# Patient Record
Sex: MALE | Race: WHITE | NOT HISPANIC OR LATINO | Employment: OTHER | URBAN - NONMETROPOLITAN AREA
[De-identification: names, ages, dates, MRNs, and addresses within clinical notes are randomized per-mention and may not be internally consistent; named-entity substitution may affect disease eponyms.]

---

## 2018-01-12 NOTE — PROGRESS NOTES
Assessment    1  Closed anterior dislocation of humerus, left, subsequent encounter (V55 29) (T48 245J)    Treatment options were discussed with the patient  Patient  was made aware  of all treatment options available  Risks, benefits, complications of injection therapy were discussed with the patient, including risk of infection, bleeding    Informed consent was obtained  The shoulder was prepared and draped in a sterile fashion and the skin cleansed with alcohol  Posterior approach was used  A 22 G needle was used 8 cc of 0 25% Marcaine and 40 mg of Depo-Medrol injected into the subacromial space and glenohumeral joint under aseptic precautions  Patient tolerated the procedure well with no complications encountered during the procedure Band-Aid was applied  No strenuous activity for 3 days post injection   Patient referred to physical therapy  Followup as scheduled     Plan  Closed anterior dislocation of humerus, left, subsequent encounter    · Betamethasone Sod Phos & Acet 6 (3-3) MG/ML Injection Suspension   · Follow-up visit in 4 Months Evaluation and Treatment  Follow-up  Status: Hold For -  Scheduling  Requested for: [id number]   · *1 - [facility] Physical Therapy  Consult  Status: Hold For - Scheduling   Requested for: [id number]  Care Summary provided  : Yes    Discussion/Summary    Left shoulder doing well status post Bankart repair  He still has some stiffness  I explained to him that this is a revision situation and he will not regain all his movements  He is aware of this  Steroid injection given to try and see if he can regain full range of motion and decrease inflammation  Patient will continie with physical therapy  Followup in 4 months  Chief Complaint    1  Shoulder Problem  Status post left shoulder revision Bankart repair      Post-Op  Post-Op Shoulder:   Chang Fernandes is status post arthroscopic Bankart repair of the left shoulder  The surgery was performed on 2/19/2015  HPI: The patient reports no excessive pain, no swelling, no fever, no shortness of breath, no calf swelling and no leg pain  PE: The surgical incision site was clean, dry and intact and not healed  The shoulder demonstrates no warmth, no induration, no erythema, no ecchymosis, no swelling and no tenderness  ROM as expected given post-op status  The patient is progressing well with ROM  Strength as expected given post-op status  The patient is progressing well with strength  Peripheral neurovascular exam reveals intact sensation to light touch and intact gross motor function  Testing for DVT is negative with soft and non-tender calves and no palpable cords  Assessment: Post-op, the patient is doing well, has excellent pain control and is showing no signs of infection  Plan: Activity Restrictions: None  Done this visit: steroid injection  Follow up: 4 months  HPI: Patient doing well status post revision left shoulder Bankart repair except for stiffness and internal rotation  Patient improving with physical therapy and has regained almost 90% range of motion and strength  Active Problems    1  Biceps tendinitis, left (726 12) (M75 22)   2  Closed anterior dislocation of humerus, left, subsequent encounter (V58 89) (S43 015D)   3  Left shoulder pain (719 41) (M25 512)   4  Rotator cuff tear, non-traumatic (727 61) (M75 100)    Social History    · Never a smoker    Current Meds   1  Allopurinol 300 MG Oral Tablet; Therapy: 40TZV6483 to (Last Rx:10Jan2012)  Requested for: 77XZO1042 Ordered   2  CeleXA 20 MG Oral Tablet (Citalopram Hydrobromide); Therapy: (HTLGEIFY:50EWU2116) to Recorded   3  ChlordiazePOXIDE HCl - 25 MG Oral Capsule; Therapy: 93GGN0445 to (Last Rx:23Adl1018)  Requested for: 96Ogs7423 Ordered   4  Endocet 5-325 MG Oral Tablet; TAKE 1 TABLET EVERY 12 HOURS AS NEEDED FOR   PAIN;   Therapy: 62QAA1534 to (Evaluate:90Cdj7718); Last Rx:51Bug1315 Ordered   5   Endocet 5-325 MG Oral Tablet; TAKE 1 TABLET EVERY 12 HOURS AS NEEDED FOR   PAIN;   Therapy: 66TVP9091 to (Evaluate:2015); Last Rx:07Nei0628 Ordered   6  Hydrochlorothiazide 12 5 MG Oral Capsule; Therapy: 00TVC6144 to (Last Rx:40Bpb9440)  Requested for: 58JTA0331 Ordered   7  Hydrocodone-Acetaminophen 5-500 MG Oral Tablet; Therapy: 57FZN6499 to (Last IS:33QZR8929)  Requested for: 14KOO1626 Ordered   8  Lisinopril 40 MG Oral Tablet; Therapy: 55SYZ2193 to (Last Rx:37Yfr6110)  Requested for: 49Ade8909 Ordered   9  Meloxicam 15 MG Oral Tablet; Therapy: ([phone]) to Recorded   10  Metoprolol Succinate ER 25 MG Oral Tablet Extended Release 24 Hour; Therapy: 78PVR8744 to (Last Rx:12Vsi9434)  Requested for: 54DBD6311 Ordered   11  PredniSONE 10 MG Oral Tablet; Therapy: 95XXI6494 to (Last Rx:2012)  Requested for: 2012 Ordered   12  Tamsulosin HCl - 0 4 MG Oral Capsule; Therapy: 65KYG5275 to (Last E72LGF8175)  Requested for: 64OVM9792 Ordered   13  TraMADol HCl - 50 MG Oral Tablet; Therapy: 01UNK5940 to (Last Rx:18Fin1918)  Requested for: 03Ald1558 Ordered    Allergies    1  Sulfur    Vitals   Recorded: [id number] 09:35AM Recorded: 77EZM6189 09:33AM   Heart Rate 68    Systolic 828    Diastolic 68    Height  5 ft 6 in   Weight  214 lb    BMI Calculated  34 54   BSA Calculated  2 07     Procedure    Procedure: Injection of the left subacromial bursa and glenohumeral joint  Indication:  inflammation  Potential complications include bleeding  Risk were discussed with the patient  Verbal consent was obtained prior to the procedure  Alcohol was used to prep the area        Signatures   Electronically signed by : JUDY Cleaning ; Rene 15 2016  9:50AM EST                       (Author)

## 2019-02-10 ENCOUNTER — APPOINTMENT (EMERGENCY)
Dept: RADIOLOGY | Facility: HOSPITAL | Age: 69
End: 2019-02-10
Payer: MEDICARE

## 2019-02-10 ENCOUNTER — HOSPITAL ENCOUNTER (INPATIENT)
Facility: HOSPITAL | Age: 69
LOS: 9 days | Discharge: HOME | DRG: 897 | End: 2019-02-19
Attending: FAMILY MEDICINE | Admitting: INTERNAL MEDICINE
Payer: MEDICARE

## 2019-02-10 ENCOUNTER — HOSPITAL ENCOUNTER (EMERGENCY)
Facility: HOSPITAL | Age: 69
End: 2019-02-10
Attending: EMERGENCY MEDICINE | Admitting: EMERGENCY MEDICINE
Payer: MEDICARE

## 2019-02-10 ENCOUNTER — APPOINTMENT (EMERGENCY)
Dept: CT IMAGING | Facility: HOSPITAL | Age: 69
End: 2019-02-10
Payer: MEDICARE

## 2019-02-10 VITALS
TEMPERATURE: 99.5 F | HEART RATE: 87 BPM | RESPIRATION RATE: 18 BRPM | HEIGHT: 67 IN | SYSTOLIC BLOOD PRESSURE: 122 MMHG | WEIGHT: 204.59 LBS | BODY MASS INDEX: 32.11 KG/M2 | OXYGEN SATURATION: 92 % | DIASTOLIC BLOOD PRESSURE: 57 MMHG

## 2019-02-10 DIAGNOSIS — R26.2 AMBULATORY DYSFUNCTION: Primary | ICD-10-CM

## 2019-02-10 DIAGNOSIS — R27.0 ATAXIA: Primary | ICD-10-CM

## 2019-02-10 DIAGNOSIS — R25.1 TREMOR, UNSPECIFIED: ICD-10-CM

## 2019-02-10 DIAGNOSIS — F10.10 ALCOHOL ABUSE: ICD-10-CM

## 2019-02-10 DIAGNOSIS — E87.6 HYPOKALEMIA: ICD-10-CM

## 2019-02-10 PROBLEM — I10 HYPERTENSION: Status: ACTIVE | Noted: 2019-02-10

## 2019-02-10 LAB
ALBUMIN SERPL BCP-MCNC: 3.9 G/DL (ref 3.5–5)
ALP SERPL-CCNC: 69 U/L (ref 46–116)
ALT SERPL W P-5'-P-CCNC: 25 U/L (ref 12–78)
AMMONIA PLAS-SCNC: <10 UMOL/L (ref 11–35)
AMPHETAMINES SERPL QL SCN: NEGATIVE
ANION GAP SERPL CALCULATED.3IONS-SCNC: 8 MMOL/L (ref 4–13)
APAP SERPL-MCNC: <2 UG/ML (ref 10–30)
APTT PPP: 26 SECONDS (ref 26–38)
AST SERPL W P-5'-P-CCNC: 16 U/L (ref 5–45)
BACTERIA UR QL AUTO: ABNORMAL /HPF
BARBITURATES UR QL: NEGATIVE
BASOPHILS # BLD AUTO: 0.05 THOUSANDS/ΜL (ref 0–0.1)
BASOPHILS NFR BLD AUTO: 1 % (ref 0–1)
BENZODIAZ UR QL: POSITIVE
BILIRUB SERPL-MCNC: 0.8 MG/DL (ref 0.2–1)
BILIRUB UR QL STRIP: NEGATIVE
BUN SERPL-MCNC: 17 MG/DL (ref 5–25)
CALCIUM SERPL-MCNC: 9.3 MG/DL (ref 8.3–10.1)
CHLORIDE SERPL-SCNC: 103 MMOL/L (ref 100–108)
CLARITY UR: CLEAR
CO2 SERPL-SCNC: 30 MMOL/L (ref 21–32)
COCAINE UR QL: NEGATIVE
COLOR UR: YELLOW
CREAT SERPL-MCNC: 0.89 MG/DL (ref 0.6–1.3)
DEPRECATED D DIMER PPP: <270 NG/ML (FEU)
EOSINOPHIL # BLD AUTO: 0.03 THOUSAND/ΜL (ref 0–0.61)
EOSINOPHIL NFR BLD AUTO: 0 % (ref 0–6)
ERYTHROCYTE [DISTWIDTH] IN BLOOD BY AUTOMATED COUNT: 12.2 % (ref 11.6–15.1)
ERYTHROCYTE [SEDIMENTATION RATE] IN BLOOD: 4 MM/HOUR (ref 0–10)
ETHANOL SERPL-MCNC: <3 MG/DL (ref 0–3)
GFR SERPL CREATININE-BSD FRML MDRD: 87 ML/MIN/1.73SQ M
GLUCOSE SERPL-MCNC: 103 MG/DL (ref 65–140)
GLUCOSE UR STRIP-MCNC: NEGATIVE MG/DL
HCT VFR BLD AUTO: 43.3 % (ref 36.5–49.3)
HGB BLD-MCNC: 14.6 G/DL (ref 12–17)
HGB UR QL STRIP.AUTO: ABNORMAL
IMM GRANULOCYTES # BLD AUTO: 0.06 THOUSAND/UL (ref 0–0.2)
IMM GRANULOCYTES NFR BLD AUTO: 1 % (ref 0–2)
INR PPP: 1.09 (ref 0.86–1.17)
KETONES UR STRIP-MCNC: ABNORMAL MG/DL
LEUKOCYTE ESTERASE UR QL STRIP: NEGATIVE
LYMPHOCYTES # BLD AUTO: 1.13 THOUSANDS/ΜL (ref 0.6–4.47)
LYMPHOCYTES NFR BLD AUTO: 12 % (ref 14–44)
MAGNESIUM SERPL-MCNC: 1.6 MG/DL (ref 1.6–2.6)
MCH RBC QN AUTO: 33.8 PG (ref 26.8–34.3)
MCHC RBC AUTO-ENTMCNC: 33.7 G/DL (ref 31.4–37.4)
MCV RBC AUTO: 100 FL (ref 82–98)
METHADONE UR QL: NEGATIVE
MONOCYTES # BLD AUTO: 1.05 THOUSAND/ΜL (ref 0.17–1.22)
MONOCYTES NFR BLD AUTO: 11 % (ref 4–12)
MUCOUS THREADS UR QL AUTO: ABNORMAL
NEUTROPHILS # BLD AUTO: 7.08 THOUSANDS/ΜL (ref 1.85–7.62)
NEUTS SEG NFR BLD AUTO: 75 % (ref 43–75)
NITRITE UR QL STRIP: NEGATIVE
NON-SQ EPI CELLS URNS QL MICRO: ABNORMAL /HPF
NRBC BLD AUTO-RTO: 0 /100 WBCS
OPIATES UR QL SCN: POSITIVE
PCP UR QL: NEGATIVE
PH UR STRIP.AUTO: 6 [PH] (ref 4.5–8)
PLATELET # BLD AUTO: 210 THOUSANDS/UL (ref 149–390)
PLATELET # BLD AUTO: 224 THOUSANDS/UL (ref 149–390)
PMV BLD AUTO: 9.6 FL (ref 8.9–12.7)
PMV BLD AUTO: 9.7 FL (ref 8.9–12.7)
POTASSIUM SERPL-SCNC: 3 MMOL/L (ref 3.5–5.3)
PROT SERPL-MCNC: 7.6 G/DL (ref 6.4–8.2)
PROT UR STRIP-MCNC: NEGATIVE MG/DL
PROTHROMBIN TIME: 13.6 SECONDS (ref 11.8–14.2)
RBC # BLD AUTO: 4.32 MILLION/UL (ref 3.88–5.62)
RBC #/AREA URNS AUTO: ABNORMAL /HPF
SALICYLATES SERPL-MCNC: <3 MG/DL (ref 3–20)
SODIUM SERPL-SCNC: 141 MMOL/L (ref 136–145)
SP GR UR STRIP.AUTO: 1.02 (ref 1–1.03)
THC UR QL: NEGATIVE
TROPONIN I SERPL-MCNC: <0.02 NG/ML
TSH SERPL DL<=0.05 MIU/L-ACNC: 1.03 UIU/ML (ref 0.36–3.74)
UROBILINOGEN UR QL STRIP.AUTO: 0.2 E.U./DL
VIT B12 SERPL-MCNC: 532 PG/ML (ref 100–900)
WBC # BLD AUTO: 9.4 THOUSAND/UL (ref 4.31–10.16)
WBC #/AREA URNS AUTO: ABNORMAL /HPF

## 2019-02-10 PROCEDURE — 82607 VITAMIN B-12: CPT | Performed by: INTERNAL MEDICINE

## 2019-02-10 PROCEDURE — 83735 ASSAY OF MAGNESIUM: CPT | Performed by: EMERGENCY MEDICINE

## 2019-02-10 PROCEDURE — 85025 COMPLETE CBC W/AUTO DIFF WBC: CPT

## 2019-02-10 PROCEDURE — 85610 PROTHROMBIN TIME: CPT | Performed by: EMERGENCY MEDICINE

## 2019-02-10 PROCEDURE — 99285 EMERGENCY DEPT VISIT HI MDM: CPT

## 2019-02-10 PROCEDURE — 81001 URINALYSIS AUTO W/SCOPE: CPT | Performed by: EMERGENCY MEDICINE

## 2019-02-10 PROCEDURE — 71046 X-RAY EXAM CHEST 2 VIEWS: CPT

## 2019-02-10 PROCEDURE — 70498 CT ANGIOGRAPHY NECK: CPT

## 2019-02-10 PROCEDURE — 80329 ANALGESICS NON-OPIOID 1 OR 2: CPT | Performed by: EMERGENCY MEDICINE

## 2019-02-10 PROCEDURE — 93005 ELECTROCARDIOGRAM TRACING: CPT

## 2019-02-10 PROCEDURE — 36415 COLL VENOUS BLD VENIPUNCTURE: CPT | Performed by: EMERGENCY MEDICINE

## 2019-02-10 PROCEDURE — 96361 HYDRATE IV INFUSION ADD-ON: CPT

## 2019-02-10 PROCEDURE — 82140 ASSAY OF AMMONIA: CPT

## 2019-02-10 PROCEDURE — 84484 ASSAY OF TROPONIN QUANT: CPT

## 2019-02-10 PROCEDURE — 85730 THROMBOPLASTIN TIME PARTIAL: CPT | Performed by: EMERGENCY MEDICINE

## 2019-02-10 PROCEDURE — 80320 DRUG SCREEN QUANTALCOHOLS: CPT | Performed by: EMERGENCY MEDICINE

## 2019-02-10 PROCEDURE — 80307 DRUG TEST PRSMV CHEM ANLYZR: CPT | Performed by: EMERGENCY MEDICINE

## 2019-02-10 PROCEDURE — 80053 COMPREHEN METABOLIC PANEL: CPT

## 2019-02-10 PROCEDURE — 84443 ASSAY THYROID STIM HORMONE: CPT | Performed by: EMERGENCY MEDICINE

## 2019-02-10 PROCEDURE — 70496 CT ANGIOGRAPHY HEAD: CPT

## 2019-02-10 PROCEDURE — 85379 FIBRIN DEGRADATION QUANT: CPT | Performed by: EMERGENCY MEDICINE

## 2019-02-10 PROCEDURE — 96376 TX/PRO/DX INJ SAME DRUG ADON: CPT

## 2019-02-10 PROCEDURE — 85652 RBC SED RATE AUTOMATED: CPT | Performed by: INTERNAL MEDICINE

## 2019-02-10 PROCEDURE — 85049 AUTOMATED PLATELET COUNT: CPT | Performed by: INTERNAL MEDICINE

## 2019-02-10 PROCEDURE — 96375 TX/PRO/DX INJ NEW DRUG ADDON: CPT

## 2019-02-10 PROCEDURE — 96365 THER/PROPH/DIAG IV INF INIT: CPT

## 2019-02-10 RX ORDER — LORAZEPAM 2 MG/ML
1 INJECTION INTRAMUSCULAR EVERY 6 HOURS PRN
Status: DISCONTINUED | OUTPATIENT
Start: 2019-02-10 | End: 2019-02-18

## 2019-02-10 RX ORDER — FINASTERIDE 5 MG/1
5 TABLET, FILM COATED ORAL DAILY
Status: DISCONTINUED | OUTPATIENT
Start: 2019-02-11 | End: 2019-02-19 | Stop reason: HOSPADM

## 2019-02-10 RX ORDER — LORAZEPAM 2 MG/ML
0.5 INJECTION INTRAMUSCULAR ONCE
Status: COMPLETED | OUTPATIENT
Start: 2019-02-10 | End: 2019-02-10

## 2019-02-10 RX ORDER — FOLIC ACID 1 MG/1
1 TABLET ORAL DAILY
Status: DISCONTINUED | OUTPATIENT
Start: 2019-02-11 | End: 2019-02-19 | Stop reason: HOSPADM

## 2019-02-10 RX ORDER — ACETAMINOPHEN 325 MG/1
650 TABLET ORAL EVERY 6 HOURS PRN
Status: DISCONTINUED | OUTPATIENT
Start: 2019-02-10 | End: 2019-02-19 | Stop reason: HOSPADM

## 2019-02-10 RX ORDER — CITALOPRAM 20 MG/1
20 TABLET ORAL DAILY
Status: DISCONTINUED | OUTPATIENT
Start: 2019-02-11 | End: 2019-02-19 | Stop reason: HOSPADM

## 2019-02-10 RX ORDER — POTASSIUM CHLORIDE 20 MEQ/1
40 TABLET, EXTENDED RELEASE ORAL ONCE
Status: COMPLETED | OUTPATIENT
Start: 2019-02-10 | End: 2019-02-10

## 2019-02-10 RX ORDER — CITALOPRAM 20 MG/1
20 TABLET ORAL DAILY
Status: ON HOLD | COMMUNITY
End: 2021-06-02

## 2019-02-10 RX ORDER — FINASTERIDE 5 MG/1
5 TABLET, FILM COATED ORAL DAILY
COMMUNITY

## 2019-02-10 RX ORDER — AMLODIPINE BESYLATE 10 MG/1
10 TABLET ORAL DAILY
Status: DISCONTINUED | OUTPATIENT
Start: 2019-02-11 | End: 2019-02-19 | Stop reason: HOSPADM

## 2019-02-10 RX ORDER — SODIUM CHLORIDE 9 MG/ML
125 INJECTION, SOLUTION INTRAVENOUS CONTINUOUS
Status: DISCONTINUED | OUTPATIENT
Start: 2019-02-10 | End: 2019-02-10 | Stop reason: HOSPADM

## 2019-02-10 RX ORDER — LISINOPRIL 20 MG/1
40 TABLET ORAL DAILY
Status: DISCONTINUED | OUTPATIENT
Start: 2019-02-11 | End: 2019-02-19 | Stop reason: HOSPADM

## 2019-02-10 RX ORDER — CHLORDIAZEPOXIDE HYDROCHLORIDE 25 MG/1
25 CAPSULE, GELATIN COATED ORAL 3 TIMES DAILY
Status: DISCONTINUED | OUTPATIENT
Start: 2019-02-10 | End: 2019-02-19 | Stop reason: HOSPADM

## 2019-02-10 RX ORDER — LISINOPRIL 40 MG/1
40 TABLET ORAL DAILY
COMMUNITY
Start: 2011-12-28 | End: 2019-02-10

## 2019-02-10 RX ORDER — KETOROLAC TROMETHAMINE 30 MG/ML
30 INJECTION, SOLUTION INTRAMUSCULAR; INTRAVENOUS ONCE
Status: COMPLETED | OUTPATIENT
Start: 2019-02-10 | End: 2019-02-10

## 2019-02-10 RX ORDER — LORAZEPAM 2 MG/ML
1 INJECTION INTRAMUSCULAR ONCE
Status: COMPLETED | OUTPATIENT
Start: 2019-02-10 | End: 2019-02-10

## 2019-02-10 RX ORDER — AMLODIPINE BESYLATE 10 MG/1
10 TABLET ORAL DAILY
COMMUNITY
End: 2019-07-13 | Stop reason: ALTCHOICE

## 2019-02-10 RX ADMIN — KETOROLAC TROMETHAMINE 30 MG: 30 INJECTION, SOLUTION INTRAMUSCULAR at 20:36

## 2019-02-10 RX ADMIN — SODIUM CHLORIDE 125 ML/HR: 0.9 INJECTION, SOLUTION INTRAVENOUS at 10:26

## 2019-02-10 RX ADMIN — POTASSIUM CHLORIDE 40 MEQ: 1500 TABLET, EXTENDED RELEASE ORAL at 09:29

## 2019-02-10 RX ADMIN — THIAMINE HYDROCHLORIDE 100 MG: 100 INJECTION, SOLUTION INTRAMUSCULAR; INTRAVENOUS at 12:58

## 2019-02-10 RX ADMIN — IOHEXOL 85 ML: 350 INJECTION, SOLUTION INTRAVENOUS at 11:03

## 2019-02-10 RX ADMIN — LORAZEPAM 0.5 MG: 2 INJECTION, SOLUTION INTRAMUSCULAR; INTRAVENOUS at 11:21

## 2019-02-10 RX ADMIN — LORAZEPAM 1 MG: 2 INJECTION, SOLUTION INTRAMUSCULAR; INTRAVENOUS at 14:11

## 2019-02-10 RX ADMIN — CHLORDIAZEPOXIDE HYDROCHLORIDE 25 MG: 25 CAPSULE ORAL at 20:41

## 2019-02-10 NOTE — ED PROVIDER NOTES
History  Chief Complaint   Patient presents with    Dizziness     Was in [place] became shaky and dizzy on thursday  Tripped over curbing friday , hit left shoulder ,nose, and hand  Was seen in ED ER in [place]  Now dizziness and shakiness worse  Having trouble walking  Pt gives hx of 2-3 weeks of off and on shaking for "months"   Pt relates mostly with stress/being upset"  Pt is worried about fathers declining health  Sx have worsened in last 2-3 weeks and pt took a trip to [place] last week  "to try to relax"    While he was there he tripped and fell -hitting face  Was seen at ER and had neg CTs and blood work  Pt got home 2 days ago and has had worsening sx and now difficulty standing/walking  Pt is off balance and feels like going to fall to left  Pt also  States he has had shooting left chest pain when standing and changing positions  Pt denies SOB No Abd pain  No vomiting  No fever/chills  No med changes    REview of [place] records- Pt with CT head -no acute findings  Does have findings of small vessel dx ; Facial CT -no acute findings; CXR/Ribs - no acute findings  Labs-K  3 1    Pt presents anxious   Is alert  And oriented  Has spastic type tremors  When asked to attempt sitting up and standing became extremely tremorous and voicing dizziness and shootin left CP  Pt unstable to ambulate  Although , has had some tremor issues in past- the ambulatory problem is acute      History provided by:  Patient and relative      Prior to Admission Medications   Prescriptions Last Dose Informant Patient Reported? Taking? amLODIPine (NORVASC) 10 mg tablet 2/10/2019 at Unknown time  Yes Yes   Sig: Take 10 mg by mouth daily   chlordiazePOXIDE (LIBRIUM) 25 mg capsule Unknown at Unknown time  Yes No   Sig: Take 25 mg by mouth     finasteride (PROSCAR) 5 mg tablet 2/10/2019 at Unknown time  Yes Yes   Sig: Take 5 mg by mouth daily   lisinopril (ZESTRIL) 40 mg tablet 2/10/2019 at Unknown time  Yes Yes   Sig: Take 40 mg by mouth daily  Facility-Administered Medications: None       Past Medical History:   Diagnosis Date    [place] (hard of hearing)     Hypertension     Renal disorder     kidney    Shoulder dislocation        Past Surgical History:   Procedure Laterality Date    CHOLECYSTECTOMY      SHOULDER SURGERY      for dislocation       History reviewed  No pertinent family history  I have reviewed and agree with the history as documented  Social History     Tobacco Use    Smoking status: Never Smoker    Smokeless tobacco: Never Used   Substance Use Topics    Alcohol use: Yes     Comment: at times    Drug use: No        Review of Systems   Constitutional: Positive for activity change  Negative for chills, diaphoresis and fever  HENT: Negative  Negative for congestion, drooling, facial swelling, trouble swallowing and voice change  Eyes: Negative  Negative for pain, redness and visual disturbance  Respiratory: Negative  Negative for choking, chest tightness and shortness of breath  Cardiovascular: Positive for chest pain  Negative for leg swelling  Gastrointestinal: Negative  Negative for blood in stool, constipation, diarrhea, nausea and vomiting  Genitourinary: Negative  Negative for difficulty urinating, dysuria and frequency  Musculoskeletal: Positive for gait problem  Negative for neck pain and neck stiffness  Skin: Negative  Negative for pallor, rash and wound  Neurological: Positive for tremors and weakness  Negative for dizziness, syncope, facial asymmetry, speech difficulty and headaches  Psychiatric/Behavioral: Positive for confusion  Negative for self-injury and suicidal ideas  The patient is nervous/anxious  All other systems reviewed and are negative  Physical Exam  Physical Exam   Constitutional: He is oriented to person, place, and time  He appears well-developed and well-nourished  Non-toxic appearance  HENT:   Head: Normocephalic   Head is with abrasion (well healed on nose)  Head is without raccoon's eyes and without Brewster's sign  Right Ear: Tympanic membrane and ear canal normal    Left Ear: Tympanic membrane and ear canal normal    Mouth/Throat: Oropharynx is clear and moist and mucous membranes are normal    Eyes: Pupils are equal, round, and reactive to light  Conjunctivae and lids are normal    Neck: Normal range of motion and phonation normal  Neck supple  No JVD present  Cardiovascular: Regular rhythm and intact distal pulses  No extrasystoles are present  No perf edema   Pulmonary/Chest: No stridor  No respiratory distress  He has no wheezes  He has no rhonchi  He has no rales  Abdominal: Soft  Bowel sounds are normal  There is no rigidity, no rebound, no guarding and no CVA tenderness  Neurological: He is alert and oriented to person, place, and time  He displays tremor (spastic)  He displays no atrophy  No cranial nerve deficit  Sensory deficit: unable to understand  He displays no seizure activity  Coordination and gait abnormal    Unable to ambulate with out asst   Is ataxic/wisebased  Unable to forrest toe/heel   Skin: Skin is warm and dry  No rash noted  He is not diaphoretic  No cyanosis  Psychiatric: His mood appears anxious  His speech is not slurred  He is not slowed and not combative  Thought content is not paranoid and not delusional  Cognition and memory are normal  He expresses no homicidal and no suicidal ideation  He is communicative  Vitals reviewed        Vital Signs  ED Triage Vitals [02/10/19 0834]   Temperature Pulse Respirations Blood Pressure SpO2   99 5 °F (37 5 °C) 68 (!) 24 141/77 97 %      Temp Source Heart Rate Source Patient Position - Orthostatic VS BP Location FiO2 (%)   Temporal Monitor Lying Left arm --      Pain Score       8           Vitals:    02/10/19 0834 02/10/19 1030 02/10/19 1245 02/10/19 1400   BP: 141/77 145/77 137/64 122/57   Pulse: 68 67 69 87   Patient Position - Orthostatic VS: Lying Lying Sitting Sitting       Visual Acuity  Visual Acuity      Most Recent Value   L Pupil Size (mm)  3   R Pupil Size (mm)  3          ED Medications  Medications   potassium chloride (K-DUR,KLOR-CON) CR tablet 40 mEq (40 mEq Oral Given 2/10/19 0929)   iohexol (OMNIPAQUE) 350 MG/ML injection (SINGLE-DOSE) 85 mL (85 mL Intravenous Given 2/10/19 1103)   LORazepam (ATIVAN) 2 mg/mL injection 0 5 mg (0 5 mg Intravenous Given 2/10/19 1121)   thiamine (VITAMIN B1) 100 mg in sodium chloride 0 9 % 50 mL IVPB (0 mg Intravenous Stopped 2/10/19 1331)   LORazepam (ATIVAN) 2 mg/mL injection 1 mg (1 mg Intravenous Given 2/10/19 1411)       Diagnostic Studies  Results Reviewed     Procedure Component Value Units Date/Time    Rapid drug screen, urine [[id number]]  (Abnormal) Collected:  02/10/19 1135    Lab Status:  Final result Specimen:  Urine, Catheter Updated:  02/10/19 1156     Amph/Meth UR Negative     Barbiturate Ur Negative     Benzodiazepine Urine Positive     Cocaine Urine Negative     Methadone Urine Negative     Opiate Urine Positive     PCP Ur Negative     THC Urine Negative    Narrative:       Presumptive report  If requested, specimen will be sent to reference lab for confirmation  FOR MEDICAL PURPOSES ONLY  IF CONFIRMATION NEEDED PLEASE CONTACT THE LAB WITHIN 5 DAYS    Drug Screen Cutoff Levels:  AMPHETAMINE/METHAMPHETAMINES  1000 ng/mL  BARBITURATES     200 ng/mL  BENZODIAZEPINES     200 ng/mL  COCAINE      300 ng/mL  METHADONE      300 ng/mL  OPIATES      300 ng/mL  PHENCYCLIDINE     25 ng/mL  THC       50 ng/mL    Urine Microscopic [[id number]]  (Abnormal) Collected:  02/10/19 1135    Lab Status:  Final result Specimen:  Urine, Straight Cath Updated:  02/10/19 1154     RBC, UA 10-20 /hpf      WBC, UA 1-2 /hpf      Epithelial Cells Occasional /hpf      Bacteria, UA None Seen /hpf      MUCUS THREADS Moderate    UA w Reflex to Microscopic w Reflex to Culture [[id number]]  (Abnormal) Collected:  02/10/19 1135    Lab Status: Final result Specimen:  Urine, Straight Cath Updated:  02/10/19 1146     Color, UA Yellow     Clarity, UA Clear     Specific Thicket, UA 1 020     pH, UA 6 0     Leukocytes, UA Negative     Nitrite, UA Negative     Protein, UA Negative mg/dl      Glucose, UA Negative mg/dl      Ketones, UA 15 (1+) mg/dl      Urobilinogen, UA 0 2 E U /dl      Bilirubin, UA Negative     Blood, UA Small    TSH [[id number]]  (Normal) Collected:  02/10/19 0856    Lab Status:  Final result Specimen:  Blood from Arm, Right Updated:  02/10/19 0952     TSH 3RD GENERATON 1 034 uIU/mL     Narrative:       Patients undergoing fluorescein dye angiography may retain small amounts of fluorescein in the body for 48-72 hours post procedure  Samples containing fluorescein can produce falsely depressed TSH values  If the patient had this procedure,a specimen should be resubmitted post fluorescein clearance      Magnesium [[id number]]  (Normal) Collected:  02/10/19 0856    Lab Status:  Final result Specimen:  Blood from Arm, Right Updated:  02/10/19 0952     Magnesium 1 6 mg/dL     Ethanol [[id number]]  (Normal) Collected:  02/10/19 0918    Lab Status:  Final result Specimen:  Blood Updated:  02/10/19 0943     Ethanol Lvl <3 0 mg/dL     Ammonia [[id number]]  (Abnormal) Collected:  02/10/19 0920    Lab Status:  Final result Specimen:  Blood from Arm, Left Updated:  02/10/19 0937     Ammonia <12 umol/L     Salicylate level [[id number]]  (Abnormal) Collected:  02/10/19 0917    Lab Status:  Final result Specimen:  Blood Updated:  37/31/06 3219     Salicylate Lvl <5 8 mg/dL     Acetaminophen level [[id number]]  (Abnormal) Collected:  02/10/19 0917    Lab Status:  Final result Specimen:  Blood Updated:  02/10/19 0936     Acetaminophen Level <2 0 ug/mL     D-Dimer [[id number]]  (Normal) Collected:  02/10/19 0856    Lab Status:  Final result Specimen:  Blood from Arm, Right Updated:  02/10/19 0929     D-Dimer, Quant <270 ng/ml (FEU)     Protime-INR [[id number]] (Normal) Collected:  02/10/19 0856    Lab Status:  Final result Specimen:  Blood from Arm, Right Updated:  02/10/19 0925     Protime 13 6 seconds      INR 1 09    APTT [[id number]]  (Normal) Collected:  02/10/19 0856    Lab Status:  Final result Specimen:  Blood from Arm, Right Updated:  02/10/19 0925     PTT 26 seconds     Troponin I [[id number]]  (Normal) Collected:  02/10/19 0856    Lab Status:  Final result Specimen:  Blood from Arm, Right Updated:  02/10/19 0924     Troponin I <0 02 ng/mL     Comprehensive metabolic panel [[id number]]  (Abnormal) Collected:  02/10/19 0856    Lab Status:  Final result Specimen:  Blood from Arm, Right Updated:  02/10/19 5019     Sodium 141 mmol/L      Potassium 3 0 mmol/L      Chloride 103 mmol/L      CO2 30 mmol/L      ANION GAP 8 mmol/L      BUN 17 mg/dL      Creatinine 0 89 mg/dL      Glucose 103 mg/dL      Calcium 9 3 mg/dL      AST 16 U/L      ALT 25 U/L      Alkaline Phosphatase 69 U/L      Total Protein 7 6 g/dL      Albumin 3 9 g/dL      Total Bilirubin 0 80 mg/dL      eGFR 87 ml/min/1 73sq m     Narrative:       National Kidney Disease Education Program recommendations are as follows:  GFR calculation is accurate only with a steady state creatinine  Chronic Kidney disease less than 60 ml/min/1 73 sq  meters  Kidney failure less than 15 ml/min/1 73 sq  meters      CBC and differential [[id number]]  (Abnormal) Collected:  02/10/19 0856    Lab Status:  Final result Specimen:  Blood from Arm, Right Updated:  02/10/19 0905     WBC 9 40 Thousand/uL      RBC 4 32 Million/uL      Hemoglobin 14 6 g/dL      Hematocrit 43 3 %       fL      MCH 33 8 pg      MCHC 33 7 g/dL      RDW 12 2 %      MPV 9 7 fL      Platelets 268 Thousands/uL      nRBC 0 /100 WBCs      Neutrophils Relative 75 %      Immat GRANS % 1 %      Lymphocytes Relative 12 %      Monocytes Relative 11 %      Eosinophils Relative 0 %      Basophils Relative 1 %      Neutrophils Absolute 7 08 Thousands/µL Immature Grans Absolute 0 06 Thousand/uL      Lymphocytes Absolute 1 13 Thousands/µL      Monocytes Absolute 1 05 Thousand/µL      Eosinophils Absolute 0 03 Thousand/µL      Basophils Absolute 0 05 Thousands/µL                  CTA head and neck with and without contrast   Final Result by Cary Mclaughlin MD (02/10 1136)   No acute intracranial hemorrhage or mass effect  Age-appropriate global atrophy  No significant focal brain parenchymal abnormalities      No evidence of critical stenosis, dissection or occlusion involving cervical carotid or vertebral segments or visualized cerebral arteries      Probable anterior right frontal jose e hole  Workstation performed: [id number]         X-ray chest 2 views   Final Result by Jennifer Wayne MD (02/10 3692)      No acute cardiopulmonary disease  Workstation performed: [id number]                    Procedures  ECG 12 Lead Documentation  Date/Time: 2/10/2019 9:14 AM  Performed by: Milly Quintanilla DO  Authorized by: Milly Quintanilla DO     ECG reviewed by me, the ED Provider: yes    Patient location:  ED  Interpretation:     Interpretation: non-specific    Rate:     ECG rate assessment: normal    Rhythm:     Rhythm: sinus rhythm    Ectopy:     Ectopy: none             Phone Contacts  ED Phone Contact    ED Course  ED Course as of Feb 10 2217   Sun Feb 10, 2019   0921 WBC: 9 40   0921 Hemoglobin: 14 6   0921 HCT: 43 3   0923 Potassium(!): 3 0   0923 Glucose, Random: 103   0940 Ammonia(!): <10   0940 ACETAMINOPHEN LEVEL(!): <2 1   2732 SALICYLATE LEVEL(!): <7 5   0940 D-DIMER QUANTITATIVE: <270   0958 D-DIMER QUANTITATIVE: <270   1113 TSH 3RD GENERATON: 1 034   1145 No acute intracranial hemorrhage or mass effect  Age-appropriate global atrophy      No significant focal brain parenchymal abnormalities    No evidence of critical stenosis, dissection or occlusion involving cervical carotid or vertebral segments or visualized cerebral arteries    Probable anterior right frontal jose e hole  [facility](!): Positive   1156 OPIATE URINE(!): Positive   1157 Noted benzo/opiate in UDS- discussed with Pt  States last pain med 6 days ago and no known benzos   Pt states only ETOH occ- wife states daily for 30 yrs      1206 Will admit      1403 Pt becoming aggressive and more shaking-will medicate another dose of Ativan      1404 Waiting transfer                                  MDM    Disposition  Final diagnoses:   Ambulatory dysfunction   Tremor, unspecified   Alcohol abuse     Time reflects when diagnosis was documented in both MDM as applicable and the Disposition within this note     Time User Action Codes Description Comment    2/10/2019  1:05 PM Lenell Alba Add [R26 2] Ambulatory dysfunction     2/10/2019  1:06 PM Lenell Alba Add [R25 1] Tremor, unspecified     2/10/2019  1:06 PM Lenell Alba Add [F10 10] Alcohol abuse       ED Disposition     ED Disposition Condition Date/Time Comment    Transfer to Another Facility-In Network  [place] Feb 10, 2019 12:26 PM Soledad Keepers should be transferred out to *[facility]        MD Documentation      Most Recent Value   Patient Condition  The patient has been stabilized such that within reasonable medical probability, no material deterioration of the patient condition or the condition of the unborn child(gabriela) is likely to result from the transfer   Reason for Transfer  No bed available at level of patient's needs   Benefits of Transfer  Specialized equipment and/or services available at the receiving facility (Include comment)________________________   Risks of Transfer  Potential for delay in receiving treatment, Potential deterioration of medical condition, Increased discomfort during transfer, Possible worsening of condition or death during transfer   Accepting Physician  Dr Nasima Elena Name, [facility],[address]    (Name & Tel number)  Sriram Thacker   Sending MD Mauro   Provider Certification  General risk, such as traffic hazards, adverse weather conditions, rough terrain or turbulence, possible failure of equipment (including vehicle or aircraft), or consequences of actions of persons outside the control of the transport personnel, Unanticipated needs of medical equipment and personnel during transport, Consent was not obtained as patient is committed to psychiatric facility and transfer is mandated      RN Documentation      Most [facility] Name, Gustavo Urias 984 Assignment  [phone]    (Name & Tel number)  Sriarm Thacker   Report Given to  FLORIDALMA Turcios      Follow-up Information    None         Discharge Medication List as of 2/10/2019  3:58 PM      CONTINUE these medications which have NOT CHANGED    Details   amLODIPine (NORVASC) 10 mg tablet Take 10 mg by mouth daily, Historical Med      finasteride (PROSCAR) 5 mg tablet Take 5 mg by mouth daily, Historical Med      lisinopril (ZESTRIL) 40 mg tablet Take 40 mg by mouth daily  , Until Discontinued, Historical Med      chlordiazePOXIDE (LIBRIUM) 25 mg capsule Take 25 mg by mouth , Until Discontinued, Historical Med           No discharge procedures on file      ED Provider  Electronically Signed by           Laura Hernandez DO  02/10/19 7236

## 2019-02-10 NOTE — EMTALA/ACUTE CARE TRANSFER
[facility] EMERGENCY DEPARTMENT  [address] [facility] [address]  Dept: [phone]      EMTALA TRANSFER CONSENT    NAME Jose Loco                                         1950                              MRN [id number]    I have been informed of my rights regarding examination, treatment, and transfer   by Dr Beau Mike DO    Benefits: Specialized equipment and/or services available at the receiving facility (Include comment)________________________    Risks: Potential for delay in receiving treatment, Potential deterioration of medical condition, Increased discomfort during transfer, Possible worsening of condition or death during transfer      Transfer Request   I acknowledge that my medical condition has been evaluated and explained to me by the emergency department physician or other qualified medical person and/or my attending physician who has recommended and offered to me further medical examination and treatment  I understand the Hospital's obligation with respect to the treatment and stabilization of my emergency medical condition  I nevertheless request to be transferred  I release the Hospital, the doctor, and any other persons caring for me from all responsibility or liability for any injury or ill effects that may result from my transfer and agree to accept all responsibility for the consequences of my choice to transfer, rather than receive stabilizing treatment at the Hospital  I understand that because the transfer is my request, my insurance may not provide reimbursement for the services  The Hospital will assist and direct me and my family in how to make arrangements for transfer, but the hospital is not liable for any fees charged by the transport service  In spite of this understanding, I refuse to consent to further medical examination and treatment which has been offered to me, and request transfer to  Walter [facility] Name, [address] : [address] [address]  I authorize the performance of emergency medical procedures and treatments upon me in both transit and upon arrival at the receiving facility  Additionally, I authorize the release of any and all medical records to the receiving facility and request they be transported with me, if possible  I authorize the performance of emergency medical procedures and treatments upon me in both transit and upon arrival at the receiving facility  Additionally, I authorize the release of any and all medical records to the receiving facility and request they be transported with me, if possible  I understand that the safest mode of transportation during a medical emergency is an ambulance and that the Hospital advocates the use of this mode of transport  Risks of traveling to the receiving facility by car, including absence of medical control, life sustaining equipment, such as oxygen, and medical personnel has been explained to me and I fully understand them  (JOLIE CORRECT BOX BELOW)  [  ]  I consent to the stated transfer and to be transported by ambulance/helicopter  [  ]  I consent to the stated transfer, but refuse transportation by ambulance and accept full responsibility for my transportation by car  I understand the risks of non-ambulance transfers and I exonerate the Hospital and its staff from any deterioration in my condition that results from this refusal     X___________________________________________    DATE  02/10/19  TIME________  Signature of patient or legally responsible individual signing on patient behalf           RELATIONSHIP TO PATIENT_________________________          Provider Certification    NAME Jaylin Jaramillo                                         1950                              MRN [id number]    A medical screening exam was performed on the above named patient  Based on the examination:    Condition Necessitating Transfer The primary encounter diagnosis was Ambulatory dysfunction  Diagnoses of Tremor, unspecified and Alcohol abuse were also pertinent to this visit  Patient Condition: The patient has been stabilized such that within reasonable medical probability, no material deterioration of the patient condition or the condition of the unborn child(gabriela) is likely to result from the transfer    Reason for Transfer: No bed available at level of patient's needs    Transfer Requirements: [facility] [address]   · Space available and qualified personnel available for treatment as acknowledged by [employer]  · Agreed to accept transfer and to provide appropriate medical treatment as acknowledged by       Dr Angie Farfan  · Appropriate medical records of the examination and treatment of the patient are provided at the time of transfer   [address] [facility],[address] _______  · Transfer will be performed by qualified personnel from    and appropriate transfer equipment as required, including the use of necessary and appropriate life support measures      Provider Certification: I have examined the patient and explained the following risks and benefits of being transferred/refusing transfer to the patient/family:  General risk, such as traffic hazards, adverse weather conditions, rough terrain or turbulence, possible failure of equipment (including vehicle or aircraft), or consequences of actions of persons outside the control of the transport personnel, Unanticipated needs of medical equipment and personnel during transport, Consent was not obtained as patient is committed to psychiatric facility and transfer is mandated      Based on these reasonable risks and benefits to the patient and/or the unborn child(gabriela), and based upon the information available at the time of the patients examination, I certify that the medical benefits reasonably to be expected from the provision of appropriate medical treatments at another medical facility outweigh the increasing risks, if any, to the individuals medical condition, and in the case of labor to the unborn child, from effecting the transfer      X____________________________________________ DATE 02/10/19        TIME_______      ORIGINAL - SEND TO MEDICAL RECORDS   COPY - SEND WITH PATIENT DURING TRANSFER

## 2019-02-10 NOTE — PLAN OF CARE
Problem: Potential for Falls  Goal: Patient will remain free of falls  Description  INTERVENTIONS:  - Assess patient frequently for physical needs  -  Identify cognitive and physical deficits and behaviors that affect risk of falls    -  [place] fall precautions as indicated by assessment   - Educate patient/family on patient safety including physical limitations  - Instruct patient to call for assistance with activity based on assessment  - Modify environment to reduce risk of injury  - Consider OT/PT consult to assist with strengthening/mobility  Outcome: Progressing     Problem: PAIN - ADULT  Goal: Verbalizes/displays adequate comfort level or baseline comfort level  Description  Interventions:  - Encourage patient to monitor pain and request assistance  - Assess pain using appropriate pain scale  - Administer analgesics based on type and severity of pain and evaluate response  - Implement non-pharmacological measures as appropriate and evaluate response  - Consider cultural and social influences on pain and pain management  - Notify physician/advanced practitioner if interventions unsuccessful or patient reports new pain  Outcome: Progressing     Problem: SAFETY ADULT  Goal: Maintain or return to baseline ADL function  Description  INTERVENTIONS:  -  Assess patient's ability to carry out ADLs; assess patient's baseline for ADL function and identify physical deficits which impact ability to perform ADLs (bathing, care of mouth/teeth, toileting, grooming, dressing, etc )  - Assess/evaluate cause of self-care deficits   - Assess range of motion  - Assess patient's mobility; develop plan if impaired  - Assess patient's need for assistive devices and provide as appropriate  - Encourage maximum independence but intervene and supervise when necessary  ¯ Involve family in performance of ADLs  ¯ Assess for home care needs following discharge   ¯ Request OT consult to assist with ADL evaluation and planning for discharge  ¯ Provide patient education as appropriate  Outcome: Progressing  Goal: Maintain or return mobility status to optimal level  Description  INTERVENTIONS:  - Assess patient's baseline mobility status (ambulation, transfers, stairs, etc )    - Identify cognitive and physical deficits and behaviors that affect mobility  - Identify mobility aids required to assist with transfers and/or ambulation (gait belt, sit-to-stand, lift, walker, cane, etc )  - [place] fall precautions as indicated by assessment  - Record patient progress and toleration of activity level on Mobility SBAR; progress patient to next Phase/Stage  - Instruct patient to call for assistance with activity based on assessment  - Request Rehabilitation consult to assist with strengthening/weightbearing, etc   Outcome: Progressing     Problem: DISCHARGE PLANNING  Goal: Discharge to home or other facility with appropriate resources  Description  INTERVENTIONS:  - Identify barriers to discharge w/patient and caregiver  - Arrange for needed discharge resources and transportation as appropriate  - Identify discharge learning needs (meds, wound care, etc )  - Arrange for interpretive services to assist at discharge as needed  - Refer to Case Management Department for coordinating discharge planning if the patient needs post-hospital services based on physician/advanced practitioner order or complex needs related to functional status, cognitive ability, or social support system  Outcome: Progressing     Problem: Knowledge Deficit  Goal: Patient/family/caregiver demonstrates understanding of disease process, treatment plan, medications, and discharge instructions  Description  Complete learning assessment and assess knowledge base    Interventions:  - Provide teaching at level of understanding  - Provide teaching via preferred learning methods  Outcome: Progressing

## 2019-02-10 NOTE — NURSING NOTE
Patient arrived to unit from [facility]  Patient is soaked in urine, confused, and agitated  Repeated attempts to get out of bed  Unable to stand with significant tremors  Diaphoretic and flushed  SLIM admitted provider paged

## 2019-02-10 NOTE — H&P
Unit/Bed#: E4 -01 Encounter: [id number]    Chief complaint: shakiness    History of Present Illness     HPI:  Jd Figueroa is a 71 y o  male who presents with shakiness which has been gradually increasing over the past several weeks  The patient states that this has been an issue for him since childhood  He said that if he got under stress he would develop some shakiness  However over the past several months this is become much more profound  Was difficult to obtain a precise time of onset  However, the patient said that he discuss this with his personal physician back in December and he was placed on medication for this  I believe that this was Celexa and Librium  He said that he was in [place] earlier this week and this was a severe problem for him  He denies any headaches, visual symptoms, or focal weakness or numbness  He says that his mother has tremor  The patient's past medical history is positive for hypertension  He has a history of uric acid kidney stones  He denies any history of hypercholesterolemia, diabetes, COPD, cardiac disease, peptic ulcer disease, or other kidney diseases  He has no history of any neurologic disease  Past Surgical History:   Procedure Laterality Date    CHOLECYSTECTOMY      SHOULDER SURGERY      for dislocation     The patient's medications at the time of admission include:  Amlodipine 10 mg daily  Celexa 20 mg daily  Finasteride 5 mg daily  Lisinopril 40 mg daily  Multivitamins 1 tablet twice daily  Librium 25 mg 3 times daily    The patient is allergic to sulfonamides    Family history is remarkable for tremor in his mother as mention  Social history reveals the patient is  and lives with his wife  He denies smoking  He says that he drinks a variable amount  I was not able to get him to quantitate this  He does say that sometimes if he is under stress he drinks more than he should  He denies the use of illicit drugs        Review of Systems  A detailed 12 point review of systems was conducted  In addition to the issues mentioned above, the patient recently had cataract surgery on his left eye but was not well satisfied with the result  As far as I could tell, no other significant symptoms have been present  Objective   Vitals: Blood pressure 145/77, pulse 76, temperature 99 1 °F (37 3 °C), temperature source Oral, resp  rate 20, height 5' 8" (1 727 m), weight 91 7 kg (202 lb 2 6 oz), SpO2 96 %  Physical Exam   The patient is a well-developed, well-nourished man who appears in no distress  Head is atraumatic and normocephalic  ENT examination is within normal limits  Eye examination reveals the pupils to be equal, round, and reactive to light  Extraocular movements are intact  Neck is supple  Carotids are full without bruits  There is no lymphadenopathy or goiter  Lungs are clear to auscultation and percussion  There is no wheezing, rales, or rhonchi  Cardiac exam reveals a regular rhythm  I heard no murmur, gallop, or rub  The abdomen is soft with active bowel sounds  There is no mass, tenderness, or organomegaly  Extremities showed no clubbing, cyanosis, or edema  There was no calf tenderness  Neurologic examination reveals the patient to be alert and oriented  His history was somewhat tangential and it was difficult for him to stay on topic  He had tremor of all extremities  His strength seem full and symmetric  I detected no cranial nerve abnormalities  The toes were downgoing      Lab Results:   Results for orders placed or performed during the hospital encounter of 02/10/19   Comprehensive metabolic panel   Result Value Ref Range    Sodium 141 136 - 145 mmol/L    Potassium 3 0 (L) 3 5 - 5 3 mmol/L    Chloride 103 100 - 108 mmol/L    CO2 30 21 - 32 mmol/L    ANION GAP 8 4 - 13 mmol/L    BUN 17 5 - 25 mg/dL    Creatinine 0 89 0 60 - 1 30 mg/dL    Glucose 103 65 - 140 mg/dL    Calcium 9 3 8 3 - 10 1 mg/dL AST 16 5 - 45 U/L    ALT 25 12 - 78 U/L    Alkaline Phosphatase 69 46 - 116 U/L    Total Protein 7 6 6 4 - 8 2 g/dL    Albumin 3 9 3 5 - 5 0 g/dL    Total Bilirubin 0 80 0 20 - 1 00 mg/dL    eGFR 87 ml/min/1 73sq m   CBC and differential   Result Value Ref Range    WBC 9 40 4 31 - 10 16 Thousand/uL    RBC 4 32 3 88 - 5 62 Million/uL    Hemoglobin 14 6 12 0 - 17 0 g/dL    Hematocrit 43 3 36 5 - 49 3 %     (H) 82 - 98 fL    MCH 33 8 26 8 - 34 3 pg    MCHC 33 7 31 4 - 37 4 g/dL    RDW 12 2 11 6 - 15 1 %    MPV 9 7 8 9 - 12 7 fL    Platelets 785 316 - 389 Thousands/uL    nRBC 0 /100 WBCs    Neutrophils Relative 75 43 - 75 %    Immat GRANS % 1 0 - 2 %    Lymphocytes Relative 12 (L) 14 - 44 %    Monocytes Relative 11 4 - 12 %    Eosinophils Relative 0 0 - 6 %    Basophils Relative 1 0 - 1 %    Neutrophils Absolute 7 08 1 85 - 7 62 Thousands/µL    Immature Grans Absolute 0 06 0 00 - 0 20 Thousand/uL    Lymphocytes Absolute 1 13 0 60 - 4 47 Thousands/µL    Monocytes Absolute 1 05 0 17 - 1 22 Thousand/µL    Eosinophils Absolute 0 03 0 00 - 0 61 Thousand/µL    Basophils Absolute 0 05 0 00 - 0 10 Thousands/µL   Troponin I   Result Value Ref Range    Troponin I <0 02 <=0 04 ng/mL   Protime-INR   Result Value Ref Range    Protime 13 6 11 8 - 14 2 seconds    INR 1 09 0 86 - 1 17   APTT   Result Value Ref Range    PTT 26 26 - 38 seconds   TSH   Result Value Ref Range    TSH 3RD GENERATON 1 034 0 358 - 3 740 uIU/mL   Magnesium   Result Value Ref Range    Magnesium 1 6 1 6 - 2 6 mg/dL   D-Dimer   Result Value Ref Range    D-Dimer, Quant <270 <500 ng/ml (FEU)   UA w Reflex to Microscopic w Reflex to Culture   Result Value Ref Range    Color, UA Yellow     Clarity, UA Clear     Specific Gravity, UA 1 020 1 003 - 1 030    pH, UA 6 0 4 5 - 8 0    Leukocytes, UA Negative Negative    Nitrite, UA Negative Negative    Protein, UA Negative Negative mg/dl    Glucose, UA Negative Negative mg/dl    Ketones, UA 15 (1+) (A) Negative mg/dl Urobilinogen, UA 0 2 0 2, 1 0 E U /dl E U /dl    Bilirubin, UA Negative Negative    Blood, UA Small (A) Negative   Rapid drug screen, urine   Result Value Ref Range    Amph/Meth UR Negative Negative    Barbiturate Ur Negative Negative    Benzodiazepine Urine Positive (A) Negative    Cocaine Urine Negative Negative    Methadone Urine Negative Negative    Opiate Urine Positive (A) Negative    PCP Ur Negative Negative    THC Urine Negative Negative   Ethanol   Result Value Ref Range    Ethanol Lvl <3 0 0 - 3 mg/dL   Salicylate level   Result Value Ref Range    Salicylate Lvl <1 1 (L) 3 - 20 mg/dL   Acetaminophen level   Result Value Ref Range    Acetaminophen Level <2 0 (L) 10 - 30 ug/mL   Ammonia   Result Value Ref Range    Ammonia <10 (L) 11 - 35 umol/L   Urine Microscopic   Result Value Ref Range    RBC, UA 10-20 (A) None Seen, 0-5 /hpf    WBC, UA 1-2 (A) None Seen, 0-5, 5-55, 5-65 /hpf    Epithelial Cells Occasional None Seen, Occasional /hpf    Bacteria, UA None Seen None Seen, Occasional /hpf    MUCUS THREADS Moderate (A) None Seen             Imaging:  CT of the brain and CTA showed no acute intracranial hemorrhage or mass effect  Age appropriate global atrophy was noted  There was no evidence of significant vascular stenosis  There was a note of a probable anterior right frontal bur hole although the patient gives no history of previous neuro surgery  Assessment/Plan     Assessment:  1  Ataxia and tremor etiology as yet undetermined  2  Hypertension  3  Positive urine drug screen for opiates  4  Suspicion of excessive alcohol intake     Plan:  The patient will be admitted to the hospital and observed carefully  Some baseline laboratory data has been obtained and has been unrevealing thus far  Specifically, TSH is normal   B12 will be checked  Neurologic evaluation will be requested    The patient will be treated prophylactically for alcohol withdrawal   Further evaluation will depend on the patient's clinical course and recommendations from Neuro  Considering the above information, I believe that it is likely that the patient's hospitalization will require 2 or more midnights  He is therefore assigned to inpatient status  I asked the patient about resuscitative measures  He would like all available modalities employed on his behalf in the event of a cardiac arrest   He is therefore sign to code blue level 1           Code Status: No Order

## 2019-02-11 ENCOUNTER — APPOINTMENT (INPATIENT)
Dept: MRI IMAGING | Facility: HOSPITAL | Age: 69
DRG: 897 | End: 2019-02-11
Payer: MEDICARE

## 2019-02-11 LAB
ATRIAL RATE: 66 BPM
P AXIS: 62 DEGREES
PR INTERVAL: 156 MS
QRS AXIS: 68 DEGREES
QRSD INTERVAL: 104 MS
QT INTERVAL: 412 MS
QTC INTERVAL: 431 MS
T WAVE AXIS: 55 DEGREES
VENTRICULAR RATE: 66 BPM

## 2019-02-11 PROCEDURE — 99222 1ST HOSP IP/OBS MODERATE 55: CPT | Performed by: PSYCHIATRY & NEUROLOGY

## 2019-02-11 PROCEDURE — 99232 SBSQ HOSP IP/OBS MODERATE 35: CPT | Performed by: HOSPITALIST

## 2019-02-11 PROCEDURE — 93010 ELECTROCARDIOGRAM REPORT: CPT | Performed by: INTERNAL MEDICINE

## 2019-02-11 PROCEDURE — 70551 MRI BRAIN STEM W/O DYE: CPT

## 2019-02-11 PROCEDURE — 84207 ASSAY OF VITAMIN B-6: CPT | Performed by: PHYSICIAN ASSISTANT

## 2019-02-11 PROCEDURE — 83918 ORGANIC ACIDS TOTAL QUANT: CPT | Performed by: PHYSICIAN ASSISTANT

## 2019-02-11 PROCEDURE — 84425 ASSAY OF VITAMIN B-1: CPT | Performed by: PHYSICIAN ASSISTANT

## 2019-02-11 RX ADMIN — CHLORDIAZEPOXIDE HYDROCHLORIDE 25 MG: 25 CAPSULE ORAL at 17:51

## 2019-02-11 RX ADMIN — Medication 1 TABLET: at 08:53

## 2019-02-11 RX ADMIN — AMLODIPINE BESYLATE 10 MG: 10 TABLET ORAL at 08:53

## 2019-02-11 RX ADMIN — FINASTERIDE 5 MG: 5 TABLET, FILM COATED ORAL at 08:53

## 2019-02-11 RX ADMIN — FOLIC ACID 1 MG: 1 TABLET ORAL at 08:53

## 2019-02-11 RX ADMIN — CHLORDIAZEPOXIDE HYDROCHLORIDE 25 MG: 25 CAPSULE ORAL at 21:33

## 2019-02-11 RX ADMIN — CHLORDIAZEPOXIDE HYDROCHLORIDE 25 MG: 25 CAPSULE ORAL at 08:53

## 2019-02-11 RX ADMIN — LISINOPRIL 40 MG: 20 TABLET ORAL at 08:53

## 2019-02-11 RX ADMIN — ENOXAPARIN SODIUM 40 MG: 40 INJECTION SUBCUTANEOUS at 08:53

## 2019-02-11 RX ADMIN — LORAZEPAM 1 MG: 2 INJECTION INTRAMUSCULAR; INTRAVENOUS at 21:33

## 2019-02-11 RX ADMIN — THIAMINE HYDROCHLORIDE 100 MG: 100 INJECTION, SOLUTION INTRAMUSCULAR; INTRAVENOUS at 08:53

## 2019-02-11 RX ADMIN — CITALOPRAM HYDROBROMIDE 20 MG: 20 TABLET ORAL at 08:53

## 2019-02-11 NOTE — PLAN OF CARE
Problem: Potential for Falls  Goal: Patient will remain free of falls  Description  INTERVENTIONS:  - Assess patient frequently for physical needs  -  Identify cognitive and physical deficits and behaviors that affect risk of falls    -  [place] fall precautions as indicated by assessment   - Educate patient/family on patient safety including physical limitations  - Instruct patient to call for assistance with activity based on assessment  - Modify environment to reduce risk of injury  - Consider OT/PT consult to assist with strengthening/mobility  Outcome: Progressing     Problem: PAIN - ADULT  Goal: Verbalizes/displays adequate comfort level or baseline comfort level  Description  Interventions:  - Encourage patient to monitor pain and request assistance  - Assess pain using appropriate pain scale  - Administer analgesics based on type and severity of pain and evaluate response  - Implement non-pharmacological measures as appropriate and evaluate response  - Consider cultural and social influences on pain and pain management  - Notify physician/advanced practitioner if interventions unsuccessful or patient reports new pain  Outcome: Progressing     Problem: SAFETY ADULT  Goal: Maintain or return to baseline ADL function  Description  INTERVENTIONS:  -  Assess patient's ability to carry out ADLs; assess patient's baseline for ADL function and identify physical deficits which impact ability to perform ADLs (bathing, care of mouth/teeth, toileting, grooming, dressing, etc )  - Assess/evaluate cause of self-care deficits   - Assess range of motion  - Assess patient's mobility; develop plan if impaired  - Assess patient's need for assistive devices and provide as appropriate  - Encourage maximum independence but intervene and supervise when necessary  ¯ Involve family in performance of ADLs  ¯ Assess for home care needs following discharge   ¯ Request OT consult to assist with ADL evaluation and planning for discharge  ¯ Provide patient education as appropriate  Outcome: Progressing  Goal: Maintain or return mobility status to optimal level  Description  INTERVENTIONS:  - Assess patient's baseline mobility status (ambulation, transfers, stairs, etc )    - Identify cognitive and physical deficits and behaviors that affect mobility  - Identify mobility aids required to assist with transfers and/or ambulation (gait belt, sit-to-stand, lift, walker, cane, etc )  - [place] fall precautions as indicated by assessment  - Record patient progress and toleration of activity level on Mobility SBAR; progress patient to next Phase/Stage  - Instruct patient to call for assistance with activity based on assessment  - Request Rehabilitation consult to assist with strengthening/weightbearing, etc   Outcome: Progressing     Problem: DISCHARGE PLANNING  Goal: Discharge to home or other facility with appropriate resources  Description  INTERVENTIONS:  - Identify barriers to discharge w/patient and caregiver  - Arrange for needed discharge resources and transportation as appropriate  - Identify discharge learning needs (meds, wound care, etc )  - Arrange for interpretive services to assist at discharge as needed  - Refer to Case Management Department for coordinating discharge planning if the patient needs post-hospital services based on physician/advanced practitioner order or complex needs related to functional status, cognitive ability, or social support system  Outcome: Progressing     Problem: Knowledge Deficit  Goal: Patient/family/caregiver demonstrates understanding of disease process, treatment plan, medications, and discharge instructions  Description  Complete learning assessment and assess knowledge base    Interventions:  - Provide teaching at level of understanding  - Provide teaching via preferred learning methods  Outcome: Progressing     Problem: NEUROSENSORY - ADULT  Goal: Achieves maximal functionality and self care  Description  INTERVENTIONS  - Monitor swallowing and airway patency with patient fatigue and changes in neurological status  - Encourage and assist patient to increase activity and self care with guidance from rehab services  - Encourage visually impaired, hearing impaired and aphasic patients to use assistive/communication devices  Outcome: Progressing     Problem: CARDIOVASCULAR - ADULT  Goal: Maintains optimal cardiac output and hemodynamic stability  Description  INTERVENTIONS:  - Monitor I/O, vital signs and rhythm  - Monitor for S/S and trends of decreased cardiac output i e  bleeding, hypotension  - Administer and titrate ordered vasoactive medications to optimize hemodynamic stability  - Assess quality of pulses, skin color and temperature  - Assess for signs of decreased coronary artery perfusion - ex   Angina  - Instruct patient to report change in severity of symptoms  Outcome: Progressing  Goal: Absence of cardiac dysrhythmias or at baseline rhythm  Description  INTERVENTIONS:  - Continuous cardiac monitoring, monitor vital signs, obtain 12 lead EKG if indicated  - Administer antiarrhythmic and heart rate control medications as ordered  - Monitor electrolytes and administer replacement therapy as ordered  Outcome: Progressing     Problem: RESPIRATORY - ADULT  Goal: Achieves optimal ventilation and oxygenation  Description  INTERVENTIONS:  - Assess for changes in respiratory status  - Assess for changes in mentation and behavior  - Position to facilitate oxygenation and minimize respiratory effort  - Oxygen administration by appropriate delivery method based on oxygen saturation (per order) or ABGs  - Initiate smoking cessation education as indicated  - Encourage broncho-pulmonary hygiene including cough, deep breathe, Incentive Spirometry  - Assess the need for suctioning and aspirate as needed  - Assess and instruct to report SOB or any respiratory difficulty  - Respiratory Therapy support as indicated  Outcome: Progressing     Problem: METABOLIC, FLUID AND ELECTROLYTES - ADULT  Goal: Electrolytes maintained within normal limits  Description  INTERVENTIONS:  - Monitor labs and assess patient for signs and symptoms of electrolyte imbalances  - Administer electrolyte replacement as ordered  - Monitor response to electrolyte replacements, including repeat lab results as appropriate  - Instruct patient on fluid and nutrition as appropriate  Outcome: Progressing  Goal: Fluid balance maintained  Description  INTERVENTIONS:  - Monitor labs and assess for signs and symptoms of volume excess or deficit  - Monitor I/O and WT  - Instruct patient on fluid and nutrition as appropriate  Outcome: Progressing     Problem: SKIN/TISSUE INTEGRITY - ADULT  Goal: Skin integrity remains intact  Description  INTERVENTIONS  - Identify patients at risk for skin breakdown  - Assess and monitor skin integrity  - Assess and monitor nutrition and hydration status  - Monitor labs (i e  albumin)  - Assess for incontinence   - Turn and reposition patient  - Assist with mobility/ambulation  - Relieve pressure over bony prominences  - Avoid friction and shearing  - Provide appropriate hygiene as needed including keeping skin clean and dry  - Evaluate need for skin moisturizer/barrier cream  - Collaborate with interdisciplinary team (i e  Nutrition, Rehabilitation, etc )   - Patient/family teaching  Outcome: Progressing     Problem: Prexisting or High Potential for Compromised Skin Integrity  Goal: Skin integrity is maintained or improved  Description  INTERVENTIONS:  - Identify patients at risk for skin breakdown  - Assess and monitor skin integrity  - Assess and monitor nutrition and hydration status  - Monitor labs (i e  albumin)  - Assess for incontinence   - Turn and reposition patient  - Assist with mobility/ambulation  - Relieve pressure over bony prominences  - Avoid friction and shearing  - Provide appropriate hygiene as needed including keeping skin clean and dry  - Evaluate need for skin moisturizer/barrier cream  - Collaborate with interdisciplinary team (i e  Nutrition, Rehabilitation, etc )   - Patient/family teaching  Outcome: Progressing

## 2019-02-11 NOTE — ASSESSMENT & PLAN NOTE
Likely alcohol withdrawal   He seems be underestimating how much he drinks  Appreciate neuro eval  Started on librium

## 2019-02-11 NOTE — UTILIZATION REVIEW
Initial Clinical Review    Admission: Date/Time/Statement: 2/10/19 @ 1646   Orders Placed This Encounter   Procedures    Inpatient Admission     Standing Status:   Standing     Number of Occurrences:   1     Order Specific Question:   Admitting Physician     Answer:   LORRAINE Siegel [[id number]]     Order Specific Question:   Level of Care     Answer:   Med Surg [[id number]]     Order Specific Question:   Estimated length of stay     Answer:   More than 2 Midnights     Order Specific Question:   Certification     Answer:   I certify that inpatient services are medically necessary for this patient for a duration of greater than two midnights  See H&P and MD Progress Notes for additional information about the patient's course of treatment  Patient Transferred to [facility] from [facility] ED on 2/10    Chief Complaint: shakiness  Per ED Note: Unable to ambulate with out asst   Is ataxic/wisebased  Unable to forrest toe/heel     History of Illness: 71 y o  male who presents with shakiness which has been gradually increasing over the past several weeks  The patient states that this has been an issue for him since childhood  He said that if he got under stress he would develop some shakiness  However over the past several months this is become much more profound  Was difficult to obtain a precise time of onset  However, the patient said that he discuss this with his personal physician back in December and he was placed on medication for this  I believe that this was Celexa and Librium  He said that he was in [place] earlier this week and this was a severe problem for him  He denies any headaches, visual symptoms, or focal weakness or numbness  He says that his mother has tremor      The patient's past medical history is positive for hypertension  He has a history of uric acid kidney stones    He denies any history of hypercholesterolemia, diabetes, COPD, cardiac disease, peptic ulcer disease, or other kidney diseases  He has no history of any neurologic disease  Ativan IV X 2 ,  Thiamine IV x 1,   And Kdur 40 po x 1  @ [facility]    ADM  Vital Signs:  99 1 - 76 - 20   145/77  96%  Wt: 91 7 kg      Pertinent Labs/Diagnostic Test Results:   ED:  K 3 0,   Trop neg  Urine:  1+ ketones,  Small blood  Urine Drug:  + benzos,   + opiates    Past Medical/Surgical History:   Past Medical History:   Diagnosis Date    [place] (hard of hearing)     Hypertension     Renal disorder     Shoulder dislocation      Admitting Diagnosis: Neurologic gait dysfunction [R26 9]  Age/Sex: 71 y o  male     Assessment/Plan:     Ataxia and tremor etiology as yet undetermined  2  Hypertension  3  Positive urine drug screen for opiates  4  Suspicion of excessive alcohol intake      Plan:  The patient will be admitted to the hospital and observed carefully  Some baseline laboratory data has been obtained and has been unrevealing thus far  Specifically, TSH is normal   B12 will be checked  Neurologic evaluation will be requested  The patient will be treated prophylactically for alcohol withdrawal   Further evaluation will depend on the patient's clinical course and recommendations from Neuro      Considering the above information, I believe that it is likely that the patient's hospitalization will require 2 or more midnights  He is therefore assigned to inpatient status        Admission Orders:  IP  Scheduled Meds:   Current Facility-Administered Medications:  acetaminophen 650 mg Oral Q6H PRN     amLODIPine 10 mg Oral Daily     chlordiazePOXIDE 25 mg Oral TID     citalopram 20 mg Oral Daily     enoxaparin 40 mg Subcutaneous Daily     finasteride 5 mg Oral Daily     folic acid 1 mg Oral Daily     lisinopril 40 mg Oral Daily     LORazepam 1 mg Intravenous Q6H PRN     multivitamin-minerals 1 tablet Oral Daily     thiamine 100 mg Intravenous Daily       Consult Neuro  MRI Brain  Toradol IV x 1 2/10      Network Utilization Review Department  Phone: [phone]; Fax [phone]  [email or web address]  ATTENTION: Please call with any questions or concerns to [phone]  and carefully listen to the prompts so that you are directed to the right person  Send all requests for admission clinical reviews, approved or denied determinations and any other requests to fax [phone]   All voicemails are confidential

## 2019-02-11 NOTE — CONSULTS
Consultation - Neurology   Canelo Morales 71 y o  male MRN: [id number]  Unit/Bed#: E4 -01 Encounter: [id number]      Assessment/Plan   Assessment:  Canelo Morales is a 71 y o  male with a past medical history of tremors of unknown origin, hypertension, and alcohol abuse who is being evaluated for increasing intermittent tremors  The patient appears extremely anxious and tremulous  It was very difficult to complete a history and physical exam given significant urge to  his phone and contact his wife, which he attempted to twice and subsequently dialed the wrong number  He required very frequent redirection  Although he was alert and oriented, he exhibited very poor insight into his current situation  High suspicion for alcohol withdrawal given history of chronic daily alcohol use  Plan:  - MRI motion limited, but grossly unremarkable  No further neurodiagnostics necessary    - B1, B6 pending  - Reviewed TSH, B12, Ammonia, coma panel: WNL  - MMA pending as patient had normal B12 but evidence of slight evidence of macrocytosis on CBC  - Recommend psychiatry consult given significant anxiety   - Per primary team, patient is being treated prophylactically for alcohol withdrawal  Continue thiamine, folic acid, librium  History of Present Illness     Reason for Consult / Principal Problem: Ataxia, Tremor    HPI: Canelo Morales is a 71 y o male with a past medical history of tremor of unknown origin, hypertension, and alcohol abuse who presented to the hospital with "shakiness" that has been increasing in severity over the past 2-3 weeks  He cannot accurately identify when the tremors began  At time of neurologic evaluation, the history portion is very limited  Per chart review, when asked the onset of his tremors, he mentioned that he does recall discussing the tremors with his primary care physician in December, and subsequently was placed on Celexa and Librium at that time   There is no documentation available regarding this encounter  At time of neurologic evaluation, the patient states that he has had tremors his entire life  He said they have always worsened under stress  He believes they are more evident at rest  He is having more difficulty with his balance over the past 2 weeks  He does report 1 recent mechanical fall that occurred last week while he was in [place] with a friend to "relax " He previously reported that he landed on his left side and face  He had reported that he was evaluated in [place] with a negative CT scan and unremarkable lab work  He had previously reported dizziness in the ED, but denies that currently  He reports significant anxiety given his elderly parents and fearing their death  Of note, the patient's wife had reported to ED staff that he has had daily alcohol use for the past 30 years  Workup so far has revealed a positive urine drug screen for benzodiazepines and opiates  ED discussion with the patient revealed that his last opiate was taken approximately 6 days ago  He could not recall any BZDs, although Librium is listed in his home medication list    CTA head and neck with and without contrast revealed no acute hemorrhage  No critical stenosis  Evidence small vessel disease  Probable anterior right frontal jose e hole  EKG was normal sinus rhythm  He was noted in the ED to become more aggressive and exhibit more tremors  He required Ativan at that time  The accepting nurse at [address] had noted that the patient arrived yesterday evening soaked in urine, confused, and agitated  He frequently attempted to get out bed  He exhibited significant tremors at that time, appeared diaphoretic, and was flushed  Since admission, the patient exhibited a fever of 101 yesterday at 1900  His blood pressures have fluctuated, with the most recent being elevated at 160/77  He did not yet have his morning antihypertensives at that time       At time of neurologic evaluation, the patient was extremely anxious and almost impossible to converse with  He was very tangential  He insisted on calling his wife  He appeared to have poor insight into the situation, as he believed he may be discharged in the next hour  He seemed to have difficulty understanding the nature of the hospital  For example, it appeared like he was unaware he was admitted and receiving a workup  He also had asked if this could be related to shingles  He exhibited significant full body tremors, which appeared to worsen with activity  He was extremely ataxic and very unsteady on his feet  He is a very poor historian  When asked about symptoms he had previously reported, he denies them including dizziness  He currently denies any headache, dizziness, vision changes, difficulty swallowing, shortness of breath, chest pain, focal weakness, numbness/tingling  Inpatient consult to Neurology  Consult performed by: Devon Barbosa PA-C  Consult ordered by: Ce Chawla MD          Review of Systems   Constitutional: Positive for activity change  Negative for fatigue  HENT: Negative for trouble swallowing  Chronically impaired hearing   Eyes: Negative for photophobia and visual disturbance  Respiratory: Negative for chest tightness and shortness of breath  Cardiovascular: Negative for chest pain and palpitations  Gastrointestinal: Negative for abdominal pain, nausea and vomiting  Endocrine: Negative  Genitourinary: Negative for dysuria  Musculoskeletal: Positive for gait problem, neck pain and neck stiffness  Left shoulder pain   Skin: Negative for rash  Allergic/Immunologic: Negative  Neurological: Positive for tremors  Negative for dizziness, seizures, syncope, facial asymmetry, speech difficulty, weakness, light-headedness, numbness and headaches  Hematological: Negative  Psychiatric/Behavioral: Positive for agitation and decreased concentration   Negative for confusion  The patient is nervous/anxious  Historical Information   Past Medical History:   Diagnosis Date    [place] (hard of hearing)     Hypertension     Renal disorder     kidney    Shoulder dislocation      Past Surgical History:   Procedure Laterality Date    CHOLECYSTECTOMY      SHOULDER SURGERY      for dislocation     Social History   Social History     Substance and Sexual Activity   Alcohol Use Yes    Comment: at times     Social History     Substance and Sexual Activity   Drug Use No     Social History     Tobacco Use   Smoking Status Never Smoker   Smokeless Tobacco Never Used     Family History: non-contributory No family history of tremors  Review of previous medical records was completed  Meds/Allergies   all current active meds have been reviewed and current meds:   Current Facility-Administered Medications   Medication Dose Route Frequency    acetaminophen (TYLENOL) tablet 650 mg  650 mg Oral Q6H PRN    amLODIPine (NORVASC) tablet 10 mg  10 mg Oral Daily    chlordiazePOXIDE (LIBRIUM) capsule 25 mg  25 mg Oral TID    citalopram (CeleXA) tablet 20 mg  20 mg Oral Daily    enoxaparin (LOVENOX) subcutaneous injection 40 mg  40 mg Subcutaneous Daily    finasteride (PROSCAR) tablet 5 mg  5 mg Oral Daily    folic acid (FOLVITE) tablet 1 mg  1 mg Oral Daily    lisinopril (ZESTRIL) tablet 40 mg  40 mg Oral Daily    LORazepam (ATIVAN) 2 mg/mL injection 1 mg  1 mg Intravenous Q6H PRN    multivitamin-minerals (CENTRUM) tablet 1 tablet  1 tablet Oral Daily    thiamine (VITAMIN B1) 100 mg in sodium chloride 0 9 % 50 mL IVPB  100 mg Intravenous Daily       Allergies   Allergen Reactions    Sulfa Antibiotics Anaphylaxis       Objective   Vitals:Blood pressure 160/77, pulse 80, temperature 98 4 °F (36 9 °C), temperature source Tympanic, resp  rate 18, height 5' 8" (1 727 m), weight 91 7 kg (202 lb 2 6 oz), SpO2 98 %  ,Body mass index is 30 74 kg/m²      Intake/Output Summary (Last 24 hours) at 2/11/2019 0839  Last data filed at 2/11/2019 0630  Gross per 24 hour   Intake --   Output 201 ml   Net -201 ml       Invasive Devices: Invasive Devices     Peripheral Intravenous Line            Peripheral IV 02/10/19 Right Antecubital less than 1 day                Physical Exam   Constitutional: He is oriented to person, place, and time  He appears well-developed and well-nourished  Very anxious, tremulous    HENT:   Head: Normocephalic and atraumatic  Right Ear: External ear normal    Left Ear: External ear normal    Mouth/Throat: Oropharynx is clear and moist    Bruising noted on nose  Eyes: Pupils are equal, round, and reactive to light  Conjunctivae and EOM are normal  Right eye exhibits no discharge  Left eye exhibits no discharge  No scleral icterus  Neck: Normal range of motion  Neck supple  Cardiovascular: Normal rate, regular rhythm and normal heart sounds  Exam reveals no gallop and no friction rub  No murmur heard  Pulmonary/Chest: Effort normal and breath sounds normal  No stridor  No respiratory distress  He has no wheezes  Musculoskeletal: Normal range of motion  He exhibits no edema or deformity  Left shoulder tenderness   Neurological: He is alert and oriented to person, place, and time  He has normal strength  No sensory deficit  He has an abnormal Finger-Nose-Finger Test (Ataxia, dysmetria, difficulty following commands) and an abnormal Heel to [employer] (Ataxic)  Coordination abnormal    Skin: Skin is warm and dry  No rash noted  He is not diaphoretic  No erythema  Psychiatric: His mood appears anxious  His speech is rapid and/or pressured and tangential  He is agitated  He expresses impulsivity  Very poor insight  No hallucinations  Nursing note and vitals reviewed  Neurologic Exam     Mental Status   Oriented to person, place, and time  Patient is awake and alert, oriented x3  Follows simple commands but has difficulty with 2 and 3 step commands  Attention and concentration limited  Very poor insight and poor historian  Speech is rapid and pressured, but no dysarthria  Able to name simple objects  Cranial Nerves     CN II   Visual acuity: (Grossly intact)    CN III, IV, VI   Pupils are equal, round, and reactive to light  Extraocular motions are normal    Right pupil: Shape: regular  Left pupil: Shape: regular  Nystagmus: none   Conjugate gaze: present    CN V   Facial sensation intact  CN VII   Facial expression full, symmetric  CN VIII   Hearing: impaired (Chronic)    CN IX, X   Palate: symmetric    CN XI   Right sternocleidomastoid strength: normal  Left sternocleidomastoid strength: normal  Right trapezius strength: normal  Left trapezius strength: normal    CN XII   Tongue: not atrophic  Tongue deviation: none    Motor Exam   Muscle bulk: normal    Strength   Strength 5/5 throughout  Sensory Exam   Light touch normal      Gait, Coordination, and Reflexes     Gait  Gait: (Ataxic, very unsteady)    Coordination   Finger to nose coordination: abnormal (Ataxia, dysmetria, difficulty following commands)  Heel to shin coordination: abnormal (Ataxic)    Tremor   Resting tremor: present  Intention tremor: present  Action tremor: Present in all 4 extremities  Patient has significant difficulty relaxing in order to obtain reflexes  Even with redirection and distraction mechanisms, patient could not relax  Lab Results: I have personally reviewed pertinent reports  Imaging Studies: I have personally reviewed pertinent reports  and I have personally reviewed pertinent films in PACS  EKG, Pathology, and Other Studies: I have personally reviewed pertinent reports  VTE Prophylaxis: Enoxaparin (Lovenox)    Code Status: Level 1 - Full Code    Counseling / Coordination of Care  Total time spent today 70 minutes  Greater than 50% of total time was spent with the patient and/or family counseling and/or coordination of care    A description of the counseling/coordination of care:  Patient was seen and evaluated  Discussed with attending  Chart reviewed thoroughly including laboratory and imaging studies    Plan of care discussed with patient and primary team

## 2019-02-11 NOTE — PROGRESS NOTES
Patient received from AM nurse  No complaints of pain at this time  Patient sitting in chair, bed alarm on and in place at this time  Patient aware to use call bell if he needs to use the bathroom to receive assistance, patient agreeable to this  Patient is warm and flushed in the face area, with moderate tremors noted in bilateral hands  Librium per order has been given, patient unfortunately stated he wants to "hold off" on the IV ativan, patient educated on the importance of this, and still wishes otherwise  Wife at bedside with patient  Call bell within reach, vitals stable

## 2019-02-11 NOTE — PROGRESS NOTES
Made aware by both Aids that patient was walked to the bathroom with his walker, assisted by both aids  Per aids, patient was in the bathroom at the sink after using the bathroom and stated "im going to go down" patient was assisted to the floor by both aids  Patient was able to stand up immediately, and returned to the chair  I was in to assess patient immediately after [place] CHEUNG  Let myself know  Wife at the bedside, and made aware of this, she stated "I think he just got weak " Per patient "yeah, I was weak and I fell to my knees, I don't have any pain right now in my knees " No swelling noted at this time, patient able to flex both legs with no issues  MD called and made aware, no new orders at this time  Patient again offered ativan to keep him relaxed in which he refused saying "maybe after dinner"  Supervisor and charge nurse made aware of this  Vitals stable

## 2019-02-11 NOTE — PROGRESS NOTES
Progress Note - aJrett Garrido 1950, 71 y o  male MRN: [id number]    Unit/Bed#: E4 -01 Encounter: [id number]    Primary Care Provider: Luke Angel MD   Date and time admitted to hospital: 2/10/2019  4:46 PM        * Ataxia  Assessment & Plan  Likely alcohol withdrawal   He seems be underestimating how much he drinks  Appreciate neuro eval  Started on librium          Subjective:   Has a lot of tremor today  Says he hasn't had any alcohol in a week  He won't tell me how much he was drinking before that  No confusion  He says he has had this tremor ever since he was a kid      Objective:     Vitals:   Temp (24hrs), Av 7 °F (37 1 °C), Min:96 5 °F (35 8 °C), Max:101 °F (38 3 °C)    Temp:  [96 5 °F (35 8 °C)-101 °F (38 3 °C)] 96 5 °F (35 8 °C)  HR:  [68-83] 80  Resp:  [18-20] 20  BP: (125-160)/(68-83) 151/82  SpO2:  [94 %-99 %] 97 %  Body mass index is 30 74 kg/m²  Input and Output Summary (last 24 hours): Intake/Output Summary (Last 24 hours) at 2019 1722  Last data filed at 2019 1300  Gross per 24 hour   Intake 960 ml   Output 801 ml   Net 159 ml       Physical Exam:     Physical Exam   HENT:   Head: Normocephalic and atraumatic  Eyes: Pupils are equal, round, and reactive to light  EOM are normal    Cardiovascular: Normal rate and regular rhythm  Exam reveals no gallop and no friction rub  No murmur heard  Pulmonary/Chest: Effort normal and breath sounds normal  He has no wheezes  He has no rales  Abdominal: Soft  Bowel sounds are normal  There is no tenderness  Musculoskeletal: He exhibits no edema  Neurological:   Severe tremor of hands and legs with rest and with movement   Nursing note and vitals reviewed              Additional Data:     Labs:    Results from last 7 days   Lab Units 02/10/19  1833 02/10/19  0856   WBC Thousand/uL  --  9 40   HEMOGLOBIN g/dL  --  14 6   HEMATOCRIT %  --  43 3   PLATELETS Thousands/uL 210 224   NEUTROS PCT %  --  75   LYMPHS PCT %  --  12*   MONOS PCT %  --  11   EOS PCT %  --  0     Results from last 7 days   Lab Units 02/10/19  0856   POTASSIUM mmol/L 3 0*   CHLORIDE mmol/L 103   CO2 mmol/L 30   BUN mg/dL 17   CREATININE mg/dL 0 89   CALCIUM mg/dL 9 3   ALK PHOS U/L 69   ALT U/L 25   AST U/L 16     Results from last 7 days   Lab Units 02/10/19  0856   INR  1 09                   * I Have Reviewed All Lab Data     Recent Cultures (last 7 days):             Last 24 Hours Medication List:     Current Facility-Administered Medications:  acetaminophen 650 mg Oral Q6H PRN MALGORZATA Amaya    amLODIPine 10 mg Oral Daily Rebel Even, MD    chlordiazePOXIDE 25 mg Oral TID Rebel Even, MD    citalopram 20 mg Oral Daily Rebel Even, MD    enoxaparin 40 mg Subcutaneous Daily Rebel Even, MD    finasteride 5 mg Oral Daily [place] Even, MD    folic acid 1 mg Oral Daily [place] Even, MD    lisinopril 40 mg Oral Daily [place] Even, MD    LORazepam 1 mg Intravenous Q6H PRN [place] Even, MD    multivitamin-minerals 1 tablet Oral Daily Rebel Even, MD    thiamine 100 mg Intravenous Daily Rebel Even, MD Last Rate: Stopped (02/11/19 0945)         VTE Pharmacologic Prophylaxis:   Pharmacologic:   Current Length of Stay: 1 day(s)    Current Patient Status: Inpatient       Discharge Plan:   Code Status: Level 1 - Full Code           Today, Patient Was Seen By: Trixie Govea DO    ** Please Note: Dictation voice to text software may have been used in the creation of this document   **

## 2019-02-11 NOTE — PLAN OF CARE
Problem: Potential for Falls  Goal: Patient will remain free of falls  Description  INTERVENTIONS:  - Assess patient frequently for physical needs  -  Identify cognitive and physical deficits and behaviors that affect risk of falls    -  [place] fall precautions as indicated by assessment   - Educate patient/family on patient safety including physical limitations  - Instruct patient to call for assistance with activity based on assessment  - Modify environment to reduce risk of injury  - Consider OT/PT consult to assist with strengthening/mobility  Outcome: Progressing     Problem: PAIN - ADULT  Goal: Verbalizes/displays adequate comfort level or baseline comfort level  Description  Interventions:  - Encourage patient to monitor pain and request assistance  - Assess pain using appropriate pain scale  - Administer analgesics based on type and severity of pain and evaluate response  - Implement non-pharmacological measures as appropriate and evaluate response  - Consider cultural and social influences on pain and pain management  - Notify physician/advanced practitioner if interventions unsuccessful or patient reports new pain  Outcome: Progressing     Problem: SAFETY ADULT  Goal: Maintain or return to baseline ADL function  Description  INTERVENTIONS:  -  Assess patient's ability to carry out ADLs; assess patient's baseline for ADL function and identify physical deficits which impact ability to perform ADLs (bathing, care of mouth/teeth, toileting, grooming, dressing, etc )  - Assess/evaluate cause of self-care deficits   - Assess range of motion  - Assess patient's mobility; develop plan if impaired  - Assess patient's need for assistive devices and provide as appropriate  - Encourage maximum independence but intervene and supervise when necessary  ¯ Involve family in performance of ADLs  ¯ Assess for home care needs following discharge   ¯ Request OT consult to assist with ADL evaluation and planning for discharge  ¯ Provide patient education as appropriate  Outcome: Progressing  Goal: Maintain or return mobility status to optimal level  Description  INTERVENTIONS:  - Assess patient's baseline mobility status (ambulation, transfers, stairs, etc )    - Identify cognitive and physical deficits and behaviors that affect mobility  - Identify mobility aids required to assist with transfers and/or ambulation (gait belt, sit-to-stand, lift, walker, cane, etc )  - [place] fall precautions as indicated by assessment  - Record patient progress and toleration of activity level on Mobility SBAR; progress patient to next Phase/Stage  - Instruct patient to call for assistance with activity based on assessment  - Request Rehabilitation consult to assist with strengthening/weightbearing, etc   Outcome: Progressing     Problem: DISCHARGE PLANNING  Goal: Discharge to home or other facility with appropriate resources  Description  INTERVENTIONS:  - Identify barriers to discharge w/patient and caregiver  - Arrange for needed discharge resources and transportation as appropriate  - Identify discharge learning needs (meds, wound care, etc )  - Arrange for interpretive services to assist at discharge as needed  - Refer to Case Management Department for coordinating discharge planning if the patient needs post-hospital services based on physician/advanced practitioner order or complex needs related to functional status, cognitive ability, or social support system  Outcome: Progressing     Problem: Knowledge Deficit  Goal: Patient/family/caregiver demonstrates understanding of disease process, treatment plan, medications, and discharge instructions  Description  Complete learning assessment and assess knowledge base    Interventions:  - Provide teaching at level of understanding  - Provide teaching via preferred learning methods  Outcome: Progressing     Problem: NEUROSENSORY - ADULT  Goal: Achieves maximal functionality and self care  Description  INTERVENTIONS  - Monitor swallowing and airway patency with patient fatigue and changes in neurological status  - Encourage and assist patient to increase activity and self care with guidance from rehab services  - Encourage visually impaired, hearing impaired and aphasic patients to use assistive/communication devices  Outcome: Progressing     Problem: CARDIOVASCULAR - ADULT  Goal: Maintains optimal cardiac output and hemodynamic stability  Description  INTERVENTIONS:  - Monitor I/O, vital signs and rhythm  - Monitor for S/S and trends of decreased cardiac output i e  bleeding, hypotension  - Administer and titrate ordered vasoactive medications to optimize hemodynamic stability  - Assess quality of pulses, skin color and temperature  - Assess for signs of decreased coronary artery perfusion - ex   Angina  - Instruct patient to report change in severity of symptoms  Outcome: Progressing  Goal: Absence of cardiac dysrhythmias or at baseline rhythm  Description  INTERVENTIONS:  - Continuous cardiac monitoring, monitor vital signs, obtain 12 lead EKG if indicated  - Administer antiarrhythmic and heart rate control medications as ordered  - Monitor electrolytes and administer replacement therapy as ordered  Outcome: Progressing     Problem: RESPIRATORY - ADULT  Goal: Achieves optimal ventilation and oxygenation  Description  INTERVENTIONS:  - Assess for changes in respiratory status  - Assess for changes in mentation and behavior  - Position to facilitate oxygenation and minimize respiratory effort  - Oxygen administration by appropriate delivery method based on oxygen saturation (per order) or ABGs  - Initiate smoking cessation education as indicated  - Encourage broncho-pulmonary hygiene including cough, deep breathe, Incentive Spirometry  - Assess the need for suctioning and aspirate as needed  - Assess and instruct to report SOB or any respiratory difficulty  - Respiratory Therapy support as indicated  Outcome: Progressing     Problem: METABOLIC, FLUID AND ELECTROLYTES - ADULT  Goal: Electrolytes maintained within normal limits  Description  INTERVENTIONS:  - Monitor labs and assess patient for signs and symptoms of electrolyte imbalances  - Administer electrolyte replacement as ordered  - Monitor response to electrolyte replacements, including repeat lab results as appropriate  - Instruct patient on fluid and nutrition as appropriate  Outcome: Progressing  Goal: Fluid balance maintained  Description  INTERVENTIONS:  - Monitor labs and assess for signs and symptoms of volume excess or deficit  - Monitor I/O and WT  - Instruct patient on fluid and nutrition as appropriate  Outcome: Progressing     Problem: SKIN/TISSUE INTEGRITY - ADULT  Goal: Skin integrity remains intact  Description  INTERVENTIONS  - Identify patients at risk for skin breakdown  - Assess and monitor skin integrity  - Assess and monitor nutrition and hydration status  - Monitor labs (i e  albumin)  - Assess for incontinence   - Turn and reposition patient  - Assist with mobility/ambulation  - Relieve pressure over bony prominences  - Avoid friction and shearing  - Provide appropriate hygiene as needed including keeping skin clean and dry  - Evaluate need for skin moisturizer/barrier cream  - Collaborate with interdisciplinary team (i e  Nutrition, Rehabilitation, etc )   - Patient/family teaching  Outcome: Progressing

## 2019-02-11 NOTE — PLAN OF CARE
Problem: Potential for Falls  Goal: Patient will remain free of falls  Description  INTERVENTIONS:  - Assess patient frequently for physical needs  -  Identify cognitive and physical deficits and behaviors that affect risk of falls    -  [place] fall precautions as indicated by assessment   - Educate patient/family on patient safety including physical limitations  - Instruct patient to call for assistance with activity based on assessment  - Modify environment to reduce risk of injury  - Consider OT/PT consult to assist with strengthening/mobility  Outcome: Progressing     Problem: PAIN - ADULT  Goal: Verbalizes/displays adequate comfort level or baseline comfort level  Description  Interventions:  - Encourage patient to monitor pain and request assistance  - Assess pain using appropriate pain scale  - Administer analgesics based on type and severity of pain and evaluate response  - Implement non-pharmacological measures as appropriate and evaluate response  - Consider cultural and social influences on pain and pain management  - Notify physician/advanced practitioner if interventions unsuccessful or patient reports new pain  Outcome: Progressing     Problem: SAFETY ADULT  Goal: Maintain or return to baseline ADL function  Description  INTERVENTIONS:  -  Assess patient's ability to carry out ADLs; assess patient's baseline for ADL function and identify physical deficits which impact ability to perform ADLs (bathing, care of mouth/teeth, toileting, grooming, dressing, etc )  - Assess/evaluate cause of self-care deficits   - Assess range of motion  - Assess patient's mobility; develop plan if impaired  - Assess patient's need for assistive devices and provide as appropriate  - Encourage maximum independence but intervene and supervise when necessary  ¯ Involve family in performance of ADLs  ¯ Assess for home care needs following discharge   ¯ Request OT consult to assist with ADL evaluation and planning for discharge  ¯ Provide patient education as appropriate  Outcome: Progressing  Goal: Maintain or return mobility status to optimal level  Description  INTERVENTIONS:  - Assess patient's baseline mobility status (ambulation, transfers, stairs, etc )    - Identify cognitive and physical deficits and behaviors that affect mobility  - Identify mobility aids required to assist with transfers and/or ambulation (gait belt, sit-to-stand, lift, walker, cane, etc )  - [place] fall precautions as indicated by assessment  - Record patient progress and toleration of activity level on Mobility SBAR; progress patient to next Phase/Stage  - Instruct patient to call for assistance with activity based on assessment  - Request Rehabilitation consult to assist with strengthening/weightbearing, etc   Outcome: Progressing     Problem: DISCHARGE PLANNING  Goal: Discharge to home or other facility with appropriate resources  Description  INTERVENTIONS:  - Identify barriers to discharge w/patient and caregiver  - Arrange for needed discharge resources and transportation as appropriate  - Identify discharge learning needs (meds, wound care, etc )  - Arrange for interpretive services to assist at discharge as needed  - Refer to Case Management Department for coordinating discharge planning if the patient needs post-hospital services based on physician/advanced practitioner order or complex needs related to functional status, cognitive ability, or social support system  Outcome: Progressing     Problem: Knowledge Deficit  Goal: Patient/family/caregiver demonstrates understanding of disease process, treatment plan, medications, and discharge instructions  Description  Complete learning assessment and assess knowledge base    Interventions:  - Provide teaching at level of understanding  - Provide teaching via preferred learning methods  Outcome: Progressing     Problem: NEUROSENSORY - ADULT  Goal: Achieves maximal functionality and self care  Description  INTERVENTIONS  - Monitor swallowing and airway patency with patient fatigue and changes in neurological status  - Encourage and assist patient to increase activity and self care with guidance from rehab services  - Encourage visually impaired, hearing impaired and aphasic patients to use assistive/communication devices  Outcome: Progressing     Problem: CARDIOVASCULAR - ADULT  Goal: Maintains optimal cardiac output and hemodynamic stability  Description  INTERVENTIONS:  - Monitor I/O, vital signs and rhythm  - Monitor for S/S and trends of decreased cardiac output i e  bleeding, hypotension  - Administer and titrate ordered vasoactive medications to optimize hemodynamic stability  - Assess quality of pulses, skin color and temperature  - Assess for signs of decreased coronary artery perfusion - ex   Angina  - Instruct patient to report change in severity of symptoms  Outcome: Progressing  Goal: Absence of cardiac dysrhythmias or at baseline rhythm  Description  INTERVENTIONS:  - Continuous cardiac monitoring, monitor vital signs, obtain 12 lead EKG if indicated  - Administer antiarrhythmic and heart rate control medications as ordered  - Monitor electrolytes and administer replacement therapy as ordered  Outcome: Progressing     Problem: RESPIRATORY - ADULT  Goal: Achieves optimal ventilation and oxygenation  Description  INTERVENTIONS:  - Assess for changes in respiratory status  - Assess for changes in mentation and behavior  - Position to facilitate oxygenation and minimize respiratory effort  - Oxygen administration by appropriate delivery method based on oxygen saturation (per order) or ABGs  - Initiate smoking cessation education as indicated  - Encourage broncho-pulmonary hygiene including cough, deep breathe, Incentive Spirometry  - Assess the need for suctioning and aspirate as needed  - Assess and instruct to report SOB or any respiratory difficulty  - Respiratory Therapy support as indicated  Outcome: Progressing     Problem: METABOLIC, FLUID AND ELECTROLYTES - ADULT  Goal: Electrolytes maintained within normal limits  Description  INTERVENTIONS:  - Monitor labs and assess patient for signs and symptoms of electrolyte imbalances  - Administer electrolyte replacement as ordered  - Monitor response to electrolyte replacements, including repeat lab results as appropriate  - Instruct patient on fluid and nutrition as appropriate  Outcome: Progressing  Goal: Fluid balance maintained  Description  INTERVENTIONS:  - Monitor labs and assess for signs and symptoms of volume excess or deficit  - Monitor I/O and WT  - Instruct patient on fluid and nutrition as appropriate  Outcome: Progressing     Problem: SKIN/TISSUE INTEGRITY - ADULT  Goal: Skin integrity remains intact  Description  INTERVENTIONS  - Identify patients at risk for skin breakdown  - Assess and monitor skin integrity  - Assess and monitor nutrition and hydration status  - Monitor labs (i e  albumin)  - Assess for incontinence   - Turn and reposition patient  - Assist with mobility/ambulation  - Relieve pressure over bony prominences  - Avoid friction and shearing  - Provide appropriate hygiene as needed including keeping skin clean and dry  - Evaluate need for skin moisturizer/barrier cream  - Collaborate with interdisciplinary team (i e  Nutrition, Rehabilitation, etc )   - Patient/family teaching  Outcome: Progressing     Problem: Prexisting or High Potential for Compromised Skin Integrity  Goal: Skin integrity is maintained or improved  Description  INTERVENTIONS:  - Identify patients at risk for skin breakdown  - Assess and monitor skin integrity  - Assess and monitor nutrition and hydration status  - Monitor labs (i e  albumin)  - Assess for incontinence   - Turn and reposition patient  - Assist with mobility/ambulation  - Relieve pressure over bony prominences  - Avoid friction and shearing  - Provide appropriate hygiene as needed including keeping skin clean and dry  - Evaluate need for skin moisturizer/barrier cream  - Collaborate with interdisciplinary team (i e  Nutrition, Rehabilitation, etc )   - Patient/family teaching  Outcome: Progressing

## 2019-02-12 PROBLEM — F10.931 DELIRIUM TREMENS (HCC): Status: ACTIVE | Noted: 2019-02-12

## 2019-02-12 PROBLEM — F10.10 ALCOHOL ABUSE: Status: ACTIVE | Noted: 2019-02-12

## 2019-02-12 PROBLEM — F10.231 DELIRIUM TREMENS (HCC): Status: ACTIVE | Noted: 2019-02-12

## 2019-02-12 LAB
ALBUMIN SERPL BCP-MCNC: 3.2 G/DL (ref 3.5–5)
ALP SERPL-CCNC: 60 U/L (ref 46–116)
ALT SERPL W P-5'-P-CCNC: 21 U/L (ref 12–78)
ANION GAP SERPL CALCULATED.3IONS-SCNC: 14 MMOL/L (ref 4–13)
AST SERPL W P-5'-P-CCNC: 20 U/L (ref 5–45)
BASOPHILS # BLD AUTO: 0.04 THOUSANDS/ΜL (ref 0–0.1)
BASOPHILS NFR BLD AUTO: 0 % (ref 0–1)
BILIRUB SERPL-MCNC: 0.79 MG/DL (ref 0.2–1)
BUN SERPL-MCNC: <1 MG/DL (ref 5–25)
CALCIUM SERPL-MCNC: 8.7 MG/DL (ref 8.3–10.1)
CHLORIDE SERPL-SCNC: 105 MMOL/L (ref 100–108)
CO2 SERPL-SCNC: 22 MMOL/L (ref 21–32)
CREAT SERPL-MCNC: 0.85 MG/DL (ref 0.6–1.3)
EOSINOPHIL # BLD AUTO: 0.01 THOUSAND/ΜL (ref 0–0.61)
EOSINOPHIL NFR BLD AUTO: 0 % (ref 0–6)
ERYTHROCYTE [DISTWIDTH] IN BLOOD BY AUTOMATED COUNT: 12 % (ref 11.6–15.1)
GFR SERPL CREATININE-BSD FRML MDRD: 89 ML/MIN/1.73SQ M
GLUCOSE SERPL-MCNC: 99 MG/DL (ref 65–140)
HCT VFR BLD AUTO: 41 % (ref 36.5–49.3)
HGB BLD-MCNC: 13.6 G/DL (ref 12–17)
IMM GRANULOCYTES # BLD AUTO: 0.08 THOUSAND/UL (ref 0–0.2)
IMM GRANULOCYTES NFR BLD AUTO: 1 % (ref 0–2)
LYMPHOCYTES # BLD AUTO: 1.04 THOUSANDS/ΜL (ref 0.6–4.47)
LYMPHOCYTES NFR BLD AUTO: 8 % (ref 14–44)
MAGNESIUM SERPL-MCNC: 2 MG/DL (ref 1.6–2.6)
MCH RBC QN AUTO: 33.5 PG (ref 26.8–34.3)
MCHC RBC AUTO-ENTMCNC: 33.2 G/DL (ref 31.4–37.4)
MCV RBC AUTO: 101 FL (ref 82–98)
MONOCYTES # BLD AUTO: 1.65 THOUSAND/ΜL (ref 0.17–1.22)
MONOCYTES NFR BLD AUTO: 13 % (ref 4–12)
NEUTROPHILS # BLD AUTO: 10.05 THOUSANDS/ΜL (ref 1.85–7.62)
NEUTS SEG NFR BLD AUTO: 78 % (ref 43–75)
NRBC BLD AUTO-RTO: 0 /100 WBCS
PLATELET # BLD AUTO: 229 THOUSANDS/UL (ref 149–390)
PMV BLD AUTO: 10.4 FL (ref 8.9–12.7)
POTASSIUM SERPL-SCNC: 3.3 MMOL/L (ref 3.5–5.3)
PROT SERPL-MCNC: 7.1 G/DL (ref 6.4–8.2)
RBC # BLD AUTO: 4.06 MILLION/UL (ref 3.88–5.62)
SODIUM SERPL-SCNC: 141 MMOL/L (ref 136–145)
VIT B12 SERPL-MCNC: 611 PG/ML (ref 100–900)
WBC # BLD AUTO: 12.87 THOUSAND/UL (ref 4.31–10.16)

## 2019-02-12 PROCEDURE — 85025 COMPLETE CBC W/AUTO DIFF WBC: CPT | Performed by: HOSPITALIST

## 2019-02-12 PROCEDURE — G8988 SELF CARE GOAL STATUS: HCPCS

## 2019-02-12 PROCEDURE — 83735 ASSAY OF MAGNESIUM: CPT | Performed by: HOSPITALIST

## 2019-02-12 PROCEDURE — G8978 MOBILITY CURRENT STATUS: HCPCS

## 2019-02-12 PROCEDURE — 82607 VITAMIN B-12: CPT | Performed by: HOSPITALIST

## 2019-02-12 PROCEDURE — G8979 MOBILITY GOAL STATUS: HCPCS

## 2019-02-12 PROCEDURE — 97163 PT EVAL HIGH COMPLEX 45 MIN: CPT

## 2019-02-12 PROCEDURE — G8987 SELF CARE CURRENT STATUS: HCPCS

## 2019-02-12 PROCEDURE — 80053 COMPREHEN METABOLIC PANEL: CPT | Performed by: HOSPITALIST

## 2019-02-12 PROCEDURE — 94762 N-INVAS EAR/PLS OXIMTRY CONT: CPT

## 2019-02-12 PROCEDURE — 99232 SBSQ HOSP IP/OBS MODERATE 35: CPT | Performed by: HOSPITALIST

## 2019-02-12 PROCEDURE — 97167 OT EVAL HIGH COMPLEX 60 MIN: CPT

## 2019-02-12 RX ORDER — ACETAMINOPHEN 325 MG/1
650 TABLET ORAL EVERY 6 HOURS PRN
Status: DISCONTINUED | OUTPATIENT
Start: 2019-02-12 | End: 2019-02-12 | Stop reason: SDUPTHER

## 2019-02-12 RX ORDER — LORAZEPAM 1 MG/1
2 TABLET ORAL EVERY 8 HOURS PRN
Status: COMPLETED | OUTPATIENT
Start: 2019-02-12 | End: 2019-02-12

## 2019-02-12 RX ORDER — TRAMADOL HYDROCHLORIDE 50 MG/1
50 TABLET ORAL EVERY 6 HOURS PRN
Status: DISCONTINUED | OUTPATIENT
Start: 2019-02-12 | End: 2019-02-19 | Stop reason: HOSPADM

## 2019-02-12 RX ORDER — HALOPERIDOL 5 MG/ML
1 INJECTION INTRAMUSCULAR ONCE
Status: COMPLETED | OUTPATIENT
Start: 2019-02-12 | End: 2019-02-12

## 2019-02-12 RX ORDER — LORAZEPAM 2 MG/ML
4 INJECTION INTRAMUSCULAR ONCE
Status: COMPLETED | OUTPATIENT
Start: 2019-02-12 | End: 2019-02-12

## 2019-02-12 RX ADMIN — TRAMADOL HYDROCHLORIDE 50 MG: 50 TABLET, COATED ORAL at 14:46

## 2019-02-12 RX ADMIN — CHLORDIAZEPOXIDE HYDROCHLORIDE 25 MG: 25 CAPSULE ORAL at 17:00

## 2019-02-12 RX ADMIN — AMLODIPINE BESYLATE 10 MG: 10 TABLET ORAL at 10:08

## 2019-02-12 RX ADMIN — DEXMEDETOMIDINE 1.5 MCG/KG/HR: 100 INJECTION, SOLUTION, CONCENTRATE INTRAVENOUS at 21:49

## 2019-02-12 RX ADMIN — LISINOPRIL 40 MG: 20 TABLET ORAL at 10:07

## 2019-02-12 RX ADMIN — CITALOPRAM HYDROBROMIDE 20 MG: 20 TABLET ORAL at 10:08

## 2019-02-12 RX ADMIN — THIAMINE HYDROCHLORIDE 100 MG: 100 INJECTION, SOLUTION INTRAMUSCULAR; INTRAVENOUS at 10:12

## 2019-02-12 RX ADMIN — ENOXAPARIN SODIUM 40 MG: 40 INJECTION SUBCUTANEOUS at 10:09

## 2019-02-12 RX ADMIN — CHLORDIAZEPOXIDE HYDROCHLORIDE 25 MG: 25 CAPSULE ORAL at 10:08

## 2019-02-12 RX ADMIN — LORAZEPAM 1 MG: 2 INJECTION INTRAMUSCULAR; INTRAVENOUS at 03:39

## 2019-02-12 RX ADMIN — LORAZEPAM 4 MG: 2 INJECTION INTRAMUSCULAR; INTRAVENOUS at 15:26

## 2019-02-12 RX ADMIN — LORAZEPAM 1 MG: 2 INJECTION INTRAMUSCULAR; INTRAVENOUS at 10:56

## 2019-02-12 RX ADMIN — LORAZEPAM 2 MG: 1 TABLET ORAL at 16:59

## 2019-02-12 RX ADMIN — FOLIC ACID 1 MG: 1 TABLET ORAL at 10:08

## 2019-02-12 RX ADMIN — DEXMEDETOMIDINE 0.4 MCG/KG/HR: 100 INJECTION, SOLUTION, CONCENTRATE INTRAVENOUS at 20:14

## 2019-02-12 RX ADMIN — LORAZEPAM 1 MG: 2 INJECTION INTRAMUSCULAR; INTRAVENOUS at 22:00

## 2019-02-12 RX ADMIN — Medication 1 TABLET: at 10:08

## 2019-02-12 RX ADMIN — FINASTERIDE 5 MG: 5 TABLET, FILM COATED ORAL at 10:07

## 2019-02-12 RX ADMIN — HALOPERIDOL LACTATE 1 MG: 5 INJECTION, SOLUTION INTRAMUSCULAR at 04:23

## 2019-02-12 NOTE — PHYSICAL THERAPY NOTE
PT EVALUATION    71 y o     [id number]    Neurologic gait dysfunction [R26 9]    Past Medical History:   Diagnosis Date    [place] (hard of hearing)     Hypertension     Renal disorder     kidney    Shoulder dislocation          Past Surgical History:   Procedure Laterality Date    CHOLECYSTECTOMY      SHOULDER SURGERY      for dislocation        02/12/19 1430   Note Type   Note type Eval only   Pain Assessment   Pain Type Acute pain;Chronic pain   Pain Location Back; Shoulder   Pain Orientation Left;Upper   Hospital Pain Intervention(s) Repositioned; Emotional support   Response to Interventions satisfied   Pain Rating: FLACC (Rest) - Face 0   Pain Rating: FLACC (Rest) - Legs 1   Pain Rating: FLACC (Rest) - Activity 1   Pain Rating: FLACC (Rest) - Cry 1   Pain Rating: FLACC (Rest) - Consolability 1   Score: FLACC (Rest) 4   Pain Rating: FLACC (Activity) - Face 1   Pain Rating: FLACC (Activity) - Legs 1   Pain Rating: FLACC (Activity) - Activity 1   Pain Rating: FLACC (Activity) - Cry 2   Pain Rating: FLACC (Activity) - Consolability 1   Score: FLACC (Activity) 6   Home Living   Type of Home House   Home Layout Two level;Bed/bath upstairs; Able to live on main level with bedroom/bathroom  (denies ESCOBAR thru garage)   Bathroom Shower/Tub Tub/shower unit   [address] [place] [address]  chair;Grab bars around toilet   [address] Other (Comment)  (walking stick)   Additional Comments Resides with spouse in 2 story home but reports can stay on main level without ESCOBAR  Reports still driving  Prior Function   Level of [place] Independent with ADLs and functional mobility   Lives With Spouse  (spouse works)   Receives Help From [facility], [place] in the last 6 months 1 to 4  (one docmuented this admission, denies others)   Vocational Part time employment   Comments Pt limited historian    Reports independent prior to admission, use of walking stick prn  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  Notes hx of OPPT 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 ( states Dr Cheryl Mckeon told him needs TSR)    Restrictions/Precautions   Weight Bearing Precautions Per Order No   Other Precautions Cognitive; Bed Alarm; Chair Alarm; Impulsive; Fall Risk;Pain;Hard of hearing  (CIWA protocol, withdraw precautions, low boy bed )   General   Additional Pertinent History Pt is 70 y/o male admitted with ataxia and tremor  Likely etoh withdrawl,  PT consulted  Up with assist    Family/Caregiver Present No   Cognition   Overall Cognitive Status Impaired   Arousal/Participation Responsive   Attention Attends with cues to redirect   Orientation Level Oriented to person   Following Commands Follows one step commands with increased time or repetition   Comments Decreased safety awarenss, impulsive, decreased insight to impairments  RUE Assessment   RUE Assessment WFL  (grossly at least 3+/5)   LUE Assessment   LUE Assessment X  (proximal limitations noted with flexion, ER  see OT notes)   RLE Assessment   RLE Assessment WFL  (grossly 4-/5)   LLE Assessment   LLE Assessment WFL  (grossly 4-/5)   Coordination   Movements are Fluid and Coordinated 0   Coordination and Movement Description tremulous  ataxia   Light Touch   RLE Light Touch Impaired   RLE Light Touch Comments varied responses 2* cognition   LLE Light Touch Impaired   LLE Light Touch Comments varied responses  Bed Mobility   Sit to Supine 5  Supervision   Additional items Increased time required   Additional Comments Supine upon completion of session  Alarm in place, bed in lowest postiion with mats underneath   Transfers   Sit to Stand 3  Moderate assistance   Additional items Assist x 2; Increased time required;Verbal cues;Armrests   Stand to Sit 3  Moderate assistance   Additional items Assist x 2; Increased time required;Verbal cues;Armrests   Stand pivot 3  Moderate assistance   Additional items Assist x 2; Increased time required;Verbal cues   Additional Comments cues for safety with transitions  Increased time to acheive upright  Difficulty keeping 4 points of RW in place 2* tremor  Ambulation/Elevation   Gait pattern Improper Weight shift;Decreased foot clearance;Shuffling; Inconsistent iram; Short stride; Excessively slow; Ataxia   Gait Assistance 3  Moderate assist   Additional items Assist x 2;Verbal cues; Tactile cues   Assistive Device Rolling walker   Distance 3'x1 to return to bed  Did not progress further 2* safety and fall risk  Balance   Static Sitting Fair   Dynamic Sitting Fair -   Static Standing Poor   Dynamic Standing Poor -   Ambulatory Poor -   Endurance Deficit   Endurance Deficit Yes   Endurance Deficit Description pain, fatigue, cognition, withdrawl precautions   Activity Tolerance   Activity Tolerance Patient limited by fatigue;Patient limited by pain;Treatment limited secondary to medical complications (Comment); Treatment limited secondary to agitation   Medical Staff Made Aware NurseQuentin  OT-Mya   Nurse Made Aware yes, Scarlett   Assessment   Prognosis Fair   Problem List Decreased strength;Decreased range of motion;Decreased endurance; Impaired balance;Decreased mobility; Decreased coordination;Decreased cognition; Impaired judgement;Decreased safety awareness; Impaired hearing; Impaired sensation; Impaired tone;Pain   Assessment Pt is 70 y/o  male admitted with ataxia likely 2* etoh withdrawal   MRI pending  PT consulted  Up with assistance   + fall while hospitalized while assisted by staff   + significant tremor and confused  Prior to admission reports was independent with occasional use of walking stick  Denies other falls prior to admission  Resides with spouse, continues to work part time as  and states independence    Currently presents with severe functional limitations 2* impairments in strength, balance, activity tolerance, coordination, sensation, cognition and overall mobility  Severe tremor limiting safety with progressive mobiltiy  Requires modA of 2 for transfers and attempt at ambulation with use of RW support  Gait severely ataxic and unsteady with difficulty maintaining contact of RW to floor with manual guidance needed  S for return to supine  Poor insight into impairments and poor safety awareness  At this time will benefit from ongoing skilled PT in order to progress and assist with return to baseline level of independent function  Rehab is recommended at this time 2* significance of limitations  Will follow and progress  Barriers to Discharge Inaccessible home environment;Decreased caregiver support   Barriers to Discharge Comments wife works, 2 story home  Goals   Patient Goals none states 2* cognitive impairments  STG Expiration Date 02/22/19   Short Term Goal #1 1)  Pt will perform bed mobility with Lila demonstrating appropriate technique 100% of the time in order to improve function  2)  Perform all transfers with Lila demonstrating safe and appropriate technique 100% of the time in order to improve ability to negotiate safely in home environment  3) Amb with least restrictive AD > 200'x1 with mod I in order to demonstrate ability to negotiate in home environment  4)  Improve overall strength and balance 1/2 grade in order to optimize ability to perform functional tasks and reduce fall risk  5) Increase activity tolerance to 45 minutes in order to improve endurance to functional tasks  6)  Negotiate stairs using most appropriate technique and S in order to be able to negotiate safely in home environment  7) PT for ongoing patient and family/caregiver education, DME needs and d/c planning in order to promote highest level of function in least restrictive environment  Treatment Day 0   Plan   Treatment/Interventions Functional transfer training;LE strengthening/ROM; Elevations; Therapeutic exercise; Endurance training;Patient/family training;Equipment eval/education; Bed mobility;Gait training; Compensatory technique education;Continued evaluation;Spoke to nursing;OT   PT Frequency Other (Comment)  (3-5x/wk)   Recommendation   Recommendation Short-term skilled PT   Equipment Recommended   (will monitor)   PT - OK to Discharge   (to [facility] when medically stable )   Modified [place] Scale   Modified [place] Scale 4   Barthel Index   Feeding 5   Bathing 0   Grooming Score 0   Dressing Score 5   Bladder Score 5   Bowels Score 10   Toilet Use Score 5   Transfers (Bed/Chair) Score 5   Mobility (Level Surface) Score 0   Stairs Score 0   Barthel Index Score 35     History: co - morbidities, fall risk, use of assistive device, assist for adl's, cognition, multiple lines, steps  Exam: impairments in locomotion, musculoskeletal, balance,posture, joint integrity, cognition , barthel 35  Clinical: unstable/unpredictable ( cognition, fall risk, more restrictive AD, significant decline from baseline, etoh withdrawal precautions)  Complexity:high    Meng Julian, PT

## 2019-02-12 NOTE — ASSESSMENT & PLAN NOTE
He is withdrawing from alcohol very badly today  He is getting confused  He doesn't know where he is    His tremor is better, but he is taking high doses of Librium and Ativan

## 2019-02-12 NOTE — PROGRESS NOTES
Progress Note - Harpal Condon 1950, 71 y o  male MRN: [id number]    Unit/Bed#: E4 -01 Encounter: [id number]    Primary Care Provider: Joan Villarreal MD   Date and time admitted to hospital: 2/10/2019  4:46 PM        * Delirium tremens [facility])  Assessment & Plan  He is withdrawing from alcohol very badly today  He is getting confused  He doesn't know where he is  His tremor is better, but he is taking high doses of Librium and Ativan    Alcohol abuse  Assessment & Plan  On MVI, thiamine and folic acid    Ataxia  Assessment & Plan  Due to alcohol withdrawal            Subjective:   Much more confused today  Doesn't know where he is  Restless  Less tremor      Objective:     Vitals:   Temp (24hrs), Av 1 °F (36 7 °C), Min:97 4 °F (36 3 °C), Max:98 5 °F (36 9 °C)    Temp:  [97 4 °F (36 3 °C)-98 5 °F (36 9 °C)] 98 5 °F (36 9 °C)  HR:  [83-99] 83  Resp:  [20] 20  BP: (105-149)/(57-78) 137/78  SpO2:  [93 %-97 %] 97 %  Body mass index is 30 74 kg/m²  Input and Output Summary (last 24 hours): Intake/Output Summary (Last 24 hours) at 2019 1701  Last data filed at 2019 1334  Gross per 24 hour   Intake 120 ml   Output --   Net 120 ml       Physical Exam:     Physical Exam   Constitutional:   More confused  Thought he was in [place] then [employer]  He knew it was 2019  When I told him that he was in [place], he could not remember that 3 minutes later   HENT:   Head: Normocephalic and atraumatic  Eyes: Pupils are equal, round, and reactive to light  EOM are normal    Cardiovascular: Normal rate and regular rhythm  Exam reveals no gallop and no friction rub  No murmur heard  Pulmonary/Chest: Effort normal and breath sounds normal  He has no wheezes  He has no rales  Abdominal: Soft  There is no tenderness  Musculoskeletal: He exhibits no edema  Neurological:   Moderately improved arm tremor at rest and attention   Nursing note and vitals reviewed  Simone Hodgson Additional Data:     Labs:    Results from last 7 days   Lab Units 02/12/19  0802   WBC Thousand/uL 12 87*   HEMOGLOBIN g/dL 13 6   HEMATOCRIT % 41 0   PLATELETS Thousands/uL 229   NEUTROS PCT % 78*   LYMPHS PCT % 8*   MONOS PCT % 13*   EOS PCT % 0     Results from last 7 days   Lab Units 02/12/19  0653   POTASSIUM mmol/L 3 3*   CHLORIDE mmol/L 105   CO2 mmol/L 22   BUN mg/dL <1*   CREATININE mg/dL 0 85   CALCIUM mg/dL 8 7   ALK PHOS U/L 60   ALT U/L 21   AST U/L 20     Results from last 7 days   Lab Units 02/10/19  0856   INR  1 09                   * I Have Reviewed All Lab Data     Recent Cultures (last 7 days):             Last 24 Hours Medication List:     Current Facility-Administered Medications:  acetaminophen 650 mg Oral Q6H PRN MALGORZATA Wagoner    amLODIPine 10 mg Oral Daily Dana Garcia MD    chlordiazePOXIDE 25 mg Oral TID Dana Garcia MD    citalopram 20 mg Oral Daily Dana Garcia MD    enoxaparin 40 mg Subcutaneous Daily Dana Garcia MD    finasteride 5 mg Oral Daily Dana Garcia MD    folic acid 1 mg Oral Daily Dana Garcia MD    lisinopril 40 mg Oral Daily Dana Garcia MD    LORazepam 1 mg Intravenous Q6H PRN Dana Garcia MD    multivitamin-minerals 1 tablet Oral Daily Dana Garcia MD    thiamine 100 mg Intravenous Daily Dana Garcia MD Last Rate: Stopped (02/12/19 1100)   traMADol 50 mg Oral Q6H PRN Niurka Emanuel DO          VTE Pharmacologic Prophylaxis:   Pharmacologic: Enoxaparin (Lovenox)      Current Length of Stay: 2 day(s)    Current Patient Status: Inpatient       Discharge Plan: likely needs alcohol rehab    Code Status: Level 1 - Full Code           Today, Patient Was Seen By: Renee Fortune DO    ** Please Note: Dictation voice to text software may have been used in the creation of this document   **

## 2019-02-12 NOTE — NURSING NOTE
Patient became agitated at 0300 about someone messing with his car  He also requested his credit cards to give to his wife and daughter in the next room of the hotel  Patient reminded that his wife left at 2100 and reoriented to hospital setting  Patient insistent that he is in a hotel  Patient continuing to attempt to stand, unable to stand related to body tremors  Given 1mg PRN ativan at 0340  Patient continues to be agitated about a variety of things  RRNP notified of patient agitation  New order for 1mg of haldol IM

## 2019-02-12 NOTE — OCCUPATIONAL THERAPY NOTE
[address]  Evaluation     Patient Name: Lior HERZOGI'I Date: 2/12/2019  Problem List  Patient Active Problem List   Diagnosis    Hypertension    Ataxia     Past Medical History  Past Medical History:   Diagnosis Date    [place] (hard of hearing)     Hypertension     Renal disorder     kidney    Shoulder dislocation      Past Surgical History  Past Surgical History:   Procedure Laterality Date    CHOLECYSTECTOMY      SHOULDER SURGERY      for dislocation           02/12/19 142   Note Type   Note type Eval only   Restrictions/Precautions   Weight Bearing Precautions Per Order No   Other Precautions Cognitive; Chair Alarm; Bed Alarm; Impulsive; Fall Risk   Pain Assessment   Pain Assessment FLACC   Home Living   Type of Home House   Home Layout Two level;Bed/bath upstairs  (0 ESCOBAR)   Bathroom Shower/Tub Tub/shower unit   Bathroom Toilet Standard   Bathroom Equipment Shower chair;Grab bars around toilet   [address] Other (Comment)  (Walking stick)   Additional Comments Pt lives with spouse in a two level house with 0 ESCOBAR and bed/bath located on 2nd floor, however pt reports able to stay on 1st    Prior Function   Level of [place] Independent with ADLs and functional mobility   Lives With Spouse  (spouse works during the day per pt)   Miguel Sánchez 32 in the last 6 months 1 to 4  (1 during hospital admission)   Vocational Part time employment   Comments At baseline, pt was I w/ ADLs, IADLs, and functional mobility/transfers w/ use of walking stick as needed, (+) , PT employed, and reports 1 fall PTA  Lifestyle   Autonomy At baseline, pt was I w/ ADLs, IADLs, and functional mobility/transfers w/ use of walking stick as needed, (+) , PT employed, and reports 1 fall PTA      Reciprocal Relationships Lives with spouse   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   Intrinsic Gratification Watching TV   Psychosocial   Psychosocial (WDL) WDL   ADL   Where Assessed Chair   Eating Assistance 5  Supervision/Setup   Grooming Assistance 4  Minimal Assistance   UB Bathing Assistance 4  Minimal Assistance   LB Bathing Assistance 3  Moderate Assistance   UB Dressing Assistance 4  Minimal Assistance   LB Dressing Assistance 3  Moderate [address]  3  Moderate Assistance   Functional Assistance 3  Moderate Assistance   Bed Mobility   Supine to Sit Unable to assess  (Pt seated OOB in chair at start of session)   Sit to Supine 5  Supervision   Additional items Assist x 1; Increased time required;Verbal cues   Additional Comments Pt lying supine at end of session with bed alarm activated  Call bell and phone within reach  All needs met and pt reports no further questions for OT at this time   Transfers   Sit to Stand 3  Moderate assistance   Additional items Assist x 2;Armrests; Increased time required; Impulsive;Verbal cues   Stand to Sit 3  Moderate assistance   Additional items Assist x 2; Increased time required; Impulsive;Verbal cues   Stand pivot 3  Moderate assistance   Additional items Assist x 2; Increased time required; Impulsive;Verbal cues   Additional Comments Cues for safe technique and hand placement with decreased carryover demonstrated;  Increased impulsivity noted   Functional Mobility   Functional Mobility 3  Moderate assistance   Additional Comments Assist x2; Increased tremors upon achieving standing position   Additional items Rolling walker   Balance   Static Sitting Fair   Dynamic Sitting Fair -   Static Standing Poor   Dynamic Standing Poor -   Ambulatory Poor -   Activity Tolerance   Medical Staff Made Hank Navarro, PT   Nurse Made Aware yes; Scarlett, RN   RUE Assessment   RUE Assessment WFL   RUE Strength   RUE Overall Strength Within Functional Limits - able to perform ADL tasks with strength  (4/5 throughout)   LUE Assessment   LUE Assessment X  (decreased shldr flexion; elbow-distal=WFL)   LUE Overall AROM   L Shoulder Flexion 90*   LUE Strength   LUE Overall Strength Deficits;Due to pain  (see below)   L Shoulder Flexion 2+/5   L Shoulder Extension 2+/5   L Elbow Flexion 3+/5   L Elbow Extension 3+/5   Hand Function   Gross Motor Coordination Functional   Fine Motor Coordination Impaired  (2* B/L tremors noted)   Sensation   Light Touch Partial deficits in the RLE;Partial deficits in the LLE   Proprioception   Proprioception No apparent deficits   Vision-Basic Assessment   Current Vision Wears glasses all the time   Vision - Complex Assessment   Ocular Range of Motion [facility]   Acuity Able to read clock/calendar on wall without difficulty   Additional Comments Able to read clock on wall accuaretly, however reports decreased vision in R eye   Perception   Inattention/Neglect Appears intact   Cognition   Overall Cognitive Status Impaired   Arousal/Participation Alert   Attention Attends with cues to redirect   Orientation Level Oriented to person;Disoriented to time;Disoriented to situation   Memory Decreased recall of precautions;Decreased recall of recent events;Decreased short term memory   Following Commands Follows one step commands with increased time or repetition   Comments Decreased safety awareness, limited insight into deficits, increased impulsivity   Assessment   Limitation Decreased ADL status; Decreased UE strength;Decreased Safe judgement during ADL;Decreased cognition;Decreased endurance;Decreased self-care trans;Decreased high-level ADLs; Decreased fine motor control;Visual deficit; Decreased sensation   Prognosis Good   Assessment Pt is a 71 y o  male seen for OT evaluation s/p adm to [facility] on 2/10/2019 w/ dizziness and shakiness (on/off for "months") and dx'd w/ Ataxia, HTN, (+) urine drug screen for opiates, and suspicion of excessive alcohol intake  CT head (-) for acute findings   Comorbidities affecting pts functional performance include a significant PMH of [place], Renal disorder, HTN, and shoulder dislocation  Pt with active OT orders and activity orders for Up with assistance  Pt lives with spouse in a two level house with 0 ESCOBAR and bed/bath located on 2nd floor, however pt reports able to stay on 1st  At baseline, pt was I w/ ADLs, IADLs, and functional mobility/transfers w/ use of walking stick as needed, (+) , PT employed, and reports 1 fall PTA  Upon evaluation, pt currently requires Mod A for LB ADLs, Min A for UB ADLs, Mod A for toileting, Supervision for bed mobility, and Mod A of 2 for functional mobility/transfers 2* the following deficits impacting occupational performance: weakness, decreased ROM, decreased strength, decreased balance, decreased tolerance, impaired [address], impaired [address], impaired sensation, impaired memory, impaired problem solving, decreased safety awareness and increased pain  These impairments, as well at pts limited home support, difficulty performing ADLS, difficulty performing IADLS  and limited insight into deficits limit pts ability to safely engage in all baseline areas of occupation, including eating, grooming, bathing, dressing, toileting, functional mobility/transfers, community mobility, laundry , house maintenance, medication management, meal prep, cleaning, social participation  and leisure activities   Pt scored overall 35/100 on the Barthel Index  Based on the aforementioned OT evaluation, functional performance deficits, and assessments, pt has been identified as a High complexity evaluation   Pt to continue to benefit from continued acute OT services during hospital stay to address defined deficits and to maximize level of functional independence in the following Occupational Performance areas: eating, grooming, bathing/shower, toilet hygiene, dressing, medication management, socialization, health maintenance, functional mobility, community mobility, clothing management, cleaning and meal prep  From OT standpoint, recommend STR upon D/C  OT will continue to follow pt 3-5x/wk to address the following goals to  w/in 10-14 days:   Goals   Patient Goals None stated 2* cognitive deficits   LTG Time Frame 10-14   Long Term Goal Please refer to LTGs listed below   Plan   Treatment Interventions ADL retraining;Functional transfer training;UE strengthening/ROM; Cognitive reorientation; Endurance training;Patient/family training;Equipment evaluation/education; Compensatory technique education; Energy conservation; Activityengagement; Fine motor coordination activities   Goal Expiration Date 19   Treatment Day 0   OT Frequency 3-5x/wk   Recommendation   OT Discharge Recommendation Short Term Rehab   OT - OK to Discharge Yes  (when medically cleared to rehab)   Barthel Index   Feeding 5   Bathing 0   Grooming Score 0   Dressing Score 5   Bladder Score 5   Bowels Score 10   Toilet Use Score 5   Transfers (Bed/Chair) Score 5   Mobility (Level Surface) Score 0   Stairs Score 0   Barthel Index Score 35   Modified [place] Scale   Modified [place] Scale 4       GOALS    1) Pt will improve activity tolerance to G for min 30 min txment sessions for increase engagement in functional tasks    2) Pt will complete bed mobility at a Mod I level w/ G balance/safety demonstrated to decrease caregiver assistance required     3) Pt will complete UB/LB dressing/self care w/ mod I using adaptive device and DME as needed    4) Pt will complete bathing w/ Mod I w/ use of AE and DME as needed    5) Pt will complete toileting w/ mod I w/ G hygiene/thoroughness using DME as needed    6) Pt will improve functional transfers to Mod I on/off all surfaces using DME as needed w/ G balance/safety     7) Pt will improve functional mobility during ADL/IADL/leisure tasks to Mod I using DME as needed w/ G balance/safety     8) Pt will participate in simulated IADL management task to increase independence to Mod I w/ G safety and endurance    9) Pt will be attentive 100% of the time during ongoing cognitive assessment w/ G participation to assist w/ safe d/c planning/recommendations    10) Pt will demonstrate G carryover of pt/caregiver education and training as appropriate w/o cues w/ good tolerance to increase safety during functional tasks    11) Pt will increase BUE strength by 1 MM grade to increase independence in ADLs and transfers    12) Pt will be able to verbalize 3 potential fall hazards and identify appropriate compensatory techniques to decrease fall risk in home environment          Chely Smith OTR/L

## 2019-02-12 NOTE — NURSING NOTE
Patient yelling at wall for Dre Key  Patient accusing RNs of poisoning him  Threatening to call the police  Patient currently sitting at the side of the bed on cell phone calling daughter Renee Asaf

## 2019-02-12 NOTE — NURSING NOTE
Patient remains on a low boy bed with bed alarm activated  Marilin Jiang remains in bed with generalized tremors at rest, aroused to speech  Marilin Jiang was able to tell me his name and birthday, informed "I'm in [facility] " Reoriented to place, Full neuro assessment documented  medications taken whole in applesauce with sips of orange juice  PRN ativan given for agitation  Patient was hallucinating calling out for his wife

## 2019-02-12 NOTE — PLAN OF CARE
Problem: OCCUPATIONAL THERAPY ADULT  Goal: Performs self-care activities at highest level of function for planned discharge setting  See evaluation for individualized goals  Description  Treatment Interventions: ADL retraining, Functional transfer training, UE strengthening/ROM, Cognitive reorientation, Endurance training, Patient/family training, Equipment evaluation/education, Compensatory technique education, Energy conservation, Activityengagement, Fine motor coordination activities          See flowsheet documentation for full assessment, interventions and recommendations  Outcome: Progressing  Note:   Limitation: Decreased ADL status, Decreased UE strength, Decreased Safe judgement during ADL, Decreased cognition, Decreased endurance, Decreased self-care trans, Decreased high-level ADLs, Decreased fine motor control, Visual deficit, Decreased sensation  Prognosis: Good  Assessment: Pt is a 71 y o  male seen for OT evaluation s/p adm to [facility] on 2/10/2019 w/ dizziness and shakiness (on/off for "months") and dx'd w/ Ataxia, HTN, (+) urine drug screen for opiates, and suspicion of excessive alcohol intake  CT head (-) for acute findings  Comorbidities affecting pt?s functional performance include a significant PMH of [place], Renal disorder, HTN, and shoulder dislocation  Pt with active OT orders and activity orders for Up with assistance  Pt lives with spouse in a two level house with 0 ESCOBAR and bed/bath located on 2nd floor, however pt reports able to stay on 1st  At baseline, pt was I w/ ADLs, IADLs, and functional mobility/transfers w/ use of walking stick as needed, (+) , PT employed, and reports 1 fall PTA   Upon evaluation, pt currently requires Mod A for LB ADLs, Min A for UB ADLs, Mod A for toileting, Supervision for bed mobility, and Mod A of 2 for functional mobility/transfers 2* the following deficits impacting occupational performance: weakness, decreased ROM, decreased strength, decreased balance, decreased tolerance, impaired [facility], impaired [address], impaired sensation, impaired memory, impaired problem solving, decreased safety awareness and increased pain  These impairments, as well at pt?s limited home support, difficulty performing ADLS, difficulty performing IADLS  and limited insight into deficits limit pt?s ability to safely engage in all baseline areas of occupation, including eating, grooming, bathing, dressing, toileting, functional mobility/transfers, community mobility, laundry , house maintenance, medication management, meal prep, cleaning, social participation  and leisure activities   Pt scored overall 35/100 on the Barthel Index  Based on the aforementioned OT evaluation, functional performance deficits, and assessments, pt has been identified as a High complexity evaluation  Pt to continue to benefit from continued acute OT services during hospital stay to address defined deficits and to maximize level of functional independence in the following Occupational Performance areas: eating, grooming, bathing/shower, toilet hygiene, dressing, medication management, socialization, health maintenance, functional mobility, community mobility, clothing management, cleaning and meal prep  From OT standpoint, recommend STR upon D/C   OT will continue to follow pt 3-5x/wk to address the following goals to  w/in 10-14 days:     OT Discharge Recommendation: Short Term Rehab  OT - OK to Discharge: Yes(when medically cleared to rehab)

## 2019-02-12 NOTE — PLAN OF CARE
Problem: PHYSICAL THERAPY ADULT  Goal: Performs mobility at highest level of function for planned discharge setting  See evaluation for individualized goals  Description  Treatment/Interventions: Functional transfer training, LE strengthening/ROM, Elevations, Therapeutic exercise, Endurance training, Patient/family training, Equipment eval/education, Bed mobility, Gait training, Compensatory technique education, Continued evaluation, Spoke to nursing, OT  Equipment Recommended: (will monitor)       See flowsheet documentation for full assessment, interventions and recommendations  Note:   Prognosis: Fair  Problem List: Decreased strength, Decreased range of motion, Decreased endurance, Impaired balance, Decreased mobility, Decreased coordination, Decreased cognition, Impaired judgement, Decreased safety awareness, Impaired hearing, Impaired sensation, Impaired tone, Pain  Assessment: Pt is 72 y/o  male admitted with ataxia likely 2* etoh withdrawal   MRI pending  PT consulted  Up with assistance   + fall while hospitalized while assisted by staff   + significant tremor and confused  Prior to admission reports was independent with occasional use of walking stick  Denies other falls prior to admission  Resides with spouse, continues to work part time as  and states independence  Currently presents with severe functional limitations 2* impairments in strength, balance, activity tolerance, coordination, sensation, cognition and overall mobility  Severe tremor limiting safety with progressive mobiltiy  Requires modA of 2 for transfers and attempt at ambulation with use of RW support  Gait severely ataxic and unsteady with difficulty maintaining contact of RW to floor with manual guidance needed  S for return to supine  Poor insight into impairments and poor safety awareness    At this time will benefit from ongoing skilled PT in order to progress and assist with return to baseline level of independent function  Rehab is recommended at this time 2* significance of limitations  Will follow and progress  Barriers to Discharge: Inaccessible home environment, Decreased caregiver support  Barriers to Discharge Comments: wife works, 2 story home  Recommendation: Short-term skilled PT     PT - OK to Discharge: (to rhb when medically stable )    See flowsheet documentation for full assessment

## 2019-02-12 NOTE — PHYSICIAN ADVISOR
Current patient class: Inpatient  The patient is currently on Hospital Day: 3 at [facility]      The patient was admitted to the hospital at 21  on 2/10/19 for the following diagnosis:  Neurologic gait dysfunction [R26 9]       There is documentation in the medical record of an expected length of stay of at least 2 midnights  The patient is therefore expected to satisfy the 2 midnight benchmark and given the 2 midnight presumption is appropriate for INPATIENT ADMISSION  Given this expectation of a satisfying stay, CMS instructs us that the patient is most often appropriate for inpatient admission under part A provided medical necessity is documented in the chart  After review of the relevant documentation, labs, vital signs and test results, the patient is appropriate for INPATIENT ADMISSION  Admission to the hospital as an inpatient is a complex decision making process which requires the practitioner to consider the patients presenting complaint, history and physical examination and all relevant testing  With this in mind, in this case, the patient was deemed appropriate for INPATIENT ADMISSION  After review of the documentation and testing available at the time of the admission I concur with this clinical determination of medical necessity  Rationale is as follows: The patient is a 71 yrs old Male who presented to the ED at 2/10/2019  4:46 PM with a chief complaint of ataxia  Given the need for further hospitalization, and along with the documentation of medical necessity present in the chart, the patient is appropriate for inpatient admission  The patient is expected to satisfy the 2 midnight benchmark, and will require further acute medical care  The patient does have comorbid conditions which increases the risk for significant adverse outcome  Given this the patient is appropriate for inpatient admission        The patients vitals on arrival were ED Triage Vitals   Temperature Pulse Respirations Blood Pressure SpO2   02/10/19 1717 02/10/19 1717 02/10/19 1717 02/10/19 1717 02/10/19 1717   99 1 °F (37 3 °C) 76 20 145/77 96 %      Temp Source Heart Rate Source Patient Position - Orthostatic VS BP Location FiO2 (%)   02/10/19 1717 02/10/19 1717 02/10/19 1717 02/10/19 1717 --   Oral Monitor Lying Left arm       Pain Score       02/10/19 1936       8           Past Medical History:   Diagnosis Date    [place] (hard of hearing)     Hypertension     Renal disorder     kidney    Shoulder dislocation      Past Surgical History:   Procedure Laterality Date    CHOLECYSTECTOMY      SHOULDER SURGERY      for dislocation           Consults have been placed to:   IP CONSULT TO NEUROLOGY    Vitals:    02/11/19 1100 02/11/19 1530 02/11/19 1842 02/11/19 2300   BP: 152/68 151/82 105/57 149/77   BP Location: Left arm Right arm Right arm Right arm   Pulse: 81 80 99 85   Resp: 18 20 20 20   Temp: 98 5 °F (36 9 °C) (!) 96 5 °F (35 8 °C) (!) 97 4 °F (36 3 °C) 98 1 °F (36 7 °C)   TempSrc: Tympanic Oral Temporal Temporal   SpO2: 99% 97% 93% 94%   Weight:       Height:           Most recent labs:    Recent Labs     02/10/19  0856 02/10/19  1833   WBC 9 40  --    HGB 14 6  --    HCT 43 3  --     210   K 3 0*  --    CALCIUM 9 3  --    BUN 17  --    CREATININE 0 89  --    INR 1 09  --    TROPONINI <0 02  --    AST 16  --    ALT 25  --    ALKPHOS 69  --        Scheduled Meds:  Current Facility-Administered Medications:  acetaminophen 650 mg Oral Q6H PRN MALGORZATA Zaman    amLODIPine 10 mg Oral Daily Uyen Calvin MD    chlordiazePOXIDE 25 mg Oral TID Uyen Calvin MD    citalopram 20 mg Oral Daily Uyen Calvin MD    enoxaparin 40 mg Subcutaneous Daily Uyen Calvin MD    finasteride 5 mg Oral Daily Uyen Calvin MD    folic acid 1 mg Oral Daily Uyen Calvin MD    lisinopril 40 mg Oral Daily Uyen Calvin MD    LORazepam 1 mg Intravenous Q6H PRN Uyen Calvin MD multivitamin-minerals 1 tablet Oral Daily Andrew Delatorre MD    thiamine 100 mg Intravenous Daily Andrew Delatorre MD Last Rate: Stopped (02/11/19 0945)     Continuous Infusions:   PRN Meds:   acetaminophen    LORazepam    Surgical procedures (if appropriate):

## 2019-02-12 NOTE — NURSING NOTE
Patient wife, Neri Ness, called to inquire about Margarette Florence  Per patient wife they have no alcohol in the house, "this has been going on a long time"  She noted about patient agitated on a regular basis  She will be at work today  Please see sticky note with patient cell phone number to provide her with updates

## 2019-02-13 LAB
ANION GAP SERPL CALCULATED.3IONS-SCNC: 12 MMOL/L (ref 4–13)
BASOPHILS # BLD AUTO: 0.05 THOUSANDS/ΜL (ref 0–0.1)
BASOPHILS NFR BLD AUTO: 1 % (ref 0–1)
BUN SERPL-MCNC: 16 MG/DL (ref 5–25)
CALCIUM SERPL-MCNC: 9 MG/DL (ref 8.3–10.1)
CHLORIDE SERPL-SCNC: 108 MMOL/L (ref 100–108)
CO2 SERPL-SCNC: 27 MMOL/L (ref 21–32)
CREAT SERPL-MCNC: 0.8 MG/DL (ref 0.6–1.3)
EOSINOPHIL # BLD AUTO: 0.02 THOUSAND/ΜL (ref 0–0.61)
EOSINOPHIL NFR BLD AUTO: 0 % (ref 0–6)
ERYTHROCYTE [DISTWIDTH] IN BLOOD BY AUTOMATED COUNT: 11.9 % (ref 11.6–15.1)
GFR SERPL CREATININE-BSD FRML MDRD: 91 ML/MIN/1.73SQ M
GLUCOSE SERPL-MCNC: 115 MG/DL (ref 65–140)
HCT VFR BLD AUTO: 42.2 % (ref 36.5–49.3)
HGB BLD-MCNC: 14.2 G/DL (ref 12–17)
IMM GRANULOCYTES # BLD AUTO: 0.04 THOUSAND/UL (ref 0–0.2)
IMM GRANULOCYTES NFR BLD AUTO: 0 % (ref 0–2)
LYMPHOCYTES # BLD AUTO: 1.4 THOUSANDS/ΜL (ref 0.6–4.47)
LYMPHOCYTES NFR BLD AUTO: 13 % (ref 14–44)
MAGNESIUM SERPL-MCNC: 1.9 MG/DL (ref 1.6–2.6)
MCH RBC QN AUTO: 33.6 PG (ref 26.8–34.3)
MCHC RBC AUTO-ENTMCNC: 33.6 G/DL (ref 31.4–37.4)
MCV RBC AUTO: 100 FL (ref 82–98)
MONOCYTES # BLD AUTO: 1.44 THOUSAND/ΜL (ref 0.17–1.22)
MONOCYTES NFR BLD AUTO: 13 % (ref 4–12)
NEUTROPHILS # BLD AUTO: 8.04 THOUSANDS/ΜL (ref 1.85–7.62)
NEUTS SEG NFR BLD AUTO: 73 % (ref 43–75)
NRBC BLD AUTO-RTO: 0 /100 WBCS
PLATELET # BLD AUTO: 227 THOUSANDS/UL (ref 149–390)
PMV BLD AUTO: 10 FL (ref 8.9–12.7)
POTASSIUM SERPL-SCNC: 3 MMOL/L (ref 3.5–5.3)
RBC # BLD AUTO: 4.23 MILLION/UL (ref 3.88–5.62)
SODIUM SERPL-SCNC: 147 MMOL/L (ref 136–145)
WBC # BLD AUTO: 10.99 THOUSAND/UL (ref 4.31–10.16)

## 2019-02-13 PROCEDURE — 80048 BASIC METABOLIC PNL TOTAL CA: CPT | Performed by: HOSPITALIST

## 2019-02-13 PROCEDURE — 85025 COMPLETE CBC W/AUTO DIFF WBC: CPT | Performed by: HOSPITALIST

## 2019-02-13 PROCEDURE — 83735 ASSAY OF MAGNESIUM: CPT | Performed by: HOSPITALIST

## 2019-02-13 PROCEDURE — 99232 SBSQ HOSP IP/OBS MODERATE 35: CPT | Performed by: HOSPITALIST

## 2019-02-13 RX ORDER — POTASSIUM CHLORIDE 14.9 MG/ML
20 INJECTION INTRAVENOUS
Status: COMPLETED | OUTPATIENT
Start: 2019-02-13 | End: 2019-02-13

## 2019-02-13 RX ORDER — POTASSIUM CHLORIDE 20 MEQ/1
20 TABLET, EXTENDED RELEASE ORAL ONCE
Status: COMPLETED | OUTPATIENT
Start: 2019-02-13 | End: 2019-02-13

## 2019-02-13 RX ADMIN — AMLODIPINE BESYLATE 10 MG: 10 TABLET ORAL at 12:30

## 2019-02-13 RX ADMIN — ACETAMINOPHEN 650 MG: 325 TABLET ORAL at 20:01

## 2019-02-13 RX ADMIN — DEXMEDETOMIDINE 1.2 MCG/KG/HR: 100 INJECTION, SOLUTION, CONCENTRATE INTRAVENOUS at 06:56

## 2019-02-13 RX ADMIN — THIAMINE HYDROCHLORIDE 100 MG: 100 INJECTION, SOLUTION INTRAMUSCULAR; INTRAVENOUS at 10:29

## 2019-02-13 RX ADMIN — FINASTERIDE 5 MG: 5 TABLET, FILM COATED ORAL at 12:28

## 2019-02-13 RX ADMIN — ENOXAPARIN SODIUM 40 MG: 40 INJECTION SUBCUTANEOUS at 10:29

## 2019-02-13 RX ADMIN — POTASSIUM CHLORIDE 20 MEQ: 200 INJECTION, SOLUTION INTRAVENOUS at 13:33

## 2019-02-13 RX ADMIN — POTASSIUM CHLORIDE 20 MEQ: 1500 TABLET, EXTENDED RELEASE ORAL at 15:07

## 2019-02-13 RX ADMIN — DEXMEDETOMIDINE 0.5 MCG/KG/HR: 100 INJECTION, SOLUTION, CONCENTRATE INTRAVENOUS at 23:20

## 2019-02-13 RX ADMIN — CHLORDIAZEPOXIDE HYDROCHLORIDE 25 MG: 25 CAPSULE ORAL at 20:03

## 2019-02-13 RX ADMIN — Medication 1 TABLET: at 12:29

## 2019-02-13 RX ADMIN — DEXMEDETOMIDINE 1.5 MCG/KG/HR: 100 INJECTION, SOLUTION, CONCENTRATE INTRAVENOUS at 00:02

## 2019-02-13 RX ADMIN — POTASSIUM CHLORIDE 20 MEQ: 200 INJECTION, SOLUTION INTRAVENOUS at 15:18

## 2019-02-13 RX ADMIN — DEXMEDETOMIDINE 0.5 MCG/KG/HR: 100 INJECTION, SOLUTION, CONCENTRATE INTRAVENOUS at 13:17

## 2019-02-13 RX ADMIN — CITALOPRAM HYDROBROMIDE 20 MG: 20 TABLET ORAL at 12:30

## 2019-02-13 RX ADMIN — DEXMEDETOMIDINE 0.7 MCG/KG/HR: 100 INJECTION, SOLUTION, CONCENTRATE INTRAVENOUS at 09:18

## 2019-02-13 RX ADMIN — CHLORDIAZEPOXIDE HYDROCHLORIDE 25 MG: 25 CAPSULE ORAL at 12:28

## 2019-02-13 RX ADMIN — LISINOPRIL 40 MG: 20 TABLET ORAL at 12:30

## 2019-02-13 RX ADMIN — DEXMEDETOMIDINE 1.2 MCG/KG/HR: 100 INJECTION, SOLUTION, CONCENTRATE INTRAVENOUS at 04:23

## 2019-02-13 RX ADMIN — FOLIC ACID 1 MG: 1 TABLET ORAL at 12:30

## 2019-02-13 RX ADMIN — DEXMEDETOMIDINE 1.2 MCG/KG/HR: 100 INJECTION, SOLUTION, CONCENTRATE INTRAVENOUS at 02:33

## 2019-02-13 RX ADMIN — DEXMEDETOMIDINE 0.5 MCG/KG/HR: 100 INJECTION, SOLUTION, CONCENTRATE INTRAVENOUS at 18:27

## 2019-02-13 NOTE — ASSESSMENT & PLAN NOTE
Had to be placed on Precedex last night  He is calm on it, but still confused and hallucinating  Cont Thiamine, mvi and folate

## 2019-02-13 NOTE — PROGRESS NOTES
Progress Note - Sheldon Heaton 1950, 71 y o  male MRN: [id number]    Unit/Bed#: ICU 13 Encounter: [id number]    Primary Care Provider: Dorys Bentley MD   Date and time admitted to hospital: 2/10/2019  4:46 PM        * Delirium tremens ([address] )  Assessment & Plan  Had to be placed on Precedex last night  He is calm on it, but still confused and hallucinating  Cont Thiamine, mvi and folate    Alcohol abuse  Assessment & Plan  On MVI, thiamine and folic acid    Ataxia  Assessment & Plan  Due to alcohol withdrawal            Subjective:   Calm on precedex  Hallucinating  "Driving a car"      Objective:     Vitals:   Temp (24hrs), Av 5 °F (36 9 °C), Min:98 3 °F (36 8 °C), Max:98 6 °F (37 °C)    Temp:  [98 3 °F (36 8 °C)-98 6 °F (37 °C)] 98 6 °F (37 °C)  HR:  [60-99] 60  Resp:  [20-49] 22  BP: (127-149)/() 135/71  SpO2:  [92 %-97 %] 97 %  Body mass index is 30 74 kg/m²  Input and Output Summary (last 24 hours): Intake/Output Summary (Last 24 hours) at 2019 1231  Last data filed at 2019 0900  Gross per 24 hour   Intake 486 98 ml   Output --   Net 486 98 ml       Physical Exam:     Physical Exam   Constitutional:   Confused, acting like he is steering a car   HENT:   Head: Normocephalic and atraumatic  Eyes: Pupils are equal, round, and reactive to light  EOM are normal    Cardiovascular: Normal rate and regular rhythm  Exam reveals no gallop and no friction rub  No murmur heard  Pulmonary/Chest: Effort normal and breath sounds normal  He has no wheezes  He has no rales  Abdominal: Soft  Bowel sounds are normal  There is no tenderness  Musculoskeletal: He exhibits no edema  Neurological:   Less tremor today   Nursing note and vitals reviewed              Additional Data:     Labs:    Results from last 7 days   Lab Units 19  0434   WBC Thousand/uL 10 99*   HEMOGLOBIN g/dL 14 2   HEMATOCRIT % 42 2   PLATELETS Thousands/uL 227   NEUTROS PCT % 73   LYMPHS PCT % 13*   MONOS PCT % 13*   EOS PCT % 0     Results from last 7 days   Lab Units 02/13/19  0434 02/12/19  0653   POTASSIUM mmol/L 3 0* 3 3*   CHLORIDE mmol/L 108 105   CO2 mmol/L 27 22   BUN mg/dL 16 <1*   CREATININE mg/dL 0 80 0 85   CALCIUM mg/dL 9 0 8 7   ALK PHOS U/L  --  60   ALT U/L  --  21   AST U/L  --  20     Results from last 7 days   Lab Units 02/10/19  0856   INR  1 09                   * I Have Reviewed All Lab Data     Recent Cultures (last 7 days):             Last 24 Hours Medication List:     Current Facility-Administered Medications:  acetaminophen 650 mg Oral Q6H PRN Matthew Prechtel, DO    amLODIPine 10 mg Oral Daily Matthew Prechtel, DO    chlordiazePOXIDE 25 mg Oral TID Jennifer Junes, DO    citalopram 20 mg Oral Daily Matthew Prechtel, DO    dexmedetomidine 0 1-0 7 mcg/kg/hr Intravenous Titrated Matthew Prechtel, DO Last Rate: 0 7 mcg/kg/hr (02/13/19 0918)   enoxaparin 40 mg Subcutaneous Daily Matthew Prechtel, DO    finasteride 5 mg Oral Daily Matthew Prechtel, DO    folic acid 1 mg Oral Daily Matthew Prechtel, DO    lisinopril 40 mg Oral Daily Matthew Prechtel, DO    LORazepam 1 mg Intravenous Q6H PRN Jennifer Junes, DO    multivitamin-minerals 1 tablet Oral Daily Matthew Prechtel, DO    thiamine 100 mg Intravenous Daily Matthew Prechtel, DO Last Rate: 100 mg (02/13/19 1029)   traMADol 50 mg Oral Q6H PRN Matthew Prechtel, DO          VTE Pharmacologic Prophylaxis:   Pharmacologic: Enoxaparin (Lovenox)      Current Length of Stay: 3 day(s)    Current Patient Status: Inpatient       Discharge Plan:   Code Status: Level 1 - Full Code           Today, Patient Was Seen By: Rachel Courtney DO    ** Please Note: Dictation voice to text software may have been used in the creation of this document   **

## 2019-02-14 LAB
ANION GAP SERPL CALCULATED.3IONS-SCNC: 7 MMOL/L (ref 4–13)
BASOPHILS # BLD AUTO: 0.04 THOUSANDS/ΜL (ref 0–0.1)
BASOPHILS NFR BLD AUTO: 0 % (ref 0–1)
BUN SERPL-MCNC: 16 MG/DL (ref 5–25)
CALCIUM SERPL-MCNC: 8.7 MG/DL (ref 8.3–10.1)
CHLORIDE SERPL-SCNC: 103 MMOL/L (ref 100–108)
CO2 SERPL-SCNC: 29 MMOL/L (ref 21–32)
CREAT SERPL-MCNC: 0.91 MG/DL (ref 0.6–1.3)
EOSINOPHIL # BLD AUTO: 0.18 THOUSAND/ΜL (ref 0–0.61)
EOSINOPHIL NFR BLD AUTO: 2 % (ref 0–6)
ERYTHROCYTE [DISTWIDTH] IN BLOOD BY AUTOMATED COUNT: 12 % (ref 11.6–15.1)
GFR SERPL CREATININE-BSD FRML MDRD: 86 ML/MIN/1.73SQ M
GLUCOSE SERPL-MCNC: 113 MG/DL (ref 65–140)
HCT VFR BLD AUTO: 39.4 % (ref 36.5–49.3)
HGB BLD-MCNC: 13 G/DL (ref 12–17)
IMM GRANULOCYTES # BLD AUTO: 0.07 THOUSAND/UL (ref 0–0.2)
IMM GRANULOCYTES NFR BLD AUTO: 1 % (ref 0–2)
LYMPHOCYTES # BLD AUTO: 1.88 THOUSANDS/ΜL (ref 0.6–4.47)
LYMPHOCYTES NFR BLD AUTO: 17 % (ref 14–44)
MAGNESIUM SERPL-MCNC: 1.8 MG/DL (ref 1.6–2.6)
MCH RBC QN AUTO: 33.4 PG (ref 26.8–34.3)
MCHC RBC AUTO-ENTMCNC: 33 G/DL (ref 31.4–37.4)
MCV RBC AUTO: 101 FL (ref 82–98)
METHYLMALONATE SERPL-SCNC: 82 NMOL/L (ref 0–378)
MONOCYTES # BLD AUTO: 1.54 THOUSAND/ΜL (ref 0.17–1.22)
MONOCYTES NFR BLD AUTO: 14 % (ref 4–12)
NEUTROPHILS # BLD AUTO: 7.46 THOUSANDS/ΜL (ref 1.85–7.62)
NEUTS SEG NFR BLD AUTO: 66 % (ref 43–75)
NRBC BLD AUTO-RTO: 0 /100 WBCS
PLATELET # BLD AUTO: 219 THOUSANDS/UL (ref 149–390)
PMV BLD AUTO: 9.7 FL (ref 8.9–12.7)
POTASSIUM SERPL-SCNC: 3.2 MMOL/L (ref 3.5–5.3)
RBC # BLD AUTO: 3.89 MILLION/UL (ref 3.88–5.62)
SL AMB DISCLAIMER: NORMAL
SODIUM SERPL-SCNC: 139 MMOL/L (ref 136–145)
WBC # BLD AUTO: 11.17 THOUSAND/UL (ref 4.31–10.16)

## 2019-02-14 PROCEDURE — 83735 ASSAY OF MAGNESIUM: CPT | Performed by: HOSPITALIST

## 2019-02-14 PROCEDURE — 85025 COMPLETE CBC W/AUTO DIFF WBC: CPT | Performed by: HOSPITALIST

## 2019-02-14 PROCEDURE — 97530 THERAPEUTIC ACTIVITIES: CPT

## 2019-02-14 PROCEDURE — 97110 THERAPEUTIC EXERCISES: CPT

## 2019-02-14 PROCEDURE — G0515 COGNITIVE SKILLS DEVELOPMENT: HCPCS

## 2019-02-14 PROCEDURE — 80048 BASIC METABOLIC PNL TOTAL CA: CPT | Performed by: HOSPITALIST

## 2019-02-14 PROCEDURE — 99232 SBSQ HOSP IP/OBS MODERATE 35: CPT | Performed by: HOSPITALIST

## 2019-02-14 PROCEDURE — 97116 GAIT TRAINING THERAPY: CPT

## 2019-02-14 RX ORDER — QUETIAPINE FUMARATE 25 MG/1
25 TABLET, FILM COATED ORAL ONCE AS NEEDED
Status: COMPLETED | OUTPATIENT
Start: 2019-02-14 | End: 2019-02-14

## 2019-02-14 RX ORDER — POTASSIUM CHLORIDE 14.9 MG/ML
20 INJECTION INTRAVENOUS
Status: COMPLETED | OUTPATIENT
Start: 2019-02-14 | End: 2019-02-14

## 2019-02-14 RX ORDER — POTASSIUM CHLORIDE 14.9 MG/ML
20 INJECTION INTRAVENOUS
Status: DISCONTINUED | OUTPATIENT
Start: 2019-02-15 | End: 2019-02-15

## 2019-02-14 RX ADMIN — THIAMINE HYDROCHLORIDE 100 MG: 100 INJECTION, SOLUTION INTRAMUSCULAR; INTRAVENOUS at 08:38

## 2019-02-14 RX ADMIN — DEXMEDETOMIDINE 0.3 MCG/KG/HR: 100 INJECTION, SOLUTION, CONCENTRATE INTRAVENOUS at 11:37

## 2019-02-14 RX ADMIN — CHLORDIAZEPOXIDE HYDROCHLORIDE 25 MG: 25 CAPSULE ORAL at 21:35

## 2019-02-14 RX ADMIN — TRAMADOL HYDROCHLORIDE 50 MG: 50 TABLET, COATED ORAL at 14:44

## 2019-02-14 RX ADMIN — LISINOPRIL 40 MG: 20 TABLET ORAL at 08:35

## 2019-02-14 RX ADMIN — ACETAMINOPHEN 650 MG: 325 TABLET ORAL at 02:38

## 2019-02-14 RX ADMIN — Medication 1 TABLET: at 08:35

## 2019-02-14 RX ADMIN — ENOXAPARIN SODIUM 40 MG: 40 INJECTION SUBCUTANEOUS at 08:32

## 2019-02-14 RX ADMIN — QUETIAPINE FUMARATE 25 MG: 25 TABLET ORAL at 21:35

## 2019-02-14 RX ADMIN — CHLORDIAZEPOXIDE HYDROCHLORIDE 25 MG: 25 CAPSULE ORAL at 16:22

## 2019-02-14 RX ADMIN — CITALOPRAM HYDROBROMIDE 20 MG: 20 TABLET ORAL at 08:35

## 2019-02-14 RX ADMIN — ACETAMINOPHEN 650 MG: 325 TABLET ORAL at 18:30

## 2019-02-14 RX ADMIN — ACETAMINOPHEN 650 MG: 325 TABLET ORAL at 08:35

## 2019-02-14 RX ADMIN — FOLIC ACID 1 MG: 1 TABLET ORAL at 08:35

## 2019-02-14 RX ADMIN — POTASSIUM CHLORIDE 20 MEQ: 200 INJECTION, SOLUTION INTRAVENOUS at 16:22

## 2019-02-14 RX ADMIN — DEXMEDETOMIDINE 0.5 MCG/KG/HR: 100 INJECTION, SOLUTION, CONCENTRATE INTRAVENOUS at 03:42

## 2019-02-14 RX ADMIN — CHLORDIAZEPOXIDE HYDROCHLORIDE 25 MG: 25 CAPSULE ORAL at 08:35

## 2019-02-14 RX ADMIN — LORAZEPAM 1 MG: 2 INJECTION INTRAMUSCULAR; INTRAVENOUS at 18:33

## 2019-02-14 RX ADMIN — FINASTERIDE 5 MG: 5 TABLET, FILM COATED ORAL at 08:35

## 2019-02-14 RX ADMIN — POTASSIUM CHLORIDE 20 MEQ: 200 INJECTION, SOLUTION INTRAVENOUS at 20:54

## 2019-02-14 RX ADMIN — AMLODIPINE BESYLATE 10 MG: 10 TABLET ORAL at 08:35

## 2019-02-14 NOTE — PLAN OF CARE
Problem: PHYSICAL THERAPY ADULT  Goal: Performs mobility at highest level of function for planned discharge setting  See evaluation for individualized goals  Description  Treatment/Interventions: Functional transfer training, LE strengthening/ROM, Elevations, Therapeutic exercise, Endurance training, Patient/family training, Equipment eval/education, Bed mobility, Gait training, Compensatory technique education, Continued evaluation, Spoke to nursing, OT  Equipment Recommended: (will monitor)       See flowsheet documentation for full assessment, interventions and recommendations  Outcome: Progressing  Note:   Prognosis: Fair  Problem List: Decreased strength, Decreased range of motion, Decreased endurance, Impaired balance, Decreased mobility, Decreased coordination, Decreased cognition, Impaired judgement, Decreased safety awareness  Assessment: Pt  seated in bedside chair upon my arrival  Pt  reporting fatigue, however agreeable to therapeutic intervention  Performance of HEP seated with cues provided for hand placement/technique  Progressed with transfers continuing to require A of therapist with hand placement/technique  Progressed with an increased amb  trial with use of RW and A of 2 with a close chair follow  Remains at a risk for falls due to noted gait impairments above  After completion of additional amb  trial pt  required seated resting break  Returned to room and repositioned seated in bedside chair at end of treatment session with 1:1 present  PT will continue to recommend rehab upon d/c for continued improvement of noted impairments above  Barriers to Discharge: Inaccessible home environment, Decreased caregiver support  Barriers to Discharge Comments: wife works, 2 story home  Recommendation: Short-term skilled PT     PT - OK to Discharge: Yes(if d/c to rehab when medically stable )    See flowsheet documentation for full assessment

## 2019-02-14 NOTE — PLAN OF CARE
Problem: OCCUPATIONAL THERAPY ADULT  Goal: Performs self-care activities at highest level of function for planned discharge setting  See evaluation for individualized goals  Description  Treatment Interventions: ADL retraining, Functional transfer training, UE strengthening/ROM, Cognitive reorientation, Endurance training, Patient/family training, Equipment evaluation/education, Compensatory technique education, Energy conservation, Activityengagement, Fine motor coordination activities          See flowsheet documentation for full assessment, interventions and recommendations  Note:   Limitation: Decreased ADL status, Decreased UE strength, Decreased Safe judgement during ADL, Decreased cognition, Decreased endurance, Decreased self-care trans, Decreased high-level ADLs, Decreased fine motor control, Visual deficit, Decreased sensation  Prognosis: Good  Assessment: Pt seen for 25 min tx session with focus on functional balance, functional mobility, activity tolerance, and cognition  Pt able to tolerate OOB mobility; sitting balance=f/f+, standing balance=f-/p+  Pt required verbalphysical cuess to maintain transfer/walker safety  Cognitive deficits noted--orientation, memory, direction-following, problem-solving  Pt would benefit from inpt rehab to improve his overall level of independence  Will continue        OT Discharge Recommendation: Short Term Rehab  OT - OK to Discharge: Yes(when medically cleared to rehab)

## 2019-02-14 NOTE — PROGRESS NOTES
Progress Note - Soledad Galvez 1950, 71 y o  male MRN: [id number]    Unit/Bed#: ICU 13 Encounter: [id number]    Primary Care Provider: Aarti Pena MD   Date and time admitted to hospital: 2/10/2019  4:46 PM        * Delirium tremens [facility])  Assessment & Plan  Doing better on Precedex, but I think he needs to continue it at least overnight    Alcohol abuse  Assessment & Plan  On MVI, thiamine and folic acid          Subjective:   A little less confused  He is more awake        Objective:     Vitals:   Temp (24hrs), Av 2 °F (36 8 °C), Min:97 3 °F (36 3 °C), Max:98 7 °F (37 1 °C)    Temp:  [97 3 °F (36 3 °C)-98 7 °F (37 1 °C)] 97 6 °F (36 4 °C)  HR:  [52-70] 64  Resp:  [19-43] 21  BP: ()/(55-73) 151/63  SpO2:  [94 %-99 %] 94 %  Body mass index is 30 74 kg/m²  Input and Output Summary (last 24 hours): Intake/Output Summary (Last 24 hours) at 2019 1437  Last data filed at 2019 0454  Gross per 24 hour   Intake 1639 5 ml   Output 1250 ml   Net 389 5 ml       Physical Exam:     Physical Exam   HENT:   Head: Normocephalic and atraumatic  Eyes: Pupils are equal, round, and reactive to light  EOM are normal    Cardiovascular: Normal rate and regular rhythm  Exam reveals no gallop and no friction rub  No murmur heard  Pulmonary/Chest: Effort normal and breath sounds normal  He has no wheezes  He has no rales  Abdominal: Soft  Bowel sounds are normal  There is no tenderness  Musculoskeletal: He exhibits no edema  Nursing note and vitals reviewed              Additional Data:     Labs:    Results from last 7 days   Lab Units 19  0436   WBC Thousand/uL 11 17*   HEMOGLOBIN g/dL 13 0   HEMATOCRIT % 39 4   PLATELETS Thousands/uL 219   NEUTROS PCT % 66   LYMPHS PCT % 17   MONOS PCT % 14*   EOS PCT % 2     Results from last 7 days   Lab Units 19  0436  19  0653   POTASSIUM mmol/L 3 2*   < > 3 3*   CHLORIDE mmol/L 103   < > 105   CO2 mmol/L 29   < > 22   BUN mg/dL 16   < > <1*   CREATININE mg/dL 0 91   < > 0 85   CALCIUM mg/dL 8 7   < > 8 7   ALK PHOS U/L  --   --  60   ALT U/L  --   --  21   AST U/L  --   --  20    < > = values in this interval not displayed  Results from last 7 days   Lab Units 02/10/19  0856   INR  1 09                   * I Have Reviewed All Lab Data     Recent Cultures (last 7 days):             Last 24 Hours Medication List:     Current Facility-Administered Medications:  acetaminophen 650 mg Oral Q6H PRN Matthew Prechtel, DO    amLODIPine 10 mg Oral Daily Matthew Prechtel, DO    chlordiazePOXIDE 25 mg Oral TID Pam Pages, DO    citalopram 20 mg Oral Daily Matthew Prechtel, DO    dexmedetomidine 0 1-0 7 mcg/kg/hr Intravenous Titrated Matthew Prechtel, DO Last Rate: 0 3 mcg/kg/hr (02/14/19 1137)   enoxaparin 40 mg Subcutaneous Daily Matthew Prechtel, DO    finasteride 5 mg Oral Daily Matthew Prechtel, DO    folic acid 1 mg Oral Daily Matthew Prechtel, DO    lisinopril 40 mg Oral Daily Matthew Prechtel, DO    LORazepam 1 mg Intravenous Q6H PRN Pam Pages, DO    multivitamin-minerals 1 tablet Oral Daily Matthew Prechtel, DO    thiamine 100 mg Intravenous Daily Matthew Prechtel, DO Last Rate: 100 mg (02/14/19 7244)   traMADol 50 mg Oral Q6H PRN Pam Pages, DO          VTE Pharmacologic Prophylaxis:   Pharmacologic: Enoxaparin (Lovenox)      Current Length of Stay: 4 day(s)    Current Patient Status: Inpatient       Discharge Plan:   Code Status: Level 1 - Full Code           Today, Patient Was Seen By: Clara Melgar DO    ** Please Note: Dictation voice to text software may have been used in the creation of this document   **

## 2019-02-14 NOTE — SOCIAL WORK
Patient on 1:1, currently still confused w/hallucinations  PT/OT notes reviewed - will need STR at discharge  Will need to be optioned d/t ETOH use  Attempted to reach the wife today to obtain social history and discuss STR, no answer, unable to leave VM on phone  Will try again later

## 2019-02-14 NOTE — PHYSICAL THERAPY NOTE
Physical Therapy Progress Note     02/14/19 1108   Pain Assessment   Pain Assessment FLACC   Pain Rating: FLACC (Rest) - Face 0   Pain Rating: FLACC (Rest) - Legs 0   Pain Rating: FLACC (Rest) - Activity 0   Pain Rating: FLACC (Rest) - Cry 0   Pain Rating: FLACC (Rest) - Consolability 0   Score: FLACC (Rest) 0   Pain Rating: FLACC (Activity) - Face 0   Pain Rating: FLACC (Activity) - Legs 0   Pain Rating: FLACC (Activity) - Activity 0   Pain Rating: FLACC (Activity) - Cry 0   Pain Rating: FLACC (Activity) - Consolability 0   Score: FLACC (Activity) 0   Restrictions/Precautions   Weight Bearing Precautions Per Order No   Other Precautions Telemetry;Multiple lines; Fall Risk;Cognitive;1:1   General   Chart Reviewed Yes   Response to Previous Treatment Patient reporting fatigue but able to participate  Family/Caregiver Present No   Subjective   Subjective Willing to participate in therapy this AM    Transfers   Sit to Stand 4  Minimal assistance   Additional items Assist x 1; Armrests; Increased time required;Verbal cues   Stand to Sit 4  Minimal assistance   Additional items Assist x 1; Armrests; Increased time required;Verbal cues   Ambulation/Elevation   Gait pattern Decreased foot clearance; Short stride; Inconsistent iram; Shuffling; Ataxia   Gait Assistance 3  Moderate assist   Additional items Assist x 2;Verbal cues; Tactile cues  (with close chair follow)   Assistive Device Rolling walker   Distance 50'   Balance   Static Sitting Fair   Dynamic Sitting Fair -   Static Standing Poor   Dynamic Standing Poor   Ambulatory Poor   Endurance Deficit   Endurance Deficit Yes   Endurance Deficit Description fatigue/medical status   Activity Tolerance   Activity Tolerance Patient limited by fatigue   Nurse Made Aware Yes   Exercises   TKR Sitting;10 reps;AAROM; Bilateral   Assessment   Prognosis Fair   Problem List Decreased strength;Decreased range of motion;Decreased endurance; Impaired balance;Decreased mobility; Decreased coordination;Decreased cognition; Impaired judgement;Decreased safety awareness   Assessment Pt  seated in bedside chair upon my arrival  Pt  reporting fatigue, however agreeable to therapeutic intervention  Performance of HEP seated with cues provided for hand placement/technique  Progressed with transfers continuing to require A of therapist with hand placement/technique  Progressed with an increased amb  trial with use of RW and A of 2 with a close chair follow  Remains at a risk for falls due to noted gait impairments above  After completion of additional amb  trial pt  required seated resting break  Returned to room and repositioned seated in bedside chair at end of treatment session with 1:1 present  PT will continue to recommend rehab upon d/c for continued improvement of noted impairments above  Barriers to Discharge Inaccessible home environment;Decreased caregiver support   Barriers to Discharge Comments wife works, 2 story home  Goals   Patient Goals To get better  STG Expiration Date 02/22/19   Treatment Day 1   Plan   Treatment/Interventions Functional transfer training;LE strengthening/ROM; Therapeutic exercise; Endurance training;Gait training;Spoke to nursing;Spoke to case management;OT   Progress Progressing toward goals   PT Frequency Other (Comment)  (3-5x/wk)   Recommendation   Recommendation Short-term skilled PT   Equipment Recommended Walker  (RW)   PT - OK to Discharge Yes  (if d/c to rehab when medically stable )     Nancy Motta PTA

## 2019-02-14 NOTE — OCCUPATIONAL THERAPY NOTE
OccupationalTherapy Progress Note(time=6670-1051)     Patient Name: Soledad MTZ Date: 2/14/2019  Problem List  Patient Active Problem List   Diagnosis    Hypertension    Ataxia    Delirium tremens ([facility] [address] )    Alcohol abuse            Subjective:     02/14/19 1055   Restrictions/Precautions   Other Precautions Telemetry;Multiple lines; Fall Risk;Pain;Cognitive;1:1   Pain Assessment   Pain Assessment FLACC   Pain Rating: FLACC (Rest) - Face 0   Pain Rating: FLACC (Rest) - Legs 0   Pain Rating: FLACC (Rest) - Activity 0   Pain Rating: FLACC (Rest) - Cry 0   Pain Rating: FLACC (Rest) - Consolability 0   Score: FLACC (Rest) 0   Functional Standing Tolerance   Time 1-2mins   Transfers   Sit to Stand 4  Minimal assistance   Additional items Assist x 1; Increased time required;Verbal cues   Stand to Sit 4  Minimal assistance   Additional items Assist x 1; Increased time required;Verbal cues   Functional Mobility   Functional Mobility 3  Moderate assistance   Additional Comments x2   Additional items Rolling walker  (with chair follow)   Cognition   Overall Cognitive Status Impaired   Arousal/Participation Alert   Attention Attends with cues to redirect   Orientation Level Oriented to person;Oriented to place   Memory Decreased short term memory;Decreased recall of recent events;Decreased recall of precautions   Following Commands Follows one step commands with increased time or repetition   Activity Tolerance   Activity Tolerance Patient limited by fatigue;Patient limited by pain   Medical Staff Made Aware nsg, P T  Assessment   Assessment Pt seen for 25 min tx session with focus on functional balance, functional mobility, activity tolerance, and cognition  Pt able to tolerate OOB mobility; sitting balance=f/f+, standing balance=f-/p+  Pt required verbalphysical cuess to maintain transfer/walker safety  Cognitive deficits noted--orientation, memory, direction-following, problem-solving   Pt would benefit from inpt rehab to improve his overall level of independence  Will continue  Plan   Treatment Interventions ADL retraining;Functional transfer training;UE strengthening/ROM; Endurance training;Cognitive reorientation;Patient/family training;Equipment evaluation/education; Compensatory technique education;Continued evaluation   Goal Expiration Date 02/26/19   Treatment Day 1   OT Frequency 3-5x/wk   Recommendation   OT Discharge Recommendation Short Term Rehab   Barthel Index   Feeding 5   Bathing 0   Grooming Score 0   Dressing Score 5   Bladder Score 5   Bowels Score 5   Toilet Use Score 5   Transfers (Bed/Chair) Score 5   Mobility (Level Surface) Score 0   Stairs Score 0   Barthel Index Score 30   Aileen Damon, OT

## 2019-02-15 LAB
ANION GAP SERPL CALCULATED.3IONS-SCNC: 10 MMOL/L (ref 4–13)
BASOPHILS # BLD AUTO: 0.04 THOUSANDS/ΜL (ref 0–0.1)
BASOPHILS NFR BLD AUTO: 1 % (ref 0–1)
BUN SERPL-MCNC: 15 MG/DL (ref 5–25)
CALCIUM SERPL-MCNC: 9.1 MG/DL (ref 8.3–10.1)
CHLORIDE SERPL-SCNC: 103 MMOL/L (ref 100–108)
CO2 SERPL-SCNC: 28 MMOL/L (ref 21–32)
CREAT SERPL-MCNC: 0.84 MG/DL (ref 0.6–1.3)
EOSINOPHIL # BLD AUTO: 0.27 THOUSAND/ΜL (ref 0–0.61)
EOSINOPHIL NFR BLD AUTO: 3 % (ref 0–6)
ERYTHROCYTE [DISTWIDTH] IN BLOOD BY AUTOMATED COUNT: 11.9 % (ref 11.6–15.1)
GFR SERPL CREATININE-BSD FRML MDRD: 89 ML/MIN/1.73SQ M
GLUCOSE SERPL-MCNC: 109 MG/DL (ref 65–140)
HCT VFR BLD AUTO: 43 % (ref 36.5–49.3)
HGB BLD-MCNC: 14 G/DL (ref 12–17)
IMM GRANULOCYTES # BLD AUTO: 0.07 THOUSAND/UL (ref 0–0.2)
IMM GRANULOCYTES NFR BLD AUTO: 1 % (ref 0–2)
LYMPHOCYTES # BLD AUTO: 1.73 THOUSANDS/ΜL (ref 0.6–4.47)
LYMPHOCYTES NFR BLD AUTO: 20 % (ref 14–44)
MAGNESIUM SERPL-MCNC: 1.7 MG/DL (ref 1.6–2.6)
MCH RBC QN AUTO: 32.8 PG (ref 26.8–34.3)
MCHC RBC AUTO-ENTMCNC: 32.6 G/DL (ref 31.4–37.4)
MCV RBC AUTO: 101 FL (ref 82–98)
MONOCYTES # BLD AUTO: 0.86 THOUSAND/ΜL (ref 0.17–1.22)
MONOCYTES NFR BLD AUTO: 10 % (ref 4–12)
NEUTROPHILS # BLD AUTO: 5.79 THOUSANDS/ΜL (ref 1.85–7.62)
NEUTS SEG NFR BLD AUTO: 65 % (ref 43–75)
NRBC BLD AUTO-RTO: 0 /100 WBCS
PLATELET # BLD AUTO: 287 THOUSANDS/UL (ref 149–390)
PMV BLD AUTO: 9.9 FL (ref 8.9–12.7)
POTASSIUM SERPL-SCNC: 3.1 MMOL/L (ref 3.5–5.3)
RBC # BLD AUTO: 4.27 MILLION/UL (ref 3.88–5.62)
SODIUM SERPL-SCNC: 141 MMOL/L (ref 136–145)
VIT B1 BLD-SCNC: 245.8 NMOL/L (ref 66.5–200)
VIT B6 SERPL-MCNC: 4.3 UG/L (ref 5.3–46.7)
WBC # BLD AUTO: 8.76 THOUSAND/UL (ref 4.31–10.16)

## 2019-02-15 PROCEDURE — 99232 SBSQ HOSP IP/OBS MODERATE 35: CPT | Performed by: HOSPITALIST

## 2019-02-15 PROCEDURE — 83735 ASSAY OF MAGNESIUM: CPT | Performed by: HOSPITALIST

## 2019-02-15 PROCEDURE — 85025 COMPLETE CBC W/AUTO DIFF WBC: CPT | Performed by: HOSPITALIST

## 2019-02-15 PROCEDURE — 80048 BASIC METABOLIC PNL TOTAL CA: CPT | Performed by: HOSPITALIST

## 2019-02-15 RX ORDER — HYDROXYZINE HYDROCHLORIDE 25 MG/1
25 TABLET, FILM COATED ORAL EVERY 6 HOURS PRN
Status: DISCONTINUED | OUTPATIENT
Start: 2019-02-15 | End: 2019-02-19 | Stop reason: HOSPADM

## 2019-02-15 RX ORDER — TRAMADOL HYDROCHLORIDE 50 MG/1
50 TABLET ORAL EVERY 6 HOURS PRN
Status: DISCONTINUED | OUTPATIENT
Start: 2019-02-15 | End: 2019-02-15 | Stop reason: SDUPTHER

## 2019-02-15 RX ORDER — POTASSIUM CHLORIDE 14.9 MG/ML
20 INJECTION INTRAVENOUS ONCE
Status: COMPLETED | OUTPATIENT
Start: 2019-02-15 | End: 2019-02-15

## 2019-02-15 RX ORDER — POTASSIUM CHLORIDE 20MEQ/15ML
40 LIQUID (ML) ORAL ONCE
Status: COMPLETED | OUTPATIENT
Start: 2019-02-15 | End: 2019-02-15

## 2019-02-15 RX ADMIN — Medication 1 TABLET: at 08:09

## 2019-02-15 RX ADMIN — POTASSIUM CHLORIDE 20 MEQ: 200 INJECTION, SOLUTION INTRAVENOUS at 06:29

## 2019-02-15 RX ADMIN — CHLORDIAZEPOXIDE HYDROCHLORIDE 25 MG: 25 CAPSULE ORAL at 15:43

## 2019-02-15 RX ADMIN — ACETAMINOPHEN 650 MG: 325 TABLET ORAL at 08:08

## 2019-02-15 RX ADMIN — LISINOPRIL 40 MG: 20 TABLET ORAL at 08:09

## 2019-02-15 RX ADMIN — POTASSIUM CHLORIDE 40 MEQ: 20 SOLUTION ORAL at 06:29

## 2019-02-15 RX ADMIN — FINASTERIDE 5 MG: 5 TABLET, FILM COATED ORAL at 08:09

## 2019-02-15 RX ADMIN — HYDROXYZINE HYDROCHLORIDE 25 MG: 25 TABLET ORAL at 14:22

## 2019-02-15 RX ADMIN — TRAMADOL HYDROCHLORIDE 50 MG: 50 TABLET, COATED ORAL at 11:47

## 2019-02-15 RX ADMIN — TRAMADOL HYDROCHLORIDE 50 MG: 50 TABLET, COATED ORAL at 19:47

## 2019-02-15 RX ADMIN — CITALOPRAM HYDROBROMIDE 20 MG: 20 TABLET ORAL at 08:09

## 2019-02-15 RX ADMIN — CHLORDIAZEPOXIDE HYDROCHLORIDE 25 MG: 25 CAPSULE ORAL at 08:09

## 2019-02-15 RX ADMIN — FOLIC ACID 1 MG: 1 TABLET ORAL at 08:09

## 2019-02-15 RX ADMIN — AMLODIPINE BESYLATE 10 MG: 10 TABLET ORAL at 08:09

## 2019-02-15 RX ADMIN — THIAMINE HYDROCHLORIDE 100 MG: 100 INJECTION, SOLUTION INTRAMUSCULAR; INTRAVENOUS at 08:11

## 2019-02-15 RX ADMIN — CHLORDIAZEPOXIDE HYDROCHLORIDE 25 MG: 25 CAPSULE ORAL at 22:01

## 2019-02-15 RX ADMIN — ENOXAPARIN SODIUM 40 MG: 40 INJECTION SUBCUTANEOUS at 08:08

## 2019-02-15 NOTE — PROGRESS NOTES
Progress Note - Denny Hands 1950, 71 y o  male MRN: [id number]    Unit/Bed#: [address] [place]  Encounter: [id number]    Primary Care Provider: Shelley Esteves MD   Date and time admitted to hospital: 2/10/2019  4:46 PM        * Delirium tremens [facility])  Assessment & Plan  Much less confused  Weaned off of precedex  Move out of ICU  Keep on CIWA    Alcohol abuse  Assessment & Plan  On MVI, thiamine and folic acid  Will ask PT and case management to see patient  He may benefit from rehab            Subjective:   Doing better  Much less confused  Much less tremor      Objective:     Vitals:   Temp (24hrs), Av 8 °F (37 1 °C), Min:97 8 °F (36 6 °C), Max:100 °F (37 8 °C)    Temp:  [97 8 °F (36 6 °C)-100 °F (37 8 °C)] 97 8 °F (36 6 °C)  HR:  [60-80] 75  Resp:  [17-23] 20  BP: (101-131)/(53-73) 103/73  SpO2:  [92 %-98 %] 94 %  Body mass index is 30 74 kg/m²  Input and Output Summary (last 24 hours): Intake/Output Summary (Last 24 hours) at 2/15/2019 1200  Last data filed at 2/15/2019 1124  Gross per 24 hour   Intake 1830 7 ml   Output 3620 ml   Net -1789 3 ml       Physical Exam:     Physical Exam   Constitutional:   Mildly confused, but oriented x 3 today   HENT:   Head: Normocephalic and atraumatic  Eyes: Pupils are equal, round, and reactive to light  EOM are normal    Cardiovascular: Normal rate and regular rhythm  Exam reveals no gallop and no friction rub  No murmur heard  Pulmonary/Chest: Effort normal and breath sounds normal  He has no wheezes  He has no rales  Abdominal: Soft  Bowel sounds are normal  There is no tenderness  Musculoskeletal: He exhibits no edema  Neurological:   Much less tremor     Nursing note and vitals reviewed              Additional Data:     Labs:    Results from last 7 days   Lab Units 02/15/19  0444   WBC Thousand/uL 8 76   HEMOGLOBIN g/dL 14 0   HEMATOCRIT % 43 0   PLATELETS Thousands/uL 287   NEUTROS PCT % 65   LYMPHS PCT % 20   MONOS PCT % 10   EOS PCT % 3 Results from last 7 days   Lab Units 02/15/19  0444  02/12/19  0653   POTASSIUM mmol/L 3 1*   < > 3 3*   CHLORIDE mmol/L 103   < > 105   CO2 mmol/L 28   < > 22   BUN mg/dL 15   < > <1*   CREATININE mg/dL 0 84   < > 0 85   CALCIUM mg/dL 9 1   < > 8 7   ALK PHOS U/L  --   --  60   ALT U/L  --   --  21   AST U/L  --   --  20    < > = values in this interval not displayed  Results from last 7 days   Lab Units 02/10/19  0856   INR  1 09                   * I Have Reviewed All Lab Data     Recent Cultures (last 7 days):             Last 24 Hours Medication List:     Current Facility-Administered Medications:  acetaminophen 650 mg Oral Q6H PRN Matthew Prechtel, DO    amLODIPine 10 mg Oral Daily Matthew Prechtel, DO    chlordiazePOXIDE 25 mg Oral TID Orien Helm, DO    citalopram 20 mg Oral Daily Matthew Prechtel, DO    enoxaparin 40 mg Subcutaneous Daily Matthew Prechtel, DO    finasteride 5 mg Oral Daily Matthew Prechtel, DO    folic acid 1 mg Oral Daily Matthew Prechtel, DO    lisinopril 40 mg Oral Daily Matthew Prechtel, DO    LORazepam 1 mg Intravenous Q6H PRN Orien Helm, DO    multivitamin-minerals 1 tablet Oral Daily Matthew Prechtel, DO    thiamine 100 mg Intravenous Daily Matthew Prechtel, DO Last Rate: 100 mg (02/15/19 0811)   traMADol 50 mg Oral Q6H PRN Orien Helm, DO          VTE Pharmacologic Prophylaxis:   Pharmacologic: Enoxaparin (Lovenox)      Current Length of Stay: 5 day(s)    Current Patient Status: Inpatient       Discharge Plan:   Code Status: Level 1 - Full Code           Today, Patient Was Seen By: Kris Anthony DO    ** Please Note: Dictation voice to text software may have been used in the creation of this document   **

## 2019-02-15 NOTE — ASSESSMENT & PLAN NOTE
On MVI, thiamine and folic acid  Will ask PT and case management to see patient    He may benefit from rehab

## 2019-02-16 LAB
ANION GAP SERPL CALCULATED.3IONS-SCNC: 7 MMOL/L (ref 4–13)
BUN SERPL-MCNC: 13 MG/DL (ref 5–25)
CALCIUM SERPL-MCNC: 9 MG/DL (ref 8.3–10.1)
CHLORIDE SERPL-SCNC: 101 MMOL/L (ref 100–108)
CO2 SERPL-SCNC: 30 MMOL/L (ref 21–32)
CREAT SERPL-MCNC: 0.78 MG/DL (ref 0.6–1.3)
ERYTHROCYTE [DISTWIDTH] IN BLOOD BY AUTOMATED COUNT: 12 % (ref 11.6–15.1)
GFR SERPL CREATININE-BSD FRML MDRD: 92 ML/MIN/1.73SQ M
GLUCOSE SERPL-MCNC: 109 MG/DL (ref 65–140)
HCT VFR BLD AUTO: 39.3 % (ref 36.5–49.3)
HGB BLD-MCNC: 13 G/DL (ref 12–17)
MAGNESIUM SERPL-MCNC: 1.5 MG/DL (ref 1.6–2.6)
MCH RBC QN AUTO: 33.3 PG (ref 26.8–34.3)
MCHC RBC AUTO-ENTMCNC: 33.1 G/DL (ref 31.4–37.4)
MCV RBC AUTO: 101 FL (ref 82–98)
PLATELET # BLD AUTO: 316 THOUSANDS/UL (ref 149–390)
PMV BLD AUTO: 9.7 FL (ref 8.9–12.7)
POTASSIUM SERPL-SCNC: 3.2 MMOL/L (ref 3.5–5.3)
RBC # BLD AUTO: 3.9 MILLION/UL (ref 3.88–5.62)
SODIUM SERPL-SCNC: 138 MMOL/L (ref 136–145)
WBC # BLD AUTO: 7.78 THOUSAND/UL (ref 4.31–10.16)

## 2019-02-16 PROCEDURE — 99232 SBSQ HOSP IP/OBS MODERATE 35: CPT | Performed by: HOSPITALIST

## 2019-02-16 PROCEDURE — 85027 COMPLETE CBC AUTOMATED: CPT | Performed by: HOSPITALIST

## 2019-02-16 PROCEDURE — 80048 BASIC METABOLIC PNL TOTAL CA: CPT | Performed by: HOSPITALIST

## 2019-02-16 PROCEDURE — 83735 ASSAY OF MAGNESIUM: CPT | Performed by: HOSPITALIST

## 2019-02-16 RX ORDER — POLYETHYLENE GLYCOL 3350 17 G/17G
17 POWDER, FOR SOLUTION ORAL DAILY
Status: DISCONTINUED | OUTPATIENT
Start: 2019-02-16 | End: 2019-02-19 | Stop reason: HOSPADM

## 2019-02-16 RX ORDER — TAMSULOSIN HYDROCHLORIDE 0.4 MG/1
0.4 CAPSULE ORAL
Status: DISCONTINUED | OUTPATIENT
Start: 2019-02-16 | End: 2019-02-19 | Stop reason: HOSPADM

## 2019-02-16 RX ADMIN — CHLORDIAZEPOXIDE HYDROCHLORIDE 25 MG: 25 CAPSULE ORAL at 17:24

## 2019-02-16 RX ADMIN — LISINOPRIL 40 MG: 20 TABLET ORAL at 08:03

## 2019-02-16 RX ADMIN — AMLODIPINE BESYLATE 10 MG: 10 TABLET ORAL at 08:03

## 2019-02-16 RX ADMIN — TRAMADOL HYDROCHLORIDE 50 MG: 50 TABLET, COATED ORAL at 02:00

## 2019-02-16 RX ADMIN — TAMSULOSIN HYDROCHLORIDE 0.4 MG: 0.4 CAPSULE ORAL at 17:24

## 2019-02-16 RX ADMIN — Medication 1 TABLET: at 08:03

## 2019-02-16 RX ADMIN — ENOXAPARIN SODIUM 40 MG: 40 INJECTION SUBCUTANEOUS at 08:03

## 2019-02-16 RX ADMIN — THIAMINE HYDROCHLORIDE 100 MG: 100 INJECTION, SOLUTION INTRAMUSCULAR; INTRAVENOUS at 09:59

## 2019-02-16 RX ADMIN — FINASTERIDE 5 MG: 5 TABLET, FILM COATED ORAL at 08:03

## 2019-02-16 RX ADMIN — CHLORDIAZEPOXIDE HYDROCHLORIDE 25 MG: 25 CAPSULE ORAL at 22:02

## 2019-02-16 RX ADMIN — CITALOPRAM HYDROBROMIDE 20 MG: 20 TABLET ORAL at 08:03

## 2019-02-16 RX ADMIN — FOLIC ACID 1 MG: 1 TABLET ORAL at 08:03

## 2019-02-16 RX ADMIN — POLYETHYLENE GLYCOL 3350 17 G: 17 POWDER, FOR SOLUTION ORAL at 12:41

## 2019-02-16 RX ADMIN — CHLORDIAZEPOXIDE HYDROCHLORIDE 25 MG: 25 CAPSULE ORAL at 08:03

## 2019-02-16 NOTE — PLAN OF CARE
Problem: Potential for Falls  Goal: Patient will remain free of falls  Description  INTERVENTIONS:  - Assess patient frequently for physical needs  -  Identify cognitive and physical deficits and behaviors that affect risk of falls  -  [place] fall precautions as indicated by assessment   - Educate patient/family on patient safety including physical limitations  - Instruct patient to call for assistance with activity based on assessment  - Modify environment to reduce risk of injury  - Consider OT/PT consult to assist with strengthening/mobility  Outcome: Progressing     Problem: PAIN - ADULT  Goal: Verbalizes/displays adequate comfort level or baseline comfort level  Description  Interventions:  - Encourage patient to monitor pain and request assistance  - Assess pain using appropriate pain scale  - Administer analgesics based on type and severity of pain and evaluate response  - Implement non-pharmacological measures as appropriate and evaluate response  - Consider cultural and social influences on pain and pain management  - Notify physician/advanced practitioner if interventions unsuccessful or patient reports new pain  Outcome: Progressing     Problem: SAFETY ADULT  Goal: Patient will remain free of falls  Description  INTERVENTIONS:  - Assess patient frequently for physical needs  -  Identify cognitive and physical deficits and behaviors that affect risk of falls    -  [place] fall precautions as indicated by assessment   - Educate patient/family on patient safety including physical limitations  - Instruct patient to call for assistance with activity based on assessment  - Modify environment to reduce risk of injury  - Consider OT/PT consult to assist with strengthening/mobility  Outcome: Progressing  Goal: Maintain or return to baseline ADL function  Description  INTERVENTIONS:  -  Assess patient's ability to carry out ADLs; assess patient's baseline for ADL function and identify physical deficits which impact ability to perform ADLs (bathing, care of mouth/teeth, toileting, grooming, dressing, etc )  - Assess/evaluate cause of self-care deficits   - Assess range of motion  - Assess patient's mobility; develop plan if impaired  - Assess patient's need for assistive devices and provide as appropriate  - Encourage maximum independence but intervene and supervise when necessary  ¯ Involve family in performance of ADLs  ¯ Assess for home care needs following discharge   ¯ Request OT consult to assist with ADL evaluation and planning for discharge  ¯ Provide patient education as appropriate  Outcome: Progressing  Goal: Maintain or return mobility status to optimal level  Description  INTERVENTIONS:  - Assess patient's baseline mobility status (ambulation, transfers, stairs, etc )    - Identify cognitive and physical deficits and behaviors that affect mobility  - Identify mobility aids required to assist with transfers and/or ambulation (gait belt, sit-to-stand, lift, walker, cane, etc )  - [place] fall precautions as indicated by assessment  - Record patient progress and toleration of activity level on Mobility SBAR; progress patient to next Phase/Stage  - Instruct patient to call for assistance with activity based on assessment  - Request Rehabilitation consult to assist with strengthening/weightbearing, etc   Outcome: Progressing     Problem: DISCHARGE PLANNING  Goal: Discharge to home or other facility with appropriate resources  Description  INTERVENTIONS:  - Identify barriers to discharge w/patient and caregiver  - Arrange for needed discharge resources and transportation as appropriate  - Identify discharge learning needs (meds, wound care, etc )  - Arrange for interpretive services to assist at discharge as needed  - Refer to Case Management Department for coordinating discharge planning if the patient needs post-hospital services based on physician/advanced practitioner order or complex needs related to functional status, cognitive ability, or social support system  Outcome: Progressing     Problem: Knowledge Deficit  Goal: Patient/family/caregiver demonstrates understanding of disease process, treatment plan, medications, and discharge instructions  Description  Complete learning assessment and assess knowledge base  Interventions:  - Provide teaching at level of understanding  - Provide teaching via preferred learning methods  Outcome: Progressing     Problem: NEUROSENSORY - ADULT  Goal: Achieves maximal functionality and self care  Description  INTERVENTIONS  - Monitor swallowing and airway patency with patient fatigue and changes in neurological status  - Encourage and assist patient to increase activity and self care with guidance from rehab services  - Encourage visually impaired, hearing impaired and aphasic patients to use assistive/communication devices  Outcome: Progressing     Problem: CARDIOVASCULAR - ADULT  Goal: Maintains optimal cardiac output and hemodynamic stability  Description  INTERVENTIONS:  - Monitor I/O, vital signs and rhythm  - Monitor for S/S and trends of decreased cardiac output i e  bleeding, hypotension  - Administer and titrate ordered vasoactive medications to optimize hemodynamic stability  - Assess quality of pulses, skin color and temperature  - Assess for signs of decreased coronary artery perfusion - ex   Angina  - Instruct patient to report change in severity of symptoms  Outcome: Progressing  Goal: Absence of cardiac dysrhythmias or at baseline rhythm  Description  INTERVENTIONS:  - Continuous cardiac monitoring, monitor vital signs, obtain 12 lead EKG if indicated  - Administer antiarrhythmic and heart rate control medications as ordered  - Monitor electrolytes and administer replacement therapy as ordered  Outcome: Progressing     Problem: RESPIRATORY - ADULT  Goal: Achieves optimal ventilation and oxygenation  Description  INTERVENTIONS:  - Assess for changes in respiratory status  - Assess for changes in mentation and behavior  - Position to facilitate oxygenation and minimize respiratory effort  - Oxygen administration by appropriate delivery method based on oxygen saturation (per order) or ABGs  - Initiate smoking cessation education as indicated  - Encourage broncho-pulmonary hygiene including cough, deep breathe, Incentive Spirometry  - Assess the need for suctioning and aspirate as needed  - Assess and instruct to report SOB or any respiratory difficulty  - Respiratory Therapy support as indicated  Outcome: Progressing     Problem: METABOLIC, FLUID AND ELECTROLYTES - ADULT  Goal: Electrolytes maintained within normal limits  Description  INTERVENTIONS:  - Monitor labs and assess patient for signs and symptoms of electrolyte imbalances  - Administer electrolyte replacement as ordered  - Monitor response to electrolyte replacements, including repeat lab results as appropriate  - Instruct patient on fluid and nutrition as appropriate  Outcome: Progressing  Goal: Fluid balance maintained  Description  INTERVENTIONS:  - Monitor labs and assess for signs and symptoms of volume excess or deficit  - Monitor I/O and WT  - Instruct patient on fluid and nutrition as appropriate  Outcome: Progressing     Problem: SKIN/TISSUE INTEGRITY - ADULT  Goal: Skin integrity remains intact  Description  INTERVENTIONS  - Identify patients at risk for skin breakdown  - Assess and monitor skin integrity  - Assess and monitor nutrition and hydration status  - Monitor labs (i e  albumin)  - Assess for incontinence   - Turn and reposition patient  - Assist with mobility/ambulation  - Relieve pressure over bony prominences  - Avoid friction and shearing  - Provide appropriate hygiene as needed including keeping skin clean and dry  - Evaluate need for skin moisturizer/barrier cream  - Collaborate with interdisciplinary team (i e  Nutrition, Rehabilitation, etc )   - Patient/family teaching  Outcome: Progressing     Problem: Prexisting or High Potential for Compromised Skin Integrity  Goal: Skin integrity is maintained or improved  Description  INTERVENTIONS:  - Identify patients at risk for skin breakdown  - Assess and monitor skin integrity  - Assess and monitor nutrition and hydration status  - Monitor labs (i e  albumin)  - Assess for incontinence   - Turn and reposition patient  - Assist with mobility/ambulation  - Relieve pressure over bony prominences  - Avoid friction and shearing  - Provide appropriate hygiene as needed including keeping skin clean and dry  - Evaluate need for skin moisturizer/barrier cream  - Collaborate with interdisciplinary team (i e  Nutrition, Rehabilitation, etc )   - Patient/family teaching  Outcome: Progressing

## 2019-02-16 NOTE — PROGRESS NOTES
Progress Note - Kareen Glaser 1950, 71 y o  male MRN: [id number]    Unit/Bed#: [address] [id number] Encounter: [id number]    Primary Care Provider: Rekha Jeffers MD   Date and time admitted to hospital: 2/10/2019  4:46 PM        * Delirium tremens [facility])  Assessment & Plan  Still a little confused, but overall much better  I did explain to him that he needs to go to inpatient alcohol rehab      Alcohol abuse  Assessment & Plan  On MVI, thiamine and folic acid  Needs alcohol rehab    Ataxia  Assessment & Plan  Due to alcohol withdrawal  Working with PT          Subjective:   Still a little confused  But I was able to have a conversation with him about his alcohol abuse and the fact that withdrawal caused this confusion  He is asking a lot of questions about rehab    No other complaints  He feels overall better  No headache      Objective:     Vitals:   Temp (24hrs), Av °F (36 7 °C), Min:97 6 °F (36 4 °C), Max:98 4 °F (36 9 °C)    Temp:  [97 6 °F (36 4 °C)-98 4 °F (36 9 °C)] 98 °F (36 7 °C)  HR:  [73-83] 83  Resp:  [18-20] 18  BP: (133-146)/(79-89) 133/80  SpO2:  [94 %-96 %] 96 %  Body mass index is 30 74 kg/m²  Input and Output Summary (last 24 hours): Intake/Output Summary (Last 24 hours) at 2019 1151  Last data filed at 2019 0900  Gross per 24 hour   Intake --   Output 1900 ml   Net -1900 ml       Physical Exam:     Physical Exam   HENT:   Head: Normocephalic and atraumatic  Eyes: Pupils are equal, round, and reactive to light  EOM are normal    Cardiovascular: Normal rate and regular rhythm  Exam reveals no gallop and no friction rub  No murmur heard  Pulmonary/Chest: Effort normal and breath sounds normal  He has no wheezes  He has no rales  Abdominal: Soft  Bowel sounds are normal  There is no tenderness  Musculoskeletal: He exhibits no edema  Neurological:   Much less tremor     Nursing note and vitals reviewed              Additional Data:     Labs:    Results from last 7 days   Lab Units 02/16/19  0538 02/15/19  0444   WBC Thousand/uL 7 78 8 76   HEMOGLOBIN g/dL 13 0 14 0   HEMATOCRIT % 39 3 43 0   PLATELETS Thousands/uL 316 287   NEUTROS PCT %  --  65   LYMPHS PCT %  --  20   MONOS PCT %  --  10   EOS PCT %  --  3     Results from last 7 days   Lab Units 02/16/19  0538  02/12/19  0653   POTASSIUM mmol/L 3 2*   < > 3 3*   CHLORIDE mmol/L 101   < > 105   CO2 mmol/L 30   < > 22   BUN mg/dL 13   < > <1*   CREATININE mg/dL 0 78   < > 0 85   CALCIUM mg/dL 9 0   < > 8 7   ALK PHOS U/L  --   --  60   ALT U/L  --   --  21   AST U/L  --   --  20    < > = values in this interval not displayed       Results from last 7 days   Lab Units 02/10/19  0856   INR  1 09                   * I Have Reviewed All Lab Data     Recent Cultures (last 7 days):             Last 24 Hours Medication List:     Current Facility-Administered Medications:  acetaminophen 650 mg Oral Q6H PRN Matthew Prechtel, DO    amLODIPine 10 mg Oral Daily Matthew Prechtel, DO    chlordiazePOXIDE 25 mg Oral TID Rosebud Flattery, DO    citalopram 20 mg Oral Daily Matthew Prechtel, DO    enoxaparin 40 mg Subcutaneous Daily Matthew Prechtel, DO    finasteride 5 mg Oral Daily Matthew Prechtel, DO    folic acid 1 mg Oral Daily Matthew Prechtel, DO    hydrOXYzine HCL 25 mg Oral Q6H PRN Matthew Prechtel, DO    lisinopril 40 mg Oral Daily Matthew Prechtel, DO    LORazepam 1 mg Intravenous Q6H PRN Rosebud Flattery, DO    multivitamin-minerals 1 tablet Oral Daily Matthew Prechtel, DO    thiamine 100 mg Intravenous Daily Matthew Prechtel, DO Last Rate: 100 mg (02/16/19 0959)   traMADol 50 mg Oral Q6H PRN Matthew Prechtel, DO          VTE Pharmacologic Prophylaxis:   Pharmacologic: Enoxaparin (Lovenox)      Current Length of Stay: 6 day(s)    Current Patient Status: Inpatient       Discharge Plan: needs inpatient alcohol rehab    Code Status: Level 1 - Full Code           Today, Patient Was Seen By: Johnny Santiago DO    ** Please Note: Dictation voice to text software may have been used in the creation of this document   **

## 2019-02-16 NOTE — ASSESSMENT & PLAN NOTE
Still a little confused, but overall much better  I did explain to him that he needs to go to inpatient alcohol rehab

## 2019-02-16 NOTE — PLAN OF CARE
Problem: Potential for Falls  Goal: Patient will remain free of falls  Description  INTERVENTIONS:  - Assess patient frequently for physical needs  -  Identify cognitive and physical deficits and behaviors that affect risk of falls  -  [place] fall precautions as indicated by assessment   - Educate patient/family on patient safety including physical limitations  - Instruct patient to call for assistance with activity based on assessment  - Modify environment to reduce risk of injury  - Consider OT/PT consult to assist with strengthening/mobility  Outcome: Progressing     Problem: PAIN - ADULT  Goal: Verbalizes/displays adequate comfort level or baseline comfort level  Description  Interventions:  - Encourage patient to monitor pain and request assistance  - Assess pain using appropriate pain scale  - Administer analgesics based on type and severity of pain and evaluate response  - Implement non-pharmacological measures as appropriate and evaluate response  - Consider cultural and social influences on pain and pain management  - Notify physician/advanced practitioner if interventions unsuccessful or patient reports new pain  Outcome: Progressing     Problem: SAFETY ADULT  Goal: Patient will remain free of falls  Description  INTERVENTIONS:  - Assess patient frequently for physical needs  -  Identify cognitive and physical deficits and behaviors that affect risk of falls    -  [place] fall precautions as indicated by assessment   - Educate patient/family on patient safety including physical limitations  - Instruct patient to call for assistance with activity based on assessment  - Modify environment to reduce risk of injury  - Consider OT/PT consult to assist with strengthening/mobility  Outcome: Progressing  Goal: Maintain or return to baseline ADL function  Description  INTERVENTIONS:  -  Assess patient's ability to carry out ADLs; assess patient's baseline for ADL function and identify physical deficits which impact ability to perform ADLs (bathing, care of mouth/teeth, toileting, grooming, dressing, etc )  - Assess/evaluate cause of self-care deficits   - Assess range of motion  - Assess patient's mobility; develop plan if impaired  - Assess patient's need for assistive devices and provide as appropriate  - Encourage maximum independence but intervene and supervise when necessary  ¯ Involve family in performance of ADLs  ¯ Assess for home care needs following discharge   ¯ Request OT consult to assist with ADL evaluation and planning for discharge  ¯ Provide patient education as appropriate  Outcome: Progressing  Goal: Maintain or return mobility status to optimal level  Description  INTERVENTIONS:  - Assess patient's baseline mobility status (ambulation, transfers, stairs, etc )    - Identify cognitive and physical deficits and behaviors that affect mobility  - Identify mobility aids required to assist with transfers and/or ambulation (gait belt, sit-to-stand, lift, walker, cane, etc )  - [place] fall precautions as indicated by assessment  - Record patient progress and toleration of activity level on Mobility SBAR; progress patient to next Phase/Stage  - Instruct patient to call for assistance with activity based on assessment  - Request Rehabilitation consult to assist with strengthening/weightbearing, etc   Outcome: Progressing     Problem: DISCHARGE PLANNING  Goal: Discharge to home or other facility with appropriate resources  Description  INTERVENTIONS:  - Identify barriers to discharge w/patient and caregiver  - Arrange for needed discharge resources and transportation as appropriate  - Identify discharge learning needs (meds, wound care, etc )  - Arrange for interpretive services to assist at discharge as needed  - Refer to Case Management Department for coordinating discharge planning if the patient needs post-hospital services based on physician/advanced practitioner order or complex needs related to functional status, cognitive ability, or social support system  Outcome: Progressing     Problem: Knowledge Deficit  Goal: Patient/family/caregiver demonstrates understanding of disease process, treatment plan, medications, and discharge instructions  Description  Complete learning assessment and assess knowledge base  Interventions:  - Provide teaching at level of understanding  - Provide teaching via preferred learning methods  Outcome: Progressing     Problem: NEUROSENSORY - ADULT  Goal: Achieves maximal functionality and self care  Description  INTERVENTIONS  - Monitor swallowing and airway patency with patient fatigue and changes in neurological status  - Encourage and assist patient to increase activity and self care with guidance from rehab services  - Encourage visually impaired, hearing impaired and aphasic patients to use assistive/communication devices  Outcome: Progressing     Problem: CARDIOVASCULAR - ADULT  Goal: Maintains optimal cardiac output and hemodynamic stability  Description  INTERVENTIONS:  - Monitor I/O, vital signs and rhythm  - Monitor for S/S and trends of decreased cardiac output i e  bleeding, hypotension  - Administer and titrate ordered vasoactive medications to optimize hemodynamic stability  - Assess quality of pulses, skin color and temperature  - Assess for signs of decreased coronary artery perfusion - ex   Angina  - Instruct patient to report change in severity of symptoms  Outcome: Progressing  Goal: Absence of cardiac dysrhythmias or at baseline rhythm  Description  INTERVENTIONS:  - Continuous cardiac monitoring, monitor vital signs, obtain 12 lead EKG if indicated  - Administer antiarrhythmic and heart rate control medications as ordered  - Monitor electrolytes and administer replacement therapy as ordered  Outcome: Progressing     Problem: RESPIRATORY - ADULT  Goal: Achieves optimal ventilation and oxygenation  Description  INTERVENTIONS:  - Assess for changes in respiratory status  - Assess for changes in mentation and behavior  - Position to facilitate oxygenation and minimize respiratory effort  - Oxygen administration by appropriate delivery method based on oxygen saturation (per order) or ABGs  - Initiate smoking cessation education as indicated  - Encourage broncho-pulmonary hygiene including cough, deep breathe, Incentive Spirometry  - Assess the need for suctioning and aspirate as needed  - Assess and instruct to report SOB or any respiratory difficulty  - Respiratory Therapy support as indicated  Outcome: Progressing     Problem: METABOLIC, FLUID AND ELECTROLYTES - ADULT  Goal: Electrolytes maintained within normal limits  Description  INTERVENTIONS:  - Monitor labs and assess patient for signs and symptoms of electrolyte imbalances  - Administer electrolyte replacement as ordered  - Monitor response to electrolyte replacements, including repeat lab results as appropriate  - Instruct patient on fluid and nutrition as appropriate  Outcome: Progressing  Goal: Fluid balance maintained  Description  INTERVENTIONS:  - Monitor labs and assess for signs and symptoms of volume excess or deficit  - Monitor I/O and WT  - Instruct patient on fluid and nutrition as appropriate  Outcome: Progressing     Problem: SKIN/TISSUE INTEGRITY - ADULT  Goal: Skin integrity remains intact  Description  INTERVENTIONS  - Identify patients at risk for skin breakdown  - Assess and monitor skin integrity  - Assess and monitor nutrition and hydration status  - Monitor labs (i e  albumin)  - Assess for incontinence   - Turn and reposition patient  - Assist with mobility/ambulation  - Relieve pressure over bony prominences  - Avoid friction and shearing  - Provide appropriate hygiene as needed including keeping skin clean and dry  - Evaluate need for skin moisturizer/barrier cream  - Collaborate with interdisciplinary team (i e  Nutrition, Rehabilitation, etc )   - Patient/family teaching  Outcome: Progressing     Problem: Prexisting or High Potential for Compromised Skin Integrity  Goal: Skin integrity is maintained or improved  Description  INTERVENTIONS:  - Identify patients at risk for skin breakdown  - Assess and monitor skin integrity  - Assess and monitor nutrition and hydration status  - Monitor labs (i e  albumin)  - Assess for incontinence   - Turn and reposition patient  - Assist with mobility/ambulation  - Relieve pressure over bony prominences  - Avoid friction and shearing  - Provide appropriate hygiene as needed including keeping skin clean and dry  - Evaluate need for skin moisturizer/barrier cream  - Collaborate with interdisciplinary team (i e  Nutrition, Rehabilitation, etc )   - Patient/family teaching  Outcome: Progressing

## 2019-02-17 PROCEDURE — 99232 SBSQ HOSP IP/OBS MODERATE 35: CPT | Performed by: HOSPITALIST

## 2019-02-17 RX ADMIN — FOLIC ACID 1 MG: 1 TABLET ORAL at 07:44

## 2019-02-17 RX ADMIN — LISINOPRIL 40 MG: 20 TABLET ORAL at 07:43

## 2019-02-17 RX ADMIN — CHLORDIAZEPOXIDE HYDROCHLORIDE 25 MG: 25 CAPSULE ORAL at 21:56

## 2019-02-17 RX ADMIN — CITALOPRAM HYDROBROMIDE 20 MG: 20 TABLET ORAL at 07:44

## 2019-02-17 RX ADMIN — POLYETHYLENE GLYCOL 3350 17 G: 17 POWDER, FOR SOLUTION ORAL at 07:44

## 2019-02-17 RX ADMIN — THIAMINE HYDROCHLORIDE 100 MG: 100 INJECTION, SOLUTION INTRAMUSCULAR; INTRAVENOUS at 08:27

## 2019-02-17 RX ADMIN — CHLORDIAZEPOXIDE HYDROCHLORIDE 25 MG: 25 CAPSULE ORAL at 16:23

## 2019-02-17 RX ADMIN — TRAMADOL HYDROCHLORIDE 50 MG: 50 TABLET, COATED ORAL at 20:02

## 2019-02-17 RX ADMIN — Medication 1 TABLET: at 07:43

## 2019-02-17 RX ADMIN — ENOXAPARIN SODIUM 40 MG: 40 INJECTION SUBCUTANEOUS at 07:45

## 2019-02-17 RX ADMIN — AMLODIPINE BESYLATE 10 MG: 10 TABLET ORAL at 07:44

## 2019-02-17 RX ADMIN — TAMSULOSIN HYDROCHLORIDE 0.4 MG: 0.4 CAPSULE ORAL at 16:22

## 2019-02-17 RX ADMIN — CHLORDIAZEPOXIDE HYDROCHLORIDE 25 MG: 25 CAPSULE ORAL at 07:44

## 2019-02-17 RX ADMIN — FINASTERIDE 5 MG: 5 TABLET, FILM COATED ORAL at 07:44

## 2019-02-17 NOTE — PLAN OF CARE
Problem: Potential for Falls  Goal: Patient will remain free of falls  Description  INTERVENTIONS:  - Assess patient frequently for physical needs  -  Identify cognitive and physical deficits and behaviors that affect risk of falls  -  [place] fall precautions as indicated by assessment   - Educate patient/family on patient safety including physical limitations  - Instruct patient to call for assistance with activity based on assessment  - Modify environment to reduce risk of injury  - Consider OT/PT consult to assist with strengthening/mobility  Outcome: Progressing     Problem: PAIN - ADULT  Goal: Verbalizes/displays adequate comfort level or baseline comfort level  Description  Interventions:  - Encourage patient to monitor pain and request assistance  - Assess pain using appropriate pain scale  - Administer analgesics based on type and severity of pain and evaluate response  - Implement non-pharmacological measures as appropriate and evaluate response  - Consider cultural and social influences on pain and pain management  - Notify physician/advanced practitioner if interventions unsuccessful or patient reports new pain  Outcome: Progressing     Problem: SAFETY ADULT  Goal: Patient will remain free of falls  Description  INTERVENTIONS:  - Assess patient frequently for physical needs  -  Identify cognitive and physical deficits and behaviors that affect risk of falls    -  [place] fall precautions as indicated by assessment   - Educate patient/family on patient safety including physical limitations  - Instruct patient to call for assistance with activity based on assessment  - Modify environment to reduce risk of injury  - Consider OT/PT consult to assist with strengthening/mobility  Outcome: Progressing  Goal: Maintain or return to baseline ADL function  Description  INTERVENTIONS:  -  Assess patient's ability to carry out ADLs; assess patient's baseline for ADL function and identify physical deficits which impact ability to perform ADLs (bathing, care of mouth/teeth, toileting, grooming, dressing, etc )  - Assess/evaluate cause of self-care deficits   - Assess range of motion  - Assess patient's mobility; develop plan if impaired  - Assess patient's need for assistive devices and provide as appropriate  - Encourage maximum independence but intervene and supervise when necessary  ¯ Involve family in performance of ADLs  ¯ Assess for home care needs following discharge   ¯ Request OT consult to assist with ADL evaluation and planning for discharge  ¯ Provide patient education as appropriate  Outcome: Progressing  Goal: Maintain or return mobility status to optimal level  Description  INTERVENTIONS:  - Assess patient's baseline mobility status (ambulation, transfers, stairs, etc )    - Identify cognitive and physical deficits and behaviors that affect mobility  - Identify mobility aids required to assist with transfers and/or ambulation (gait belt, sit-to-stand, lift, walker, cane, etc )  - [place] fall precautions as indicated by assessment  - Record patient progress and toleration of activity level on Mobility SBAR; progress patient to next Phase/Stage  - Instruct patient to call for assistance with activity based on assessment  - Request Rehabilitation consult to assist with strengthening/weightbearing, etc   Outcome: Progressing     Problem: DISCHARGE PLANNING  Goal: Discharge to home or other facility with appropriate resources  Description  INTERVENTIONS:  - Identify barriers to discharge w/patient and caregiver  - Arrange for needed discharge resources and transportation as appropriate  - Identify discharge learning needs (meds, wound care, etc )  - Arrange for interpretive services to assist at discharge as needed  - Refer to Case Management Department for coordinating discharge planning if the patient needs post-hospital services based on physician/advanced practitioner order or complex needs related to functional status, cognitive ability, or social support system  Outcome: Progressing     Problem: Knowledge Deficit  Goal: Patient/family/caregiver demonstrates understanding of disease process, treatment plan, medications, and discharge instructions  Description  Complete learning assessment and assess knowledge base  Interventions:  - Provide teaching at level of understanding  - Provide teaching via preferred learning methods  Outcome: Progressing     Problem: NEUROSENSORY - ADULT  Goal: Achieves maximal functionality and self care  Description  INTERVENTIONS  - Monitor swallowing and airway patency with patient fatigue and changes in neurological status  - Encourage and assist patient to increase activity and self care with guidance from rehab services  - Encourage visually impaired, hearing impaired and aphasic patients to use assistive/communication devices  Outcome: Progressing     Problem: CARDIOVASCULAR - ADULT  Goal: Maintains optimal cardiac output and hemodynamic stability  Description  INTERVENTIONS:  - Monitor I/O, vital signs and rhythm  - Monitor for S/S and trends of decreased cardiac output i e  bleeding, hypotension  - Administer and titrate ordered vasoactive medications to optimize hemodynamic stability  - Assess quality of pulses, skin color and temperature  - Assess for signs of decreased coronary artery perfusion - ex   Angina  - Instruct patient to report change in severity of symptoms  Outcome: Progressing  Goal: Absence of cardiac dysrhythmias or at baseline rhythm  Description  INTERVENTIONS:  - Continuous cardiac monitoring, monitor vital signs, obtain 12 lead EKG if indicated  - Administer antiarrhythmic and heart rate control medications as ordered  - Monitor electrolytes and administer replacement therapy as ordered  Outcome: Progressing     Problem: RESPIRATORY - ADULT  Goal: Achieves optimal ventilation and oxygenation  Description  INTERVENTIONS:  - Assess for changes in respiratory status  - Assess for changes in mentation and behavior  - Position to facilitate oxygenation and minimize respiratory effort  - Oxygen administration by appropriate delivery method based on oxygen saturation (per order) or ABGs  - Initiate smoking cessation education as indicated  - Encourage broncho-pulmonary hygiene including cough, deep breathe, Incentive Spirometry  - Assess the need for suctioning and aspirate as needed  - Assess and instruct to report SOB or any respiratory difficulty  - Respiratory Therapy support as indicated  Outcome: Progressing     Problem: METABOLIC, FLUID AND ELECTROLYTES - ADULT  Goal: Electrolytes maintained within normal limits  Description  INTERVENTIONS:  - Monitor labs and assess patient for signs and symptoms of electrolyte imbalances  - Administer electrolyte replacement as ordered  - Monitor response to electrolyte replacements, including repeat lab results as appropriate  - Instruct patient on fluid and nutrition as appropriate  Outcome: Progressing  Goal: Fluid balance maintained  Description  INTERVENTIONS:  - Monitor labs and assess for signs and symptoms of volume excess or deficit  - Monitor I/O and WT  - Instruct patient on fluid and nutrition as appropriate  Outcome: Progressing     Problem: SKIN/TISSUE INTEGRITY - ADULT  Goal: Skin integrity remains intact  Description  INTERVENTIONS  - Identify patients at risk for skin breakdown  - Assess and monitor skin integrity  - Assess and monitor nutrition and hydration status  - Monitor labs (i e  albumin)  - Assess for incontinence   - Turn and reposition patient  - Assist with mobility/ambulation  - Relieve pressure over bony prominences  - Avoid friction and shearing  - Provide appropriate hygiene as needed including keeping skin clean and dry  - Evaluate need for skin moisturizer/barrier cream  - Collaborate with interdisciplinary team (i e  Nutrition, Rehabilitation, etc )   - Patient/family teaching  Outcome: Progressing     Problem: Prexisting or High Potential for Compromised Skin Integrity  Goal: Skin integrity is maintained or improved  Description  INTERVENTIONS:  - Identify patients at risk for skin breakdown  - Assess and monitor skin integrity  - Assess and monitor nutrition and hydration status  - Monitor labs (i e  albumin)  - Assess for incontinence   - Turn and reposition patient  - Assist with mobility/ambulation  - Relieve pressure over bony prominences  - Avoid friction and shearing  - Provide appropriate hygiene as needed including keeping skin clean and dry  - Evaluate need for skin moisturizer/barrier cream  - Collaborate with interdisciplinary team (i e  Nutrition, Rehabilitation, etc )   - Patient/family teaching  Outcome: Progressing

## 2019-02-17 NOTE — SOCIAL WORK
Cm met with Patient explained the [facility]GRAM and referral  Patient agreeable to referral  Patient lives with his wife was independent Pta  He has already called [facility] rehab and knows they have beds  Patient has used [facility] in the past and he has a rehab placement at [facility] in the past  CM called [facility] program spoke with Alan Paige faxed H&P, med list and face sheet to [facility]  Staff from [facility] will see patient this afternoon/evening

## 2019-02-17 NOTE — PLAN OF CARE
Problem: Potential for Falls  Goal: Patient will remain free of falls  Description  INTERVENTIONS:  - Assess patient frequently for physical needs  -  Identify cognitive and physical deficits and behaviors that affect risk of falls  -  [place] fall precautions as indicated by assessment   - Educate patient/family on patient safety including physical limitations  - Instruct patient to call for assistance with activity based on assessment  - Modify environment to reduce risk of injury  - Consider OT/PT consult to assist with strengthening/mobility  Outcome: Progressing     Problem: PAIN - ADULT  Goal: Verbalizes/displays adequate comfort level or baseline comfort level  Description  Interventions:  - Encourage patient to monitor pain and request assistance  - Assess pain using appropriate pain scale  - Administer analgesics based on type and severity of pain and evaluate response  - Implement non-pharmacological measures as appropriate and evaluate response  - Consider cultural and social influences on pain and pain management  - Notify physician/advanced practitioner if interventions unsuccessful or patient reports new pain  Outcome: Progressing     Problem: SAFETY ADULT  Goal: Patient will remain free of falls  Description  INTERVENTIONS:  - Assess patient frequently for physical needs  -  Identify cognitive and physical deficits and behaviors that affect risk of falls    -  [place] fall precautions as indicated by assessment   - Educate patient/family on patient safety including physical limitations  - Instruct patient to call for assistance with activity based on assessment  - Modify environment to reduce risk of injury  - Consider OT/PT consult to assist with strengthening/mobility  Outcome: Progressing  Goal: Maintain or return to baseline ADL function  Description  INTERVENTIONS:  -  Assess patient's ability to carry out ADLs; assess patient's baseline for ADL function and identify physical deficits which impact ability to perform ADLs (bathing, care of mouth/teeth, toileting, grooming, dressing, etc )  - Assess/evaluate cause of self-care deficits   - Assess range of motion  - Assess patient's mobility; develop plan if impaired  - Assess patient's need for assistive devices and provide as appropriate  - Encourage maximum independence but intervene and supervise when necessary  ¯ Involve family in performance of ADLs  ¯ Assess for home care needs following discharge   ¯ Request OT consult to assist with ADL evaluation and planning for discharge  ¯ Provide patient education as appropriate  Outcome: Progressing  Goal: Maintain or return mobility status to optimal level  Description  INTERVENTIONS:  - Assess patient's baseline mobility status (ambulation, transfers, stairs, etc )    - Identify cognitive and physical deficits and behaviors that affect mobility  - Identify mobility aids required to assist with transfers and/or ambulation (gait belt, sit-to-stand, lift, walker, cane, etc )  - [place] fall precautions as indicated by assessment  - Record patient progress and toleration of activity level on Mobility SBAR; progress patient to next Phase/Stage  - Instruct patient to call for assistance with activity based on assessment  - Request Rehabilitation consult to assist with strengthening/weightbearing, etc   Outcome: Progressing     Problem: DISCHARGE PLANNING  Goal: Discharge to home or other facility with appropriate resources  Description  INTERVENTIONS:  - Identify barriers to discharge w/patient and caregiver  - Arrange for needed discharge resources and transportation as appropriate  - Identify discharge learning needs (meds, wound care, etc )  - Arrange for interpretive services to assist at discharge as needed  - Refer to Case Management Department for coordinating discharge planning if the patient needs post-hospital services based on physician/advanced practitioner order or complex needs related to functional status, cognitive ability, or social support system  Outcome: Progressing     Problem: Knowledge Deficit  Goal: Patient/family/caregiver demonstrates understanding of disease process, treatment plan, medications, and discharge instructions  Description  Complete learning assessment and assess knowledge base  Interventions:  - Provide teaching at level of understanding  - Provide teaching via preferred learning methods  Outcome: Progressing     Problem: NEUROSENSORY - ADULT  Goal: Achieves maximal functionality and self care  Description  INTERVENTIONS  - Monitor swallowing and airway patency with patient fatigue and changes in neurological status  - Encourage and assist patient to increase activity and self care with guidance from rehab services  - Encourage visually impaired, hearing impaired and aphasic patients to use assistive/communication devices  Outcome: Progressing     Problem: CARDIOVASCULAR - ADULT  Goal: Maintains optimal cardiac output and hemodynamic stability  Description  INTERVENTIONS:  - Monitor I/O, vital signs and rhythm  - Monitor for S/S and trends of decreased cardiac output i e  bleeding, hypotension  - Administer and titrate ordered vasoactive medications to optimize hemodynamic stability  - Assess quality of pulses, skin color and temperature  - Assess for signs of decreased coronary artery perfusion - ex   Angina  - Instruct patient to report change in severity of symptoms  Outcome: Progressing  Goal: Absence of cardiac dysrhythmias or at baseline rhythm  Description  INTERVENTIONS:  - Continuous cardiac monitoring, monitor vital signs, obtain 12 lead EKG if indicated  - Administer antiarrhythmic and heart rate control medications as ordered  - Monitor electrolytes and administer replacement therapy as ordered  Outcome: Progressing     Problem: RESPIRATORY - ADULT  Goal: Achieves optimal ventilation and oxygenation  Description  INTERVENTIONS:  - Assess for changes in respiratory status  - Assess for changes in mentation and behavior  - Position to facilitate oxygenation and minimize respiratory effort  - Oxygen administration by appropriate delivery method based on oxygen saturation (per order) or ABGs  - Initiate smoking cessation education as indicated  - Encourage broncho-pulmonary hygiene including cough, deep breathe, Incentive Spirometry  - Assess the need for suctioning and aspirate as needed  - Assess and instruct to report SOB or any respiratory difficulty  - Respiratory Therapy support as indicated  Outcome: Progressing     Problem: METABOLIC, FLUID AND ELECTROLYTES - ADULT  Goal: Electrolytes maintained within normal limits  Description  INTERVENTIONS:  - Monitor labs and assess patient for signs and symptoms of electrolyte imbalances  - Administer electrolyte replacement as ordered  - Monitor response to electrolyte replacements, including repeat lab results as appropriate  - Instruct patient on fluid and nutrition as appropriate  Outcome: Progressing  Goal: Fluid balance maintained  Description  INTERVENTIONS:  - Monitor labs and assess for signs and symptoms of volume excess or deficit  - Monitor I/O and WT  - Instruct patient on fluid and nutrition as appropriate  Outcome: Progressing     Problem: SKIN/TISSUE INTEGRITY - ADULT  Goal: Skin integrity remains intact  Description  INTERVENTIONS  - Identify patients at risk for skin breakdown  - Assess and monitor skin integrity  - Assess and monitor nutrition and hydration status  - Monitor labs (i e  albumin)  - Assess for incontinence   - Turn and reposition patient  - Assist with mobility/ambulation  - Relieve pressure over bony prominences  - Avoid friction and shearing  - Provide appropriate hygiene as needed including keeping skin clean and dry  - Evaluate need for skin moisturizer/barrier cream  - Collaborate with interdisciplinary team (i e  Nutrition, Rehabilitation, etc )   - Patient/family teaching  Outcome: Progressing     Problem: Prexisting or High Potential for Compromised Skin Integrity  Goal: Skin integrity is maintained or improved  Description  INTERVENTIONS:  - Identify patients at risk for skin breakdown  - Assess and monitor skin integrity  - Assess and monitor nutrition and hydration status  - Monitor labs (i e  albumin)  - Assess for incontinence   - Turn and reposition patient  - Assist with mobility/ambulation  - Relieve pressure over bony prominences  - Avoid friction and shearing  - Provide appropriate hygiene as needed including keeping skin clean and dry  - Evaluate need for skin moisturizer/barrier cream  - Collaborate with interdisciplinary team (i e  Nutrition, Rehabilitation, etc )   - Patient/family teaching  Outcome: Progressing

## 2019-02-17 NOTE — ASSESSMENT & PLAN NOTE
Doing much better  He originally needed Precedex drip  He is now on [facility] protocol  Needs inpatient alcohol rehab  He is hesitant but his wife and brother are convincing him,  He has been to inpatient alcohol rehab at least twice in the past

## 2019-02-17 NOTE — PROGRESS NOTES
Progress Note - Harpal Condon 1950, 71 y o  male MRN: [id number]    Unit/Bed#: [facility] [address] [id number] Encounter: [id number]    Primary Care Provider: Joan Villarreal MD   Date and time admitted to hospital: 2/10/2019  4:46 PM        * Delirium tremens [facility])  Assessment & Plan  Doing much better  He originally needed Precedex drip  He is now on [facility] protocol  Needs inpatient alcohol rehab  He is hesitant but his wife and brother are convincing him,  He has been to inpatient alcohol rehab at least twice in the past     Alcohol abuse  Assessment & Plan  On MVI, thiamine and folic acid  Needs alcohol rehab    Ataxia  Assessment & Plan  Due to alcohol withdrawal  Working with PT          Subjective:   Feels better  Less confused  Much less hand tremor  Walking better       Objective:     Vitals:   Temp (24hrs), Av 9 °F (36 6 °C), Min:97 6 °F (36 4 °C), Max:98 °F (36 7 °C)    Temp:  [97 6 °F (36 4 °C)-98 °F (36 7 °C)] 98 °F (36 7 °C)  HR:  [74-96] 86  Resp:  [17-20] 17  BP: (129-143)/(80-93) 136/87  SpO2:  [95 %-96 %] 95 %  Body mass index is 30 74 kg/m²  Input and Output Summary (last 24 hours): Intake/Output Summary (Last 24 hours) at 2019 1115  Last data filed at 2019 0956  Gross per 24 hour   Intake --   Output 651 ml   Net -651 ml       Physical Exam:     Physical Exam   Constitutional:   Oriented x 3  No confusion today   HENT:   Head: Normocephalic and atraumatic  Eyes: Pupils are equal, round, and reactive to light  EOM are normal    Cardiovascular: Normal rate and regular rhythm  Exam reveals no gallop and no friction rub  No murmur heard  Pulmonary/Chest: Effort normal and breath sounds normal  He has no wheezes  He has no rales  Abdominal: Soft  Bowel sounds are normal  There is no tenderness  Musculoskeletal: He exhibits no edema  Nursing note and vitals reviewed              Additional Data:     Labs:    Results from last 7 days   Lab Units 19  0538 02/15/19  7403 WBC Thousand/uL 7 78 8 76   HEMOGLOBIN g/dL 13 0 14 0   HEMATOCRIT % 39 3 43 0   PLATELETS Thousands/uL 316 287   NEUTROS PCT %  --  65   LYMPHS PCT %  --  20   MONOS PCT %  --  10   EOS PCT %  --  3     Results from last 7 days   Lab Units 02/16/19  0538  02/12/19  0653   POTASSIUM mmol/L 3 2*   < > 3 3*   CHLORIDE mmol/L 101   < > 105   CO2 mmol/L 30   < > 22   BUN mg/dL 13   < > <1*   CREATININE mg/dL 0 78   < > 0 85   CALCIUM mg/dL 9 0   < > 8 7   ALK PHOS U/L  --   --  60   ALT U/L  --   --  21   AST U/L  --   --  20    < > = values in this interval not displayed  * I Have Reviewed All Lab Data     Recent Cultures (last 7 days):             Last 24 Hours Medication List:     Current Facility-Administered Medications:  acetaminophen 650 mg Oral Q6H PRN Matthew Prechtel, DO    amLODIPine 10 mg Oral Daily Matthew Prechtel, DO    chlordiazePOXIDE 25 mg Oral TID Marion Barrios, DO    citalopram 20 mg Oral Daily Matthew Prechtel, DO    enoxaparin 40 mg Subcutaneous Daily Matthew Prechtel, DO    finasteride 5 mg Oral Daily Matthew Prechtel, DO    folic acid 1 mg Oral Daily Matthew Prechtel, DO    hydrOXYzine HCL 25 mg Oral Q6H PRN Matthew Prechtel, DO    lisinopril 40 mg Oral Daily Matthew Prechtel, DO    LORazepam 1 mg Intravenous Q6H PRN Marion Barrios, DO    multivitamin-minerals 1 tablet Oral Daily Matthew Prechtel, DO    polyethylene glycol 17 g Oral Daily Matthew Prechtel, DO    tamsulosin 0 4 mg Oral Daily With [facility] Prechtel, DO    thiamine 100 mg Intravenous Daily Matthew Prechtel, DO Last Rate: 100 mg (02/17/19 0827)   traMADol 50 mg Oral Q6H PRN Matthew Prechtel, DO          VTE Pharmacologic Prophylaxis:   Pharmacologic: Enoxaparin (Lovenox)      Current Length of Stay: 7 day(s)    Current Patient Status: Inpatient       Discharge Plan: needs inpatient alcohol rehab     He is stable to go whenever a bed is available    Code Status: Level 1 - Full Code           Today, Patient Was Seen By: Edward Sheets DO    ** Please Note: Dictation voice to text software may have been used in the creation of this document   **

## 2019-02-18 PROCEDURE — 99232 SBSQ HOSP IP/OBS MODERATE 35: CPT | Performed by: FAMILY MEDICINE

## 2019-02-18 PROCEDURE — 97116 GAIT TRAINING THERAPY: CPT

## 2019-02-18 PROCEDURE — 97530 THERAPEUTIC ACTIVITIES: CPT

## 2019-02-18 RX ORDER — THIAMINE MONONITRATE (VIT B1) 100 MG
100 TABLET ORAL DAILY
Status: DISCONTINUED | OUTPATIENT
Start: 2019-02-18 | End: 2019-02-19 | Stop reason: HOSPADM

## 2019-02-18 RX ORDER — LORAZEPAM 1 MG/1
1 TABLET ORAL EVERY 6 HOURS PRN
Status: DISCONTINUED | OUTPATIENT
Start: 2019-02-18 | End: 2019-02-19 | Stop reason: HOSPADM

## 2019-02-18 RX ADMIN — CHLORDIAZEPOXIDE HYDROCHLORIDE 25 MG: 25 CAPSULE ORAL at 22:08

## 2019-02-18 RX ADMIN — FOLIC ACID 1 MG: 1 TABLET ORAL at 07:55

## 2019-02-18 RX ADMIN — TAMSULOSIN HYDROCHLORIDE 0.4 MG: 0.4 CAPSULE ORAL at 16:49

## 2019-02-18 RX ADMIN — Medication 1 TABLET: at 07:55

## 2019-02-18 RX ADMIN — ENOXAPARIN SODIUM 40 MG: 40 INJECTION SUBCUTANEOUS at 07:55

## 2019-02-18 RX ADMIN — AMLODIPINE BESYLATE 10 MG: 10 TABLET ORAL at 07:56

## 2019-02-18 RX ADMIN — CITALOPRAM HYDROBROMIDE 20 MG: 20 TABLET ORAL at 07:56

## 2019-02-18 RX ADMIN — CHLORDIAZEPOXIDE HYDROCHLORIDE 25 MG: 25 CAPSULE ORAL at 16:49

## 2019-02-18 RX ADMIN — TRAMADOL HYDROCHLORIDE 50 MG: 50 TABLET, COATED ORAL at 22:09

## 2019-02-18 RX ADMIN — CHLORDIAZEPOXIDE HYDROCHLORIDE 25 MG: 25 CAPSULE ORAL at 07:56

## 2019-02-18 RX ADMIN — Medication 100 MG: at 10:23

## 2019-02-18 RX ADMIN — FINASTERIDE 5 MG: 5 TABLET, FILM COATED ORAL at 07:55

## 2019-02-18 RX ADMIN — LISINOPRIL 40 MG: 20 TABLET ORAL at 07:56

## 2019-02-18 NOTE — SOCIAL WORK
LOS 8 DAYS  GMLOS 3 4  CM listened to message on CM phone and determined that a message was left from [facility] regarding d/c plan  CM was informed that assessment was completed with patient at bedside on 2/17/19, recommendation for inpatient rehab was made, and patient was agreeable  CM was informed that due to the weekend, [facility] was unable to verify [facility] benefits and as a result would follow up today however, due to being a holiday they informed CM that the verification/auth would need to wait until Tuesday for [address] rehab  CM department will continue to follow through discharge

## 2019-02-18 NOTE — PLAN OF CARE
Problem: DISCHARGE PLANNING - CARE MANAGEMENT  Goal: Discharge to post-acute care or home with appropriate resources  Description  D/c to Alcohol rehab     INTERVENTIONS:  - Conduct assessment to determine patient/family and health care team treatment goals, and need for post-acute services based on payer coverage, community resources, and patient preferences, and barriers to discharge  - Address psychosocial, clinical, and financial barriers to discharge as identified in assessment in conjunction with the patient/family and health care team  - Arrange appropriate level of post-acute services according to patient's   needs and preference and payer coverage in collaboration with the physician and health care team  - Communicate with and update the patient/family, physician, and health care team regarding progress on the discharge plan  - Arrange appropriate transportation to post-acute venues   Outcome: Progressing  Note:   LOS 8 DAYS  GMLOS 3 4  CM listened to message on CM phone and determined that a message was left from HOST regarding d/c plan  CM was informed that assessment was completed with patient at bedside on 2/17/19, recommendation for inpatient rehab was made, and patient was agreeable  CM was informed that due to the weekend, [facility] was unable to verify [facility] benefits and as a result would follow up today however, due to being a holiday they informed CM that the verification/auth would need to wait until Tuesday for [address] re[facility]  CM department will continue to follow through discharge

## 2019-02-18 NOTE — PLAN OF CARE
Problem: PHYSICAL THERAPY ADULT  Goal: Performs mobility at highest level of function for planned discharge setting  See evaluation for individualized goals  Description  Treatment/Interventions: Functional transfer training, LE strengthening/ROM, Elevations, Therapeutic exercise, Endurance training, Patient/family training, Equipment eval/education, Bed mobility, Gait training, Compensatory technique education, Continued evaluation, Spoke to nursing, OT  Equipment Recommended: (will monitor)       See flowsheet documentation for full assessment, interventions and recommendations  Outcome: Completed  Note:   Prognosis: Good  Problem List: Decreased endurance, Decreased mobility, Obesity  Assessment: Pt  seated in bedside chair upon my arrival  Per nursing staff, pt  with much improvement and walking the hallways all day himself with no difficulty  Pt  progressed with transfers being able to complete practicing proper technique with no noted LOB  Progressed with an increased amb  trial with no AD with noted improvement in gait pattern which is within normal limits  Pt  reports from time to time pain in R sciatica, however is use to this pain at this time  After completion of additional amb  trial pt  returned to room and repositioned seated in bedside chair at end of treatment session  Discussion with pt  about continued amb  trials while in hospital pt  agreeable and willing  PT will d/c orders at this time due to completion of goals  Pt  to d/c to appropriate facility when medically stable  Barriers to Discharge: None  Barriers to Discharge Comments: wife works, 2 story home  Recommendation: Other (Comment)(to appropriate facility when medically stable  )     PT - OK to Discharge: Yes(if d/c when medically stable )    See flowsheet documentation for full assessment

## 2019-02-18 NOTE — PLAN OF CARE
Problem: Potential for Falls  Goal: Patient will remain free of falls  Description  INTERVENTIONS:  - Assess patient frequently for physical needs  -  Identify cognitive and physical deficits and behaviors that affect risk of falls  -  [place] fall precautions as indicated by assessment   - Educate patient/family on patient safety including physical limitations  - Instruct patient to call for assistance with activity based on assessment  - Modify environment to reduce risk of injury  - Consider OT/PT consult to assist with strengthening/mobility  Outcome: Progressing     Problem: PAIN - ADULT  Goal: Verbalizes/displays adequate comfort level or baseline comfort level  Description  Interventions:  - Encourage patient to monitor pain and request assistance  - Assess pain using appropriate pain scale  - Administer analgesics based on type and severity of pain and evaluate response  - Implement non-pharmacological measures as appropriate and evaluate response  - Consider cultural and social influences on pain and pain management  - Notify physician/advanced practitioner if interventions unsuccessful or patient reports new pain  Outcome: Progressing     Problem: SAFETY ADULT  Goal: Patient will remain free of falls  Description  INTERVENTIONS:  - Assess patient frequently for physical needs  -  Identify cognitive and physical deficits and behaviors that affect risk of falls    -  [place] fall precautions as indicated by assessment   - Educate patient/family on patient safety including physical limitations  - Instruct patient to call for assistance with activity based on assessment  - Modify environment to reduce risk of injury  - Consider OT/PT consult to assist with strengthening/mobility  Outcome: Progressing  Goal: Maintain or return to baseline ADL function  Description  INTERVENTIONS:  -  Assess patient's ability to carry out ADLs; assess patient's baseline for ADL function and identify physical deficits which impact ability to perform ADLs (bathing, care of mouth/teeth, toileting, grooming, dressing, etc )  - Assess/evaluate cause of self-care deficits   - Assess range of motion  - Assess patient's mobility; develop plan if impaired  - Assess patient's need for assistive devices and provide as appropriate  - Encourage maximum independence but intervene and supervise when necessary  ¯ Involve family in performance of ADLs  ¯ Assess for home care needs following discharge   ¯ Request OT consult to assist with ADL evaluation and planning for discharge  ¯ Provide patient education as appropriate  Outcome: Progressing  Goal: Maintain or return mobility status to optimal level  Description  INTERVENTIONS:  - Assess patient's baseline mobility status (ambulation, transfers, stairs, etc )    - Identify cognitive and physical deficits and behaviors that affect mobility  - Identify mobility aids required to assist with transfers and/or ambulation (gait belt, sit-to-stand, lift, walker, cane, etc )  - [place] fall precautions as indicated by assessment  - Record patient progress and toleration of activity level on Mobility SBAR; progress patient to next Phase/Stage  - Instruct patient to call for assistance with activity based on assessment  - Request Rehabilitation consult to assist with strengthening/weightbearing, etc   Outcome: Progressing     Problem: DISCHARGE PLANNING  Goal: Discharge to home or other facility with appropriate resources  Description  INTERVENTIONS:  - Identify barriers to discharge w/patient and caregiver  - Arrange for needed discharge resources and transportation as appropriate  - Identify discharge learning needs (meds, wound care, etc )  - Arrange for interpretive services to assist at discharge as needed  - Refer to Case Management Department for coordinating discharge planning if the patient needs post-hospital services based on physician/advanced practitioner order or complex needs related to functional status, cognitive ability, or social support system  Outcome: Progressing     Problem: Knowledge Deficit  Goal: Patient/family/caregiver demonstrates understanding of disease process, treatment plan, medications, and discharge instructions  Description  Complete learning assessment and assess knowledge base  Interventions:  - Provide teaching at level of understanding  - Provide teaching via preferred learning methods  Outcome: Progressing     Problem: NEUROSENSORY - ADULT  Goal: Achieves maximal functionality and self care  Description  INTERVENTIONS  - Monitor swallowing and airway patency with patient fatigue and changes in neurological status  - Encourage and assist patient to increase activity and self care with guidance from rehab services  - Encourage visually impaired, hearing impaired and aphasic patients to use assistive/communication devices  Outcome: Progressing     Problem: CARDIOVASCULAR - ADULT  Goal: Maintains optimal cardiac output and hemodynamic stability  Description  INTERVENTIONS:  - Monitor I/O, vital signs and rhythm  - Monitor for S/S and trends of decreased cardiac output i e  bleeding, hypotension  - Administer and titrate ordered vasoactive medications to optimize hemodynamic stability  - Assess quality of pulses, skin color and temperature  - Assess for signs of decreased coronary artery perfusion - ex   Angina  - Instruct patient to report change in severity of symptoms  Outcome: Progressing  Goal: Absence of cardiac dysrhythmias or at baseline rhythm  Description  INTERVENTIONS:  - Continuous cardiac monitoring, monitor vital signs, obtain 12 lead EKG if indicated  - Administer antiarrhythmic and heart rate control medications as ordered  - Monitor electrolytes and administer replacement therapy as ordered  Outcome: Progressing     Problem: RESPIRATORY - ADULT  Goal: Achieves optimal ventilation and oxygenation  Description  INTERVENTIONS:  - Assess for changes in respiratory status  - Assess for changes in mentation and behavior  - Position to facilitate oxygenation and minimize respiratory effort  - Oxygen administration by appropriate delivery method based on oxygen saturation (per order) or ABGs  - Initiate smoking cessation education as indicated  - Encourage broncho-pulmonary hygiene including cough, deep breathe, Incentive Spirometry  - Assess the need for suctioning and aspirate as needed  - Assess and instruct to report SOB or any respiratory difficulty  - Respiratory Therapy support as indicated  Outcome: Progressing     Problem: METABOLIC, FLUID AND ELECTROLYTES - ADULT  Goal: Electrolytes maintained within normal limits  Description  INTERVENTIONS:  - Monitor labs and assess patient for signs and symptoms of electrolyte imbalances  - Administer electrolyte replacement as ordered  - Monitor response to electrolyte replacements, including repeat lab results as appropriate  - Instruct patient on fluid and nutrition as appropriate  Outcome: Progressing  Goal: Fluid balance maintained  Description  INTERVENTIONS:  - Monitor labs and assess for signs and symptoms of volume excess or deficit  - Monitor I/O and WT  - Instruct patient on fluid and nutrition as appropriate  Outcome: Progressing     Problem: SKIN/TISSUE INTEGRITY - ADULT  Goal: Skin integrity remains intact  Description  INTERVENTIONS  - Identify patients at risk for skin breakdown  - Assess and monitor skin integrity  - Assess and monitor nutrition and hydration status  - Monitor labs (i e  albumin)  - Assess for incontinence   - Turn and reposition patient  - Assist with mobility/ambulation  - Relieve pressure over bony prominences  - Avoid friction and shearing  - Provide appropriate hygiene as needed including keeping skin clean and dry  - Evaluate need for skin moisturizer/barrier cream  - Collaborate with interdisciplinary team (i e  Nutrition, Rehabilitation, etc )   - Patient/family teaching  Outcome: Progressing     Problem: Prexisting or High Potential for Compromised Skin Integrity  Goal: Skin integrity is maintained or improved  Description  INTERVENTIONS:  - Identify patients at risk for skin breakdown  - Assess and monitor skin integrity  - Assess and monitor nutrition and hydration status  - Monitor labs (i e  albumin)  - Assess for incontinence   - Turn and reposition patient  - Assist with mobility/ambulation  - Relieve pressure over bony prominences  - Avoid friction and shearing  - Provide appropriate hygiene as needed including keeping skin clean and dry  - Evaluate need for skin moisturizer/barrier cream  - Collaborate with interdisciplinary team (i e  Nutrition, Rehabilitation, etc )   - Patient/family teaching  Outcome: Progressing     Problem: DISCHARGE PLANNING - CARE MANAGEMENT  Goal: Discharge to post-acute care or home with appropriate resources  Description  D/c to Alcohol rehab     INTERVENTIONS:  - Conduct assessment to determine patient/family and health care team treatment goals, and need for post-acute services based on payer coverage, community resources, and patient preferences, and barriers to discharge  - Address psychosocial, clinical, and financial barriers to discharge as identified in assessment in conjunction with the patient/family and health care team  - Arrange appropriate level of post-acute services according to patient's   needs and preference and payer coverage in collaboration with the physician and health care team  - Communicate with and update the patient/family, physician, and health care team regarding progress on the discharge plan  - Arrange appropriate transportation to post-acute venues   Outcome: Progressing

## 2019-02-18 NOTE — PHYSICAL THERAPY NOTE
Physical Therapy Progress Note     02/18/19 9940   Pain Assessment   Pain Assessment No/denies pain   Pain Score No Pain   Hospital Pain Intervention(s) Ambulation/increased activity;Repositioned   Response to Interventions Tolerated  Restrictions/Precautions   Weight Bearing Precautions Per Order No   Other Precautions   (none)   General   Chart Reviewed Yes   Response to Previous Treatment Patient with no complaints from previous session  Family/Caregiver Present No   Subjective   Subjective Willing to participate in therapy this PM    Transfers   Sit to Stand 7  Independent   Stand to Sit 7  Independent   Ambulation/Elevation   Gait pattern WNL   Gait Assistance 7  Independent   Assistive Device None   Distance 300'   Stair Management Assistance Not tested  (Pt  reports feeling confident with stairs, education provide)   Balance   Static Sitting Normal   Dynamic Sitting Normal   Static Standing Good   Dynamic Standing Good   Ambulatory Good   Endurance Deficit   Endurance Deficit No   Activity Tolerance   Activity Tolerance Patient tolerated treatment well   Nurse Made Aware Yes   Assessment   Prognosis Good   Problem List Decreased endurance;Decreased mobility;Obesity   Assessment Pt  seated in bedside chair upon my arrival  Per nursing staff, pt  with much improvement and walking the hallways all day himself with no difficulty  Pt  progressed with transfers being able to complete practicing proper technique with no noted LOB  Progressed with an increased amb  trial with no AD with noted improvement in gait pattern which is within normal limits  Pt  reports from time to time pain in R sciatica, however is use to this pain at this time  After completion of additional amb  trial pt  returned to room and repositioned seated in bedside chair at end of treatment session  Discussion with pt  about continued amb  trials while in hospital pt  agreeable and willing   PT will d/c orders at this time due to completion of goals  Pt  to d/c to appropriate facility when medically stable  Barriers to Discharge None   Goals   Patient Goals To go to rehab  STG Expiration Date 02/22/19   Treatment Day 2   Plan   Treatment/Interventions Functional transfer training; Endurance training;Gait training;Spoke to nursing;Spoke to case management   Progress Improving as expected   PT Frequency Other (Comment)  (3-5x/wk)   Recommendation   Recommendation Other (Comment)  (to appropriate facility when medically stable )   Equipment Recommended Other (Comment)  (none at this time )   PT - OK to Discharge Yes  (if d/c when medically stable )     Barthel Index   Feeding 10   Bathing 5   Grooming Score 5   Dressing Score 10   Bladder Score 10   Bowels Score 10   Toilet Use Score 10   Transfers (Bed/Chair) Score 15   Mobility (Level Surface) Score 15   Stairs Score 5   Barthel Index Score 95             Roel Betancourt, PTA

## 2019-02-18 NOTE — PLAN OF CARE
Problem: Potential for Falls  Goal: Patient will remain free of falls  Description  INTERVENTIONS:  - Assess patient frequently for physical needs  -  Identify cognitive and physical deficits and behaviors that affect risk of falls  -  [place] fall precautions as indicated by assessment   - Educate patient/family on patient safety including physical limitations  - Instruct patient to call for assistance with activity based on assessment  - Modify environment to reduce risk of injury  - Consider OT/PT consult to assist with strengthening/mobility  Outcome: Progressing     Problem: PAIN - ADULT  Goal: Verbalizes/displays adequate comfort level or baseline comfort level  Description  Interventions:  - Encourage patient to monitor pain and request assistance  - Assess pain using appropriate pain scale  - Administer analgesics based on type and severity of pain and evaluate response  - Implement non-pharmacological measures as appropriate and evaluate response  - Consider cultural and social influences on pain and pain management  - Notify physician/advanced practitioner if interventions unsuccessful or patient reports new pain  Outcome: Progressing     Problem: SAFETY ADULT  Goal: Patient will remain free of falls  Description  INTERVENTIONS:  - Assess patient frequently for physical needs  -  Identify cognitive and physical deficits and behaviors that affect risk of falls    -  [place] fall precautions as indicated by assessment   - Educate patient/family on patient safety including physical limitations  - Instruct patient to call for assistance with activity based on assessment  - Modify environment to reduce risk of injury  - Consider OT/PT consult to assist with strengthening/mobility  Outcome: Progressing  Goal: Maintain or return to baseline ADL function  Description  INTERVENTIONS:  -  Assess patient's ability to carry out ADLs; assess patient's baseline for ADL function and identify physical deficits which impact ability to perform ADLs (bathing, care of mouth/teeth, toileting, grooming, dressing, etc )  - Assess/evaluate cause of self-care deficits   - Assess range of motion  - Assess patient's mobility; develop plan if impaired  - Assess patient's need for assistive devices and provide as appropriate  - Encourage maximum independence but intervene and supervise when necessary  ¯ Involve family in performance of ADLs  ¯ Assess for home care needs following discharge   ¯ Request OT consult to assist with ADL evaluation and planning for discharge  ¯ Provide patient education as appropriate  Outcome: Progressing  Goal: Maintain or return mobility status to optimal level  Description  INTERVENTIONS:  - Assess patient's baseline mobility status (ambulation, transfers, stairs, etc )    - Identify cognitive and physical deficits and behaviors that affect mobility  - Identify mobility aids required to assist with transfers and/or ambulation (gait belt, sit-to-stand, lift, walker, cane, etc )  - [place] fall precautions as indicated by assessment  - Record patient progress and toleration of activity level on Mobility SBAR; progress patient to next Phase/Stage  - Instruct patient to call for assistance with activity based on assessment  - Request Rehabilitation consult to assist with strengthening/weightbearing, etc   Outcome: Progressing     Problem: DISCHARGE PLANNING  Goal: Discharge to home or other facility with appropriate resources  Description  INTERVENTIONS:  - Identify barriers to discharge w/patient and caregiver  - Arrange for needed discharge resources and transportation as appropriate  - Identify discharge learning needs (meds, wound care, etc )  - Arrange for interpretive services to assist at discharge as needed  - Refer to Case Management Department for coordinating discharge planning if the patient needs post-hospital services based on physician/advanced practitioner order or complex needs related to functional status, cognitive ability, or social support system  Outcome: Progressing     Problem: Knowledge Deficit  Goal: Patient/family/caregiver demonstrates understanding of disease process, treatment plan, medications, and discharge instructions  Description  Complete learning assessment and assess knowledge base  Interventions:  - Provide teaching at level of understanding  - Provide teaching via preferred learning methods  Outcome: Progressing     Problem: NEUROSENSORY - ADULT  Goal: Achieves maximal functionality and self care  Description  INTERVENTIONS  - Monitor swallowing and airway patency with patient fatigue and changes in neurological status  - Encourage and assist patient to increase activity and self care with guidance from rehab services  - Encourage visually impaired, hearing impaired and aphasic patients to use assistive/communication devices  Outcome: Progressing     Problem: CARDIOVASCULAR - ADULT  Goal: Maintains optimal cardiac output and hemodynamic stability  Description  INTERVENTIONS:  - Monitor I/O, vital signs and rhythm  - Monitor for S/S and trends of decreased cardiac output i e  bleeding, hypotension  - Administer and titrate ordered vasoactive medications to optimize hemodynamic stability  - Assess quality of pulses, skin color and temperature  - Assess for signs of decreased coronary artery perfusion - ex   Angina  - Instruct patient to report change in severity of symptoms  Outcome: Progressing  Goal: Absence of cardiac dysrhythmias or at baseline rhythm  Description  INTERVENTIONS:  - Continuous cardiac monitoring, monitor vital signs, obtain 12 lead EKG if indicated  - Administer antiarrhythmic and heart rate control medications as ordered  - Monitor electrolytes and administer replacement therapy as ordered  Outcome: Progressing     Problem: RESPIRATORY - ADULT  Goal: Achieves optimal ventilation and oxygenation  Description  INTERVENTIONS:  - Assess for changes in respiratory status  - Assess for changes in mentation and behavior  - Position to facilitate oxygenation and minimize respiratory effort  - Oxygen administration by appropriate delivery method based on oxygen saturation (per order) or ABGs  - Initiate smoking cessation education as indicated  - Encourage broncho-pulmonary hygiene including cough, deep breathe, Incentive Spirometry  - Assess the need for suctioning and aspirate as needed  - Assess and instruct to report SOB or any respiratory difficulty  - Respiratory Therapy support as indicated  Outcome: Progressing     Problem: METABOLIC, FLUID AND ELECTROLYTES - ADULT  Goal: Electrolytes maintained within normal limits  Description  INTERVENTIONS:  - Monitor labs and assess patient for signs and symptoms of electrolyte imbalances  - Administer electrolyte replacement as ordered  - Monitor response to electrolyte replacements, including repeat lab results as appropriate  - Instruct patient on fluid and nutrition as appropriate  Outcome: Progressing  Goal: Fluid balance maintained  Description  INTERVENTIONS:  - Monitor labs and assess for signs and symptoms of volume excess or deficit  - Monitor I/O and WT  - Instruct patient on fluid and nutrition as appropriate  Outcome: Progressing     Problem: SKIN/TISSUE INTEGRITY - ADULT  Goal: Skin integrity remains intact  Description  INTERVENTIONS  - Identify patients at risk for skin breakdown  - Assess and monitor skin integrity  - Assess and monitor nutrition and hydration status  - Monitor labs (i e  albumin)  - Assess for incontinence   - Turn and reposition patient  - Assist with mobility/ambulation  - Relieve pressure over bony prominences  - Avoid friction and shearing  - Provide appropriate hygiene as needed including keeping skin clean and dry  - Evaluate need for skin moisturizer/barrier cream  - Collaborate with interdisciplinary team (i e  Nutrition, Rehabilitation, etc )   - Patient/family teaching  Outcome: Progressing     Problem: Prexisting or High Potential for Compromised Skin Integrity  Goal: Skin integrity is maintained or improved  Description  INTERVENTIONS:  - Identify patients at risk for skin breakdown  - Assess and monitor skin integrity  - Assess and monitor nutrition and hydration status  - Monitor labs (i e  albumin)  - Assess for incontinence   - Turn and reposition patient  - Assist with mobility/ambulation  - Relieve pressure over bony prominences  - Avoid friction and shearing  - Provide appropriate hygiene as needed including keeping skin clean and dry  - Evaluate need for skin moisturizer/barrier cream  - Collaborate with interdisciplinary team (i e  Nutrition, Rehabilitation, etc )   - Patient/family teaching  Outcome: Progressing     Problem: DISCHARGE PLANNING - CARE MANAGEMENT  Goal: Discharge to post-acute care or home with appropriate resources  Description  D/c to Alcohol rehab     INTERVENTIONS:  - Conduct assessment to determine patient/family and health care team treatment goals, and need for post-acute services based on payer coverage, community resources, and patient preferences, and barriers to discharge  - Address psychosocial, clinical, and financial barriers to discharge as identified in assessment in conjunction with the patient/family and health care team  - Arrange appropriate level of post-acute services according to patient's   needs and preference and payer coverage in collaboration with the physician and health care team  - Communicate with and update the patient/family, physician, and health care team regarding progress on the discharge plan  - Arrange appropriate transportation to post-acute venues   Outcome: Progressing

## 2019-02-19 VITALS
HEIGHT: 68 IN | OXYGEN SATURATION: 95 % | DIASTOLIC BLOOD PRESSURE: 82 MMHG | HEART RATE: 80 BPM | BODY MASS INDEX: 30.64 KG/M2 | SYSTOLIC BLOOD PRESSURE: 131 MMHG | TEMPERATURE: 98 F | RESPIRATION RATE: 19 BRPM | WEIGHT: 202.16 LBS

## 2019-02-19 PROBLEM — E87.6 HYPOKALEMIA: Status: ACTIVE | Noted: 2019-02-19

## 2019-02-19 PROCEDURE — 99239 HOSP IP/OBS DSCHRG MGMT >30: CPT | Performed by: FAMILY MEDICINE

## 2019-02-19 RX ORDER — FOLIC ACID 1 MG/1
1 TABLET ORAL DAILY
Qty: 30 TABLET | Refills: 0 | Status: SHIPPED | OUTPATIENT
Start: 2019-02-20 | End: 2019-07-13 | Stop reason: ALTCHOICE

## 2019-02-19 RX ORDER — LANOLIN ALCOHOL/MO/W.PET/CERES
100 CREAM (GRAM) TOPICAL DAILY
Qty: 30 TABLET | Refills: 0 | Status: SHIPPED | OUTPATIENT
Start: 2019-02-20 | End: 2019-07-13 | Stop reason: ALTCHOICE

## 2019-02-19 RX ADMIN — AMLODIPINE BESYLATE 10 MG: 10 TABLET ORAL at 08:24

## 2019-02-19 RX ADMIN — Medication 100 MG: at 08:24

## 2019-02-19 RX ADMIN — Medication 1 TABLET: at 08:24

## 2019-02-19 RX ADMIN — FOLIC ACID 1 MG: 1 TABLET ORAL at 08:24

## 2019-02-19 RX ADMIN — FINASTERIDE 5 MG: 5 TABLET, FILM COATED ORAL at 08:24

## 2019-02-19 RX ADMIN — CHLORDIAZEPOXIDE HYDROCHLORIDE 25 MG: 25 CAPSULE ORAL at 08:24

## 2019-02-19 RX ADMIN — CITALOPRAM HYDROBROMIDE 20 MG: 20 TABLET ORAL at 08:24

## 2019-02-19 RX ADMIN — ENOXAPARIN SODIUM 40 MG: 40 INJECTION SUBCUTANEOUS at 08:24

## 2019-02-19 RX ADMIN — LISINOPRIL 40 MG: 20 TABLET ORAL at 08:24

## 2019-02-19 NOTE — SOCIAL WORK
LUISA was informed by Cynthia that patient is now not interested in going to inpatient treatment and would prefer to discharge home with outpatient services  Yolanda Hutchinson will be calling him to review information and attempt to get him referrals for outpatient treatment  LUISA will continue to follow through discharge

## 2019-02-19 NOTE — ASSESSMENT & PLAN NOTE
Resolved  Initially requiring Precedex drip  Initially plans for inpatient alcohol rehab, patient elected for outpatient, arrangements made with HOST

## 2019-02-19 NOTE — ASSESSMENT & PLAN NOTE
Resolved  Initially requiring Precedex drip  Continue on CIWA protocol as needed  Needs inpatient alcohol rehab, arrangement made  Anticipate dc tomorrow

## 2019-02-19 NOTE — PLAN OF CARE
Problem: Potential for Falls  Goal: Patient will remain free of falls  Description  INTERVENTIONS:  - Assess patient frequently for physical needs  -  Identify cognitive and physical deficits and behaviors that affect risk of falls  -  [place] fall precautions as indicated by assessment   - Educate patient/family on patient safety including physical limitations  - Instruct patient to call for assistance with activity based on assessment  - Modify environment to reduce risk of injury  - Consider OT/PT consult to assist with strengthening/mobility  Outcome: Progressing     Problem: PAIN - ADULT  Goal: Verbalizes/displays adequate comfort level or baseline comfort level  Description  Interventions:  - Encourage patient to monitor pain and request assistance  - Assess pain using appropriate pain scale  - Administer analgesics based on type and severity of pain and evaluate response  - Implement non-pharmacological measures as appropriate and evaluate response  - Consider cultural and social influences on pain and pain management  - Notify physician/advanced practitioner if interventions unsuccessful or patient reports new pain  Outcome: Progressing     Problem: SAFETY ADULT  Goal: Patient will remain free of falls  Description  INTERVENTIONS:  - Assess patient frequently for physical needs  -  Identify cognitive and physical deficits and behaviors that affect risk of falls    -  [place] fall precautions as indicated by assessment   - Educate patient/family on patient safety including physical limitations  - Instruct patient to call for assistance with activity based on assessment  - Modify environment to reduce risk of injury  - Consider OT/PT consult to assist with strengthening/mobility  Outcome: Progressing  Goal: Maintain or return to baseline ADL function  Description  INTERVENTIONS:  -  Assess patient's ability to carry out ADLs; assess patient's baseline for ADL function and identify physical deficits which impact ability to perform ADLs (bathing, care of mouth/teeth, toileting, grooming, dressing, etc )  - Assess/evaluate cause of self-care deficits   - Assess range of motion  - Assess patient's mobility; develop plan if impaired  - Assess patient's need for assistive devices and provide as appropriate  - Encourage maximum independence but intervene and supervise when necessary  ¯ Involve family in performance of ADLs  ¯ Assess for home care needs following discharge   ¯ Request OT consult to assist with ADL evaluation and planning for discharge  ¯ Provide patient education as appropriate  Outcome: Progressing  Goal: Maintain or return mobility status to optimal level  Description  INTERVENTIONS:  - Assess patient's baseline mobility status (ambulation, transfers, stairs, etc )    - Identify cognitive and physical deficits and behaviors that affect mobility  - Identify mobility aids required to assist with transfers and/or ambulation (gait belt, sit-to-stand, lift, walker, cane, etc )  - [place] fall precautions as indicated by assessment  - Record patient progress and toleration of activity level on Mobility SBAR; progress patient to next Phase/Stage  - Instruct patient to call for assistance with activity based on assessment  - Request Rehabilitation consult to assist with strengthening/weightbearing, etc   Outcome: Progressing     Problem: DISCHARGE PLANNING  Goal: Discharge to home or other facility with appropriate resources  Description  INTERVENTIONS:  - Identify barriers to discharge w/patient and caregiver  - Arrange for needed discharge resources and transportation as appropriate  - Identify discharge learning needs (meds, wound care, etc )  - Arrange for interpretive services to assist at discharge as needed  - Refer to Case Management Department for coordinating discharge planning if the patient needs post-hospital services based on physician/advanced practitioner order or complex needs related to functional status, cognitive ability, or social support system  Outcome: Progressing     Problem: Knowledge Deficit  Goal: Patient/family/caregiver demonstrates understanding of disease process, treatment plan, medications, and discharge instructions  Description  Complete learning assessment and assess knowledge base  Interventions:  - Provide teaching at level of understanding  - Provide teaching via preferred learning methods  Outcome: Progressing     Problem: NEUROSENSORY - ADULT  Goal: Achieves maximal functionality and self care  Description  INTERVENTIONS  - Monitor swallowing and airway patency with patient fatigue and changes in neurological status  - Encourage and assist patient to increase activity and self care with guidance from rehab services  - Encourage visually impaired, hearing impaired and aphasic patients to use assistive/communication devices  Outcome: Progressing     Problem: CARDIOVASCULAR - ADULT  Goal: Maintains optimal cardiac output and hemodynamic stability  Description  INTERVENTIONS:  - Monitor I/O, vital signs and rhythm  - Monitor for S/S and trends of decreased cardiac output i e  bleeding, hypotension  - Administer and titrate ordered vasoactive medications to optimize hemodynamic stability  - Assess quality of pulses, skin color and temperature  - Assess for signs of decreased coronary artery perfusion - ex   Angina  - Instruct patient to report change in severity of symptoms  Outcome: Progressing  Goal: Absence of cardiac dysrhythmias or at baseline rhythm  Description  INTERVENTIONS:  - Continuous cardiac monitoring, monitor vital signs, obtain 12 lead EKG if indicated  - Administer antiarrhythmic and heart rate control medications as ordered  - Monitor electrolytes and administer replacement therapy as ordered  Outcome: Progressing     Problem: RESPIRATORY - ADULT  Goal: Achieves optimal ventilation and oxygenation  Description  INTERVENTIONS:  - Assess for changes in respiratory status  - Assess for changes in mentation and behavior  - Position to facilitate oxygenation and minimize respiratory effort  - Oxygen administration by appropriate delivery method based on oxygen saturation (per order) or ABGs  - Initiate smoking cessation education as indicated  - Encourage broncho-pulmonary hygiene including cough, deep breathe, Incentive Spirometry  - Assess the need for suctioning and aspirate as needed  - Assess and instruct to report SOB or any respiratory difficulty  - Respiratory Therapy support as indicated  Outcome: Progressing     Problem: METABOLIC, FLUID AND ELECTROLYTES - ADULT  Goal: Electrolytes maintained within normal limits  Description  INTERVENTIONS:  - Monitor labs and assess patient for signs and symptoms of electrolyte imbalances  - Administer electrolyte replacement as ordered  - Monitor response to electrolyte replacements, including repeat lab results as appropriate  - Instruct patient on fluid and nutrition as appropriate  Outcome: Progressing  Goal: Fluid balance maintained  Description  INTERVENTIONS:  - Monitor labs and assess for signs and symptoms of volume excess or deficit  - Monitor I/O and WT  - Instruct patient on fluid and nutrition as appropriate  Outcome: Progressing     Problem: SKIN/TISSUE INTEGRITY - ADULT  Goal: Skin integrity remains intact  Description  INTERVENTIONS  - Identify patients at risk for skin breakdown  - Assess and monitor skin integrity  - Assess and monitor nutrition and hydration status  - Monitor labs (i e  albumin)  - Assess for incontinence   - Turn and reposition patient  - Assist with mobility/ambulation  - Relieve pressure over bony prominences  - Avoid friction and shearing  - Provide appropriate hygiene as needed including keeping skin clean and dry  - Evaluate need for skin moisturizer/barrier cream  - Collaborate with interdisciplinary team (i e  Nutrition, Rehabilitation, etc )   - Patient/family teaching  Outcome: Progressing     Problem: Prexisting or High Potential for Compromised Skin Integrity  Goal: Skin integrity is maintained or improved  Description  INTERVENTIONS:  - Identify patients at risk for skin breakdown  - Assess and monitor skin integrity  - Assess and monitor nutrition and hydration status  - Monitor labs (i e  albumin)  - Assess for incontinence   - Turn and reposition patient  - Assist with mobility/ambulation  - Relieve pressure over bony prominences  - Avoid friction and shearing  - Provide appropriate hygiene as needed including keeping skin clean and dry  - Evaluate need for skin moisturizer/barrier cream  - Collaborate with interdisciplinary team (i e  Nutrition, Rehabilitation, etc )   - Patient/family teaching  Outcome: Progressing

## 2019-02-19 NOTE — PLAN OF CARE
Problem: DISCHARGE PLANNING - CARE MANAGEMENT  Goal: Discharge to post-acute care or home with appropriate resources  Description  D/c to Alcohol rehab     INTERVENTIONS:  - Conduct assessment to determine patient/family and health care team treatment goals, and need for post-acute services based on payer coverage, community resources, and patient preferences, and barriers to discharge  - Address psychosocial, clinical, and financial barriers to discharge as identified in assessment in conjunction with the patient/family and health care team  - Arrange appropriate level of post-acute services according to patient's   needs and preference and payer coverage in collaboration with the physician and health care team  - Communicate with and update the patient/family, physician, and health care team regarding progress on the discharge plan  - Arrange appropriate transportation to post-acute venues   Outcome: Progressing  Note:   LUISA was informed by Cynthia that patient is now not interested in going to inpatient treatment and would prefer to discharge home with outpatient services  Trinh Moore will be calling him to review information and attempt to get him referrals for outpatient treatment  CM will continue to follow through discharge

## 2019-02-19 NOTE — PROGRESS NOTES
Progress Note - Wendy Emanuel 1950, 71 y o  male MRN: [id number]    Unit/Bed#: [facility] 2 -01 Encounter: [id number]    Primary Care Provider: Briana Zamorano MD   Date and time admitted to hospital: 2/10/2019  4:46 PM        * Delirium tremens [facility])  Assessment & Plan  Resolved  Initially requiring Precedex drip  Continue on CIWA protocol as needed  Needs inpatient alcohol rehab, arrangement made  Anticipate dc tomorrow      Alcohol abuse  Assessment & Plan  On MVI, thiamine and folic acid  Needs alcohol rehab, CM arranging for placement, likely tomorrow     Hypertension  Assessment & Plan  Controlled, continue current management      VTE Pharmacologic Prophylaxis:   Pharmacologic: Enoxaparin (Lovenox)  Mechanical VTE Prophylaxis in Place: Yes    Patient Centered Rounds: I have performed bedside rounds with nursing staff today  Discussions with Specialists or Other Care Team Provider: Case management    Education and Discussions with Family / Patient: Patient    Time Spent for Care: 30 minutes  More than 50% of total time spent on counseling and coordination of care as described above  Current Length of Stay: 8 day(s)    Current Patient Status: Inpatient   Certification Statement: The patient will continue to require additional inpatient hospital stay due to placement arrangement    Discharge Plan: tomorrow    Code Status: Level 1 - Full Code      Subjective:   Patient seen and examined  He reports feeling well and feels ready to be discharged  Alert and oriented x 3  No complaints  Objective:     Vitals:   Temp (24hrs), Av 3 °F (36 8 °C), Min:98 1 °F (36 7 °C), Max:98 6 °F (37 °C)    Temp:  [98 1 °F (36 7 °C)-98 6 °F (37 °C)] 98 1 °F (36 7 °C)  HR:  [72-81] 78  Resp:  [18] 18  BP: (120-136)/(76-84) 133/84  SpO2:  [95 %-97 %] 96 %  Body mass index is 30 74 kg/m²  Input and Output Summary (last 24 hours):        Intake/Output Summary (Last 24 hours) at 20198  Last data filed at 2/17/2019 2300  Gross per 24 hour   Intake --   Output 300 ml   Net -300 ml       Physical Exam:     Physical Exam   Constitutional: He is oriented to person, place, and time  No distress  HENT:   Head: Normocephalic and atraumatic  Eyes: Conjunctivae are normal    Cardiovascular: Normal rate and regular rhythm  No murmur heard  Pulmonary/Chest: No respiratory distress  He has no rales  Abdominal: Soft  He exhibits no distension  There is no tenderness  Musculoskeletal: He exhibits no edema  Neurological: He is alert and oriented to person, place, and time  Skin: Skin is warm and dry  Psychiatric: He has a normal mood and affect  Additional Data:     Labs:    Results from last 7 days   Lab Units 02/16/19  0538 02/15/19  0444   WBC Thousand/uL 7 78 8 76   HEMOGLOBIN g/dL 13 0 14 0   HEMATOCRIT % 39 3 43 0   PLATELETS Thousands/uL 316 287   NEUTROS PCT %  --  65   LYMPHS PCT %  --  20   MONOS PCT %  --  10   EOS PCT %  --  3     Results from last 7 days   Lab Units 02/16/19  0538  02/12/19  0653   SODIUM mmol/L 138   < > 141   POTASSIUM mmol/L 3 2*   < > 3 3*   CHLORIDE mmol/L 101   < > 105   CO2 mmol/L 30   < > 22   BUN mg/dL 13   < > <1*   CREATININE mg/dL 0 78   < > 0 85   ANION GAP mmol/L 7   < > 14*   CALCIUM mg/dL 9 0   < > 8 7   ALBUMIN g/dL  --   --  3 2*   TOTAL BILIRUBIN mg/dL  --   --  0 79   ALK PHOS U/L  --   --  60   ALT U/L  --   --  21   AST U/L  --   --  20   GLUCOSE RANDOM mg/dL 109   < > 99    < > = values in this interval not displayed  * I Have Reviewed All Lab Data Listed Above  * Additional Pertinent Lab Tests Reviewed:  [facility] Admission Reviewed    Imaging:    Imaging Reports Reviewed Today Include: MRI brain      Recent Cultures (last 7 days):           Last 24 Hours Medication List:     Current Facility-Administered Medications:  acetaminophen 650 mg Oral Q6H PRN Matthew Wilkins, DO   amLODIPine 10 mg Oral Daily Catherine Boucher Prechtel, DO   chlordiazePOXIDE 25 mg Oral TID [employer], DO   citalopram 20 mg Oral Daily Matthew Prechtel, DO   enoxaparin 40 mg Subcutaneous Daily Matthew Prechtel, DO   finasteride 5 mg Oral Daily Matthew Prechtel, DO   folic acid 1 mg Oral Daily Matthew Prechtel, DO   hydrOXYzine HCL 25 mg Oral Q6H PRN Matthew Prechtel, DO   lisinopril 40 mg Oral Daily Matthew Prechtel, DO   LORazepam 1 mg Oral Q6H PRN Michelle Bell MD   multivitamin-minerals 1 tablet Oral Daily Matthew Prechtel, DO   polyethylene glycol 17 g Oral Daily Matthew Prechtel, DO   tamsulosin 0 4 mg Oral Daily [employer] Prechtel, DO   thiamine 100 mg Oral Daily Michelle Bell MD   traMADol 50 mg Oral Q6H PRN [employer], DO        Today, Patient Was Seen By: Kenya Malcolm MD    ** Please Note: Dictation voice to text software may have been used in the creation of this document   **

## 2019-02-19 NOTE — PLAN OF CARE
Problem: Potential for Falls  Goal: Patient will remain free of falls  Description  INTERVENTIONS:  - Assess patient frequently for physical needs  -  Identify cognitive and physical deficits and behaviors that affect risk of falls  -  [place] fall precautions as indicated by assessment   - Educate patient/family on patient safety including physical limitations  - Instruct patient to call for assistance with activity based on assessment  - Modify environment to reduce risk of injury  - Consider OT/PT consult to assist with strengthening/mobility  Outcome: Progressing     Problem: PAIN - ADULT  Goal: Verbalizes/displays adequate comfort level or baseline comfort level  Description  Interventions:  - Encourage patient to monitor pain and request assistance  - Assess pain using appropriate pain scale  - Administer analgesics based on type and severity of pain and evaluate response  - Implement non-pharmacological measures as appropriate and evaluate response  - Consider cultural and social influences on pain and pain management  - Notify physician/advanced practitioner if interventions unsuccessful or patient reports new pain  Outcome: Progressing     Problem: SAFETY ADULT  Goal: Patient will remain free of falls  Description  INTERVENTIONS:  - Assess patient frequently for physical needs  -  Identify cognitive and physical deficits and behaviors that affect risk of falls    -  [place] fall precautions as indicated by assessment   - Educate patient/family on patient safety including physical limitations  - Instruct patient to call for assistance with activity based on assessment  - Modify environment to reduce risk of injury  - Consider OT/PT consult to assist with strengthening/mobility  Outcome: Progressing  Goal: Maintain or return to baseline ADL function  Description  INTERVENTIONS:  -  Assess patient's ability to carry out ADLs; assess patient's baseline for ADL function and identify physical deficits which impact ability to perform ADLs (bathing, care of mouth/teeth, toileting, grooming, dressing, etc )  - Assess/evaluate cause of self-care deficits   - Assess range of motion  - Assess patient's mobility; develop plan if impaired  - Assess patient's need for assistive devices and provide as appropriate  - Encourage maximum independence but intervene and supervise when necessary  ¯ Involve family in performance of ADLs  ¯ Assess for home care needs following discharge   ¯ Request OT consult to assist with ADL evaluation and planning for discharge  ¯ Provide patient education as appropriate  Outcome: Progressing  Goal: Maintain or return mobility status to optimal level  Description  INTERVENTIONS:  - Assess patient's baseline mobility status (ambulation, transfers, stairs, etc )    - Identify cognitive and physical deficits and behaviors that affect mobility  - Identify mobility aids required to assist with transfers and/or ambulation (gait belt, sit-to-stand, lift, walker, cane, etc )  - [place] fall precautions as indicated by assessment  - Record patient progress and toleration of activity level on Mobility SBAR; progress patient to next Phase/Stage  - Instruct patient to call for assistance with activity based on assessment  - Request Rehabilitation consult to assist with strengthening/weightbearing, etc   Outcome: Progressing     Problem: DISCHARGE PLANNING  Goal: Discharge to home or other facility with appropriate resources  Description  INTERVENTIONS:  - Identify barriers to discharge w/patient and caregiver  - Arrange for needed discharge resources and transportation as appropriate  - Identify discharge learning needs (meds, wound care, etc )  - Arrange for interpretive services to assist at discharge as needed  - Refer to Case Management Department for coordinating discharge planning if the patient needs post-hospital services based on physician/advanced practitioner order or complex needs related to functional status, cognitive ability, or social support system  Outcome: Progressing     Problem: Knowledge Deficit  Goal: Patient/family/caregiver demonstrates understanding of disease process, treatment plan, medications, and discharge instructions  Description  Complete learning assessment and assess knowledge base  Interventions:  - Provide teaching at level of understanding  - Provide teaching via preferred learning methods  Outcome: Progressing     Problem: NEUROSENSORY - ADULT  Goal: Achieves maximal functionality and self care  Description  INTERVENTIONS  - Monitor swallowing and airway patency with patient fatigue and changes in neurological status  - Encourage and assist patient to increase activity and self care with guidance from rehab services  - Encourage visually impaired, hearing impaired and aphasic patients to use assistive/communication devices  Outcome: Progressing     Problem: CARDIOVASCULAR - ADULT  Goal: Maintains optimal cardiac output and hemodynamic stability  Description  INTERVENTIONS:  - Monitor I/O, vital signs and rhythm  - Monitor for S/S and trends of decreased cardiac output i e  bleeding, hypotension  - Administer and titrate ordered vasoactive medications to optimize hemodynamic stability  - Assess quality of pulses, skin color and temperature  - Assess for signs of decreased coronary artery perfusion - ex   Angina  - Instruct patient to report change in severity of symptoms  Outcome: Progressing  Goal: Absence of cardiac dysrhythmias or at baseline rhythm  Description  INTERVENTIONS:  - Continuous cardiac monitoring, monitor vital signs, obtain 12 lead EKG if indicated  - Administer antiarrhythmic and heart rate control medications as ordered  - Monitor electrolytes and administer replacement therapy as ordered  Outcome: Progressing     Problem: RESPIRATORY - ADULT  Goal: Achieves optimal ventilation and oxygenation  Description  INTERVENTIONS:  - Assess for changes in respiratory status  - Assess for changes in mentation and behavior  - Position to facilitate oxygenation and minimize respiratory effort  - Oxygen administration by appropriate delivery method based on oxygen saturation (per order) or ABGs  - Initiate smoking cessation education as indicated  - Encourage broncho-pulmonary hygiene including cough, deep breathe, Incentive Spirometry  - Assess the need for suctioning and aspirate as needed  - Assess and instruct to report SOB or any respiratory difficulty  - Respiratory Therapy support as indicated  Outcome: Progressing     Problem: METABOLIC, FLUID AND ELECTROLYTES - ADULT  Goal: Electrolytes maintained within normal limits  Description  INTERVENTIONS:  - Monitor labs and assess patient for signs and symptoms of electrolyte imbalances  - Administer electrolyte replacement as ordered  - Monitor response to electrolyte replacements, including repeat lab results as appropriate  - Instruct patient on fluid and nutrition as appropriate  Outcome: Progressing  Goal: Fluid balance maintained  Description  INTERVENTIONS:  - Monitor labs and assess for signs and symptoms of volume excess or deficit  - Monitor I/O and WT  - Instruct patient on fluid and nutrition as appropriate  Outcome: Progressing     Problem: SKIN/TISSUE INTEGRITY - ADULT  Goal: Skin integrity remains intact  Description  INTERVENTIONS  - Identify patients at risk for skin breakdown  - Assess and monitor skin integrity  - Assess and monitor nutrition and hydration status  - Monitor labs (i e  albumin)  - Assess for incontinence   - Turn and reposition patient  - Assist with mobility/ambulation  - Relieve pressure over bony prominences  - Avoid friction and shearing  - Provide appropriate hygiene as needed including keeping skin clean and dry  - Evaluate need for skin moisturizer/barrier cream  - Collaborate with interdisciplinary team (i e  Nutrition, Rehabilitation, etc )   - Patient/family teaching  Outcome: Progressing     Problem: Prexisting or High Potential for Compromised Skin Integrity  Goal: Skin integrity is maintained or improved  Description  INTERVENTIONS:  - Identify patients at risk for skin breakdown  - Assess and monitor skin integrity  - Assess and monitor nutrition and hydration status  - Monitor labs (i e  albumin)  - Assess for incontinence   - Turn and reposition patient  - Assist with mobility/ambulation  - Relieve pressure over bony prominences  - Avoid friction and shearing  - Provide appropriate hygiene as needed including keeping skin clean and dry  - Evaluate need for skin moisturizer/barrier cream  - Collaborate with interdisciplinary team (i e  Nutrition, Rehabilitation, etc )   - Patient/family teaching  Outcome: Progressing     Problem: DISCHARGE PLANNING - CARE MANAGEMENT  Goal: Discharge to post-acute care or home with appropriate resources  Description  D/c to Alcohol rehab     INTERVENTIONS:  - Conduct assessment to determine patient/family and health care team treatment goals, and need for post-acute services based on payer coverage, community resources, and patient preferences, and barriers to discharge  - Address psychosocial, clinical, and financial barriers to discharge as identified in assessment in conjunction with the patient/family and health care team  - Arrange appropriate level of post-acute services according to patient's   needs and preference and payer coverage in collaboration with the physician and health care team  - Communicate with and update the patient/family, physician, and health care team regarding progress on the discharge plan  - Arrange appropriate transportation to post-acute venues   Outcome: Progressing

## 2019-02-19 NOTE — SOCIAL WORK
CM receive call from Sunshine Ziegler at [place] [address] who is waiting return call  CM will continue to follow with possible discharge today pending bed and auth

## 2019-02-19 NOTE — SOCIAL WORK
LOS 8 DAYS  CM left message for HOST requesting return call regarding d c plan for inpatient substance use treatment

## 2019-02-19 NOTE — DISCHARGE SUMMARY
Discharge- Armona  1950, 71 y o  male MRN: [id number]    Unit/Bed#: [facility] 2 -01 Encounter: [id number]    Primary Care Provider: Jim Lion MD   Date and time admitted to hospital: 2/10/2019  4:46 PM        * Delirium tremens [facility])  Assessment & Plan  Resolved  Initially requiring Precedex drip  Initially plans for inpatient alcohol rehab, patient elected for outpatient, arrangements made with HOST         Hypokalemia  Assessment & Plan  Patient maintaining normal oral intake  Recheck BMP in 1 week    Alcohol abuse  Assessment & Plan  On MVI, thiamine and folic acid  Needs alcohol rehab, CM arranging for placement, likely tomorrow   Patient on Librium for symptom control of withdrawal    Ataxia  Assessment & Plan  Due to alcohol withdrawal, resolved  PT cleared for discharge home    Hypertension  Assessment & Plan  Controlled, continue current management      Discharging Physician / Practitioner: Pieter Perrin MD  PCP: Jim Lion MD  Admission Date:   Admission Orders (From admission, onward)    Ordered        02/10/19 1809  Inpatient Admission  Once     Order ID Start Status   [id number] 02/10/19 1808 Completed              Discharge Date: 02/19/19    Resolved Problems  Date Reviewed: 2/19/2019    None          Consultations During Hospital Stay:  · PT/OT, Case Management    Procedures Performed:   MRI brain wo contrast   Final Result by Margaret Voss DO (02/11 2665)      Limited examination  Only 3 sequences could be obtained due to severe tremors  Diffusion imaging is unremarkable  No gross evidence of mass, hemorrhage or acute intracranial pathology  Consider repeat examination with anesthesia if warranted        Workstation performed: [id number]           Significant Findings / Test Results:   Results from last 7 days   Lab Units 02/16/19  0538   WBC Thousand/uL 7 78   HEMOGLOBIN g/dL 13 0   HEMATOCRIT % 39 3   PLATELETS Thousands/uL 316     Results from last 7 days   Lab Units 02/16/19  0538   SODIUM mmol/L 138   POTASSIUM mmol/L 3 2*   CHLORIDE mmol/L 101   CO2 mmol/L 30   BUN mg/dL 13   CREATININE mg/dL 0 78   CALCIUM mg/dL 9 0         Incidental Findings:   · None     Test Results Pending at Discharge (will require follow up): · None     Outpatient Tests Requested:  · BMP    Complications:  None    Reason for Admission: Ataxia and tremors    Hospital Course:     Roshni Toscano is a 71 y o  male patient who originally presented to the hospital on 2/10/2019 due to ataxia and tremors  The patient was evaluated by Neurology and had a full neurologic workup  Patient symptoms were most consistent with alcohol withdrawal syndrome, he eventually was diagnosed with DTs and transiently required Precedex sedation  The patient showed gradual improvement with his symptoms having completely resolved by the time of the discharge  The patient was evaluated by HOST program and arrangements will be made for outpatient rehab program      Please see above list of diagnoses and related plan for additional information  Condition at Discharge: good     Discharge Day Visit / Exam:     Subjective:  Patient seen and examined  He reports feeling well and would like to go home  He denies chest pain, shortness of breath or palpitations  He denies nausea, vomiting, diarrhea constipation  He states that his tremors have resolved  He is able to ambulate in hallways without difficulty  He states that he is agreeable to outpatient alcohol rehab  Vitals: Blood Pressure: 131/82 (02/19/19 0732)  Pulse: 80 (02/19/19 0732)  Temperature: 98 °F (36 7 °C) (02/19/19 0732)  Temp Source: Temporal (02/19/19 0732)  Respirations: 19 (02/19/19 0732)  Height: 5' 8" (172 7 cm) (02/10/19 1717)  Weight - Scale: 91 7 kg (202 lb 2 6 oz) (02/10/19 1717)  SpO2: 95 % (02/19/19 0732)    Exam:     Physical Exam   Constitutional: He is oriented to person, place, and time  He appears well-developed and well-nourished     HENT: Head: Normocephalic and atraumatic  Eyes: Conjunctivae are normal    Neck: No JVD present  Cardiovascular: Normal rate and regular rhythm  No murmur heard  Pulmonary/Chest: Effort normal  No respiratory distress  He has no wheezes  Abdominal: Soft  There is no tenderness  There is no rebound  Musculoskeletal: He exhibits no edema  Neurological: He is alert and oriented to person, place, and time  Skin: Skin is warm and dry  Psychiatric: He has a normal mood and affect  Discussion with Family: Wife    Discharge instructions/Information to patient and family:   See after visit summary for information provided to patient and family  Provisions for Follow-Up Care:  See after visit summary for information related to follow-up care and any pertinent home health orders  Disposition:     Home    For Discharges to [facility] SNF:   · Not Applicable to this Patient - Not Applicable to this Patient    Planned Readmission: No     Discharge Statement:  I spent 35 minutes discharging the patient  This time was spent on the day of discharge  I had direct contact with the patient on the day of discharge  Greater than 50% of the total time was spent examining patient, answering all patient questions, arranging and discussing plan of care with patient as well as directly providing post-discharge instructions  Additional time then spent on discharge activities  Discharge Medications:  See after visit summary for reconciled discharge medications provided to patient and family        ** Please Note: This note has been constructed using a voice recognition system **

## 2019-02-19 NOTE — ASSESSMENT & PLAN NOTE
On MVI, thiamine and folic acid  Needs alcohol rehab, CM arranging for placement, likely tomorrow   Patient on Librium for symptom control of withdrawal

## 2019-02-21 ENCOUNTER — TELEPHONE (OUTPATIENT)
Dept: MEDSURG UNIT | Facility: HOSPITAL | Age: 69
End: 2019-02-21

## 2019-07-13 ENCOUNTER — HOSPITAL ENCOUNTER (INPATIENT)
Facility: HOSPITAL | Age: 69
LOS: 3 days | Discharge: HOME | DRG: 897 | End: 2019-07-16
Attending: EMERGENCY MEDICINE | Admitting: FAMILY MEDICINE
Payer: MEDICARE

## 2019-07-13 ENCOUNTER — APPOINTMENT (EMERGENCY)
Dept: RADIOLOGY | Facility: HOSPITAL | Age: 69
DRG: 897 | End: 2019-07-13
Payer: MEDICARE

## 2019-07-13 DIAGNOSIS — E87.6 HYPOKALEMIA: ICD-10-CM

## 2019-07-13 DIAGNOSIS — F10.10 ALCOHOL ABUSE: ICD-10-CM

## 2019-07-13 DIAGNOSIS — F10.239 ALCOHOL WITHDRAWAL (HCC): Primary | ICD-10-CM

## 2019-07-13 DIAGNOSIS — I10 HYPERTENSION, UNSPECIFIED TYPE: ICD-10-CM

## 2019-07-13 DIAGNOSIS — E83.42 HYPOMAGNESEMIA: ICD-10-CM

## 2019-07-13 PROBLEM — F10.939 ALCOHOL WITHDRAWAL (HCC): Status: ACTIVE | Noted: 2019-02-12

## 2019-07-13 LAB
ALBUMIN SERPL BCP-MCNC: 4 G/DL (ref 3.5–5)
ALP SERPL-CCNC: 104 U/L (ref 46–116)
ALT SERPL W P-5'-P-CCNC: 39 U/L (ref 12–78)
AMPHETAMINES SERPL QL SCN: NEGATIVE
ANION GAP SERPL CALCULATED.3IONS-SCNC: 6 MMOL/L (ref 4–13)
APAP SERPL-MCNC: <2 UG/ML (ref 10–20)
AST SERPL W P-5'-P-CCNC: 43 U/L (ref 5–45)
BACTERIA UR QL AUTO: ABNORMAL /HPF
BARBITURATES UR QL: NEGATIVE
BASOPHILS # BLD AUTO: 0.06 THOUSANDS/ΜL (ref 0–0.1)
BASOPHILS NFR BLD AUTO: 1 % (ref 0–1)
BENZODIAZ UR QL: NEGATIVE
BILIRUB SERPL-MCNC: 0.5 MG/DL (ref 0.2–1)
BILIRUB UR QL STRIP: NEGATIVE
BUN SERPL-MCNC: 11 MG/DL (ref 5–25)
CALCIUM SERPL-MCNC: 8.8 MG/DL (ref 8.3–10.1)
CHLORIDE SERPL-SCNC: 105 MMOL/L (ref 100–108)
CLARITY UR: CLEAR
CO2 SERPL-SCNC: 30 MMOL/L (ref 21–32)
COCAINE UR QL: NEGATIVE
COLOR UR: ABNORMAL
CREAT SERPL-MCNC: 0.67 MG/DL (ref 0.6–1.3)
EOSINOPHIL # BLD AUTO: 0.1 THOUSAND/ΜL (ref 0–0.61)
EOSINOPHIL NFR BLD AUTO: 1 % (ref 0–6)
ERYTHROCYTE [DISTWIDTH] IN BLOOD BY AUTOMATED COUNT: 16.8 % (ref 11.6–15.1)
ETHANOL SERPL-MCNC: 57 MG/DL (ref 0–3)
GFR SERPL CREATININE-BSD FRML MDRD: 98 ML/MIN/1.73SQ M
GLUCOSE SERPL-MCNC: 90 MG/DL (ref 65–140)
GLUCOSE UR STRIP-MCNC: NEGATIVE MG/DL
HCT VFR BLD AUTO: 42.1 % (ref 36.5–49.3)
HGB BLD-MCNC: 13.9 G/DL (ref 12–17)
HGB UR QL STRIP.AUTO: NEGATIVE
IMM GRANULOCYTES # BLD AUTO: 0.09 THOUSAND/UL (ref 0–0.2)
IMM GRANULOCYTES NFR BLD AUTO: 1 % (ref 0–2)
KETONES UR STRIP-MCNC: ABNORMAL MG/DL
LEUKOCYTE ESTERASE UR QL STRIP: NEGATIVE
LIPASE SERPL-CCNC: 217 U/L (ref 73–393)
LYMPHOCYTES # BLD AUTO: 1.52 THOUSANDS/ΜL (ref 0.6–4.47)
LYMPHOCYTES NFR BLD AUTO: 12 % (ref 14–44)
MAGNESIUM SERPL-MCNC: 1.1 MG/DL (ref 1.6–2.6)
MCH RBC QN AUTO: 32.7 PG (ref 26.8–34.3)
MCHC RBC AUTO-ENTMCNC: 33 G/DL (ref 31.4–37.4)
MCV RBC AUTO: 99 FL (ref 82–98)
METHADONE UR QL: NEGATIVE
MONOCYTES # BLD AUTO: 0.82 THOUSAND/ΜL (ref 0.17–1.22)
MONOCYTES NFR BLD AUTO: 7 % (ref 4–12)
NEUTROPHILS # BLD AUTO: 10.07 THOUSANDS/ΜL (ref 1.85–7.62)
NEUTS SEG NFR BLD AUTO: 78 % (ref 43–75)
NITRITE UR QL STRIP: NEGATIVE
NON-SQ EPI CELLS URNS QL MICRO: ABNORMAL /HPF
NRBC BLD AUTO-RTO: 0 /100 WBCS
OPIATES UR QL SCN: NEGATIVE
PCP UR QL: NEGATIVE
PH UR STRIP.AUTO: 6 [PH]
PLATELET # BLD AUTO: 175 THOUSANDS/UL (ref 149–390)
PLATELET # BLD AUTO: 188 THOUSANDS/UL (ref 149–390)
PMV BLD AUTO: 8.6 FL (ref 8.9–12.7)
PMV BLD AUTO: 9.2 FL (ref 8.9–12.7)
POTASSIUM SERPL-SCNC: 3.2 MMOL/L (ref 3.5–5.3)
PROT SERPL-MCNC: 7.6 G/DL (ref 6.4–8.2)
PROT UR STRIP-MCNC: ABNORMAL MG/DL
RBC # BLD AUTO: 4.25 MILLION/UL (ref 3.88–5.62)
RBC #/AREA URNS AUTO: ABNORMAL /HPF
SALICYLATES SERPL-MCNC: <3 MG/DL (ref 3–20)
SODIUM SERPL-SCNC: 141 MMOL/L (ref 136–145)
SP GR UR STRIP.AUTO: >=1.03 (ref 1–1.03)
THC UR QL: NEGATIVE
TROPONIN I SERPL-MCNC: <0.02 NG/ML
TSH SERPL DL<=0.05 MIU/L-ACNC: 1.71 UIU/ML (ref 0.36–3.74)
UROBILINOGEN UR QL STRIP.AUTO: 0.2 E.U./DL
VIT B12 SERPL-MCNC: 337 PG/ML (ref 100–900)
WBC # BLD AUTO: 12.66 THOUSAND/UL (ref 4.31–10.16)
WBC #/AREA URNS AUTO: ABNORMAL /HPF

## 2019-07-13 PROCEDURE — 80307 DRUG TEST PRSMV CHEM ANLYZR: CPT | Performed by: STUDENT IN AN ORGANIZED HEALTH CARE EDUCATION/TRAINING PROGRAM

## 2019-07-13 PROCEDURE — 85049 AUTOMATED PLATELET COUNT: CPT | Performed by: STUDENT IN AN ORGANIZED HEALTH CARE EDUCATION/TRAINING PROGRAM

## 2019-07-13 PROCEDURE — 96374 THER/PROPH/DIAG INJ IV PUSH: CPT

## 2019-07-13 PROCEDURE — 83735 ASSAY OF MAGNESIUM: CPT | Performed by: EMERGENCY MEDICINE

## 2019-07-13 PROCEDURE — 84443 ASSAY THYROID STIM HORMONE: CPT | Performed by: EMERGENCY MEDICINE

## 2019-07-13 PROCEDURE — 71045 X-RAY EXAM CHEST 1 VIEW: CPT

## 2019-07-13 PROCEDURE — 1124F ACP DISCUSS-NO DSCNMKR DOCD: CPT | Performed by: EMERGENCY MEDICINE

## 2019-07-13 PROCEDURE — 96365 THER/PROPH/DIAG IV INF INIT: CPT

## 2019-07-13 PROCEDURE — 83690 ASSAY OF LIPASE: CPT | Performed by: EMERGENCY MEDICINE

## 2019-07-13 PROCEDURE — 84484 ASSAY OF TROPONIN QUANT: CPT | Performed by: EMERGENCY MEDICINE

## 2019-07-13 PROCEDURE — 85025 COMPLETE CBC W/AUTO DIFF WBC: CPT | Performed by: EMERGENCY MEDICINE

## 2019-07-13 PROCEDURE — 36415 COLL VENOUS BLD VENIPUNCTURE: CPT | Performed by: EMERGENCY MEDICINE

## 2019-07-13 PROCEDURE — 99291 CRITICAL CARE FIRST HOUR: CPT | Performed by: FAMILY MEDICINE

## 2019-07-13 PROCEDURE — 96375 TX/PRO/DX INJ NEW DRUG ADDON: CPT

## 2019-07-13 PROCEDURE — 81001 URINALYSIS AUTO W/SCOPE: CPT | Performed by: STUDENT IN AN ORGANIZED HEALTH CARE EDUCATION/TRAINING PROGRAM

## 2019-07-13 PROCEDURE — 99285 EMERGENCY DEPT VISIT HI MDM: CPT | Performed by: EMERGENCY MEDICINE

## 2019-07-13 PROCEDURE — 80329 ANALGESICS NON-OPIOID 1 OR 2: CPT | Performed by: EMERGENCY MEDICINE

## 2019-07-13 PROCEDURE — 80320 DRUG SCREEN QUANTALCOHOLS: CPT | Performed by: EMERGENCY MEDICINE

## 2019-07-13 PROCEDURE — 99285 EMERGENCY DEPT VISIT HI MDM: CPT

## 2019-07-13 PROCEDURE — 93005 ELECTROCARDIOGRAM TRACING: CPT

## 2019-07-13 PROCEDURE — 80053 COMPREHEN METABOLIC PANEL: CPT | Performed by: EMERGENCY MEDICINE

## 2019-07-13 PROCEDURE — 82607 VITAMIN B-12: CPT | Performed by: STUDENT IN AN ORGANIZED HEALTH CARE EDUCATION/TRAINING PROGRAM

## 2019-07-13 RX ORDER — CITALOPRAM 20 MG/1
20 TABLET ORAL DAILY
Status: DISCONTINUED | OUTPATIENT
Start: 2019-07-13 | End: 2019-07-16 | Stop reason: HOSPADM

## 2019-07-13 RX ORDER — CHLORDIAZEPOXIDE HYDROCHLORIDE 5 MG/1
10 CAPSULE, GELATIN COATED ORAL EVERY 8 HOURS SCHEDULED
Status: DISCONTINUED | OUTPATIENT
Start: 2019-07-13 | End: 2019-07-16 | Stop reason: HOSPADM

## 2019-07-13 RX ORDER — LORAZEPAM 2 MG/ML
2 INJECTION INTRAMUSCULAR ONCE
Status: COMPLETED | OUTPATIENT
Start: 2019-07-13 | End: 2019-07-13

## 2019-07-13 RX ORDER — FINASTERIDE 5 MG/1
5 TABLET, FILM COATED ORAL DAILY
Status: DISCONTINUED | OUTPATIENT
Start: 2019-07-13 | End: 2019-07-16 | Stop reason: HOSPADM

## 2019-07-13 RX ORDER — METOPROLOL SUCCINATE 25 MG/1
25 TABLET, EXTENDED RELEASE ORAL DAILY
Status: DISCONTINUED | OUTPATIENT
Start: 2019-07-13 | End: 2019-07-13

## 2019-07-13 RX ORDER — LORAZEPAM 2 MG/ML
4 INJECTION INTRAMUSCULAR ONCE
Status: COMPLETED | OUTPATIENT
Start: 2019-07-13 | End: 2019-07-13

## 2019-07-13 RX ORDER — HYDRALAZINE HYDROCHLORIDE 20 MG/ML
5 INJECTION INTRAMUSCULAR; INTRAVENOUS ONCE
Status: COMPLETED | OUTPATIENT
Start: 2019-07-13 | End: 2019-07-13

## 2019-07-13 RX ORDER — MAGNESIUM SULFATE HEPTAHYDRATE 40 MG/ML
2 INJECTION, SOLUTION INTRAVENOUS ONCE
Status: COMPLETED | OUTPATIENT
Start: 2019-07-13 | End: 2019-07-13

## 2019-07-13 RX ORDER — SODIUM CHLORIDE 9 MG/ML
125 INJECTION, SOLUTION INTRAVENOUS CONTINUOUS
Status: DISCONTINUED | OUTPATIENT
Start: 2019-07-13 | End: 2019-07-15

## 2019-07-13 RX ORDER — IBUPROFEN 600 MG/1
600 TABLET ORAL EVERY 8 HOURS PRN
Status: DISCONTINUED | OUTPATIENT
Start: 2019-07-13 | End: 2019-07-16 | Stop reason: HOSPADM

## 2019-07-13 RX ORDER — POTASSIUM CHLORIDE 20 MEQ/1
20 TABLET, EXTENDED RELEASE ORAL DAILY
Status: DISCONTINUED | OUTPATIENT
Start: 2019-07-13 | End: 2019-07-16 | Stop reason: HOSPADM

## 2019-07-13 RX ORDER — AMLODIPINE BESYLATE 5 MG/1
5 TABLET ORAL DAILY
Status: DISCONTINUED | OUTPATIENT
Start: 2019-07-13 | End: 2019-07-16 | Stop reason: HOSPADM

## 2019-07-13 RX ORDER — ASPIRIN 81 MG/1
81 TABLET ORAL DAILY
Status: DISCONTINUED | OUTPATIENT
Start: 2019-07-13 | End: 2019-07-16 | Stop reason: HOSPADM

## 2019-07-13 RX ORDER — POTASSIUM CHLORIDE 20 MEQ/1
40 TABLET, EXTENDED RELEASE ORAL ONCE
Status: COMPLETED | OUTPATIENT
Start: 2019-07-13 | End: 2019-07-13

## 2019-07-13 RX ORDER — LORAZEPAM 2 MG/ML
4 INJECTION INTRAMUSCULAR ONCE
Status: DISCONTINUED | OUTPATIENT
Start: 2019-07-13 | End: 2019-07-13

## 2019-07-13 RX ORDER — METOPROLOL TARTRATE 5 MG/5ML
5 INJECTION INTRAVENOUS EVERY 6 HOURS PRN
Status: DISCONTINUED | OUTPATIENT
Start: 2019-07-13 | End: 2019-07-13

## 2019-07-13 RX ORDER — FOLIC ACID 5 MG/ML
INJECTION, SOLUTION INTRAMUSCULAR; INTRAVENOUS; SUBCUTANEOUS
Status: DISCONTINUED
Start: 2019-07-13 | End: 2019-07-13

## 2019-07-13 RX ORDER — METOPROLOL SUCCINATE 25 MG/1
25 TABLET, EXTENDED RELEASE ORAL DAILY
Status: ON HOLD | COMMUNITY
End: 2021-06-02

## 2019-07-13 RX ORDER — METOPROLOL TARTRATE 5 MG/5ML
5 INJECTION INTRAVENOUS ONCE
Status: DISCONTINUED | OUTPATIENT
Start: 2019-07-13 | End: 2019-07-13

## 2019-07-13 RX ORDER — ASPIRIN 81 MG/1
81 TABLET ORAL DAILY
COMMUNITY
End: 2019-12-02

## 2019-07-13 RX ORDER — METOPROLOL SUCCINATE 50 MG/1
50 TABLET, EXTENDED RELEASE ORAL DAILY
Status: DISCONTINUED | OUTPATIENT
Start: 2019-07-14 | End: 2019-07-16 | Stop reason: HOSPADM

## 2019-07-13 RX ORDER — HYDRALAZINE HYDROCHLORIDE 20 MG/ML
5 INJECTION INTRAMUSCULAR; INTRAVENOUS EVERY 6 HOURS PRN
Status: DISCONTINUED | OUTPATIENT
Start: 2019-07-13 | End: 2019-07-16 | Stop reason: HOSPADM

## 2019-07-13 RX ORDER — LORAZEPAM 1 MG/1
2 TABLET ORAL ONCE
Status: COMPLETED | OUTPATIENT
Start: 2019-07-13 | End: 2019-07-13

## 2019-07-13 RX ADMIN — POTASSIUM CHLORIDE 20 MEQ: 1500 TABLET, EXTENDED RELEASE ORAL at 09:37

## 2019-07-13 RX ADMIN — IBUPROFEN 600 MG: 600 TABLET ORAL at 10:05

## 2019-07-13 RX ADMIN — METOPROLOL SUCCINATE 25 MG: 25 TABLET, EXTENDED RELEASE ORAL at 09:37

## 2019-07-13 RX ADMIN — MAGNESIUM OXIDE TAB 400 MG (241.3 MG ELEMENTAL MG) 400 MG: 400 (241.3 MG) TAB at 18:12

## 2019-07-13 RX ADMIN — POTASSIUM CHLORIDE 40 MEQ: 1500 TABLET, EXTENDED RELEASE ORAL at 06:10

## 2019-07-13 RX ADMIN — LORAZEPAM 2 MG: 1 TABLET ORAL at 19:23

## 2019-07-13 RX ADMIN — HYDRALAZINE HYDROCHLORIDE 5 MG: 20 INJECTION INTRAMUSCULAR; INTRAVENOUS at 21:04

## 2019-07-13 RX ADMIN — SODIUM CHLORIDE 125 ML/HR: 0.9 INJECTION, SOLUTION INTRAVENOUS at 18:12

## 2019-07-13 RX ADMIN — THIAMINE HYDROCHLORIDE: 100 INJECTION, SOLUTION INTRAMUSCULAR; INTRAVENOUS at 06:05

## 2019-07-13 RX ADMIN — LORAZEPAM 2 MG: 2 INJECTION INTRAMUSCULAR; INTRAVENOUS at 06:24

## 2019-07-13 RX ADMIN — ENOXAPARIN SODIUM 40 MG: 40 INJECTION SUBCUTANEOUS at 09:40

## 2019-07-13 RX ADMIN — MULTIPLE VITAMINS W/ MINERALS TAB 1 TABLET: TAB at 09:37

## 2019-07-13 RX ADMIN — CITALOPRAM HYDROBROMIDE 20 MG: 20 TABLET ORAL at 09:37

## 2019-07-13 RX ADMIN — MAGNESIUM OXIDE TAB 400 MG (241.3 MG ELEMENTAL MG) 400 MG: 400 (241.3 MG) TAB at 09:41

## 2019-07-13 RX ADMIN — FINASTERIDE 5 MG: 5 TABLET, FILM COATED ORAL at 09:37

## 2019-07-13 RX ADMIN — METOPROLOL TARTRATE 5 MG: 1 INJECTION, SOLUTION INTRAVENOUS at 18:12

## 2019-07-13 RX ADMIN — MULTIPLE VITAMINS W/ MINERALS TAB 2 TABLET: TAB at 05:46

## 2019-07-13 RX ADMIN — CHLORDIAZEPOXIDE HYDROCHLORIDE 10 MG: 5 CAPSULE ORAL at 22:31

## 2019-07-13 RX ADMIN — LORAZEPAM 2 MG: 2 INJECTION INTRAMUSCULAR; INTRAVENOUS at 05:33

## 2019-07-13 RX ADMIN — AMLODIPINE BESYLATE 5 MG: 5 TABLET ORAL at 19:22

## 2019-07-13 RX ADMIN — HYDRALAZINE HYDROCHLORIDE 5 MG: 20 INJECTION INTRAMUSCULAR; INTRAVENOUS at 15:22

## 2019-07-13 RX ADMIN — MAGNESIUM SULFATE HEPTAHYDRATE 2 G: 40 INJECTION, SOLUTION INTRAVENOUS at 06:15

## 2019-07-13 RX ADMIN — SODIUM CHLORIDE 125 ML/HR: 0.9 INJECTION, SOLUTION INTRAVENOUS at 09:25

## 2019-07-13 RX ADMIN — LORAZEPAM 4 MG: 2 INJECTION INTRAMUSCULAR; INTRAVENOUS at 05:47

## 2019-07-13 RX ADMIN — ASPIRIN 81 MG: 81 TABLET, COATED ORAL at 09:37

## 2019-07-13 RX ADMIN — CHLORDIAZEPOXIDE HYDROCHLORIDE 10 MG: 5 CAPSULE ORAL at 13:26

## 2019-07-13 NOTE — H&P
H&P- Alba Bustamante 1950, 71 y o  male MRN: [id number]    Unit/Bed#:  Encounter: [id number]    Primary Care Provider: Annel Rashid MD   Date and time admitted to hospital: 7/13/2019  5:17 AM    Conchita Arnold is a 71years old male with history of alcohol abuse, delirium tremens, hypertension, uric acid kidney stone presents with 1 day duration generalized weakness and shakiness will be admitted to the general medical floor for greater than 2 midnights for suspicions of alcohol withdrawal under the service of Dr Florencio Walker  Patient is in full code       * Alcohol withdrawal [facility])  Assessment & Plan  Patient presents with initial stage of withdrawal symptoms: sweats, shakes, paresthesias and palpitations  - last drink was at 7/12/19 at 6 pm after drinking 2 pints of vodka  - elevated Ethanol level of 54  - placed on CIWA protocol  - TSH WNL, check B12 level  - LFT WNL   - given thiamine and folic acid at the ED  - troponin negative, EKG WNL  - leukocytosis with WBC 12 66 on admission, obtain UA, patient afebrile at this time  - continue multivitamin-minerals supplement with Centrum   - IVF with NS @ 125 cc/hr  - Thiamine 009 mg and Folic acid 1 mg IVPB   - Start Librium 10 mg PO TID for agitation and wean off when agitation resolves          Alcohol abuse  Assessment & Plan  Patient reports attending AA meeting 3 times a week      Hypokalemia  Assessment & Plan  K+ of 3 2 on admission  - ED: replaced KCl 40 mEq PO x 1  - ordered KCl 20 mEq PO daily, monitor K+, replace as needed       Hypomagnesemia  Assessment & Plan  Mg of 1 1 on admission  - secondary to alcohol abuse   - ED: replaced MgSO4 2mg IV x 1  - ordered Mg oxide 400 mg PO BID   - monitor daily Mg level      Hypertension  Assessment & Plan  Continue home medication metoprolol 25 mg PO daily, current BP stable at 149/70 mmHg  - continue BP monitoring              VTE Prophylaxis: Enoxaparin (Lovenox)  / sequential compression device Code Status: Level 1 - Full code  POLST: POLST form is not discussed and not completed at this time  Discussion with family: none    Anticipated Length of Stay:  Patient will be admitted on an Inpatient basis with an anticipated length of stay of  More than 2 midnights  Total Time for Visit, including Counseling / Coordination of Care: 30 minutes  Greater than 50% of this total time spent on direct patient counseling and coordination of care  Chief Complaint:   Generalized weakness    History of Present Illness:    Kristen Soto is a 71 y o  male with PMHx of HTN and uric acid kidney stone who presents with generalized weakness and shakiness since last night  Patient admits to drinking 2 eight-ounce bottle of vodka yesterday, last drink around 6pm and around 11pm he had anxiety attack feel like he was "gonna die"  At the ED course patient received total of 8 mg of Ativan, Thiamine 776 mg, folic acid 1 mg and IVF 1L bolus  Patient reports increasing hand tremors, with tingling sensation in fingertips  Patient also reports palpitations, jitters, and sweating  Patient denied nausea, vomiting, headache, abdominal pain, chills, fevers  Patient had admission in the past February 2019 for delirium tremens  Patient states he drinks at least 4 days a week  He attends AA meeting three times a week in [place]  ED course:  Folic acid 1mg   IVF 1L NS bolus  Ativan total of 8 mg IV  MgSO4 2 mg IV  KCl 40 mEq PO  Centrium 2 tabs  CMP (K+ 3 2), TSH WNL, Lipase, Mg 1 1, Troponin, CBC  Salicylate, acetaminophen, ethanol level  Rapid drug screen  CXR  EKG NSR    Review of Systems:    Review of Systems   Constitutional: Positive for fatigue  Negative for chills and fever  HENT: Negative for congestion and sore throat  Eyes: Negative for visual disturbance  Respiratory: Positive for cough  Negative for shortness of breath  Cardiovascular: Positive for palpitations  Negative for leg swelling  Gastrointestinal: Negative for abdominal pain, nausea and vomiting  Genitourinary: Negative for dysuria  Skin: Positive for rash  Neurological: Positive for tremors and weakness  Negative for dizziness, numbness and headaches  Psychiatric/Behavioral: Negative for hallucinations  The patient is nervous/anxious  Past Medical and Surgical History:     Past Medical History:   Diagnosis Date    [place] (hard of hearing)     Hypertension     Kidney stones     Renal disorder     kidney    Shoulder dislocation        Past Surgical History:   Procedure Laterality Date    CHOLECYSTECTOMY      SHOULDER SURGERY      for dislocation       Meds/Allergies:    Prior to Admission medications    Medication Sig Start Date End Date Taking? Authorizing Provider   aspirin (ECOTRIN LOW STRENGTH) 81 mg EC tablet Take 81 mg by mouth daily   Yes Historical Provider, MD   citalopram (CeleXA) 20 mg tablet Take 20 mg by mouth daily   Yes Historical Provider, MD   finasteride (PROSCAR) 5 mg tablet Take 5 mg by mouth daily   Yes Historical Provider, MD   metoprolol succinate (TOPROL-XL) 25 mg 24 hr tablet Take 25 mg by mouth daily   Yes Historical Provider, MD   Multiple Vitamins-Minerals (PRESERVISION AREDS 2 PO) Take 1 tablet by mouth 2 (two) times a day    Historical Provider, MD   amLODIPine (NORVASC) 10 mg tablet Take 10 mg by mouth daily  7/13/19  Historical Provider, MD   chlordiazePOXIDE (LIBRIUM) 25 mg capsule Take 25 mg by mouth  7/13/19  Historical Provider, MD   folic acid (FOLVITE) 1 mg tablet Take 1 tablet (1 mg total) by mouth daily 2/20/19 7/13/19  Afshan Krishnamurthy MD   lisinopril (ZESTRIL) 40 mg tablet Take 40 mg by mouth daily  7/13/19  Historical Provider, MD   thiamine 100 MG tablet Take 1 tablet (100 mg total) by mouth daily 2/20/19 7/13/19  Afshan Krishnamurthy MD     I have reviewed home medications using allscripts  Allergies:    Allergies   Allergen Reactions    Sulfa Antibiotics Anaphylaxis and Rash    Sulfur Rash and Edema       Social History:     Marital Status: /Civil Union     Substance Use History:   Social History     Substance and Sexual Activity   Alcohol Use Yes    Comment: at times     Social History     Tobacco Use   Smoking Status Never Smoker   Smokeless Tobacco Never Used     Social History     Substance and Sexual Activity   Drug Use No       Family History:    non-contributory    Physical Exam:     Vitals:   Blood Pressure: 149/70 (07/13/19 0744)  Pulse: 73 (07/13/19 0744)  Temperature: 99 3 °F (37 4 °C) (07/13/19 0657)  Temp Source: Temporal (07/13/19 0657)  Respirations: 17 (07/13/19 0744)  Height: 5' 7" (170 2 cm) (07/13/19 0657)  Weight - Scale: 94 kg (207 lb 3 7 oz) (07/13/19 0657)  SpO2: 97 % (07/13/19 0744)    Physical Exam   Constitutional: He is oriented to person, place, and time  He appears well-developed  No distress  HENT:   Head: Normocephalic  Eyes: Pupils are equal, round, and reactive to light  Right eye exhibits no discharge  Left eye exhibits no discharge  No scleral icterus  Neck: Normal range of motion  Cardiovascular: Normal rate, regular rhythm and normal heart sounds  Pulmonary/Chest: Effort normal and breath sounds normal  No respiratory distress  He has no wheezes  Abdominal: Soft  Bowel sounds are normal  There is tenderness  Mild tenderness on deep palpation of RLQ  Obese abdomen  Musculoskeletal: He exhibits no edema  Right hand tremors more than left hand  Altered mental status   Lymphadenopathy:     He has no cervical adenopathy  Neurological: He is oriented to person, place, and time  He displays tremor  GCS eye subscore is 4  GCS verbal subscore is 4  GCS motor subscore is 6  Skin: He is not diaphoretic  Vitals reviewed  Additional Data:     Lab Results: I have personally reviewed pertinent reports        Results from last 7 days   Lab Units 07/13/19  0529   WBC Thousand/uL 12 66*   HEMOGLOBIN g/dL 13 9   HEMATOCRIT % 42 1 PLATELETS Thousands/uL 188   NEUTROS PCT % 78*   LYMPHS PCT % 12*   MONOS PCT % 7   EOS PCT % 1     Results from last 7 days   Lab Units 07/13/19  0529   SODIUM mmol/L 141   POTASSIUM mmol/L 3 2*   CHLORIDE mmol/L 105   CO2 mmol/L 30   BUN mg/dL 11   CREATININE mg/dL 0 67   ANION GAP mmol/L 6   CALCIUM mg/dL 8 8   ALBUMIN g/dL 4 0   TOTAL BILIRUBIN mg/dL 0 50   ALK PHOS U/L 104   ALT U/L 39   AST U/L 43   GLUCOSE RANDOM mg/dL 90                       Imaging: I have personally reviewed pertinent reports  XR chest 1 view portable    (Results Pending)       EKG, Pathology, and Other Studies Reviewed on Admission:   · EKG: NSR    Allscripts / Epic Records Reviewed:  Yes

## 2019-07-13 NOTE — ASSESSMENT & PLAN NOTE
Patient presents with initial stage of withdrawal symptoms: sweats, shakes, paresthesias and palpitations  - last drink was at 7/12/19 at 6 pm after drinking 2 pints of vodka  - elevated Ethanol level of 54  - placed on CIWA protocol  - TSH WNL, check B12 level  - LFT WNL   - given thiamine and folic acid at the ED  - troponin negative, EKG WNL  - leukocytosis with WBC 12 66 on admission, obtain UA, patient afebrile at this time  - continue multivitamin-minerals supplement with Centrum   - IVF with NS @ 125 cc/hr  - Thiamine 867 mg and Folic acid 1 mg IVPB   - Start librium 10 mg PO TID for agitation until mentation improves and wean off

## 2019-07-13 NOTE — ED PROVIDER NOTES
History  Chief Complaint   Patient presents with    Weakness - Generalized     Pt who states "I drank quite a bit" yesterday c/o feeling weakand like "I'm gonna die" since 2300 last night - did doze off but then awoke at 0400     Patient: Kaushal Mei  69 y o /male  YOB: 1950  MRN: [id number]  PCP: Lisa Seaman MD  Date of evaluation: 7/13/2019    (N B   [address] may have been used in the preparation of this document  Occasional wrong word or "sound-alike" substitutions may have occurred due to the inherent limitations of voice recognition software  Interpretation should be guided by context )    Shaky and generally weak  in the setting of heavy drinking yesterday (Friday) - drank about 2 pints of vodka, starting in the early afternoon, and ending about 1900  He drinks at least 4 days a week, heavily each day  Wife states no change in mental status at present - not confused  He has had admissions in the past for full delirium tremens  She says pt does go to AA  History provided by:  Patient and relative  Fatigue   Severity:  Severe  Onset quality:  Unable to specify  Duration: awoke with it 0400  Timing:  Constant  Progression:  Unchanged  Chronicity:  New  Context: alcohol use (2 pints yesterday - stopped drinking about 1900 )    Context: not change in medication, not drug use and not recent infection    Relieved by:  Nothing  Ineffective treatments:  None tried  Associated symptoms: difficulty walking    Associated symptoms: no abdominal pain, no aphasia, no chest pain, no cough, no dizziness, no drooling, no numbness in extremities, no falls, no fever, no lethargy, no loss of consciousness, no nausea, no seizures, no shortness of breath, no stroke symptoms, no syncope, no urgency and no vomiting        Prior to Admission Medications   Prescriptions Last Dose Informant Patient Reported? Taking?    Multiple Vitamins-Minerals (PRESERVISION AREDS 2 PO)   Yes No   Sig: Take 1 tablet by mouth 2 (two) times a day   aspirin (ECOTRIN LOW STRENGTH) 81 mg EC tablet   Yes Yes   Sig: Take 81 mg by mouth daily   citalopram (CeleXA) 20 mg tablet   Yes Yes   Sig: Take 20 mg by mouth daily   finasteride (PROSCAR) 5 mg tablet   Yes Yes   Sig: Take 5 mg by mouth daily   metoprolol succinate (TOPROL-XL) 25 mg 24 hr tablet   Yes Yes   Sig: Take 25 mg by mouth daily      Facility-Administered Medications: None       Past Medical History:   Diagnosis Date    [place] (hard of hearing)     Hypertension     Kidney stones     Renal disorder     kidney    Shoulder dislocation        Past Surgical History:   Procedure Laterality Date    CHOLECYSTECTOMY      SHOULDER SURGERY      for dislocation       History reviewed  No pertinent family history  I have reviewed and agree with the history as documented  Social History     Tobacco Use    Smoking status: Never Smoker    Smokeless tobacco: Never Used   Substance Use Topics    Alcohol use: Yes     Comment: at times    Drug use: No        Review of Systems   Constitutional: Positive for fatigue  Negative for chills and fever  HENT: Negative  Negative for drooling, trouble swallowing and voice change  Eyes: Negative for photophobia and visual disturbance  Respiratory: Negative  Negative for cough and shortness of breath  Cardiovascular: Negative  Negative for chest pain, palpitations and syncope  Gastrointestinal: Negative  Negative for abdominal pain, nausea and vomiting  Endocrine: Negative  Negative for polydipsia and polyuria  Genitourinary: Negative  Negative for difficulty urinating and urgency  Musculoskeletal: Negative  Negative for back pain, falls and neck pain  Skin: Negative  Negative for rash and wound  Allergic/Immunologic: Negative for immunocompromised state  Neurological: Positive for tremors and weakness (generalized)   Negative for dizziness, seizures, loss of consciousness, syncope, facial asymmetry and speech difficulty  Hematological: Negative  Negative for adenopathy  Does not bruise/bleed easily  All other systems reviewed and are negative  Physical Exam  Physical Exam   Constitutional: He is oriented to person, place, and time  He appears well-developed and well-nourished  HENT:   Mouth/Throat: Oropharynx is clear and moist and mucous membranes are normal    Voice normal   Eyes: Pupils are equal, round, and reactive to light  EOM are normal    Cardiovascular: Normal rate and regular rhythm  Pulmonary/Chest: Effort normal    Abdominal: Soft  Bowel sounds are normal    Neurological: He is alert and oriented to person, place, and time  He has normal strength  No cranial nerve deficit  He exhibits normal muscle tone  GCS eye subscore is 4  GCS verbal subscore is 5  GCS motor subscore is 6  Diffuse tremors   Skin: Skin is warm and dry  Psychiatric: He has a normal mood and affect  His speech is normal and behavior is normal    Nursing note and vitals reviewed        Vital Signs  ED Triage Vitals   Temperature Pulse Respirations Blood Pressure SpO2   07/13/19 0524 07/13/19 0518 07/13/19 0518 07/13/19 0518 07/13/19 0518   99 3 °F (37 4 °C) 73 19 (!) 223/102 96 %      Temp Source Heart Rate Source Patient Position - Orthostatic VS BP Location FiO2 (%)   07/13/19 0518 07/13/19 0518 07/13/19 0518 07/13/19 0518 --   Temporal Monitor Lying Left arm       Pain Score       07/13/19 0518       5           Vitals:    07/13/19 0518 07/13/19 0544 07/13/19 0615 07/13/19 0626   BP: (!) 223/102 (!) 205/95 (!) 194/98 158/74   Pulse: 73  73 72   Patient Position - Orthostatic VS: Lying Lying Lying Lying         Visual Acuity  Visual Acuity      Most Recent Value   L Pupil Size (mm)  3   R Pupil Size (mm)  3          ED Medications  Medications   folic acid 5 mg/mL injection **ADS Override Pull** (has no administration in time range)   magnesium sulfate 2 g/50 mL IVPB (premix) 2 g (2 g Intravenous New Bag 7/13/19 3520)   LORazepam (ATIVAN) 2 mg/mL injection 2 mg (2 mg Intravenous Given 7/13/19 0533)   LORazepam (ATIVAN) 2 mg/mL injection 4 mg (4 mg Intravenous Given 7/13/19 0547)   thiamine (VITAMIN B1) 513 mg, folic acid 1 mg in sodium chloride 0 9 % 1,000 mL infusion ( Intravenous New Bag 7/13/19 0605)   multivitamin-minerals (CENTRUM) tablet 2 tablet (2 tablets Oral Given 7/13/19 0546)   potassium chloride (K-DUR,KLOR-CON) CR tablet 40 mEq (40 mEq Oral Given 7/13/19 0610)   LORazepam (ATIVAN) 2 mg/mL injection 2 mg (2 mg Intravenous Given 7/13/19 0624)       Diagnostic Studies  Results Reviewed     Procedure Component Value Units Date/Time    Magnesium [[id number]]  (Abnormal) Collected:  07/13/19 0529    Lab Status:  Final result Specimen:  Blood from Arm, Right Updated:  07/13/19 0601     Magnesium 1 1 mg/dL     Lipase [[id number]]  (Normal) Collected:  07/13/19 0529    Lab Status:  Final result Specimen:  Blood from Arm, Right Updated:  07/13/19 0601     Lipase 217 u/L     TSH [[id number]]  (Normal) Collected:  07/13/19 0529    Lab Status:  Final result Specimen:  Blood from Arm, Right Updated:  07/13/19 0601     TSH 3RD GENERATON 1 714 uIU/mL     Narrative:       Patients undergoing fluorescein dye angiography may retain small amounts of fluorescein in the body for 48-72 hours post procedure  Samples containing fluorescein can produce falsely depressed TSH values  If the patient had this procedure,a specimen should be resubmitted post fluorescein clearance  Salicylate level [[id number]]  (Abnormal) Collected:  07/13/19 0529    Lab Status:  Final result Specimen:  Blood from Arm, Right Updated:  51/08/51 9211     Salicylate Lvl <3 mg/dL     Acetaminophen level-If concentration is detectable, please discuss with medical  on call   [[id number]]  (Abnormal) Collected:  07/13/19 0529    Lab Status:  Final result Specimen:  Blood from Arm, Right Updated:  07/13/19 0601     Acetaminophen Level <2 0 ug/mL Troponin I [[id number]]  (Normal) Collected:  07/13/19 0529    Lab Status:  Final result Specimen:  Blood from Arm, Right Updated:  07/13/19 0553     Troponin I <0 02 ng/mL     Comprehensive metabolic panel [[id number]]  (Abnormal) Collected:  07/13/19 0529    Lab Status:  Final result Specimen:  Blood from Arm, Right Updated:  07/13/19 0552     Sodium 141 mmol/L      Potassium 3 2 mmol/L      Chloride 105 mmol/L      CO2 30 mmol/L      ANION GAP 6 mmol/L      BUN 11 mg/dL      Creatinine 0 67 mg/dL      Glucose 90 mg/dL      Calcium 8 8 mg/dL      AST 43 U/L      ALT 39 U/L      Alkaline Phosphatase 104 U/L      Total Protein 7 6 g/dL      Albumin 4 0 g/dL      Total Bilirubin 0 50 mg/dL      eGFR 98 ml/min/1 73sq m     Narrative:       [place] guidelines for Chronic Kidney Disease (CKD):     Stage 1 with normal or high GFR (GFR > 90 mL/min/1 73 square meters)    Stage 2 Mild CKD (GFR = 60-89 mL/min/1 73 square meters)    Stage 3A Moderate CKD (GFR = 45-59 mL/min/1 73 square meters)    Stage 3B Moderate CKD (GFR = 30-44 mL/min/1 73 square meters)    Stage 4 Severe CKD (GFR = 15-29 mL/min/1 73 square meters)    Stage 5 End Stage CKD (GFR <15 mL/min/1 73 square meters)  Note: GFR calculation is accurate only with a steady state creatinine    Ethanol [[id number]]  (Abnormal) Collected:  07/13/19 0529    Lab Status:  Final result Specimen:  Blood from Arm, Right Updated:  07/13/19 0546     Ethanol Lvl 57 mg/dL     CBC and differential [[id number]]  (Abnormal) Collected:  07/13/19 0529    Lab Status:  Final result Specimen:  Blood from Arm, Right Updated:  07/13/19 0536     WBC 12 66 Thousand/uL      RBC 4 25 Million/uL      Hemoglobin 13 9 g/dL      Hematocrit 42 1 %      MCV 99 fL      MCH 32 7 pg      MCHC 33 0 g/dL      RDW 16 8 %      MPV 8 6 fL      Platelets 377 Thousands/uL      nRBC 0 /100 WBCs      Neutrophils Relative 78 %      Immat GRANS % 1 %      Lymphocytes Relative 12 % Monocytes Relative 7 %      Eosinophils Relative 1 %      Basophils Relative 1 %      Neutrophils Absolute 10 07 Thousands/µL      Immature Grans Absolute 0 09 Thousand/uL      Lymphocytes Absolute 1 52 Thousands/µL      Monocytes Absolute 0 82 Thousand/µL      Eosinophils Absolute 0 10 Thousand/µL      Basophils Absolute 0 06 Thousands/µL     Rapid drug screen, urine [[id number]]     Lab Status:  No result Specimen:  Urine                  XR chest 1 view portable    (Results Pending)              Procedures  Procedures       ED Course  ED Course as of Jul 13 0649   Sat Jul 13, 2019   0605 MEDICAL ALCOHOL(!): 57   8002 SALICYLATE LEVEL(!): <3   0606 ACETAMINOPHEN LEVEL(!): <2 0   0606 Magnesium(!): 1 1   0606 Potassium(!): 3 2   0606 eGFR: 98   0607 Lipase: 217   0620 Paged SLIM      0641 Improved with Ativan  No further tremors  Sleepy but arouses to light touch  HEART Risk Score      Most Recent Value   History  0 Filed at: 07/13/2019 0640   ECG  0 Filed at: 07/13/2019 0640   Age  2 Filed at: 07/13/2019 0640   Risk Factors  1 Filed at: 07/13/2019 0640   Troponin  0 Filed at: 07/13/2019 0640   Heart Score Risk Calculator   History  0 Filed at: 07/13/2019 0640   ECG  0 Filed at: 07/13/2019 0640   Age  2 Filed at: 07/13/2019 0640   Risk Factors  1 Filed at: 07/13/2019 0640   Troponin  0 Filed at: 07/13/2019 0640   HEART Score  3 Filed at: 07/13/2019 0640   HEART Score  3 Filed at: 07/13/2019 0400                            MDM  Number of Diagnoses or Management Options  Alcohol abuse: established and worsening  Alcohol withdrawal ([facility] [address] ):    Hypokalemia:   Hypomagnesemia: new and does not require workup     Amount and/or Complexity of Data Reviewed  Tests in the radiology section of CPT®: ordered and reviewed  Tests in the medicine section of CPT®: ordered and reviewed  Obtain history from someone other than the patient: yes  Review and summarize past medical records: yes  Independent visualization of images, tracings, or specimens: yes        Disposition  Final diagnoses:   Alcohol withdrawal ([facility] [address] )   Hypomagnesemia   Alcohol abuse   Hypokalemia     Time reflects when diagnosis was documented in both MDM as applicable and the Disposition within this note     Time User Action Codes Description Comment    7/13/2019  6:21 AM Synetta Lieu M Add [F10 239] Alcohol withdrawal ([facility] [address] )     7/13/2019  6:22 AM Julisa, [place]il[place] Other Add [E83 42] Hypomagnesemia     7/13/2019  6:22 AM Synetta Lieu M Add [F10 10] Alcohol abuse     7/13/2019  6:49 AM Synetta Lieu M Add [E87 6] Hypokalemia       ED Disposition     ED Disposition Condition Date/Time Comment    Admit Stable Sat Jul 13, 2019  6:28 AM Case was discussed with Loli Power PA-C for [facility]  and the patient's admission status was agreed to be Admission Status: inpatient status to the service of Dr Jennifer Lindquist   Follow-up Information    None         Patient's Medications   Discharge Prescriptions    No medications on file     No discharge procedures on file      ED Provider  Electronically Signed by           Peace Nolasco MD  07/13/19 8867

## 2019-07-13 NOTE — ED PROCEDURE NOTE
PROCEDURE  CriticalCare Time  Performed by: Marcio Bellamy MD  Authorized by: Marcio Bellamy MD     Critical care provider statement:     Critical care time (minutes):  35    Critical care time was exclusive of:  Separately billable procedures and treating other patients and teaching time    Critical care was necessary to treat or prevent imminent or life-threatening deterioration of the following conditions:  CNS failure or compromise    Critical care was time spent personally by me on the following activities:  Obtaining history from patient or surrogate, development of treatment plan with patient or surrogate, evaluation of patient's response to treatment, examination of patient, review of old charts, re-evaluation of patient's condition, ordering and review of radiographic studies and ordering and review of laboratory studies    I assumed direction of critical care for this patient from another provider in my specialty: no           Marcio Bellamy MD  07/13/19 7528

## 2019-07-13 NOTE — ASSESSMENT & PLAN NOTE
Mg of 1 1 on admission  - secondary to alcohol abuse   - ED: replaced MgSO4 2mg IV x 1  - ordered Mg oxide 400 mg PO BID   - monitor daily Mg level

## 2019-07-13 NOTE — PLAN OF CARE
Problem: NEUROSENSORY - ADULT  Goal: Absence of seizures  Description  INTERVENTIONS  - Monitor for seizure activity  - Administer anti-seizure medications as ordered  - Monitor neurological status  Outcome: Progressing     Problem: CARDIOVASCULAR - ADULT  Goal: Maintains optimal cardiac output and hemodynamic stability  Description  INTERVENTIONS:  - Monitor I/O, vital signs and rhythm  - Monitor for S/S and trends of decreased cardiac output i e  bleeding, hypotension  - Administer and titrate ordered vasoactive medications to optimize hemodynamic stability  - Assess quality of pulses, skin color and temperature  - Assess for signs of decreased coronary artery perfusion - ex   Angina  - Instruct patient to report change in severity of symptoms  Outcome: Progressing     Problem: METABOLIC, FLUID AND ELECTROLYTES - ADULT  Goal: Electrolytes maintained within normal limits  Description  INTERVENTIONS:  - Monitor labs and assess patient for signs and symptoms of electrolyte imbalances  - Administer electrolyte replacement as ordered  - Monitor response to electrolyte replacements, including repeat lab results as appropriate  - Instruct patient on fluid and nutrition as appropriate  Outcome: Progressing  Goal: Fluid balance maintained  Description  INTERVENTIONS:  - Monitor labs and assess for signs and symptoms of volume excess or deficit  - Monitor I/O and WT  - Instruct patient on fluid and nutrition as appropriate  Outcome: Progressing  Goal: Glucose maintained within target range  Description  INTERVENTIONS:  - Monitor Blood Glucose as ordered  - Assess for signs and symptoms of hyperglycemia and hypoglycemia  - Administer ordered medications to maintain glucose within target range  - Assess nutritional intake and initiate nutrition service referral as needed  Outcome: Progressing     Problem: SKIN/TISSUE INTEGRITY - ADULT  Goal: Skin integrity remains intact  Description  INTERVENTIONS  - Identify patients at risk for skin breakdown  - Assess and monitor skin integrity  - Assess and monitor nutrition and hydration status  - Monitor labs (i e  albumin)  - Assess for incontinence   - Turn and reposition patient  - Assist with mobility/ambulation  - Relieve pressure over bony prominences  - Avoid friction and shearing  - Provide appropriate hygiene as needed including keeping skin clean and dry  - Evaluate need for skin moisturizer/barrier cream  - Collaborate with interdisciplinary team (i e  Nutrition, Rehabilitation, etc )   - Patient/family teaching  Outcome: Progressing

## 2019-07-13 NOTE — ASSESSMENT & PLAN NOTE
K+ of 3 2 on admission  - ED: replaced KCl 40 mEq PO x 1  - ordered KCl 20 mEq PO daily, monitor K+, replace as needed

## 2019-07-13 NOTE — ASSESSMENT & PLAN NOTE
Patient presents with initial stage of withdrawal symptoms: sweats, shakes, paresthesias and palpitations  - last drink was at 7/12/19 at 6 pm after drinking 2 pints of vodka  - elevated Ethanol level of 54  - placed on CIWA protocol  - TSH WNL, check B12 level  - LFT WNL   - given thiamine and folic acid at the ED  - troponin negative, EKG WNL  - leukocytosis with WBC 12 66 on admission, obtain UA, patient afebrile at this time  - continue multivitamin-minerals supplement with Centrum   - IVF with NS @ 125 cc/hr  - Thiamine 357 mg and Folic acid 1 mg IVPB

## 2019-07-13 NOTE — ASSESSMENT & PLAN NOTE
Continue home medication metoprolol 25 mg PO daily, current BP stable at 149/70 mmHg  - continue BP monitoring

## 2019-07-13 NOTE — ED PROCEDURE NOTE
PROCEDURE  ECG 12 Lead Documentation Only  Date/Time: 7/13/2019 6:39 AM  Performed by: Eleuterio Love MD  Authorized by: Eleuterio Love MD     Indications / Diagnosis:  71  ECG reviewed by me, the ED Provider: yes (Interpreted by me)    Patient location:  ED  Previous ECG:     Comparison to cardiac monitor: Yes    Interpretation:     Interpretation: normal    Rate:     ECG rate:  71    ECG rate assessment: normal    Rhythm:     Rhythm: sinus rhythm    Ectopy:     Ectopy: none    QRS:     QRS axis:  Normal  Conduction:     Conduction: normal    ST segments:     ST segments:  Normal  T waves:     T waves: normal           Eleuterio Love MD  07/13/19 6173

## 2019-07-14 LAB
ALBUMIN SERPL BCP-MCNC: 3.4 G/DL (ref 3.5–5)
ALP SERPL-CCNC: 92 U/L (ref 46–116)
ALT SERPL W P-5'-P-CCNC: 27 U/L (ref 12–78)
ANION GAP SERPL CALCULATED.3IONS-SCNC: 6 MMOL/L (ref 4–13)
AST SERPL W P-5'-P-CCNC: 20 U/L (ref 5–45)
BASOPHILS # BLD AUTO: 0.05 THOUSANDS/ΜL (ref 0–0.1)
BASOPHILS NFR BLD AUTO: 0 % (ref 0–1)
BILIRUB SERPL-MCNC: 0.9 MG/DL (ref 0.2–1)
BUN SERPL-MCNC: 10 MG/DL (ref 5–25)
CALCIUM SERPL-MCNC: 8.4 MG/DL (ref 8.3–10.1)
CHLORIDE SERPL-SCNC: 103 MMOL/L (ref 100–108)
CO2 SERPL-SCNC: 28 MMOL/L (ref 21–32)
CREAT SERPL-MCNC: 0.63 MG/DL (ref 0.6–1.3)
EOSINOPHIL # BLD AUTO: 0.15 THOUSAND/ΜL (ref 0–0.61)
EOSINOPHIL NFR BLD AUTO: 1 % (ref 0–6)
ERYTHROCYTE [DISTWIDTH] IN BLOOD BY AUTOMATED COUNT: 16.9 % (ref 11.6–15.1)
GFR SERPL CREATININE-BSD FRML MDRD: 101 ML/MIN/1.73SQ M
GLUCOSE SERPL-MCNC: 102 MG/DL (ref 65–140)
HCT VFR BLD AUTO: 40.5 % (ref 36.5–49.3)
HGB BLD-MCNC: 13 G/DL (ref 12–17)
IMM GRANULOCYTES # BLD AUTO: 0.06 THOUSAND/UL (ref 0–0.2)
IMM GRANULOCYTES NFR BLD AUTO: 0 % (ref 0–2)
LYMPHOCYTES # BLD AUTO: 1.75 THOUSANDS/ΜL (ref 0.6–4.47)
LYMPHOCYTES NFR BLD AUTO: 13 % (ref 14–44)
MAGNESIUM SERPL-MCNC: 1.6 MG/DL (ref 1.6–2.6)
MCH RBC QN AUTO: 32.3 PG (ref 26.8–34.3)
MCHC RBC AUTO-ENTMCNC: 32.1 G/DL (ref 31.4–37.4)
MCV RBC AUTO: 101 FL (ref 82–98)
MONOCYTES # BLD AUTO: 0.71 THOUSAND/ΜL (ref 0.17–1.22)
MONOCYTES NFR BLD AUTO: 5 % (ref 4–12)
NEUTROPHILS # BLD AUTO: 10.81 THOUSANDS/ΜL (ref 1.85–7.62)
NEUTS SEG NFR BLD AUTO: 81 % (ref 43–75)
NRBC BLD AUTO-RTO: 0 /100 WBCS
PHOSPHATE SERPL-MCNC: 2.6 MG/DL (ref 2.3–4.1)
PLATELET # BLD AUTO: 162 THOUSANDS/UL (ref 149–390)
PMV BLD AUTO: 9.9 FL (ref 8.9–12.7)
POTASSIUM SERPL-SCNC: 3.2 MMOL/L (ref 3.5–5.3)
PROT SERPL-MCNC: 7 G/DL (ref 6.4–8.2)
RBC # BLD AUTO: 4.02 MILLION/UL (ref 3.88–5.62)
SODIUM SERPL-SCNC: 137 MMOL/L (ref 136–145)
WBC # BLD AUTO: 13.53 THOUSAND/UL (ref 4.31–10.16)

## 2019-07-14 PROCEDURE — 83735 ASSAY OF MAGNESIUM: CPT | Performed by: STUDENT IN AN ORGANIZED HEALTH CARE EDUCATION/TRAINING PROGRAM

## 2019-07-14 PROCEDURE — 84100 ASSAY OF PHOSPHORUS: CPT | Performed by: STUDENT IN AN ORGANIZED HEALTH CARE EDUCATION/TRAINING PROGRAM

## 2019-07-14 PROCEDURE — 80053 COMPREHEN METABOLIC PANEL: CPT | Performed by: STUDENT IN AN ORGANIZED HEALTH CARE EDUCATION/TRAINING PROGRAM

## 2019-07-14 PROCEDURE — 85025 COMPLETE CBC W/AUTO DIFF WBC: CPT | Performed by: STUDENT IN AN ORGANIZED HEALTH CARE EDUCATION/TRAINING PROGRAM

## 2019-07-14 PROCEDURE — 99232 SBSQ HOSP IP/OBS MODERATE 35: CPT | Performed by: FAMILY MEDICINE

## 2019-07-14 RX ORDER — LORAZEPAM 1 MG/1
2 TABLET ORAL ONCE
Status: COMPLETED | OUTPATIENT
Start: 2019-07-14 | End: 2019-07-14

## 2019-07-14 RX ORDER — CYCLOSPORINE 0.5 MG/ML
1 EMULSION OPHTHALMIC 2 TIMES DAILY
COMMUNITY
End: 2021-10-01 | Stop reason: HOSPADM

## 2019-07-14 RX ORDER — POLYVINYL ALCOHOL 14 MG/ML
2 SOLUTION/ DROPS OPHTHALMIC
Status: DISCONTINUED | OUTPATIENT
Start: 2019-07-14 | End: 2019-07-16 | Stop reason: HOSPADM

## 2019-07-14 RX ORDER — ECHINACEA PURPUREA EXTRACT 125 MG
1 TABLET ORAL 4 TIMES DAILY PRN
Status: DISCONTINUED | OUTPATIENT
Start: 2019-07-14 | End: 2019-07-16 | Stop reason: HOSPADM

## 2019-07-14 RX ORDER — CLOBETASOL PROPIONATE 0.05 MG/G
GEL TOPICAL 2 TIMES DAILY
COMMUNITY
End: 2019-12-02

## 2019-07-14 RX ORDER — MINERAL OIL AND PETROLATUM 150; 830 MG/G; MG/G
OINTMENT OPHTHALMIC
Status: DISCONTINUED | OUTPATIENT
Start: 2019-07-14 | End: 2019-07-16 | Stop reason: HOSPADM

## 2019-07-14 RX ORDER — LABETALOL 20 MG/4 ML (5 MG/ML) INTRAVENOUS SYRINGE
5 ONCE
Status: COMPLETED | OUTPATIENT
Start: 2019-07-14 | End: 2019-07-14

## 2019-07-14 RX ADMIN — THIAMINE HYDROCHLORIDE: 100 INJECTION, SOLUTION INTRAMUSCULAR; INTRAVENOUS at 06:21

## 2019-07-14 RX ADMIN — POLYVINYL ALCOHOL 2 DROP: 14 SOLUTION/ DROPS OPHTHALMIC at 22:58

## 2019-07-14 RX ADMIN — LORAZEPAM 2 MG: 1 TABLET ORAL at 12:14

## 2019-07-14 RX ADMIN — CITALOPRAM HYDROBROMIDE 20 MG: 20 TABLET ORAL at 08:14

## 2019-07-14 RX ADMIN — CHLORDIAZEPOXIDE HYDROCHLORIDE 10 MG: 5 CAPSULE ORAL at 21:26

## 2019-07-14 RX ADMIN — CHLORDIAZEPOXIDE HYDROCHLORIDE 10 MG: 5 CAPSULE ORAL at 14:16

## 2019-07-14 RX ADMIN — SODIUM CHLORIDE 125 ML/HR: 0.9 INJECTION, SOLUTION INTRAVENOUS at 12:22

## 2019-07-14 RX ADMIN — ASPIRIN 81 MG: 81 TABLET, COATED ORAL at 08:14

## 2019-07-14 RX ADMIN — AMLODIPINE BESYLATE 5 MG: 5 TABLET ORAL at 08:14

## 2019-07-14 RX ADMIN — MINERAL OIL AND WHITE PETROLATUM: 150; 830 OINTMENT OPHTHALMIC at 21:27

## 2019-07-14 RX ADMIN — SODIUM CHLORIDE 125 ML/HR: 0.9 INJECTION, SOLUTION INTRAVENOUS at 21:11

## 2019-07-14 RX ADMIN — LORAZEPAM 2 MG: 1 TABLET ORAL at 04:43

## 2019-07-14 RX ADMIN — CHLORDIAZEPOXIDE HYDROCHLORIDE 10 MG: 5 CAPSULE ORAL at 06:21

## 2019-07-14 RX ADMIN — IBUPROFEN 600 MG: 600 TABLET ORAL at 11:58

## 2019-07-14 RX ADMIN — HYDRALAZINE HYDROCHLORIDE 5 MG: 20 INJECTION INTRAMUSCULAR; INTRAVENOUS at 23:26

## 2019-07-14 RX ADMIN — POTASSIUM CHLORIDE 20 MEQ: 1500 TABLET, EXTENDED RELEASE ORAL at 08:14

## 2019-07-14 RX ADMIN — MAGNESIUM OXIDE TAB 400 MG (241.3 MG ELEMENTAL MG) 400 MG: 400 (241.3 MG) TAB at 18:28

## 2019-07-14 RX ADMIN — SALINE NASAL SPRAY 1 SPRAY: 1.5 SOLUTION NASAL at 18:28

## 2019-07-14 RX ADMIN — LORAZEPAM 2 MG: 1 TABLET ORAL at 23:26

## 2019-07-14 RX ADMIN — MAGNESIUM OXIDE TAB 400 MG (241.3 MG ELEMENTAL MG) 400 MG: 400 (241.3 MG) TAB at 08:14

## 2019-07-14 RX ADMIN — METOPROLOL SUCCINATE 50 MG: 50 TABLET, EXTENDED RELEASE ORAL at 08:14

## 2019-07-14 RX ADMIN — ENOXAPARIN SODIUM 40 MG: 40 INJECTION SUBCUTANEOUS at 08:13

## 2019-07-14 RX ADMIN — FINASTERIDE 5 MG: 5 TABLET, FILM COATED ORAL at 08:14

## 2019-07-14 RX ADMIN — LORAZEPAM 2 MG: 1 TABLET ORAL at 19:29

## 2019-07-14 RX ADMIN — MULTIPLE VITAMINS W/ MINERALS TAB 1 TABLET: TAB at 08:14

## 2019-07-14 RX ADMIN — LABETALOL 20 MG/4 ML (5 MG/ML) INTRAVENOUS SYRINGE 5 MG: at 01:05

## 2019-07-14 RX ADMIN — SODIUM CHLORIDE 125 ML/HR: 0.9 INJECTION, SOLUTION INTRAVENOUS at 02:15

## 2019-07-14 RX ADMIN — IBUPROFEN 600 MG: 600 TABLET ORAL at 04:27

## 2019-07-14 NOTE — PROGRESS NOTES
Patient BP elevated at 181/86 at 2100, PRN 5 mg Hydralazine administered as ordered  BP only came down into the 170's  At 0100 /85, Adan Hoang PA-C notifed  One time dose of 5 mg Labetalol ordered and administered  Patient with no complaints, no signs of distress  Will continue to monitor patient

## 2019-07-14 NOTE — PROGRESS NOTES
Patient /86  Soldead Soto PA-C on radha and made aware  Verbal order to hold PRN Hydralazine for now and reassess BP at 2100 due to already receiving Lopressor, Norvasc and Ativan  Patient resting comfortably, no signs of distress  Will continue to monitor patient closely

## 2019-07-15 LAB
ATRIAL RATE: 71 BPM
P AXIS: 42 DEGREES
PR INTERVAL: 150 MS
QRS AXIS: 41 DEGREES
QRSD INTERVAL: 96 MS
QT INTERVAL: 428 MS
QTC INTERVAL: 465 MS
T WAVE AXIS: 37 DEGREES
VENTRICULAR RATE: 71 BPM

## 2019-07-15 PROCEDURE — 93010 ELECTROCARDIOGRAM REPORT: CPT | Performed by: INTERNAL MEDICINE

## 2019-07-15 PROCEDURE — 99232 SBSQ HOSP IP/OBS MODERATE 35: CPT | Performed by: FAMILY MEDICINE

## 2019-07-15 RX ORDER — LORAZEPAM 1 MG/1
2 TABLET ORAL ONCE
Status: COMPLETED | OUTPATIENT
Start: 2019-07-15 | End: 2019-07-15

## 2019-07-15 RX ADMIN — SODIUM CHLORIDE 125 ML/HR: 0.9 INJECTION, SOLUTION INTRAVENOUS at 05:14

## 2019-07-15 RX ADMIN — IBUPROFEN 600 MG: 600 TABLET ORAL at 23:08

## 2019-07-15 RX ADMIN — ASPIRIN 81 MG: 81 TABLET, COATED ORAL at 08:32

## 2019-07-15 RX ADMIN — AMLODIPINE BESYLATE 5 MG: 5 TABLET ORAL at 08:30

## 2019-07-15 RX ADMIN — MINERAL OIL AND WHITE PETROLATUM: 150; 830 OINTMENT OPHTHALMIC at 21:12

## 2019-07-15 RX ADMIN — THIAMINE HYDROCHLORIDE: 100 INJECTION, SOLUTION INTRAMUSCULAR; INTRAVENOUS at 05:14

## 2019-07-15 RX ADMIN — FINASTERIDE 5 MG: 5 TABLET, FILM COATED ORAL at 08:32

## 2019-07-15 RX ADMIN — MAGNESIUM OXIDE TAB 400 MG (241.3 MG ELEMENTAL MG) 400 MG: 400 (241.3 MG) TAB at 08:32

## 2019-07-15 RX ADMIN — CITALOPRAM HYDROBROMIDE 20 MG: 20 TABLET ORAL at 08:30

## 2019-07-15 RX ADMIN — CHLORDIAZEPOXIDE HYDROCHLORIDE 10 MG: 5 CAPSULE ORAL at 06:08

## 2019-07-15 RX ADMIN — LORAZEPAM 2 MG: 1 TABLET ORAL at 04:55

## 2019-07-15 RX ADMIN — LORAZEPAM 2 MG: 1 TABLET ORAL at 19:19

## 2019-07-15 RX ADMIN — CHLORDIAZEPOXIDE HYDROCHLORIDE 10 MG: 5 CAPSULE ORAL at 21:11

## 2019-07-15 RX ADMIN — MULTIPLE VITAMINS W/ MINERALS TAB 1 TABLET: TAB at 08:30

## 2019-07-15 RX ADMIN — ENOXAPARIN SODIUM 40 MG: 40 INJECTION SUBCUTANEOUS at 08:30

## 2019-07-15 RX ADMIN — MAGNESIUM OXIDE TAB 400 MG (241.3 MG ELEMENTAL MG) 400 MG: 400 (241.3 MG) TAB at 19:00

## 2019-07-15 RX ADMIN — CHLORDIAZEPOXIDE HYDROCHLORIDE 10 MG: 5 CAPSULE ORAL at 14:00

## 2019-07-15 RX ADMIN — METOPROLOL SUCCINATE 50 MG: 50 TABLET, EXTENDED RELEASE ORAL at 08:31

## 2019-07-15 RX ADMIN — POTASSIUM CHLORIDE 20 MEQ: 1500 TABLET, EXTENDED RELEASE ORAL at 08:32

## 2019-07-15 NOTE — NURSING NOTE
Patient ambulating in hallway with assistance of CNA and walker ambulated 300 feet, without difficulty

## 2019-07-15 NOTE — SOCIAL WORK
Visit patient in his hospital room to initiate discharge planning  Patient lives in 2 story home with bathroom both floors, 2 steps to enter, with his spouse Anita Johnson  States he is independent with ambulation but does have a walker and cane at home  Uses [facility] on [address] [facility] [address] [facility] in [facility] and denies any problem obtaining his meds  CM reviewed d/c planning process including the following: identifying help at home, patient preference for d/c planning needs, availability of treatment team to discuss questions or concerns patient and/or family may have regarding understanding medications and recognizing signs and symptoms once discharged  CM also encouraged patient to follow up with all recommended appointments after discharge  Patient advised of importance for patient and family to participate in managing patients medical well being

## 2019-07-15 NOTE — ASSESSMENT & PLAN NOTE
Hypertension of unclear etiology  At least some part is secondary to long-term alcohol abuse, but patient may have additional causes  Will continue to adjust antihypertensive medications but anticipate some normalization of blood pressure with resolution of alcohol withdrawal   Blood pressure improved with increase in metoprolol dose and addition of amlodipine, but still moderately elevated  Will continue to adjust medications as needed

## 2019-07-15 NOTE — ASSESSMENT & PLAN NOTE
Long-term alcohol abuse  Will  patient throughout admission regarding importance of cessation and rehabilitation

## 2019-07-15 NOTE — ASSESSMENT & PLAN NOTE
Hypomagnesemia secondary to alcohol abuse and associated nutritional deficiency  Continue supplementation as needed

## 2019-07-15 NOTE — NURSING NOTE
RN rounds with Dr Judsno Levy of [facility] reviewed and updated changes noted   Patient agreeing at this time to stay one more night

## 2019-07-15 NOTE — PROGRESS NOTES
Progress Note - Sol Palomares 1950, 71 y o  male MRN: [id number]    Unit/Bed#:  Encounter: [id number]    Primary Care Provider: Caty Harris MD   Date and time admitted to hospital: 7/13/2019  5:17 AM        Hypomagnesemia  Assessment & Plan  Hypomagnesemia secondary to alcohol abuse and associated nutritional deficiency  Continue supplementation as needed  Hypokalemia  Assessment & Plan  Hypokalemia due to alcohol abuse and associated nutritional deficiency  Continue supplementation as needed  Alcohol abuse  Assessment & Plan  Long-term alcohol abuse  Will  patient throughout admission regarding importance of cessation and rehabilitation  Ataxia  Assessment & Plan  Chronic stable condition secondary to long-term alcohol abuse  Consult PT/OT  Hypertension  Assessment & Plan  Hypertension of unclear etiology  At least some part is secondary to long-term alcohol abuse, but patient may have additional causes  Will continue to adjust antihypertensive medications but anticipate some normalization of blood pressure with resolution of alcohol withdrawal   Blood pressure improved with increase in metoprolol dose and addition of amlodipine, but still moderately elevated  Will continue to adjust medications as needed  * [facility] withdrawal [facility] )  Assessment & Plan  Patient admitted for alcohol withdrawal, with sweats, tremor, and palpitations  Patient reports last drink was 2 pints of vodka finished at approximately 6 PM on July 12  He has history of alcohol withdrawal but denies history of seizure or hallucinations  Ethanol level at presentation 54  CIWA protocol  Based on patient's history and presentation, will also treat with scheduled librium until CIWA score begins to decline, then transition to PRN ativan only  Continue vitamin and electrolyte supplementation  Patient eating/drinking well; will decrease IV fluids            VTE Pharmacologic Prophylaxis: Pharmacologic: Enoxaparin (Lovenox)  Mechanical VTE Prophylaxis in Place: Yes    Patient Centered Rounds: I have performed bedside rounds with nursing staff today  Discussions with Specialists or Other Care Team Provider: None    Education and Discussions with Family / Patient: Greater than 25 minutes spent with this patient discussing diagnosis, prognosis, and plan of care  Time Spent for Care: 45 minutes  More than 50% of total time spent on counseling and coordination of care as described above  Current Length of Stay: 1 day(s)    Current Patient Status: Inpatient   Certification Statement: The patient will continue to require additional inpatient hospital stay due to alcohol withdrawal    Discharge Plan: Pending resolution of alcohol withdrawal    Code Status: Level 1 - Full Code      Subjective:   Patient reports improvement in tremor and resolution of sweats since admission  No new complaints, including fever, headache, chest pain, palpitations, diarrhea, or hallucinations  Objective:     Vitals:   Temp (24hrs), Av 7 °F (37 1 °C), Min:98 °F (36 7 °C), Max:99 4 °F (37 4 °C)    Temp:  [98 °F (36 7 °C)-99 4 °F (37 4 °C)] 99 4 °F (37 4 °C)  HR:  [64-80] 75  Resp:  [14-29] 27  BP: (146-181)/() 168/81  SpO2:  [92 %-98 %] 98 %  Body mass index is 32 77 kg/m²  Input and Output Summary (last 24 hours): Intake/Output Summary (Last 24 hours) at 2019 9191  Last data filed at 2019 2217  Gross per 24 hour   Intake 5090 ml   Output 2375 ml   Net 2715 ml       Physical Exam:     Physical Exam  Physical Exam   Constitutional: Patient is oriented to person, place, and time  Appears well-developed and well-nourished  No distress  HENT:   Head: Normocephalic and atraumatic  Eyes: Pupils are equal, round, and reactive to light  EOM are normal  No scleral icterus  Neck: Neck supple  No JVD present     Cardiovascular: Normal rate, regular rhythm, normal heart sounds and intact distal pulses  Pulmonary/Chest: Effort normal and breath sounds normal  No stridor  No respiratory distress  No wheezes, no rales, and no chest wall tenderness  Abdominal: Soft  Bowel sounds are normal  No abdominal distension and no mass  There is no tenderness  There is no rebound and no guarding  Musculoskeletal: No edema, tenderness or deformity  Neurological: Patient is alert and oriented to person, place, and time  He has stable mild tremor  Skin: Skin is warm and dry  Capillary refill takes less than 2 seconds  No rash noted  Not diaphoretic  No erythema  No pallor  Psychiatric: Patient has a normal mood and affect  Behavior is normal  Judgment and thought content normal    Nursing note and vitals reviewed  Additional Data:     Labs:    Results from last 7 days   Lab Units 07/14/19  0437   WBC Thousand/uL 13 53*   HEMOGLOBIN g/dL 13 0   HEMATOCRIT % 40 5   PLATELETS Thousands/uL 162   NEUTROS PCT % 81*   LYMPHS PCT % 13*   MONOS PCT % 5   EOS PCT % 1     Results from last 7 days   Lab Units 07/14/19  0437   SODIUM mmol/L 137   POTASSIUM mmol/L 3 2*   CHLORIDE mmol/L 103   CO2 mmol/L 28   BUN mg/dL 10   CREATININE mg/dL 0 63   ANION GAP mmol/L 6   CALCIUM mg/dL 8 4   ALBUMIN g/dL 3 4*   TOTAL BILIRUBIN mg/dL 0 90   ALK PHOS U/L 92   ALT U/L 27   AST U/L 20   GLUCOSE RANDOM mg/dL 102                           * I Have Reviewed All Lab Data Listed Above  * Additional Pertinent Lab Tests Reviewed:  [address] Admission Reviewed    Imaging:    Imaging Reports Reviewed Today Include: None  Imaging Personally Reviewed by Myself Includes:  None    Recent Cultures (last 7 days):           Last 24 Hours Medication List:     Current Facility-Administered Medications:  amLODIPine 5 mg Oral Daily Fernando Vicente MD    artificial tear  Both Eyes  Fernando Vicente MD    aspirin 81 mg Oral Daily Fernando Vicente MD    chlordiazePOXIDE 10 mg Oral [facility] Fernando Vicente MD    citalopram 20 mg Oral Daily Sil Johansen MD    enoxaparin 40 mg Subcutaneous Daily Sil Johnasen MD    finasteride 5 mg Oral Daily Sil Johansen MD    folic acid 1 mg, thiamine 100 mg in 0 9% sodium chloride 100 mL IVPB  Intravenous Daily Sil Johansen MD    hydrALAZINE 5 mg Intravenous Q6H PRN Sli Johansen MD    ibuprofen 600 mg Oral Q8H PRN Sil Johansen MD    magnesium oxide 400 mg Oral BID Sil Johansen MD    metoprolol succinate 50 mg Oral Daily Sil Johansen MD    multivitamin-minerals 1 tablet Oral Daily Sil Johansen MD    polyvinyl alcohol 2 drop Both Eyes Q3H PRN Sil Johansen MD    potassium chloride 20 mEq Oral Daily Sil Johansen MD    sodium chloride 1 spray Each Nare 4x Daily PRN Ray Hernandez MD    sodium chloride 125 mL/hr Intravenous Continuous Sil Johansen MD Last Rate: 125 mL/hr (07/14/19 2111)        Today, Patient Was Seen By: Sil Johansen MD    ** Please Note: Dictation voice to text software may have been used in the creation of this document   **

## 2019-07-15 NOTE — ASSESSMENT & PLAN NOTE
Patient admitted for alcohol withdrawal, with sweats, tremor, and palpitations  Patient reports last drink was 2 pints of vodka finished at approximately 6 PM on July 12  He has history of alcohol withdrawal but denies history of seizure or hallucinations  Ethanol level at presentation 54  CIWA protocol  Based on patient's history and presentation, will also treat with scheduled librium until CIWA score begins to decline, then transition to PRN ativan only  Continue vitamin and electrolyte supplementation  Patient eating/drinking well; will decrease IV fluids

## 2019-07-16 VITALS
HEIGHT: 67 IN | TEMPERATURE: 97 F | RESPIRATION RATE: 15 BRPM | WEIGHT: 209.66 LBS | OXYGEN SATURATION: 94 % | BODY MASS INDEX: 32.91 KG/M2 | SYSTOLIC BLOOD PRESSURE: 130 MMHG | HEART RATE: 70 BPM | DIASTOLIC BLOOD PRESSURE: 78 MMHG

## 2019-07-16 PROCEDURE — 99239 HOSP IP/OBS DSCHRG MGMT >30: CPT | Performed by: FAMILY MEDICINE

## 2019-07-16 RX ORDER — CHLORDIAZEPOXIDE HYDROCHLORIDE 5 MG/1
5 CAPSULE, GELATIN COATED ORAL 2 TIMES DAILY PRN
Qty: 12 CAPSULE | Refills: 0
Start: 2019-07-16 | End: 2019-07-16 | Stop reason: SDUPTHER

## 2019-07-16 RX ORDER — CHLORDIAZEPOXIDE HYDROCHLORIDE 5 MG/1
5 CAPSULE, GELATIN COATED ORAL 2 TIMES DAILY PRN
Qty: 12 CAPSULE | Refills: 0 | Status: SHIPPED | OUTPATIENT
Start: 2019-07-16 | End: 2019-12-02

## 2019-07-16 RX ORDER — AMLODIPINE BESYLATE 5 MG/1
5 TABLET ORAL DAILY
Qty: 30 TABLET | Refills: 0 | Status: SHIPPED | OUTPATIENT
Start: 2019-07-16

## 2019-07-16 RX ADMIN — AMLODIPINE BESYLATE 5 MG: 5 TABLET ORAL at 09:52

## 2019-07-16 RX ADMIN — FINASTERIDE 5 MG: 5 TABLET, FILM COATED ORAL at 09:53

## 2019-07-16 RX ADMIN — ENOXAPARIN SODIUM 40 MG: 40 INJECTION SUBCUTANEOUS at 09:53

## 2019-07-16 RX ADMIN — POTASSIUM CHLORIDE 20 MEQ: 1500 TABLET, EXTENDED RELEASE ORAL at 09:55

## 2019-07-16 RX ADMIN — ASPIRIN 81 MG: 81 TABLET, COATED ORAL at 09:49

## 2019-07-16 RX ADMIN — MAGNESIUM OXIDE TAB 400 MG (241.3 MG ELEMENTAL MG) 400 MG: 400 (241.3 MG) TAB at 09:50

## 2019-07-16 RX ADMIN — CHLORDIAZEPOXIDE HYDROCHLORIDE 10 MG: 5 CAPSULE ORAL at 05:38

## 2019-07-16 RX ADMIN — MULTIPLE VITAMINS W/ MINERALS TAB 1 TABLET: TAB at 09:54

## 2019-07-16 RX ADMIN — CITALOPRAM HYDROBROMIDE 20 MG: 20 TABLET ORAL at 09:52

## 2019-07-16 RX ADMIN — THIAMINE HYDROCHLORIDE: 100 INJECTION, SOLUTION INTRAMUSCULAR; INTRAVENOUS at 05:31

## 2019-07-16 RX ADMIN — METOPROLOL SUCCINATE 50 MG: 50 TABLET, EXTENDED RELEASE ORAL at 09:50

## 2019-07-16 NOTE — PROGRESS NOTES
Progress Note - Guadalupe Shaw 1950, 71 y o  male MRN: [id number]    Unit/Bed#: [facility] Encounter: [id number]    Primary Care Provider: Kallie Gill MD   Date and time admitted to hospital: 7/13/2019  5:17 AM        * Alcohol withdrawal [facility])  Assessment & Plan  Patient admitted for alcohol withdrawal, with sweats, tremor, and palpitations  Patient reports last drink was 2 pints of vodka finished at approximately 6 PM on July 12  He has history of alcohol withdrawal but denies history of seizure or hallucinations  Ethanol level at presentation 54  CIWA protocol  Based on patient's history and presentation, will also treat with scheduled librium until CIWA score begins to decline, then transition to PRN ativan only  Continue vitamin and electrolyte supplementation  Patient eating/drinking well; will decrease IV fluids  Hypomagnesemia  Assessment & Plan  Hypomagnesemia secondary to alcohol abuse and associated nutritional deficiency  Continue supplementation as needed  Hypokalemia  Assessment & Plan  Hypokalemia due to alcohol abuse and associated nutritional deficiency  Continue supplementation as needed  Alcohol abuse  Assessment & Plan  Long-term alcohol abuse  Will  patient throughout admission regarding importance of cessation and rehabilitation  Ataxia  Assessment & Plan  Chronic stable condition secondary to long-term alcohol abuse  Consult PT/OT  Hypertension  Assessment & Plan  Hypertension of unclear etiology  At least some part is secondary to long-term alcohol abuse, but patient may have additional causes  Will continue to adjust antihypertensive medications but anticipate some normalization of blood pressure with resolution of alcohol withdrawal   Blood pressure improved with increase in metoprolol dose and addition of amlodipine, but still moderately elevated  Will continue to adjust medications as needed            VTE Pharmacologic Prophylaxis: Pharmacologic: Enoxaparin (Lovenox)  Mechanical VTE Prophylaxis in Place: Yes    Patient Centered Rounds: I have performed bedside rounds with nursing staff today  Discussions with Specialists or Other Care Team Provider: None    Education and Discussions with Family / Patient: Greater than 15 minutes spent with this patient discussing diagnosis, prognosis, and plan of care  Time Spent for Care: 30 minutes  More than 50% of total time spent on counseling and coordination of care as described above  Current Length of Stay: 3 day(s)    Current Patient Status: Inpatient   Certification Statement: The patient will continue to require additional inpatient hospital stay due to alcohol withdrawal    Discharge Plan: Pending clinical course    Code Status: Level 1 - Full Code      Subjective:   Patient reports feeling good overall  Tolerating oral intake  Mild tremor continues intermittently  No new complaints  Objective:     Vitals:   Temp (24hrs), Av 7 °F (37 1 °C), Min:97 8 °F (36 6 °C), Max:100 1 °F (37 8 °C)    Temp:  [97 8 °F (36 6 °C)-100 1 °F (37 8 °C)] 97 8 °F (36 6 °C)  HR:  [58-89] 58  Resp:  [15-49] 15  BP: (126-175)/(59-99) 152/72  SpO2:  [93 %-97 %] 93 %  Body mass index is 32 84 kg/m²  Input and Output Summary (last 24 hours): Intake/Output Summary (Last 24 hours) at 2019 0659  Last data filed at 2019 0531  Gross per 24 hour   Intake 2080 ml   Output 2125 ml   Net -45 ml       Physical Exam:     Physical Exam  Physical Exam   Constitutional: Patient is oriented to person, place, and time  Appears well-developed and well-nourished  No distress  HENT:   Head: Normocephalic and atraumatic  Eyes: Pupils are equal, round, and reactive to light  EOM are normal  No scleral icterus  Neck: Neck supple  No JVD present  Cardiovascular: Normal rate, regular rhythm, normal heart sounds and intact distal pulses     Pulmonary/Chest: Effort normal and breath sounds normal  No stridor  No respiratory distress  No wheezes, no rales, and no chest wall tenderness  Abdominal: Soft  Bowel sounds are normal  No abdominal distension and no mass  There is no tenderness  There is no rebound and no guarding  Musculoskeletal: No edema, tenderness or deformity  Neurological: Patient is alert and oriented to person, place, and time  Skin: Skin is warm and dry  Capillary refill takes less than 2 seconds  No rash noted  Not diaphoretic  No erythema  No pallor  Psychiatric: Patient has a normal mood and affect  Behavior is normal  Judgment and thought content normal    Nursing note and vitals reviewed  Additional Data:     Labs:    Results from last 7 days   Lab Units 07/14/19  0437   WBC Thousand/uL 13 53*   HEMOGLOBIN g/dL 13 0   HEMATOCRIT % 40 5   PLATELETS Thousands/uL 162   NEUTROS PCT % 81*   LYMPHS PCT % 13*   MONOS PCT % 5   EOS PCT % 1     Results from last 7 days   Lab Units 07/14/19  0437   SODIUM mmol/L 137   POTASSIUM mmol/L 3 2*   CHLORIDE mmol/L 103   CO2 mmol/L 28   BUN mg/dL 10   CREATININE mg/dL 0 63   ANION GAP mmol/L 6   CALCIUM mg/dL 8 4   ALBUMIN g/dL 3 4*   TOTAL BILIRUBIN mg/dL 0 90   ALK PHOS U/L 92   ALT U/L 27   AST U/L 20   GLUCOSE RANDOM mg/dL 102                           * I Have Reviewed All Lab Data Listed Above  * Additional Pertinent Lab Tests Reviewed:  [address] Admission Reviewed    Imaging:    Imaging Reports Reviewed Today Include: None  Imaging Personally Reviewed by Myself Includes:  None    Recent Cultures (last 7 days):           Last 24 Hours Medication List:     Current Facility-Administered Medications:  amLODIPine 5 mg Oral Daily Dre Berumen MD    artificial tear  Both Eyes HS Dre Berumen MD    aspirin 81 mg Oral Daily Dre Berumen MD    chlordiazePOXIDE 10 mg Oral [facility] Dre Berumen MD    citalopram 20 mg Oral Daily Dre Berumen MD    enoxaparin 40 mg Subcutaneous Daily Sinai Berg MD    finasteride 5 mg Oral Daily Sinai Berg MD    folic acid 1 mg, thiamine 100 mg in 0 9% sodium chloride 100 mL IVPB  Intravenous Daily Sinai Berg MD Last Rate: 100 mL/hr at 07/15/19 0514   hydrALAZINE 5 mg Intravenous Q6H PRN Sinai Berg MD    ibuprofen 600 mg Oral Q8H PRN Sinai Berg MD    magnesium oxide 400 mg Oral BID Sinai Berg MD    metoprolol succinate 50 mg Oral Daily Sinai Berg MD    multivitamin-minerals 1 tablet Oral Daily Sinai Berg MD    polyvinyl alcohol 2 drop Both Eyes Q3H PRN Sinai Berg MD    potassium chloride 20 mEq Oral Daily Sinai Berg MD    sodium chloride 1 spray Each Nare 4x Daily PRN Jeff Brooks MD         Today, Patient Was Seen By: Sinai Berg MD    ** Please Note: Dictation voice to text software may have been used in the creation of this document   **

## 2019-07-16 NOTE — DISCHARGE SUMMARY
Discharge Summary - Ariane Khan 71 y o  male MRN: [id number]    Unit/Bed#:  Encounter: [id number]    Admission Date:   Admission Orders (From admission, onward)    Ordered        07/13/19 0629  Inpatient Admission  Once               Admitting Diagnosis: Alcohol withdrawal ([address] ) [F10 239]  Hypokalemia [E87 6]  Hypomagnesemia [E83 42]  Alcohol abuse [F10 10]  Weakness [R53 1]    HPI: Ariane Khan is a 71 y o  male with PMHx of HTN and uric acid kidney stone who presents with generalized weakness and shakiness since last night  Patient admits to drinking 2 eight-ounce bottle of vodka yesterday, last drink around 6pm and around 11pm he had anxiety attack feel like he was "gonna die"  At the ED course patient received total of 8 mg of Ativan, Thiamine 579 mg, folic acid 1 mg and IVF 1L bolus  Patient reports increasing hand tremors, with tingling sensation in fingertips  Patient also reports palpitations, jitters, and sweating  Patient denied nausea, vomiting, headache, abdominal pain, chills, fevers  Patient had admission in the past February 2019 for delirium tremens  Patient states he drinks at least 4 days a week  He attends AA meeting three times a week in [facility]  Procedures Performed:   Orders Placed This Encounter   Procedures   [facility] ED ECG Documentation Only       Summary of Hospital Course:     Alcohol abuse:  Patient admitted to the medical floor, started on CIWA protocol, PT/OT, and initiated on a multivitamin  He was initiated on Librium 10 mg p o  T i d  And will be discharged on Librium 5 mg p o  B i d  As needed  The patient expressed interest and alcohol cessation and I spoke with him in detail prior to discharge  i specifically explained he should not drink while taking Librium    The patient was agreeable and stated he was previously in a 12 step program   He states he will not consume alcohol once he goes home and will follow up with his PCP as instructed      Essential hypertension:  Metoprolol was continued, Norvasc was initiated, blood pressure was well controlled prior to discharge    Significant Findings, Care, Treatment and Services Provided:     Complications: none    Discharge Diagnosis: see above    Resolved Problems  Date Reviewed: 7/16/2019    None          Condition at Discharge: fair         Discharge instructions/Information to patient and family:   See after visit summary for information provided to patient and family  Provisions for Follow-Up Care:  See after visit summary for information related to follow-up care and any pertinent home health orders  PCP: Parth Hernandez MD    Disposition: Home    Planned Readmission: No    Discharge Statement   I spent 40 minutes discharging the patient  This time was spent on the day of discharge  I had direct contact with the patient on the day of discharge  Additional documentation is required if more than 30 minutes were spent on discharge  Discharge Medications:  See after visit summary for reconciled discharge medications provided to patient and family

## 2019-07-16 NOTE — SOCIAL WORK
Discussed with patient preferences on discharge;understanding how to manage health at home; purpose of taking medications; importance of follow up care/appointments; and symptoms to watch out for once discharged home  Pt is being dc'd home on this date  Pt inquiring about a taxi, pt aware of the out of pocket cost and states he has cash and a credit card at home  [address] taxi will not take pt home unless he has cash or credit card on hand here  Pt states his wife is available after 4:30pm to take him home

## 2019-07-16 NOTE — DISCHARGE INSTRUCTIONS
Abuse of Alcohol   WHAT YOU NEED TO KNOW:   · Alcohol abuse is unhealthy drinking behavior  You may drink too much at one time once a week, or continue to drink too much daily  You continue to drink even though it causes problems  The problems can be alcohol related legal problems, or problems with work or relationships with family  · If you drink too much at one time, you are binge drinking  Binge drinking is when you have a large amount of alcohol in a short time  Your blood alcohol concentrations (CHAIM) goes above 0 08 g/dLlevel during binge drinking  For men, this usually happens with more than 4 drinks in 2 hours  For women, it is more than 3 drinks in 2 hours  A drink is 12 ounces of beer, 4 ounces of wine, or 1½ ounces of liquor  DISCHARGE INSTRUCTIONS:   Call 911 for any of the following:   · You have sudden chest pain or trouble breathing  · You have a seizure or have shaking or trembling  · You were in an accident because of alcohol  Return to the emergency department if:   · You want to harm yourself or others  · You have hallucinations (you see or hear things that are not real)  · You cannot stop vomiting or you vomit blood  Contact your healthcare provider if:   · You need help to stop drinking alcohol  · You have questions or concerns about your condition or care  Medicines:   · Vitamin supplements  may be given to treat low vitamin levels  Alcohol can make it hard for your body to absorb enough vitamins such as B1  Vitamin supplements may also be given to prevent alcohol related brain damage  · Take your medicine as directed  Contact your healthcare provider if you think your medicine is not helping or if you have side effects  Tell him or her if you are allergic to any medicine  Keep a list of the medicines, vitamins, and herbs you take  Include the amounts, and when and why you take them  Bring the list or the pill bottles to follow-up visits   Carry your medicine list with you in case of an emergency  Treatments or therapies you may need:   · Detoxification (detox) and withdrawal  is a program that helps you to safely get alcohol out of your body  Detox can also help get rid of the physical need to drink  Healthcare providers monitor the physical symptoms of withdrawal  They may give you medicines to help decrease nausea, dehydration, and seizures  Healthcare providers will also monitor your blood pressure, heart and breathing rates, and your temperature  Symptoms of anxiety, depression, and suicidal thoughts are also monitored and managed during detox  Healthcare providers may give you medicines for these symptoms and therapy sessions will be available to you  Detox is usually done at a detox center or in a hospital  Healthcare providers do not recommend that you try to detox at home or by yourself  Withdrawal symptoms may become life-threatening  The center can help you find 12 step programs or an individual therapist to help with emotional support after detox  · Inpatient and outpatient treatment  focus on your personal needs to help you stop drinking  Treatment helps you understand the reasons you abuse alcohol  Counselors and therapists provide you with support and help you find ways to cope instead of drinking  You may need inpatient treatment to provide a controlled environment  You may need outpatient treatment after your inpatient treatment is complete  · Alcohol aversion therapy  takes away the desire to drink by causing a negative reaction when you drink  Healthcare providers may give you medicines that cause nausea and vomiting when you drink alcohol  They may instead give you a medicine that decreases your urge to drink alcohol  These medicines are used to help you stop drinking or reduce the amount you drink  They can also help you avoid relapse    Follow up with your healthcare provider as directed:  Write down your questions so you remember to ask them during your visits  Avoid alcohol:  You should stop drinking entirely  Alcohol can damage your brain, heart, and liver  It also increases your risk for injury, high blood pressure, and certain types of cancer  Alcohol is dangerous when you combine it with certain medicines  Do not drive if you have had alcohol:  Make sure someone who has not been drinking can help you get home  Get support:  Most people need support to stop drinking alcohol  Mental health providers, support groups, rehabilitation centers, and your healthcare provider can provide support  For more information:   · Alcoholics Anonymous  Web Address: http://[employer]/  · Substance Abuse and [address] , [address] [place]  Web Address: https://[employer]/  © 2017 [address] [employer] Information is for End User's use only and may not be sold, redistributed or otherwise used for commercial purposes  All illustrations and images included in CareNotes® are the copyrighted property of [employer]  or [employer]  The above information is an  only  It is not intended as medical advice for individual conditions or treatments  Talk to your doctor, nurse or pharmacist before following any medical regimen to see if it is safe and effective for you

## 2019-07-16 NOTE — ASSESSMENT & PLAN NOTE
Hypokalemia due to alcohol abuse and associated nutritional deficiency  Continue supplementation as needed

## 2019-07-17 NOTE — NURSING NOTE
Patient DC to home via wheelchair in no acute distress with his wife  Patient and his wife report understanding of D/C instructions and med list  Patient aware of need for F/U appointment with PCP in one week   Patient aware to report problems or concerns to MD

## 2019-12-02 ENCOUNTER — HOSPITAL ENCOUNTER (INPATIENT)
Facility: HOSPITAL | Age: 69
LOS: 3 days | Discharge: HOME | DRG: 897 | End: 2019-12-05
Attending: EMERGENCY MEDICINE | Admitting: INTERNAL MEDICINE
Payer: MEDICARE

## 2019-12-02 ENCOUNTER — APPOINTMENT (EMERGENCY)
Dept: RADIOLOGY | Facility: HOSPITAL | Age: 69
DRG: 897 | End: 2019-12-02
Payer: MEDICARE

## 2019-12-02 DIAGNOSIS — F10.239 ALCOHOL WITHDRAWAL (HCC): Primary | ICD-10-CM

## 2019-12-02 DIAGNOSIS — E83.42 HYPOMAGNESEMIA: ICD-10-CM

## 2019-12-02 DIAGNOSIS — E83.51 HYPOCALCEMIA: ICD-10-CM

## 2019-12-02 DIAGNOSIS — R74.8 ELEVATED LIPASE: ICD-10-CM

## 2019-12-02 DIAGNOSIS — R74.01 ELEVATED TRANSAMINASE LEVEL: ICD-10-CM

## 2019-12-02 PROBLEM — N40.0 BPH (BENIGN PROSTATIC HYPERPLASIA): Status: ACTIVE | Noted: 2019-12-02

## 2019-12-02 PROBLEM — R25.1 TREMOR OF LEFT HAND: Status: ACTIVE | Noted: 2019-12-02

## 2019-12-02 PROBLEM — R25.1 TREMOR OF LEFT HAND: Chronic | Status: ACTIVE | Noted: 2019-12-02

## 2019-12-02 LAB
ALBUMIN SERPL BCP-MCNC: 3.3 G/DL (ref 3.5–5)
ALBUMIN SERPL BCP-MCNC: 3.6 G/DL (ref 3.5–5)
ALP SERPL-CCNC: 112 U/L (ref 46–116)
ALP SERPL-CCNC: 96 U/L (ref 46–116)
ALT SERPL W P-5'-P-CCNC: 82 U/L (ref 12–78)
ALT SERPL W P-5'-P-CCNC: 96 U/L (ref 12–78)
ANION GAP SERPL CALCULATED.3IONS-SCNC: 12 MMOL/L (ref 4–13)
ANION GAP SERPL CALCULATED.3IONS-SCNC: 13 MMOL/L (ref 4–13)
APAP SERPL-MCNC: <2 UG/ML (ref 10–20)
APTT PPP: 24 SECONDS (ref 23–37)
AST SERPL W P-5'-P-CCNC: 109 U/L (ref 5–45)
AST SERPL W P-5'-P-CCNC: 75 U/L (ref 5–45)
ATRIAL RATE: 76 BPM
BACTERIA UR QL AUTO: ABNORMAL /HPF
BASOPHILS # BLD AUTO: 0.07 THOUSANDS/ΜL (ref 0–0.1)
BASOPHILS NFR BLD AUTO: 1 % (ref 0–1)
BILIRUB SERPL-MCNC: 0.5 MG/DL (ref 0.2–1)
BILIRUB SERPL-MCNC: 0.8 MG/DL (ref 0.2–1)
BILIRUB UR QL STRIP: NEGATIVE
BUN SERPL-MCNC: 13 MG/DL (ref 5–25)
BUN SERPL-MCNC: 13 MG/DL (ref 5–25)
CALCIUM SERPL-MCNC: 7.8 MG/DL (ref 8.3–10.1)
CALCIUM SERPL-MCNC: 7.9 MG/DL (ref 8.3–10.1)
CHLORIDE SERPL-SCNC: 100 MMOL/L (ref 100–108)
CHLORIDE SERPL-SCNC: 103 MMOL/L (ref 100–108)
CLARITY UR: CLEAR
CO2 SERPL-SCNC: 27 MMOL/L (ref 21–32)
CO2 SERPL-SCNC: 28 MMOL/L (ref 21–32)
COLOR UR: YELLOW
CREAT SERPL-MCNC: 0.67 MG/DL (ref 0.6–1.3)
CREAT SERPL-MCNC: 0.79 MG/DL (ref 0.6–1.3)
EOSINOPHIL # BLD AUTO: 0.12 THOUSAND/ΜL (ref 0–0.61)
EOSINOPHIL NFR BLD AUTO: 2 % (ref 0–6)
ERYTHROCYTE [DISTWIDTH] IN BLOOD BY AUTOMATED COUNT: 13.7 % (ref 11.6–15.1)
ETHANOL SERPL-MCNC: 115 MG/DL (ref 0–3)
GFR SERPL CREATININE-BSD FRML MDRD: 92 ML/MIN/1.73SQ M
GFR SERPL CREATININE-BSD FRML MDRD: 98 ML/MIN/1.73SQ M
GLUCOSE SERPL-MCNC: 81 MG/DL (ref 65–140)
GLUCOSE SERPL-MCNC: 90 MG/DL (ref 65–140)
GLUCOSE SERPL-MCNC: 91 MG/DL (ref 65–140)
GLUCOSE UR STRIP-MCNC: NEGATIVE MG/DL
HCT VFR BLD AUTO: 42.5 % (ref 36.5–49.3)
HGB BLD-MCNC: 14 G/DL (ref 12–17)
HGB UR QL STRIP.AUTO: ABNORMAL
HYALINE CASTS #/AREA URNS LPF: ABNORMAL /LPF
IMM GRANULOCYTES # BLD AUTO: 0.05 THOUSAND/UL (ref 0–0.2)
IMM GRANULOCYTES NFR BLD AUTO: 1 % (ref 0–2)
INR PPP: 0.95 (ref 0.84–1.19)
KETONES UR STRIP-MCNC: ABNORMAL MG/DL
LACTATE SERPL-SCNC: 2.1 MMOL/L (ref 0.5–2)
LACTATE SERPL-SCNC: 2.8 MMOL/L (ref 0.5–2)
LEUKOCYTE ESTERASE UR QL STRIP: NEGATIVE
LIPASE SERPL-CCNC: 795 U/L (ref 73–393)
LYMPHOCYTES # BLD AUTO: 1.31 THOUSANDS/ΜL (ref 0.6–4.47)
LYMPHOCYTES NFR BLD AUTO: 17 % (ref 14–44)
MAGNESIUM SERPL-MCNC: 1.2 MG/DL (ref 1.6–2.6)
MAGNESIUM SERPL-MCNC: 1.8 MG/DL (ref 1.6–2.6)
MCH RBC QN AUTO: 33.5 PG (ref 26.8–34.3)
MCHC RBC AUTO-ENTMCNC: 32.9 G/DL (ref 31.4–37.4)
MCV RBC AUTO: 102 FL (ref 82–98)
MONOCYTES # BLD AUTO: 0.8 THOUSAND/ΜL (ref 0.17–1.22)
MONOCYTES NFR BLD AUTO: 11 % (ref 4–12)
NEUTROPHILS # BLD AUTO: 5.21 THOUSANDS/ΜL (ref 1.85–7.62)
NEUTS SEG NFR BLD AUTO: 68 % (ref 43–75)
NITRITE UR QL STRIP: NEGATIVE
NON-SQ EPI CELLS URNS QL MICRO: ABNORMAL /HPF
NRBC BLD AUTO-RTO: 0 /100 WBCS
NT-PROBNP SERPL-MCNC: 42 PG/ML
P AXIS: 55 DEGREES
PH UR STRIP.AUTO: 5.5 [PH]
PHOSPHATE SERPL-MCNC: 3.3 MG/DL (ref 2.3–4.1)
PLATELET # BLD AUTO: 153 THOUSANDS/UL (ref 149–390)
PLATELET # BLD AUTO: 186 THOUSANDS/UL (ref 149–390)
PMV BLD AUTO: 8.9 FL (ref 8.9–12.7)
PMV BLD AUTO: 9.3 FL (ref 8.9–12.7)
POTASSIUM SERPL-SCNC: 2.8 MMOL/L (ref 3.5–5.3)
POTASSIUM SERPL-SCNC: 3.6 MMOL/L (ref 3.5–5.3)
PR INTERVAL: 158 MS
PROT SERPL-MCNC: 6.5 G/DL (ref 6.4–8.2)
PROT SERPL-MCNC: 7.3 G/DL (ref 6.4–8.2)
PROT UR STRIP-MCNC: ABNORMAL MG/DL
PROTHROMBIN TIME: 12.7 SECONDS (ref 11.6–14.5)
QRS AXIS: 52 DEGREES
QRSD INTERVAL: 98 MS
QT INTERVAL: 426 MS
QTC INTERVAL: 479 MS
RBC # BLD AUTO: 4.18 MILLION/UL (ref 3.88–5.62)
RBC #/AREA URNS AUTO: ABNORMAL /HPF
SALICYLATES SERPL-MCNC: <3 MG/DL (ref 3–20)
SODIUM SERPL-SCNC: 140 MMOL/L (ref 136–145)
SODIUM SERPL-SCNC: 143 MMOL/L (ref 136–145)
SP GR UR STRIP.AUTO: 1.02 (ref 1–1.03)
T WAVE AXIS: 41 DEGREES
TROPONIN I SERPL-MCNC: <0.02 NG/ML
UROBILINOGEN UR QL STRIP.AUTO: 0.2 E.U./DL
VENTRICULAR RATE: 76 BPM
WBC # BLD AUTO: 7.56 THOUSAND/UL (ref 4.31–10.16)
WBC #/AREA URNS AUTO: ABNORMAL /HPF

## 2019-12-02 PROCEDURE — 83605 ASSAY OF LACTIC ACID: CPT | Performed by: EMERGENCY MEDICINE

## 2019-12-02 PROCEDURE — 80320 DRUG SCREEN QUANTALCOHOLS: CPT | Performed by: EMERGENCY MEDICINE

## 2019-12-02 PROCEDURE — 99292 CRITICAL CARE ADDL 30 MIN: CPT | Performed by: EMERGENCY MEDICINE

## 2019-12-02 PROCEDURE — 96365 THER/PROPH/DIAG IV INF INIT: CPT

## 2019-12-02 PROCEDURE — 71046 X-RAY EXAM CHEST 2 VIEWS: CPT

## 2019-12-02 PROCEDURE — 80053 COMPREHEN METABOLIC PANEL: CPT | Performed by: EMERGENCY MEDICINE

## 2019-12-02 PROCEDURE — 85610 PROTHROMBIN TIME: CPT | Performed by: EMERGENCY MEDICINE

## 2019-12-02 PROCEDURE — 84100 ASSAY OF PHOSPHORUS: CPT | Performed by: EMERGENCY MEDICINE

## 2019-12-02 PROCEDURE — G0425 INPT/ED TELECONSULT30: HCPCS | Performed by: EMERGENCY MEDICINE

## 2019-12-02 PROCEDURE — 80329 ANALGESICS NON-OPIOID 1 OR 2: CPT | Performed by: EMERGENCY MEDICINE

## 2019-12-02 PROCEDURE — 80053 COMPREHEN METABOLIC PANEL: CPT | Performed by: STUDENT IN AN ORGANIZED HEALTH CARE EDUCATION/TRAINING PROGRAM

## 2019-12-02 PROCEDURE — 93010 ELECTROCARDIOGRAM REPORT: CPT | Performed by: INTERNAL MEDICINE

## 2019-12-02 PROCEDURE — 96376 TX/PRO/DX INJ SAME DRUG ADON: CPT

## 2019-12-02 PROCEDURE — 83880 ASSAY OF NATRIURETIC PEPTIDE: CPT | Performed by: EMERGENCY MEDICINE

## 2019-12-02 PROCEDURE — 84484 ASSAY OF TROPONIN QUANT: CPT | Performed by: EMERGENCY MEDICINE

## 2019-12-02 PROCEDURE — 85730 THROMBOPLASTIN TIME PARTIAL: CPT | Performed by: EMERGENCY MEDICINE

## 2019-12-02 PROCEDURE — 83735 ASSAY OF MAGNESIUM: CPT | Performed by: EMERGENCY MEDICINE

## 2019-12-02 PROCEDURE — 81001 URINALYSIS AUTO W/SCOPE: CPT | Performed by: EMERGENCY MEDICINE

## 2019-12-02 PROCEDURE — 93005 ELECTROCARDIOGRAM TRACING: CPT

## 2019-12-02 PROCEDURE — 85025 COMPLETE CBC W/AUTO DIFF WBC: CPT | Performed by: EMERGENCY MEDICINE

## 2019-12-02 PROCEDURE — 82948 REAGENT STRIP/BLOOD GLUCOSE: CPT

## 2019-12-02 PROCEDURE — 99222 1ST HOSP IP/OBS MODERATE 55: CPT | Performed by: INTERNAL MEDICINE

## 2019-12-02 PROCEDURE — 83690 ASSAY OF LIPASE: CPT | Performed by: EMERGENCY MEDICINE

## 2019-12-02 PROCEDURE — 96367 TX/PROPH/DG ADDL SEQ IV INF: CPT

## 2019-12-02 PROCEDURE — 96361 HYDRATE IV INFUSION ADD-ON: CPT

## 2019-12-02 PROCEDURE — 99285 EMERGENCY DEPT VISIT HI MDM: CPT

## 2019-12-02 PROCEDURE — 83735 ASSAY OF MAGNESIUM: CPT | Performed by: STUDENT IN AN ORGANIZED HEALTH CARE EDUCATION/TRAINING PROGRAM

## 2019-12-02 PROCEDURE — 36415 COLL VENOUS BLD VENIPUNCTURE: CPT | Performed by: EMERGENCY MEDICINE

## 2019-12-02 PROCEDURE — 85049 AUTOMATED PLATELET COUNT: CPT | Performed by: STUDENT IN AN ORGANIZED HEALTH CARE EDUCATION/TRAINING PROGRAM

## 2019-12-02 PROCEDURE — 96375 TX/PRO/DX INJ NEW DRUG ADDON: CPT

## 2019-12-02 PROCEDURE — 99291 CRITICAL CARE FIRST HOUR: CPT | Performed by: EMERGENCY MEDICINE

## 2019-12-02 RX ORDER — PHENOBARBITAL SODIUM 130 MG/ML
65 INJECTION INTRAMUSCULAR ONCE
Status: COMPLETED | OUTPATIENT
Start: 2019-12-02 | End: 2019-12-02

## 2019-12-02 RX ORDER — AMLODIPINE BESYLATE 5 MG/1
5 TABLET ORAL DAILY
Status: DISCONTINUED | OUTPATIENT
Start: 2019-12-02 | End: 2019-12-05 | Stop reason: HOSPADM

## 2019-12-02 RX ORDER — FOLIC ACID 1 MG/1
1 TABLET ORAL DAILY
Status: DISCONTINUED | OUTPATIENT
Start: 2019-12-02 | End: 2019-12-05 | Stop reason: HOSPADM

## 2019-12-02 RX ORDER — MAGNESIUM SULFATE HEPTAHYDRATE 40 MG/ML
2 INJECTION, SOLUTION INTRAVENOUS
Status: COMPLETED | OUTPATIENT
Start: 2019-12-02 | End: 2019-12-02

## 2019-12-02 RX ORDER — SODIUM CHLORIDE AND POTASSIUM CHLORIDE .9; .15 G/100ML; G/100ML
100 SOLUTION INTRAVENOUS CONTINUOUS
Status: DISCONTINUED | OUTPATIENT
Start: 2019-12-02 | End: 2019-12-04

## 2019-12-02 RX ORDER — DIAZEPAM 5 MG/ML
10 INJECTION, SOLUTION INTRAMUSCULAR; INTRAVENOUS ONCE
Status: COMPLETED | OUTPATIENT
Start: 2019-12-02 | End: 2019-12-02

## 2019-12-02 RX ORDER — DIAZEPAM 5 MG/ML
20 INJECTION, SOLUTION INTRAMUSCULAR; INTRAVENOUS ONCE
Status: COMPLETED | OUTPATIENT
Start: 2019-12-02 | End: 2019-12-02

## 2019-12-02 RX ORDER — THIAMINE MONONITRATE (VIT B1) 100 MG
100 TABLET ORAL DAILY
Status: DISCONTINUED | OUTPATIENT
Start: 2019-12-02 | End: 2019-12-05 | Stop reason: HOSPADM

## 2019-12-02 RX ORDER — PHENOBARBITAL SODIUM 130 MG/ML
65 INJECTION INTRAMUSCULAR ONCE
Status: COMPLETED | OUTPATIENT
Start: 2019-12-03 | End: 2019-12-03

## 2019-12-02 RX ORDER — METOPROLOL SUCCINATE 25 MG/1
25 TABLET, EXTENDED RELEASE ORAL DAILY
Status: DISCONTINUED | OUTPATIENT
Start: 2019-12-02 | End: 2019-12-05 | Stop reason: HOSPADM

## 2019-12-02 RX ORDER — FINASTERIDE 5 MG/1
5 TABLET, FILM COATED ORAL DAILY
Status: DISCONTINUED | OUTPATIENT
Start: 2019-12-02 | End: 2019-12-05 | Stop reason: HOSPADM

## 2019-12-02 RX ORDER — SODIUM CHLORIDE 9 MG/ML
3 INJECTION INTRAVENOUS AS NEEDED
Status: DISCONTINUED | OUTPATIENT
Start: 2019-12-02 | End: 2019-12-05 | Stop reason: HOSPADM

## 2019-12-02 RX ORDER — POTASSIUM CHLORIDE 20 MEQ/1
40 TABLET, EXTENDED RELEASE ORAL ONCE
Status: COMPLETED | OUTPATIENT
Start: 2019-12-02 | End: 2019-12-02

## 2019-12-02 RX ORDER — LORAZEPAM 2 MG/ML
2 INJECTION INTRAMUSCULAR ONCE
Status: COMPLETED | OUTPATIENT
Start: 2019-12-02 | End: 2019-12-02

## 2019-12-02 RX ORDER — CITALOPRAM 20 MG/1
20 TABLET ORAL DAILY
Status: DISCONTINUED | OUTPATIENT
Start: 2019-12-02 | End: 2019-12-05 | Stop reason: HOSPADM

## 2019-12-02 RX ORDER — PHENOBARBITAL SODIUM 130 MG/ML
130 INJECTION INTRAMUSCULAR ONCE
Status: COMPLETED | OUTPATIENT
Start: 2019-12-02 | End: 2019-12-02

## 2019-12-02 RX ORDER — SODIUM CHLORIDE 9 MG/ML
100 INJECTION, SOLUTION INTRAVENOUS CONTINUOUS
Status: DISCONTINUED | OUTPATIENT
Start: 2019-12-02 | End: 2019-12-02

## 2019-12-02 RX ADMIN — MAGNESIUM SULFATE HEPTAHYDRATE 2 G: 40 INJECTION, SOLUTION INTRAVENOUS at 11:46

## 2019-12-02 RX ADMIN — MULTIPLE VITAMINS W/ MINERALS TAB 1 TABLET: TAB at 15:29

## 2019-12-02 RX ADMIN — THIAMINE HYDROCHLORIDE 500 MG: 100 INJECTION, SOLUTION INTRAMUSCULAR; INTRAVENOUS at 08:00

## 2019-12-02 RX ADMIN — METOPROLOL SUCCINATE 25 MG: 25 TABLET, EXTENDED RELEASE ORAL at 15:27

## 2019-12-02 RX ADMIN — CITALOPRAM HYDROBROMIDE 20 MG: 20 TABLET ORAL at 15:28

## 2019-12-02 RX ADMIN — FINASTERIDE 5 MG: 5 TABLET, FILM COATED ORAL at 15:29

## 2019-12-02 RX ADMIN — PHENOBARBITAL SODIUM 130 MG: 130 INJECTION INTRAMUSCULAR; INTRAVENOUS at 10:58

## 2019-12-02 RX ADMIN — SODIUM CHLORIDE 100 ML/HR: 0.9 INJECTION, SOLUTION INTRAVENOUS at 09:54

## 2019-12-02 RX ADMIN — FOLIC ACID 1 MG: 1 TABLET ORAL at 15:28

## 2019-12-02 RX ADMIN — SODIUM CHLORIDE 1000 ML: 0.9 INJECTION, SOLUTION INTRAVENOUS at 06:53

## 2019-12-02 RX ADMIN — AMLODIPINE BESYLATE 5 MG: 5 TABLET ORAL at 15:29

## 2019-12-02 RX ADMIN — DIAZEPAM 20 MG: 10 INJECTION, SOLUTION INTRAMUSCULAR; INTRAVENOUS at 10:02

## 2019-12-02 RX ADMIN — ENOXAPARIN SODIUM 40 MG: 40 INJECTION SUBCUTANEOUS at 15:29

## 2019-12-02 RX ADMIN — SODIUM CHLORIDE AND POTASSIUM CHLORIDE 100 ML/HR: .9; .15 SOLUTION INTRAVENOUS at 15:37

## 2019-12-02 RX ADMIN — POTASSIUM CHLORIDE 40 MEQ: 1500 TABLET, EXTENDED RELEASE ORAL at 23:47

## 2019-12-02 RX ADMIN — LORAZEPAM 2 MG: 2 INJECTION, SOLUTION INTRAMUSCULAR; INTRAVENOUS at 06:52

## 2019-12-02 RX ADMIN — MAGNESIUM SULFATE HEPTAHYDRATE 2 G: 40 INJECTION, SOLUTION INTRAVENOUS at 09:08

## 2019-12-02 RX ADMIN — DIAZEPAM 10 MG: 5 INJECTION, SOLUTION INTRAMUSCULAR; INTRAVENOUS at 07:30

## 2019-12-02 RX ADMIN — PHENOBARBITAL SODIUM 65 MG: 130 INJECTION INTRAMUSCULAR; INTRAVENOUS at 15:27

## 2019-12-02 RX ADMIN — PHENOBARBITAL SODIUM 65 MG: 130 INJECTION INTRAMUSCULAR; INTRAVENOUS at 11:29

## 2019-12-02 RX ADMIN — Medication 100 MG: at 15:29

## 2019-12-02 NOTE — ASSESSMENT & PLAN NOTE
1 2 on admission  Likely secondary to alcohol abuse  IV mag supplementation'  Trend lytes and magnesisum

## 2019-12-02 NOTE — ED PROVIDER NOTES
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  Does not drink daily but drinks frequently, most recently the day prior on 29 November  He states that he consumed approximately five fluid oz of vodka on that day  He denies use of any other coingested medications or over-the-counter medications apart from his prescription medications  He has a prior history of alcohol withdrawal with most recent inpatient treatment in February 2019  He notes significant stressors beginning on Veteran's Day 2019, when his wife left him unexpectedly and served him with divorce papers immediately after patient's mother was hospitalized  He currently lives alone and states that this has contributed to his recent alcohol intake  Denies any suicidal ideation or plans for self-harm  He has no prior history of suicide attempts or psychiatric inpatient hospitalization  He states he has otherwise been adherent to his usual medication therapy  Denies any recent falls/head injury  Currently denies headache/neck pain/chest pain/sudden visual changes (although was recently diagnosed with macular degeneration; notes no immediate changes in his vision)/abd pain/n/v/extremity weakness/extremity paresthesias      He separately notes a several day history of increasing cough that is intermittently productive of a white phlegm with some degree of orthopnea and mild exertional dyspnea  He was noted to be hypoxemic on room air at 90% upon ED arrival for which he was placed 2 5 L nasal cannula with improvement in his saturations to 95%  He does not have any history of pulmonary disease previously and does not have an oxygen requirement normally  He denies any dyspnea at rest   He denies any chest pain, either pleuritic or exertional   Denies any unexpected changes in his weight  Denies any lower extremity edema  A/p:  Significant alcohol intake recently with early signs/symptoms of ETOH withdrawal including tremulousness and tachypnea  Check ECG/extensive lab workup/chest x-ray to assess both metabolic derangements related to alcohol consumption as well as etiology of his hypoxemia  Will give empiric dosing of diazepam IV and IV thiamine while workup ongoing  Likely hospitalization  History provided by:  Medical records and patient  Dizziness   Associated symptoms: no chest pain, no diarrhea, no headaches, no nausea, no palpitations, no shortness of breath, no vomiting and no weakness        Prior to Admission Medications   Prescriptions Last Dose Informant Patient Reported? Taking?    amLODIPine (NORVASC) 5 mg tablet   No Yes   Sig: Take 1 tablet (5 mg total) by mouth daily   citalopram (CeleXA) 20 mg tablet   Yes No   Sig: Take 20 mg by mouth daily   cycloSPORINE (RESTASIS) 0 05 % ophthalmic emulsion   Yes Yes   Si drop 2 (two) times a day   finasteride (PROSCAR) 5 mg tablet   Yes Yes   Sig: Take 5 mg by mouth daily   metoprolol succinate (TOPROL-XL) 25 mg 24 hr tablet   Yes Yes   Sig: Take 25 mg by mouth daily      Facility-Administered Medications: None       Past Medical History:   Diagnosis Date    Anxiety     Depression     [place] (hard of hearing)     Hypertension     Kidney stones     Prostate enlargement     Renal disorder     kidney    Shoulder dislocation        Past Surgical History:   Procedure Laterality Date    CHOLECYSTECTOMY      SHOULDER SURGERY      for dislocation       History reviewed  No pertinent family history  I have reviewed and agree with the history as documented  Social History     Tobacco Use    Smoking status: Never Smoker    Smokeless tobacco: Never Used   Substance Use Topics    Alcohol use: Yes     Frequency: 2-3 times a week     Drinks per session: 5 or 6     Binge frequency: Daily or almost daily     Comment: 3-4 times per week    Drug use: No        Review of Systems   Constitutional: Negative for chills and fever  HENT: Positive for congestion  Negative for sore throat and trouble swallowing  Respiratory: Positive for cough and chest tightness  Negative for shortness of breath  Cardiovascular: Negative for chest pain and palpitations  Gastrointestinal: Negative for abdominal pain, diarrhea, nausea and vomiting  Skin: Negative for color change, pallor, rash and wound  Neurological: Positive for dizziness and light-headedness  Negative for seizures, syncope, speech difficulty, weakness, numbness and headaches  Hematological: Negative for adenopathy  Does not bruise/bleed easily  Psychiatric/Behavioral: Negative for self-injury and suicidal ideas  The patient is nervous/anxious  All other systems reviewed and are negative  Physical Exam  Physical Exam   Constitutional: He is oriented to person, place, and time  He appears well-developed and well-nourished  He is cooperative  No distress  HENT:   Head: Normocephalic and atraumatic  Right Ear: External ear normal  Decreased hearing is noted  Left Ear: External ear normal  Decreased hearing is noted  Nose: Nose normal    Mouth/Throat: Uvula is midline, oropharynx is clear and moist and mucous membranes are normal  No oropharyngeal exudate  Hearing aides are present bilaterally    Rapid and continuous tongue fasciculations are present   Eyes: Pupils are equal, round, and reactive to light   Conjunctivae, EOM and lids are normal    Neck: Trachea normal, normal range of motion and phonation normal  Neck supple  No JVD present  No tracheal tenderness, no spinous process tenderness and no muscular tenderness present  No tracheal deviation present  No thyroid mass and no thyromegaly present  Cardiovascular: Normal rate, regular rhythm, S1 normal, S2 normal, normal heart sounds and intact distal pulses  Exam reveals no gallop and no friction rub  No murmur heard  Pulses:       Radial pulses are 2+ on the right side, and 2+ on the left side  Dorsalis pedis pulses are 2+ on the right side, and 2+ on the left side  Posterior tibial pulses are 2+ on the right side, and 2+ on the left side  Pulmonary/Chest: Effort normal and breath sounds normal  No stridor  No respiratory distress  He has no decreased breath sounds  He has no wheezes  He has no rhonchi  He has no rales  He exhibits no tenderness  Abdominal: Soft  He exhibits no distension and no mass  There is no tenderness  There is no rigidity, no rebound, no guarding and no CVA tenderness  Musculoskeletal: Normal range of motion  He exhibits no edema, tenderness or deformity  Neurological: He is alert and oriented to person, place, and time  He has normal strength  He displays tremor (Tremulous in all extremities approx 5-6 Hz)  No cranial nerve deficit or sensory deficit  He exhibits normal muscle tone  GCS eye subscore is 4  GCS verbal subscore is 5  GCS motor subscore is 6  PERRLA; EOMI  Sensation intact to light touch over face in V1-V3 distribution bilaterally  Facial expressions symmetric  Tongue/uvula midline  Shoulder shrug equal bilaterally  Strength 5/5 in UE/LE bilaterally  Sensation intact to light touch in UE/LE bilaterally  Skin: Skin is warm, dry and intact  No rash noted  He is not diaphoretic  No erythema  Psychiatric: He has a normal mood and affect  His speech is normal and behavior is normal  He is not actively hallucinating   Cognition and memory are normal  He expresses no homicidal and no suicidal ideation  He expresses no suicidal plans and no homicidal plans  Nursing note and vitals reviewed        Vital Signs  ED Triage Vitals   Temperature Pulse Respirations Blood Pressure SpO2   12/02/19 0655 12/02/19 0624 12/02/19 0624 12/02/19 0624 12/02/19 0624   98 5 °F (36 9 °C) 86 22 168/86 95 %      Temp Source Heart Rate Source Patient Position - Orthostatic VS BP Location FiO2 (%)   12/02/19 0655 12/02/19 0624 12/02/19 0624 12/02/19 0624 --   Oral Monitor Sitting Left arm       Pain Score       12/02/19 0624       No Pain           Vitals:    12/02/19 0715 12/02/19 0844 12/02/19 1012 12/02/19 1124   BP:  (!) 178/81 162/78 (!) 178/84   Pulse: 78 83 73 78   Patient Position - Orthostatic VS:             Visual Acuity  Visual Acuity      Most Recent Value   L Pupil Size (mm)  3   R Pupil Size (mm)  3          ED Medications  Medications   sodium chloride (PF) 0 9 % injection 3 mL (has no administration in time range)   amLODIPine (NORVASC) tablet 5 mg (has no administration in time range)   citalopram (CeleXA) tablet 20 mg (has no administration in time range)   finasteride (PROSCAR) tablet 5 mg (has no administration in time range)   metoprolol succinate (TOPROL-XL) 24 hr tablet 25 mg (has no administration in time range)   thiamine (VITAMIN B1) tablet 100 mg (has no administration in time range)   folic acid (FOLVITE) tablet 1 mg (has no administration in time range)   multivitamin-minerals (CENTRUM) tablet 1 tablet (has no administration in time range)   enoxaparin (LOVENOX) subcutaneous injection 40 mg (has no administration in time range)   sodium chloride 0 9 % with KCl 20 mEq/L infusion (premix) (has no administration in time range)   PHENobarbital 130 mg/mL injection 65 mg (has no administration in time range)   sodium chloride 0 9 % bolus 1,000 mL (0 mL Intravenous Stopped 12/2/19 0830)   LORazepam (ATIVAN) 2 mg/mL injection 2 mg (2 mg Intravenous Given 12/2/19 3404)   diazepam (VALIUM) injection 10 mg (10 mg Intravenous Given 12/2/19 0730)   thiamine (VITAMIN B1) 500 mg in sodium chloride 0 9 % 50 mL IVPB (0 mg Intravenous Stopped 12/2/19 0830)   magnesium sulfate 2 g/50 mL IVPB (premix) 2 g (0 g Intravenous Stopped 12/2/19 1431)   diazepam (VALIUM) injection 20 mg (20 mg Intravenous Given 12/2/19 1002)   PHENobarbital 130 mg/mL injection 130 mg (130 mg Intravenous Given 12/2/19 1058)   PHENobarbital 130 mg/mL injection 65 mg (65 mg Intravenous Given 12/2/19 1129)       Diagnostic Studies  Results Reviewed     Procedure Component Value Units Date/Time    Lactic acid, plasma [[id number]]  (Abnormal) Collected:  12/02/19 1029    Lab Status:  Final result Specimen:  Blood from Arm, Left Updated:  12/02/19 1105     LACTIC ACID 2 1 mmol/L     Narrative:       Result may be elevated if tourniquet was used during collection      Urine Microscopic [[id number]]  (Abnormal) Collected:  12/02/19 0832    Lab Status:  Final result Specimen:  Urine, Clean Catch Updated:  12/02/19 0903     RBC, UA 0-1 /hpf      WBC, UA None Seen /hpf      Epithelial Cells None Seen /hpf      Bacteria, UA None Seen /hpf      Hyaline Casts, UA 0-1 /lpf     UA w Reflex to Microscopic w Reflex to Culture [[id number]]  (Abnormal) Collected:  12/02/19 0832    Lab Status:  Final result Specimen:  Urine, Clean Catch Updated:  12/02/19 0839     Color, UA Yellow     Clarity, UA Clear     Specific Gravity, UA 1 025     pH, UA 5 5     Leukocytes, UA Negative     Nitrite, UA Negative     Protein, UA 30 (1+) mg/dl      Glucose, UA Negative mg/dl      Ketones, UA 15 (1+) mg/dl      Urobilinogen, UA 0 2 E U /dl      Bilirubin, UA Negative     Blood, UA Small    Phosphorus [[id number]]  (Normal) Collected:  12/02/19 0645    Lab Status:  Final result Specimen:  Blood from Arm, Right Updated:  12/02/19 0813     Phosphorus 3 3 mg/dL     Lactic acid, plasma [[id number]]  (Abnormal) Collected:  12/02/19 0720    Lab Status: Final result Specimen:  Blood from Arm, Right Updated:  12/02/19 0757     LACTIC ACID 2 8 mmol/L     Narrative:       Result may be elevated if tourniquet was used during collection      NT-BNP PRO [[id number]]  (Normal) Collected:  12/02/19 0645    Lab Status:  Final result Specimen:  Blood from Arm, Right Updated:  12/02/19 0741     NT-proBNP 42 pg/mL     Lipase [[id number]]  (Abnormal) Collected:  12/02/19 0645    Lab Status:  Final result Specimen:  Blood from Arm, Right Updated:  12/02/19 0741     Lipase 795 u/L     Magnesium [[id number]]  (Abnormal) Collected:  12/02/19 0645    Lab Status:  Final result Specimen:  Blood from Arm, Right Updated:  12/02/19 0741     Magnesium 1 2 mg/dL     Salicylate level [[id number]]  (Abnormal) Collected:  12/02/19 0645    Lab Status:  Final result Specimen:  Blood from Arm, Right Updated:  81/61/59 9908     Salicylate Lvl <3 mg/dL     Acetaminophen level-"If concentration is detectable, please discuss with medical  on call " [[id number]]  (Abnormal) Collected:  12/02/19 0645    Lab Status:  Final result Specimen:  Blood from Arm, Right Updated:  12/02/19 0741     Acetaminophen Level <2 0 ug/mL     Troponin I [[id number]]  (Normal) Collected:  12/02/19 0645    Lab Status:  Final result Specimen:  Blood from Arm, Right Updated:  12/02/19 0730     Troponin I <0 02 ng/mL     Comprehensive metabolic panel [[id number]]  (Abnormal) Collected:  12/02/19 0645    Lab Status:  Final result Specimen:  Blood from Arm, Right Updated:  12/02/19 0730     Sodium 143 mmol/L      Potassium 3 6 mmol/L      Chloride 103 mmol/L      CO2 27 mmol/L      ANION GAP 13 mmol/L      BUN 13 mg/dL      Creatinine 0 67 mg/dL      Glucose 90 mg/dL      Calcium 7 9 mg/dL       U/L      ALT 96 U/L      Alkaline Phosphatase 112 U/L      Total Protein 7 3 g/dL      Albumin 3 6 g/dL      Total Bilirubin 0 50 mg/dL      eGFR 98 ml/min/1 73sq m     Narrative:       [place] guidelines for Chronic Kidney Disease (CKD):     Stage 1 with normal or high GFR (GFR > 90 mL/min/1 73 square meters)    Stage 2 Mild CKD (GFR = 60-89 mL/min/1 73 square meters)    Stage 3A Moderate CKD (GFR = 45-59 mL/min/1 73 square meters)    Stage 3B Moderate CKD (GFR = 30-44 mL/min/1 73 square meters)    Stage 4 Severe CKD (GFR = 15-29 mL/min/1 73 square meters)    Stage 5 End Stage CKD (GFR <15 mL/min/1 73 square meters)  Note: GFR calculation is accurate only with a steady state creatinine    Ethanol [[id number]]  (Abnormal) Collected:  12/02/19 0645    Lab Status:  Final result Specimen:  Blood from Arm, Right Updated:  12/02/19 0723     Ethanol Lvl 115 mg/dL     Protime-INR [[id number]]  (Normal) Collected:  12/02/19 0645    Lab Status:  Final result Specimen:  Blood from Arm, Right Updated:  12/02/19 0714     Protime 12 7 seconds      INR 0 95    APTT [[id number]]  (Normal) Collected:  12/02/19 0645    Lab Status:  Final result Specimen:  Blood from Arm, Right Updated:  12/02/19 0714     PTT 24 seconds     CBC and differential [[id number]]  (Abnormal) Collected:  12/02/19 0645    Lab Status:  Final result Specimen:  Blood from Arm, Right Updated:  12/02/19 0705     WBC 7 56 Thousand/uL      RBC 4 18 Million/uL      Hemoglobin 14 0 g/dL      Hematocrit 42 5 %       fL      MCH 33 5 pg      MCHC 32 9 g/dL      RDW 13 7 %      MPV 9 3 fL      Platelets 054 Thousands/uL      nRBC 0 /100 WBCs      Neutrophils Relative 68 %      Immat GRANS % 1 %      Lymphocytes Relative 17 %      Monocytes Relative 11 %      Eosinophils Relative 2 %      Basophils Relative 1 %      Neutrophils Absolute 5 21 Thousands/µL      Immature Grans Absolute 0 05 Thousand/uL      Lymphocytes Absolute 1 31 Thousands/µL      Monocytes Absolute 0 80 Thousand/µL      Eosinophils Absolute 0 12 Thousand/µL      Basophils Absolute 0 07 Thousands/µL                  X-ray chest 2 views   Final Result by Александр Araya MD (12/02 3225) Possible 9 mm nodular density at the right midlung  Recommend follow-up with nonemergent chest CT  The study was marked in EPIC for significant notification  Workstation performed: [id number]                    Procedures  ECG 12 Lead Documentation Only  Date/Time: 12/2/2019 7:01 AM  Performed by: Yarelis Garduno DO  Authorized by: Yarelis Garduno DO     Indications / Diagnosis:  EtOH withdrawal  ECG reviewed by me, the ED Provider: yes    Patient location:  ED  Previous ECG:     Comparison to cardiac monitor: Yes    Interpretation:     Interpretation: normal    Quality:     Tracing quality:  Limited by artifact  Rate:     ECG rate:  76    ECG rate assessment: normal    Rhythm:     Rhythm: sinus rhythm    Ectopy:     Ectopy: none    QRS:     QRS axis:  Normal    QRS intervals:  Normal  Conduction:     Conduction: normal    ST segments:     ST segments:  Normal  T waves:     T waves: normal    Comments:      Pr 158 qrs 98 qtc 479    CriticalCare Time  Performed by: Yarelis Garduno DO  Authorized by: Yarelis Garduno DO     Critical care provider statement:     Critical care time (minutes):  75    Critical care was necessary to treat or prevent imminent or life-threatening deterioration of the following conditions:  [place]    Critical care was time spent personally by me on the following activities:  Ordering and review of laboratory studies, ordering and review of radiographic studies, re-evaluation of patient's condition, review of old charts, evaluation of patient's response to treatment, examination of patient, obtaining history from patient or surrogate, development of treatment plan with patient or surrogate and discussions with consultants           ED Course  ED Course as of Dec 02 1457   [facility] Dec 02, 2019   0755 1  WBC wnl   2  Hg/Hct wnl   3  Plt wnl   4  Electrolytes: hypomagnesemia; will replete IV  Hypocalcemia  5  Salicylate/acetaminophen below detection limits; EtOH elevated    6  Troponin negative  7  BNP negative  8  Lipase elevated; this is <3x upper limit of normal and not a/w any s/sx of pancreatitis  9  INR wnl  PTT wnl  10  Lactic acid elevated  11  Transaminases mildly elevated; likely represents EtOH effect  Bilirubin wnl         0840 UA: ketonuria+proteinuria +small blood  9953 Chest x-ray results noted reviewed:  Possible right mid lung nodule  Otherwise within normal limits  Would admit for combination of EtOH withdrawal+metabolic disturbances  Patient is agreeable to this    Upon reeval at 0930, patient remained tremulous in extremities with tongue fasciculations  GCS 15  Awake/alert and in no distress  Will give diazepam 20 mg IV  80 D/w Dr Clarissa Nichols: requests tox consultation for possible phenobarbital administration but accepts in observation    D/w Dr Wilma Bedolla of toxicology      1032 D/w Dr Wilma Bedolla: phenobarbital 130 mg IV loading dose with 65 mg q30 min PRN for continued sx up to max 5 doses total  Will place note in chart  I d/w Dr Clarissa Nichols in the ED regarding plan      0400 9087 Upon re-evaluation, patient reports some ongoing tremulousness and anxiety slightly relieved from time of initial presentation  He is awake/alert with normal mental status  GCS 15  Will give 65 mg phenobarbital IV       1236 Patient's brother stated to me that he (patient) had made statements about harming himself via phone to patient's father at 0500 this morning  Patient had not stated this to his brother directly; he denied this to me also  Will need psychiatric consultation when sober and medically stable   This information was relayed via [employer] Text to Dr Clarissa Nichols at 26            MDM  Number of Diagnoses or Management Options  Alcohol withdrawal ([facility] [address] ): new and requires workup  Elevated lipase: new and requires workup  Elevated transaminase level: new and requires workup  Hypocalcemia: new and requires workup  Hypomagnesemia: new and requires workup     Amount and/or Complexity of Data Reviewed  Clinical lab tests: ordered and reviewed  Tests in the radiology section of CPT®: ordered and reviewed  Decide to obtain previous medical records or to obtain history from someone other than the patient: yes  Review and summarize past medical records: yes  Discuss the patient with other providers: yes  Independent visualization of images, tracings, or specimens: yes    Risk of Complications, Morbidity, and/or Mortality  Presenting problems: high  Diagnostic procedures: high  Management options: high    Patient Progress  Patient progress: improved      Disposition  Final diagnoses:   Al[facility] withdrawal ([facility] )   Hypomagnesemia   Hypocalcemia   Elevated lipase   Elevated transaminase level     Time reflects when diagnosis was documented in both MDM as applicable and the Disposition within this note     Time User Action Codes Description Comment    12/2/2019 10:21 AM [place] Leonor Add [F10 239] Alcohol withdrawal ([facility] )     12/2/2019 10:21 AM [place] Leonor Add [E83 42] Hypomagnesemia     12/2/2019 10:21 AM [place] Leonor Add [E83 51] Hypocalcemia     12/2/2019 10:24 AM [place] Leonor Add [R74 8] Elevated lipase     12/2/2019 10:24 AM [place] Leonor Add [R74 0] Elevated transaminase level       ED Disposition     ED Disposition Condition Date/Time Comment    Admit Stable Mon Dec 2, 2019 10:21 AM Case was discussed with Dr Mónica Meyer and the patient's admission status was agreed to be Admission Status: observation status to the service of Dr Mónica Meyer          Follow-up Information    None         Current Discharge Medication List      CONTINUE these medications which have NOT CHANGED    Details   amLODIPine (NORVASC) 5 mg tablet Take 1 tablet (5 mg total) by mouth daily  Qty: 30 tablet, Refills: 0    Associated Diagnoses: Hypertension, unspecified type      cycloSPORINE (RESTASIS) 0 05 % ophthalmic emulsion 1 drop 2 (two) times a day      finasteride (PROSCAR) 5 mg tablet Take 5 mg by mouth daily      metoprolol succinate (TOPROL-XL) 25 mg 24 hr tablet Take 25 mg by mouth daily      citalopram (CeleXA) 20 mg tablet Take 20 mg by mouth daily           No discharge procedures on file      ED Provider  Electronically Signed by           Nathaly Aviles DO  12/02/19 2488

## 2019-12-02 NOTE — ASSESSMENT & PLAN NOTE
Last Drink last night unknown time, patient states he consumed a 5th of vodka  Currently has Ataxia/Fasiculations  Seizure/Aspiration/fall Precautions  OOB with assistance for time being  Etoh Level shows 115  30mg Diazepam 130mg phenobarb in ED with moderate effect  CIWA at time of admission 10  Fluids   Thiamine and Multivitamin    C/w Dr Andrew Grace Toxi: recommending 65mg phenobarb IV q 30min up to 5 doses

## 2019-12-02 NOTE — CONSULTS
PHONE [facility] Toxicology  Patience Barger 71 y o  male MRN: [id number]  Unit/Bed#: [facility] Encounter: [id number]      Reason for Consult / Principal Problem: Alcohol withdrawal  Inpatient consult to Toxicology  Consult performed by: Ludin Davalos MD  Consult ordered by: Summer Vallejo DO        12/02/19      ASSESSMENT:  1) Alcohol withdrawal  2) Alcohol abuse  3) Hypomagnesemia  4) Hypocalcemia    DISCUSSION/ RECOMMENDATIONS:  Patient with history of alcohol abuse presenting today with early withdrawal symptoms, and would like to stop drinking/ get options for rehab  He is being admitted to the hospital due to electrolyte abnormalities  Patient was initially given diazepam for his withdrawal symptoms without much relief  I was consulted regarding further management for his withdrawal     The patient currently has mild alcohol withdrawal by SEWS score  His blood ethanol concentration of 115 mg/dL does not stop him from being in withdrawal, as he likely generally has a higher concentration  I have recommended treatment with phenobarbital, with initial dose of 130 mg IV, followed by 65 mg IV every 30 minutes as needed for continued withdrawal symptoms for up to 5 doses total   If after this the patient continues to have evidence of alcohol withdrawal, or if withdrawal worsens, please contact me to discuss further recommendations and dosing  Please do not give further benzodiazepines while phenobarbital is being given  Phenobarbital has a long duration of action and half-life, and once alcohol withdrawal is well controlled, further dosing is generally not needed and patients can be safely discharged    However as this patient is early on in the withdrawal process and is presenting with elevated blood ethanol, management may be a bit more complex and I would recommend continuing to watch him until his symptoms are adequately controlled AND he does not have any recurrence of symptoms/ require additional phenobarbital for at least 8 hours  Please continue to administer daily thiamine and folate given history of alcohol abuse  For further questions, please call [facility]  Service or Patient Access Center to reach the medical  on call  Hx and PE limited by the dynamics of a phone consultation  I have not personally interviewed or evaluated the patient, but only advised based on the information provided to me  Primary provider is responsible for all clinical decisions  Pertinent history, physical exam and clinical findings and course discussed: Nicolette Dotson is a 71y o  year old male who presents with alcohol abuse and withdrawal   The patient admits to drinking 12-15 oz of vodka daily with last drink yesterday  He presented because he wants help to stop drinking  At time of initial consultation he is c/o anxiety and is tremulous  HR is normal and DBP under 110  Mental status normal   No N/V  No known history of liver disease  Aside from tremulousness and anxiety, no exam abnormalities were noted on consultation discussion  Review of systems and physical exam not performed by me  Historical Information   Past Medical History:   Diagnosis Date    Anxiety     Depression     [place] (hard of hearing)     Hypertension     Kidney stones     Prostate enlargement     Renal disorder     kidney    Shoulder dislocation      Past Surgical History:   Procedure Laterality Date    CHOLECYSTECTOMY      SHOULDER SURGERY      for dislocation     Social History   Social History     Substance and Sexual Activity   Alcohol Use Yes    Frequency: 2-3 times a week    Drinks per session: 5 or 6    Binge frequency: Daily or almost daily    Comment: 3-4 times per week     Social History     Substance and Sexual Activity   Drug Use No     Social History     Tobacco Use   Smoking Status Never Smoker   Smokeless Tobacco Never Used     History reviewed   No pertinent family history  Prior to Admission medications    Medication Sig Start Date End Date Taking? Authorizing Provider   amLODIPine (NORVASC) 5 mg tablet Take 1 tablet (5 mg total) by mouth daily 7/16/19  Yes Michael Tse MD   cycloSPORINE (RESTASIS) 0 05 % ophthalmic emulsion 1 drop 2 (two) times a day   Yes Historical Provider, MD   finasteride (PROSCAR) 5 mg tablet Take 5 mg by mouth daily   Yes Historical Provider, MD   metoprolol succinate (TOPROL-XL) 25 mg 24 hr tablet Take 25 mg by mouth daily   Yes Historical Provider, MD   citalopram (CeleXA) 20 mg tablet Take 20 mg by mouth daily    Historical Provider, MD   aspirin (ECOTRIN LOW STRENGTH) 81 mg EC tablet Take 81 mg by mouth daily  12/2/19  Historical Provider, MD   chlordiazePOXIDE (LIBRIUM) 5 mg capsule Take 1 capsule (5 mg total) by mouth 2 (two) times a day as needed for anxiety for up to 10 days 7/16/19 12/2/19  Michael Tse MD   clobetasol (TEMOVATE) 0 05 % GEL Apply topically 2 (two) times a day  12/2/19  Historical Provider, MD   Multiple Vitamins-Minerals (PRESERVISION AREDS 2 PO) Take 1 tablet by mouth 2 (two) times a day  12/2/19  Historical Provider, MD       Current Facility-Administered Medications   Medication Dose Route Frequency    magnesium sulfate 2 g/50 mL IVPB (premix) 2 g  2 g Intravenous Q2H    sodium chloride (PF) 0 9 % injection 3 mL  3 mL Intravenous PRN    sodium chloride 0 9 % infusion  100 mL/hr Intravenous Continuous       Allergies   Allergen Reactions    Sulfa Antibiotics Anaphylaxis and Rash    Sulfur Rash and Edema       Objective       Intake/Output Summary (Last 24 hours) at 12/2/2019 1154  Last data filed at 12/2/2019 0954  Gross per 24 hour   Intake 2050 ml   Output    Net 2050 ml       Invasive Devices:   Peripheral IV 12/02/19 Right Arm (Active)   Site Assessment Clean;Dry; Intact 12/2/2019  6:44 AM   Dressing Type Transparent 12/2/2019  6:44 AM   Line Status Blood return noted; Flushed;Saline locked 12/2/2019  6:44 AM Dressing Status Clean;Dry; Intact 12/2/2019  6:44 AM       Vitals   Vitals:    12/02/19 0715 12/02/19 0844 12/02/19 1012 12/02/19 1124   BP:  (!) 178/81 162/78 (!) 178/84   TempSrc:       Pulse: 78 83 73 78   Resp: 20 18  17   Patient Position - Orthostatic VS:       Temp:             EKG, Pathology, and/or Other Studies: Discussed with ED physician      Lab Results: I have personally reviewed pertinent reports  Labs:  Results from last 7 days   Lab Units 12/02/19  0645   WBC Thousand/uL 7 56   HEMOGLOBIN g/dL 14 0   HEMATOCRIT % 42 5   PLATELETS Thousands/uL 186   NEUTROS PCT % 68   LYMPHS PCT % 17   MONOS PCT % 11      Results from last 7 days   Lab Units 12/02/19  0645   POTASSIUM mmol/L 3 6   CHLORIDE mmol/L 103   CO2 mmol/L 27   BUN mg/dL 13   CREATININE mg/dL 0 67   CALCIUM mg/dL 7 9*   ALK PHOS U/L 112   ALT U/L 96*   AST U/L 109*   MAGNESIUM mg/dL 1 2*   PHOSPHORUS mg/dL 3 3      Results from last 7 days   Lab Units 12/02/19  0645   INR  0 95   PTT seconds 24     Results from last 7 days   Lab Units 12/02/19  1029 12/02/19  0720   LACTIC ACID mmol/L 2 1* 2 8*     0   Lab Value Date/Time    TROPONINI <0 02 12/02/2019 0645    TROPONINI <0 02 07/13/2019 0529    TROPONINI <0 02 02/10/2019 0856         Results from last 7 days   Lab Units 12/02/19  0645   ACETAMINOPHEN LVL ug/mL <2 0*   ETHANOL LVL mg/dL 911*   SALICYLATE LVL mg/dL <3*     Invalid input(s): EXTPREGUR        Counseling / Coordination of Care  Total time spent today 33 minutes  This was a phone consultation

## 2019-12-02 NOTE — ASSESSMENT & PLAN NOTE
Last Drink last night unknown time, patient states he consumed a 5th of vodka  Currently has Ataxia/Fasiculations  CIWA protocol  Seizure/Aspiration/fall Precautions  OOB with assistance for time being  Etoh Level shows 115  30mg Diazepam in ED with moderate effect  CIWA at time of admission 10  Fluids if needed  Thiamine and Multivitamin

## 2019-12-02 NOTE — H&P
History and Physical - [address] [place] [address]     Patient Information: Mony Meyer 71 y o  male MRN: [id number]  Unit/Bed#: VY75 Encounter: [id number]  Admitting Physician: Tracy Horne DO   PCP: Popeye Gleason MD  Date of Admission:  12/02/19    Assessment/Plan:    Alcohol withdrawal ([facility] [address] )  Assessment & Plan  Last Drink last night unknown time, patient states he consumed a 5th of vodka  Currently has Ataxia/Fasiculations  Seizure/Aspiration/fall Precautions  OOB with assistance for time being  Etoh Level shows 115  30mg Diazepam 130mg phenobarb in ED with moderate effect  CIWA at time of admission 10  Fluids   Thiamine and Multivitamin    C/w Dr Johnson [facility]: recommending 65mg phenobarb IV q 30min up to 5 doses       Hypomagnesemia  Assessment & Plan  1 2 on admission  Likely secondary to alcohol abuse  IV mag supplementation'  Trend lytes and magnesisum    Hypertension  Assessment & Plan  Well Controlled  Continue home meds  Monitor BP          VTE Prophylaxis: Enoxaparin (Lovenox)  / sequential compression device   Code Status: Full  POLST: POLST form is not discussed and not completed at this time  Anticipated Length of Stay:  Patient will be admitted on an Inpatient basis with an anticipated length of stay of  > 2 midnights  Justification for Hospital Stay: Alcohol Withdrawal    Total Time for Visit, including Counseling / Coordination of Care: 20 minutes  Greater than 50% of this total time spent on direct patient counseling and coordination of care  Chief Complaint:   Weakness    History of Present Illness:    Mony Meyer is a 71 y o  male who presents with generalized weakness and fasciculations consistent with Etoh withdrawal   Patient states he has been an alcoholic for 14 years, and has been going to AA regular however he still consumes a 5th of vodka every day to every other day  He states his last drink was yesterday around 9pm, which was a 5th of vodka    He feel asleep and woke up with trembling in his legs, and knew he was withdrawal prompting visit to ED  Patient has been admitted multiple times in the past for similar issues, denies ever having as seizure of more serious complications  States he has recently been under a lot of stress, as his wife unexpectedly left him 1 month ago  Currently endorses depression, sweating, tongue fasciculations, generalized weakness  Of note he has an acquired tremor of the left upper extremity secondary to a shoulder injury, being seen by Dr Kim López  Review of Systems:    Review of Systems   Constitutional: Positive for fatigue  Negative for activity change, appetite change, chills, diaphoresis and fever  HENT: Negative for congestion, dental problem, drooling, ear discharge, ear pain, facial swelling, hearing loss, mouth sores, nosebleeds, postnasal drip, rhinorrhea, sinus pressure and sinus pain  Eyes: Negative for pain, discharge and itching  Respiratory: Negative for apnea, cough, choking, chest tightness, shortness of breath and stridor  Cardiovascular: Negative for chest pain, palpitations and leg swelling  Gastrointestinal: Negative for abdominal distention, abdominal pain, anal bleeding, constipation and diarrhea  Endocrine: Negative for cold intolerance, heat intolerance, polydipsia and polyphagia  Genitourinary: Negative for difficulty urinating, discharge, dysuria, enuresis, flank pain, frequency, genital sores and hematuria  Musculoskeletal: Negative for arthralgias, back pain, gait problem, joint swelling, myalgias, neck pain and neck stiffness  Skin: Negative for color change and pallor  Allergic/Immunologic: Negative for environmental allergies and food allergies  Neurological: Positive for dizziness, tremors, weakness and light-headedness  Hematological: Negative for adenopathy  Psychiatric/Behavioral: Positive for dysphoric mood   Negative for agitation, behavioral problems, confusion, decreased concentration, hallucinations, self-injury and sleep disturbance  The patient is nervous/anxious  The patient is not hyperactive  Past Medical and Surgical History:     Past Medical History:   Diagnosis Date    Anxiety     Depression     [place] (hard of hearing)     Hypertension     Kidney stones     Prostate enlargement     Renal disorder     kidney    Shoulder dislocation        Past Surgical History:   Procedure Laterality Date    CHOLECYSTECTOMY      SHOULDER SURGERY      for dislocation       Meds/Allergies:    Prior to Admission medications    Medication Sig Start Date End Date Taking? Authorizing Provider   amLODIPine (NORVASC) 5 mg tablet Take 1 tablet (5 mg total) by mouth daily 7/16/19  Yes Nico Ontiveros MD   cycloSPORINE (RESTASIS) 0 05 % ophthalmic emulsion 1 drop 2 (two) times a day   Yes Historical Provider, MD   finasteride (PROSCAR) 5 mg tablet Take 5 mg by mouth daily   Yes Historical Provider, MD   metoprolol succinate (TOPROL-XL) 25 mg 24 hr tablet Take 25 mg by mouth daily   Yes Historical Provider, MD   citalopram (CeleXA) 20 mg tablet Take 20 mg by mouth daily    Historical Provider, MD   aspirin (ECOTRIN LOW STRENGTH) 81 mg EC tablet Take 81 mg by mouth daily  12/2/19  Historical Provider, MD   chlordiazePOXIDE (LIBRIUM) 5 mg capsule Take 1 capsule (5 mg total) by mouth 2 (two) times a day as needed for anxiety for up to 10 days 7/16/19 12/2/19  Nico Ontiveros MD   clobetasol (TEMOVATE) 0 05 % GEL Apply topically 2 (two) times a day  12/2/19  Historical Provider, MD   Multiple Vitamins-Minerals (PRESERVISION AREDS 2 PO) Take 1 tablet by mouth 2 (two) times a day  12/2/19  Historical Provider, MD     I have reviewed home medications with patient personally  Allergies:    Allergies   Allergen Reactions    Sulfa Antibiotics Anaphylaxis and Rash    Sulfur Rash and Edema       Social History:       Social History     Substance and Sexual Activity   Alcohol Use Yes    Frequency: 2-3 times a week    Drinks per session: 5 or 6    Binge frequency: Daily or almost daily    Comment: 3-4 times per week     Social History     Tobacco Use   Smoking Status Never Smoker   Smokeless Tobacco Never Used     Social History     Substance and Sexual Activity   Drug Use No       Family History:    non-contributory    Physical Exam:     Vitals:   Blood Pressure: (!) 178/84 (12/02/19 1124)  Pulse: 78 (12/02/19 1124)  Temperature: 98 5 °F (36 9 °C) (12/02/19 0655)  Temp Source: Oral (12/02/19 0655)  Respirations: 17 (12/02/19 1124)  Height: 5' 7" (170 2 cm) (12/02/19 3043)  Weight - Scale: 102 kg (224 lb 13 9 oz) (12/02/19 0624)  SpO2: 95 % (12/02/19 1124)    Physical Exam   Constitutional: He is oriented to person, place, and time  He appears well-developed  HENT:   Head: Normocephalic and atraumatic  Eyes: Pupils are equal, round, and reactive to light  EOM are normal    Neck: Normal range of motion  Neck supple  Cardiovascular: Normal rate and regular rhythm  Pulmonary/Chest: Effort normal    Abdominal: Soft  Bowel sounds are normal    Musculoskeletal: Normal range of motion  Neurological: He is alert and oriented to person, place, and time  He displays tremor  He displays normal reflexes  No cranial nerve deficit or sensory deficit  Gait abnormal  GCS eye subscore is 4  GCS verbal subscore is 5  GCS motor subscore is 6  Generalized and tongue fasciculation, abdomormal gait secondary to fasiculations   Skin: Capillary refill takes less than 2 seconds  He is diaphoretic  Psychiatric: His mood appears anxious  His speech is not rapid and/or pressured, not delayed and not slurred  He is not agitated, not aggressive, not hyperactive, not slowed, not withdrawn and not combative  Thought content is not paranoid and not delusional  Cognition and memory are normal  He exhibits a depressed mood  He expresses no homicidal and no suicidal ideation  He expresses no suicidal plans and no homicidal plans   He exhibits normal recent memory and normal remote memory  Additional Data:     Lab Results: I have personally reviewed pertinent reports  Results from last 7 days   Lab Units 12/02/19  0645   WBC Thousand/uL 7 56   HEMOGLOBIN g/dL 14 0   HEMATOCRIT % 42 5   PLATELETS Thousands/uL 186   NEUTROS PCT % 68   LYMPHS PCT % 17   MONOS PCT % 11   EOS PCT % 2     Results from last 7 days   Lab Units 12/02/19  0645   POTASSIUM mmol/L 3 6   CHLORIDE mmol/L 103   CO2 mmol/L 27   BUN mg/dL 13   CREATININE mg/dL 0 67   CALCIUM mg/dL 7 9*   ALK PHOS U/L 112   ALT U/L 96*   AST U/L 109*     Results from last 7 days   Lab Units 12/02/19  0645   INR  0 95       Imaging: I have personally reviewed pertinent reports  X-ray Chest 2 Views    Result Date: 12/2/2019  Narrative: CHEST INDICATION:   Chest pain  COMPARISON:  Chest x-ray 7/13/2019 EXAM PERFORMED/VIEWS:  XR CHEST PA & LATERAL FINDINGS: Cardiomediastinal silhouette appears unremarkable  Possible nodular density at the right midlung measuring up to 9 mm in size  Lung fields are otherwise clear  No pneumothorax or pleural effusion  Osseous structures appear within normal limits for patient age  Impression: Possible 9 mm nodular density at the right midlung  Recommend follow-up with nonemergent chest CT  The study was marked in EPIC for significant notification  Workstation performed: [id number]       EKG, Pathology, and Other Studies Reviewed on Admission:   · EKG: NSR     Epic / Care Everywhere Records Reviewed: Yes     ** Please Note: This note has been constructed using a voice recognition system   **

## 2019-12-03 LAB
ALBUMIN SERPL BCP-MCNC: 3.3 G/DL (ref 3.5–5)
ALBUMIN SERPL BCP-MCNC: 3.4 G/DL (ref 3.5–5)
ALP SERPL-CCNC: 100 U/L (ref 46–116)
ALP SERPL-CCNC: 123 U/L (ref 46–116)
ALT SERPL W P-5'-P-CCNC: 61 U/L (ref 12–78)
ALT SERPL W P-5'-P-CCNC: 65 U/L (ref 12–78)
ANION GAP SERPL CALCULATED.3IONS-SCNC: 12 MMOL/L (ref 4–13)
ANION GAP SERPL CALCULATED.3IONS-SCNC: 7 MMOL/L (ref 4–13)
AST SERPL W P-5'-P-CCNC: 43 U/L (ref 5–45)
AST SERPL W P-5'-P-CCNC: 58 U/L (ref 5–45)
BILIRUB SERPL-MCNC: 0.6 MG/DL (ref 0.2–1)
BILIRUB SERPL-MCNC: 0.8 MG/DL (ref 0.2–1)
BUN SERPL-MCNC: 10 MG/DL (ref 5–25)
BUN SERPL-MCNC: 11 MG/DL (ref 5–25)
CALCIUM SERPL-MCNC: 7.7 MG/DL (ref 8.3–10.1)
CALCIUM SERPL-MCNC: 8.2 MG/DL (ref 8.3–10.1)
CHLORIDE SERPL-SCNC: 103 MMOL/L (ref 100–108)
CHLORIDE SERPL-SCNC: 99 MMOL/L (ref 100–108)
CO2 SERPL-SCNC: 28 MMOL/L (ref 21–32)
CO2 SERPL-SCNC: 29 MMOL/L (ref 21–32)
CREAT SERPL-MCNC: 0.54 MG/DL (ref 0.6–1.3)
CREAT SERPL-MCNC: 0.76 MG/DL (ref 0.6–1.3)
GFR SERPL CREATININE-BSD FRML MDRD: 107 ML/MIN/1.73SQ M
GFR SERPL CREATININE-BSD FRML MDRD: 93 ML/MIN/1.73SQ M
GLUCOSE SERPL-MCNC: 131 MG/DL (ref 65–140)
GLUCOSE SERPL-MCNC: 91 MG/DL (ref 65–140)
MAGNESIUM SERPL-MCNC: 1.6 MG/DL (ref 1.6–2.6)
MAGNESIUM SERPL-MCNC: 1.8 MG/DL (ref 1.6–2.6)
POTASSIUM SERPL-SCNC: 3 MMOL/L (ref 3.5–5.3)
POTASSIUM SERPL-SCNC: 3.3 MMOL/L (ref 3.5–5.3)
PROT SERPL-MCNC: 6.8 G/DL (ref 6.4–8.2)
PROT SERPL-MCNC: 6.8 G/DL (ref 6.4–8.2)
SODIUM SERPL-SCNC: 139 MMOL/L (ref 136–145)
SODIUM SERPL-SCNC: 139 MMOL/L (ref 136–145)

## 2019-12-03 PROCEDURE — 83735 ASSAY OF MAGNESIUM: CPT | Performed by: INTERNAL MEDICINE

## 2019-12-03 PROCEDURE — 99233 SBSQ HOSP IP/OBS HIGH 50: CPT | Performed by: INTERNAL MEDICINE

## 2019-12-03 PROCEDURE — 80053 COMPREHEN METABOLIC PANEL: CPT | Performed by: INTERNAL MEDICINE

## 2019-12-03 RX ORDER — LORAZEPAM 1 MG/1
2 TABLET ORAL ONCE
Status: COMPLETED | OUTPATIENT
Start: 2019-12-03 | End: 2019-12-03

## 2019-12-03 RX ORDER — MAGNESIUM SULFATE HEPTAHYDRATE 40 MG/ML
2 INJECTION, SOLUTION INTRAVENOUS ONCE
Status: COMPLETED | OUTPATIENT
Start: 2019-12-03 | End: 2019-12-03

## 2019-12-03 RX ORDER — PHENOBARBITAL SODIUM 130 MG/ML
65 INJECTION INTRAMUSCULAR ONCE
Status: COMPLETED | OUTPATIENT
Start: 2019-12-03 | End: 2019-12-03

## 2019-12-03 RX ORDER — HYDRALAZINE HYDROCHLORIDE 20 MG/ML
10 INJECTION INTRAMUSCULAR; INTRAVENOUS EVERY 6 HOURS PRN
Status: DISCONTINUED | OUTPATIENT
Start: 2019-12-03 | End: 2019-12-05 | Stop reason: HOSPADM

## 2019-12-03 RX ORDER — CLONIDINE HYDROCHLORIDE 0.1 MG/1
0.1 TABLET ORAL EVERY 12 HOURS SCHEDULED
Status: DISCONTINUED | OUTPATIENT
Start: 2019-12-03 | End: 2019-12-05 | Stop reason: HOSPADM

## 2019-12-03 RX ORDER — DIAZEPAM 5 MG/1
5 TABLET ORAL EVERY 8 HOURS PRN
Status: DISCONTINUED | OUTPATIENT
Start: 2019-12-03 | End: 2019-12-05 | Stop reason: HOSPADM

## 2019-12-03 RX ORDER — BUTALBITAL, ACETAMINOPHEN AND CAFFEINE 50; 325; 40 MG/1; MG/1; MG/1
1 TABLET ORAL EVERY 4 HOURS PRN
Status: DISCONTINUED | OUTPATIENT
Start: 2019-12-03 | End: 2019-12-05 | Stop reason: HOSPADM

## 2019-12-03 RX ORDER — LORAZEPAM 2 MG/ML
1 INJECTION INTRAMUSCULAR
Status: DISCONTINUED | OUTPATIENT
Start: 2019-12-03 | End: 2019-12-05 | Stop reason: HOSPADM

## 2019-12-03 RX ADMIN — DIAZEPAM 5 MG: 5 TABLET ORAL at 16:25

## 2019-12-03 RX ADMIN — CLONIDINE HYDROCHLORIDE 0.1 MG: 0.1 TABLET ORAL at 21:00

## 2019-12-03 RX ADMIN — ENOXAPARIN SODIUM 40 MG: 40 INJECTION SUBCUTANEOUS at 08:26

## 2019-12-03 RX ADMIN — CITALOPRAM HYDROBROMIDE 20 MG: 20 TABLET ORAL at 08:25

## 2019-12-03 RX ADMIN — AMLODIPINE BESYLATE 5 MG: 5 TABLET ORAL at 08:26

## 2019-12-03 RX ADMIN — SODIUM CHLORIDE AND POTASSIUM CHLORIDE 100 ML/HR: .9; .15 SOLUTION INTRAVENOUS at 11:33

## 2019-12-03 RX ADMIN — LORAZEPAM 2 MG: 1 TABLET ORAL at 09:22

## 2019-12-03 RX ADMIN — MULTIPLE VITAMINS W/ MINERALS TAB 1 TABLET: TAB at 08:25

## 2019-12-03 RX ADMIN — FINASTERIDE 5 MG: 5 TABLET, FILM COATED ORAL at 08:25

## 2019-12-03 RX ADMIN — METOPROLOL SUCCINATE 25 MG: 25 TABLET, EXTENDED RELEASE ORAL at 08:26

## 2019-12-03 RX ADMIN — PHENOBARBITAL SODIUM 65 MG: 130 INJECTION INTRAMUSCULAR; INTRAVENOUS at 04:24

## 2019-12-03 RX ADMIN — SODIUM CHLORIDE AND POTASSIUM CHLORIDE 100 ML/HR: .9; .15 SOLUTION INTRAVENOUS at 01:02

## 2019-12-03 RX ADMIN — FOLIC ACID 1 MG: 1 TABLET ORAL at 08:25

## 2019-12-03 RX ADMIN — Medication 100 MG: at 08:26

## 2019-12-03 RX ADMIN — SODIUM CHLORIDE AND POTASSIUM CHLORIDE 100 ML/HR: .9; .15 SOLUTION INTRAVENOUS at 20:57

## 2019-12-03 RX ADMIN — MAGNESIUM SULFATE HEPTAHYDRATE 2 G: 40 INJECTION, SOLUTION INTRAVENOUS at 09:31

## 2019-12-03 RX ADMIN — PHENOBARBITAL SODIUM 65 MG: 130 INJECTION INTRAMUSCULAR; INTRAVENOUS at 00:12

## 2019-12-03 RX ADMIN — CLONIDINE HYDROCHLORIDE 0.1 MG: 0.1 TABLET ORAL at 09:22

## 2019-12-03 NOTE — PLAN OF CARE
Problem: NEUROSENSORY - ADULT  Goal: Remains free of injury related to seizures activity  Description  INTERVENTIONS  - Maintain airway, patient safety and administer oxygen as ordered  - Monitor patient for seizure activity, document and report duration and description of seizure to physician/advanced practitioner  - If seizure occurs, ensure patient safety during seizure  - Reorient patient post seizure  - Seizure pads on all 4 side rails  - Instruct patient/family to notify RN of any seizure activity including if an aura is experienced  - Instruct patient/family to call for assistance with activity based on nursing assessment  - Administer anti-seizure medications if ordered     Outcome: Progressing     Problem: COPING  Goal: Pt/Family able to verbalize concerns and demonstrate effective coping strategies  Description  INTERVENTIONS:  - Assist patient/family to identify coping skills, available support systems and cultural and spiritual values  - Provide emotional support, including active listening and acknowledgement of concerns of patient and caregivers  - Reduce environmental stimuli, as able  - Provide patient education  - Assess for spiritual pain/suffering and initiate spiritual care, including notification of Pastoral Care or n[place]ka based community as needed  - Assess effectiveness of coping strategies  Outcome: Progressing  Goal: Will report anxiety at manageable levels  Description  INTERVENTIONS:  - Administer medication as ordered  - Teach and encourage coping skills  - Provide emotional support  - Assess patient for anxiety and ability to cope   Outcome: Progressing     Problem: CONFUSION/THOUGHT DISTURBANCE  Goal: Thought disturbances (confusion, delirium, depression, dementia or psychosis) are managed to maintain or return to baseline mental status and functional level  Description  INTERVENTIONS:  - Assess for possible contributors to  thought disturbance, including but not limited to medications, infection, impaired vision or hearing, underlying metabolic abnormalities, dehydration, respiratory compromise,  psychiatric diagnoses and notify attending PHYSICAN/AP  - Monitor and intervene to maintain adequate nutrition, hydration, elimination, sleep and activity  - Decrease environmental stimuli, including noise as appropriate  - Provide frequent contacts to provide refocusing, direction and reassurance as needed  Approach patient calmly with eye contact and at their level  - [place] high risk fall precautions, aspiration precautions and other safety measures, as indicated  - If delirium suspected, notify physician/AP of change in condition and request immediate in-person evaluation  - Pursue consults as appropriate including OT for cognitive evaluation/activity planning, psychiatric, pastoral care, etc    Outcome: Progressing     Problem: SUBSTANCE USE/ABUSE  Goal: Will have no detox symptoms and will verbalize plan for changing substance-related behavior  Description  INTERVENTIONS:  - Monitor physical status and assess for symptoms of withdrawal  - Administer medication as ordered  - Provide emotional support with 1 on 1 interaction with staff  - Encourage recovery focused program/ addiction education  - Assess for verbalization of changing behaviors related to substance abuse  - Initiate consults and referrals as appropriate (Case Management, Spiritual Care, etc )  Outcome: Progressing     Problem: Potential for Falls  Goal: Patient will remain free of falls  Description  INTERVENTIONS:  - Assess patient frequently for physical needs  -  Identify cognitive and physical deficits and behaviors that affect risk of falls    -  [place] fall precautions as indicated by assessment   - Educate patient/family on patient safety including physical limitations  - Instruct patient to call for assistance with activity based on assessment  - Modify environment to reduce risk of injury  - Consider OT/PT consult to assist with strengthening/mobility  Outcome: Progressing

## 2019-12-03 NOTE — PROGRESS NOTES
[facility] Progress Note  Patient: Franko Car 71 y o  male   MRN: [id number]  PCP: Ayala Delarosa MD  Unit/Bed#:  Encounter: [id number]  Date Of Visit: 12/03/19    Assessment:    Principal Problem:    Alcohol withdrawal ([facility] [address] )  Active Problems:    Hypertension    Hypomagnesemia    BPH (benign prostatic hyperplasia)    Transaminitis    Tremor of left hand      Plan:    Alcohol withdrawal ([facility] [address] )  Mentation has improved  CIWA protocol initiated  He feels slightly anxious/Will add 1 mg of IV lorazepam  Continue current care        Hypomagnesemia for  1 2 on admission  Likely secondary to alcohol abuse  IV mag supplementation'  Trend lytes and magnesisum     Hypertension  BP fluctuates   Will initiate clonidine  Continue home meds  Monitor BP/telemetry  Case discussed with patient's RN during multidisciplinary rounds        VTE Pharmacologic Prophylaxis:   Pharmacologic: Enoxaparin (Lovenox)  Mechanical VTE Prophylaxis in Place: No    Patient Centered Rounds: I have performed bedside rounds with nursing staff today  Discussions with Specialists or Other Care Team Provider:  Yes    Education and Discussions with Family / Patient:  Patient/does not want family involved    Time Spent for Care: 30 minutes  More than 50% of total time spent on counseling and coordination of care as described above  Current Length of Stay: 1 day(s)    Current Patient Status: Inpatient   Certification Statement: The patient will continue to require additional inpatient hospital stay due to Alcohol withdrawal    Discharge Plan / Estimated Discharge Date: To be determined/he adamantly refuses to go into inpatient alcohol rehabilitation he is alert oriented x4 and appears to be capable of making decisions      Code Status: Level 1 - Full Code      Subjective:   No complaints today  As per nursing did report some headache earlier  Patient reports feeling much better, like day and night different     Objective: Vitals:   Temp (24hrs), Av °F (36 7 °C), Min:97 5 °F (36 4 °C), Max:98 5 °F (36 9 °C)    Temp:  [97 5 °F (36 4 °C)-98 5 °F (36 9 °C)] 97 5 °F (36 4 °C)  HR:  [66-83] 66  Resp:  [17-18] 18  BP: (164-196)/(79-95) 174/84  SpO2:  [95 %-96 %] 95 %  Body mass index is 35 22 kg/m²  Input and Output Summary (last 24 hours): Intake/Output Summary (Last 24 hours) at 12/3/2019 1027  Last data filed at 12/3/2019 0900  Gross per 24 hour   Intake 1150 ml   Output 800 ml   Net 350 ml       Physical Exam:     Physical Exam      Constitutional:  Well developed, well nourished, no acute distress, non-toxic appearance   Eyes:  PERRL, conjunctiva normal   HENT:  Atraumatic, external ears normal, nose normal, oropharynx moist, no pharyngeal exudates  Neck- normal range of motion, no tenderness, supple   Respiratory:  No respiratory distress, normal breath sounds, no rales, no wheezing   Cardiovascular:  Normal rate, normal rhythm, no murmurs, no gallops, no rubs   GI:  Soft, nondistended, normal bowel sounds, nontender, no organomegaly, no mass, no rebound, no guarding   :  No costovertebral angle tenderness   Musculoskeletal:  No edema, no tenderness, no deformities   Back- no tenderness  Integument:  Well hydrated, no rash   Lymphatic:  No lymphadenopathy noted   Neurologic:  Alert & oriented x 3, CN 2-12 normal, normal motor function, normal sensory function, no focal deficits noted   Psychiatric:  Speech and behavior appropriate           Additional Data:     Labs:    Results from last 7 days   Lab Units 19  1816 19  0645   WBC Thousand/uL  --  7 56   HEMOGLOBIN g/dL  --  14 0   HEMATOCRIT %  --  42 5   PLATELETS Thousands/uL 153 186   NEUTROS PCT %  --  68   LYMPHS PCT %  --  17   MONOS PCT %  --  11   EOS PCT %  --  2     Results from last 7 days   Lab Units 19  0433   POTASSIUM mmol/L 3 0*   CHLORIDE mmol/L 99*   CO2 mmol/L 28   BUN mg/dL 11   CREATININE mg/dL 0 54*   CALCIUM mg/dL 7 7* ALK PHOS U/L 100   ALT U/L 65   AST U/L 58*     Results from last 7 days   Lab Units 12/02/19  0645   INR  0 95       * I Have Reviewed All Lab Data Listed Above  * Additional Pertinent Lab Tests Reviewed: Vickey 66 Admission Reviewed    Imaging:    Imaging Reports Reviewed Today Include:  Yes  Imaging Personally Reviewed by Myself Includes:  Yes    Recent Cultures (last 7 days):           Last 24 Hours Medication List:     Current Facility-Administered Medications:  amLODIPine 5 mg Oral Daily Caterina Rdz, DO    butalbital-acetaminophen-caffeine 1 tablet Oral Q4H PRN Armida Orr    citalopram 20 mg Oral Daily Caterina Rdz, DO    cloNIDine 0 1 mg Oral Q12H [facility] Tracy Orr    diazepam 5 mg Oral Q8H PRN Tracy Orr    enoxaparin 40 mg Subcutaneous Daily Caterina Rdz, DO    finasteride 5 mg Oral Daily Yoni Mendieta, DO    folic acid 1 mg Oral Daily Yoni Mendieta, DO    hydrALAZINE 10 mg Intravenous Q6H PRN Tracy Orr    LORazepam 1 mg Intravenous Once in imaging [employer]    magnesium sulfate 2 g Intravenous Once Tracy Orr    metoprolol succinate 25 mg Oral Daily Caterina Boldenone, DO    multivitamin-minerals 1 tablet Oral Daily Caterina Boldenone, DO    sodium chloride (PF) 3 mL Intravenous PRN Caterina Rdz, DO    sodium chloride 0 9 % with KCl 20 mEq/L 100 mL/hr Intravenous Continuous Caterina Rdz, DO Last Rate: 100 mL/hr (12/03/19 0102)   thiamine 100 mg Oral Daily Caterina Rdz DO         Today, Patient Was Seen By: [employer]    ** Please Note: This note has been constructed using a voice recognition system   **

## 2019-12-03 NOTE — NURSING NOTE
Patient has been cooperative thru shift, had 2 episodes of stronger tremors with irritability which was treated with phenobarbital 65mg @ 0012am & 0424am with good results  Patient was also given 40meq K-dur for potassium level 2 8

## 2019-12-03 NOTE — NURSING NOTE
Patient transferred to [facility] [id number], report given  Patient CIWA 7 at time of transfer

## 2019-12-03 NOTE — SOCIAL WORK
Met with pt to discuss role as  in helping pt to develop discharge plan and to help pt carry out their plan  Pt lives in a house alone  Pt has a cane and a walker at home but uses no device   Pt is independent with ADL's  Pt does his own cooking and cleaning  Pt has gone to Alcohol rehab in the Past at [facility]  Pt states he still goes to [facility]  Pt has never had home care or any services in the home   Pt's PCP is Jonel Solis  Pt uses the [facility] in [facility]  Pt was given a discharge check list and Meds to [facility]  Both reviewed with a good understanding  Will continue to follow for case management needs  Will continue to follow for any Case Management needs

## 2019-12-04 LAB
ALBUMIN SERPL BCP-MCNC: 3.1 G/DL (ref 3.5–5)
ALBUMIN SERPL BCP-MCNC: 3.5 G/DL (ref 3.5–5)
ALP SERPL-CCNC: 100 U/L (ref 46–116)
ALP SERPL-CCNC: 93 U/L (ref 46–116)
ALT SERPL W P-5'-P-CCNC: 53 U/L (ref 12–78)
ALT SERPL W P-5'-P-CCNC: 56 U/L (ref 12–78)
ANION GAP SERPL CALCULATED.3IONS-SCNC: 7 MMOL/L (ref 4–13)
ANION GAP SERPL CALCULATED.3IONS-SCNC: 8 MMOL/L (ref 4–13)
AST SERPL W P-5'-P-CCNC: 38 U/L (ref 5–45)
AST SERPL W P-5'-P-CCNC: 40 U/L (ref 5–45)
BILIRUB SERPL-MCNC: 0.6 MG/DL (ref 0.2–1)
BILIRUB SERPL-MCNC: 0.6 MG/DL (ref 0.2–1)
BUN SERPL-MCNC: 9 MG/DL (ref 5–25)
BUN SERPL-MCNC: 9 MG/DL (ref 5–25)
CALCIUM SERPL-MCNC: 8.2 MG/DL (ref 8.3–10.1)
CALCIUM SERPL-MCNC: 8.8 MG/DL (ref 8.3–10.1)
CHLORIDE SERPL-SCNC: 100 MMOL/L (ref 100–108)
CHLORIDE SERPL-SCNC: 103 MMOL/L (ref 100–108)
CO2 SERPL-SCNC: 28 MMOL/L (ref 21–32)
CO2 SERPL-SCNC: 28 MMOL/L (ref 21–32)
CREAT SERPL-MCNC: 0.61 MG/DL (ref 0.6–1.3)
CREAT SERPL-MCNC: 0.75 MG/DL (ref 0.6–1.3)
ERYTHROCYTE [DISTWIDTH] IN BLOOD BY AUTOMATED COUNT: 13.1 % (ref 11.6–15.1)
GFR SERPL CREATININE-BSD FRML MDRD: 102 ML/MIN/1.73SQ M
GFR SERPL CREATININE-BSD FRML MDRD: 94 ML/MIN/1.73SQ M
GLUCOSE SERPL-MCNC: 106 MG/DL (ref 65–140)
GLUCOSE SERPL-MCNC: 158 MG/DL (ref 65–140)
HCT VFR BLD AUTO: 39.9 % (ref 36.5–49.3)
HGB BLD-MCNC: 13.4 G/DL (ref 12–17)
MAGNESIUM SERPL-MCNC: 1.5 MG/DL (ref 1.6–2.6)
MAGNESIUM SERPL-MCNC: 2.5 MG/DL (ref 1.6–2.6)
MCH RBC QN AUTO: 34.1 PG (ref 26.8–34.3)
MCHC RBC AUTO-ENTMCNC: 33.6 G/DL (ref 31.4–37.4)
MCV RBC AUTO: 102 FL (ref 82–98)
PLATELET # BLD AUTO: 154 THOUSANDS/UL (ref 149–390)
PMV BLD AUTO: 9.9 FL (ref 8.9–12.7)
POTASSIUM SERPL-SCNC: 3 MMOL/L (ref 3.5–5.3)
POTASSIUM SERPL-SCNC: 3.6 MMOL/L (ref 3.5–5.3)
PROT SERPL-MCNC: 6.5 G/DL (ref 6.4–8.2)
PROT SERPL-MCNC: 7.2 G/DL (ref 6.4–8.2)
RBC # BLD AUTO: 3.93 MILLION/UL (ref 3.88–5.62)
SODIUM SERPL-SCNC: 135 MMOL/L (ref 136–145)
SODIUM SERPL-SCNC: 139 MMOL/L (ref 136–145)
WBC # BLD AUTO: 7.51 THOUSAND/UL (ref 4.31–10.16)

## 2019-12-04 PROCEDURE — 80053 COMPREHEN METABOLIC PANEL: CPT | Performed by: INTERNAL MEDICINE

## 2019-12-04 PROCEDURE — G0009 ADMIN PNEUMOCOCCAL VACCINE: HCPCS | Performed by: INTERNAL MEDICINE

## 2019-12-04 PROCEDURE — 85027 COMPLETE CBC AUTOMATED: CPT | Performed by: INTERNAL MEDICINE

## 2019-12-04 PROCEDURE — 90670 PCV13 VACCINE IM: CPT | Performed by: INTERNAL MEDICINE

## 2019-12-04 PROCEDURE — 99233 SBSQ HOSP IP/OBS HIGH 50: CPT | Performed by: INTERNAL MEDICINE

## 2019-12-04 PROCEDURE — 83735 ASSAY OF MAGNESIUM: CPT | Performed by: INTERNAL MEDICINE

## 2019-12-04 RX ORDER — MAGNESIUM SULFATE HEPTAHYDRATE 40 MG/ML
2 INJECTION, SOLUTION INTRAVENOUS
Status: COMPLETED | OUTPATIENT
Start: 2019-12-04 | End: 2019-12-04

## 2019-12-04 RX ORDER — POTASSIUM CHLORIDE, DEXTROSE MONOHYDRATE AND SODIUM CHLORIDE 300; 5; 900 MG/100ML; G/100ML; MG/100ML
75 INJECTION, SOLUTION INTRAVENOUS CONTINUOUS
Status: DISCONTINUED | OUTPATIENT
Start: 2019-12-04 | End: 2019-12-05 | Stop reason: HOSPADM

## 2019-12-04 RX ORDER — POTASSIUM CHLORIDE 20 MEQ/1
40 TABLET, EXTENDED RELEASE ORAL ONCE
Status: COMPLETED | OUTPATIENT
Start: 2019-12-04 | End: 2019-12-04

## 2019-12-04 RX ADMIN — CLONIDINE HYDROCHLORIDE 0.1 MG: 0.1 TABLET ORAL at 10:49

## 2019-12-04 RX ADMIN — MAGNESIUM SULFATE HEPTAHYDRATE 2 G: 40 INJECTION, SOLUTION INTRAVENOUS at 14:00

## 2019-12-04 RX ADMIN — DIAZEPAM 5 MG: 5 TABLET ORAL at 14:04

## 2019-12-04 RX ADMIN — MULTIPLE VITAMINS W/ MINERALS TAB 1 TABLET: TAB at 10:50

## 2019-12-04 RX ADMIN — Medication 400 MG: at 13:50

## 2019-12-04 RX ADMIN — POTASSIUM CHLORIDE 40 MEQ: 1500 TABLET, EXTENDED RELEASE ORAL at 13:50

## 2019-12-04 RX ADMIN — CLONIDINE HYDROCHLORIDE 0.1 MG: 0.1 TABLET ORAL at 21:58

## 2019-12-04 RX ADMIN — AMLODIPINE BESYLATE 5 MG: 5 TABLET ORAL at 10:49

## 2019-12-04 RX ADMIN — METOPROLOL SUCCINATE 25 MG: 25 TABLET, EXTENDED RELEASE ORAL at 10:49

## 2019-12-04 RX ADMIN — PNEUMOCOCCAL 13-VALENT CONJUGATE VACCINE 0.5 ML: 2.2; 2.2; 2.2; 2.2; 2.2; 4.4; 2.2; 2.2; 2.2; 2.2; 2.2; 2.2; 2.2 INJECTION, SUSPENSION INTRAMUSCULAR at 14:05

## 2019-12-04 RX ADMIN — Medication 400 MG: at 17:29

## 2019-12-04 RX ADMIN — DIAZEPAM 5 MG: 5 TABLET ORAL at 23:21

## 2019-12-04 RX ADMIN — FINASTERIDE 5 MG: 5 TABLET, FILM COATED ORAL at 10:50

## 2019-12-04 RX ADMIN — ENOXAPARIN SODIUM 40 MG: 40 INJECTION SUBCUTANEOUS at 10:49

## 2019-12-04 RX ADMIN — FOLIC ACID 1 MG: 1 TABLET ORAL at 10:50

## 2019-12-04 RX ADMIN — POTASSIUM CHLORIDE, DEXTROSE MONOHYDRATE AND SODIUM CHLORIDE 75 ML/HR: 300; 5; 900 INJECTION, SOLUTION INTRAVENOUS at 13:58

## 2019-12-04 RX ADMIN — Medication 100 MG: at 10:50

## 2019-12-04 RX ADMIN — SODIUM CHLORIDE AND POTASSIUM CHLORIDE 100 ML/HR: .9; .15 SOLUTION INTRAVENOUS at 05:02

## 2019-12-04 RX ADMIN — CITALOPRAM HYDROBROMIDE 20 MG: 20 TABLET ORAL at 10:50

## 2019-12-04 RX ADMIN — MAGNESIUM SULFATE HEPTAHYDRATE 2 G: 40 INJECTION, SOLUTION INTRAVENOUS at 16:19

## 2019-12-04 NOTE — DISCHARGE SUMMARY
Discharge Summary - Theo Vargas 71 y o  male MRN: [id number]    Unit/Bed#: 914-95 Encounter: [id number]    Admission Date:   Admission Orders (From admission, onward)     Ordered        12/02/19 1336  Inpatient Admission  Once         12/02/19 1020  Place in Observation  Once                     Admitting Diagnosis: Hypocalcemia [E83 51]  Alcohol withdrawal ([address] ) [F10 239]  Hypomagnesemia [E83 42]  Dizziness [R42]  Elevated transaminase level [R74 0]  Elevated lipase [R74 8]        Procedures Performed:   Orders Placed This Encounter   Procedures   [employer] ED ECG Documentation Only       Summary of Hospital Course:     Theo Vargas is a 71 y o  male who presents with generalized weakness and fasciculations consistent with Etoh withdrawal   Patient states he has been an alcoholic for 14 years, and has been going to AA regular however he still consumes a 5th of vodka every day to every other day  He states his last drink was yesterday around 9pm, which was a 5th of vodka  He feel asleep and woke up with trembling in his legs, and knew he was withdrawal prompting visit to ED  Patient has been admitted multiple times in the past for similar issues, denies ever having as seizure of more serious complications  States he has recently been under a lot of stress, as his wife unexpectedly left him 1 month ago  Currently endorses depression, sweating, tongue fasciculations, generalized weakness  Of note he has an acquired tremor of the left upper extremity secondary to a shoulder injury, being seen by Dr Quintin Zapata  Patient was subsequently admitted for further evaluation of alcohol intoxication alcohol withdrawal hypoglycemia  Symptoms quickly resolved after generous IV hydration  Also given IV Ativan diazepam and placed on CIWA protocol    This time he is wide awake I have personally ambulated the patient for 100+ feet with no problems he has normal gait he reports he understands his ongoing problems with ETOH abuse it would like to speak to his primary care doctor upon his discharge  He has no suicidal homicidal ideation is mood affect is within normal limits  He reports he has no desire to go into inpatient rehab as I have decided to stop drinking alcohol I have offered help including [employer] meetings and placement into inpatient rehabilitation however patient at this time says he would like to discontinue alcohol use as he has done previously [place] attributes this bout of alcohol abuse to my wife leaving the recently [facility] states I am over it now this discharge exam is as following:      Constitutional:  Well developed, well nourished, no acute distress, non-toxic appearance   Eyes:  PERRL, conjunctiva normal   HENT:  Atraumatic, external ears normal, nose normal, oropharynx moist, no pharyngeal exudates  Neck- normal range of motion, no tenderness, supple   Respiratory:  No respiratory distress, normal breath sounds, no rales, no wheezing   Cardiovascular:  Normal rate, normal rhythm, no murmurs, no gallops, no rubs   GI:  Soft, nondistended, normal bowel sounds, nontender, no organomegaly, no mass, no rebound, no guarding   :  No costovertebral angle tenderness   Musculoskeletal:  No edema, no tenderness, no deformities   Back- no tenderness  Integument:  Well hydrated, no rash   Lymphatic:  No lymphadenopathy noted   Neurologic:  Alert & oriented x 3, CN 2-12 normal, normal motor function, normal sensory function, no focal deficits noted   Psychiatric:  Speech and behavior appropriate             Significant Findings, Care, Treatment and Services Provided:  IV hydration IV lorazepam, treatment per [facility] protocol IV folic acid vitamin L84    Complications:  None      Discharge Diagnosis:   Alcohol withdrawal  Alcohol intoxication  Hypocalcemia [E83 51]  Al[facility] ([facility] ) [F10 239]  Hypomagnesemia [E83 42]  Dizziness [R42]  Elevated transaminase level [R74 0]  Elevated lipase [R74 8]  Resolved Problems  Date Reviewed: 7/16/2019    None          Condition at Discharge: good         Discharge instructions/Information to patient and family:   See after visit summary for information provided to patient and family  Provisions for Follow-Up Care:  See after visit summary for information related to follow-up care and any pertinent home health orders  PCP: Marimar Campos MD    Disposition: Home    Planned Readmission: No      Discharge Statement   I spent 40 minutes discharging the patient  This time was spent on the day of discharge  I had direct contact with the patient on the day of discharge  Additional documentation is required if more than 30 minutes were spent on discharge  Discharge Medications:  See after visit summary for reconciled discharge medications provided to patient and family

## 2019-12-04 NOTE — NURSING NOTE
Patient complaining of anxiety and requesting medication to "calm him down"  At 1404 Valium po given  On reassessment at 1500 good relief of anxiety was obtained  Patient feel better

## 2019-12-04 NOTE — PROGRESS NOTES
[facility] Progress Note  Patient: Patience Barger 71 y o  male   MRN: [id number]  PCP: Favian Greco MD  Unit/Bed#: 039-34 Encounter: [id number]  Date Of Visit: 19    Assessment:    Principal Problem:    Alcohol withdrawal ([facility] [address] )  Active Problems:    Hypertension    Hypomagnesemia    BPH (benign prostatic hyperplasia)    Transaminitis    Tremor of left hand      Plan:    Alcohol withdrawal ([facility] [address] )  Mentation has improved  CIWA protocol initiated  He feels slightly anxious/Will add 1 mg of IV lorazepam  Continue current care        Hypomagnesemia for  1 2 on admission  Likely secondary to alcohol abuse  IV mag supplementation'  Trend lytes and magnesisum    Hypokalemia  Will give p o  K-Dur       Hypertension  BP fluctuates   Will initiate clonidine  Continue home meds  Monitor BP/telemetry  Case discussed with patient's RN during multidisciplinary rounds        VTE Pharmacologic Prophylaxis:   Pharmacologic: Enoxaparin (Lovenox)  Mechanical VTE Prophylaxis in Place: Yes    Patient Centered Rounds: I have performed bedside rounds with nursing staff today  Discussions with Specialists or Other Care Team Provider:  Yes    Education and Discussions with Family / Patient:  No patient does not want me to call any family members he is alert oriented    Time Spent for Care: 30 minutes  More than 50% of total time spent on counseling and coordination of care as described above  Current Length of Stay: 2 day(s)    Current Patient Status: Inpatient   Certification Statement: The patient will continue to require additional inpatient hospital stay due to Alcohol intoxication    Discharge Plan / Estimated Discharge Date:   To be determined    Code Status: Level 1 - Full Code      Subjective:   No complaints    Objective:     Vitals:   Temp (24hrs), Av 8 °F (36 6 °C), Min:97 5 °F (36 4 °C), Max:98 5 °F (36 9 °C)    Temp:  [97 5 °F (36 4 °C)-98 5 °F (36 9 °C)] 97 5 °F (36 4 °C)  HR:  [51-83] 71  Resp: [16-18] 16  BP: (125-188)/(73-98) 140/73  SpO2:  [91 %-96 %] 95 %  Body mass index is 35 22 kg/m²  Input and Output Summary (last 24 hours): Intake/Output Summary (Last 24 hours) at 12/4/2019 1248  Last data filed at 12/4/2019 0844  Gross per 24 hour   Intake 1220 ml   Output    Net 1220 ml       Physical Exam:     Constitutional:  Well developed, well nourished, no acute distress, non-toxic appearance   Eyes:  PERRL, conjunctiva normal   HENT:  Atraumatic, external ears normal, nose normal, oropharynx moist, no pharyngeal exudates  Neck- normal range of motion, no tenderness, supple   Respiratory:  No respiratory distress, normal breath sounds, no rales, no wheezing   Cardiovascular:  Normal rate, normal rhythm, no murmurs, no gallops, no rubs   GI:  Soft, nondistended, normal bowel sounds, nontender, no organomegaly, no mass, no rebound, no guarding   :  No costovertebral angle tenderness   Musculoskeletal:  No edema, no tenderness, no deformities  Back- no tenderness  Integument:  Well hydrated, no rash   Lymphatic:  No lymphadenopathy noted   Neurologic:  Alert & oriented x 3, CN 2-12 normal, normal motor function, normal sensory function, no focal deficits noted   Psychiatric:  Speech and behavior appropriate             Additional Data:     Labs:    Results from last 7 days   Lab Units 12/04/19  0512  12/02/19  0645   WBC Thousand/uL 7 51  --  7 56   HEMOGLOBIN g/dL 13 4  --  14 0   HEMATOCRIT % 39 9  --  42 5   PLATELETS Thousands/uL 154   < > 186   NEUTROS PCT %  --   --  68   LYMPHS PCT %  --   --  17   MONOS PCT %  --   --  11   EOS PCT %  --   --  2    < > = values in this interval not displayed       Results from last 7 days   Lab Units 12/04/19  0512   POTASSIUM mmol/L 3 0*   CHLORIDE mmol/L 103   CO2 mmol/L 28   BUN mg/dL 9   CREATININE mg/dL 0 61   CALCIUM mg/dL 8 2*   ALK PHOS U/L 93   ALT U/L 53   AST U/L 38     Results from last 7 days   Lab Units 12/02/19  0645   INR  0 95       * I Have Reviewed All Lab Data Listed Above  * Additional Pertinent Lab Tests Reviewed: Vickey 66 Admission Reviewed    Imaging:    Imaging Reports Reviewed Today Include:  Yes  Imaging Personally Reviewed by Myself Includes:  Yes    Recent Cultures (last 7 days):           Last 24 Hours Medication List:     Current Facility-Administered Medications:  amLODIPine 5 mg Oral Daily [facility] Gayle   butalbital-acetaminophen-caffeine 1 tablet Oral Q4H PRN [facility]   citalopram 20 mg Oral Daily [facility]   cloNIDine 0 1 mg Oral Q12H [facility]   dextrose 5 % and sodium chloride 0 9 % with KCl 40 mEq/L 75 mL/hr Intravenous Continuous [facility]   diazepam 5 mg Oral Q8H PRN [facility]   enoxaparin 40 mg Subcutaneous Daily [facility]   finasteride 5 mg Oral Daily [facility]   folic acid 1 mg Oral Daily [facility]   hydrALAZINE 10 mg Intravenous Q6H PRN [facility]   LORazepam 1 mg Intravenous Once in imaging [employer]   magnesium sulfate 4 g Intravenous Once [employer]   metoprolol succinate 25 mg Oral Daily [facility]   multivitamin-minerals 1 tablet Oral Daily [facility]   pneumococcal 13-valent conjugate vaccine 0 5 mL Intramuscular Prior to discharge [employer]   potassium chloride 40 mEq Oral Once [employer]   sodium chloride (PF) 3 mL Intravenous PRN [facility]   thiamine 100 mg Oral Daily [facility] Gayle        Today, Patient Was Seen By: [employer]    ** Please Note: This note has been constructed using a voice recognition system   **

## 2019-12-05 VITALS
RESPIRATION RATE: 18 BRPM | SYSTOLIC BLOOD PRESSURE: 148 MMHG | DIASTOLIC BLOOD PRESSURE: 82 MMHG | BODY MASS INDEX: 35.29 KG/M2 | WEIGHT: 224.87 LBS | HEART RATE: 73 BPM | TEMPERATURE: 98.3 F | HEIGHT: 67 IN | OXYGEN SATURATION: 97 %

## 2019-12-05 LAB
ALBUMIN SERPL BCP-MCNC: 3.1 G/DL (ref 3.5–5)
ALP SERPL-CCNC: 83 U/L (ref 46–116)
ALT SERPL W P-5'-P-CCNC: 49 U/L (ref 12–78)
ANION GAP SERPL CALCULATED.3IONS-SCNC: 7 MMOL/L (ref 4–13)
AST SERPL W P-5'-P-CCNC: 35 U/L (ref 5–45)
BILIRUB SERPL-MCNC: 0.6 MG/DL (ref 0.2–1)
BUN SERPL-MCNC: 8 MG/DL (ref 5–25)
CALCIUM SERPL-MCNC: 8.3 MG/DL (ref 8.3–10.1)
CHLORIDE SERPL-SCNC: 103 MMOL/L (ref 100–108)
CO2 SERPL-SCNC: 29 MMOL/L (ref 21–32)
CREAT SERPL-MCNC: 0.59 MG/DL (ref 0.6–1.3)
GFR SERPL CREATININE-BSD FRML MDRD: 103 ML/MIN/1.73SQ M
GLUCOSE SERPL-MCNC: 112 MG/DL (ref 65–140)
MAGNESIUM SERPL-MCNC: 2.2 MG/DL (ref 1.6–2.6)
POTASSIUM SERPL-SCNC: 3.5 MMOL/L (ref 3.5–5.3)
PROT SERPL-MCNC: 6.4 G/DL (ref 6.4–8.2)
SODIUM SERPL-SCNC: 139 MMOL/L (ref 136–145)

## 2019-12-05 PROCEDURE — 83735 ASSAY OF MAGNESIUM: CPT | Performed by: INTERNAL MEDICINE

## 2019-12-05 PROCEDURE — 99239 HOSP IP/OBS DSCHRG MGMT >30: CPT | Performed by: INTERNAL MEDICINE

## 2019-12-05 PROCEDURE — 80053 COMPREHEN METABOLIC PANEL: CPT | Performed by: INTERNAL MEDICINE

## 2019-12-05 RX ADMIN — AMLODIPINE BESYLATE 5 MG: 5 TABLET ORAL at 08:54

## 2019-12-05 RX ADMIN — Medication 400 MG: at 08:53

## 2019-12-05 RX ADMIN — DIAZEPAM 5 MG: 5 TABLET ORAL at 08:54

## 2019-12-05 RX ADMIN — FINASTERIDE 5 MG: 5 TABLET, FILM COATED ORAL at 08:53

## 2019-12-05 RX ADMIN — CLONIDINE HYDROCHLORIDE 0.1 MG: 0.1 TABLET ORAL at 08:54

## 2019-12-05 RX ADMIN — Medication 100 MG: at 08:54

## 2019-12-05 RX ADMIN — METOPROLOL SUCCINATE 25 MG: 25 TABLET, EXTENDED RELEASE ORAL at 08:53

## 2019-12-05 RX ADMIN — ENOXAPARIN SODIUM 40 MG: 40 INJECTION SUBCUTANEOUS at 08:53

## 2019-12-05 RX ADMIN — MULTIPLE VITAMINS W/ MINERALS TAB 1 TABLET: TAB at 08:54

## 2019-12-05 RX ADMIN — CITALOPRAM HYDROBROMIDE 20 MG: 20 TABLET ORAL at 08:54

## 2019-12-05 RX ADMIN — FOLIC ACID 1 MG: 1 TABLET ORAL at 08:54

## 2019-12-05 NOTE — PLAN OF CARE
Problem: NEUROSENSORY - ADULT  Goal: Remains free of injury related to seizures activity  Description  INTERVENTIONS  - Maintain airway, patient safety and administer oxygen as ordered  - Monitor patient for seizure activity, document and report duration and description of seizure to physician/advanced practitioner  - If seizure occurs, ensure patient safety during seizure  - Reorient patient post seizure  - Seizure pads on all 4 side rails  - Instruct patient/family to notify RN of any seizure activity including if an aura is experienced  - Instruct patient/family to call for assistance with activity based on nursing assessment  - Administer anti-seizure medications if ordered     Outcome: Progressing     Problem: COPING  Goal: Pt/Family able to verbalize concerns and demonstrate effective coping strategies  Description  INTERVENTIONS:  - Assist patient/family to identify coping skills, available support systems and cultural and spiritual values  - Provide emotional support, including active listening and acknowledgement of concerns of patient and caregivers  - Reduce environmental stimuli, as able  - Provide patient education  - Assess for spiritual pain/suffering and initiate spiritual care, including notification of Pastoral Care or ninoska based community as needed  - Assess effectiveness of coping strategies  Outcome: Progressing  Goal: Will report anxiety at manageable levels  Description  INTERVENTIONS:  - Administer medication as ordered  - Teach and encourage coping skills  - Provide emotional support  - Assess patient for anxiety and ability to cope   Outcome: Progressing     Problem: CONFUSION/THOUGHT DISTURBANCE  Goal: Thought disturbances (confusion, delirium, depression, dementia or psychosis) are managed to maintain or return to baseline mental status and functional level  Description  INTERVENTIONS:  - Assess for possible contributors to  thought disturbance, including but not limited to medications, infection, impaired vision or hearing, underlying metabolic abnormalities, dehydration, respiratory compromise,  psychiatric diagnoses and notify attending PHYSICAN/AP  - Monitor and intervene to maintain adequate nutrition, hydration, elimination, sleep and activity  - Decrease environmental stimuli, including noise as appropriate  - Provide frequent contacts to provide refocusing, direction and reassurance as needed  Approach patient calmly with eye contact and at their level  - [place] high risk fall precautions, aspiration precautions and other safety measures, as indicated  - If delirium suspected, notify physician/AP of change in condition and request immediate in-person evaluation  - Pursue consults as appropriate including OT for cognitive evaluation/activity planning, psychiatric, pastoral care, etc    Outcome: Progressing     Problem: SUBSTANCE USE/ABUSE  Goal: Will have no detox symptoms and will verbalize plan for changing substance-related behavior  Description  INTERVENTIONS:  - Monitor physical status and assess for symptoms of withdrawal  - Administer medication as ordered  - Provide emotional support with 1 on 1 interaction with staff  - Encourage recovery focused program/ addiction education  - Assess for verbalization of changing behaviors related to substance abuse  - Initiate consults and referrals as appropriate (Case Management, Spiritual Care, etc )  Outcome: Progressing     Problem: Potential for Falls  Goal: Patient will remain free of falls  Description  INTERVENTIONS:  - Assess patient frequently for physical needs  -  Identify cognitive and physical deficits and behaviors that affect risk of falls    -  [place] fall precautions as indicated by assessment   - Educate patient/family on patient safety including physical limitations  - Instruct patient to call for assistance with activity based on assessment  - Modify environment to reduce risk of injury  - Consider OT/PT consult to assist with strengthening/mobility  Outcome: Progressing

## 2019-12-05 NOTE — SOCIAL WORK
Discussed with patient preferences on discharge;understanding how to manage health at home; purpose of taking medications; importance of follow up care/appointments; and symptoms to watch out for once discharged home  Pt is being dc'd home on this date and CM scheduled a follow up appointment with pt's PCP Jonel Sellers as pt had an appointment on 12/3 but was in the hospital  Pt was re-scheduled for December 11th at 9:15 am

## 2019-12-05 NOTE — NURSING NOTE
IV access lost on pt  Many attempts made to regain access  Will retry to gain access  Dr Newton Freeman notified

## 2021-04-14 ENCOUNTER — APPOINTMENT (OUTPATIENT)
Dept: PHYSICAL THERAPY | Facility: CLINIC | Age: 71
End: 2021-04-14
Payer: MEDICARE

## 2021-04-17 ENCOUNTER — HOSPITAL ENCOUNTER (EMERGENCY)
Facility: HOSPITAL | Age: 71
End: 2021-04-17
Attending: EMERGENCY MEDICINE | Admitting: EMERGENCY MEDICINE
Payer: MEDICARE

## 2021-04-17 ENCOUNTER — APPOINTMENT (EMERGENCY)
Dept: RADIOLOGY | Facility: HOSPITAL | Age: 71
End: 2021-04-17
Payer: MEDICARE

## 2021-04-17 ENCOUNTER — APPOINTMENT (EMERGENCY)
Dept: CT IMAGING | Facility: HOSPITAL | Age: 71
End: 2021-04-17
Payer: MEDICARE

## 2021-04-17 ENCOUNTER — APPOINTMENT (EMERGENCY)
Dept: NON INVASIVE DIAGNOSTICS | Facility: HOSPITAL | Age: 71
End: 2021-04-17
Payer: MEDICARE

## 2021-04-17 ENCOUNTER — HOSPITAL ENCOUNTER (INPATIENT)
Facility: HOSPITAL | Age: 71
LOS: 3 days | Discharge: HOME | DRG: 896 | End: 2021-04-20
Attending: EMERGENCY MEDICINE | Admitting: EMERGENCY MEDICINE
Payer: MEDICARE

## 2021-04-17 VITALS
WEIGHT: 224.43 LBS | OXYGEN SATURATION: 92 % | SYSTOLIC BLOOD PRESSURE: 198 MMHG | TEMPERATURE: 99.3 F | HEART RATE: 67 BPM | RESPIRATION RATE: 22 BRPM | DIASTOLIC BLOOD PRESSURE: 86 MMHG | BODY MASS INDEX: 35.22 KG/M2 | HEIGHT: 67 IN

## 2021-04-17 DIAGNOSIS — E87.6 HYPOKALEMIA: ICD-10-CM

## 2021-04-17 DIAGNOSIS — E83.42 HYPOMAGNESEMIA: ICD-10-CM

## 2021-04-17 DIAGNOSIS — K85.90 ACUTE PANCREATITIS: ICD-10-CM

## 2021-04-17 DIAGNOSIS — M79.89 LEG SWELLING: ICD-10-CM

## 2021-04-17 DIAGNOSIS — F10.10 ALCOHOL ABUSE: ICD-10-CM

## 2021-04-17 DIAGNOSIS — N28.1 RENAL CYST, LEFT: ICD-10-CM

## 2021-04-17 DIAGNOSIS — R60.0 BILATERAL LOWER EXTREMITY EDEMA: ICD-10-CM

## 2021-04-17 DIAGNOSIS — R14.3 FLATULENCE: ICD-10-CM

## 2021-04-17 DIAGNOSIS — G47.34 NOCTURNAL HYPOXIA: Primary | ICD-10-CM

## 2021-04-17 DIAGNOSIS — F10.10 ALCOHOL ABUSE: Primary | ICD-10-CM

## 2021-04-17 PROBLEM — F32.A DEPRESSION: Status: ACTIVE | Noted: 2021-04-17

## 2021-04-17 PROBLEM — R94.31 PROLONGED QT INTERVAL: Status: ACTIVE | Noted: 2021-04-17

## 2021-04-17 LAB
ALBUMIN SERPL BCP-MCNC: 4.3 G/DL (ref 3.5–5)
ALP SERPL-CCNC: 99 U/L (ref 46–116)
ALT SERPL W P-5'-P-CCNC: 49 U/L (ref 12–78)
AMPHETAMINES SERPL QL SCN: NEGATIVE
ANION GAP SERPL CALCULATED.3IONS-SCNC: 13 MMOL/L (ref 4–13)
ANION GAP SERPL CALCULATED.3IONS-SCNC: 7 MMOL/L (ref 5–14)
APTT PPP: 26 SECONDS (ref 23–37)
AST SERPL W P-5'-P-CCNC: 53 U/L (ref 5–45)
BACTERIA UR QL AUTO: NORMAL /HPF
BARBITURATES UR QL: NEGATIVE
BASOPHILS # BLD AUTO: 0.04 THOUSANDS/ΜL (ref 0–0.1)
BASOPHILS NFR BLD AUTO: 1 % (ref 0–1)
BENZODIAZ UR QL: POSITIVE
BILIRUB SERPL-MCNC: 1.21 MG/DL (ref 0.2–1)
BILIRUB UR QL STRIP: NEGATIVE
BUN SERPL-MCNC: 10 MG/DL (ref 5–25)
BUN SERPL-MCNC: 12 MG/DL (ref 5–25)
CALCIUM SERPL-MCNC: 8.8 MG/DL (ref 8.3–10.1)
CALCIUM SERPL-MCNC: 8.9 MG/DL (ref 8.4–10.2)
CHLORIDE SERPL-SCNC: 102 MMOL/L (ref 100–108)
CHLORIDE SERPL-SCNC: 99 MMOL/L (ref 97–108)
CK MB SERPL-MCNC: 1.7 % (ref 0–2.5)
CK MB SERPL-MCNC: 3.8 NG/ML (ref 0–5)
CK SERPL-CCNC: 226 U/L (ref 39–308)
CLARITY UR: CLEAR
CO2 SERPL-SCNC: 28 MMOL/L (ref 21–32)
CO2 SERPL-SCNC: 31 MMOL/L (ref 22–30)
COCAINE UR QL: NEGATIVE
COLOR UR: YELLOW
CREAT SERPL-MCNC: 0.61 MG/DL (ref 0.7–1.5)
CREAT SERPL-MCNC: 0.71 MG/DL (ref 0.6–1.3)
EOSINOPHIL # BLD AUTO: 0.03 THOUSAND/ΜL (ref 0–0.61)
EOSINOPHIL NFR BLD AUTO: 0 % (ref 0–6)
ERYTHROCYTE [DISTWIDTH] IN BLOOD BY AUTOMATED COUNT: 13.7 % (ref 11.6–15.1)
ERYTHROCYTE [DISTWIDTH] IN BLOOD BY AUTOMATED COUNT: 14.8 %
ETHANOL SERPL-MCNC: <3 MG/DL (ref 0–3)
GFR SERPL CREATININE-BSD FRML MDRD: 100 ML/MIN/1.73SQ M
GFR SERPL CREATININE-BSD FRML MDRD: 94 ML/MIN/1.73SQ M
GLUCOSE SERPL-MCNC: 81 MG/DL (ref 70–99)
GLUCOSE SERPL-MCNC: 90 MG/DL (ref 65–140)
GLUCOSE UR STRIP-MCNC: NEGATIVE MG/DL
HCT VFR BLD AUTO: 41.4 % (ref 41–53)
HCT VFR BLD AUTO: 44.4 % (ref 36.5–49.3)
HGB BLD-MCNC: 13.8 G/DL (ref 13.5–17.5)
HGB BLD-MCNC: 14.8 G/DL (ref 12–17)
HGB UR QL STRIP.AUTO: ABNORMAL
IMM GRANULOCYTES # BLD AUTO: 0.03 THOUSAND/UL (ref 0–0.2)
IMM GRANULOCYTES NFR BLD AUTO: 0 % (ref 0–2)
INR PPP: 1.07 (ref 0.84–1.19)
KETONES UR STRIP-MCNC: ABNORMAL MG/DL
LEUKOCYTE ESTERASE UR QL STRIP: NEGATIVE
LIPASE SERPL-CCNC: 1297 U/L (ref 73–393)
LYMPHOCYTES # BLD AUTO: 0.91 THOUSANDS/ΜL (ref 0.6–4.47)
LYMPHOCYTES # BLD AUTO: 1.74 THOUSAND/UL (ref 0.5–4)
LYMPHOCYTES # BLD AUTO: 26 % (ref 25–45)
LYMPHOCYTES NFR BLD AUTO: 12 % (ref 14–44)
MAGNESIUM SERPL-MCNC: 1.2 MG/DL (ref 1.6–2.6)
MAGNESIUM SERPL-MCNC: 1.5 MG/DL (ref 1.6–2.3)
MCH RBC QN AUTO: 31.7 PG (ref 26.8–34.3)
MCH RBC QN AUTO: 32.1 PG (ref 26–34)
MCHC RBC AUTO-ENTMCNC: 33.3 G/DL (ref 31.4–37.4)
MCHC RBC AUTO-ENTMCNC: 33.4 G/DL (ref 31–36)
MCV RBC AUTO: 95 FL (ref 82–98)
MCV RBC AUTO: 96 FL (ref 80–100)
METHADONE UR QL: NEGATIVE
MONOCYTES # BLD AUTO: 0.47 THOUSAND/UL (ref 0.2–0.9)
MONOCYTES # BLD AUTO: 0.58 THOUSAND/ΜL (ref 0.17–1.22)
MONOCYTES NFR BLD AUTO: 7 % (ref 1–10)
MONOCYTES NFR BLD AUTO: 8 % (ref 4–12)
NEUTROPHILS # BLD AUTO: 6.16 THOUSANDS/ΜL (ref 1.85–7.62)
NEUTS SEG # BLD: 4.49 THOUSAND/UL (ref 1.8–7.8)
NEUTS SEG NFR BLD AUTO: 67 %
NEUTS SEG NFR BLD AUTO: 79 % (ref 43–75)
NITRITE UR QL STRIP: NEGATIVE
NON-SQ EPI CELLS URNS QL MICRO: NORMAL /HPF
NRBC BLD AUTO-RTO: 0 /100 WBCS
NT-PROBNP SERPL-MCNC: 108 PG/ML
OPIATES UR QL SCN: NEGATIVE
OXYCODONE+OXYMORPHONE UR QL SCN: NEGATIVE
PCP UR QL: NEGATIVE
PH UR STRIP.AUTO: 6 [PH]
PLATELET # BLD AUTO: 202 THOUSANDS/UL (ref 150–450)
PLATELET # BLD AUTO: 224 THOUSANDS/UL (ref 149–390)
PLATELET BLD QL SMEAR: ADEQUATE
PMV BLD AUTO: 7.2 FL (ref 8.9–12.7)
PMV BLD AUTO: 9 FL (ref 8.9–12.7)
POTASSIUM SERPL-SCNC: 3.2 MMOL/L (ref 3.5–5.3)
POTASSIUM SERPL-SCNC: 3.2 MMOL/L (ref 3.6–5)
PROT SERPL-MCNC: 7.7 G/DL (ref 6.4–8.2)
PROT UR STRIP-MCNC: ABNORMAL MG/DL
PROTHROMBIN TIME: 13.7 SECONDS (ref 11.6–14.5)
RBC # BLD AUTO: 4.31 MILLION/UL (ref 4.5–5.9)
RBC # BLD AUTO: 4.67 MILLION/UL (ref 3.88–5.62)
RBC #/AREA URNS AUTO: NORMAL /HPF
RBC MORPH BLD: NORMAL
SODIUM SERPL-SCNC: 137 MMOL/L (ref 137–147)
SODIUM SERPL-SCNC: 143 MMOL/L (ref 136–145)
SP GR UR STRIP.AUTO: >=1.03 (ref 1–1.03)
THC UR QL: NEGATIVE
TOTAL CELLS COUNTED SPEC: 100
TROPONIN I SERPL-MCNC: <0.02 NG/ML
TSH SERPL DL<=0.05 MIU/L-ACNC: 0.95 UIU/ML (ref 0.36–3.74)
UROBILINOGEN UR QL STRIP.AUTO: 0.2 E.U./DL
WBC # BLD AUTO: 6.7 THOUSAND/UL (ref 4.5–11)
WBC # BLD AUTO: 7.75 THOUSAND/UL (ref 4.31–10.16)
WBC #/AREA URNS AUTO: NORMAL /HPF

## 2021-04-17 PROCEDURE — 81001 URINALYSIS AUTO W/SCOPE: CPT | Performed by: PHYSICIAN ASSISTANT

## 2021-04-17 PROCEDURE — 80053 COMPREHEN METABOLIC PANEL: CPT | Performed by: PHYSICIAN ASSISTANT

## 2021-04-17 PROCEDURE — 85025 COMPLETE CBC W/AUTO DIFF WBC: CPT | Performed by: PHYSICIAN ASSISTANT

## 2021-04-17 PROCEDURE — 85007 BL SMEAR W/DIFF WBC COUNT: CPT | Performed by: INTERNAL MEDICINE

## 2021-04-17 PROCEDURE — 84443 ASSAY THYROID STIM HORMONE: CPT | Performed by: PHYSICIAN ASSISTANT

## 2021-04-17 PROCEDURE — 93970 EXTREMITY STUDY: CPT

## 2021-04-17 PROCEDURE — 82550 ASSAY OF CK (CPK): CPT | Performed by: PHYSICIAN ASSISTANT

## 2021-04-17 PROCEDURE — 83690 ASSAY OF LIPASE: CPT | Performed by: PHYSICIAN ASSISTANT

## 2021-04-17 PROCEDURE — 96361 HYDRATE IV INFUSION ADD-ON: CPT

## 2021-04-17 PROCEDURE — 82553 CREATINE MB FRACTION: CPT | Performed by: PHYSICIAN ASSISTANT

## 2021-04-17 PROCEDURE — 83880 ASSAY OF NATRIURETIC PEPTIDE: CPT | Performed by: PHYSICIAN ASSISTANT

## 2021-04-17 PROCEDURE — 99285 EMERGENCY DEPT VISIT HI MDM: CPT

## 2021-04-17 PROCEDURE — 96375 TX/PRO/DX INJ NEW DRUG ADDON: CPT

## 2021-04-17 PROCEDURE — 80307 DRUG TEST PRSMV CHEM ANLYZR: CPT | Performed by: PHYSICIAN ASSISTANT

## 2021-04-17 PROCEDURE — 84484 ASSAY OF TROPONIN QUANT: CPT | Performed by: PHYSICIAN ASSISTANT

## 2021-04-17 PROCEDURE — 85730 THROMBOPLASTIN TIME PARTIAL: CPT | Performed by: PHYSICIAN ASSISTANT

## 2021-04-17 PROCEDURE — 83735 ASSAY OF MAGNESIUM: CPT | Performed by: INTERNAL MEDICINE

## 2021-04-17 PROCEDURE — 99223 1ST HOSP IP/OBS HIGH 75: CPT | Performed by: INTERNAL MEDICINE

## 2021-04-17 PROCEDURE — 80048 BASIC METABOLIC PNL TOTAL CA: CPT | Performed by: INTERNAL MEDICINE

## 2021-04-17 PROCEDURE — 85027 COMPLETE CBC AUTOMATED: CPT | Performed by: INTERNAL MEDICINE

## 2021-04-17 PROCEDURE — HZ2ZZZZ DETOXIFICATION SERVICES FOR SUBSTANCE ABUSE TREATMENT: ICD-10-PCS | Performed by: EMERGENCY MEDICINE

## 2021-04-17 PROCEDURE — 93005 ELECTROCARDIOGRAM TRACING: CPT

## 2021-04-17 PROCEDURE — 85610 PROTHROMBIN TIME: CPT | Performed by: PHYSICIAN ASSISTANT

## 2021-04-17 PROCEDURE — 96365 THER/PROPH/DIAG IV INF INIT: CPT

## 2021-04-17 PROCEDURE — G1004 CDSM NDSC: HCPCS

## 2021-04-17 PROCEDURE — 96367 TX/PROPH/DG ADDL SEQ IV INF: CPT

## 2021-04-17 PROCEDURE — 74177 CT ABD & PELVIS W/CONTRAST: CPT

## 2021-04-17 PROCEDURE — 93970 EXTREMITY STUDY: CPT | Performed by: SURGERY

## 2021-04-17 PROCEDURE — 36415 COLL VENOUS BLD VENIPUNCTURE: CPT | Performed by: PHYSICIAN ASSISTANT

## 2021-04-17 PROCEDURE — 96376 TX/PRO/DX INJ SAME DRUG ADON: CPT

## 2021-04-17 PROCEDURE — 99285 EMERGENCY DEPT VISIT HI MDM: CPT | Performed by: PHYSICIAN ASSISTANT

## 2021-04-17 PROCEDURE — 83735 ASSAY OF MAGNESIUM: CPT | Performed by: PHYSICIAN ASSISTANT

## 2021-04-17 PROCEDURE — 82077 ASSAY SPEC XCP UR&BREATH IA: CPT | Performed by: PHYSICIAN ASSISTANT

## 2021-04-17 PROCEDURE — 71045 X-RAY EXAM CHEST 1 VIEW: CPT

## 2021-04-17 RX ORDER — POTASSIUM CHLORIDE 20 MEQ/1
40 TABLET, EXTENDED RELEASE ORAL ONCE
Status: COMPLETED | OUTPATIENT
Start: 2021-04-17 | End: 2021-04-17

## 2021-04-17 RX ORDER — NADOLOL 20 MG/1
20 TABLET ORAL
Status: ON HOLD | COMMUNITY
Start: 2021-03-04 | End: 2021-09-07 | Stop reason: SDUPTHER

## 2021-04-17 RX ORDER — MAGNESIUM SULFATE HEPTAHYDRATE 40 MG/ML
2 INJECTION, SOLUTION INTRAVENOUS ONCE
Status: COMPLETED | OUTPATIENT
Start: 2021-04-17 | End: 2021-04-17

## 2021-04-17 RX ORDER — LORAZEPAM 2 MG/ML
1 INJECTION INTRAMUSCULAR ONCE
Status: COMPLETED | OUTPATIENT
Start: 2021-04-17 | End: 2021-04-17

## 2021-04-17 RX ORDER — PHENOBARBITAL SODIUM 130 MG/ML
130 INJECTION INTRAMUSCULAR ONCE
Status: COMPLETED | OUTPATIENT
Start: 2021-04-17 | End: 2021-04-17

## 2021-04-17 RX ORDER — NALTREXONE HYDROCHLORIDE 50 MG/1
50 TABLET, FILM COATED ORAL DAILY
COMMUNITY
Start: 2020-08-24 | End: 2021-06-09 | Stop reason: HOSPADM

## 2021-04-17 RX ORDER — BACLOFEN 10 MG/1
10 TABLET ORAL 3 TIMES DAILY
Status: DISCONTINUED | OUTPATIENT
Start: 2021-04-17 | End: 2021-04-20 | Stop reason: HOSPADM

## 2021-04-17 RX ORDER — PHENOBARBITAL SODIUM 65 MG/ML
65 INJECTION INTRAMUSCULAR ONCE
Status: COMPLETED | OUTPATIENT
Start: 2021-04-17 | End: 2021-04-17

## 2021-04-17 RX ORDER — MAGNESIUM SULFATE HEPTAHYDRATE 40 MG/ML
2 INJECTION, SOLUTION INTRAVENOUS ONCE
Status: COMPLETED | OUTPATIENT
Start: 2021-04-17 | End: 2021-04-18

## 2021-04-17 RX ORDER — AMLODIPINE BESYLATE 5 MG/1
5 TABLET ORAL DAILY
Status: DISCONTINUED | OUTPATIENT
Start: 2021-04-18 | End: 2021-04-20 | Stop reason: HOSPADM

## 2021-04-17 RX ORDER — ALFUZOSIN HYDROCHLORIDE 10 MG/1
10 TABLET, EXTENDED RELEASE ORAL DAILY
Status: DISCONTINUED | OUTPATIENT
Start: 2021-04-18 | End: 2021-04-20 | Stop reason: HOSPADM

## 2021-04-17 RX ORDER — ACETAMINOPHEN 325 MG/1
650 TABLET ORAL EVERY 6 HOURS PRN
Status: DISCONTINUED | OUTPATIENT
Start: 2021-04-17 | End: 2021-04-19

## 2021-04-17 RX ORDER — CITALOPRAM 20 MG/1
20 TABLET ORAL DAILY
Status: DISCONTINUED | OUTPATIENT
Start: 2021-04-18 | End: 2021-04-20 | Stop reason: HOSPADM

## 2021-04-17 RX ORDER — ALFUZOSIN HYDROCHLORIDE 10 MG/1
10 TABLET, EXTENDED RELEASE ORAL
Status: DISCONTINUED | OUTPATIENT
Start: 2021-04-18 | End: 2021-04-17

## 2021-04-17 RX ORDER — SODIUM CHLORIDE, SODIUM LACTATE, POTASSIUM CHLORIDE, CALCIUM CHLORIDE 600; 310; 30; 20 MG/100ML; MG/100ML; MG/100ML; MG/100ML
125 INJECTION, SOLUTION INTRAVENOUS CONTINUOUS
Status: DISCONTINUED | OUTPATIENT
Start: 2021-04-17 | End: 2021-04-19

## 2021-04-17 RX ORDER — FINASTERIDE 5 MG/1
5 TABLET, FILM COATED ORAL DAILY
Status: DISCONTINUED | OUTPATIENT
Start: 2021-04-18 | End: 2021-04-20 | Stop reason: HOSPADM

## 2021-04-17 RX ORDER — ALFUZOSIN HYDROCHLORIDE 10 MG/1
TABLET, EXTENDED RELEASE ORAL
COMMUNITY

## 2021-04-17 RX ORDER — TRAZODONE HYDROCHLORIDE 150 MG/1
TABLET ORAL
COMMUNITY
End: 2021-06-09 | Stop reason: HOSPADM

## 2021-04-17 RX ORDER — ALLOPURINOL 100 MG/1
TABLET ORAL
Status: ON HOLD | COMMUNITY
End: 2021-09-07 | Stop reason: SDUPTHER

## 2021-04-17 RX ORDER — FUROSEMIDE 10 MG/ML
20 INJECTION INTRAMUSCULAR; INTRAVENOUS ONCE
Status: COMPLETED | OUTPATIENT
Start: 2021-04-17 | End: 2021-04-17

## 2021-04-17 RX ORDER — PHENOBARBITAL SODIUM 65 MG/ML
65 INJECTION INTRAMUSCULAR ONCE
Status: COMPLETED | OUTPATIENT
Start: 2021-04-18 | End: 2021-04-18

## 2021-04-17 RX ORDER — NADOLOL 20 MG/1
20 TABLET ORAL
Status: DISCONTINUED | OUTPATIENT
Start: 2021-04-17 | End: 2021-04-20 | Stop reason: HOSPADM

## 2021-04-17 RX ORDER — DIAZEPAM 5 MG/ML
5 INJECTION, SOLUTION INTRAMUSCULAR; INTRAVENOUS ONCE
Status: COMPLETED | OUTPATIENT
Start: 2021-04-17 | End: 2021-04-17

## 2021-04-17 RX ORDER — METOPROLOL SUCCINATE 25 MG/1
25 TABLET, EXTENDED RELEASE ORAL DAILY
Status: DISCONTINUED | OUTPATIENT
Start: 2021-04-18 | End: 2021-04-20 | Stop reason: HOSPADM

## 2021-04-17 RX ORDER — BACLOFEN 10 MG/1
10 TABLET ORAL 3 TIMES DAILY
COMMUNITY
Start: 2021-03-04 | End: 2021-09-07 | Stop reason: HOSPADM

## 2021-04-17 RX ORDER — SODIUM CHLORIDE, SODIUM LACTATE, POTASSIUM CHLORIDE, CALCIUM CHLORIDE 600; 310; 30; 20 MG/100ML; MG/100ML; MG/100ML; MG/100ML
125 INJECTION, SOLUTION INTRAVENOUS CONTINUOUS
Status: DISCONTINUED | OUTPATIENT
Start: 2021-04-17 | End: 2021-04-17 | Stop reason: HOSPADM

## 2021-04-17 RX ADMIN — PHENOBARBITAL SODIUM 65 MG: 65 INJECTION INTRAMUSCULAR; INTRAVENOUS at 22:53

## 2021-04-17 RX ADMIN — SODIUM CHLORIDE, SODIUM LACTATE, POTASSIUM CHLORIDE, AND CALCIUM CHLORIDE 125 ML/HR: .6; .31; .03; .02 INJECTION, SOLUTION INTRAVENOUS at 16:38

## 2021-04-17 RX ADMIN — BACLOFEN 10 MG: 10 TABLET ORAL at 22:13

## 2021-04-17 RX ADMIN — POTASSIUM CHLORIDE 40 MEQ: 1500 TABLET, EXTENDED RELEASE ORAL at 15:31

## 2021-04-17 RX ADMIN — DIAZEPAM 5 MG: 10 INJECTION, SOLUTION INTRAMUSCULAR; INTRAVENOUS at 18:51

## 2021-04-17 RX ADMIN — MULTIPLE VITAMINS W/ MINERALS TAB 1 TABLET: TAB at 13:38

## 2021-04-17 RX ADMIN — NADOLOL 20 MG: 20 TABLET ORAL at 21:40

## 2021-04-17 RX ADMIN — IOHEXOL 100 ML: 350 INJECTION, SOLUTION INTRAVENOUS at 15:18

## 2021-04-17 RX ADMIN — MAGNESIUM SULFATE HEPTAHYDRATE 2 G: 40 INJECTION, SOLUTION INTRAVENOUS at 15:34

## 2021-04-17 RX ADMIN — SODIUM CHLORIDE, SODIUM LACTATE, POTASSIUM CHLORIDE, AND CALCIUM CHLORIDE 125 ML/HR: .6; .31; .03; .02 INJECTION, SOLUTION INTRAVENOUS at 22:13

## 2021-04-17 RX ADMIN — TRAZODONE HYDROCHLORIDE 150 MG: 100 TABLET ORAL at 21:39

## 2021-04-17 RX ADMIN — FUROSEMIDE 20 MG: 10 INJECTION, SOLUTION INTRAVENOUS at 22:14

## 2021-04-17 RX ADMIN — LORAZEPAM 1 MG: 2 INJECTION INTRAMUSCULAR; INTRAVENOUS at 15:33

## 2021-04-17 RX ADMIN — LORAZEPAM 1 MG: 2 INJECTION INTRAMUSCULAR; INTRAVENOUS at 13:38

## 2021-04-17 RX ADMIN — THIAMINE HYDROCHLORIDE: 100 INJECTION, SOLUTION INTRAMUSCULAR; INTRAVENOUS at 13:38

## 2021-04-17 RX ADMIN — PHENOBARBITAL SODIUM 130 MG: 130 INJECTION INTRAMUSCULAR at 21:40

## 2021-04-17 RX ADMIN — POTASSIUM CHLORIDE 40 MEQ: 20 TABLET, EXTENDED RELEASE ORAL at 22:53

## 2021-04-17 NOTE — ED NOTES
Patient was not fed here and was c/o being hungry  We were attempting to get him something to eat, but his transferred arrive  I passed the message along to [facility] facility during report        Verner Hands, RN  04/17/21 5745

## 2021-04-17 NOTE — ED NOTES
Patient transported to [facility], [address] [place] [address]  04/17/21 5767

## 2021-04-17 NOTE — ASSESSMENT & PLAN NOTE
· Patient's ECG done in [facility] ED showed prolonged QTC interval at 497 with infrequent PVCs  · Patient will be monitored on telemetry  · Repeat ECG in the AM

## 2021-04-17 NOTE — ED PROVIDER NOTES
History  Chief Complaint   Patient presents with    Leg Swelling     pt reports leg swelling started two weeks ago, pcp did put on antiobitiocs states not working;     Alcohol Problem     pt reports shaking; last drink yesterday     70year old male with history of long standing alcohol abuse, HTN presents for evaluation of leg swelling  Pt notes this started a few weeks ago  He notes he was evaluated by PCP and was on an antibiotic (details of this unknown) and it didn't help  He reports swelling  Denies pain  Denies h/o CHF  Denies h/o DVT  Both legs equally swollen  No reported aggravating or alleviating factors  Denies redness or warmth  Pt also reports shaking related to his alcohol problem  Pt has been dealing with this for quite some time  He is accompanied by spouse  He is interested in going to rehab  Has been to [facility] rehab in the past   Wife notes she's called several places but he has been having difficultly finding a facility  He has attended AA without reported benefit  He has tried rehab in the past but has relapses  He admits to drinking 1/5th vodka daily  He reports his last drink was yesterday  He admits to feeling shaky and feels like his "legs are wobbly"  He does report episode of one seizure in the past   Also has h/o DTs  Denies chest pain, SOB, N/V/D, abdominal pain  Denies dizziness or lightheadedness  History provided by:  Patient, medical records and spouse   used: No    Alcohol Problem  Chronicity:  Recurrent  Associated symptoms: no abdominal pain, no chest pain, no congestion, no cough, no diarrhea, no fatigue, no fever, no headaches, no loss of consciousness, no nausea, no rash, no rhinorrhea, no shortness of breath, no sore throat, no vomiting and no wheezing        Prior to Admission Medications   Prescriptions Last Dose Informant Patient Reported? Taking?    alfuzosin (UROXATRAL) 10 mg 24 hr tablet   Yes No   Sig: Take 40 mg by mouth daily    allopurinol (ZYLOPRIM) 100 mg tablet   Yes No   amLODIPine (NORVASC) 5 mg tablet   No No   Sig: Take 1 tablet (5 mg total) by mouth daily   baclofen 10 mg tablet   Yes Yes   Sig: Take 10 mg by mouth Three times a day   citalopram (CeleXA) 20 mg tablet   Yes No   Sig: Take 20 mg by mouth daily   cycloSPORINE (RESTASIS) 0 05 % ophthalmic emulsion   Yes No   Si drop 2 (two) times a day   finasteride (PROSCAR) 5 mg tablet   Yes No   Sig: Take 5 mg by mouth daily   magnesium oxide (MAG-OX) 400 mg   Yes Yes   Sig: Take 400 mg by mouth 2 (two) times a day   metoprolol succinate (TOPROL-XL) 25 mg 24 hr tablet   Yes No   Sig: Take 25 mg by mouth daily   nadolol (CORGARD) 20 mg tablet   Yes Yes   Sig: Take 20 mg by mouth   naltrexone (REVIA) 50 mg tablet   Yes Yes   Sig: Take 50 mg by mouth daily   traZODone (DESYREL) 150 mg tablet   Yes No   Sig: Take 150 mg by mouth nightly  Takes 1/2 pill      Facility-Administered Medications: None       Past Medical History:   Diagnosis Date    Alcohol dependency ([facility] )     Anxiety     Depression     [place] (hard of hearing)     Hypertension     Kidney stones     Prostate enlargement     Renal disorder     kidney    Shoulder dislocation        Past Surgical History:   Procedure Laterality Date    CHOLECYSTECTOMY      SHOULDER SURGERY      for dislocation       History reviewed  No pertinent family history  I have reviewed and agree with the history as documented  E-Cigarette/Vaping    E-Cigarette Use Never User      E-Cigarette/Vaping Substances     Social History     Tobacco Use    Smoking status: Never Smoker    Smokeless tobacco: Never Used   Substance Use Topics    Alcohol use: Yes     Frequency: 2-3 times a week     Drinks per session: 5 or 6     Binge frequency: Daily or almost daily     Comment: 3-4 times per week    Drug use: No       Review of Systems   Constitutional: Negative  Negative for chills, fatigue and fever  HENT: Negative    Negative for congestion, rhinorrhea and sore throat  Eyes: Negative  Negative for visual disturbance  Respiratory: Negative  Negative for cough, shortness of breath and wheezing  Cardiovascular: Positive for leg swelling  Negative for chest pain and palpitations  Gastrointestinal: Negative  Negative for abdominal pain, constipation, diarrhea, nausea and vomiting  Genitourinary: Negative  Negative for dysuria, flank pain, frequency and hematuria  Musculoskeletal: Negative  Negative for back pain and neck pain  Skin: Negative  Negative for rash  Neurological: Positive for tremors  Negative for dizziness, loss of consciousness, syncope, light-headedness, numbness and headaches  Psychiatric/Behavioral: Negative for confusion and hallucinations  The patient is nervous/anxious  All other systems reviewed and are negative  Physical Exam  Physical Exam  Vitals signs and nursing note reviewed  Constitutional:       General: He is not in acute distress  Appearance: Normal appearance  He is well-developed  He is obese  He is not toxic-appearing  HENT:      Head: Normocephalic and atraumatic  Right Ear: Tympanic membrane, ear canal and external ear normal  Decreased hearing noted  Left Ear: Tympanic membrane, ear canal and external ear normal  Decreased hearing noted  Nose: Nose normal       Mouth/Throat:      Pharynx: Uvula midline  No oropharyngeal exudate  Eyes:      General: Lids are normal  No scleral icterus  Conjunctiva/sclera: Conjunctivae normal       Pupils: Pupils are equal, round, and reactive to light  Neck:      Musculoskeletal: Normal range of motion and neck supple  Vascular: No JVD  Trachea: Trachea normal  No tracheal deviation  Cardiovascular:      Rate and Rhythm: Normal rate and regular rhythm  Pulses: Normal pulses  Dorsalis pedis pulses are 2+ on the right side and 2+ on the left side          Posterior tibial pulses are 2+ on the right side and 2+ on the left side  Heart sounds: Normal heart sounds, S1 normal and S2 normal  No murmur  Pulmonary:      Effort: Pulmonary effort is normal  No tachypnea or respiratory distress  Breath sounds: Normal breath sounds  No wheezing, rhonchi or rales  Abdominal:      General: Bowel sounds are normal  There is no distension  Palpations: Abdomen is soft  Tenderness: There is no abdominal tenderness  There is no guarding or rebound  Musculoskeletal: Normal range of motion  General: No tenderness  Right lower le+ Edema present  Left lower le+ Edema present  Skin:     General: Skin is warm and dry  Capillary Refill: Capillary refill takes less than 2 seconds  Findings: No rash  Neurological:      General: No focal deficit present  Mental Status: He is alert and oriented to person, place, and time  GCS: GCS eye subscore is 4  GCS verbal subscore is 5  GCS motor subscore is 6  Cranial Nerves: Cranial nerves are intact  No cranial nerve deficit  Sensory: Sensation is intact  No sensory deficit  Motor: Tremor present  No abnormal muscle tone  Deep Tendon Reflexes: Reflexes are normal and symmetric  Comments: Pt is tremulous  Psychiatric:         Mood and Affect: Mood is anxious           Speech: Speech normal          Behavior: Behavior normal          Vital Signs  ED Triage Vitals   Temperature Pulse Respirations Blood Pressure SpO2   21 1231 21 1230 21 1230 21 1230 21 1230   99 3 °F (37 4 °C) 71 21 164/79 92 %      Temp Source Heart Rate Source Patient Position - Orthostatic VS BP Location FiO2 (%)   21 1231 21 1230 21 1230 21 1230 --   Temporal Monitor Lying Left arm       Pain Score       --                  Vitals:    21 1730 21 1800 21 1830 21 1900   BP: 163/79 (!) 174/81 (!) 175/88 (!) 198/86   Pulse: 64 68 63 67   Patient Position - Orthostatic VS: Lying Sitting Lying Lying         Visual Acuity      ED Medications  Medications   lactated ringers infusion (125 mL/hr Intravenous New Bag 4/17/21 1638)   multivitamin-minerals (CENTRUM) tablet 1 tablet (1 tablet Oral Given 4/17/21 1338)   LORazepam (ATIVAN) injection 1 mg (1 mg Intravenous Given 6/95/01 2189)   folic acid 1 mg, thiamine (VITAMIN B1) 100 mg in sodium chloride 0 9 % 100 mL IV piggyback ( Intravenous Stopped 4/17/21 1423)   magnesium sulfate 2 g/50 mL IVPB (premix) 2 g (0 g Intravenous Stopped 4/17/21 1634)   potassium chloride (K-DUR,KLOR-CON) CR tablet 40 mEq (40 mEq Oral Given 4/17/21 1531)   iohexol (OMNIPAQUE) 350 MG/ML injection (SINGLE-DOSE) 100 mL (100 mL Intravenous Given 4/17/21 1518)   LORazepam (ATIVAN) injection 1 mg (1 mg Intravenous Given 4/17/21 1533)   diazepam (VALIUM) injection 5 mg (5 mg Intravenous Given 4/17/21 1851)       Diagnostic Studies  Results Reviewed     Procedure Component Value Units Date/Time    Urine Microscopic [[id number]]  (Normal) Collected: 04/17/21 1424    Lab Status: Final result Specimen: Urine, Clean Catch Updated: 04/17/21 1530     RBC, UA 1-2 /hpf      WBC, UA 0-1 /hpf      Epithelial Cells Occasional /hpf      Bacteria, UA None Seen /hpf     Rapid drug screen, urine [[id number]]  (Abnormal) Collected: 04/17/21 1424    Lab Status: Final result Specimen: Urine, Clean Catch Updated: 04/17/21 1515     Amph/Meth UR Negative     Barbiturate Ur Negative     Benzodiazepine Urine Positive     Cocaine Urine Negative     Methadone Urine Negative     Opiate Urine Negative     PCP Ur Negative     THC Urine Negative     Oxycodone Urine Negative    Narrative:      Presumptive report  If requested, specimen will be sent to reference lab for confirmation  FOR MEDICAL PURPOSES ONLY  IF CONFIRMATION NEEDED PLEASE CONTACT THE LAB WITHIN 5 DAYS      Drug Screen Cutoff Levels:  AMPHETAMINE/METHAMPHETAMINES  1000 ng/mL  BARBITURATES     200 ng/mL  BENZODIAZEPINES     200 ng/mL  COCAINE      300 ng/mL  METHADONE      300 ng/mL  OPIATES      300 ng/mL  PHENCYCLIDINE     25 ng/mL  THC       50 ng/mL  OXYCODONE      100 ng/mL    UA (URINE) with reflex to Scope [[id number]]  (Abnormal) Collected: 04/17/21 1424    Lab Status: Final result Specimen: Urine, Clean Catch Updated: 04/17/21 1431     Color, UA Yellow     Clarity, UA Clear     Specific Gravity, UA >=1 030     pH, UA 6 0     Leukocytes, UA Negative     Nitrite, UA Negative     Protein, UA 30 (1+) mg/dl      Glucose, UA Negative mg/dl      Ketones, UA >=80 (3+) mg/dl      Urobilinogen, UA 0 2 E U /dl      Bilirubin, UA Negative     Blood, UA Trace-Intact    TSH [[id number]]  (Normal) Collected: 04/17/21 1320    Lab Status: Final result Specimen: Blood from Arm, Left Updated: 04/17/21 1421     TSH 3RD GENERATON 0 951 uIU/mL     Narrative:      Patients undergoing fluorescein dye angiography may retain small amounts of fluorescein in the body for 48-72 hours post procedure  Samples containing fluorescein can produce falsely depressed TSH values  If the patient had this procedure,a specimen should be resubmitted post fluorescein clearance        Magnesium [[id number]]  (Abnormal) Collected: 04/17/21 1320    Lab Status: Final result Specimen: Blood from Arm, Left Updated: 04/17/21 1421     Magnesium 1 2 mg/dL     NT-BNP PRO [[id number]]  (Normal) Collected: 04/17/21 1320    Lab Status: Final result Specimen: Blood from Arm, Left Updated: 04/17/21 1421     NT-proBNP 108 pg/mL     Lipase [[id number]]  (Abnormal) Collected: 04/17/21 1320    Lab Status: Final result Specimen: Blood from Arm, Left Updated: 04/17/21 1421     Lipase 1,297 u/L     CKMB [[id number]]  (Normal) Collected: 04/17/21 1320    Lab Status: Final result Specimen: Blood from Arm, Left Updated: 04/17/21 1421     CK-MB Index 1 7 %      CK-MB 3 8 ng/mL     CK Total with Reflex CKMB [[id number]]  (Normal) Collected: 04/17/21 1320    Lab Status: Final result Specimen: Blood from Arm, Left Updated: 04/17/21 1417     Total  U/L     Ethanol [[id number]]  (Normal) Collected: 04/17/21 1320    Lab Status: Final result Specimen: Blood from Arm, Left Updated: 04/17/21 1400     Ethanol Lvl <3 mg/dL     Troponin I [[id number]]  (Normal) Collected: 04/17/21 1320    Lab Status: Final result Specimen: Blood from Arm, Left Updated: 04/17/21 1347     Troponin I <0 02 ng/mL     Comprehensive metabolic panel [[id number]]  (Abnormal) Collected: 04/17/21 1320    Lab Status: Final result Specimen: Blood from Arm, Left Updated: 04/17/21 1342     Sodium 143 mmol/L      Potassium 3 2 mmol/L      Chloride 102 mmol/L      CO2 28 mmol/L      ANION GAP 13 mmol/L      BUN 12 mg/dL      Creatinine 0 71 mg/dL      Glucose 90 mg/dL      Calcium 8 8 mg/dL      AST 53 U/L      ALT 49 U/L      Alkaline Phosphatase 99 U/L      Total Protein 7 7 g/dL      Albumin 4 3 g/dL      Total Bilirubin 1 21 mg/dL      eGFR 94 ml/min/1 73sq m     Narrative:      [place] guidelines for Chronic Kidney Disease (CKD):     Stage 1 with normal or high GFR (GFR > 90 mL/min/1 73 square meters)    Stage 2 Mild CKD (GFR = 60-89 mL/min/1 73 square meters)    Stage 3A Moderate CKD (GFR = 45-59 mL/min/1 73 square meters)    Stage 3B Moderate CKD (GFR = 30-44 mL/min/1 73 square meters)    Stage 4 Severe CKD (GFR = 15-29 mL/min/1 73 square meters)    Stage 5 End Stage CKD (GFR <15 mL/min/1 73 square meters)  Note: GFR calculation is accurate only with a steady state creatinine    Protime-INR [[id number]]  (Normal) Collected: 04/17/21 1320    Lab Status: Final result Specimen: Blood from Arm, Left Updated: 04/17/21 1336     Protime 13 7 seconds      INR 1 07    APTT [[id number]]  (Normal) Collected: 04/17/21 1320    Lab Status: Final result Specimen: Blood from Arm, Left Updated: 04/17/21 1336     PTT 26 seconds     CBC and differential [[id number]]  (Abnormal) Collected: 04/17/21 1320    Lab Status: Final result Specimen: Blood from Arm, Left Updated: 04/17/21 1328     WBC 7 75 Thousand/uL      RBC 4 67 Million/uL      Hemoglobin 14 8 g/dL      Hematocrit 44 4 %      MCV 95 fL      MCH 31 7 pg      MCHC 33 3 g/dL      RDW 13 7 %      MPV 9 0 fL      Platelets 556 Thousands/uL      nRBC 0 /100 WBCs      Neutrophils Relative 79 %      Immat GRANS % 0 %      Lymphocytes Relative 12 %      Monocytes Relative 8 %      Eosinophils Relative 0 %      Basophils Relative 1 %      Neutrophils Absolute 6 16 Thousands/µL      Immature Grans Absolute 0 03 Thousand/uL      Lymphocytes Absolute 0 91 Thousands/µL      Monocytes Absolute 0 58 Thousand/µL      Eosinophils Absolute 0 03 Thousand/µL      Basophils Absolute 0 04 Thousands/µL                  CT abdomen pelvis with contrast   Final Result by Ceci Gonzalez MD (04/17 0821)   1  Mild inflammatory change noted pancreatic head and uncinate process suspicious for acute uncomplicated pancreatitis  2   Left lower renal pole cortical cyst with thin calcified septa consistent with a Bosniak 2 cyst  Based on Bosniak Classification of Cystic Renal Masses, Version 2019, this lesion is a Bosniak II: Likely benign Bosniak II renal mass requiring no    follow-up           Workstation performed: [id number]         VAS lower limb venous duplex study, complete bilateral   Final Result by Phuong He MD (04/17 1727)      XR chest 1 view portable    (Results Pending)              Procedures  ECG 12 Lead Documentation Only    Date/Time: 4/17/2021 1:32 PM  Performed by: Neda Mc PA-C  Authorized by: Neda Mc PA-C     Indications / Diagnosis:  Leg swelling  ECG reviewed by me, the ED Provider: yes    Patient location:  ED  Previous ECG:     Previous ECG:  Compared to current    Comparison ECG info:  12/2/19    Similarity:  Changes noted    Comparison to cardiac monitor: Yes    Interpretation:     Interpretation: abnormal    Rate:     ECG rate:  69    ECG rate assessment: normal    Rhythm:     Rhythm: sinus rhythm    Ectopy:     Ectopy: PVCs      PVCs:  Infrequent  QRS:     QRS axis:  Normal    QRS intervals:  Normal  Conduction:     Conduction: normal    ST segments:     ST segments:  Normal  T waves:     T waves: non-specific    Other findings:     Other findings: prolonged qTc interval    Comments:      , , QT/QTc 464/497; no acute ischemic changes  Compared to prior QT interval has increased  ED Course  ED Course as of Apr 17 1935   Sat Apr 17, 2021   1329 WBC: 7 75   1329 Hemoglobin: 14 8   1329 Platelet Count: 759   1331 I did reach out to on call vascular tech Caroline Corrigan via [place] text to notify him of ordered study  1332 He indicates he will be in within the hour  1342 Glucose, Random: 90   1342 Creatinine: 0 71   1342 BUN: 12   1342 Sodium: 143   1342 Will replete   Potassium(!): 3 2   1342 Chloride: 102   1342 CO2: 28   1342 Anion Gap: 13   1342 Calcium: 8 8   1342 Increased, normal last year   AST(!): 53   1343 ALT: 49   1343 Alkaline Phosphatase: 99   1343 Total Protein: 7 7   1343 Albumin: 4 3   1343 TOTAL BILIRUBIN(!): 1 21   1343 eGFR: 94   1343 INR: 1 07   1343 Protime: 13 7   1343 PTT: 26   1347 Troponin I: <0 02   7256 Independently viewed and interpreted by me - no acute cardiopulmonary process or significant interval change; pending official read  XR chest 1 view portable   1402 MEDICAL ALCOHOL: <3   1030 Prelim from vascular tech Caroline Corrigan, negative for DVT bilaterally  1457 Total CK: 755   5286 TSH 3RD GENERATON: 0 951   1457 NT-proBNP: 108   1457 Will replete  Magnesium(!): 1 2   1457 CREATINE KINASE-MB INDEX: 1 7   1457 CREATINE KINASE-MB FRACTION: 3 8   1457 POCT URINE PROTEIN(!): 30 (1+)   1457 Ketones, UA(!): >=80 (3+)   1457 Blood, UA(!): Trace-Intact   1458 Increase from 795 one year ago; likely due to pancreatitis secondary to alcohol abuse, will obtain CT abd/pelv to further evaluate     Lipase(!): 1,297 1500 Pt and spouse updated on results  He is agreeable to CT scan  Will plan to discuss with medical detox unit at [facility] for admission and further management  1517 Positive benzo, otherwise negative  Rapid drug screen, urine(!)   1524 Pt notes feeling more tremulous/jittery and requesting another dose of ativan  Vital signs stable  1537 RBC, UA: 1-2   1537 WBC, UA: 0-1   1553 CT performed and pending interpretation  1645 IMPRESSION:  1  Mild inflammatory change noted pancreatic head and uncinate process suspicious for acute uncomplicated pancreatitis  2   Left lower renal pole cortical cyst with thin calcified septa consistent with a Bosniak 2 cyst  Based on Bosniak Classification of Cystic Renal Masses, Version 2019, this lesion is a Bosniak II: Likely benign Bosniak II renal mass requiring no   follow-up  CT abdomen pelvis with contrast   1650 Pt updated on results  He doesn't have significant pain but has findings to suggest a mild uncomplicated pancreatitis likely related to alcohol  Reviewed incidental finding of renal cyst in which no follow up was recommended by radiology  [place] text sent to on call [facility] Ronald Gaming  1704 He will look at chart and get back to me  Elroy [phone] [facility] detox for update  [address] to [place] via telephone, feels pt is a good candidate  Will touch base with attending and get back to me       1801 Per [place], pt is accepted for transfer/admission  Recommends diazepam as opposed to ativan if needed and recommends dose prior to transport  1803 Spoke to Susi at [facility], will call back with bed assignment and  information  1812 Per RN, ETA for  is in one hour  1538 [facility] arrived for /transport  Pt left in stable condition  He will be admitted to medical toxicology as there is concern for alcohol withdrawal given use and history; pt is looking for rehab    Potassium and magnesium supplemented  IV fluid resuscitation and bowel rest for acute pancreatitis  Discussed need for alcohol cessation and pt wanting to quit and go to rehab  Leg swelling likely dependent edema  No evidence of DVT  No CHF  Reviewed elevation of extremities, low sodium diet, regular activity, compression stockings, etc       HEART Risk Score      Most Recent Value   Heart Score Risk Calculator   History  0 Filed at: 04/17/2021 1406   ECG  1 Filed at: 04/17/2021 1406   Age  2 Filed at: 04/17/2021 1406   Risk Factors  1 Filed at: 04/17/2021 1406   Troponin  0 Filed at: 04/17/2021 1406   HEART Score  4 Filed at: 04/17/2021 1406                      SBIRT 22yo+      Most Recent Value   SBIRT (23 yo +)   In order to provide better care to our patients, we are screening all of our patients for alcohol and drug use  Would it be okay to ask you these screening questions? Yes Filed at: 04/17/2021 1239   Initial Alcohol Screen: US AUDIT-C    1  How often do you have a drink containing alcohol?  0 Filed at: 04/17/2021 1239   2  How many drinks containing alcohol do you have on a typical day you are drinking? 0 Filed at: 04/17/2021 1239   3a  Male UNDER 65: How often do you have five or more drinks on one occasion? 0 Filed at: 04/17/2021 1239   3b  FEMALE Any Age, or MALE 65+: How often do you have 4 or more drinks on one occassion? 0 Filed at: 04/17/2021 1239   Audit-C Score  0 Filed at: 04/17/2021 1239   JEMIMA: How many times in the past year have you    Used an illegal drug or used a prescription medication for non-medical reasons?   Never Filed at: 04/17/2021 1239            Wells' Criteria for DVT      Most Recent Value   Wells' Criteria for DVT   Active cancer Treatment or palliation within 6 months  0 Filed at: 04/17/2021 1330   Bedridden recently >3 days or major surgery within 12 weeks  0 Filed at: 04/17/2021 1330   Calf swelling >3 cm compared to the other leg  0 Filed at: 04/17/2021 1330   Entire leg swollen  1 Filed at: 04/17/2021 1330   Collateral (nonvaricose) superficial veins present  0 Filed at: 04/17/2021 1330   Localized tenderness along the deep venous system  0 Filed at: 04/17/2021 1330   Pitting edema, confined to symptomatic leg  0 Filed at: 04/17/2021 1330   Paralysis, paresis, or recent plaster immobilization of the lower extremity  0 Filed at: 04/17/2021 1330   Previously documented DVT  0 Filed at: 04/17/2021 1330   Alternative diagnosis to DVT as likely or more likely  0 Filed at: 04/17/2021 1330   Wells DVT Critera Score  1 Filed at: 04/17/2021 1330              [facility]  Number of Diagnoses or Management Options  Acute pancreatitis: new and requires workup  Alcohol abuse: established and worsening  Bilateral lower extremity edema: new and requires workup  Hypokalemia: new and requires workup  Hypomagnesemia: new and requires workup  Renal cyst, left: new and requires workup     Amount and/or Complexity of Data Reviewed  Clinical lab tests: ordered and reviewed  Tests in the radiology section of CPT®: ordered and reviewed  Decide to obtain previous medical records or to obtain history from someone other than the patient: yes  Obtain history from someone other than the patient: yes  Review and summarize past medical records: yes  Discuss the patient with other providers: yes (Attending, medical toxicology)  Independent visualization of images, tracings, or specimens: yes    Patient Progress  Patient progress: stable      Disposition  Final diagnoses:   Alcohol abuse   Bilateral lower extremity edema   Hypomagnesemia   Hypokalemia   Acute pancreatitis   Renal cyst, left     Time reflects when diagnosis was documented in both MDM as applicable and the Disposition within this note     Time User Action Codes Description Comment    4/17/2021  3:19 PM Mckinney Riddles Add [F10 10] Alcohol abuse     4/17/2021  3:19 PM Mckinney Riddles Add [R60 0] Bilateral lower extremity edema     4/17/2021  3:19 PM [place] [E83 42] Hypomagnesemia     4/17/2021  3:19 PM Trixie Octave Add [E87 6] Hypokalemia     4/17/2021  5:03 PM Trixie Octave Add [K85 90] Acute pancreatitis     4/17/2021  5:03 PM Trixie Octave Add [N28 1] Renal cyst, left       ED Disposition     ED Disposition Condition Date/Time Comment    Transfer to Another Facility-In Network  [facility] Apr 17, 2021  6:02 PM Raudel Marquez should be transferred out to [facility]          MD Documentation      Most Recent Value   Patient Condition  The patient has been stabilized such that within reasonable medical probability, no material deterioration of the patient condition or the condition of the unborn child(gabriela) is likely to result from the transfer   Reason for Transfer  Level of Care needed not available at this facility   Benefits of Transfer  Specialized equipment and/or services available at the receiving facility (Include comment)________________________ [facility]]   Risks of Transfer  Potential for delay in receiving treatment, Potential deterioration of medical condition, Loss of IV, Increased discomfort during transfer, Possible worsening of condition or death during transfer   Accepting Physician  Dr De Guzman [place] Name, [id number] \[facility] [place] [facility]\"" [facility]    (Name & Tel number)  PACS   Sending MD Jose Hutton MD   Provider Certification  General risk, such as traffic hazards, adverse weather conditions, rough terrain or turbulence, possible failure of equipment (including vehicle or aircraft), or consequences of actions of persons outside the control of the transport personnel, Unanticipated needs of medical equipment and personnel during transport, Risk of worsening condition, The possibility of a transport vehicle being unavailable      RN Documentation      [address] Name, [id number] \[facility]\"" [facility]   Bed Assignment  508    (Name & Tel number)  PACS Report Given to  Yvonne RIVAS       Follow-up Information    None         Discharge Medication List as of 4/17/2021  7:16 PM      CONTINUE these medications which have NOT CHANGED    Details   baclofen 10 mg tablet Take 10 mg by mouth Three times a day, Starting Thu 3/4/2021, Until Fri 3/4/2022, Historical Med      magnesium oxide (MAG-OX) 400 mg Take 400 mg by mouth 2 (two) times a day, Historical Med      nadolol (CORGARD) 20 mg tablet Take 20 mg by mouth, Starting Thu 3/4/2021, Until Fri 3/4/2022, Historical Med      naltrexone (REVIA) 50 mg tablet Take 50 mg by mouth daily, Starting Mon 8/24/2020, Until Tue 8/24/2021, Historical Med      alfuzosin (UROXATRAL) 10 mg 24 hr tablet Take 40 mg by mouth daily  , Historical Med      allopurinol (ZYLOPRIM) 100 mg tablet Historical Med      amLODIPine (NORVASC) 5 mg tablet Take 1 tablet (5 mg total) by mouth daily, Starting Tue 7/16/2019, Normal      citalopram (CeleXA) 20 mg tablet Take 20 mg by mouth daily, Historical Med      cycloSPORINE (RESTASIS) 0 05 % ophthalmic emulsion 1 drop 2 (two) times a day, Historical Med      finasteride (PROSCAR) 5 mg tablet Take 5 mg by mouth daily, Historical Med      metoprolol succinate (TOPROL-XL) 25 mg 24 hr tablet Take 25 mg by mouth daily, Historical Med      traZODone (DESYREL) 150 mg tablet Take 150 mg by mouth nightly  Takes 1/2 pill, Historical Med           No discharge procedures on file      PDMP Review     None          ED Provider  Electronically Signed by           Enmanuel Perea PA-C  04/17/21 1935

## 2021-04-17 NOTE — EMTALA/ACUTE CARE TRANSFER
[facility] EMERGENCY DEPARTMENT  [address] [place] [id number]  Dept: [phone]      EMTALA TRANSFER CONSENT    NAME Soledad Galvez                                         1950                              MRN [id number]    I have been informed of my rights regarding examination, treatment, and transfer   by Dr Joanna De LaR osa MD    Benefits: Specialized equipment and/or services available at the receiving facility (Include comment)________________________(medical toxicology)    Risks: Potential for delay in receiving treatment, Potential deterioration of medical condition, Loss of IV, Increased discomfort during transfer, Possible worsening of condition or death during transfer      Consent for Transfer:  I acknowledge that my medical condition has been evaluated and explained to me by the emergency department physician or other qualified medical person and/or my attending physician, who has recommended that I be transferred to the service of  Accepting Physician: Dr Noa Mistry at [facility] Name, [address] : [facility]  The above potential benefits of such transfer, the potential risks associated with such transfer, and the probable risks of not being transferred have been explained to me, and I fully understand them  The doctor has explained that, in my case, the benefits of transfer outweigh the risks  I agree to be transferred  I authorize the performance of emergency medical procedures and treatments upon me in both transit and upon arrival at the receiving facility  Additionally, I authorize the release of any and all medical records to the receiving facility and request they be transported with me, if possible  I understand that the safest mode of transportation during a medical emergency is an ambulance and that the Hospital advocates the use of this mode of transport   Risks of traveling to the receiving facility by car, including absence of medical control, life sustaining equipment, such as oxygen, and medical personnel has been explained to me and I fully understand them  (JOLIE CORRECT BOX BELOW)  [ x ]  I consent to the stated transfer and to be transported by ambulance/helicopter  [  ]  I consent to the stated transfer, but refuse transportation by ambulance and accept full responsibility for my transportation by car  I understand the risks of non-ambulance transfers and I exonerate the Hospital and its staff from any deterioration in my condition that results from this refusal     X___________________________________________    DATE  21  TIME________  Signature of patient or legally responsible individual signing on patient behalf           RELATIONSHIP TO PATIENT_________________________          Provider Certification    NAME Monica Cleary                                         1950                              MRN [id number]    A medical screening exam was performed on the above named patient  Based on the examination:    Condition Necessitating Transfer The primary encounter diagnosis was Alcohol abuse  Diagnoses of Bilateral lower extremity edema, Hypomagnesemia, Hypokalemia, Acute pancreatitis, and Renal cyst, left were also pertinent to this visit      Patient Condition: The patient has been stabilized such that within reasonable medical probability, no material deterioration of the patient condition or the condition of the unborn child(gabriela) is likely to result from the transfer    Reason for Transfer: Level of Care needed not available at this facility    Transfer Requirements: Facility [facility]   · Space available and qualified personnel available for treatment as acknowledged by PACS  · Agreed to accept transfer and to provide appropriate medical treatment as acknowledged by       Dr Mamie Pérez  · Appropriate medical records of the examination and treatment of the patient are provided at the time of transfer   [address] _______  · Transfer will be performed by qualified personnel from    and appropriate transfer equipment as required, including the use of necessary and appropriate life support measures  Provider Certification: I have examined the patient and explained the following risks and benefits of being transferred/refusing transfer to the patient/family:  General risk, such as traffic hazards, adverse weather conditions, rough terrain or turbulence, possible failure of equipment (including vehicle or aircraft), or consequences of actions of persons outside the control of the transport personnel, Unanticipated needs of medical equipment and personnel during transport, Risk of worsening condition, The possibility of a transport vehicle being unavailable      Based on these reasonable risks and benefits to the patient and/or the unborn child(gabriela), and based upon the information available at the time of the patients examination, I certify that the medical benefits reasonably to be expected from the provision of appropriate medical treatments at another medical facility outweigh the increasing risks, if any, to the individuals medical condition, and in the case of labor to the unborn child, from effecting the transfer      X____________________________________________ DATE 04/17/21        TIME_______      ORIGINAL - SEND TO MEDICAL RECORDS   COPY - SEND WITH PATIENT DURING TRANSFER

## 2021-04-17 NOTE — ED NOTES
Patient was not steady on his feet  He was really shaky  He was unable to stand at the bedside and provide a UA, so he was helped to the [facility] to sit and then the urinal was held into place to assist him  He was then assisted back into bed        Javier Cole RN  04/17/21 3677

## 2021-04-17 NOTE — ASSESSMENT & PLAN NOTE
· Patient has a longstanding history of alcohol abuse  · Lipase was elevated at 1,297 on arrival to the ED  · CT abdomen (4/17/2021): Mild inflammatory change noted pancreatic head and uncinate process suspicious for acute uncomplicated pancreatitis    · Will make patient NPO and started on continues IV fluids  · Trend lipase with routine labs

## 2021-04-17 NOTE — ASSESSMENT & PLAN NOTE
· Patient's magnesium on arrival to the [facility] emergency department was 1 2  · Patient received 2 g IV magnesium in the ED  · Recheck magnesium

## 2021-04-17 NOTE — ASSESSMENT & PLAN NOTE
· Patient's potassium on arrival to the [facility] emergency department was 3 2  · Patient received 40 mg K-DUR in the ED  · Recheck potassium

## 2021-04-17 NOTE — ASSESSMENT & PLAN NOTE
· Patient reports leg swelling that started approximately 2 weeks ago  States that he was seen by his primary care physician and was put on antibiotics, without relief  · Patient denies history of DVT  Denies history of CHF or CKD  · On physical exam patient appears to have +2 pitting edema bilaterally  No erythema or warmth noted  Patient denies pain on palpation    · VAS study (4/17/2021):  Negative for DVT bilaterally  · Give one time dose of 20 mg IV Lasix  · Continue to monitor

## 2021-04-17 NOTE — ASSESSMENT & PLAN NOTE
· Patient's BP mildly 175/88 on admission  · Continue pre-hospital amlodipine 5 mg daily, metoprolol succinate 25 mg daily, and Nadolol 20 mg daily  · Continue to monitor per protocol

## 2021-04-18 PROBLEM — R09.02 HYPOXIA: Status: ACTIVE | Noted: 2021-04-18

## 2021-04-18 LAB
ALBUMIN SERPL BCP-MCNC: 3.5 G/DL (ref 3–5.2)
ALP SERPL-CCNC: 75 U/L (ref 43–122)
ALT SERPL W P-5'-P-CCNC: 28 U/L
ANION GAP SERPL CALCULATED.3IONS-SCNC: 3 MMOL/L (ref 5–14)
ANION GAP SERPL CALCULATED.3IONS-SCNC: 8 MMOL/L (ref 5–14)
AST SERPL W P-5'-P-CCNC: 44 U/L (ref 17–59)
ATRIAL RATE: 58 BPM
BILIRUB SERPL-MCNC: 1.24 MG/DL
BUN SERPL-MCNC: 10 MG/DL (ref 5–25)
BUN SERPL-MCNC: 10 MG/DL (ref 5–25)
CALCIUM SERPL-MCNC: 8.3 MG/DL (ref 8.4–10.2)
CALCIUM SERPL-MCNC: 8.4 MG/DL (ref 8.4–10.2)
CHLORIDE SERPL-SCNC: 97 MMOL/L (ref 97–108)
CHLORIDE SERPL-SCNC: 97 MMOL/L (ref 97–108)
CO2 SERPL-SCNC: 30 MMOL/L (ref 22–30)
CO2 SERPL-SCNC: 33 MMOL/L (ref 22–30)
CREAT SERPL-MCNC: 0.58 MG/DL (ref 0.7–1.5)
CREAT SERPL-MCNC: 0.74 MG/DL (ref 0.7–1.5)
ERYTHROCYTE [DISTWIDTH] IN BLOOD BY AUTOMATED COUNT: 14.6 %
ERYTHROCYTE [DISTWIDTH] IN BLOOD BY AUTOMATED COUNT: 14.7 %
GFR SERPL CREATININE-BSD FRML MDRD: 103 ML/MIN/1.73SQ M
GFR SERPL CREATININE-BSD FRML MDRD: 93 ML/MIN/1.73SQ M
GLUCOSE SERPL-MCNC: 173 MG/DL (ref 70–99)
GLUCOSE SERPL-MCNC: 88 MG/DL (ref 70–99)
HCT VFR BLD AUTO: 38.5 % (ref 41–53)
HCT VFR BLD AUTO: 38.5 % (ref 41–53)
HGB BLD-MCNC: 12.8 G/DL (ref 13.5–17.5)
HGB BLD-MCNC: 13 G/DL (ref 13.5–17.5)
LIPASE SERPL-CCNC: 1202 U/L (ref 23–300)
LIPASE SERPL-CCNC: 881 U/L (ref 23–300)
MAGNESIUM SERPL-MCNC: 1.9 MG/DL (ref 1.6–2.3)
MAGNESIUM SERPL-MCNC: 1.9 MG/DL (ref 1.6–2.3)
MCH RBC QN AUTO: 32.2 PG (ref 26–34)
MCH RBC QN AUTO: 32.7 PG (ref 26–34)
MCHC RBC AUTO-ENTMCNC: 33.2 G/DL (ref 31–36)
MCHC RBC AUTO-ENTMCNC: 33.9 G/DL (ref 31–36)
MCV RBC AUTO: 96 FL (ref 80–100)
MCV RBC AUTO: 97 FL (ref 80–100)
P AXIS: 42 DEGREES
PLATELET # BLD AUTO: 197 THOUSANDS/UL (ref 150–450)
PLATELET # BLD AUTO: 198 THOUSANDS/UL (ref 150–450)
PMV BLD AUTO: 7.5 FL (ref 8.9–12.7)
PMV BLD AUTO: 7.8 FL (ref 8.9–12.7)
POTASSIUM SERPL-SCNC: 3.2 MMOL/L (ref 3.6–5)
POTASSIUM SERPL-SCNC: 3.8 MMOL/L (ref 3.6–5)
PR INTERVAL: 180 MS
PROT SERPL-MCNC: 6.2 G/DL (ref 5.9–8.4)
QRS AXIS: 48 DEGREES
QRSD INTERVAL: 96 MS
QT INTERVAL: 452 MS
QTC INTERVAL: 443 MS
RBC # BLD AUTO: 3.97 MILLION/UL (ref 4.5–5.9)
RBC # BLD AUTO: 3.99 MILLION/UL (ref 4.5–5.9)
SODIUM SERPL-SCNC: 133 MMOL/L (ref 137–147)
SODIUM SERPL-SCNC: 135 MMOL/L (ref 137–147)
T WAVE AXIS: 30 DEGREES
VENTRICULAR RATE: 58 BPM
WBC # BLD AUTO: 5 THOUSAND/UL (ref 4.5–11)
WBC # BLD AUTO: 5.9 THOUSAND/UL (ref 4.5–11)

## 2021-04-18 PROCEDURE — 83690 ASSAY OF LIPASE: CPT | Performed by: NURSE PRACTITIONER

## 2021-04-18 PROCEDURE — 93010 ELECTROCARDIOGRAM REPORT: CPT | Performed by: INTERNAL MEDICINE

## 2021-04-18 PROCEDURE — 80053 COMPREHEN METABOLIC PANEL: CPT | Performed by: INTERNAL MEDICINE

## 2021-04-18 PROCEDURE — 80048 BASIC METABOLIC PNL TOTAL CA: CPT | Performed by: EMERGENCY MEDICINE

## 2021-04-18 PROCEDURE — 99233 SBSQ HOSP IP/OBS HIGH 50: CPT | Performed by: INTERNAL MEDICINE

## 2021-04-18 PROCEDURE — 85027 COMPLETE CBC AUTOMATED: CPT | Performed by: INTERNAL MEDICINE

## 2021-04-18 PROCEDURE — 83735 ASSAY OF MAGNESIUM: CPT | Performed by: EMERGENCY MEDICINE

## 2021-04-18 PROCEDURE — 93005 ELECTROCARDIOGRAM TRACING: CPT

## 2021-04-18 PROCEDURE — 83735 ASSAY OF MAGNESIUM: CPT | Performed by: INTERNAL MEDICINE

## 2021-04-18 PROCEDURE — 83690 ASSAY OF LIPASE: CPT | Performed by: INTERNAL MEDICINE

## 2021-04-18 PROCEDURE — 85027 COMPLETE CBC AUTOMATED: CPT | Performed by: EMERGENCY MEDICINE

## 2021-04-18 RX ORDER — PHENOBARBITAL SODIUM 65 MG/ML
65 INJECTION INTRAMUSCULAR ONCE
Status: COMPLETED | OUTPATIENT
Start: 2021-04-18 | End: 2021-04-18

## 2021-04-18 RX ORDER — PHENOBARBITAL SODIUM 130 MG/ML
130 INJECTION INTRAMUSCULAR ONCE
Status: COMPLETED | OUTPATIENT
Start: 2021-04-18 | End: 2021-04-18

## 2021-04-18 RX ORDER — POTASSIUM CHLORIDE 14.9 MG/ML
20 INJECTION INTRAVENOUS ONCE
Status: COMPLETED | OUTPATIENT
Start: 2021-04-18 | End: 2021-04-18

## 2021-04-18 RX ORDER — PHENOBARBITAL 32.4 MG/1
64.8 TABLET ORAL ONCE
Status: COMPLETED | OUTPATIENT
Start: 2021-04-18 | End: 2021-04-18

## 2021-04-18 RX ORDER — MAGNESIUM SULFATE HEPTAHYDRATE 40 MG/ML
2 INJECTION, SOLUTION INTRAVENOUS ONCE
Status: COMPLETED | OUTPATIENT
Start: 2021-04-18 | End: 2021-04-18

## 2021-04-18 RX ORDER — POTASSIUM CHLORIDE 20 MEQ/1
40 TABLET, EXTENDED RELEASE ORAL ONCE
Status: COMPLETED | OUTPATIENT
Start: 2021-04-18 | End: 2021-04-18

## 2021-04-18 RX ORDER — LOPERAMIDE HYDROCHLORIDE 2 MG/1
2 CAPSULE ORAL 3 TIMES DAILY PRN
Status: DISCONTINUED | OUTPATIENT
Start: 2021-04-18 | End: 2021-04-20 | Stop reason: HOSPADM

## 2021-04-18 RX ORDER — LANOLIN ALCOHOL/MO/W.PET/CERES
100 CREAM (GRAM) TOPICAL DAILY
Status: DISCONTINUED | OUTPATIENT
Start: 2021-04-18 | End: 2021-04-20 | Stop reason: HOSPADM

## 2021-04-18 RX ADMIN — LOPERAMIDE HYDROCHLORIDE 2 MG: 2 CAPSULE ORAL at 15:14

## 2021-04-18 RX ADMIN — TRAZODONE HYDROCHLORIDE 150 MG: 100 TABLET ORAL at 21:01

## 2021-04-18 RX ADMIN — AMLODIPINE BESYLATE 5 MG: 5 TABLET ORAL at 08:21

## 2021-04-18 RX ADMIN — PHENOBARBITAL SODIUM 130 MG: 130 INJECTION INTRAMUSCULAR at 08:10

## 2021-04-18 RX ADMIN — SODIUM CHLORIDE, SODIUM LACTATE, POTASSIUM CHLORIDE, AND CALCIUM CHLORIDE 125 ML/HR: .6; .31; .03; .02 INJECTION, SOLUTION INTRAVENOUS at 03:31

## 2021-04-18 RX ADMIN — SODIUM CHLORIDE, SODIUM LACTATE, POTASSIUM CHLORIDE, AND CALCIUM CHLORIDE 125 ML/HR: .6; .31; .03; .02 INJECTION, SOLUTION INTRAVENOUS at 13:41

## 2021-04-18 RX ADMIN — ENOXAPARIN SODIUM 40 MG: 40 INJECTION SUBCUTANEOUS at 08:21

## 2021-04-18 RX ADMIN — POTASSIUM CHLORIDE 20 MEQ: 14.9 INJECTION, SOLUTION INTRAVENOUS at 10:53

## 2021-04-18 RX ADMIN — PHENOBARBITAL SODIUM 65 MG: 65 INJECTION INTRAMUSCULAR; INTRAVENOUS at 00:02

## 2021-04-18 RX ADMIN — THIAMINE HCL TAB 100 MG 100 MG: 100 TAB at 10:19

## 2021-04-18 RX ADMIN — POTASSIUM CHLORIDE 40 MEQ: 20 TABLET, EXTENDED RELEASE ORAL at 10:19

## 2021-04-18 RX ADMIN — MAGNESIUM SULFATE IN WATER 2 G: 40 INJECTION, SOLUTION INTRAVENOUS at 10:19

## 2021-04-18 RX ADMIN — THIAMINE HYDROCHLORIDE: 100 INJECTION, SOLUTION INTRAMUSCULAR; INTRAVENOUS at 08:10

## 2021-04-18 RX ADMIN — CITALOPRAM HYDROBROMIDE 20 MG: 20 TABLET ORAL at 08:21

## 2021-04-18 RX ADMIN — PHENOBARBITAL SODIUM 65 MG: 65 INJECTION INTRAMUSCULAR; INTRAVENOUS at 03:47

## 2021-04-18 RX ADMIN — NADOLOL 20 MG: 20 TABLET ORAL at 21:01

## 2021-04-18 RX ADMIN — POTASSIUM CHLORIDE 40 MEQ: 20 TABLET, EXTENDED RELEASE ORAL at 06:50

## 2021-04-18 RX ADMIN — Medication 1 TABLET: at 10:19

## 2021-04-18 RX ADMIN — PHENOBARBITAL SODIUM 65 MG: 65 INJECTION INTRAMUSCULAR; INTRAVENOUS at 01:19

## 2021-04-18 RX ADMIN — BACLOFEN 10 MG: 10 TABLET ORAL at 08:20

## 2021-04-18 RX ADMIN — PHENOBARBITAL 64.8 MG: 32.4 TABLET ORAL at 13:53

## 2021-04-18 RX ADMIN — PHENOBARBITAL SODIUM 65 MG: 65 INJECTION INTRAMUSCULAR; INTRAVENOUS at 04:56

## 2021-04-18 RX ADMIN — MAGNESIUM SULFATE IN WATER 2 G: 40 INJECTION, SOLUTION INTRAVENOUS at 00:02

## 2021-04-18 RX ADMIN — FINASTERIDE 5 MG: 5 TABLET, FILM COATED ORAL at 08:21

## 2021-04-18 RX ADMIN — PHENOBARBITAL SODIUM 65 MG: 65 INJECTION INTRAMUSCULAR; INTRAVENOUS at 02:32

## 2021-04-18 RX ADMIN — SODIUM CHLORIDE, SODIUM LACTATE, POTASSIUM CHLORIDE, AND CALCIUM CHLORIDE 125 ML/HR: .6; .31; .03; .02 INJECTION, SOLUTION INTRAVENOUS at 21:03

## 2021-04-18 RX ADMIN — BACLOFEN 10 MG: 10 TABLET ORAL at 21:02

## 2021-04-18 RX ADMIN — BACLOFEN 10 MG: 10 TABLET ORAL at 16:40

## 2021-04-18 RX ADMIN — METOPROLOL SUCCINATE 25 MG: 25 TABLET, EXTENDED RELEASE ORAL at 08:21

## 2021-04-18 NOTE — QUICK NOTE
MEDICAL DETOX UNIT, LEVEL 4  Department of Medical Toxicology      Reason for Admission/Principal Problem: Alcohol Withdrawal   Reassessing Provider: Elizabeth Rashid PA-C  4/17/2021  8:25 PM    04/18/21  TIME    Patient is currently on SEWS protocol  I have reevaluated the patient  /75 (BP Location: Left arm)   Pulse 60   Temp 98 °F (36 7 °C) (Temporal)   Resp 20   Ht 5' 7" (1 702 m)   Wt 102 kg (224 lb 13 9 oz)   SpO2 92%   BMI 35 22 kg/m²       SEWS Total Score: 5 (4/18/2021  3:40 AM)      Clinical Opiate Withdrawal Scale  Pulse: 60  Heart Rate Source: Monitor  Patient Position - Orthostatic VS: Lying        Discussed patient status with attending? no    Plan:  Patient is on the medical detox unit for acute alcohol withdrawal   Patient has received a total of 520 mg IV phenobarbital   On reassessment patient still had a SEWS score of a 5 due to his anxiety level and tremor  Patient will require an additional 65 mg IV phenobarbital at this time  Continue to reassess per protocol

## 2021-04-18 NOTE — ASSESSMENT & PLAN NOTE
· Patient's potassium on arrival to the [facility] emergency department was 3 2  · Patient received 40 mg K-DUR in the ED  · Recheck potassium

## 2021-04-18 NOTE — PROGRESS NOTES
04/18/21 1449   Patient Information   Mental Status Alert   Primary Caregiver Self   Accompanied by/Relationship spouse at [address] [facility] Immediate family; Extended family;Community  (AA & has sponsor)   Bahai/Cultural Requests none   Legal [facility] Proxy Appointed No   Activities of Daily Living Prior to Admission   Functional Status Independent   Assistive Device No device needed   Ambulation Independent   Access to Firearms   Access to Firearms No   Income Information   Income Source Pension/group home   Means of Transportation   Means of Transport to Appts: [employer]   Pt lives with wife, in own home  Pt socially drank but in his 42's drinking increased and became a problem  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

## 2021-04-18 NOTE — ASSESSMENT & PLAN NOTE
· Patient presented to the 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

## 2021-04-18 NOTE — ASSESSMENT & PLAN NOTE
· Patient's ECG done in [facility] ED showed prolonged QTC interval at 497 with infrequent PVCs  · Patient will be monitored on telemetry  · Repeat ECG in the AM

## 2021-04-18 NOTE — ASSESSMENT & PLAN NOTE
· Patient's BP was 160/93 on admission  · Continue pre-hospital amlodipine 5 mg daily, metoprolol succinate 25 mg daily, and Nadolol 20 mg daily  · Continue to monitor per protocol

## 2021-04-18 NOTE — ASSESSMENT & PLAN NOTE
· Patient's magnesium on arrival to the [facility] emergency department was 1 2  · Patient received 2 g IV magnesium in the ED  · Recheck magnesium

## 2021-04-18 NOTE — ASSESSMENT & PLAN NOTE
· Patient has a longstanding history of alcohol abuse  · Lipase was elevated at 1,297 on arrival to the ED  · CT abdomen (4/17/2021): Mild inflammatory change noted pancreatic head and uncinate process suspicious for acute uncomplicated pancreatitis    · Will make patient NPO and started on continuous IV fluids  · Trend lipase with routine labs

## 2021-04-18 NOTE — H&P
[facility]  H&P- Erlin Nguyen 1950, 70 y o  male MRN: [id number]  Unit/Bed#: [facility] 4-22 Encounter: [id number]  Primary Care Provider: Isabel Shea MD   Date and time admitted to hospital: 4/17/2021  8:25 PM    * Alcohol withdrawal [facility])  Assessment & Plan  · Patient presented to the [facility] ED seeking help for alcohol withdrawal symptoms  Patient and his wife feel that he would benefit from inpatient medical detox with follow-up rehab services for his alcohol use  · Patient last drank on 04/16/2021 at around 2:30 p m  He admits to drinking 1 pt of vodka  · Ethanol level on admission was <3  · 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  Stating that his last known withdrawal seizure was in February of 2019  Placed on seizure precautions  · Place on SEWS protocol  · Started on IV thiamine and folic acid  · On arrival to the unit patient admits to feeling anxious, restless, nauseous, tremulous  Patient is AAO x3   · Continue to reassess per protocol    Alcohol abuse  Assessment & Plan  · Patient reports a longstanding history of chronic alcohol use since age of 36years old  Patient states that since this age he has been drinking about 1 pint of vodka over the course of 2-3 days  · Patient admits that over the last 1 5 weeks he has been drinking 1 pint of vodka per day  · Patient reports that he has been a member of AA over the last few years, but states that lately he has been going to meetings and right after going to the liquor store and buying a bottle of vodka  · Patient states that he has been treated with naltrexone for the last 3 months by his primary care provider  However, he states that this has not been helping with his alcohol use  · Patient admits to having been hospitalized for alcohol withdrawal in the past   Stating that he was hospitalized over at [facility] once and then over at [facility] for an additional 2-3 occurrences    · Patient is admitted to the medical detox unit seeking help for alcohol withdrawal with follow-up rehab services  Pancreatitis  Assessment & Plan  · Patient has a longstanding history of alcohol abuse  · Lipase was elevated at 1,297 on arrival to the ED  · CT abdomen (4/17/2021): Mild inflammatory change noted pancreatic head and uncinate process suspicious for acute uncomplicated pancreatitis  · Will make patient NPO and started on continuous IV fluids  · Trend lipase with routine labs    Leg swelling  Assessment & Plan  · Patient reports leg swelling that started approximately 2 weeks ago  States that he was seen by his primary care physician and was put on antibiotics, without relief  · Patient denies history of DVT  Denies history of CHF or CKD  · On physical exam patient appears to have +2 pitting edema bilaterally  No erythema or warmth noted  Patient denies pain on palpation    · VAS study (4/17/2021):  Negative for DVT bilaterally  · Give one time dose of 20 mg IV Lasix  · Continue to monitor    Prolonged QT interval  Assessment & Plan  · Patient's ECG done in [facility] ED showed prolonged QTC interval at 497 with infrequent PVCs  · Patient will be monitored on telemetry  · Repeat ECG in the AM    Depression  Assessment & Plan  · Continue Celexa 20 mg daily    BPH (benign prostatic hyperplasia)  Assessment & Plan  · Continue pre-hospital finasteride 5 mg daily and alfuzosin 40 mg daily    Hypomagnesemia  Assessment & Plan  · Patient's magnesium on arrival to the [facility] emergency department was 1 2  · Patient received 2 g IV magnesium in the ED  · Recheck magnesium     Hypokalemia  Assessment & Plan  · Patient's potassium on arrival to the [facility] emergency department was 3 2  · Patient received 40 mg K-DUR in the ED  · Recheck potassium    Hypertension  Assessment & Plan  · Patient's BP was 160/93 on admission  · Continue pre-hospital amlodipine 5 mg daily, metoprolol succinate 25 mg daily, and Nadolol 20 mg daily  · Continue to monitor per protocol      Additional medical treatment(s) includes:  Hypertension, hypokalemia, hypomagnesemia, leg swelling, prolonged QTC interval, BPH, pancreatitis, depression       VTE Prophylaxis: Enoxaparin (Lovenox)  / sequential compression device   Code Status: Level 1 code  Discussion with family:  Spoke with patient's wife regarding updates  All patient questions answered to the best my ability  Anticipated Length of Stay:  Patient will be admitted on an Inpatient basis with an anticipated length of stay of  More than  midnights  Justification for Hospital Stay: Alcohol withdrawal    For any questions or concerns, please Tiger Text the advanced practitioner in the role of [facility]-DETOX-AP On Call      This patient qualifies for Level IV medically managed intensive inpatient services under the criteria set by the American Society of Addiction Medicine, including dimensions 1-3  The patient is in withdrawal (or is intoxicated with high risk of withdrawal), with severe and unstable medical and/or psychiatric (dual diagnosis) problems, requiring requires 24-hour medical and nursing care and the full resources of a [address] patient to medical detox unit and continue supportive care and stabilization of acute ethanol withdrawal per medical toxicology/detox treatment pathway  Monitor ethanol withdrawal severity via the Severity of Ethanol Withdrawal Scale (SEWS) Q4 hours and then hourly if/when SEWS > 6  Treat withdrawal per pathway and reassess Q30-60 minutes             Mild SEWS Score 1-6  Administer medications* (IV or PO; PO preferred):   If initial SEWS score: diazepam 10mg PO/IV x 1 AND phenobarbital 65 mg PO/IV x 1   If repeat SEWS score 1-6: phenobarbital 65 mg PO/IV q1 hour x 5 doses maximum   Reassessment:    SEWS q1 hour after each dose until SEWS 0 x 2 hours   VS q1 hours (until SEWS 0, then q4 hours)   Notify provider for bedside evaluation if 5-dose maximum is reached, RASS of -3 to -5, or SEWS score escalates to moderate or severe  Moderate SEWS Score 7-12  Administer medications* (IV):   If initial SEWS score: diazepam 10mg IV x 1 AND phenobarbital 260 mg IV x 1   If repeat SEWS score 7-12 or score escalated from mild: phenobarbital 130 mg IV q30 minutes x 5 doses maximum   Reassessment:   SEWS q30 minutes after each dose until SEWS < 7 (then hourly until SEWS 0 x 2 hours)   VS q30 minutes until SEWS < 7 (then hourly until SEWS 0, then q4 hours)   Notify provider for bedside evaluation if 5-dose maximum is reached, RASS of -3 to -5, or SEWS score escalates to severe  Severe SEWS Score ? 13  Administer medications* (IV):   If initial SEWS score: Diazepam 10 mg IV x 1 AND phenobarbital 650 mg IV piggyback x 1 over 15-30 minutes   If repeat SEWS score ? 13 or score escalated from mild or moderate: phenobarbital 130 mg IV q30 minutes x 5 doses maximum   Reassessment:   SEWS q30 minutes after each dose until SEWS < 7 (then hourly until SEWS 0 x 2 hours)    VS q30 minutes until SEWS < 7 (then hourly until SEWS 0, then q4 hours)   Notify provider for bedside evaluation if 5-dose maximum is reached or RASS of -3 to -5   *Hold medications and notify provider if CNS depression, respirations < 10/min, or RASS of -3 to -5  Medications to be administered adjunctively if more than 2 grams of phenobarbital is needed for stabilization of withdrawal; require attending approval     Dexmedetomidine infusion 0 1-1mcg/kg/hr IV infusion, titratable to reduced agitation (Goal: RASS -2)   Ketamine   o Acute agitated delirium: 1-2 mg/kg IV or 4-5 mg/kg IM  o Refractory withdrawal: 0 1-1mg/kg/hr IV infusion, titratable to reduced agitation (Goal: RASS -2)    Further evaluation, screening and treatment:  Evaluate complete metabolic panel, transaminases, INR, and lipase   Assess hepatic ultrasound for any sign of alcoholic liver disease or cirrhosis, and ultimately refer for further hepatic evaluation and care as/if indicated  Additional medications for ethanol associated malnutrition: Thiamine 100 mg IV daily, increase to 500 mg TID for signs/symptoms of Wernicke's Encephalopathy or Wernicke Korsakoff Syndrome   Folic acid 1 mg IV daily   Multivitamin PO daily      Will offer first monthly injection of Naltrexone 380 mg IM, once patient is stabilized, as it has been shown to assist in decreasing cravings for ethanol  Evaluate and treat for coexisting substance use, such as opioids and nicotine  Discuss risk factors for infectious disease, such as history of intravenous drug abuse, and offer hepatitis and HIV screening if indicated  Case management consultation to assist with coordination of subsequent treatment after discharge  Hx and PE limited by: None    HPI: Renita Trujillo is a 70y o  year old male who presents with alcohol withdrawal   Patient states that since the age of 36years old he has been drinking about 1 pt of vodka over the course of 2-3 days  However, patient states that over the last 1 5 weeks he has been drinking 1 pt of vodka daily  Patient reports a history inpatient hospitalization for alcohol withdrawal in the past stating he was previously hospitalized at both [facility] and [address] [place] [address]  Patient also reports being a member AA currently  Patient admits that after his AA meetings he goes to look a store and buys a bottle of vodka in goes home to drink it  Patient does note a history of alcohol withdrawal seizures, stating his last seizure was in February of 2019  Patient states that his last drink was on 04/16/2021 at around 2:30 p m  on arrival to the unit patient admits to being anxious, restless, nauseous, tremulous and is complaining of diarrhea  Patient denies any shortness of breath or chest pain at this time    Patient does also complain of some bilateral lower leg swelling without any erythema, edema or tenderness to palpation  Patient denies a history of CHF or CKD  Patient has a past medical history significant for hypertension, BPH, depression, insomnia  Patient will be admitted to the medical detox unit for acute alcohol withdrawal and placed on SEWS protocol  Preferred alcoholic beverage(s):  Vodka and gin  Quantity and frequency of alcohol intake:  States that since the age of 36 he has been drinking a pt of vodka over the course of 2-3 days  However, patient states that over the last 1 5 weeks he has been drinking 1 point of vodka daily  Use of any ethanol substitutes (toxic alcohols): no  Date/Time of last alcohol intake:  1 pt of vodka at 2:30 p m  on 04/16/2021  Current signs and symptoms of ethanol withdrawal: anxiety, tremor and nausea    SEWS Total Score: 8 (4/17/2021  9:00 PM)          Ethanol Withdrawal History  Previous ethanol withdrawal? yes  Prior inpatient treatment for ethanol withdrawal? yes  Prior outpatient treatment for ethanol withdrawal? no  History of seizures with prior ethanol withdrawal? yes  Prior treatment with naltrexone (Vivitrol)? yes  Current treatment with naltrexone (Vivitrol)? yes  Other current treatment for ethanol use disorder? no  Co-existing substance use? no    Review of PDMP: yes     Social History     Substance and Sexual Activity   Alcohol Use Yes    Frequency: 2-3 times a week    Drinks per session: 5 or 6    Binge frequency: Daily or almost daily    Comment: 3-4 times per week     Social History     Substance and Sexual Activity   Drug Use No     Social History     Tobacco Use   Smoking Status Never Smoker   Smokeless Tobacco Never Used       Review of Systems   Constitutional: Negative for chills and fever  HENT: Negative for congestion, ear pain and sore throat  Eyes: Negative for pain and visual disturbance  Respiratory: Negative for cough and shortness of breath      Cardiovascular: Negative for chest pain and palpitations  Gastrointestinal: Positive for diarrhea and nausea  Negative for abdominal pain, blood in stool and vomiting  Genitourinary: Negative for dysuria and hematuria  Musculoskeletal: Negative for arthralgias and back pain  Skin: Negative for color change and rash  Neurological: Positive for tremors and headaches  Negative for dizziness, seizures and syncope  Psychiatric/Behavioral: Negative for confusion, hallucinations and suicidal ideas  The patient is nervous/anxious and is hyperactive  All other systems reviewed and are negative  Historical Information   Past Medical History:   Diagnosis Date    [facility] dependency ([facility] [address] )     Anxiety     Depression     [place] (hard of hearing)     Hypertension     Kidney stones     Prostate enlargement     Renal disorder     kidney    Shoulder dislocation      Past Surgical History:   Procedure Laterality Date    CHOLECYSTECTOMY      SHOULDER SURGERY      for dislocation     No family history on file  Social History   Marital Status: /Civil Union   Occupation: Retired  Patient Pre-hospital Living Situation:  Lives at home with his wife  Patient Pre-hospital Level of Mobility:  Independent, sometimes uses walker  Patient Pre-hospital Diet Restrictions:  None    Allergies   Allergen Reactions    Sulfa Antibiotics Anaphylaxis and Rash    Sulfur Rash and Edema       Prior to Admission medications    Medication Sig Start Date End Date Taking? Authorizing Provider   alfuzosin (UROXATRAL) 10 mg 24 hr tablet Take 40 mg by mouth daily      Historical Provider, MD   allopurinol (ZYLOPRIM) 100 mg tablet     Historical Provider, MD   amLODIPine (NORVASC) 5 mg tablet Take 1 tablet (5 mg total) by mouth daily 7/16/19   Scar Casas MD   baclofen 10 mg tablet Take 10 mg by mouth Three times a day 3/4/21 3/4/22  Historical Provider, MD   citalopram (CeleXA) 20 mg tablet Take 20 mg by mouth daily    Historical Provider, MD cycloSPORINE (RESTASIS) 0 05 % ophthalmic emulsion 1 drop 2 (two) times a day    Historical Provider, MD   finasteride (PROSCAR) 5 mg tablet Take 5 mg by mouth daily    Historical Provider, MD   magnesium oxide (MAG-OX) 400 mg Take 400 mg by mouth 2 (two) times a day    Historical Provider, MD   metoprolol succinate (TOPROL-XL) 25 mg 24 hr tablet Take 25 mg by mouth daily    Historical Provider, MD   nadolol (CORGARD) 20 mg tablet Take 20 mg by mouth 3/4/21 3/4/22  Historical Provider, MD   naltrexone (REVIA) 50 mg tablet Take 50 mg by mouth daily 8/24/20 8/24/21  Historical Provider, MD   traZODone (DESYREL) 150 mg tablet Take 150 mg by mouth nightly   Takes 1/2 pill    Historical Provider, MD       Current Facility-Administered Medications   Medication Dose Route Frequency    acetaminophen (TYLENOL) tablet 650 mg  650 mg Oral Q6H PRN    [START ON 4/18/2021] amLODIPine (NORVASC) tablet 5 mg  5 mg Oral Daily    baclofen tablet 10 mg  10 mg Oral TID    [START ON 4/18/2021] citalopram (CeleXA) tablet 20 mg  20 mg Oral Daily    [START ON 4/18/2021] enoxaparin (LOVENOX) subcutaneous injection 40 mg  40 mg Subcutaneous Daily    [START ON 4/18/2021] finasteride (PROSCAR) tablet 5 mg  5 mg Oral Daily    [START ON 4/27/9614] folic acid 1 mg, thiamine (VITAMIN B1) 100 mg in sodium chloride 0 9 % 100 mL IV piggyback   Intravenous Daily    furosemide (LASIX) injection 20 mg  20 mg Intravenous Once    lactated ringers infusion  125 mL/hr Intravenous Continuous    [START ON 4/18/2021] metoprolol succinate (TOPROL-XL) 24 hr tablet 25 mg  25 mg Oral Daily    nadolol (CORGARD) tablet 20 mg  20 mg Oral HS    PHENobarbital 130 mg/mL injection 130 mg  130 mg Intravenous Once    traZODone (DESYREL) tablet 150 mg  150 mg Oral HS       Continuous Infusions:lactated ringers, 125 mL/hr             Objective     No intake or output data in the 24 hours ending 04/17/21 2134    Invasive Devices:   Peripheral IV 04/17/21 Left Antecubital (Active)   Site Assessment WDL;Dry;Clean; Intact 04/17/21 1321   Dressing Type Transparent 04/17/21 1321   Line Status Blood return noted; Saline locked 04/17/21 1321   Dressing Status Clean;Dry; Intact 04/17/21 1321       Vitals   Vitals:    04/17/21 2051   BP: 160/93   TempSrc: Temporal   Pulse: 68   Resp: (!) 28   Patient Position - Orthostatic VS: Lying   Temp: 97 8 °F (36 6 °C)       Physical Exam  Vitals signs reviewed  Constitutional:       General: He is in acute distress  Appearance: He is obese  HENT:      Head: Normocephalic and atraumatic  Right Ear: Decreased hearing noted  Left Ear: Decreased hearing noted  Nose: No congestion  Mouth/Throat:      Mouth: Mucous membranes are moist       Pharynx: Oropharynx is clear  Eyes:      General: No scleral icterus  Pupils: Pupils are equal, round, and reactive to light  Cardiovascular:      Rate and Rhythm: Normal rate and regular rhythm  Heart sounds: No murmur  Pulmonary:      Effort: Pulmonary effort is normal       Breath sounds: Normal breath sounds  No wheezing or rales  Abdominal:      General: Bowel sounds are normal  There is no distension  Palpations: Abdomen is soft  Tenderness: There is no abdominal tenderness  Musculoskeletal: Normal range of motion  Right lower leg: Edema (+2) present  Left lower leg: Edema (+2) present  Skin:     General: Skin is warm and dry  Findings: No rash  Neurological:      Mental Status: He is alert and oriented to person, place, and time  Motor: Tremor present  Psychiatric:         Mood and Affect: Mood is anxious  Data:    EKG, Pathology, and Other Studies: I have personally reviewed pertinent reports  · EKG:  Normal sinus rhythm with infrequent PVCs  Prolonged QTC interval   · CT abdomen:  Acute uncomplicated pancreatitis    Left lower renal pole cortical cyst within calcified septum consistent with a Bosniak 2 cyst  · VAS:  Negative for DVT bilaterally    Lab Results:  CBC ETOH     Lab Results   Component Value Date    WBC 7 75 04/17/2021    WBC 9 30 12/29/2014    RBC 4 67 04/17/2021    RBC 4 16 12/29/2014    HGB 14 8 04/17/2021    HGB 13 8 12/29/2014    HCT 44 4 04/17/2021    HCT 40 5 12/29/2014    MCV 95 04/17/2021    MCV 97 12/29/2014    MCH 31 7 04/17/2021    MCH 33 2 12/29/2014    MCHC 33 3 04/17/2021    MCHC 34 1 12/29/2014    RDW 13 7 04/17/2021    RDW 12 9 12/29/2014     04/17/2021     12/29/2014    MPV 9 0 04/17/2021    MPV 9 7 12/29/2014      Lab Results   Component Value Date    LACTICACID 2 1 (HH) 12/02/2019      CMP UA         Component Value Date/Time     12/29/2014 0706    K 3 2 (L) 04/17/2021 1320    K 3 4 (L) 12/29/2014 0706     04/17/2021 1320     12/29/2014 0706    CO2 28 04/17/2021 1320    CO2 25 12/29/2014 0706    BUN 12 04/17/2021 1320    BUN 15 12/29/2014 0706    CREATININE 0 71 04/17/2021 1320    CREATININE 0 62 12/29/2014 0706         Component Value Date/Time    CALCIUM 8 8 04/17/2021 1320    CALCIUM 8 9 12/29/2014 0706    ALKPHOS 99 04/17/2021 1320    AST 53 (H) 04/17/2021 1320    ALT 49 04/17/2021 1320      Lab Results   Component Value Date    CLARITYU Clear 04/17/2021    COLORU Yellow 04/17/2021    SPECGRAV >=1 030 04/17/2021    PHUR 6 0 04/17/2021    PHUR 6 0 02/10/2019    GLUCOSEU Negative 04/17/2021    KETONESU >=80 (3+) (A) 04/17/2021    BLOODU Trace-Intact (A) 04/17/2021    PROTEIN UA 30 (1+) (A) 04/17/2021    NITRITE Negative 04/17/2021    BILIRUBINUR Negative 04/17/2021    UROBILINOGEN 0 2 04/17/2021    LEUKOCYTESUR Negative 04/17/2021    WBCUA 0-1 04/17/2021    RBCUA 1-2 04/17/2021    HYALINE 0-1 (A) 12/02/2019    BACTERIA None Seen 04/17/2021    EPIS Occasional 04/17/2021        Liver Function Test: ASA     Lab Results   Component Value Date    TBILI 1 21 (H) 04/17/2021    ALKPHOS 99 04/17/2021    AST 53 (H) 04/17/2021    ALT 49 04/17/2021    TP 7 7 04/17/2021    ALB 4 3 04/17/2021      Lab Results   Component Value Date    SALICYLATE <3 (L) 57/63/3587      Troponin APAP     Lab Results   Component Value Date    TROPONINI <0 02 04/17/2021      Lab Results   Component Value Date    ACTMNPHEN <2 0 (L) 12/02/2019      VBG HCG     No results found for: PHVEN, ZFI5DOY, PO2VEN, RDN8PLU, BEVEN, N7WBYKAUJ, T6FZYNP   No results found for: HCGQUANT   ABG Urine Drug Screen     No results found for: PHART, JAW8HNF, PO2ART, GEV9LQW, BEART, F8KQAHLXR, O2HGB, SOURC, NAZ, VTAC, ACRATE, INSPIREDAIR, PEEP   Lab Results   Component Value Date    AMPMETHUR Negative 04/17/2021    BARBTUR Negative 04/17/2021    BDZUR Positive (A) 04/17/2021    COCAINEUR Negative 04/17/2021    METHADONEUR Negative 04/17/2021    OPIATEUR Negative 04/17/2021    PCPUR Negative 04/17/2021    THCUR Negative 04/17/2021    OXYCODONEUR Negative 04/17/2021      Lactate INR     Lab Results   Component Value Date    LACTICACID 2 1 ([facility]) 12/02/2019      Lab Results   Component Value Date    INR 1 07 04/17/2021    INR 1 20 (H) 12/29/2014      PTT Protime     Lab Results   Component Value Date/Time    PTT 26 04/17/2021 01:20 PM    PTT 25 12/29/2014 07:06 AM        Lab Results   Component Value Date/Time    PROTIME 13 7 04/17/2021 01:20 PM    PROTIME 15 0 (H) 12/29/2014 07:06 AM              Imaging Studies: I have personally reviewed pertinent reports  Counseling / Coordination of Care  Total floor / unit time spent today 45 minutes  Greater than 50% of total time was spent with the patient and / or family counseling and / or coordination of care         Minutes of critical care time: 35 minutes  -Critical care time was exclusive of separately billable procedures and teaching time    -Critical care was necessary to treat or prevent imminent or life-threatening deterioration of the following condition: CNS failure/compromise, toxidrome (ethanol withdrawal),  withdrawal  -Critical care time was spent personally by me on the following activities as well as the above as per the course and rest of chart: obtaining history from patient/surrogate, development of a treatment plan, discussions with referring provider(s), evaluation of patient's response to the treatment, examination of the patient, recommending treatments and interventions, re-evaluation of the patient's condition, review of old charts, ordering/interpreting laboratory studies, ordering/interpreting of radiographic studies  ** Please Note: This note has been constructed using a voice recognition system   **

## 2021-04-18 NOTE — PLAN OF CARE
Problem: Potential for Falls  Goal: Patient will remain free of falls  Description: INTERVENTIONS:  - Assess patient frequently for physical needs  -  Identify cognitive and physical deficits and behaviors that affect risk of falls    -  [place] fall precautions as indicated by assessment   - Educate patient/family on patient safety including physical limitations  - Instruct patient to call for assistance with activity based on assessment  - Modify environment to reduce risk of injury  - Consider OT/PT consult to assist with strengthening/mobility  Outcome: Progressing     Problem: Prexisting or High Potential for Compromised Skin Integrity  Goal: Skin integrity is maintained or improved  Description: INTERVENTIONS:  - Identify patients at risk for skin breakdown  - Assess and monitor skin integrity  - Assess and monitor nutrition and hydration status  - Monitor labs   - Assess for incontinence   - Turn and reposition patient  - Assist with mobility/ambulation  - Relieve pressure over bony prominences  - Avoid friction and shearing  - Provide appropriate hygiene as needed including keeping skin clean and dry  - Evaluate need for skin moisturizer/barrier cream  - Collaborate with interdisciplinary team   - Patient/family teaching  - Consider wound care consult   Outcome: Progressing     Problem: PAIN - ADULT  Goal: Verbalizes/displays adequate comfort level or baseline comfort level  Description: Interventions:  - Encourage patient to monitor pain and request assistance  - Assess pain using appropriate pain scale  - Administer analgesics based on type and severity of pain and evaluate response  - Implement non-pharmacological measures as appropriate and evaluate response  - Consider cultural and social influences on pain and pain management  - Notify physician/advanced practitioner if interventions unsuccessful or patient reports new pain  Outcome: Progressing     Problem: INFECTION - ADULT  Goal: Absence or prevention of progression during hospitalization  Description: INTERVENTIONS:  - Assess and monitor for signs and symptoms of infection  - Monitor lab/diagnostic results  - Monitor all insertion sites, i e  indwelling lines, tubes, and drains  - Monitor endotracheal if appropriate and nasal secretions for changes in amount and color  - [place] appropriate cooling/warming therapies per order  - Administer medications as ordered  - Instruct and encourage patient and family to use good hand hygiene technique  - Identify and instruct in appropriate isolation precautions for identified infection/condition  Outcome: Progressing  Goal: Absence of fever/infection during neutropenic period  Description: INTERVENTIONS:  - Monitor WBC    Outcome: Progressing     Problem: SAFETY ADULT  Goal: Maintain or return to baseline ADL function  Description: INTERVENTIONS:  -  Assess patient's ability to carry out ADLs; assess patient's baseline for ADL function and identify physical deficits which impact ability to perform ADLs (bathing, care of mouth/teeth, toileting, grooming, dressing, etc )  - Assess/evaluate cause of self-care deficits   - Assess range of motion  - Assess patient's mobility; develop plan if impaired  - Assess patient's need for assistive devices and provide as appropriate  - Encourage maximum independence but intervene and supervise when necessary  - Involve family in performance of ADLs  - Assess for home care needs following discharge   - Consider OT consult to assist with ADL evaluation and planning for discharge  - Provide patient education as appropriate  Outcome: Progressing  Goal: Maintain or return mobility status to optimal level  Description: INTERVENTIONS:  - Assess patient's baseline mobility status (ambulation, transfers, stairs, etc )    - Identify cognitive and physical deficits and behaviors that affect mobility  - Identify mobility aids required to assist with transfers and/or ambulation (gait belt, sit-to-stand, lift, walker, cane, etc )  - [place] fall precautions as indicated by assessment  - Record patient progress and toleration of activity level on Mobility SBAR; progress patient to next Phase/Stage  - Instruct patient to call for assistance with activity based on assessment  - Consider rehabilitation consult to assist with strengthening/weightbearing, etc   Outcome: Progressing     Problem: DISCHARGE PLANNING  Goal: Discharge to home or other facility with appropriate resources  Description: INTERVENTIONS:  - Identify barriers to discharge w/patient and caregiver  - Arrange for needed discharge resources and transportation as appropriate  - Identify discharge learning needs (meds, wound care, etc )  - Arrange for interpretive services to assist at discharge as needed  - Refer to Case Management Department for coordinating discharge planning if the patient needs post-hospital services based on physician/advanced practitioner order or complex needs related to functional status, cognitive ability, or social support system  Outcome: Progressing     Problem: Knowledge Deficit  Goal: Patient/family/caregiver demonstrates understanding of disease process, treatment plan, medications, and discharge instructions  Description: Complete learning assessment and assess knowledge base    Interventions:  - Provide teaching at level of understanding  - Provide teaching via preferred learning methods  Outcome: Progressing

## 2021-04-18 NOTE — ASSESSMENT & PLAN NOTE
· Patient reports a longstanding history of chronic alcohol use since age of 36years old  Patient states that since this age he has been drinking about 1 pint of vodka over the course of 2-3 days  · Patient admits that over the last 1 5 weeks he has been drinking 1 pint of vodka per day  · Patient reports that he has been a member of AA over the last few years, but states that lately he has been going to meetings and right after going to the liquor store and buying a bottle of vodka  · Patient states that he has been treated with naltrexone for the last 3 months by his primary care provider  However, he states that this has not been helping with his alcohol use  · Patient admits to having been hospitalized for alcohol withdrawal in the past   Stating that he was hospitalized over at [facility] once and then over at [facility] for an additional 2-3 occurrences  · Patient is admitted to the medical detox unit seeking help for alcohol withdrawal with follow-up rehab services

## 2021-04-18 NOTE — ASSESSMENT & PLAN NOTE
Hypoxia overnight down to 80% with loud snoring noted  Likely ROSARIO  · Will continue to monitor pulse-ox and give supplemental O2 as needed  · Night O2 study obtained revealing Oxygen desaturations dropping below 88% with a low of 76% for a total of 32 58 minutes at night and a total of 57 hypoxic events    · Plan to discharge patient with 2L home oxygen for nocturnal hypoxia to bridge until Polysomnogram can be obtained  · ECHO conducted revealing EF of 60% with no regional wall motion abnormalities

## 2021-04-18 NOTE — NURSING NOTE
Patient oxygen saturations dropped to 80% while sleeping at approximately 5:40 AM   Patient observed sleeping and snoring  Woke patient up, placed on 2 L NC, repositioned in bed with HOB elevated  O2 saturations now 96-97%  Attending aware, instructed to hold next phenobarbital dose for SEWS score of 2 due at approximate 6 am   Patient resting comfortably, will continue to monitor

## 2021-04-18 NOTE — PROGRESS NOTES
PROGRESS NOTE  DEPARTMENT OF MEDICAL TOXICOLOGY  LEVEL 4 MEDICAL DETOX UNIT  Nkechi Garcia 70 y o  male MRN: [id number]  Unit/Bed#: 5T DETOX 789-95 Encounter: [id number]      Reason for Admission/Principal Problem: Alcohol withdrawal    Rounding Provider: Du Banda MD  Attending Provider: Du Banda MD   4/17/2021  8:25 PM           Hypoxia  Assessment & Plan  Hypoxia overnight down to 80% with loud snoring noted  Likely ROSARIO  · Will continue to monitor pulse-ox and give supplemental O2 as needed  · Night O2 study  · Given presentation with peripheral edema will get ECHO    Alcohol withdrawal ([facility] [address] )  Assessment & Plan  · Patient presented to the [facility] ED seeking help for alcohol withdrawal symptoms  Patient and his wife feel that he would benefit from inpatient medical detox with follow-up rehab services for his alcohol use  · Patient last drank on 04/16/2021 at around 2:30 p m  He admits to drinking 1 pt of vodka  · Ethanol level on admission was <3  · 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  Stating that his last known withdrawal seizure was in February of 2019  Placed on seizure precautions  · Continue on SEWS protocol  · Started on IV thiamine and folic acid  · Has received 780 mg of phenobarbital thus far, further dosing as needed per SEWS protocol     Alcohol abuse  Assessment & Plan  · Patient reports a longstanding history of chronic alcohol use since age of 36years old  Patient states that since this age he has been drinking about 1 pint of vodka over the course of 2-3 days  · Patient admits that over the last 1 5 weeks he has been drinking 1 pint of vodka per day  · Patient reports that he has been a member of AA over the last few years, but states that lately he has been going to meetings and right after going to the liquor store and buying a bottle of vodka  · Patient states that he has been treated with naltrexone for the last 3 months by his primary care provider  However, he states that this has not been helping with his alcohol use  · Patient admits to having been hospitalized for alcohol withdrawal in the past   Stating that he was hospitalized over at [facility] once and then over at [facility] for an additional 2-3 occurrences  · Patient is admitted to the medical detox unit seeking help for alcohol withdrawal with follow-up rehab services      Pancreatitis  Assessment & Plan  · Patient has a longstanding history of alcohol abuse  · Lipase was elevated at 1,297 on arrival to the ED, now trended down to 881  · CT abdomen (4/17/2021): Mild inflammatory change noted pancreatic head and uncinate process suspicious for acute uncomplicated pancreatitis  · Will advance to soft diet today as lipase trending down and patient asymptomatic  · Continue to trend lipase     Leg swelling  Assessment & Plan  · Patient reports leg swelling that started approximately 2 weeks ago  States that he was seen by his primary care physician and was put on antibiotics, without relief  · Patient denies history of DVT  Denies history of CHF or CKD  · On physical exam patient appears to have +1 pitting edema bilaterally  No erythema or warmth noted  Patient denies pain on palpation    · VAS study (4/17/2021):  Negative for DVT bilaterally  · Give one time dose of 20 mg IV Lasix  · Continue to monitor  · ECHO ordered, will follow-up on results     Prolonged QT interval  Assessment & Plan  · Patient's initial ECG done in [facility] ED showed prolonged QTC interval at 497 with infrequent PVCs  · Repeat EKG shows QTc normalized at 434 ms  · Will discontinue telemetry monitoring     Depression  Assessment & Plan  · Continue Celexa 20 mg daily     BPH (benign prostatic hyperplasia)  Assessment & Plan  · Continue pre-hospital finasteride 5 mg daily and alfuzosin 40 mg daily     Hypomagnesemia  Assessment & Plan  · Patient's magnesium on arrival to the [facility] emergency department was 1 2  · Trended 1 2 --> 1 5 --> 1 9 with IV magnesium  · Will give additional IV magnesium to get level to 2 0 or above  · Will continue to trend     Hypokalemia  Assessment & Plan  · Patient's potassium on arrival to the [facility] emergency department was 3 2  · Patient receiving supplemental potassium  · Repleting magnesium  · Will continue to trend     Hypertension  Assessment & Plan  · Patient's BP was 160/93 on admission  · Continue pre-hospital amlodipine 5 mg daily, metoprolol succinate 25 mg daily, and Nadolol 20 mg daily  · Continue to monitor per protocol      VTE Pharmacologic Prophylaxis:   Pharmacologic: Enoxaparin (Lovenox)  Mechanical VTE Prophylaxis in Place: yes    Code Status: Level 1 - Full Code    Patient Centered Rounds: I have performed bedside rounds with nursing staff today  Discussions with Specialists or Other Care Team Provider: No     Education and Discussions with Family / Patient: Discussion with patient regarding ongoing plan of care    Time Spent for Care: 45 minutes  More than 50% of total time spent on counseling and coordination of care as described above  Current Length of Stay: 1 day(s)    Current Patient Status: Inpatient     Certification Statement: The patient will continue to require additional inpatient hospital stay due to alcohol withdrawal, acute pancreatitis, hypokalemia, peripheral edema Discharge Plan: TBD        Subjective: The patient states he is feeling much better this morning with no with further symptoms of alcohol withdrawal time of my evaluation  Patient denies any current abdominal pain or nausea  He states that he is hungry and is asking to be able to eat      Objective:     Clinical Opiate Withdrawal Scale  Pulse: 56  Heart Rate Source: Monitor  Patient Position - Orthostatic VS: Lying    SEWS Total Score: 0 (4/18/2021  9:25 AM)        Last 24 Hours Medication List:   Current Facility-Administered Medications   Medication Dose Route Frequency Provider Last Rate    acetaminophen  650 mg Oral Q6H PRN Margaret Sanchez PA-C      amLODIPine  5 mg Oral Daily Margaret Sanchez PA-C      baclofen  10 mg Oral TID Margaret Sanchez PA-C      citalopram  20 mg Oral Daily Margaret Sanchez PA-C      enoxaparin  40 mg Subcutaneous Daily Margaret Sanchez PA-C      finasteride  5 mg Oral Daily Margaret Sanchez PA-C      folic acid 1 mg, thiamine 100 mg in 0 9% sodium chloride 100 mL IVPB   Intravenous Daily Margaret Sanchez PA-C Stopped (21 0840)    lactated ringers  125 mL/hr Intravenous Continuous Margaret Sanchez PA-C 125 mL/hr (21 0331)    metoprolol succinate  25 mg Oral Daily Margaret Sanchez PA-C      multivitamin-minerals  1 tablet Oral Daily Renita Perez MD      nadolol  20 mg Oral HS Margaret Sanchez PA-C      NON FORMULARY  40 mg Oral Daily Margaret Sanchez PA-C      potassium chloride  20 mEq Intravenous Once Renita Perez MD 20 mEq (21 1053)    thiamine  100 mg Oral Daily Renita Perez MD      traZODone  150 mg Oral HS Margaret Sanchez PA-C           Vitals:   Temp (24hrs), Av 2 °F (36 8 °C), Min:97 7 °F (36 5 °C), Max:99 °F (37 2 °C)    Temp:  [97 7 °F (36 5 °C)-99 °F (37 2 °C)] 98 3 °F (36 8 °C)  HR:  [56-74] 56  Resp:  [16-28] 16  BP: (130-198)/() 153/75  SpO2:  [80 %-98 %] 96 %  Body mass index is 35 22 kg/m²  Input and Output Summary (last 24 hours): Intake/Output Summary (Last 24 hours) at 2021 1231  Last data filed at 2021 1125  Gross per 24 hour   Intake 1220 ml   Output 2500 ml   Net -1280 ml       Physical Exam:   Physical Exam  Vitals signs reviewed  Constitutional:       General: He is not in acute distress  Appearance: He is not ill-appearing or diaphoretic  HENT:      Head: Normocephalic and atraumatic  Mouth/Throat:      Mouth: Mucous membranes are moist       Pharynx: Oropharynx is clear     Eyes:      Conjunctiva/sclera: Conjunctivae normal       Pupils: Pupils are equal, round, and reactive to light  Neck:      Musculoskeletal: Neck supple  Cardiovascular:      Rate and Rhythm: Normal rate and regular rhythm  Heart sounds: No murmur  No friction rub  No gallop  Pulmonary:      Effort: Pulmonary effort is normal  No respiratory distress  Breath sounds: Normal breath sounds  Abdominal:      General: There is no distension  Palpations: Abdomen is soft  Tenderness: There is no abdominal tenderness  Musculoskeletal:         General: No swelling  Comments: 1+ pitting edema to lower extremities bilaterally  No erythema, warmth, tenderness  Skin:     General: Skin is warm and dry  Neurological:      General: No focal deficit present  Mental Status: He is alert and oriented to person, place, and time  Comments: No tremor  Normal muscle tone  Psychiatric:         Mood and Affect: Mood normal          Behavior: Behavior normal          Thought Content: Thought content normal          Additional Data:     Labs:   Results from last 7 days   Lab Units 04/18/21  0452 04/17/21  2204 04/17/21  1320   WBC Thousand/uL 5 90 6 70 7 75   HEMOGLOBIN g/dL 13 0* 13 8 14 8   HEMATOCRIT % 38 5* 41 4 44 4   PLATELETS Thousands/uL 197 202 224   NEUTROS PCT %  --   --  79*   LYMPHS PCT %  --   --  12*   LYMPHO PCT %  --  26  --    MONOS PCT %  --   --  8   MONO PCT %  --  7  --    EOS PCT %  --   --  0      Results from last 7 days   Lab Units 04/18/21  0452   SODIUM mmol/L 135*   POTASSIUM mmol/L 3 2*   CHLORIDE mmol/L 97   CO2 mmol/L 30   BUN mg/dL 10   CREATININE mg/dL 0 58*   ANION GAP mmol/L 8   CALCIUM mg/dL 8 4   ALBUMIN g/dL 3 5   TOTAL BILIRUBIN mg/dL 1 24   ALK PHOS U/L 75   ALT U/L 28   AST U/L 44   GLUCOSE RANDOM mg/dL 88      Results from last 7 days   Lab Units 04/17/21  1320   INR  1 07                         * I Have Reviewed All Lab Data Listed Above  * Additional Pertinent Lab Tests Reviewed:  [place] [facility] Admission Reviewed      Imaging Studies: I have personally reviewed pertinent reports  Recent Cultures (last 7 days):      Procedure Note: EKG  Date/Time: 04/18/21 12:40 PM   Performed by: Geovani Craig  Authorized by: Geovani Craig  Indications / Diagnosis: Abnormal QTc on prior EKG  ECG reviewed by me, the Medical Detox Unit Provider: yes   Rhythm: normal sinus  Intervals: normal intervals  Axis: normal axis  QRS/Blocks: normal QRS  ST Changes: No acute ST Changes    Today, Patient Was Seen By: Richard Munguia MD    ** Please Note: Dictation voice to text software may have been used in the creation of this document   **

## 2021-04-19 ENCOUNTER — APPOINTMENT (INPATIENT)
Dept: NON INVASIVE DIAGNOSTICS | Facility: HOSPITAL | Age: 71
DRG: 896 | End: 2021-04-19
Payer: MEDICARE

## 2021-04-19 PROBLEM — R14.3 FLATULENCE: Status: ACTIVE | Noted: 2021-04-19

## 2021-04-19 LAB
ALBUMIN SERPL BCP-MCNC: 3.5 G/DL (ref 3–5.2)
ALP SERPL-CCNC: 68 U/L (ref 43–122)
ALT SERPL W P-5'-P-CCNC: 25 U/L
ANION GAP SERPL CALCULATED.3IONS-SCNC: 4 MMOL/L (ref 5–14)
AST SERPL W P-5'-P-CCNC: 35 U/L (ref 17–59)
ATRIAL RATE: 70 BPM
BILIRUB SERPL-MCNC: 0.8 MG/DL
BUN SERPL-MCNC: 8 MG/DL (ref 5–25)
CALCIUM SERPL-MCNC: 8.5 MG/DL (ref 8.4–10.2)
CHLORIDE SERPL-SCNC: 99 MMOL/L (ref 97–108)
CO2 SERPL-SCNC: 33 MMOL/L (ref 22–30)
CREAT SERPL-MCNC: 0.53 MG/DL (ref 0.7–1.5)
ERYTHROCYTE [DISTWIDTH] IN BLOOD BY AUTOMATED COUNT: 14.8 %
GFR SERPL CREATININE-BSD FRML MDRD: 106 ML/MIN/1.73SQ M
GLUCOSE SERPL-MCNC: 109 MG/DL (ref 70–99)
HCT VFR BLD AUTO: 40.7 % (ref 41–53)
HGB BLD-MCNC: 13.8 G/DL (ref 13.5–17.5)
LIPASE SERPL-CCNC: 757 U/L (ref 23–300)
MCH RBC QN AUTO: 32.9 PG (ref 26–34)
MCHC RBC AUTO-ENTMCNC: 33.8 G/DL (ref 31–36)
MCV RBC AUTO: 97 FL (ref 80–100)
P AXIS: 41 DEGREES
PLATELET # BLD AUTO: 184 THOUSANDS/UL (ref 150–450)
PMV BLD AUTO: 7.6 FL (ref 8.9–12.7)
POTASSIUM SERPL-SCNC: 3.8 MMOL/L (ref 3.6–5)
PR INTERVAL: 170 MS
PROT SERPL-MCNC: 6.5 G/DL (ref 5.9–8.4)
QRS AXIS: 63 DEGREES
QRSD INTERVAL: 102 MS
QT INTERVAL: 464 MS
QTC INTERVAL: 497 MS
RBC # BLD AUTO: 4.18 MILLION/UL (ref 4.5–5.9)
SODIUM SERPL-SCNC: 136 MMOL/L (ref 137–147)
T WAVE AXIS: 46 DEGREES
VENTRICULAR RATE: 69 BPM
WBC # BLD AUTO: 4.4 THOUSAND/UL (ref 4.5–11)

## 2021-04-19 PROCEDURE — 80053 COMPREHEN METABOLIC PANEL: CPT | Performed by: EMERGENCY MEDICINE

## 2021-04-19 PROCEDURE — 93010 ELECTROCARDIOGRAM REPORT: CPT | Performed by: INTERNAL MEDICINE

## 2021-04-19 PROCEDURE — 97163 PT EVAL HIGH COMPLEX 45 MIN: CPT

## 2021-04-19 PROCEDURE — 99232 SBSQ HOSP IP/OBS MODERATE 35: CPT | Performed by: INTERNAL MEDICINE

## 2021-04-19 PROCEDURE — 94762 N-INVAS EAR/PLS OXIMTRY CONT: CPT

## 2021-04-19 PROCEDURE — 83690 ASSAY OF LIPASE: CPT | Performed by: EMERGENCY MEDICINE

## 2021-04-19 PROCEDURE — 85027 COMPLETE CBC AUTOMATED: CPT | Performed by: EMERGENCY MEDICINE

## 2021-04-19 PROCEDURE — 93306 TTE W/DOPPLER COMPLETE: CPT

## 2021-04-19 PROCEDURE — 93306 TTE W/DOPPLER COMPLETE: CPT | Performed by: INTERNAL MEDICINE

## 2021-04-19 RX ORDER — SIMETHICONE 80 MG
80 TABLET,CHEWABLE ORAL EVERY 6 HOURS PRN
Status: DISCONTINUED | OUTPATIENT
Start: 2021-04-19 | End: 2021-04-20 | Stop reason: HOSPADM

## 2021-04-19 RX ORDER — ALPRAZOLAM 0.5 MG/1
0.5 TABLET ORAL ONCE
Status: COMPLETED | OUTPATIENT
Start: 2021-04-19 | End: 2021-04-19

## 2021-04-19 RX ORDER — ACETAMINOPHEN 325 MG/1
650 TABLET ORAL EVERY 6 HOURS PRN
Status: DISCONTINUED | OUTPATIENT
Start: 2021-04-19 | End: 2021-04-20 | Stop reason: HOSPADM

## 2021-04-19 RX ORDER — IBUPROFEN 600 MG/1
600 TABLET ORAL EVERY 6 HOURS PRN
Status: DISCONTINUED | OUTPATIENT
Start: 2021-04-19 | End: 2021-04-20 | Stop reason: HOSPADM

## 2021-04-19 RX ORDER — MAGNESIUM SULFATE HEPTAHYDRATE 40 MG/ML
2 INJECTION, SOLUTION INTRAVENOUS ONCE
Status: DISCONTINUED | OUTPATIENT
Start: 2021-04-19 | End: 2021-04-19

## 2021-04-19 RX ADMIN — BACLOFEN 10 MG: 10 TABLET ORAL at 21:02

## 2021-04-19 RX ADMIN — THIAMINE HCL TAB 100 MG 100 MG: 100 TAB at 08:17

## 2021-04-19 RX ADMIN — SIMETHICONE 80 MG: 80 TABLET, CHEWABLE ORAL at 18:10

## 2021-04-19 RX ADMIN — SIMETHICONE 80 MG: 80 TABLET, CHEWABLE ORAL at 12:39

## 2021-04-19 RX ADMIN — ENOXAPARIN SODIUM 40 MG: 40 INJECTION SUBCUTANEOUS at 08:18

## 2021-04-19 RX ADMIN — ALPRAZOLAM 0.5 MG: 0.5 TABLET ORAL at 22:48

## 2021-04-19 RX ADMIN — TRAZODONE HYDROCHLORIDE 150 MG: 100 TABLET ORAL at 21:02

## 2021-04-19 RX ADMIN — METOPROLOL SUCCINATE 25 MG: 25 TABLET, EXTENDED RELEASE ORAL at 08:17

## 2021-04-19 RX ADMIN — CITALOPRAM HYDROBROMIDE 20 MG: 20 TABLET ORAL at 08:17

## 2021-04-19 RX ADMIN — ALFUZOSIN HYDROCHLORIDE 10 MG: 10 TABLET, FILM COATED, EXTENDED RELEASE ORAL at 08:16

## 2021-04-19 RX ADMIN — SODIUM CHLORIDE, SODIUM LACTATE, POTASSIUM CHLORIDE, AND CALCIUM CHLORIDE 125 ML/HR: .6; .31; .03; .02 INJECTION, SOLUTION INTRAVENOUS at 04:46

## 2021-04-19 RX ADMIN — NADOLOL 20 MG: 20 TABLET ORAL at 21:02

## 2021-04-19 RX ADMIN — BACLOFEN 10 MG: 10 TABLET ORAL at 17:31

## 2021-04-19 RX ADMIN — LOPERAMIDE HYDROCHLORIDE 2 MG: 2 CAPSULE ORAL at 08:20

## 2021-04-19 RX ADMIN — IBUPROFEN 600 MG: 600 TABLET, FILM COATED ORAL at 07:29

## 2021-04-19 RX ADMIN — FINASTERIDE 5 MG: 5 TABLET, FILM COATED ORAL at 08:21

## 2021-04-19 RX ADMIN — AMLODIPINE BESYLATE 5 MG: 5 TABLET ORAL at 08:17

## 2021-04-19 RX ADMIN — BACLOFEN 10 MG: 10 TABLET ORAL at 08:21

## 2021-04-19 RX ADMIN — THIAMINE HYDROCHLORIDE: 100 INJECTION, SOLUTION INTRAMUSCULAR; INTRAVENOUS at 08:18

## 2021-04-19 RX ADMIN — Medication 1 TABLET: at 08:17

## 2021-04-19 NOTE — ASSESSMENT & PLAN NOTE
· Patient's BP was 160/93 on admission, now 157/94  · Continue pre-hospital amlodipine 5 mg daily, metoprolol succinate 25 mg daily, and Nadolol 20 mg daily  · Continue to monitor per protocol

## 2021-04-19 NOTE — ASSESSMENT & PLAN NOTE
· Patient reports a longstanding history of chronic alcohol use since age of 36years old  Patient states that since this age he has been drinking about 1 pint of vodka over the course of 2-3 days  · Patient admits that over the last 1 5 weeks he has been drinking 1 pint of vodka per day  · Patient reports that he has been a member of AA over the last few years, but states that lately he has been going to meetings and right after going to the liquor store and buying a bottle of vodka  · Patient states that he has been treated with naltrexone for the last 3 months by his primary care provider  However, he states that this has not been helping with his alcohol use  · Patient admits to having been hospitalized for alcohol withdrawal in the past   Stating that he was hospitalized over at [facility] once and then over at [facility] for an additional 2-3 occurrences  · Patient is admitted to the medical detox unit seeking help for alcohol withdrawal with follow-up rehab services

## 2021-04-19 NOTE — ASSESSMENT & PLAN NOTE
· Patient reports leg swelling that started approximately 2 weeks ago  States that he was seen by his primary care physician and was put on antibiotics, without relief  · Patient denies history of DVT  Denies history of CHF or CKD  · On physical exam patient appears to have +2 pitting edema bilaterally  No erythema or warmth noted  Patient denies pain on palpation    · VAS study (4/17/2021):  Negative for DVT bilaterally  · Give one time dose of 20 mg IV Lasix  · ECHO conducted revealing EF of 60% with no regional wall motion abnormalities  · PT recommends outpatient physical therapy and referral will be made upon discharge

## 2021-04-19 NOTE — PHYSICAL THERAPY NOTE
Physical Therapy Evaluation    Patient's Name: Moreno Abraham    Admitting Diagnosis  Alcohol abuse [F10 10]    Problem List  Patient Active Problem List   Diagnosis    Hypertension    Ataxia    Alcohol withdrawal ([address] )    Alcohol abuse    Hypokalemia    Hypomagnesemia    BPH (benign prostatic hyperplasia)    Transaminitis    Tremor of left hand    Pancreatitis    Prolonged QT interval    Leg swelling    Depression    Hypoxia       Past Medical History  Past Medical History:   Diagnosis Date    Alcohol dependency ([address] )     Anxiety     Depression     [place] (hard of hearing)     Hypertension     Kidney stones     Prostate enlargement     Renal disorder     kidney    Shoulder dislocation        Past Surgical History  Past Surgical History:   Procedure Laterality Date    CHOLECYSTECTOMY      SHOULDER SURGERY      for dislocation       Recent Imaging  No orders to display       Recent Vital Signs  Vitals:    04/18/21 2343 04/19/21 0312 04/19/21 0731 04/19/21 1135   BP: 150/85 160/88 156/84 155/92   BP Location: Left arm Left arm Left arm Left arm   Pulse: 68 62 60 60   Resp: 16 18 20 18   Temp: 97 6 °F (36 4 °C) 97 6 °F (36 4 °C) (!) 97 °F (36 1 °C) (!) 97 3 °F (36 3 °C)   TempSrc: Temporal Temporal Temporal Temporal   SpO2: 95% 95% 96% 96%   Weight:       Height:            04/19/21 1030   PT Last Visit   PT Visit Date 04/19/21   Note Type   Note type Evaluation   Pain Assessment   Pain Assessment Tool 0-10   Pain Score 2   Pain Location/Orientation Location: Head   Hospital Pain Intervention(s) Repositioned; Ambulation/increased activity   Home Living   Type of Home House  ([facility])   Home Layout Two level  (bed bath on first level)   Bathroom Shower/Tub Tub/shower unit   Prior Function   Level of [place] Independent with ADLs and functional mobility   Lives With Spouse   ADL Assistance Independent   IADLs Independent   Falls in the last 6 months   (reports falls sometimes when drinking) Restrictions/Precautions   Weight Bearing Precautions Per Order No   Other Precautions Pain   General   Family/Caregiver Present No   Cognition   Arousal/Participation Alert   Orientation Level Oriented X4   Memory Within functional limits   Following Commands Follows all commands and directions without difficulty   Strength RLE   R Hip Flexion 4/5   R Knee Flexion 4/5   R Knee Extension 4/5   R Ankle Dorsiflexion 4/5   R Ankle Plantar Flexion 3+/5   Strength LLE   L Hip Flexion 4/5   L Knee Flexion 4/5   L Knee Extension 4/5   L Ankle Dorsiflexion 4/5   L Ankle Plantar Flexion 3+/5   Coordination   Movements are Fluid and Coordinated 1   Sensation X   Light Touch   RLE Light Touch Impaired   RLE Light Touch Comments tingling/numbness toes and foot able to identify light touch   LLE Light Touch Impaired   LLE Light Touch Comments tingling/numbness toes and foot able to identify light touch   Bed Mobility   Supine to Sit 6  Modified independent   Additional items Increased time required   Sit to Supine 6  Modified independent   Additional items Increased time required   Transfers   Sit to Stand 5  Supervision   Additional items Increased time required;Verbal cues   Stand to Sit 5  Supervision   Additional items Increased time required;Verbal cues   Additional Comments with RW   Ambulation/Elevation   Gait pattern Excessively slow; Short stride; Foward flexed;Decreased foot clearance; Wide MARGY   Gait Assistance 5  Supervision   Additional items Verbal cues   Assistive Device Rolling walker;None   Distance 150ft with RW, 100ft with no AD   Stair Management Assistance 5  Supervision   Additional items Verbal cues; Increased time required   Stair Management Technique Two rails; Step to pattern; Foreward   Number of Stairs 5   Balance   Static Sitting Fair +   Dynamic Sitting Fair   Static Standing Fair   Dynamic Standing Fair -   Ambulatory Fair -   Endurance Deficit   Endurance Deficit Yes   Endurance Deficit Description fatigue due to deconditioning   Activity Tolerance   Activity Tolerance Patient tolerated treatment well   Nurse Made Aware spoke to RN   Assessment   Prognosis Good   Problem List Decreased strength;Decreased endurance; Impaired balance;Decreased mobility; Decreased coordination;Decreased safety awareness; Impaired sensation;Obesity;Pain   Barriers to Discharge Inaccessible home environment   Goals   Patient Goals to get moving a little better before leaving hospital    STG Expiration Date 04/29/21   Short Term Goal #1 Pt will complete bed mobility mod I, transfers mod I with LRAD, ambulation mod I with LRAD 200ft and FF stairs with LRAD mod I to return home vs ETOH rehab  Plan   Treatment/Interventions Functional transfer training;LE strengthening/ROM; Elevations; Endurance training; Therapeutic exercise;Patient/family training;Equipment eval/education; Bed mobility;Gait training;Spoke to nursing;Spoke to case management;OT   PT Frequency 2-3x/wk   Recommendation   PT Discharge Recommendation Home with outpatient rehabilitation   Equipment Recommended Walker;Cane  (RW vs SPC pending progress)   PT - OK to Discharge Yes  (OK to D/C home vs ETOH rehab when medically cleared)   AM-PAC Basic Mobility Inpatient   Turning in Bed Without Bedrails 4   Lying on Back to Sitting on Edge of Flat Bed 4   Moving Bed to Chair 4   Standing Up From Chair 4   Walk in Room 4   Climb 3-5 Stairs 3   Basic Mobility Inpatient Raw Score 23   Basic Mobility Standardized Score 50 88       ASSESSMENT                                                                                                                     Roberto Monet is a 70 y o  male admitted to [facility] on 4/17/2021 for Alcohol withdrawal ([facility] [address] )  Pt  has a past medical history of Alcohol dependency ([facility] [address] ), Anxiety, Depression, [place] (hard of hearing), Hypertension, Kidney stones, Prostate enlargement, Renal disorder, and Shoulder dislocation    PT was consulted and pt was seen on 4/19/2021 for mobility assessment and d/c planning  Pt presents supine in bed alert and agreeable to therapy  He is reporting mild headache at this time  Demonstrates mild weakness but with functional strength to BLEs  Reporting paresthesias in bilateral feet which started to worsen at home recently with development of LE edema, edema has largely resolved with minimal edema bilaterally  Reports sensation has improved at the ankle level not yet in the feet  This is reflected in his gait pattern as he has decreased balance and reports feeling unsteady due to altered sensation in feet  Did well with RW, more unsteady with no device although with no major LOB  Will trial [facility] next treatment as this will be more functional when negotiating elevations  Pt is currently functioning at a modified independent assistance level for bed mobility, supervision assistance x1 level for transfers, supervision assistance x1 level for ambulation with Rolling walker and supervision assistance x1 for elevations  The patient's AM-PAC Basic Mobility Inpatient Short Form Raw Score is 23, Standardized Score is 50  88  A standardized score greater than 42 9 suggests the patient may benefit from discharge to home  Please also refer to the recommendation of the Physical Therapist for safe discharge planning  Recommendations                                                                                                              Pt will benefit from continued skilled IP PT to address the above mentioned impairments in order to maximize recovery and increase functional independence when completing mobility and ADLs  See flow sheet for goals and POC       DME:  SPC vs RW pending further assessment    Discharge Disposition:  Home with Outpatient Physical Therapy       Miguel Wu PT, DPT

## 2021-04-19 NOTE — ASSESSMENT & PLAN NOTE
· Patient's ECG done in [place] ED showed prolonged QTC interval at 497 with infrequent PVCs  · Repeat EKG showed QTc normalized at 434 ms  · EKG done on day of discharge shows  and within normal limits  · Telemetry was discontinued

## 2021-04-19 NOTE — PLAN OF CARE
Problem: PHYSICAL THERAPY ADULT  Goal: Performs mobility at highest level of function for planned discharge setting  See evaluation for individualized goals  Description: Treatment/Interventions: Functional transfer training, LE strengthening/ROM, Elevations, Endurance training, Therapeutic exercise, Patient/family training, Equipment eval/education, Bed mobility, Gait training, Spoke to nursing, Spoke to case management, OT  Equipment Recommended: Walker, Cane(RW vs SPC pending progress)       See flowsheet documentation for full assessment, interventions and recommendations  Note: Prognosis: Good  Problem List: Decreased strength, Decreased endurance, Impaired balance, Decreased mobility, Decreased coordination, Decreased safety awareness, Impaired sensation, Obesity, Pain     Barriers to Discharge: Inaccessible home environment        PT Discharge Recommendation: Home with outpatient rehabilitation     PT - OK to Discharge: Yes(OK to D/C home vs ETOH rehab when medically cleared)    See flowsheet documentation for full assessment

## 2021-04-19 NOTE — ASSESSMENT & PLAN NOTE
· Patient's magnesium on arrival to the [facility] emergency department was 1 2  · Trended 1 2 --> 1 5 --> 1 9 with IV magnesium and within normal limits

## 2021-04-19 NOTE — PLAN OF CARE
Problem: Potential for Falls  Goal: Patient will remain free of falls  Description: INTERVENTIONS:  - Assess patient frequently for physical needs  -  Identify cognitive and physical deficits and behaviors that affect risk of falls    -  [place] fall precautions as indicated by assessment   - Educate patient/family on patient safety including physical limitations  - Instruct patient to call for assistance with activity based on assessment  - Modify environment to reduce risk of injury  - Consider OT/PT consult to assist with strengthening/mobility  Outcome: Progressing     Problem: Prexisting or High Potential for Compromised Skin Integrity  Goal: Skin integrity is maintained or improved  Description: INTERVENTIONS:  - Identify patients at risk for skin breakdown  - Assess and monitor skin integrity  - Assess and monitor nutrition and hydration status  - Monitor labs   - Assess for incontinence   - Turn and reposition patient  - Assist with mobility/ambulation  - Relieve pressure over bony prominences  - Avoid friction and shearing  - Provide appropriate hygiene as needed including keeping skin clean and dry  - Evaluate need for skin moisturizer/barrier cream  - Collaborate with interdisciplinary team   - Patient/family teaching  - Consider wound care consult   Outcome: Progressing     Problem: PAIN - ADULT  Goal: Verbalizes/displays adequate comfort level or baseline comfort level  Description: Interventions:  - Encourage patient to monitor pain and request assistance  - Assess pain using appropriate pain scale  - Administer analgesics based on type and severity of pain and evaluate response  - Implement non-pharmacological measures as appropriate and evaluate response  - Consider cultural and social influences on pain and pain management  - Notify physician/advanced practitioner if interventions unsuccessful or patient reports new pain  Outcome: Progressing     Problem: INFECTION - ADULT  Goal: Absence or prevention of progression during hospitalization  Description: INTERVENTIONS:  - Assess and monitor for signs and symptoms of infection  - Monitor lab/diagnostic results  - Monitor all insertion sites, i e  indwelling lines, tubes, and drains  - Monitor endotracheal if appropriate and nasal secretions for changes in amount and color  - [place] appropriate cooling/warming therapies per order  - Administer medications as ordered  - Instruct and encourage patient and family to use good hand hygiene technique  - Identify and instruct in appropriate isolation precautions for identified infection/condition  Outcome: Progressing  Goal: Absence of fever/infection during neutropenic period  Description: INTERVENTIONS:  - Monitor WBC    Outcome: Progressing     Problem: SAFETY ADULT  Goal: Maintain or return to baseline ADL function  Description: INTERVENTIONS:  -  Assess patient's ability to carry out ADLs; assess patient's baseline for ADL function and identify physical deficits which impact ability to perform ADLs (bathing, care of mouth/teeth, toileting, grooming, dressing, etc )  - Assess/evaluate cause of self-care deficits   - Assess range of motion  - Assess patient's mobility; develop plan if impaired  - Assess patient's need for assistive devices and provide as appropriate  - Encourage maximum independence but intervene and supervise when necessary  - Involve family in performance of ADLs  - Assess for home care needs following discharge   - Consider OT consult to assist with ADL evaluation and planning for discharge  - Provide patient education as appropriate  Outcome: Progressing  Goal: Maintain or return mobility status to optimal level  Description: INTERVENTIONS:  - Assess patient's baseline mobility status (ambulation, transfers, stairs, etc )    - Identify cognitive and physical deficits and behaviors that affect mobility  - Identify mobility aids required to assist with transfers and/or ambulation (gait belt, sit-to-stand, lift, walker, cane, etc )  - [place] fall precautions as indicated by assessment  - Record patient progress and toleration of activity level on Mobility SBAR; progress patient to next Phase/Stage  - Instruct patient to call for assistance with activity based on assessment  - Consider rehabilitation consult to assist with strengthening/weightbearing, etc   Outcome: Progressing     Problem: DISCHARGE PLANNING  Goal: Discharge to home or other facility with appropriate resources  Description: INTERVENTIONS:  - Identify barriers to discharge w/patient and caregiver  - Arrange for needed discharge resources and transportation as appropriate  - Identify discharge learning needs (meds, wound care, etc )  - Arrange for interpretive services to assist at discharge as needed  - Refer to Case Management Department for coordinating discharge planning if the patient needs post-hospital services based on physician/advanced practitioner order or complex needs related to functional status, cognitive ability, or social support system  Outcome: Progressing     Problem: Knowledge Deficit  Goal: Patient/family/caregiver demonstrates understanding of disease process, treatment plan, medications, and discharge instructions  Description: Complete learning assessment and assess knowledge base    Interventions:  - Provide teaching at level of understanding  - Provide teaching via preferred learning methods  Outcome: Progressing

## 2021-04-19 NOTE — ASSESSMENT & PLAN NOTE
· Patient's potassium on arrival to the [facility] emergency department was 3 2  · Patient received supplemental potassium and now within normal limits  · Will continue to monitor

## 2021-04-19 NOTE — PLAN OF CARE
Problem: Potential for Falls  Goal: Patient will remain free of falls  Description: INTERVENTIONS:  - Assess patient frequently for physical needs  -  Identify cognitive and physical deficits and behaviors that affect risk of falls    -  [place] fall precautions as indicated by assessment   - Educate patient/family on patient safety including physical limitations  - Instruct patient to call for assistance with activity based on assessment  - Modify environment to reduce risk of injury  - Consider OT/PT consult to assist with strengthening/mobility  Outcome: Progressing     Problem: Prexisting or High Potential for Compromised Skin Integrity  Goal: Skin integrity is maintained or improved  Description: INTERVENTIONS:  - Identify patients at risk for skin breakdown  - Assess and monitor skin integrity  - Assess and monitor nutrition and hydration status  - Monitor labs   - Assess for incontinence   - Turn and reposition patient  - Assist with mobility/ambulation  - Relieve pressure over bony prominences  - Avoid friction and shearing  - Provide appropriate hygiene as needed including keeping skin clean and dry  - Evaluate need for skin moisturizer/barrier cream  - Collaborate with interdisciplinary team   - Patient/family teaching  - Consider wound care consult   Outcome: Progressing     Problem: PAIN - ADULT  Goal: Verbalizes/displays adequate comfort level or baseline comfort level  Description: Interventions:  - Encourage patient to monitor pain and request assistance  - Assess pain using appropriate pain scale  - Administer analgesics based on type and severity of pain and evaluate response  - Implement non-pharmacological measures as appropriate and evaluate response  - Consider cultural and social influences on pain and pain management  - Notify physician/advanced practitioner if interventions unsuccessful or patient reports new pain  Outcome: Progressing     Problem: INFECTION - ADULT  Goal: Absence or prevention of progression during hospitalization  Description: INTERVENTIONS:  - Assess and monitor for signs and symptoms of infection  - Monitor lab/diagnostic results  - Monitor all insertion sites, i e  indwelling lines, tubes, and drains  - Monitor endotracheal if appropriate and nasal secretions for changes in amount and color  - [place] appropriate cooling/warming therapies per order  - Administer medications as ordered  - Instruct and encourage patient and family to use good hand hygiene technique  - Identify and instruct in appropriate isolation precautions for identified infection/condition  Outcome: Progressing  Goal: Absence of fever/infection during neutropenic period  Description: INTERVENTIONS:  - Monitor WBC    Outcome: Progressing     Problem: SAFETY ADULT  Goal: Maintain or return to baseline ADL function  Description: INTERVENTIONS:  -  Assess patient's ability to carry out ADLs; assess patient's baseline for ADL function and identify physical deficits which impact ability to perform ADLs (bathing, care of mouth/teeth, toileting, grooming, dressing, etc )  - Assess/evaluate cause of self-care deficits   - Assess range of motion  - Assess patient's mobility; develop plan if impaired  - Assess patient's need for assistive devices and provide as appropriate  - Encourage maximum independence but intervene and supervise when necessary  - Involve family in performance of ADLs  - Assess for home care needs following discharge   - Consider OT consult to assist with ADL evaluation and planning for discharge  - Provide patient education as appropriate  Outcome: Progressing  Goal: Maintain or return mobility status to optimal level  Description: INTERVENTIONS:  - Assess patient's baseline mobility status (ambulation, transfers, stairs, etc )    - Identify cognitive and physical deficits and behaviors that affect mobility  - Identify mobility aids required to assist with transfers and/or ambulation (gait belt, sit-to-stand, lift, walker, cane, etc )  - [place] fall precautions as indicated by assessment  - Record patient progress and toleration of activity level on Mobility SBAR; progress patient to next Phase/Stage  - Instruct patient to call for assistance with activity based on assessment  - Consider rehabilitation consult to assist with strengthening/weightbearing, etc   Outcome: Progressing     Problem: DISCHARGE PLANNING  Goal: Discharge to home or other facility with appropriate resources  Description: INTERVENTIONS:  - Identify barriers to discharge w/patient and caregiver  - Arrange for needed discharge resources and transportation as appropriate  - Identify discharge learning needs (meds, wound care, etc )  - Arrange for interpretive services to assist at discharge as needed  - Refer to Case Management Department for coordinating discharge planning if the patient needs post-hospital services based on physician/advanced practitioner order or complex needs related to functional status, cognitive ability, or social support system  Outcome: Progressing     Problem: Knowledge Deficit  Goal: Patient/family/caregiver demonstrates understanding of disease process, treatment plan, medications, and discharge instructions  Description: Complete learning assessment and assess knowledge base    Interventions:  - Provide teaching at level of understanding  - Provide teaching via preferred learning methods  Outcome: Progressing

## 2021-04-19 NOTE — PROGRESS NOTES
PROGRESS NOTE  DEPARTMENT OF MEDICAL TOXICOLOGY  LEVEL 4 MEDICAL DETOX UNIT  Ariane Khan 70 y o  male MRN: [id number]  Unit/Bed#: 5T DETOX 981-14 Encounter: [id number]      Reason for Admission/Principal Problem: Alcohol Withdrawal  Rounding Provider: Estefania Mayfield DO  Attending Provider: Pat Serna MD   4/17/2021  8:25 PM           * Alcohol withdrawal [facility])  Assessment & Plan  · Patient presented to the [facility] ED seeking help for alcohol withdrawal symptoms  Patient and his wife feel that he would benefit from inpatient medical detox with follow-up rehab services for his alcohol use  · Patient last drank on 04/16/2021 at around 2:30 p m  He admits to drinking 1 pt of vodka  · Ethanol level on admission was <3  · 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  Stating that his last known withdrawal seizure was in February of 2019  Placed on seizure precautions  · Place on SEWS protocol  · Started on IV thiamine and folic acid  · On arrival to the unit patient admits to feeling anxious, restless, nauseous, tremulous  Patient is AAO x3   · Received 780 mg of phenobarbital, with last dose at 1353 on 4/18/21    Alcohol abuse  Assessment & Plan  · Patient reports a longstanding history of chronic alcohol use since age of 36years old  Patient states that since this age he has been drinking about 1 pint of vodka over the course of 2-3 days  · Patient admits that over the last 1 5 weeks he has been drinking 1 pint of vodka per day  · Patient reports that he has been a member of AA over the last few years, but states that lately he has been going to meetings and right after going to the liquor store and buying a bottle of vodka  · Patient states that he has been treated with naltrexone for the last 3 months by his primary care provider  However, he states that this has not been helping with his alcohol use    · Patient admits to having been hospitalized for alcohol withdrawal in the past   Stating that he was hospitalized over at [facility] once and then over at [facility] for an additional 2-3 occurrences  · Patient is admitted to the medical detox unit seeking help for alcohol withdrawal with follow-up rehab services  Leg swelling  Assessment & Plan  · Patient reports leg swelling that started approximately 2 weeks ago  States that he was seen by his primary care physician and was put on antibiotics, without relief  · Patient denies history of DVT  Denies history of CHF or CKD  · On physical exam patient appears to have +2 pitting edema bilaterally  No erythema or warmth noted  Patient denies pain on palpation  · VAS study (4/17/2021):  Negative for DVT bilaterally  · Give one time dose of 20 mg IV Lasix  · Echo ordered and obtained with results pending  · Continue to monitor    Pancreatitis  Assessment & Plan  · Patient has a longstanding history of alcohol abuse  · Lipase was elevated at 1,297 on arrival to the ED, 881 on 4/18/21, 757 on 4/19/21 and trending down  · CT abdomen (4/17/2021): Mild inflammatory change noted pancreatic head and uncinate process suspicious for acute uncomplicated pancreatitis  · Patient was advanced to soft diet and remains asymptomatic, will transition to a normal diet  · Continue to monitor lipase    Prolonged QT interval  Assessment & Plan  · Patient's ECG done in [facility] ED showed prolonged QTC interval at 497 with infrequent PVCs  · Repeat EKG showed QTc normalized at 434 ms  · Telemetry was discontinued    Hypoxia  Assessment & Plan  Hypoxia overnight down to 80% with loud snoring noted  Likely ROSARIO    · Will continue to monitor pulse-ox and give supplemental O2 as needed  · Night O2 study obtained and results pending  · Plan to get patient home sleep study if appropriate followed by Polysomnogram if needed  · ECHO conducted for peripheral edema and pending    Hypertension  Assessment & Plan  · Patient's BP was 160/93 on admission, now 155/92  · Continue pre-hospital amlodipine 5 mg daily, metoprolol succinate 25 mg daily, and Nadolol 20 mg daily  · Continue to monitor per protocol    Hypokalemia  Assessment & Plan  · Patient's potassium on arrival to the [facility] emergency department was 3 2  · Patient received supplemental potassium and now within normal limits  · Will continue to monitor    Hypomagnesemia  Assessment & Plan  · Patient's magnesium on arrival to the [facility] emergency department was 1 2  · Trended 1 2 --> 1 5 --> 1 9 with IV magnesium and within normal limits    BPH (benign prostatic hyperplasia)  Assessment & Plan  · Continue pre-hospital finasteride 5 mg daily and alfuzosin 40 mg daily    Depression  Assessment & Plan  · Continue Celexa 20 mg daily    Flatulence  Assessment & Plan  · Ordered Simethicone 80 mg Q6 PRN          VTE Pharmacologic Prophylaxis:   Pharmacologic: Enoxaparin (Lovenox)  Mechanical VTE Prophylaxis in Place: yes    Code Status: Level 1 - Full Code    Patient Centered Rounds: I have performed bedside rounds with nursing staff today  Discussions with Specialists or Other Care Team Provider: Discussed with toxicology team    Education and Discussions with Family / Patient:  Discussion with patient regarding ongoing plan of care    Time Spent for Care: 20 minutes  More than 50% of total time spent on counseling and coordination of care as described above  Current Length of Stay: 2 day(s)    Current Patient Status: Inpatient     Certification Statement: The patient will continue to require additional inpatient hospital stay due to alcohol withdrawal, acute pancreatitis, and cardiac work up for peripheral edema Discharge Plan: Likely tomorrow provided continued improvement with withdrawal and benign echo findings        Subjective:   Patient seen at bedside  States he is feeling better and denies any withdrawal symptoms overnight  State appetite is improving and able to tolerate soft diet with desire to transition to regular house diet  Reports improvement in sleep last night and getting 6-7 hours  Having BMs without issues  Denies any side effects to medications  Does report having excessive gas and wished to have some medication for that  States he is ready to start alcohol rehab once he is medically stable  Indicates that PT was in to see him and he was able to ambulate around unit without difficulty      Objective:     Clinical Opiate Withdrawal Scale  Pulse: 60  Heart Rate Source: Monitor  Patient Position - Orthostatic VS: Sitting    SEWS Total Score: 0 (2021 11:40 PM)        Last 24 Hours Medication List:   Current Facility-Administered Medications   Medication Dose Route Frequency Provider Last Rate    acetaminophen  650 mg Oral Q6H PRN Margaret Sanchez PA-C      alfuzosin  10 mg Oral Daily Margaret Sanchez PA-C      amLODIPine  5 mg Oral Daily Margaret Sanchez PA-C      baclofen  10 mg Oral TID Margaret Sanchez PA-C      citalopram  20 mg Oral Daily Margaret Sanchez PA-C      enoxaparin  40 mg Subcutaneous Daily Margaret Sanchez PA-C      finasteride  5 mg Oral Daily Margaret Sanchez PA-C      folic acid 1 mg, thiamine 100 mg in 0 9% sodium chloride 100 mL IVPB   Intravenous Daily Margaret Sanchez PA-C Stopped (21 1187)    ibuprofen  600 mg Oral Q6H PRN MALGORZATA Arreaga      loperamide  2 mg Oral TID PRN Margaret Sanchez PA-C      metoprolol succinate  25 mg Oral Daily Margaret Sanchez PA-C      multivitamin-minerals  1 tablet Oral Daily Margaret Sanchez PA-C      nadolol  20 mg Oral HS Margaret Sanchez PA-C      simethicone  80 mg Oral Q6H PRN Celesta Kortney, DO      thiamine  100 mg Oral Daily Margaret Sanchez PA-C      traZODone  150 mg Oral HS Margaret Sanchez PA-C           Vitals:   Temp (24hrs), Av 5 °F (36 4 °C), Min:97 °F (36 1 °C), Max:97 8 °F (36 6 °C)    Temp:  [97 °F (36 1 °C)-97 8 °F (36 6 °C)] 97 3 °F (36 3 °C)  HR:  [58-68] 60  Resp:  [16-20] 18  BP: (133-160)/(75-92) 155/92  SpO2:  [93 %-96 %] 96 %  Body mass index is 35 22 kg/m²  Input and Output Summary (last 24 hours): Intake/Output Summary (Last 24 hours) at 4/19/2021 1513  Last data filed at 4/19/2021 1443  Gross per 24 hour   Intake 4212 5 ml   Output 1425 ml   Net 2787 5 ml       Physical Exam:   Physical Exam  Vitals signs and nursing note reviewed  Constitutional:       Appearance: Normal appearance  He is obese  He is not ill-appearing or diaphoretic  HENT:      Head: Normocephalic and atraumatic  Nose: Nose normal    Eyes:      General: No scleral icterus  Extraocular Movements: Extraocular movements intact  Conjunctiva/sclera: Conjunctivae normal       Pupils: Pupils are equal, round, and reactive to light  Cardiovascular:      Rate and Rhythm: Normal rate and regular rhythm  Heart sounds: No murmur  No friction rub  No gallop  Pulmonary:      Effort: Pulmonary effort is normal       Breath sounds: Normal breath sounds  No stridor  No wheezing or rales  Abdominal:      General: There is distension  Tenderness: There is no abdominal tenderness  There is no guarding  Comments: Hyperactive bowel sounds   Musculoskeletal: Normal range of motion  General: No swelling or tenderness  Skin:     General: Skin is warm and dry  Neurological:      Mental Status: He is alert and oriented to person, place, and time  Psychiatric:         Mood and Affect: Mood normal          Behavior: Behavior normal          Thought Content:  Thought content normal          Judgment: Judgment normal          Additional Data:     Labs:   Results from last 7 days   Lab Units 04/19/21  0459  04/17/21  2204 04/17/21  1320   WBC Thousand/uL 4 40*   < > 6 70 7 75   HEMOGLOBIN g/dL 13 8   < > 13 8 14 8   HEMATOCRIT % 40 7*   < > 41 4 44 4   PLATELETS Thousands/uL 184   < > 202 224   NEUTROS PCT %  --   --   --  79*   LYMPHS PCT %  --   --   --  12*   LYMPHO PCT %  --   --  26 --    MONOS PCT %  --   --   --  8   MONO PCT %  --   --  7  --    EOS PCT %  --   --   --  0    < > = values in this interval not displayed  Results from last 7 days   Lab Units 04/19/21  0459   SODIUM mmol/L 136*   POTASSIUM mmol/L 3 8   CHLORIDE mmol/L 99   CO2 mmol/L 33*   BUN mg/dL 8   CREATININE mg/dL 0 53*   ANION GAP mmol/L 4*   CALCIUM mg/dL 8 5   ALBUMIN g/dL 3 5   TOTAL BILIRUBIN mg/dL 0 80   ALK PHOS U/L 68   ALT U/L 25   AST U/L 35   GLUCOSE RANDOM mg/dL 109*      Results from last 7 days   Lab Units 04/17/21  1320   INR  1 07                         * I Have Reviewed All Lab Data Listed Above  * Additional Pertinent Lab Tests Reviewed: [facility] 66 Admission Reviewed      Imaging Studies: I have personally reviewed pertinent reports  Recent Cultures (last 7 days):    None      Today, Patient Was Seen By: Tessa Arthur DO    ** Please Note: Dictation voice to text software may have been used in the creation of this document   **

## 2021-04-20 VITALS
WEIGHT: 224.87 LBS | TEMPERATURE: 97.8 F | HEART RATE: 67 BPM | BODY MASS INDEX: 35.29 KG/M2 | DIASTOLIC BLOOD PRESSURE: 88 MMHG | HEIGHT: 67 IN | OXYGEN SATURATION: 93 % | RESPIRATION RATE: 19 BRPM | SYSTOLIC BLOOD PRESSURE: 149 MMHG

## 2021-04-20 PROBLEM — M79.89 LEG SWELLING: Status: RESOLVED | Noted: 2021-04-17 | Resolved: 2021-04-20

## 2021-04-20 PROBLEM — K85.90 PANCREATITIS: Status: RESOLVED | Noted: 2021-04-17 | Resolved: 2021-04-20

## 2021-04-20 PROBLEM — F10.939 ALCOHOL WITHDRAWAL (HCC): Status: RESOLVED | Noted: 2019-02-12 | Resolved: 2021-04-20

## 2021-04-20 PROBLEM — E83.42 HYPOMAGNESEMIA: Status: RESOLVED | Noted: 2019-07-13 | Resolved: 2021-04-20

## 2021-04-20 PROBLEM — R94.31 PROLONGED QT INTERVAL: Status: RESOLVED | Noted: 2021-04-17 | Resolved: 2021-04-20

## 2021-04-20 PROBLEM — E87.6 HYPOKALEMIA: Status: RESOLVED | Noted: 2019-02-19 | Resolved: 2021-04-20

## 2021-04-20 PROBLEM — F10.239 ALCOHOL WITHDRAWAL (HCC): Status: RESOLVED | Noted: 2019-02-12 | Resolved: 2021-04-20

## 2021-04-20 PROBLEM — I10 HYPERTENSION: Status: RESOLVED | Noted: 2019-02-10 | Resolved: 2021-04-20

## 2021-04-20 LAB
ANION GAP SERPL CALCULATED.3IONS-SCNC: 4 MMOL/L (ref 5–14)
ATRIAL RATE: 58 BPM
BUN SERPL-MCNC: 8 MG/DL (ref 5–25)
CALCIUM SERPL-MCNC: 9.1 MG/DL (ref 8.4–10.2)
CHLORIDE SERPL-SCNC: 98 MMOL/L (ref 97–108)
CO2 SERPL-SCNC: 34 MMOL/L (ref 22–30)
CREAT SERPL-MCNC: 0.62 MG/DL (ref 0.7–1.5)
GFR SERPL CREATININE-BSD FRML MDRD: 100 ML/MIN/1.73SQ M
GLUCOSE SERPL-MCNC: 110 MG/DL (ref 70–99)
GLUCOSE SERPL-MCNC: 94 MG/DL (ref 65–140)
HEMOCCULT STL QL: NEGATIVE
HEMOCCULT STL QL: NORMAL
HEMOCCULT STL QL: NORMAL
LIPASE SERPL-CCNC: 464 U/L (ref 23–300)
P AXIS: 41 DEGREES
POTASSIUM SERPL-SCNC: 3.7 MMOL/L (ref 3.6–5)
PR INTERVAL: 176 MS
QRS AXIS: 46 DEGREES
QRSD INTERVAL: 94 MS
QT INTERVAL: 422 MS
QTC INTERVAL: 414 MS
SODIUM SERPL-SCNC: 136 MMOL/L (ref 137–147)
T WAVE AXIS: 16 DEGREES
VENTRICULAR RATE: 58 BPM

## 2021-04-20 PROCEDURE — 82948 REAGENT STRIP/BLOOD GLUCOSE: CPT

## 2021-04-20 PROCEDURE — NC001 PR NO CHARGE: Performed by: EMERGENCY MEDICINE

## 2021-04-20 PROCEDURE — 99238 HOSP IP/OBS DSCHRG MGMT 30/<: CPT | Performed by: EMERGENCY MEDICINE

## 2021-04-20 PROCEDURE — 97116 GAIT TRAINING THERAPY: CPT

## 2021-04-20 PROCEDURE — 93005 ELECTROCARDIOGRAM TRACING: CPT

## 2021-04-20 PROCEDURE — 93010 ELECTROCARDIOGRAM REPORT: CPT | Performed by: INTERNAL MEDICINE

## 2021-04-20 PROCEDURE — 83690 ASSAY OF LIPASE: CPT | Performed by: PSYCHIATRY & NEUROLOGY

## 2021-04-20 PROCEDURE — 82272 OCCULT BLD FECES 1-3 TESTS: CPT | Performed by: EMERGENCY MEDICINE

## 2021-04-20 PROCEDURE — 80048 BASIC METABOLIC PNL TOTAL CA: CPT | Performed by: PSYCHIATRY & NEUROLOGY

## 2021-04-20 RX ORDER — LANOLIN ALCOHOL/MO/W.PET/CERES
100 CREAM (GRAM) TOPICAL DAILY
Qty: 30 TABLET | Refills: 0 | Status: ON HOLD | OUTPATIENT
Start: 2021-04-21 | End: 2021-06-22

## 2021-04-20 RX ORDER — SIMETHICONE 80 MG
80 TABLET,CHEWABLE ORAL EVERY 6 HOURS PRN
Qty: 30 TABLET | Refills: 0 | Status: SHIPPED | OUTPATIENT
Start: 2021-04-20 | End: 2021-06-22 | Stop reason: HOSPADM

## 2021-04-20 RX ADMIN — METOPROLOL SUCCINATE 25 MG: 25 TABLET, EXTENDED RELEASE ORAL at 08:11

## 2021-04-20 RX ADMIN — Medication 1 TABLET: at 08:10

## 2021-04-20 RX ADMIN — LOPERAMIDE HYDROCHLORIDE 2 MG: 2 CAPSULE ORAL at 08:11

## 2021-04-20 RX ADMIN — BACLOFEN 10 MG: 10 TABLET ORAL at 08:11

## 2021-04-20 RX ADMIN — BACLOFEN 10 MG: 10 TABLET ORAL at 15:39

## 2021-04-20 RX ADMIN — FINASTERIDE 5 MG: 5 TABLET, FILM COATED ORAL at 08:11

## 2021-04-20 RX ADMIN — AMLODIPINE BESYLATE 5 MG: 5 TABLET ORAL at 08:10

## 2021-04-20 RX ADMIN — SIMETHICONE 80 MG: 80 TABLET, CHEWABLE ORAL at 13:00

## 2021-04-20 RX ADMIN — ALFUZOSIN HYDROCHLORIDE 10 MG: 10 TABLET, FILM COATED, EXTENDED RELEASE ORAL at 08:12

## 2021-04-20 RX ADMIN — CITALOPRAM HYDROBROMIDE 20 MG: 20 TABLET ORAL at 08:11

## 2021-04-20 RX ADMIN — ENOXAPARIN SODIUM 40 MG: 40 INJECTION SUBCUTANEOUS at 08:11

## 2021-04-20 RX ADMIN — THIAMINE HYDROCHLORIDE: 100 INJECTION, SOLUTION INTRAMUSCULAR; INTRAVENOUS at 09:45

## 2021-04-20 RX ADMIN — THIAMINE HCL TAB 100 MG 100 MG: 100 TAB at 08:11

## 2021-04-20 NOTE — CASE MANAGEMENT
Cm informed pt cleared for d/c today an will require overnight home O2  CM met with pt, reviewed this need  Pt reports having r/w at home but no O2  Pt signed release for [employer]  Pt reports his wife works till [employer]  Pt reports his wife will provide d/c transportation  Cm spoke with pt, reviewed inpt rehab again  Pt reports plans to contact his AA sponsor upon d/c, does not want inpt or outpatient treatment at this time    Ecin referral sent to [facility] along with clinical documentation, notification of pt's d/c today, and request for delivery today after 530pm

## 2021-04-20 NOTE — CASE MANAGEMENT
Referral to [facility] for home O2 was approved and accepted  [employer] to contact pt's wife to arrange home delivery  CM spoke with pt's wife Juve Russell, cell# [phone]; reviewed pt's d/c today with home O2 at night through [facility]  Pt's wife in agreement, reports she will make herself available for home O2 delivery  CM informed pt's wife of pt's refusal of inpt rehab treatment  CM also informed pt's wife of medicare not covering inpt rehab; pt must go through his secondary insurance or [employer]  Pt's wife reports pt has gone through [facility] in past, was at [facility] for 4 days only and signed himself out of program  Pt's wife reports she will speak with pt regarding his sobriety and rehab options  Pt's wife reports she will  pt this evening after 7pm dependent upon home O2 delivery time

## 2021-04-20 NOTE — NURSING NOTE
Patient resting comfortably in room  IV removed and patient showered  Patient ready for DC  Wife to provide transport for the patient after she is home from work    Potential  time 7/8 pm

## 2021-04-20 NOTE — PLAN OF CARE
Patient education on mobilization every hour to 2 hours to prevent skin breakdown    Verbal understanding

## 2021-04-20 NOTE — PHYSICAL THERAPY NOTE
Physical TherapyTreatment Note    Patient's Name: Leonardo Angel    Admitting Diagnosis  Alcohol abuse [F10 10]    Problem List  Patient Active Problem List   Diagnosis    Ataxia    Alcohol abuse    BPH (benign prostatic hyperplasia)    Transaminitis    Tremor of left hand    Depression    Hypoxia    Flatulence       Past Medical History  Past Medical History:   Diagnosis Date    Alcohol dependency ([address] )     Anxiety     Depression     [place] (hard of hearing)     Hypertension     Kidney stones     Prostate enlargement     Renal disorder     kidney    Shoulder dislocation        Past Surgical History  Past Surgical History:   Procedure Laterality Date    CHOLECYSTECTOMY      SHOULDER SURGERY      for dislocation       Recent Imaging  No orders to display       Recent Vital Signs  Vitals:    04/20/21 0745 04/20/21 0913 04/20/21 1100 04/20/21 1525   BP: (!) 178/96 136/74 157/94 149/88   BP Location: Left arm Left arm Left arm Right arm   Pulse: 60  61 67   Resp: 18  18 19   Temp: 97 7 °F (36 5 °C)  (!) 97 2 °F (36 2 °C) 97 8 °F (36 6 °C)   TempSrc: Temporal  Temporal Temporal   SpO2:   93% 93%   Weight:       Height:            04/20/21 1040   PT Last Visit   PT Visit Date 04/20/21   Note Type   Note Type Treatment   Pain Assessment   Pain Assessment Tool 0-10   Pain Score No Pain   Restrictions/Precautions   Weight Bearing Precautions Per Order No   General   Chart Reviewed Yes   Response to Previous Treatment Patient with no complaints from previous session  Family/Caregiver Present No   Cognition   Arousal/Participation Alert; Cooperative   Attention Within functional limits   Orientation Level Oriented X4   Memory Within functional limits   Following Commands Follows all commands and directions without difficulty   Subjective   Subjective I would like to try with a cane   Bed Mobility   Supine to Sit 6  Modified independent   Additional items Increased time required   Sit to Supine 6  Modified independent   Additional items Increased time required   Transfers   Sit to Stand 6  Modified independent   Additional items Increased time required;Verbal cues   Stand to Sit 6  Modified independent   Additional items Increased time required;Verbal cues   Additional Comments with RW   Ambulation/Elevation   Gait pattern Foward flexed;Decreased foot clearance   Gait Assistance 5  Supervision   Assistive Device Rolling walker;None   Distance 50ft with RW; 200ft with [address]   Stair Management Assistance 6  Modified independent   Additional items Increased time required   Stair Management Technique One rail L;Step to pattern; Foreward; With cane   Number of Stairs 12   Balance   Static Sitting Fair +   Dynamic Sitting Fair   Static Standing Fair   Dynamic Standing Fair -   Ambulatory Fair -   Endurance Deficit   Endurance Deficit Yes   Endurance Deficit Description fatigue   Activity Tolerance   Activity Tolerance Patient tolerated treatment well   Nurse Made Aware spoke to RN   Assessment   Prognosis Good   Problem List Decreased strength;Decreased endurance; Impaired balance;Decreased mobility; Decreased coordination;Decreased safety awareness; Impaired sensation;Obesity;Pain   Assessment Pt seen today for PT treatment, found seated in room alert and agreeable to therapy  He is reporting no pain at this time  Ambulates well with SPC  Recommending SPC for ambulation at this time  Cleared for D/C with outpatient PT  Barriers to Discharge Inaccessible home environment   Goals   Patient Goals to go home   STG Expiration Date 04/29/21   PT Treatment Day 1   Plan   Treatment/Interventions Functional transfer training;LE strengthening/ROM; Elevations; Therapeutic exercise; Endurance training;Patient/family training;Equipment eval/education; Bed mobility;Gait training;Spoke to nursing;Spoke to case management;OT   Progress Progressing toward goals   PT Frequency 2-3x/wk   Recommendation   PT Discharge Recommendation Home with outpatient rehabilitation   Equipment Recommended Cane   PT - OK to Discharge Yes   AM-PAC Basic Mobility Inpatient   Turning in Bed Without Bedrails 4   Lying on Back to Sitting on Edge of Flat Bed 4   Moving Bed to Chair 4   Standing Up From Chair 4   Walk in Room 4   Climb 3-5 Stairs 4   Basic Mobility Inpatient Raw Score 24   Basic Mobility Standardized Score 57 68       Aramis Ramos PT, DPT

## 2021-04-20 NOTE — PLAN OF CARE
Problem: PHYSICAL THERAPY ADULT  Goal: Performs mobility at highest level of function for planned discharge setting  See evaluation for individualized goals  Description: Treatment/Interventions: Functional transfer training, LE strengthening/ROM, Elevations, Endurance training, Therapeutic exercise, Patient/family training, Equipment eval/education, Bed mobility, Gait training, Spoke to nursing, Spoke to case management, OT  Equipment Recommended: Walker, Cane(RW vs SPC pending progress)       See flowsheet documentation for full assessment, interventions and recommendations  Outcome: Progressing  Note: Prognosis: Good  Problem List: Decreased strength, Decreased endurance, Impaired balance, Decreased mobility, Decreased coordination, Decreased safety awareness, Impaired sensation, Obesity, Pain  Assessment: Pt seen today for PT treatment, found seated in room alert and agreeable to therapy  He is reporting no pain at this time  Ambulates well with SPC  Recommending SPC for ambulation at this time  Cleared for D/C with outpatient PT  Barriers to Discharge: Inaccessible home environment        PT Discharge Recommendation: Home with outpatient rehabilitation     PT - OK to Discharge: Yes    See flowsheet documentation for full assessment

## 2021-04-20 NOTE — DISCHARGE SUMMARY
[facility]  Discharge- Howell [place] 1950, 70 y o  male MRN: [id number]  Unit/Bed#: Deepak Alberts 520-58 Encounter: [id number]  Primary Care Provider: Mallorie Porras MD   Date and time admitted to hospital: 4/17/2021  8:25 PM    * Alcohol withdrawal (HCC)-resolved as of 4/20/2021  Assessment & Plan  · Patient presented to the [facility] ED seeking help for alcohol withdrawal symptoms  Patient and his wife feel that he would benefit from inpatient medical detox with follow-up rehab services for his alcohol use  · Patient last drank on 04/16/2021 at around 2:30 p m  He admits to drinking 1 pt of vodka  · Ethanol level on admission was <3  · 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  Stating that his last known withdrawal seizure was in February of 2019  Placed on seizure precautions  · Place on SEWS protocol  · Started on IV thiamine and folic acid  · On arrival to the unit patient admits to feeling anxious, restless, nauseous, tremulous, but AAO x3  Patient is currently experiencing now withdrawal symptoms  · Received 780 mg of phenobarbital, with last dose at 1353 on 4/18/21    Alcohol abuse  Assessment & Plan  · Patient reports a longstanding history of chronic alcohol use since age of 36years old  Patient states that since this age he has been drinking about 1 pint of vodka over the course of 2-3 days  · Patient admits that over the last 1 5 weeks he has been drinking 1 pint of vodka per day  · Patient reports that he has been a member of AA over the last few years, but states that lately he has been going to meetings and right after going to the liquor store and buying a bottle of vodka  · Patient states that he has been treated with naltrexone for the last 3 months by his primary care provider  However, he states that this has not been helping with his alcohol use    · Patient admits to having been hospitalized for alcohol withdrawal in the past   Stating that he was hospitalized over at [facility] once and then over at [facility] for an additional 2-3 occurrences  · Patient is admitted to the medical detox unit seeking help for alcohol withdrawal with follow-up rehab services  Leg swelling-resolved as of 4/20/2021  Assessment & Plan  · Patient reports leg swelling that started approximately 2 weeks ago  States that he was seen by his primary care physician and was put on antibiotics, without relief  · Patient denies history of DVT  Denies history of CHF or CKD  · On physical exam patient appears to have +2 pitting edema bilaterally  No erythema or warmth noted  Patient denies pain on palpation  · VAS study (4/17/2021):  Negative for DVT bilaterally  · Give one time dose of 20 mg IV Lasix  · ECHO conducted revealing EF of 60% with no regional wall motion abnormalities  · PT recommends outpatient physical therapy and referral will be made upon discharge    Pancreatitis-resolved as of 4/20/2021  Assessment & Plan  · Patient has a longstanding history of alcohol abuse  · Lipase was elevated at 1,297 on arrival to the ED, 881 on 4/18/21, 757 on 4/19/21, 464 on 4/20/21 and trending down  · CT abdomen (4/17/2021): Mild inflammatory change noted pancreatic head and uncinate process suspicious for acute uncomplicated pancreatitis  · Patient was advanced to soft diet and remained asymptomatic  Transitioned to normal diet and tolerating well  · Hemocult obatined and resulted as negative    Prolonged QT interval-resolved as of 4/20/2021  Assessment & Plan  · Patient's ECG done in [facility] ED showed prolonged QTC interval at 497 with infrequent PVCs  · Repeat EKG showed QTc normalized at 434 ms  · EKG done on day of discharge shows  and within normal limits  · Telemetry was discontinued    Hypoxia  Assessment & Plan  Hypoxia overnight down to 80% with loud snoring noted  Likely ROSARIO    · Will continue to monitor pulse-ox and give supplemental O2 as needed  · Night O2 study obtained revealing Oxygen desaturations dropping below 88% with a low of 76% for a total of 32 58 minutes at night and a total of 57 hypoxic events    · Plan to discharge patient with 2L home oxygen for nocturnal hypoxia to bridge until Polysomnogram can be obtained  · ECHO conducted revealing EF of 60% with no regional wall motion abnormalities    Hypertension-resolved as of 4/20/2021  Assessment & Plan  · Patient's BP was 160/93 on admission, now 157/94  · Continue pre-hospital amlodipine 5 mg daily, metoprolol succinate 25 mg daily, and Nadolol 20 mg daily  · Continue to monitor per protocol    Hypokalemia-resolved as of 4/20/2021  Assessment & Plan  · Patient's potassium on arrival to the [facility] emergency department was 3 2  · Patient received supplemental potassium and now within normal limits  · Will continue to monitor    Hypomagnesemia-resolved as of 4/20/2021  Assessment & Plan  · Patient's magnesium on arrival to the [facility] emergency department was 1 2  · Trended 1 2 --> 1 5 --> 1 9 with IV magnesium and within normal limits    BPH (benign prostatic hyperplasia)  Assessment & Plan  · Continue pre-hospital finasteride 5 mg daily and alfuzosin 40 mg daily    Depression  Assessment & Plan  · Continue Celexa 20 mg daily    Flatulence  Assessment & Plan  · Continue Simethicone 80 mg Q6 PRN  MEDICAL DETOX UNIT, LEVEL 4  Department of Medical Toxicology  Reason for Admission/Principal Problem: Alcohol Withdrawal  Admitting provider: DO Moira Buckner MD   4/17/2021  8:25 PM       Discharging Physician / Practitioner: Clive Moreno DO  PCP: Torito Nguyen MD  Admission Date:   Admission Orders (From admission, onward)     Ordered        04/17/21 2054  Inpatient Admission  Once                   Discharge Date: 04/20/21    Resolved Problems  Date Reviewed: 4/17/2021          Resolved    * (Principal) Alcohol withdrawal ([facility] [address] ) 4/20/2021     Resolved by  Clive Moreno DO          Consultations During [facility]:  · None    Procedures Performed:   · Night time O2 study    Significant Findings / Test Results:   · EKG 4/17/21 -   · EKG 4/20/21 -   · CMP 4/17/21 - K+ 3 2  · BMP 4/18/21 - K+ 3 8  · Magnesium 4/17/21 - 1 2  · Magnesium 4/18/21 -  1 9  · Lipase 4/17/21 - 1,297  · Lipase 4/20/21 - 464  · CT 4/17/21 - "Mild inflammatory change noted pancreatic head and uncinate process suspicious for acute uncomplicated pancreatitis "  · Occult blood 4/20/21 - negative  · ECHO - EF of 60% with no regional wall motion abnormalities    Incidental Findings:   · None    Test Results Pending at Discharge (will require follow up): · None     Outpatient Tests Requested:  · Polysomnogram    Complications:  None    Reason for Admission: Alcohol Withdrawal    Hospital Course:     Brigette Olivares is a 70 y o  male patient who originally presented to [facility] on 4/17/2021 due to Alcohol Withdrawal and seeking stabilization with follow up rehabilitation services  Found to be symptomatic with tremor, nausea, anxiety, and restlessness  Patient was admitted to detox and started on SEWS protocol  Home medications were continued  Patient was noted to have electrolyte abnormalities with reduced K+ and Mg on presentation and these were repleted with rechecks within normal limits as noted above  He received a total of 780 mg of Phenobarbital during this stay with last dose at 1353 on 4/18/21  Given IV thiamine and folic acid  Lipase was obtained showing elevation as noted above  CT obtained and result as above showing acute pancreatitis  Patient was made NPO and IV lactated ringers were started at 125ml/hr  EKG was noted to be prolonged at 497 and patient was placed on telemetry  Repeat EKG showed resolution of QTC prolongation and telemetry was discontinued  Lipases trended down and patient diet was advanced to soft and later regular house  Fluids were subsequently discontinued   Patient complained of bilateral leg swelling and Echo was obtained and resulted within normal limits as above  PT saw patient and recommended outpatient PT referral  Patient was noted to be desaturating at night and night time O2 study was conducted show 57 desaturations under 88% with low of 76% for a total of 32 58 minutes  Patient was prescribed 2L home O2 for night time use to bridge until he can obtain Polysomnogram as outpatient  Simethicone as needed was added due to complaints of excessive gas with noted improvement  Please see above list of diagnoses and related plan for additional information  Condition at Discharge: good     Discharge Day Visit / Exam:     Subjective:  Patient seen at bedside  Reports continued improvement overall and feeling well this morning  Does state he is have some lose dark looking stools and requests fecal testing for this  States he is able to ambulate around the unit without difficulty  Diet has progressed and indicates he is tolerating a full diet  Sleeping continues to improve and reports he was able to obtain a full night of sleep  Educated about need for home O2 and night and follow up sleep study and patient voiced understanding  Denies any side effects to medications  Vitals: Blood Pressure: 157/94 (04/20/21 1100)  Pulse: 61 (04/20/21 1100)  Temperature: (!) 97 2 °F (36 2 °C) (04/20/21 1100)  Temp Source: Temporal (04/20/21 1100)  Respirations: 18 (04/20/21 1100)  Height: 5' 7" (170 2 cm) (04/17/21 2051)  Weight - Scale: 102 kg (224 lb 13 9 oz) (04/17/21 2051)  SpO2: 93 % (04/20/21 1100)     Exam:   Physical Exam  Vitals signs and nursing note reviewed  Constitutional:       General: He is not in acute distress  Appearance: Normal appearance  He is obese  He is not ill-appearing or toxic-appearing  HENT:      Head: Normocephalic and atraumatic  Nose: Nose normal    Eyes:      General: No scleral icterus  Right eye: No discharge  Left eye: No discharge        Extraocular Movements: Extraocular movements intact  Conjunctiva/sclera: Conjunctivae normal       Pupils: Pupils are equal, round, and reactive to light  Cardiovascular:      Rate and Rhythm: Normal rate and regular rhythm  Heart sounds: Normal heart sounds  No murmur  No friction rub  No gallop  Pulmonary:      Effort: Pulmonary effort is normal       Breath sounds: Normal breath sounds  Abdominal:      General: There is distension  Tenderness: There is no abdominal tenderness  There is no guarding  Comments: Hyperactive bowel sounds but reduced from prior   Musculoskeletal: Normal range of motion  Skin:     General: Skin is warm and dry  Neurological:      Mental Status: He is alert and oriented to person, place, and time  Psychiatric:         Mood and Affect: Mood normal          Behavior: Behavior normal          Thought Content: Thought content normal          Judgment: Judgment normal        Discussion with Family: Discussion with patient regarding ongoing plan of care    Discharge instructions/Information to patient and family:   See after visit summary for information provided to patient and family  Provisions for Follow-Up Care:  See after visit summary for information related to follow-up care and any pertinent home health orders  Disposition:     Home      Planned Readmission: No     Discharge Statement:  I spent 30 minutes discharging the patient  This time was spent on the day of discharge  I had direct contact with the patient on the day of discharge  Greater than 50% of the total time was spent examining patient, answering all patient questions, arranging and discussing plan of care with patient as well as directly providing post-discharge instructions  Additional time then spent on discharge activities  Discharge Medications:  See after visit summary for reconciled discharge medications provided to patient and family        ** Please Note: This note has been constructed using a voice recognition system **

## 2021-04-20 NOTE — QUICK NOTE
Hypoxia  Assessment & Plan  Hypoxia overnight down to 80% with loud snoring noted  Likely ROSARIO  · Will continue to monitor pulse-ox and give supplemental O2 as needed  · Night O2 study obtained revealing Oxygen desaturations dropping below 88% for a total of 32 58 minutes at night with low of 76% and a total of 57 hypoxic events    · Plan to discharge patient with 2L home oxygen for nocturnal hypoxia to bridge until Polysomnogram can be obtained  · ECHO conducted revealing EF of 60% with no regional wall motion abnormalities

## 2021-04-20 NOTE — DISCHARGE INSTR - OTHER ORDERS
[facility]   [address] [place], [address]  [address] [place] [address] [phone]    Alcoholics Anonymous: Follow up with your AA sponsor    What you need to know aboutcoronavirus disease 2019 (COVID-19)     What is coronavirus disease 2019 (COVID-19)? Coronavirus disease 2019 (COVID-19) is a respiratory illness that can spread from person to person  The virus that causes COVID-19 is a novel coronavirus that was first identified during an investigation into an outbreak in [place], [place]  Can people in [place]  get COVID[place]19? Yes  COVID-19 is spreading from person to person in parts of the [place]  Risk of infection with COVID-19 is higher for people who are close contacts of someone known to have COVID-19, for example healthcare workers, or household members  Other people at higher risk for infection are those who live in or have recently been in an area with ongoing spread of COVID-19  Learn more about places with ongoing spread at   [employer] tn  html#geographic  Have there been cases of COVID-19 in the [place] ?   Yes  The first case of COVID-19 in the [place] was reported on January 21, 2020  The current count of cases of COVID-19 in the [place] is available on [employer] at [facility]  How does COVID-19 spread? The virus that causes COVID-19 probably emerged from an animal source, but is now spreading from person to person  The virus is thought to spread mainly between people who are in close contact with one another (within about 6 feet) through respiratory droplets produced when an infected person coughs or sneezes   It also may be possible that a person can get COVID-19 by touching a surface or object that has the virus on it and then touching their own mouth, nose, or possibly their eyes, but this is not thought to be the main way the virus spreads  Learn what is known about the spread of newly emerged coronaviruses at [facility]  What are the symptoms of COVID-19? Patients with COVID-19 have had mild to severe respiratory illness with symptoms of   fever   cough   shortness of breath  What are severe complications from this virus? Some patients have pneumonia in both lungs, multi-organ failure and in some cases death  How can I help protect myself? People can help protect themselves from respiratory illness with everyday preventive actions  Avoid close contact with people who are sick  Avoid touching your eyes, nose, and mouth withunwashed hands  Wash your hands often with soap and water for at least 20 seconds  Use an alcohol-based hand  that contains at least 60% alcohol if soap and water are not available  If you are sick, to keep from spreading respiratory illness to others, you should   Stay home when you are sick  Cover your cough or sneeze with a tissue, then throw the tissue in the trash  Clean and disinfect frequently touched objectsand surfaces  What should I do if I recently traveled from an area with ongoing spread of COVID-19? If you have traveled from an affected area, there may be restrictions on your movements for up to 2 weeks  If you develop symptoms during that period (fever, cough, trouble breathing), seek medical advice  Call the office of your health care provider before you go, and tell them about your travel and your symptoms  They will give you instructions on how to get care without exposing other people to your illness  While sick, avoid contact with people, don't go out and delay any travel to reduce the possibility of spreading illness to others  Is there a treatment? There is no specific antiviral treatment for COVID-19  People with COVID-19 can seek medical care to helprelieve symptoms    For more information: www cdc gov/GBUWJ78AH 806965-C 03/03/2020       What to do if you are sick withcoronavirus disease 2019 (COVID-19)     If you are sick with COVID-19 or suspect you are infected with the virus that causes COVID-19, follow the steps below to help prevent the disease from spreading to people in your home and community  Stay home except to get medical care   You should restrict activities outside your home, except for getting medical care  Do not go to work, school, or public areas  Avoid using public transportation, ride-sharing, or taxis  Separate yourself from other people and animals inyour home  People: As much as possible, you should stay in a specific room and away from other people in your home  Also, you should use a separate bathroom, if available  Animals: Do not handle pets or other animals while sick  See COVID-19 and Animals for more information  Call ahead before visiting your doctor   If you have a medical appointment, call the healthcare provider and tell them that you have or may have COVID-19  This will help the healthcare provider's office take steps to keep other people from getting infected or exposed  Wear a facemask  You should wear a facemask when you are around other people (e g , sharing a room or vehicle) or pets and before you enter a healthcare provider's office  If you are not able to wear a facemask (for example, because it causes trouble breathing), then people who live with you should not stay in the same room with you, or they should wear a facemask if they enteryour room  Cover your coughs and sneezes   Cover your mouth and nose with a tissue when you cough or sneeze  Throw used tissues in a lined trash can; immediately wash your hands with soap and water for at least 20 seconds or clean your hands with an alcohol-based hand  that contains at least 60 to 95% alcohol, covering all surfaces of your hands and rubbing them together until they feel dry   Soap and water should be used preferentially if hands are visibly dirty  Avoid sharing personal household items   You should not share dishes, drinking glasses, cups, eating utensils, towels, or bedding with other people or pets in your home  After using these items, they should be washed thoroughly with soap and water  Clean your hands often  Wash your hands often with soap and water for at least 20 seconds  If soap and water are not available, clean your hands with an alcohol-based hand  that contains at least 60% alcohol, covering all surfaces of your hands and rubbing them together until they feel dry  Soap and water should be used preferentially if hands are visibly dirty  Avoid touching your eyes, nose, and mouth with unwashed hands  Clean all "high-touch" surfaces every day  High touch surfaces include counters, tabletops, doorknobs, bathroom fixtures, toilets, phones, keyboards, tablets, and bedside tables  Also, clean any surfaces that may have blood, stool, or body fluids on them  Use a household cleaning spray or wipe, according to the label instructions  Labels contain instructions for safe and effective use of the cleaning product including precautions you should take when applying the product, such as wearing gloves and making sure you have good ventilation during use of the product  Monitor your symptoms  Seek prompt medical attention if your illness is worsening (e g , difficulty breathing)  Before seeking care, call your healthcare provider and tell them that you have, or are being evaluated for, COVID-19  Put on a facemask before you enter the facility  These steps will help the healthcare provider's office to keep other people in the office or waiting room from getting infectedor exposed  Ask your healthcare provider to call the local or [facility] health department   Persons who are placed under active monitoring or facilitated self-monitoring should follow instructions provided by their local health department or occupational health professionals, as appropriate  If you have a medical emergency and need to call 911, notify the dispatch personnel that you have, or are being evaluated for COVID-19  If possible, put on a facemask before emergency medical services arrive  Discontinuing home isolation  Patients with confirmed COVID-19 should remain under home isolation precautions until the risk of secondary transmission to others is thought to be low  The decision to discontinue home isolation precautions should be made on a case-by-case basis, in consultation with healthcare providers and state and local health departments  For more information: www cdc gov/EFHHW09UK 241482-F 02/24/2020       Stay home when you are sick,except to get medical care  Wash your hands often with soap and water for at least 20 seconds  Cover your cough or sneeze with a tissue, then throw the tissue in the trash  Clean and disinfect frequently touched objects and surfaces  Avoid touching your eyes, nose, and mouth  STOP THE SPREAD OF GERMS  For more information: www cdc gov/COVID19 Avoid close contact with people who are sick  Help prevent the spread of respiratory diseases like COVID-19

## 2021-04-20 NOTE — DISCHARGE SUMMARY
MEDICAL DETOX UNIT, LEVEL 4  Department of Medical Toxicology  Reason for Admission/Principal Problem: Alcohol Withdrawal  Admitting provider: DO Chun Gallagher MD   4/17/2021  8:25 PM       Discharging Physician / Practitioner: Ben Navarro DO  PCP: Delilah Zarco MD  Admission Date:   Admission Orders (From admission, onward)     Ordered        04/17/21 2054  Inpatient Admission  Once                   Discharge Date: 04/20/21    Resolved Problems  Date Reviewed: 4/17/2021          Resolved    * (Principal) Alcohol withdrawal ([facility] [address] ) 4/20/2021     Resolved by  Ben Navarro DO          Consultations During Hospital Stay:  · None    Procedures Performed:   · Night time O2 study    Significant Findings / Test Results:   · EKG 4/17/21 -   · EKG 4/20/21 -   · CMP 4/17/21 - K+ 3 2  · BMP 4/18/21 - K+ 3 8  · Magnesium 4/17/21 - 1 2  · Magnesium 4/18/21 -  1 9  · Lipase 4/17/21 - 1,297  · Lipase 4/20/21 - 464  · CT 4/17/21 - "Mild inflammatory change noted pancreatic head and uncinate process suspicious for acute uncomplicated pancreatitis "  · Occult blood 4/20/21 - negative  · ECHO - EF of 60% with no regional wall motion abnormalities    Incidental Findings:   · None    Test Results Pending at Discharge (will require follow up): · None     Outpatient Tests Requested:  · Polysomnogram    Complications:  None    Reason for Admission: Alcohol Withdrawal    Hospital Course:     Denia Valladares is a 70 y o  male patient who [facility] on 4/17/2021 due to Alcohol Withdrawal and seeking stabilization with follow up rehabilitation services  Found to be symptomatic with tremor, nausea, anxiety, and restlessness  Patient was admitted to detox and started on SEWS protocol  Home medications were continued  Patient was noted to have electrolyte abnormalities with reduced K+ and Mg on presentation and these were repleted with rechecks within normal limits as noted above   He received a total of 780 mg of Phenobarbital during this stay with last dose at 1353 on 4/18/21  Given IV thiamine and folic acid  Lipase was obtained showing elevation as noted above  CT obtained and result as above showing acute pancreatitis  Patient was made NPO and IV lactated ringers were started at 125ml/hr  EKG was noted to be prolonged at 497 and patient was placed on telemetry  Repeat EKG showed resolution of QTC prolongation and telemetry was discontinued  Lipases trended down and patient diet was advanced to soft and later regular house  Fluids were subsequently discontinued  Patient complained of bilateral leg swelling and Echo was obtained and resulted within normal limits as above  PT saw patient and recommended outpatient PT referral  Patient was noted to be desaturating at night and night time O2 study was conducted show 57 desaturations under 88% with low of 76% for a total of 32 58 minutes  Patient was prescribed 2L home O2 for night time use to bridge until he can obtain Polysomnogram as outpatient  Simethicone as needed was added due to complaints of excessive gas with noted improvement  Please see above list of diagnoses and related plan for additional information  Condition at Discharge: good     Discharge Day Visit / Exam:     Subjective:  Patient seen at bedside  Reports continued improvement overall and feeling well this morning  Does state he is have some lose dark looking stools and requests fecal testing for this  States he is able to ambulate around the unit without difficulty  Diet has progressed and indicates he is tolerating a full diet  Sleeping continues to improve and reports he was able to obtain a full night of sleep  Educated about need for home O2 and night and follow up sleep study and patient voiced understanding  Denies any side effects to medications      Vitals: Blood Pressure: 157/94 (04/20/21 1100)  Pulse: 61 (04/20/21 1100)  Temperature: (!) 97 2 °F (36 2 °C) (04/20/21 1100)  Temp Source: Temporal (04/20/21 1100)  Respirations: 18 (04/20/21 1100)  Height: 5' 7" (170 2 cm) (04/17/21 2051)  Weight - Scale: 102 kg (224 lb 13 9 oz) (04/17/21 2051)  SpO2: 93 % (04/20/21 1100)     Exam:   Physical Exam  Vitals signs and nursing note reviewed  Constitutional:       General: He is not in acute distress  Appearance: Normal appearance  He is obese  He is not ill-appearing or toxic-appearing  HENT:      Head: Normocephalic and atraumatic  Nose: Nose normal    Eyes:      General: No scleral icterus  Right eye: No discharge  Left eye: No discharge  Extraocular Movements: Extraocular movements intact  Conjunctiva/sclera: Conjunctivae normal       Pupils: Pupils are equal, round, and reactive to light  Cardiovascular:      Rate and Rhythm: Normal rate and regular rhythm  Heart sounds: Normal heart sounds  No murmur  No friction rub  No gallop  Pulmonary:      Effort: Pulmonary effort is normal       Breath sounds: Normal breath sounds  Abdominal:      General: There is distension  Tenderness: There is no abdominal tenderness  There is no guarding  Comments: Hyperactive bowel sounds but reduced from prior   Musculoskeletal: Normal range of motion  Skin:     General: Skin is warm and dry  Neurological:      Mental Status: He is alert and oriented to person, place, and time  Psychiatric:         Mood and Affect: Mood normal          Behavior: Behavior normal          Thought Content: Thought content normal          Judgment: Judgment normal        Discussion with Family: Discussion with patient regarding ongoing plan of care    Discharge instructions/Information to patient and family:   See after visit summary for information provided to patient and family  Provisions for Follow-Up Care:  See after visit summary for information related to follow-up care and any pertinent home health orders        Disposition: Home      Planned Readmission: No     Discharge Statement:  I spent 30 minutes discharging the patient  This time was spent on the day of discharge  I had direct contact with the patient on the day of discharge  Greater than 50% of the total time was spent examining patient, answering all patient questions, arranging and discussing plan of care with patient as well as directly providing post-discharge instructions  Additional time then spent on discharge activities  Discharge Medications:  See after visit summary for reconciled discharge medications provided to patient and family        ** Please Note: This note has been constructed using a voice recognition system **

## 2021-04-20 NOTE — PLAN OF CARE
Problem: Potential for Falls  Goal: Patient will remain free of falls  Description: INTERVENTIONS:  - Assess patient frequently for physical needs  -  Identify cognitive and physical deficits and behaviors that affect risk of falls    -  [place] fall precautions as indicated by assessment   - Educate patient/family on patient safety including physical limitations  - Instruct patient to call for assistance with activity based on assessment  - Modify environment to reduce risk of injury  - Consider OT/PT consult to assist with strengthening/mobility  Outcome: Progressing     Problem: Prexisting or High Potential for Compromised Skin Integrity  Goal: Skin integrity is maintained or improved  Description: INTERVENTIONS:  - Identify patients at risk for skin breakdown  - Assess and monitor skin integrity  - Assess and monitor nutrition and hydration status  - Monitor labs   - Assess for incontinence   - Turn and reposition patient  - Assist with mobility/ambulation  - Relieve pressure over bony prominences  - Avoid friction and shearing  - Provide appropriate hygiene as needed including keeping skin clean and dry  - Evaluate need for skin moisturizer/barrier cream  - Collaborate with interdisciplinary team   - Patient/family teaching  - Consider wound care consult   Outcome: Progressing     Problem: PAIN - ADULT  Goal: Verbalizes/displays adequate comfort level or baseline comfort level  Description: Interventions:  - Encourage patient to monitor pain and request assistance  - Assess pain using appropriate pain scale  - Administer analgesics based on type and severity of pain and evaluate response  - Implement non-pharmacological measures as appropriate and evaluate response  - Consider cultural and social influences on pain and pain management  - Notify physician/advanced practitioner if interventions unsuccessful or patient reports new pain  Outcome: Progressing     Problem: INFECTION - ADULT  Goal: Absence or prevention of progression during hospitalization  Description: INTERVENTIONS:  - Assess and monitor for signs and symptoms of infection  - Monitor lab/diagnostic results  - Monitor all insertion sites, i e  indwelling lines, tubes, and drains  - Monitor endotracheal if appropriate and nasal secretions for changes in amount and color  - [place] appropriate cooling/warming therapies per order  - Administer medications as ordered  - Instruct and encourage patient and family to use good hand hygiene technique  - Identify and instruct in appropriate isolation precautions for identified infection/condition  Outcome: Progressing  Goal: Absence of fever/infection during neutropenic period  Description: INTERVENTIONS:  - Monitor WBC    Outcome: Progressing     Problem: SAFETY ADULT  Goal: Maintain or return to baseline ADL function  Description: INTERVENTIONS:  -  Assess patient's ability to carry out ADLs; assess patient's baseline for ADL function and identify physical deficits which impact ability to perform ADLs (bathing, care of mouth/teeth, toileting, grooming, dressing, etc )  - Assess/evaluate cause of self-care deficits   - Assess range of motion  - Assess patient's mobility; develop plan if impaired  - Assess patient's need for assistive devices and provide as appropriate  - Encourage maximum independence but intervene and supervise when necessary  - Involve family in performance of ADLs  - Assess for home care needs following discharge   - Consider OT consult to assist with ADL evaluation and planning for discharge  - Provide patient education as appropriate  Outcome: Progressing  Goal: Maintain or return mobility status to optimal level  Description: INTERVENTIONS:  - Assess patient's baseline mobility status (ambulation, transfers, stairs, etc )    - Identify cognitive and physical deficits and behaviors that affect mobility  - Identify mobility aids required to assist with transfers and/or ambulation (gait belt, sit-to-stand, lift, walker, cane, etc )  - [place] fall precautions as indicated by assessment  - Record patient progress and toleration of activity level on Mobility SBAR; progress patient to next Phase/Stage  - Instruct patient to call for assistance with activity based on assessment  - Consider rehabilitation consult to assist with strengthening/weightbearing, etc   Outcome: Progressing     Problem: DISCHARGE PLANNING  Goal: Discharge to home or other facility with appropriate resources  Description: INTERVENTIONS:  - Identify barriers to discharge w/patient and caregiver  - Arrange for needed discharge resources and transportation as appropriate  - Identify discharge learning needs (meds, wound care, etc )  - Arrange for interpretive services to assist at discharge as needed  - Refer to Case Management Department for coordinating discharge planning if the patient needs post-hospital services based on physician/advanced practitioner order or complex needs related to functional status, cognitive ability, or social support system  Outcome: Progressing     Problem: Knowledge Deficit  Goal: Patient/family/caregiver demonstrates understanding of disease process, treatment plan, medications, and discharge instructions  Description: Complete learning assessment and assess knowledge base    Interventions:  - Provide teaching at level of understanding  - Provide teaching via preferred learning methods  Outcome: Progressing

## 2021-04-21 ENCOUNTER — TRANSCRIBE ORDERS (OUTPATIENT)
Dept: PHYSICAL THERAPY | Facility: CLINIC | Age: 71
End: 2021-04-21

## 2021-04-21 ENCOUNTER — EVALUATION (OUTPATIENT)
Dept: PHYSICAL THERAPY | Facility: CLINIC | Age: 71
End: 2021-04-21
Payer: MEDICARE

## 2021-04-21 DIAGNOSIS — M54.2 NECK PAIN: Primary | ICD-10-CM

## 2021-04-21 DIAGNOSIS — M62.838 MUSCLE SPASMS OF NECK: ICD-10-CM

## 2021-04-21 DIAGNOSIS — M25.519 CHRONIC SHOULDER PAIN, UNSPECIFIED LATERALITY: ICD-10-CM

## 2021-04-21 DIAGNOSIS — G89.29 CHRONIC SHOULDER PAIN, UNSPECIFIED LATERALITY: ICD-10-CM

## 2021-04-21 DIAGNOSIS — M54.9 BACK PAIN WITH RADIATION: ICD-10-CM

## 2021-04-21 PROCEDURE — 97014 ELECTRIC STIMULATION THERAPY: CPT | Performed by: PHYSICAL THERAPIST

## 2021-04-21 PROCEDURE — 97162 PT EVAL MOD COMPLEX 30 MIN: CPT | Performed by: PHYSICAL THERAPIST

## 2021-04-21 NOTE — NURSING NOTE
Patient confirms he has all his belongings  Patient escorted with pca and security in a wheelchair to wife

## 2021-04-21 NOTE — PROGRESS NOTES
PT Evaluation     Today's date: 2021  Patient name: Kacie Youssef  : 1950  MRN: [id number]  Referring provider: Norma Lanier MD  Dx:   Encounter Diagnosis     ICD-10-CM    1  Neck pain  M54 2    2  Back pain with radiation  M54 9    3  Chronic shoulder pain, unspecified laterality  M25 519     G89 29    4  Muscle spasms of neck  M62 838                   Assessment  Assessment details: Pt is a 70year old male with complex PMH including recent discharge yesterday from [facility]  Pt presents with chief complaint of neck and shoulder pain with limited ability to turn and look over shoulder especially noted when driving  Pt complains of pulling at cervical spine when attempting to rotate  Pt also with history of multiple dislocations of shoulders with RTC surgery L shoulder limiting ability to reach and perform overhead activity  Pt also complains of central to right low back pain with intermittent radicular symptoms into R lateral thigh worse with prolonged standing and sitting  Pt also reports limited ability to perform bending and prolonged  Pt presents with decreased cervical and lumbar ROM with hypomobility at the facet joints  Pain present with rotation at cervical spine and with palpation bilateral lumbar paraspinals and piriformis muscles  Pt demonstrating strength deficits at R hip and bilateral shoulders with decreased core strength  Pt would benefit from Outpatient PT to decrease pain and muscle spasm at cervical and lumbar spine, improve ROM bilateral cervical and lumbar spine, increase core and postural strength and to improve balance and tolerance for all functional activity including prolonged sitting standing and ambulation  Pt will also be provided with HEP    Impairments: abnormal gait, abnormal muscle tone, abnormal or restricted ROM, activity intolerance, impaired balance, impaired physical strength, lacks appropriate home exercise program, pain with function, weight-bearing intolerance and poor posture   Functional limitations: prolonged sitting, standing, turning to look over shoulderUnderstanding of Dx/Px/POC: good   Prognosis: good    Goals  STG's to be met in 3-4 weeks:   1  Decrease low back pain by 25% and abolish radicular symptoms  2  Increase cervical and lumbar ROM by 5 degrees  3  Increase core strength bilateral shoulder and hip strength by 1/2 muscle grade  4  Improve tandem stance by 5 seconds in tandem trials and decrease postural sway in NBOS to 1 mistake or less    LTG's to be met by discharge:  1  Decrease low back and neck pain to less than 2/10 with activity  2  Maximize cervical and lumbar ROM all planes needed for IADL's and driving  3  Improve core LE and UE strength to 4+ to 5/5  4  Normalize gait and improve balance needed to return to ambulation no A  D  which is pt's baseline  5  Pt will be able to perform NBOS eyes closed on unstable surface without LOB  6  No difficulty with performing head turns while driving looking over shoulders  7  Pt will be (I) with HEP  Plan  Plan details: Pt provided with  and pre-mod E-stim to decrease pain and muscle spasm  Pt noting improvement in pain and neck stiffness post treatment  Will initiate soft tissue mobilization joint mobilization core stability strengthening and stretching next visit  Pt will also be given HEP    Patient would benefit from: skilled physical therapy  Planned modality interventions: thermotherapy: hydrocollator packs and unattended electrical stimulation  Other planned modality interventions: modalities to be added or modified at discretion of therapist  Planned therapy interventions: home exercise program, flexibility, therapeutic exercise, therapeutic activities, stretching, strengthening, postural training, patient education, neuromuscular re-education, balance, joint mobilization, manual therapy and abdominal trunk stabilization  Frequency: 2x week  Duration in weeks: 8  Plan of Care beginning date: 2021  Plan of Care expiration date: 2021  Treatment plan discussed with: patient        Subjective Evaluation    History of Present Illness  Mechanism of injury: Pt is a 70year old male presenting to Outpatient PT with chief complaint of neck stiffness with difficulty turning head mainly when driving  Pt denies pain and notes stiffness  Pt also complains of constant central and Right low back pain with intermittent radicular pain into R thigh  Pt notes radicular symptoms are produced with prolonged standing  Pt with no recent testing on cervical spine or low back  Quality of life: good    Pain  Current pain ratin  At best pain ratin  At worst pain ratin (denies neck pain, only stiffness)  Location: low back with radicular pain intermittently to R lateral thigh  Relieving factors: medications and ice  Aggravating factors: standing    Social Support  Steps to enter house: yes  2  Stairs in house: yes   12  Lives in: multiple-level home  Lives with: spouse    Employment status: not working  Hand dominance: right      Diagnostic Tests  No diagnostic tests performed  Treatments  Current treatment: physical therapy  Patient Goals  Patient goals for therapy: decreased pain, increased motion, increased strength, improved balance and independence with ADLs/IADLs          Objective     Postural Observations    Additional Postural Observation Details  Gait: pt enters clinic with use of SPC since recent hospitalization and detox stay for alcohol abuse     Pt with mild unsteadiness during balance testing with dizziness intermittently present    Pt with mild forward shoulder and head posturing with R shoulder elevation compared to L      Palpation     Additional Palpation Details  Hypomobility mid cervical spine with rotation bilaterally  Tightness and muscle spasm cervical paraspinals and upper thoracic paraspinals  TTP upper trap and levator bilaterally    TTP R piriformis and L piriformis with R greater than left  TTP bilateral Lumbar paraspinals R greater than L  Hypomobility lower lumbar spine with Lumbar ROM all planes  Equal pelvis height    Neurological Testing     Sensation   Cervical/Thoracic   Left   Intact: light touch    Right   Intact: light touch    Comments   Right light touch: intact UE's and LE's to light touch       Additional Neurological Details  Pt reports intermittent tingling bilateral feet especially at dorsum of feet- new onset in the last 3 months  Pt reports 1 year history of constant finger tingling bilaterally    Active Range of Motion   Cervical/Thoracic Spine       Cervical    Flexion: 30 degrees   Extension: 40 degrees      Left lateral flexion: 25 degrees      Right lateral flexion: 20 degrees     with pain  Left rotation: 34 degrees  Right rotation: 34 degrees           Additional Active Range of Motion Details  L sided neck stiffness with pulling into shoulder with sidebending and rotation    Bilateral shoulder flex abd limited to 95 degrees  Elbow wrist and hand WFL    L-spine     Flex- 30 deg  Ext- 10 SB-L 5 degrees R- 5 degrees    Strength/Myotome Testing     Additional Strength Details  Elbow wrist and hand 4+/5 bilaterally  R shoulder flex 4/5 L shoulder flex 3+/5  Bilateral Shoulder abd 4+/5  Core Strength: 3+/5  R hip flex 4+/5 L LE 5/5  R hip abd/add knee and ankle 5/5    NBOS    E O- 20 sec no LOB/shakiness present     E C  20 sec 2-3 LOB    Tandem stance             RFF        12 seconds- unsteadiness and LOB             LFF          4 seconds- unsteadiness and LOB    General Comments:      Cervical/Thoracic Comments  Difficulty turning head to look over shoulders when driving  Standing tolerance 10 minutes with increased pain  Sitting tolerance 30 minutes with increased R low back discomfort  Increased low back pain with car transfers  Foto: 45             Precautions: unsteadiness, recent discharge from detox      Date 4/21/21       Visit 1 Pain 4/10 back       Manuals        STM bilateral C-spine UT        Manual cervical traction, suboccipital release, manual stretch        STM lumbar paraspinals        PA mobilization L-spine        Neuro Re-Ed        Cervical rotation with mob/glide seated        Chin tucks        T-band row        T-band ELISHA        T-band trunk rotation        Tandem stance        NBOS with ABC        BOSU marching        sidestepping                        Ther Ex        LTR        SKC        Adductor squeeze w/ bridge        clamshells        PPT with marching        PPT with leg raise                Nu-step        Ther Activity                        Gait Training                        Modalities        HP w/ pre-mod C-spine and L-spine 15 min

## 2021-04-21 NOTE — LETTER
2021    Aarti Pena MD  [address]    Patient: Soledad Galvez   YOB: 1950   Date of Visit: 2021     Encounter Diagnosis     ICD-10-CM    1  Neck pain  M54 2    2  Back pain with radiation  M54 9    3  Chronic shoulder pain, unspecified laterality  M25 519     G89 29    4  Muscle spasms of neck  M62 838        Dear Dr Sam Negron:    Thank you for your recent referral of Soledad Galvez  Please review the attached evaluation summary from Peter's recent visit  Please verify that you agree with the plan of care by signing the attached order  If you have any questions or concerns, please do not hesitate to call  I sincerely appreciate the opportunity to share in the care of one of your patients and hope to have another opportunity to work with you in the near future  Sincerely,    Rivera Cristina, PT      Referring Provider:      I certify that I have read the below Plan of Care and certify the need for these services furnished under this plan of treatment while under my care  Aarti Pena MD  [address] [place] [address]  Via Fax: [phone]          PT Evaluation     Today's date: 2021  Patient name: Soledad Galvez  : 1950  MRN: [id number]  Referring provider: Sindy Bustillos MD  Dx:   Encounter Diagnosis     ICD-10-CM    1  Neck pain  M54 2    2  Back pain with radiation  M54 9    3  Chronic shoulder pain, unspecified laterality  M25 519     G89 29    4  Muscle spasms of neck  M62 838                   Assessment  Assessment details: Pt is a 70year old male with complex PMH including recent discharge yesterday from [facility]  Pt presents with chief complaint of neck and shoulder pain with limited ability to turn and look over shoulder especially noted when driving  Pt complains of pulling at cervical spine when attempting to rotate   Pt also with history of multiple dislocations of shoulders with RTC surgery L shoulder limiting ability to reach and perform overhead activity  Pt also complains of central to right low back pain with intermittent radicular symptoms into R lateral thigh worse with prolonged standing and sitting  Pt also reports limited ability to perform bending and prolonged  Pt presents with decreased cervical and lumbar ROM with hypomobility at the facet joints  Pain present with rotation at cervical spine and with palpation bilateral lumbar paraspinals and piriformis muscles  Pt demonstrating strength deficits at R hip and bilateral shoulders with decreased core strength  Pt would benefit from Outpatient PT to decrease pain and muscle spasm at cervical and lumbar spine, improve ROM bilateral cervical and lumbar spine, increase core and postural strength and to improve balance and tolerance for all functional activity including prolonged sitting standing and ambulation  Pt will also be provided with HEP  Impairments: abnormal gait, abnormal muscle tone, abnormal or restricted ROM, activity intolerance, impaired balance, impaired physical strength, lacks appropriate home exercise program, pain with function, weight-bearing intolerance and poor posture   Functional limitations: prolonged sitting, standing, turning to look over shoulderUnderstanding of Dx/Px/POC: good   Prognosis: good    Goals  STG's to be met in 3-4 weeks:   1  Decrease low back pain by 25% and abolish radicular symptoms  2  Increase cervical and lumbar ROM by 5 degrees  3  Increase core strength bilateral shoulder and hip strength by 1/2 muscle grade  4  Improve tandem stance by 5 seconds in tandem trials and decrease postural sway in NBOS to 1 mistake or less    LTG's to be met by discharge:  1  Decrease low back and neck pain to less than 2/10 with activity  2  Maximize cervical and lumbar ROM all planes needed for IADL's and driving  3  Improve core LE and UE strength to 4+ to 5/5  4   Normalize gait and improve balance needed to return to ambulation no A  D  which is pt's baseline  5  Pt will be able to perform NBOS eyes closed on unstable surface without LOB  6  No difficulty with performing head turns while driving looking over shoulders  7  Pt will be (I) with HEP  Plan  Plan details: Pt provided with MH and pre-mod E-stim to decrease pain and muscle spasm  Pt noting improvement in pain and neck stiffness post treatment  Will initiate soft tissue mobilization joint mobilization core stability strengthening and stretching next visit  Pt will also be given HEP  Patient would benefit from: skilled physical therapy  Planned modality interventions: thermotherapy: hydrocollator packs and unattended electrical stimulation  Other planned modality interventions: modalities to be added or modified at discretion of therapist  Planned therapy interventions: home exercise program, flexibility, therapeutic exercise, therapeutic activities, stretching, strengthening, postural training, patient education, neuromuscular re-education, balance, joint mobilization, manual therapy and abdominal trunk stabilization  Frequency: 2x week  Duration in weeks: 8  Plan of Care beginning date: 2021  Plan of Care expiration date: 2021  Treatment plan discussed with: patient        Subjective Evaluation    History of Present Illness  Mechanism of injury: Pt is a 70year old male presenting to Outpatient PT with chief complaint of neck stiffness with difficulty turning head mainly when driving  Pt denies pain and notes stiffness  Pt also complains of constant central and Right low back pain with intermittent radicular pain into R thigh  Pt notes radicular symptoms are produced with prolonged standing  Pt with no recent testing on cervical spine or low back     Quality of life: good    Pain  Current pain ratin  At best pain ratin  At worst pain ratin (denies neck pain, only stiffness)  Location: low back with radicular pain intermittently to R lateral thigh  Relieving factors: medications and ice  Aggravating factors: standing    Social Support  Steps to enter house: yes  2  Stairs in house: yes   12  Lives in: multiple-level home  Lives with: spouse    Employment status: not working  Hand dominance: right      Diagnostic Tests  No diagnostic tests performed  Treatments  Current treatment: physical therapy  Patient Goals  Patient goals for therapy: decreased pain, increased motion, increased strength, improved balance and independence with ADLs/IADLs          Objective     Postural Observations    Additional Postural Observation Details  Gait: pt enters clinic with use of SPC since recent hospitalization and detox stay for alcohol abuse  Pt with mild unsteadiness during balance testing with dizziness intermittently present    Pt with mild forward shoulder and head posturing with R shoulder elevation compared to L      Palpation     Additional Palpation Details  Hypomobility mid cervical spine with rotation bilaterally  Tightness and muscle spasm cervical paraspinals and upper thoracic paraspinals  TTP upper trap and levator bilaterally    TTP R piriformis and L piriformis with R greater than left  TTP bilateral Lumbar paraspinals R greater than L  Hypomobility lower lumbar spine with Lumbar ROM all planes  Equal pelvis height    Neurological Testing     Sensation   Cervical/Thoracic   Left   Intact: light touch    Right   Intact: light touch    Comments   Right light touch: intact UE's and LE's to light touch       Additional Neurological Details  Pt reports intermittent tingling bilateral feet especially at dorsum of feet- new onset in the last 3 months  Pt reports 1 year history of constant finger tingling bilaterally    Active Range of Motion   Cervical/Thoracic Spine       Cervical    Flexion: 30 degrees   Extension: 40 degrees      Left lateral flexion: 25 degrees      Right lateral flexion: 20 degrees     with pain  Left rotation: 34 degrees  Right rotation: 34 degrees           Additional Active Range of Motion Details  L sided neck stiffness with pulling into shoulder with sidebending and rotation    Bilateral shoulder flex abd limited to 95 degrees  Elbow wrist and hand WFL    L-spine     Flex- 30 deg  Ext- 10 SB-L 5 degrees R- 5 degrees    Strength/Myotome Testing     Additional Strength Details  Elbow wrist and hand 4+/5 bilaterally  R shoulder flex 4/5 L shoulder flex 3+/5  Bilateral Shoulder abd 4+/5  Core Strength: 3+/5  R hip flex 4+/5 L LE 5/5  R hip abd/add knee and ankle 5/5    NBOS    E O- 20 sec no LOB/shakiness present     E C  20 sec 2-3 LOB    Tandem stance             RFF        12 seconds- unsteadiness and LOB             LFF          4 seconds- unsteadiness and LOB    General Comments:      Cervical/Thoracic Comments  Difficulty turning head to look over shoulders when driving  Standing tolerance 10 minutes with increased pain  Sitting tolerance 30 minutes with increased R low back discomfort  Increased low back pain with car transfers  Foto: 45             Precautions: unsteadiness, recent discharge from detox      Date 4/21/21       Visit 1       Pain 4/10 back       Manuals        STM bilateral C-spine UT        Manual cervical traction, suboccipital release, manual stretch        STM lumbar paraspinals        PA mobilization L-spine        Neuro Re-Ed        Cervical rotation with mob/glide seated        Chin tucks        T-band row        T-band ELISHA        T-band trunk rotation        Tandem stance        NBOS with ABC        BOSU marching        sidestepping                        Ther Ex        LTR        SKC        Adductor squeeze w/ bridge        clamshells        PPT with marching        PPT with leg raise                Nu-step        Ther Activity                        Gait Training                        Modalities        HP w/ pre-mod C-spine and L-spine 15 min

## 2021-04-23 ENCOUNTER — OFFICE VISIT (OUTPATIENT)
Dept: PHYSICAL THERAPY | Facility: CLINIC | Age: 71
End: 2021-04-23
Payer: MEDICARE

## 2021-04-23 DIAGNOSIS — M62.838 MUSCLE SPASMS OF NECK: ICD-10-CM

## 2021-04-23 DIAGNOSIS — M25.519 CHRONIC SHOULDER PAIN, UNSPECIFIED LATERALITY: ICD-10-CM

## 2021-04-23 DIAGNOSIS — G89.29 CHRONIC SHOULDER PAIN, UNSPECIFIED LATERALITY: ICD-10-CM

## 2021-04-23 DIAGNOSIS — M54.9 BACK PAIN WITH RADIATION: ICD-10-CM

## 2021-04-23 DIAGNOSIS — M54.2 NECK PAIN: Primary | ICD-10-CM

## 2021-04-23 PROCEDURE — 97110 THERAPEUTIC EXERCISES: CPT

## 2021-04-23 PROCEDURE — 97140 MANUAL THERAPY 1/> REGIONS: CPT

## 2021-04-23 PROCEDURE — 97014 ELECTRIC STIMULATION THERAPY: CPT

## 2021-04-23 PROCEDURE — 97112 NEUROMUSCULAR REEDUCATION: CPT

## 2021-04-23 NOTE — PROGRESS NOTES
Daily Note     Today's date: 2021  Patient name: Sharon Torres  : 1950  MRN: [id number]  Referring provider: Sharon Harris MD  Dx:   Encounter Diagnosis     ICD-10-CM    1  Neck pain  M54 2    2  Back pain with radiation  M54 9    3  Chronic shoulder pain, unspecified laterality  M25 519     G89 29    4  Muscle spasms of neck  M62 838                   Subjective: Pt comments on L LE weakness today      Objective: See treatment diary below  Progressed program per PT POC  Handouts given for HEP of same  Assessment: Tolerated treatment fair  Patient exhibited good technique with therapeutic exercises      Plan: Continue per plan of care        Precautions: unsteadiness, recent discharge from detox      Date 21      Visit 1 2      Pain 10 back 5 B        Manuals        LE stretch  ds      STM bilateral C-spine UT  ds      Manual cervical traction, suboccipital release, manual stretch  ------      STM lumbar paraspinals        PA mobilization L-spine        Neuro Re-Ed        Cervical rotation with mob/glide seated        Chin tucks  20x      Scapular retraction  2/10      T-band row        T-band ELISHA        T-band trunk rotation        Tandem stance        NBOS with ABC        BOSU marching        sidestepping        Ther Ex        LTR        SKC        Adductor squeeze w/ bridge        clamshells        PPT  30x 3"      PPT with marching  20      PPT with leg raise  1/10              Nu-step  5m  L3      Ther Activity                Gait Training                Modalities        HP w/ pre-mod C-spine and L-spine 15 min 15m

## 2021-04-28 ENCOUNTER — OFFICE VISIT (OUTPATIENT)
Dept: PHYSICAL THERAPY | Facility: CLINIC | Age: 71
End: 2021-04-28
Payer: MEDICARE

## 2021-04-28 DIAGNOSIS — M25.519 CHRONIC SHOULDER PAIN, UNSPECIFIED LATERALITY: ICD-10-CM

## 2021-04-28 DIAGNOSIS — M54.2 NECK PAIN: Primary | ICD-10-CM

## 2021-04-28 DIAGNOSIS — M54.9 BACK PAIN WITH RADIATION: ICD-10-CM

## 2021-04-28 DIAGNOSIS — M62.838 MUSCLE SPASMS OF NECK: ICD-10-CM

## 2021-04-28 DIAGNOSIS — G89.29 CHRONIC SHOULDER PAIN, UNSPECIFIED LATERALITY: ICD-10-CM

## 2021-04-28 PROCEDURE — 97112 NEUROMUSCULAR REEDUCATION: CPT | Performed by: PHYSICAL THERAPIST

## 2021-04-28 PROCEDURE — 97140 MANUAL THERAPY 1/> REGIONS: CPT | Performed by: PHYSICAL THERAPIST

## 2021-04-28 PROCEDURE — 97014 ELECTRIC STIMULATION THERAPY: CPT | Performed by: PHYSICAL THERAPIST

## 2021-04-28 NOTE — PROGRESS NOTES
Daily Note     Today's date: 2021  Patient name: Jeramie Almanza  : 1950  MRN: [id number]  Referring provider: Cuauhtemoc Ruiz MD  Dx:   Encounter Diagnosis     ICD-10-CM    1  Neck pain  M54 2    2  Back pain with radiation  M54 9    3  Chronic shoulder pain, unspecified laterality  M25 519     G89 29    4  Muscle spasms of neck  M62 838                   Subjective: Pt notes no back pain when sitting but notes 8/10 bilateral low back with weightbearing and ambulation  Pt continues to reports neck stiffness as well  He experienced relief of symptoms for 1 day after last session  Objective: See treatment diary below  Initiated additional stretching and core strengthening per grid below  Added mobilization with cervical rotation      Assessment: Tolerated treatment well  Patient with no lumbar pain post session  Tightness noted during STM R low back  Decreased neck stiffness post mobilization and HP with IFC  Plan: Continue per plan of care        Precautions: unsteadiness, recent discharge from detox      Date 21 4/232 21     Visit 1 2 3     Pain 4/10 back 5 B   8 B     Manuals        LE stretch  ds [facility]     STM bilateral C-spine UT  [facility]     Manual cervical traction, suboccipital release, manual stretch  ------      STM lumbar paraspinals   [facility]     PA mobilization L-spine        Neuro Re-Ed        Cervical rotation with mob/glide seated        Chin tucks  20x x20     Scapular retraction  10 2/10     T-band row        T-band ELISHA        T-band trunk rotation        Tandem stance        NBOS with ABC        BOSU marching        sidestepping        Ther Ex        LTR   x10 3"     SKC        Adductor squeeze w/ bridge        clamshells        PPT  30x 3" 30x 3"     PPT with marching   2/20     PPT with leg raise  1/10 2/10             Nu-step  5m  L3 10 min L4     Ther Activity                Gait Training                Modalities        HP w/ pre-mod C-spine and L-spine 15 min 15m

## 2021-04-30 ENCOUNTER — OFFICE VISIT (OUTPATIENT)
Dept: PHYSICAL THERAPY | Facility: CLINIC | Age: 71
End: 2021-04-30
Payer: MEDICARE

## 2021-04-30 DIAGNOSIS — M62.838 MUSCLE SPASMS OF NECK: ICD-10-CM

## 2021-04-30 DIAGNOSIS — M54.9 BACK PAIN WITH RADIATION: ICD-10-CM

## 2021-04-30 DIAGNOSIS — M54.2 NECK PAIN: Primary | ICD-10-CM

## 2021-04-30 DIAGNOSIS — G89.29 CHRONIC SHOULDER PAIN, UNSPECIFIED LATERALITY: ICD-10-CM

## 2021-04-30 DIAGNOSIS — M25.519 CHRONIC SHOULDER PAIN, UNSPECIFIED LATERALITY: ICD-10-CM

## 2021-04-30 PROCEDURE — 97014 ELECTRIC STIMULATION THERAPY: CPT

## 2021-04-30 PROCEDURE — 97112 NEUROMUSCULAR REEDUCATION: CPT

## 2021-04-30 PROCEDURE — 97110 THERAPEUTIC EXERCISES: CPT

## 2021-04-30 PROCEDURE — 97140 MANUAL THERAPY 1/> REGIONS: CPT

## 2021-04-30 NOTE — PROGRESS NOTES
Daily Note     Today's date: 2021  Patient name: Fatou Coleman  : 1950  MRN: [id number]  Referring provider: Jenkins Collet, MD  Dx:   Encounter Diagnosis     ICD-10-CM    1  Neck pain  M54 2    2  Back pain with radiation  M54 9    3  Chronic shoulder pain, unspecified laterality  M25 519     G89 29    4  Muscle spasms of neck  M62 838        Start Time: 845  Stop Time: 945  Total time in clinic (min): 60 minutes    Subjective: Pt reports persisting LBP, less cervical c/o today  Objective: See treatment diary below      Assessment: Tolerated treatment well  Patient exhibited good technique with therapeutic exercises      Plan: Continue per plan of care        Precautions: unsteadiness, recent discharge from detox      Date 21    Visit 1 2 3 4    Pain 4/10 back 5 B   8 B 8B    Manuals        LE stretch  ds JH ds    STM bilateral C-spine UT  ds  ---->    Manual cervical traction, suboccipital release, manual stretch  ------      STM lumbar paraspinals    ---->    PA mobilization L-spine        Neuro Re-Ed        Cervical rotation with mob/glide seated    ---->    Chin tucks  20x x20 20x    Scapular retraction  2/10 2/10 2/10    T-band row        T-band ELISHA        T-band trunk rotation        Tandem stance        NBOS with ABC        BOSU marching        sidestepping        Ther Ex        LTR   x10 3" 10x 3"    SKC        Adductor squeeze w/ bridge    30x 3"    clamshells    L3  2/15    PPT  30x 3" 30x 3" 30x 3"    PPT with marching   1#     PPT with leg raise  1/10  2/10 1# 2/10            Nu-step  5m  L3 10m L4 10m L4    Ther Activity                Gait Training                Modalities        HP w/ pre-mod C-spine and L-spine 15 min 15m 15m 15m

## 2021-05-03 ENCOUNTER — OFFICE VISIT (OUTPATIENT)
Dept: PHYSICAL THERAPY | Facility: CLINIC | Age: 71
End: 2021-05-03
Payer: MEDICARE

## 2021-05-03 DIAGNOSIS — M62.838 MUSCLE SPASMS OF NECK: ICD-10-CM

## 2021-05-03 DIAGNOSIS — M54.9 BACK PAIN WITH RADIATION: ICD-10-CM

## 2021-05-03 DIAGNOSIS — M25.519 CHRONIC SHOULDER PAIN, UNSPECIFIED LATERALITY: ICD-10-CM

## 2021-05-03 DIAGNOSIS — M54.2 NECK PAIN: Primary | ICD-10-CM

## 2021-05-03 DIAGNOSIS — G89.29 CHRONIC SHOULDER PAIN, UNSPECIFIED LATERALITY: ICD-10-CM

## 2021-05-03 PROCEDURE — 97112 NEUROMUSCULAR REEDUCATION: CPT

## 2021-05-03 PROCEDURE — 97140 MANUAL THERAPY 1/> REGIONS: CPT

## 2021-05-03 PROCEDURE — 97014 ELECTRIC STIMULATION THERAPY: CPT

## 2021-05-03 PROCEDURE — 97110 THERAPEUTIC EXERCISES: CPT

## 2021-05-03 NOTE — PROGRESS NOTES
Daily Note     Today's date: 5/3/2021  Patient name: Candance Shallow  : 1950  MRN: [id number]  Referring provider: Thalia Solano MD  Dx:   Encounter Diagnosis     ICD-10-CM    1  Neck pain  M54 2    2  Back pain with radiation  M54 9    3  Chronic shoulder pain, unspecified laterality  M25 519     G89 29    4  Muscle spasms of neck  M62 838        Start Time: 0900  Stop Time: 1015  Total time in clinic (min): 75 minutes    Subjective: Pt reports less back and neck pain today  Objective: See treatment diary below  PTA expanded program per PT POC  FOTO 52  Assessment: Tolerated treatment well  Patient exhibited good technique with therapeutic exercises      Plan: Continue per plan of care        Precautions: unsteadiness, recent discharge from detox      Date 4/21/21 4/232 4/28/21 4/30 5/3   Visit 1 2 3 4 5   Pain 4/10 back 5 B   8 B 8B B5 N 3   Manuals        LE stretch  ds JH ds ds   STM bilateral C-spine UT  ds JH ----> ds B UT   Manual cervical traction, suboccipital release, manual stretch  ------      STM lumbar paraspinals   JH ----> ---->   PA mobilization L-spine        Neuro Re-Ed        Cervical rotation with mob/glide seated   JH ----> ------>   Chin tucks  20x x20 20x 20x   Scapular retraction  2/10 210 210 2/10   T-band row     L4 2/10   T-band ELISHA     L4 2/10   T-band trunk rotation        Tandem stance        NBOS with ABC        BOSU marching        sidestepping        Ther Ex        LTR   x10 3" 10x 3" 1 5# 2/20   SKC        Adductor squeeze w/ bridge    30x 3" 30x 3"   clamshells    L3  2/15 L4 2/15   PPT  30x 3" 30x 3" 30x 3" 30x 3"   PPT with marching   1# 2/20 1 5# 2/20   PPT with leg raise  1/10  210 1# 2/10 1 5# 2/15           Nu-step  5m  L3 10m L4 10m L4 15m L5   Ther Activity                Gait Training                Modalities        HP w/ pre-mod C-spine and L-spine 15 min 15m 15m 15m 15m

## 2021-05-05 ENCOUNTER — OFFICE VISIT (OUTPATIENT)
Dept: PHYSICAL THERAPY | Facility: CLINIC | Age: 71
End: 2021-05-05
Payer: MEDICARE

## 2021-05-05 DIAGNOSIS — M25.519 CHRONIC SHOULDER PAIN, UNSPECIFIED LATERALITY: ICD-10-CM

## 2021-05-05 DIAGNOSIS — M62.838 MUSCLE SPASMS OF NECK: ICD-10-CM

## 2021-05-05 DIAGNOSIS — M54.9 BACK PAIN WITH RADIATION: ICD-10-CM

## 2021-05-05 DIAGNOSIS — G89.29 CHRONIC SHOULDER PAIN, UNSPECIFIED LATERALITY: ICD-10-CM

## 2021-05-05 DIAGNOSIS — M54.2 NECK PAIN: Primary | ICD-10-CM

## 2021-05-05 PROCEDURE — 97110 THERAPEUTIC EXERCISES: CPT

## 2021-05-05 PROCEDURE — 97140 MANUAL THERAPY 1/> REGIONS: CPT

## 2021-05-05 PROCEDURE — 97112 NEUROMUSCULAR REEDUCATION: CPT

## 2021-05-05 PROCEDURE — 97014 ELECTRIC STIMULATION THERAPY: CPT

## 2021-05-05 NOTE — PROGRESS NOTES
Daily Note     Today's date: 2021  Patient name: Candance Shallow  : 1950  MRN: [id number]  Referring provider: Thalia Solano MD  Dx:   Encounter Diagnosis     ICD-10-CM    1  Neck pain  M54 2    2  Back pain with radiation  M54 9    3  Chronic shoulder pain, unspecified laterality  M25 519     G89 29    4  Muscle spasms of neck  M62 838        Start Time: 0830  Stop Time: 0945  Total time in clinic (min): 75 minutes    Subjective: Pt comment on min c/o with his neck ; just LBP today      Objective: See treatment diary below      Assessment: Tolerated treatment well  Patient exhibited good technique with therapeutic exercises      Plan: Continue per plan of care        Precautions: unsteadiness, recent discharge from detox      Date 5/5  4/28/21 4/30 5/3   Visit 6  3 4 5   Pain 6B 5 B   N 1 8 B 8B B5 N 3   Manuals        LE stretch ds ds JH ds ds   STM bilateral C-spine UT ds ds JH ----> ds B UT   Manual cervical traction, suboccipital release, manual stretch ---- ------      STM lumbar paraspinals ----  [facility] DARIEL ----> ---->   PA mobilization L-spine        Neuro Re-Ed        Cervical rotation with mob/glide seated    ----> ------>   Chin tucks 20x 20x x20 20x 20x   Scapular retraction /10 2/10 2/10 2/10 2/10   T-band row L4 2/10    L4 2/10   T-band ELISHA L4 2/10    L4 2/10   T-band trunk rotation        Tandem stance        NBOS with ABC        BOSU marching        sidestepping        Ther Ex        LTR 1 5# 2/20  x10 3" 10x 3" 1 5# 2/20   SKC        Adductor squeeze w/ bridge 30x 3"   30x 3" 30x 3"   clamshells L4 2/15   L3  2/15 L4 2/15   PPT 30x 3" 30x 3" 30x 3" 30x 3" 30x 3"   PPT with marching 1 5# 2/20 1/20  2/20 1# 2/20 1 5# 2/20   PPT with leg raise 1 5# 2/15 1/10  2/10 1# 2/10 1 5# 2/15           Nu-step 15m L5 5m  L3 10m L4 10m L4 15m L5   Ther Activity                Gait Training                Modalities        HP w/ pre-mod C-spine and L-spine 15 m 15m 15m 15m 15m

## 2021-05-10 ENCOUNTER — APPOINTMENT (OUTPATIENT)
Dept: PHYSICAL THERAPY | Facility: CLINIC | Age: 71
End: 2021-05-10
Payer: MEDICARE

## 2021-05-12 ENCOUNTER — APPOINTMENT (OUTPATIENT)
Dept: PHYSICAL THERAPY | Facility: CLINIC | Age: 71
End: 2021-05-12
Payer: MEDICARE

## 2021-05-14 ENCOUNTER — APPOINTMENT (OUTPATIENT)
Dept: PHYSICAL THERAPY | Facility: CLINIC | Age: 71
End: 2021-05-14
Payer: MEDICARE

## 2021-05-25 ENCOUNTER — OFFICE VISIT (OUTPATIENT)
Dept: PHYSICAL THERAPY | Facility: CLINIC | Age: 71
End: 2021-05-25
Payer: MEDICARE

## 2021-05-25 ENCOUNTER — TRANSCRIBE ORDERS (OUTPATIENT)
Dept: PHYSICAL THERAPY | Facility: CLINIC | Age: 71
End: 2021-05-25

## 2021-05-25 DIAGNOSIS — M54.2 NECK PAIN: Primary | ICD-10-CM

## 2021-05-25 DIAGNOSIS — M54.9 BACK PAIN WITH RADIATION: ICD-10-CM

## 2021-05-25 DIAGNOSIS — G89.29 CHRONIC SHOULDER PAIN, UNSPECIFIED LATERALITY: ICD-10-CM

## 2021-05-25 DIAGNOSIS — M25.519 CHRONIC SHOULDER PAIN, UNSPECIFIED LATERALITY: ICD-10-CM

## 2021-05-25 DIAGNOSIS — M62.838 MUSCLE SPASMS OF NECK: ICD-10-CM

## 2021-05-25 PROCEDURE — 97112 NEUROMUSCULAR REEDUCATION: CPT

## 2021-05-25 PROCEDURE — 97110 THERAPEUTIC EXERCISES: CPT

## 2021-05-25 PROCEDURE — 97140 MANUAL THERAPY 1/> REGIONS: CPT

## 2021-05-25 NOTE — LETTER
May 25, 2021    Arleen Martell MD  [address]    Patient: Estrellita Perez   YOB: 1950   Date of Visit: 2021     Encounter Diagnosis     ICD-10-CM    1  Neck pain  M54 2    2  Back pain with radiation  M54 9    3  Chronic shoulder pain, unspecified laterality  M25 519     G89 29    4  Muscle spasms of neck  M62 838        Dear Dr Genoveva Hernández:    Thank you for your recent referral of Estrellita Perez  Please review the attached evaluation summary from Peter's recent visit  Please verify that you agree with the plan of care by signing the attached order  If you have any questions or concerns, please do not hesitate to call  I sincerely appreciate the opportunity to share in the care of one of your patients and hope to have another opportunity to work with you in the near future  Sincerely,    Mary Carmen Shanks, PT      Referring Provider:      I certify that I have read the below Plan of Care and certify the need for these services furnished under this plan of treatment while under my care  Arleen Martell MD  [address] [place] [address]  Via Fax: [phone]          Daily Note     Today's date: 2021  Patient name: Estrellita Perez  : 1950  MRN: [id number]   Referring provider: Mer Olivas MD  Dx:   Encounter Diagnosis     ICD-10-CM    1  Neck pain  M54 2    2  Back pain with radiation  M54 9    3  Chronic shoulder pain, unspecified laterality  M25 519     G89 29    4  Muscle spasms of neck  M62 838        Start Time: 0830  Stop Time: 930  Total time in clinic (min): 60 minutes    Subjective:Pt's cc is L UT pain  Objective: See treatment diary below  Resumed TE, held modalities due to pt's time restraints  PT provided STM to the L UT  Assessment: Tolerated treatment fair  Patient would benefit from continued PT      Plan: Continue per plan of care        Precautions: unsteadiness, recent discharge from detox      Date 5/5 5/25 4/28/21 4/30 5/3   Visit 6 7 3 4 5   Pain 6B  8 B 8B B5 N 3   Manuals        LE stretch ds -----> JH ds ds   STM bilateral C-spine UT ds jh L UT JH ----> ds B UT   Manual cervical traction, suboccipital release, manual stretch ---- ------      STM lumbar paraspinals ---- ----> JH ----> ---->   PA mobilization L-spine        Neuro Re-Ed        Cervical rotation with mob/glide seated  ------> JH ----> ------>   Chin tucks 20x 20x x20 20x 20x   Scapular retraction 2/10 2/10 2/10 2/10 2/10   T-band row L4 2/10 L5 2/15   L4 2/10   T-band ELISHA L4 2/10 L5 2/15   L4 2/10   T-band trunk rotation        Tandem stance        NBOS with ABC        BOSU marching        sidestepping        Ther Ex        LTR 1 5# 2/20 1 5# 2/20 x10 3" 10x 3" 1 5# 2/20   SKC        Adductor squeeze w/ bridge 30x 3" 30x 3"  30x 3" 30x 3"   clamshells L4 2/15 L4 2/15  L3  2/15 L4 2/15   PPT 30x 3" 30x 3" 30x 3" 30x 3" 30x 3"   PPT with marching 1 5# 2/20 21 5# 2/20  2/20 1# 2/20 1 5# 2/20   PPT with leg raise 1 5# 2/15 1 5# 2/15  2/10 1# 2/10 1 5# 2/15   Nu-step 15m L5 10m  L5 10m L4 10m L4 15m L5   Ther Activity        Gait Training        Modalities        HP w/ pre-mod C-spine and L-spine 15 m defer 15m 15m 15m                  Attestation signed by Letty Saucedo PT at 2021  4:42 PM:  I supervised the visit  We discussed the case to ensure appropriate continuation and progression of care and I reviewed the documentation  PT Re-Evaluation     Today's date: 2021  Patient name: Sheila Vaughan  : 1950  MRN: [id number]  Referring provider: Joyce Rivero MD  Dx:   Encounter Diagnosis     ICD-10-CM    1  Neck pain  M54 2    2  Back pain with radiation  M54 9    3  Chronic shoulder pain, unspecified laterality  M25 519     G89 29    4   Muscle spasms of neck  M62 838        Start Time: 830  Stop Time: 930  Total time in clinic (min): 60 minutes    Assessment  Assessment details: Pt is a 70year old male with complex PMH including recent discharge yesterday from [facility]  Pt presents with chief complaint of neck and shoulder pain with limited ability to turn and look over shoulder especially noted when driving  Pt has been seen for 7 visits of Outpatient PT including 2 weeks of no therapy due to being in alcohol rehab with treatment consisting of STM, joint mobilization, manual stretching, ROM strengthening postural correction and core stabilization exercises followed by HP with E-Stim  Pt has made progress with therapy with improve in cervical and lumbar spine ROM, improvement in core strength and UE strength with exception of bilateral shoulders  Pt with decreased stiffness and muscle spasm at cervical spine since beginning therapy however muscle spasm still present L paraspinals and levator  Pt still with tenderness to palpation and muscle tightness R low back with intermittent back pain but no radicular symptoms into thigh  Pt reports compliance with HEP thus far  Pt would benefit from continued activity in PT to further decrease pain and muscle spasm at cervical spine and R low back, improve cervical and lumbar spine ROM, core and scapular strength needed to return to all ADL's and IADL's unrestricted  Impairments: abnormal gait, abnormal muscle tone, abnormal or restricted ROM, activity intolerance, impaired balance, impaired physical strength, pain with function and weight-bearing intolerance  Functional limitations: prolonged standing, turning to look over shoulderUnderstanding of Dx/Px/POC: good   Prognosis: good    Goals  STG's to be met in 3-4 weeks:   1  Decrease low back pain by 25% and abolish radicular symptoms- partially met  2  Increase cervical and lumbar ROM by 5 degrees- partially met  3  Increase core strength bilateral shoulder and hip strength by 1/2 muscle grade- not met  4   Improve tandem stance by 5 seconds in tandem trials and decrease postural sway in NBOS to 1 mistake or less- partially met    LTG's to be met by discharge:  1  Decrease low back and neck pain to less than 2/10 with activity- not met  2  Maximize cervical and lumbar ROM all planes needed for IADL's and driving- not met  3  Improve core LE and UE strength to 4+ to 5/5- partially met  4  Normalize gait and improve balance needed to return to ambulation no A  D  which is pt's baseline- progressing not met  5  Pt will be able to perform NBOS eyes closed on unstable surface without LOB- progressing not met  6  No difficulty with performing head turns while driving looking over shoulders- not met  7  Pt will be (I) with HEP  - ongoing and progressing    Plan  Patient would benefit from: skilled physical therapy  Planned modality interventions: thermotherapy: hydrocollator packs and unattended electrical stimulation  Other planned modality interventions: modalities to be added or modified at discretion of therapist  Planned therapy interventions: home exercise program, flexibility, therapeutic exercise, therapeutic activities, stretching, strengthening, postural training, patient education, neuromuscular re-education, balance, joint mobilization, manual therapy and abdominal trunk stabilization  Frequency: 2x week  Duration in weeks: 4  Plan of Care beginning date: 2021  Plan of Care expiration date: 2021  Treatment plan discussed with: patient        Subjective Evaluation    History of Present Illness  Mechanism of injury: Pt reports symptoms have improved but pt feels he would be further along if he didn't miss approximately 2 weeks due to going back to alcohol rehab  Pt still reports his balance is off if standing for prolonged period  Pt reports stiffness is still present L cervical spine and he continues to experience intermittent low back pain     Quality of life: good    Pain  Current pain ratin (neck stiffness, L posterior S' pain w/ movement 8/10)  At best pain ratin  At worst pain ratin (denies neck pain only stiffness, R posterior S' pain with movement 8/10 )  Location: low back with radicular pain intermittently to R lateral thigh  Relieving factors: medications and ice  Aggravating factors: standing      Diagnostic Tests  No diagnostic tests performed  Treatments  Current treatment: physical therapy  Patient Goals  Patient goals for therapy: decreased pain, increased motion, increased strength, improved balance and independence with ADLs/IADLs          Objective     Postural Observations    Additional Postural Observation Details  Gait: pt enters clinic with use of SPC since recent hospitalization and detox stay for alcohol abuse- gait is now without A  D  with WBOS and decreased step length  Pt with mild unsteadiness during balance testing with dizziness intermittently present- improved in NBOS but unsteady in tandem stance trials    Pt with mild forward shoulder and head posturing with R shoulder elevation compared to L      Palpation     Additional Palpation Details  Hypomobility mid cervical spine with rotation bilaterally  Tightness and muscle spasm cervical paraspinals and upper thoracic paraspinals- Muscle spasm and TTP L cervical paraspinals and levator, no R sided spasm  TTP upper trap and levator bilaterally- Now only L sided    TTP R piriformis and L piriformis with R greater than left- No Left sided tenderness  TTP bilateral Lumbar paraspinals R greater than L- Only present on R abolished L  Hypomobility lower lumbar spine with Lumbar ROM all planes  Equal pelvis height    Neurological Testing     Sensation   Cervical/Thoracic   Left   Intact: light touch    Right   Intact: light touch    Comments   Right light touch: intact UE's and LE's to light touch       Additional Neurological Details  Pt reports intermittent tingling bilateral feet especially at dorsum of feet- new onset in the last 3 months- same  Pt reports 1 year history of constant finger tingling bilaterally- same    Active Range of Motion   Cervical/Thoracic Spine       Cervical    Flexion: 35 degrees   Extension: 30 degrees      Left lateral flexion: 30 degrees      Right lateral flexion: 33 degrees     with pain  Left rotation: 35 degrees  Right rotation: 40 degrees           Additional Active Range of Motion Details  L sided neck stiffness with pulling into shoulder with sidebending and rotation    Bilateral shoulder flex abd limited to 105 degrees with L shoulder pain present  Elbow wrist and hand WFL    L-spine     Flex- 30 deg  Ext- 20 SB-L 5 degrees R- 15 degrees    Strength/Myotome Testing     Additional Strength Details  Elbow wrist and hand 5/5 bilaterally  R shoulder flex 4/5 L shoulder flex 3+/5  Bilateral Shoulder abd 4+/5  Core Strength: 4/5  R hip flex 4+/5 L LE 5/5  R hip abd/add knee and ankle 5/5    NBOS    E O- 20 sec no LOB/ minimal shakiness    E C  20 sec no LOB    Tandem stance             RFF        4 seconds- unsteadiness and LOB             LFF         14 seconds-  LOB    General Comments:      Cervical/Thoracic Comments  Difficulty turning head to look over shoulders when driving- improved but still difficult  Standing tolerance 10 minutes with increased pain- same  Sitting tolerance 30 minutes with increased R low back discomfort- improved to 1 HR  Increased low back pain with car transfers- improved, minimal to no difficulty  Foto: 45- then 52 and now at 41 despite subjective report of improvement in symptoms             Precautions: unsteadiness, recent discharge from detox

## 2021-05-25 NOTE — PROGRESS NOTES
PT Re-Evaluation     Today's date: 2021  Patient name: Sharon Torres  : 1950  MRN: [id number]  Referring provider: Sharon Harris MD  Dx:   Encounter Diagnosis     ICD-10-CM    1  Neck pain  M54 2    2  Back pain with radiation  M54 9    3  Chronic shoulder pain, unspecified laterality  M25 519     G89 29    4  Muscle spasms of neck  M62 838        Start Time: 0830  Stop Time: 0930  Total time in clinic (min): 60 minutes    Assessment  Assessment details: Pt is a 70year old male with complex PMH including recent discharge yesterday from [facility]  Pt presents with chief complaint of neck and shoulder pain with limited ability to turn and look over shoulder especially noted when driving  Pt has been seen for 7 visits of Outpatient PT including 2 weeks of no therapy due to being in alcohol rehab with treatment consisting of STM, joint mobilization, manual stretching, ROM strengthening postural correction and core stabilization exercises followed by HP with E-Stim  Pt has made progress with therapy with improve in cervical and lumbar spine ROM, improvement in core strength and UE strength with exception of bilateral shoulders  Pt with decreased stiffness and muscle spasm at cervical spine since beginning therapy however muscle spasm still present L paraspinals and levator  Pt still with tenderness to palpation and muscle tightness R low back with intermittent back pain but no radicular symptoms into thigh  Pt reports compliance with HEP thus far  Pt would benefit from continued activity in PT to further decrease pain and muscle spasm at cervical spine and R low back, improve cervical and lumbar spine ROM, core and scapular strength needed to return to all ADL's and IADL's unrestricted     Impairments: abnormal gait, abnormal muscle tone, abnormal or restricted ROM, activity intolerance, impaired balance, impaired physical strength, pain with function and weight-bearing intolerance  Functional limitations: prolonged standing, turning to look over shoulderUnderstanding of Dx/Px/POC: good   Prognosis: good    Goals  STG's to be met in 3-4 weeks:   1  Decrease low back pain by 25% and abolish radicular symptoms- partially met  2  Increase cervical and lumbar ROM by 5 degrees- partially met  3  Increase core strength bilateral shoulder and hip strength by 1/2 muscle grade- not met  4  Improve tandem stance by 5 seconds in tandem trials and decrease postural sway in NBOS to 1 mistake or less- partially met    LTG's to be met by discharge:  1  Decrease low back and neck pain to less than 2/10 with activity- not met  2  Maximize cervical and lumbar ROM all planes needed for IADL's and driving- not met  3  Improve core LE and UE strength to 4+ to 5/5- partially met  4  Normalize gait and improve balance needed to return to ambulation no A  D  which is pt's baseline- progressing not met  5  Pt will be able to perform NBOS eyes closed on unstable surface without LOB- progressing not met  6  No difficulty with performing head turns while driving looking over shoulders- not met  7  Pt will be (I) with HEP   - ongoing and progressing    Plan  Patient would benefit from: skilled physical therapy  Planned modality interventions: thermotherapy: hydrocollator packs and unattended electrical stimulation  Other planned modality interventions: modalities to be added or modified at discretion of therapist  Planned therapy interventions: home exercise program, flexibility, therapeutic exercise, therapeutic activities, stretching, strengthening, postural training, patient education, neuromuscular re-education, balance, joint mobilization, manual therapy and abdominal trunk stabilization  Frequency: 2x week  Duration in weeks: 4  Plan of Care beginning date: 5/25/2021  Plan of Care expiration date: 6/24/2021  Treatment plan discussed with: patient        Subjective Evaluation    History of Present Illness  Mechanism of injury: Pt reports symptoms have improved but pt feels he would be further along if he didn't miss approximately 2 weeks due to going back to alcohol rehab  Pt still reports his balance is off if standing for prolonged period  Pt reports stiffness is still present L cervical spine and he continues to experience intermittent low back pain  Quality of life: good    Pain  Current pain ratin (neck stiffness, L posterior S' pain w/ movement 8/10)  At best pain ratin  At worst pain ratin (denies neck pain only stiffness, R posterior S' pain with movement 8/10 )  Location: low back with radicular pain intermittently to R lateral thigh  Relieving factors: medications and ice  Aggravating factors: standing      Diagnostic Tests  No diagnostic tests performed  Treatments  Current treatment: physical therapy  Patient Goals  Patient goals for therapy: decreased pain, increased motion, increased strength, improved balance and independence with ADLs/IADLs          Objective     Postural Observations    Additional Postural Observation Details  Gait: pt enters clinic with use of SPC since recent hospitalization and detox stay for alcohol abuse- gait is now without A  D  with WBOS and decreased step length  Pt with mild unsteadiness during balance testing with dizziness intermittently present- improved in NBOS but unsteady in tandem stance trials    Pt with mild forward shoulder and head posturing with R shoulder elevation compared to L      Palpation     Additional Palpation Details  Hypomobility mid cervical spine with rotation bilaterally  Tightness and muscle spasm cervical paraspinals and upper thoracic paraspinals- Muscle spasm and TTP L cervical paraspinals and levator, no R sided spasm  TTP upper trap and levator bilaterally- Now only L sided    TTP R piriformis and L piriformis with R greater than left- No Left sided tenderness  TTP bilateral Lumbar paraspinals R greater than L- Only present on R abolished L  Hypomobility lower lumbar spine with Lumbar ROM all planes  Equal pelvis height    Neurological Testing     Sensation   Cervical/Thoracic   Left   Intact: light touch    Right   Intact: light touch    Comments   Right light touch: intact UE's and LE's to light touch       Additional Neurological Details  Pt reports intermittent tingling bilateral feet especially at dorsum of feet- new onset in the last 3 months- same  Pt reports 1 year history of constant finger tingling bilaterally- same    Active Range of Motion   Cervical/Thoracic Spine       Cervical    Flexion: 35 degrees   Extension: 30 degrees      Left lateral flexion: 30 degrees      Right lateral flexion: 33 degrees     with pain  Left rotation: 35 degrees  Right rotation: 40 degrees           Additional Active Range of Motion Details  L sided neck stiffness with pulling into shoulder with sidebending and rotation    Bilateral shoulder flex abd limited to 105 degrees with L shoulder pain present  Elbow wrist and hand WFL    L-spine     Flex- 30 deg  Ext- 20 SB-L 5 degrees R- 15 degrees    Strength/Myotome Testing     Additional Strength Details  Elbow wrist and hand 5/5 bilaterally  R shoulder flex 4/5 L shoulder flex 3+/5  Bilateral Shoulder abd 4+/5  Core Strength: 4/5  R hip flex 4+/5 L LE 5/5  R hip abd/add knee and ankle 5/5    NBOS    E O- 20 sec no LOB/ minimal shakiness    E C  20 sec no LOB    Tandem stance             RFF        4 seconds- unsteadiness and LOB             LFF         14 seconds-  LOB    General Comments:      Cervical/Thoracic Comments  Difficulty turning head to look over shoulders when driving- improved but still difficult  Standing tolerance 10 minutes with increased pain- same  Sitting tolerance 30 minutes with increased R low back discomfort- improved to 1 HR  Increased low back pain with car transfers- improved, minimal to no difficulty  Foto: 45- then 52 and now at 41 despite subjective report of improvement in symptoms             Precautions: unsteadiness, recent discharge from detox

## 2021-05-25 NOTE — PROGRESS NOTES
Daily Note     Today's date: 2021  Patient name: Rodo Betancourt  : 1950  MRN: [id number]   Referring provider: Yanna Merrill MD  Dx:   Encounter Diagnosis     ICD-10-CM    1  Neck pain  M54 2    2  Back pain with radiation  M54 9    3  Chronic shoulder pain, unspecified laterality  M25 519     G89 29    4  Muscle spasms of neck  M62 838        Start Time: 08  Stop Time: 930  Total time in clinic (min): 60 minutes    Subjective:Pt's cc is L UT pain  Objective: See treatment diary below  Resumed TE, held modalities due to pt's time restraints  PT provided STM to the L UT  Assessment: Tolerated treatment fair  Patient would benefit from continued PT      Plan: Continue per plan of care        Precautions: unsteadiness, recent discharge from detox      Date 5/5 5/25 4/28/21 4/30 5/3   Visit 6 7 3 4 5   Pain 6B  8 B 8B B5 N 3   Manuals        LE stretch ds -----> JH ds ds   STM bilateral C-spine UT ds jh L UT JH ----> ds B UT   Manual cervical traction, suboccipital release, manual stretch ---- ------      STM lumbar paraspinals ---- ----> JH ----> ---->   PA mobilization L-spine        Neuro Re-Ed        Cervical rotation with mob/glide seated  ------> JH ----> ------>   Chin tucks 20x 20x x20 20x 20x   Scapular retraction 2/10 2/10 2/10 2/10 2/10   T-band row L4 2/10 L5 2/15   L4 2/10   T-band ELISHA L4 2/10 L5 2/15   L4 2/10   T-band trunk rotation        Tandem stance        NBOS with ABC        BOSU marching        sidestepping        Ther Ex        LTR 1 5# 2/20 1 5# 2/20 x10 3" 10x 3" 1 5# 2/20   SKC        Adductor squeeze w/ bridge 30x 3" 30x 3"  30x 3" 30x 3"   clamshells L4 2/15 L4 2/15  L3  2/15 L4 2/15   PPT 30x 3" 30x 3" 30x 3" 30x 3" 30x 3"   PPT with marching 1 5# 2/20 21 5#  1#  1 5#    PPT with leg raise 1 5# 2/15 1 5# 2/15  2/10 1# 2/10 1 5# 2/15   Nu-step 15m L5 10m  L5 10m L4 10m L4 15m L5   Ther Activity        Gait Training        Modalities        HP w/ pre-mod C-spine and L-spine 15 m defer 15m 15m 15m

## 2021-05-28 ENCOUNTER — OFFICE VISIT (OUTPATIENT)
Dept: PHYSICAL THERAPY | Facility: CLINIC | Age: 71
End: 2021-05-28
Payer: MEDICARE

## 2021-05-28 DIAGNOSIS — M62.838 MUSCLE SPASMS OF NECK: ICD-10-CM

## 2021-05-28 DIAGNOSIS — G89.29 CHRONIC SHOULDER PAIN, UNSPECIFIED LATERALITY: ICD-10-CM

## 2021-05-28 DIAGNOSIS — M25.519 CHRONIC SHOULDER PAIN, UNSPECIFIED LATERALITY: ICD-10-CM

## 2021-05-28 DIAGNOSIS — M54.2 NECK PAIN: Primary | ICD-10-CM

## 2021-05-28 DIAGNOSIS — M54.9 BACK PAIN WITH RADIATION: ICD-10-CM

## 2021-05-28 PROCEDURE — 97140 MANUAL THERAPY 1/> REGIONS: CPT

## 2021-05-28 PROCEDURE — 97110 THERAPEUTIC EXERCISES: CPT

## 2021-05-28 PROCEDURE — 97112 NEUROMUSCULAR REEDUCATION: CPT

## 2021-05-28 PROCEDURE — 97014 ELECTRIC STIMULATION THERAPY: CPT

## 2021-05-28 NOTE — PROGRESS NOTES
Daily Note     Today's date: 2021  Patient name: Monica Cleary  : 1950  MRN: [id number]  Referring provider: Simón Alford MD  Dx:   Encounter Diagnosis     ICD-10-CM    1  Neck pain  M54 2    2  Back pain with radiation  M54 9    3  Chronic shoulder pain, unspecified laterality  M25 519     G89 29    4  Muscle spasms of neck  M62 838        Start Time: 0900  Stop Time: 1015  Total time in clinic (min): 75 minutes  Subjective: Pt notes less LBP  Continued c/o with his L UT  Objective: See treatment diary below      Assessment: Tolerated treatment well  Patient exhibited good technique with therapeutic exercises      Plan: Continue per plan of care        Precautions: unsteadiness, recent discharge from detox  Date 5/5  5/28 4/28/21 4/30 5/3   Visit 6  7 3 4 5   Pain 6B  8 B 8B B5 N 3   Manuals             LE stretch ds ds JH ds ds   STM bilateral C-spine UT ds ds JH ----> ds B UT   Manual cervical traction, suboccipital release, manual stretch ---- ------         STM lumbar paraspinals ----   JH ----> ---->   PA mobilization L-spine             Neuro Re-Ed             Cervical rotation with mob/glide seated     JH ----> ------>   Chin tucks 20x 20x x20 20x 20x   Scapular retraction 2/10 2/10 2/10 2/10 2/10   T-band row L4 2/10  L5 2/15     L4 2/10   T-band ELISHA L4 2/10  L5 2/15     L4 2/10   T-band trunk rotation             Tandem stance             NBOS with ABC             BOSU marching             sidestepping             Ther Ex             LTR 1 5# 2/20  2# 2/20 x10 3" 10x 3" 1 5# 2/20   Adductor squeeze w/ bridge 30x 3"     30x 3" 30x 3"   clamshells L4 2/15  L5 2/15   L3  2/15 L4 2/15   PPT 30x 3" 30x 3" 30x 3" 30x 3" 30x 3"   PPT with marching 1 5# 2/20 2# 2/20  2/20 1# 2/20 1 5# 2/20   PPT with leg raise 1 5# 2/15 2# 2/20  2/10 1# 2/10 1 5# 2/15                 Nu-step 15m L5 5m  L3 10m L4 10m L4 15m L5   Ther Activity                           Gait Training                         Modalities             HP w/ pre-mod C-spine and L-spine 15 m 15m 15m 15m 15m

## 2021-06-02 ENCOUNTER — HOSPITAL ENCOUNTER (EMERGENCY)
Facility: HOSPITAL | Age: 71
End: 2021-06-02
Attending: EMERGENCY MEDICINE | Admitting: EMERGENCY MEDICINE
Payer: MEDICARE

## 2021-06-02 ENCOUNTER — HOSPITAL ENCOUNTER (INPATIENT)
Facility: HOSPITAL | Age: 71
LOS: 7 days | Discharge: HOME | DRG: 897 | End: 2021-06-09
Attending: EMERGENCY MEDICINE | Admitting: EMERGENCY MEDICINE
Payer: MEDICARE

## 2021-06-02 VITALS
TEMPERATURE: 98 F | HEART RATE: 67 BPM | DIASTOLIC BLOOD PRESSURE: 73 MMHG | SYSTOLIC BLOOD PRESSURE: 160 MMHG | WEIGHT: 224.87 LBS | BODY MASS INDEX: 35.29 KG/M2 | RESPIRATION RATE: 21 BRPM | HEIGHT: 67 IN | OXYGEN SATURATION: 92 %

## 2021-06-02 DIAGNOSIS — F39 UNSPECIFIED MOOD (AFFECTIVE) DISORDER (HCC): ICD-10-CM

## 2021-06-02 DIAGNOSIS — L29.9 PRURITUS: Primary | ICD-10-CM

## 2021-06-02 DIAGNOSIS — F10.20 ALCOHOL USE DISORDER, SEVERE, DEPENDENCE (HCC): ICD-10-CM

## 2021-06-02 DIAGNOSIS — F10.230 ALCOHOL WITHDRAWAL SYNDROME WITHOUT COMPLICATION (HCC): Primary | ICD-10-CM

## 2021-06-02 DIAGNOSIS — G47.00 INSOMNIA, UNSPECIFIED TYPE: ICD-10-CM

## 2021-06-02 DIAGNOSIS — E87.6 HYPOKALEMIA: ICD-10-CM

## 2021-06-02 DIAGNOSIS — F41.9 ANXIETY: ICD-10-CM

## 2021-06-02 LAB
ALBUMIN SERPL BCP-MCNC: 3.8 G/DL (ref 3.5–5)
ALP SERPL-CCNC: 89 U/L (ref 46–116)
ALT SERPL W P-5'-P-CCNC: 28 U/L (ref 12–78)
ANION GAP SERPL CALCULATED.3IONS-SCNC: 11 MMOL/L (ref 4–13)
AST SERPL W P-5'-P-CCNC: 23 U/L (ref 5–45)
BASOPHILS # BLD AUTO: 0.05 THOUSANDS/ΜL (ref 0–0.1)
BASOPHILS NFR BLD AUTO: 1 % (ref 0–1)
BILIRUB SERPL-MCNC: 0.59 MG/DL (ref 0.2–1)
BUN SERPL-MCNC: 17 MG/DL (ref 5–25)
CALCIUM SERPL-MCNC: 8.1 MG/DL (ref 8.3–10.1)
CHLORIDE SERPL-SCNC: 100 MMOL/L (ref 100–108)
CO2 SERPL-SCNC: 28 MMOL/L (ref 21–32)
CREAT SERPL-MCNC: 0.74 MG/DL (ref 0.6–1.3)
EOSINOPHIL # BLD AUTO: 0.1 THOUSAND/ΜL (ref 0–0.61)
EOSINOPHIL NFR BLD AUTO: 1 % (ref 0–6)
ERYTHROCYTE [DISTWIDTH] IN BLOOD BY AUTOMATED COUNT: 13.4 % (ref 11.6–15.1)
GFR SERPL CREATININE-BSD FRML MDRD: 93 ML/MIN/1.73SQ M
GLUCOSE SERPL-MCNC: 98 MG/DL (ref 65–140)
HCT VFR BLD AUTO: 40 % (ref 36.5–49.3)
HGB BLD-MCNC: 13.4 G/DL (ref 12–17)
IMM GRANULOCYTES # BLD AUTO: 0.04 THOUSAND/UL (ref 0–0.2)
IMM GRANULOCYTES NFR BLD AUTO: 1 % (ref 0–2)
LYMPHOCYTES # BLD AUTO: 1.69 THOUSANDS/ΜL (ref 0.6–4.47)
LYMPHOCYTES NFR BLD AUTO: 23 % (ref 14–44)
MAGNESIUM SERPL-MCNC: 1.6 MG/DL (ref 1.6–2.6)
MCH RBC QN AUTO: 31.8 PG (ref 26.8–34.3)
MCHC RBC AUTO-ENTMCNC: 33.5 G/DL (ref 31.4–37.4)
MCV RBC AUTO: 95 FL (ref 82–98)
MONOCYTES # BLD AUTO: 0.86 THOUSAND/ΜL (ref 0.17–1.22)
MONOCYTES NFR BLD AUTO: 12 % (ref 4–12)
NEUTROPHILS # BLD AUTO: 4.65 THOUSANDS/ΜL (ref 1.85–7.62)
NEUTS SEG NFR BLD AUTO: 62 % (ref 43–75)
NRBC BLD AUTO-RTO: 0 /100 WBCS
PHOSPHATE SERPL-MCNC: 2.5 MG/DL (ref 2.3–4.1)
PLATELET # BLD AUTO: 275 THOUSANDS/UL (ref 149–390)
PMV BLD AUTO: 9.2 FL (ref 8.9–12.7)
POTASSIUM SERPL-SCNC: 3 MMOL/L (ref 3.5–5.3)
PROT SERPL-MCNC: 6.8 G/DL (ref 6.4–8.2)
RBC # BLD AUTO: 4.21 MILLION/UL (ref 3.88–5.62)
SODIUM SERPL-SCNC: 139 MMOL/L (ref 136–145)
WBC # BLD AUTO: 7.39 THOUSAND/UL (ref 4.31–10.16)

## 2021-06-02 PROCEDURE — 83735 ASSAY OF MAGNESIUM: CPT | Performed by: EMERGENCY MEDICINE

## 2021-06-02 PROCEDURE — 96367 TX/PROPH/DG ADDL SEQ IV INF: CPT

## 2021-06-02 PROCEDURE — HZ2ZZZZ DETOXIFICATION SERVICES FOR SUBSTANCE ABUSE TREATMENT: ICD-10-PCS | Performed by: EMERGENCY MEDICINE

## 2021-06-02 PROCEDURE — 85025 COMPLETE CBC W/AUTO DIFF WBC: CPT | Performed by: EMERGENCY MEDICINE

## 2021-06-02 PROCEDURE — 36415 COLL VENOUS BLD VENIPUNCTURE: CPT | Performed by: EMERGENCY MEDICINE

## 2021-06-02 PROCEDURE — 96375 TX/PRO/DX INJ NEW DRUG ADDON: CPT

## 2021-06-02 PROCEDURE — NC001 PR NO CHARGE: Performed by: PHYSICIAN ASSISTANT

## 2021-06-02 PROCEDURE — 99285 EMERGENCY DEPT VISIT HI MDM: CPT | Performed by: EMERGENCY MEDICINE

## 2021-06-02 PROCEDURE — 96365 THER/PROPH/DIAG IV INF INIT: CPT

## 2021-06-02 PROCEDURE — 93005 ELECTROCARDIOGRAM TRACING: CPT

## 2021-06-02 PROCEDURE — 99223 1ST HOSP IP/OBS HIGH 75: CPT | Performed by: PHYSICIAN ASSISTANT

## 2021-06-02 PROCEDURE — 99285 EMERGENCY DEPT VISIT HI MDM: CPT

## 2021-06-02 PROCEDURE — 84100 ASSAY OF PHOSPHORUS: CPT | Performed by: EMERGENCY MEDICINE

## 2021-06-02 PROCEDURE — 96376 TX/PRO/DX INJ SAME DRUG ADON: CPT

## 2021-06-02 PROCEDURE — 80053 COMPREHEN METABOLIC PANEL: CPT | Performed by: EMERGENCY MEDICINE

## 2021-06-02 RX ORDER — FOLIC ACID 5 MG/ML
INJECTION, SOLUTION INTRAMUSCULAR; INTRAVENOUS; SUBCUTANEOUS
Status: COMPLETED
Start: 2021-06-02 | End: 2021-06-02

## 2021-06-02 RX ORDER — PHENOBARBITAL SODIUM 130 MG/ML
260 INJECTION INTRAMUSCULAR ONCE
Status: COMPLETED | OUTPATIENT
Start: 2021-06-02 | End: 2021-06-02

## 2021-06-02 RX ORDER — LORAZEPAM 2 MG/ML
1 INJECTION INTRAMUSCULAR ONCE
Status: COMPLETED | OUTPATIENT
Start: 2021-06-02 | End: 2021-06-02

## 2021-06-02 RX ORDER — SODIUM CHLORIDE 9 MG/ML
125 INJECTION, SOLUTION INTRAVENOUS CONTINUOUS
Status: DISCONTINUED | OUTPATIENT
Start: 2021-06-02 | End: 2021-06-02 | Stop reason: HOSPADM

## 2021-06-02 RX ORDER — POTASSIUM CHLORIDE 14.9 MG/ML
20 INJECTION INTRAVENOUS ONCE
Status: COMPLETED | OUTPATIENT
Start: 2021-06-02 | End: 2021-06-02

## 2021-06-02 RX ORDER — POTASSIUM CHLORIDE 20 MEQ/1
40 TABLET, EXTENDED RELEASE ORAL ONCE
Status: COMPLETED | OUTPATIENT
Start: 2021-06-02 | End: 2021-06-02

## 2021-06-02 RX ORDER — PHENOBARBITAL 32.4 MG/1
64.8 TABLET ORAL ONCE
Status: COMPLETED | OUTPATIENT
Start: 2021-06-02 | End: 2021-06-02

## 2021-06-02 RX ORDER — SODIUM CHLORIDE 9 MG/ML
125 INJECTION, SOLUTION INTRAVENOUS CONTINUOUS
Status: DISCONTINUED | OUTPATIENT
Start: 2021-06-02 | End: 2021-06-03

## 2021-06-02 RX ADMIN — SODIUM CHLORIDE 125 ML/HR: 0.9 INJECTION, SOLUTION INTRAVENOUS at 23:24

## 2021-06-02 RX ADMIN — LORAZEPAM 1 MG: 2 INJECTION INTRAMUSCULAR; INTRAVENOUS at 20:22

## 2021-06-02 RX ADMIN — LORAZEPAM 1 MG: 2 INJECTION INTRAMUSCULAR; INTRAVENOUS at 19:20

## 2021-06-02 RX ADMIN — POTASSIUM CHLORIDE 20 MEQ: 14.9 INJECTION, SOLUTION INTRAVENOUS at 20:24

## 2021-06-02 RX ADMIN — PHENOBARBITAL SODIUM 260 MG: 130 INJECTION INTRAMUSCULAR at 22:44

## 2021-06-02 RX ADMIN — SODIUM CHLORIDE 125 ML/HR: 0.9 INJECTION, SOLUTION INTRAVENOUS at 20:25

## 2021-06-02 RX ADMIN — FOLIC ACID 1 MG: 5 INJECTION, SOLUTION INTRAMUSCULAR; INTRAVENOUS; SUBCUTANEOUS at 19:39

## 2021-06-02 RX ADMIN — SODIUM CHLORIDE 1000 ML: 0.9 INJECTION, SOLUTION INTRAVENOUS at 19:22

## 2021-06-02 RX ADMIN — PHENOBARBITAL 64.8 MG: 32.4 TABLET ORAL at 23:28

## 2021-06-02 RX ADMIN — MAGNESIUM OXIDE TAB 400 MG (241.3 MG ELEMENTAL MG) 800 MG: 400 (241.3 MG) TAB at 20:23

## 2021-06-02 RX ADMIN — POTASSIUM CHLORIDE 40 MEQ: 1500 TABLET, EXTENDED RELEASE ORAL at 20:23

## 2021-06-03 PROBLEM — R74.8 ELEVATED LIPASE: Status: ACTIVE | Noted: 2021-06-03

## 2021-06-03 PROBLEM — E83.39 HYPOPHOSPHATEMIA: Status: ACTIVE | Noted: 2021-06-03

## 2021-06-03 PROBLEM — E83.39 HYPOPHOSPHATEMIA: Status: RESOLVED | Noted: 2021-06-03 | Resolved: 2021-06-03

## 2021-06-03 PROBLEM — Z87.898 HISTORY OF PROLONGED Q-T INTERVAL ON ECG: Status: ACTIVE | Noted: 2021-04-17

## 2021-06-03 PROBLEM — K70.0 ALCOHOL INDUCED FATTY LIVER: Status: ACTIVE | Noted: 2021-06-03

## 2021-06-03 PROBLEM — F10.930 ALCOHOL WITHDRAWAL SYNDROME WITHOUT COMPLICATION (HCC): Status: ACTIVE | Noted: 2019-02-12

## 2021-06-03 PROBLEM — E78.2 MIXED HYPERLIPIDEMIA: Status: ACTIVE | Noted: 2021-06-03

## 2021-06-03 PROBLEM — F10.230 ALCOHOL WITHDRAWAL SYNDROME WITHOUT COMPLICATION (HCC): Status: ACTIVE | Noted: 2019-02-12

## 2021-06-03 LAB
ALBUMIN SERPL BCP-MCNC: 3.7 G/DL (ref 3–5.2)
ALP SERPL-CCNC: 64 U/L (ref 43–122)
ALT SERPL W P-5'-P-CCNC: 24 U/L
ANION GAP SERPL CALCULATED.3IONS-SCNC: 3 MMOL/L (ref 5–14)
ANION GAP SERPL CALCULATED.3IONS-SCNC: 4 MMOL/L (ref 5–14)
AST SERPL W P-5'-P-CCNC: 33 U/L (ref 17–59)
ATRIAL RATE: 15 BPM
ATRIAL RATE: 58 BPM
BILIRUB SERPL-MCNC: 1.11 MG/DL
BUN SERPL-MCNC: 11 MG/DL (ref 5–25)
BUN SERPL-MCNC: 13 MG/DL (ref 5–25)
CALCIUM SERPL-MCNC: 8.2 MG/DL (ref 8.4–10.2)
CALCIUM SERPL-MCNC: 8.2 MG/DL (ref 8.4–10.2)
CHLORIDE SERPL-SCNC: 99 MMOL/L (ref 97–108)
CHLORIDE SERPL-SCNC: 99 MMOL/L (ref 97–108)
CHOLEST SERPL-MCNC: 147 MG/DL
CO2 SERPL-SCNC: 31 MMOL/L (ref 22–30)
CO2 SERPL-SCNC: 32 MMOL/L (ref 22–30)
CREAT SERPL-MCNC: 0.52 MG/DL (ref 0.7–1.5)
CREAT SERPL-MCNC: 0.63 MG/DL (ref 0.7–1.5)
ERYTHROCYTE [DISTWIDTH] IN BLOOD BY AUTOMATED COUNT: 14.8 %
GFR SERPL CREATININE-BSD FRML MDRD: 107 ML/MIN/1.73SQ M
GFR SERPL CREATININE-BSD FRML MDRD: 99 ML/MIN/1.73SQ M
GLUCOSE SERPL-MCNC: 136 MG/DL (ref 70–99)
GLUCOSE SERPL-MCNC: 154 MG/DL (ref 70–99)
HCT VFR BLD AUTO: 39.8 % (ref 41–53)
HDLC SERPL-MCNC: 61 MG/DL
HGB BLD-MCNC: 13.8 G/DL (ref 13.5–17.5)
INR PPP: 1.12 (ref 0.84–1.19)
LDLC SERPL CALC-MCNC: 74 MG/DL
LIPASE SERPL-CCNC: 748 U/L (ref 23–300)
LIPASE SERPL-CCNC: 834 U/L (ref 23–300)
MAGNESIUM SERPL-MCNC: 1.5 MG/DL (ref 1.6–2.3)
MAGNESIUM SERPL-MCNC: 2.1 MG/DL (ref 1.6–2.3)
MCH RBC QN AUTO: 33.3 PG (ref 26–34)
MCHC RBC AUTO-ENTMCNC: 34.6 G/DL (ref 31–36)
MCV RBC AUTO: 96 FL (ref 80–100)
NONHDLC SERPL-MCNC: 86 MG/DL
P AXIS: 40 DEGREES
PHOSPHATE SERPL-MCNC: 2.2 MG/DL (ref 2.5–4.8)
PLATELET # BLD AUTO: 248 THOUSANDS/UL (ref 150–450)
PMV BLD AUTO: 7.4 FL (ref 8.9–12.7)
POTASSIUM SERPL-SCNC: 3.1 MMOL/L (ref 3.6–5)
POTASSIUM SERPL-SCNC: 3.5 MMOL/L (ref 3.6–5)
PR INTERVAL: 174 MS
PROT SERPL-MCNC: 6.4 G/DL (ref 5.9–8.4)
PROTHROMBIN TIME: 14.5 SECONDS (ref 11.6–14.5)
QRS AXIS: 37 DEGREES
QRS AXIS: 99 DEGREES
QRSD INTERVAL: 116 MS
QRSD INTERVAL: 96 MS
QT INTERVAL: 454 MS
QT INTERVAL: 478 MS
QTC INTERVAL: 445 MS
QTC INTERVAL: 481 MS
RBC # BLD AUTO: 4.13 MILLION/UL (ref 4.5–5.9)
SODIUM SERPL-SCNC: 134 MMOL/L (ref 137–147)
SODIUM SERPL-SCNC: 134 MMOL/L (ref 137–147)
T WAVE AXIS: 29 DEGREES
T WAVE AXIS: 41 DEGREES
TRIGL SERPL-MCNC: 60 MG/DL
VENTRICULAR RATE: 58 BPM
VENTRICULAR RATE: 61 BPM
WBC # BLD AUTO: 9.5 THOUSAND/UL (ref 4.5–11)

## 2021-06-03 PROCEDURE — 93010 ELECTROCARDIOGRAM REPORT: CPT | Performed by: INTERNAL MEDICINE

## 2021-06-03 PROCEDURE — 80061 LIPID PANEL: CPT | Performed by: PHYSICIAN ASSISTANT

## 2021-06-03 PROCEDURE — 99232 SBSQ HOSP IP/OBS MODERATE 35: CPT | Performed by: EMERGENCY MEDICINE

## 2021-06-03 PROCEDURE — 85610 PROTHROMBIN TIME: CPT | Performed by: PHYSICIAN ASSISTANT

## 2021-06-03 PROCEDURE — 83735 ASSAY OF MAGNESIUM: CPT | Performed by: PHYSICIAN ASSISTANT

## 2021-06-03 PROCEDURE — 84100 ASSAY OF PHOSPHORUS: CPT | Performed by: PHYSICIAN ASSISTANT

## 2021-06-03 PROCEDURE — 80048 BASIC METABOLIC PNL TOTAL CA: CPT | Performed by: PHYSICIAN ASSISTANT

## 2021-06-03 PROCEDURE — 80053 COMPREHEN METABOLIC PANEL: CPT | Performed by: PHYSICIAN ASSISTANT

## 2021-06-03 PROCEDURE — 83690 ASSAY OF LIPASE: CPT | Performed by: PHYSICIAN ASSISTANT

## 2021-06-03 PROCEDURE — 85027 COMPLETE CBC AUTOMATED: CPT | Performed by: PHYSICIAN ASSISTANT

## 2021-06-03 PROCEDURE — 93005 ELECTROCARDIOGRAM TRACING: CPT

## 2021-06-03 RX ORDER — FLUOXETINE HYDROCHLORIDE 20 MG/1
60 CAPSULE ORAL DAILY
COMMUNITY
Start: 2021-04-01 | End: 2021-10-15 | Stop reason: HOSPADM

## 2021-06-03 RX ORDER — DIPHENHYDRAMINE HCL 25 MG
25 TABLET ORAL EVERY 6 HOURS PRN
Status: DISCONTINUED | OUTPATIENT
Start: 2021-06-03 | End: 2021-06-07

## 2021-06-03 RX ORDER — MAGNESIUM SULFATE HEPTAHYDRATE 40 MG/ML
4 INJECTION, SOLUTION INTRAVENOUS ONCE
Status: DISCONTINUED | OUTPATIENT
Start: 2021-06-03 | End: 2021-06-03

## 2021-06-03 RX ORDER — POTASSIUM CHLORIDE 14.9 MG/ML
20 INJECTION INTRAVENOUS ONCE
Status: COMPLETED | OUTPATIENT
Start: 2021-06-03 | End: 2021-06-03

## 2021-06-03 RX ORDER — FINASTERIDE 5 MG/1
5 TABLET, FILM COATED ORAL DAILY
Status: DISCONTINUED | OUTPATIENT
Start: 2021-06-03 | End: 2021-06-09 | Stop reason: HOSPADM

## 2021-06-03 RX ORDER — LANOLIN ALCOHOL/MO/W.PET/CERES
CREAM (GRAM) TOPICAL 3 TIMES DAILY PRN
Status: DISCONTINUED | OUTPATIENT
Start: 2021-06-03 | End: 2021-06-09 | Stop reason: HOSPADM

## 2021-06-03 RX ORDER — BACLOFEN 10 MG/1
10 TABLET ORAL 3 TIMES DAILY
Status: DISCONTINUED | OUTPATIENT
Start: 2021-06-03 | End: 2021-06-09 | Stop reason: HOSPADM

## 2021-06-03 RX ORDER — LANOLIN ALCOHOL/MO/W.PET/CERES
3 CREAM (GRAM) TOPICAL
Status: DISCONTINUED | OUTPATIENT
Start: 2021-06-03 | End: 2021-06-09 | Stop reason: HOSPADM

## 2021-06-03 RX ORDER — FLUOXETINE HYDROCHLORIDE 20 MG/1
60 CAPSULE ORAL DAILY
Status: DISCONTINUED | OUTPATIENT
Start: 2021-06-03 | End: 2021-06-09 | Stop reason: HOSPADM

## 2021-06-03 RX ORDER — PHENOBARBITAL 32.4 MG/1
64.8 TABLET ORAL ONCE
Status: COMPLETED | OUTPATIENT
Start: 2021-06-03 | End: 2021-06-03

## 2021-06-03 RX ORDER — SIMETHICONE 80 MG
80 TABLET,CHEWABLE ORAL EVERY 6 HOURS PRN
Status: DISCONTINUED | OUTPATIENT
Start: 2021-06-03 | End: 2021-06-09 | Stop reason: HOSPADM

## 2021-06-03 RX ORDER — ACETAMINOPHEN 325 MG/1
650 TABLET ORAL EVERY 6 HOURS PRN
Status: DISCONTINUED | OUTPATIENT
Start: 2021-06-03 | End: 2021-06-09 | Stop reason: HOSPADM

## 2021-06-03 RX ORDER — MAGNESIUM SULFATE HEPTAHYDRATE 40 MG/ML
2 INJECTION, SOLUTION INTRAVENOUS
Status: COMPLETED | OUTPATIENT
Start: 2021-06-03 | End: 2021-06-03

## 2021-06-03 RX ORDER — FUROSEMIDE 10 MG/ML
20 INJECTION INTRAMUSCULAR; INTRAVENOUS ONCE
Status: COMPLETED | OUTPATIENT
Start: 2021-06-03 | End: 2021-06-03

## 2021-06-03 RX ORDER — PHENOBARBITAL SODIUM 130 MG/ML
130 INJECTION INTRAMUSCULAR ONCE
Status: COMPLETED | OUTPATIENT
Start: 2021-06-03 | End: 2021-06-03

## 2021-06-03 RX ORDER — ATORVASTATIN CALCIUM 40 MG/1
40 TABLET, FILM COATED ORAL
Status: DISCONTINUED | OUTPATIENT
Start: 2021-06-03 | End: 2021-06-09 | Stop reason: HOSPADM

## 2021-06-03 RX ORDER — ATORVASTATIN CALCIUM 40 MG/1
40 TABLET, FILM COATED ORAL DAILY
COMMUNITY
Start: 2021-04-28

## 2021-06-03 RX ORDER — TRAZODONE HYDROCHLORIDE 100 MG/1
100 TABLET ORAL
Status: DISCONTINUED | OUTPATIENT
Start: 2021-06-03 | End: 2021-06-09 | Stop reason: HOSPADM

## 2021-06-03 RX ORDER — POTASSIUM CHLORIDE 20 MEQ/1
40 TABLET, EXTENDED RELEASE ORAL ONCE
Status: COMPLETED | OUTPATIENT
Start: 2021-06-03 | End: 2021-06-03

## 2021-06-03 RX ORDER — ALFUZOSIN HYDROCHLORIDE 10 MG/1
10 TABLET, EXTENDED RELEASE ORAL
Status: DISCONTINUED | OUTPATIENT
Start: 2021-06-03 | End: 2021-06-09 | Stop reason: HOSPADM

## 2021-06-03 RX ADMIN — Medication: at 07:49

## 2021-06-03 RX ADMIN — PHENOBARBITAL 64.8 MG: 32.4 TABLET ORAL at 07:37

## 2021-06-03 RX ADMIN — MAGNESIUM SULFATE IN WATER 2 G: 40 INJECTION, SOLUTION INTRAVENOUS at 07:49

## 2021-06-03 RX ADMIN — MAGNESIUM SULFATE IN WATER 2 G: 40 INJECTION, SOLUTION INTRAVENOUS at 05:37

## 2021-06-03 RX ADMIN — TRAZODONE HYDROCHLORIDE 100 MG: 100 TABLET ORAL at 21:26

## 2021-06-03 RX ADMIN — POTASSIUM CHLORIDE 20 MEQ: 14.9 INJECTION, SOLUTION INTRAVENOUS at 05:36

## 2021-06-03 RX ADMIN — BACLOFEN 10 MG: 10 TABLET ORAL at 17:23

## 2021-06-03 RX ADMIN — ATORVASTATIN CALCIUM 40 MG: 40 TABLET, FILM COATED ORAL at 17:24

## 2021-06-03 RX ADMIN — FOLIC ACID: 5 INJECTION, SOLUTION INTRAMUSCULAR; INTRAVENOUS; SUBCUTANEOUS at 08:57

## 2021-06-03 RX ADMIN — FLUOXETINE 60 MG: 20 CAPSULE ORAL at 08:15

## 2021-06-03 RX ADMIN — Medication 1 TABLET: at 08:59

## 2021-06-03 RX ADMIN — ENOXAPARIN SODIUM 40 MG: 40 INJECTION SUBCUTANEOUS at 08:16

## 2021-06-03 RX ADMIN — POTASSIUM CHLORIDE 40 MEQ: 1500 TABLET, EXTENDED RELEASE ORAL at 05:40

## 2021-06-03 RX ADMIN — FINASTERIDE 5 MG: 5 TABLET, FILM COATED ORAL at 08:15

## 2021-06-03 RX ADMIN — Medication 1 TABLET: at 17:23

## 2021-06-03 RX ADMIN — PHENOBARBITAL SODIUM 130 MG: 130 INJECTION INTRAMUSCULAR at 09:07

## 2021-06-03 RX ADMIN — BACLOFEN 10 MG: 10 TABLET ORAL at 08:15

## 2021-06-03 RX ADMIN — TRAZODONE HYDROCHLORIDE 100 MG: 100 TABLET ORAL at 01:00

## 2021-06-03 RX ADMIN — FUROSEMIDE 20 MG: 10 INJECTION, SOLUTION INTRAVENOUS at 01:00

## 2021-06-03 RX ADMIN — POTASSIUM CHLORIDE 40 MEQ: 1500 TABLET, EXTENDED RELEASE ORAL at 21:26

## 2021-06-03 RX ADMIN — PHENOBARBITAL 64.8 MG: 32.4 TABLET ORAL at 01:00

## 2021-06-03 RX ADMIN — DIPHENHYDRAMINE HCL 25 MG: 25 TABLET ORAL at 14:32

## 2021-06-03 RX ADMIN — BACLOFEN 10 MG: 10 TABLET ORAL at 21:26

## 2021-06-03 NOTE — ASSESSMENT & PLAN NOTE
· Most recent /90 preceding range 130//90  · Most recent heart rate 66, preceding range 56-67  · Patient reports Outpatient blood pressure medications as follows:  Amlodipine 5 mg daily and nadolol 20 mg daily  Patient reports that he no longer takes metoprolol or lisinopril  *  · Will hold blood pressure medications for now as patient undergoes treatment for alcohol withdrawal   May resume medication regimen once withdrawal process complete    · Continue to monitor vitals, BP

## 2021-06-03 NOTE — ASSESSMENT & PLAN NOTE
· Patient presented with elevated lipase of 834  · Had elevated lipase on prior admission as well  · Not complaining of abdominal pain and is tolerating PO normally  · Will continue to trend  · 06/04/21:  · Lipase trending downward: 834 -> 748 -> 505  · Continue to trend

## 2021-06-03 NOTE — ASSESSMENT & PLAN NOTE
· CT abdomen/pelvis with contrast 04/17/2021:  Enlarged fatty liver noted  · Likely secondary to alcohol use disorder  · CMP 06/02/2021 revealed LFTs as follows:  AST 23, ALT 28, alk-phos 89  · Continue to monitor LFTs  · Encourage follow-up with PCP outpatient for ongoing monitoring

## 2021-06-03 NOTE — ASSESSMENT & PLAN NOTE
[address] [facility], [address]    MAIN OR [phone]    MAIN PRE OP [phone]    AMBULATORY PRE OP [phone]    PRE-ADMISSION TESTING [phone]       1500 N [facility] on the day of your surgery  Have your insurance card, photo ID, and any copayment (if needed)     Food   and   Drink   NO food or drink after midnight the night before surgery    This means NO water, gum, mints, coffee, juice, etc.  No alcohol (beer, wine, liquor) 24 hours before and after surgery     Medicine to   TAKE   Morning of Surgery   MEDICATIONS TO TAKE THE MORNING OF SURGERY WITH A SIP OF WATER:    Respiridone, Klonopin and Gabapentin if needed. The morning of surgery ONLY do NOT take your Metformin. Stop your vitamin E today.      Medicine  To  STOP   FOR PAIN   NO Aspirin for pain    NO Non-Steroidal Anti-Inflammatory Drugs (NSAIDs:   for example, Ibuprofen (Advil, Motrin), Naproxen (Aleve)   STOP herbal supplements and vitamins 1 week before surgery   You can take Tylenol - follow instructions on the bottle     Blood  Thinners    If you take Aspirin, Plavix, Coumadin, blood-thinning or anti-clot medicine, talk to your surgeon and/or follow the instructions from the doctor who told you to take that medicine     Clothing  Jewelry  Valuables  Bathing     CLOTHING   Wear loose, comfortable clothes   Wear glasses instead of contacts   Leave money, jewelry and valuables at home   No make-up, particularly mascara, the day · EKG performed upon arrival to unit revealed QT/QTc 530/551  · EKG tracing inadequate, will repeat  · Patient was noted to have prolonged QTC interval during previous admission  · Initially EKG performed revealed QTC interval of 497 with infrequent PVCs; repeat EKG revealed QTc 434; EKG on day of discharge revealed QTC of 414  · Continue telemetry monitoring  · EKG to be repeated 21 [phone]. OTHER SPECIAL INSTRUCTIONS:  Free  

## 2021-06-03 NOTE — ASSESSMENT & PLAN NOTE
· CMP 06/02/2021 revealed potassium of 3 0  · Patient supplemented with 60 mEq total in ED prior to transfer (40 mEq PO, 20 mEq IV)  · Continue to monitor, CMP in a m  Valeria Jonel   Replete as necessary

## 2021-06-03 NOTE — ASSESSMENT & PLAN NOTE
· Most recent /90 preceding range 130//90  · Most recent heart rate 66, preceding range 56-67  · Patient reports Outpatient blood pressure medications as follows:  Amlodipine 5 mg daily and nadolol 20 mg daily  Patient reports that he no longer takes metoprolol or lisinopril  *  · Will hold blood pressure medications for now as patient undergoes treatment for alcohol withdrawal   May resume medication regimen once withdrawal process complete  · Continue to monitor vitals, BP  · 06/03/21:  · Most recent BP at 172/97, preceding range 120/70 - 170/90  · Most recent HR 63, preceding range 60 - 76  · Will continue to hold home BP meds for now as patient undergoes SEWS protocol   May resume home medications once patient is no longer experiencing withdrawal symptoms  · Continue to monitor vitals  · 06/04/21:   · BP remains elevated after completion of SEWS protocol  · Will restart Amlodipine 5mg and continue to monitor

## 2021-06-03 NOTE — ASSESSMENT & PLAN NOTE
· During last admission to detox Unit, patient noted to have overnight hypoxia  · Likely secondary to ROSARIO  · Patient reports that he wears nasal cannula overnight, 2 L O2 supplementation  · Patient reports that he was scheduled for a sleep study with [facility] Manoj COVARRUBIAS, however got rescheduled to next week  · Patient with SpO2 95% on room air upon admission to this unit  · Continue O2 supplementation via nasal cannula overnight  · Encourage patient to f/u with outpatient sleep study

## 2021-06-03 NOTE — ASSESSMENT & PLAN NOTE
· Patient with ongoing bilateral lower extremity swelling; denies personal h/o DVT, CHF  · Leg swelling was noted during previous admission-at that time, patient reported it began approximately 2 weeks prior to admission and patient was initiated on antibiotics PTA with no relief  · VAS lower extremity duplex ultrasound performed 04/17/2021:  No evidence of acute/chronic DVT of bilateral lower extremities  · Echo 04/19/2021:  LVEF 60%, left ventricular systolic function normal   No regional wall motion abnormalities    · On exam, 1+ pitting edema noted on bilateral lower extremities; no erythema/warmth  · Administer 1-time dose of 20 mg IV Lasix  · Continue to monitor swelling

## 2021-06-03 NOTE — NURSING NOTE
Patient found to have bloody pants found by PCA  PCA discovered it was from patient scratching  PCA noticed "bite cy looking" spots all over legs and some on back  PCA then notices ants underneath patient bed  Patient transferred to another room; housekeeping came to decontaminate  Patient put on contact precaution just in case "marks" are bed bug related  Dr Janie Huitron made aware and also noticed "trujillo"  Dr Janie Huitron suggested a shower, which the patient was washed up by PCA  Also added prn benadryl that was given to patient  Patient had no complaints  Will continue to monitor

## 2021-06-03 NOTE — ASSESSMENT & PLAN NOTE
· Magnesium 6/2/2021:1 6  · Patient supplemented with 800 mg magnesium oxide in the ED prior to transfer  · Continue to monitor, check magnesium in a paulo Cho   Replete as necessary  · 06/03/21:   · Magnesium 1 5 today  · 2g IV mag x 2 was given this morning  · Repeat mag ordered for 1200 today  · Continue to monitor and replete magnesium as needed  · 06/04/21: Potassium at 2 1 yesterday afternoon  · No further supplementation is needed

## 2021-06-03 NOTE — ASSESSMENT & PLAN NOTE
· Patient reports history of withdrawal seizure in 2019  · Patient reports that he initially presented to [facility] ED on 06/02/2021 for evaluation of withdrawal symptoms including shakiness/restlessness, unsteadiness, dizziness, anxiety  · Upon presentation to the unit, patient reported ongoing restlessness/shakiness, unsteadiness, anxiety   · Patient notes that he has underlying tremor of left upper extremity secondary to rotator cuff injury; patient notes that this tremor is exacerbated during alcohol withdrawal  · Initial SEWS upon presentation to unit: 10  · Patient received 1 mg ativan x 2 in ED prior to transfer to this unit, will hold initial 10 mg valium  · Follow SEWS protocol for medically assisted alcohol withdrawal

## 2021-06-03 NOTE — PLAN OF CARE
Problem: Potential for Falls  Goal: Patient will remain free of falls  Description: INTERVENTIONS:  - Assess patient frequently for physical needs  -  Identify cognitive and physical deficits and behaviors that affect risk of falls  -  [place] fall precautions as indicated by assessment   - Educate patient/family on patient safety including physical limitations  - Instruct patient to call for assistance with activity based on assessment  - Modify environment to reduce risk of injury  - Consider OT/PT consult to assist with strengthening/mobility  Outcome: Progressing     Problem: PAIN - ADULT  Goal: Verbalizes/displays adequate comfort level or baseline comfort level  Description: Interventions:  - Encourage patient to monitor pain and request assistance  - Assess pain using appropriate pain scale  - Administer analgesics based on type and severity of pain and evaluate response  - Implement non-pharmacological measures as appropriate and evaluate response  - Consider cultural and social influences on pain and pain management  - Notify physician/advanced practitioner if interventions unsuccessful or patient reports new pain  Outcome: Progressing     Problem: INFECTION - ADULT  Goal: Absence of fever/infection during neutropenic period  Description: INTERVENTIONS:  - Monitor WBC    Outcome: Progressing     Problem: SAFETY ADULT  Goal: Patient will remain free of falls  Description: INTERVENTIONS:  - Assess patient frequently for physical needs  -  Identify cognitive and physical deficits and behaviors that affect risk of falls    -  [place] fall precautions as indicated by assessment   - Educate patient/family on patient safety including physical limitations  - Instruct patient to call for assistance with activity based on assessment  - Modify environment to reduce risk of injury  - Consider OT/PT consult to assist with strengthening/mobility  Outcome: Progressing  Goal: Maintain or return to baseline ADL function  Description: INTERVENTIONS:  -  Assess patient's ability to carry out ADLs; assess patient's baseline for ADL function and identify physical deficits which impact ability to perform ADLs (bathing, care of mouth/teeth, toileting, grooming, dressing, etc )  - Assess/evaluate cause of self-care deficits   - Assess range of motion  - Assess patient's mobility; develop plan if impaired  - Assess patient's need for assistive devices and provide as appropriate  - Encourage maximum independence but intervene and supervise when necessary  - Involve family in performance of ADLs  - Assess for home care needs following discharge   - Consider OT consult to assist with ADL evaluation and planning for discharge  - Provide patient education as appropriate  Outcome: Progressing     Problem: SUBSTANCE USE/ABUSE  Goal: By discharge, will develop insight into their chemical dependency and sustain motivation to continue in recovery  Description: INTERVENTIONS:  - Attends all daily group sessions and scheduled AA groups  - Actively practices coping skills through participation in the therapeutic community and adherence to program rules  - Reviews and completes assignments from individual treatment plan  - Assist patient development of understanding of their personal cycle of addiction and relapse triggers  Outcome: Progressing  Goal: By discharge, patient will have ongoing treatment plan addressing chemical dependency  Description: INTERVENTIONS:  - Assist patient with resources and/or appointments for ongoing recovery based living  Outcome: Progressing     Problem: NEUROSENSORY - ADULT  Goal: Remains free of injury related to seizures activity  Description: INTERVENTIONS  - Maintain airway, patient safety  and administer oxygen as ordered  - Monitor patient for seizure activity, document and report duration and description of seizure to physician/advanced practitioner  - If seizure occurs,  ensure patient safety during seizure  - Reorient patient post seizure  - Seizure pads on all 4 side rails  - Instruct patient/family to notify RN of any seizure activity including if an aura is experienced  - Instruct patient/family to call for assistance with activity based on nursing assessment  - Administer anti-seizure medications if ordered    Outcome: Progressing     Problem: CARDIOVASCULAR - ADULT  Goal: Absence of cardiac dysrhythmias or at baseline rhythm  Description: INTERVENTIONS:  - Continuous cardiac monitoring, vital signs, obtain 12 lead EKG if ordered  - Administer antiarrhythmic and heart rate control medications as ordered  - Monitor electrolytes and administer replacement therapy as ordered  Outcome: Progressing     Problem: RESPIRATORY - ADULT  Goal: Achieves optimal ventilation and oxygenation  Description: INTERVENTIONS:  - Assess for changes in respiratory status  - Assess for changes in mentation and behavior  - Position to facilitate oxygenation and minimize respiratory effort  - Oxygen administered by appropriate delivery if ordered  - Initiate smoking cessation education as indicated  - Encourage broncho-pulmonary hygiene including cough, deep breathe, Incentive Spirometry  - Assess the need for suctioning and aspirate as needed  - Assess and instruct to report SOB or any respiratory difficulty  - Respiratory Therapy support as indicated  Outcome: Progressing     Problem: METABOLIC, FLUID AND ELECTROLYTES - ADULT  Goal: Electrolytes maintained within normal limits  Description: INTERVENTIONS:  - Monitor labs and assess patient for signs and symptoms of electrolyte imbalances  - Administer electrolyte replacement as ordered  - Monitor response to electrolyte replacements, including repeat lab results as appropriate  - Instruct patient on fluid and nutrition as appropriate  Outcome: Progressing

## 2021-06-03 NOTE — ED NOTES
[facility]   [phone]  Dr Zoltan Duncan [address] [facility]  06/02/21 1823

## 2021-06-03 NOTE — ED PROVIDER NOTES
EMERGENCY DEPARTMENT ENCOUNTER NOTE    This note has been generated using a voice recognition software  There may be typographic, grammatic, or word substitution errors that have escaped editorial review  ? CHIEF COMPLAINT  Chief Complaint   Patient presents with    Withdrawal - Alcohol     patient is chronic alcoholic and drank a pint of vodka over the last two days; recently went to detox center and started drinking again shortly after being discharged from facility; patient is dizzy and has the shakes        HPI  Monica Cleary is a 70 y o  male with PMH of hypertension, longstanding alcohol dependence and abuse, presenting with a request for alcohol use help  Patient was admitted at inpatient Toxicology unit at [facility] on April 17th, but reports not going outpatient rehab after the admission  He relapsed and has been drinking vodka  Over the past 2 days, patient drank about a lb a walker  Last drink was around 6:00 a m  This morning  At present, patient reports feeling very shaky, feeling nauseated, having a headache, and feeling as though he is withdrawing  No hallucinations at present, although patient has previously had hallucinations  No history of seizures  Patient presents with his wife  He is interested in being admitted to the detox unit again and following through with inpatient rehabilitation  No chest pain, shortness of breath, vomiting, abdominal pain, or leg swelling  No recent sick contacts  No thoughts of self-harm  REVIEW OF SYSTEMS    Constitutional: denies fevers, chills  Feels very shaky  Visual/Eyes: no changes in vision  HENT: no rhinorrhea, no sore throat  Cardiac: no chest pain  Respiratory: no shortness of breath, no cough  GI: no abdominal pain    Has had nausea  Heme/Onc: no easy bruising  Endocrine: no diabetes  Neuro: no focal weakness or numbness, no history of seizures    Ten systems reviewed and negative unless otherwise noted in HPI and above    PAST MEDICAL HISTORY  Past Medical History:   Diagnosis Date    Alcohol dependency ([facility] [address] )     Anxiety     Depression     [place] (hard of hearing)     Hypertension     Kidney stones     Prostate enlargement     Renal disorder     kidney    Shoulder dislocation        SURGICAL HISTORY  Past Surgical History:   Procedure Laterality Date    CHOLECYSTECTOMY      SHOULDER SURGERY      for dislocation       FAMILY HISTORY  History reviewed  No pertinent family history  CURRENT MEDICATIONS  No current facility-administered medications on file prior to encounter  Current Outpatient Medications on File Prior to Encounter   Medication Sig    alfuzosin (UROXATRAL) 10 mg 24 hr tablet Take 40 mg by mouth daily   allopurinol (ZYLOPRIM) 100 mg tablet     amLODIPine (NORVASC) 5 mg tablet Take 1 tablet (5 mg total) by mouth daily    baclofen 10 mg tablet Take 10 mg by mouth Three times a day    citalopram (CeleXA) 20 mg tablet Take 20 mg by mouth daily    cycloSPORINE (RESTASIS) 0 05 % ophthalmic emulsion 1 drop 2 (two) times a day    finasteride (PROSCAR) 5 mg tablet Take 5 mg by mouth daily    magnesium oxide (MAG-OX) 400 mg Take 400 mg by mouth 2 (two) times a day    metoprolol succinate (TOPROL-XL) 25 mg 24 hr tablet Take 25 mg by mouth daily    nadolol (CORGARD) 20 mg tablet Take 20 mg by mouth    naltrexone (REVIA) 50 mg tablet Take 50 mg by mouth daily    simethicone (MYLICON) 80 mg chewable tablet Chew 1 tablet (80 mg total) every 6 (six) hours as needed for flatulence    thiamine 100 MG tablet Take 1 tablet (100 mg total) by mouth daily    traZODone (DESYREL) 150 mg tablet Take 150 mg by mouth nightly   Takes 1/2 pill       ALLERGIES  Allergies   Allergen Reactions    Sulfa Antibiotics Anaphylaxis and Rash    Sulfur Rash and Edema       SOCIAL HISTORY  Social History     Socioeconomic History    Marital status: /Civil Union     Spouse name: None    Number of children: None    Years of education: None    Highest education level: None   Occupational History    None   Social Needs    Financial resource strain: None    Food insecurity     Worry: None     Inability: None    Transportation needs     Medical: None     Non-medical: None   Tobacco Use    Smoking status: Never Smoker    Smokeless tobacco: Never Used   Substance and Sexual Activity    Alcohol use: Yes     Alcohol/week: 6 0 standard drinks     Types: 6 Shots of liquor per week     Frequency: 4 or more times a week     Drinks per session: 5 or 6     Binge frequency: Daily or almost daily     Comment: 3-4 times per week    Drug use: Yes    Sexual activity: Not Currently     Partners: Female   Lifestyle    Physical activity     Days per week: None     Minutes per session: None    Stress: None   Relationships    Social connections     Talks on phone: None     Gets together: None     Attends Rastafari service: None     Active member of club or organization: None     Attends meetings of clubs or organizations: None     Relationship status: None    Intimate partner violence     Fear of current or ex partner: None     Emotionally abused: None     Physically abused: None     Forced sexual activity: None   Other Topics Concern    None   Social History Narrative    None       PHYSICAL EXAM    /70 (BP Location: Left arm)   Pulse 63   Temp 98 °F (36 7 °C) (Temporal)   Resp 21   Ht 5' 7" (1 702 m)   Wt 102 kg (224 lb 13 9 oz)   SpO2 92%   BMI 35 22 kg/m²   Vital signs and nursing notes reviewed    CONSTITUTIONAL: male appearing stated age resting in bed, appears mildly tremulous with visible shaking when patient extend his hands  HEENT: atraumatic, normocephalic  Sclera anicteric, conjunctiva are not injected  Moist oral mucosa  CARDIOVASCULAR/CHEST: RRR, no M/R/G  2+ radial pulses  PULMONARY: Breathing comfortably on RA  Breath sounds are equal and clear to auscultation  ABDOMEN: non-distended  BS present, normoactive  Non-tender  MSK: moves all extremities, no deformities, no peripheral edema  NEURO: Awake, alert, and oriented x 3  Face symmetric  Moves all extremities spontaneously  No focal neurologic deficits  SKIN: Warm, appears well-perfused    MENTAL STATUS:  Pleasant, good eye contact, mildly anxious, no SI or HI      ?  LABS AND TESTS    Results Reviewed     Procedure Component Value Units Date/Time    Comprehensive metabolic panel [[id number]]  (Abnormal) Collected: 06/02/21 1924    Lab Status: Final result Specimen: Blood from Arm, Right Updated: 06/02/21 1945     Sodium 139 mmol/L      Potassium 3 0 mmol/L      Chloride 100 mmol/L      CO2 28 mmol/L      ANION GAP 11 mmol/L      BUN 17 mg/dL      Creatinine 0 74 mg/dL      Glucose 98 mg/dL      Calcium 8 1 mg/dL      AST 23 U/L      ALT 28 U/L      Alkaline Phosphatase 89 U/L      Total Protein 6 8 g/dL      Albumin 3 8 g/dL      Total Bilirubin 0 59 mg/dL      eGFR 93 ml/min/1 73sq m     Narrative:      [place] guidelines for Chronic Kidney Disease (CKD):     Stage 1 with normal or high GFR (GFR > 90 mL/min/1 73 square meters)    Stage 2 Mild CKD (GFR = 60-89 mL/min/1 73 square meters)    Stage 3A Moderate CKD (GFR = 45-59 mL/min/1 73 square meters)    Stage 3B Moderate CKD (GFR = 30-44 mL/min/1 73 square meters)    Stage 4 Severe CKD (GFR = 15-29 mL/min/1 73 square meters)    Stage 5 End Stage CKD (GFR <15 mL/min/1 73 square meters)  Note: GFR calculation is accurate only with a steady state creatinine    Magnesium [[id number]]  (Normal) Collected: 06/02/21 1924    Lab Status: Final result Specimen: Blood from Arm, Right Updated: 06/02/21 1942     Magnesium 1 6 mg/dL     Phosphorus [[id number]]  (Normal) Collected: 06/02/21 1924    Lab Status: Final result Specimen: Blood from Arm, Right Updated: 06/02/21 1942     Phosphorus 2 5 mg/dL     CBC and differential [[id number]] Collected: 06/02/21 1924    Lab Status: Final result Specimen: Blood from Arm, Right Updated: 06/02/21 1930     WBC 7 39 Thousand/uL      RBC 4 21 Million/uL      Hemoglobin 13 4 g/dL      Hematocrit 40 0 %      MCV 95 fL      MCH 31 8 pg      MCHC 33 5 g/dL      RDW 13 4 %      MPV 9 2 fL      Platelets 849 Thousands/uL      nRBC 0 /100 WBCs      Neutrophils Relative 62 %      Immat GRANS % 1 %      Lymphocytes Relative 23 %      Monocytes Relative 12 %      Eosinophils Relative 1 %      Basophils Relative 1 %      Neutrophils Absolute 4 65 Thousands/µL      Immature Grans Absolute 0 04 Thousand/uL      Lymphocytes Absolute 1 69 Thousands/µL      Monocytes Absolute 0 86 Thousand/µL      Eosinophils Absolute 0 10 Thousand/µL      Basophils Absolute 0 05 Thousands/µL           No orders to display       ED COURSE & MEDICAL DECISION MAKING  ECG 12 Lead Documentation Only    Date/Time: 6/2/2021 7:55 PM  Performed by: Yuan Crowder MD  Authorized by: Yuan Crowder MD     Comments:      Normal sinus rhythm, ventricular rate 61, OK interval 182, , , normal axis, no ST/T-wave changes to suggest ischemia, no STEMI  Medications   sodium chloride 0 9 % bolus 1,000 mL (0 mL Intravenous Stopped 0/1/37 5033)   folic acid 1 mg in sodium chloride 0 9 % 50 mL IVPB (0 mg Intravenous Stopped 6/2/21 2021)   LORazepam (ATIVAN) injection 1 mg (1 mg Intravenous Given 7/6/98 0759)   folic acid 5 mg/mL injection **ADS Override Pull** (  Override Pull 6/2/21 1940)   potassium chloride (K-DUR,KLOR-CON) CR tablet 40 mEq (40 mEq Oral Given 6/2/21 2023)   potassium chloride 20 mEq IVPB (premix) (20 mEq Intravenous New Bag 6/2/21 2024)   magnesium oxide (MAG-OX) tablet 800 mg (800 mg Oral Given 6/2/21 2023)   LORazepam (ATIVAN) injection 1 mg (1 mg Intravenous Given 6/2/21 212)     54-year-old male presenting with shakiness, headaches, and nausea in setting of stepping alcohol use within the past 12 hours, with request for help to quit using alcohol    Vital signs reviewed, afebrile, not tachycardic, not hypoxic, not hypotensive or hypertensive  No evidence of autonomic instability at present  Current CIWA score is 12  1 L normal saline started for hydration, Ativan administered given elevated CIWA score  Labs reveal CMP with hypokalemia of 3 0, magnesium is 1 6, phosphorus is 2 5  No transaminitis  CBC is without anemia or macrocytosis  Folic acid administered  Potassium administered IV and p o   Magnesium p o  Administered  EKG is without ischemic changes  Case discussed with medical  on call, Dr Bipin Waite, who kindly accepts patient for admission at the inpatient detox service  Patient is agreeable with transfer for admission  Patient does read to [facility] for admission for further care  I emphasized the patient that he should follow through with inpatient rehab for his alcohol use         MDM  Number of Diagnoses or Management Options  Alcohol withdrawal syndrome without complication ([facility] [address] ): new and requires workup  Anxiety: new and requires workup  Hypokalemia: new and requires workup     Amount and/or Complexity of Data Reviewed  Clinical lab tests: ordered and reviewed  Tests in the medicine section of CPT®: ordered and reviewed  Obtain history from someone other than the patient: yes (Wife)  Review and summarize past medical records: yes  Discuss the patient with other providers: yes (Toxicology)  Independent visualization of images, tracings, or specimens: yes    Risk of Complications, Morbidity, and/or Mortality  Presenting problems: high  Diagnostic procedures: high  Management options: high    Patient Progress  Patient progress: improved      CLINICAL IMPRESSION  Final diagnoses:   Alcohol withdrawal syndrome without complication ([facility] [address] )   Anxiety   Hypokalemia       DISPOSITION  Time reflects when diagnosis was documented in both MDM as applicable and the Disposition within this note     Time User Action Codes Description Comment    6/2/2021  8:18 PM Amos Azar Add [C65 400] Alcohol withdrawal syndrome without complication ([facility] [address] )     6/1/2855  8:18 PM Amos Azar Add [F41 9] Anxiety     6/2/2021  8:18 PM Amos Azar Add [E87 6] Hypokalemia       ED Disposition     ED Disposition Condition Date/Time Comment    Transfer to Another Facility-In Network  Wed Jun 2, 2021  8:18 PM Mary Grace Mulligan should be transferred out to [facility] ANEL RIVERA MD Documentation      Most Recent Value   Patient Condition  The patient has been stabilized such that within reasonable medical probability, no material deterioration of the patient condition or the condition of the unborn child(gabriela) is likely to result from the transfer   Reason for Transfer  Level of Care needed not available at this facility   Benefits of Transfer  Specialized equipment and/or services available at the receiving facility (Include comment)________________________   Risks of Transfer  Potential for delay in receiving treatment, Potential deterioration of medical condition, Loss of IV, Increased discomfort during transfer   Accepting Physician  Dr Itzel Dickerson Name, [address] [facility] [address] by (Company and Unit #)  [facility]    Sending MD Dr Hermann Mccarthy    Provider Certification  General risk, such as traffic hazards, adverse weather conditions, rough terrain or turbulence, possible failure of equipment (including vehicle or aircraft), or consequences of actions of persons outside the control of the transport personnel      RN Documentation      Most [address] [facility] [address] Name, [address] [facility]    Report Given to  RN    Transported by [employer] and Unit #)  [facility]       Follow-up Information    None            Charan Naranjo MD  06/09/21 6871       Charan Naranjo MD  06/09/21 3410

## 2021-06-03 NOTE — ASSESSMENT & PLAN NOTE
· Patient's current outpatient regimen includes finasteride 5 mg daily and alfuzosin 10 mg daily  · Patient unsure if he take 10 mg of alfuzosin daily or 40 mg daily  · Continue finasteride 5 mg daily, alfuzosin 10 mg daily

## 2021-06-03 NOTE — ASSESSMENT & PLAN NOTE
· CMP 06/02/2021 revealed potassium of 3 0  · Patient supplemented with 60 mEq total in ED prior to transfer (40 mEq PO, 20 mEq IV)  · Continue to monitor, CMP in a [facility]   Replete as necessary  · 06/03/21:   · Potassium at 3 1 today  · Was given Potassium 40mEq PO and 20mEq  · Repeat BMP ordered for today at 1200  · Continue to monitor and replete potassium as needed  · 06/04/21: Potassium 3 4 today  · Will give 40mEq Potassium chloride  · Recheck BMP tomorrow morning

## 2021-06-03 NOTE — EMTALA/ACUTE CARE TRANSFER
[facility] EMERGENCY DEPARTMENT  [address] [place] [id number]  Dept: [phone]      EMTALA TRANSFER CONSENT    NAME Sheldon Heaton                                         1950                              MRN [id number]    I have been informed of my rights regarding examination, treatment, and transfer   by Dr Uriah Yo MD    Benefits: Specialized equipment and/or services available at the receiving facility (Include comment)________________________    Risks: Potential for delay in receiving treatment, Potential deterioration of medical condition, Loss of IV, Increased discomfort during transfer      Consent for Transfer:  I acknowledge that my medical condition has been evaluated and explained to me by the emergency department physician or other qualified medical person and/or my attending physician, who has recommended that I be transferred to the service of  Accepting Physician: Dr Pipo Ortega at [address] Name, [address] : [place]  The above potential benefits of such transfer, the potential risks associated with such transfer, and the probable risks of not being transferred have been explained to me, and I fully understand them  The doctor has explained that, in my case, the benefits of transfer outweigh the risks  I agree to be transferred  I authorize the performance of emergency medical procedures and treatments upon me in both transit and upon arrival at the receiving facility  Additionally, I authorize the release of any and all medical records to the receiving facility and request they be transported with me, if possible  I understand that the safest mode of transportation during a medical emergency is an ambulance and that the Hospital advocates the use of this mode of transport   Risks of traveling to the receiving facility by car, including absence of medical control, life sustaining equipment, such as oxygen, and medical personnel has been explained to me and I fully understand them  (JOLIE CORRECT BOX BELOW)  [  ]  I consent to the stated transfer and to be transported by ambulance/helicopter  [  ]  I consent to the stated transfer, but refuse transportation by ambulance and accept full responsibility for my transportation by car  I understand the risks of non-ambulance transfers and I exonerate the Hospital and its staff from any deterioration in my condition that results from this refusal     X___________________________________________    DATE  21  TIME________  Signature of patient or legally responsible individual signing on patient behalf           RELATIONSHIP TO PATIENT_________________________          Provider Certification    NAME Denny Hands                                         1950                              MRN [id number]    A medical screening exam was performed on the above named patient  Based on the examination:    Condition Necessitating Transfer The primary encounter diagnosis was Alcohol withdrawal syndrome without complication ([facility] [address] )  Diagnoses of Anxiety and Hypokalemia were also pertinent to this visit      Patient Condition: The patient has been stabilized such that within reasonable medical probability, no material deterioration of the patient condition or the condition of the unborn child(gabriela) is likely to result from the transfer    Reason for Transfer: Level of Care needed not available at this facility    Transfer Requirements: [address]   · Space available and qualified personnel available for treatment as acknowledged by    · Agreed to accept transfer and to provide appropriate medical treatment as acknowledged by       Dr Rob Chua  · Appropriate medical records of the examination and treatment of the patient are provided at the time of transfer   [address] _______  · Transfer will be performed by qualified personnel from    and appropriate transfer equipment as required, including the use of necessary and appropriate life support measures  Provider Certification: I have examined the patient and explained the following risks and benefits of being transferred/refusing transfer to the patient/family:  General risk, such as traffic hazards, adverse weather conditions, rough terrain or turbulence, possible failure of equipment (including vehicle or aircraft), or consequences of actions of persons outside the control of the transport personnel      Based on these reasonable risks and benefits to the patient and/or the unborn child(gabriela), and based upon the information available at the time of the patients examination, I certify that the medical benefits reasonably to be expected from the provision of appropriate medical treatments at another medical facility outweigh the increasing risks, if any, to the individuals medical condition, and in the case of labor to the unborn child, from effecting the transfer      X____________________________________________ DATE 06/02/21        TIME_______      ORIGINAL - SEND TO MEDICAL RECORDS   COPY - SEND WITH PATIENT DURING TRANSFER

## 2021-06-03 NOTE — DISCHARGE INSTR - OTHER ORDERS
[address] drug and alcohol resources    [facility]  [address] [address] [place], [address] [facility]   Phone: [phone]  Fax: [phone]    A  A  Hotline/ Help Line  The Hotline Coordinator  manages the A  A  hotline for [employer]  A A  members volunteer service to answer phone calls daily 24/7, [phone]; [phone]    https://aaintergroupnepa org/member-services    N  A  contact:  Yesi Kenny [address] [address] [facility] [address], [address] [facility]  Helpline: [phone]  Https://[employer]/    [place] in [facility], [address]  [facility], [address]  Helpline: [phone]  Https://[employer]/                [address] mental health resources    Mental health resources in [place] [address], [place] · [phone]      Prevent suicide [place]: In Crisis? Call    8-958-158-BCEC (4600)    National Suicide Prevention Lifeline: 9-157.273.1731    Intake (Mental Health)  This is the first contact of a consumer with an  who assists each person or family seeking services to identify the presenting problem, complete necessary social, familial, and medical to develop an initial service plan  Payment for this service is for those consumers who have been determined to be ineligible for other insurances  For Adults:  Tracie and [address]  [facility] Counseling Services: [address] [facility] Counseling Services: [phone]  [facility] Counseling Services: [place]riet crisis  If you are experiencing a mental health crisis, please dial 211 from your cell phone or [phone]  You can also text your zip code to

## 2021-06-03 NOTE — QUICK NOTE
Morning labs revealed several electrolyte abnormalities today  CMP revealed sodium of 134 (corrected sodium 135 due to glucose of 136; down from 139 yesterday), potassium 3 1 (slightly increased from 3 0 yesterday)  Magnesium 1 5 (down from 1 6 yesterday) phosphorus 2 2 (down from 2 5 yesterday)  Lipase this morning elevated at 834  Patient with recent history of pancreatitis during previous admission in April 2021; lipase trended down to 464 on day of discharge (4/20/2021)  Upon initial evaluation 6/2/2021, patient denied abdominal pain, no abdominal tenderness on initial exam     PLAN:  · electrolyte supplementation ordered: magnesium sulfate 2 g IV x 2, 60 mEq total K+ (40 PO, 20 IV)  · Continue to monitor electrolytes and replete as necessary  · Repeat BMP, magnesium, and phosphorus at noon today  · Consider phosphorus supplementation if further decrease  · Continue to trend lipase    · Consider CT abdomen/pelvis if lipase increases  · Continue IV fluids

## 2021-06-03 NOTE — ASSESSMENT & PLAN NOTE
· During last admission to detox Unit, patient noted to have overnight hypoxia  · Likely secondary to ROSARIO  · Patient reports that he wears nasal cannula overnight, 2 L O2 supplementation  · Patient reports that he was scheduled for a sleep study with [facility] Man[facility] COVARRUBIAS, however got rescheduled to next week  · Patient with SpO2 95% on room air upon admission to this unit  · Continue O2 supplementation via nasal cannula overnight  · Encourage patient to f/u with outpatient sleep study

## 2021-06-03 NOTE — ASSESSMENT & PLAN NOTE
· Patient reports history of withdrawal seizure in 2019  · Patient reports that he initially presented to [facility] ED on 06/02/2021 for evaluation of withdrawal symptoms including shakiness/restlessness, unsteadiness, dizziness, anxiety  · Upon presentation to the unit, patient reported ongoing restlessness/shakiness, unsteadiness, anxiety   · Patient notes that he has underlying tremor of left upper extremity secondary to rotator cuff injury; patient notes that this tremor is exacerbated during alcohol withdrawal  · Initial SEWS upon presentation to unit: 10  · Patient received 1 mg ativan x 2 in ED prior to transfer to this unit, will hold initial 10 mg valium  · Follow SEWS protocol for medically assisted alcohol withdrawal  · 06/03/21:   · SEWS scores: 10, 5, 2, 0, 0, 5 (06/03/21: 5048)  · Phenobarbital given: 260mg, 65mg 65mg 65mg (06/03/21: 0737) total 455mg  · Continue SEWS protocol  · 06/04/21:  · SEWS completed on 06/03/21 at 0900  · 455mg Phenobarbital given  · Patient remains free of withdrawal symptoms

## 2021-06-03 NOTE — ASSESSMENT & PLAN NOTE
· Patient previously treated in [facility] detox Unit back in April 2021 (4/17-4/20); history of chronic alcohol use since his 45s  · Patient was discharged on naltrexone 50 mg daily as well as thiamine/folic acid supplementation  · Patient reports compliance, however notes that naltrexone"didn't have much of an effect" on him (able to "drink through medication")  · Patient reports that he was sober for approximately 3 weeks following discharge from this unit, subsequently resumed drinking  · Initially started drinking 375 mL bottles of vodka over the course of 2-3 night  · Reports recent increase of amount consumed over the past 2 days, attributing increase to increased stress regarding his father's state of health  · Patient notes that he consumed approximately 1 5 375 mL bottles of vodka throughout the day of 06/01/2021  · Notes that he consumed a half of 375 mL bottle 6/2/2021 (finished around 07:00 am)  · Presented to [facility] ED 06/02/2021 with withdrawal symptoms including shakiness/restlessness, unsteadiness, anxiety, dizziness  · Ethanol level 6/2/2021: 307  · Other pertinent labs  · CMP 6/2/2021:  Sodium 139, potassium 3 0, LFTs as follows:  AST 23, ALT 28, alk-phos 89  · CBC 6/2/2021:  Hemoglobin 13 4, MCV 95    Platelets 361  · Continue to monitor CBC and CMP  · Patient notes he is currently interested in rehab  · Reports past inpatient rehab in 2005 and most recently in March 2021 with [facility]  · Also reports that he attends AA meetings  · Follow SEWS protocol from medically assisted alcohol withdrawal  · Daily thiamine and folate

## 2021-06-03 NOTE — ASSESSMENT & PLAN NOTE
· Chart review reveals patient does not have full lipid panel on file  · Chart review did reveal limited data as follows:  Cholesterol 187 as of 04/05/2021, triglycerides 233 as of 09/12/2020  · Order lipid panel  · Continue outpatient regimen:  Atorvastatin 40 mg daily  · Recommend outpatient follow-up with PCP for ongoing monitoring

## 2021-06-03 NOTE — PROGRESS NOTES
06/03/21 0944   Referral Data   Referral Reason Drug/Alcohol [address] [facility] Zackary drake   Patient Information   Mental Status Alert   Primary Caregiver Self   Support System Immediate family;Friends; Other (Comment)  (AA sponsor)   Latter-day/Cultural Requests [facility]   Activities of Daily Living Prior to Admission   Functional Status Independent   Assistive Device No device needed   Living Arrangement House   Ambulation Independent   Access to Firearms   Access to Firearms No  (pt denies)   [address] Pension/longterm  (pt denies)   Means of Transportation   Means of Transport to [facility]: [facility]     Pt is a 70year old  male who was admitted to detox for acute alcohol withdrawal  Pt's name, date of birth, home address, and telephone number were verified  Pt was informed of case management role and the purpose of the completion of intake with case management  Pt was alert and cooperative  Pt is a readmission, having received treatment at this detox in April  Pt states he had been able to maintain abstinence for 3 weeks since discharge but then relapsed  Pt reports he returned to drinking 1 5 liters of vodka daily  Pt reports indicate that pt received naltrexone to address his alcohol use  When asked, pt stated that this had no effect on his cravings  Pt reports his last alcohol use was 6/2/2021 of 8-12 ounces of vodka  Pt reports his current symptoms of withdrawal are  shakiness, restlessness, unsteadiness, annxiety, numbness, and swelling in his ankles  Pt reports a history of one withdrawal seizure in 2019 and a history of Dt's  Pt states he has been consuming alcohol in excess since the age of 36  Prior to that age, pt states he was able to manage his alcohol use, having started his use at age 24  Pt denies any other substance use  Pt reports multiple previous inpatient treatments, the last  was at [facility] in February, 2021  Pt reports leaving this treatment AMA due to the conditions of the housing was not to his liking  Pt reports current attendance at 12 step meetings and states he does have a sponsor and 12 step meeting support group  Pt is currently in the contemplation stage of change  Audit:31  PAWSS:4  Ethanol lvl: 307  UDS: not completed    Pt reports a diagnosis of depression and anxiety  Pt states he receives treatment for these conditions through his medical provider  Pt receives prozac and seroquel  Pt reports taking these medications as prescribed  Pt denies any inpatient treatment in the past   Pt denies any current or past suicidal ideation  Pt denies any access to firearms  Pt reports current medical condition of hypertension and previous shoulder surgery  A review of pt's medical record also indicates BPH (benign prostatic hyperplasia), Hypomagnesemia, Hypokalemia, Mixed hyperlipidemia, and Alcohol induced fatty liver  Pt signed an RODRIGO for Dr Nathan Flynn, his PCP  The office was contacted by phone at [phone] and informed of pt's current admission  Pt reports a current legal issue pending of a DUI charge  Pt states he had driven intoxicated on 2/11/2021 and was charged with a DUI  Pt states he has a pending court appearance on 6/14/2021  PT states he was informed by his  that this hearing could be postponed  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 and states his education level is a masters plus in school administration  Pt states he receives both a pension and social security each month as income  Pt states he has a license and owns a car  Pt states he is able to transport himself to appointments  Pt reports he resides with his wife  Pt signed an RODRIGO for Lindsay Pacheco, spouse, at [phone]  Larry Vences was contacted by phone and update was provided and received  Pt's wife agreed with a referral to inpatient treatment   Pt reports he cares for his elderly parents  Pt's wife was asked to bring pt clothing for possible discharge to inpatient rehab and also to bring pt's alfuzosin medication that the pharmacy does not have  Pt and Cm completed relapse prevention plan  Pt and Cm signed plan and pt received a copy of this plan  Pt was able to list both triggers and coping skills  Pt indicates ongoing attendance at 12 step meetings and list these as supports  Pt made multiple references to Alcoholics Anonymous and this writer discussed AA topics with pt  Pt was agreeable to a referral to inpatient treatment at this time  Pt currently is insured with medicare as primary insurance and pt signed an IMM notice  Pt also has a secondary insurance listed as HOP  HOP was contacted at [phone] and cm spoke with [facility] who indicated that due to members exhaustion of benefits for service through medicare, pt did not have substance abuse coverage  Pt signed an RODRIGO for HOST  Cm spoke with Laurita Morales at [facility] program the referral was made for funding  Iona at [facility] then called and was connected with pt room to complete pt interview

## 2021-06-03 NOTE — ASSESSMENT & PLAN NOTE
· Magnesium 6/2/2021:1 6  · Patient supplemented with 800 mg magnesium oxide in the ED prior to transfer  · Continue to monitor, check magnesium in a [facility]   Replete as necessary

## 2021-06-03 NOTE — PLAN OF CARE
Problem: SUBSTANCE USE/ABUSE  Goal: By discharge, will develop insight into their chemical dependency and sustain motivation to continue in recovery  Description: INTERVENTIONS:  - Attends all daily group sessions and scheduled AA groups  - Actively practices coping skills through participation in the therapeutic community and adherence to program rules  - Reviews and completes assignments from individual treatment plan  - Assist patient development of understanding of their personal cycle of addiction and relapse triggers  6/2/2021 2303 by Maranda Sparrow RN  Outcome: Progressing     Problem: SUBSTANCE USE/ABUSE  Goal: By discharge, patient will have ongoing treatment plan addressing chemical dependency  Description: INTERVENTIONS:  - Assist patient with resources and/or appointments for ongoing recovery based living  6/2/2021 2303 by Maranda Sparrow RN  Outcome: Progressing     Problem: NEUROSENSORY - ADULT  Goal: Remains free of injury related to seizures activity  Description: INTERVENTIONS  - Maintain airway, patient safety  and administer oxygen as ordered  - Monitor patient for seizure activity, document and report duration and description of seizure to physician/advanced practitioner  - If seizure occurs,  ensure patient safety during seizure  - Reorient patient post seizure  - Seizure pads on all 4 side rails  - Instruct patient/family to notify RN of any seizure activity including if an aura is experienced  - Instruct patient/family to call for assistance with activity based on nursing assessment  - Administer anti-seizure medications if ordered    6/2/2021 2303 by Maranda Sparrow RN  Outcome: Progressing     Problem: CARDIOVASCULAR - ADULT  Goal: Absence of cardiac dysrhythmias or at baseline rhythm  Description: INTERVENTIONS:  - Continuous cardiac monitoring, vital signs, obtain 12 lead EKG if ordered  - Administer antiarrhythmic and heart rate control medications as ordered  - Monitor electrolytes and administer replacement therapy as ordered  6/2/2021 2303 by Ava Tracy RN  Outcome: Progressing     Problem: RESPIRATORY - ADULT  Goal: Achieves optimal ventilation and oxygenation  Description: INTERVENTIONS:  - Assess for changes in respiratory status  - Assess for changes in mentation and behavior  - Position to facilitate oxygenation and minimize respiratory effort  - Oxygen administered by appropriate delivery if ordered  - Initiate smoking cessation education as indicated  - Encourage broncho-pulmonary hygiene including cough, deep breathe, Incentive Spirometry  - Assess the need for suctioning and aspirate as needed  - Assess and instruct to report SOB or any respiratory difficulty  - Respiratory Therapy support as indicated  6/2/2021 2303 by Ava Tracy RN  Outcome: Progressing     Problem: METABOLIC, FLUID AND ELECTROLYTES - ADULT  Goal: Electrolytes maintained within normal limits  Description: INTERVENTIONS:  - Monitor labs and assess patient for signs and symptoms of electrolyte imbalances  - Administer electrolyte replacement as ordered  - Monitor response to electrolyte replacements, including repeat lab results as appropriate  - Instruct patient on fluid and nutrition as appropriate  6/2/2021 2303 by Ava Tracy RN  Outcome: Progressing

## 2021-06-03 NOTE — ASSESSMENT & PLAN NOTE
· Patient was noted to have prolonged QTC interval during previous admission  · Initial EKG performed (4/17/2021) revealed QTC interval of 497 with infrequent PVCs; repeat EKG (4/18) revealed QTc 443; EKG on day of discharge (4/20/2021) revealed QTC of 414  · EKG 6/3/2021:  Sinus bradycardia, otherwise normal EKG   QT//445 ms  · Continue telemetry monitoring for now, can likely be discontinued tomorrow

## 2021-06-03 NOTE — ASSESSMENT & PLAN NOTE
· Patient reports that he has recently transitioned from Celexa to Prozac daily  · Continue current outpatient regimen:  Prozac 60 mg daily

## 2021-06-03 NOTE — PROGRESS NOTES
PROGRESS NOTE  DEPARTMENT OF MEDICAL TOXICOLOGY  LEVEL 4 MEDICAL DETOX UNIT  Denny Hands 70 y o  male MRN: [id number]  Unit/Bed#: 5T DETOX 518-01 Encounter: [id number]      Reason for Admission/Principal Problem: Alcohol withdrawal, alcohol use disorder, elevated lipase    Rounding Provider: Hemalatha Prince MD  Attending Provider: Hemalatha Prince MD   6/2/2021 10:26 PM           * Alcohol withdrawal syndrome without complication ([facility], [place] [facility] )  Assessment & Plan  · Patient reports history of withdrawal seizure in 2019  · Patient reports that he initially presented to [facility] ED on 06/02/2021 for evaluation of withdrawal symptoms including shakiness/restlessness, unsteadiness, dizziness, anxiety  · Upon presentation to the unit, patient reported ongoing restlessness/shakiness, unsteadiness, anxiety   · Patient notes that he has underlying tremor of left upper extremity secondary to rotator cuff injury; patient notes that this tremor is exacerbated during alcohol withdrawal  · Initial SEWS upon presentation to unit: 10  · Patient received 1 mg ativan x 2 in ED prior to transfer to this unit, will hold initial 10 mg valium  · Follow SEWS protocol for medically assisted alcohol withdrawal  · 06/03/21:   · SEWS scores: 10, 5, 2, 0, 0, 5 (06/03/21: 1167)  · Phenobarbital given: 260mg, 65mg 65mg 65mg (06/03/21: 0737) total 455mg  · Continue SEWS protocol      Alcohol use disorder, severe, dependence ([facility], [place] [facility] )  Assessment & Plan  · Patient previously treated in [facility] detox Unit back in April 2021 (4/17-4/20); history of chronic alcohol use since his 45s  · Patient was discharged on naltrexone 50 mg daily as well as thiamine/folic acid supplementation  · Patient reports compliance, however notes that naltrexone"didn't have much of an effect" on him (able to "drink through medication")  · Patient reports that he was sober for approximately 3 weeks following discharge from this unit, subsequently resumed drinking  · Initially started drinking 375 mL bottles of vodka over the course of 2-3 night  · Reports recent increase of amount consumed over the past 2 days, attributing increase to increased stress regarding his father's state of health  · Patient notes that he consumed approximately 1 5 375 mL bottles of vodka throughout the day of 06/01/2021  · Notes that he consumed a half of 375 mL bottle 6/2/2021 (finished around 07:00 am)  · Presented to [facility] ED 06/02/2021 with withdrawal symptoms including shakiness/restlessness, unsteadiness, anxiety, dizziness  · Ethanol level 6/2/2021: 307  · Other pertinent labs  · CMP 6/2/2021:  Sodium 139, potassium 3 0, LFTs as follows:  AST 23, ALT 28, alk-phos 89  · CBC 6/2/2021:  Hemoglobin 13 4, MCV 95    Platelets 949  · Continue to monitor CBC and CMP  · Patient notes he is currently interested in rehab  · Reports past inpatient rehab in 2005 and most recently in March 2021 with [facility]  · Also reports that he attends AA meetings  · Follow SEWS protocol from medically assisted alcohol withdrawal  · Daily thiamine and folate    Hypoxia  Assessment & Plan  · During last admission to detox Unit, patient noted to have overnight hypoxia  · Likely secondary to ROSARIO  · Patient reports that he wears nasal cannula overnight, 2 L O2 supplementation  · Patient reports that he was scheduled for a sleep study with [facility], however got rescheduled to next week  · Patient with SpO2 95% on room air upon admission to this unit  · Continue O2 supplementation via nasal cannula overnight  · Encourage patient to f/u with outpatient sleep study      Elevated lipase  Assessment & Plan  · Patient presented with elevated lipase of 834  · Had elevated lipase on prior admission as well  · Not complaining of abdominal pain and is tolerating PO normally  · Will continue to trend    Alcohol induced fatty liver  Assessment & Plan  · CT abdomen/pelvis with contrast 04/17/2021: Enlarged fatty liver noted  · Likely secondary to alcohol use disorder  · CMP 06/02/2021 revealed LFTs as follows:  AST 23, ALT 28, alk-phos 89  · Continue to monitor LFTs  · Encourage follow-up with PCP outpatient for ongoing monitoring    Leg swelling  Assessment & Plan  · Patient with ongoing bilateral lower extremity swelling; denies personal h/o DVT, CHF  · Leg swelling was noted during previous admission-at that time, patient reported it began approximately 2 weeks prior to admission and patient was initiated on antibiotics PTA with no relief  · VAS lower extremity duplex ultrasound performed 04/17/2021:  No evidence of acute/chronic DVT of bilateral lower extremities  · Echo 04/19/2021:  LVEF 60%, left ventricular systolic function normal   No regional wall motion abnormalities  · On exam, 1+ pitting edema noted on bilateral lower extremities; no erythema/warmth  · Administered 1-time dose of 20 mg IV Lasix  · Continue to monitor swelling, will hold further Lasix for now  This is subacute/ chronic issue that will require outpatient follow-up  Hypophosphatemia  Assessment & Plan  · Phosphorus at 2 2 this morning  · Repleted with potassium-sodium-phosphateS 45 - 298 - 250mg bid  · Repeat phosphorus level tomorrow morning    Hypomagnesemia  Assessment & Plan  · Magnesium 6/2/2021:1 6  · Patient supplemented with 800 mg magnesium oxide in the ED prior to transfer  · Continue to monitor, check magnesium in a   [place]ette Bari Replete as necessary  · 06/03/21:   · Magnesium 1 5 today  · 2g IV mag x 2 was given this morning  · Repeat mag ordered for 1200 today  · Continue to monitor and replete magnesium as needed    Hypokalemia  Assessment & Plan  · CMP 06/02/2021 revealed potassium of 3 0  · Patient supplemented with 60 mEq total in ED prior to transfer (40 mEq PO, 20 mEq IV)  · Continue to monitor, CMP in a m  Roselyn Bari   Replete as necessary  · 06/03/21:   · Potassium at 3 1 today  · Was given Potassium 40mEq PO and 20mEq  · Repeat BMP ordered for today at 1200  · Continue to monitor and replete potassium as needed    Hypertension  Assessment & Plan  · Most recent /90 preceding range 130//90  · Most recent heart rate 66, preceding range 56-67  · Patient reports Outpatient blood pressure medications as follows:  Amlodipine 5 mg daily and nadolol 20 mg daily  Patient reports that he no longer takes metoprolol or lisinopril  *  · Will hold blood pressure medications for now as patient undergoes treatment for alcohol withdrawal   May resume medication regimen once withdrawal process complete  · Continue to monitor vitals, BP  · 06/03/21:  · Most recent BP at 172/97, preceding range 120/70 - 170/90  · Most recent HR 63, preceding range 60 - 76  · Will continue to hold home BP meds for now as patient undergoes SEWS protocol  May resume home medications once patient is no longer experiencing withdrawal symptoms  · Continue to monitor vitals    Depression  Assessment & Plan  · Patient reports that he has recently transitioned from Celexa to Prozac daily  · Continue current outpatient regimen:  Prozac 60 mg daily    Mixed hyperlipidemia  Assessment & Plan  · Chart review reveals patient does not have full lipid panel on file  · Chart review did reveal limited data as follows:  Cholesterol 187 as of 04/05/2021, triglycerides 233 as of 09/12/2020  · Order lipid panel  · Continue outpatient regimen:  Atorvastatin 40 mg daily  · Recommend outpatient follow-up with PCP for ongoing monitoring    Flatulence  Assessment & Plan  · Patient reports sensation of bloating/increased flatulence  · Continue simethicone 80 mg q 6 hours p r n      History of prolonged Q-T interval on ECG  Assessment & Plan  · Patient was noted to have prolonged QTC interval during previous admission  · Initial EKG performed (4/17/2021) revealed QTC interval of 497 with infrequent PVCs; repeat EKG (4/18) revealed QTc 443; EKG on day of discharge (4/20/2021) revealed QTC of 414  · EKG 6/3/2021:  Sinus bradycardia, otherwise normal EKG  QT//445 ms  · Continue telemetry monitoring for now, can likely be discontinued tomorrow    BPH (benign prostatic hyperplasia)  Assessment & Plan  · Patient's current outpatient regimen includes finasteride 5 mg daily and alfuzosin 10 mg daily  · Patient unsure if he take 10 mg of alfuzosin daily or 40 mg daily  · Continue finasteride 5 mg daily, alfuzosin 10 mg daily            VTE Pharmacologic Prophylaxis:   Pharmacologic: Enoxaparin (Lovenox)  Mechanical VTE Prophylaxis in Place: yes    Code Status: Level 1 - Full Code    Patient Centered Rounds: I have performed bedside rounds with nursing staff today  Discussions with Specialists or Other Care Team Provider: None     Education and Discussions with Family / Patient: Discussed with patient    Time Spent for Care: 30 minutes  More than 50% of total time spent on counseling and coordination of care as described above  Current Length of Stay: 1 day(s)    Current Patient Status: Inpatient     Certification Statement: The patient will continue to require additional inpatient hospital stay due to alcohol withdrawal, elevated lipase Discharge Plan: Plan inpatient rehab when medically stabilized        Subjective: The patient reports feeling overall better today and is motivated for inpatient rehab placement  He still feels he is a little shaky but much improved  No abdominal pain, N/V, and is tolerating PO well      Objective:     Clinical Opiate Withdrawal Scale  Pulse: 59  Heart Rate Source: Monitor  Patient Position - Orthostatic VS: Lying    SEWS Total Score: 0 (6/3/2021 10:37 AM)        Last 24 Hours Medication List:   Current Facility-Administered Medications   Medication Dose Route Frequency Provider Last Rate    acetaminophen  650 mg Oral Q6H PRN Norma Sheehan PA-C      alfuzosin  10 mg Oral Daily With Breakfast Norma CARLITOS Sheehan      atorvastatin 40 mg Oral After [employer], PA-C      baclofen  10 mg Oral TID Norma Defrancisco, PA-C      enoxaparin  40 mg Subcutaneous Daily Norma Defrancisco, PA-C      finasteride  5 mg Oral Daily Norma Defrancisco, PA-C      FLUoxetine  60 mg Oral Daily Norma Defrancisco, PA-C      folic acid 1 mg, thiamine 100 mg in 0 9% sodium chloride 100 mL IVPB   Intravenous Daily Norma Defrancisco, PA-C Stopped (21 0930)    potassium-sodium phosphateS  1 tablet Oral BID With Meals Paloma Islas, PA-C      simethicone  80 mg Oral Q6H PRN Norma Defrancisco, PA-C      traZODone  100 mg Oral HS Norma Defrancisco, PA-C      white petrolatum-mineral oil   Topical TID PRN Norma Defrancisco, PA-C           Vitals:   Temp (24hrs), Av 7 °F (36 5 °C), Min:97 2 °F (36 2 °C), Max:98 2 °F (36 8 °C)    Temp:  [97 2 °F (36 2 °C)-98 2 °F (36 8 °C)] 97 4 °F (36 3 °C)  HR:  [56-76] 59  Resp:  [16-24] 18  BP: (120-175)/(64-97) 151/85  SpO2:  [91 %-97 %] 96 %  Body mass index is 35 22 kg/m²  Input and Output Summary (last 24 hours): Intake/Output Summary (Last 24 hours) at 6/3/2021 1248  Last data filed at 6/3/2021 1120  Gross per 24 hour   Intake 1801 67 ml   Output --   Net 1801 67 ml       Physical Exam:   Physical Exam  Vitals signs reviewed  Constitutional:       General: He is not in acute distress  Appearance: Normal appearance  HENT:      Head: Normocephalic and atraumatic  Mouth/Throat:      Mouth: Mucous membranes are moist       Pharynx: Oropharynx is clear  Eyes:      Conjunctiva/sclera: Conjunctivae normal       Pupils: Pupils are equal, round, and reactive to light  Neck:      Musculoskeletal: Neck supple  Cardiovascular:      Rate and Rhythm: Normal rate and regular rhythm  Heart sounds: No murmur  No friction rub  No gallop  Pulmonary:      Effort: Pulmonary effort is normal  No respiratory distress  Breath sounds: Normal breath sounds  Abdominal:      General: There is no distension  Palpations: Abdomen is soft  Tenderness: There is no abdominal tenderness  Musculoskeletal: Normal range of motion  Comments: Trace bilateral lower extremity pitting edema   Skin:     General: Skin is warm and dry  Neurological:      General: No focal deficit present  Mental Status: He is alert and oriented to person, place, and time  Comments: No tremor   Psychiatric:         Mood and Affect: Mood normal          Behavior: Behavior normal          Thought Content: Thought content normal          Additional Data:     Labs:   Results from last 7 days   Lab Units 06/03/21  0337 06/02/21  1924   WBC Thousand/uL 9 50 7 39   HEMOGLOBIN g/dL 13 8 13 4   HEMATOCRIT % 39 8* 40 0   PLATELETS Thousands/uL 248 275   NEUTROS PCT %  --  62   LYMPHS PCT %  --  23   MONOS PCT %  --  12   EOS PCT %  --  1      Results from last 7 days   Lab Units 06/03/21  0337   SODIUM mmol/L 134*   POTASSIUM mmol/L 3 1*   CHLORIDE mmol/L 99   CO2 mmol/L 32*   BUN mg/dL 13   CREATININE mg/dL 0 52*   ANION GAP mmol/L 3*   CALCIUM mg/dL 8 2*   ALBUMIN g/dL 3 7   TOTAL BILIRUBIN mg/dL 1 11   ALK PHOS U/L 64   ALT U/L 24   AST U/L 33   GLUCOSE RANDOM mg/dL 136*      Results from last 7 days   Lab Units 06/03/21  0337   INR  1 12                         * I Have Reviewed All Lab Data Listed Above  * Additional Pertinent Lab Tests Reviewed: [facility] 66 Admission Reviewed      Imaging Studies: I have personally reviewed pertinent reports  Recent Cultures (last 7 days): Today, Patient Was Seen By: Jennifer Ramos MD    ** Please Note: Dictation voice to text software may have been used in the creation of this document   **

## 2021-06-03 NOTE — ASSESSMENT & PLAN NOTE
· Patient with ongoing bilateral lower extremity swelling; denies personal h/o DVT, CHF  · Leg swelling was noted during previous admission-at that time, patient reported it began approximately 2 weeks prior to admission and patient was initiated on antibiotics PTA with no relief  · VAS lower extremity duplex ultrasound performed 04/17/2021:  No evidence of acute/chronic DVT of bilateral lower extremities  · Echo 04/19/2021:  LVEF 60%, left ventricular systolic function normal   No regional wall motion abnormalities  · On exam, 1+ pitting edema noted on bilateral lower extremities; no erythema/warmth  · Administered 1-time dose of 20 mg IV Lasix  · Continue to monitor swelling, will hold further Lasix for now  This is subacute/ chronic issue that will require outpatient follow-up

## 2021-06-03 NOTE — ASSESSMENT & PLAN NOTE
· Phosphorus at 2 2 this morning  · Repleted with potassium-sodium-phosphateS 45 - 298 - 250mg bid  · Repeat phosphorus level tomorrow morning  · 06/04/21: Phosporus at 2 8 this morning  · No further supplementation needed

## 2021-06-03 NOTE — ASSESSMENT & PLAN NOTE
· Patient reports sensation of bloating/increased flatulence  · Continue simethicone 80 mg q 6 hours p r n

## 2021-06-03 NOTE — H&P
[facility]  H&P- Estrellita Perez 1950, 70 y o  male MRN: [id number]  Unit/Bed#: 5T DETOX 903-55 Encounter: [id number]  Primary Care Provider: Arleen Martell MD   Date and time admitted to hospital: 6/2/2021 10:26 PM    HISTORY & PHYSICAL EXAM  DEPARTMENT OF MEDICAL TOXICOLOGY  LEVEL 4 MEDICAL DETOX UNIT  Estrellita Perez 70 y o  male MRN: [id number]  Unit/Bed#: 5T DETOX 518-01 Encounter: [id number]      Reason for Admission/Principal Problem: Ethanol withdrawal, Ethanol use disorder  Admitting Provider: Lary Prater PA-C  Attending Provider: Sherif Rodas MD   6/2/2021 10:26 PM      Alcohol use disorder, severe, dependence ([facility] [address] )  Assessment & Plan  · Patient previously treated in [facility] detox Unit back in April 2021 (4/17-4/20); history of chronic alcohol use since his 45s  · Patient was discharged on naltrexone 50 mg daily as well as thiamine/folic acid supplementation  · Patient reports compliance, however notes that naltrexone"didn't have much of an effect" on him (able to "drink through medication")  · Patient reports that he was sober for approximately 3 weeks following discharge from this unit, subsequently resumed drinking  · Initially started drinking 375 mL bottles of vodka over the course of 2-3 night  · Reports recent increase of amount consumed over the past 2 days, attributing increase to increased stress regarding his father's state of health  · Patient notes that he consumed approximately 1 5 375 mL bottles of vodka throughout the day of 06/01/2021  · Notes that he consumed a half of 375 mL bottle 6/2/2021 (finished around 07:00 am)  · Presented to [facility] ED 06/02/2021 with withdrawal symptoms including shakiness/restlessness, unsteadiness, anxiety, dizziness  · Ethanol level 6/2/2021: 307  · Other pertinent labs  · CMP 6/2/2021:  Sodium 139, potassium 3 0, LFTs as follows:  AST 23, ALT 28, alk-phos 89  · CBC 6/2/2021:  Hemoglobin 13 4, MCV 95  Platelets 074  · Continue to monitor CBC and CMP  · Patient notes he is currently interested in rehab  · Reports past inpatient rehab in 2005 and most recently in March 2021 with [facility]  · Also reports that he attends AA meetings  · Follow SEWS protocol from medically assisted alcohol withdrawal    * Alcohol withdrawal syndrome without complication ([address] )  Assessment & Plan  · Patient reports history of withdrawal seizure in 2019  · Patient reports that he initially presented to [facility] ED on 06/02/2021 for evaluation of withdrawal symptoms including shakiness/restlessness, unsteadiness, dizziness, anxiety  · Upon presentation to the unit, patient reported ongoing restlessness/shakiness, unsteadiness, anxiety   · Patient notes that he has underlying tremor of left upper extremity secondary to rotator cuff injury; patient notes that this tremor is exacerbated during alcohol withdrawal  · Initial SEWS upon presentation to unit: 10  · Patient received 1 mg ativan x 2 in ED prior to transfer to this unit, will hold initial 10 mg valium  · Follow SEWS protocol for medically assisted alcohol withdrawal    Hypokalemia  Assessment & Plan  · CMP 06/02/2021 revealed potassium of 3 0  · Patient supplemented with 60 mEq total in ED prior to transfer (40 mEq PO, 20 mEq IV)  · Continue to monitor, CMP in a m  Henery Jurist Replete as necessary    Hypomagnesemia  Assessment & Plan  · Magnesium 6/2/2021:1 6  · Patient supplemented with 800 mg magnesium oxide in the ED prior to transfer  · Continue to monitor, check magnesium in a m  Henery Jurist   Replete as necessary    Alcohol induced fatty liver  Assessment & Plan  · CT abdomen/pelvis with contrast 04/17/2021:  Enlarged fatty liver noted  · Likely secondary to alcohol use disorder  · CMP 06/02/2021 revealed LFTs as follows:  AST 23, ALT 28, alk-phos 89  · Continue to monitor LFTs  · Encourage follow-up with PCP outpatient for ongoing monitoring    History of Prolonged QT interval on ECG  Assessment & Plan  · Patient was noted to have prolonged QTC interval during previous admission  · Initial EKG performed (4/17/2021) revealed QTC interval of 497 with infrequent PVCs; repeat EKG (4/18) revealed QTc 443; EKG on day of discharge (4/20/2021) revealed QTC of 414  · Echo 04/19/2021:  LVEF 60%, left ventricular systolic function normal   No regional wall motion abnormalities  · EKG 6/3/2021:  Sinus bradycardia, otherwise normal EKG  QT//445 ms  · Continue telemetry monitoring    Hypertension  Assessment & Plan  · Most recent /90 preceding range 130//90  · Most recent heart rate 66, preceding range 56-67  · Patient reports Outpatient blood pressure medications as follows:  Amlodipine 5 mg daily and nadolol 20 mg daily  Patient reports that he no longer takes metoprolol or lisinopril  *  · Will hold blood pressure medications for now as patient undergoes treatment for alcohol withdrawal   May resume medication regimen once withdrawal process complete  · Continue to monitor vitals, BP    Leg swelling  Assessment & Plan  · Patient with ongoing bilateral lower extremity swelling; denies personal h/o DVT, CHF  · Leg swelling was noted during previous admission-at that time, patient reported it began approximately 2 weeks prior to admission and patient was initiated on antibiotics PTA with no relief  · VAS lower extremity duplex ultrasound performed 04/17/2021:  No evidence of acute/chronic DVT of bilateral lower extremities  · Echo 04/19/2021:  LVEF 60%, left ventricular systolic function normal   No regional wall motion abnormalities    · On exam, 1+ pitting edema noted on bilateral lower extremities; no erythema/warmth  · Administer 1-time dose of 20 mg IV Lasix  · Continue to monitor swelling    Mixed hyperlipidemia  Assessment & Plan  · Chart review reveals patient does not have full lipid panel on file  · Chart review did reveal limited data as follows:  Cholesterol 187 as of 04/05/2021, triglycerides 233 as of 09/12/2020  · Order lipid panel  · Continue outpatient regimen:  Atorvastatin 40 mg daily  · Recommend outpatient follow-up with PCP for ongoing monitoring    Flatulence  Assessment & Plan  · Patient reports sensation of bloating/increased flatulence  · Continue simethicone 80 mg q 6 hours p r n  Hypoxia  Assessment & Plan  · During last admission to detox Unit, patient noted to have overnight hypoxia  · Likely secondary to ROSARIO  · Patient reports that he wears nasal cannula overnight, 2 L O2 supplementation  · Patient reports that he was scheduled for a sleep study with [facility], however got rescheduled to next week  · Patient with SpO2 95% on room air upon admission to this unit  · Continue O2 supplementation via nasal cannula overnight  · Encourage patient to f/u with outpatient sleep study      Depression  Assessment & Plan  · Patient reports that he has recently transitioned from Celexa to Prozac daily  · Continue current outpatient regimen:  Prozac 60 mg daily    BPH (benign prostatic hyperplasia)  Assessment & Plan  · Patient's current outpatient regimen includes finasteride 5 mg daily and alfuzosin 10 mg daily  · Patient unsure if he take 10 mg of alfuzosin daily or 40 mg daily  · Continue finasteride 5 mg daily, alfuzosin 10 mg daily      Additional medical treatment(s) includes: hypokalemia, hypomagnesemia, leg swelling, prolonged QTc, alcoholic fatty liver, HTN       VTE Prophylaxis: Enoxaparin (Lovenox)  / sequential compression device   Code Status: level 1- full code  POLST: POLST form is not discussed and not completed at this time  Discussion with family: none at this time    Anticipated Length of Stay:  Patient will be admitted on an Inpatient basis with an anticipated length of stay of  Greater than 2  midnights     Justification for Hospital Stay:  Ongoing management of medically assisted alcohol withdrawal and electrolyte abnormalities    For any questions or concerns, please Tiger Text the advanced practitioner in the role of [facility]-DETOX-AP On Call      This patient qualifies for Level IV medically managed intensive inpatient services under the criteria set by the American Society of Addiction Medicine, including dimensions 1-3  The patient is in withdrawal (or is intoxicated with high risk of withdrawal), with severe and unstable medical and/or psychiatric (dual diagnosis) problems, requiring requires 24-hour medical and nursing care and the full resources of a [address] patient to medical detox unit and continue supportive care and stabilization of acute ethanol withdrawal per medical toxicology/detox treatment pathway  Monitor ethanol withdrawal severity via the Severity of Ethanol Withdrawal Scale (SEWS) Q4 hours and then hourly if/when SEWS > 6  Treat withdrawal per pathway and reassess Q30-60 minutes  Mild SEWS Score 1-6  Administer medications* (IV or PO; PO preferred):   If initial SEWS score: diazepam 10mg PO/IV x 1 AND phenobarbital 65 mg PO/IV x 1   If repeat SEWS score 1-6: phenobarbital 65 mg PO/IV q1 hour x 5 doses maximum   Reassessment:    SEWS q1 hour after each dose until SEWS 0 x 2 hours   VS q1 hours (until SEWS 0, then q4 hours)   Notify provider for bedside evaluation if 5-dose maximum is reached, RASS of -3 to -5, or SEWS score escalates to moderate or severe     Moderate SEWS Score 7-12  Administer medications* (IV):   If initial SEWS score: diazepam 10mg IV x 1 AND phenobarbital 260 mg IV x 1   If repeat SEWS score 7-12 or score escalated from mild: phenobarbital 130 mg IV q30 minutes x 5 doses maximum   Reassessment:   SEWS q30 minutes after each dose until SEWS < 7 (then hourly until SEWS 0 x 2 hours)   VS q30 minutes until SEWS < 7 (then hourly until SEWS 0, then q4 hours)   Notify provider for bedside evaluation if 5-dose maximum is reached, RASS of -3 to -5, or SEWS score escalates to severe  Severe SEWS Score ? 13  Administer medications* (IV):   If initial SEWS score: Diazepam 10 mg IV x 1 AND phenobarbital 650 mg IV piggyback x 1 over 15-30 minutes   If repeat SEWS score ? 13 or score escalated from mild or moderate: phenobarbital 130 mg IV q30 minutes x 5 doses maximum   Reassessment:   SEWS q30 minutes after each dose until SEWS < 7 (then hourly until SEWS 0 x 2 hours)    VS q30 minutes until SEWS < 7 (then hourly until SEWS 0, then q4 hours)   Notify provider for bedside evaluation if 5-dose maximum is reached or RASS of -3 to -5   *Hold medications and notify provider if CNS depression, respirations < 10/min, or RASS of -3 to -5  Medications to be administered adjunctively if more than 2 grams of phenobarbital is needed for stabilization of withdrawal; require attending approval     Dexmedetomidine infusion 0 1-1mcg/kg/hr IV infusion, titratable to reduced agitation (Goal: RASS -2)   Ketamine   o Acute agitated delirium: 1-2 mg/kg IV or 4-5 mg/kg IM  o Refractory withdrawal: 0 1-1mg/kg/hr IV infusion, titratable to reduced agitation (Goal: RASS -2)    Further evaluation, screening and treatment:  Evaluate complete metabolic panel, transaminases, INR, and lipase  Assess hepatic ultrasound for any sign of alcoholic liver disease or cirrhosis, and ultimately refer for further hepatic evaluation and care as/if indicated  Additional medications for ethanol associated malnutrition: Thiamine 100 mg IV daily, increase to 500 mg TID for signs/symptoms of Wernicke's Encephalopathy or Wernicke Korsakoff Syndrome   Folic acid 1 mg IV daily   Multivitamin PO daily      Will offer first monthly injection of Naltrexone 380 mg IM, once patient is stabilized, as it has been shown to assist in decreasing cravings for ethanol  Evaluate and treat for coexisting substance use, such as opioids and nicotine   Discuss risk factors for infectious disease, such as history of intravenous drug abuse, and offer hepatitis and HIV screening if indicated  Case management consultation to assist with coordination of subsequent treatment after discharge  Hx and PE limited by: patient hard of hearing     HPI: Raudel Marquez is a 70y o  year old male with PMH of HTN, HLD, BPH, macular degeneration, chronic alcohol use disorder his since his 62Q complicated by alcoholic fatty liver disease and history of withdrawal seizure who presents with request for alcohol detox treatment  Patient reports chronic alcohol use primarily since his 45s, recently treated on [address] [place] [address] detox Unit in April of 2021 (4/17-4/20)  Patient notes that he was discharged on naltrexone, reports he was sober for 3 weeks following discharge, subsequently began drinking again up approximately 3 weeks ago  Reports that he initially started drinking 1 pt of vodka over the course of 2-3 days, however, notes that he increased consumption over the past 2 days  Patient notes that on 06/01/2021, he consumed 1 5 pt of vodka; consumed initial half pt of vodka on 06/02/2021  Patient reports compliance with naltrexone, however notes that he is able to "drink through the medication", suggesting that is ineffective for him  Patient notes increased anxiety recently secondary to his father's poor health, attributing his increased alcohol consumption to stress from caretaking  Patient denies use of other substances including tobacco, marijuana, cocaine, heroin  Patient reports numbness/tingling of bilateral lower extremities from tips of toes to just above the ankles; reports that this has been a chronic issue with alcohol consumption, stating that he notice resolution of symptoms when he was sober  Patient also reports underlying tremor of left upper extremity, attributes to left shoulder injury (torn rotator cuff)    Patient reports that his tremor is worsened with alcohol withdrawal   Patient notes gait unsteadiness and reports that he follows with PT outpatient, ambulates with walker at home  Preferred alcoholic beverage(s): vodka  Quantity and frequency of alcohol intake: 1 pint of vodka over course of 2-3 days; increased past 2 days (1 5 pints on 6/1, 0 5 pint 6/2)  Use of any ethanol substitutes (toxic alcohols): no  Date/Time of last alcohol intake: 07:00 am 6/2/2021; consumed 0 5 pint of vodka  Current signs and symptoms of ethanol withdrawal: anxiety, tremor, hypertension and headache    SEWS Total Score: 10 (6/2/2021 10:30 PM)    Ethanol Withdrawal History  Previous ethanol withdrawal? yes  Prior inpatient treatment for ethanol withdrawal? yes  Prior outpatient treatment for ethanol withdrawal? yes  History of seizures with prior ethanol withdrawal? yes  Prior treatment with naltrexone (Vivitrol)? yes  Current treatment with naltrexone (Vivitrol)? yes  Other current treatment for ethanol use disorder? no  Co-existing substance use? no    Review of PDMP: yes     Social History     Substance and Sexual Activity   Alcohol Use Yes    Alcohol/week: 6 0 standard drinks    Types: 6 Shots of liquor per week    Frequency: 4 or more times a week    Drinks per session: 5 or 6    Binge frequency: Daily or almost daily    Comment: 3-4 times per week     Social History     Substance and Sexual Activity   Drug Use Yes     Social History     Tobacco Use   Smoking Status Never Smoker   Smokeless Tobacco Never Used       Review of Systems   Constitutional: Negative for appetite change, chills and fever  HENT: Positive for congestion  Negative for ear pain, sneezing and sore throat  Eyes: Negative for pain and visual disturbance  Respiratory: Negative for cough and shortness of breath  Cardiovascular: Positive for palpitations and leg swelling  Negative for chest pain     Gastrointestinal: Positive for abdominal distention (Bloating secondary to increased flatulence) and diarrhea (Patient reports 2 episodes of soft looser stools morning of 06/02/2021)  Negative for abdominal pain, blood in stool, constipation, nausea and vomiting  Genitourinary: Negative for difficulty urinating, dysuria and hematuria  Musculoskeletal: Positive for arthralgias, gait problem (Reports unsteadiness, uses walker/cane at home and follows with PT outpatient) and joint swelling  Negative for back pain  Skin: Negative for color change and rash  Patient reports pruritus of stomach and b/l thighs  Reports prescription of cream, applies intermittently at home, unable to recall name of cream    Neurological: Positive for dizziness, tremors, numbness (/tingling; b/l LEs from toes to just above ankle ) and headaches (5/10, intermittent throbbing left frontal region)  Negative for seizures, syncope and weakness  Psychiatric/Behavioral: The patient is nervous/anxious  All other systems reviewed and are negative  Historical Information   Past Medical History:   Diagnosis Date    Alcohol dependency (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 Authorizing Provider   alfuzosin (UROXATRAL) 10 mg 24 hr tablet Take 40 mg by mouth daily     Yes Historical Provider, MD allopurinol (ZYLOPRIM) 100 mg tablet    Yes Historical Provider, MD   amLODIPine (NORVASC) 5 mg tablet Take 1 tablet (5 mg total) by mouth daily 7/16/19  Yes Shirlene Royal MD   baclofen 10 mg tablet Take 10 mg by mouth Three times a day 3/4/21 3/4/22 Yes Historical Provider, MD   finasteride (PROSCAR) 5 mg tablet Take 5 mg by mouth daily   Yes Historical Provider, MD   magnesium oxide (MAG-OX) 400 mg Take 400 mg by mouth 2 (two) times a day   Yes Historical Provider, MD   nadolol (CORGARD) 20 mg tablet Take 20 mg by mouth 3/4/21 3/4/22 Yes Historical Provider, MD   naltrexone (REVIA) 50 mg tablet Take 50 mg by mouth daily 8/24/20 8/24/21 Yes Historical Provider, MD   traZODone (DESYREL) 150 mg tablet Take 150 mg by mouth nightly   Takes 1/2 pill   Yes Historical Provider, MD   cycloSPORINE (RESTASIS) 0 05 % ophthalmic emulsion 1 drop 2 (two) times a day    Historical Provider, MD   simethicone (MYLICON) 80 mg chewable tablet Chew 1 tablet (80 mg total) every 6 (six) hours as needed for flatulence 4/20/21   Eneida White DO   thiamine 100 MG tablet Take 1 tablet (100 mg total) by mouth daily 4/21/21 5/21/21  Eneida White DO   citalopram (CeleXA) 20 mg tablet Take 20 mg by mouth daily  6/2/21  Historical Provider, MD   metoprolol succinate (TOPROL-XL) 25 mg 24 hr tablet Take 25 mg by mouth daily  6/2/21  Historical Provider, MD       Current Facility-Administered Medications   Medication Dose Route Frequency    acetaminophen (TYLENOL) tablet 650 mg  650 mg Oral Q6H PRN    alfuzosin (UROXATRAL) 24 hr tablet 10 mg  10 mg Oral Daily With Breakfast    atorvastatin (LIPITOR) tablet 40 mg  40 mg Oral After Dinner    baclofen tablet 10 mg  10 mg Oral TID    enoxaparin (LOVENOX) subcutaneous injection 40 mg  40 mg Subcutaneous Daily    finasteride (PROSCAR) tablet 5 mg  5 mg Oral Daily    FLUoxetine (PROzac) capsule 60 mg  60 mg Oral Daily    folic acid 1 mg, thiamine (VITAMIN B1) 100 mg in sodium chloride 0 9 % 100 mL IV piggyback   Intravenous Daily    simethicone (MYLICON) chewable tablet 80 mg  80 mg Oral Q6H PRN    sodium chloride 0 9 % infusion  125 mL/hr Intravenous Continuous    traZODone (DESYREL) tablet 100 mg  100 mg Oral HS    white petrolatum-mineral oil (EUCERIN,HYDROCERIN) cream   Topical TID PRN       Continuous Infusions:sodium chloride, 125 mL/hr, Last Rate: 125 mL/hr (06/02/21 2324)             Objective     No intake or output data in the 24 hours ending 06/03/21 0228    Invasive Devices:   Peripheral IV 06/02/21 Right Antecubital (Active)   Site Assessment Clean;Dry; Intact 06/02/21 1900   Dressing Type Transparent 06/02/21 1900   Line Status Blood return noted 06/02/21 1900   Dressing Status Clean;Dry; Intact 06/02/21 1900       Vitals   Vitals:    06/02/21 2230 06/02/21 2308 06/03/21 0037 06/03/21 0147   BP: 170/90 140/72 130/80 130/90   TempSrc: Temporal Temporal Temporal Temporal   Pulse: 66 72 76 60   Resp: 18 16 18 16   Patient Position - Orthostatic VS: Sitting Lying Lying Lying   Temp: 98 2 °F (36 8 °C) 98 1 °F (36 7 °C) 97 8 °F (36 6 °C) 97 5 °F (36 4 °C)       Physical Exam  Vitals signs reviewed  Constitutional:       General: He is not in acute distress  Appearance: Normal appearance  He is obese  He is not ill-appearing or diaphoretic  HENT:      Head: Normocephalic and atraumatic  Ears:      Comments: Patient [place]     Nose: Congestion present  Mouth/Throat:      Mouth: Mucous membranes are moist       Pharynx: Oropharynx is clear  Eyes:      General: No scleral icterus  Extraocular Movements: Extraocular movements intact  Conjunctiva/sclera: Conjunctivae normal       Pupils: Pupils are equal, round, and reactive to light  Comments: Wears glasses   Neck:      Musculoskeletal: Normal range of motion  Cardiovascular:      Rate and Rhythm: Normal rate and regular rhythm  Pulses: Normal pulses  Heart sounds: Normal heart sounds  No murmur   No friction rub  No gallop  Pulmonary:      Effort: Pulmonary effort is normal  No respiratory distress  Breath sounds: Normal breath sounds  No wheezing, rhonchi or rales  Abdominal:      General: Abdomen is flat  Bowel sounds are normal  There is no distension  Palpations: Abdomen is soft  Tenderness: There is no abdominal tenderness  There is no guarding  Musculoskeletal: Normal range of motion  General: Swelling present  Left ankle: He exhibits swelling  He exhibits normal range of motion and no ecchymosis  Right lower le+ Pitting Edema present  Left lower le+ Pitting Edema present  Feet:    Skin:     General: Skin is warm and dry  Capillary Refill: Capillary refill takes less than 2 seconds  Findings: Rash (Dry skin of abdomen and bilateral upper thighs, minimal erythema (possibly secondary to scratching)) present  Neurological:      General: No focal deficit present  Mental Status: He is alert and oriented to person, place, and time  Mental status is at baseline  Sensory: Sensory deficit (Patient reports "dullness" to grows touch of bilateral lower extremities (feet to just above ankle)  Reports that he is able to feel touch, however notes that it feels as if his legs are asleep") present  Motor: Tremor (LUE > RUE) present  Gait: Gait abnormal (unsteady )  Psychiatric:         Attention and Perception: Attention normal          Mood and Affect: Mood is anxious  Speech: Speech normal          Behavior: Behavior is cooperative  Data:    EKG, Pathology, and Other Studies: I have personally reviewed pertinent reports  · EKG:  Initial EKG performed upon patient arrival to Unit on 2021 revealed an inadequate tracing  · Repeat EKG 2021:  Sinus bradycardia (ventricular rate 58 bpm), otherwise normal EKG  QRS 96 ms, QT//445 ms   No acute ST segment changes/abnormalities noted  · EKG 2021: Sinus bradycardia, otherwise normal EKG    QRS 94 ms, QT//414 ms  · Echo 04/19/2021  · CT abdomen pelvis with contrast 04/17/2021  · Vas lower limb duplex ultrasound 04/17/2021      Lab Results:  CBC ETOH     Lab Results   Component Value Date    WBC 7 39 06/02/2021    WBC 9 30 12/29/2014    RBC 4 21 06/02/2021    RBC 4 16 12/29/2014    HGB 13 4 06/02/2021    HGB 13 8 12/29/2014    HCT 40 0 06/02/2021    HCT 40 5 12/29/2014    MCV 95 06/02/2021    MCV 97 12/29/2014    MCH 31 8 06/02/2021    MCH 33 2 12/29/2014    MCHC 33 5 06/02/2021    MCHC 34 1 12/29/2014    RDW 13 4 06/02/2021    RDW 12 9 12/29/2014     06/02/2021     12/29/2014    MPV 9 2 06/02/2021    MPV 9 7 12/29/2014      Lab Results   Component Value Date    LACTICACID 2 1 (HH) 12/02/2019      CMP UA         Component Value Date/Time     12/29/2014 0706    K 3 0 (L) 06/02/2021 1924    K 3 4 (L) 12/29/2014 0706     06/02/2021 1924     12/29/2014 0706    CO2 28 06/02/2021 1924    CO2 25 12/29/2014 0706    BUN 17 06/02/2021 1924    BUN 15 12/29/2014 0706    CREATININE 0 74 06/02/2021 1924    CREATININE 0 62 12/29/2014 0706         Component Value Date/Time    CALCIUM 8 1 (L) 06/02/2021 1924    CALCIUM 8 9 12/29/2014 0706    ALKPHOS 89 06/02/2021 1924    AST 23 06/02/2021 1924    ALT 28 06/02/2021 1924      Lab Results   Component Value Date    CLARITYU Clear 04/17/2021    COLORU Yellow 04/17/2021    SPECGRAV >=1 030 04/17/2021    PHUR 6 0 04/17/2021    PHUR 6 0 02/10/2019    GLUCOSEU Negative 04/17/2021    KETONESU >=80 (3+) (A) 04/17/2021    BLOODU Trace-Intact (A) 04/17/2021    PROTEIN UA 30 (1+) (A) 04/17/2021    NITRITE Negative 04/17/2021    BILIRUBINUR Negative 04/17/2021    UROBILINOGEN 0 2 04/17/2021    LEUKOCYTESUR Negative 04/17/2021    WBCUA 0-1 04/17/2021    RBCUA 1-2 04/17/2021    HYALINE 0-1 (A) 12/02/2019    BACTERIA None Seen 04/17/2021    EPIS Occasional 04/17/2021        Liver Function Test: ASA     Lab Results   Component Value Date    TBILI 0 59 06/02/2021    ALKPHOS 89 06/02/2021    AST 23 06/02/2021    ALT 28 06/02/2021    TP 6 8 06/02/2021    ALB 3 8 06/02/2021      Lab Results   Component Value Date    SALICYLATE <3 (L) 03/20/2804      Troponin APAP     Lab Results   Component Value Date    TROPONINI <0 02 04/17/2021      Lab Results   Component Value Date    ACTMNPHEN <2 0 (L) 12/02/2019      VBG HCG     No results found for: PHVEN, INS4TKW, PO2VEN, KAA3JQT, BEVEN, T6PYWSBAU, S7HXYQN   No results found for: HCGQUANT   ABG Urine Drug Screen     No results found for: PHART, LCH3HKH, PO2ART, HTA7NQX, BEART, S5ODZDJPZ, O2HGB, SOURC, NAZ, VTAC, ACRATE, INSPIREDAIR, PEEP   Lab Results   Component Value Date    AMPMETHUR Negative 04/17/2021    BARBTUR Negative 04/17/2021    BDZUR Positive (A) 04/17/2021    COCAINEUR Negative 04/17/2021    METHADONEUR Negative 04/17/2021    OPIATEUR Negative 04/17/2021    PCPUR Negative 04/17/2021    THCUR Negative 04/17/2021    OXYCODONEUR Negative 04/17/2021      Lactate INR     Lab Results   Component Value Date    LACTICACID 2 1 ([facility]) 12/02/2019      Lab Results   Component Value Date    INR 1 07 04/17/2021    INR 1 20 (H) 12/29/2014      PTT Protime     Lab Results   Component Value Date/Time    PTT 26 04/17/2021 01:20 PM    PTT 25 12/29/2014 07:06 AM        Lab Results   Component Value Date/Time    PROTIME 13 7 04/17/2021 01:20 PM    PROTIME 15 0 (H) 12/29/2014 07:06 AM              Imaging Studies: I have personally reviewed pertinent reports  Counseling / Coordination of Care  Total floor / unit time spent today 65 minutes  Greater than 50% of total time was spent with the patient and / or family counseling and / or coordination of care         Minutes of critical care time 20  -Critical care time was exclusive of separately billable procedures and teaching time    -Critical care was necessary to treat or prevent imminent or life-threatening deterioration of the following condition: CNS failure/compromise, toxidrome (ethanol withdrawal),  withdrawal  -Critical care time was spent personally by me on the following activities as well as the above as per the course and rest of chart: obtaining history from patient/surrogate, development of a treatment plan, discussions with referring provider(s), evaluation of patient's response to the treatment, examination of the patient, recommending treatments and interventions, re-evaluation of the patient's condition, review of old charts, ordering/interpreting laboratory studies, ordering/interpreting of radiographic studies  ** Please Note: This note has been constructed using a voice recognition system   **

## 2021-06-03 NOTE — ASSESSMENT & PLAN NOTE
· Patient previously treated in [facility] detox Unit back in April 2021 (4/17-4/20); history of chronic alcohol use since his 45s  · Patient was discharged on naltrexone 50 mg daily as well as thiamine/folic acid supplementation  · Patient reports compliance, however notes that naltrexone"didn't have much of an effect" on him (able to "drink through medication")  · Patient reports that he was sober for approximately 3 weeks following discharge from this unit, subsequently resumed drinking  · Initially started drinking 375 mL bottles of vodka over the course of 2-3 night  · Reports recent increase of amount consumed over the past 2 days, attributing increase to increased stress regarding his father's state of health  · Patient notes that he consumed approximately 1 5 375 mL bottles of vodka throughout the day of 06/01/2021  · Notes that he consumed a half of 375 mL bottle 6/2/2021 (finished around 07:00 am)  · Presented to [facility] ED 06/02/2021 with withdrawal symptoms including shakiness/restlessness, unsteadiness, anxiety, dizziness  · Ethanol level 6/2/2021: 307  · Other pertinent labs  · CMP 6/2/2021:  Sodium 139, potassium 3 0, LFTs as follows:  AST 23, ALT 28, alk-phos 89  · CBC 6/2/2021:  Hemoglobin 13 4, MCV 95    Platelets 365  · Continue to monitor CBC and CMP  · Patient notes he is currently interested in rehab  · Reports past inpatient rehab in 2005 and most recently in March 2021 with [facility]  · Also reports that he attends AA meetings  · Follow SEWS protocol from medically assisted alcohol withdrawal

## 2021-06-04 LAB
ALBUMIN SERPL BCP-MCNC: 4 G/DL (ref 3–5.2)
ALP SERPL-CCNC: 80 U/L (ref 43–122)
ALT SERPL W P-5'-P-CCNC: 25 U/L
ANION GAP SERPL CALCULATED.3IONS-SCNC: 6 MMOL/L (ref 5–14)
AST SERPL W P-5'-P-CCNC: 29 U/L (ref 17–59)
ATRIAL RATE: 61 BPM
BILIRUB SERPL-MCNC: 0.72 MG/DL
BUN SERPL-MCNC: 8 MG/DL (ref 5–25)
CALCIUM SERPL-MCNC: 9 MG/DL (ref 8.4–10.2)
CHLORIDE SERPL-SCNC: 100 MMOL/L (ref 97–108)
CO2 SERPL-SCNC: 31 MMOL/L (ref 22–30)
CREAT SERPL-MCNC: 0.59 MG/DL (ref 0.7–1.5)
ERYTHROCYTE [DISTWIDTH] IN BLOOD BY AUTOMATED COUNT: 14.7 %
GFR SERPL CREATININE-BSD FRML MDRD: 102 ML/MIN/1.73SQ M
GLUCOSE SERPL-MCNC: 145 MG/DL (ref 70–99)
HCT VFR BLD AUTO: 41.1 % (ref 41–53)
HGB BLD-MCNC: 13.9 G/DL (ref 13.5–17.5)
LIPASE SERPL-CCNC: 505 U/L (ref 23–300)
MCH RBC QN AUTO: 33.1 PG (ref 26–34)
MCHC RBC AUTO-ENTMCNC: 33.9 G/DL (ref 31–36)
MCV RBC AUTO: 98 FL (ref 80–100)
P AXIS: 55 DEGREES
PHOSPHATE SERPL-MCNC: 2.8 MG/DL (ref 2.5–4.8)
PLATELET # BLD AUTO: 217 THOUSANDS/UL (ref 150–450)
PMV BLD AUTO: 7.8 FL (ref 8.9–12.7)
POTASSIUM SERPL-SCNC: 3.4 MMOL/L (ref 3.6–5)
PR INTERVAL: 192 MS
PROT SERPL-MCNC: 7 G/DL (ref 5.9–8.4)
QRS AXIS: 59 DEGREES
QRSD INTERVAL: 104 MS
QT INTERVAL: 450 MS
QTC INTERVAL: 453 MS
RBC # BLD AUTO: 4.22 MILLION/UL (ref 4.5–5.9)
SODIUM SERPL-SCNC: 137 MMOL/L (ref 137–147)
T WAVE AXIS: 51 DEGREES
VENTRICULAR RATE: 61 BPM
WBC # BLD AUTO: 6.9 THOUSAND/UL (ref 4.5–11)

## 2021-06-04 PROCEDURE — 84100 ASSAY OF PHOSPHORUS: CPT | Performed by: EMERGENCY MEDICINE

## 2021-06-04 PROCEDURE — 99232 SBSQ HOSP IP/OBS MODERATE 35: CPT | Performed by: EMERGENCY MEDICINE

## 2021-06-04 PROCEDURE — 80053 COMPREHEN METABOLIC PANEL: CPT | Performed by: EMERGENCY MEDICINE

## 2021-06-04 PROCEDURE — 85027 COMPLETE CBC AUTOMATED: CPT | Performed by: EMERGENCY MEDICINE

## 2021-06-04 PROCEDURE — 93010 ELECTROCARDIOGRAM REPORT: CPT | Performed by: INTERNAL MEDICINE

## 2021-06-04 PROCEDURE — 83690 ASSAY OF LIPASE: CPT | Performed by: EMERGENCY MEDICINE

## 2021-06-04 RX ORDER — POTASSIUM CHLORIDE 20 MEQ/1
40 TABLET, EXTENDED RELEASE ORAL ONCE
Status: COMPLETED | OUTPATIENT
Start: 2021-06-04 | End: 2021-06-04

## 2021-06-04 RX ORDER — AMLODIPINE BESYLATE 5 MG/1
5 TABLET ORAL DAILY
Status: DISCONTINUED | OUTPATIENT
Start: 2021-06-04 | End: 2021-06-09 | Stop reason: HOSPADM

## 2021-06-04 RX ADMIN — FOLIC ACID: 5 INJECTION, SOLUTION INTRAMUSCULAR; INTRAVENOUS; SUBCUTANEOUS at 08:39

## 2021-06-04 RX ADMIN — DIPHENHYDRAMINE HCL 25 MG: 25 TABLET ORAL at 08:37

## 2021-06-04 RX ADMIN — ENOXAPARIN SODIUM 40 MG: 40 INJECTION SUBCUTANEOUS at 08:32

## 2021-06-04 RX ADMIN — FINASTERIDE 5 MG: 5 TABLET, FILM COATED ORAL at 08:32

## 2021-06-04 RX ADMIN — POTASSIUM CHLORIDE 40 MEQ: 1500 TABLET, EXTENDED RELEASE ORAL at 16:39

## 2021-06-04 RX ADMIN — BACLOFEN 10 MG: 10 TABLET ORAL at 21:34

## 2021-06-04 RX ADMIN — BACLOFEN 10 MG: 10 TABLET ORAL at 08:33

## 2021-06-04 RX ADMIN — BACLOFEN 10 MG: 10 TABLET ORAL at 15:52

## 2021-06-04 RX ADMIN — AMLODIPINE BESYLATE 5 MG: 5 TABLET ORAL at 15:53

## 2021-06-04 RX ADMIN — ATORVASTATIN CALCIUM 40 MG: 40 TABLET, FILM COATED ORAL at 16:40

## 2021-06-04 RX ADMIN — TRAZODONE HYDROCHLORIDE 100 MG: 100 TABLET ORAL at 21:34

## 2021-06-04 RX ADMIN — ACETAMINOPHEN 650 MG: 325 TABLET ORAL at 09:15

## 2021-06-04 RX ADMIN — FLUOXETINE 60 MG: 20 CAPSULE ORAL at 08:32

## 2021-06-04 RX ADMIN — MELATONIN TAB 3 MG 3 MG: 3 TAB at 21:34

## 2021-06-04 NOTE — PROGRESS NOTES
[facility]  Progress Note - Edmundo Garrido 1950, 70 y o  male MRN: [id number]  Unit/Bed#: 5T DETOX 880-14 Encounter: [id number]  Primary Care Provider: Kash Santoyo MD   Date and time admitted to hospital: 6/2/2021 10:26 PM    Elevated lipase  Assessment & Plan  · Patient presented with elevated lipase of 834  · Had elevated lipase on prior admission as well  · Not complaining of abdominal pain and is tolerating PO normally  · Will continue to trend  · 06/04/21:  · Lipase trending downward: 834 -> 748 -> 505  · Continue to trend    Hypophosphatemia  Assessment & Plan  · Phosphorus at 2 2 this morning  · Repleted with potassium-sodium-phosphateS 45 - 298 - 250mg bid  · Repeat phosphorus level tomorrow morning  · 06/04/21: Phosporus at 2 8 this morning  · No further supplementation needed    Alcohol induced fatty liver  Assessment & Plan  · CT abdomen/pelvis with contrast 04/17/2021:  Enlarged fatty liver noted  · Likely secondary to alcohol use disorder  · CMP 06/02/2021 revealed LFTs as follows:  AST 23, ALT 28, alk-phos 89  · Continue to monitor LFTs  · Encourage follow-up with PCP outpatient for ongoing monitoring    Mixed hyperlipidemia  Assessment & Plan  · Chart review reveals patient does not have full lipid panel on file  · Chart review did reveal limited data as follows:  Cholesterol 187 as of 04/05/2021, triglycerides 233 as of 09/12/2020  · Order lipid panel  · Continue outpatient regimen:  Atorvastatin 40 mg daily  · Recommend outpatient follow-up with PCP for ongoing monitoring    Flatulence  Assessment & Plan  · Patient reports sensation of bloating/increased flatulence  · Continue simethicone 80 mg q 6 hours p r n      Hypoxia  Assessment & Plan  · During last admission to detox Unit, patient noted to have overnight hypoxia  · Likely secondary to ROSARIO  · Patient reports that he wears nasal cannula overnight, 2 L O2 supplementation  · Patient reports that he was scheduled for a sleep study with [facility], however got rescheduled to next week  · Patient with SpO2 95% on room air upon admission to this unit  · Continue O2 supplementation via nasal cannula overnight  · Encourage patient to f/u with outpatient sleep study      Depression  Assessment & Plan  · Patient reports that he has recently transitioned from Celexa to Prozac daily  · Continue current outpatient regimen:  Prozac 60 mg daily    Leg swelling  Assessment & Plan  · Patient with ongoing bilateral lower extremity swelling; denies personal h/o DVT, CHF  · Leg swelling was noted during previous admission-at that time, patient reported it began approximately 2 weeks prior to admission and patient was initiated on antibiotics PTA with no relief  · VAS lower extremity duplex ultrasound performed 04/17/2021:  No evidence of acute/chronic DVT of bilateral lower extremities  · Echo 04/19/2021:  LVEF 60%, left ventricular systolic function normal   No regional wall motion abnormalities  · On exam, 1+ pitting edema noted on bilateral lower extremities; no erythema/warmth  · Administered 1-time dose of 20 mg IV Lasix  · Continue to monitor swelling, will hold further Lasix for now  This is subacute/ chronic issue that will require outpatient follow-up  History of prolonged Q-T interval on ECG  Assessment & Plan  · Patient was noted to have prolonged QTC interval during previous admission  · Initial EKG performed (4/17/2021) revealed QTC interval of 497 with infrequent PVCs; repeat EKG (4/18) revealed QTc 443; EKG on day of discharge (4/20/2021) revealed QTC of 414  · EKG 6/3/2021:  Sinus bradycardia, otherwise normal EKG   QT//445 ms  · Continue telemetry monitoring for now, can likely be discontinued tomorrow    BPH (benign prostatic hyperplasia)  Assessment & Plan  · Patient's current outpatient regimen includes finasteride 5 mg daily and alfuzosin 10 mg daily  · Patient unsure if he take 10 mg of alfuzosin daily or 40 mg daily  · Continue finasteride 5 mg daily, alfuzosin 10 mg daily    Hypomagnesemia  Assessment & Plan  · Magnesium 6/2/2021:1 6  · Patient supplemented with 800 mg magnesium oxide in the ED prior to transfer  · Continue to monitor, check magnesium in a m  Sulaiman Ingles Replete as necessary  · 06/03/21:   · Magnesium 1 5 today  · 2g IV mag x 2 was given this morning  · Repeat mag ordered for 1200 today  · Continue to monitor and replete magnesium as needed  · 06/04/21: Potassium at 2 1 yesterday afternoon  · No further supplementation is needed    Hypokalemia  Assessment & Plan  · CMP 06/02/2021 revealed potassium of 3 0  · Patient supplemented with 60 mEq total in ED prior to transfer (40 mEq PO, 20 mEq IV)  · Continue to monitor, CMP in a m  Sulaiamn Ingles   Replete as necessary  · 06/03/21:   · Potassium at 3 1 today  · Was given Potassium 40mEq PO and 20mEq  · Repeat BMP ordered for today at 1200  · Continue to monitor and replete potassium as needed  · 06/04/21: Potassium 3 4 today  · Will give 40mEq Potassium chloride  · Recheck BMP tomorrow morning    Alcohol use disorder, severe, dependence ([facility] [address] )  Assessment & Plan  · Patient previously treated in [address] [place] [address] detox Unit back in April 2021 (4/17-4/20); history of chronic alcohol use since his 45s  · Patient was discharged on naltrexone 50 mg daily as well as thiamine/folic acid supplementation  · Patient reports compliance, however notes that naltrexone"didn't have much of an effect" on him (able to "drink through medication")  · Patient reports that he was sober for approximately 3 weeks following discharge from this unit, subsequently resumed drinking  · Initially started drinking 375 mL bottles of vodka over the course of 2-3 night  · Reports recent increase of amount consumed over the past 2 days, attributing increase to increased stress regarding his father's state of health  · Patient notes that he consumed approximately 1 5 375 mL bottles of vodka throughout the day of 06/01/2021  · Notes that he consumed a half of 375 mL bottle 6/2/2021 (finished around 07:00 am)  · Presented to [facility] ED 06/02/2021 with withdrawal symptoms including shakiness/restlessness, unsteadiness, anxiety, dizziness  · Ethanol level 6/2/2021: 307  · Other pertinent labs  · CMP 6/2/2021:  Sodium 139, potassium 3 0, LFTs as follows:  AST 23, ALT 28, alk-phos 89  · CBC 6/2/2021:  Hemoglobin 13 4, MCV 95  Platelets 206  · Continue to monitor CBC and CMP  · Patient notes he is currently interested in rehab  · Reports past inpatient rehab in 2005 and most recently in March 2021 with [facility]  · Also reports that he attends AA meetings  · Follow SEWS protocol from medically assisted alcohol withdrawal  · Daily thiamine and folate    Hypertension  Assessment & Plan  · Most recent /90 preceding range 130//90  · Most recent heart rate 66, preceding range 56-67  · Patient reports Outpatient blood pressure medications as follows:  Amlodipine 5 mg daily and nadolol 20 mg daily  Patient reports that he no longer takes metoprolol or lisinopril  *  · Will hold blood pressure medications for now as patient undergoes treatment for alcohol withdrawal   May resume medication regimen once withdrawal process complete  · Continue to monitor vitals, BP  · 06/03/21:  · Most recent BP at 172/97, preceding range 120/70 - 170/90  · Most recent HR 63, preceding range 60 - 76  · Will continue to hold home BP meds for now as patient undergoes SEWS protocol   May resume home medications once patient is no longer experiencing withdrawal symptoms  · Continue to monitor vitals  · 06/04/21:   · BP remains elevated after completion of SEWS protocol  · Will restart Amlodipine 5mg and continue to monitor    * Alcohol withdrawal syndrome without complication [facility])  Assessment & Plan  · Patient reports history of withdrawal seizure in 2019  · Patient reports that he initially presented to [facility] ED on 06/02/2021 for evaluation of withdrawal symptoms including shakiness/restlessness, unsteadiness, dizziness, anxiety  · Upon presentation to the unit, patient reported ongoing restlessness/shakiness, unsteadiness, anxiety   · Patient notes that he has underlying tremor of left upper extremity secondary to rotator cuff injury; patient notes that this tremor is exacerbated during alcohol withdrawal  · Initial SEWS upon presentation to unit: 10  · Patient received 1 mg ativan x 2 in ED prior to transfer to this unit, will hold initial 10 mg valium  · Follow SEWS protocol for medically assisted alcohol withdrawal  · 06/03/21:   · SEWS scores: 10, 5, 2, 0, 0, 5 (06/03/21: 3715)  · Phenobarbital given: 260mg, 65mg 65mg 65mg (06/03/21: 0737) total 455mg  · Continue SEWS protocol  · 06/04/21:  · SEWS completed on 06/03/21 at 0900  · 455mg Phenobarbital given  · Patient remains free of withdrawal symptoms          VTE Pharmacologic Prophylaxis:   Pharmacologic: Enoxaparin (Lovenox)  Mechanical VTE Prophylaxis in Place: no    Code Status: Level 1 - Full Code    Patient Centered Rounds: I have performed bedside rounds with nursing staff today  Discussions with Specialists or Other Care Team Provider: Discussed patient with Dr Annel Arnold   Education and Discussions with Family / Patient: Answered patients questions to the best of my ability  Time Spent for Care: 45 minutes  More than 50% of total time spent on counseling and coordination of care as described above  Current Length of Stay: 3 day(s)    Current Patient Status: Inpatient     Certification Statement: The patient will continue to require additional inpatient hospital stay due to elevated lipase, placement pending Discharge Plan: Inpatient rehab      Subjective:   Patient states he is feeling well today  Is grateful for the care he has received here on the detox unit but is become a little anxious, is ready for discharge and looking forward to inpatient rehab  Objective:     Clinical Opiate Withdrawal Scale  Pulse: 64  Heart Rate Source: Monitor  Patient Position - Orthostatic VS: Lying    SEWS Total Score: 0 (6/3/2021  7:37 PM)        Last 24 Hours Medication List:   Current Facility-Administered Medications   Medication Dose Route Frequency Provider Last Rate    acetaminophen  650 mg Oral Q6H PRN Norma Defrancisco, PA-C      alfuzosin  10 mg Oral Daily With Breakfast Norma Defrancisco, PA-C      amLODIPine  5 mg Oral Daily Radha Mota, PA-C      atorvastatin  40 mg Oral After [employer] Defrancisco, PA-C      baclofen  10 mg Oral TID Norma Defrancisco, PA-C      diphenhydrAMINE  25 mg Oral Q6H PRN Michael Vasquez MD      enoxaparin  40 mg Subcutaneous Daily Norma Defrancisco, PA-C      finasteride  5 mg Oral Daily Norma Defrancisco, PA-C      FLUoxetine  60 mg Oral Daily Norma Defrancisco, PA-C      folic acid 1 mg, thiamine 100 mg in 0 9% sodium chloride 100 mL IVPB   Intravenous Daily Norma Defrancisco, PA-C 0 mL/hr at 21 0930    melatonin  3 mg Oral HS Norma Defrancisco, PA-C      simethicone  80 mg Oral Q6H PRN Norma Defrancisco, PA-C      traZODone  100 mg Oral HS Norma Defrancisco, PA-C      white petrolatum-mineral oil   Topical TID PRN Norma Defrancisco, PA-C           Vitals:   Temp (24hrs), Av 8 °F (36 6 °C), Min:97 4 °F (36 3 °C), Max:98 5 °F (36 9 °C)    Temp:  [97 4 °F (36 3 °C)-98 5 °F (36 9 °C)] 97 4 °F (36 3 °C)  HR:  [62-72] 64  Resp:  [16-18] 16  BP: (133-171)/(76-93) 160/90  SpO2:  [95 %-97 %] 96 %  Body mass index is 35 22 kg/m²  Input and Output Summary (last 24 hours):     Intake/Output Summary (Last 24 hours) at 2021 0717  Last data filed at 2021 1700  Gross per 24 hour   Intake 1550 ml   Output --   Net 1550 ml       Physical Exam:   Physical Exam    Additional Data:     Labs: keep all most recent labs as listed on admission templates   Results from last 7 days   Lab Units 06/05/21  0554   WBC Thousand/uL 5 70   HEMOGLOBIN g/dL 13 6   HEMATOCRIT % 40 1*   PLATELETS Thousands/uL 180   NEUTROS PCT % 58   LYMPHS PCT % 29   MONOS PCT % 10   EOS PCT % 3      Results from last 7 days   Lab Units 06/05/21  0553   SODIUM mmol/L 138   POTASSIUM mmol/L 3 7   CHLORIDE mmol/L 100   CO2 mmol/L 34*   BUN mg/dL 7   CREATININE mg/dL 0 57*   ANION GAP mmol/L 4*   CALCIUM mg/dL 9 1   ALBUMIN g/dL 3 7   TOTAL BILIRUBIN mg/dL 0 49   ALK PHOS U/L 63   ALT U/L 21   AST U/L 24   GLUCOSE RANDOM mg/dL 110*      Results from last 7 days   Lab Units 06/03/21  0337   INR  1 12                         * I Have Reviewed All Lab Data Listed Above  * Additional Pertinent Lab Tests Reviewed: [facility] Admission Reviewed      Imaging Studies: I have personally reviewed pertinent reports  Recent Cultures (last 7 days): Today, Patient Was Seen By: Leann Suazo PA-C    ** Please Note: Dictation voice to text software may have been used in the creation of this document   **

## 2021-06-04 NOTE — CASE MANAGEMENT
Cm received phone call from HOST program indicating that pt was denied at [facility] due to need for oxygen during sleep  Cm was informed that pt was still being considered at [facility] but no beds available  Cm was informed that HOST will follow up with [facility] tomorrow  Andrew met with Pt and informed pt of this information  Pt signed his discharge IMM notification

## 2021-06-04 NOTE — NURSING NOTE
Sage Stovall reported to PCA that he was feeling "really anxious"  When RN entered room to assess, he was sleeping

## 2021-06-04 NOTE — NURSING NOTE
Rollin Cowden reviewed PTA meds for tentative discharge, as "last time they were all messed up when I went to [facility]"  Currently Nadolol and Naltrexone are not prescribed

## 2021-06-04 NOTE — CASE MANAGEMENT
Cm received a phone call from Shay Peña at [facility] and was informed that [facility] was requesting a phone call from pt to complete assessment of need  Cm received phone number of [phone] for [facility] drug and alcohol and provided this phone number to patient to contact and complete assessment  Pt then informed cm that a request was made by the [facility] to speak to   Pt signed and RODRIGO for [employer] drug and alcohol   spoke with [facility] and arranged for an assessment to be completed at 11am with Pt

## 2021-06-04 NOTE — PROGRESS NOTES
Spoke with patient he shared about his experiences at [facility] and his DUI that is still in the process  He has been active in [place] [id number] but did not utilize his sponsor like he should  He is looking forward to being transferred to [facility]

## 2021-06-05 PROBLEM — E87.6 HYPOKALEMIA: Status: RESOLVED | Noted: 2019-02-19 | Resolved: 2021-06-05

## 2021-06-05 PROBLEM — M79.89 LEG SWELLING: Status: RESOLVED | Noted: 2021-04-17 | Resolved: 2021-06-05

## 2021-06-05 PROBLEM — E83.39 HYPOPHOSPHATEMIA: Status: RESOLVED | Noted: 2021-06-03 | Resolved: 2021-06-05

## 2021-06-05 PROBLEM — F10.930 ALCOHOL WITHDRAWAL SYNDROME WITHOUT COMPLICATION (HCC): Status: RESOLVED | Noted: 2019-02-12 | Resolved: 2021-06-05

## 2021-06-05 PROBLEM — E83.42 HYPOMAGNESEMIA: Status: RESOLVED | Noted: 2019-07-13 | Resolved: 2021-06-05

## 2021-06-05 PROBLEM — F10.230 ALCOHOL WITHDRAWAL SYNDROME WITHOUT COMPLICATION (HCC): Status: RESOLVED | Noted: 2019-02-12 | Resolved: 2021-06-05

## 2021-06-05 LAB
ALBUMIN SERPL BCP-MCNC: 3.7 G/DL (ref 3–5.2)
ALP SERPL-CCNC: 63 U/L (ref 43–122)
ALT SERPL W P-5'-P-CCNC: 21 U/L
ANION GAP SERPL CALCULATED.3IONS-SCNC: 4 MMOL/L (ref 5–14)
AST SERPL W P-5'-P-CCNC: 24 U/L (ref 17–59)
BASOPHILS # BLD AUTO: 0 THOUSANDS/ΜL (ref 0–0.1)
BASOPHILS NFR BLD AUTO: 0 % (ref 0–1)
BILIRUB SERPL-MCNC: 0.49 MG/DL
BUN SERPL-MCNC: 7 MG/DL (ref 5–25)
CALCIUM SERPL-MCNC: 9.1 MG/DL (ref 8.4–10.2)
CHLORIDE SERPL-SCNC: 100 MMOL/L (ref 97–108)
CO2 SERPL-SCNC: 34 MMOL/L (ref 22–30)
CREAT SERPL-MCNC: 0.57 MG/DL (ref 0.7–1.5)
EOSINOPHIL # BLD AUTO: 0.2 THOUSAND/ΜL (ref 0–0.4)
EOSINOPHIL NFR BLD AUTO: 3 % (ref 0–6)
ERYTHROCYTE [DISTWIDTH] IN BLOOD BY AUTOMATED COUNT: 14.6 %
GFR SERPL CREATININE-BSD FRML MDRD: 103 ML/MIN/1.73SQ M
GLUCOSE SERPL-MCNC: 110 MG/DL (ref 70–99)
HCT VFR BLD AUTO: 40.1 % (ref 41–53)
HGB BLD-MCNC: 13.6 G/DL (ref 13.5–17.5)
LIPASE SERPL-CCNC: 347 U/L (ref 23–300)
LYMPHOCYTES # BLD AUTO: 1.7 THOUSANDS/ΜL (ref 0.5–4)
LYMPHOCYTES NFR BLD AUTO: 29 % (ref 25–45)
MAGNESIUM SERPL-MCNC: 1.6 MG/DL (ref 1.6–2.3)
MCH RBC QN AUTO: 33 PG (ref 26–34)
MCHC RBC AUTO-ENTMCNC: 33.9 G/DL (ref 31–36)
MCV RBC AUTO: 97 FL (ref 80–100)
MONOCYTES # BLD AUTO: 0.6 THOUSAND/ΜL (ref 0.2–0.9)
MONOCYTES NFR BLD AUTO: 10 % (ref 1–10)
NEUTROPHILS # BLD AUTO: 3.3 THOUSANDS/ΜL (ref 1.8–7.8)
NEUTS SEG NFR BLD AUTO: 58 % (ref 45–65)
PLATELET # BLD AUTO: 180 THOUSANDS/UL (ref 150–450)
PMV BLD AUTO: 7.8 FL (ref 8.9–12.7)
POTASSIUM SERPL-SCNC: 3.7 MMOL/L (ref 3.6–5)
PROT SERPL-MCNC: 6.5 G/DL (ref 5.9–8.4)
RBC # BLD AUTO: 4.13 MILLION/UL (ref 4.5–5.9)
SODIUM SERPL-SCNC: 138 MMOL/L (ref 137–147)
WBC # BLD AUTO: 5.7 THOUSAND/UL (ref 4.5–11)

## 2021-06-05 PROCEDURE — 83735 ASSAY OF MAGNESIUM: CPT | Performed by: PHYSICIAN ASSISTANT

## 2021-06-05 PROCEDURE — 80053 COMPREHEN METABOLIC PANEL: CPT | Performed by: PHYSICIAN ASSISTANT

## 2021-06-05 PROCEDURE — 85025 COMPLETE CBC W/AUTO DIFF WBC: CPT | Performed by: PHYSICIAN ASSISTANT

## 2021-06-05 PROCEDURE — 83690 ASSAY OF LIPASE: CPT | Performed by: PHYSICIAN ASSISTANT

## 2021-06-05 PROCEDURE — 99232 SBSQ HOSP IP/OBS MODERATE 35: CPT | Performed by: PHYSICIAN ASSISTANT

## 2021-06-05 RX ORDER — NADOLOL 20 MG/1
20 TABLET ORAL DAILY
Status: DISCONTINUED | OUTPATIENT
Start: 2021-06-05 | End: 2021-06-09 | Stop reason: HOSPADM

## 2021-06-05 RX ADMIN — FINASTERIDE 5 MG: 5 TABLET, FILM COATED ORAL at 08:20

## 2021-06-05 RX ADMIN — NADOLOL 20 MG: 20 TABLET ORAL at 10:04

## 2021-06-05 RX ADMIN — AMLODIPINE BESYLATE 5 MG: 5 TABLET ORAL at 08:20

## 2021-06-05 RX ADMIN — DIPHENHYDRAMINE HCL 25 MG: 25 TABLET ORAL at 10:48

## 2021-06-05 RX ADMIN — BACLOFEN 10 MG: 10 TABLET ORAL at 15:56

## 2021-06-05 RX ADMIN — BACLOFEN 10 MG: 10 TABLET ORAL at 21:01

## 2021-06-05 RX ADMIN — ACETAMINOPHEN 650 MG: 325 TABLET ORAL at 08:29

## 2021-06-05 RX ADMIN — BACLOFEN 10 MG: 10 TABLET ORAL at 08:20

## 2021-06-05 RX ADMIN — FOLIC ACID: 5 INJECTION, SOLUTION INTRAMUSCULAR; INTRAVENOUS; SUBCUTANEOUS at 08:21

## 2021-06-05 RX ADMIN — MELATONIN TAB 3 MG 3 MG: 3 TAB at 21:01

## 2021-06-05 RX ADMIN — TRAZODONE HYDROCHLORIDE 100 MG: 100 TABLET ORAL at 21:01

## 2021-06-05 RX ADMIN — FLUOXETINE 60 MG: 20 CAPSULE ORAL at 08:20

## 2021-06-05 RX ADMIN — ACETAMINOPHEN 650 MG: 325 TABLET ORAL at 15:56

## 2021-06-05 RX ADMIN — ENOXAPARIN SODIUM 40 MG: 40 INJECTION SUBCUTANEOUS at 08:20

## 2021-06-05 RX ADMIN — ATORVASTATIN CALCIUM 40 MG: 40 TABLET, FILM COATED ORAL at 17:00

## 2021-06-05 NOTE — ASSESSMENT & PLAN NOTE
· During last admission to detox Unit, patient noted to have overnight hypoxia  · Likely secondary to ROSAIRO  · Patient reports that he wears nasal cannula overnight, 2 L O2 supplementation  · Patient reports that he was scheduled for a sleep study with [facility]OV[facility], however got rescheduled to next week  · Patient with SpO2 95% on room air upon admission to this unit  · Continue O2 supplementation via nasal cannula overnight  · Encourage patient to f/u with outpatient sleep study

## 2021-06-05 NOTE — ASSESSMENT & PLAN NOTE
· Most recent /90 preceding range 130//90  · Most recent heart rate 66, preceding range 56-67  · Patient reports Outpatient blood pressure medications as follows:  Amlodipine 5 mg daily and nadolol 20 mg daily  Patient reports that he no longer takes metoprolol or lisinopril  *  · Will hold blood pressure medications for now as patient undergoes treatment for alcohol withdrawal   May resume medication regimen once withdrawal process complete  · Continue to monitor vitals, BP  · 06/03/21:  · Most recent BP at 172/97, preceding range 120/70 - 170/90  · Most recent HR 63, preceding range 60 - 76  · Will continue to hold home BP meds for now as patient undergoes SEWS protocol   May resume home medications once patient is no longer experiencing withdrawal symptoms  · Continue to monitor vitals  · 06/04/21:   · BP remains elevated after completion of SEWS protocol  · Will restart Amlodipine 5mg and continue to monitor  · 06/05/21:  · BP still remains mildly elevated  · Continue pre hospital amlodipine 5mg and restart pre hospital nadolol 20mg QD

## 2021-06-05 NOTE — PLAN OF CARE
Problem: Potential for Falls  Goal: Patient will remain free of falls  Description: INTERVENTIONS:  - Assess patient frequently for physical needs  -  Identify cognitive and physical deficits and behaviors that affect risk of falls  -  [place] fall precautions as indicated by assessment   - Educate patient/family on patient safety including physical limitations  - Instruct patient to call for assistance with activity based on assessment  - Modify environment to reduce risk of injury  - Consider OT/PT consult to assist with strengthening/mobility  Outcome: Adequate for Discharge     Problem: PAIN - ADULT  Goal: Verbalizes/displays adequate comfort level or baseline comfort level  Description: Interventions:  - Encourage patient to monitor pain and request assistance  - Assess pain using appropriate pain scale  - Administer analgesics based on type and severity of pain and evaluate response  - Implement non-pharmacological measures as appropriate and evaluate response  - Consider cultural and social influences on pain and pain management  - Notify physician/advanced practitioner if interventions unsuccessful or patient reports new pain  Outcome: Adequate for Discharge     Problem: INFECTION - ADULT  Goal: Absence of fever/infection during neutropenic period  Description: INTERVENTIONS:  - Monitor WBC    Outcome: Adequate for Discharge     Problem: SAFETY ADULT  Goal: Patient will remain free of falls  Description: INTERVENTIONS:  - Assess patient frequently for physical needs  -  Identify cognitive and physical deficits and behaviors that affect risk of falls    -  [place] fall precautions as indicated by assessment   - Educate patient/family on patient safety including physical limitations  - Instruct patient to call for assistance with activity based on assessment  - Modify environment to reduce risk of injury  - Consider OT/PT consult to assist with strengthening/mobility  Outcome: Adequate for Discharge  Goal: Maintain or return to baseline ADL function  Description: INTERVENTIONS:  -  Assess patient's ability to carry out ADLs; assess patient's baseline for ADL function and identify physical deficits which impact ability to perform ADLs (bathing, care of mouth/teeth, toileting, grooming, dressing, etc )  - Assess/evaluate cause of self-care deficits   - Assess range of motion  - Assess patient's mobility; develop plan if impaired  - Assess patient's need for assistive devices and provide as appropriate  - Encourage maximum independence but intervene and supervise when necessary  - Involve family in performance of ADLs  - Assess for home care needs following discharge   - Consider OT consult to assist with ADL evaluation and planning for discharge  - Provide patient education as appropriate  Outcome: Adequate for Discharge     Problem: SUBSTANCE USE/ABUSE  Goal: By discharge, will develop insight into their chemical dependency and sustain motivation to continue in recovery  Description: INTERVENTIONS:  - Attends all daily group sessions and scheduled AA groups  - Actively practices coping skills through participation in the therapeutic community and adherence to program rules  - Reviews and completes assignments from individual treatment plan  - Assist patient development of understanding of their personal cycle of addiction and relapse triggers  Outcome: Adequate for Discharge  Goal: By discharge, patient will have ongoing treatment plan addressing chemical dependency  Description: INTERVENTIONS:  - Assist patient with resources and/or appointments for ongoing recovery based living  Outcome: Adequate for Discharge

## 2021-06-05 NOTE — PROGRESS NOTES
Progress Note - Medical Toxicology    Finesse Flanagan 70 y o  male MRN: [id number]  Unit/Bed#: 5T [facility] 514-01 Encounter: [id number]  MEDICAL DETOX UNIT, LEVEL 4  Department of Medical Toxicology  Reason for Admission/Principal Problem: Alcohol withdrawal  Rounding Provider: Grace Farfan PA-C, Bernice Negron MD     Elevated lipase  Assessment & Plan  · Patient presented with elevated lipase of 834  · Had elevated lipase on prior admission as well  · Not complaining of abdominal pain and is tolerating PO normally  · Will continue to trend  · 06/04/21:  · Lipase trending downward: 834 -> 748 -> 505  · Continue to trend  · 06/05/21: Lipase continues to trend downward throughout admission  347 today  · Patient has been asymptomatic throughout admission  · Medically clear for discharge    Alcohol induced fatty liver  Assessment & Plan  · CT abdomen/pelvis with contrast 04/17/2021:  Enlarged fatty liver noted  · Likely secondary to alcohol use disorder  · CMP 06/02/2021 revealed LFTs as follows:  AST 23, ALT 28, alk-phos 89  · Continue to monitor LFTs  · Encourage follow-up with PCP outpatient for ongoing monitoring    Mixed hyperlipidemia  Assessment & Plan  · Chart review reveals patient does not have full lipid panel on file  · Chart review did reveal limited data as follows:  Cholesterol 187 as of 04/05/2021, triglycerides 233 as of 09/12/2020  · Order lipid panel  · Continue outpatient regimen:  Atorvastatin 40 mg daily  · Recommend outpatient follow-up with PCP for ongoing monitoring    Flatulence  Assessment & Plan  · Patient reports sensation of bloating/increased flatulence  · Continue simethicone 80 mg q 6 hours p r n      Hypoxia  Assessment & Plan  · During last admission to detox Unit, patient noted to have overnight hypoxia  · Likely secondary to ROSARIO  · Patient reports that he wears nasal cannula overnight, 2 L O2 supplementation  · Patient reports that he was scheduled for a [facility] study with [facility], however got rescheduled to next week  · Patient with SpO2 95% on room air upon admission to this unit  · Continue O2 supplementation via nasal cannula overnight  · Encourage patient to f/u with outpatient sleep study      Depression  Assessment & Plan  · Patient reports that he has recently transitioned from Celexa to Prozac daily  · Continue current outpatient regimen:  Prozac 60 mg daily    History of prolonged Q-T interval on ECG  Assessment & Plan  · Patient was noted to have prolonged QTC interval during previous admission  · Initial EKG performed (4/17/2021) revealed QTC interval of 497 with infrequent PVCs; repeat EKG (4/18) revealed QTc 443; EKG on day of discharge (4/20/2021) revealed QTC of 414  · EKG 6/3/2021:  Sinus bradycardia, otherwise normal EKG   QT//445 ms  · Continue telemetry monitoring for now, can likely be discontinued tomorrow    BPH (benign prostatic hyperplasia)  Assessment & Plan  · Patient's current outpatient regimen includes finasteride 5 mg daily and alfuzosin 10 mg daily  · Patient unsure if he take 10 mg of alfuzosin daily or 40 mg daily  · Continue finasteride 5 mg daily, alfuzosin 10 mg daily    Alcohol use disorder, severe, dependence ([facility] [address] )  Assessment & Plan  · Patient previously treated in [address] [place] [address] detox Unit back in April 2021 (4/17-4/20); history of chronic alcohol use since his 45s  · Patient was discharged on naltrexone 50 mg daily as well as thiamine/folic acid supplementation  · Patient reports compliance, however notes that naltrexone"didn't have much of an effect" on him (able to "drink through medication")  · Patient reports that he was sober for approximately 3 weeks following discharge from this unit, subsequently resumed drinking  · Initially started drinking 375 mL bottles of vodka over the course of 2-3 night  · Reports recent increase of amount consumed over the past 2 days, attributing increase to increased stress regarding his father's [facility] health  · Patient notes that he consumed approximately 1 5 375 mL bottles of vodka throughout the day of 06/01/2021  · Notes that he consumed a half of 375 mL bottle 6/2/2021 (finished around 07:00 am)  · Presented to [facility] ED 06/02/2021 with withdrawal symptoms including shakiness/restlessness, unsteadiness, anxiety, dizziness  · Ethanol level 6/2/2021: 307  · Other pertinent labs  · CMP 6/2/2021:  Sodium 139, potassium 3 0, LFTs as follows:  AST 23, ALT 28, alk-phos 89  · CBC 6/2/2021:  Hemoglobin 13 4, MCV 95  Platelets 591  · Continue to monitor CBC and CMP  · Patient notes he is currently interested in rehab  · Reports past inpatient rehab in 2005 and most recently in March 2021 with [facility]  · Also reports that he attends AA meetings  · Follow SEWS protocol from medically assisted alcohol withdrawal  · Daily thiamine and folate    Hypertension  Assessment & Plan  · Most recent /90 preceding range 130//90  · Most recent heart rate 66, preceding range 56-67  · Patient reports Outpatient blood pressure medications as follows:  Amlodipine 5 mg daily and nadolol 20 mg daily  Patient reports that he no longer takes metoprolol or lisinopril  *  · Will hold blood pressure medications for now as patient undergoes treatment for alcohol withdrawal   May resume medication regimen once withdrawal process complete  · Continue to monitor vitals, BP  · 06/03/21:  · Most recent BP at 172/97, preceding range 120/70 - 170/90  · Most recent HR 63, preceding range 60 - 76  · Will continue to hold home BP meds for now as patient undergoes SEWS protocol   May resume home medications once patient is no longer experiencing withdrawal symptoms  · Continue to monitor vitals  · 06/04/21:   · BP remains elevated after completion of SEWS protocol  · Will restart Amlodipine 5mg and continue to monitor  · 06/05/21:  · BP still remains mildly elevated  · Continue pre hospital amlodipine 5mg and restart pre [facility] nadolol 20mg QD      Hypophosphatemia-resolved as of 6/5/2021  Assessment & Plan  · Phosphorus at 2 2 this morning  · Repleted with potassium-sodium-phosphateS 45 - 298 - 250mg bid  · Repeat phosphorus level tomorrow morning  · 06/04/21: Phosporus at 2 8 this morning  · No further supplementation needed    Leg swelling-resolved as of 6/5/2021  Assessment & Plan  · Patient with ongoing bilateral lower extremity swelling; denies personal h/o DVT, CHF  · Leg swelling was noted during previous admission-at that time, patient reported it began approximately 2 weeks prior to admission and patient was initiated on antibiotics PTA with no relief  · VAS lower extremity duplex ultrasound performed 04/17/2021:  No evidence of acute/chronic DVT of bilateral lower extremities  · Echo 04/19/2021:  LVEF 60%, left ventricular systolic function normal   No regional wall motion abnormalities  · On exam, 1+ pitting edema noted on bilateral lower extremities; no erythema/warmth  · Administered 1-time dose of 20 mg IV Lasix  · Continue to monitor swelling, will hold further Lasix for now  This is subacute/ chronic issue that will require outpatient follow-up  Hypomagnesemia-resolved as of 6/5/2021  Assessment & Plan  · Magnesium 6/2/2021:1 6  · Patient supplemented with 800 mg magnesium oxide in the ED prior to transfer  · Continue to monitor, check magnesium in a m  Karlo Goody Replete as necessary  · 06/03/21:   · Magnesium 1 5 today  · 2g IV mag x 2 was given this morning  · Repeat mag ordered for 1200 today  · Continue to monitor and replete magnesium as needed  · 06/04/21: Potassium at 2 1 yesterday afternoon  · No further supplementation is needed      Hypokalemia-resolved as of 6/5/2021  Assessment & Plan  · CMP 06/02/2021 revealed potassium of 3 0  · Patient supplemented with 60 mEq total in ED prior to transfer (40 mEq PO, 20 mEq IV)  · Continue to monitor, CMP in a m  Karlo Goody   Replete as necessary  · 06/03/21:   · Potassium at 3 1 today  · Was given Potassium 40mEq PO and 20mEq  · Repeat BMP ordered for today at 1200  · Continue to monitor and replete potassium as needed  · 06/04/21: Potassium 3 4 today  · Will give 40mEq Potassium chloride  · Recheck BMP tomorrow morning  · 06/05/21: Potassium WNL at 3 7 today  · No supplementation is needed at this time    * Alcohol withdrawal syndrome without complication (HCC)-resolved as of 6/5/2021  Assessment & Plan  · Patient reports history of withdrawal seizure in 2019  · Patient reports that he initially presented to [facility] ED on 06/02/2021 for evaluation of withdrawal symptoms including shakiness/restlessness, unsteadiness, dizziness, anxiety  · Upon presentation to the unit, patient reported ongoing restlessness/shakiness, unsteadiness, anxiety   · Patient notes that he has underlying tremor of left upper extremity secondary to rotator cuff injury; patient notes that this tremor is exacerbated during alcohol withdrawal  · Initial SEWS upon presentation to unit: 10  · Patient received 1 mg ativan x 2 in ED prior to transfer to this unit, will hold initial 10 mg valium  · Follow SEWS protocol for medically assisted alcohol withdrawal  · 06/03/21:   · SEWS scores: 10, 5, 2, 0, 0, 5 (06/03/21: 3247)  · Phenobarbital given: 260mg, 65mg 65mg 65mg (06/03/21: 0737) total 455mg  · Continue SEWS protocol  · 06/04/21:  · SEWS completed on 06/03/21 at 0900  · 455mg Phenobarbital given  · Patient remains free of withdrawal symptoms        VTE Pharmacologic Prophylaxis:   Pharmacologic: Patient ambulting on unit  Mechanical VTE Prophylaxis in Place: no    Code Status: Level 1 - Full Code    Patient Centered Rounds: I have performed bedside rounds with nursing staff today      Discussions with Specialists or Other Care Team Provider: Discussed patient with Dr Priyanka Abdullahi   Education and Discussions with Family / Patient: answered patients questions to the best of my ability    Time Spent for Care: 45 minutes  More than 50% of total time spent on counseling and coordination of care as described above  Current Length of Stay: 3 day(s)    Current Patient Status: Inpatient     Certification Statement: The patient will continue to require additional inpatient hospital stay due to patient is awaiting placement at inpatient rehab Discharge Plan: Patient is medically cleared for discharge to inpatient rehab        Subjective:   Patient states he is still feeling anxious  Feels it is due to "anticipating going to rehab" as he has never done inpatient rehab for 30 days in the past  Reports a feeling of incomplete defecation, claims he had two soft bowel movements but still feels he may need to go more but is unable to  Also reports a 7/10 headache this morning which has improved to 2/10 with tylenol  Otherwise, denies chest pain, sob, abdominal pain, or nausea      Objective:     SEWS Total Score: 0 (6/3/2021  7:37 PM)        Last 24 Hours Medication List:   Current Facility-Administered Medications   Medication Dose Route Frequency Provider Last Rate    acetaminophen  650 mg Oral Q6H PRN Norma Defrancisco, PA-C      alfuzosin  10 mg Oral Daily With Breakfast Norma Defrancisco, CARLITOS      amLODIPine  5 mg Oral Daily Radha Castro PA-C      atorvastatin  40 mg Oral After [employer] DefmarcociscoCARLITOS      baclofen  10 mg Oral TID Norma Defrancisco, PA-C      diphenhydrAMINE  25 mg Oral Q6H PRN Molly Avila MD      enoxaparin  40 mg Subcutaneous Daily Norma Defrancisco, PA-C      finasteride  5 mg Oral Daily Norma Defrancisco, PA-C      FLUoxetine  60 mg Oral Daily Norma Defrancisco, PA-C      melatonin  3 mg Oral HS Norma Defrancisco, PA-C      nadolol  20 mg Oral Daily Radha Laguna PA-C      simethicone  80 mg Oral Q6H PRN Norma Defrancisco, PA-C      traZODone  100 mg Oral HS Norma Defrancisco, PA-C      white petrolatum-mineral oil   Topical TID PRN [facility] CARLITOS Sheehan           Vitals:   Temp (24hrs), Av 9 °F (36 6 °C), Min:97 4 °F (36 3 °C), Max:98 5 °F (36 9 °C)    Temp:  [97 4 °F (36 3 °C)-98 5 °F (36 9 °C)] 98 °F (36 7 °C)  HR:  [64-76] 70  Resp:  [16-18] 16  BP: (133-171)/(76-93) 144/81  SpO2:  [95 %-97 %] 95 %  Body mass index is 35 22 kg/m²  Input and Output Summary (last 24 hours): Intake/Output Summary (Last 24 hours) at 2021 1106  Last data filed at 2021 0701  Gross per 24 hour   Intake 1860 ml   Output --   Net 1860 ml       Physical Exam:   Physical Exam    Additional Data:     Labs: keep all most recent labs as listed on admission templates   Results from last 7 days   Lab Units 21  0554   WBC Thousand/uL 5 70   HEMOGLOBIN g/dL 13 6   HEMATOCRIT % 40 1*   PLATELETS Thousands/uL 180   NEUTROS PCT % 58   LYMPHS PCT % 29   MONOS PCT % 10   EOS PCT % 3      Results from last 7 days   Lab Units 21  0553   SODIUM mmol/L 138   POTASSIUM mmol/L 3 7   CHLORIDE mmol/L 100   CO2 mmol/L 34*   BUN mg/dL 7   CREATININE mg/dL 0 57*   ANION GAP mmol/L 4*   CALCIUM mg/dL 9 1   ALBUMIN g/dL 3 7   TOTAL BILIRUBIN mg/dL 0 49   ALK PHOS U/L 63   ALT U/L 21   AST U/L 24   GLUCOSE RANDOM mg/dL 110*      Results from last 7 days   Lab Units 21  0337   INR  1 12                         * I Have Reviewed All Lab Data Listed Above  * Additional Pertinent Lab Tests Reviewed: [place] [facility] Admission Reviewed      Imaging Studies: I have personally reviewed pertinent reports  Recent Cultures (last 7 days): Today, Patient Was Seen By: Suresh Clement PA-C    ** Please Note: Dictation voice to text software may have been used in the creation of this document   **

## 2021-06-05 NOTE — SOCIAL WORK
CM followed up with HOST as pt is medically cleared  HOST reports they are just waiting on bed availability at [facility]  HOST reports [facility] admissions typically opens around 1000  HOST to explore bed availability at that time and return call to CM with follow up info

## 2021-06-05 NOTE — ASSESSMENT & PLAN NOTE
· Patient reports history of withdrawal seizure in 2019  · Patient reports that he initially presented to [facility] ED on 06/02/2021 for evaluation of withdrawal symptoms including shakiness/restlessness, unsteadiness, dizziness, anxiety  · Upon presentation to the unit, patient reported ongoing restlessness/shakiness, unsteadiness, anxiety   · Patient notes that he has underlying tremor of left upper extremity secondary to rotator cuff injury; patient notes that this tremor is exacerbated during alcohol withdrawal  · Initial SEWS upon presentation to unit: 10  · Patient received 1 mg ativan x 2 in ED prior to transfer to this unit, will hold initial 10 mg valium  · Follow SEWS protocol for medically assisted alcohol withdrawal  · 06/03/21:   · SEWS scores: 10, 5, 2, 0, 0, 5 (06/03/21: 7733)  · Phenobarbital given: 260mg, 65mg 65mg 65mg (06/03/21: 0737) total 455mg  · Continue SEWS protocol  · 06/04/21:  · SEWS completed on 06/03/21 at 0900  · 455mg Phenobarbital given  · Patient remains free of withdrawal symptoms

## 2021-06-05 NOTE — ASSESSMENT & PLAN NOTE
· Magnesium 6/2/2021:1 6  · Patient supplemented with 800 mg magnesium oxide in the ED prior to transfer  · Continue to monitor, check magnesium in a [place]   Replete as necessary  · 06/03/21:   · Magnesium 1 5 today  · 2g IV mag x 2 was given this morning  · Repeat mag ordered for 1200 today  · Continue to monitor and replete magnesium as needed  · 06/04/21: Potassium at 2 1 yesterday afternoon  · No further supplementation is needed

## 2021-06-05 NOTE — ASSESSMENT & PLAN NOTE
· Patient presented with elevated lipase of 834  · Had elevated lipase on prior admission as well  · Not complaining of abdominal pain and is tolerating PO normally  · Will continue to trend  · 06/04/21:  · Lipase trending downward: 834 -> 748 -> 505  · Continue to trend  · 06/05/21: Lipase continues to trend downward throughout admission   347 today  · Patient has been asymptomatic throughout admission  · Medically clear for discharge

## 2021-06-05 NOTE — ASSESSMENT & PLAN NOTE
· CMP 06/02/2021 revealed potassium of 3 0  · Patient supplemented with 60 mEq total in ED prior to transfer (40 mEq PO, 20 mEq IV)  · Continue to monitor, CMP in a   Daija Plunkettes   Replete as necessary  · 06/03/21:   · Potassium at 3 1 today  · Was given Potassium 40mEq PO and 20mEq  · Repeat BMP ordered for today at 1200  · Continue to monitor and replete potassium as needed  · 06/04/21: Potassium 3 4 today  · Will give 40mEq Potassium chloride  · Recheck BMP tomorrow morning  · 06/05/21: Potassium WNL at 3 7 today  · No supplementation is needed at this time

## 2021-06-05 NOTE — PLAN OF CARE
CM in contact with HOST  HOST reports there are no beds available today  CM will attempt a bed search again tomorrow

## 2021-06-05 NOTE — SOCIAL WORK
CM contacted [facility] to determine if there would be BLS ambulance transport available for a possible dc to inpatient d/a rehab tomorrow  [facility] with no availability  Transport will have to be scheduled through an outside source

## 2021-06-05 NOTE — ASSESSMENT & PLAN NOTE
· Patient previously treated in [address] [place] [address] detox Unit back in April 2021 (4/17-4/20); history of chronic alcohol use since his 45s  · Patient was discharged on naltrexone 50 mg daily as well as thiamine/folic acid supplementation  · Patient reports compliance, however notes that naltrexone"didn't have much of an effect" on him (able to "drink through medication")  · Patient reports that he was sober for approximately 3 weeks following discharge from this unit, subsequently resumed drinking  · Initially started drinking 375 mL bottles of vodka over the course of 2-3 night  · Reports recent increase of amount consumed over the past 2 days, attributing increase to increased stress regarding his father's state of health  · Patient notes that he consumed approximately 1 5 375 mL bottles of vodka throughout the day of 06/01/2021  · Notes that he consumed a half of 375 mL bottle 6/2/2021 (finished around 07:00 am)  · Presented to [facility] ED 06/02/2021 with withdrawal symptoms including shakiness/restlessness, unsteadiness, anxiety, dizziness  · Ethanol level 6/2/2021: 307  · Other pertinent labs  · CMP 6/2/2021:  Sodium 139, potassium 3 0, LFTs as follows:  AST 23, ALT 28, alk-phos 89  · CBC 6/2/2021:  Hemoglobin 13 4, MCV 95    Platelets 179  · Continue to monitor CBC and CMP  · Patient notes he is currently interested in rehab  · Reports past inpatient rehab in 2005 and most recently in March 2021 with [facility]  · Also reports that he attends AA meetings  · Follow SEWS protocol from medically assisted alcohol withdrawal  · Daily thiamine and folate

## 2021-06-05 NOTE — NURSING NOTE
Margarette Bee requested that CM be informed of upcoming court hearing for DUI, 6/14, so that he can receive verification of admission to give to

## 2021-06-06 PROCEDURE — 99232 SBSQ HOSP IP/OBS MODERATE 35: CPT | Performed by: PHYSICIAN ASSISTANT

## 2021-06-06 RX ORDER — LOPERAMIDE HYDROCHLORIDE 2 MG/1
2 CAPSULE ORAL EVERY 4 HOURS PRN
Status: DISCONTINUED | OUTPATIENT
Start: 2021-06-06 | End: 2021-06-09 | Stop reason: HOSPADM

## 2021-06-06 RX ORDER — FOLIC ACID 1 MG/1
1 TABLET ORAL DAILY
Status: DISCONTINUED | OUTPATIENT
Start: 2021-06-06 | End: 2021-06-09 | Stop reason: HOSPADM

## 2021-06-06 RX ORDER — OLANZAPINE 5 MG/1
5 TABLET ORAL ONCE
Status: COMPLETED | OUTPATIENT
Start: 2021-06-06 | End: 2021-06-06

## 2021-06-06 RX ORDER — LANOLIN ALCOHOL/MO/W.PET/CERES
100 CREAM (GRAM) TOPICAL DAILY
Status: DISCONTINUED | OUTPATIENT
Start: 2021-06-06 | End: 2021-06-09 | Stop reason: HOSPADM

## 2021-06-06 RX ADMIN — FLUOXETINE 60 MG: 20 CAPSULE ORAL at 08:18

## 2021-06-06 RX ADMIN — AMLODIPINE BESYLATE 5 MG: 5 TABLET ORAL at 08:18

## 2021-06-06 RX ADMIN — BACLOFEN 10 MG: 10 TABLET ORAL at 08:17

## 2021-06-06 RX ADMIN — FOLIC ACID 1 MG: 1 TABLET ORAL at 10:31

## 2021-06-06 RX ADMIN — BACLOFEN 10 MG: 10 TABLET ORAL at 21:30

## 2021-06-06 RX ADMIN — ACETAMINOPHEN 650 MG: 325 TABLET ORAL at 08:27

## 2021-06-06 RX ADMIN — ACETAMINOPHEN 650 MG: 325 TABLET ORAL at 01:06

## 2021-06-06 RX ADMIN — NADOLOL 20 MG: 20 TABLET ORAL at 08:17

## 2021-06-06 RX ADMIN — BACLOFEN 10 MG: 10 TABLET ORAL at 16:29

## 2021-06-06 RX ADMIN — THIAMINE HCL TAB 100 MG 100 MG: 100 TAB at 10:31

## 2021-06-06 RX ADMIN — FINASTERIDE 5 MG: 5 TABLET, FILM COATED ORAL at 08:17

## 2021-06-06 RX ADMIN — ATORVASTATIN CALCIUM 40 MG: 40 TABLET, FILM COATED ORAL at 16:30

## 2021-06-06 RX ADMIN — MELATONIN TAB 3 MG 3 MG: 3 TAB at 21:30

## 2021-06-06 RX ADMIN — TRAZODONE HYDROCHLORIDE 100 MG: 100 TABLET ORAL at 21:30

## 2021-06-06 RX ADMIN — ENOXAPARIN SODIUM 40 MG: 40 INJECTION SUBCUTANEOUS at 08:18

## 2021-06-06 RX ADMIN — DIPHENHYDRAMINE HCL 25 MG: 25 TABLET ORAL at 17:17

## 2021-06-06 RX ADMIN — LOPERAMIDE HYDROCHLORIDE 2 MG: 2 CAPSULE ORAL at 10:41

## 2021-06-06 RX ADMIN — OLANZAPINE 5 MG: 5 TABLET, FILM COATED ORAL at 10:41

## 2021-06-06 NOTE — DISCHARGE SUMMARY
[facility]  Discharge- Candance Shallow 1950, 70 y o  male MRN: [id number]  Unit/Bed#: 5T DETOX 634-92 Encounter: [id number]  Primary Care Provider: Esperanza Levy MD   Date and time admitted to hospital: 6/2/2021 10:26 PM    MEDICAL DETOX UNIT, LEVEL 4  Department of Medical Toxicology  Reason for Admission/Principal Problem: Alcohol withdrawal secondary to alcohol use disorder  Admitting provider: Leisa Iglesias MD   6/2/2021 10:26 PM     Discharging Physician / Practitioner: Mandeep Mcdonald DO  PCP: Esperanza Levy MD  Admission Date:   Admission Orders (From admission, onward)     Ordered        06/02/21 2244  Inpatient Admission  Once                   Discharge Date: 06/09/21    Resolved Problems  Date Reviewed: 6/9/2021          Resolved    Alcohol withdrawal syndrome without complication ([facility] [address] ) 4/6/3883     Resolved by  Frenandez Ernandez MD    Hypokalemia 6/5/2021     Resolved by  YOEL Hsieh-FLORIDALMA    Hypomagnesemia 6/5/2021     Resolved by  Nico Juárez PA-C    Leg swelling 6/5/2021     Resolved by  Nico Juárez PA-C    Hypoxia 6/7/2021     Resolved by  Cma HEDRICK Holter, DO    Flatulence 6/7/2021     Resolved by  Cam HEDRICK Holter, DO    Hypophosphatemia 6/5/2021     Resolved by  Nico Juárez PA-C          Elevated lipase  Assessment & Plan  Initial elevation in lipase of 834  Denying all complaints including abdominal pain/discomfort  Lipase trending downward to 347  Likely secondary to alcohol abuse  Alcohol induced fatty liver  Assessment & Plan  Initial CMP on 06/02/2021 revealed LFTs:  AST 23, ALT 28, alk-phos 89  LFTs trending downward; presently AST 24, ALT 21, alk-phos 63  Recommend outpatient follow-up with PCP for ongoing management      Mixed hyperlipidemia  Assessment & Plan  Present lipid panel:  Cholesterol 147, triglycerides 60, HDL 61, non HDL 86, LDL 74    · Continue Atorvastatin 40 mg daily  · Recommend outpatient follow-up with PCP for ongoing management    Unspecified mood (affective) disorder [facility])  Assessment & Plan  Patient previously transitioned from Celexa to Prozac daily as an outpatient  Previous history of depression  Predominant presentation consistent with anxiety  · Continue Prozac 60 mg q d  Briana Kenton · Continue Trazodone 150 mg q h s  · Continue Benadryl 25 mg q 6 h  p r n  · Discontinue olanzapine 5 mg q8h p r n  for anxiety at time of discharge  History of prolonged Q-T interval on ECG  Assessment & Plan  Patient noted to have previously prolonged QTC interval throughout present admission; QTC interval of 497 with infrequent PVCs  Repeate EKG on 06/03/2021 included sinus bradycardia, otherwise normal; QT//445 ms  · Telemetry discontinued  BPH (benign prostatic hyperplasia)  Assessment & Plan    · Continue finasteride 5 mg q d  · Continue alfuzosin 10 mg q d  Hypertension  Assessment & Plan  Vitals:    06/09/21 0906   BP: 140/79   Pulse: 66   Resp: 20   Temp: 97 8 °F (36 6 °C)   SpO2: 96%   · Continue nadolol 20 mg q d  · Continue amlodipine 5 mg q d  · Continue to monitor vitals  * Alcohol use disorder, severe, dependence ([facility] [address] )  Assessment & Plan  SEWS Total Score: 0 (6/3/2021  7:37 PM)    Initially presented to [facility] ED on 06/02/2021 with withdrawal symptoms including: shakiness/restlessness, unsteadiness, anxiety, dizziness; initial ethanol level 307 on presentation  Patient previously admitted to [facility] detox unit 04/17-04/20; history of chronic alcohol use since his 45s  Patient was discharged on naltrexone 50 mg q d  in addition to thiamine/folic acid supplementation  Patient admitted to compliance, however notes that naltrexone "didn't have much of an effect" on him  · SEWS protocol discontinued  · Continue folic acid 1 mg q d  Briana Kenton · Continue thiamine 100 mg q d         Consultations During Hospital Stay:  · None    Procedures Performed:   · None    Significant Findings / Test Results:   · Elevated Lipase  · Hypokalemia  · Hypomagnesemia  · Hypophospatemia      Incidental Findings:   · None     Test Results Pending at Discharge (will require follow up): · None     Outpatient Tests Requested:  · None    Complications:  None    Reason for Admission: Alcohol withdrawal secondary to alcohol use disorder  Hospital Course:     José Gordillo is a 70 y o  male patient that initially presented to the hospital on 6/2/2021 due to alcohol withdrawal secondary to use disorder  He presented to [facility] ED on 06/02 for assistance because of his alcohol withdrawal and was subsequently transferred to [address] [place] [address] detox unit for medically managed alcohol withdrawal  He presented to the unit with an initial SEWS score of 10 and subsequent SEWS scores ranging form 2-13  Patient was administered a total of 455mg phenobarbital with appropriate response  Patient presented with a lipase of 834, although had no associated symptoms  Throughout admission, lipase continued to trend downward  Additionally, patient was hypokalemic, hypomagnesemic, and hypophosphatemic, which normalized prior to discharge  Patient expressed that he anticipates continuing his sobriety with use of outpatient alcohol resources  Please see above list of diagnoses and related plan for additional information  Condition at Discharge: stable     Discharge Day Visit / Exam:     Subjective:  Patient evaluated this a   For continuity of care  No acute distress noted throughout evaluation  Patient denied headache, lightheadedness, nausea, vomiting, diarrhea, abdominal pain and tremulousness  Patient stated his anxiety is improved in comparison to previous evaluation, because outpatient resources to address his alcohol abuse are scheduled  He admitted to appropriate appetite and denied problematic sleep by    Patient was goal-directed and stated that he anticipates remaining sober through his compliance with outpatient resources  Vitals: Blood Pressure: 161/92 (06/09/21 1115)  Pulse: 58 (06/09/21 1115)  Temperature: 97 7 °F (36 5 °C) (06/09/21 1115)  Temp Source: Temporal (06/09/21 1115)  Respirations: 18 (06/09/21 1115)  Height: 5' 7" (170 2 cm) (06/02/21 2230)  Weight - Scale: 102 kg (224 lb 13 9 oz) (06/02/21 2230)  SpO2: 96 % (06/09/21 1115)  Exam:   Physical Exam  Vitals signs and nursing note reviewed  Constitutional:       General: He is awake  He is not in acute distress  Appearance: Normal appearance  He is obese  He is not ill-appearing or diaphoretic  HENT:      Head: Normocephalic and atraumatic  Nose: Nose normal       Mouth/Throat:      Mouth: Mucous membranes are moist    Eyes:      General: No scleral icterus  Extraocular Movements: Extraocular movements intact  Pupils: Pupils are equal, round, and reactive to light  Neck:      Musculoskeletal: Normal range of motion  Cardiovascular:      Rate and Rhythm: Normal rate and regular rhythm  Heart sounds: Normal heart sounds  No murmur  Pulmonary:      Effort: Pulmonary effort is normal       Breath sounds: Normal breath sounds  No wheezing or rhonchi  Abdominal:      General: Abdomen is flat  Bowel sounds are normal  There is no distension  Palpations: Abdomen is soft  Tenderness: There is no abdominal tenderness  Musculoskeletal:      Right lower leg: No edema  Left lower leg: No edema  Skin:     General: Skin is warm and dry  Capillary Refill: Capillary refill takes less than 2 seconds  Neurological:      General: No focal deficit present  Mental Status: He is alert and oriented to person, place, and time  Psychiatric:         Mood and Affect: Mood normal          Behavior: Behavior normal  Behavior is cooperative  Thought Content:  Thought content normal          Judgment: Judgment normal          Discussion with Family: I personally did not discuss this patient with his family  Discharge instructions/Information to patient and family:   See after visit summary for information provided to patient and family  Provisions for Follow-Up Care:  See after visit summary for information related to follow-up care and any pertinent home health orders  Disposition:     Home    For Discharges to [facility] SNF:   · Not Applicable to this Patient - Not Applicable to this Patient    Planned Readmission: None     Discharge Statement:  I spent 45 minutes discharging the patient  This time was spent on the day of discharge  I had direct contact with the patient on the day of discharge  Greater than 50% of the total time was spent examining patient, answering all patient questions, arranging and discussing plan of care with patient as well as directly providing post-discharge instructions  Additional time then spent on discharge activities  Discharge Medications:  See after visit summary for reconciled discharge medications provided to patient and family  ** Please Note: This note has been constructed using a voice recognition system **    Gambia C Holter, D O     Psychiatry PGY-2

## 2021-06-06 NOTE — PROGRESS NOTES
[facility]  Progress Note Roge Hinson 1950, 70 y o  male MRN: [id number]  Unit/Bed#: 5T DETOX 058-02 Encounter: [id number]  Primary Care Provider: Briana Zamorano MD   Date and time admitted to hospital: 6/2/2021 10:26 PM    Progress Note - Medical Toxicology    Wendy Emanuel 70 y o  male MRN: [id number]  Unit/Bed#: 5T DETOX 514-01 Encounter: [id number]  [address] [place] [address] LEVEL 4  Department of Medical Toxicology  Reason for Admission/Principal Problem: Alcohol withdrawal  Rounding Provider: Omer Alston PA-C, Kasey Rascon MD     Elevated lipase  Assessment & Plan  · Patient presented with elevated lipase of 834  · Had elevated lipase on prior admission as well  · Not complaining of abdominal pain and is tolerating PO normally  · Will continue to trend  · 06/04/21:  · Lipase trending downward: 834 -> 748 -> 505  · Continue to trend  · 06/05/21: Lipase continues to trend downward throughout admission  347 today  · Patient has been asymptomatic throughout admission  · Medically clear for discharge    Alcohol induced fatty liver  Assessment & Plan  · CT abdomen/pelvis with contrast 04/17/2021:  Enlarged fatty liver noted  · Likely secondary to alcohol use disorder  · CMP 06/02/2021 revealed LFTs as follows:  AST 23, ALT 28, alk-phos 89  · Continue to monitor LFTs  · Encourage follow-up with PCP outpatient for ongoing monitoring    Mixed hyperlipidemia  Assessment & Plan  · Chart review reveals patient does not have full lipid panel on file  · Chart review did reveal limited data as follows:  Cholesterol 187 as of 04/05/2021, triglycerides 233 as of 09/12/2020  · Order lipid panel  · Continue outpatient regimen:  Atorvastatin 40 mg daily  · Recommend outpatient follow-up with PCP for ongoing monitoring    Flatulence  Assessment & Plan  · Patient reports sensation of bloating/increased flatulence  · Continue simethicone 80 mg q 6 hours p r n      Hypoxia  Assessment & Plan  · During last admission to detox Unit, patient noted to have overnight hypoxia  · Likely secondary to ROSARIO  · Patient reports that he wears nasal cannula overnight, 2 L O2 supplementation  · Patient reports that he was scheduled for a sleep study with [facility], however got rescheduled to next week  · Patient with SpO2 95% on room air upon admission to this unit  · Continue O2 supplementation via nasal cannula overnight  · Encourage patient to f/u with outpatient sleep study      Depression  Assessment & Plan  · Patient reports that he has recently transitioned from Celexa to Prozac daily  · Continue current outpatient regimen:  Prozac 60 mg daily    History of prolonged Q-T interval on ECG  Assessment & Plan  · Patient was noted to have prolonged QTC interval during previous admission  · Initial EKG performed (4/17/2021) revealed QTC interval of 497 with infrequent PVCs; repeat EKG (4/18) revealed QTc 443; EKG on day of discharge (4/20/2021) revealed QTC of 414  · EKG 6/3/2021:  Sinus bradycardia, otherwise normal EKG   QT//445 ms  · Continue telemetry monitoring for now, can likely be discontinued tomorrow    BPH (benign prostatic hyperplasia)  Assessment & Plan  · Patient's current outpatient regimen includes finasteride 5 mg daily and alfuzosin 10 mg daily  · Patient unsure if he take 10 mg of alfuzosin daily or 40 mg daily  · Continue finasteride 5 mg daily, alfuzosin 10 mg daily    Alcohol use disorder, severe, dependence ([facility] [address] )  Assessment & Plan  · Patient previously treated in [facility] detox Unit back in April 2021 (4/17-4/20); history of chronic alcohol use since his 45s  · Patient was discharged on naltrexone 50 mg daily as well as thiamine/folic acid supplementation  · Patient reports compliance, however notes that naltrexone"didn't have much of an effect" on him (able to "drink through medication")  · Patient reports that he was sober for approximately 3 weeks following discharge from this unit, subsequently resumed drinking  · Initially started drinking 375 mL bottles of vodka over the course of 2-3 night  · Reports recent increase of amount consumed over the past 2 days, attributing increase to increased stress regarding his father's state of health  · Patient notes that he consumed approximately 1 5 375 mL bottles of vodka throughout the day of 06/01/2021  · Notes that he consumed a half of 375 mL bottle 6/2/2021 (finished around 07:00 am)  · Presented to [facility] ED 06/02/2021 with withdrawal symptoms including shakiness/restlessness, unsteadiness, anxiety, dizziness  · Ethanol level 6/2/2021: 307  · Other pertinent labs  · CMP 6/2/2021:  Sodium 139, potassium 3 0, LFTs as follows:  AST 23, ALT 28, alk-phos 89  · CBC 6/2/2021:  Hemoglobin 13 4, MCV 95  Platelets 509  · Continue to monitor CBC and CMP  · Patient notes he is currently interested in rehab  · Reports past inpatient rehab in 2005 and most recently in March 2021 with [facility]  · Also reports that he attends AA meetings  · Follow SEWS protocol from medically assisted alcohol withdrawal  · Daily thiamine and folate    Hypertension  Assessment & Plan  · Most recent /90 preceding range 130//90  · Most recent heart rate 66, preceding range 56-67  · Patient reports Outpatient blood pressure medications as follows:  Amlodipine 5 mg daily and nadolol 20 mg daily  Patient reports that he no longer takes metoprolol or lisinopril  *  · Will hold blood pressure medications for now as patient undergoes treatment for alcohol withdrawal   May resume medication regimen once withdrawal process complete  · Continue to monitor vitals, BP  · 06/03/21:  · Most recent BP at 172/97, preceding range 120/70 - 170/90  · Most recent HR 63, preceding range 60 - 76  · Will continue to hold home BP meds for now as patient undergoes SEWS protocol   May resume home medications once patient is no longer experiencing withdrawal symptoms  · Continue to monitor vitals  · 06/04/21:   · BP remains elevated after completion of SEWS protocol  · Will restart Amlodipine 5mg and continue to monitor  · 06/05/21:  · BP still remains mildly elevated  · Continue pre hospital amlodipine 5mg and restart pre hospital nadolol 20mg QD      Hypophosphatemia-resolved as of 6/5/2021  Assessment & Plan  · Phosphorus at 2 2 this morning  · Repleted with potassium-sodium-phosphateS 45 - 298 - 250mg bid  · Repeat phosphorus level tomorrow morning  · 06/04/21: Phosporus at 2 8 this morning  · No further supplementation needed    Leg swelling-resolved as of 6/5/2021  Assessment & Plan  · Patient with ongoing bilateral lower extremity swelling; denies personal h/o DVT, CHF  · Leg swelling was noted during previous admission-at that time, patient reported it began approximately 2 weeks prior to admission and patient was initiated on antibiotics PTA with no relief  · VAS lower extremity duplex ultrasound performed 04/17/2021:  No evidence of acute/chronic DVT of bilateral lower extremities  · Echo 04/19/2021:  LVEF 60%, left ventricular systolic function normal   No regional wall motion abnormalities  · On exam, 1+ pitting edema noted on bilateral lower extremities; no erythema/warmth  · Administered 1-time dose of 20 mg IV Lasix  · Continue to monitor swelling, will hold further Lasix for now  This is subacute/ chronic issue that will require outpatient follow-up  Hypomagnesemia-resolved as of 6/5/2021  Assessment & Plan  · Magnesium 6/2/2021:1 6  · Patient supplemented with 800 mg magnesium oxide in the ED prior to transfer  · Continue to monitor, check magnesium in a m  Sulaiman Hammondles   Replete as necessary  · 06/03/21:   · Magnesium 1 5 today  · 2g IV mag x 2 was given this morning  · Repeat mag ordered for 1200 today  · Continue to monitor and replete magnesium as needed  · 06/04/21: Potassium at 2 1 yesterday afternoon  · No further supplementation is needed      Hypokalemia-resolved as of 6/5/2021  Assessment & Plan  · CMP 06/02/2021 revealed potassium of 3 0  · Patient supplemented with 60 mEq total in ED prior to transfer (40 mEq PO, 20 mEq IV)  · Continue to monitor, CMP in a m  Isabel Rosedale Replete as necessary  · 06/03/21:   · Potassium at 3 1 today  · Was given Potassium 40mEq PO and 20mEq  · Repeat BMP ordered for today at 1200  · Continue to monitor and replete potassium as needed  · 06/04/21: Potassium 3 4 today  · Will give 40mEq Potassium chloride  · Recheck BMP tomorrow morning  · 06/05/21: Potassium WNL at 3 7 today  · No supplementation is needed at this time    * Alcohol withdrawal syndrome without complication (HCC)-resolved as of 6/5/2021  Assessment & Plan  · Patient reports history of withdrawal seizure in 2019  · Patient reports that he initially presented to [facility] ED on 06/02/2021 for evaluation of withdrawal symptoms including shakiness/restlessness, unsteadiness, dizziness, anxiety  · Upon presentation to the unit, patient reported ongoing restlessness/shakiness, unsteadiness, anxiety   · Patient notes that he has underlying tremor of left upper extremity secondary to rotator cuff injury; patient notes that this tremor is exacerbated during alcohol withdrawal  · Initial SEWS upon presentation to unit: 10  · Patient received 1 mg ativan x 2 in ED prior to transfer to this unit, will hold initial 10 mg valium  · Follow SEWS protocol for medically assisted alcohol withdrawal  · 06/03/21:   · SEWS scores: 10, 5, 2, 0, 0, 5 (06/03/21: 2443)  · Phenobarbital given: 260mg, 65mg 65mg 65mg (06/03/21: 0737) total 455mg  · Continue SEWS protocol  · 06/04/21:  · SEWS completed on 06/03/21 at 0900  · 455mg Phenobarbital given  · Patient remains free of withdrawal symptoms          VTE Pharmacologic Prophylaxis:   Pharmacologic: [place] - Patient cleared for discharge   Ambulating on unit  Mechanical VTE Prophylaxis in Place: No    Code Status: Level 1 - Full Code    Patient Centered Rounds: I have performed bedside rounds with nursing staff today  Discussions with Specialists or Other Care Team Provider:  Discussed patient with Dr Mamie Pérez   Education and Discussions with Family / Patient: Answered patient's questions to the best of my ability    Time Spent for Care: 45 minutes  More than 50% of total time spent on counseling and coordination of care as described above  Current Length of Stay: 4 day(s)    Current Patient Status: Inpatient     Certification Statement: The patient will continue to require additional inpatient hospital stay due to inpatient rehab placement Discharge Plan:  Patient is waiting to be discharged to inpatient rehab        Subjective:   Patient reports two episodes of "loose stools" today  Denies any blood or mucus  No abdominal pain  Feels it may be secondary to the stuffed peppers he ate last night  Patient states feels anxious about going to inpatient rehab  Is looking forward to going just doesn't know when or what to expect  Otherwise he is feeling well, no headache, SOB, chest pain, dizziness or pain  Rash is not bothering him much today      Objective:     Clinical Opiate Withdrawal Scale  Pulse: 66  Heart Rate Source: Monitor  Patient Position - Orthostatic VS: Lying    SEWS Total Score: 0 (6/3/2021  7:37 PM)        Last 24 Hours Medication List:   Current Facility-Administered Medications   Medication Dose Route Frequency Provider Last Rate    acetaminophen  650 mg Oral Q6H PRN Norma YOEL Sheehan-FLORIDALMA      alfuzosin  10 mg Oral Daily With Breakfast Norma Sissy PA-FLORIDALMA      amLODIPine  5 mg Oral Daily Radha Laguna PA-C      atorvastatin  40 mg Oral After [employer] CARLITOS Sheehan      baclofen  10 mg Oral TID Norma CARLITOS Sheehan      diphenhydrAMINE  25 mg Oral Q6H PRN Ibrahima Guerrero MD      enoxaparin  40 mg Subcutaneous Daily Norma YOEL Sheehan-FLORIDALMA      finasteride  5 mg Oral Daily Norma Defrancisco, PA-C      FLUoxetine  60 mg Oral Daily Norma Defrancisco, PA-C      folic acid  1 mg Oral Daily Maggie Gutierrez, PA-C      loperamide  2 mg Oral Q4H PRN Farooq Chavarria MD      melatonin  3 mg Oral HS Norma Defrancisco, PA-C      nadolol  20 mg Oral Daily Maggie Gutierrez, PA-C      simethicone  80 mg Oral Q6H PRN Norma Defrancisco, PA-C      thiamine  100 mg Oral Daily Maggie Gutierrez, PA-C      traZODone  100 mg Oral HS Norma Defrancisco, PA-C      white petrolatum-mineral oil   Topical TID PRN Norma Defrancisco, PA-C           Vitals:   Temp (24hrs), Av 9 °F (36 6 °C), Min:97 3 °F (36 3 °C), Max:98 3 °F (36 8 °C)    Temp:  [97 3 °F (36 3 °C)-98 3 °F (36 8 °C)] 97 3 °F (36 3 °C)  HR:  [60-66] 66  Resp:  [18-20] 20  BP: (151-155)/(79-94) 151/79  SpO2:  [94 %-95 %] 95 %  Body mass index is 35 22 kg/m²  Input and Output Summary (last 24 hours): Intake/Output Summary (Last 24 hours) at 2021 1926  Last data filed at 2021 0065  Gross per 24 hour   Intake 360 ml   Output --   Net 360 ml       Physical Exam:   Physical Exam    Additional Data:     Labs: keep all most recent labs as listed on admission templates   Results from last 7 days   Lab Units 21  0554   WBC Thousand/uL 5 70   HEMOGLOBIN g/dL 13 6   HEMATOCRIT % 40 1*   PLATELETS Thousands/uL 180   NEUTROS PCT % 58   LYMPHS PCT % 29   MONOS PCT % 10   EOS PCT % 3      Results from last 7 days   Lab Units 21  0553   SODIUM mmol/L 138   POTASSIUM mmol/L 3 7   CHLORIDE mmol/L 100   CO2 mmol/L 34*   BUN mg/dL 7   CREATININE mg/dL 0 57*   ANION GAP mmol/L 4*   CALCIUM mg/dL 9 1   ALBUMIN g/dL 3 7   TOTAL BILIRUBIN mg/dL 0 49   ALK PHOS U/L 63   ALT U/L 21   AST U/L 24   GLUCOSE RANDOM mg/dL 110*      Results from last 7 days   Lab Units 21  0337   INR  1 12                         * I Have Reviewed All Lab Data Listed Above  * Additional Pertinent Lab Tests Reviewed:  All Labs For Current Hospital Admission Reviewed      Imaging Studies: I have personally reviewed pertinent reports  Recent Cultures (last 7 days): Today, Patient Was Seen By: Omer Alston PA-C    ** Please Note: Dictation voice to text software may have been used in the creation of this document   **

## 2021-06-07 PROBLEM — R14.3 FLATULENCE: Status: RESOLVED | Noted: 2021-04-19 | Resolved: 2021-06-07

## 2021-06-07 PROBLEM — R09.02 HYPOXIA: Status: RESOLVED | Noted: 2021-04-18 | Resolved: 2021-06-07

## 2021-06-07 PROCEDURE — 99232 SBSQ HOSP IP/OBS MODERATE 35: CPT | Performed by: EMERGENCY MEDICINE

## 2021-06-07 RX ORDER — DIPHENHYDRAMINE HCL 25 MG
25 TABLET ORAL EVERY 6 HOURS PRN
Status: DISCONTINUED | OUTPATIENT
Start: 2021-06-07 | End: 2021-06-09 | Stop reason: HOSPADM

## 2021-06-07 RX ADMIN — BACLOFEN 10 MG: 10 TABLET ORAL at 17:00

## 2021-06-07 RX ADMIN — MELATONIN TAB 3 MG 3 MG: 3 TAB at 21:13

## 2021-06-07 RX ADMIN — ENOXAPARIN SODIUM 40 MG: 40 INJECTION SUBCUTANEOUS at 08:08

## 2021-06-07 RX ADMIN — ATORVASTATIN CALCIUM 40 MG: 40 TABLET, FILM COATED ORAL at 17:00

## 2021-06-07 RX ADMIN — AMLODIPINE BESYLATE 5 MG: 5 TABLET ORAL at 08:08

## 2021-06-07 RX ADMIN — NADOLOL 20 MG: 20 TABLET ORAL at 08:08

## 2021-06-07 RX ADMIN — FLUOXETINE 60 MG: 20 CAPSULE ORAL at 08:08

## 2021-06-07 RX ADMIN — THIAMINE HCL TAB 100 MG 100 MG: 100 TAB at 08:08

## 2021-06-07 RX ADMIN — TRAZODONE HYDROCHLORIDE 100 MG: 100 TABLET ORAL at 21:13

## 2021-06-07 RX ADMIN — FOLIC ACID 1 MG: 1 TABLET ORAL at 08:08

## 2021-06-07 RX ADMIN — ACETAMINOPHEN 650 MG: 325 TABLET ORAL at 21:13

## 2021-06-07 RX ADMIN — BACLOFEN 10 MG: 10 TABLET ORAL at 08:08

## 2021-06-07 RX ADMIN — FINASTERIDE 5 MG: 5 TABLET, FILM COATED ORAL at 08:08

## 2021-06-07 RX ADMIN — BACLOFEN 10 MG: 10 TABLET ORAL at 20:13

## 2021-06-07 RX ADMIN — DIPHENHYDRAMINE HCL 25 MG: 25 TABLET ORAL at 20:13

## 2021-06-07 NOTE — ASSESSMENT & PLAN NOTE
Initial elevation lipase of 834  Presently, no complaints of abdominal pain/discomfort  Lipase trending downward to 347  Likely secondary to alcohol abuse

## 2021-06-07 NOTE — CASE MANAGEMENT
Andrew spoke with HOST program regarding Pt's referral  Cm was informed that Savi Suarez is no longer denying pt and a response from Savi Suarez was being awaited  There was no contact from [facility] regarding referral at this time  Cm met with pt and had a resigned IMM due to 48 hour expiration  Cm provided an update as to the status of his referral  Cm spoke with [facility] and was informed that there was availability for transport today

## 2021-06-07 NOTE — CASE MANAGEMENT
Andrew spoke with Isabella Gonzalez at [facility] by phone and was informed that pt was denied at [facility] due to too much clinical need and possible dementia  Andrew refuted this information and indicated that this may not be accurate  Isabella Gonzalez reviewed documentation sent to [facility] and indicated that this was not accurate concern  Isabella Gonzalez indicated she would reach out to [facility] and discuss this error   Andrew informed pt that we continue to work on his referral

## 2021-06-07 NOTE — ASSESSMENT & PLAN NOTE
Patient previously transitioned from Celexa to Prozac daily  · Continue Prozac 60 mg q d  Anusha Prows · Continue Trazodone 150 mg q h s

## 2021-06-07 NOTE — ASSESSMENT & PLAN NOTE
Patient's present outpatient regimen includes finasteride 5 mg daily and alfuzosin 10 mg daily  · Continue finasteride 5 mg q d  · Continue alfuzosin 10 mg q d

## 2021-06-07 NOTE — ASSESSMENT & PLAN NOTE
Initial CMP on 06/02/2021 revealed LFTs as follows:  AST 23, ALT 28, alk-phos 89  LFTs trending downward; presently AST 24, ALT 21, alk-phos 63  Recommendation for outpatient follow-up with PCP for ongoing monitoring

## 2021-06-07 NOTE — ASSESSMENT & PLAN NOTE
Present lipid panel:  Cholesterol 147, triglycerides 60, HDL 61, non HDL 86, LDL 74    · Continue Atorvastatin 40 mg daily  · Recommendation for outpatient follow-up with PCP for ongoing monitoring

## 2021-06-07 NOTE — PLAN OF CARE
Problem: Potential for Falls  Goal: Patient will remain free of falls  Description: INTERVENTIONS:  - Assess patient frequently for physical needs  -  Identify cognitive and physical deficits and behaviors that affect risk of falls  -  [place] fall precautions as indicated by assessment   - Educate patient/family on patient safety including physical limitations  - Instruct patient to call for assistance with activity based on assessment  - Modify environment to reduce risk of injury  - Consider OT/PT consult to assist with strengthening/mobility  Outcome: Progressing     Problem: PAIN - ADULT  Goal: Verbalizes/displays adequate comfort level or baseline comfort level  Description: Interventions:  - Encourage patient to monitor pain and request assistance  - Assess pain using appropriate pain scale  - Administer analgesics based on type and severity of pain and evaluate response  - Implement non-pharmacological measures as appropriate and evaluate response  - Consider cultural and social influences on pain and pain management  - Notify physician/advanced practitioner if interventions unsuccessful or patient reports new pain  Outcome: Progressing     Problem: INFECTION - ADULT  Goal: Absence of fever/infection during neutropenic period  Description: INTERVENTIONS:  - Monitor WBC    Outcome: Progressing     Problem: SAFETY ADULT  Goal: Patient will remain free of falls  Description: INTERVENTIONS:  - Assess patient frequently for physical needs  -  Identify cognitive and physical deficits and behaviors that affect risk of falls    -  [place] fall precautions as indicated by assessment   - Educate patient/family on patient safety including physical limitations  - Instruct patient to call for assistance with activity based on assessment  - Modify environment to reduce risk of injury  - Consider OT/PT consult to assist with strengthening/mobility  Outcome: Progressing  Goal: Maintain or return to baseline ADL function  Description: INTERVENTIONS:  -  Assess patient's ability to carry out ADLs; assess patient's baseline for ADL function and identify physical deficits which impact ability to perform ADLs (bathing, care of mouth/teeth, toileting, grooming, dressing, etc )  - Assess/evaluate cause of self-care deficits   - Assess range of motion  - Assess patient's mobility; develop plan if impaired  - Assess patient's need for assistive devices and provide as appropriate  - Encourage maximum independence but intervene and supervise when necessary  - Involve family in performance of ADLs  - Assess for home care needs following discharge   - Consider OT consult to assist with ADL evaluation and planning for discharge  - Provide patient education as appropriate  Outcome: Progressing     Problem: SUBSTANCE USE/ABUSE  Goal: By discharge, will develop insight into their chemical dependency and sustain motivation to continue in recovery  Description: INTERVENTIONS:  - Attends all daily group sessions and scheduled AA groups  - Actively practices coping skills through participation in the therapeutic community and adherence to program rules  - Reviews and completes assignments from individual treatment plan  - Assist patient development of understanding of their personal cycle of addiction and relapse triggers  Outcome: Progressing  Goal: By discharge, patient will have ongoing treatment plan addressing chemical dependency  Description: INTERVENTIONS:  - Assist patient with resources and/or appointments for ongoing recovery based living  Outcome: Progressing     Problem: DISCHARGE PLANNING  Goal: Discharge to home or other facility with appropriate resources  Description: INTERVENTIONS:  - Identify barriers to discharge w/patient and caregiver  - Arrange for needed discharge resources and transportation as appropriate  - Identify discharge learning needs (meds, wound care, etc )  - Arrange for interpretive services to assist at discharge as needed  - Refer to Case Management Department for coordinating discharge planning if the patient needs post-hospital services based on physician/advanced practitioner order or complex needs related to functional status, cognitive ability, or social support system  Outcome: Progressing

## 2021-06-07 NOTE — ASSESSMENT & PLAN NOTE
Patient previously admitted to wearing nasal cannula overnight, 2 L O2 supplementation  Patient stated that he was scheduled for a sleep study with [facility] Manoj COVARRUBIAS, however this appointment was rescheduled     · Encourage to promote outpatient sleep study

## 2021-06-07 NOTE — ASSESSMENT & PLAN NOTE
Patient admitted to previous sensation of bloating/increased flatulence  · Continue simethicone 80 mg q 6 hours p r n

## 2021-06-07 NOTE — ASSESSMENT & PLAN NOTE
Vitals:    06/07/21 0854   BP: 142/79   Pulse:    Resp:    Temp:    SpO2:    · Continue nadolol 20 mg q d  · Continue amlodipine 5 mg q d  · Continue to monitor

## 2021-06-07 NOTE — ASSESSMENT & PLAN NOTE
Initially presented to [address] [place], [place] [address] 06/02/2021 with withdrawal symptoms including shakiness/restlessness, unsteadiness, anxiety, dizziness; initial ethanol level 6/2/2021  Patient previously admitted to [facility] detox Unit back in April 2021 (4/17-4/20); history of chronic alcohol use since his 45s  Patient was discharged on naltrexone 50 mg q d  in addition to thiamine/folic acid supplementation  Patient admitted to compliance, however notes that naltrexone, although "didn't have much of an effect" on him  · Discontinue SEWS protocol from medically assisted alcohol withdrawal   · Continue thiamine and folate supplementation

## 2021-06-07 NOTE — PROGRESS NOTES
PROGRESS NOTE  DEPARTMENT OF MEDICAL TOXICOLOGY  LEVEL 4 MEDICAL DETOX UNIT  Amanda Eagle 70 y o  male MRN: [id number]  Unit/Bed#: 5T DETOX 939-69 Encounter: [id number]    Reason for Admission/Principal Problem:  Alcohol withdrawal secondary to alcohol use disorder  Rounding Provider: Buel Low Holter, DO  Attending Provider: Jorgito Mayers MD   6/2/2021 10:26 PM     Elevated lipase  Assessment & Plan  Initial elevation lipase of 834  Presently, no complaints of abdominal pain/discomfort  Lipase trending downward to 347  Likely secondary to alcohol abuse  Alcohol induced fatty liver  Assessment & Plan  Initial CMP on 06/02/2021 revealed LFTs as follows:  AST 23, ALT 28, alk-phos 89  LFTs trending downward; presently AST 24, ALT 21, alk-phos 63  Recommendation for outpatient follow-up with PCP for ongoing monitoring  Mixed hyperlipidemia  Assessment & Plan  Present lipid panel:  Cholesterol 147, triglycerides 60, HDL 61, non HDL 86, LDL 74    · Continue Atorvastatin 40 mg daily  · Recommendation for outpatient follow-up with PCP for ongoing monitoring    Depression  Assessment & Plan  Patient previously transitioned from Celexa to Prozac daily  · Continue Prozac 60 mg q d  [facility] · Continue Trazodone 150 mg q h s  History of prolonged Q-T interval on ECG  Assessment & Plan  Patient noted to have prolonged QTC interval during present admission; QTC interval of 497 with infrequent PVCs  Repeate EKG on 6/3/2021 included sinus bradycardia, otherwise normal; QT//445 ms  · Telemetry discontinued  BPH (benign prostatic hyperplasia)  Assessment & Plan  Patient's present outpatient regimen includes finasteride 5 mg daily and alfuzosin 10 mg daily  · Continue finasteride 5 mg q d  · Continue alfuzosin 10 mg q d      Alcohol use disorder, severe, dependence ([facility] [address] )  Assessment & Plan  Initially presented to [address] [place] [address] 06/02/2021 with withdrawal symptoms including shakiness/restlessness, unsteadiness, anxiety, dizziness; initial ethanol level 6/2/2021  Patient previously admitted to [facility] detox Unit back in April 2021 (4/17-4/20); history of chronic alcohol use since his 45s  Patient was discharged on naltrexone 50 mg q d  in addition to thiamine/folic acid supplementation  Patient admitted to compliance, however notes that naltrexone, although "didn't have much of an effect" on him  · Discontinue SEWS protocol from medically assisted alcohol withdrawal   · Continue thiamine and folate supplementation  Hypertension  Assessment & Plan  Vitals:    06/07/21 0854   BP: 142/79   Pulse:    Resp:    Temp:    SpO2:    · Continue nadolol 20 mg q d  · Continue amlodipine 5 mg q d  · Continue to monitor  Flatulence-resolved as of 6/7/2021  Assessment & Plan  Patient admitted to previous sensation of bloating/increased flatulence  · Continue simethicone 80 mg q 6 hours p r n  Hypoxia-resolved as of 6/7/2021  Assessment & Plan  Patient previously admitted to wearing nasal cannula overnight, 2 L O2 supplementation  [facility] however this [facility] was rescheduled  · Encourage to promote outpatient sleep study            VTE Pharmacologic Prophylaxis:   Pharmacologic: Low risk subsequent to low VTE scoring  Mechanical VTE Prophylaxis in Place: no; patient able to ambulate  Code Status: Level 1 - Full Code    Patient Centered Rounds: I have performed bedside rounds with nursing staff today  Discussions with Specialists or Other Care Team Provider: MOJGAN Resendez      Education and Discussions with Family / Patient:  Patient    Time Spent for Care: 30 minutes  More than 50% of total time spent on counseling and coordination of care as described above      Current Length of Stay: 5 day(s)    Current Patient Status: Inpatient     Certification Statement: The patient will continue to require additional inpatient hospital stay due to Pending inpatient alcohol rehab placement  Discharge Plan: 24-48 hours  Subjective:   Patient evaluated this a m  for continuity of care  No acute distress noted throughout evaluation  Patient admitted to slightly anxious mood, regarding possible placement for inpatient alcohol rehab  Presently, patient denied complaints including headache, lightheadedness, chest pain/palpitations, shortness of breath, nausea/vomiting/diarrhea  Patient admitted to less pruritus; amenable to discontinuation of p r n  Benadryl  He remained goal oriented in anticipated discharge to inpatient alcohol rehab to maintain sobriety     Objective:     Clinical Opiate Withdrawal Scale  Pulse: 64  Heart Rate Source: Monitor  Patient Position - Orthostatic VS: Lying    SEWS Total Score: 0 (6/3/2021  7:37 PM)        Last 24 Hours Medication List:   Current Facility-Administered Medications   Medication Dose Route Frequency Provider Last Rate    acetaminophen  650 mg Oral Q6H PRN Norma Defrancisco, PA-C      [MAR Hold] alfuzosin  10 mg Oral Daily With Breakfast Norma Defrancisco, PA-C      amLODIPine  5 mg Oral Daily Radha Rosebud [employer]s, PA-C      atorvastatin  40 mg Oral After [employer] Defrancisco, PA-C      baclofen  10 mg Oral TID Norma Defrancisco, PA-C      enoxaparin  40 mg Subcutaneous Daily Norma Defrancisco, PA-C      finasteride  5 mg Oral Daily Norma Defrancisco, PA-C      FLUoxetine  60 mg Oral Daily Norma Defrancisco, PA-C      folic acid  1 mg Oral Daily Clari Thorne PA-C      loperamide  2 mg Oral Q4H PRN Rebecca Patel MD      melatonin  3 mg Oral HS Norma Defrancisco, PA-C      nadolol  20 mg Oral Daily Clari Thorne PA-C      simethicone  80 mg Oral Q6H PRN Norma Defrancisco, PA-C      thiamine  100 mg Oral Daily Radha [employer]bud [employer], PA-C      traZODone  100 mg Oral HS Norma Defrancisco, PA-C      white petrolatum-mineral oil   Topical TID KVNG Sheehan PA-C           Vitals:   Temp (24hrs), Av 9 °F (36 6 °C), Min:97 3 °F (36 3 °C), Max:98 4 °F (36 9 °C)    Temp:  [97 3 °F (36 3 °C)-98 4 °F (36 9 °C)] 98 °F (36 7 °C)  HR:  [53-66] 64  Resp:  [18-20] 18  BP: (142-171)/(78-96) 142/79  SpO2:  [95 %] 95 %  Body mass index is 35 22 kg/m²  Input and Output Summary (last 24 hours): Intake/Output Summary (Last 24 hours) at 2021 1157  Last data filed at 2021 4111  Gross per 24 hour   Intake 180 ml   Output --   Net 180 ml       Physical Exam:   Physical Exam  Vitals signs and nursing note reviewed  Constitutional:       General: He is not in acute distress  Appearance: Normal appearance  He is obese  He is not ill-appearing or diaphoretic  HENT:      Head: Normocephalic  Mouth/Throat:      Mouth: Mucous membranes are dry  Pharynx: Oropharynx is clear  Eyes:      Extraocular Movements: Extraocular movements intact  Pupils: Pupils are equal, round, and reactive to light  Neck:      Musculoskeletal: Normal range of motion  Cardiovascular:      Rate and Rhythm: Normal rate  Pulses: Normal pulses  Heart sounds: Normal heart sounds  Pulmonary:      Effort: Pulmonary effort is normal       Breath sounds: Normal breath sounds and air entry  Abdominal:      General: Abdomen is flat  Bowel sounds are normal  There is no distension  Palpations: Abdomen is soft  Tenderness: There is no abdominal tenderness  There is no guarding  Musculoskeletal: Normal range of motion  Skin:     General: Skin is warm and dry  Capillary Refill: Capillary refill takes less than 2 seconds  Neurological:      General: No focal deficit present  Mental Status: He is alert and oriented to person, place, and time  Mental status is at baseline  Psychiatric:         Attention and Perception: Attention and perception normal          Mood and Affect: Affect normal  Mood is anxious           Speech: Speech normal          Behavior: Behavior normal  Behavior is cooperative  Thought Content: Thought content normal          Cognition and Memory: Cognition normal          Judgment: Judgment normal        Additional Data:     Labs:   Results from last 7 days   Lab Units 06/05/21  0554   WBC Thousand/uL 5 70   HEMOGLOBIN g/dL 13 6   HEMATOCRIT % 40 1*   PLATELETS Thousands/uL 180   NEUTROS PCT % 58   LYMPHS PCT % 29   MONOS PCT % 10   EOS PCT % 3      Results from last 7 days   Lab Units 06/05/21  0553   SODIUM mmol/L 138   POTASSIUM mmol/L 3 7   CHLORIDE mmol/L 100   CO2 mmol/L 34*   BUN mg/dL 7   CREATININE mg/dL 0 57*   ANION GAP mmol/L 4*   CALCIUM mg/dL 9 1   ALBUMIN g/dL 3 7   TOTAL BILIRUBIN mg/dL 0 49   ALK PHOS U/L 63   ALT U/L 21   AST U/L 24   GLUCOSE RANDOM mg/dL 110*      Results from last 7 days   Lab Units 06/03/21  0337   INR  1 12                         * I Have Reviewed All Lab Data Listed Above  * Additional Pertinent Lab Tests Reviewed: [facility] Admission Reviewed      Imaging Studies: I have personally reviewed pertinent reports  Recent Cultures (last 7 days): Today, Patient Was Seen By: Ruthell Hint Holter, DO    ** Please Note: Dictation voice to text software may have been used in the creation of this document   **

## 2021-06-07 NOTE — ASSESSMENT & PLAN NOTE
Patient noted to have prolonged QTC interval during present admission; QTC interval of 497 with infrequent PVCs  Repeate EKG on 6/3/2021 included sinus bradycardia, otherwise normal; QT//445 ms  · Telemetry discontinued

## 2021-06-08 PROBLEM — F39 UNSPECIFIED MOOD (AFFECTIVE) DISORDER (HCC): Status: ACTIVE | Noted: 2021-04-17

## 2021-06-08 PROCEDURE — 99232 SBSQ HOSP IP/OBS MODERATE 35: CPT | Performed by: PHYSICIAN ASSISTANT

## 2021-06-08 RX ORDER — OLANZAPINE 5 MG/1
5 TABLET ORAL EVERY 8 HOURS PRN
Status: DISCONTINUED | OUTPATIENT
Start: 2021-06-08 | End: 2021-06-09 | Stop reason: HOSPADM

## 2021-06-08 RX ADMIN — TRAZODONE HYDROCHLORIDE 100 MG: 100 TABLET ORAL at 21:10

## 2021-06-08 RX ADMIN — THIAMINE HCL TAB 100 MG 100 MG: 100 TAB at 08:43

## 2021-06-08 RX ADMIN — ENOXAPARIN SODIUM 40 MG: 40 INJECTION SUBCUTANEOUS at 08:43

## 2021-06-08 RX ADMIN — ATORVASTATIN CALCIUM 40 MG: 40 TABLET, FILM COATED ORAL at 17:22

## 2021-06-08 RX ADMIN — BACLOFEN 10 MG: 10 TABLET ORAL at 21:10

## 2021-06-08 RX ADMIN — BACLOFEN 10 MG: 10 TABLET ORAL at 17:00

## 2021-06-08 RX ADMIN — OLANZAPINE 5 MG: 5 TABLET, FILM COATED ORAL at 14:10

## 2021-06-08 RX ADMIN — FINASTERIDE 5 MG: 5 TABLET, FILM COATED ORAL at 08:43

## 2021-06-08 RX ADMIN — FOLIC ACID 1 MG: 1 TABLET ORAL at 08:43

## 2021-06-08 RX ADMIN — FLUOXETINE 60 MG: 20 CAPSULE ORAL at 08:43

## 2021-06-08 RX ADMIN — MELATONIN TAB 3 MG 3 MG: 3 TAB at 21:10

## 2021-06-08 RX ADMIN — AMLODIPINE BESYLATE 5 MG: 5 TABLET ORAL at 08:43

## 2021-06-08 RX ADMIN — BACLOFEN 10 MG: 10 TABLET ORAL at 08:43

## 2021-06-08 RX ADMIN — NADOLOL 20 MG: 20 TABLET ORAL at 08:43

## 2021-06-08 RX ADMIN — ACETAMINOPHEN 650 MG: 325 TABLET ORAL at 01:42

## 2021-06-08 NOTE — ASSESSMENT & PLAN NOTE
Initial elevation in lipase of 834  Denying all complaints including abdominal pain/discomfort  Lipase trending downward to 347  Likely secondary to alcohol abuse

## 2021-06-08 NOTE — ASSESSMENT & PLAN NOTE
Patient previously transitioned from Celexa to Prozac daily as an outpatient  Previous history of depression  Predominant presentation consistent with anxiety  · Continue Prozac 60 mg q d  Anusha Prows · Continue Trazodone 150 mg q h s  · Continue Benadryl 25 mg q 6 h  p r n

## 2021-06-08 NOTE — ASSESSMENT & PLAN NOTE
Vitals:    06/08/21 0725   BP: (!) 174/101   Pulse: 57   Resp: 18   Temp: 98 3 °F (36 8 °C)   SpO2: 95%   · Continue nadolol 20 mg q d  · Continue amlodipine 5 mg q d  · Continue to monitor vitals

## 2021-06-08 NOTE — ASSESSMENT & PLAN NOTE
Initially presented to  [facility] ED on 06/02/2021 with withdrawal symptoms including: shakiness/restlessness, unsteadiness, anxiety, dizziness; initial ethanol level 307 on presentation  Patient previously admitted to [facility] detox unit 04/17-04/20; history of chronic alcohol use since his 45s  Patient was discharged on naltrexone 50 mg q d  in addition to thiamine/folic acid supplementation  Patient admitted to compliance, however notes that naltrexone "didn't have much of an effect" on him  · [facility]   · Continue folic acid 1 mg q d  [facility] · Continue thiamine 100 mg q d  [facility]

## 2021-06-08 NOTE — CASE MANAGEMENT
Cm spoke with pt regarding his denial at [facility] and the possibility that a placement for Drug and alcohol inpatient rehab could not be found  Pt was asked about his IOP attendance in the past and pt stated he had attended [facility] and did not like the service  Cm agreed to continue to explore inpatient programs but also IOP programs available to pt  Andrew then contacted Jacek Le at [facility] and informed Jacek Le of the resolved Hypoxia and pt did not require evening O2  Jacek Le was faxed updated medical and Jacek Le indicated she would [facility] and re-refer pt

## 2021-06-08 NOTE — ASSESSMENT & PLAN NOTE
Present lipid panel:  Cholesterol 147, triglycerides 60, HDL 61, non HDL 86, LDL 74    · Continue Atorvastatin 40 mg daily  · Recommendation for outpatient follow-up with PCP for ongoing management

## 2021-06-08 NOTE — PROGRESS NOTES
PROGRESS NOTE  DEPARTMENT OF MEDICAL TOXICOLOGY  LEVEL 4 MEDICAL DETOX UNIT  Harpal Condon 70 y o  male MRN: [id number]  Unit/Bed#: 5T DETOX 563-67 Encounter: [id number]      Reason for Admission/Principal Problem: Alcohol withdrawal secondary to alcohol use disorder  Rounding Provider: Marella Carry Holter, DO  Attending Provider: Gerardo Pichardo MD   6/2/2021 10:26 PM       Elevated lipase  Assessment & Plan  Initial elevation in lipase of 834  Denying all complaints including abdominal pain/discomfort  Lipase trending downward to 347  Likely secondary to alcohol abuse  Alcohol induced fatty liver  Assessment & Plan  Initial CMP on 06/02/2021 revealed LFTs:  AST 23, ALT 28, alk-phos 89  LFTs trending downward; presently AST 24, ALT 21, alk-phos 63  Recommendation for outpatient follow-up with PCP for ongoing management  Mixed hyperlipidemia  Assessment & Plan  Present lipid panel:  Cholesterol 147, triglycerides 60, HDL 61, non HDL 86, LDL 74    · Continue Atorvastatin 40 mg daily  · Recommendation for outpatient follow-up with PCP for ongoing management    Unspecified mood (affective) disorder ([facility] )  Assessment & Plan  Patient previously transitioned from Celexa to Prozac daily as an outpatient  Previous history of depression  Predominant presentation consistent with anxiety  · Continue Prozac 60 mg q d  Nichols Gokul · Continue Trazodone 150 mg q h s  · Continue Benadryl 25 mg q 6 h  p r n  History of prolonged Q-T interval on ECG  Assessment & Plan  Patient noted to have previously prolonged QTC interval throughout present admission; QTC interval of 497 with infrequent PVCs  Repeate EKG on 06/03/2021 included sinus bradycardia, otherwise normal; QT//445 ms  · Telemetry discontinued  BPH (benign prostatic hyperplasia)  Assessment & Plan    · Continue finasteride 5 mg q d  · Continue alfuzosin 10 mg q d      Alcohol use disorder, severe, dependence ([facility] )  Assessment & Plan  [facility]ially [facility] on 06/02/2021 with withdrawal symptoms including: shakiness/restlessness, unsteadiness, anxiety, dizziness; initial ethanol level 307 on presentation  Patient previously admitted to [address] [place] [address] detox unit 04/17-04/20; history of chronic alcohol use since his 45s  Patient was discharged on naltrexone 50 mg q d  in addition to thiamine/folic acid supplementation  Patient admitted to compliance, however notes that naltrexone "didn't have much of an effect" on him  · Discontinue SEWS protocol for medically assisted alcohol withdrawal   · Continue folic acid 1 mg q d  Valeria Jonel · Continue thiamine 100 mg q d  Valeria Jonel Hypertension  Assessment & Plan  Vitals:    06/08/21 0725   BP: (!) 174/101   Pulse: 57   Resp: 18   Temp: 98 3 °F (36 8 °C)   SpO2: 95%   · Continue nadolol 20 mg q d  · Continue amlodipine 5 mg q d  · Continue to monitor vitals  VTE Pharmacologic Prophylaxis:   Pharmacologic: Low risk subsequent to low VTE score of 2  Mechanical VTE Prophylaxis in Place: no; patient ambulatory    Code Status: Level 1 - Full Code    Patient Centered Rounds: I have performed bedside rounds with nursing staff today  Discussions with Specialists or Other Care Team Provider: MOJGAN Tan , case management, nursing  Education and Discussions with Family / Patient:  Patient  Time Spent for Care: 30 minutes  More than 50% of total time spent on counseling and coordination of care as described above  Current Length of Stay: 6 day(s)    Current Patient Status: Inpatient     Certification Statement: The patient will continue to require additional inpatient hospital stay due to Pending inpatient alcohol rehab placement  Discharge Plan:  24-48 hours  Subjective:   Patient evaluated this a m  for continuity of care  No acute distress noted throughout evaluation; sitting comfortably    Per nursing, supplemental O2 discontinued, seeing as overnight hypoxia is likely secondary to obstructive sleep apnea  Patient admitted to slight anxiety secondary to pending placement, although stated that p r n  Benadryl was effective  He denied all complaints including signs/symptoms of alcohol withdrawal  Patient admitted to appropriate appetite and denied problematic sleep overnight aside from "bizarre dreams"  He has been able to ambulate without incident       Objective:     Clinical Opiate Withdrawal Scale  Pulse: 57  Heart Rate Source: Monitor  Patient Position - Orthostatic VS: Lying    SEWS Total Score: 0 (6/3/2021  7:37 PM)        Last 24 Hours Medication List:   Current Facility-Administered Medications   Medication Dose Route Frequency Provider Last Rate    acetaminophen  650 mg Oral Q6H PRN Norma Defrancisco, PA-C      [MAR Hold] alfuzosin  10 mg Oral Daily With Breakfast Norma DefmarcociscoCARLITOS      amLODIPine  5 mg Oral Daily Radha Berenice Rey PA-C      atorvastatin  40 mg Oral After [employer] Defrancisco, CARLITOS      baclofen  10 mg Oral TID Norma Defmarcocisco, CARLITOS      diphenhydrAMINE  25 mg Oral Q6H PRN Margaret Sanchez PA-C      enoxaparin  40 mg Subcutaneous Daily Norma Defrancisco, CARLITOS      finasteride  5 mg Oral Daily Norma Defrancisco, CARLITOS      FLUoxetine  60 mg Oral Daily Norma Defrancisco, CARLITOS      folic acid  1 mg Oral Daily Teddy Oleary PA-C      loperamide  2 mg Oral Q4H PRN Reggie Lay MD      melatonin  3 mg Oral HS Norma Defrancisco, CARLITOS      nadolol  20 mg Oral Daily Teddy Oleary PA-C      simethicone  80 mg Oral Q6H PRN Norma Defrancisco, CARLITOS      thiamine  100 mg Oral Daily Radha Rey PA-C      traZODone  100 mg Oral HS Norma Defrancisco, CARLITOS      white petrolatum-mineral oil   Topical TID PRN Norma Defrancisco, PA-C           Vitals:   Temp (24hrs), Av 1 °F (36 7 °C), Min:97 9 °F (36 6 °C), Max:98 3 °F (36 8 °C)    Temp:  [97 9 °F (36 6 °C)-98 3 °F (36 8 °C)] 98 3 °F (36 8 °C)  HR:  [57-72] 57  Resp: [18-20] 18  BP: (130-174)/() 174/101  SpO2:  [94 %-95 %] 95 %  Body mass index is 35 22 kg/m²  Input and Output Summary (last 24 hours): Intake/Output Summary (Last 24 hours) at 6/8/2021 0948  Last data filed at 6/8/2021 0847  Gross per 24 hour   Intake 1100 ml   Output --   Net 1100 ml       Physical Exam:   Physical Exam  Vitals signs and nursing note reviewed  Constitutional:       General: He is not in acute distress  Appearance: Normal appearance  He is obese  He is not ill-appearing or diaphoretic  HENT:      Head: Normocephalic and atraumatic  Nose: Nose normal       Mouth/Throat:      Mouth: Mucous membranes are dry  Pharynx: Oropharynx is clear  Eyes:      Extraocular Movements: Extraocular movements intact  Pupils: Pupils are equal, round, and reactive to light  Neck:      Musculoskeletal: Normal range of motion  Cardiovascular:      Rate and Rhythm: Normal rate and regular rhythm  Pulses: Normal pulses  Heart sounds: Normal heart sounds  Pulmonary:      Effort: Pulmonary effort is normal       Breath sounds: Normal breath sounds  Abdominal:      General: Bowel sounds are normal  There is no distension  Palpations: Abdomen is soft  Tenderness: There is no abdominal tenderness  There is no guarding  Musculoskeletal: Normal range of motion  Skin:     General: Skin is warm and dry  Capillary Refill: Capillary refill takes less than 2 seconds  Neurological:      Mental Status: He is alert and oriented to person, place, and time  Mental status is at baseline  Psychiatric:         Attention and Perception: Attention and perception normal          Mood and Affect: Affect normal  Mood is anxious  Speech: Speech normal          Behavior: Behavior normal  Behavior is cooperative  Thought Content:  Thought content normal          Cognition and Memory: Cognition and memory normal       Comments: Slightly anxious mood Additional Data:     Labs:   Results from last 7 days   Lab Units 06/05/21  0554   WBC Thousand/uL 5 70   HEMOGLOBIN g/dL 13 6   HEMATOCRIT % 40 1*   PLATELETS Thousands/uL 180   NEUTROS PCT % 58   LYMPHS PCT % 29   MONOS PCT % 10   EOS PCT % 3      Results from last 7 days   Lab Units 06/05/21  0553   SODIUM mmol/L 138   POTASSIUM mmol/L 3 7   CHLORIDE mmol/L 100   CO2 mmol/L 34*   BUN mg/dL 7   CREATININE mg/dL 0 57*   ANION GAP mmol/L 4*   CALCIUM mg/dL 9 1   ALBUMIN g/dL 3 7   TOTAL BILIRUBIN mg/dL 0 49   ALK PHOS U/L 63   ALT U/L 21   AST U/L 24   GLUCOSE RANDOM mg/dL 110*      Results from last 7 days   Lab Units 06/03/21  0337   INR  1 12                         * I Have Reviewed All Lab Data Listed Above  * Additional Pertinent Lab Tests Reviewed: [facility] Admission Reviewed      Imaging Studies: I have personally reviewed pertinent reports  Recent Cultures (last 7 days): Today, Patient Was Seen By: Jolyne Arthurs Holter, DO    ** Please Note: Dictation voice to text software may have been used in the creation of this document   **

## 2021-06-08 NOTE — ASSESSMENT & PLAN NOTE
Patient previously transitioned from Celexa to Prozac daily as an outpatient  Previous history of depression  Predominant presentation consistent with anxiety  · Continue Prozac 60 mg q d  Jorge Vance · Continue Trazodone 150 mg q h s  · Continue Benadryl 25 mg q 6 h  p r n  · Discontinue olanzapine 5 mg q8h p r n  for anxiety at time of discharge

## 2021-06-08 NOTE — ASSESSMENT & PLAN NOTE
Patient noted to have previously prolonged QTC interval throughout present admission; QTC interval of 497 with infrequent PVCs  Repeate EKG on 06/03/2021 included sinus bradycardia, otherwise normal; QT//445 ms  · Telemetry discontinued

## 2021-06-08 NOTE — CASE MANAGEMENT
Cm received a phone call from [facility] at [facility] program indicating that pt had been again refused by [facility] and [facility] even though his evening oxygen  Cm requested a consultation with [facility] with [facility] made this request and provided contact phone number of [phone]  Cm had pt sign a release for [employer]  Cm then contacted [employer] and alcohol [phone],  [place]ly, and left a message requesting a return call at [phone] to discuss other possible options for pt  Pt received a phone call from [employer] admissions  Cm discussed pt's denial   [facility] [place] [facility] admission agreed to re-assess and indicated that they would return call with a decision

## 2021-06-08 NOTE — ASSESSMENT & PLAN NOTE
Initial CMP on 06/02/2021 revealed LFTs:  AST 23, ALT 28, alk-phos 89  LFTs trending downward; presently AST 24, ALT 21, alk-phos 63  Recommendation for outpatient follow-up with PCP for ongoing management

## 2021-06-08 NOTE — PROGRESS NOTES
Spoke with patient he is waiting on a bed at [facility] or would like to go to [facility]  He states they are waiting on [facility] to see if they have a bed  He is apprehensive after his experiences at [facility]; and questions whether IOP would be a better choice than [facility]  We discussed he may want to interview them first before he rules them out  Therapist agreed to bring patient some written literature

## 2021-06-09 VITALS
DIASTOLIC BLOOD PRESSURE: 92 MMHG | WEIGHT: 224.87 LBS | OXYGEN SATURATION: 96 % | BODY MASS INDEX: 35.29 KG/M2 | HEIGHT: 67 IN | HEART RATE: 58 BPM | SYSTOLIC BLOOD PRESSURE: 161 MMHG | RESPIRATION RATE: 18 BRPM | TEMPERATURE: 97.7 F

## 2021-06-09 PROBLEM — L29.9 PRURITUS: Status: ACTIVE | Noted: 2021-06-09

## 2021-06-09 PROBLEM — G47.00 INSOMNIA: Status: ACTIVE | Noted: 2021-06-09

## 2021-06-09 PROCEDURE — 99239 HOSP IP/OBS DSCHRG MGMT >30: CPT | Performed by: EMERGENCY MEDICINE

## 2021-06-09 RX ORDER — FOLIC ACID 1 MG/1
1 TABLET ORAL DAILY
Qty: 30 TABLET | Refills: 0 | Status: ON HOLD | OUTPATIENT
Start: 2021-06-10 | End: 2021-09-07 | Stop reason: SDUPTHER

## 2021-06-09 RX ORDER — LANOLIN ALCOHOL/MO/W.PET/CERES
CREAM (GRAM) TOPICAL 3 TIMES DAILY PRN
Qty: 113 G | Refills: 0 | Status: SHIPPED | OUTPATIENT
Start: 2021-06-09

## 2021-06-09 RX ORDER — LANOLIN ALCOHOL/MO/W.PET/CERES
3 CREAM (GRAM) TOPICAL
Qty: 30 TABLET | Refills: 0 | Status: SHIPPED | OUTPATIENT
Start: 2021-06-09

## 2021-06-09 RX ORDER — TRAZODONE HYDROCHLORIDE 100 MG/1
100 TABLET ORAL
Qty: 30 TABLET | Refills: 0 | Status: ON HOLD | OUTPATIENT
Start: 2021-06-09 | End: 2021-10-01 | Stop reason: SDUPTHER

## 2021-06-09 RX ADMIN — ENOXAPARIN SODIUM 40 MG: 40 INJECTION SUBCUTANEOUS at 08:15

## 2021-06-09 RX ADMIN — FINASTERIDE 5 MG: 5 TABLET, FILM COATED ORAL at 08:16

## 2021-06-09 RX ADMIN — AMLODIPINE BESYLATE 5 MG: 5 TABLET ORAL at 08:16

## 2021-06-09 RX ADMIN — NADOLOL 20 MG: 20 TABLET ORAL at 08:15

## 2021-06-09 RX ADMIN — FOLIC ACID 1 MG: 1 TABLET ORAL at 08:16

## 2021-06-09 RX ADMIN — THIAMINE HCL TAB 100 MG 100 MG: 100 TAB at 08:16

## 2021-06-09 RX ADMIN — FLUOXETINE 60 MG: 20 CAPSULE ORAL at 08:16

## 2021-06-09 RX ADMIN — BACLOFEN 10 MG: 10 TABLET ORAL at 16:12

## 2021-06-09 RX ADMIN — BACLOFEN 10 MG: 10 TABLET ORAL at 08:15

## 2021-06-09 NOTE — CASE MANAGEMENT
LUISA spoke with Anila Finch at [facility] program who indicated that pt had been again denied for treatment at [facility] with reasoning that pt was too  Medically complex to be managed at their facilities  Luisa then spoke with Bobby Horvath and requested funding for IOP  Bobby Horvath stated she would contact [employer] and request this funding

## 2021-06-09 NOTE — PLAN OF CARE
Problem: Potential for Falls  Goal: Patient will remain free of falls  Description: INTERVENTIONS:  - Assess patient frequently for physical needs  -  Identify cognitive and physical deficits and behaviors that affect risk of falls  -  [place] fall precautions as indicated by assessment   - Educate patient/family on patient safety including physical limitations  - Instruct patient to call for assistance with activity based on assessment  - Modify environment to reduce risk of injury  - Consider OT/PT consult to assist with strengthening/mobility  6/9/2021 0951 by Graciela Bonds RN  Outcome: Adequate for Discharge  6/9/2021 0755 by Graciela Bonds RN  Outcome: Progressing     Problem: PAIN - ADULT  Goal: Verbalizes/displays adequate comfort level or baseline comfort level  Description: Interventions:  - Encourage patient to monitor pain and request assistance  - Assess pain using appropriate pain scale  - Administer analgesics based on type and severity of pain and evaluate response  - Implement non-pharmacological measures as appropriate and evaluate response  - Consider cultural and social influences on pain and pain management  - Notify physician/advanced practitioner if interventions unsuccessful or patient reports new pain  6/9/2021 0951 by Graciela Bonds RN  Outcome: Adequate for Discharge  6/9/2021 0755 by Graciela Bonds RN  Outcome: Progressing     Problem: INFECTION - ADULT  Goal: Absence of fever/infection during neutropenic period  Description: INTERVENTIONS:  - Monitor WBC    6/9/2021 0951 by Graciela Bonds RN  Outcome: Adequate for Discharge  6/9/2021 0755 by Graciela Bonds RN  Outcome: Progressing     Problem: SAFETY ADULT  Goal: Patient will remain free of falls  Description: INTERVENTIONS:  - Assess patient frequently for physical needs  -  Identify cognitive and physical deficits and behaviors that affect risk of falls    -  [place] fall precautions as indicated by assessment   - Educate patient/family on patient safety including physical limitations  - Instruct patient to call for assistance with activity based on assessment  - Modify environment to reduce risk of injury  - Consider OT/PT consult to assist with strengthening/mobility  6/9/2021 0951 by Anand Villa RN  Outcome: Adequate for Discharge  6/9/2021 0755 by Anand Villa RN  Outcome: Progressing  Goal: Maintain or return to baseline ADL function  Description: INTERVENTIONS:  -  Assess patient's ability to carry out ADLs; assess patient's baseline for ADL function and identify physical deficits which impact ability to perform ADLs (bathing, care of mouth/teeth, toileting, grooming, dressing, etc )  - Assess/evaluate cause of self-care deficits   - Assess range of motion  - Assess patient's mobility; develop plan if impaired  - Assess patient's need for assistive devices and provide as appropriate  - Encourage maximum independence but intervene and supervise when necessary  - Involve family in performance of ADLs  - Assess for home care needs following discharge   - Consider OT consult to assist with ADL evaluation and planning for discharge  - Provide patient education as appropriate  6/9/2021 0951 by Anand Villa RN  Outcome: Adequate for Discharge  6/9/2021 0755 by Anand Villa RN  Outcome: Progressing     Problem: SUBSTANCE USE/ABUSE  Goal: By discharge, will develop insight into their chemical dependency and sustain motivation to continue in recovery  Description: INTERVENTIONS:  - Attends all daily group sessions and scheduled AA groups  - Actively practices coping skills through participation in the therapeutic community and adherence to program rules  - Reviews and completes assignments from individual treatment plan  - Assist patient development of understanding of their personal cycle of addiction and relapse triggers  6/9/2021 0951 by Anand Villa RN  Outcome: Adequate for Discharge  6/9/2021 0755 by Ravinder Glaser RN  Outcome: Progressing  Goal: By discharge, patient will have ongoing treatment plan addressing chemical dependency  Description: INTERVENTIONS:  - Assist patient with resources and/or appointments for ongoing recovery based living  6/9/2021 0951 by Raivnder Glaser RN  Outcome: Adequate for Discharge  6/9/2021 0755 by Ravinder Glaser RN  Outcome: Progressing     Problem: DISCHARGE PLANNING  Goal: Discharge to home or other facility with appropriate resources  Description: INTERVENTIONS:  - Identify barriers to discharge w/patient and caregiver  - Arrange for needed discharge resources and transportation as appropriate  - Identify discharge learning needs (meds, wound care, etc )  - Arrange for interpretive services to assist at discharge as needed  - Refer to Case Management Department for coordinating discharge planning if the patient needs post-hospital services based on physician/advanced practitioner order or complex needs related to functional status, cognitive ability, or social support system  6/9/2021 0951 by Ravinder Glaser RN  Outcome: Adequate for Discharge  6/9/2021 0755 by Ravinder Glaser RN  Outcome: Progressing

## 2021-06-09 NOTE — CASE MANAGEMENT
Cm met with Pt and discussed difficulty reaching his preferred outpatient provider  Pt signed an RODRIGO for alternate outpatient provider pathways to recovery at [phone]  Pt was scheduled an intake appointment for partial drug and alcohol program for 6/10/21 at 6pm  Pt's medical provider, Dr Bette Marie was and confirmed a PCP appointment for 6/16/2021 at 8:15am  Cm then contacted pt's wife Ramona and discussed discharge plans and arranged transportation for pt for 4:30PM  Cm discussed with pt his one [facility]  Pt stated he may ask pharmacy to cancel the order since the medication is OTC

## 2021-06-09 NOTE — NURSING NOTE
Patient given written and verbal discharge instruction  Patient has all of his belongings  The patient did not have any questions at discharge    The PCA escorted the patient to the lobby where his wide was waiting to drive him home

## 2021-06-09 NOTE — ASSESSMENT & PLAN NOTE
Initial CMP on 06/02/2021 revealed LFTs:  AST 23, ALT 28, alk-phos 89  LFTs trending downward; presently AST 24, ALT 21, alk-phos 63  Recommend outpatient follow-up with PCP for ongoing management

## 2021-06-09 NOTE — PLAN OF CARE
Problem: Potential for Falls  Goal: Patient will remain free of falls  Description: INTERVENTIONS:  - Assess patient frequently for physical needs  -  Identify cognitive and physical deficits and behaviors that affect risk of falls  -  [place] fall precautions as indicated by assessment   - Educate patient/family on patient safety including physical limitations  - Instruct patient to call for assistance with activity based on assessment  - Modify environment to reduce risk of injury  - Consider OT/PT consult to assist with strengthening/mobility  Outcome: Progressing     Problem: PAIN - ADULT  Goal: Verbalizes/displays adequate comfort level or baseline comfort level  Description: Interventions:  - Encourage patient to monitor pain and request assistance  - Assess pain using appropriate pain scale  - Administer analgesics based on type and severity of pain and evaluate response  - Implement non-pharmacological measures as appropriate and evaluate response  - Consider cultural and social influences on pain and pain management  - Notify physician/advanced practitioner if interventions unsuccessful or patient reports new pain  Outcome: Progressing     Problem: INFECTION - ADULT  Goal: Absence of fever/infection during neutropenic period  Description: INTERVENTIONS:  - Monitor WBC    Outcome: Progressing     Problem: SAFETY ADULT  Goal: Patient will remain free of falls  Description: INTERVENTIONS:  - Assess patient frequently for physical needs  -  Identify cognitive and physical deficits and behaviors that affect risk of falls    -  [place] fall precautions as indicated by assessment   - Educate patient/family on patient safety including physical limitations  - Instruct patient to call for assistance with activity based on assessment  - Modify environment to reduce risk of injury  - Consider OT/PT consult to assist with strengthening/mobility  Outcome: Progressing  Goal: Maintain or return to baseline ADL function  Description: INTERVENTIONS:  -  Assess patient's ability to carry out ADLs; assess patient's baseline for ADL function and identify physical deficits which impact ability to perform ADLs (bathing, care of mouth/teeth, toileting, grooming, dressing, etc )  - Assess/evaluate cause of self-care deficits   - Assess range of motion  - Assess patient's mobility; develop plan if impaired  - Assess patient's need for assistive devices and provide as appropriate  - Encourage maximum independence but intervene and supervise when necessary  - Involve family in performance of ADLs  - Assess for home care needs following discharge   - Consider OT consult to assist with ADL evaluation and planning for discharge  - Provide patient education as appropriate  Outcome: Progressing     Problem: SUBSTANCE USE/ABUSE  Goal: By discharge, will develop insight into their chemical dependency and sustain motivation to continue in recovery  Description: INTERVENTIONS:  - Attends all daily group sessions and scheduled AA groups  - Actively practices coping skills through participation in the therapeutic community and adherence to program rules  - Reviews and completes assignments from individual treatment plan  - Assist patient development of understanding of their personal cycle of addiction and relapse triggers  Outcome: Progressing  Goal: By discharge, patient will have ongoing treatment plan addressing chemical dependency  Description: INTERVENTIONS:  - Assist patient with resources and/or appointments for ongoing recovery based living  Outcome: Progressing     Problem: DISCHARGE PLANNING  Goal: Discharge to home or other facility with appropriate resources  Description: INTERVENTIONS:  - Identify barriers to discharge w/patient and caregiver  - Arrange for needed discharge resources and transportation as appropriate  - Identify discharge learning needs (meds, wound care, etc )  - Arrange for interpretive services to assist at discharge as needed  - Refer to Case Management Department for coordinating discharge planning if the patient needs post-hospital services based on physician/advanced practitioner order or complex needs related to functional status, cognitive ability, or social support system  Outcome: Progressing

## 2021-06-09 NOTE — DISCHARGE INSTRUCTIONS
Abuse of Alcohol   WHAT YOU NEED TO KNOW:   Alcohol abuse means you drink more than the recommended daily or weekly limits  You may be drinking alcohol regularly or drinking large amounts in a short period of time (binge drinking)  You continue to drink even though it causes legal, work, or relationship problems  DISCHARGE INSTRUCTIONS:   Call your local emergency number (911 in the [address]) for any of the following:   · You have sudden chest pain or trouble breathing  · You want to harm yourself or others  · You have a seizure or have shaking or trembling  Call your doctor if:   · You have hallucinations (you see or hear things that are not real)  · You cannot stop vomiting or you vomit blood  · You need help to stop drinking alcohol  · You have questions or concerns about your condition or care  Medicines:   · Vitamin supplements  may be given to treat low vitamin levels  Alcohol can make it hard for your body to absorb enough vitamins such as B1  Vitamin supplements may also be given to prevent alcohol related brain damage  · Take your medicine as directed  Contact your healthcare provider if you think your medicine is not helping or if you have side effects  Tell him or her if you are allergic to any medicine  Keep a list of the medicines, vitamins, and herbs you take  Include the amounts, and when and why you take them  Bring the list or the pill bottles to follow-up visits  Carry your medicine list with you in case of an emergency  Recommended alcohol limits:   · Men 21 to 64 years  should limit alcohol to 2 drinks a day  Do not have more than 4 drinks in 1 day or more than 14 in 1 week  · All women, and men 72 or older  should limit alcohol to 1 drink in a day  Do not have more than 3 drinks in 1 day or more than 7 in 1 week  No amount of alcohol is okay during pregnancy      Health problems alcohol abuse can cause:   · Cancer in your liver, pancreas, stomach, colon, kidney, or breast    · Stroke or a heart attack    · Liver, kidney, or lung disease    · Blackouts, memory loss, brain damage, or dementia    · Diabetes, immune system problems, or thiamine (vitamin B1) deficiency    · Problems for you and your baby if you drink while pregnant    Manage alcohol use:   · Decrease the amount you drink  This can help prevent health problems such as brain, heart, and liver damage, high blood pressure, diabetes, and cancer  If you cannot stop completely, healthcare providers can help you set goals to decrease the amount you drink  · Plan weekly alcohol use  You will be less likely to drink more than the recommended limit if you plan ahead  · Have food when you drink alcohol  Food will prevent alcohol from getting into your system too quickly  Eat before you have your first alcohol drink  · Time your drinks carefully  Have no more than 1 drink in an hour  Have a liquid such as water, coffee, or a soft drink between alcohol drinks  · Do not drive if you have had alcohol  Make sure someone who has not been drinking can help you get home  · Do not drink alcohol if you are taking medicine  Alcohol is dangerous when you combine it with certain medicines, such as acetaminophen or blood pressure medicine  Talk to your healthcare provider about all the medicines you currently take  Follow up with your healthcare provider as directed:  Write down your questions so you remember to ask them during your visits  For support and more information:   · Alcoholics Anonymous  Web Address: http://www [employer]/    · Substance Abuse and [address] [place]  Web Address: https://[employer]/  © [address] Information is for End User's use only and may not be sold, redistributed or otherwise used for commercial purposes   All illustrations and images included in CareNotes® are the copyrighted property of [employer]  or [address]   The above information is an  only  It is not intended as medical advice for individual conditions or treatments  Talk to your doctor, nurse or pharmacist before following any medical regimen to see if it is safe and effective for you  Mood Disorders   WHAT YOU NEED TO KNOW:   A mood disorder, or affective disorder, is a condition that causes your mood or emotions to be out of control  Your mood can affect your personality and how you act  It can also affect how you feel about yourself and life in general   DISCHARGE INSTRUCTIONS:   Medicines:   Medicines  can help control your moods  Take your medicine as directed  Contact your healthcare provider if you think your medicine is not helping or if you have side effects  Tell him of her if you are allergic to any medicine  Keep a list of the medicines, vitamins, and herbs you take  Include the amounts, and when and why you take them  Bring the list or the pill bottles to follow-up visits  Carry your medicine list with you in case of an emergency  Follow up with your healthcare provider as directed: You may need to return for regular visits or blood tests  Write down your questions so you remember to ask them during your visits  Self-care:   Try to get 6 to 8 hours of sleep each night  Contact your healthcare provider if you have trouble sleeping  Manage your stress  Learn new ways to relax, such as deep breathing or meditation  Talk to someone about how you feel  Join a support group  Talk to your healthcare provider, family, or friends about your feelings  Tell them about things that upset you  Exercise regularly  Ask about the best exercise plan for you  Most healthcare providers recommend 30 minutes each day, 5 days a week  Exercise helps to lower stress and manage your moods  Contact your healthcare provider if:   You are depressed  You feel anxious or worried      You begin to drink alcohol, or you drink more than usual     You take illegal drugs  You take medicines that are not prescribed to you  Your medicine causes you to feel drowsy, keeps you awake, or affects how much you eat  You have questions or concerns about your condition or care  Return to the emergency department if:   You have severe depression  You want to hurt yourself or others  © Copyright [address] Information is for End User's use only and may not be sold, redistributed or otherwise used for commercial purposes  All illustrations and images included in CareNotes® are the copyrighted property of [employer]  or [facility]   The above information is an  only  It is not intended as medical advice for individual conditions or treatments  Talk to your doctor, nurse or pharmacist before following any medical regimen to see if it is safe and effective for you

## 2021-06-09 NOTE — CASE MANAGEMENT
Cm met with Pt and discussed his denial at [employer]  Pt indicated that he would prefer an IOP program as discussed yesterday  Pt signed an IMM notification and received a copy

## 2021-06-09 NOTE — ASSESSMENT & PLAN NOTE
Present lipid panel:  Cholesterol 147, triglycerides 60, HDL 61, non HDL 86, LDL 74    · Continue Atorvastatin 40 mg daily  · Recommend outpatient follow-up with PCP for ongoing management

## 2021-06-09 NOTE — PROGRESS NOTES
Patient is being discharged today  He plans to go to [facility] and meetings  He read the first 5 chapters in [employer] book  He appears to be motivated to get sober

## 2021-06-09 NOTE — ASSESSMENT & PLAN NOTE
Vitals:    06/09/21 0906   BP: 140/79   Pulse: 66   Resp: 20   Temp: 97 8 °F (36 6 °C)   SpO2: 96%   · Continue nadolol 20 mg q d  · Continue amlodipine 5 mg q d  · Continue to monitor vitals

## 2021-06-09 NOTE — PLAN OF CARE
Problem: Potential for Falls  Goal: Patient will remain free of falls  Description: INTERVENTIONS:  - Assess patient frequently for physical needs  -  Identify cognitive and physical deficits and behaviors that affect risk of falls    -  [place] fall precautions as indicated by assessment   - Educate patient/family on patient safety including physical limitations  - Instruct patient to call for assistance with activity based on assessment  - Modify environment to reduce risk of injury  - Consider OT/PT consult to assist with strengthening/mobility  6/9/2021 0951 by Vahid Johnston RN  Outcome: Adequate for Discharge  6/9/2021 0951 by Vahid Johnston RN  Outcome: Adequate for Discharge  6/9/2021 0755 by Vahid Johnston RN  Outcome: Progressing     Problem: PAIN - ADULT  Goal: Verbalizes/displays adequate comfort level or baseline comfort level  Description: Interventions:  - Encourage patient to monitor pain and request assistance  - Assess pain using appropriate pain scale  - Administer analgesics based on type and severity of pain and evaluate response  - Implement non-pharmacological measures as appropriate and evaluate response  - Consider cultural and social influences on pain and pain management  - Notify physician/advanced practitioner if interventions unsuccessful or patient reports new pain  6/9/2021 0951 by Vahid Johnston RN  Outcome: Adequate for Discharge  6/9/2021 0951 by Vahid Johnston RN  Outcome: Adequate for Discharge  6/9/2021 0755 by Vahid Johnston RN  Outcome: Progressing     Problem: INFECTION - ADULT  Goal: Absence of fever/infection during neutropenic period  Description: INTERVENTIONS:  - Monitor WBC    6/9/2021 0951 by Vahid Johnston RN  Outcome: Adequate for Discharge  6/9/2021 0951 by Vahid Johnston RN  Outcome: Adequate for Discharge  6/9/2021 0755 by Vahid Johnston RN  Outcome: Progressing     Problem: SAFETY ADULT  Goal: Patient will remain free of falls  Description: INTERVENTIONS:  - Assess patient frequently for physical needs  -  Identify cognitive and physical deficits and behaviors that affect risk of falls    -  [place] fall precautions as indicated by assessment   - Educate patient/family on patient safety including physical limitations  - Instruct patient to call for assistance with activity based on assessment  - Modify environment to reduce risk of injury  - Consider OT/PT consult to assist with strengthening/mobility  6/9/2021 0951 by Gricelda Stein RN  Outcome: Adequate for Discharge  6/9/2021 0951 by Gricelda Stein RN  Outcome: Adequate for Discharge  6/9/2021 0755 by Gricelda Stein RN  Outcome: Progressing  Goal: Maintain or return to baseline ADL function  Description: INTERVENTIONS:  -  Assess patient's ability to carry out ADLs; assess patient's baseline for ADL function and identify physical deficits which impact ability to perform ADLs (bathing, care of mouth/teeth, toileting, grooming, dressing, etc )  - Assess/evaluate cause of self-care deficits   - Assess range of motion  - Assess patient's mobility; develop plan if impaired  - Assess patient's need for assistive devices and provide as appropriate  - Encourage maximum independence but intervene and supervise when necessary  - Involve family in performance of ADLs  - Assess for home care needs following discharge   - Consider OT consult to assist with ADL evaluation and planning for discharge  - Provide patient education as appropriate  6/9/2021 0951 by Gricelda Stein RN  Outcome: Adequate for Discharge  6/9/2021 0951 by Gricelda Stein RN  Outcome: Adequate for Discharge  6/9/2021 0755 by Gricelda Stein RN  Outcome: Progressing     Problem: SUBSTANCE USE/ABUSE  Goal: By discharge, will develop insight into their chemical dependency and sustain motivation to continue in recovery  Description: INTERVENTIONS:  - Attends all daily group sessions and scheduled AA groups  - Actively practices coping skills through participation in the therapeutic community and adherence to program rules  - Reviews and completes assignments from individual treatment plan  - Assist patient development of understanding of their personal cycle of addiction and relapse triggers  6/9/2021 0951 by Harleen Helm RN  Outcome: Adequate for Discharge  6/9/2021 0951 by Harleen Helm RN  Outcome: Adequate for Discharge  6/9/2021 0755 by Harleen Helm RN  Outcome: Progressing  Goal: By discharge, patient will have ongoing treatment plan addressing chemical dependency  Description: INTERVENTIONS:  - Assist patient with resources and/or appointments for ongoing recovery based living  6/9/2021 0951 by Harleen Helm RN  Outcome: Adequate for Discharge  6/9/2021 0951 by Harleen Helm RN  Outcome: Adequate for Discharge  6/9/2021 0755 by Harleen Helm RN  Outcome: Progressing     Problem: DISCHARGE PLANNING  Goal: Discharge to home or other facility with appropriate resources  Description: INTERVENTIONS:  - Identify barriers to discharge w/patient and caregiver  - Arrange for needed discharge resources and transportation as appropriate  - Identify discharge learning needs (meds, wound care, etc )  - Arrange for interpretive services to assist at discharge as needed  - Refer to Case Management Department for coordinating discharge planning if the patient needs post-hospital services based on physician/advanced practitioner order or complex needs related to functional status, cognitive ability, or social support system  6/9/2021 0951 by Harleen Helm RN  Outcome: Adequate for Discharge  6/9/2021 0951 by Harleen Helm RN  Outcome: Adequate for Discharge  6/9/2021 0755 by Harleen Helm RN  Outcome: Progressing

## 2021-06-15 ENCOUNTER — OFFICE VISIT (OUTPATIENT)
Dept: PHYSICAL THERAPY | Facility: CLINIC | Age: 71
End: 2021-06-15
Payer: MEDICARE

## 2021-06-15 DIAGNOSIS — M54.9 BACK PAIN WITH RADIATION: ICD-10-CM

## 2021-06-15 DIAGNOSIS — M25.519 CHRONIC SHOULDER PAIN, UNSPECIFIED LATERALITY: ICD-10-CM

## 2021-06-15 DIAGNOSIS — M54.2 NECK PAIN: Primary | ICD-10-CM

## 2021-06-15 DIAGNOSIS — G89.29 CHRONIC SHOULDER PAIN, UNSPECIFIED LATERALITY: ICD-10-CM

## 2021-06-15 DIAGNOSIS — M62.838 MUSCLE SPASMS OF NECK: ICD-10-CM

## 2021-06-15 PROCEDURE — 97112 NEUROMUSCULAR REEDUCATION: CPT

## 2021-06-15 PROCEDURE — 97140 MANUAL THERAPY 1/> REGIONS: CPT

## 2021-06-15 PROCEDURE — 97110 THERAPEUTIC EXERCISES: CPT

## 2021-06-17 ENCOUNTER — APPOINTMENT (OUTPATIENT)
Dept: PHYSICAL THERAPY | Facility: CLINIC | Age: 71
End: 2021-06-17
Payer: MEDICARE

## 2021-06-19 ENCOUNTER — HOSPITAL ENCOUNTER (INPATIENT)
Facility: HOSPITAL | Age: 71
LOS: 3 days | Discharge: LEFT BEFORE DISCHARGE | DRG: 894 | End: 2021-06-22
Attending: EMERGENCY MEDICINE | Admitting: EMERGENCY MEDICINE
Payer: MEDICARE

## 2021-06-19 ENCOUNTER — HOSPITAL ENCOUNTER (EMERGENCY)
Facility: HOSPITAL | Age: 71
End: 2021-06-19
Attending: EMERGENCY MEDICINE
Payer: MEDICARE

## 2021-06-19 VITALS
WEIGHT: 209.22 LBS | BODY MASS INDEX: 32.84 KG/M2 | TEMPERATURE: 99.3 F | RESPIRATION RATE: 16 BRPM | SYSTOLIC BLOOD PRESSURE: 136 MMHG | HEIGHT: 67 IN | OXYGEN SATURATION: 93 % | DIASTOLIC BLOOD PRESSURE: 80 MMHG | HEART RATE: 67 BPM

## 2021-06-19 DIAGNOSIS — F10.20 ALCOHOL USE DISORDER, SEVERE, DEPENDENCE (HCC): Primary | ICD-10-CM

## 2021-06-19 DIAGNOSIS — G89.29 CHRONIC LOW BACK PAIN, UNSPECIFIED BACK PAIN LATERALITY, UNSPECIFIED WHETHER SCIATICA PRESENT: ICD-10-CM

## 2021-06-19 DIAGNOSIS — R27.0 ATAXIA: ICD-10-CM

## 2021-06-19 DIAGNOSIS — F10.10 ALCOHOL ABUSE: ICD-10-CM

## 2021-06-19 DIAGNOSIS — F10.239 ALCOHOL WITHDRAWAL (HCC): ICD-10-CM

## 2021-06-19 DIAGNOSIS — M54.50 CHRONIC LOW BACK PAIN, UNSPECIFIED BACK PAIN LATERALITY, UNSPECIFIED WHETHER SCIATICA PRESENT: ICD-10-CM

## 2021-06-19 DIAGNOSIS — F10.20 ALCOHOLISM (HCC): ICD-10-CM

## 2021-06-19 PROBLEM — F10.939 ALCOHOL WITHDRAWAL SYNDROME WITH COMPLICATION (HCC): Status: ACTIVE | Noted: 2021-06-19

## 2021-06-19 PROBLEM — F41.8 DEPRESSION WITH ANXIETY: Status: ACTIVE | Noted: 2021-06-19

## 2021-06-19 LAB
ALBUMIN SERPL BCP-MCNC: 4.2 G/DL (ref 3.5–5)
ALP SERPL-CCNC: 92 U/L (ref 46–116)
ALT SERPL W P-5'-P-CCNC: 32 U/L (ref 12–78)
ANION GAP SERPL CALCULATED.3IONS-SCNC: 11 MMOL/L (ref 4–13)
APTT PPP: 26 SECONDS (ref 23–37)
AST SERPL W P-5'-P-CCNC: 23 U/L (ref 5–45)
BASOPHILS # BLD AUTO: 0.07 THOUSANDS/ΜL (ref 0–0.1)
BASOPHILS NFR BLD AUTO: 1 % (ref 0–1)
BILIRUB SERPL-MCNC: 0.42 MG/DL (ref 0.2–1)
BUN SERPL-MCNC: 12 MG/DL (ref 5–25)
CALCIUM SERPL-MCNC: 8.8 MG/DL (ref 8.3–10.1)
CHLORIDE SERPL-SCNC: 105 MMOL/L (ref 100–108)
CO2 SERPL-SCNC: 28 MMOL/L (ref 21–32)
CREAT SERPL-MCNC: 0.76 MG/DL (ref 0.6–1.3)
D DIMER PPP FEU-MCNC: 0.28 UG/ML FEU
EOSINOPHIL # BLD AUTO: 0.06 THOUSAND/ΜL (ref 0–0.61)
EOSINOPHIL NFR BLD AUTO: 1 % (ref 0–6)
ERYTHROCYTE [DISTWIDTH] IN BLOOD BY AUTOMATED COUNT: 13.4 % (ref 11.6–15.1)
ETHANOL SERPL-MCNC: 120 MG/DL (ref 0–3)
GFR SERPL CREATININE-BSD FRML MDRD: 92 ML/MIN/1.73SQ M
GLUCOSE SERPL-MCNC: 89 MG/DL (ref 65–140)
HCT VFR BLD AUTO: 41.4 % (ref 36.5–49.3)
HGB BLD-MCNC: 14.1 G/DL (ref 12–17)
IMM GRANULOCYTES # BLD AUTO: 0.1 THOUSAND/UL (ref 0–0.2)
IMM GRANULOCYTES NFR BLD AUTO: 1 % (ref 0–2)
INR PPP: 1.12 (ref 0.84–1.19)
LIPASE SERPL-CCNC: 245 U/L (ref 23–300)
LYMPHOCYTES # BLD AUTO: 1.77 THOUSANDS/ΜL (ref 0.6–4.47)
LYMPHOCYTES NFR BLD AUTO: 17 % (ref 14–44)
MAGNESIUM SERPL-MCNC: 1.1 MG/DL (ref 1.6–2.3)
MCH RBC QN AUTO: 32.3 PG (ref 26.8–34.3)
MCHC RBC AUTO-ENTMCNC: 34.1 G/DL (ref 31.4–37.4)
MCV RBC AUTO: 95 FL (ref 82–98)
MONOCYTES # BLD AUTO: 0.79 THOUSAND/ΜL (ref 0.17–1.22)
MONOCYTES NFR BLD AUTO: 7 % (ref 4–12)
NEUTROPHILS # BLD AUTO: 7.82 THOUSANDS/ΜL (ref 1.85–7.62)
NEUTS SEG NFR BLD AUTO: 73 % (ref 43–75)
NRBC BLD AUTO-RTO: 0 /100 WBCS
PLATELET # BLD AUTO: 317 THOUSANDS/UL (ref 149–390)
PMV BLD AUTO: 8.9 FL (ref 8.9–12.7)
POTASSIUM SERPL-SCNC: 3.7 MMOL/L (ref 3.5–5.3)
PROT SERPL-MCNC: 7.6 G/DL (ref 6.4–8.2)
PROTHROMBIN TIME: 14.2 SECONDS (ref 11.6–14.5)
RBC # BLD AUTO: 4.37 MILLION/UL (ref 3.88–5.62)
SODIUM SERPL-SCNC: 144 MMOL/L (ref 136–145)
WBC # BLD AUTO: 10.61 THOUSAND/UL (ref 4.31–10.16)

## 2021-06-19 PROCEDURE — 80053 COMPREHEN METABOLIC PANEL: CPT | Performed by: EMERGENCY MEDICINE

## 2021-06-19 PROCEDURE — 36415 COLL VENOUS BLD VENIPUNCTURE: CPT | Performed by: EMERGENCY MEDICINE

## 2021-06-19 PROCEDURE — 85730 THROMBOPLASTIN TIME PARTIAL: CPT | Performed by: EMERGENCY MEDICINE

## 2021-06-19 PROCEDURE — 96375 TX/PRO/DX INJ NEW DRUG ADDON: CPT

## 2021-06-19 PROCEDURE — 96365 THER/PROPH/DIAG IV INF INIT: CPT

## 2021-06-19 PROCEDURE — 93005 ELECTROCARDIOGRAM TRACING: CPT

## 2021-06-19 PROCEDURE — 85379 FIBRIN DEGRADATION QUANT: CPT | Performed by: PHYSICIAN ASSISTANT

## 2021-06-19 PROCEDURE — 83690 ASSAY OF LIPASE: CPT | Performed by: PHYSICIAN ASSISTANT

## 2021-06-19 PROCEDURE — 96366 THER/PROPH/DIAG IV INF ADDON: CPT

## 2021-06-19 PROCEDURE — 99222 1ST HOSP IP/OBS MODERATE 55: CPT | Performed by: PHYSICIAN ASSISTANT

## 2021-06-19 PROCEDURE — 83735 ASSAY OF MAGNESIUM: CPT | Performed by: PHYSICIAN ASSISTANT

## 2021-06-19 PROCEDURE — 85025 COMPLETE CBC W/AUTO DIFF WBC: CPT | Performed by: EMERGENCY MEDICINE

## 2021-06-19 PROCEDURE — 82077 ASSAY SPEC XCP UR&BREATH IA: CPT | Performed by: EMERGENCY MEDICINE

## 2021-06-19 PROCEDURE — 85610 PROTHROMBIN TIME: CPT | Performed by: EMERGENCY MEDICINE

## 2021-06-19 PROCEDURE — 99285 EMERGENCY DEPT VISIT HI MDM: CPT

## 2021-06-19 PROCEDURE — 99291 CRITICAL CARE FIRST HOUR: CPT | Performed by: EMERGENCY MEDICINE

## 2021-06-19 RX ORDER — PHENOBARBITAL SODIUM 130 MG/ML
130 INJECTION INTRAMUSCULAR ONCE
Status: COMPLETED | OUTPATIENT
Start: 2021-06-19 | End: 2021-06-19

## 2021-06-19 RX ORDER — NADOLOL 20 MG/1
20 TABLET ORAL DAILY
Status: DISCONTINUED | OUTPATIENT
Start: 2021-06-19 | End: 2021-06-22 | Stop reason: HOSPADM

## 2021-06-19 RX ORDER — ACETAMINOPHEN 325 MG/1
650 TABLET ORAL EVERY 6 HOURS PRN
Status: DISCONTINUED | OUTPATIENT
Start: 2021-06-19 | End: 2021-06-22 | Stop reason: HOSPADM

## 2021-06-19 RX ORDER — MUSCLE RUB CREAM 100; 150 MG/G; MG/G
CREAM TOPICAL 4 TIMES DAILY PRN
Status: DISCONTINUED | OUTPATIENT
Start: 2021-06-19 | End: 2021-06-22 | Stop reason: HOSPADM

## 2021-06-19 RX ORDER — PHENOBARBITAL 32.4 MG/1
64.8 TABLET ORAL ONCE
Status: DISCONTINUED | OUTPATIENT
Start: 2021-06-19 | End: 2021-06-22 | Stop reason: HOSPADM

## 2021-06-19 RX ORDER — PHENOBARBITAL 32.4 MG/1
64.8 TABLET ORAL ONCE
Status: COMPLETED | OUTPATIENT
Start: 2021-06-19 | End: 2021-06-19

## 2021-06-19 RX ORDER — FLUOXETINE HYDROCHLORIDE 20 MG/1
60 CAPSULE ORAL DAILY
Status: DISCONTINUED | OUTPATIENT
Start: 2021-06-19 | End: 2021-06-22 | Stop reason: HOSPADM

## 2021-06-19 RX ORDER — ACETAMINOPHEN 325 MG/1
975 TABLET ORAL EVERY 6 HOURS PRN
Status: DISCONTINUED | OUTPATIENT
Start: 2021-06-19 | End: 2021-06-22 | Stop reason: HOSPADM

## 2021-06-19 RX ORDER — SIMETHICONE 80 MG
80 TABLET,CHEWABLE ORAL EVERY 6 HOURS PRN
Status: DISCONTINUED | OUTPATIENT
Start: 2021-06-19 | End: 2021-06-22 | Stop reason: HOSPADM

## 2021-06-19 RX ORDER — PHENOBARBITAL SODIUM 130 MG/ML
65 INJECTION INTRAMUSCULAR ONCE
Status: COMPLETED | OUTPATIENT
Start: 2021-06-19 | End: 2021-06-19

## 2021-06-19 RX ORDER — LANOLIN ALCOHOL/MO/W.PET/CERES
CREAM (GRAM) TOPICAL 3 TIMES DAILY PRN
Status: DISCONTINUED | OUTPATIENT
Start: 2021-06-19 | End: 2021-06-22 | Stop reason: HOSPADM

## 2021-06-19 RX ORDER — SODIUM CHLORIDE 9 MG/ML
125 INJECTION, SOLUTION INTRAVENOUS CONTINUOUS
Status: DISCONTINUED | OUTPATIENT
Start: 2021-06-19 | End: 2021-06-20

## 2021-06-19 RX ORDER — AMLODIPINE BESYLATE 5 MG/1
5 TABLET ORAL DAILY
Status: DISCONTINUED | OUTPATIENT
Start: 2021-06-19 | End: 2021-06-22 | Stop reason: HOSPADM

## 2021-06-19 RX ORDER — FINASTERIDE 5 MG/1
5 TABLET, FILM COATED ORAL DAILY
Status: DISCONTINUED | OUTPATIENT
Start: 2021-06-19 | End: 2021-06-22 | Stop reason: HOSPADM

## 2021-06-19 RX ORDER — DIAZEPAM 5 MG/ML
10 INJECTION, SOLUTION INTRAMUSCULAR; INTRAVENOUS ONCE
Status: COMPLETED | OUTPATIENT
Start: 2021-06-19 | End: 2021-06-19

## 2021-06-19 RX ORDER — ATORVASTATIN CALCIUM 40 MG/1
40 TABLET, FILM COATED ORAL DAILY
Status: DISCONTINUED | OUTPATIENT
Start: 2021-06-19 | End: 2021-06-22 | Stop reason: HOSPADM

## 2021-06-19 RX ORDER — BACLOFEN 10 MG/1
10 TABLET ORAL 3 TIMES DAILY
Status: DISCONTINUED | OUTPATIENT
Start: 2021-06-19 | End: 2021-06-22 | Stop reason: HOSPADM

## 2021-06-19 RX ADMIN — ACETAMINOPHEN 650 MG: 325 TABLET ORAL at 16:03

## 2021-06-19 RX ADMIN — MAGNESIUM SULFATE HEPTAHYDRATE 4 G: 500 INJECTION, SOLUTION INTRAMUSCULAR; INTRAVENOUS at 16:15

## 2021-06-19 RX ADMIN — SODIUM CHLORIDE 125 ML/HR: 0.9 INJECTION, SOLUTION INTRAVENOUS at 16:15

## 2021-06-19 RX ADMIN — BACLOFEN 10 MG: 10 TABLET ORAL at 21:12

## 2021-06-19 RX ADMIN — PHENOBARBITAL SODIUM 130 MG: 130 INJECTION INTRAMUSCULAR at 19:18

## 2021-06-19 RX ADMIN — FINASTERIDE 5 MG: 5 TABLET, FILM COATED ORAL at 14:59

## 2021-06-19 RX ADMIN — PHENOBARBITAL 64.8 MG: 32.4 TABLET ORAL at 16:08

## 2021-06-19 RX ADMIN — ATORVASTATIN CALCIUM 40 MG: 40 TABLET, FILM COATED ORAL at 14:59

## 2021-06-19 RX ADMIN — SODIUM CHLORIDE, SODIUM LACTATE, POTASSIUM CHLORIDE, AND CALCIUM CHLORIDE 1000 ML: .6; .31; .03; .02 INJECTION, SOLUTION INTRAVENOUS at 08:36

## 2021-06-19 RX ADMIN — NADOLOL 20 MG: 20 TABLET ORAL at 14:59

## 2021-06-19 RX ADMIN — PHENOBARBITAL 64.8 MG: 32.4 TABLET ORAL at 13:39

## 2021-06-19 RX ADMIN — SODIUM CHLORIDE 125 ML/HR: 0.9 INJECTION, SOLUTION INTRAVENOUS at 13:28

## 2021-06-19 RX ADMIN — ENOXAPARIN SODIUM 40 MG: 40 INJECTION SUBCUTANEOUS at 13:28

## 2021-06-19 RX ADMIN — PHENOBARBITAL SODIUM 65 MG: 130 INJECTION INTRAMUSCULAR; INTRAVENOUS at 08:36

## 2021-06-19 RX ADMIN — PHENOBARBITAL 64.8 MG: 32.4 TABLET ORAL at 21:12

## 2021-06-19 RX ADMIN — PHENOBARBITAL 64.8 MG: 32.4 TABLET ORAL at 14:59

## 2021-06-19 RX ADMIN — PHENOBARBITAL 64.8 MG: 32.4 TABLET ORAL at 12:34

## 2021-06-19 RX ADMIN — AMLODIPINE BESYLATE 5 MG: 5 TABLET ORAL at 14:59

## 2021-06-19 RX ADMIN — MENTHOL, METHYL SALICYLATE: 10; 15 CREAM TOPICAL at 22:18

## 2021-06-19 RX ADMIN — DIAZEPAM 10 MG: 10 INJECTION, SOLUTION INTRAMUSCULAR; INTRAVENOUS at 10:53

## 2021-06-19 RX ADMIN — FLUOXETINE 60 MG: 20 CAPSULE ORAL at 14:59

## 2021-06-19 NOTE — ED NOTES
Patient requesting inpatient detox at [facility]  Patient states that he "does the best there"  Patient does not want to speak to a crisis worker stating that he is not suicidal or homicidal and that he is "an alcoholic" who needs detox and help with the dizziness, tremors, and headache        Arash Richardson RN  06/19/21 8154

## 2021-06-19 NOTE — ASSESSMENT & PLAN NOTE
· Patient previously treated in [address] [place] [address] detox unit on 04/17/21 - 04/20/21 and 06/02/21 - 06/09/21  History of chronic alcohol use since his 45s     · Patient was discharged home from last admission with outpatient services, as well as folic acid and thiamine    · Presented to [facility]'s ED on 06/19/21 with alcohol withdrawal symptoms including shakiness/restlessness, unsteadiness, anxiety, dizziness  · Ethanol level at 0837 today: 120  · Other pertinent labs  · CMP 06/19/21: Sodium 144, Potassium 3 7, AST 23, ALT 32, ALP 92  · CBC 06/19/21: Hgb 14 1, MCV 95, Platelets 353, WBCs 33 53  · Patient reports he is currently interested in inpatient rehab  · Reports past inpatient rehab in 2005 and most recently in March 2021 with [facility]  · Also reports that he attends AA  · Follow [facility] protocol for medically assisted alcohol withdrawal

## 2021-06-19 NOTE — ASSESSMENT & PLAN NOTE
· Patient's current outpatient regimen includes finesteride 5mg QD and alfuzosin 10mg daily  · Will order finesteride 5mg QD, Alfuzosin 10mg not on hospital formulary, patient states his wife will bring from home

## 2021-06-19 NOTE — PROGRESS NOTES
06/19/21 3789   Patient Intake   Living Arrangement House  (Patient is 69 y/o, while male, , source of income is pension and SSDI, lives in a house with his wife of 40 years )   Can patient return home? Yes   Address to be Discharge to: [facility] [address]   Patient's Telephone Number [phone]   Access to Firearms No   Work History Retired;Disabled   School Grade/Year Other (Comment)  (Masters)   Admission Status   Status of Admission [address] [place] [address]   Patient History   Presenting Problems Alcohol withdrawl symptoms   Treatment History Pt is 30 days readmit, third admission since April 2021  Pt has numerous prior rehab admission  Currently in Treatment Yes   Current Psychiatrist/Therapist [facility] [place]   Medical Problems HTN, high lipid, refer to chart for more info   Legal Issues Pending DUI charges -hearing post poned till August 23rd   Indicate type of legal issues present: Charges pending   Court Date 08/23/21   Substance Abuse Yes (See [facility] History section for detail)  Mana Rodriguez)   Crisis Info   Release of Information Signed Yes  Blanca Warren wife -phone: [phone], PCP Jonel MILLER [phone], [facility] phone: [phone])     Case management met with patient for intake assessment  Pt was calm and cooperative and provided informations  Pt denied having SI, HI and AVH  201, [facility] ED due to alcohol withdrawal symptoms  Pt is 30 days readmit -pt was recently admitted to detox twice I e  June 2021 and April 2021  Pt started drinking again "I was just worried about my parents' health issues and started drinking for last few days  Drinking helps me helps me distract myself from worrying"  Patient reports since Friday he has been drinking about 1 and 1/2 Pint vodka daily  He also adds his difficulty avoiding maladaptive coping skills of drinking alcohol   Pt also notes that he recently had first session with counselor at [facility]; pt also reports he missed a session with counselor's appointment with [facility]  Patient states he is willing to go to inpatient rehab "I will go to inpatient this time, if Candelaria Barry and Dr Raymond Lai tells me"  Pt denied having access to firearms  Patient UDS positive for Ethanol level 120  Pt reported about pending DUI charges -court hearing rescheduled for August 23rd  Pt has source of income is pension and SSDI about $4400 a month  Patient's health insurance is medicare part A & B, and secondary insurance [employer]  Pt's reports he uses [employer] rights reviewed and patient signed IMM  Pt signed following ROIs:   Saeid Diss wife -phone: [phone] - CM called and left voicemail with admission notification  PCP Jonel Sellers [phone] -CM called and left voicemail with admission notification  [facility] phone: [phone] -CM called and left voicemail with admission notification     Patient's relapse prevention plan was completed  Pt appears to be at contemplation stage

## 2021-06-19 NOTE — EMTALA/ACUTE CARE TRANSFER
[address] [facility] EMERGENCY DEPARTMENT  [address] [facility] [address]  Dept: [phone]      EMTALA TRANSFER CONSENT    NAME Finesse Flanagan                                         1950                              MRN [id number]    I have been informed of my rights regarding examination, treatment, and transfer   by Dr Shreya Watkins DO    Benefits:    Definitive Treatment, Toxicology Unit    Risks:    Crash during transfer      Transfer Request   I acknowledge that my medical condition has been evaluated and explained to me by the emergency department physician or other qualified medical person and/or my attending physician who has recommended and offered to me further medical examination and treatment  I understand the Hospital's obligation with respect to the treatment and stabilization of my emergency medical condition  I nevertheless request to be transferred  I release the Hospital, the doctor, and any other persons caring for me from all responsibility or liability for any injury or ill effects that may result from my transfer and agree to accept all responsibility for the consequences of my choice to transfer, rather than receive stabilizing treatment at the Hospital  I understand that because the transfer is my request, my insurance may not provide reimbursement for the services  The Hospital will assist and direct me and my family in how to make arrangements for transfer, but the hospital is not liable for any fees charged by the transport service  In spite of this understanding, I refuse to consent to further medical examination and treatment which has been offered to me, and request transfer to    I authorize the performance of emergency medical procedures and treatments upon me in both transit and upon arrival at the receiving facility    Additionally, I authorize the release of any and all medical records to the receiving facility and request they be transported with me, if possible  I authorize the performance of emergency medical procedures and treatments upon me in both transit and upon arrival at the receiving facility  Additionally, I authorize the release of any and all medical records to the receiving facility and request they be transported with me, if possible  I understand that the safest mode of transportation during a medical emergency is an ambulance and that the Hospital advocates the use of this mode of transport  Risks of traveling to the receiving facility by car, including absence of medical control, life sustaining equipment, such as oxygen, and medical personnel has been explained to me and I fully understand them  (JOLIE CORRECT BOX BELOW)  [ X ]  I consent to the stated transfer and to be transported by ambulance/helicopter  [  ]  I consent to the stated transfer, but refuse transportation by ambulance and accept full responsibility for my transportation by car  I understand the risks of non-ambulance transfers and I exonerate the Hospital and its staff from any deterioration in my condition that results from this refusal     X___________________________________________    DATE  21  TIME________  Signature of patient or legally responsible individual signing on patient behalf           RELATIONSHIP TO PATIENT_________________________          Provider Certification    NAME Amanda Eagle                                        [facility] 1950                              MRN [id number]    A medical screening exam was performed on the above named patient  Based on the examination:    Condition Necessitating Transfer The primary encounter diagnosis was Alcohol use disorder, severe, dependence ([address] )  Diagnoses of Alcohol withdrawal ([facility] ) and Alcoholism ([facility] ) were also pertinent to this visit      Patient Condition:   Stable    Reason for Transfer:   Detox Unit    Transfer Requirements: Facility     · Space available and qualified personnel available for treatment as acknowledged by    PACS  · Agreed to accept transfer and to provide appropriate medical treatment as acknowledged by: PACS          · Appropriate medical records of the examination and treatment of the patient are provided at the time of transfer   [address] [facility] _______  · Transfer will be performed by qualified personnel from    and appropriate transfer equipment as required, including the use of necessary and appropriate life support measures  Provider Certification: I have examined the patient and explained the following risks and benefits of being transferred/refusing transfer to the patient/family:         Based on these reasonable risks and benefits to the patient and/or the unborn child(gabriela), and based upon the information available at the time of the patients examination, I certify that the medical benefits reasonably to be expected from the provision of appropriate medical treatments at another medical facility outweigh the increasing risks, if any, to the individuals medical condition, and in the case of labor to the unborn child, from effecting the transfer      X____________________________________________ DATE 06/19/21        TIME_______      ORIGINAL - SEND TO MEDICAL RECORDS   COPY - SEND WITH PATIENT DURING TRANSFER

## 2021-06-19 NOTE — H&P
[facility]  H&P- Sondra Carbone 1950, 70 y o  male MRN: [id number]  Unit/Bed#: 5T DETOX 094-23 Encounter: [id number]  Primary Care Provider: Kaila Montes MD   Date and time admitted to hospital: 6/19/2021 12:14 PM    HISTORY & PHYSICAL EXAM  DEPARTMENT OF MEDICAL TOXICOLOGY  LEVEL 4 MEDICAL DETOX UNIT  Sondra Carbone 70 y o  male MRN: [id number]  Unit/Bed#: 5T DETOX 508-01 Encounter: [id number]      Reason for Admission/Principal Problem: Ethanol withdrawal, Ethanol use disorder  Admitting Provider: Arsenio Boswell PA-C  Attending Provider: Tish Hercules MD   6/19/2021 12:14 PM    * Alcohol withdrawal syndrome without complication ([facility] )  Assessment & Plan  · Patient reports remote history of withdrawal seizure in February 2019- was found unresponsive in his garage  · Patient presented to [facility] ED 6/19/2021 for treatment of alcohol withdrawal symptoms including tremor, dizziness, headache  · Patient received 10 mg Valium and 130 mg phenobarbital in the ED prior to transfer to detox Unit  · Initial SEWS score 5 upon arrival to unit- 65 mg phenobarbital administered (10 mg valium held since patient already received in ED)  · At present time, patient has received 5 total doses of phenobarbital since arrival to unit (69-66-84-; 520 mg total thus far including ED dose)  · Patient continues to exhibit tremor worse than baseline  · Continue to monitor CBC, CMP  · Continue IVFs; IV thiamine/folic acid supplementation  · Continue to follow SEWS protocol for medically-assisted alcohol withdrawal    Alcohol use disorder, severe, dependence ([facility] )  Assessment & Plan  · Patient with history of chronic alcohol use since his 79A; complicated by alcoholic fatty liver disease and reported h/o alcohol withdrawal seizure  · Patient previously treated in [facility] detox in in April 2021 (4/17-4/20) and most recently early June 2021 (6/2-6/9)  · Upon discharge from most recent admission, patient was set up with outpatient rehab resources, set to follow with [facility] on 06/10/2021  · Patient states that he attended outpatient rehab this past week on Tuesday, Wednesday, Thursday from 5-8 p m  each evening; attended a 1 hour counseling session this past week  · Reports that he continues to attend AA meetings  · Patient notes he takes naltrexone (notes it "doesn't help"); however, per chart review of AVS 6/9/2021, naltrexone was discontinued upon discharge  · Patient reports that he resumed drinking alcohol on 6/13/2021  · 0 5 pt vodka daily from 6/13/2021-6/18/2021  · On 6/18- patient states he purchased 2 pint bottles of vodka  Consumed total of 1 5 pints between 12:00 pm 6/18 and 01:00 am on 6/19  · Patient presented to [facility]'s ED 6/19/2021 for treatment of alcohol withdrawal symptoms  · Pertinent labs:  · CMP 06/19/2021:  Sodium 144, potassium 3 7  LFTs-AST 23 ALT 32, alk-phos 2  · CBC 6/19/2021: hemoglobin 14 1, MCV 95, platelets 446  · Ethanol at 8:37 a m  06/19/2020: 120  · Follow SEWS protocol for medically-assisted alcohol withdrawal  · Patient reports that he is still interested in pursuing rehab    Hypomagnesemia  Assessment & Plan  · Magnesium 6/19/2021: 1 1  · CMP revealed potassium of 3 7  · Supplementation administered:  4 g magnesium sulfate IV  · Continue to monitor magnesium, replete as necessary    Swelling of right lower extremity  Assessment & Plan  · Patient presents with asymmetric lower extremity swelling: right lower extremity appears more swollen compared to left lower extremity;  Non-pitting edema; no notable erythema present  · RLE measures 36 5 cm mid calf, LLE measures 33 cm mid calf  · Negative Homans sign  · Patient reports that his lower extremities appear unchanged to him  · Patient denies leg pain, chest pain, shortness of breath  · Patient denies personal/family history of DVT; denies recent injury  · Per chart review, patient has history of chronic bilateral lower extremity swelling  · VAS lower extremity duplex ultrasound performed 04/17/2021:  No evidence of acute/chronic DVT of bilateral lower extremities  · Check D-dimer:  0 28 (WNL)  · Continue to monitor swelling  · consider further work-up should swelling worsen, RLE becomes erythematous, or patient becomes acutely symptomatic    Alcohol induced fatty liver  Assessment & Plan  · Likely secondary to alcohol use disorder  · CT abdomen/pelvis with contrast 04/17/2021:  Enlarged fatty liver noted  · CMP 06/02/2021 revealed LFTs as follows:  AST 23, ALT 32, alk-phos 92  · Continue to monitor LFTs  · Encourage follow-up with PCP outpatient for ongoing monitoring    History of prolonged Q-T interval on ECG  Assessment & Plan  · Patient noted to have prolonged QT interval during admission in April 2021  · EKG performed (4/17/2021) revealed QTC interval of 497 with infrequent PVCs  · repeat EKG (4/18) revealed QTc 443; EKG on day of discharge (4/20/2021) revealed QTC of 414  ? Echo 04/19/2021:  LVEF 60%, left ventricular systolic function normal   No regional wall motion abnormalities  · Appears stable currently  · EKG (performed in ED 6/19/2021 prior to transfer):  Normal sinus rhythm, normal EKG      · QT//456 ms  · Continue tele monitoring    Hypertension  Assessment & Plan  · Most recent /66  · Continue outpatient regimen of nadolol 20 mg daily and amlodipine 5 mg daily  · Continue to monitor vitals, BP    Hypoxia  Assessment & Plan  · During admission in April, patient was noted to have overnight hypoxia  · Likely secondary to ROSARIO  · Patient reports that he was scheduled for sleep study with [facility], however there were issues with scheduling and his appointment got canceled  · Patient currently wears a nasal cannula at night- 2 L O2 supplementation  · Patient reports that he has recently being inconsistent with wearing O2 supplementation at night, stating that he or oxygen maybe 3-4 nights the previous week  · Most recent SpO2 95%  · Continue to monitor SpO2 throughout the night, apply O2 supplementation via nasal cannula as needed (SpO2 goal >90%)  · Recommend outpatient sleep study for further evaluation    Flatulence   Assessment & Plan  · Patient reports ongoing bloating, excessive flatulence; denies abdominal pain, nausea, constipation (1 episode diarrhea earlier today, 2 subsequent normal BMs)  · Per chart review, this is chronic for patient  · +BS x 4, no TTP  · Continue simethicone 80 mg q 6h PRN    Depression with anxiety  Assessment & Plan  · Continue outpatient regimen:  Prozac 60 mg daily, trazodone 150 QHS  · Encourage follow-up with PCP outpatient for ongoing monitoring/maintenance    Mixed hyperlipidemia  Assessment & Plan  · Most recent lipid panel 06/03/2021:  Cholesterol 147, triglycerides 60, LDL 74, HDL 61  · Continue outpatient regimen atorvastatin 40 mg daily  · Recommend outpatient follow-up with PCP for ongoing monitoring    BPH (benign prostatic hyperplasia)  Assessment & Plan  · Continue outpatient regimen of finasteride 5 mg daily and alfuzosin 10 mg daily  · alfuzosin non-formulary- patient's wife to drop medication off      Additional medical treatment(s) includes: hypomagnesemia, lower extremity swelling, hypoxia        VTE Prophylaxis: Enoxaparin (Lovenox)  / sequential compression device   Code Status: Full code  POLST: POLST is not applicable to this patient  Discussion with family: I personally did not discuss this patient with his family    Anticipated Length of Stay:  Patient will be admitted on an Inpatient basis with an anticipated length of stay of  2  midnights     Justification for Hospital Stay: Alcohol withdrawal managment    For any questions or concerns, please Tiger Text the advanced practitioner in the role of [facility]-DETOX-AP On Call      This patient qualifies for Level IV medically managed intensive inpatient services under the criteria set by the American Society of Addiction Medicine, including dimensions 1-3  The patient is in withdrawal (or is intoxicated with high risk of withdrawal), with severe and unstable medical and/or psychiatric (dual diagnosis) problems, requiring requires 24-hour medical and nursing care and the full resources of a [address] patient to medical detox unit and continue supportive care and stabilization of acute ethanol withdrawal per medical toxicology/detox treatment pathway  Monitor ethanol withdrawal severity via the Severity of Ethanol Withdrawal Scale (SEWS) Q4 hours and then hourly if/when SEWS > 6  Treat withdrawal per pathway and reassess Q30-60 minutes  Mild SEWS Score 1-6  Administer medications* (IV or PO; PO preferred):   If initial SEWS score: diazepam 10mg PO/IV x 1 AND phenobarbital 65 mg PO/IV x 1   If repeat SEWS score 1-6: phenobarbital 65 mg PO/IV q1 hour x 5 doses maximum   Reassessment:    SEWS q1 hour after each dose until SEWS 0 x 2 hours   VS q1 hours (until SEWS 0, then q4 hours)   Notify provider for bedside evaluation if 5-dose maximum is reached, RASS of -3 to -5, or SEWS score escalates to moderate or severe  Moderate SEWS Score 7-12  Administer medications* (IV):   If initial SEWS score: diazepam 10mg IV x 1 AND phenobarbital 260 mg IV x 1   If repeat SEWS score 7-12 or score escalated from mild: phenobarbital 130 mg IV q30 minutes x 5 doses maximum   Reassessment:   SEWS q30 minutes after each dose until SEWS < 7 (then hourly until SEWS 0 x 2 hours)   VS q30 minutes until SEWS < 7 (then hourly until SEWS 0, then q4 hours)   Notify provider for bedside evaluation if 5-dose maximum is reached, RASS of -3 to -5, or SEWS score escalates to severe     Severe SEWS Score ? 13  Administer medications* (IV):   If initial SEWS score: Diazepam 10 mg IV x 1 AND phenobarbital 650 mg IV piggyback x 1 over 15-30 minutes   If repeat SEWS score ? 13 or score escalated from mild or moderate: phenobarbital 130 mg IV q30 minutes x 5 doses maximum   Reassessment:   SEWS q30 minutes after each dose until SEWS < 7 (then hourly until SEWS 0 x 2 hours)    VS q30 minutes until SEWS < 7 (then hourly until SEWS 0, then q4 hours)   Notify provider for bedside evaluation if 5-dose maximum is reached or RASS of -3 to -5   *Hold medications and notify provider if CNS depression, respirations < 10/min, or RASS of -3 to -5  Medications to be administered adjunctively if more than 2 grams of phenobarbital is needed for stabilization of withdrawal; require attending approval     Dexmedetomidine infusion 0 1-1mcg/kg/hr IV infusion, titratable to reduced agitation (Goal: RASS -2)   Ketamine   o Acute agitated delirium: 1-2 mg/kg IV or 4-5 mg/kg IM  o Refractory withdrawal: 0 1-1mg/kg/hr IV infusion, titratable to reduced agitation (Goal: RASS -2)    Further evaluation, screening and treatment:  Evaluate complete metabolic panel, transaminases, INR, and lipase  Assess hepatic ultrasound for any sign of alcoholic liver disease or cirrhosis, and ultimately refer for further hepatic evaluation and care as/if indicated  Additional medications for ethanol associated malnutrition: Thiamine 100 mg IV daily, increase to 500 mg TID for signs/symptoms of Wernicke's Encephalopathy or Wernicke Korsakoff Syndrome   Folic acid 1 mg IV daily   Multivitamin PO daily      Will offer first monthly injection of Naltrexone 380 mg IM, once patient is stabilized, as it has been shown to assist in decreasing cravings for ethanol  Evaluate and treat for coexisting substance use, such as opioids and nicotine  Discuss risk factors for infectious disease, such as history of intravenous drug abuse, and offer hepatitis and HIV screening if indicated       Case management consultation to assist with coordination of subsequent treatment after discharge  Hx and PE limited by: patient has some difficulty hearing     HPI: Jaden Long is a 70y o  year old male with PMH HTN, HLD, BPH, macular degeneration, chronic alcohol use disorder since 73'V complicated by alcoholic fatty liver disease and reported history of withdrawal seizure who presents with request for alcohol detox  Patient originally presented to [facility] ED on 6/19/2021 seeking treatment for his alcohol withdrawal symptoms  Patient was recently admitted to the [facility] Detox unit 6/2/2021-6/9/2021  At discharge, patient was set up to follow up with outpatient rehab services  Per chart review, he was scheduled to have an appointment with [facility] on 06/10/2021  Patient states that he attended outpatient rehab this past week on Tuesday, Wednesday, Thursday from 5-8 p m  each evening  patient also reports that he attended a 1 hour counseling session this past week  Reports that he continues to attend AA meetings  Patient was also admitted to [facility] Detox unit in April 2021 (4/17-4/20)  Patient reports intermittent numbness/tingling of toes/fingers; reports that this has been a chronic issue with alcohol consumption, stating that he notice resolution of symptoms when he was sober  Patient also reports underlying tremor of left upper extremity, attributes to left shoulder injury (torn rotator cuff)  Notes worsening tremor with alcohol withdrawal  Patient reports ongoing gait unsteadiness (worse with alcohol consumption) and reports that he continues to follow with PT outpatient, ambulates with walker at home  Patient notes mild low back pain at this time, reporting that while he was drunk last night, he transport him solve by sitting on the ground and scooting" around, thinks he may have pulled something in his low back  Denies recent falls/injuries  Patient denies use of other substances including tobacco, marijuana, cocaine, heroin      Preferred alcoholic beverage(s): vodka  Quantity and frequency of alcohol intake: 0 5 pt vodka daily from 6/13/2021-6/18/2021  · On 6/18- patient states he purchased 2 pint bottles of vodka  Consumed total of 1 5 pints between 12:00 pm 6/18 and 01:00 am on 6/19  Use of any ethanol substitutes (toxic alcohols): no  Date/Time of last alcohol intake: finished drinking around 01:00 am 6/19  Current signs and symptoms of ethanol withdrawal: tremor    SEWS Total Score: 2 (6/19/2021 20:51 PM)    Ethanol Withdrawal History  Previous ethanol withdrawal? yes  Prior inpatient treatment for ethanol withdrawal? yes  Prior outpatient treatment for ethanol withdrawal? yes  History of seizures with prior ethanol withdrawal? yes  Prior treatment with naltrexone (Vivitrol)? yes  Current treatment with naltrexone (Vivitrol)? Yes- patient reports that he currently takes naltrexone daily; AVS from most recent discharge indicates that naltrexone was discontinued  Other current treatment for ethanol use disorder? no  Co-existing substance use? no    Review of PDMP: yes     Social History     Substance and Sexual Activity   Alcohol Use Yes    Alcohol/week: 6 0 standard drinks    Types: 6 Shots of liquor per week    Comment: 1 pint of liquor a day to every three days     Social History     Substance and Sexual Activity   Drug Use Never     Social History     Tobacco Use   Smoking Status Never Smoker   Smokeless Tobacco Never Used       Review of Systems   Constitutional: Negative for appetite change, chills, diaphoresis and fever  HENT: Positive for hearing loss (chronic)  Negative for congestion, ear pain, sneezing and sore throat  Eyes: Negative for pain and visual disturbance  Respiratory: Negative for cough and shortness of breath  Cardiovascular: Negative for chest pain, palpitations and leg swelling (patient reports his legs look "normal" to him)     Gastrointestinal: Positive for diarrhea (1 episode loose stool this morning; 2 subsequent BMs normal)  Negative for abdominal pain, constipation, nausea and vomiting  Patient reports ongoing bloating/flatulence   Genitourinary: Negative for difficulty urinating, dysuria, frequency, hematuria and urgency  Musculoskeletal: Positive for back pain (mild, patient reports that he feels he pulled a muscle in his low back ) and gait problem (chronic, follows with PT, uses cane/walker)  Negative for arthralgias  Skin: Negative for color change and rash  Dry skin b/l LEs; eucerin cream helps   Neurological: Positive for tremors (currently worse than baseline) and numbness (intermittent tingling of fingers/toes; chronic)  Negative for dizziness, seizures, syncope, light-headedness and headaches  Psychiatric/Behavioral: Negative for hallucinations and sleep disturbance  The patient is nervous/anxious (chronic)  All other systems reviewed and are negative  Historical Information   Past Medical History:   Diagnosis Date    Alcohol dependency ([facility] )     Anxiety     Depression     [place] (hard of hearing)     Hypertension     Kidney stones     Prostate enlargement     Renal disorder     kidney    Shoulder dislocation      Past Surgical History:   Procedure Laterality Date    CHOLECYSTECTOMY      SHOULDER SURGERY      for dislocation     History reviewed  No pertinent family history  Social History   Marital Status: /Civil Union   Occupation:  Retired  Patient Pre-hospital Living Situation:  Home with wife  Patient Pre-hospital Level of Mobility:  walker/cane  Patient Pre-hospital Diet Restrictions:  None    Allergies   Allergen Reactions    Sulfa Antibiotics Anaphylaxis and Rash    Sulfur Rash and Edema       Prior to Admission medications    Medication Sig Start Date End Date Taking? Authorizing Provider   alfuzosin (UROXATRAL) 10 mg 24 hr tablet Take 40 mg by mouth daily     Yes Historical Provider, MD   allopurinol (ZYLOPRIM) 100 mg tablet    Yes Historical Provider, MD amLODIPine (NORVASC) 5 mg tablet Take 1 tablet (5 mg total) by mouth daily 7/16/19  Yes Li Chiu MD   atorvastatin (LIPITOR) 40 mg tablet Take 40 mg by mouth daily 4/28/21  Yes Historical Provider, MD   baclofen 10 mg tablet Take 10 mg by mouth Three times a day 3/4/21 3/4/22 Yes Historical Provider, MD   cycloSPORINE (RESTASIS) 0 05 % ophthalmic emulsion 1 drop 2 (two) times a day   Yes Historical Provider, MD   finasteride (PROSCAR) 5 mg tablet Take 5 mg by mouth daily   Yes Historical Provider, MD   FLUoxetine (PROzac) 20 mg capsule Take 60 mg by mouth daily 4/1/21  Yes Historical Provider, MD   melatonin 3 mg Take 1 tablet (3 mg total) by mouth daily at bedtime 6/9/21  Yes Jordan C Holter, DO   nadolol (CORGARD) 20 mg tablet Take 20 mg by mouth 3/4/21 3/4/22 Yes Historical Provider, MD   simethicone (MYLICON) 80 mg chewable tablet Chew 1 tablet (80 mg total) every 6 (six) hours as needed for flatulence 4/20/21  Yes Callum Oshea DO   traZODone (DESYREL) 100 mg tablet Take 1 tablet (100 mg total) by mouth daily at bedtime 6/9/21  Yes [place] C Holter, DO   white petrolatum-mineral oil (EUCERIN,HYDROCERIN) cream Apply topically 3 (three) times a day as needed (pruritis) 6/9/21  Yes [place] C Holter, DO   folic acid (FOLVITE) 1 mg tablet Take 1 tablet (1 mg total) by mouth daily 6/10/21   [place] C Holter, DO   thiamine 100 MG tablet Take 1 tablet (100 mg total) by mouth daily 4/21/21 5/21/21  Callum Oshea DO       Current Facility-Administered Medications   Medication Dose Route Frequency    acetaminophen (TYLENOL) tablet 650 mg  650 mg Oral Q6H PRN    acetaminophen (TYLENOL) tablet 975 mg  975 mg Oral Q6H PRN    amLODIPine (NORVASC) tablet 5 mg  5 mg Oral Daily    atorvastatin (LIPITOR) tablet 40 mg  40 mg Oral Daily    baclofen tablet 10 mg  10 mg Oral TID    enoxaparin (LOVENOX) subcutaneous injection 40 mg  40 mg Subcutaneous Daily    finasteride (PROSCAR) tablet 5 mg  5 mg Oral Daily    FLUoxetine (PROzac) capsule 60 mg  60 mg Oral Daily    folic acid 1 mg, thiamine (VITAMIN B1) 100 mg in sodium chloride 0 9 % 100 mL IV piggyback   Intravenous Daily    melatonin tablet 3 mg  3 mg Oral HS    menthol-methyl salicylate (BENGAY) 13-73 % cream   Apply externally 4x Daily PRN    nadolol (CORGARD) tablet 20 mg  20 mg Oral Daily    NON FORMULARY  10 mg Oral Daily    PHENobarbital tablet 64 8 mg  64 8 mg Oral Once    simethicone (MYLICON) chewable tablet 80 mg  80 mg Oral Q6H PRN    sodium chloride 0 9 % infusion  125 mL/hr Intravenous Continuous    traZODone (DESYREL) tablet 150 mg  150 mg Oral HS    white petrolatum-mineral oil (EUCERIN,HYDROCERIN) cream   Topical TID PRN       Continuous Infusions:sodium chloride, 125 mL/hr, Last Rate: 125 mL/hr (06/19/21 1615)         Objective       Intake/Output Summary (Last 24 hours) at 6/20/2021 0244  Last data filed at 6/19/2021 1901  Gross per 24 hour   Intake 960 ml   Output 575 ml   Net 385 ml       Invasive Devices:   Peripheral IV 06/19/21 Right Antecubital (Active)   Site Assessment WDL 06/19/21 1316   Dressing Type Transparent 06/19/21 1316   Line Status Flushed 06/19/21 1316   Dressing Status Clean;Dry; Intact 06/19/21 1316       Peripheral IV 06/19/21 Left;Ventral (anterior) Forearm (Active)   Site Assessment [facility] 06/19/21 1500   Dressing Type Transparent 06/19/21 1500   Line Status Blood return noted; Flushed; Infusing 06/19/21 1500   Dressing Status Clean;Dry; Intact 06/19/21 1500       Vitals   Vitals:    06/19/21 1954 06/19/21 2048 06/19/21 2214 06/19/21 2319   BP: 118/66 123/77 111/65 132/80   TempSrc: Temporal Temporal Temporal Temporal   Pulse: 57 (!) 54 57 (!) 50   Resp: 18 18 16 16   Patient Position - Orthostatic VS: Sitting Sitting Sitting Sitting   Temp: 97 7 °F (36 5 °C) (!) 97 1 °F (36 2 °C) 98 1 °F (36 7 °C) (!) 97 3 °F (36 3 °C)       Physical Exam  Vitals reviewed  Constitutional:       General: He is not in acute distress       Appearance: Normal appearance  He is not ill-appearing or diaphoretic  HENT:      Head: Normocephalic and atraumatic  Ears:      Comments: Patient [place]     Nose: Nose normal  No congestion  Mouth/Throat:      Mouth: Mucous membranes are moist       Pharynx: Oropharynx is clear  Eyes:      General: No scleral icterus  Extraocular Movements: Extraocular movements intact  Right eye: No nystagmus  Left eye: No nystagmus  Conjunctiva/sclera: Conjunctivae normal       Pupils: Pupils are equal, round, and reactive to light  Comments: Wears glasses   Cardiovascular:      Rate and Rhythm: Normal rate and regular rhythm  Pulses: Normal pulses  Heart sounds: Normal heart sounds  No murmur heard  No friction rub  No gallop  Pulmonary:      Effort: Pulmonary effort is normal  No respiratory distress  Breath sounds: Normal breath sounds  No wheezing, rhonchi or rales  Abdominal:      General: Abdomen is flat  Bowel sounds are normal  There is no distension  Palpations: Abdomen is soft  Tenderness: There is no abdominal tenderness  There is no guarding  Musculoskeletal:         General: Swelling (RLE appears more swollen than LLE; RLE measures 36 5 cm mid-calf, LLE measures 33 cm mid-calf) present  Normal range of motion  Cervical back: Normal range of motion  No bony tenderness  Thoracic back: No bony tenderness  Lumbar back: Tenderness (mild, b/l paraspinal musculature) present  No spasms or bony tenderness  Right lower leg: Edema (non-pitting) present  Left lower leg: No edema  Comments: (refer to images below for visual depiction of RLE swelling)   Skin:     General: Skin is warm and dry  Capillary Refill: Capillary refill takes less than 2 seconds  Findings: No erythema  Neurological:      General: No focal deficit present  Mental Status: He is alert and oriented to person, place, and time  Mental status is at baseline  Sensory: No sensory deficit  Motor: Tremor (RUE > LUE; patient reports mild tremor of LUE at baseline) present  Psychiatric:         Attention and Perception: Attention normal          Mood and Affect: Mood is anxious  Behavior: Behavior is cooperative  Data:    EKG, Pathology, and Other Studies: I have personally reviewed pertinent reports  · EKG (performed in ED 6/19/2021 prior to transfer):  Normal sinus rhythm, normal EKG  No acute ST segment elevation/depression noted  · Ventricular rate 67  · QRS 98  · QT//456 ms  · VAS lower extremity duplex ultrasound performed 04/17/2021:  No evidence of acute/chronic DVT of bilateral lower extremities  · Echo 04/19/2021:  LVEF 60%, left ventricular systolic function normal   No regional wall motion abnormalities      Lab Results:  CBC ETOH     Lab Results   Component Value Date    WBC 10 61 (H) 06/19/2021    WBC 9 30 12/29/2014    RBC 4 37 06/19/2021    RBC 4 16 12/29/2014    HGB 14 1 06/19/2021    HGB 13 8 12/29/2014    HCT 41 4 06/19/2021    HCT 40 5 12/29/2014    MCV 95 06/19/2021    MCV 97 12/29/2014    MCH 32 3 06/19/2021    MCH 33 2 12/29/2014    MCHC 34 1 06/19/2021    MCHC 34 1 12/29/2014    RDW 13 4 06/19/2021    RDW 12 9 12/29/2014     06/19/2021     12/29/2014    MPV 8 9 06/19/2021    MPV 9 7 12/29/2014      Lab Results   Component Value Date    LACTICACID 2 1 (HH) 12/02/2019      CMP UA         Component Value Date/Time     12/29/2014 0706    K 3 7 06/19/2021 0837    K 3 4 (L) 12/29/2014 0706     06/19/2021 0837     12/29/2014 0706    CO2 28 06/19/2021 0837    CO2 25 12/29/2014 0706    BUN 12 06/19/2021 0837    BUN 15 12/29/2014 0706    CREATININE 0 76 06/19/2021 0837    CREATININE 0 62 12/29/2014 0706         Component Value Date/Time    CALCIUM 8 8 06/19/2021 0837    CALCIUM 8 9 12/29/2014 0706    ALKPHOS 92 06/19/2021 0837    AST 23 06/19/2021 0837    ALT 32 06/19/2021 0837      Lab Results   Component Value Date    CLARITYU Clear 04/17/2021    COLORU Yellow 04/17/2021    SPECGRAV >=1 030 04/17/2021    PHUR 6 0 04/17/2021    PHUR 6 0 02/10/2019    GLUCOSEU Negative 04/17/2021    KETONESU >=80 (3+) (A) 04/17/2021    BLOODU Trace-Intact (A) 04/17/2021    PROTEIN UA 30 (1+) (A) 04/17/2021    NITRITE Negative 04/17/2021    BILIRUBINUR Negative 04/17/2021    UROBILINOGEN 0 2 04/17/2021    LEUKOCYTESUR Negative 04/17/2021    WBCUA 0-1 04/17/2021    RBCUA 1-2 04/17/2021    HYALINE 0-1 (A) 12/02/2019    BACTERIA None Seen 04/17/2021    EPIS Occasional 04/17/2021        Liver Function Test: ASA     Lab Results   Component Value Date    TBILI 0 42 06/19/2021    ALKPHOS 92 06/19/2021    AST 23 06/19/2021    ALT 32 06/19/2021    TP 7 6 06/19/2021    ALB 4 2 06/19/2021      Lab Results   Component Value Date    SALICYLATE <3 (L) 15/84/0613      Troponin APAP     Lab Results   Component Value Date    TROPONINI <0 02 04/17/2021      Lab Results   Component Value Date    ACTMNPHEN <2 0 (L) 12/02/2019      VBG HCG     No results found for: PHVEN, NWS8NTG, PO2VEN, BDV5WOH, BEVEN, P2ZZHUAKN, C2EJTLX   No results found for: HCGQUANT   ABG Urine Drug Screen     No results found for: PHART, JSN9DZE, PO2ART, VBH3OKU, BEART, U6JQZGIFF, O2HGB, SOURC, NAZ, VTAC, ACRATE, INSPIREDAIR, PEEP   Lab Results   Component Value Date    AMPMETHUR Negative 04/17/2021    BARBTUR Negative 04/17/2021    BDZUR Positive (A) 04/17/2021    COCAINEUR Negative 04/17/2021    METHADONEUR Negative 04/17/2021    OPIATEUR Negative 04/17/2021    PCPUR Negative 04/17/2021    THCUR Negative 04/17/2021    OXYCODONEUR Negative 04/17/2021      Lactate INR     Lab Results   Component Value Date    LACTICACID 2 1 ([place]) 12/02/2019      Lab Results   Component Value Date    INR 1 12 06/19/2021    INR 1 20 (H) 12/29/2014      PTT Protime     Lab Results   Component Value Date/Time    PTT 26 06/19/2021 08:37 AM    PTT 25 12/29/2014 07:06 AM        Lab Results   Component Value Date/Time    PROTIME 14 2 06/19/2021 08:37 AM    PROTIME 15 0 (H) 12/29/2014 07:06 AM          Imaging Studies: I have personally reviewed pertinent reports  Counseling / Coordination of Care  Total floor / unit time spent today 60 minutes  Greater than 50% of total time was spent with the patient and / or family counseling and / or coordination of care  ** Please Note: This note has been constructed using a voice recognition system   **

## 2021-06-19 NOTE — ED PROVIDER NOTES
History  Chief Complaint   Patient presents with    Withdrawal - Alcohol     pt comes in EMS with symptoms of alcohol withdrawl  pt c/o dizziness and shaking  patients last drink was midnight lastnight  Patient is a 69-year-old male with history of alcohol abuse presenting for complaint of alcohol withdrawal and requesting help with his alcohol withdrawal symptoms  Patient was admitted to the [facility] detox center on  and discharged on  with same complaints  He states he did not follow up with outpatient treatment at that time of discharge and has been drinking approximately 1 pt daily since that event  He states yesterday he drank 1 and half pt of vodka  His last drink was approximately midnight  He woke at approximately is 7:00 a m  This morning and felt tremulous and that he might go into withdrawal   He states he had 1 episode approximately 1 year ago where he was found in his garage, was intubated and transferred to [facility]   Unclear if this was an alcohol withdrawal seizure at the time  No change in other medications  Denies any attempt to harm himself  Is seeking treatment  Did come here voluntarily  Currently he is by himself  He does live with his spouse  Prior to Admission Medications   Prescriptions Last Dose Informant Patient Reported? Taking? FLUoxetine (PROzac) 20 mg capsule   Yes No   Sig: Take 60 mg by mouth daily   alfuzosin (UROXATRAL) 10 mg 24 hr tablet   Yes No   Sig: Take 40 mg by mouth daily     allopurinol (ZYLOPRIM) 100 mg tablet   Yes No   amLODIPine (NORVASC) 5 mg tablet   No No   Sig: Take 1 tablet (5 mg total) by mouth daily   atorvastatin (LIPITOR) 40 mg tablet   Yes No   Sig: Take 40 mg by mouth daily   baclofen 10 mg tablet   Yes No   Sig: Take 10 mg by mouth Three times a day   cycloSPORINE (RESTASIS) 0 05 % ophthalmic emulsion   Yes No   Si drop 2 (two) times a day   finasteride (PROSCAR) 5 mg tablet   Yes No   Sig: Take 5 mg by mouth daily   folic acid (FOLVITE) 1 mg tablet   No No   Sig: Take 1 tablet (1 mg total) by mouth daily   melatonin 3 mg   No No   Sig: Take 1 tablet (3 mg total) by mouth daily at bedtime   nadolol (CORGARD) 20 mg tablet   Yes No   Sig: Take 20 mg by mouth   simethicone (MYLICON) 80 mg chewable tablet   No No   Sig: Chew 1 tablet (80 mg total) every 6 (six) hours as needed for flatulence   thiamine 100 MG tablet   No No   Sig: Take 1 tablet (100 mg total) by mouth daily   traZODone (DESYREL) 100 mg tablet   No No   Sig: Take 1 tablet (100 mg total) by mouth daily at bedtime   white petrolatum-mineral oil (EUCERIN,HYDROCERIN) cream   No No   Sig: Apply topically 3 (three) times a day as needed (pruritis)      Facility-Administered Medications: None       Past Medical History:   Diagnosis Date    Alcohol dependency ([facility] )     Anxiety     Depression     [place] (hard of hearing)     Hypertension     Kidney stones     Prostate enlargement     Renal disorder     kidney    Shoulder dislocation        Past Surgical History:   Procedure Laterality Date    CHOLECYSTECTOMY      SHOULDER SURGERY      for dislocation       History reviewed  No pertinent family history  I have reviewed and agree with the history as documented  E-Cigarette/Vaping    E-Cigarette Use Never User      E-Cigarette/Vaping Substances    Nicotine No     THC No     CBD No     Flavoring No     Other No     Unknown No      Social History     Tobacco Use    Smoking status: Never Smoker    Smokeless tobacco: Never Used   Vaping Use    Vaping Use: Never used   Substance Use Topics    Alcohol use: Yes     Alcohol/week: 6 0 standard drinks     Types: 6 Shots of liquor per week     Comment: 1 pint of liquor a day to every three days    Drug use: Never       Review of Systems   Constitutional: Negative for activity change, appetite change, chills and fever     HENT: Negative for ear pain, hearing loss, rhinorrhea, sneezing, sore throat and trouble swallowing  Eyes: Negative for pain and visual disturbance  Respiratory: Negative for cough, choking, chest tightness, shortness of breath, wheezing and stridor  Cardiovascular: Negative for chest pain, palpitations and leg swelling  Gastrointestinal: Negative for abdominal pain, constipation, diarrhea, nausea and vomiting  Genitourinary: Negative for difficulty urinating, dysuria, frequency, hematuria and testicular pain  Musculoskeletal: Negative for arthralgias, back pain, gait problem and neck pain  Skin: Negative for color change and rash  Allergic/Immunologic: Negative for immunocompromised state  Neurological: Positive for tremors  Negative for dizziness, seizures, syncope, speech difficulty, weakness, light-headedness, numbness and headaches  Psychiatric/Behavioral: Negative for confusion and sleep disturbance  All other systems reviewed and are negative  Physical Exam  Physical Exam  Vitals and nursing note reviewed  Constitutional:       General: He is not in acute distress  Appearance: Normal appearance  He is well-developed and normal weight  He is not ill-appearing, toxic-appearing or diaphoretic  Comments: Slightly tremulous   HENT:      Head: Normocephalic and atraumatic  Nose: Nose normal       Mouth/Throat:      Mouth: Mucous membranes are moist       Pharynx: No posterior oropharyngeal erythema  Eyes:      General: No scleral icterus  Extraocular Movements: Extraocular movements intact  Conjunctiva/sclera: Conjunctivae normal    Cardiovascular:      Rate and Rhythm: Normal rate and regular rhythm  Pulses: Normal pulses  Heart sounds: Normal heart sounds  No murmur heard  Pulmonary:      Effort: Pulmonary effort is normal  No respiratory distress  Breath sounds: Normal breath sounds  No wheezing or rales  Chest:      Chest wall: No tenderness     Abdominal:      General: Bowel sounds are normal  There is no distension  Palpations: Abdomen is soft  There is no mass  Tenderness: There is no abdominal tenderness  There is no right CVA tenderness, left CVA tenderness, guarding or rebound  Musculoskeletal:         General: No swelling, tenderness or deformity  Normal range of motion  Cervical back: Normal range of motion and neck supple  Right lower leg: No edema  Left lower leg: No edema  Lymphadenopathy:      Cervical: No cervical adenopathy  Skin:     General: Skin is warm and dry  Capillary Refill: Capillary refill takes less than 2 seconds  Coloration: Skin is not jaundiced or pale  Findings: No bruising, erythema or rash  Neurological:      General: No focal deficit present  Mental Status: He is alert and oriented to person, place, and time  Cranial Nerves: No cranial nerve deficit  Sensory: No sensory deficit  Motor: No abnormal muscle tone  Psychiatric:         Mood and Affect: Mood normal          Behavior: Behavior normal          Thought Content:  Thought content normal          Vital Signs  ED Triage Vitals   Temperature Pulse Respirations Blood Pressure SpO2   06/19/21 0811 06/19/21 0811 06/19/21 0811 06/19/21 0811 06/19/21 0811   99 3 °F (37 4 °C) 65 18 158/79 95 %      Temp Source Heart Rate Source Patient Position - Orthostatic VS BP Location FiO2 (%)   06/19/21 0811 06/19/21 0811 06/19/21 0811 06/19/21 0811 --   Temporal Monitor Lying Left arm       Pain Score       06/19/21 0809       7           Vitals:    06/19/21 0845 06/19/21 0900 06/19/21 0930 06/19/21 1000   BP: 153/73 150/69 134/67 148/75   Pulse: 67 62 62 67   Patient Position - Orthostatic VS: Lying Lying Lying Lying         Visual Acuity  Visual Acuity      Most Recent Value   L Pupil Size (mm)  2   R Pupil Size (mm)  2          ED Medications  Medications   diazepam (VALIUM) injection 10 mg (has no administration in time range)   lactated ringers bolus 1,000 mL (1,000 mL Intravenous New Bag 6/19/21 0836)   PHENobarbital 130 mg/mL injection 65 mg (65 mg Intravenous Given 6/19/21 0836)       Diagnostic Studies  Results Reviewed     Procedure Component Value Units Date/Time    Comprehensive metabolic panel [[id number]] Collected: 06/19/21 0837    Lab Status: Final result Specimen: Blood from Arm, Right Updated: 06/19/21 0902     Sodium 144 mmol/L      Potassium 3 7 mmol/L      Chloride 105 mmol/L      CO2 28 mmol/L      ANION GAP 11 mmol/L      BUN 12 mg/dL      Creatinine 0 76 mg/dL      Glucose 89 mg/dL      Calcium 8 8 mg/dL      AST 23 U/L      ALT 32 U/L      Alkaline Phosphatase 92 U/L      Total Protein 7 6 g/dL      Albumin 4 2 g/dL      Total Bilirubin 0 42 mg/dL      eGFR 92 ml/min/1 73sq m     Narrative:      [place] guidelines for Chronic Kidney Disease (CKD):     Stage 1 with normal or high GFR (GFR > 90 mL/min/1 73 square meters)    Stage 2 Mild CKD (GFR = 60-89 mL/min/1 73 square meters)    Stage 3A Moderate CKD (GFR = 45-59 mL/min/1 73 square meters)    Stage 3B Moderate CKD (GFR = 30-44 mL/min/1 73 square meters)    Stage 4 Severe CKD (GFR = 15-29 mL/min/1 73 square meters)    Stage 5 End Stage CKD (GFR <15 mL/min/1 73 square meters)  Note: GFR calculation is accurate only with a steady state creatinine    Ethanol [[id number]]  (Abnormal) Collected: 06/19/21 0837    Lab Status: Final result Specimen: Blood from Arm, Right Updated: 06/19/21 0858     Ethanol Lvl 120 mg/dL     Protime-INR [[id number]]  (Normal) Collected: 06/19/21 0837    Lab Status: Final result Specimen: Blood from Arm, Right Updated: 06/19/21 0856     Protime 14 2 seconds      INR 1 12    APTT [[id number]]  (Normal) Collected: 06/19/21 0837    Lab Status: Final result Specimen: Blood from Arm, Right Updated: 06/19/21 0856     PTT 26 seconds     CBC and differential [[id number]]  (Abnormal) Collected: 06/19/21 0837    Lab Status: Final result Specimen: Blood from Arm, Right Updated: 06/19/21 0848     WBC 10 61 Thousand/uL      RBC 4 37 Million/uL      Hemoglobin 14 1 g/dL      Hematocrit 41 4 %      MCV 95 fL      MCH 32 3 pg      MCHC 34 1 g/dL      RDW 13 4 %      MPV 8 9 fL      Platelets 193 Thousands/uL      nRBC 0 /100 WBCs      Neutrophils Relative 73 %      Immat GRANS % 1 %      Lymphocytes Relative 17 %      Monocytes Relative 7 %      Eosinophils Relative 1 %      Basophils Relative 1 %      Neutrophils Absolute 7 82 Thousands/µL      Immature Grans Absolute 0 10 Thousand/uL      Lymphocytes Absolute 1 77 Thousands/µL      Monocytes Absolute 0 79 Thousand/µL      Eosinophils Absolute 0 06 Thousand/µL      Basophils Absolute 0 07 Thousands/µL                  No orders to display              Procedures  ECG 12 Lead Documentation Only    Date/Time: 6/19/2021 8:38 AM  Performed by: Roldan Juarez DO  Authorized by: Roldan Juarez DO     Indications / Diagnosis:  EtoH Withdrawal  ECG reviewed by me, the ED Provider: yes    Patient location:  ED  Rate:     ECG rate:  66    ECG rate assessment: normal    Rhythm:     Rhythm: sinus rhythm    Ectopy:     Ectopy: none    QRS:     QRS axis:  Normal    QRS intervals:  Normal  Conduction:     Conduction: normal    ST segments:     ST segments:  Normal  T waves:     T waves: normal      CriticalCare Time  Performed by: Roldan Juarez DO  Authorized by: Roldan Juarez DO     Critical care provider statement:     Critical care time (minutes):  35    Critical care time was exclusive of:  Separately billable procedures and treating other patients    Critical care was necessary to treat or prevent imminent or life-threatening deterioration of the following conditions:  Toxidrome    Critical care was time spent personally by me on the following activities:  Blood draw for specimens, obtaining history from patient or surrogate, development of treatment plan with patient or surrogate, discussions with consultants, evaluation of patient's response to treatment, examination of patient, review of old charts, re-evaluation of patient's condition, ordering and review of laboratory studies and ordering and performing treatments and interventions    I assumed direction of critical care for this patient from another provider in my specialty: no               ED Course                             SBIRT 22yo+      Most Recent Value   SBIRT (24 yo +)   In order to provide better care to our patients, we are screening all of our patients for alcohol and drug use  Would it be okay to ask you these screening questions? Yes Filed at: 06/19/2021 0818   Initial Alcohol Screen: US AUDIT-C    1  How often do you have a drink containing alcohol? 6 Filed at: 06/19/2021 0818   2  How many drinks containing alcohol do you have on a typical day you are drinking? 6 Filed at: 06/19/2021 0818   3a  Male UNDER 65: How often do you have five or more drinks on one occasion? 6 Filed at: 06/19/2021 0818   Audit-C Score  (!) 18 Filed at: 06/19/2021 0818   Full Alcohol Screen: US AUDIT   4  How often during the last year have you found that you were not able to stop drinking once you had started? 4 Filed at: 06/19/2021 0818   5  How often during past year have you failed to do what was normally expected of you because of drinking? 4 Filed at: 06/19/2021 0818   6  How often in past year have you needed a first drink in the morning to get yourself going after a heavy drinking session? 4 Filed at: 06/19/2021 0818   7  How often in past year have you had feeling of guilt or remorse after drinking? 4 Filed at: 06/19/2021 0818   8  How often in past year have you been unable to remember what happened night before because you had been drinking? 3 Filed at: 06/19/2021 0818   9  Have you or someone else been injured as a result of your drinking? 0 Filed at: 06/19/2021 0818   10   Has a relative, friend, doctor or other health worker been concerned about your drinking and suggested you cut down? 4 Filed at: 06/19/2021 0818   AUDIT Total Score  (!) 41 Filed at: 06/19/2021 8690   JEMIMA: How many times in the past year have you    Used an illegal drug or used a prescription medication for non-medical reasons? Never Filed at: 06/19/2021 0818                    MDM    Disposition  Final diagnoses:   Alcohol use disorder, severe, dependence ([facility] [address] )   Alcohol withdrawal ([facility] [address] )   [facility]     Time reflects when diagnosis was documented in both MDM as applicable and the Disposition within this note     Time User Action Codes Description Comment    6/19/2021  8:42 AM Anand Rodri T Add [F10 20] Alcohol use disorder, severe, dependence ([facility] [address] )     6/19/2021 10:36 AM Anand Rodri T Add [F10 239] Alcohol withdrawal ([facility] )     6/19/2021 10:37 AM Anand Rodri T Add [F10 10] Alcohol abuse     6/19/2021 10:37 AM Anand Rodri T Add [F10 20] Alcoholism ([facility] )     6/19/2021 10:37 AM Anand Rodri T Remove [F10 10] Alcohol abuse       ED Disposition     ED Disposition Condition Date/Time Comment    Transfer to Another Facility-In Network  [facility] Jun 19, 2021 10:36 AM Amanda Eagle should be transferred out to [address] [place] [address] Detox Unit  Follow-up Information    None         Patient's Medications   Discharge Prescriptions    No medications on file     No discharge procedures on file      PDMP Review       Value Time User    PDMP Reviewed  Yes 6/9/2021 12:02 PM Buel Low Holter, DO          ED Provider  Electronically Signed by           Judd Haro DO  06/19/21 104

## 2021-06-19 NOTE — ED NOTES
Patient ready for transfer to Detox unit at [facility]  Transfer packet ready        Hari Oseguera RN  06/19/21 6930

## 2021-06-19 NOTE — ASSESSMENT & PLAN NOTE
· Last lipid panel on 06/03/21 WNL  · Continue pre hospital Atorvastatin 40mg QD  · Recommend outpatient follow-up for ongoing monitoring

## 2021-06-19 NOTE — ASSESSMENT & PLAN NOTE
· During his April admission, patient noted to have overnight hypoxia  · Likely secondary to ROSARIO  · Reports he wears a nasal canula overnight, 2L O2 supplementation  · Patient currently saturating at 95%  · Continue O2 supplementation 2L via nasal canula overnight

## 2021-06-19 NOTE — ED NOTES
Patient used bedside commode  Patient had small loose bowel movement        Harper Reeder RN  06/19/21 9380

## 2021-06-19 NOTE — ASSESSMENT & PLAN NOTE
· Patient reports that he initially reported to [facility] ED on 06/19/21 for evaluation of withdrawal symptoms including shaking, restlessness, anixiey, dizziness, unsteadiness  · He was given Valium 10mg and Phenobarbital 65mg in the ED  · He notes that he has a chronic tremor of the left upper extremity secondary to rotator cuff injury; patient notes that the tremor is exacerbated by alcohol withdrawal  · Patient was subsequently transferred to the [address] [place] [address] Detox unit for medically managed alcohol withdrawal  Upon admission to the unit, patients SEWS score was 5 and was given Phenobarbital 65mg  · Continue SEWS protocol

## 2021-06-19 NOTE — ASSESSMENT & PLAN NOTE
· Upon arrival to the unit, BP at 161/89  · Patient home anti-hypertensive regimen: amlodipine 5mg QD and Nadolol 20mg QD  · This patient is know to the unit and his hypertension does not resolve with alcohol withdrawal cessation  · Will continue home anti-hypertensive medications

## 2021-06-19 NOTE — TREATMENT TEAM
06/19/21 1525   Team Meeting   Meeting Type Tx Team Meeting   Initial Conference Date 06/19/21   Next Conference Date 07/18/21   Team Members Present   Team Members Present Physician;Nurse;   Physician Team Member Ramu   Nursing Team Member [facility] Management Team Member Lakshmi   Patient/Family Present   Patient Present Yes   Patient's Family Present No     Treatment team met, reviewed treatment goals  Pt is in agreement with going to inpatient rehab to continue treatment  Continue to monitor

## 2021-06-19 NOTE — DISCHARGE INSTR - OTHER ORDERS
You will be discharged to     After discharge, if you find your coping skills are not as effective and you continue to feel distressed please call Rossana Valdez services are available 24 hours a day by calling [facility] ([facility]) AT [place] at [phone], and [address] : [phone]    [facility] : [phone]     If you feel you are a danger to yourself or others please contact 911 or go to nearest Emergency room to seek immediate help  Outpatient Drug & Alcohol Treatment   The [facility] currently operates an outpatient treatment unit:  [facility] in [place], [place] as a functional unit of the [employer]  The Outpatient treatment units are licensed by the [place] Department of Drug & Alcohol Programs to provide individual and group counseling for those with substance abuse and dependency problems  The clinical staff is experienced in a variety of therapeutic modalities and provides treatment that is individualized to meet the particular needs of each person  These units are drug-free treatment programs  The [facility] accepts most major healthcare insurance coverage plans, [place] Medical Assistance and in those cases where the consumer has no third party healthcare coverage, a liberal sliding fee schedule is utilized  The length of service and type of outpatient service provided is based on the results of the Level of Care Assessment            There are currently three treatment protocols available:   Outpatient   Intensive Outpatient   Contracted services for Partial Hospitalization  Therapy is provided in both Individual and Group counseling formats  The Outpatient department offers individual counseling for the family members of substance abusers to address co-dependent and enabling behaviors      Outpatient treatment services in BEHAVIORAL MEDICINE AT [facility] are purchased through a fee-for-service subcontract with:  [place] [facility]  [address] [place] [address] [address] [place] [address], [address]   Phone: [phone]    [address], [address] [facility]  [place] [address]  [place], [address]   New Admissions [phone]  Local office [phone]    Outpatient treatment services in [facility] are purchased through fee-for-service subcontracts with:  [address]  [address]  Phone: [phone], [address]  Phone: [address]  [phone] / [place]  [phone]  Outpatient treatment services are also available to [facility] residents in our [facility]

## 2021-06-19 NOTE — ED NOTES
Called nurse to nurse report to Angelo Anaya at [phone]  Patient going to Detox Unit via WILLARD Coulter RN  06/19/21 9452

## 2021-06-20 LAB
ALBUMIN SERPL BCP-MCNC: 3.7 G/DL (ref 3–5.2)
ALP SERPL-CCNC: 90 U/L (ref 43–122)
ALT SERPL W P-5'-P-CCNC: 24 U/L
ANION GAP SERPL CALCULATED.3IONS-SCNC: 8 MMOL/L (ref 5–14)
AST SERPL W P-5'-P-CCNC: 26 U/L (ref 17–59)
BASOPHILS # BLD AUTO: 0.1 THOUSANDS/ΜL (ref 0–0.1)
BASOPHILS NFR BLD AUTO: 1 % (ref 0–1)
BILIRUB SERPL-MCNC: 0.66 MG/DL
BUN SERPL-MCNC: 12 MG/DL (ref 5–25)
CALCIUM SERPL-MCNC: 8.3 MG/DL (ref 8.4–10.2)
CHLORIDE SERPL-SCNC: 101 MMOL/L (ref 97–108)
CO2 SERPL-SCNC: 29 MMOL/L (ref 22–30)
CREAT SERPL-MCNC: 0.68 MG/DL (ref 0.7–1.5)
EOSINOPHIL # BLD AUTO: 0.2 THOUSAND/ΜL (ref 0–0.4)
EOSINOPHIL NFR BLD AUTO: 2 % (ref 0–6)
ERYTHROCYTE [DISTWIDTH] IN BLOOD BY AUTOMATED COUNT: 14.6 %
GFR SERPL CREATININE-BSD FRML MDRD: 96 ML/MIN/1.73SQ M
GLUCOSE SERPL-MCNC: 129 MG/DL (ref 70–99)
HCT VFR BLD AUTO: 38.5 % (ref 41–53)
HGB BLD-MCNC: 13.4 G/DL (ref 13.5–17.5)
LYMPHOCYTES # BLD AUTO: 1.6 THOUSANDS/ΜL (ref 0.5–4)
LYMPHOCYTES NFR BLD AUTO: 18 % (ref 25–45)
MAGNESIUM SERPL-MCNC: 2.1 MG/DL (ref 1.6–2.3)
MCH RBC QN AUTO: 33.7 PG (ref 26–34)
MCHC RBC AUTO-ENTMCNC: 34.8 G/DL (ref 31–36)
MCV RBC AUTO: 97 FL (ref 80–100)
MONOCYTES # BLD AUTO: 0.9 THOUSAND/ΜL (ref 0.2–0.9)
MONOCYTES NFR BLD AUTO: 10 % (ref 1–10)
NEUTROPHILS # BLD AUTO: 6.3 THOUSANDS/ΜL (ref 1.8–7.8)
NEUTS SEG NFR BLD AUTO: 70 % (ref 45–65)
PHOSPHATE SERPL-MCNC: 2.8 MG/DL (ref 2.5–4.8)
PLATELET # BLD AUTO: 248 THOUSANDS/UL (ref 150–450)
PMV BLD AUTO: 7.3 FL (ref 8.9–12.7)
POTASSIUM SERPL-SCNC: 3.1 MMOL/L (ref 3.6–5)
PROT SERPL-MCNC: 6.5 G/DL (ref 5.9–8.4)
RBC # BLD AUTO: 3.98 MILLION/UL (ref 4.5–5.9)
SODIUM SERPL-SCNC: 138 MMOL/L (ref 137–147)
WBC # BLD AUTO: 9.1 THOUSAND/UL (ref 4.5–11)

## 2021-06-20 PROCEDURE — 84100 ASSAY OF PHOSPHORUS: CPT | Performed by: PHYSICIAN ASSISTANT

## 2021-06-20 PROCEDURE — 85025 COMPLETE CBC W/AUTO DIFF WBC: CPT | Performed by: PHYSICIAN ASSISTANT

## 2021-06-20 PROCEDURE — 99233 SBSQ HOSP IP/OBS HIGH 50: CPT | Performed by: PHYSICIAN ASSISTANT

## 2021-06-20 PROCEDURE — 83735 ASSAY OF MAGNESIUM: CPT | Performed by: PHYSICIAN ASSISTANT

## 2021-06-20 PROCEDURE — 80053 COMPREHEN METABOLIC PANEL: CPT | Performed by: PHYSICIAN ASSISTANT

## 2021-06-20 RX ORDER — ALFUZOSIN HYDROCHLORIDE 10 MG/1
10 TABLET, EXTENDED RELEASE ORAL DAILY
Status: DISCONTINUED | OUTPATIENT
Start: 2021-06-20 | End: 2021-06-22 | Stop reason: HOSPADM

## 2021-06-20 RX ORDER — LANOLIN ALCOHOL/MO/W.PET/CERES
3 CREAM (GRAM) TOPICAL
Status: DISCONTINUED | OUTPATIENT
Start: 2021-06-20 | End: 2021-06-22 | Stop reason: HOSPADM

## 2021-06-20 RX ORDER — POTASSIUM CHLORIDE 20 MEQ/1
40 TABLET, EXTENDED RELEASE ORAL ONCE
Status: COMPLETED | OUTPATIENT
Start: 2021-06-20 | End: 2021-06-20

## 2021-06-20 RX ORDER — POTASSIUM CHLORIDE 14.9 MG/ML
20 INJECTION INTRAVENOUS ONCE
Status: COMPLETED | OUTPATIENT
Start: 2021-06-20 | End: 2021-06-20

## 2021-06-20 RX ORDER — DIPHENHYDRAMINE HCL 25 MG
25 TABLET ORAL 2 TIMES DAILY PRN
Status: DISCONTINUED | OUTPATIENT
Start: 2021-06-20 | End: 2021-06-22 | Stop reason: HOSPADM

## 2021-06-20 RX ORDER — PHENOBARBITAL 32.4 MG/1
64.8 TABLET ORAL ONCE
Status: COMPLETED | OUTPATIENT
Start: 2021-06-20 | End: 2021-06-20

## 2021-06-20 RX ADMIN — BACLOFEN 10 MG: 10 TABLET ORAL at 16:04

## 2021-06-20 RX ADMIN — FOLIC ACID: 5 INJECTION, SOLUTION INTRAMUSCULAR; INTRAVENOUS; SUBCUTANEOUS at 08:15

## 2021-06-20 RX ADMIN — DIPHENHYDRAMINE HCL 25 MG: 25 TABLET ORAL at 17:22

## 2021-06-20 RX ADMIN — MELATONIN TAB 3 MG 3 MG: 3 TAB at 21:05

## 2021-06-20 RX ADMIN — MENTHOL, METHYL SALICYLATE: 10; 15 CREAM TOPICAL at 08:36

## 2021-06-20 RX ADMIN — ATORVASTATIN CALCIUM 40 MG: 40 TABLET, FILM COATED ORAL at 08:14

## 2021-06-20 RX ADMIN — POTASSIUM CHLORIDE 20 MEQ: 14.9 INJECTION, SOLUTION INTRAVENOUS at 06:18

## 2021-06-20 RX ADMIN — BACLOFEN 10 MG: 10 TABLET ORAL at 21:05

## 2021-06-20 RX ADMIN — NADOLOL 20 MG: 20 TABLET ORAL at 08:14

## 2021-06-20 RX ADMIN — POTASSIUM CHLORIDE 40 MEQ: 20 TABLET, EXTENDED RELEASE ORAL at 06:18

## 2021-06-20 RX ADMIN — SODIUM CHLORIDE 125 ML/HR: 0.9 INJECTION, SOLUTION INTRAVENOUS at 01:00

## 2021-06-20 RX ADMIN — SIMETHICONE 80 MG: 80 TABLET, CHEWABLE ORAL at 17:17

## 2021-06-20 RX ADMIN — FLUOXETINE 60 MG: 20 CAPSULE ORAL at 08:14

## 2021-06-20 RX ADMIN — DIPHENHYDRAMINE HCL 25 MG: 25 TABLET ORAL at 12:05

## 2021-06-20 RX ADMIN — BACLOFEN 10 MG: 10 TABLET ORAL at 08:14

## 2021-06-20 RX ADMIN — MENTHOL, METHYL SALICYLATE 1 APPLICATION: 10; 15 CREAM TOPICAL at 12:05

## 2021-06-20 RX ADMIN — TRAZODONE HYDROCHLORIDE 150 MG: 100 TABLET ORAL at 21:04

## 2021-06-20 RX ADMIN — SIMETHICONE 80 MG: 80 TABLET, CHEWABLE ORAL at 08:36

## 2021-06-20 RX ADMIN — FINASTERIDE 5 MG: 5 TABLET, FILM COATED ORAL at 08:14

## 2021-06-20 RX ADMIN — ENOXAPARIN SODIUM 40 MG: 40 INJECTION SUBCUTANEOUS at 08:14

## 2021-06-20 RX ADMIN — AMLODIPINE BESYLATE 5 MG: 5 TABLET ORAL at 08:14

## 2021-06-20 RX ADMIN — ALFUZOSIN 10 MG: 10 TABLET, EXTENDED RELEASE ORAL at 11:16

## 2021-06-20 RX ADMIN — ACETAMINOPHEN 650 MG: 325 TABLET ORAL at 11:16

## 2021-06-20 RX ADMIN — PHENOBARBITAL 64.8 MG: 32.4 TABLET ORAL at 03:06

## 2021-06-20 NOTE — ASSESSMENT & PLAN NOTE
Magnesium supplementation previously ordered:  4 g magnesium sulfate IV  Present magnesium 1 8    · Continue to monitor magnesium, replete as necessary

## 2021-06-20 NOTE — ASSESSMENT & PLAN NOTE
· Patient noted to have prolonged QT interval during admission in April 2021  · EKG performed (4/17/2021) revealed QTC interval of 497 with infrequent PVCs  · repeat EKG (4/18) revealed QTc 443; EKG on day of discharge (4/20/2021) revealed QTC of 414  ? Echo 04/19/2021:  LVEF 60%, left ventricular systolic function normal   No regional wall motion abnormalities  · Appears stable currently  · EKG (performed in ED 6/19/2021 prior to transfer):  Normal sinus rhythm, normal EKG      · QT//456 ms  · Continue tele monitoring

## 2021-06-20 NOTE — ASSESSMENT & PLAN NOTE
Previous instances of overnight hypoxia likely secondary to ROSARIO prior to admission  Previously scheduled sleep study with [facility], although there were issues with scheduling and his appointment was cancelled  Patient admitted to inconsistent present use of a nasal cannula at night- 2 L O2 supplementation, although his appropriate oxygenation has not necessitated supplemental oxygen use currently  · Continue to monitor SpO2 throughout the night despite appropriate oxygenation overnight throughout present admission  · Recommend rescheduling outpatient sleep study

## 2021-06-20 NOTE — ASSESSMENT & PLAN NOTE
Admitted to previous instances bloating, excessive flatulence; denies abdominal pain, nausea, constipation (1 episode diarrhea previously, 2 subsequent normal BMs)    · Continue simethicone 80 mg q 6h PRN

## 2021-06-20 NOTE — ASSESSMENT & PLAN NOTE
Patient admitted to withdrawal seizure in February 2019  Initially presented to [facility] ED 6/19/2021 for treatment of alcohol withdrawal symptoms including tremor, dizziness, headache; received Valium 10 mg in addition to 130 mg of phenobarbital in emergency department  Initial SEWS score of 5  Patient presently received 5 total doses of phenobarbital since arrival to unit (48-37-53--130 mg; 650 mg thus far    · Continue to monitor CBC, CMP  · Discontinued IVFs aside from IV thiamine/folic acid supplementation  · Consider discontinuation of SEWS protocol for medically-assisted alcohol withdrawal  · SEWS scores: 5, 5, 5, 5, 0, 0, 8, 5, 2, 0, 0, 2, 0

## 2021-06-20 NOTE — ASSESSMENT & PLAN NOTE
· During admission in April, patient was noted to have overnight hypoxia  · Likely secondary to ROSARIO  · Patient reports that he was scheduled for sleep study with [facility], however there were issues with scheduling and his appointment got canceled  · Patient currently wears a nasal cannula at night- 2 L O2 supplementation  · Patient reports that he has recently being inconsistent with wearing O2 supplementation at night, stating that he or oxygen maybe 3-4 nights the previous week  · Most recent SpO2 95%  · Continue to monitor SpO2 throughout the night, apply O2 supplementation via nasal cannula as needed (SpO2 goal >90%)  · Recommend outpatient sleep study for further evaluation

## 2021-06-20 NOTE — ASSESSMENT & PLAN NOTE
· Continue outpatient regimen of finasteride 5 mg daily and alfuzosin 10 mg daily  · alfuzosin non-formulary- patient's wife to drop medication off

## 2021-06-20 NOTE — ASSESSMENT & PLAN NOTE
· Continue finesteride 5mg q d  · Continue Alfuzosin 10mg q d ; presently not on hospital formulary  Provided by patient's spouse

## 2021-06-20 NOTE — ASSESSMENT & PLAN NOTE
· Likely secondary to alcohol use disorder  · CT abdomen/pelvis with contrast 04/17/2021:  Enlarged fatty liver noted  · CMP 06/02/2021 revealed LFTs as follows:  AST 23, ALT 32, alk-phos 92  · Continue to monitor LFTs  · Encourage follow-up with PCP outpatient for ongoing monitoring

## 2021-06-20 NOTE — ASSESSMENT & PLAN NOTE
· Most recent /66  · Continue outpatient regimen of nadolol 20 mg daily and amlodipine 5 mg daily  · Continue to monitor vitals, BP  · 06/20/21:  · BP remains stable throughout admission, continue to monitor

## 2021-06-20 NOTE — QUICK NOTE
Hypokalemia  Assessment & Plan  · CMP this morning revealed potassium of 3 1, decreased from 3 7 yesterday  · Magnesium improved this morning to 2 1 from 1 1 yesterday    · Potassium supplementation ordered:  40 mEq potassium chloride PO + 20 mEq potassium chloride IV  · Continue to monitor potassium, replete as necessary

## 2021-06-20 NOTE — ASSESSMENT & PLAN NOTE
Likely secondary to alcohol use disorder  Previous CT abdomen/pelvis with contrast 04/17/2021: enlarged fatty liver noted  · Continue to monitor LFTs using CMP     · Encourage follow-up with PCP outpatient for ongoing monitoring

## 2021-06-20 NOTE — ASSESSMENT & PLAN NOTE
· Patient with history of chronic alcohol use since his 07W; complicated by alcoholic fatty liver disease and reported h/o alcohol withdrawal seizure  · Patient previously treated in [facility] detox in in April 2021 (4/17-4/20) and most recently early June 2021 (6/2-6/9)  · Upon discharge from most recent admission, patient was set up with outpatient rehab resources, set to follow with [facility] on 06/10/2021  · Patient states that he attended outpatient rehab this past week on Tuesday, Wednesday, Thursday from 5-8 p m  each evening  attended a 1 hour counseling session this past week  · Reports that he continues to attend AA meetings  · Patient notes he takes naltrexone (notes it "doesn't help"); however, per chart review of AVS 6/9/2021, naltrexone was discontinued upon discharge  · Patient reports that he resumed drinking alcohol on 6/13/2021  · 0 5 pt vodka daily from 6/13/2021-6/18/2021  · On 6/18- patient states he purchased 2 pint bottles of vodka  Consumed total of 1 5 pints between 12:00 pm 6/18 and 01:00 am on 6/19  · Patient presented to [facility] ED 6/19/2021 for treatment of alcohol withdrawal symptoms  · Pertinent labs:  · CMP 06/19/2021:  Sodium 144, potassium 3 7    LFTs-AST 23 ALT 32, alk-phos 2  · CBC 6/19/2021: hemoglobin 14 1, MCV 95, platelets 014  · Ethanol at 8:37 a m  06/19/2020: 120  · Follow SEWS protocol for medically-assisted alcohol withdrawal  · Patient reports that he is still interested in pursuing rehab

## 2021-06-20 NOTE — ASSESSMENT & PLAN NOTE
Previously prolonged QT interval during admission in April 2021  Previous EKG (performed in ED 6/19/2021 prior to transfer): normal sinus rhythm, normal EKG, QT//456 ms  · Consider discontinuation of telemetry

## 2021-06-20 NOTE — ASSESSMENT & PLAN NOTE
Patient presents with asymmetric lower extremity swelling: right lower extremity appears more swollen compared to left lower extremity; Non-pitting edema; no notable erythema present  Per chart review, patient has history of chronic bilateral lower extremity swelling; VAS lower extremity duplex ultrasound performed 04/17/2021:  No evidence of acute/chronic DVT of bilateral lower extremities   Initial D-dimer 0 28    · RLE measures 36 5 cm mid calf, LLE measures 33 cm mid calf  · Negative Homans sign  · Patient reports that his lower extremities appear unchanged to him  · Patient denies leg pain, chest pain, shortness of breath  · Patient denies personal/family history of DVT; denies recent injury  · Continue to monitor   · Consider further work-up should swelling worsen, RLE becomes erythematous, or patient becomes acutely symptomatic

## 2021-06-20 NOTE — ASSESSMENT & PLAN NOTE
Previous lipid panel 06/03/2021: cholesterol 147, triglycerides 60, LDL 74, HDL 61  · Continue atorvastatin 40 mg q d    · Recommend outpatient follow-up with PCP for ongoing monitoring

## 2021-06-20 NOTE — PROGRESS NOTES
Progress Note - Medical Toxicology    Sol Palomares 70 y o  male MRN: [id number]  Unit/Bed#: 5T DETOX 508-01 Encounter: [id number]  MEDICAL DETOX UNIT, LEVEL 4  Department of Medical Toxicology  Reason for Admission/Principal Problem: alcohol withdrawal  Rounding Provider: Jamila Jean PA-C, Lawanda Bass MD     Depression with anxiety  Assessment & Plan  · Continue outpatient regimen:  Prozac 60 mg daily, trazodone 150 QHS  · Encourage follow-up with PCP outpatient for ongoing monitoring/maintenance    Alcohol induced fatty liver  Assessment & Plan  · Likely secondary to alcohol use disorder  · CT abdomen/pelvis with contrast 04/17/2021:  Enlarged fatty liver noted  · CMP 06/02/2021 revealed LFTs as follows:  AST 23, ALT 32, alk-phos 92  · Continue to monitor LFTs  · Encourage follow-up with PCP outpatient for ongoing monitoring    Mixed hyperlipidemia  Assessment & Plan  · Most recent lipid panel 06/03/2021:  Cholesterol 147, triglycerides 60, LDL 74, HDL 61  · Continue outpatient regimen atorvastatin 40 mg daily  · Recommend outpatient follow-up with PCP for ongoing monitoring    Flatulence  Assessment & Plan  · Patient reports ongoing bloating, excessive flatulence; denies abdominal pain, nausea, constipation (1 episode diarrhea earlier today, 2 subsequent normal BMs)  · Per chart review, this is chronic for patient  · +BS x 4, no TTP  · Continue simethicone 80 mg q 6h PRN    Hypoxia  Assessment & Plan  · During admission in April, patient was noted to have overnight hypoxia  · Likely secondary to ROSARIO  · Patient reports that he was scheduled for sleep study with [facility], however there were issues with scheduling and his appointment got canceled  · Patient currently wears a nasal cannula at night- 2 L O2 supplementation  · Patient reports that he has recently being inconsistent with wearing O2 supplementation at night, stating that he or oxygen maybe 3-4 nights the previous week  · Most recent SpO2 95%  · Continue to monitor SpO2 throughout the night, apply O2 supplementation via nasal cannula as needed (SpO2 goal >90%)  · Recommend outpatient sleep study for further evaluation    Swelling of right lower extremity  Assessment & Plan  · Patient presents with asymmetric lower extremity swelling: right lower extremity appears more swollen compared to left lower extremity; Non-pitting edema; no notable erythema present  · RLE measures 36 5 cm mid calf, LLE measures 33 cm mid calf  · Negative Homans sign  · Patient reports that his lower extremities appear unchanged to him  · Patient denies leg pain, chest pain, shortness of breath  · Patient denies personal/family history of DVT; denies recent injury  · Per chart review, patient has history of chronic bilateral lower extremity swelling  · VAS lower extremity duplex ultrasound performed 04/17/2021:  No evidence of acute/chronic DVT of bilateral lower extremities  · Check D-dimer:  0 28 (WNL)  · Continue to monitor swelling  · consider further work-up should swelling worsen, RLE becomes erythematous, or patient becomes acutely symptomatic    History of prolonged Q-T interval on ECG  Assessment & Plan  · Patient noted to have prolonged QT interval during admission in April 2021  · EKG performed (4/17/2021) revealed QTC interval of 497 with infrequent PVCs  · repeat EKG (4/18) revealed QTc 443; EKG on day of discharge (4/20/2021) revealed QTC of 414  · Echo 04/19/2021:  LVEF 60%, left ventricular systolic function normal   No regional wall motion abnormalities  · Appears stable currently  · EKG (performed in ED 6/19/2021 prior to transfer):  Normal sinus rhythm, normal EKG    · QT//456 ms  · Continue tele monitoring    BPH (benign prostatic hyperplasia)  Assessment & Plan  · Patient's current outpatient regimen includes finesteride 5mg QD and alfuzosin 10mg daily  · Will order finesteride 5mg QD, Alfuzosin 10mg not on hospital formulary, patient states his wife will bring from home    Hypomagnesemia  Assessment & Plan  · Magnesium 6/19/2021: 1 1  · CMP revealed potassium of 3 7  · Supplementation administered:  4 g magnesium sulfate IV  · Continue to monitor magnesium, replete as necessary  · 06/20/21:   · Magnesium up to 2 1 today  · Will continue to monitor daily mag      Hypokalemia  Assessment & Plan  · 06/20/21:   · CMP this morning revealed potassium of 3 1, decreased from 3 7 yesterday  · Magnesium improved this morning to 2 1 from 1 1 yesterday  · Potassium supplementation ordered:  40 mEq potassium chloride PO + 20 mEq potassium chloride IV  · Continue to monitor potassium, replete as necessary    Alcohol use disorder, severe, dependence ([facility] [address] )  Assessment & Plan  · Patient with history of chronic alcohol use since his 64L; complicated by alcoholic fatty liver disease and reported h/o alcohol withdrawal seizure  · Patient previously treated in \[facility]\"" detox in in April 2021 (4/17-4/20) and most recently early June 2021 (6/2-6/9)  · Upon discharge from most recent admission, patient was set up with outpatient rehab resources, set to follow with [facility] on 06/10/2021  · Patient states that he attended outpatient rehab this past week on Tuesday, Wednesday, Thursday from 5-8 p m  each evening; attended a 1 hour counseling session this past week  · Reports that he continues to attend AA meetings  · Patient notes he takes naltrexone (notes it "doesn't help"); however, per chart review of AVS 6/9/2021, naltrexone was discontinued upon discharge  · Patient reports that he resumed drinking alcohol on 6/13/2021  · 0 5 pt vodka daily from 6/13/2021-6/18/2021  · On 6/18- patient states he purchased 2 pint bottles of vodka  Consumed total of 1 5 pints between 12:00 pm 6/18 and 01:00 am on 6/19  · Patient presented to [facility] ED 6/19/2021 for treatment of alcohol withdrawal symptoms  · Pertinent labs:  · CMP 06/19/2021:  Sodium 144, potassium 3 7    LFTs-AST 23 ALT 32, alk-phos 2  · CBC 6/19/2021: hemoglobin 14 1, MCV 95, platelets 563  · Ethanol at 8:37 a m  06/19/2020: 120  · Follow SEWS protocol for medically-assisted alcohol withdrawal  · Patient reports that he is still interested in pursuing rehab      Hypertension  Assessment & Plan  · Most recent /66  · Continue outpatient regimen of nadolol 20 mg daily and amlodipine 5 mg daily  · Continue to monitor vitals, BP  · 06/20/21:  · BP remains stable throughout admission, continue to monitor    * Alcohol withdrawal syndrome without complication ([facility] [address] )  Assessment & Plan  · Patient reports remote history of withdrawal seizure in February 2019- was found unresponsive in his garage  · Patient presented to [facility] ED 6/19/2021 for treatment of alcohol withdrawal symptoms including tremor, dizziness, headache  · Patient received 10 mg Valium and 130 mg phenobarbital in the ED prior to transfer to detox Unit  · Initial SEWS score 5 upon arrival to unit- 65 mg phenobarbital administered (10 mg valium held since patient already received in ED)  · At present time, patient has received 5 total doses of phenobarbital since arrival to unit (34-54-18-; 520 mg total thus far including ED dose)  · Patient continues to exhibit tremor worse than baseline  · Continue to monitor CBC, CMP  · Continue IVFs; IV thiamine/folic acid supplementation  · Continue to follow SEWS protocol for medically-assisted alcohol withdrawal  · 06/20/21:  · SEWS scores: 5, 5, 5, 5, 0, 0, 8, 5, 2, 0, 0, 2, 0  · Phenobarbital: 130mg, 65mg, 65mg, 65mg, 65mg, 130mg, 65mg, 65mg (650mg 06/20/21)  · Continue SEWS protocol        VTE Pharmacologic Prophylaxis:   Pharmacologic: Enoxaparin (Lovenox)  Mechanical VTE Prophylaxis in Place: yes    Code Status: Level 1 - Full Code    Patient Centered Rounds: I have performed bedside rounds with nursing staff today      Discussions with Specialists or Other Care Team Provider: Dr Arya Car   Education and Discussions with Family / Patient: Answered patients questions to the best of my ability    Time Spent for Care: 45 minutes  More than 50% of total time spent on counseling and coordination of care as described above  Current Length of Stay: 1 day(s)    Current Patient Status: Inpatient     Certification Statement: The patient will continue to require additional inpatient hospital stay due to alcohol withdrawal monitoring Discharge Plan: Patient would like to continue treatment for alcohol use with inpatient rehab      Subjective:   Patient states he is feeling well today  Feels his ambulating has improved since his last admission and would like to get up and ambulate on the unit  Only complaining of gas and mild tremor which is chronic  Denies, HA, CP, SOB, Abdominal pain, N/V/diarrhea, pain, or weakness  Is pleasant and happy with the care he is receiving       Objective:     Clinical Opiate Withdrawal Scale  Pulse: 59    SEWS Total Score: 0 (6/20/2021  9:30 AM)        Last 24 Hours Medication List:   Current Facility-Administered Medications   Medication Dose Route Frequency Provider Last Rate    acetaminophen  650 mg Oral Q6H PRN Saira Juan Daniel, PA-C      acetaminophen  975 mg Oral Q6H PRN Saira Juan Daniel, PA-C      alfuzosin  10 mg Oral Daily Norma Defrancisco, PA-C      amLODIPine  5 mg Oral Daily Saira Juan Daniel, PA-C      atorvastatin  40 mg Oral Daily Saira Juan Daniel, PA-C      baclofen  10 mg Oral TID Norma Defrancisco, PA-C      enoxaparin  40 mg Subcutaneous Daily Saira Juan Daniel, PA-C      finasteride  5 mg Oral Daily Radha Laguna PA-C      FLUoxetine  60 mg Oral Daily Radha Hernandez PA-C      folic acid 1 mg, thiamine 100 mg in 0 9% sodium chloride 100 mL IVPB   Intravenous Daily Norma Defrancisco, PA-C      melatonin  3 mg Oral HS Norma Defrancisco, PA-C      menthol-methyl salicylate   Apply externally 4x Daily PRN Norma Defrancisco, PA-C      nadolol  20 mg Oral Daily Marci Sierra PA-C      PHENobarbital  64 8 mg Oral Once [facility] CARLITOS Sheehan      simethicone  80 mg Oral Q6H PRN Marci Sierra PA-C      traZODone  150 mg Oral HS Norma CARLITOS Sheehan      white petrolatum-mineral oil   Topical TID PRN Marci Sierra PA-C           Vitals:   Temp (24hrs), Av 7 °F (36 5 °C), Min:97 1 °F (36 2 °C), Max:98 4 °F (36 9 °C)    Temp:  [97 1 °F (36 2 °C)-98 4 °F (36 9 °C)] 97 2 °F (36 2 °C)  HR:  [48-74] 59  Resp:  [16-20] 18  BP: (111-161)/(58-89) 160/78  SpO2:  [90 %-99 %] 91 %  Body mass index is 32 6 kg/m²  Input and Output Summary (last 24 hours): Intake/Output Summary (Last 24 hours) at 2021 1115  Last data filed at 2021 0815  Gross per 24 hour   Intake 2640 ml   Output 1400 ml   Net 1240 ml       Physical Exam:   Physical Exam  Constitutional:       General: He is not in acute distress  Appearance: Normal appearance  He is obese  He is not ill-appearing, toxic-appearing or diaphoretic  HENT:      Head: Normocephalic and atraumatic  Nose: Nose normal       Mouth/Throat:      Mouth: Mucous membranes are moist       Pharynx: Oropharynx is clear  Eyes:      General: No scleral icterus  Cardiovascular:      Rate and Rhythm: Normal rate and regular rhythm  Heart sounds: Normal heart sounds  No murmur heard  Pulmonary:      Effort: Pulmonary effort is normal       Breath sounds: Normal breath sounds  Abdominal:      General: Bowel sounds are normal  There is no distension  Palpations: Abdomen is soft  Tenderness: There is no abdominal tenderness  Musculoskeletal:      Right lower leg: No edema  Left lower leg: No edema  Skin:     General: Skin is warm and dry  Findings: Rash present  Neurological:      General: No focal deficit present  Mental Status: He is alert and oriented to person, place, and time     Psychiatric:         Mood and Affect: Mood normal  Behavior: Behavior normal          Thought Content: Thought content normal          Judgment: Judgment normal          Additional Data:     Labs: keep all most recent labs as listed on admission templates   Results from last 7 days   Lab Units 06/20/21  0525   WBC Thousand/uL 9 10   HEMOGLOBIN g/dL 13 4*   HEMATOCRIT % 38 5*   PLATELETS Thousands/uL 248   NEUTROS PCT % 70*   LYMPHS PCT % 18*   MONOS PCT % 10   EOS PCT % 2      Results from last 7 days   Lab Units 06/20/21  0524   SODIUM mmol/L 138   POTASSIUM mmol/L 3 1*   CHLORIDE mmol/L 101   CO2 mmol/L 29   BUN mg/dL 12   CREATININE mg/dL 0 68*   ANION GAP mmol/L 8   CALCIUM mg/dL 8 3*   ALBUMIN g/dL 3 7   TOTAL BILIRUBIN mg/dL 0 66   ALK PHOS U/L 90   ALT U/L 24   AST U/L 26   GLUCOSE RANDOM mg/dL 129*      Results from last 7 days   Lab Units 06/19/21  0837   INR  1 12                       * I Have Reviewed All Lab Data Listed Above  * Additional Pertinent Lab Tests Reviewed: All Labs Within Last 24 Hours Reviewed      Imaging Studies: I have personally reviewed pertinent reports  Recent Cultures (last 7 days): Today, Patient Was Seen By: Robbi Clemente PA-C    ** Please Note: Dictation voice to text software may have been used in the creation of this document   **

## 2021-06-20 NOTE — ASSESSMENT & PLAN NOTE
Potassium supplementation previously ordered:  40 mEq potassium chloride PO + 20 mEq potassium chloride IV  Present potassium 3 6    · Continue to monitor potassium, replete as necessary

## 2021-06-20 NOTE — ASSESSMENT & PLAN NOTE
Vitals:    06/21/21 0200   BP:    Pulse:    Resp:    Temp:    SpO2: 91%     · Continue nadolol 20 mg q d  Staci Sahu · Continue amlodipine 5 mg q d  Staci Sahu · Continue to monitor vitals

## 2021-06-20 NOTE — ASSESSMENT & PLAN NOTE
· Continue Prozac 60 mg q d  Larayne Chroman · Continue trazodone 150 q h s     · Encourage follow-up with PCP outpatient for ongoing monitoring/maintenance

## 2021-06-20 NOTE — ASSESSMENT & PLAN NOTE
· Continue outpatient regimen:  Prozac 60 mg daily, trazodone 150 QHS  · Encourage follow-up with PCP outpatient for ongoing monitoring/maintenance

## 2021-06-20 NOTE — ASSESSMENT & PLAN NOTE
· Patient presents with asymmetric lower extremity swelling: right lower extremity appears more swollen compared to left lower extremity;  Non-pitting edema; no notable erythema present  · RLE measures 36 5 cm mid calf, LLE measures 33 cm mid calf  · Negative Homans sign  · Patient reports that his lower extremities appear unchanged to him  · Patient denies leg pain, chest pain, shortness of breath  · Patient denies personal/family history of DVT; denies recent injury  · Per chart review, patient has history of chronic bilateral lower extremity swelling  · VAS lower extremity duplex ultrasound performed 04/17/2021:  No evidence of acute/chronic DVT of bilateral lower extremities  · Check D-dimer:  0 28 (WNL)  · Continue to monitor swelling  · consider further work-up should swelling worsen, RLE becomes erythematous, or patient becomes acutely symptomatic

## 2021-06-20 NOTE — ASSESSMENT & PLAN NOTE
· Magnesium 6/19/2021: 1 1  · CMP revealed potassium of 3 7  · Supplementation administered:  4 g magnesium sulfate IV  · Continue to monitor magnesium, replete as necessary

## 2021-06-20 NOTE — ASSESSMENT & PLAN NOTE
Patient admitted to chronic alcohol use since his 89D, complicated by alcoholic fatty liver disease and prior alcohol withdrawal seizure  Previously managed in [facility] detox in April 2021 (4/17-4/20) and June 2021 (6/2-6/9); [facility] in addition to naltrexone, although stated that this is not beneficial   Patient stated he resumed drinking alcohol on 6/13/2021; approximately 1/2 pt of daily aside from 1 5 pt prior to admission, presenting to [facility]'s ED on 06/19  · Consider discontinuation of SEWS protocol for medically-assisted alcohol withdrawal  · Case management consulted regarding pending placement to rehab

## 2021-06-20 NOTE — ASSESSMENT & PLAN NOTE
· Patient reports bloating, excessive flatulence; denies abdominal pain, nausea, constipation (1 episode diarrhea earlier today, 2 subsequent normal BMs)  · Per chart review, this is chronic for patient  · Continue simethicone 80 mg q 6h PRN

## 2021-06-20 NOTE — ASSESSMENT & PLAN NOTE
· Most recent lipid panel 06/03/2021:  Cholesterol 147, triglycerides 60, LDL 74, HDL 61  · Continue outpatient regimen atorvastatin 40 mg daily  · Recommend outpatient follow-up with PCP for ongoing monitoring

## 2021-06-20 NOTE — ASSESSMENT & PLAN NOTE
· Patient reports remote history of withdrawal seizure in February 2019- was found unresponsive in his garage  · Patient presented to [facility] ED 6/19/2021 for treatment of alcohol withdrawal symptoms including tremor, dizziness, headache  · Patient received 10 mg Valium and 130 mg phenobarbital in the ED prior to transfer to detox Unit  · Initial SEWS score 5 upon arrival to unit- 65 mg phenobarbital administered (10 mg valium held since patient already received in ED)  · At present time, patient has received 5 total doses of phenobarbital since arrival to unit (95-21-27-; 520 mg total thus far including ED dose)  · Patient continues to exhibit tremor worse than baseline  · Continue to follow SEWS protocol for medically-assisted alcohol withdrawal  · 06/20/21:  · SEWS scores: 5, 5, 5, 5, 0, 0, 8, 5, 2, 0, 0, 2, 0  · Phenobarbital: 130mg, 65mg, 65mg, 65mg, 65mg, 130mg, 65mg, 65mg (650mg 06/20/21)  · Patient with one score of 2 and 3 scores of 0 out of the last 4  The score of 2 was given for tremor which is chronic   At this point SEWS can be discontinued

## 2021-06-21 PROBLEM — R09.02 HYPOXIA: Status: RESOLVED | Noted: 2021-04-18 | Resolved: 2021-06-21

## 2021-06-21 PROBLEM — E83.42 HYPOMAGNESEMIA: Status: RESOLVED | Noted: 2019-07-13 | Resolved: 2021-06-21

## 2021-06-21 PROBLEM — E87.6 HYPOKALEMIA: Status: RESOLVED | Noted: 2019-02-19 | Resolved: 2021-06-21

## 2021-06-21 LAB
ANION GAP SERPL CALCULATED.3IONS-SCNC: 7 MMOL/L (ref 5–14)
ATRIAL RATE: 67 BPM
BASOPHILS # BLD AUTO: 0 THOUSANDS/ΜL (ref 0–0.1)
BASOPHILS NFR BLD AUTO: 0 % (ref 0–1)
BUN SERPL-MCNC: 10 MG/DL (ref 5–25)
CALCIUM SERPL-MCNC: 9 MG/DL (ref 8.4–10.2)
CHLORIDE SERPL-SCNC: 103 MMOL/L (ref 97–108)
CO2 SERPL-SCNC: 29 MMOL/L (ref 22–30)
CREAT SERPL-MCNC: 0.62 MG/DL (ref 0.7–1.5)
EOSINOPHIL # BLD AUTO: 0.2 THOUSAND/ΜL (ref 0–0.4)
EOSINOPHIL NFR BLD AUTO: 3 % (ref 0–6)
ERYTHROCYTE [DISTWIDTH] IN BLOOD BY AUTOMATED COUNT: 14.4 %
GFR SERPL CREATININE-BSD FRML MDRD: 100 ML/MIN/1.73SQ M
GLUCOSE SERPL-MCNC: 126 MG/DL (ref 70–99)
HCT VFR BLD AUTO: 39.7 % (ref 41–53)
HGB BLD-MCNC: 13.6 G/DL (ref 13.5–17.5)
LYMPHOCYTES # BLD AUTO: 1.7 THOUSANDS/ΜL (ref 0.5–4)
LYMPHOCYTES NFR BLD AUTO: 22 % (ref 25–45)
MAGNESIUM SERPL-MCNC: 1.8 MG/DL (ref 1.6–2.3)
MCH RBC QN AUTO: 33.4 PG (ref 26–34)
MCHC RBC AUTO-ENTMCNC: 34.4 G/DL (ref 31–36)
MCV RBC AUTO: 97 FL (ref 80–100)
MONOCYTES # BLD AUTO: 0.7 THOUSAND/ΜL (ref 0.2–0.9)
MONOCYTES NFR BLD AUTO: 9 % (ref 1–10)
NEUTROPHILS # BLD AUTO: 4.8 THOUSANDS/ΜL (ref 1.8–7.8)
NEUTS SEG NFR BLD AUTO: 65 % (ref 45–65)
P AXIS: 68 DEGREES
PLATELET # BLD AUTO: 215 THOUSANDS/UL (ref 150–450)
PMV BLD AUTO: 7.9 FL (ref 8.9–12.7)
POTASSIUM SERPL-SCNC: 3.6 MMOL/L (ref 3.6–5)
PR INTERVAL: 176 MS
QRS AXIS: 76 DEGREES
QRSD INTERVAL: 98 MS
QT INTERVAL: 432 MS
QTC INTERVAL: 456 MS
RBC # BLD AUTO: 4.08 MILLION/UL (ref 4.5–5.9)
SODIUM SERPL-SCNC: 139 MMOL/L (ref 137–147)
T WAVE AXIS: 79 DEGREES
VENTRICULAR RATE: 67 BPM
WBC # BLD AUTO: 7.4 THOUSAND/UL (ref 4.5–11)

## 2021-06-21 PROCEDURE — 93010 ELECTROCARDIOGRAM REPORT: CPT | Performed by: INTERNAL MEDICINE

## 2021-06-21 PROCEDURE — 99232 SBSQ HOSP IP/OBS MODERATE 35: CPT | Performed by: EMERGENCY MEDICINE

## 2021-06-21 PROCEDURE — 85025 COMPLETE CBC W/AUTO DIFF WBC: CPT | Performed by: PHYSICIAN ASSISTANT

## 2021-06-21 PROCEDURE — 97163 PT EVAL HIGH COMPLEX 45 MIN: CPT

## 2021-06-21 PROCEDURE — 80048 BASIC METABOLIC PNL TOTAL CA: CPT | Performed by: PHYSICIAN ASSISTANT

## 2021-06-21 PROCEDURE — 83735 ASSAY OF MAGNESIUM: CPT | Performed by: PHYSICIAN ASSISTANT

## 2021-06-21 RX ADMIN — FINASTERIDE 5 MG: 5 TABLET, FILM COATED ORAL at 08:24

## 2021-06-21 RX ADMIN — AMLODIPINE BESYLATE 5 MG: 5 TABLET ORAL at 08:24

## 2021-06-21 RX ADMIN — ATORVASTATIN CALCIUM 40 MG: 40 TABLET, FILM COATED ORAL at 08:24

## 2021-06-21 RX ADMIN — FOLIC ACID: 5 INJECTION, SOLUTION INTRAMUSCULAR; INTRAVENOUS; SUBCUTANEOUS at 08:23

## 2021-06-21 RX ADMIN — MELATONIN TAB 3 MG 3 MG: 3 TAB at 21:04

## 2021-06-21 RX ADMIN — BACLOFEN 10 MG: 10 TABLET ORAL at 17:04

## 2021-06-21 RX ADMIN — DIPHENHYDRAMINE HCL 25 MG: 25 TABLET ORAL at 05:43

## 2021-06-21 RX ADMIN — ENOXAPARIN SODIUM 40 MG: 40 INJECTION SUBCUTANEOUS at 08:24

## 2021-06-21 RX ADMIN — TRAZODONE HYDROCHLORIDE 150 MG: 100 TABLET ORAL at 21:04

## 2021-06-21 RX ADMIN — NADOLOL 20 MG: 20 TABLET ORAL at 08:24

## 2021-06-21 RX ADMIN — ALFUZOSIN 10 MG: 10 TABLET, EXTENDED RELEASE ORAL at 08:24

## 2021-06-21 RX ADMIN — BACLOFEN 10 MG: 10 TABLET ORAL at 21:04

## 2021-06-21 RX ADMIN — FLUOXETINE 60 MG: 20 CAPSULE ORAL at 08:24

## 2021-06-21 RX ADMIN — BACLOFEN 10 MG: 10 TABLET ORAL at 08:23

## 2021-06-21 RX ADMIN — MENTHOL, METHYL SALICYLATE: 10; 15 CREAM TOPICAL at 05:43

## 2021-06-21 NOTE — CASE MANAGEMENT
Cm met with pt and discussed pt's pharmacy options  Pt indicated he would like his prescriptions sent to his home pharmacy, [address]

## 2021-06-21 NOTE — CASE MANAGEMENT
LUISA me with pt and discussed his return to the detox unit  Pt reports he had relapsed and drank less then 24 hours  Pt states he had started attending partial program and felt comfortable returning to this program at [facility]  Cm had pt sign an IMM for discharge  Pt received a copy of IMM  Luisa then spoke with pt's wife who confirmed that pt had been attending outpatient and states she believes that pt began drinking possibly Thursday evening  Cm discussed pt's return to IOP and possible need for transportation today for discharge  Pt's wife requested that pt stay one more day and she can retreive pt tomorrow morning

## 2021-06-21 NOTE — PROGRESS NOTES
PROGRESS NOTE  DEPARTMENT OF MEDICAL TOXICOLOGY  LEVEL 4 MEDICAL DETOX UNIT  Brigette Olivares 70 y o  male MRN: [id number]  Unit/Bed#: 5T DETOX 508-01 Encounter: [id number]      Reason for Admission/Principal Problem:  Alcohol withdrawal secondary to alcohol use disorder  Rounding Provider: Goble Christian Holter, DO  Attending Provider: Anibal Martinez MD   6/19/2021 12:14 PM     Depression with anxiety  Assessment & Plan  · Continue Prozac 60 mg q d  Efra Balint · Continue trazodone 150 q h s     · Encourage follow-up with PCP outpatient for ongoing monitoring/maintenance    Alcohol induced fatty liver  Assessment & Plan  Likely secondary to alcohol use disorder  Previous CT abdomen/pelvis with contrast 04/17/2021: enlarged fatty liver noted  · Continue to monitor LFTs using CMP  · Encourage follow-up with PCP outpatient for ongoing monitoring    Mixed hyperlipidemia  Assessment & Plan  Previous lipid panel 06/03/2021: cholesterol 147, triglycerides 60, LDL 74, HDL 61  · Continue atorvastatin 40 mg q d  · Recommend outpatient follow-up with PCP for ongoing monitoring    Flatulence  Assessment & Plan  Admitted to previous instances bloating, excessive flatulence; denies abdominal pain, nausea, constipation (1 episode diarrhea previously, 2 subsequent normal BMs)  · Continue simethicone 80 mg q 6h PRN    Swelling of right lower extremity  Assessment & Plan  Patient presents with asymmetric lower extremity swelling: right lower extremity appears more swollen compared to left lower extremity; Non-pitting edema; no notable erythema present  Per chart review, patient has history of chronic bilateral lower extremity swelling; VAS lower extremity duplex ultrasound performed 04/17/2021:  No evidence of acute/chronic DVT of bilateral lower extremities   Initial D-dimer 0 28    · RLE measures 36 5 cm mid calf, LLE measures 33 cm mid calf  · Negative Homans sign  · Patient reports that his lower extremities appear unchanged to him  · Patient denies leg pain, chest pain, shortness of breath  · Patient denies personal/family history of DVT; denies recent injury  · Continue to monitor   · Consider further work-up should swelling worsen, RLE becomes erythematous, or patient becomes acutely symptomatic    History of prolonged Q-T interval on ECG  Assessment & Plan  Previously prolonged QT interval during admission in April 2021  Previous EKG (performed in ED 6/19/2021 prior to transfer): normal sinus rhythm, normal EKG, QT//456 ms  · Consider discontinuation of telemetry  BPH (benign prostatic hyperplasia)  Assessment & Plan    · Continue finesteride 5mg q d  · Continue Alfuzosin 10mg q d ; presently not on hospital formulary  Provided by patient's spouse  Alcohol use disorder, severe, dependence ([facility] [address] )  Assessment & Plan  Patient admitted to chronic alcohol use since his 22E, complicated by alcoholic fatty liver disease and prior alcohol withdrawal seizure  Previously managed in [facility] detox in April 2021 (4/17-4/20) and June 2021 (6/2-6/9); [facility] in addition to naltrexone, although stated that this is not beneficial   Patient stated he resumed drinking alcohol on 6/13/2021; approximately 1/2 pt of daily aside from 1 5 pt prior to admission, presenting to [facility] ED on 06/19  · [facility]  · Case management consulted regarding pending placement to rehab  Hypertension  Assessment & Plan  Vitals:    06/21/21 0200   BP:    Pulse:    Resp:    Temp:    SpO2: 91%     · Continue nadolol 20 mg q d  Rusty Rizzo · Continue amlodipine 5 mg q d  Rusty Rizzo · Continue to monitor vitals  * Alcohol withdrawal syndrome without complication [facility])  Assessment & Plan  Patient admitted to withdrawal seizure in February 2019   Initially presented to [facility] ED 6/19/2021 for treatment of alcohol withdrawal symptoms including tremor, dizziness, headache; received Valium 10 mg in addition to 130 mg of phenobarbital in emergency department  Initial SEWS score of 5  Patient presently received 5 total doses of phenobarbital since arrival to unit (70-79-87--130 mg; 650 mg thus far  · Continue to monitor CBC, CMP  · Discontinued IVFs aside from IV thiamine/folic acid supplementation  · Consider discontinuation of SEWS protocol for medically-assisted alcohol withdrawal  · SEWS scores: 5, 5, 5, 5, 0, 0, 8, 5, 2, 0, 0, 2, 0      Hypoxia-resolved as of 6/21/2021  Assessment & Plan  Previous instances of overnight hypoxia likely secondary to ROSARIO prior to admission  Previously scheduled sleep study with [employer], although there were issues with scheduling and his appointment was cancelled  Patient admitted to inconsistent present use of a nasal cannula at night- 2 L O2 supplementation, although his appropriate oxygenation has not necessitated supplemental oxygen use currently  · Continue to monitor SpO2 throughout the night despite appropriate oxygenation overnight throughout present admission  · Recommend rescheduling outpatient sleep study  Hypomagnesemia-resolved as of 6/21/2021  Assessment & Plan  Magnesium supplementation previously ordered:  4 g magnesium sulfate IV  Present magnesium 1 8  · Continue to monitor magnesium, replete as necessary      Hypokalemia-resolved as of 6/21/2021  Assessment & Plan  Potassium supplementation previously ordered:  40 mEq potassium chloride PO + 20 mEq potassium chloride IV  Present potassium 3 6  · Continue to monitor potassium, replete as necessary          VTE Pharmacologic Prophylaxis:   Pharmacologic: Enoxaparin (Lovenox)  Mechanical VTE Prophylaxis in Place: yes    Code Status: Level 1 - Full Code    Patient Centered Rounds: I have performed bedside rounds with nursing staff today      Discussions with Specialists or Other Care Team Provider: MOJGAN Alejandre , case management, nursing     Education and Discussions with Family / Patient: Patient    Time Spent for Care: 30 minutes  More than 50% of total time spent on counseling and coordination of care as described above  Current Length of Stay: 2 day(s)    Current Patient Status: Inpatient     Certification Statement: The patient will continue to require additional inpatient hospital stay due to Medically assisted alcohol withdrawal, possible placement Discharge Plan:  24-48 hours      Subjective:   Patient evaluated this a m  for continuity of care  No acute distress noted throughout evaluation  Patient denied significant signs/symptoms of alcohol withdrawal with the exception of slight anxiety this a m, better in comparison to previous evaluation     Patient remained amenable to see physical therapy this a m  regarding his ambulation  Patient in agreement to return to his outpatient program at time of discharge, although ideally would prefer admission for inpatient alcohol rehab  He admitted to appropriate appetite denied problematic sleep overnight including denial of shortness of breath or trouble breathing necessitating supplementation      Objective:     Clinical Opiate Withdrawal Scale  Pulse: 63    SEWS Total Score: 0 (6/20/2021  1:15 PM)        Last 24 Hours Medication List:   Current Facility-Administered Medications   Medication Dose Route Frequency Provider Last Rate    acetaminophen  650 mg Oral Q6H PRN Vern Scheuermann, PA-C      acetaminophen  975 mg Oral Q6H PRN Vern Scheuermann, PA-C      alfuzosin  10 mg Oral Daily Norma DefranciCARLITOS castillo      amLODIPine  5 mg Oral Daily Vern Scheuermann, PA-C      atorvastatin  40 mg Oral Daily Vern Scheuermann, PA-C      baclofen  10 mg Oral TID Norma DefranciscoCARLITOS      diphenhydrAMINE  25 mg Oral BID PRN Vern Scheuermann, PA-C      enoxaparin  40 mg Subcutaneous Daily Radhapippa Flores PA-C      finasteride  5 mg Oral Daily Vern Scheuermann, PA-C      FLUoxetine  60 mg Oral Daily Radha L Jessie Cavanaugh PA-C      folic acid 1 mg, thiamine 100 mg in 0 9% sodium chloride 100 mL IVPB   Intravenous Daily Norma Sheehan PA-C Stopped (21 1001)    melatonin  3 mg Oral HS Norma DefranciscoCARLITOS      menthol-methyl salicylate   Apply externally 4x Daily PRN Norma NupurciscCARLITOS bañuelos      nadolol  20 mg Oral Daily Hermes Mccullough PA-C      PHENobarbital  64 8 mg Oral Once [employer]LITOS      simethicone  80 mg Oral Q6H PRN Hermes Mccullough PA-C      traZODone  150 mg Oral HS Norma SimpsonscCARLITOS bañuelos      white petrolatum-mineral oil   Topical TID PRN Hermes Mccullough PA-C           Vitals:   Temp (24hrs), Av 7 °F (36 5 °C), Min:97 7 °F (36 5 °C), Max:97 7 °F (36 5 °C)    Temp:  [97 7 °F (36 5 °C)] 97 7 °F (36 5 °C)  HR:  [63] 63  Resp:  [18] 18  BP: (133)/(72) 133/72  SpO2:  [91 %-96 %] 91 %  Body mass index is 32 6 kg/m²  Input and Output Summary (last 24 hours): Intake/Output Summary (Last 24 hours) at 2021 1042  Last data filed at 2021 1001  Gross per 24 hour   Intake 2392 92 ml   Output --   Net 2392 92 ml       Physical Exam:   Physical Exam  Vitals and nursing note reviewed  Constitutional:       General: He is awake  He is not in acute distress  Appearance: Normal appearance  He is obese  He is not ill-appearing or diaphoretic  HENT:      Head: Normocephalic and atraumatic  Nose: Nose normal       Mouth/Throat:      Mouth: Mucous membranes are dry  Pharynx: Oropharynx is clear  Eyes:      Extraocular Movements: Extraocular movements intact  Pupils: Pupils are equal, round, and reactive to light  Cardiovascular:      Rate and Rhythm: Normal rate and regular rhythm  Pulses: Normal pulses  Heart sounds: Normal heart sounds  No murmur heard  Pulmonary:      Effort: Pulmonary effort is normal  No respiratory distress  Breath sounds: Normal breath sounds and air entry  No decreased breath sounds  Abdominal:      General: Bowel sounds are normal  There is no distension  Palpations: Abdomen is soft  Tenderness: There is no abdominal tenderness  There is no guarding  Musculoskeletal:         General: Normal range of motion  Cervical back: Normal range of motion  Skin:     General: Skin is warm and dry  Capillary Refill: Capillary refill takes less than 2 seconds  Neurological:      General: No focal deficit present  Mental Status: He is alert  Psychiatric:         Attention and Perception: Attention and perception normal          Mood and Affect: Affect normal  Mood is anxious  Speech: Speech normal          Behavior: Behavior normal  Behavior is cooperative  Thought Content: Thought content normal          Cognition and Memory: Cognition and memory normal          Judgment: Judgment normal          Additional Data:     Labs:   Results from last 7 days   Lab Units 06/21/21  0607   WBC Thousand/uL 7 40   HEMOGLOBIN g/dL 13 6   HEMATOCRIT % 39 7*   PLATELETS Thousands/uL 215   NEUTROS PCT % 65   LYMPHS PCT % 22*   MONOS PCT % 9   EOS PCT % 3      Results from last 7 days   Lab Units 06/21/21  0607 06/20/21  0524   SODIUM mmol/L 139 138   POTASSIUM mmol/L 3 6 3 1*   CHLORIDE mmol/L 103 101   CO2 mmol/L 29 29   BUN mg/dL 10 12   CREATININE mg/dL 0 62* 0 68*   ANION GAP mmol/L 7 8   CALCIUM mg/dL 9 0 8 3*   ALBUMIN g/dL  --  3 7   TOTAL BILIRUBIN mg/dL  --  0 66   ALK PHOS U/L  --  90   ALT U/L  --  24   AST U/L  --  26   GLUCOSE RANDOM mg/dL 126* 129*      Results from last 7 days   Lab Units 06/19/21  0837   INR  1 12                         * I Have Reviewed All Lab Data Listed Above  * Additional Pertinent Lab Tests Reviewed: Vickey 66 Admission Reviewed      Imaging Studies: I have personally reviewed pertinent reports  Recent Cultures (last 7 days):           Today, Patient Was Seen By: Lonell Evert Holter, DO    ** Please Note: Dictation voice to text software may have been used in the creation of this document   **

## 2021-06-21 NOTE — CASE MANAGEMENT
Andrew received a phone call from Annel Danielle at [facility] returning a call from this weekend  An RODRIGO was signed for [facility] by pt upon admission  Annel Danielle indicated she was pt's counselor and requested a call from pt  Cm informed pt of this call and pt stated he had contacted Annel Danielle and spoke with her  Pt states he is interested in returning to outpatient treatment with [facility]  Cm explained to pt that his family wants him to enter inpatient and as explained earlier with his pt's signing of his RODRIGO for [facility] a referral was made for inpatient treatment  Andrew then received a call from [facility] program to complete assessment interview with pt  [facility] program was transferred to pt's room to complete this interview

## 2021-06-21 NOTE — CASE MANAGEMENT
Due to pt deciding outpatient treatment   Tm contacted [facility] and confirmed that pt is scheduled for group therapy on 6/22/2021 at 5pm  Cm then contacted Dr Mary Pa office (PCP) and scheduled an appointment for 6/28/2021 at 7:15AM   Cm then had pt sign and RODRIGO for [employer] in [facility] and scheduled pt an appointment for physical therapy on 6/28/2021 at 1pm

## 2021-06-21 NOTE — CASE MANAGEMENT
Cm received a phone call from pt's brother Da Chen, [phone]  Pt signed an RODRIGO for Da Chen  Tejinder Eng spoke with this writer and expressed concern regarding pt's possible discharge and stated that the family wanted pt to enter inpatient treatment  Tejinder Eng stated that pt had never completed inpatient drug and alcohol treatment in the past   Cm discussed with Tejinder Eng the families response to pt returning home without completing treatment and Tejinder Eng stated he would have a discussion with pt's wife regarding her response to pt not completing treatment and the support the family could provide  Cm agreed again to begin the referral process for pt to enter inpatient treatment but advised pt's brother that this may result in the same outcome as last attempt which was inability for pt to be accepted  Pt's brother stated he understood  Pt signed an RODRIGO for [facility] program due to pt having medicare  [facility] was contacted by phone and a referral was made   Pt information faxed to [phone] for [facility] to begin process of this referral

## 2021-06-21 NOTE — NURSING NOTE
Patient informed RN that he needs to be discharged no later than 0830 tomorrow 6/22 because of his ride  RN picked up home meds from pharmacy and put them in patient locker in duffle bag  RN notified resident of the patient discharge time and need for paperwork and any scripts done

## 2021-06-21 NOTE — PLAN OF CARE
Problem: MOBILITY - ADULT  Goal: Maintain or return to baseline ADL function  Description: INTERVENTIONS:  -  Assess patient's ability to carry out ADLs; assess patient's baseline for ADL function and identify physical deficits which impact ability to perform ADLs (bathing, care of mouth/teeth, toileting, grooming, dressing, etc )  - Assess/evaluate cause of self-care deficits   - Assess range of motion  - Assess patient's mobility; develop plan if impaired  - Assess patient's need for assistive devices and provide as appropriate  - Encourage maximum independence but intervene and supervise when necessary  - Involve family in performance of ADLs  - Assess for home care needs following discharge   - Consider OT consult to assist with ADL evaluation and planning for discharge  - Provide patient education as appropriate  Outcome: Progressing  Goal: Maintains/Returns to pre admission functional level  Description: INTERVENTIONS:  - Perform BMAT or MOVE assessment daily    - Set and communicate daily mobility goal to care team and patient/family/caregiver     - Collaborate with rehabilitation services on mobility goals if consulted  - Ambulate patient 5 times a day  - Out of bed to chair 3 times a day   - Out of bed for meals 3 times a day  - Out of bed for toileting  - Record patient progress and toleration of activity level   Outcome: Progressing     Problem: PAIN - ADULT  Goal: Verbalizes/displays adequate comfort level or baseline comfort level  Description: Interventions:  - Encourage patient to monitor pain and request assistance  - Assess pain using appropriate pain scale  - Administer analgesics based on type and severity of pain and evaluate response  - Implement non-pharmacological measures as appropriate and evaluate response  - Consider cultural and social influences on pain and pain management  - Notify physician/advanced practitioner if interventions unsuccessful or patient reports new pain  Outcome: Progressing     Problem: INFECTION - ADULT  Goal: Absence or prevention of progression during hospitalization  Description: INTERVENTIONS:  - Assess and monitor for signs and symptoms of infection  - Monitor lab/diagnostic results  - Monitor all insertion sites, i e  indwelling lines, tubes, and drains  - Monitor endotracheal if appropriate and nasal secretions for changes in amount and color  - [place] appropriate cooling/warming therapies per order  - Administer medications as ordered  - Instruct and encourage patient and family to use good hand hygiene technique  - Identify and instruct in appropriate isolation precautions for identified infection/condition  Outcome: Progressing  Goal: Absence of fever/infection during neutropenic period  Description: INTERVENTIONS:  - Monitor WBC    Outcome: Progressing     Problem: SAFETY ADULT  Goal: Patient will remain free of falls  Description: INTERVENTIONS:  - Educate patient/family on patient safety including physical limitations  - Instruct patient to call for assistance with activity   - Consult OT/PT to assist with strengthening/mobility   - Keep Call bell within reach  - Keep bed low and locked with side rails adjusted as appropriate  - Keep care items and personal belongings within reach  - Initiate and maintain comfort rounds  - Make Fall Risk Sign visible to staff  - Offer Toileting every 2 Hours, in advance of need  - Apply yellow socks and bracelet for high fall risk patients  - Consider moving patient to room near nurses station  Outcome: Progressing     Problem: DISCHARGE PLANNING  Goal: Discharge to home or other facility with appropriate resources  Description: INTERVENTIONS:  - Identify barriers to discharge w/patient and caregiver  - Arrange for needed discharge resources and transportation as appropriate  - Identify discharge learning needs (meds, wound care, etc )  - Arrange for interpretive services to assist at discharge as needed  - Refer to Case Management Department for coordinating discharge planning if the patient needs post-hospital services based on physician/advanced practitioner order or complex needs related to functional status, cognitive ability, or social support system  Outcome: Progressing     Problem: Knowledge Deficit  Goal: Patient/family/caregiver demonstrates understanding of disease process, treatment plan, medications, and discharge instructions  Description: Complete learning assessment and assess knowledge base    Interventions:  - Provide teaching at level of understanding  - Provide teaching via preferred learning methods  Outcome: Progressing     Problem: Potential for Falls  Goal: Patient will remain free of falls  Description: INTERVENTIONS:  - Educate patient/family on patient safety including physical limitations  - Instruct patient to call for assistance with activity   - Consult OT/PT to assist with strengthening/mobility   - Keep Call bell within reach  - Keep bed low and locked with side rails adjusted as appropriate  - Keep care items and personal belongings within reach  - Initiate and maintain comfort rounds  - Make Fall Risk Sign visible to staff  - Offer Toileting every 2 Hours, in advance of need  - Apply yellow socks and bracelet for high fall risk patients  - Consider moving patient to room near nurses station  Outcome: Progressing

## 2021-06-21 NOTE — CASE MANAGEMENT
Cm received a call from [facility] indicating that pt had completed his intake but has stated he does not want inpatient treatment  [facility] indicates that at this time a bed search could not be conducted due to pt's statement that he does not want inpatient treatment  Cm then received a phone call from pt's brother Elias Rosa, who states that family would like pt to enter inpatient treatment  Cm explained the dilemma regarding pt's indicated he would not enter treatment but wanted to return to [facility]  Elias Rosa stated that he would contact pt's wife to discuss this dilemma  Cm informed Dr Kelly Mohan of this situation and Dr Shipman indicated he would discuss aftercare plans with pt and encourage inpatient treatment

## 2021-06-21 NOTE — PHYSICAL THERAPY NOTE
Physical Therapy Evaluation    Patient's Name: Viviana Brackjami    Admitting Diagnosis  Alcohol use disorder, mild, abuse [F10 10]    Problem List  Patient Active Problem List   Diagnosis    Hypertension    Ataxia    Alcohol withdrawal syndrome without complication (HCC)    Alcohol use disorder, severe, dependence (HCC)    BPH (benign prostatic hyperplasia)    Transaminitis    Tremor of left hand    History of prolonged Q-T interval on ECG    Swelling of right lower extremity    Unspecified mood (affective) disorder (HCC)    Flatulence    Mixed hyperlipidemia    Alcohol induced fatty liver    Elevated lipase    Pruritus    Insomnia    [facility]    Depression with anxiety       Past Medical History  Past Medical History:   Diagnosis Date    [facility] dependency [facility]     Anxiety     Depression     [place] (hard of hearing)     Hypertension     Kidney stones     Prostate enlargement     Renal disorder     kidney    Shoulder dislocation        Past Surgical History  Past Surgical History:   Procedure Laterality Date    CHOLECYSTECTOMY      SHOULDER SURGERY      for dislocation       Recent Imaging  No orders to display       Recent Vital Signs  Vitals:    06/21/21 0100 06/21/21 0200 06/21/21 0800 06/21/21 1100   BP:   140/76 132/74   BP Location:   Left arm Left arm   Pulse:   67 65   Resp:   18 18   Temp:   97 8 °F (36 6 °C) 99 4 °F (37 4 °C)   TempSrc:   Temporal Temporal   SpO2: 93% 91% 95% 96%   Weight:       Height:            06/21/21 1010   PT Last Visit   PT Visit Date 06/21/21   Note Type   Note type Evaluation   Pain Assessment   Pain Assessment Tool 0-10   Pain Score 3   Pain Location/Orientation Orientation: Right;Orientation: Lower; Location: Back   Home Living   Type of [place])   Home Layout Two level;Performs ADLs on one level; Able to live on main level with bedroom/bathroom;Stairs to enter with rails  (1 ESCOBAR)   Home Equipment Walker;Cane   Prior Function   Level of [place] Independent with ADLs and functional mobility   Lives With [place] Help From Family   ADL Assistance Independent   IADLs Independent   Vocational Retired   Restrictions/Precautions   [employer] Bearing Precautions Per Order No   Other Precautions Pain   General   Family/Caregiver Present No   Cognition   Overall Cognitive Status WFL   Arousal/Participation Alert   Orientation Level Oriented X4   Memory Within functional limits   Following Commands Follows all commands and directions without difficulty   Strength RLE   R Hip Flexion 4/5   R Knee Flexion 4/5   R Knee Extension 4/5   R Ankle Dorsiflexion 4/5   R Ankle Plantar Flexion 4/5   Strength LLE   L Hip Flexion 4/5   L Knee Flexion 4/5   L Knee Extension 4/5   L Ankle Dorsiflexion 4/5   L Ankle Plantar Flexion 4/5   Coordination   Movements are Fluid and Coordinated 0   Coordination and Movement Description mild coordination deficit   Sensation X   Light Touch   RLE Light Touch Impaired   RLE Light Touch Comments numbness to the bottom of feet   LLE Light Touch Impaired   LLE Light Touch Comments numbness to the bottom of feet   Bed Mobility   Supine to Sit 6  Modified independent   Additional items Increased time required   Sit to Supine 6  Modified independent   Additional items Increased time required   Transfers   Sit to Stand 6  Modified independent   Additional items Increased time required;Armrests   Stand to Sit 6  Modified independent   Additional items Armrests; Increased time required   Additional Comments with RW   Ambulation/Elevation   Gait pattern Short stride;Decreased foot clearance; Wide MARGY   Gait Assistance 6  Modified independent   Assistive Device Rolling walker   Distance 200ft   Balance   Static Sitting Fair +   Dynamic Sitting Fair +   Static Standing Fair   Dynamic Standing Fair -   Ambulatory Fair -   Endurance Deficit   Endurance Deficit Yes   Endurance Deficit Description fatigue   Activity Tolerance   Activity Tolerance Patient limited by fatigue   Medical Staff Made Aware spoke to CM   Nurse Made Aware spoke to RN   Assessment   Prognosis Good   Problem List Decreased strength;Decreased endurance; Impaired balance;Decreased mobility; Decreased coordination; Impaired sensation;Obesity;Pain   Barriers to Discharge Inaccessible home environment   Goals   Patient Goals to get PT for LBP and balance deficit   Plan   PT Frequency One time visit   Recommendation   PT Discharge Recommendation Home with outpatient rehabilitation   Equipment Recommended Walker  (continue home PRN use)   PT - OK to Discharge Yes   AM-PAC Basic Mobility Inpatient   Turning in Bed Without Bedrails 4   Lying on Back to Sitting on Edge of Flat Bed 4   Moving Bed to Chair 4   Standing Up From Chair 4   Walk in Room 4   Climb 3-5 Stairs 4   Basic Mobility Inpatient Raw Score 24   Basic Mobility Standardized Score 57 68         ASSESSMENT                                                                                                                     Nkechi Garcia is a 70 y o  male admitted to [address] [place] [address] on 6/19/2021 for Alcohol withdrawal syndrome without complication ([facility] )  Pt  has a past medical history of Alcohol dependency ([facility] ), Anxiety, Depression, [place] (hard of hearing), Hypertension, Kidney stones, Prostate enlargement, Renal disorder, and Shoulder dislocation    PT was consulted and pt was seen on 6/21/2021 for mobility assessment and d/c planning  Pt presents supine in bed alert and agreeable to therapy  He is reporting pain in the R lower back  He demonstrates mild weakness in BLEs  Numbness in the plantar aspect of foot limiting balance with mobility, mild edema to feet as well  Coordination is mildly impaired with heel to shin  No LOB with ambulation with RW, does have decreased balance and will recommend use of RW until able to improve balance in therapy    Pt is currently functioning at a modified independent assistance level for bed mobility, modified independent assistance level for transfers, modified independent assistance level for ambulation with Rolling Walker  The patient's AM-PAC Basic Mobility Inpatient Short Form Raw Score is 24, Standardized Score is 57 68  A standardized score greater than 42 9 suggests the patient may benefit from discharge to home  Please also refer to the recommendation of the Physical Therapist for safe discharge planning  Recommendations                                                                                                              Pt will benefit from continued skilled IP PT to address the above mentioned impairments in order to maximize recovery and increase functional independence when completing mobility and ADLs  See flow sheet for goals and POC  DME:  rolling walker     Discharge Disposition:  Home with Outpatient Physical Therapy to address LBP and balance deficit        Kailyn Marroquin PT, DPT

## 2021-06-21 NOTE — NURSING NOTE
Pt requesting to shower  IV removed, and showering chair and supplies provided  Returned patient belongings as well since having an early discharge tomorrow 6/22

## 2021-06-21 NOTE — PROGRESS NOTES
Spoke with patient about his readmission  Informed him if he is readmitted again inpatient treatment is the only option for aftercare  He states his wife is putting him under house arrest; she appears to be angry and afraid that he will relapse again  He states he has been going to [place] [id number] and he likes his new therapist  He does not have a real understanding of his relapse process  He is scheduled for discharge tomorrow morning

## 2021-06-22 ENCOUNTER — APPOINTMENT (OUTPATIENT)
Dept: PHYSICAL THERAPY | Facility: CLINIC | Age: 71
End: 2021-06-22
Payer: MEDICARE

## 2021-06-22 VITALS
BODY MASS INDEX: 32.66 KG/M2 | HEART RATE: 68 BPM | RESPIRATION RATE: 18 BRPM | DIASTOLIC BLOOD PRESSURE: 76 MMHG | OXYGEN SATURATION: 94 % | TEMPERATURE: 98.7 F | WEIGHT: 208.11 LBS | SYSTOLIC BLOOD PRESSURE: 133 MMHG | HEIGHT: 67 IN

## 2021-06-22 PROBLEM — F10.230 ALCOHOL WITHDRAWAL SYNDROME WITHOUT COMPLICATION (HCC): Status: RESOLVED | Noted: 2019-02-12 | Resolved: 2021-06-22

## 2021-06-22 PROBLEM — F10.930 ALCOHOL WITHDRAWAL SYNDROME WITHOUT COMPLICATION (HCC): Status: RESOLVED | Noted: 2019-02-12 | Resolved: 2021-06-22

## 2021-06-22 PROBLEM — R14.3 FLATULENCE: Status: RESOLVED | Noted: 2021-04-19 | Resolved: 2021-06-22

## 2021-06-22 PROCEDURE — 99239 HOSP IP/OBS DSCHRG MGMT >30: CPT | Performed by: NURSE PRACTITIONER

## 2021-06-22 RX ORDER — LANOLIN ALCOHOL/MO/W.PET/CERES
100 CREAM (GRAM) TOPICAL DAILY
Qty: 30 TABLET | Refills: 0 | Status: ON HOLD | OUTPATIENT
Start: 2021-06-22 | End: 2021-09-07

## 2021-06-22 NOTE — ASSESSMENT & PLAN NOTE
Magnesium supplementation previously ordered:  4 g magnesium sulfate IV  Present magnesium 1 8    · Stabilized

## 2021-06-22 NOTE — ASSESSMENT & PLAN NOTE
Potassium supplementation previously ordered:  40 mEq potassium chloride PO + 20 mEq potassium chloride IV  Present potassium 3 6    · Stabilized

## 2021-06-22 NOTE — ASSESSMENT & PLAN NOTE
Vitals:    06/22/21 0500   BP:    SpO2: 94%     · Continue nadolol 20 mg q d  Efra Balint · Continue amlodipine 5 mg q d  Efra Balint · Continue to monitor vitals

## 2021-06-22 NOTE — ASSESSMENT & PLAN NOTE
Patient admitted to chronic alcohol use since his 97G, complicated by alcoholic fatty liver disease and prior alcohol withdrawal seizure  [facility] in April 2021 (4/17-4/20) and June 2021 (6/2-6/9); discharge using pathways to Recovery in addition to naltrexone, although stated that this is not beneficial   Patient stated he resumed drinking alcohol on 6/13/2021; approximately 1/2 pt of daily aside from 1 5 pt prior to admission, presenting to [facility] ED on 06/19  · Consider discontinuation of SEWS protocol for medically-assisted alcohol withdrawal  · Case management consulted regarding pending placement to rehab    · Patient is refusing rehab at this time and will be discharged home against medical advice

## 2021-06-22 NOTE — ASSESSMENT & PLAN NOTE
Previous instances of overnight hypoxia likely secondary to ROSARIO prior to admission  Previously scheduled sleep study with [facility], although there were issues with scheduling and his appointment was cancelled  Patient admitted to inconsistent present use of a nasal cannula at night- 2 L O2 supplementation, although his appropriate oxygenation has not necessitated supplemental oxygen use currently  · Continue to monitor SpO2 throughout the night despite appropriate oxygenation overnight throughout present admission  · Recommend rescheduling outpatient sleep study

## 2021-06-22 NOTE — ASSESSMENT & PLAN NOTE
· Continue Prozac 60 mg q d  Shailesh Gold · Continue trazodone 150 q h s     · Encourage follow-up with PCP outpatient for ongoing monitoring/maintenance

## 2021-06-22 NOTE — ASSESSMENT & PLAN NOTE
Patient presents with asymmetric lower extremity swelling: right lower extremity appears more swollen compared to left lower extremity; Non-pitting edema; no notable erythema present  Per chart review, patient has history of chronic bilateral lower extremity swelling; VAS lower extremity duplex ultrasound performed 04/17/2021:  No evidence of acute/chronic DVT of bilateral lower extremities   Initial D-dimer 0 28    · RLE measures 36 5 cm mid calf, LLE measures 33 cm mid calf  · Negative Homans sign  · Patient reports that his lower extremities appear unchanged to him  · Patient denies leg pain, chest pain, shortness of breath  · Patient denies personal/family history of DVT; denies recent injury  · Continue to monitor   · Consider further work-up with PCP should swelling worsen, RLE becomes erythematous, or patient becomes acutely symptomatic

## 2021-06-22 NOTE — ASSESSMENT & PLAN NOTE
Patient admitted to withdrawal seizure in February 2019  Initially presented to [facility] ED 6/19/2021 for treatment of alcohol withdrawal symptoms including tremor, dizziness, headache; received Valium 10 mg in addition to 130 mg of phenobarbital in emergency department  Initial SEWS score of 5  Patient presently received 5 total doses of phenobarbital since arrival to unit (43-46-86--130 mg; 650 mg thus far    · Continue to monitor CBC, CMP  · Discontinued IVFs aside from IV thiamine/folic acid supplementation  · Consider discontinuation of SEWS protocol for medically-assisted alcohol withdrawal  SEWS Total Score: 0 (6/20/2021  1:15 PM)  · Patient has completed SEWS protocol and is on anticipated that the patient require any further medical intervention at this time for alcohol withdrawal

## 2021-06-22 NOTE — DISCHARGE SUMMARY
[facility]  Discharge- Erlin Nguyen 1950, 70 y o  male MRN: [id number]  Unit/Bed#: [facility] 283-49 Encounter: [id number]  Primary Care Provider: Isabel Shea MD   Date and time admitted to hospital: 6/19/2021 12:14 PM    Depression with anxiety  Assessment & Plan  · Continue Prozac 60 mg q d  Latonia Gaeljef · Continue trazodone 150 q h s     · Encourage follow-up with PCP outpatient for ongoing monitoring/maintenance    Alcohol induced fatty liver  Assessment & Plan  Likely secondary to alcohol use disorder  Previous CT abdomen/pelvis with contrast 04/17/2021: enlarged fatty liver noted  · Continue to monitor LFTs using CMP  · Encourage follow-up with PCP outpatient for ongoing monitoring    Mixed hyperlipidemia  Assessment & Plan  Previous lipid panel 06/03/2021: cholesterol 147, triglycerides 60, LDL 74, HDL 61  · Continue atorvastatin 40 mg q d  · Recommend outpatient follow-up with PCP for ongoing monitoring    Swelling of right lower extremity  Assessment & Plan  Patient presents with asymmetric lower extremity swelling: right lower extremity appears more swollen compared to left lower extremity; Non-pitting edema; no notable erythema present  Per chart review, patient has history of chronic bilateral lower extremity swelling; VAS lower extremity duplex ultrasound performed 04/17/2021:  No evidence of acute/chronic DVT of bilateral lower extremities   Initial D-dimer 0 28    · RLE measures 36 5 cm mid calf, LLE measures 33 cm mid calf  · Negative Homans sign  · Patient reports that his lower extremities appear unchanged to him  · Patient denies leg pain, chest pain, shortness of breath  · Patient denies personal/family history of DVT; denies recent injury  · Continue to monitor   · Consider further work-up with PCP should swelling worsen, RLE becomes erythematous, or patient becomes acutely symptomatic    History of prolonged Q-T interval on ECG  Assessment & Plan  Previously prolonged QT interval during admission in April 2021  Previous EKG (performed in ED 6/19/2021 prior to transfer): normal sinus rhythm, normal EKG, QT//456 ms  · Consider discontinuation of telemetry  BPH (benign prostatic hyperplasia)  Assessment & Plan    · Continue finesteride 5mg q d  · Continue Alfuzosin 10mg q d ; presently not on hospital formulary  Provided by patient's spouse  Alcohol use disorder, severe, dependence ([facility] [address] )  Assessment & Plan  Patient admitted to chronic alcohol use since his 51N, complicated by alcoholic fatty liver disease and prior alcohol withdrawal seizure  Previously managed in [facility] detox in April 2021 (4/17-4/20) and June 2021 (6/2-6/9); discharge using [facility] in addition to naltrexone, although stated that this is not beneficial   Patient stated he resumed drinking alcohol on 6/13/2021; approximately 1/2 pt of daily aside from 1 5 pt prior to admission, [facility]  · [facility]  · Case management consulted regarding pending placement to rehab  · Patient is refusing rehab at this time and will be discharged home against medical advice      Hypertension  Assessment & Plan  Vitals:    06/22/21 0500   BP:    SpO2: 94%     · Continue nadolol 20 mg q d  Larayne Chroman · Continue amlodipine 5 mg q d  Larayne Chroman · Continue to monitor vitals  Flatulence-resolved as of 6/22/2021  Assessment & Plan  Admitted to previous instances bloating, excessive flatulence; denies abdominal pain, nausea, constipation (1 episode diarrhea previously, 2 subsequent normal BMs)  · Continue simethicone 80 mg q 6h PRN    Hypoxia-resolved as of 6/21/2021  Assessment & Plan  Previous instances of overnight hypoxia likely secondary to ROSARIO prior to admission  Previously scheduled sleep study with [facility], although there were issues with scheduling and his appointment was cancelled   Patient admitted to inconsistent present use of a nasal cannula at night- 2 L O2 supplementation, although his appropriate oxygenation has not necessitated supplemental oxygen use currently  · Continue to monitor SpO2 throughout the night despite appropriate oxygenation overnight throughout present admission  · Recommend rescheduling outpatient sleep study  Hypomagnesemia-resolved as of 6/21/2021  Assessment & Plan  Magnesium supplementation previously ordered:  4 g magnesium sulfate IV  Present magnesium 1 8  · Stabilized      Hypokalemia-resolved as of 6/21/2021  Assessment & Plan  Potassium supplementation previously ordered:  40 mEq potassium chloride PO + 20 mEq potassium chloride IV  Present potassium 3 6  · Stabilized    * Alcohol withdrawal syndrome without complication (HCC)-resolved as of 6/22/2021  Assessment & Plan  Patient admitted to withdrawal seizure in February 2019  Initially presented to [facility]'s ED 6/19/2021 for treatment of alcohol withdrawal symptoms including tremor, dizziness, headache; received Valium 10 mg in addition to 130 mg of phenobarbital in emergency department  Initial SEWS score of 5  Patient presently received 5 total doses of phenobarbital since arrival to unit (78-39-30--130 mg; 650 mg thus far    · Continue to monitor CBC, CMP  · Discontinued IVFs aside from IV thiamine/folic acid supplementation  · Consider discontinuation of SEWS protocol for medically-assisted alcohol withdrawal  SEWS Total Score: 0 (6/20/2021  1:15 PM)  · Patient has completed SEWS protocol and is on anticipated that the patient require any further medical intervention at this time for alcohol withdrawal             Discharging Physician / Practitioner: MALGORZATA Villalta  PCP: Alessandra Wallace MD  Admission Date:   Admission Orders (From admission, onward)     Ordered        06/19/21 1302  Inpatient Admission  Once                   Discharge Date: 06/22/21    Medical Problems     Resolved Problems  Date Reviewed: 6/22/2021        Resolved    * (Principal) Alcohol withdrawal syndrome without complication ([facility] [address] ) 3/88/2844     Resolved by  MALGORZATA Mike    Hypokalemia 6/21/2021     Resolved by  Gambia C Holter, DO    Hypomagnesemia 6/21/2021     Resolved by  Cam HEDRICK Holter, DO    Hypoxia 6/21/2021     Resolved by  Cam HEDRICK Holpaul, DO    Flatulence 6/22/2021     Resolved by  Peter Carrington, [facility] Stay:  · None    Procedures Performed:   · None    Significant Findings / Test Results:   · None    Incidental Findings:   · None     Test Results Pending at Discharge (will require follow up): · None     Outpatient Tests Requested:  · None    Complications:  None    Reason for Admission:  Alcohol use disorder presenting withdrawal    Hospital Course:     Viviana Kate is a 70 y o  male patient who originally presented to the hospital on 6/19/2021 due to readmission to detox Unit (3rd admission recently discharged on 06/09) previously treated detox in April and June of 2021  Upon his discharge was most recent admission patient was set up to go with outpatient rehab resources to follow up with [facility] on Ele 10, 2021  Patient states that he attended outpatient rehab and that he continues with AA  Patient states that he resumed drinking on June 13th with 0 5 pt of vodka daily from the 13th to 18th  Patient was admitted and managed on SEWS protocol and received a total of 650 mg of phenobarbital at which point patient stabilized and no further medical intervention was required  Initially patient is being set up to follow up with rehab services by case management however patient has since declined further follow-up and wishes to be discharged home despite repeated detox admissions and further Education, this is strongly against medical advice  Please see above list of diagnoses and related plan for additional information       Condition at Discharge: stable     Discharge Day Visit / Exam:     * Please refer to separate progress note for these details *    Discussion with Family:  All patient's questions were answered to the best my abilities    Discharge instructions/Information to patient and family:   See after visit summary for information provided to patient and family  Provisions for Follow-Up Care:  See after visit summary for information related to follow-up care and any pertinent home health orders  Disposition:     Home    For Discharges to [facility] SNF:   · Not Applicable to this Patient - Not Applicable to this Patient    Planned Readmission:  None     Discharge Statement:  I spent 45 minutes discharging the patient  This time was spent on the day of discharge  I had direct contact with the patient on the day of discharge  Greater than 50% of the total time was spent examining patient, answering all patient questions, arranging and discussing plan of care with patient as well as directly providing post-discharge instructions  Additional time then spent on discharge activities  Discharge Medications:  See after visit summary for reconciled discharge medications provided to patient and family        ** Please Note: This note has been constructed using a voice recognition system **

## 2021-06-23 NOTE — CASE MANAGEMENT
Late Entry 6/22/21 3:00Pm Cm received a phone call from Nerissa Ruiz at [id number] indicating that the order for pt's physical therapy had not been received  Cm contacted [facility], and was provided with an order  6/23/2021  7:30Am: Order was faxed to [id number] atten: Svetlana at [phone]

## 2021-06-25 ENCOUNTER — EVALUATION (OUTPATIENT)
Dept: PHYSICAL THERAPY | Facility: CLINIC | Age: 71
End: 2021-06-25
Payer: MEDICARE

## 2021-06-25 DIAGNOSIS — M62.838 MUSCLE SPASMS OF NECK: ICD-10-CM

## 2021-06-25 DIAGNOSIS — M54.2 NECK PAIN: Primary | ICD-10-CM

## 2021-06-25 DIAGNOSIS — M25.519 CHRONIC SHOULDER PAIN, UNSPECIFIED LATERALITY: ICD-10-CM

## 2021-06-25 DIAGNOSIS — M54.9 BACK PAIN WITH RADIATION: ICD-10-CM

## 2021-06-25 DIAGNOSIS — G89.29 CHRONIC SHOULDER PAIN, UNSPECIFIED LATERALITY: ICD-10-CM

## 2021-06-25 PROCEDURE — 97014 ELECTRIC STIMULATION THERAPY: CPT | Performed by: PHYSICAL THERAPIST

## 2021-06-25 PROCEDURE — 97110 THERAPEUTIC EXERCISES: CPT | Performed by: PHYSICAL THERAPIST

## 2021-06-25 PROCEDURE — 97140 MANUAL THERAPY 1/> REGIONS: CPT | Performed by: PHYSICAL THERAPIST

## 2021-06-25 NOTE — LETTER
2021    Aarti Pena MD  [address] [place], [place] [id number]    Patient: Soledad Galvez   YOB: 1950   Date of Visit: 2021     Encounter Diagnosis     ICD-10-CM    1  Neck pain  M54 2    2  Back pain with radiation  M54 9    3  Chronic shoulder pain, unspecified laterality  M25 519     G89 29    4  Muscle spasms of neck  M62 838        Dear Dr Sam Negron:    Thank you for your recent referral of Soledad Galvez  Please review the attached evaluation summary from Peter's recent visit  Please verify that you agree with the plan of care by signing the attached order  If you have any questions or concerns, please do not hesitate to call  I sincerely appreciate the opportunity to share in the care of one of your patients and hope to have another opportunity to work with you in the near future  Sincerely,    Rivera Cristina, PT      Referring Provider:      I certify that I have read the below Plan of Care and certify the need for these services furnished under this plan of treatment while under my care  Aarti Pena MD  [address] [place] [address]  Via Fax: [phone]          PT Re-Evaluation     Today's date: 2021  Patient name: Soledad Galvez  : 1950  MRN: [id number]  Referring provider: Sindy Bustillos MD  Dx:   Encounter Diagnosis     ICD-10-CM    1  Neck pain  M54 2    2  Back pain with radiation  M54 9    3  Chronic shoulder pain, unspecified laterality  M25 519     G89 29    4  Muscle spasms of neck  M62 838                   Assessment  Assessment details: Pt is a 70year old male with complex PMH including recent discharge yesterday from [facility]  Pt presents with chief complaint of neck and shoulder pain with limited ability to turn and look over shoulder especially noted when driving   Pt has been seen for 10 visits of Outpatient PT including 2 weeks on 2 occassions of no therapy due to being in alcohol rehab and pt's father in hospital, with treatment consisting of STM, joint mobilization, manual stretching, ROM strengthening postural correction and core stabilization exercises followed by HP with E-Stim  Pt initially was making progress with therapy then has not been seen since 6/15 due to being back in hospital for detox stay  Pt has only been seen x2 visits since last reassessment and notes increased bending and yard work at home with increase in neck and back symptoms  Pt despite increased pain and neck stiffness did demonstrate strength gains at L UE and cervical ROM gains into extension and rotation  Tightness and muscle spasm still present L levator and upper trap with hypomobility present mid to low cervical spine which improves with manual therapy  Low back pain persists with prolonged sitting especially in flexed posture and with car transfers  Proper posture and extension exercises reviewed with patient  Sitting standing and ambulation tolerance has remained the same since last reassessment  Progress limited due to lack of attendance with therapy  Pt would benefit from continued PT if attendance becomes consistent as pt was making improvements with decrease in pain and improved mobility when consistently attending therapy visits  Impairments: abnormal gait, abnormal muscle tone, abnormal or restricted ROM, activity intolerance, impaired balance, impaired physical strength and pain with function  Functional limitations: prolonged standing, turning to look over shoulder, prolonged sittingUnderstanding of Dx/Px/POC: good   Prognosis: good    Goals  STG's to be met in 3-4 weeks:   1  Decrease low back pain by 25% and abolish radicular symptoms- partially met  2  Increase cervical and lumbar ROM by 5 degrees- partially met  3  Increase core strength bilateral shoulder and hip strength by 1/2 muscle grade- not met  4   Improve tandem stance by 5 seconds in tandem trials and decrease postural sway in NBOS to 1 mistake or less- partially met    LTG's to be met by discharge:  1  Decrease low back and neck pain to less than 2/10 with activity- not met  2  Maximize cervical and lumbar ROM all planes needed for IADL's and driving- not met  3  Improve core LE and UE strength to 4+ to 5/5- partially met  4  Normalize gait and improve balance needed to return to ambulation no A  D  which is pt's baseline- progressing not met  5  Pt will be able to perform NBOS eyes closed on unstable surface without LOB- progressing not met  6  No difficulty with performing head turns while driving looking over shoulders- not met  7  Pt will be (I) with HEP  - ongoing and progressing    Plan  Patient would benefit from: skilled physical therapy  Planned modality interventions: thermotherapy: hydrocollator packs and unattended electrical stimulation  Other planned modality interventions: modalities to be added or modified at discretion of therapist  Planned therapy interventions: home exercise program, flexibility, therapeutic exercise, therapeutic activities, stretching, strengthening, postural training, patient education, neuromuscular re-education, balance, joint mobilization, manual therapy and abdominal trunk stabilization  Frequency: 2x week  Duration in weeks: 4  Plan of Care beginning date: 2021  Plan of Care expiration date: 2021  Treatment plan discussed with: patient        Subjective Evaluation    History of Present Illness  Mechanism of injury: Pt reports increased pain with bending and activity  Pt still notes increased pain with standing and pressure in low back  Neck symptoms come and go  Increased tightness at L side of neck with sitting at computer     Quality of life: good    Pain  Current pain ratin (neck stiffness, L posterior S' pain w/ movement 8/10)  At best pain rating: 3  At worst pain ratin (denies neck pain only stiffness, R posterior S' pain with movement 8/10 )  Location: low back with radicular pain intermittently to R lateral thigh and neck  Relieving factors: medications and ice  Aggravating factors: standing      Diagnostic Tests  No diagnostic tests performed  Treatments  Current treatment: physical therapy  Patient Goals  Patient goals for therapy: decreased pain, increased motion, increased strength, improved balance and independence with ADLs/IADLs          Objective     Postural Observations    Additional Postural Observation Details  Gait: pt enters clinic with use of SPC since recent hospitalization and detox stay for alcohol abuse- gait is now without A  D  with WBOS and decreased step length  Pt with mild unsteadiness during balance testing with dizziness intermittently present- improved in NBOS but unsteady in tandem stance trials    Pt with mild forward shoulder and head posturing with R shoulder elevation compared to L      Palpation     Additional Palpation Details  Hypomobility mid cervical spine with rotation bilaterally- still present but decreased  Tightness and muscle spasm cervical paraspinals and upper thoracic paraspinals- Muscle spasm and TTP L cervical paraspinals and levator, no R sided spasm  TTP upper trap and levator bilaterally- Now only L sided    TTP R piriformis and L piriformis with R greater than left- No Left sided tenderness  TTP bilateral Lumbar paraspinals R greater than L- Only present on R abolished L  Hypomobility lower lumbar spine with Lumbar ROM all planes- hypomobility still present  Equal pelvis height    Neurological Testing     Sensation   Cervical/Thoracic   Left   Intact: light touch    Right   Intact: light touch    Comments   Right light touch: intact UE's and LE's to light touch       Additional Neurological Details  Pt reports intermittent tingling bilateral feet especially at dorsum of feet- new onset in the last 3 months- same  Pt reports 1 year history of constant finger tingling bilaterally- same    Active Range of Motion   Cervical/Thoracic Spine Cervical    Flexion: 35 degrees   Extension: 35 degrees      Left lateral flexion: 25 degrees      Right lateral flexion: 35 degrees     with pain  Left rotation: 35 degrees  Right rotation: 48 degrees           Additional Active Range of Motion Details  L sided neck stiffness with pulling into shoulder with sidebending and rotation    Bilateral shoulder flex abd limited to 110 degrees with L shoulder pain present  Elbow wrist and hand WFL    L-spine     Flex- 35 deg  Ext- 20 SB-L 8 degrees R- 15 degrees    Strength/Myotome Testing     Additional Strength Details  Elbow wrist and hand 5/5 bilaterally  R shoulder flex 4-/5 L shoulder flex 4/5  Bilateral Shoulder abd 4+/5  Core Strength: 4/5  R hip flex 5/5 L LE 5/5  R hip abd/add knee and ankle 5/5    NBOS    E O- 20 sec no LOB/ no shakiness    E C  20 sec no LOB    Tandem stance             RFF        5 seconds- unsteadiness and LOB             LFF         12 seconds-  LOB    General Comments:      Cervical/Thoracic Comments  Difficulty turning head to look over shoulders when driving- improved but still difficult  Standing tolerance 10 minutes with increased pain- same  Sitting tolerance 30 minutes with increased R low back discomfort- improved to 1 HR  Increased low back pain with car transfers- improved, but still difficult on drivers side  Foto: 45- increased to 56             Precautions: unsteadiness, recent discharge from detox    Date 5/25  5/28 6/15 6/25 5/5   Visit 7  8 9 10 5   Pain 6B   8B N8 B5 N 3   Manuals             LE stretch ds ds ds JH ds   STM bilateral C-spine UT ds ds ---> JH with PA mobilization and mob w/ mov ds B UT   Manual cervical traction, suboccipital release, manual stretch ---- ------  -------       STM lumbar paraspinals ----   -----> ----> ---->   PA mobilization L-spine      --------       Neuro Re-Ed             Cervical rotation with mob/glide seated     ------ ----> ------>   Chin tucks 20x 20x x20  20x   Scapular retraction 2/10 2/10 2/10  2/10   T-band row L4 2/10  L5 2/15  L5 2/15   L4 2/10   T-band ELISHA L4 2/10  L5 2/15  L5 2/15   L4 2/10   T-band trunk rotation             Tandem stance             NBOS with ABC             BOSU marching             sidestepping             Ther Ex             LTR 1 5# 2/20  2# 2/20 2# 2/20  1 5# 2/20   Adductor squeeze w/ bridge 30x 3"    30x 3" add  30x 3"   clamshells L4 2/15  L5 2/15  L5 2/15  L4 2/15   PPT 30x 3" 30x 3" 30x 3"  30x 3"   PPT with marching 1 5# 2/ 2# 2/  2# 2/  1 5# 2/20   PPT with leg raise 1 5# 2/15 2# 2  2# 2/10  1 5# 2/15    wall sagsx        x10     Prone prop    3 min    Nu-step 15m L5 5m  L3 10m L4 10m L4 15m L5   Ther Activity                           Gait Training                           Modalities             HP w/ pre-mod C-spine and L-spine 15 m 15m -----> 15m 15m            PT Discharge    Today's date: 2021  Patient name: Raudel Marquez  : 1950  MRN: [id number]  Referring provider: Anita Roblero MD  Dx:   Encounter Diagnosis     ICD-10-CM    1  Neck pain  M54 2    2  Back pain with radiation  M54 9    3  Chronic shoulder pain, unspecified laterality  M25 519     G89 29    4  Muscle spasms of neck  M62 838                   Assessment  Assessment details: Pt is a 70year old male with complex PMH including [facility]  Pt presents with chief complaint of neck and shoulder pain with limited ability to turn and look over shoulder especially noted when driving  Pt has been seen for 10 visits of Outpatient PT including 2 weeks on 2 occassions of no therapy due to being in alcohol rehab and pt's father in hospital, with treatment consisting of STM, joint mobilization, manual stretching, ROM strengthening postural correction and core stabilization exercises followed by HP with E-Stim   Pt initially was making progress with therapy then has not been seen since 6/15 due to being back in hospital for detox stay  Pt has only been seen x2 visits since last reassessment and notes increased bending and yard work at home with increase in neck and back symptoms  Pt despite increased pain and neck stiffness did demonstrate strength gains at L UE and cervical ROM gains into extension and rotation  Tightness and muscle spasm still present L levator and upper trap with hypomobility present mid to low cervical spine which improves with manual therapy  Low back pain persists with prolonged sitting especially in flexed posture and with car transfers  Proper posture and extension exercises reviewed with patient  Sitting standing and ambulation tolerance has remained the same since last reassessment  Progress limited due to lack of attendance with therapy  Pt phoned clinic after last reassessment stating he would not be returning to therapy and would like to be discharged  Understanding of Dx/Px/POC: good   Prognosis: good    Goals  STG's to be met in 3-4 weeks:   1  Decrease low back pain by 25% and abolish radicular symptoms- partially met  2  Increase cervical and lumbar ROM by 5 degrees- partially met  3  Increase core strength bilateral shoulder and hip strength by 1/2 muscle grade- not met  4  Improve tandem stance by 5 seconds in tandem trials and decrease postural sway in NBOS to 1 mistake or less- partially met    LTG's to be met by discharge:  1  Decrease low back and neck pain to less than 2/10 with activity- not met  2  Maximize cervical and lumbar ROM all planes needed for IADL's and driving- not met  3  Improve core LE and UE strength to 4+ to 5/5- partially met  4  Normalize gait and improve balance needed to return to ambulation no A  D  which is pt's baseline- progressing not met  5  Pt will be able to perform NBOS eyes closed on unstable surface without LOB- progressing not met  6  No difficulty with performing head turns while driving looking over shoulders- not met  7   Pt will be (I) with HEP  - met    Plan   D/C PT at patient request   Plan of Care beginning date: 2021  Plan of Care expiration date: 2021  Treatment plan discussed with: patient        Subjective Evaluation    History of Present Illness  Mechanism of injury: Pt reports increased pain with bending and activity  Pt still notes increased pain with standing and pressure in low back  Neck symptoms come and go  Increased tightness at L side of neck with sitting at computer  Quality of life: good    Pain  Current pain ratin (neck stiffness, L posterior S' pain w/ movement 8/10)  At best pain rating: 3  At worst pain ratin (denies neck pain only stiffness, R posterior S' pain with movement 8/10 )  Location: low back with radicular pain intermittently to R lateral thigh and neck  Relieving factors: medications and ice  Aggravating factors: standing      Diagnostic Tests  No diagnostic tests performed  Treatments  Current treatment: physical therapy  Patient Goals  Patient goals for therapy: decreased pain, increased motion, increased strength, improved balance and independence with ADLs/IADLs          Objective     Postural Observations    Additional Postural Observation Details  Gait: pt enters clinic with use of SPC since recent hospitalization and detox stay for alcohol abuse- gait is now without A  D  with WBOS and decreased step length  Pt with mild unsteadiness during balance testing with dizziness intermittently present- improved in NBOS but unsteady in tandem stance trials    Pt with mild forward shoulder and head posturing with R shoulder elevation compared to L      Palpation     Additional Palpation Details  Hypomobility mid cervical spine with rotation bilaterally- still present but decreased  Tightness and muscle spasm cervical paraspinals and upper thoracic paraspinals- Muscle spasm and TTP L cervical paraspinals and levator, no R sided spasm  TTP upper trap and levator bilaterally- Now only L sided    TTP R piriformis and L piriformis with R greater than left- No Left sided tenderness  TTP bilateral Lumbar paraspinals R greater than L- Only present on R abolished L  Hypomobility lower lumbar spine with Lumbar ROM all planes- hypomobility still present  Equal pelvis height    Neurological Testing     Sensation   Cervical/Thoracic   Left   Intact: light touch    Right   Intact: light touch    Comments   Right light touch: intact UE's and LE's to light touch       Additional Neurological Details  Pt reports intermittent tingling bilateral feet especially at dorsum of feet- new onset in the last 3 months- same  Pt reports 1 year history of constant finger tingling bilaterally- same    Active Range of Motion   Cervical/Thoracic Spine       Cervical    Flexion: 35 degrees   Extension: 35 degrees      Left lateral flexion: 25 degrees      Right lateral flexion: 35 degrees     with pain  Left rotation: 35 degrees  Right rotation: 48 degrees           Additional Active Range of Motion Details  L sided neck stiffness with pulling into shoulder with sidebending and rotation    Bilateral shoulder flex abd limited to 110 degrees with L shoulder pain present  Elbow wrist and hand WFL    L-spine     Flex- 35 deg  Ext- 20 SB-L 8 degrees R- 15 degrees    Strength/Myotome Testing     Additional Strength Details  Elbow wrist and hand 5/5 bilaterally  R shoulder flex 4-/5 L shoulder flex 4/5  Bilateral Shoulder abd 4+/5  Core Strength: 4/5  R hip flex 5/5 L LE 5/5  R hip abd/add knee and ankle 5/5    NBOS    E O- 20 sec no LOB/ no shakiness    E C  20 sec no LOB    Tandem stance             RFF        5 seconds- unsteadiness and LOB             LFF         12 seconds-  LOB    General Comments:      Cervical/Thoracic Comments  Difficulty turning head to look over shoulders when driving- improved but still difficult  Standing tolerance 10 minutes with increased pain- same  Sitting tolerance 30 minutes with increased R low back discomfort- improved to 1 HR  Increased low back pain with car transfers- improved, but still difficult on drivers side  Foto: 45- increased to 56             Precautions: unsteadiness, recent discharge from detox

## 2021-06-25 NOTE — LETTER
2021    Arleen Martell MD  [address] [place], [place] [id number]    Patient: Estrellita Perez   YOB: 1950   Date of Visit: 2021     Encounter Diagnosis     ICD-10-CM    1  Neck pain  M54 2    2  Back pain with radiation  M54 9    3  Chronic shoulder pain, unspecified laterality  M25 519     G89 29    4  Muscle spasms of neck  M62 838        Dear Dr Genoveva Hernández:    Thank you for your recent referral of Estrellita Perez  Please review the attached evaluation summary from Peter's recent visit  Please verify that you agree with the plan of care by signing the attached order  If you have any questions or concerns, please do not hesitate to call  I sincerely appreciate the opportunity to share in the care of one of your patients and hope to have another opportunity to work with you in the near future  Sincerely,    Mar yCarmen Shanks PT      Referring Provider:      I certify that I have read the below Plan of Care and certify the need for these services furnished under this plan of treatment while under my care  Arleen Martell MD  [address] [place] [address]  Via Fax: [phone]          PT Re-Evaluation     Today's date: 2021  Patient name: Estrellita Perez  : 1950  MRN: [id number]  Referring provider: Mer Olivas MD  Dx:   Encounter Diagnosis     ICD-10-CM    1  Neck pain  M54 2    2  Back pain with radiation  M54 9    3  Chronic shoulder pain, unspecified laterality  M25 519     G89 29    4  Muscle spasms of neck  M62 838                   Assessment  Assessment details: Pt is a 70year old male with complex PMH including recent discharge yesterday from [facility]  Pt presents with chief complaint of neck and shoulder pain with limited ability to turn and look over shoulder especially noted when driving   Pt has been seen for 10 visits of Outpatient PT including 2 weeks on 2 occassions of no therapy due to being in alcohol rehab and pt's father in hospital, with treatment consisting of STM, joint mobilization, manual stretching, ROM strengthening postural correction and core stabilization exercises followed by HP with E-Stim  Pt initially was making progress with therapy then has not been seen since 6/15 due to being back in hospital for detox stay  Pt has only been seen x2 visits since last reassessment and notes increased bending and yard work at home with increase in neck and back symptoms  Pt despite increased pain and neck stiffness did demonstrate strength gains at L UE and cervical ROM gains into extension and rotation  Tightness and muscle spasm still present L levator and upper trap with hypomobility present mid to low cervical spine which improves with manual therapy  Low back pain persists with prolonged sitting especially in flexed posture and with car transfers  Proper posture and extension exercises reviewed with patient  Sitting standing and ambulation tolerance has remained the same since last reassessment  Progress limited due to lack of attendance with therapy  Pt would benefit from continued PT if attendance becomes consistent as pt was making improvements with decrease in pain and improved mobility when consistently attending therapy visits  Impairments: abnormal gait, abnormal muscle tone, abnormal or restricted ROM, activity intolerance, impaired balance, impaired physical strength and pain with function  Functional limitations: prolonged standing, turning to look over shoulder, prolonged sittingUnderstanding of Dx/Px/POC: good   Prognosis: good    Goals  STG's to be met in 3-4 weeks:   1  Decrease low back pain by 25% and abolish radicular symptoms- partially met  2  Increase cervical and lumbar ROM by 5 degrees- partially met  3  Increase core strength bilateral shoulder and hip strength by 1/2 muscle grade- not met  4   Improve tandem stance by 5 seconds in tandem trials and decrease postural sway in NBOS to 1 mistake or less- partially met    LTG's to be met by discharge:  1  Decrease low back and neck pain to less than 2/10 with activity- not met  2  Maximize cervical and lumbar ROM all planes needed for IADL's and driving- not met  3  Improve core LE and UE strength to 4+ to 5/5- partially met  4  Normalize gait and improve balance needed to return to ambulation no A  D  which is pt's baseline- progressing not met  5  Pt will be able to perform NBOS eyes closed on unstable surface without LOB- progressing not met  6  No difficulty with performing head turns while driving looking over shoulders- not met  7  Pt will be (I) with HEP  - ongoing and progressing    Plan  Patient would benefit from: skilled physical therapy  Planned modality interventions: thermotherapy: hydrocollator packs and unattended electrical stimulation  Other planned modality interventions: modalities to be added or modified at discretion of therapist  Planned therapy interventions: home exercise program, flexibility, therapeutic exercise, therapeutic activities, stretching, strengthening, postural training, patient education, neuromuscular re-education, balance, joint mobilization, manual therapy and abdominal trunk stabilization  Frequency: 2x week  Duration in weeks: 4  Plan of Care beginning date: 2021  Plan of Care expiration date: 2021  Treatment plan discussed with: patient        Subjective Evaluation    History of Present Illness  Mechanism of injury: Pt reports increased pain with bending and activity  Pt still notes increased pain with standing and pressure in low back  Neck symptoms come and go  Increased tightness at L side of neck with sitting at computer     Quality of life: good    Pain  Current pain ratin (neck stiffness, L posterior S' pain w/ movement 8/10)  At best pain rating: 3  At worst pain ratin (denies neck pain only stiffness, R posterior S' pain with movement 8/10 )  Location: low back with radicular pain intermittently to R lateral thigh and neck  Relieving factors: medications and ice  Aggravating factors: standing      Diagnostic Tests  No diagnostic tests performed  Treatments  Current treatment: physical therapy  Patient Goals  Patient goals for therapy: decreased pain, increased motion, increased strength, improved balance and independence with ADLs/IADLs          Objective     Postural Observations    Additional Postural Observation Details  Gait: pt enters clinic with use of SPC since recent hospitalization and detox stay for alcohol abuse- gait is now without A  D  with WBOS and decreased step length  Pt with mild unsteadiness during balance testing with dizziness intermittently present- improved in NBOS but unsteady in tandem stance trials    Pt with mild forward shoulder and head posturing with R shoulder elevation compared to L      Palpation     Additional Palpation Details  Hypomobility mid cervical spine with rotation bilaterally- still present but decreased  Tightness and muscle spasm cervical paraspinals and upper thoracic paraspinals- Muscle spasm and TTP L cervical paraspinals and levator, no R sided spasm  TTP upper trap and levator bilaterally- Now only L sided    TTP R piriformis and L piriformis with R greater than left- No Left sided tenderness  TTP bilateral Lumbar paraspinals R greater than L- Only present on R abolished L  Hypomobility lower lumbar spine with Lumbar ROM all planes- hypomobility still present  Equal pelvis height    Neurological Testing     Sensation   Cervical/Thoracic   Left   Intact: light touch    Right   Intact: light touch    Comments   Right light touch: intact UE's and LE's to light touch       Additional Neurological Details  Pt reports intermittent tingling bilateral feet especially at dorsum of feet- new onset in the last 3 months- same  Pt reports 1 year history of constant finger tingling bilaterally- same    Active Range of Motion   Cervical/Thoracic Spine Cervical    Flexion: 35 degrees   Extension: 35 degrees      Left lateral flexion: 25 degrees      Right lateral flexion: 35 degrees     with pain  Left rotation: 35 degrees  Right rotation: 48 degrees           Additional Active Range of Motion Details  L sided neck stiffness with pulling into shoulder with sidebending and rotation    Bilateral shoulder flex abd limited to 110 degrees with L shoulder pain present  Elbow wrist and hand WFL    L-spine     Flex- 35 deg  Ext- 20 SB-L 8 degrees R- 15 degrees    Strength/Myotome Testing     Additional Strength Details  Elbow wrist and hand 5/5 bilaterally  R shoulder flex 4-/5 L shoulder flex 4/5  Bilateral Shoulder abd 4+/5  Core Strength: 4/5  R hip flex 5/5 L LE 5/5  R hip abd/add knee and ankle 5/5    NBOS    E O- 20 sec no LOB/ no shakiness    E C  20 sec no LOB    Tandem stance             RFF        5 seconds- unsteadiness and LOB             LFF         12 seconds-  LOB    General Comments:      Cervical/Thoracic Comments  Difficulty turning head to look over shoulders when driving- improved but still difficult  Standing tolerance 10 minutes with increased pain- same  Sitting tolerance 30 minutes with increased R low back discomfort- improved to 1 HR  Increased low back pain with car transfers- improved, but still difficult on drivers side  Foto: 45- increased to 56             Precautions: unsteadiness, recent discharge from detox    Date 5/25  5/28 6/15 6/25 5/5   Visit 7  8 9 10 5   Pain 6B   8B N8 B5 N 3   Manuals             LE stretch ds ds ds JH ds   STM bilateral C-spine UT ds ds ---> JH with PA mobilization and mob w/ mov ds B UT   Manual cervical traction, suboccipital release, manual stretch ---- ------  -------       STM lumbar paraspinals ----   -----> ----> ---->   PA mobilization L-spine      --------       Neuro Re-Ed             Cervical rotation with mob/glide seated     ------ ----> ------>   Chin tucks 20x 20x x20  20x   Scapular retraction 2/10 2/10 2/10  2/10   T-band row L4 2/10  L5 2/15  L5 2/15   L4 2/10   T-band ELISHA L4 2/10  L5 2/15  L5 2/15   L4 2/10   T-band trunk rotation             Tandem stance             NBOS with ABC             BOSU marching             sidestepping             Ther Ex             LTR 1 5# 2/20  2# 2/20 2# 2/20  1 5# 2/20   Adductor squeeze w/ bridge 30x 3"    30x 3" add  30x 3"   clamshells L4 2/15  L5 2/15  L5 2/15  L4 2/15   PPT 30x 3" 30x 3" 30x 3"  30x 3"   PPT with marching 1 5# 2/ 2# 2/  2# 2/  1 5# 2/20   PPT with leg raise 1 5# 2/15 2# 2/20  2# 2/10  1 5# 2/15    wall sagsx        x10     Prone prop    3 min    Nu-step 15m L5 5m  L3 10m L4 10m L4 15m L5   Ther Activity                           Gait Training                           Modalities             HP w/ pre-mod C-spine and L-spine 15 m 15m -----> 15m 15m            PT Discharge    Today's date: 2021  Patient name: Mary Grace Mulligan  : 1950  MRN: [id number]  Referring provider: Jim Rdz MD  Dx:   Encounter Diagnosis     ICD-10-CM    1  Neck pain  M54 2    2  Back pain with radiation  M54 9    3  Chronic shoulder pain, unspecified laterality  M25 519     G89 29    4  Muscle spasms of neck  M62 838                   Assessment  Assessment details: Pt is a 70year old male with complex PMH including recent discharge yesterday from [facility]  Pt presents with chief complaint of neck and shoulder pain with limited ability to turn and look over shoulder especially noted when driving  Pt has been seen for 10 visits of Outpatient PT including 2 weeks on 2 occassions of no therapy due to being in alcohol rehab and pt's father in hospital, with treatment consisting of STM, joint mobilization, manual stretching, ROM strengthening postural correction and core stabilization exercises followed by HP with E-Stim   Pt initially was making progress with therapy then has not been seen since 6/15 due to being back in hospital for detox stay  Pt has only been seen x2 visits since last reassessment and notes increased bending and yard work at home with increase in neck and back symptoms  Pt despite increased pain and neck stiffness did demonstrate strength gains at L UE and cervical ROM gains into extension and rotation  Tightness and muscle spasm still present L levator and upper trap with hypomobility present mid to low cervical spine which improves with manual therapy  Low back pain persists with prolonged sitting especially in flexed posture and with car transfers  Proper posture and extension exercises reviewed with patient  Sitting standing and ambulation tolerance has remained the same since last reassessment  Progress limited due to lack of attendance with therapy  Pt phoned clinic after last reassessment stating he would not be returning to therapy and would like to be discharged  Understanding of Dx/Px/POC: good   Prognosis: good    Goals  STG's to be met in 3-4 weeks:   1  Decrease low back pain by 25% and abolish radicular symptoms- partially met  2  Increase cervical and lumbar ROM by 5 degrees- partially met  3  Increase core strength bilateral shoulder and hip strength by 1/2 muscle grade- not met  4  Improve tandem stance by 5 seconds in tandem trials and decrease postural sway in NBOS to 1 mistake or less- partially met    LTG's to be met by discharge:  1  Decrease low back and neck pain to less than 2/10 with activity- not met  2  Maximize cervical and lumbar ROM all planes needed for IADL's and driving- not met  3  Improve core LE and UE strength to 4+ to 5/5- partially met  4  Normalize gait and improve balance needed to return to ambulation no A  D  which is pt's baseline- progressing not met  5  Pt will be able to perform NBOS eyes closed on unstable surface without LOB- progressing not met  6  No difficulty with performing head turns while driving looking over shoulders- not met  7   Pt will be (I) with HEP  - met    Plan   D/C PT at patient request   Plan of Care beginning date: 2021  Plan of Care expiration date: 2021  Treatment plan discussed with: patient        Subjective Evaluation    History of Present Illness  Mechanism of injury: Pt reports increased pain with bending and activity  Pt still notes increased pain with standing and pressure in low back  Neck symptoms come and go  Increased tightness at L side of neck with sitting at computer  Quality of life: good    Pain  Current pain ratin (neck stiffness, L posterior S' pain w/ movement 8/10)  At best pain rating: 3  At worst pain ratin (denies neck pain only stiffness, R posterior S' pain with movement 8/10 )  Location: low back with radicular pain intermittently to R lateral thigh and neck  Relieving factors: medications and ice  Aggravating factors: standing      Diagnostic Tests  No diagnostic tests performed  Treatments  Current treatment: physical therapy  Patient Goals  Patient goals for therapy: decreased pain, increased motion, increased strength, improved balance and independence with ADLs/IADLs          Objective     Postural Observations    Additional Postural Observation Details  Gait: pt enters clinic with use of SPC since recent hospitalization and detox stay for alcohol abuse- gait is now without A  D  with WBOS and decreased step length  Pt with mild unsteadiness during balance testing with dizziness intermittently present- improved in NBOS but unsteady in tandem stance trials    Pt with mild forward shoulder and head posturing with R shoulder elevation compared to L      Palpation     Additional Palpation Details  Hypomobility mid cervical spine with rotation bilaterally- still present but decreased  Tightness and muscle spasm cervical paraspinals and upper thoracic paraspinals- Muscle spasm and TTP L cervical paraspinals and levator, no R sided spasm  TTP upper trap and levator bilaterally- Now only L sided    TTP R piriformis and L piriformis with R greater than left- No Left sided tenderness  TTP bilateral Lumbar paraspinals R greater than L- Only present on R abolished L  Hypomobility lower lumbar spine with Lumbar ROM all planes- hypomobility still present  Equal pelvis height    Neurological Testing     Sensation   Cervical/Thoracic   Left   Intact: light touch    Right   Intact: light touch    Comments   Right light touch: intact UE's and LE's to light touch       Additional Neurological Details  Pt reports intermittent tingling bilateral feet especially at dorsum of feet- new onset in the last 3 months- same  Pt reports 1 year history of constant finger tingling bilaterally- same    Active Range of Motion   Cervical/Thoracic Spine       Cervical    Flexion: 35 degrees   Extension: 35 degrees      Left lateral flexion: 25 degrees      Right lateral flexion: 35 degrees     with pain  Left rotation: 35 degrees  Right rotation: 48 degrees           Additional Active Range of Motion Details  L sided neck stiffness with pulling into shoulder with sidebending and rotation    Bilateral shoulder flex abd limited to 110 degrees with L shoulder pain present  Elbow wrist and hand WFL    L-spine     Flex- 35 deg  Ext- 20 SB-L 8 degrees R- 15 degrees    Strength/Myotome Testing     Additional Strength Details  Elbow wrist and hand 5/5 bilaterally  R shoulder flex 4-/5 L shoulder flex 4/5  Bilateral Shoulder abd 4+/5  Core Strength: 4/5  R hip flex 5/5 L LE 5/5  R hip abd/add knee and ankle 5/5    NBOS    E O- 20 sec no LOB/ no shakiness    E C  20 sec no LOB    Tandem stance             RFF        5 seconds- unsteadiness and LOB             LFF         12 seconds-  LOB    General Comments:      Cervical/Thoracic Comments  Difficulty turning head to look over shoulders when driving- improved but still difficult  Standing tolerance 10 minutes with increased pain- same  Sitting tolerance 30 minutes with increased R low back discomfort- improved to 1 HR  Increased low back pain with car transfers- improved, but still difficult on drivers side  Foto: 45- increased to 56             Precautions: unsteadiness, recent discharge from detox

## 2021-06-25 NOTE — PROGRESS NOTES
PT Re-Evaluation     Today's date: 2021  Patient name: Roshni Toscano  : 1950  MRN: [id number]  Referring provider: Carolina Degroot MD  Dx:   Encounter Diagnosis     ICD-10-CM    1  Neck pain  M54 2    2  Back pain with radiation  M54 9    3  Chronic shoulder pain, unspecified laterality  M25 519     G89 29    4  Muscle spasms of neck  M62 838                   Assessment  Assessment details: Pt is a 70year old male with complex PMH including recent discharge yesterday from [facility]  Pt presents with chief complaint of neck and shoulder pain with limited ability to turn and look over shoulder especially noted when driving  Pt has been seen for 10 visits of Outpatient PT including 2 weeks on 2 occassions of no therapy due to being in alcohol rehab and pt's father in hospital, with treatment consisting of STM, joint mobilization, manual stretching, ROM strengthening postural correction and core stabilization exercises followed by HP with E-Stim  Pt initially was making progress with therapy then has not been seen since 6/15 due to being back in hospital for detox stay  Pt has only been seen x2 visits since last reassessment and notes increased bending and yard work at home with increase in neck and back symptoms  Pt despite increased pain and neck stiffness did demonstrate strength gains at L UE and cervical ROM gains into extension and rotation  Tightness and muscle spasm still present L levator and upper trap with hypomobility present mid to low cervical spine which improves with manual therapy  Low back pain persists with prolonged sitting especially in flexed posture and with car transfers  Proper posture and extension exercises reviewed with patient  Sitting standing and ambulation tolerance has remained the same since last reassessment  Progress limited due to lack of attendance with therapy   Pt would benefit from continued PT if attendance becomes consistent as pt was making improvements with decrease in pain and improved mobility when consistently attending therapy visits  Impairments: abnormal gait, abnormal muscle tone, abnormal or restricted ROM, activity intolerance, impaired balance, impaired physical strength and pain with function  Functional limitations: prolonged standing, turning to look over shoulder, prolonged sittingUnderstanding of Dx/Px/POC: good   Prognosis: good    Goals  STG's to be met in 3-4 weeks:   1  Decrease low back pain by 25% and abolish radicular symptoms- partially met  2  Increase cervical and lumbar ROM by 5 degrees- partially met  3  Increase core strength bilateral shoulder and hip strength by 1/2 muscle grade- not met  4  Improve tandem stance by 5 seconds in tandem trials and decrease postural sway in NBOS to 1 mistake or less- partially met    LTG's to be met by discharge:  1  Decrease low back and neck pain to less than 2/10 with activity- not met  2  Maximize cervical and lumbar ROM all planes needed for IADL's and driving- not met  3  Improve core LE and UE strength to 4+ to 5/5- partially met  4  Normalize gait and improve balance needed to return to ambulation no A  D  which is pt's baseline- progressing not met  5  Pt will be able to perform NBOS eyes closed on unstable surface without LOB- progressing not met  6  No difficulty with performing head turns while driving looking over shoulders- not met  7  Pt will be (I) with HEP   - ongoing and progressing    Plan  Patient would benefit from: skilled physical therapy  Planned modality interventions: thermotherapy: hydrocollator packs and unattended electrical stimulation  Other planned modality interventions: modalities to be added or modified at discretion of therapist  Planned therapy interventions: home exercise program, flexibility, therapeutic exercise, therapeutic activities, stretching, strengthening, postural training, patient education, neuromuscular re-education, balance, joint mobilization, manual therapy and abdominal trunk stabilization  Frequency: 2x week  Duration in weeks: 4  Plan of Care beginning date: 2021  Plan of Care expiration date: 2021  Treatment plan discussed with: patient        Subjective Evaluation    History of Present Illness  Mechanism of injury: Pt reports increased pain with bending and activity  Pt still notes increased pain with standing and pressure in low back  Neck symptoms come and go  Increased tightness at L side of neck with sitting at computer  Quality of life: good    Pain  Current pain ratin (neck stiffness, L posterior S' pain w/ movement 8/10)  At best pain rating: 3  At worst pain ratin (denies neck pain only stiffness, R posterior S' pain with movement 8/10 )  Location: low back with radicular pain intermittently to R lateral thigh and neck  Relieving factors: medications and ice  Aggravating factors: standing      Diagnostic Tests  No diagnostic tests performed  Treatments  Current treatment: physical therapy  Patient Goals  Patient goals for therapy: decreased pain, increased motion, increased strength, improved balance and independence with ADLs/IADLs          Objective     Postural Observations    Additional Postural Observation Details  Gait: pt enters clinic with use of SPC since recent hospitalization and detox stay for alcohol abuse- gait is now without A  D  with WBOS and decreased step length  Pt with mild unsteadiness during balance testing with dizziness intermittently present- improved in NBOS but unsteady in tandem stance trials    Pt with mild forward shoulder and head posturing with R shoulder elevation compared to L      Palpation     Additional Palpation Details  Hypomobility mid cervical spine with rotation bilaterally- still present but decreased  Tightness and muscle spasm cervical paraspinals and upper thoracic paraspinals- Muscle spasm and TTP L cervical paraspinals and levator, no R sided spasm  TTP upper trap and levator bilaterally- Now only L sided    TTP R piriformis and L piriformis with R greater than left- No Left sided tenderness  TTP bilateral Lumbar paraspinals R greater than L- Only present on R abolished L  Hypomobility lower lumbar spine with Lumbar ROM all planes- hypomobility still present  Equal pelvis height    Neurological Testing     Sensation   Cervical/Thoracic   Left   Intact: light touch    Right   Intact: light touch    Comments   Right light touch: intact UE's and LE's to light touch       Additional Neurological Details  Pt reports intermittent tingling bilateral feet especially at dorsum of feet- new onset in the last 3 months- same  Pt reports 1 year history of constant finger tingling bilaterally- same    Active Range of Motion   Cervical/Thoracic Spine       Cervical    Flexion: 35 degrees   Extension: 35 degrees      Left lateral flexion: 25 degrees      Right lateral flexion: 35 degrees     with pain  Left rotation: 35 degrees  Right rotation: 48 degrees           Additional Active Range of Motion Details  L sided neck stiffness with pulling into shoulder with sidebending and rotation    Bilateral shoulder flex abd limited to 110 degrees with L shoulder pain present  Elbow wrist and hand WFL    L-spine     Flex- 35 deg  Ext- 20 SB-L 8 degrees R- 15 degrees    Strength/Myotome Testing     Additional Strength Details  Elbow wrist and hand 5/5 bilaterally  R shoulder flex 4-/5 L shoulder flex 4/5  Bilateral Shoulder abd 4+/5  Core Strength: 4/5  R hip flex 5/5 L LE 5/5  R hip abd/add knee and ankle 5/5    NBOS    E O- 20 sec no LOB/ no shakiness    E C  20 sec no LOB    Tandem stance             RFF        5 seconds- unsteadiness and LOB             LFF         12 seconds-  LOB    General Comments:      Cervical/Thoracic Comments  Difficulty turning head to look over shoulders when driving- improved but still difficult  Standing tolerance 10 minutes with increased pain- same  Sitting tolerance 30 minutes with increased R low back discomfort- improved to 1 HR  Increased low back pain with car transfers- improved, but still difficult on drivers side  Foto: 45- increased to 56             Precautions: unsteadiness, recent discharge from detox    Date 5/25  5/28 6/15 6/25 5/5   Visit 7  8 9 10 5   Pain 6B   8B N8 B5 N 3   Manuals             LE stretch ds ds ds JH ds   STM bilateral C-spine UT ds ds ---> JH with PA mobilization and mob w/ mov ds B UT   Manual cervical traction, suboccipital release, manual stretch ---- ------  -------       STM lumbar paraspinals ----   -----> ----> ---->   PA mobilization L-spine      --------       Neuro Re-Ed             Cervical rotation with mob/glide seated     ------ ----> ------>   Chin tucks 20x 20x x20  20x   Scapular retraction 2/10 2/10 2/10  2/10   T-band row L4 2/10  L5 2/15  L5 2/15   L4 2/10   T-band ELISHA L4 2/10  L5 2/15  L5 2/15   L4 2/10   T-band trunk rotation             Tandem stance             NBOS with ABC             BOSU marching             sidestepping             Ther Ex             LTR 1 5# 2/20  2# 2/20 2# 2/20  1 5# 2/20   Adductor squeeze w/ bridge 30x 3"    30x 3" add  30x 3"   clamshells L4 2/15  L5 2/15  L5 2/15  L4 2/15   PPT 30x 3" 30x 3" 30x 3"  30x 3"   PPT with marching 1 5# 2/20 2# 2/20  2# 2/20  1 5# 2/20   PPT with leg raise 1 5# 2/15 2# 2/20  2# 2/10  1 5# 2/15    wall sagsx        x10     Prone prop    3 min    Nu-step 15m L5 5m  L3 10m L4 10m L4 15m L5   Ther Activity                           Gait Training                           Modalities             HP w/ pre-mod C-spine and L-spine 15 m 15m -----> 15m 15m

## 2021-06-30 NOTE — PROGRESS NOTES
PT Discharge    Today's date: 2021  Patient name: Harpal Condon  : 1950  MRN: [id number]  Referring provider: Lakshmi Alicia MD  Dx:   Encounter Diagnosis     ICD-10-CM    1  Neck pain  M54 2    2  Back pain with radiation  M54 9    3  Chronic shoulder pain, unspecified laterality  M25 519     G89 29    4  Muscle spasms of neck  M62 838                   Assessment  Assessment details: Pt is a 70year old male with complex PMH including recent discharge yesterday from [facility]  Pt presents with chief complaint of neck and shoulder pain with limited ability to turn and look over shoulder especially noted when driving  Pt has been seen for 10 visits of Outpatient PT including 2 weeks on 2 occassions of no therapy due to being in alcohol rehab and pt's father in hospital, with treatment consisting of STM, joint mobilization, manual stretching, ROM strengthening postural correction and core stabilization exercises followed by HP with E-Stim  Pt initially was making progress with therapy then has not been seen since 6/15 due to being back in hospital for detox stay  Pt has only been seen x2 visits since last reassessment and notes increased bending and yard work at home with increase in neck and back symptoms  Pt despite increased pain and neck stiffness did demonstrate strength gains at L UE and cervical ROM gains into extension and rotation  Tightness and muscle spasm still present L levator and upper trap with hypomobility present mid to low cervical spine which improves with manual therapy  Low back pain persists with prolonged sitting especially in flexed posture and with car transfers  Proper posture and extension exercises reviewed with patient  Sitting standing and ambulation tolerance has remained the same since last reassessment  Progress limited due to lack of attendance with therapy   Pt phoned clinic after last reassessment stating he would not be returning to therapy and would like to be discharged  Understanding of Dx/Px/POC: good   Prognosis: good    Goals  STG's to be met in 3-4 weeks:   1  Decrease low back pain by 25% and abolish radicular symptoms- partially met  2  Increase cervical and lumbar ROM by 5 degrees- partially met  3  Increase core strength bilateral shoulder and hip strength by 1/2 muscle grade- not met  4  Improve tandem stance by 5 seconds in tandem trials and decrease postural sway in NBOS to 1 mistake or less- partially met    LTG's to be met by discharge:  1  Decrease low back and neck pain to less than 2/10 with activity- not met  2  Maximize cervical and lumbar ROM all planes needed for IADL's and driving- not met  3  Improve core LE and UE strength to 4+ to 5/5- partially met  4  Normalize gait and improve balance needed to return to ambulation no A  D  which is pt's baseline- progressing not met  5  Pt will be able to perform NBOS eyes closed on unstable surface without LOB- progressing not met  6  No difficulty with performing head turns while driving looking over shoulders- not met  7  Pt will be (I) with HEP  - met    Plan   D/C PT at patient request   Plan of Care beginning date: 2021  Plan of Care expiration date: 2021  Treatment plan discussed with: patient        Subjective Evaluation    History of Present Illness  Mechanism of injury: Pt reports increased pain with bending and activity  Pt still notes increased pain with standing and pressure in low back  Neck symptoms come and go  Increased tightness at L side of neck with sitting at computer     Quality of life: good    Pain  Current pain ratin (neck stiffness, L posterior S' pain w/ movement 8/10)  At best pain rating: 3  At worst pain ratin (denies neck pain only stiffness, R posterior S' pain with movement 8/10 )  Location: low back with radicular pain intermittently to R lateral thigh and neck  Relieving factors: medications and ice  Aggravating factors: standing      Diagnostic Tests  No diagnostic tests performed  Treatments  Current treatment: physical therapy  Patient Goals  Patient goals for therapy: decreased pain, increased motion, increased strength, improved balance and independence with ADLs/IADLs          Objective     Postural Observations    Additional Postural Observation Details  Gait: pt enters clinic with use of SPC since recent hospitalization and detox stay for alcohol abuse- gait is now without A  D  with WBOS and decreased step length  Pt with mild unsteadiness during balance testing with dizziness intermittently present- improved in NBOS but unsteady in tandem stance trials    Pt with mild forward shoulder and head posturing with R shoulder elevation compared to L      Palpation     Additional Palpation Details  Hypomobility mid cervical spine with rotation bilaterally- still present but decreased  Tightness and muscle spasm cervical paraspinals and upper thoracic paraspinals- Muscle spasm and TTP L cervical paraspinals and levator, no R sided spasm  TTP upper trap and levator bilaterally- Now only L sided    TTP R piriformis and L piriformis with R greater than left- No Left sided tenderness  TTP bilateral Lumbar paraspinals R greater than L- Only present on R abolished L  Hypomobility lower lumbar spine with Lumbar ROM all planes- hypomobility still present  Equal pelvis height    Neurological Testing     Sensation   Cervical/Thoracic   Left   Intact: light touch    Right   Intact: light touch    Comments   Right light touch: intact UE's and LE's to light touch       Additional Neurological Details  Pt reports intermittent tingling bilateral feet especially at dorsum of feet- new onset in the last 3 months- same  Pt reports 1 year history of constant finger tingling bilaterally- same    Active Range of Motion   Cervical/Thoracic Spine       Cervical    Flexion: 35 degrees   Extension: 35 degrees      Left lateral flexion: 25 degrees      Right lateral flexion: 35 degrees     with pain  Left rotation: 35 degrees  Right rotation: 48 degrees           Additional Active Range of Motion Details  L sided neck stiffness with pulling into shoulder with sidebending and rotation    Bilateral shoulder flex abd limited to 110 degrees with L shoulder pain present  Elbow wrist and hand WFL    L-spine     Flex- 35 deg  Ext- 20 SB-L 8 degrees R- 15 degrees    Strength/Myotome Testing     Additional Strength Details  Elbow wrist and hand 5/5 bilaterally  R shoulder flex 4-/5 L shoulder flex 4/5  Bilateral Shoulder abd 4+/5  Core Strength: 4/5  R hip flex 5/5 L LE 5/5  R hip abd/add knee and ankle 5/5    NBOS    E O- 20 sec no LOB/ no shakiness    E C  20 sec no LOB    Tandem stance             RFF        5 seconds- unsteadiness and LOB             LFF         12 seconds-  LOB    General Comments:      Cervical/Thoracic Comments  Difficulty turning head to look over shoulders when driving- improved but still difficult  Standing tolerance 10 minutes with increased pain- same  Sitting tolerance 30 minutes with increased R low back discomfort- improved to 1 HR  Increased low back pain with car transfers- improved, but still difficult on drivers side  Foto: 45- increased to 56             Precautions: unsteadiness, recent discharge from detox

## 2021-07-03 ENCOUNTER — APPOINTMENT (EMERGENCY)
Dept: RADIOLOGY | Facility: HOSPITAL | Age: 71
End: 2021-07-03
Payer: MEDICARE

## 2021-07-03 ENCOUNTER — HOSPITAL ENCOUNTER (EMERGENCY)
Facility: HOSPITAL | Age: 71
Discharge: HOME | End: 2021-07-03
Attending: EMERGENCY MEDICINE
Payer: MEDICARE

## 2021-07-03 ENCOUNTER — APPOINTMENT (EMERGENCY)
Dept: CT IMAGING | Facility: HOSPITAL | Age: 71
End: 2021-07-03
Payer: MEDICARE

## 2021-07-03 VITALS
TEMPERATURE: 98.2 F | RESPIRATION RATE: 21 BRPM | DIASTOLIC BLOOD PRESSURE: 66 MMHG | OXYGEN SATURATION: 92 % | BODY MASS INDEX: 32.66 KG/M2 | WEIGHT: 208.11 LBS | HEIGHT: 67 IN | SYSTOLIC BLOOD PRESSURE: 140 MMHG | HEART RATE: 61 BPM

## 2021-07-03 DIAGNOSIS — E87.6 HYPOKALEMIA: ICD-10-CM

## 2021-07-03 DIAGNOSIS — E83.42 HYPOMAGNESEMIA: ICD-10-CM

## 2021-07-03 DIAGNOSIS — R07.9 LEFT-SIDED CHEST PAIN: Primary | ICD-10-CM

## 2021-07-03 DIAGNOSIS — J98.11 ATELECTASIS OF BOTH LUNGS: ICD-10-CM

## 2021-07-03 DIAGNOSIS — Z87.898 HISTORY OF ALCOHOL USE DISORDER: ICD-10-CM

## 2021-07-03 LAB
ALBUMIN SERPL BCP-MCNC: 4.1 G/DL (ref 3.5–5)
ALP SERPL-CCNC: 90 U/L (ref 46–116)
ALT SERPL W P-5'-P-CCNC: 39 U/L (ref 12–78)
ANION GAP SERPL CALCULATED.3IONS-SCNC: 8 MMOL/L (ref 4–13)
AST SERPL W P-5'-P-CCNC: 29 U/L (ref 5–45)
BASOPHILS # BLD AUTO: 0.06 THOUSANDS/ΜL (ref 0–0.1)
BASOPHILS NFR BLD AUTO: 1 % (ref 0–1)
BILIRUB SERPL-MCNC: 0.66 MG/DL (ref 0.2–1)
BUN SERPL-MCNC: 15 MG/DL (ref 5–25)
CALCIUM SERPL-MCNC: 8.9 MG/DL (ref 8.3–10.1)
CHLORIDE SERPL-SCNC: 104 MMOL/L (ref 100–108)
CO2 SERPL-SCNC: 29 MMOL/L (ref 21–32)
CREAT SERPL-MCNC: 0.82 MG/DL (ref 0.6–1.3)
D DIMER PPP FEU-MCNC: <0.27 UG/ML FEU
EOSINOPHIL # BLD AUTO: 0.18 THOUSAND/ΜL (ref 0–0.61)
EOSINOPHIL NFR BLD AUTO: 2 % (ref 0–6)
ERYTHROCYTE [DISTWIDTH] IN BLOOD BY AUTOMATED COUNT: 13.1 % (ref 11.6–15.1)
GFR SERPL CREATININE-BSD FRML MDRD: 89 ML/MIN/1.73SQ M
GLUCOSE SERPL-MCNC: 96 MG/DL (ref 65–140)
HCT VFR BLD AUTO: 43.5 % (ref 36.5–49.3)
HGB BLD-MCNC: 14.5 G/DL (ref 12–17)
IMM GRANULOCYTES # BLD AUTO: 0.11 THOUSAND/UL (ref 0–0.2)
IMM GRANULOCYTES NFR BLD AUTO: 1 % (ref 0–2)
LIPASE SERPL-CCNC: 656 U/L (ref 73–393)
LYMPHOCYTES # BLD AUTO: 1.88 THOUSANDS/ΜL (ref 0.6–4.47)
LYMPHOCYTES NFR BLD AUTO: 22 % (ref 14–44)
MAGNESIUM SERPL-MCNC: 1.2 MG/DL (ref 1.6–2.6)
MCH RBC QN AUTO: 31.9 PG (ref 26.8–34.3)
MCHC RBC AUTO-ENTMCNC: 33.3 G/DL (ref 31.4–37.4)
MCV RBC AUTO: 96 FL (ref 82–98)
MONOCYTES # BLD AUTO: 0.75 THOUSAND/ΜL (ref 0.17–1.22)
MONOCYTES NFR BLD AUTO: 9 % (ref 4–12)
NEUTROPHILS # BLD AUTO: 5.48 THOUSANDS/ΜL (ref 1.85–7.62)
NEUTS SEG NFR BLD AUTO: 65 % (ref 43–75)
NRBC BLD AUTO-RTO: 0 /100 WBCS
PLATELET # BLD AUTO: 237 THOUSANDS/UL (ref 149–390)
PMV BLD AUTO: 8.9 FL (ref 8.9–12.7)
POTASSIUM SERPL-SCNC: 3.4 MMOL/L (ref 3.5–5.3)
PROT SERPL-MCNC: 7.5 G/DL (ref 6.4–8.2)
RBC # BLD AUTO: 4.54 MILLION/UL (ref 3.88–5.62)
SODIUM SERPL-SCNC: 141 MMOL/L (ref 136–145)
TROPONIN I SERPL-MCNC: <0.02 NG/ML
TROPONIN I SERPL-MCNC: <0.02 NG/ML
WBC # BLD AUTO: 8.46 THOUSAND/UL (ref 4.31–10.16)

## 2021-07-03 PROCEDURE — 96375 TX/PRO/DX INJ NEW DRUG ADDON: CPT

## 2021-07-03 PROCEDURE — 83735 ASSAY OF MAGNESIUM: CPT | Performed by: EMERGENCY MEDICINE

## 2021-07-03 PROCEDURE — 99285 EMERGENCY DEPT VISIT HI MDM: CPT | Performed by: EMERGENCY MEDICINE

## 2021-07-03 PROCEDURE — 71045 X-RAY EXAM CHEST 1 VIEW: CPT

## 2021-07-03 PROCEDURE — G1004 CDSM NDSC: HCPCS

## 2021-07-03 PROCEDURE — 71250 CT THORAX DX C-: CPT

## 2021-07-03 PROCEDURE — 36415 COLL VENOUS BLD VENIPUNCTURE: CPT

## 2021-07-03 PROCEDURE — 96376 TX/PRO/DX INJ SAME DRUG ADON: CPT

## 2021-07-03 PROCEDURE — 85025 COMPLETE CBC W/AUTO DIFF WBC: CPT | Performed by: EMERGENCY MEDICINE

## 2021-07-03 PROCEDURE — 85379 FIBRIN DEGRADATION QUANT: CPT | Performed by: EMERGENCY MEDICINE

## 2021-07-03 PROCEDURE — 83690 ASSAY OF LIPASE: CPT | Performed by: EMERGENCY MEDICINE

## 2021-07-03 PROCEDURE — 84484 ASSAY OF TROPONIN QUANT: CPT | Performed by: EMERGENCY MEDICINE

## 2021-07-03 PROCEDURE — 93005 ELECTROCARDIOGRAM TRACING: CPT

## 2021-07-03 PROCEDURE — 99285 EMERGENCY DEPT VISIT HI MDM: CPT

## 2021-07-03 PROCEDURE — 80053 COMPREHEN METABOLIC PANEL: CPT | Performed by: EMERGENCY MEDICINE

## 2021-07-03 PROCEDURE — 96366 THER/PROPH/DIAG IV INF ADDON: CPT

## 2021-07-03 PROCEDURE — 96365 THER/PROPH/DIAG IV INF INIT: CPT

## 2021-07-03 RX ORDER — DIAZEPAM 5 MG/ML
10 INJECTION, SOLUTION INTRAMUSCULAR; INTRAVENOUS ONCE
Status: COMPLETED | OUTPATIENT
Start: 2021-07-03 | End: 2021-07-03

## 2021-07-03 RX ORDER — POTASSIUM CHLORIDE 20 MEQ/1
20 TABLET, EXTENDED RELEASE ORAL 2 TIMES DAILY
Qty: 20 TABLET | Refills: 0 | Status: SHIPPED | OUTPATIENT
Start: 2021-07-03 | End: 2021-09-07 | Stop reason: HOSPADM

## 2021-07-03 RX ORDER — LORAZEPAM 1 MG/1
1 TABLET ORAL 2 TIMES DAILY PRN
Qty: 13 TABLET | Refills: 0 | Status: SHIPPED | OUTPATIENT
Start: 2021-07-03 | End: 2021-09-07 | Stop reason: HOSPADM

## 2021-07-03 RX ORDER — MAGNESIUM SULFATE HEPTAHYDRATE 40 MG/ML
2 INJECTION, SOLUTION INTRAVENOUS ONCE
Status: COMPLETED | OUTPATIENT
Start: 2021-07-03 | End: 2021-07-03

## 2021-07-03 RX ORDER — POTASSIUM CHLORIDE 20 MEQ/1
40 TABLET, EXTENDED RELEASE ORAL ONCE
Status: COMPLETED | OUTPATIENT
Start: 2021-07-03 | End: 2021-07-03

## 2021-07-03 RX ADMIN — DIAZEPAM 10 MG: 10 INJECTION, SOLUTION INTRAMUSCULAR; INTRAVENOUS at 07:34

## 2021-07-03 RX ADMIN — DIAZEPAM 10 MG: 10 INJECTION, SOLUTION INTRAMUSCULAR; INTRAVENOUS at 10:50

## 2021-07-03 RX ADMIN — MAGNESIUM SULFATE HEPTAHYDRATE 2 G: 40 INJECTION, SOLUTION INTRAVENOUS at 08:24

## 2021-07-03 RX ADMIN — POTASSIUM CHLORIDE 40 MEQ: 1500 TABLET, EXTENDED RELEASE ORAL at 07:49

## 2021-07-03 RX ADMIN — DIAZEPAM 10 MG: 10 INJECTION, SOLUTION INTRAMUSCULAR; INTRAVENOUS at 08:41

## 2021-07-03 NOTE — ED PROVIDER NOTES
History  Chief Complaint   Patient presents with    Chest Pain     intermittent chest pain since 2 am "burning sensation"; patient is chronic alcoholic and states he drank 8 ounces of vodka last at 2:30 pm; patient states he is unsure if this is "withdrawl" or something else        History provided by:  Patient, spouse and medical records  Chest Pain  Pain location:  L chest  Pain quality: pressure    Pain radiates to:  L shoulder  Pain radiates to the back: no    Pain severity:  Moderate  Onset quality:  Sudden  Duration:  5 hours (Onset at 0220 this morning)  Timing:  Constant  Progression:  Unchanged  Chronicity:  New (No prior hx of similar sx or previous cardiac/pulmonary disease)  Context: at rest    Relieved by:  Nothing  Worsened by:  Nothing tried  Ineffective treatments:  None tried  Associated symptoms: no abdominal pain, no cough, no dizziness, no fever, no lower extremity edema, no nausea, no numbness, no orthopnea, no palpitations, no PND, no shortness of breath, no syncope, not vomiting and no weakness    Risk factors: hypertension, male sex and obesity    Risk factors: no aortic disease, no coronary artery disease, no prior DVT/PE and no smoking      59-year-old male history alcohol use disorder, gout, hypertension, ureterolithiasis presents to the ED with left-sided chest pressure radiating into the left shoulder beginning shortly after 0200 this morning  Onset at rest while sitting in a chair  Pain has been essentially constant since onset  No obvious exacerbating/relieving factors  Denies any prior history of similar pain  Not associated with dyspnea/cough/nausea/vomiting/diaphoresis  Not worse with active range of motion of the left shoulder  No preceding trauma/injury to the area but has had two prior rotator cuff repairs of the left shoulder and has known degenerative disease of shoulder for which he has previously been suggested to consider shoulder replacement   No history VTE and no travel/surgery/immobilization past 30 days  He does note some degree of LE edema over past 2d as well  He went through alcohol detox therapy in  this year ( and ) at the inpatient rehab unit at [facility] in [place]  He had been subsequently abstinent from alcohol until yesterday when he drank 6-8 oz of vodka in the afternoon  Today he feels somewhat tremulous and nervous  He had not drunk any other alcohol in the interval period  DDx includes but is not limited to ACS/PE/dissection/ptx/pna; relatively low concern for dissection given nature of sx and I do not suspect this diagnosis to be likely  Will assess ACS with ecg/troponin initially  Ptx/pna will be assessed by chest imaging  PE to be assessed with d-dimer; low risk for PE by Well's score  Prior to Admission Medications   Prescriptions Last Dose Informant Patient Reported? Taking? FLUoxetine (PROzac) 20 mg capsule   Yes No   Sig: Take 60 mg by mouth daily   alfuzosin (UROXATRAL) 10 mg 24 hr tablet   Yes No   Sig: Take 40 mg by mouth daily     allopurinol (ZYLOPRIM) 100 mg tablet   Yes No   amLODIPine (NORVASC) 5 mg tablet   No No   Sig: Take 1 tablet (5 mg total) by mouth daily   atorvastatin (LIPITOR) 40 mg tablet   Yes No   Sig: Take 40 mg by mouth daily   baclofen 10 mg tablet   Yes No   Sig: Take 10 mg by mouth Three times a day   cycloSPORINE (RESTASIS) 0 05 % ophthalmic emulsion   Yes No   Si drop 2 (two) times a day   finasteride (PROSCAR) 5 mg tablet   Yes No   Sig: Take 5 mg by mouth daily   folic acid (FOLVITE) 1 mg tablet   No No   Sig: Take 1 tablet (1 mg total) by mouth daily   melatonin 3 mg   No No   Sig: Take 1 tablet (3 mg total) by mouth daily at bedtime   nadolol (CORGARD) 20 mg tablet   Yes No   Sig: Take 20 mg by mouth   thiamine 100 MG tablet   No No   Sig: Take 1 tablet (100 mg total) by mouth daily   traZODone (DESYREL) 100 mg tablet   No No   Sig: Take 1 tablet (100 mg total) by mouth daily at bedtime   white petrolatum-mineral oil (EUCERIN,HYDROCERIN) cream   No No   Sig: Apply topically 3 (three) times a day as needed (pruritis)      Facility-Administered Medications: None       Past Medical History:   Diagnosis Date    Alcohol dependency ([address] )     Anxiety     Depression     [place] (hard of hearing)     Hypertension     Kidney stones     Prostate enlargement     Renal disorder     kidney    Shoulder dislocation        Past Surgical History:   Procedure Laterality Date    CHOLECYSTECTOMY      SHOULDER SURGERY      for dislocation       History reviewed  No pertinent family history  I have reviewed and agree with the history as documented  E-Cigarette/Vaping    E-Cigarette Use Never User      E-Cigarette/Vaping Substances    Nicotine No     THC No     CBD No     Flavoring No     Other No     Unknown No      Social History     Tobacco Use    Smoking status: Never Smoker    Smokeless tobacco: Never Used   Vaping Use    Vaping Use: Never used   Substance Use Topics    Alcohol use: Yes     Alcohol/week: 6 0 standard drinks     Types: 6 Shots of liquor per week     Comment: 1 pint of liquor a day to every three days    Drug use: Never       Review of Systems   Constitutional: Negative for chills and fever  Respiratory: Negative for cough and shortness of breath  Cardiovascular: Positive for chest pain  Negative for palpitations, orthopnea, syncope and PND  Gastrointestinal: Negative for abdominal pain, nausea and vomiting  Neurological: Negative for dizziness, syncope, weakness, light-headedness and numbness  All other systems reviewed and are negative  Physical Exam  Physical Exam  Vitals and nursing note reviewed  Constitutional:       General: He is awake  He is not in acute distress  Appearance: Normal appearance  He is well-developed  HENT:      Head: Normocephalic and atraumatic        Right Ear: External ear normal  Decreased hearing noted  Left Ear: External ear normal  Decreased hearing noted  Ears:      Comments: He has hearing aids placed bilaterally but is still quite hard of hearing  Neck:      Thyroid: No thyroid mass, thyromegaly or thyroid tenderness  Trachea: Trachea and phonation normal    Cardiovascular:      Rate and Rhythm: Normal rate and regular rhythm  Pulses:           Radial pulses are 2+ on the right side and 2+ on the left side  Dorsalis pedis pulses are 2+ on the right side and 2+ on the left side  Posterior tibial pulses are 2+ on the right side and 2+ on the left side  Heart sounds: Normal heart sounds, S1 normal and S2 normal  No murmur heard  No friction rub  No gallop  Pulmonary:      Effort: Pulmonary effort is normal  No respiratory distress  Breath sounds: Normal breath sounds  No stridor  No decreased breath sounds, wheezing, rhonchi or rales  Chest:      Chest wall: No tenderness  Comments: No reproducible pain across the chest wall  Abdominal:      Tenderness: There is no abdominal tenderness  There is no guarding or rebound  Musculoskeletal:      Comments: Passive range of motion of left shoulder does not reproduce any pain in the chest    Skin:     General: Skin is warm and dry  Neurological:      Mental Status: He is alert and oriented to person, place, and time  GCS: GCS eye subscore is 4  GCS verbal subscore is 5  GCS motor subscore is 6  Cranial Nerves: No cranial nerve deficit  Sensory: No sensory deficit  Motor: Tremor (mildly tremulous in extremities) present  No abnormal muscle tone  Comments: PERRLA; EOMI  Sensation intact to light touch over face in V1-V3 distribution bilaterally  Facial expressions symmetric  Tongue/uvula midline  Shoulder shrug equal bilaterally  Strength 5/5 in UE/LE bilaterally  Sensation intact to light touch in UE/LE bilaterally           Vital Signs  ED Triage Vitals [07/03/21 0649] Temperature Pulse Respirations Blood Pressure SpO2   98 2 °F (36 8 °C) 65 18 (!) 181/84 93 %      Temp Source Heart Rate Source Patient Position - Orthostatic VS BP Location FiO2 (%)   Temporal Monitor Lying Right arm --      Pain Score       9           Vitals:    07/03/21 1015 07/03/21 1030 07/03/21 1045 07/03/21 1100   BP: 150/72 167/81 158/73 140/66   Pulse: 60 65 62 61   Patient Position - Orthostatic VS: Sitting Sitting Sitting Sitting         Visual Acuity  Visual Acuity      Most Recent Value   L Pupil Size (mm)  2   R Pupil Size (mm)  2          ED Medications  Medications   diazepam (VALIUM) injection 10 mg (10 mg Intravenous Given 7/3/21 0734)   potassium chloride (K-DUR,KLOR-CON) CR tablet 40 mEq (40 mEq Oral Given 7/3/21 0749)   magnesium sulfate 2 g/50 mL IVPB (premix) 2 g (0 g Intravenous Stopped 7/3/21 1046)   diazepam (VALIUM) injection 10 mg (10 mg Intravenous Given 7/3/21 0841)   diazepam (VALIUM) injection 10 mg (10 mg Intravenous Given 7/3/21 1050)       Diagnostic Studies  Results Reviewed     Procedure Component Value Units Date/Time    Troponin I [[id number]]  (Normal) Collected: 07/03/21 1000    Lab Status: Final result Specimen: Blood from Arm, Right Updated: 07/03/21 1027     Troponin I <0 02 ng/mL     Magnesium [[id number]]  (Abnormal) Collected: 07/03/21 0656    Lab Status: Final result Specimen: Blood from Arm, Right Updated: 07/03/21 0805     Magnesium 1 2 mg/dL     Lipase [[id number]]  (Abnormal) Collected: 07/03/21 0656    Lab Status: Final result Specimen: Blood from Arm, Right Updated: 07/03/21 0758     Lipase 656 u/L     D-dimer, quantitative [[id number]]  (Normal) Collected: 07/03/21 0656    Lab Status: Final result Specimen: Blood from Arm, Right Updated: 07/03/21 0740     D-Dimer, Quant <0 27 ug/ml FEU     Comprehensive metabolic panel [[id number]]  (Abnormal) Collected: 07/03/21 0656    Lab Status: Final result Specimen: Blood from Arm, Right Updated: 07/03/21 0730     Sodium 141 mmol/L      Potassium 3 4 mmol/L      Chloride 104 mmol/L      CO2 29 mmol/L      ANION GAP 8 mmol/L      BUN 15 mg/dL      Creatinine 0 82 mg/dL      Glucose 96 mg/dL      Calcium 8 9 mg/dL      AST 29 U/L      ALT 39 U/L      Alkaline Phosphatase 90 U/L      Total Protein 7 5 g/dL      Albumin 4 1 g/dL      Total Bilirubin 0 66 mg/dL      eGFR 89 ml/min/1 73sq m     Narrative:      National Kidney Disease Foundation guidelines for Chronic Kidney Disease (CKD):     Stage 1 with normal or high GFR (GFR > 90 mL/min/1 73 square meters)    Stage 2 Mild CKD (GFR = 60-89 mL/min/1 73 square meters)    Stage 3A Moderate CKD (GFR = 45-59 mL/min/1 73 square meters)    Stage 3B Moderate CKD (GFR = 30-44 mL/min/1 73 square meters)    Stage 4 Severe CKD (GFR = 15-29 mL/min/1 73 square meters)    Stage 5 End Stage CKD (GFR <15 mL/min/1 73 square meters)  Note: GFR calculation is accurate only with a steady state creatinine    Troponin I [[id number]]  (Normal) Collected: 07/03/21 0656    Lab Status: Final result Specimen: Blood from Arm, Right Updated: 07/03/21 0729     Troponin I <0 02 ng/mL     CBC and differential [[id number]] Collected: 07/03/21 0656    Lab Status: Final result Specimen: Blood from Arm, Right Updated: 07/03/21 0703     WBC 8 46 Thousand/uL      RBC 4 54 Million/uL      Hemoglobin 14 5 g/dL      Hematocrit 43 5 %      MCV 96 fL      MCH 31 9 pg      MCHC 33 3 g/dL      RDW 13 1 %      MPV 8 9 fL      Platelets 543 Thousands/uL      nRBC 0 /100 WBCs      Neutrophils Relative 65 %      Immat GRANS % 1 %      Lymphocytes Relative 22 %      Monocytes Relative 9 %      Eosinophils Relative 2 %      Basophils Relative 1 %      Neutrophils Absolute 5 48 Thousands/µL      Immature Grans Absolute 0 11 Thousand/uL      Lymphocytes Absolute 1 88 Thousands/µL      Monocytes Absolute 0 75 Thousand/µL      Eosinophils Absolute 0 18 Thousand/µL      Basophils Absolute 0 06 Thousands/µL                  CT chest without contrast   Final Result by Darcy Haji MD (06/41 3210)   1  No focal consolidation to suggest pneumonia  Bibasilar dependent atelectasis with mild elevation right hemidiaphragm  2   3 mm pulmonary nodules unchanged from prior exam 3/11/2010 and presumably benign  3   Chronic appearing bilateral rib fractures  4   The ascending aorta is upper limits of normal in size measuring 3 9 cm the level the right pulmonary artery  Workstation performed: [id number]         XR chest 1 view portable   ED Interpretation by Huma Flores DO (07/03 0736)   Airway is midline  There is mild elevation of right hemidiaphragm with atelectasis at the right lung base  No definitive infiltrate but this is not fully evaluated due to the elevation of the right lung base  Cardiac silhouette is within normal limits  There appears to be partial subluxation of the left shoulder                   Procedures  ECG 12 Lead Documentation Only    Date/Time: 7/3/2021 7:18 AM  Performed by: Huma Flores DO  Authorized by: Huma Flores DO     Indications / Diagnosis:  Left-sided chest pain  ECG reviewed by me, the ED Provider: yes    Patient location:  ED  Previous ECG:     Previous ECG:  Compared to current    Comparison ECG info:  19 June 21    Similarity:  No change    Comparison to cardiac monitor: Yes    Interpretation:     Interpretation: normal    Rate:     ECG rate:  65    ECG rate assessment: normal    Rhythm:     Rhythm: sinus rhythm    Ectopy:     Ectopy: none    QRS:     QRS axis:  Normal    QRS intervals:  Normal  Conduction:     Conduction: normal    ST segments:     ST segments:  Normal  T waves:     T waves: normal    Comments:      Pr 172 qrs 106 qtc 474             ED Course  ED Course as of Jul 03 1459   Sat Jul 03, 2021   0731 Normal WBC  Normal Hg/Hct  Normal Plt   CBC and differential   0731 Negative   Troponin I   0731 Mild hypokalemia  Otherwise normal electrolytes  Transaminases wnl Comprehensive metabolic panel(!)   4355 Within reference range   D-dimer, quantitative   0744 CIWA score 6 at initial evaluation (3 for tremor and 3 for anxiety)      0749 Lab workup thus far does not account for patient's symptoms  The right basilar abnormality noted on chest x-ray will be further evaluated with CT chest without contrast       0759 Lipase is mildly elevated but not greater than 3 times upper limit of normal and not associated with any pancreatitis symptoms  Lipase(!)   [id number] IV magnesium repletion while remainder of workup is ongoing  Magnesium(!)   [id number] CT completed and awaiting interpretation      0908 IMPRESSION:  1  No focal consolidation to suggest pneumonia  Bibasilar dependent atelectasis with mild elevation right hemidiaphragm  2   3 mm pulmonary nodules unchanged from prior exam 3/11/2010 and presumably benign  3   Chronic appearing bilateral rib fractures  4   The ascending aorta is upper limits of normal in size measuring 3 9 cm the level the right pulmonary artery  CT chest without contrast   0935 Plan to repeat ECG and troponin at 1000 as this will be T +3 hours from initial value  1006 ECG NSR 63 bpm  Normal axis   QRS 98 Qtc 462  No ectopy  No st/t changes  No changes from earlier ECG today  1028 Repeat troponin negative   Troponin I   1053 Results of the workup reviewed with the patient and his wife  Negative troponin x2 with unchanged ECG and non concerning symptoms suggest ACS is not cause of his chest pain  Workup for alternative cause of chest pain such as PE, pneumothorax, pneumonia all negative  CT demonstrated evidence of atelectasis for which he will be given incentive spirometer  Of note, he had been placed on oxygen therapy following his prior inpatient rehabilitation stent in early June of this year  He was not returned to oxygen therapy after his subsequent inpatient rehabilitation stay    He has never required oxygen therapy previously and does not have any underlying lung disease of which he is aware  He does not have any absolute indication for oxygen therapy now; I stressed the great importance of using the incentive spirometer to avoid complications from atelectasis  He does already take potassium and magnesium supplements  He states the potassium supplements are over-the-counter and magnesium is prescription  I will temporarily increase the magnesium dose and provide a prescription potassium replacement for the short term  He requested a short-term prescription of an anxiolytic as he still feels "jittery " I confirmed again that he had only ingested alcohol yesterday for the first time following his most recent inpatient rehabilitation stay  As such, concern for acute alcohol withdrawal is quite low  He CIWA at presentation was low and has not progressed to any degree  I emphasized the importance of following with his primary physician with respect to these changes being made to his medical therapy  [address] queried prior to Rx  Based on review of records, there is no evidence of controlled substance abuse or repeated inappropriate ED visits to obtain controlled substances  He has been prescribed benzodiazepines on a number previous occasions generally in the setting of outpatient management of EtOH withdrawal   It is therefore reasonable to prescribe a short course of bzd for symptom control with close f/u to PMD also advised  Precautions given to avoid use of benzodiazepine while driving and to abstain from alcohol while using  SBIRT 22yo+      Most Recent Value   SBIRT (24 yo +)   In order to provide better care to our patients, we are screening all of our patients for alcohol and drug use  Would it be okay to ask you these screening questions? Yes Filed at: 07/03/2021 4585   Initial Alcohol Screen: US AUDIT-C    1  How often do you have a drink containing alcohol?   6 Filed at: 07/03/2021 1858   2  How many drinks containing alcohol do you have on a typical day you are drinking? 5 Filed at: 07/03/2021 0652   3a  Male UNDER 65: How often do you have five or more drinks on one occasion? 6 Filed at: 07/03/2021 5370   Audit-C Score  (!) 17 Filed at: 07/03/2021 5653   Full Alcohol Screen: US AUDIT   4  How often during the last year have you found that you were not able to stop drinking once you had started? 4 Filed at: 07/03/2021 0652   5  How often during past year have you failed to do what was normally expected of you because of drinking? 4 Filed at: 07/03/2021 0652   6  How often in past year have you needed a first drink in the morning to get yourself going after a heavy drinking session? 4 Filed at: 07/03/2021 0652   7  How often in past year have you had feeling of guilt or remorse after drinking? 4 Filed at: 07/03/2021 0652   8  How often in past year have you been unable to remember what happened night before because you had been drinking? 4 Filed at: 07/03/2021 0652   9  Have you or someone else been injured as a result of your drinking? 0 Filed at: 07/03/2021 0652   10  Has a relative, friend, doctor or other health worker been concerned about your drinking and suggested you cut down?   0 Filed at: 07/03/2021 1431   AUDIT Total Score  (!) 37 Filed at: 07/03/2021 9847   JEMIMA: How many times in the past year have you    Used an illegal drug or used a prescription medication for non-medical reasons?   Never Filed at: 07/03/2021 7029          Wells' Criteria for PE      Most Recent Value   Wells' Criteria for PE   Clinical signs and symptoms of DVT  0 Filed at: 07/03/2021 8326   PE is primary diagnosis or equally likely  0 Filed at: 07/03/2021 0742   HR >100  0 Filed at: 07/03/2021 0742   Immobilization at least 3 days or Surgery in the previous 4 weeks  0 Filed at: 07/03/2021 3600   Previous, objectively diagnosed PE or DVT  0 Filed at: 07/03/2021 8028   Hemoptysis  0 Filed at: 07/03/2021 0742   Malignancy with treatment within 6 months or palliative  0 Filed at: 07/03/2021 4658   Wells' Criteria Total  0 Filed at: 07/03/2021 5243                MDM    Disposition  Final diagnoses:   Left-sided chest pain   Atelectasis of both lungs   Hypomagnesemia   Hypokalemia   History of alcohol use disorder     Time reflects when diagnosis was documented in both MDM as applicable and the Disposition within this note     Time User Action Codes Description Comment    7/3/2021 10:43 AM Jud Barrier Add [R07 9] Left-sided chest pain     7/3/2021 10:44 AM Jud Barrier Add [A17 03] Atelectasis of both lungs     7/3/2021 10:44 AM Jud Barrier Add [E83 42] Hypomagnesemia     7/3/2021 10:44 AM Jud Barrier Add [E87 6] Hypokalemia     7/3/2021 11:28 AM Jud Barrier Add [S77 614] History of alcohol use disorder       ED Disposition     ED Disposition Condition Date/Time Comment    Discharge Stable Sat Jul 3, 2021 11:27 AM Shanae Go discharge to home/self care              Follow-up Information     Follow up With Specialties Details Why Contact Info Additional Information    Jonel Sellers MD Internal Medicine Schedule an appointment as soon as possible for a visit  As soon as possible for further evaluation of your symptoms [facility] [phone]       [facility] Emergency Department Emergency Medicine Go to  If you pass out, develop chest pain that spreads out of your chest or becomes worse with physical activity, become very short of breath, or develop chest pain with vomiting [phone]  [facility] Emergency Department, [address], [address]          Discharge Medication List as of 7/3/2021 11:31 AM      START taking these medications    Details   LORazepam (Ativan) 1 mg tablet Take 1 tablet (1 mg total) by mouth 2 (two) times a day as needed for anxiety Do not drive or drink alcohol while using, Starting Sat 7/3/2021, Normal      magnesium oxide (MAG-OX) 400 mg Take 2 tablets (800 mg total) by mouth 2 (two) times a day for 3 days Take this in place of your normal magnesium dose  After this prescription is completed, please go back to your normal magnesium dose , Starting Sat 7/3/2021, Until Tue 7/6/2021, Normal      potassium chloride (K-DUR,KLOR-CON) 20 mEq tablet Take 1 tablet (20 mEq total) by mouth 2 (two) times a day, Starting Sat 7/3/2021, Normal         CONTINUE these medications which have NOT CHANGED    Details   alfuzosin (UROXATRAL) 10 mg 24 hr tablet Take 40 mg by mouth daily  , Historical Med      allopurinol (ZYLOPRIM) 100 mg tablet Historical Med      amLODIPine (NORVASC) 5 mg tablet Take 1 tablet (5 mg total) by mouth daily, Starting Tue 7/16/2019, Normal      atorvastatin (LIPITOR) 40 mg tablet Take 40 mg by mouth daily, Starting Wed 4/28/2021, Historical Med      baclofen 10 mg tablet Take 10 mg by mouth Three times a day, Starting Thu 3/4/2021, Until Fri 3/4/2022, Historical Med      cycloSPORINE (RESTASIS) 0 05 % ophthalmic emulsion 1 drop 2 (two) times a day, Historical Med      finasteride (PROSCAR) 5 mg tablet Take 5 mg by mouth daily, Historical Med      FLUoxetine (PROzac) 20 mg capsule Take 60 mg by mouth daily, Starting Thu 4/1/2021, Historical Med      folic acid (FOLVITE) 1 mg tablet Take 1 tablet (1 mg total) by mouth daily, Starting Thu 6/10/2021, Normal      melatonin 3 mg Take 1 tablet (3 mg total) by mouth daily at bedtime, Starting Wed 6/9/2021, Normal      nadolol (CORGARD) 20 mg tablet Take 20 mg by mouth, Starting Thu 3/4/2021, Until Fri 3/4/2022, Historical Med      thiamine 100 MG tablet Take 1 tablet (100 mg total) by mouth daily, Starting Tue 6/22/2021, Until Thu 7/22/2021, Normal      traZODone (DESYREL) 100 mg tablet Take 1 tablet (100 mg total) by mouth daily at bedtime, Starting Wed 6/9/2021, Normal white petrolatum-mineral oil (EUCERIN,HYDROCERIN) cream Apply topically 3 (three) times a day as needed (pruritis), Starting Wed 6/9/2021, Normal           No discharge procedures on file      PDMP Review       Value Time User    PDMP Reviewed  Yes 6/9/2021 12:02 PM Erie Sneddon Holter, DO          ED Provider  Electronically Signed by           Marieta Osgood, DO  07/03/21 1500

## 2021-07-06 LAB
ATRIAL RATE: 63 BPM
ATRIAL RATE: 65 BPM
ATRIAL RATE: 69 BPM
P AXIS: 52 DEGREES
P AXIS: 54 DEGREES
P AXIS: 56 DEGREES
PR INTERVAL: 172 MS
PR INTERVAL: 174 MS
PR INTERVAL: 174 MS
QRS AXIS: 61 DEGREES
QRS AXIS: 63 DEGREES
QRS AXIS: 68 DEGREES
QRSD INTERVAL: 106 MS
QRSD INTERVAL: 96 MS
QRSD INTERVAL: 98 MS
QT INTERVAL: 430 MS
QT INTERVAL: 452 MS
QT INTERVAL: 456 MS
QTC INTERVAL: 460 MS
QTC INTERVAL: 462 MS
QTC INTERVAL: 474 MS
T WAVE AXIS: 48 DEGREES
T WAVE AXIS: 50 DEGREES
T WAVE AXIS: 58 DEGREES
VENTRICULAR RATE: 63 BPM
VENTRICULAR RATE: 65 BPM
VENTRICULAR RATE: 69 BPM

## 2021-07-06 PROCEDURE — 93010 ELECTROCARDIOGRAM REPORT: CPT | Performed by: INTERNAL MEDICINE

## 2021-07-21 ENCOUNTER — HOSPITAL ENCOUNTER (EMERGENCY)
Facility: HOSPITAL | Age: 71
Discharge: HOME | End: 2021-07-21
Attending: EMERGENCY MEDICINE
Payer: MEDICARE

## 2021-07-21 VITALS
TEMPERATURE: 98.3 F | DIASTOLIC BLOOD PRESSURE: 70 MMHG | SYSTOLIC BLOOD PRESSURE: 162 MMHG | HEART RATE: 72 BPM | RESPIRATION RATE: 18 BRPM | OXYGEN SATURATION: 95 %

## 2021-07-21 DIAGNOSIS — K64.4 EXTERNAL HEMORRHOID: Primary | ICD-10-CM

## 2021-07-21 PROCEDURE — 99284 EMERGENCY DEPT VISIT MOD MDM: CPT | Performed by: EMERGENCY MEDICINE

## 2021-07-21 PROCEDURE — 99282 EMERGENCY DEPT VISIT SF MDM: CPT

## 2021-07-21 RX ORDER — LORAZEPAM 0.5 MG/1
0.5 TABLET ORAL ONCE
Status: COMPLETED | OUTPATIENT
Start: 2021-07-21 | End: 2021-07-21

## 2021-07-21 RX ORDER — LIDOCAINE HYDROCHLORIDE AND EPINEPHRINE 10; 10 MG/ML; UG/ML
5 INJECTION, SOLUTION INFILTRATION; PERINEURAL ONCE
Status: COMPLETED | OUTPATIENT
Start: 2021-07-21 | End: 2021-07-21

## 2021-07-21 RX ADMIN — LIDOCAINE HYDROCHLORIDE,EPINEPHRINE BITARTRATE 5 ML: 10; .01 INJECTION, SOLUTION INFILTRATION; PERINEURAL at 20:28

## 2021-07-21 RX ADMIN — LORAZEPAM 0.5 MG: 0.5 TABLET ORAL at 20:53

## 2021-07-21 RX ADMIN — LORAZEPAM 0.5 MG: 0.5 TABLET ORAL at 20:16

## 2021-07-21 NOTE — ED NOTES
Patient requesting "something to calm me down"   Informed patient that he needs to see the provider first      Machelle Orozco RN  07/21/21 2010

## 2021-07-22 NOTE — ED PROVIDER NOTES
Final Diagnosis:  1  External hemorrhoid        Chief Complaint   Patient presents with    Hemorrhoids     Patient reports hemorrhoids that are bleeding starting yesterday  C/o pain  HPI  Patient presents for evaluation of hemorrhoid  Patient states that earlier today he was going to bathroom and he had the sudden onset of pain around his rectum  States that at that time there was some blood on the toilet paper  He states that he has a history of hemorrhoids in the past   Denies any nausea or vomiting, dysuria, hematuria, constipation or diarrhea  Denies any history strain  Denies any history of colon issues  In the past the patient says that he has had success having them excised  Unless otherwise specified:  - No language barrier    - History obtained from patient  - There are no limitations to the history obtained  - Previous charting was reviewed    PMH:   has a past medical history of Alcohol dependency ([facility] [address] ), Anxiety, Depression, [place] (hard of hearing), Hypertension, Kidney stones, Prostate enlargement, Renal disorder, and Shoulder dislocation  PSH:   has a past surgical history that includes Shoulder surgery and Cholecystectomy  ROS:  Review of Systems   Constitutional: Negative for activity change, chills, fatigue and fever  Respiratory: Negative for cough and shortness of breath  Cardiovascular: Negative for chest pain and palpitations  Gastrointestinal: Positive for anal bleeding and rectal pain  Negative for abdominal distention, abdominal pain, blood in stool, constipation, diarrhea, nausea and vomiting  Genitourinary: Negative for dysuria and hematuria  Musculoskeletal: Negative for arthralgias, myalgias and neck pain  Neurological: Negative for dizziness, syncope, light-headedness and headaches  All other systems reviewed and are negative         PE:   Vitals:    07/21/21 1953 07/21/21 2000   BP: (!) 183/86 162/70   BP Location: Left arm    Pulse: 72 72 Resp: 18 18   Temp: 98 3 °F (36 8 °C)    TempSrc: Temporal    SpO2: 94% 95%     Vitals reviewed by me  Physical Exam  Constitutional:       General: He is not in acute distress  Appearance: He is well-developed  He is not diaphoretic  HENT:      Head: Normocephalic and atraumatic  Right Ear: External ear normal       Left Ear: External ear normal    Eyes:      General:         Right eye: No discharge  Left eye: No discharge  Conjunctiva/sclera: Conjunctivae normal       Pupils: Pupils are equal, round, and reactive to light  Neck:      Vascular: No JVD  Trachea: No tracheal deviation  Cardiovascular:      Rate and Rhythm: Normal rate and regular rhythm  Heart sounds: Normal heart sounds  No murmur heard  No friction rub  No gallop  Pulmonary:      Effort: Pulmonary effort is normal  No respiratory distress  Breath sounds: Normal breath sounds  No wheezing or rales  Abdominal:      General: Bowel sounds are normal  There is no distension  Palpations: Abdomen is soft  There is no mass  Tenderness: There is no abdominal tenderness  There is no guarding  Genitourinary:     Rectum: Guaiac result negative  Tenderness and external hemorrhoid present  No anal fissure  Musculoskeletal:         General: No tenderness or deformity  Normal range of motion  Cervical back: Normal range of motion and neck supple  Neurological:      Mental Status: He is alert and oriented to person, place, and time  Cranial Nerves: No cranial nerve deficit  Sensory: No sensory deficit  Motor: No abnormal muscle tone  Coordination: Coordination normal    Psychiatric:         Behavior: Behavior normal          Thought Content: Thought content normal          Judgment: Judgment normal           A:  - Nursing note reviewed  No orders to display     No orders of the defined types were placed in this encounter      Labs Reviewed - No data to display      Final Diagnosis:  1  External hemorrhoid        P:  - patient with external hemorrhoid  Small clot evacuated however hemorrhoid was not complete platelet evaluated  I instructed the patient that his pain should improve but that he should follow up with surgery at his earliest convenience  Earlier return precautions discussed    Medications   lidocaine-epinephrine (XYLOCAINE/EPINEPHRINE) 1 %-1:100,000 injection 5 mL (5 mL Infiltration Given by Other 7/21/21 2028)   LORazepam (ATIVAN) tablet 0 5 mg (0 5 mg Oral Given 7/21/21 2016)   LORazepam (ATIVAN) tablet 0 5 mg (0 5 mg Oral Given 7/21/21 2053)     Time reflects when diagnosis was documented in both MDM as applicable and the Disposition within this note     Time User Action Codes Description Comment    7/21/2021  8:43 PM Herbert Patel Add [K64 4] External hemorrhoid       ED Disposition     ED Disposition Condition Date/Time Comment    Discharge Stable Wed Jul 21, 2021  8:43 PM Vesta Neff discharge to home/self care              Follow-up Information     Follow up With Specialties Details Why [address] Ada Castaneda MD Internal Medicine   [facility]      Rc Restrepo MD General Surgery Schedule an appointment as soon as possible for a visit in 1 day  [address] [place] [address] [address]   [phone]          Discharge Medication List as of 7/21/2021  8:51 PM      START taking these medications    Details   NIFEdipine 0 3%-lidocaine 5% rectal ointment Apply 1 application topically every 4 (four) hours as needed for discomfort or pain Apply a small amount to anal fissure, Starting Wed 7/21/2021, Normal      psyllium (METAMUCIL SMOOTH TEXTURE) 28 % packet Take 1 packet by mouth 2 (two) times a day for 10 days, Starting Wed 7/21/2021, Until Sat 7/31/2021, Normal         CONTINUE these medications which have NOT CHANGED    Details   alfuzosin (UROXATRAL) 10 mg 24 hr tablet Take 40 mg by mouth daily  , Historical Med      allopurinol (ZYLOPRIM) 100 mg tablet Historical Med      amLODIPine (NORVASC) 5 mg tablet Take 1 tablet (5 mg total) by mouth daily, Starting Tue 7/16/2019, Normal      atorvastatin (LIPITOR) 40 mg tablet Take 40 mg by mouth daily, Starting Wed 4/28/2021, Historical Med      baclofen 10 mg tablet Take 10 mg by mouth Three times a day, Starting Thu 3/4/2021, Until Fri 3/4/2022, Historical Med      cycloSPORINE (RESTASIS) 0 05 % ophthalmic emulsion 1 drop 2 (two) times a day, Historical Med      finasteride (PROSCAR) 5 mg tablet Take 5 mg by mouth daily, Historical Med      FLUoxetine (PROzac) 20 mg capsule Take 60 mg by mouth daily, Starting Thu 4/1/2021, Historical Med      folic acid (FOLVITE) 1 mg tablet Take 1 tablet (1 mg total) by mouth daily, Starting Thu 6/10/2021, Normal      LORazepam (Ativan) 1 mg tablet Take 1 tablet (1 mg total) by mouth 2 (two) times a day as needed for anxiety Do not drive or drink alcohol while using, Starting Sat 7/3/2021, Normal      magnesium oxide (MAG-OX) 400 mg Take 2 tablets (800 mg total) by mouth 2 (two) times a day for 3 days Take this in place of your normal magnesium dose   After this prescription is completed, please go back to your normal magnesium dose , Starting Sat 7/3/2021, Until Tue 7/6/2021, Normal      melatonin 3 mg Take 1 tablet (3 mg total) by mouth daily at bedtime, Starting Wed 6/9/2021, Normal      nadolol (CORGARD) 20 mg tablet Take 20 mg by mouth, Starting Thu 3/4/2021, Until Fri 3/4/2022, Historical Med      potassium chloride (K-DUR,KLOR-CON) 20 mEq tablet Take 1 tablet (20 mEq total) by mouth 2 (two) times a day, Starting Sat 7/3/2021, Normal      thiamine 100 MG tablet Take 1 tablet (100 mg total) by mouth daily, Starting Tue 6/22/2021, Until Thu 7/22/2021, Normal      traZODone (DESYREL) 100 mg tablet Take 1 tablet (100 mg total) by mouth daily at bedtime, Starting Wed 6/9/2021, Normal      white petrolatum-mineral oil (EUCERIN,HYDROCERIN) cream Apply topically 3 (three) times a day as needed (pruritis), Starting Wed 2021, Normal           No discharge procedures on file  Prior to Admission Medications   Prescriptions Last Dose Informant Patient Reported? Taking? FLUoxetine (PROzac) 20 mg capsule   Yes No   Sig: Take 60 mg by mouth daily   LORazepam (Ativan) 1 mg tablet   No No   Sig: Take 1 tablet (1 mg total) by mouth 2 (two) times a day as needed for anxiety Do not drive or drink alcohol while using   alfuzosin (UROXATRAL) 10 mg 24 hr tablet   Yes No   Sig: Take 40 mg by mouth daily  allopurinol (ZYLOPRIM) 100 mg tablet   Yes No   amLODIPine (NORVASC) 5 mg tablet   No No   Sig: Take 1 tablet (5 mg total) by mouth daily   atorvastatin (LIPITOR) 40 mg tablet   Yes No   Sig: Take 40 mg by mouth daily   baclofen 10 mg tablet   Yes No   Sig: Take 10 mg by mouth Three times a day   cycloSPORINE (RESTASIS) 0 05 % ophthalmic emulsion   Yes No   Si drop 2 (two) times a day   finasteride (PROSCAR) 5 mg tablet   Yes No   Sig: Take 5 mg by mouth daily   folic acid (FOLVITE) 1 mg tablet   No No   Sig: Take 1 tablet (1 mg total) by mouth daily   magnesium oxide (MAG-OX) 400 mg   No No   Sig: Take 2 tablets (800 mg total) by mouth 2 (two) times a day for 3 days Take this in place of your normal magnesium dose  After this prescription is completed, please go back to your normal magnesium dose     melatonin 3 mg   No No   Sig: Take 1 tablet (3 mg total) by mouth daily at bedtime   nadolol (CORGARD) 20 mg tablet   Yes No   Sig: Take 20 mg by mouth   potassium chloride (K-DUR,KLOR-CON) 20 mEq tablet   No No   Sig: Take 1 tablet (20 mEq total) by mouth 2 (two) times a day   thiamine 100 MG tablet   No No   Sig: Take 1 tablet (100 mg total) by mouth daily   traZODone (DESYREL) 100 mg tablet   No No   Sig: Take 1 tablet (100 mg total) by mouth daily at bedtime   white petrolatum-mineral oil (EUCERIN,HYDROCERIN) cream   No No   Sig: Apply topically 3 (three) times a day as needed (pruritis)      Facility-Administered Medications: None       Portions of the record may have been created with voice recognition software  Occasional wrong word or "sound a like" substitutions may have occurred due to the inherent limitations of voice recognition software  Read the chart carefully and recognize, using context, where substitutions have occurred      Electronically signed by:  MD Bell Smith MD  07/21/21 0508

## 2021-07-25 ENCOUNTER — HOSPITAL ENCOUNTER (EMERGENCY)
Facility: HOSPITAL | Age: 71
Discharge: HOME | End: 2021-07-25
Attending: EMERGENCY MEDICINE
Payer: MEDICARE

## 2021-07-25 VITALS
SYSTOLIC BLOOD PRESSURE: 141 MMHG | DIASTOLIC BLOOD PRESSURE: 75 MMHG | RESPIRATION RATE: 21 BRPM | BODY MASS INDEX: 32.66 KG/M2 | HEIGHT: 67 IN | TEMPERATURE: 99 F | HEART RATE: 74 BPM | WEIGHT: 208.11 LBS | OXYGEN SATURATION: 93 %

## 2021-07-25 DIAGNOSIS — F10.239 ALCOHOL WITHDRAWAL (HCC): Primary | ICD-10-CM

## 2021-07-25 LAB
ALBUMIN SERPL BCP-MCNC: 4 G/DL (ref 3.5–5)
ALP SERPL-CCNC: 89 U/L (ref 46–116)
ALT SERPL W P-5'-P-CCNC: 25 U/L (ref 12–78)
ANION GAP SERPL CALCULATED.3IONS-SCNC: 11 MMOL/L (ref 4–13)
AST SERPL W P-5'-P-CCNC: 15 U/L (ref 5–45)
BASOPHILS # BLD AUTO: 0.05 THOUSANDS/ΜL (ref 0–0.1)
BASOPHILS NFR BLD AUTO: 1 % (ref 0–1)
BILIRUB SERPL-MCNC: 0.66 MG/DL (ref 0.2–1)
BILIRUB UR QL STRIP: NEGATIVE
BUN SERPL-MCNC: 8 MG/DL (ref 5–25)
CALCIUM SERPL-MCNC: 9.1 MG/DL (ref 8.3–10.1)
CHLORIDE SERPL-SCNC: 99 MMOL/L (ref 100–108)
CLARITY UR: CLEAR
CO2 SERPL-SCNC: 29 MMOL/L (ref 21–32)
COLOR UR: YELLOW
CREAT SERPL-MCNC: 0.78 MG/DL (ref 0.6–1.3)
EOSINOPHIL # BLD AUTO: 0.09 THOUSAND/ΜL (ref 0–0.61)
EOSINOPHIL NFR BLD AUTO: 1 % (ref 0–6)
ERYTHROCYTE [DISTWIDTH] IN BLOOD BY AUTOMATED COUNT: 13.2 % (ref 11.6–15.1)
GFR SERPL CREATININE-BSD FRML MDRD: 91 ML/MIN/1.73SQ M
GLUCOSE SERPL-MCNC: 105 MG/DL (ref 65–140)
GLUCOSE UR STRIP-MCNC: NEGATIVE MG/DL
HCT VFR BLD AUTO: 43.2 % (ref 36.5–49.3)
HGB BLD-MCNC: 14.7 G/DL (ref 12–17)
HGB UR QL STRIP.AUTO: NEGATIVE
IMM GRANULOCYTES # BLD AUTO: 0.13 THOUSAND/UL (ref 0–0.2)
IMM GRANULOCYTES NFR BLD AUTO: 2 % (ref 0–2)
KETONES UR STRIP-MCNC: ABNORMAL MG/DL
LEUKOCYTE ESTERASE UR QL STRIP: NEGATIVE
LYMPHOCYTES # BLD AUTO: 1.66 THOUSANDS/ΜL (ref 0.6–4.47)
LYMPHOCYTES NFR BLD AUTO: 20 % (ref 14–44)
MAGNESIUM SERPL-MCNC: 1.4 MG/DL (ref 1.6–2.6)
MCH RBC QN AUTO: 32.1 PG (ref 26.8–34.3)
MCHC RBC AUTO-ENTMCNC: 34 G/DL (ref 31.4–37.4)
MCV RBC AUTO: 94 FL (ref 82–98)
MONOCYTES # BLD AUTO: 0.85 THOUSAND/ΜL (ref 0.17–1.22)
MONOCYTES NFR BLD AUTO: 10 % (ref 4–12)
NEUTROPHILS # BLD AUTO: 5.58 THOUSANDS/ΜL (ref 1.85–7.62)
NEUTS SEG NFR BLD AUTO: 66 % (ref 43–75)
NITRITE UR QL STRIP: NEGATIVE
NRBC BLD AUTO-RTO: 0 /100 WBCS
PH UR STRIP.AUTO: 6 [PH]
PLATELET # BLD AUTO: 261 THOUSANDS/UL (ref 149–390)
PMV BLD AUTO: 9 FL (ref 8.9–12.7)
POTASSIUM SERPL-SCNC: 3 MMOL/L (ref 3.5–5.3)
PROT SERPL-MCNC: 7.7 G/DL (ref 6.4–8.2)
PROT UR STRIP-MCNC: NEGATIVE MG/DL
RBC # BLD AUTO: 4.58 MILLION/UL (ref 3.88–5.62)
SODIUM SERPL-SCNC: 139 MMOL/L (ref 136–145)
SP GR UR STRIP.AUTO: 1.01 (ref 1–1.03)
TROPONIN I SERPL-MCNC: <0.02 NG/ML
UROBILINOGEN UR QL STRIP.AUTO: 0.2 E.U./DL
WBC # BLD AUTO: 8.36 THOUSAND/UL (ref 4.31–10.16)

## 2021-07-25 PROCEDURE — 96375 TX/PRO/DX INJ NEW DRUG ADDON: CPT

## 2021-07-25 PROCEDURE — 93005 ELECTROCARDIOGRAM TRACING: CPT

## 2021-07-25 PROCEDURE — 85025 COMPLETE CBC W/AUTO DIFF WBC: CPT | Performed by: EMERGENCY MEDICINE

## 2021-07-25 PROCEDURE — 99284 EMERGENCY DEPT VISIT MOD MDM: CPT | Performed by: EMERGENCY MEDICINE

## 2021-07-25 PROCEDURE — 96367 TX/PROPH/DG ADDL SEQ IV INF: CPT

## 2021-07-25 PROCEDURE — 36415 COLL VENOUS BLD VENIPUNCTURE: CPT | Performed by: EMERGENCY MEDICINE

## 2021-07-25 PROCEDURE — 83735 ASSAY OF MAGNESIUM: CPT | Performed by: EMERGENCY MEDICINE

## 2021-07-25 PROCEDURE — 80053 COMPREHEN METABOLIC PANEL: CPT | Performed by: EMERGENCY MEDICINE

## 2021-07-25 PROCEDURE — 84484 ASSAY OF TROPONIN QUANT: CPT | Performed by: EMERGENCY MEDICINE

## 2021-07-25 PROCEDURE — 96376 TX/PRO/DX INJ SAME DRUG ADON: CPT

## 2021-07-25 PROCEDURE — 81003 URINALYSIS AUTO W/O SCOPE: CPT | Performed by: EMERGENCY MEDICINE

## 2021-07-25 PROCEDURE — 96365 THER/PROPH/DIAG IV INF INIT: CPT

## 2021-07-25 PROCEDURE — 99284 EMERGENCY DEPT VISIT MOD MDM: CPT

## 2021-07-25 RX ORDER — POTASSIUM CHLORIDE 20 MEQ/1
40 TABLET, EXTENDED RELEASE ORAL ONCE
Status: COMPLETED | OUTPATIENT
Start: 2021-07-25 | End: 2021-07-25

## 2021-07-25 RX ORDER — DIAZEPAM 5 MG/ML
5 INJECTION, SOLUTION INTRAMUSCULAR; INTRAVENOUS ONCE
Status: DISCONTINUED | OUTPATIENT
Start: 2021-07-25 | End: 2021-07-25

## 2021-07-25 RX ORDER — DIAZEPAM 5 MG/ML
10 INJECTION, SOLUTION INTRAMUSCULAR; INTRAVENOUS ONCE
Status: COMPLETED | OUTPATIENT
Start: 2021-07-25 | End: 2021-07-25

## 2021-07-25 RX ORDER — POTASSIUM CHLORIDE 14.9 MG/ML
20 INJECTION INTRAVENOUS ONCE
Status: COMPLETED | OUTPATIENT
Start: 2021-07-25 | End: 2021-07-25

## 2021-07-25 RX ORDER — MAGNESIUM SULFATE HEPTAHYDRATE 40 MG/ML
2 INJECTION, SOLUTION INTRAVENOUS ONCE
Status: DISCONTINUED | OUTPATIENT
Start: 2021-07-25 | End: 2021-07-25 | Stop reason: HOSPADM

## 2021-07-25 RX ADMIN — POTASSIUM CHLORIDE 20 MEQ: 14.9 INJECTION, SOLUTION INTRAVENOUS at 13:51

## 2021-07-25 RX ADMIN — SODIUM CHLORIDE 1000 ML: 0.9 INJECTION, SOLUTION INTRAVENOUS at 13:25

## 2021-07-25 RX ADMIN — DIAZEPAM 10 MG: 5 INJECTION, SOLUTION INTRAMUSCULAR; INTRAVENOUS at 16:16

## 2021-07-25 RX ADMIN — POTASSIUM CHLORIDE 40 MEQ: 1500 TABLET, EXTENDED RELEASE ORAL at 13:50

## 2021-07-25 RX ADMIN — DIAZEPAM 10 MG: 5 INJECTION, SOLUTION INTRAMUSCULAR; INTRAVENOUS at 13:24

## 2021-07-25 RX ADMIN — DIAZEPAM 10 MG: 5 INJECTION, SOLUTION INTRAMUSCULAR; INTRAVENOUS at 14:36

## 2021-07-25 NOTE — ED PROVIDER NOTES
History  Chief Complaint   Patient presents with    Dizziness     patient reports that he usually drinks 1/2-1pint of vodka  he states last drink was five days ago  patient reports shakiness and dizziness for the past two days  he states it feels like he is going through withdrawl  Is a 77-year-old male with a history of hypertension, anxiety, alcohol abuse who presents for evaluation of lightheadedness, shakiness, generalized weakness  Patient says that the symptoms started yesterday  He is concerned that it may be related to alcohol withdrawal   He says that he last drank on Tuesday night  He says that he drinks anywhere from a half to 1 pt of vodka a day  He says he has been doing this for "years "  Patient says that he quit for 3 months "during the pain definite" experience some mild shakiness but never had any withdrawal seizures  Patient says that about a year ago he had an episode where he was intubated and flown to [facility], it was unclear whether this was a withdrawal seizure at that time  The patient is involved with AA as an outpatient and is not interested in inpatient rehab at this time  The patient has been treated at the detox unit at [facility] in both April and June for withdrawal symptoms  The patient says that he has also been under a lot of stress due to the fact that he is scheduled to have eye injections tomorrow for his glaucoma  He says that it has been "weighing on him" and that is difficult "not being would have a drink "  He admits to some lightheadedness which he describes as feeling near syncopal   He denies any headache, visual changes, chest pain, shortness of breath, abdominal pain  Prior to Admission Medications   Prescriptions Last Dose Informant Patient Reported? Taking?    FLUoxetine (PROzac) 20 mg capsule   Yes No   Sig: Take 60 mg by mouth daily   LORazepam (Ativan) 1 mg tablet   No No   Sig: Take 1 tablet (1 mg total) by mouth 2 (two) times a day as needed for anxiety Do not drive or drink alcohol while using   NIFEdipine 0 3%-lidocaine 5% rectal ointment   No No   Sig: Apply 1 application topically every 4 (four) hours as needed for discomfort or pain Apply a small amount to anal fissure   alfuzosin (UROXATRAL) 10 mg 24 hr tablet   Yes No   Sig: Take 40 mg by mouth daily  allopurinol (ZYLOPRIM) 100 mg tablet   Yes No   amLODIPine (NORVASC) 5 mg tablet   No No   Sig: Take 1 tablet (5 mg total) by mouth daily   atorvastatin (LIPITOR) 40 mg tablet   Yes No   Sig: Take 40 mg by mouth daily   baclofen 10 mg tablet   Yes No   Sig: Take 10 mg by mouth Three times a day   cycloSPORINE (RESTASIS) 0 05 % ophthalmic emulsion   Yes No   Si drop 2 (two) times a day   finasteride (PROSCAR) 5 mg tablet   Yes No   Sig: Take 5 mg by mouth daily   folic acid (FOLVITE) 1 mg tablet   No No   Sig: Take 1 tablet (1 mg total) by mouth daily   magnesium oxide (MAG-OX) 400 mg   No No   Sig: Take 2 tablets (800 mg total) by mouth 2 (two) times a day for 3 days Take this in place of your normal magnesium dose  After this prescription is completed, please go back to your normal magnesium dose     melatonin 3 mg   No No   Sig: Take 1 tablet (3 mg total) by mouth daily at bedtime   nadolol (CORGARD) 20 mg tablet   Yes No   Sig: Take 20 mg by mouth   potassium chloride (K-DUR,KLOR-CON) 20 mEq tablet   No No   Sig: Take 1 tablet (20 mEq total) by mouth 2 (two) times a day   psyllium (METAMUCIL SMOOTH TEXTURE) 28 % packet   No No   Sig: Take 1 packet by mouth 2 (two) times a day for 10 days   thiamine 100 MG tablet   No No   Sig: Take 1 tablet (100 mg total) by mouth daily   traZODone (DESYREL) 100 mg tablet   No No   Sig: Take 1 tablet (100 mg total) by mouth daily at bedtime   white petrolatum-mineral oil (EUCERIN,HYDROCERIN) cream   No No   Sig: Apply topically 3 (three) times a day as needed (pruritis)      Facility-Administered Medications: None       Past Medical History: Diagnosis Date    Alcohol dependency ([facility] [address] )     Anxiety     Depression     [place] (hard of hearing)     Hypertension     Kidney stones     Prostate enlargement     Renal disorder     kidney    Shoulder dislocation        Past Surgical History:   Procedure Laterality Date    CHOLECYSTECTOMY      SHOULDER SURGERY      for dislocation       History reviewed  No pertinent family history  I have reviewed and agree with the history as documented  E-Cigarette/Vaping    E-Cigarette Use Never User      E-Cigarette/Vaping Substances    Nicotine No     THC No     CBD No     Flavoring No     Other No     Unknown No      Social History     Tobacco Use    Smoking status: Never Smoker    Smokeless tobacco: Never Used   Vaping Use    Vaping Use: Never used   Substance Use Topics    Alcohol use: Yes     Alcohol/week: 6 0 standard drinks     Types: 6 Shots of liquor per week     Comment: 1/2- 1 pint vodka daily    Drug use: Never       Review of Systems   Constitutional: Negative for chills, fever and unexpected weight change  HENT: Negative for congestion, sore throat and trouble swallowing  Eyes: Negative for pain, discharge and itching  Respiratory: Negative for cough, chest tightness, shortness of breath and wheezing  Cardiovascular: Negative for chest pain, palpitations and leg swelling  Gastrointestinal: Negative for abdominal pain, blood in stool, diarrhea, nausea and vomiting  Endocrine: Negative for polyuria  Genitourinary: Negative for difficulty urinating, dysuria, frequency and hematuria  Musculoskeletal: Negative for arthralgias and back pain  Neurological: Positive for tremors, weakness (generalized) and light-headedness  Negative for dizziness, syncope and headaches  Physical Exam  Physical Exam  Vitals and nursing note reviewed  Constitutional:       General: He is not in acute distress  Appearance: He is well-developed     HENT:      Head: Normocephalic and atraumatic  Right Ear: External ear normal       Left Ear: External ear normal    Eyes:      Conjunctiva/sclera: Conjunctivae normal       Pupils: Pupils are equal, round, and reactive to light  Cardiovascular:      Rate and Rhythm: Normal rate and regular rhythm  Heart sounds: Normal heart sounds  No murmur heard  No friction rub  No gallop  Pulmonary:      Effort: Pulmonary effort is normal  No respiratory distress  Breath sounds: Normal breath sounds  No wheezing or rales  Abdominal:      General: Bowel sounds are normal  There is no distension  Palpations: Abdomen is soft  Tenderness: There is no abdominal tenderness  There is no guarding  Musculoskeletal:         General: No tenderness or deformity  Normal range of motion  Cervical back: Normal range of motion  Lymphadenopathy:      Cervical: No cervical adenopathy  Skin:     General: Skin is warm and dry  Neurological:      General: No focal deficit present  Mental Status: He is alert and oriented to person, place, and time  Mental status is at baseline  Cranial Nerves: No cranial nerve deficit  Sensory: No sensory deficit  Motor: No weakness or abnormal muscle tone     Psychiatric:         Behavior: Behavior normal          Vital Signs  ED Triage Vitals   Temperature Pulse Respirations Blood Pressure SpO2   07/25/21 1231 07/25/21 1230 07/25/21 1230 07/25/21 1230 07/25/21 1230   99 °F (37 2 °C) 78 21 141/77 93 %      Temp Source Heart Rate Source Patient Position - Orthostatic VS BP Location FiO2 (%)   07/25/21 1231 07/25/21 1230 07/25/21 1230 07/25/21 1230 --   Temporal Monitor Lying Left arm       Pain Score       --                  Vitals:    07/25/21 1430 07/25/21 1530 07/25/21 1600 07/25/21 1630   BP: (!) 172/86 148/76 166/78 141/75   Pulse: 71 73 73 74   Patient Position - Orthostatic VS: Lying Sitting Sitting Lying         Visual Acuity  Visual Acuity      Most Recent Value   L Pupil Size (mm)  2   R Pupil Size (mm)  2          ED Medications  Medications   magnesium sulfate 2 g/50 mL IVPB (premix) 2 g (0 g Intravenous Stopped 7/25/21 1538)   sodium chloride 0 9 % bolus 1,000 mL (0 mL Intravenous Stopped 7/25/21 1453)   diazepam (VALIUM) injection 10 mg (10 mg Intravenous Given 7/25/21 1324)   potassium chloride 20 mEq IVPB (premix) (0 mEq Intravenous Stopped 7/25/21 1453)   potassium chloride (K-DUR,KLOR-CON) CR tablet 40 mEq (40 mEq Oral Given 7/25/21 1350)   diazepam (VALIUM) injection 10 mg (10 mg Intravenous Given 7/25/21 1436)   diazepam (VALIUM) injection 10 mg (10 mg Intravenous Given 7/25/21 1616)       Diagnostic Studies  Results Reviewed     Procedure Component Value Units Date/Time    UA (URINE) with reflex to Scope [[id number]]  (Abnormal) Collected: 07/25/21 1407    Lab Status: Final result Specimen: Urine, Clean Catch Updated: 07/25/21 1412     Color, UA Yellow     Clarity, UA Clear     Specific Gravity, UA 1 010     pH, UA 6 0     Leukocytes, UA Negative     Nitrite, UA Negative     Protein, UA Negative mg/dl      Glucose, UA Negative mg/dl      Ketones, UA 15 (1+) mg/dl      Urobilinogen, UA 0 2 E U /dl      Bilirubin, UA Negative     Blood, UA Negative    Magnesium [[id number]]  (Abnormal) Collected: 07/25/21 1322    Lab Status: Final result Specimen: Blood Updated: 07/25/21 1347     Magnesium 1 4 mg/dL     Troponin I [[id number]]  (Normal) Collected: 07/25/21 1322    Lab Status: Final result Specimen: Blood from Arm, Right Updated: 07/25/21 1342     Troponin I <0 02 ng/mL     Comprehensive metabolic panel [[id number]]  (Abnormal) Collected: 07/25/21 1322    Lab Status: Final result Specimen: Blood from Arm, Right Updated: 07/25/21 1340     Sodium 139 mmol/L      Potassium 3 0 mmol/L      Chloride 99 mmol/L      CO2 29 mmol/L      ANION GAP 11 mmol/L      BUN 8 mg/dL      Creatinine 0 78 mg/dL      Glucose 105 mg/dL      Calcium 9 1 mg/dL      AST 15 U/L      ALT 25 U/L Alkaline Phosphatase 89 U/L      Total Protein 7 7 g/dL      Albumin 4 0 g/dL      Total Bilirubin 0 66 mg/dL      eGFR 91 ml/min/1 73sq m     Narrative:      [place] guidelines for Chronic Kidney Disease (CKD):     Stage 1 with normal or high GFR (GFR > 90 mL/min/1 73 square meters)    Stage 2 Mild CKD (GFR = 60-89 mL/min/1 73 square meters)    Stage 3A Moderate CKD (GFR = 45-59 mL/min/1 73 square meters)    Stage 3B Moderate CKD (GFR = 30-44 mL/min/1 73 square meters)    Stage 4 Severe CKD (GFR = 15-29 mL/min/1 73 square meters)    Stage 5 End Stage CKD (GFR <15 mL/min/1 73 square meters)  Note: GFR calculation is accurate only with a steady state creatinine    CBC and differential [[id number]] Collected: 07/25/21 1322    Lab Status: Final result Specimen: Blood from Arm, Right Updated: 07/25/21 1327     WBC 8 36 Thousand/uL      RBC 4 58 Million/uL      Hemoglobin 14 7 g/dL      Hematocrit 43 2 %      MCV 94 fL      MCH 32 1 pg      MCHC 34 0 g/dL      RDW 13 2 %      MPV 9 0 fL      Platelets 636 Thousands/uL      nRBC 0 /100 WBCs      Neutrophils Relative 66 %      Immat GRANS % 2 %      Lymphocytes Relative 20 %      Monocytes Relative 10 %      Eosinophils Relative 1 %      Basophils Relative 1 %      Neutrophils Absolute 5 58 Thousands/µL      Immature Grans Absolute 0 13 Thousand/uL      Lymphocytes Absolute 1 66 Thousands/µL      Monocytes Absolute 0 85 Thousand/µL      Eosinophils Absolute 0 09 Thousand/µL      Basophils Absolute 0 05 Thousands/µL                  No orders to display              Procedures  Procedures         ED Course  ED Course as of Jul 25 1734   Sun Jul 25, 2021   1430 CIWA 10 on arrival        1511 Patient feeling better, still mildly tremulous on exam  He says that his symptoms feel mild compared to when he has been admitted to the detox unit in the past   He has been treated with librium in the past as an outpatient       [phone] with Dr Milagros Desouza of toxicology who is familiar with the patient from his stays in the detox unit  He recommended that the patient not be sent home with any medications as he is liable to start drinking again  The patient is feeling better at this time  He has minimal tremoring on exam and his vitals are within normal limits  The patient would not like admission at the detox unit at this time and would prefer to go home  Will give him another dose of Valium prior to discharge  Told to follow up with his family doctor  Told to return to the ED if his symptoms acutely worsen  SBIRT 20yo+      Most Recent Value   SBIRT (22 yo +)   In order to provide better care to our patients, we are screening all of our patients for alcohol and drug use  Would it be okay to ask you these screening questions? Yes Filed at: 07/25/2021 1247   Initial Alcohol Screen: US AUDIT-C    1  How often do you have a drink containing alcohol? 4 Filed at: 07/25/2021 1247   2  How many drinks containing alcohol do you have on a typical day you are drinking? 3 Filed at: 07/25/2021 1247   3a  Male UNDER 65: How often do you have five or more drinks on one occasion? 6 Filed at: 07/25/2021 1233   3b  FEMALE Any Age, or MALE 65+: How often do you have 4 or more drinks on one occassion? 2 Filed at: 07/25/2021 1247   Audit-C Score  (!) 9 Filed at: 07/25/2021 1247   Full Alcohol Screen: US AUDIT   4  How often during the last year have you found that you were not able to stop drinking once you had started? 2 Filed at: 07/25/2021 1247   5  How often during past year have you failed to do what was normally expected of you because of drinking? 2 Filed at: 07/25/2021 1247   6  How often in past year have you needed a first drink in the morning to get yourself going after a heavy drinking session? 1 Filed at: 07/25/2021 1247   7  How often in past year have you had feeling of guilt or remorse after drinking?    2 Filed at: 07/25/2021 1247   8  How often in past year have you been unable to remember what happened night before because you had been drinking? 0 Filed at: 07/25/2021 1247   9  Have you or someone else been injured as a result of your drinking? 0 Filed at: 07/25/2021 1247   10  Has a relative, friend, doctor or other health worker been concerned about your drinking and suggested you cut down? 4 Filed at: 07/25/2021 1247   AUDIT Total Score  (!) 20 Filed at: 07/25/2021 1247   JEMIMA: How many times in the past year have you    Used an illegal drug or used a prescription medication for non-medical reasons? Never Filed at: 07/25/2021 1247                    MDM  Number of Diagnoses or Management Options  Alcohol withdrawal ([facility] )  Diagnosis management comments: 54-year-old male with history of alcohol abuse, anxiety presenting lightheadedness, tremors weakness  Last drink was 5 days ago  Feels somewhat withdrawals in past though mild compared to other times  Vitals are within normal limits  Initial  CIWA 10  Patient was given 20 mg of Valium, improvement of symptoms  Has minimal tremulous on exam, vitals remained within normal limits  Offered admission at detox unit however patient would like to go home  Spoke with toxicology who was familiar with this patient  They are okay with this plan  Recommended not giving the patient out patient benzos as he is likely to start drinking again      Disposition  Final diagnoses:   Alcohol withdrawal ([facility] [address] )     Time reflects when diagnosis was documented in both MDM as applicable and the Disposition within this note     Time User Action Codes Description Comment    7/25/2021  3:58 PM Simone Lovell Add [J32 [id number]] Alcohol withdrawal [facility])       ED Disposition     ED Disposition Condition Date/Time Comment    Discharge Stable Sun Jul 25, 2021  3:58 PM Roland Velazquez discharge to home/self care              Follow-up Information     Follow up With Specialties Details Why Contact Info Additional Information    Jonel Sellers MD Internal Medicine Schedule an appointment as soon as possible for a visit  For follow up [id number] [facility] [phone]       [facility] Emergency Department Emergency Medicine Go to  If symptoms worsen including worsenign shaking, nausea/vomtiing, confusion, seizures or any other concerning symptoms [place] Terrazas [id number]  [address] [facility] Emergency Department, [facility] [address], [place], [address] [facility], [address]          Discharge Medication List as of 7/25/2021  3:59 PM      CONTINUE these medications which have NOT CHANGED    Details   alfuzosin (UROXATRAL) 10 mg 24 hr tablet Take 40 mg by mouth daily  , Historical Med      allopurinol (ZYLOPRIM) 100 mg tablet Historical Med      amLODIPine (NORVASC) 5 mg tablet Take 1 tablet (5 mg total) by mouth daily, Starting Tue 7/16/2019, Normal      atorvastatin (LIPITOR) 40 mg tablet Take 40 mg by mouth daily, Starting Wed 4/28/2021, Historical Med      baclofen 10 mg tablet Take 10 mg by mouth Three times a day, Starting Thu 3/4/2021, Until Fri 3/4/2022, Historical Med      cycloSPORINE (RESTASIS) 0 05 % ophthalmic emulsion 1 drop 2 (two) times a day, Historical Med      finasteride (PROSCAR) 5 mg tablet Take 5 mg by mouth daily, Historical Med      FLUoxetine (PROzac) 20 mg capsule Take 60 mg by mouth daily, Starting Thu 4/1/2021, Historical Med      folic acid (FOLVITE) 1 mg tablet Take 1 tablet (1 mg total) by mouth daily, Starting Thu 6/10/2021, Normal      LORazepam (Ativan) 1 mg tablet Take 1 tablet (1 mg total) by mouth 2 (two) times a day as needed for anxiety Do not drive or drink alcohol while using, Starting Sat 7/3/2021, Normal      magnesium oxide (MAG-OX) 400 mg Take 2 tablets (800 mg total) by mouth 2 (two) times a day for 3 days Take this in place of your normal magnesium dose   After this prescription is completed, please go back to your normal magnesium dose , Starting Sat 7/3/2021, Until Tue 7/6/2021, Normal      melatonin 3 mg Take 1 tablet (3 mg total) by mouth daily at bedtime, Starting Wed 6/9/2021, Normal      nadolol (CORGARD) 20 mg tablet Take 20 mg by mouth, Starting Thu 3/4/2021, Until Fri 3/4/2022, Historical Med      NIFEdipine 0 3%-lidocaine 5% rectal ointment Apply 1 application topically every 4 (four) hours as needed for discomfort or pain Apply a small amount to anal fissure, Starting Wed 7/21/2021, Normal      potassium chloride (K-DUR,KLOR-CON) 20 mEq tablet Take 1 tablet (20 mEq total) by mouth 2 (two) times a day, Starting Sat 7/3/2021, Normal      psyllium (METAMUCIL SMOOTH TEXTURE) 28 % packet Take 1 packet by mouth 2 (two) times a day for 10 days, Starting Wed 7/21/2021, Until Sat 7/31/2021, Normal      thiamine 100 MG tablet Take 1 tablet (100 mg total) by mouth daily, Starting Tue 6/22/2021, Until Thu 7/22/2021, Normal      traZODone (DESYREL) 100 mg tablet Take 1 tablet (100 mg total) by mouth daily at bedtime, Starting Wed 6/9/2021, Normal      white petrolatum-mineral oil (EUCERIN,HYDROCERIN) cream Apply topically 3 (three) times a day as needed (pruritis), Starting Wed 6/9/2021, Normal           No discharge procedures on file      PDMP Review       Value Time User    PDMP Reviewed  Yes 6/9/2021 12:02 PM Toby Lake Holter, DO          ED Provider  Electronically Signed by           Anne Catalan DO  07/25/21 8777

## 2021-07-26 LAB
ATRIAL RATE: 80 BPM
P AXIS: 45 DEGREES
PR INTERVAL: 158 MS
QRS AXIS: 52 DEGREES
QRSD INTERVAL: 100 MS
QT INTERVAL: 380 MS
QTC INTERVAL: 438 MS
T WAVE AXIS: 45 DEGREES
VENTRICULAR RATE: 80 BPM

## 2021-07-26 PROCEDURE — 93010 ELECTROCARDIOGRAM REPORT: CPT | Performed by: INTERNAL MEDICINE

## 2021-07-31 ENCOUNTER — HOSPITAL ENCOUNTER (INPATIENT)
Facility: HOSPITAL | Age: 71
LOS: 4 days | Discharge: HOME | DRG: 897 | End: 2021-08-04
Attending: EMERGENCY MEDICINE | Admitting: FAMILY MEDICINE
Payer: MEDICARE

## 2021-07-31 ENCOUNTER — APPOINTMENT (EMERGENCY)
Dept: RADIOLOGY | Facility: HOSPITAL | Age: 71
DRG: 897 | End: 2021-07-31
Payer: MEDICARE

## 2021-07-31 DIAGNOSIS — R79.0 LOW MAGNESIUM LEVEL: ICD-10-CM

## 2021-07-31 DIAGNOSIS — E87.6 HYPOKALEMIA: ICD-10-CM

## 2021-07-31 DIAGNOSIS — F10.239 ALCOHOL WITHDRAWAL SYNDROME WITH COMPLICATION (HCC): ICD-10-CM

## 2021-07-31 DIAGNOSIS — F10.239 ALCOHOL WITHDRAWAL (HCC): Primary | ICD-10-CM

## 2021-07-31 PROBLEM — E87.8 ELECTROLYTE ABNORMALITY: Status: ACTIVE | Noted: 2021-07-31

## 2021-07-31 LAB
ALBUMIN SERPL BCP-MCNC: 4.4 G/DL (ref 3.5–5)
ALP SERPL-CCNC: 106 U/L (ref 46–116)
ALT SERPL W P-5'-P-CCNC: 29 U/L (ref 12–78)
AMPHETAMINES SERPL QL SCN: NEGATIVE
ANION GAP SERPL CALCULATED.3IONS-SCNC: 14 MMOL/L (ref 4–13)
APAP SERPL-MCNC: <2 UG/ML (ref 10–20)
APTT PPP: 25 SECONDS (ref 23–37)
AST SERPL W P-5'-P-CCNC: 26 U/L (ref 5–45)
BARBITURATES UR QL: NEGATIVE
BASOPHILS # BLD AUTO: 0.05 THOUSANDS/ΜL (ref 0–0.1)
BASOPHILS NFR BLD AUTO: 1 % (ref 0–1)
BENZODIAZ UR QL: POSITIVE
BILIRUB SERPL-MCNC: 0.45 MG/DL (ref 0.2–1)
BILIRUB UR QL STRIP: NEGATIVE
BUN SERPL-MCNC: 10 MG/DL (ref 5–25)
CALCIUM SERPL-MCNC: 9.1 MG/DL (ref 8.3–10.1)
CHLORIDE SERPL-SCNC: 101 MMOL/L (ref 100–108)
CK SERPL-CCNC: 84 U/L (ref 39–308)
CLARITY UR: ABNORMAL
CO2 SERPL-SCNC: 27 MMOL/L (ref 21–32)
COCAINE UR QL: NEGATIVE
COLOR UR: YELLOW
CREAT SERPL-MCNC: 0.72 MG/DL (ref 0.6–1.3)
EOSINOPHIL # BLD AUTO: 0.06 THOUSAND/ΜL (ref 0–0.61)
EOSINOPHIL NFR BLD AUTO: 1 % (ref 0–6)
ERYTHROCYTE [DISTWIDTH] IN BLOOD BY AUTOMATED COUNT: 13.6 % (ref 11.6–15.1)
ETHANOL SERPL-MCNC: 54 MG/DL (ref 0–3)
GFR SERPL CREATININE-BSD FRML MDRD: 94 ML/MIN/1.73SQ M
GLUCOSE SERPL-MCNC: 106 MG/DL (ref 65–140)
GLUCOSE UR STRIP-MCNC: NEGATIVE MG/DL
HCT VFR BLD AUTO: 46 % (ref 36.5–49.3)
HGB BLD-MCNC: 15.2 G/DL (ref 12–17)
HGB UR QL STRIP.AUTO: NEGATIVE
IMM GRANULOCYTES # BLD AUTO: 0.11 THOUSAND/UL (ref 0–0.2)
IMM GRANULOCYTES NFR BLD AUTO: 2 % (ref 0–2)
INR PPP: 1.04 (ref 0.84–1.19)
KETONES UR STRIP-MCNC: ABNORMAL MG/DL
LEUKOCYTE ESTERASE UR QL STRIP: NEGATIVE
LIPASE SERPL-CCNC: 106 U/L (ref 73–393)
LYMPHOCYTES # BLD AUTO: 1.62 THOUSANDS/ΜL (ref 0.6–4.47)
LYMPHOCYTES NFR BLD AUTO: 22 % (ref 14–44)
MAGNESIUM SERPL-MCNC: 1.4 MG/DL (ref 1.6–2.6)
MCH RBC QN AUTO: 31.9 PG (ref 26.8–34.3)
MCHC RBC AUTO-ENTMCNC: 33 G/DL (ref 31.4–37.4)
MCV RBC AUTO: 97 FL (ref 82–98)
METHADONE UR QL: NEGATIVE
MONOCYTES # BLD AUTO: 0.93 THOUSAND/ΜL (ref 0.17–1.22)
MONOCYTES NFR BLD AUTO: 13 % (ref 4–12)
NEUTROPHILS # BLD AUTO: 4.61 THOUSANDS/ΜL (ref 1.85–7.62)
NEUTS SEG NFR BLD AUTO: 61 % (ref 43–75)
NITRITE UR QL STRIP: NEGATIVE
NRBC BLD AUTO-RTO: 0 /100 WBCS
OPIATES UR QL SCN: NEGATIVE
OXYCODONE+OXYMORPHONE UR QL SCN: NEGATIVE
PCP UR QL: NEGATIVE
PH UR STRIP.AUTO: 6 [PH]
PLATELET # BLD AUTO: 342 THOUSANDS/UL (ref 149–390)
PLATELET # BLD AUTO: 391 THOUSANDS/UL (ref 149–390)
PMV BLD AUTO: 8.7 FL (ref 8.9–12.7)
PMV BLD AUTO: 9.1 FL (ref 8.9–12.7)
POTASSIUM SERPL-SCNC: 2.9 MMOL/L (ref 3.5–5.3)
PROT SERPL-MCNC: 8.2 G/DL (ref 6.4–8.2)
PROT UR STRIP-MCNC: NEGATIVE MG/DL
PROTHROMBIN TIME: 13.4 SECONDS (ref 11.6–14.5)
RBC # BLD AUTO: 4.76 MILLION/UL (ref 3.88–5.62)
SALICYLATES SERPL-MCNC: <3 MG/DL (ref 3–20)
SARS-COV-2 RNA RESP QL NAA+PROBE: NEGATIVE
SODIUM SERPL-SCNC: 142 MMOL/L (ref 136–145)
SP GR UR STRIP.AUTO: 1.02 (ref 1–1.03)
THC UR QL: NEGATIVE
TROPONIN I SERPL-MCNC: <0.02 NG/ML
UROBILINOGEN UR QL STRIP.AUTO: 0.2 E.U./DL
WBC # BLD AUTO: 7.38 THOUSAND/UL (ref 4.31–10.16)

## 2021-07-31 PROCEDURE — 85025 COMPLETE CBC W/AUTO DIFF WBC: CPT | Performed by: EMERGENCY MEDICINE

## 2021-07-31 PROCEDURE — 83735 ASSAY OF MAGNESIUM: CPT | Performed by: EMERGENCY MEDICINE

## 2021-07-31 PROCEDURE — 85049 AUTOMATED PLATELET COUNT: CPT | Performed by: FAMILY MEDICINE

## 2021-07-31 PROCEDURE — 80179 DRUG ASSAY SALICYLATE: CPT | Performed by: EMERGENCY MEDICINE

## 2021-07-31 PROCEDURE — 99285 EMERGENCY DEPT VISIT HI MDM: CPT | Performed by: EMERGENCY MEDICINE

## 2021-07-31 PROCEDURE — 80143 DRUG ASSAY ACETAMINOPHEN: CPT | Performed by: EMERGENCY MEDICINE

## 2021-07-31 PROCEDURE — 81003 URINALYSIS AUTO W/O SCOPE: CPT | Performed by: EMERGENCY MEDICINE

## 2021-07-31 PROCEDURE — 80307 DRUG TEST PRSMV CHEM ANLYZR: CPT | Performed by: EMERGENCY MEDICINE

## 2021-07-31 PROCEDURE — 85610 PROTHROMBIN TIME: CPT | Performed by: EMERGENCY MEDICINE

## 2021-07-31 PROCEDURE — 99223 1ST HOSP IP/OBS HIGH 75: CPT | Performed by: FAMILY MEDICINE

## 2021-07-31 PROCEDURE — U0003 INFECTIOUS AGENT DETECTION BY NUCLEIC ACID (DNA OR RNA); SEVERE ACUTE RESPIRATORY SYNDROME CORONAVIRUS 2 (SARS-COV-2) (CORONAVIRUS DISEASE [COVID-19]), AMPLIFIED PROBE TECHNIQUE, MAKING USE OF HIGH THROUGHPUT TECHNOLOGIES AS DESCRIBED BY CMS-2020-01-R: HCPCS | Performed by: EMERGENCY MEDICINE

## 2021-07-31 PROCEDURE — 96365 THER/PROPH/DIAG IV INF INIT: CPT

## 2021-07-31 PROCEDURE — 99285 EMERGENCY DEPT VISIT HI MDM: CPT

## 2021-07-31 PROCEDURE — 94760 N-INVAS EAR/PLS OXIMETRY 1: CPT

## 2021-07-31 PROCEDURE — 96367 TX/PROPH/DG ADDL SEQ IV INF: CPT

## 2021-07-31 PROCEDURE — 96375 TX/PRO/DX INJ NEW DRUG ADDON: CPT

## 2021-07-31 PROCEDURE — 36415 COLL VENOUS BLD VENIPUNCTURE: CPT | Performed by: EMERGENCY MEDICINE

## 2021-07-31 PROCEDURE — 93005 ELECTROCARDIOGRAM TRACING: CPT

## 2021-07-31 PROCEDURE — 84484 ASSAY OF TROPONIN QUANT: CPT | Performed by: EMERGENCY MEDICINE

## 2021-07-31 PROCEDURE — 85730 THROMBOPLASTIN TIME PARTIAL: CPT | Performed by: EMERGENCY MEDICINE

## 2021-07-31 PROCEDURE — 82550 ASSAY OF CK (CPK): CPT | Performed by: EMERGENCY MEDICINE

## 2021-07-31 PROCEDURE — 99448 NTRPROF PH1/NTRNET/EHR 21-30: CPT | Performed by: EMERGENCY MEDICINE

## 2021-07-31 PROCEDURE — 80053 COMPREHEN METABOLIC PANEL: CPT | Performed by: EMERGENCY MEDICINE

## 2021-07-31 PROCEDURE — 96366 THER/PROPH/DIAG IV INF ADDON: CPT

## 2021-07-31 PROCEDURE — 71045 X-RAY EXAM CHEST 1 VIEW: CPT

## 2021-07-31 PROCEDURE — 82077 ASSAY SPEC XCP UR&BREATH IA: CPT | Performed by: EMERGENCY MEDICINE

## 2021-07-31 PROCEDURE — U0005 INFEC AGEN DETEC AMPLI PROBE: HCPCS | Performed by: EMERGENCY MEDICINE

## 2021-07-31 PROCEDURE — 83690 ASSAY OF LIPASE: CPT | Performed by: EMERGENCY MEDICINE

## 2021-07-31 PROCEDURE — 96368 THER/DIAG CONCURRENT INF: CPT

## 2021-07-31 PROCEDURE — 96376 TX/PRO/DX INJ SAME DRUG ADON: CPT

## 2021-07-31 PROCEDURE — 1124F ACP DISCUSS-NO DSCNMKR DOCD: CPT | Performed by: EMERGENCY MEDICINE

## 2021-07-31 RX ORDER — FOLIC ACID 1 MG/1
1 TABLET ORAL DAILY
Status: DISCONTINUED | OUTPATIENT
Start: 2021-07-31 | End: 2021-08-04 | Stop reason: HOSPADM

## 2021-07-31 RX ORDER — MAGNESIUM SULFATE HEPTAHYDRATE 40 MG/ML
2 INJECTION, SOLUTION INTRAVENOUS ONCE
Status: COMPLETED | OUTPATIENT
Start: 2021-07-31 | End: 2021-07-31

## 2021-07-31 RX ORDER — ONDANSETRON 2 MG/ML
4 INJECTION INTRAMUSCULAR; INTRAVENOUS ONCE
Status: COMPLETED | OUTPATIENT
Start: 2021-07-31 | End: 2021-07-31

## 2021-07-31 RX ORDER — NADOLOL 40 MG/1
20 TABLET ORAL DAILY
Status: DISCONTINUED | OUTPATIENT
Start: 2021-07-31 | End: 2021-08-04 | Stop reason: HOSPADM

## 2021-07-31 RX ORDER — ATORVASTATIN CALCIUM 40 MG/1
40 TABLET, FILM COATED ORAL DAILY
Status: DISCONTINUED | OUTPATIENT
Start: 2021-07-31 | End: 2021-08-04 | Stop reason: HOSPADM

## 2021-07-31 RX ORDER — POTASSIUM CHLORIDE 20 MEQ/1
60 TABLET, EXTENDED RELEASE ORAL ONCE
Status: COMPLETED | OUTPATIENT
Start: 2021-07-31 | End: 2021-07-31

## 2021-07-31 RX ORDER — TRAZODONE HYDROCHLORIDE 50 MG/1
100 TABLET ORAL
Status: DISCONTINUED | OUTPATIENT
Start: 2021-07-31 | End: 2021-08-04 | Stop reason: HOSPADM

## 2021-07-31 RX ORDER — ALLOPURINOL 100 MG/1
100 TABLET ORAL DAILY
Status: DISCONTINUED | OUTPATIENT
Start: 2021-07-31 | End: 2021-08-04 | Stop reason: HOSPADM

## 2021-07-31 RX ORDER — PHENOBARBITAL SODIUM 130 MG/ML
65 INJECTION INTRAMUSCULAR ONCE
Status: COMPLETED | OUTPATIENT
Start: 2021-07-31 | End: 2021-07-31

## 2021-07-31 RX ORDER — FLUOXETINE HYDROCHLORIDE 20 MG/1
60 CAPSULE ORAL DAILY
Status: DISCONTINUED | OUTPATIENT
Start: 2021-07-31 | End: 2021-08-04 | Stop reason: HOSPADM

## 2021-07-31 RX ORDER — LANOLIN ALCOHOL/MO/W.PET/CERES
3 CREAM (GRAM) TOPICAL
Status: DISCONTINUED | OUTPATIENT
Start: 2021-07-31 | End: 2021-08-04 | Stop reason: HOSPADM

## 2021-07-31 RX ORDER — LORAZEPAM 1 MG/1
2 TABLET ORAL ONCE
Status: COMPLETED | OUTPATIENT
Start: 2021-07-31 | End: 2021-07-31

## 2021-07-31 RX ORDER — SODIUM CHLORIDE 9 MG/ML
100 INJECTION, SOLUTION INTRAVENOUS CONTINUOUS
Status: DISCONTINUED | OUTPATIENT
Start: 2021-07-31 | End: 2021-08-03

## 2021-07-31 RX ORDER — TAMSULOSIN HYDROCHLORIDE 0.4 MG/1
0.4 CAPSULE ORAL
Status: DISCONTINUED | OUTPATIENT
Start: 2021-07-31 | End: 2021-08-04 | Stop reason: HOSPADM

## 2021-07-31 RX ORDER — POTASSIUM CHLORIDE 20 MEQ/1
20 TABLET, EXTENDED RELEASE ORAL 2 TIMES DAILY
Status: DISCONTINUED | OUTPATIENT
Start: 2021-07-31 | End: 2021-08-04 | Stop reason: HOSPADM

## 2021-07-31 RX ORDER — PHENOBARBITAL SODIUM 130 MG/ML
130 INJECTION INTRAMUSCULAR ONCE
Status: COMPLETED | OUTPATIENT
Start: 2021-07-31 | End: 2021-07-31

## 2021-07-31 RX ORDER — AMLODIPINE BESYLATE 5 MG/1
5 TABLET ORAL DAILY
Status: DISCONTINUED | OUTPATIENT
Start: 2021-07-31 | End: 2021-08-04 | Stop reason: HOSPADM

## 2021-07-31 RX ORDER — FINASTERIDE 5 MG/1
5 TABLET, FILM COATED ORAL DAILY
Status: DISCONTINUED | OUTPATIENT
Start: 2021-07-31 | End: 2021-08-04 | Stop reason: HOSPADM

## 2021-07-31 RX ADMIN — FOLIC ACID 1 MG: 1 TABLET ORAL at 14:10

## 2021-07-31 RX ADMIN — FLUOXETINE 60 MG: 20 CAPSULE ORAL at 14:09

## 2021-07-31 RX ADMIN — MELATONIN TAB 3 MG 3 MG: 3 TAB at 21:23

## 2021-07-31 RX ADMIN — TRAZODONE HYDROCHLORIDE 100 MG: 50 TABLET ORAL at 21:23

## 2021-07-31 RX ADMIN — POTASSIUM CHLORIDE 20 MEQ: 1500 TABLET, EXTENDED RELEASE ORAL at 21:22

## 2021-07-31 RX ADMIN — ENOXAPARIN SODIUM 40 MG: 40 INJECTION SUBCUTANEOUS at 14:10

## 2021-07-31 RX ADMIN — ATORVASTATIN CALCIUM 40 MG: 40 TABLET, FILM COATED ORAL at 16:46

## 2021-07-31 RX ADMIN — PHENOBARBITAL SODIUM 65 MG: 130 INJECTION INTRAMUSCULAR; INTRAVENOUS at 09:39

## 2021-07-31 RX ADMIN — MAGNESIUM OXIDE TAB 400 MG (241.3 MG ELEMENTAL MG) 800 MG: 400 (241.3 MG) TAB at 14:09

## 2021-07-31 RX ADMIN — POTASSIUM CHLORIDE 60 MEQ: 1500 TABLET, EXTENDED RELEASE ORAL at 10:31

## 2021-07-31 RX ADMIN — ALLOPURINOL 100 MG: 100 TABLET ORAL at 14:09

## 2021-07-31 RX ADMIN — MAGNESIUM OXIDE TAB 400 MG (241.3 MG ELEMENTAL MG) 800 MG: 400 (241.3 MG) TAB at 21:22

## 2021-07-31 RX ADMIN — NADOLOL 20 MG: 40 TABLET ORAL at 14:09

## 2021-07-31 RX ADMIN — ONDANSETRON 4 MG: 2 INJECTION INTRAMUSCULAR; INTRAVENOUS at 10:34

## 2021-07-31 RX ADMIN — POTASSIUM CHLORIDE 20 MEQ: 1500 TABLET, EXTENDED RELEASE ORAL at 14:09

## 2021-07-31 RX ADMIN — SODIUM CHLORIDE 100 ML/HR: 0.9 INJECTION, SOLUTION INTRAVENOUS at 14:12

## 2021-07-31 RX ADMIN — MAGNESIUM SULFATE HEPTAHYDRATE 2 G: 40 INJECTION, SOLUTION INTRAVENOUS at 10:31

## 2021-07-31 RX ADMIN — SODIUM CHLORIDE, SODIUM LACTATE, POTASSIUM CHLORIDE, AND CALCIUM CHLORIDE 1000 ML: .6; .31; .03; .02 INJECTION, SOLUTION INTRAVENOUS at 09:39

## 2021-07-31 RX ADMIN — AMLODIPINE BESYLATE 5 MG: 5 TABLET ORAL at 14:09

## 2021-07-31 RX ADMIN — THIAMINE HYDROCHLORIDE 100 MG: 100 INJECTION, SOLUTION INTRAMUSCULAR; INTRAVENOUS at 10:00

## 2021-07-31 RX ADMIN — FINASTERIDE 5 MG: 5 TABLET, FILM COATED ORAL at 14:09

## 2021-07-31 RX ADMIN — PHENOBARBITAL SODIUM 130 MG: 130 INJECTION INTRAMUSCULAR; INTRAVENOUS at 11:32

## 2021-07-31 RX ADMIN — LORAZEPAM 2 MG: 1 TABLET ORAL at 19:35

## 2021-07-31 RX ADMIN — TAMSULOSIN HYDROCHLORIDE 0.4 MG: 0.4 CAPSULE ORAL at 16:46

## 2021-07-31 NOTE — H&P
H&P Exam - Kanika Torres 70 y o  male MRN: [id number]    Unit/Bed#: RM07 Encounter: [id number]    Alcohol withdrawal syndrome with complication [facility])  Assessment & Plan  Patient received phenobarbital in the emergency department  Unfortunately no beds available a detox  Admit to the medical floor with telemetry  Normal saline 100cc/hr  Initiate CIWA protocol  Will consider Librium tomorrow    Electrolyte abnormality  Assessment & Plan  Hypokalemia and hypomagnesemia  Replete potassium and magnesium as needed    Mixed hyperlipidemia  Assessment & Plan  Lipitor 40 mg HS    BPH (benign prostatic hyperplasia)  Assessment & Plan  Continue Flomax/Proscar    Essential hypertension  Assessment & Plan  Norvasc 5 mg daily  Corgard 20 mg daily          History of Present Illness      Aamir Aj is a pleasant 77-year-old male with past medical history significant for alcohol dependence presents to the emergency room after stating he regressed and was drinking for the last 2 days  States last drink was 6:00 p m  Last evening  He was previously treated at [facility] detox center  He denies any suicidal or homicidal ideations  States that he drinks vodka, and consumed approximately 24 oz in the last 48 hours  Review of Systems   Cardiovascular: Positive for palpitations  Psychiatric/Behavioral: The patient is nervous/anxious  All other systems reviewed and are negative        Historical Information   Past Medical History:   Diagnosis Date    Alcohol dependency ([address] )     Anxiety     Depression     [place] (hard of hearing)     Hypertension     Kidney stones     Prostate enlargement     Renal disorder     kidney    Shoulder dislocation      Past Surgical History:   Procedure Laterality Date    CHOLECYSTECTOMY      SHOULDER SURGERY      for dislocation     Social History   Social History     Substance and Sexual Activity   Alcohol Use Yes    Alcohol/week: 6 0 standard drinks    Types: 6 Shots of liquor per week Comment: 1/2- 1 pint vodka daily     Social History     Substance and Sexual Activity   Drug Use Never     Social History     Tobacco Use   Smoking Status Never Smoker   Smokeless Tobacco Never Used     E-Cigarette Use: Never User     E-Cigarette/Vaping Substances    Nicotine No     THC No     CBD No     Flavoring No     Other No     Unknown No        Family History: non-contributory    Meds/Allergies   all medications and allergies reviewed  Allergies   Allergen Reactions    Sulfa Antibiotics Anaphylaxis and Rash    Sulfur Rash and Edema       Objective   First Vitals:   Blood Pressure: 142/80 (07/31/21 0900)  Pulse: 100 (07/31/21 0900)  Temperature: 97 9 °F (36 6 °C) (07/31/21 0900)  Temp Source: Tympanic (07/31/21 0900)  Respirations: 20 (07/31/21 0900)  Weight - Scale: 93 kg (205 lb 0 4 oz) (07/31/21 0900)  SpO2: 98 % (07/31/21 0900)    Current Vitals:   Blood Pressure: 164/76 (07/31/21 1130)  Pulse: 97 (07/31/21 1130)  Temperature: 97 9 °F (36 6 °C) (07/31/21 0900)  Temp Source: Tympanic (07/31/21 0900)  Respirations: 22 (07/31/21 1130)  Weight - Scale: 93 kg (205 lb 0 4 oz) (07/31/21 0900)  SpO2: 92 % (07/31/21 1130)      Intake/Output Summary (Last 24 hours) at 7/31/2021 1213  Last data filed at 7/31/2021 1031  Gross per 24 hour   Intake 1050 ml   Output --   Net 1050 ml       Invasive Devices     Peripheral Intravenous Line            Peripheral IV 07/31/21 Left Antecubital <1 day                Physical Exam  Vitals and nursing note reviewed  Constitutional:       Appearance: Normal appearance  HENT:      Head: Normocephalic and atraumatic  Nose: Nose normal       Mouth/Throat:      Mouth: Mucous membranes are moist       Pharynx: No oropharyngeal exudate  Eyes:      Pupils: Pupils are equal, round, and reactive to light  Cardiovascular:      Rate and Rhythm: Regular rhythm  Tachycardia present     Pulmonary:      Effort: Pulmonary effort is normal    Abdominal:      General: Abdomen is flat  Bowel sounds are normal  There is no distension  Tenderness: There is no abdominal tenderness  There is no guarding  Musculoskeletal:         General: No swelling  Normal range of motion  Cervical back: Normal range of motion  No rigidity  Skin:     General: Skin is warm  Capillary Refill: Capillary refill takes 2 to 3 seconds  Coloration: Skin is not jaundiced  Findings: No bruising  Neurological:      General: No focal deficit present  Mental Status: He is alert and oriented to person, place, and time  Lab Results:   Lab Results   Component Value Date     12/29/2014    SODIUM 142 07/31/2021    K 2 9 (L) 07/31/2021     07/31/2021    CO2 27 07/31/2021    ANIONGAP 10 12/29/2014    AGAP 14 (H) 07/31/2021    BUN 10 07/31/2021    CREATININE 0 72 07/31/2021    GLUC 106 07/31/2021    CALCIUM 9 1 07/31/2021    AST 26 07/31/2021    ALT 29 07/31/2021    ALKPHOS 106 07/31/2021    TP 8 2 07/31/2021    TBILI 0 45 07/31/2021    EGFR 94 07/31/2021     Lab Results   Component Value Date    WBC 7 38 07/31/2021    HGB 15 2 07/31/2021    HCT 46 0 07/31/2021    MCV 97 07/31/2021     (H) 07/31/2021       Imaging:   XR chest 1 view portable   ED Interpretation   Read by me; Radiologist to provide formal interpretation   Rt basilar atelectasis vs  Prior 4/17/21          EKG, Pathology, and Other Studies:sinus tach    Code Status: Prior  Advance Directive and Living Will:      Power of :    POLST:      Counseling / Coordination of Care:

## 2021-07-31 NOTE — ED PROVIDER NOTES
History  Chief Complaint   Patient presents with    Withdrawal - Alcohol     pt reports he wants to go to detox  reports drinking a 5th or more a day  Last drink last night  70-year-old male presents with shakiness and wanting detox and inpatient rehab  He has a history of hypertension anxiety and chronic alcoholism  His last drink was last night  He states he went on a binge over the last couple of days and drank quite a bit   He feels dehydrated  He denies fever or chills he has had no cough or upper respiratory complaints denies any chest pain or shortness of breath no nausea or vomiting he denies any abdominal or back pain  He has no suicidal ideation  Patient was evaluated in the emergency department 6 days ago at that time he did not want inpatient rehab  Been treated previously at the detox unit [facility] in June as well as April  Evaluating the emergency department on 7/25/2021 at that time he did have hypokalemia as well as low magnesium these were supplemented and he was discharged home  Review of prev detox units discharge summary from 6/20/2021 patient has had a history of alcohol withdrawal seizure          Prior to Admission Medications   Prescriptions Last Dose Informant Patient Reported? Taking? FLUoxetine (PROzac) 20 mg capsule 7/30/2021 at Unknown time  Yes Yes   Sig: Take 60 mg by mouth daily   LORazepam (Ativan) 1 mg tablet 7/30/2021 at Unknown time  No Yes   Sig: Take 1 tablet (1 mg total) by mouth 2 (two) times a day as needed for anxiety Do not drive or drink alcohol while using   NIFEdipine 0 3%-lidocaine 5% rectal ointment 7/30/2021 at Unknown time  No Yes   Sig: Apply 1 application topically every 4 (four) hours as needed for discomfort or pain Apply a small amount to anal fissure   alfuzosin (UROXATRAL) 10 mg 24 hr tablet 7/30/2021 at Unknown time  Yes Yes   Sig: Take 40 mg by mouth daily     allopurinol (ZYLOPRIM) 100 mg tablet 7/30/2021 at Unknown time  Yes Yes amLODIPine (NORVASC) 5 mg tablet 2021 at Unknown time  No Yes   Sig: Take 1 tablet (5 mg total) by mouth daily   atorvastatin (LIPITOR) 40 mg tablet 2021 at Unknown time  Yes Yes   Sig: Take 40 mg by mouth daily   baclofen 10 mg tablet 2021 at Unknown time  Yes Yes   Sig: Take 10 mg by mouth Three times a day   cycloSPORINE (RESTASIS) 0 05 % ophthalmic emulsion 2021 at Unknown time  Yes Yes   Si drop 2 (two) times a day   finasteride (PROSCAR) 5 mg tablet 2021 at Unknown time  Yes Yes   Sig: Take 5 mg by mouth daily   folic acid (FOLVITE) 1 mg tablet 2021 at Unknown time  No Yes   Sig: Take 1 tablet (1 mg total) by mouth daily   magnesium oxide (MAG-OX) 400 mg   No No   Sig: Take 2 tablets (800 mg total) by mouth 2 (two) times a day for 3 days Take this in place of your normal magnesium dose  After this prescription is completed, please go back to your normal magnesium dose     melatonin 3 mg 2021 at Unknown time  No Yes   Sig: Take 1 tablet (3 mg total) by mouth daily at bedtime   nadolol (CORGARD) 20 mg tablet   Yes No   Sig: Take 20 mg by mouth   potassium chloride (K-DUR,KLOR-CON) 20 mEq tablet 2021 at Unknown time  No Yes   Sig: Take 1 tablet (20 mEq total) by mouth 2 (two) times a day   psyllium (METAMUCIL SMOOTH TEXTURE) 28 % packet 2021 at Unknown time  No Yes   Sig: Take 1 packet by mouth 2 (two) times a day for 10 days   thiamine 100 MG tablet   No No   Sig: Take 1 tablet (100 mg total) by mouth daily   traZODone (DESYREL) 100 mg tablet 2021 at Unknown time  No Yes   Sig: Take 1 tablet (100 mg total) by mouth daily at bedtime   white petrolatum-mineral oil (EUCERIN,HYDROCERIN) cream 2021 at Unknown time  No Yes   Sig: Apply topically 3 (three) times a day as needed (pruritis)      Facility-Administered Medications: None       Past Medical History:   Diagnosis Date    Alcohol dependency ([address] )     Anxiety     Depression     [place]ma[place] (hard of hearing)  Hypertension     Kidney stones     Prostate enlargement     Renal disorder     kidney    Shoulder dislocation        Past Surgical History:   Procedure Laterality Date    CHOLECYSTECTOMY      SHOULDER SURGERY      for dislocation       History reviewed  No pertinent family history  I have reviewed and agree with the history as documented  E-Cigarette/Vaping    E-Cigarette Use Never User      E-Cigarette/Vaping Substances    Nicotine No     THC No     CBD No     Flavoring No     Other No     Unknown No      Social History     Tobacco Use    Smoking status: Never Smoker    Smokeless tobacco: Never Used   Vaping Use    Vaping Use: Never used   Substance Use Topics    Alcohol use: Yes     Alcohol/week: 6 0 standard drinks     Types: 6 Shots of liquor per week     Comment: 1/2- 1 pint vodka daily    Drug use: Never       Review of Systems   Constitutional: Negative for activity change, chills and fever  HENT: Negative for congestion, ear pain, rhinorrhea, sneezing and sore throat  Eyes: Negative for discharge  Respiratory: Negative for cough and shortness of breath  Cardiovascular: Negative for chest pain and leg swelling  Gastrointestinal: Negative for abdominal pain, blood in stool, diarrhea, nausea and vomiting  Endocrine: Negative for polyuria  Genitourinary: Negative for dysuria, frequency and urgency  Musculoskeletal: Negative for back pain and myalgias  Skin: Negative for rash  Neurological: Negative for dizziness, weakness, light-headedness, numbness and headaches  Hematological: Negative for adenopathy  Psychiatric/Behavioral: Negative for confusion  The patient is nervous/anxious  All other systems reviewed and are negative  Physical Exam  Physical Exam  Vitals and nursing note reviewed  Constitutional:       General: He is not in acute distress  Appearance: He is well-developed  He is not toxic-appearing or diaphoretic        Comments: Hard of hearing   HENT:      Head: Normocephalic and atraumatic  Right Ear: External ear normal       Left Ear: External ear normal       Ears:      Comments: Bilateral hearing aids     Nose: Nose normal    Eyes:      General: No scleral icterus  Right eye: No discharge  Left eye: No discharge  Conjunctiva/sclera: Conjunctivae normal       Pupils: Pupils are equal, round, and reactive to light  Cardiovascular:      Rate and Rhythm: Normal rate and regular rhythm  Heart sounds: Normal heart sounds  Pulmonary:      Effort: Pulmonary effort is normal  No respiratory distress  Breath sounds: Normal breath sounds  Abdominal:      General: Bowel sounds are normal  There is no distension  Palpations: Abdomen is soft  There is no mass  Tenderness: There is no abdominal tenderness  There is no guarding or rebound  Comments: Back: no mildline or CVA tenderness   Musculoskeletal:         General: No tenderness or deformity  Normal range of motion  Cervical back: Normal range of motion and neck supple  Right lower leg: No edema  Left lower leg: No edema  Lymphadenopathy:      Cervical: No cervical adenopathy  Skin:     General: Skin is warm and dry  Neurological:      Mental Status: He is alert and oriented to person, place, and time  Cranial Nerves: No cranial nerve deficit  Sensory: No sensory deficit  Motor: No weakness or abnormal muscle tone        Coordination: Coordination normal       Gait: Gait normal       Comments: Gait steady; slightly tremorous   Psychiatric:         Mood and Affect: Mood normal          Vital Signs  ED Triage Vitals   Temperature Pulse Respirations Blood Pressure SpO2   07/31/21 0900 07/31/21 0900 07/31/21 0900 07/31/21 0900 07/31/21 0900   97 9 °F (36 6 °C) 100 20 142/80 98 %      Temp Source Heart Rate Source Patient Position - Orthostatic VS BP Location FiO2 (%)   07/31/21 0900 07/31/21 0900 07/31/21 0900 07/31/21 0900 --   Tympanic Monitor Sitting Right arm       Pain Score       07/31/21 1324       1           Vitals:    07/31/21 1409 07/31/21 1745 07/31/21 1913 07/31/21 2023   BP: 134/80 140/80 132/70 127/70   Pulse: (!) 110 95 66 65   Patient Position - Orthostatic VS:             Visual Acuity      ED Medications  Medications   thiamine (VITAMIN B1) 100 mg in sodium chloride 0 9 % 50 mL IVPB (0 mg Intravenous Stopped 7/31/21 1030)   tamsulosin (FLOMAX) capsule 0 4 mg (0 4 mg Oral Given 7/31/21 1646)   allopurinol (ZYLOPRIM) tablet 100 mg (100 mg Oral Given 7/31/21 1409)   amLODIPine (NORVASC) tablet 5 mg (5 mg Oral Given 7/31/21 1409)   atorvastatin (LIPITOR) tablet 40 mg (40 mg Oral Given 7/31/21 1646)   finasteride (PROSCAR) tablet 5 mg (5 mg Oral Given 7/31/21 1409)   FLUoxetine (PROzac) capsule 60 mg (60 mg Oral Given 3/74/76 6073)   folic acid (FOLVITE) tablet 1 mg (1 mg Oral Given 7/31/21 1410)   magnesium oxide (MAG-OX) tablet 800 mg (800 mg Oral Given 7/31/21 2122)   melatonin tablet 3 mg (3 mg Oral Given 7/31/21 2123)   nadolol (CORGARD) tablet 20 mg (20 mg Oral Given 7/31/21 1409)   potassium chloride (K-DUR,KLOR-CON) CR tablet 20 mEq (20 mEq Oral Given 7/31/21 2122)   traZODone (DESYREL) tablet 100 mg (100 mg Oral Given 7/31/21 2123)   enoxaparin (LOVENOX) subcutaneous injection 40 mg (40 mg Subcutaneous Given 7/31/21 1410)   sodium chloride 0 9 % infusion (100 mL/hr Intravenous New Bag 7/31/21 1412)   PHENobarbital 130 mg/mL injection 65 mg (65 mg Intravenous Given 7/31/21 0939)   lactated ringers bolus 1,000 mL (0 mL Intravenous Stopped 7/31/21 1031)   magnesium sulfate 2 g/50 mL IVPB (premix) 2 g (2 g Intravenous New Bag 7/31/21 1031)   potassium chloride (K-DUR,KLOR-CON) CR tablet 60 mEq (60 mEq Oral Given 7/31/21 1031)   ondansetron (ZOFRAN) injection 4 mg (4 mg Intravenous Given 7/31/21 1034)   PHENobarbital 130 mg/mL injection 130 mg (130 mg Intravenous Given 7/31/21 1132)   LORazepam (ATIVAN) tablet 2 mg (2 mg Oral Given 7/31/21 1935)       Diagnostic Studies  Results Reviewed     Procedure Component Value Units Date/Time    Novel Coronavirus (Covid-19),PCR [facility] - 2 Hour Stat [[id number]]  (Normal) Collected: 07/31/21 1039    Lab Status: Final result Specimen: Nares from Nose Updated: 07/31/21 1142     SARS-CoV-2 Negative    Narrative: The specimen collection materials, transport medium, and/or testing methodology utilized in the production of these test results have been proven to be reliable in a limited validation with an abbreviated program under the Emergency Utilization Authorization provided by the FDA  Testing reported as "Presumptive positive" will be confirmed with secondary testing to ensure result accuracy  Clinical caution and judgement should be used with the interpretation of these results with consideration of the clinical impression and other laboratory testing  Testing reported as "Positive" or "Negative" has been proven to be accurate according to standard laboratory validation requirements  All testing is performed with control materials showing appropriate reactivity at standard intervals  Rapid drug screen, urine [[id number]]  (Abnormal) Collected: 07/31/21 1039    Lab Status: Final result Specimen: Urine, Clean Catch Updated: 07/31/21 1109     Amph/Meth UR Negative     Barbiturate Ur Negative     Benzodiazepine Urine Positive     Cocaine Urine Negative     Methadone Urine Negative     Opiate Urine Negative     PCP Ur Negative     THC Urine Negative     Oxycodone Urine Negative    Narrative:      Presumptive report  If requested, specimen will be sent to reference lab for confirmation  FOR MEDICAL PURPOSES ONLY  IF CONFIRMATION NEEDED PLEASE CONTACT THE LAB WITHIN 5 DAYS      Drug Screen Cutoff Levels:  AMPHETAMINE/METHAMPHETAMINES  1000 ng/mL  BARBITURATES     200 ng/mL  BENZODIAZEPINES     200 ng/mL  COCAINE      300 ng/mL  METHADONE      300 ng/mL  OPIATES      300 ng/mL  PHENCYCLIDINE     25 ng/mL  THC       50 ng/mL  OXYCODONE      100 ng/mL    UA w Reflex to Microscopic w Reflex to Culture [[id number]]  (Abnormal) Collected: 07/31/21 1039    Lab Status: Final result Specimen: Urine, Clean Catch Updated: 07/31/21 1047     Color, UA Yellow     Clarity, UA Slightly Cloudy     Specific Gravity, UA 1 025     pH, UA 6 0     Leukocytes, UA Negative     Nitrite, UA Negative     Protein, UA Negative mg/dl      Glucose, UA Negative mg/dl      Ketones, UA 15 (1+) mg/dl      Urobilinogen, UA 0 2 E U /dl      Bilirubin, UA Negative     Blood, UA Negative    Acetaminophen level-If concentration is detectable, please discuss with medical  on call   [[id number]]  (Abnormal) Collected: 07/31/21 0937    Lab Status: Final result Specimen: Blood from Arm, Right Updated: 07/31/21 1025     Acetaminophen Level <2 0 ug/mL     Magnesium [[id number]]  (Abnormal) Collected: 07/31/21 0937    Lab Status: Final result Specimen: Blood from Arm, Right Updated: 07/31/21 1022     Magnesium 1 4 mg/dL     Lipase [[id number]]  (Normal) Collected: 07/31/21 0937    Lab Status: Final result Specimen: Blood from Arm, Right Updated: 07/31/21 1022     Lipase 596 u/L     Salicylate level [[id number]]  (Abnormal) Collected: 07/31/21 0937    Lab Status: Final result Specimen: Blood from Arm, Right Updated: 76/10/23 9996     Salicylate Lvl <3 mg/dL     Comprehensive metabolic panel [[id number]]  (Abnormal) Collected: 07/31/21 0937    Lab Status: Final result Specimen: Blood from Arm, Right Updated: 07/31/21 1020     Sodium 142 mmol/L      Potassium 2 9 mmol/L      Chloride 101 mmol/L      CO2 27 mmol/L      ANION GAP 14 mmol/L      BUN 10 mg/dL      Creatinine 0 72 mg/dL      Glucose 106 mg/dL      Calcium 9 1 mg/dL      AST 26 U/L      ALT 29 U/L      Alkaline Phosphatase 106 U/L      Total Protein 8 2 g/dL      Albumin 4 4 g/dL      Total Bilirubin 0 45 mg/dL      eGFR 94 ml/min/1 73sq m     Narrative: National Kidney Disease Foundation guidelines for Chronic Kidney Disease (CKD):     Stage 1 with normal or high GFR (GFR > 90 mL/min/1 73 square meters)    Stage 2 Mild CKD (GFR = 60-89 mL/min/1 73 square meters)    Stage 3A Moderate CKD (GFR = 45-59 mL/min/1 73 square meters)    Stage 3B Moderate CKD (GFR = 30-44 mL/min/1 73 square meters)    Stage 4 Severe CKD (GFR = 15-29 mL/min/1 73 square meters)    Stage 5 End Stage CKD (GFR <15 mL/min/1 73 square meters)  Note: GFR calculation is accurate only with a steady state creatinine    Troponin I [[id number]]  (Normal) Collected: 07/31/21 0937    Lab Status: Final result Specimen: Blood from Arm, Left Updated: 07/31/21 1016     Troponin I <0 02 ng/mL     CK Total with Reflex CKMB [[id number]]  (Normal) Collected: 07/31/21 0937    Lab Status: Final result Specimen: Blood from Arm, Right Updated: 07/31/21 1013     Total CK 84 U/L     Ethanol [[id number]]  (Abnormal) Collected: 07/31/21 0937    Lab Status: Final result Specimen: Blood from Arm, Right Updated: 07/31/21 1009     Ethanol Lvl 54 mg/dL     Protime-INR [[id number]]  (Normal) Collected: 07/31/21 0937    Lab Status: Final result Specimen: Blood from Arm, Right Updated: 07/31/21 1004     Protime 13 4 seconds      INR 1 04    APTT [[id number]]  (Normal) Collected: 07/31/21 0937    Lab Status: Final result Specimen: Blood from Arm, Right Updated: 07/31/21 1004     PTT 25 seconds     CBC and differential [[id number]]  (Abnormal) Collected: 07/31/21 0937    Lab Status: Final result Specimen: Blood from Arm, Right Updated: 07/31/21 0955     WBC 7 38 Thousand/uL      RBC 4 76 Million/uL      Hemoglobin 15 2 g/dL      Hematocrit 46 0 %      MCV 97 fL      MCH 31 9 pg      MCHC 33 0 g/dL      RDW 13 6 %      MPV 9 1 fL      Platelets 900 Thousands/uL      nRBC 0 /100 WBCs      Neutrophils Relative 61 %      Immat GRANS % 2 %      Lymphocytes Relative 22 %      Monocytes Relative 13 %      Eosinophils Relative 1 % Basophils Relative 1 %      Neutrophils Absolute 4 61 Thousands/µL      Immature Grans Absolute 0 11 Thousand/uL      Lymphocytes Absolute 1 62 Thousands/µL      Monocytes Absolute 0 93 Thousand/µL      Eosinophils Absolute 0 06 Thousand/µL      Basophils Absolute 0 05 Thousands/µL                  XR chest 1 view portable   ED Interpretation by Desmond Chaves MD (07/31 1029)   Read by me; Radiologist to provide formal interpretation  Rt basilar atelectasis vs  Prior 4/17/21                 Procedures  ECG 12 Lead Documentation Only    Date/Time: 7/31/2021 10:12 AM  Performed by: Desmond Chaves MD  Authorized by: Desmond Chaves MD     Indications / Diagnosis:  Shakey  ECG reviewed by me, the ED Provider: yes    Patient location:  ED  Previous ECG:     Previous ECG:  Compared to current    Comparison ECG info:  7/25/21 1231    Similarity:  No change  Rate:     ECG rate:  88    ECG rate assessment: normal    Rhythm:     Rhythm: sinus rhythm    QRS:     QRS axis:  Normal  Comments:      No acute ischemic changes;              ED Course  ED Course as of Jul 31 2127   Sat Jul 31, 2021   1135 TC to [place] mid level on for [facility] detox unit to determine if they have availability      1139 TC from [place] no bed availability at [facility] Detox until will obtain tox consult  1153 Case reviewed with Dr Parish Harvey of toxicology knows patient well secondary to unit being full recommends admit to [facility] and [facility] protocol  JAIME can confer with him at anytime will contact [facility] for hospitalization      1203 Updated patient OK with hospitalization at [facility] will contact [facility]      1207 Case reviewed with Dr Kevan Escobar recommends inpatient admit tele                                SBIRT 20yo+      Most Recent Value   SBIRT (23 yo +)   In order to provide better care to our patients, we are screening all of our patients for alcohol and drug use  Would it be okay to ask you these screening questions?   Yes Filed at: 07/31/2021 1119   Initial Alcohol Screen: US AUDIT-C    1  How often do you have a drink containing alcohol? 4 Filed at: 07/31/2021 1119   2  How many drinks containing alcohol do you have on a typical day you are drinking? 4 Filed at: 07/31/2021 1119   3a  Male UNDER 65: How often do you have five or more drinks on one occasion? 3 Filed at: 07/31/2021 1121   3b  FEMALE Any Age, or MALE 65+: How often do you have 4 or more drinks on one occassion? 4 Filed at: 07/31/2021 1121   Audit-C Score  7 Filed at: 07/31/2021 1121   Full Alcohol Screen: US AUDIT   4  How often during the last year have you found that you were not able to stop drinking once you had started? 1 Filed at: 07/31/2021 1121   5  How often during past year have you failed to do what was normally expected of you because of drinking? 0 Filed at: 07/31/2021 1121   6  How often in past year have you needed a first drink in the morning to get yourself going after a heavy drinking session? 0 Filed at: 07/31/2021 1121   7  How often in past year have you had feeling of guilt or remorse after drinking? 0 Filed at: 07/31/2021 1121   8  How often in past year have you been unable to remember what happened night before because you had been drinking? 1 Filed at: 07/31/2021 1121   9  Have you or someone else been injured as a result of your drinking? 0 Filed at: 07/31/2021 1121   10  Has a relative, friend, doctor or other health worker been concerned about your drinking and suggested you cut down? 4 Filed at: 07/31/2021 1121   AUDIT Total Score  13 Filed at: 07/31/2021 1121   JEMIMA: How many times in the past year have you    Used an illegal drug or used a prescription medication for non-medical reasons?   Never Filed at: 07/31/2021 1121                    MDM  Number of Diagnoses or Management Options  Alcohol withdrawal ([facility] [address] )  Hypokalemia  Low magnesium level  Diagnosis management comments: Mdm: alcohol withdrawal, will eval for electrolyte abnormality, acs, infection initiate hydration supplement thiamine and administer phenobarb IV      Disposition  Final diagnoses:   Alcohol withdrawal ([facility] )   Hypokalemia   Low magnesium level     Time reflects when diagnosis was documented in both MDM as applicable and the Disposition within this note     Time User Action Codes Description Comment    7/31/2021 11:34 AM Norberto Maudlin Add [F10 239] Alcohol withdrawal ([facility] )     7/31/2021 11:34 AM Norberto Maudlin Add [E87 6] Hypokalemia     7/31/2021 11:35 AM Norberto Maudlin Add [R79 0] Low magnesium level       ED Disposition     ED Disposition Condition Date/Time Comment    Admit Stable Sat Jul 31, 2021 12:07 PM Case was discussed with Dr Emelyn Aguilera and the patient's admission status was agreed to be Admission Status: inpatient status to the service of Dr Emelyn Aguilera   Follow-up Information    None         Current Discharge Medication List      CONTINUE these medications which have NOT CHANGED    Details   alfuzosin (UROXATRAL) 10 mg 24 hr tablet Take 40 mg by mouth daily        allopurinol (ZYLOPRIM) 100 mg tablet       amLODIPine (NORVASC) 5 mg tablet Take 1 tablet (5 mg total) by mouth daily  Qty: 30 tablet, Refills: 0    Associated Diagnoses: Hypertension, unspecified type      atorvastatin (LIPITOR) 40 mg tablet Take 40 mg by mouth daily      baclofen 10 mg tablet Take 10 mg by mouth Three times a day      cycloSPORINE (RESTASIS) 0 05 % ophthalmic emulsion 1 drop 2 (two) times a day      finasteride (PROSCAR) 5 mg tablet Take 5 mg by mouth daily      FLUoxetine (PROzac) 20 mg capsule Take 60 mg by mouth daily      folic acid (FOLVITE) 1 mg tablet Take 1 tablet (1 mg total) by mouth daily  Qty: 30 tablet, Refills: 0    Associated Diagnoses: Alcohol use disorder, severe, dependence (HCC)      LORazepam (Ativan) 1 mg tablet Take 1 tablet (1 mg total) by mouth 2 (two) times a day as needed for anxiety Do not drive or drink alcohol while using  Qty: 13 tablet, Refills: 0    Associated Diagnoses: History of alcohol use disorder      melatonin 3 mg Take 1 tablet (3 mg total) by mouth daily at bedtime  Qty: 30 tablet, Refills: 0    Associated Diagnoses: Insomnia, unspecified type      NIFEdipine 0 3%-lidocaine 5% rectal ointment Apply 1 application topically every 4 (four) hours as needed for discomfort or pain Apply a small amount to anal fissure  Qty: 1 g, Refills: 0    Associated Diagnoses: External hemorrhoid      potassium chloride (K-DUR,KLOR-CON) 20 mEq tablet Take 1 tablet (20 mEq total) by mouth 2 (two) times a day  Qty: 20 tablet, Refills: 0    Associated Diagnoses: Hypokalemia      psyllium (METAMUCIL SMOOTH TEXTURE) 28 % packet Take 1 packet by mouth 2 (two) times a day for 10 days  Qty: 20 packet, Refills: 0    Associated Diagnoses: External hemorrhoid      traZODone (DESYREL) 100 mg tablet Take 1 tablet (100 mg total) by mouth daily at bedtime  Qty: 30 tablet, Refills: 0    Associated Diagnoses: Unspecified mood (affective) disorder (HCC)      white petrolatum-mineral oil (EUCERIN,HYDROCERIN) cream Apply topically 3 (three) times a day as needed (pruritis)  Qty: 113 g, Refills: 0    Associated Diagnoses: Pruritus      magnesium oxide (MAG-OX) 400 mg Take 2 tablets (800 mg total) by mouth 2 (two) times a day for 3 days Take this in place of your normal magnesium dose  After this prescription is completed, please go back to your normal magnesium dose  Qty: 12 tablet, Refills: 0    Associated Diagnoses: Hypomagnesemia      nadolol (CORGARD) 20 mg tablet Take 20 mg by mouth      thiamine 100 MG tablet Take 1 tablet (100 mg total) by mouth daily  Qty: 30 tablet, Refills: 0    Associated Diagnoses: Alcohol abuse           No discharge procedures on file      PDMP Review       Value Time User    PDMP Reviewed  Yes 6/9/2021 12:02 PM Chares James Holter, DO          ED Provider  Electronically Signed by           Radha Keen MD  07/31/21 7440

## 2021-07-31 NOTE — ASSESSMENT & PLAN NOTE
Patient received phenobarbital in the emergency department  Unfortunately no beds available currently at inpatient detox  Continue maintain on the CIWA protocol    Patient is relatively high risk for withdrawal, reporting a prior history of seizure activity, and upwards of a qt of vodka daily    Will also initiate on standing doses of Oxazepam

## 2021-07-31 NOTE — CONSULTS
PHONE [facility] 1980 Toxicology  Vesta Neff 70 y o  male MRN: [id number]  Unit/Bed#: RM07 Encounter: [id number]      Reason for Consult / Principal Problem: Alcohol withdrawal  Consults  07/31/21      ASSESSMENT:  1) Alcohol withdrawal  2) Alcohol use disorder    DISCUSSION/ RECOMMENDATIONS:  Mr Nathaniel Bell is a pleasant gentleman who is well known to our service  He has been admitted to the medical detox unit at [address] [place] [address] 3 times where he has been treated for alcohol withdrawal   He has additionally had treatment for alcohol withdrawal at other campus facilities  I would recommend continued treatment per [facility] protocol, and based on past history expect he will do well with this  Would not recommend use of phenobarbital on the floor due to concern for potential of irreversible respiratory depression  If there are any complications with his treatment on [facility], please feel free to reach out to me regarding other possible treatment options  Please administer daily thiamine and folate  For further questions, please call [facility] or [facility] to reach the medical  on call  Hx and PE limited by the dynamics of a phone consultation  I have not personally interviewed or evaluated the patient, but only advised based on the information provided to me  Primary provider is responsible for all clinical decisions  Pertinent history, physical exam and clinical findings and course discussed: Vesta Neff is a 70y o  year old male with alcohol use disorder presenting with alcohol withdrawal     Review of systems and physical exam not performed by me      Historical Information   Past Medical History:   Diagnosis Date    Alcohol dependency ([address] )     Anxiety     Depression     [place] (hard of hearing)     Hypertension     Kidney stones     Prostate enlargement     Renal disorder     kidney    Shoulder dislocation      Past Surgical History:   Procedure Laterality Date    CHOLECYSTECTOMY      SHOULDER SURGERY      for dislocation     Social History   Social History     Substance and Sexual Activity   Alcohol Use Yes    Alcohol/week: 6 0 standard drinks    Types: 6 Shots of liquor per week    Comment: 1/2- 1 jovanny mccain daily     Social History     Substance and Sexual Activity   Drug Use Never     Social History     Tobacco Use   Smoking Status Never Smoker   Smokeless Tobacco Never Used     History reviewed  No pertinent family history  Prior to Admission medications    Medication Sig Start Date End Date Taking? Authorizing Provider   alfuzosin (UROXATRAL) 10 mg 24 hr tablet Take 40 mg by mouth daily  Historical Provider, MD   allopurinol (ZYLOPRIM) 100 mg tablet     Historical Provider, MD   amLODIPine (NORVASC) 5 mg tablet Take 1 tablet (5 mg total) by mouth daily 7/16/19   Joaquín Serrano MD   atorvastatin (LIPITOR) 40 mg tablet Take 40 mg by mouth daily 4/28/21   Historical Provider, MD   baclofen 10 mg tablet Take 10 mg by mouth Three times a day 3/4/21 3/4/22  Historical Provider, MD   cycloSPORINE (RESTASIS) 0 05 % ophthalmic emulsion 1 drop 2 (two) times a day    Historical Provider, MD   finasteride (PROSCAR) 5 mg tablet Take 5 mg by mouth daily    Historical Provider, MD   FLUoxetine (PROzac) 20 mg capsule Take 60 mg by mouth daily 4/1/21   Historical Provider, MD   folic acid (FOLVITE) 1 mg tablet Take 1 tablet (1 mg total) by mouth daily 6/10/21   Gambia C Holter, DO   LORazepam (Ativan) 1 mg tablet Take 1 tablet (1 mg total) by mouth 2 (two) times a day as needed for anxiety Do not drive or drink alcohol while using 7/3/21   Lenore Spears DO   magnesium oxide (MAG-OX) 400 mg Take 2 tablets (800 mg total) by mouth 2 (two) times a day for 3 days Take this in place of your normal magnesium dose  After this prescription is completed, please go back to your normal magnesium dose   7/3/21 7/6/21  Lenore Spears DO   melatonin 3 mg Take 1 tablet (3 mg total) by mouth daily at bedtime 6/9/21   Gambia C Holter, DO   nadolol (CORGARD) 20 mg tablet Take 20 mg by mouth 3/4/21 3/4/22  Yana Storm MD   NIFEdipine 0 3%-lidocaine 5% rectal ointment Apply 1 application topically every 4 (four) hours as needed for discomfort or pain Apply a small amount to anal fissure 7/21/21   Bell Guan MD   potassium chloride (K-DUR,KLOR-CON) 20 mEq tablet Take 1 tablet (20 mEq total) by mouth 2 (two) times a day 7/3/21   Alexandre Saez DO   psyllium (METAMUCIL SMOOTH TEXTURE) 28 % packet Take 1 packet by mouth 2 (two) times a day for 10 days 7/21/21 7/31/21  Bell Guan MD   thiamine 100 MG tablet Take 1 tablet (100 mg total) by mouth daily 6/22/21 7/22/21  MALGORZATA Arreaga   traZODone (DESYREL) 100 mg tablet Take 1 tablet (100 mg total) by mouth daily at bedtime 6/9/21   Gambia C Holter, DO   white petrolatum-mineral oil (EUCERIN,HYDROCERIN) cream Apply topically 3 (three) times a day as needed (pruritis) 6/9/21   Gambia C Holter, DO       Current Facility-Administered Medications   Medication Dose Route Frequency    magnesium sulfate 2 g/50 mL IVPB (premix) 2 g  2 g Intravenous Once    thiamine (VITAMIN B1) 100 mg in sodium chloride 0 9 % 50 mL IVPB  100 mg Intravenous Daily       Allergies   Allergen Reactions    Sulfa Antibiotics Anaphylaxis and Rash    Sulfur Rash and Edema       Objective       Intake/Output Summary (Last 24 hours) at 7/31/2021 1226  Last data filed at 7/31/2021 1031  Gross per 24 hour   Intake 1050 ml   Output --   Net 1050 ml       Invasive Devices:   Peripheral IV 07/31/21 Left Antecubital (Active)   Site Assessment WDL 07/31/21 0903   Dressing Type Transparent 07/31/21 0903   Line Status Blood return noted; Flushed;Saline locked 07/31/21 7299   Dressing Status Clean;Dry; Intact 07/31/21 0903       Vitals   Vitals:    07/31/21 1030 07/31/21 1045 07/31/21 1100 07/31/21 1130   BP: 170/76  152/77 164/76   TempSrc: Pulse: 97 100 95 97   Resp: 18 18 16 22   Patient Position - Orthostatic VS: Sitting      Temp:             Lab Results: I have personally reviewed pertinent reports  Labs:  Results from last 7 days   Lab Units 07/31/21  0937   WBC Thousand/uL 7 38   HEMOGLOBIN g/dL 15 2   HEMATOCRIT % 46 0   PLATELETS Thousands/uL 391*   NEUTROS PCT % 61   LYMPHS PCT % 22   MONOS PCT % 13*      Results from last 7 days   Lab Units 07/31/21  0937   POTASSIUM mmol/L 2 9*   CHLORIDE mmol/L 101   CO2 mmol/L 27   BUN mg/dL 10   CREATININE mg/dL 0 72   CALCIUM mg/dL 9 1   ALK PHOS U/L 106   ALT U/L 29   AST U/L 26   MAGNESIUM mg/dL 1 4*      Results from last 7 days   Lab Units 07/31/21  0937   INR  1 04   PTT seconds 25         0   Lab Value Date/Time    TROPONINI <0 02 07/31/2021 0937    TROPONINI <0 02 07/25/2021 1322    TROPONINI <0 02 07/03/2021 1000    TROPONINI <0 02 07/03/2021 0656    TROPONINI <0 02 04/17/2021 1320    TROPONINI <0 02 12/02/2019 0645    TROPONINI <0 02 07/13/2019 0529    TROPONINI <0 02 02/10/2019 0856         Results from last 7 days   Lab Units 07/31/21  0937   ACETAMINOPHEN LVL ug/mL <2 0*   ETHANOL LVL mg/dL 54*   SALICYLATE LVL mg/dL <3*     Invalid input(s): EXTPREGUR        Counseling / Coordination of Care  Total time spent today 21 minutes   This was a phone consultation

## 2021-07-31 NOTE — PLAN OF CARE
Problem: Potential for Falls  Goal: Patient will remain free of falls  Description: INTERVENTIONS:  - Educate patient/family on patient safety including physical limitations  - Instruct patient to call for assistance with activity   - Consult OT/PT to assist with strengthening/mobility   - Keep Call bell within reach  - Keep bed low and locked with side rails adjusted as appropriate  - Keep care items and personal belongings within reach  - Initiate and maintain comfort rounds  - Make Fall Risk Sign visible to staff  - Offer Toileting every 2 Hours, in advance of need  - Initiate/Maintain bed alarm  - Apply yellow socks and bracelet for high fall risk patients  - Consider moving patient to room near nurses station  Outcome: Progressing    Problem: PAIN - ADULT  Goal: Verbalizes/displays adequate comfort level or baseline comfort level  Description: Interventions:  - Encourage patient to monitor pain and request assistance  - Assess pain using appropriate pain scale  - Administer analgesics based on type and severity of pain and evaluate response  - Implement non-pharmacological measures as appropriate and evaluate response  - Consider cultural and social influences on pain and pain management  - Notify physician/advanced practitioner if interventions unsuccessful or patient reports new pain  Outcome: Progressing     Problem: INFECTION - ADULT  Goal: Absence or prevention of progression during hospitalization  Description: INTERVENTIONS:  - Assess and monitor for signs and symptoms of infection  - Monitor lab/diagnostic results  - Monitor all insertion sites, i e  indwelling lines, tubes, and drains  - Monitor endotracheal if appropriate and nasal secretions for changes in amount and color  - [place] appropriate cooling/warming therapies per order  - Administer medications as ordered  - Instruct and encourage patient and family to use good hand hygiene technique  - Identify and instruct in appropriate isolation precautions for identified infection/condition  Outcome: Progressing     Problem: SAFETY ADULT  Goal: Patient will remain free of falls  Description: INTERVENTIONS:  - Educate patient/family on patient safety including physical limitations  - Instruct patient to call for assistance with activity   - Consult OT/PT to assist with strengthening/mobility   - Keep Call bell within reach  - Keep bed low and locked with side rails adjusted as appropriate  - Keep care items and personal belongings within reach  - Initiate and maintain comfort rounds  - Make Fall Risk Sign visible to staff  - Offer Toileting every 2 Hours, in advance of need  - Initiate/Maintain bed and chair alarm  - Apply yellow socks and bracelet for high fall risk patients  - Consider moving patient to room near nurses station  Outcome: Progressing  Goal: Maintain or return to baseline ADL function  Description: INTERVENTIONS:  -  Assess patient's ability to carry out ADLs; assess patient's baseline for ADL function and identify physical deficits which impact ability to perform ADLs (bathing, care of mouth/teeth, toileting, grooming, dressing, etc )  - Assess/evaluate cause of self-care deficits   - Assess range of motion  - Assess patient's mobility; develop plan if impaired  - Assess patient's need for assistive devices and provide as appropriate  - Encourage maximum independence but intervene and supervise when necessary  - Involve family in performance of ADLs  - Assess for home care needs following discharge   - Consider OT consult to assist with ADL evaluation and planning for discharge  - Provide patient education as appropriate  Outcome: Progressing  Goal: Maintains/Returns to pre admission functional level  Description: INTERVENTIONS:  - Perform BMAT or MOVE assessment daily    - Set and communicate daily mobility goal to care team and patient/family/caregiver     - Collaborate with rehabilitation services on mobility goals if consulted  - Out of bed for meals 3 times a day  - Out of bed for toileting  - Record patient progress and toleration of activity level   Outcome: Progressing     Problem: DISCHARGE PLANNING  Goal: Discharge to home or other facility with appropriate resources  Description: INTERVENTIONS:  - Identify barriers to discharge w/patient and caregiver  - Arrange for needed discharge resources and transportation as appropriate  - Identify discharge learning needs (meds, wound care, etc )  - Arrange for interpretive services to assist at discharge as needed  - Refer to Case Management Department for coordinating discharge planning if the patient needs post-hospital services based on physician/advanced practitioner order or complex needs related to functional status, cognitive ability, or social support system  Outcome: Progressing     Problem: Knowledge Deficit  Goal: Patient/family/caregiver demonstrates understanding of disease process, treatment plan, medications, and discharge instructions  Description: Complete learning assessment and assess knowledge base    Interventions:  - Provide teaching at level of understanding  - Provide teaching via preferred learning methods  Outcome: Progressing     Problem: METABOLIC, FLUID AND ELECTROLYTES - ADULT  Goal: Electrolytes maintained within normal limits  Description: INTERVENTIONS:  - Monitor labs and assess patient for signs and symptoms of electrolyte imbalances  - Administer electrolyte replacement as ordered  - Monitor response to electrolyte replacements, including repeat lab results as appropriate  - Instruct patient on fluid and nutrition as appropriate  Outcome: Progressing

## 2021-08-01 LAB
ALBUMIN SERPL BCP-MCNC: 2.8 G/DL (ref 3.5–5)
ALP SERPL-CCNC: 65 U/L (ref 46–116)
ALT SERPL W P-5'-P-CCNC: 19 U/L (ref 12–78)
ANION GAP SERPL CALCULATED.3IONS-SCNC: 4 MMOL/L (ref 4–13)
AST SERPL W P-5'-P-CCNC: 19 U/L (ref 5–45)
ATRIAL RATE: 88 BPM
BASOPHILS # BLD AUTO: 0.05 THOUSANDS/ΜL (ref 0–0.1)
BASOPHILS NFR BLD AUTO: 1 % (ref 0–1)
BILIRUB SERPL-MCNC: 0.68 MG/DL (ref 0.2–1)
BUN SERPL-MCNC: 10 MG/DL (ref 5–25)
CALCIUM ALBUM COR SERPL-MCNC: 9.1 MG/DL (ref 8.3–10.1)
CALCIUM SERPL-MCNC: 8.1 MG/DL (ref 8.3–10.1)
CHLORIDE SERPL-SCNC: 106 MMOL/L (ref 100–108)
CO2 SERPL-SCNC: 29 MMOL/L (ref 21–32)
CREAT SERPL-MCNC: 0.61 MG/DL (ref 0.6–1.3)
EOSINOPHIL # BLD AUTO: 0.2 THOUSAND/ΜL (ref 0–0.61)
EOSINOPHIL NFR BLD AUTO: 3 % (ref 0–6)
ERYTHROCYTE [DISTWIDTH] IN BLOOD BY AUTOMATED COUNT: 13.3 % (ref 11.6–15.1)
GFR SERPL CREATININE-BSD FRML MDRD: 100 ML/MIN/1.73SQ M
GLUCOSE SERPL-MCNC: 100 MG/DL (ref 65–140)
HCT VFR BLD AUTO: 43.2 % (ref 36.5–49.3)
HGB BLD-MCNC: 13.8 G/DL (ref 12–17)
IMM GRANULOCYTES # BLD AUTO: 0.08 THOUSAND/UL (ref 0–0.2)
IMM GRANULOCYTES NFR BLD AUTO: 1 % (ref 0–2)
LYMPHOCYTES # BLD AUTO: 1.88 THOUSANDS/ΜL (ref 0.6–4.47)
LYMPHOCYTES NFR BLD AUTO: 31 % (ref 14–44)
MAGNESIUM SERPL-MCNC: 2.1 MG/DL (ref 1.6–2.6)
MCH RBC QN AUTO: 31.9 PG (ref 26.8–34.3)
MCHC RBC AUTO-ENTMCNC: 31.9 G/DL (ref 31.4–37.4)
MCV RBC AUTO: 100 FL (ref 82–98)
MONOCYTES # BLD AUTO: 0.81 THOUSAND/ΜL (ref 0.17–1.22)
MONOCYTES NFR BLD AUTO: 13 % (ref 4–12)
NEUTROPHILS # BLD AUTO: 3.08 THOUSANDS/ΜL (ref 1.85–7.62)
NEUTS SEG NFR BLD AUTO: 51 % (ref 43–75)
NRBC BLD AUTO-RTO: 0 /100 WBCS
P AXIS: 59 DEGREES
PLATELET # BLD AUTO: 266 THOUSANDS/UL (ref 149–390)
PMV BLD AUTO: 8.8 FL (ref 8.9–12.7)
POTASSIUM SERPL-SCNC: 3.9 MMOL/L (ref 3.5–5.3)
PR INTERVAL: 158 MS
PROT SERPL-MCNC: 5.6 G/DL (ref 6.4–8.2)
QRS AXIS: 73 DEGREES
QRSD INTERVAL: 104 MS
QT INTERVAL: 388 MS
QTC INTERVAL: 469 MS
RBC # BLD AUTO: 4.32 MILLION/UL (ref 3.88–5.62)
SODIUM SERPL-SCNC: 139 MMOL/L (ref 136–145)
T WAVE AXIS: 34 DEGREES
VENTRICULAR RATE: 88 BPM
WBC # BLD AUTO: 6.1 THOUSAND/UL (ref 4.31–10.16)

## 2021-08-01 PROCEDURE — 80053 COMPREHEN METABOLIC PANEL: CPT | Performed by: FAMILY MEDICINE

## 2021-08-01 PROCEDURE — 85025 COMPLETE CBC W/AUTO DIFF WBC: CPT | Performed by: FAMILY MEDICINE

## 2021-08-01 PROCEDURE — 93010 ELECTROCARDIOGRAM REPORT: CPT | Performed by: INTERNAL MEDICINE

## 2021-08-01 PROCEDURE — 83735 ASSAY OF MAGNESIUM: CPT | Performed by: FAMILY MEDICINE

## 2021-08-01 RX ADMIN — POTASSIUM CHLORIDE 20 MEQ: 1500 TABLET, EXTENDED RELEASE ORAL at 08:58

## 2021-08-01 RX ADMIN — TRAZODONE HYDROCHLORIDE 100 MG: 50 TABLET ORAL at 21:03

## 2021-08-01 RX ADMIN — MELATONIN TAB 3 MG 3 MG: 3 TAB at 21:03

## 2021-08-01 RX ADMIN — FOLIC ACID 1 MG: 1 TABLET ORAL at 08:58

## 2021-08-01 RX ADMIN — FINASTERIDE 5 MG: 5 TABLET, FILM COATED ORAL at 08:58

## 2021-08-01 RX ADMIN — THIAMINE HYDROCHLORIDE 100 MG: 100 INJECTION, SOLUTION INTRAMUSCULAR; INTRAVENOUS at 11:16

## 2021-08-01 RX ADMIN — MAGNESIUM OXIDE TAB 400 MG (241.3 MG ELEMENTAL MG) 800 MG: 400 (241.3 MG) TAB at 08:58

## 2021-08-01 RX ADMIN — TAMSULOSIN HYDROCHLORIDE 0.4 MG: 0.4 CAPSULE ORAL at 17:35

## 2021-08-01 RX ADMIN — ENOXAPARIN SODIUM 40 MG: 40 INJECTION SUBCUTANEOUS at 08:58

## 2021-08-01 RX ADMIN — MAGNESIUM OXIDE TAB 400 MG (241.3 MG ELEMENTAL MG) 800 MG: 400 (241.3 MG) TAB at 21:03

## 2021-08-01 RX ADMIN — POTASSIUM CHLORIDE 20 MEQ: 1500 TABLET, EXTENDED RELEASE ORAL at 21:03

## 2021-08-01 RX ADMIN — NADOLOL 20 MG: 40 TABLET ORAL at 08:58

## 2021-08-01 RX ADMIN — ALLOPURINOL 100 MG: 100 TABLET ORAL at 08:58

## 2021-08-01 RX ADMIN — ATORVASTATIN CALCIUM 40 MG: 40 TABLET, FILM COATED ORAL at 17:35

## 2021-08-01 RX ADMIN — FLUOXETINE 60 MG: 20 CAPSULE ORAL at 08:57

## 2021-08-01 RX ADMIN — AMLODIPINE BESYLATE 5 MG: 5 TABLET ORAL at 08:58

## 2021-08-02 PROCEDURE — 94760 N-INVAS EAR/PLS OXIMETRY 1: CPT

## 2021-08-02 PROCEDURE — 97166 OT EVAL MOD COMPLEX 45 MIN: CPT

## 2021-08-02 PROCEDURE — 97162 PT EVAL MOD COMPLEX 30 MIN: CPT

## 2021-08-02 PROCEDURE — 99233 SBSQ HOSP IP/OBS HIGH 50: CPT | Performed by: INTERNAL MEDICINE

## 2021-08-02 RX ORDER — OXAZEPAM 10 MG
20 CAPSULE ORAL EVERY 8 HOURS SCHEDULED
Status: DISCONTINUED | OUTPATIENT
Start: 2021-08-02 | End: 2021-08-04 | Stop reason: HOSPADM

## 2021-08-02 RX ADMIN — MAGNESIUM OXIDE TAB 400 MG (241.3 MG ELEMENTAL MG) 800 MG: 400 (241.3 MG) TAB at 09:28

## 2021-08-02 RX ADMIN — TAMSULOSIN HYDROCHLORIDE 0.4 MG: 0.4 CAPSULE ORAL at 16:36

## 2021-08-02 RX ADMIN — ALLOPURINOL 100 MG: 100 TABLET ORAL at 09:28

## 2021-08-02 RX ADMIN — FOLIC ACID 1 MG: 1 TABLET ORAL at 09:28

## 2021-08-02 RX ADMIN — SODIUM CHLORIDE 100 ML/HR: 0.9 INJECTION, SOLUTION INTRAVENOUS at 19:40

## 2021-08-02 RX ADMIN — MELATONIN TAB 3 MG 3 MG: 3 TAB at 21:09

## 2021-08-02 RX ADMIN — MAGNESIUM OXIDE TAB 400 MG (241.3 MG ELEMENTAL MG) 800 MG: 400 (241.3 MG) TAB at 21:09

## 2021-08-02 RX ADMIN — OXAZEPAM 20 MG: 10 CAPSULE, GELATIN COATED ORAL at 18:32

## 2021-08-02 RX ADMIN — AMLODIPINE BESYLATE 5 MG: 5 TABLET ORAL at 09:28

## 2021-08-02 RX ADMIN — ENOXAPARIN SODIUM 40 MG: 40 INJECTION SUBCUTANEOUS at 09:27

## 2021-08-02 RX ADMIN — ATORVASTATIN CALCIUM 40 MG: 40 TABLET, FILM COATED ORAL at 16:36

## 2021-08-02 RX ADMIN — FLUOXETINE 60 MG: 20 CAPSULE ORAL at 09:27

## 2021-08-02 RX ADMIN — POTASSIUM CHLORIDE 20 MEQ: 1500 TABLET, EXTENDED RELEASE ORAL at 21:09

## 2021-08-02 RX ADMIN — TRAZODONE HYDROCHLORIDE 100 MG: 50 TABLET ORAL at 21:09

## 2021-08-02 RX ADMIN — FINASTERIDE 5 MG: 5 TABLET, FILM COATED ORAL at 09:27

## 2021-08-02 RX ADMIN — POTASSIUM CHLORIDE 20 MEQ: 1500 TABLET, EXTENDED RELEASE ORAL at 09:28

## 2021-08-02 RX ADMIN — SODIUM CHLORIDE 100 ML/HR: 0.9 INJECTION, SOLUTION INTRAVENOUS at 09:35

## 2021-08-02 RX ADMIN — THIAMINE HYDROCHLORIDE 100 MG: 100 INJECTION, SOLUTION INTRAMUSCULAR; INTRAVENOUS at 09:33

## 2021-08-02 RX ADMIN — NADOLOL 20 MG: 40 TABLET ORAL at 09:28

## 2021-08-02 NOTE — PLAN OF CARE
Problem: Potential for Falls  Goal: Patient will remain free of falls  Description: INTERVENTIONS:  - Educate patient/family on patient safety including physical limitations  - Instruct patient to call for assistance with activity   - Consult OT/PT to assist with strengthening/mobility   - Keep Call bell within reach  - Keep bed low and locked with side rails adjusted as appropriate  - Keep care items and personal belongings within reach  - Initiate and maintain comfort rounds  - Make Fall Risk Sign visible to staff  - Offer Toileting every 2 Hours, in advance of need  - Initiate/Maintain bed/chair alarm  - Obtain necessary fall risk management equipment: alarms  - Apply yellow socks and bracelet for high fall risk patients  - Consider moving patient to room near nurses station  Outcome: Progressing     Problem: MOBILITY - ADULT  Goal: Maintain or return to baseline ADL function  Description: INTERVENTIONS:  -  Assess patient's ability to carry out ADLs; assess patient's baseline for ADL function and identify physical deficits which impact ability to perform ADLs (bathing, care of mouth/teeth, toileting, grooming, dressing, etc )  - Assess/evaluate cause of self-care deficits   - Assess range of motion  - Assess patient's mobility; develop plan if impaired  - Assess patient's need for assistive devices and provide as appropriate  - Encourage maximum independence but intervene and supervise when necessary  - Involve family in performance of ADLs  - Assess for home care needs following discharge   - Consider OT consult to assist with ADL evaluation and planning for discharge  - Provide patient education as appropriate  Outcome: Progressing  Goal: Maintains/Returns to pre admission functional level  Description: INTERVENTIONS:  - Perform BMAT or MOVE assessment daily    - Set and communicate daily mobility goal to care team and patient/family/caregiver     - Collaborate with rehabilitation services on mobility goals if consulted  - Perform Range of Motion 3 times a day  - Reposition patient every 2 hours    - Dangle patient 3 times a day  - Stand patient 3 times a day  - Ambulate patient 3 times a day  - Out of bed to chair 3 times a day   - Out of bed for meals 3 times a day  - Out of bed for toileting  - Record patient progress and toleration of activity level   Outcome: Progressing     Problem: Prexisting or High Potential for Compromised Skin Integrity  Goal: Skin integrity is maintained or improved  Description: INTERVENTIONS:  - Identify patients at risk for skin breakdown  - Assess and monitor skin integrity  - Assess and monitor nutrition and hydration status  - Monitor labs   - Assess for incontinence   - Turn and reposition patient  - Assist with mobility/ambulation  - Relieve pressure over bony prominences  - Avoid friction and shearing  - Provide appropriate hygiene as needed including keeping skin clean and dry  - Evaluate need for skin moisturizer/barrier cream  - Collaborate with interdisciplinary team   - Patient/family teaching  - Consider wound care consult   Outcome: Progressing     Problem: PAIN - ADULT  Goal: Verbalizes/displays adequate comfort level or baseline comfort level  Description: Interventions:  - Encourage patient to monitor pain and request assistance  - Assess pain using appropriate pain scale  - Administer analgesics based on type and severity of pain and evaluate response  - Implement non-pharmacological measures as appropriate and evaluate response  - Consider cultural and social influences on pain and pain management  - Notify physician/advanced practitioner if interventions unsuccessful or patient reports new pain  Outcome: Progressing     Problem: INFECTION - ADULT  Goal: Absence or prevention of progression during hospitalization  Description: INTERVENTIONS:  - Assess and monitor for signs and symptoms of infection  - Monitor lab/diagnostic results  - Monitor all insertion sites, i e  indwelling lines, tubes, and drains  - Monitor endotracheal if appropriate and nasal secretions for changes in amount and color  - [place] appropriate cooling/warming therapies per order  - Administer medications as ordered  - Instruct and encourage patient and family to use good hand hygiene technique  - Identify and instruct in appropriate isolation precautions for identified infection/condition  Outcome: Progressing     Problem: SAFETY ADULT  Goal: Patient will remain free of falls  Description: INTERVENTIONS:  - Educate patient/family on patient safety including physical limitations  - Instruct patient to call for assistance with activity   - Consult OT/PT to assist with strengthening/mobility   - Keep Call bell within reach  - Keep bed low and locked with side rails adjusted as appropriate  - Keep care items and personal belongings within reach  - Initiate and maintain comfort rounds  - Make Fall Risk Sign visible to staff  - Apply yellow socks and bracelet for high fall risk patients  - Consider moving patient to room near nurses station  Outcome: Progressing  Goal: Maintain or return to baseline ADL function  Description: INTERVENTIONS:  -  Assess patient's ability to carry out ADLs; assess patient's baseline for ADL function and identify physical deficits which impact ability to perform ADLs (bathing, care of mouth/teeth, toileting, grooming, dressing, etc )  - Assess/evaluate cause of self-care deficits   - Assess range of motion  - Assess patient's mobility; develop plan if impaired  - Assess patient's need for assistive devices and provide as appropriate  - Encourage maximum independence but intervene and supervise when necessary  - Involve family in performance of ADLs  - Assess for home care needs following discharge   - Consider OT consult to assist with ADL evaluation and planning for discharge  - Provide patient education as appropriate  Outcome: Progressing  Goal: Maintains/Returns to pre admission functional level  Description: INTERVENTIONS:  - Perform BMAT or MOVE assessment daily    - Set and communicate daily mobility goal to care team and patient/family/caregiver  - Collaborate with rehabilitation services on mobility goals if consulted  - Perform Range of Motion 3 times a day  - Reposition patient every 3 hours  - Dangle patient 3 times a day  - Stand patient 3 times a day  - Ambulate patient 3 times a day  - Out of bed to chair 3 times a day   - Out of bed for meals 3 times a day  - Out of bed for toileting  - Record patient progress and toleration of activity level   Outcome: Progressing     Problem: DISCHARGE PLANNING  Goal: Discharge to home or other facility with appropriate resources  Description: INTERVENTIONS:  - Identify barriers to discharge w/patient and caregiver  - Arrange for needed discharge resources and transportation as appropriate  - Identify discharge learning needs (meds, wound care, etc )  - Arrange for interpretive services to assist at discharge as needed  - Refer to Case Management Department for coordinating discharge planning if the patient needs post-hospital services based on physician/advanced practitioner order or complex needs related to functional status, cognitive ability, or social support system  Outcome: Progressing     Problem: Knowledge Deficit  Goal: Patient/family/caregiver demonstrates understanding of disease process, treatment plan, medications, and discharge instructions  Description: Complete learning assessment and assess knowledge base    Interventions:  - Provide teaching at level of understanding  - Provide teaching via preferred learning methods  Outcome: Progressing     Problem: METABOLIC, FLUID AND ELECTROLYTES - ADULT  Goal: Electrolytes maintained within normal limits  Description: INTERVENTIONS:  - Monitor labs and assess patient for signs and symptoms of electrolyte imbalances  - Administer electrolyte replacement as ordered  - Monitor response to electrolyte replacements, including repeat lab results as appropriate  - Instruct patient on fluid and nutrition as appropriate  Outcome: Progressing

## 2021-08-02 NOTE — OCCUPATIONAL THERAPY NOTE
Occupational Therapy Evaluation     Patient Name: Velia Barajas  TSVPE'T Date: 8/2/2021  Problem List  Principal Problem:    Alcohol withdrawal syndrome with complication [facility])  Active Problems:    Essential hypertension    BPH (benign prostatic hyperplasia)    Mixed hyperlipidemia    Electrolyte abnormality    Past Medical History  Past Medical History:   Diagnosis Date    Alcohol dependency ([address] )     Anxiety     Depression     [place] (hard of hearing)     Hypertension     Kidney stones     Prostate enlargement     Renal disorder     kidney    Shoulder dislocation      Past Surgical History  Past Surgical History:   Procedure Laterality Date    CHOLECYSTECTOMY      SHOULDER SURGERY      for dislocation             08/02/21 0855   OT Last Visit   OT Visit Date 08/02/21   Note Type   Note type Evaluation   Restrictions/Precautions   Weight Bearing Precautions Per Order No   Other Precautions Multiple lines; Fall Risk   Pain Assessment   Pain Assessment Tool Pain Assessment not indicated - pt denies pain   Home Living   Type of [address] [place] [address] Two level;Bed/bath upstairs; Able to live on main level with bedroom/bathroom;Stairs to enter without rails; Other (Comment)  (1 ESCOBAR)   Bathroom Shower/Tub Tub/shower unit   Bathroom Toilet Standard   Bathroom Equipment Shower chair;Grab bars around toilet   [address] Walker;Cane   Additional Comments pt performs functional mobility with SPC at baseline    Prior Function   Level of [place] Independent with ADLs and functional mobility   Lives With Spouse   ADL Assistance Independent   IADLs Independent   Falls in the last 6 months 1 to 4   Vocational Part time employment   Comments pt is (I) with driving   Psychosocial   Psychosocial (WDL) WDL   Subjective   Subjective "I didn't do much at PT"   ADL   Where Assessed Edge of bed   LB Dressing Assistance 7  Independent   Additional Comments pt with no difficulties with ADL tasks    Bed Mobility   Additional Comments pt seated EOB at start of session and in chair at end of session; pt on RA with no complaints of SOB   Transfers   Sit to Stand 6  Modified independent   Additional items   (SPC)   Stand to Sit 6  Modified independent   Additional items   [facility])   Additional Comments pt peformed functional transfers multiple times with [facility] and no significant LOB or instability   Functional Mobility   Functional Mobility 6  Modified independent   Additional Comments pt performed functional mobility with use of SPC during session; performs ~450ft with good endurance   Additional items SPC   Balance   Static Sitting Good   Dynamic Sitting Good   Static Standing Good   Dynamic Standing Good   Ambulatory Good   Activity Tolerance   Activity Tolerance Patient tolerated treatment well   RUE Assessment   RUE Assessment WFL   LUE Assessment   LUE Assessment WFL   Hand Function   Gross Motor Coordination Functional   Fine Motor Coordination Functional   Sensation   Light Touch No apparent deficits   Sharp/Dull No apparent deficits   Cognition   Overall Cognitive Status WFL   Arousal/Participation Alert   Attention Within functional limits   Orientation Level Oriented X4   Memory Within functional limits   Following Commands Follows all commands and directions without difficulty   Comments pt is significant Platinum   Assessment   Assessment Patient is a 70 y o  male admitted to [facility] on 7/31/2021 due to Alcohol withdrawal syndrome with complication ([address] )  Pt performed at (I) level with no OT needs identified at this time Comorbidities affecting pt's physical performance at time of assessment include HTN, shoulder dislocation, renal disorder, [place], kidney stones, depression, alc dependency  The patient's raw score on the AM-PAC Daily Activity inpatient short form is 24, standardized score is 57 54, greater than 39 4  Patients at this level are likely to benefit from DC to home   From OT standpoint recommend anticipate no needs upon D/C  No further acute OT needs identified at this time  Recommend continued mobilization with hospital staff and restorative services while in the hospital to increase pts endurance and strength upon D/C  From OT standpoint, recommend D/C to home with family support when medically cleared  D/C pt from OT caseload at this time  Goals   Patient Goals to go home    Recommendation   OT Discharge Recommendation No rehabilitation needs   AM-PAC Daily Activity Inpatient   Lower Body Dressing 4   Bathing 4   Toileting 4   Upper Body Dressing 4   Grooming 4   Eating 4   Daily Activity Raw Score 24   Daily Activity Standardized Score (Calc for Raw Score >=11) 57 54   AM-PAC Applied Cognition Inpatient   Following a Speech/Presentation 4   Understanding Ordinary Conversation 4   Taking Medications 4   Remembering Where Things Are Placed or Put Away 4   Remembering List of 4-5 Errands 3   Taking Care of Complicated Tasks 4   Applied Cognition Raw Score 23   Applied Cognition Standardized Score 53 08     Pt is performing at OF; OT services will be discontinued at this time  Pt left seated in chair at end of session; all needs within reach; all lines intact

## 2021-08-02 NOTE — CASE MANAGEMENT
Met with pt to discuss role as  in helping pt to develop discharge plan and to help pt carry out their plan  Pt's current LOS is   1 day   Pt is a Risk of Unplanned Readmission score of  27  Pt is not a 30 day readmission  Pt is not a bundle  Pt lives in a house with his wife  Pt has 1 ESCOBAR  Pt has 3 steps on the inside  Pt has a cane and a walker  Pt uses the cane to ambulate   Pt is independent with ADL's and functional mobility  Pt's wife does the cooking and cleaning  Pt and his wife both drive  Pt has never had home care or any services in the home   Pt  has been to Alcohol rehab at the [facility] in the past and does not want to go there again  Pt has been to [address] [place] [address] Detox unit in April and June 2021  [facility]  Pt has gone to [facility] before  PCP: Jonel Sellers  Preferred Pharmacy: [facility] in [facility] on [address]   Pt with no barriers getting meds or paying for meds  Case Management met with the patient to evaluate the patient's prior level of functioning and living situation and any barriers to discharge and to forming a safe discharge plan  CM also evaluated the patient for any services in the home or need for services  Case management also notified patient that their preferences will be take into consideration when developing their discharge plan         Pt's wife Kimberly Booker ([phone] ) is his primary contact  Pt wishes to go to Alcohol rehab when he is medically ready for discharge  Pt definitely does not want to go to the [facility]   I will continue to follow for any Case Management needs

## 2021-08-02 NOTE — PROGRESS NOTES
[facility] [address] [place]  Progress Note - Jaki Frances 1950, 70 y o  male MRN: [id number]  Unit/Bed#: 735-59 Encounter: [id number]  Primary Care Provider: Mallorie Porras MD   Date and time admitted to hospital: 2021  8:59 AM    * Alcohol withdrawal syndrome with complication [facility])  Assessment & Plan  Patient received phenobarbital in the emergency department  Unfortunately no beds available currently at inpatient detox  Continue maintain on the CIWA protocol    Patient is relatively high risk for withdrawal, reporting a prior history of seizure activity, and upwards of a qt of vodka daily  Will also initiate on standing doses of Oxazepam     Essential hypertension  Assessment & Plan  Continue CCB and BB therapy  BPH (benign prostatic hyperplasia)  Assessment & Plan  Continue Flomax/Proscar    Mixed hyperlipidemia  Assessment & Plan  Continue statin therapy  VTE Pharmacologic Prophylaxis:   Pharmacologic: Enoxaparin (Lovenox)  Mechanical VTE Prophylaxis in Place: Yes    Patient Centered Rounds: I have performed bedside rounds with nursing staff today  Discussions with Specialists or Other Care Team Provider: None    Education and Discussions with Family / Patient: Yes    Time Spent for Care: 30 minutes  More than 50% of total time spent on counseling and coordination of care as described above  Current Length of Stay: 2 day(s)    Current Patient Status: Inpatient   Certification Statement:  Patient requires continued inpatient hospital Stay due to need for placement into rehab facility  Discharge Plan:  TBD    Code Status: Level 1 - Full Code      Subjective:   Patient seen and examined - reports feeling well, but also admits to prior withdrawal and seizure without alcohol use  Reports last drink as 3 days ago  No overnight events were reported  Remains afebrile      Objective:     Vitals:   Temp (24hrs), Av 9 °F (36 6 °C), Min:97 °F (36 1 °C), Max:99 1 °F (37 3 °C)    Temp:  [97 °F (36 1 °C)-99 1 °F (37 3 °C)] 99 1 °F (37 3 °C)  HR:  [54-70] 70  Resp:  [15-19] 19  BP: (119-161)/(66-86) 155/85  SpO2:  [93 %-96 %] 95 %  Body mass index is 31 49 kg/m²  Input and Output Summary (last 24 hours): Intake/Output Summary (Last 24 hours) at 8/2/2021 1736  Last data filed at 8/2/2021 1713  Gross per 24 hour   Intake 3680 ml   Output --   Net 3680 ml       Physical Exam:     Physical Exam  Vitals and nursing note reviewed  Constitutional:       Appearance: He is well-developed  HENT:      Head: Normocephalic and atraumatic  Eyes:      Conjunctiva/sclera: Conjunctivae normal    Cardiovascular:      Rate and Rhythm: Normal rate and regular rhythm  Heart sounds: No murmur heard  Pulmonary:      Effort: Pulmonary effort is normal  No respiratory distress  Breath sounds: Normal breath sounds  Abdominal:      Palpations: Abdomen is soft  Tenderness: There is no abdominal tenderness  Musculoskeletal:      Cervical back: Neck supple  Skin:     General: Skin is warm and dry  Neurological:      Mental Status: He is alert  Additional Data:     Labs:    Results from last 7 days   Lab Units 08/01/21  0646   WBC Thousand/uL 6 10   HEMOGLOBIN g/dL 13 8   HEMATOCRIT % 43 2   PLATELETS Thousands/uL 266   NEUTROS PCT % 51   LYMPHS PCT % 31   MONOS PCT % 13*   EOS PCT % 3     Results from last 7 days   Lab Units 08/01/21  0444   SODIUM mmol/L 139   POTASSIUM mmol/L 3 9   CHLORIDE mmol/L 106   CO2 mmol/L 29   BUN mg/dL 10   CREATININE mg/dL 0 61   ANION GAP mmol/L 4   CALCIUM mg/dL 8 1*   ALBUMIN g/dL 2 8*   TOTAL BILIRUBIN mg/dL 0 68   ALK PHOS U/L 65   ALT U/L 19   AST U/L 19   GLUCOSE RANDOM mg/dL 100     Results from last 7 days   Lab Units 07/31/21  0937   INR  1 04                       * I Have Reviewed All Lab Data Listed Above  * Additional Pertinent Lab Tests Reviewed:  [address] Admission Reviewed    Imaging:    Imaging Reports Reviewed Today Include: CXR     Recent Cultures (last 7 days):           Last 24 Hours Medication List:   Current Facility-Administered Medications   Medication Dose Route Frequency Provider Last Rate    allopurinol  100 mg Oral Daily Joaquín Rene MD      amLODIPine  5 mg Oral Daily Joaquín Rene MD      atorvastatin  40 mg Oral Daily Joaquín Rene MD      enoxaparin  40 mg Subcutaneous Daily Joaquín Rene MD      finasteride  5 mg Oral Daily Joaquín Rene MD      FLUoxetine  60 mg Oral Daily Joaquín Rene MD      folic acid  1 mg Oral Daily Joaquín Rene MD      magnesium oxide  800 mg Oral BID Joaquín Rene MD      melatonin  3 mg Oral HS Joaquín Rene MD      nadolol  20 mg Oral Daily Joaquín Rene MD      oxazepam  20 mg Oral [facility] Anaya Teran MD      potassium chloride  20 mEq Oral BID Joaquín Rene MD      sodium chloride  100 mL/hr Intravenous Continuous Joaquín Rene  mL/hr (08/02/21 0935)    tamsulosin  0 4 mg Oral Daily With Katelyn Be MD      thiamine  100 mg Intravenous Daily Demetris Zamudio  mg (08/02/21 0933)    traZODone  100 mg Oral HS Demetris Zamudio MD          Today, Patient Was Seen By: Anaya Teran MD    ** Please Note: Dictation voice to text software may have been used in the creation of this document   **

## 2021-08-02 NOTE — CASE MANAGEMENT
As per physician patient is medically ready for discharge to go to Alcohol rehab  I will begin the bed search   The following facilities do not have a bed : [facility], [place] , and [facility] , clinicals faxed to them

## 2021-08-02 NOTE — PHYSICAL THERAPY NOTE
Physical Therapy Evaluation    Patient Name: Maicol Lopez    RGMRY'J Date: 8/2/2021     Problem List  Principal Problem:    Alcohol withdrawal syndrome with complication [facility])  Active Problems:    Essential hypertension    BPH (benign prostatic hyperplasia)    Mixed hyperlipidemia    Electrolyte abnormality       Past Medical History  Past Medical History:   Diagnosis Date    Alcohol dependency ([address] )     Anxiety     Depression     [place] (hard of hearing)     Hypertension     Kidney stones     Prostate enlargement     Renal disorder     kidney    Shoulder dislocation         Past Surgical History  Past Surgical History:   Procedure Laterality Date    CHOLECYSTECTOMY      SHOULDER SURGERY      for dislocation           08/02/21 0900   PT Last Visit   PT Visit Date 08/02/21   Note Type   Note type Evaluation   Pain Assessment   Pain Assessment Tool 0-10   Pain Score No Pain   Home Living   Type of Home House   Home Layout Two level;Bed/bath upstairs; Able to live on main level with bedroom/bathroom;Stairs to enter without rails  (1 ESCOBAR thru garage)   Bathroom Shower/Tub Tub/shower unit   [address] [place] [address]  chair;Grab bars around toilet   [address] [place];Cane   Additional Comments occasional SPC use at baseline   Prior Function   Level of [place] Independent with ADLs and functional mobility   Lives With [place] Help From Family   ADL Assistance Independent   IADLs Independent   Falls in the last 6 months 1 to 4   Vocational Part time employment   Comments pt drives   Restrictions/Precautions   Weight Bearing Precautions Per Order No   Other Precautions Multiple lines; Fall Risk   General   Family/Caregiver Present No   Cognition   Overall Cognitive Status WFL   Arousal/Participation Alert   Orientation Level Oriented X4   Memory Within functional limits   Following Commands Follows all commands and directions without difficulty   Comments pleasant, pt [place]   RLE Assessment   RLE Assessment WFL   LLE Assessment   LLE Assessment WFL   Bed Mobility   Additional Comments pt seated EOB on RA at beginning of session    Transfers   Sit to Stand 6  Modified independent   Stand to Sit 6  Modified independent   Additional Comments 3x, SPC used   Ambulation/Elevation   Gait pattern WNL   Gait Assistance 6  Modified independent   Assistive Device [facility]   Distance 450'   Stair Management Assistance Not tested   Balance   Static Sitting Good   Dynamic Sitting Good   Static Standing Good   Dynamic Standing Good   Ambulatory Good   Endurance Deficit   Endurance Deficit No   Activity Tolerance   Activity Tolerance Patient tolerated treatment well   Assessment   Prognosis Excellent   Problem List Obesity; Impaired hearing   Assessment Patient is a 70 y o  male evaluated by Physical Therapy s/p admit to [address] [place] [address] on 7/31/2021 with admitting diagnosis of: Alcohol withdrawal, Hypokalemia, Withdrawal symptoms, alcohol, Low magnesium level and principal problem of: Alcohol withdrawal syndrome with complication  PT was consulted to assess patient's functional mobility and discharge needs  Ordered are PT Evaluation and treatment with activity level of: up with assistance  Comorbidities affecting patient's physical performance at time of assessment include: HTN, renal disorder, [place], alcohol dependency, anxiety  Personal factors affecting the patient at time of IE include: lives in 2 story home, ambulating with assistive device, step(s) to enter home, history of fall(s) and hearing impairments  Please locate objective findings from PT assessment regarding body systems outlined above  Upon evaluation, pt performs 3 sit<->stand transfers mod (I) with SPC; pt denies dizziness upon upright standing   Pt ambulates 450' mod (I) with SPC; pt displays good ambulatory balance without increase in postural sway  He denies SOB or fatigue after gait  SpO2 remains WFL on RA  The patient's AM-PAC Basic Mobility Inpatient Short Form Raw Score is 24, Standardized Score is 57 68  A standardized score greater than 42 9 suggests the patient may benefit from discharge to home  Please also refer to the recommendation of the Physical Therapist for safe discharge planning  D/c recommendation at this time is home with no needs  Co treatment with OT secondary to complex medical condition of pt, possible A of 2 required to achieve and maintain transitional movements, requiring the need of skilled therapeutic intervention of 2 therapists to achieve delivery of services  Goals   Patient Goals to go home   Plan   PT Frequency One time visit   Recommendation   PT Discharge Recommendation No rehabilitation needs   AM-PAC Basic Mobility Inpatient   Turning in Bed Without Bedrails 4   Lying on Back to Sitting on Edge of Flat Bed 4   Moving Bed to Chair 4   Standing Up From Chair 4   Walk in Room 4   Climb 3-5 Stairs 4   Basic Mobility Inpatient Raw Score 24   Basic Mobility Standardized Score 57 68   Pt seated in recliner at end of session with all needs in reach

## 2021-08-03 LAB
ANION GAP SERPL CALCULATED.3IONS-SCNC: 5 MMOL/L (ref 4–13)
BUN SERPL-MCNC: 10 MG/DL (ref 5–25)
CALCIUM SERPL-MCNC: 8.8 MG/DL (ref 8.3–10.1)
CHLORIDE SERPL-SCNC: 105 MMOL/L (ref 100–108)
CO2 SERPL-SCNC: 30 MMOL/L (ref 21–32)
CREAT SERPL-MCNC: 0.69 MG/DL (ref 0.6–1.3)
GFR SERPL CREATININE-BSD FRML MDRD: 96 ML/MIN/1.73SQ M
GLUCOSE SERPL-MCNC: 112 MG/DL (ref 65–140)
MAGNESIUM SERPL-MCNC: 2 MG/DL (ref 1.6–2.6)
POTASSIUM SERPL-SCNC: 4.4 MMOL/L (ref 3.5–5.3)
SODIUM SERPL-SCNC: 140 MMOL/L (ref 136–145)

## 2021-08-03 PROCEDURE — 80048 BASIC METABOLIC PNL TOTAL CA: CPT | Performed by: INTERNAL MEDICINE

## 2021-08-03 PROCEDURE — 83735 ASSAY OF MAGNESIUM: CPT | Performed by: INTERNAL MEDICINE

## 2021-08-03 PROCEDURE — 99232 SBSQ HOSP IP/OBS MODERATE 35: CPT | Performed by: INTERNAL MEDICINE

## 2021-08-03 RX ADMIN — AMLODIPINE BESYLATE 5 MG: 5 TABLET ORAL at 10:13

## 2021-08-03 RX ADMIN — OXAZEPAM 20 MG: 10 CAPSULE, GELATIN COATED ORAL at 05:37

## 2021-08-03 RX ADMIN — POTASSIUM CHLORIDE 20 MEQ: 1500 TABLET, EXTENDED RELEASE ORAL at 21:06

## 2021-08-03 RX ADMIN — TAMSULOSIN HYDROCHLORIDE 0.4 MG: 0.4 CAPSULE ORAL at 16:16

## 2021-08-03 RX ADMIN — ALLOPURINOL 100 MG: 100 TABLET ORAL at 10:04

## 2021-08-03 RX ADMIN — MAGNESIUM OXIDE TAB 400 MG (241.3 MG ELEMENTAL MG) 800 MG: 400 (241.3 MG) TAB at 21:06

## 2021-08-03 RX ADMIN — SODIUM CHLORIDE 100 ML/HR: 0.9 INJECTION, SOLUTION INTRAVENOUS at 05:37

## 2021-08-03 RX ADMIN — MELATONIN TAB 3 MG 3 MG: 3 TAB at 21:06

## 2021-08-03 RX ADMIN — OXAZEPAM 20 MG: 10 CAPSULE, GELATIN COATED ORAL at 21:06

## 2021-08-03 RX ADMIN — NADOLOL 20 MG: 40 TABLET ORAL at 10:13

## 2021-08-03 RX ADMIN — ATORVASTATIN CALCIUM 40 MG: 40 TABLET, FILM COATED ORAL at 16:16

## 2021-08-03 RX ADMIN — MAGNESIUM OXIDE TAB 400 MG (241.3 MG ELEMENTAL MG) 800 MG: 400 (241.3 MG) TAB at 10:05

## 2021-08-03 RX ADMIN — ENOXAPARIN SODIUM 40 MG: 40 INJECTION SUBCUTANEOUS at 10:04

## 2021-08-03 RX ADMIN — FOLIC ACID 1 MG: 1 TABLET ORAL at 10:05

## 2021-08-03 RX ADMIN — THIAMINE HYDROCHLORIDE 100 MG: 100 INJECTION, SOLUTION INTRAMUSCULAR; INTRAVENOUS at 10:08

## 2021-08-03 RX ADMIN — OXAZEPAM 20 MG: 10 CAPSULE, GELATIN COATED ORAL at 14:45

## 2021-08-03 RX ADMIN — POTASSIUM CHLORIDE 20 MEQ: 1500 TABLET, EXTENDED RELEASE ORAL at 10:07

## 2021-08-03 RX ADMIN — FINASTERIDE 5 MG: 5 TABLET, FILM COATED ORAL at 10:05

## 2021-08-03 RX ADMIN — FLUOXETINE 60 MG: 20 CAPSULE ORAL at 10:05

## 2021-08-03 RX ADMIN — TRAZODONE HYDROCHLORIDE 100 MG: 50 TABLET ORAL at 21:06

## 2021-08-03 NOTE — CASE MANAGEMENT
I called Marky Iyer Crisis worker at [facility] to see if they have a bed , I left message for crisis worker   [facility] and [facility] do not have a bed   [facility] is still  Trying to review clinicals I sent yesterday

## 2021-08-03 NOTE — PLAN OF CARE
Problem: Potential for Falls  Goal: Patient will remain free of falls  Description: INTERVENTIONS:  - Educate patient/family on patient safety including physical limitations  - Instruct patient to call for assistance with activity   - Consult OT/PT to assist with strengthening/mobility   - Keep Call bell within reach  - Keep bed low and locked with side rails adjusted as appropriate  - Keep care items and personal belongings within reach  - Initiate and maintain comfort rounds  - Make Fall Risk Sign visible to staff  - Offer Toileting every 1-2 Hours, in advance of need  - Initiate/Maintain bed/chair alarm  - Obtain necessary fall risk management equipment  - Apply yellow socks and bracelet for high fall risk patients  - Consider moving patient to room near nurses station  Outcome: Progressing     Problem: MOBILITY - ADULT  Goal: Maintain or return to baseline ADL function  Description: INTERVENTIONS:  -  Assess patient's ability to carry out ADLs; assess patient's baseline for ADL function and identify physical deficits which impact ability to perform ADLs (bathing, care of mouth/teeth, toileting, grooming, dressing, etc )  - Assess/evaluate cause of self-care deficits   - Assess range of motion  - Assess patient's mobility; develop plan if impaired  - Assess patient's need for assistive devices and provide as appropriate  - Encourage maximum independence but intervene and supervise when necessary  - Involve family in performance of ADLs  - Assess for home care needs following discharge   - Consider OT consult to assist with ADL evaluation and planning for discharge  - Provide patient education as appropriate  Outcome: Progressing  Goal: Maintains/Returns to pre admission functional level  Description: INTERVENTIONS:  - Perform BMAT or MOVE assessment daily    - Set and communicate daily mobility goal to care team and patient/family/caregiver     - Collaborate with rehabilitation services on mobility goals if consulted  - Perform Range of Motion 3-4 times a day  - Reposition patient every 1-2 hours    - Dangle patient 3 times a day  - Stand patient 3 times a day  - Ambulate patient 3 times a day  - Out of bed to chair 3 times a day   - Out of bed for meals 3 times a day  - Out of bed for toileting  - Record patient progress and toleration of activity level   Outcome: Progressing     Problem: Prexisting or High Potential for Compromised Skin Integrity  Goal: Skin integrity is maintained or improved  Description: INTERVENTIONS:  - Identify patients at risk for skin breakdown  - Assess and monitor skin integrity  - Assess and monitor nutrition and hydration status  - Monitor labs   - Assess for incontinence   - Turn and reposition patient  - Assist with mobility/ambulation  - Relieve pressure over bony prominences  - Avoid friction and shearing  - Provide appropriate hygiene as needed including keeping skin clean and dry  - Evaluate need for skin moisturizer/barrier cream  - Collaborate with interdisciplinary team   - Patient/family teaching  - Consider wound care consult   Outcome: Progressing     Problem: PAIN - ADULT  Goal: Verbalizes/displays adequate comfort level or baseline comfort level  Description: Interventions:  - Encourage patient to monitor pain and request assistance  - Assess pain using appropriate pain scale  - Administer analgesics based on type and severity of pain and evaluate response  - Implement non-pharmacological measures as appropriate and evaluate response  - Consider cultural and social influences on pain and pain management  - Notify physician/advanced practitioner if interventions unsuccessful or patient reports new pain  Outcome: Progressing     Problem: INFECTION - ADULT  Goal: Absence or prevention of progression during hospitalization  Description: INTERVENTIONS:  - Assess and monitor for signs and symptoms of infection  - Monitor lab/diagnostic results  - Monitor all insertion sites, i e  indwelling lines, tubes, and drains  - Monitor endotracheal if appropriate and nasal secretions for changes in amount and color  - [place] appropriate cooling/warming therapies per order  - Administer medications as ordered  - Instruct and encourage patient and family to use good hand hygiene technique  - Identify and instruct in appropriate isolation precautions for identified infection/condition  Outcome: Progressing     Problem: SAFETY ADULT  Goal: Patient will remain free of falls  Description: INTERVENTIONS:  - Educate patient/family on patient safety including physical limitations  - Instruct patient to call for assistance with activity   - Consult OT/PT to assist with strengthening/mobility   - Keep Call bell within reach  - Keep bed low and locked with side rails adjusted as appropriate  - Keep care items and personal belongings within reach  - Initiate and maintain comfort rounds  - Make Fall Risk Sign visible to staff  - Offer Toileting every 1-2 Hours, in advance of need  - Initiate/Maintain bed/chair alarm  - Obtain necessary fall risk management equipment  - Apply yellow socks and bracelet for high fall risk patients  - Consider moving patient to room near nurses station  Outcome: Progressing  Goal: Maintain or return to baseline ADL function  Description: INTERVENTIONS:  -  Assess patient's ability to carry out ADLs; assess patient's baseline for ADL function and identify physical deficits which impact ability to perform ADLs (bathing, care of mouth/teeth, toileting, grooming, dressing, etc )  - Assess/evaluate cause of self-care deficits   - Assess range of motion  - Assess patient's mobility; develop plan if impaired  - Assess patient's need for assistive devices and provide as appropriate  - Encourage maximum independence but intervene and supervise when necessary  - Involve family in performance of ADLs  - Assess for home care needs following discharge   - Consider OT consult to assist with ADL evaluation and planning for discharge  - Provide patient education as appropriate  Outcome: Progressing  Goal: Maintains/Returns to pre admission functional level  Description: INTERVENTIONS:  - Perform BMAT or MOVE assessment daily    - Set and communicate daily mobility goal to care team and patient/family/caregiver  - Collaborate with rehabilitation services on mobility goals if consulted  - Out of bed for toileting  - Record patient progress and toleration of activity level   Outcome: Progressing     Problem: DISCHARGE PLANNING  Goal: Discharge to home or other facility with appropriate resources  Description: INTERVENTIONS:  - Identify barriers to discharge w/patient and caregiver  - Arrange for needed discharge resources and transportation as appropriate  - Identify discharge learning needs (meds, wound care, etc )  - Arrange for interpretive services to assist at discharge as needed  - Refer to Case Management Department for coordinating discharge planning if the patient needs post-hospital services based on physician/advanced practitioner order or complex needs related to functional status, cognitive ability, or social support system  Outcome: Progressing     Problem: Knowledge Deficit  Goal: Patient/family/caregiver demonstrates understanding of disease process, treatment plan, medications, and discharge instructions  Description: Complete learning assessment and assess knowledge base    Interventions:  - Provide teaching at level of understanding  - Provide teaching via preferred learning methods  Outcome: Progressing     Problem: METABOLIC, FLUID AND ELECTROLYTES - ADULT  Goal: Electrolytes maintained within normal limits  Description: INTERVENTIONS:  - Monitor labs and assess patient for signs and symptoms of electrolyte imbalances  - Administer electrolyte replacement as ordered  - Monitor response to electrolyte replacements, including repeat lab results as appropriate  - Instruct patient on fluid and nutrition as appropriate  Outcome: Progressing

## 2021-08-03 NOTE — CASE MANAGEMENT
Case Management Progress Note    Patient name Ariane Khan  Location /000-30 MRN [id number]  : 1950 Date 8/3/2021       LOS (days): 3  Geometric Mean LOS (GMLOS) (days): 3 40  Days to GMLOS:0 4        BUNDLE:      OBJECTIVE:  Pt is a 70y o  year old /[employer], white or  [1], male with Quaker preference of None admitted on 2021  8:59 AM  Pt is admitted to [facility] at [address] [place] [address] with complaints of Alcohol withdrawal syndrome with complication ([facility] [address] )   Current admission status: Inpatient  Preferred Pharmacy:   [address], [address] [address]  Phone: [phone] Fax: [phone] [address] [facility],[address], [address] [place] [address]  Phone: [phone] Fax: [phone]    Primary Care Provider: Anat Rodriguez MD    Primary Insurance: MEDICARE  Secondary Insurance: MEDICARE    PROGRESS NOTE:     Called crisis worker at [facility] to see if they have a bed in the detox unit  I await her call back

## 2021-08-03 NOTE — PROGRESS NOTES
[address] [facility]  Progress Note - Sunshine Jane 1950, 70 y o  male MRN: [id number]  Unit/Bed#: 943-97 Encounter: [id number]  Primary Care Provider: Alessandra Wallace MD   Date and time admitted to hospital: 7/31/2021  8:59 AM    * Alcohol withdrawal syndrome with complication [facility])  Assessment & Plan  Patient received phenobarbital in the emergency department  Unfortunately no beds available currently at inpatient detox  Continue maintain on the CIWA protocol    Patient is relatively high risk for withdrawal, reporting a prior history of seizure activity, and upwards of a qt of vodka daily  Was initiated on standing doses of Oxazepam   Currently without overt signs of withdrawal     Essential hypertension  Assessment & Plan  Continue CCB and BB therapy  BPH (benign prostatic hyperplasia)  Assessment & Plan  Continue Flomax/Proscar    Mixed hyperlipidemia  Assessment & Plan  Continue statin therapy  VTE Pharmacologic Prophylaxis:   Pharmacologic: Enoxaparin (Lovenox)  Mechanical VTE Prophylaxis in Place: Yes     Patient Centered Rounds: I have performed bedside rounds with nursing staff today      Discussions with Specialists or Other Care Team Provider: None     Education and Discussions with Family / Patient: Yes     Time Spent for Care: 20 minutes  More than 50% of total time spent on counseling and coordination of care as described above      Current Length of Stay: 3 day(s)     Current Patient Status: Inpatient   Certification Statement:  Patient requires continued inpatient hospital Stay due to need for placement into rehab facility      Discharge Plan:  TBD     Code Status: Level 1 - Full Code    Subjective:   Patient seen - reports continue to feel well and without any specific complaints  No evidence of diaphoresis, tachycardia, or hypertension  Per nursing patient is not scoring on the CIWA protocol  No overnight events reported  Remains afebrile      Objective:     Vitals: Temp (24hrs), Av 8 °F (36 6 °C), Min:97 3 °F (36 3 °C), Max:98 2 °F (36 8 °C)    Temp:  [97 3 °F (36 3 °C)-98 2 °F (36 8 °C)] 97 4 °F (36 3 °C)  HR:  [56-67] 66  Resp:  [16-19] 16  BP: (123-152)/(75-93) 137/77  SpO2:  [94 %-97 %] 96 %  Body mass index is 31 49 kg/m²  Input and Output Summary (last 24 hours): Intake/Output Summary (Last 24 hours) at 8/3/2021 1527  Last data filed at 8/3/2021 1214  Gross per 24 hour   Intake 1620 ml   Output 1550 ml   Net 70 ml       Physical Exam:   Vitals and nursing note reviewed  Constitutional:       Appearance: He is well-developed  HENT:      Head: Normocephalic and atraumatic  Eyes:      Conjunctiva/sclera: Conjunctivae normal    Cardiovascular:      Rate and Rhythm: Normal rate and regular rhythm  Heart sounds: No murmur heard  Pulmonary:      Effort: Pulmonary effort is normal  No respiratory distress  Breath sounds: Normal breath sounds  Abdominal:      Palpations: Abdomen is soft  Tenderness: There is no abdominal tenderness  Musculoskeletal:      Cervical back: Neck supple  Skin:     General: Skin is warm and dry  Neurological:      Mental Status: He is alert       Additional Data:     Labs:    Results from last 7 days   Lab Units 21  0646   WBC Thousand/uL 6 10   HEMOGLOBIN g/dL 13 8   HEMATOCRIT % 43 2   PLATELETS Thousands/uL 266   NEUTROS PCT % 51   LYMPHS PCT % 31   MONOS PCT % 13*   EOS PCT % 3     Results from last 7 days   Lab Units 21  0600 21  0444   SODIUM mmol/L 140 139   POTASSIUM mmol/L 4 4 3 9   CHLORIDE mmol/L 105 106   CO2 mmol/L 30 29   BUN mg/dL 10 10   CREATININE mg/dL 0 69 0 61   ANION GAP mmol/L 5 4   CALCIUM mg/dL 8 8 8 1*   ALBUMIN g/dL  --  2 8*   TOTAL BILIRUBIN mg/dL  --  0 68   ALK PHOS U/L  --  65   ALT U/L  --  19   AST U/L  --  19   GLUCOSE RANDOM mg/dL 112 100     Results from last 7 days   Lab Units 21  0937   INR  1 04                       * I Have Reviewed All Lab Data Listed Above  * Additional Pertinent Lab Tests Reviewed: All Labs Within Last 24 Hours Reviewed     Recent Cultures (last 7 days):           Last 24 Hours Medication List:   Current Facility-Administered Medications   Medication Dose Route Frequency Provider Last Rate    allopurinol  100 mg Oral Daily Joaquín Clark MD      amLODIPine  5 mg Oral Daily Joaquín Clark MD      atorvastatin  40 mg Oral Daily Joaquín Clark MD      enoxaparin  40 mg Subcutaneous Daily Joaquín Clakr MD      finasteride  5 mg Oral Daily Joaquín Clark MD      FLUoxetine  60 mg Oral Daily Joaquín Clark MD      folic acid  1 mg Oral Daily Joaquín Clark MD      magnesium oxide  800 mg Oral BID Joaquín Clark MD      melatonin  3 mg Oral HS Joaquín Clark MD      nadolol  20 mg Oral Daily Joaquín Clark MD      oxazepam  20 mg Oral [facility] Eitan Zuñiga MD      potassium chloride  20 mEq Oral BID Joaquín Clark MD      tamsulosin  0 4 mg Oral Daily With Rubens Viera MD      thiamine  100 mg Intravenous Daily Joaquín Clark  mg (08/03/21 1008)    traZODone  100 mg Oral HS Sidra Gonzalez MD          Today, Patient Was Seen By: Eitan Zuñiga MD    ** Please Note: Dictation voice to text software may have been used in the creation of this document   **

## 2021-08-03 NOTE — ASSESSMENT & PLAN NOTE
Patient received phenobarbital in the emergency department  Unfortunately no beds available currently at inpatient detox  Continue maintain on the CIWA protocol    Patient is relatively high risk for withdrawal, reporting a prior history of seizure activity, and upwards of a qt of vodka daily  Was initiated on standing doses of Oxazepam   Currently without overt signs of withdrawal, and not scoring on current CIWA protocol

## 2021-08-03 NOTE — CASE MANAGEMENT
As per Dominick Conteh Crisis worker the [facility]  Detox unit is not taking any patients today  I called [employer] and they are currently reviewing clinicals   They are unable to tell me when they will have completed the review of his clinicals  Patient wanted to find out what the cost would be for him to privately pay to go to [facility] since they do cover medicare  It would cost $2683 for a week and if you pay in cash for 28 days you get a 10% percent discount and it would cost $[phone]    [facility] would not have a bed until Friday at the earliest

## 2021-08-04 VITALS
HEART RATE: 63 BPM | DIASTOLIC BLOOD PRESSURE: 75 MMHG | HEIGHT: 67 IN | WEIGHT: 201.06 LBS | OXYGEN SATURATION: 96 % | RESPIRATION RATE: 18 BRPM | SYSTOLIC BLOOD PRESSURE: 137 MMHG | TEMPERATURE: 98.8 F | BODY MASS INDEX: 31.56 KG/M2

## 2021-08-04 LAB
ANION GAP SERPL CALCULATED.3IONS-SCNC: 8 MMOL/L (ref 4–13)
BUN SERPL-MCNC: 14 MG/DL (ref 5–25)
CALCIUM SERPL-MCNC: 8.8 MG/DL (ref 8.3–10.1)
CHLORIDE SERPL-SCNC: 104 MMOL/L (ref 100–108)
CO2 SERPL-SCNC: 25 MMOL/L (ref 21–32)
CREAT SERPL-MCNC: 0.72 MG/DL (ref 0.6–1.3)
GFR SERPL CREATININE-BSD FRML MDRD: 94 ML/MIN/1.73SQ M
GLUCOSE SERPL-MCNC: 120 MG/DL (ref 65–140)
MAGNESIUM SERPL-MCNC: 2.3 MG/DL (ref 1.6–2.6)
POTASSIUM SERPL-SCNC: 4.1 MMOL/L (ref 3.5–5.3)
SODIUM SERPL-SCNC: 137 MMOL/L (ref 136–145)

## 2021-08-04 PROCEDURE — 99239 HOSP IP/OBS DSCHRG MGMT >30: CPT | Performed by: INTERNAL MEDICINE

## 2021-08-04 PROCEDURE — 83735 ASSAY OF MAGNESIUM: CPT | Performed by: INTERNAL MEDICINE

## 2021-08-04 PROCEDURE — 80048 BASIC METABOLIC PNL TOTAL CA: CPT | Performed by: INTERNAL MEDICINE

## 2021-08-04 RX ORDER — OXAZEPAM 10 MG
CAPSULE ORAL
Qty: 15 CAPSULE | Refills: 0 | Status: SHIPPED | OUTPATIENT
Start: 2021-08-04 | End: 2021-09-07 | Stop reason: HOSPADM

## 2021-08-04 RX ADMIN — MAGNESIUM OXIDE TAB 400 MG (241.3 MG ELEMENTAL MG) 800 MG: 400 (241.3 MG) TAB at 08:36

## 2021-08-04 RX ADMIN — ATORVASTATIN CALCIUM 40 MG: 40 TABLET, FILM COATED ORAL at 16:44

## 2021-08-04 RX ADMIN — FOLIC ACID 1 MG: 1 TABLET ORAL at 08:36

## 2021-08-04 RX ADMIN — AMLODIPINE BESYLATE 5 MG: 5 TABLET ORAL at 08:36

## 2021-08-04 RX ADMIN — ALLOPURINOL 100 MG: 100 TABLET ORAL at 08:36

## 2021-08-04 RX ADMIN — FLUOXETINE 60 MG: 20 CAPSULE ORAL at 08:36

## 2021-08-04 RX ADMIN — THIAMINE HYDROCHLORIDE 100 MG: 100 INJECTION, SOLUTION INTRAMUSCULAR; INTRAVENOUS at 08:41

## 2021-08-04 RX ADMIN — ENOXAPARIN SODIUM 40 MG: 40 INJECTION SUBCUTANEOUS at 08:36

## 2021-08-04 RX ADMIN — POTASSIUM CHLORIDE 20 MEQ: 1500 TABLET, EXTENDED RELEASE ORAL at 08:36

## 2021-08-04 RX ADMIN — FINASTERIDE 5 MG: 5 TABLET, FILM COATED ORAL at 08:36

## 2021-08-04 RX ADMIN — TAMSULOSIN HYDROCHLORIDE 0.4 MG: 0.4 CAPSULE ORAL at 16:44

## 2021-08-04 RX ADMIN — OXAZEPAM 20 MG: 10 CAPSULE, GELATIN COATED ORAL at 05:17

## 2021-08-04 RX ADMIN — OXAZEPAM 20 MG: 10 CAPSULE, GELATIN COATED ORAL at 16:44

## 2021-08-04 RX ADMIN — NADOLOL 20 MG: 40 TABLET ORAL at 08:36

## 2021-08-04 NOTE — PLAN OF CARE
Problem: Potential for Falls  Goal: Patient will remain free of falls  Description: INTERVENTIONS:  - Educate patient/family on patient safety including physical limitations  - Instruct patient to call for assistance with activity   - Consult OT/PT to assist with strengthening/mobility   - Keep Call bell within reach  - Keep bed low and locked with side rails adjusted as appropriate  - Keep care items and personal belongings within reach  - Initiate and maintain comfort rounds  - Make Fall Risk Sign visible to staff  - Offer Toileting every 2 Hours, in advance of need  - Initiate/Maintain bed/chair alarm  - Obtain necessary fall risk management equipment: alarms  - Apply yellow socks and bracelet for high fall risk patients  - Consider moving patient to room near nurses station  8/4/2021 1835 by Jose Alberto Joseph RN  Outcome: Adequate for Discharge  8/4/2021 0702 by Jose Alberto Joseph RN  Outcome: Progressing     Problem: MOBILITY - ADULT  Goal: Maintain or return to baseline ADL function  Description: INTERVENTIONS:  -  Assess patient's ability to carry out ADLs; assess patient's baseline for ADL function and identify physical deficits which impact ability to perform ADLs (bathing, care of mouth/teeth, toileting, grooming, dressing, etc )  - Assess/evaluate cause of self-care deficits   - Assess range of motion  - Assess patient's mobility; develop plan if impaired  - Assess patient's need for assistive devices and provide as appropriate  - Encourage maximum independence but intervene and supervise when necessary  - Involve family in performance of ADLs  - Assess for home care needs following discharge   - Consider OT consult to assist with ADL evaluation and planning for discharge  - Provide patient education as appropriate  8/4/2021 1835 by Jose Alberto Joseph RN  Outcome: Adequate for Discharge  8/4/2021 1685 by Jose Alberto Joseph RN  Outcome: Progressing  Goal: Maintains/Returns to pre admission functional level  Description: INTERVENTIONS:  - Perform BMAT or MOVE assessment daily    - Set and communicate daily mobility goal to care team and patient/family/caregiver  - Collaborate with rehabilitation services on mobility goals if consulted  - Perform Range of Motion 3 times a day  - Reposition patient every 3 hours    - Dangle patient 3 times a day  - Stand patient 3 times a day  - Ambulate patient 3 times a day  - Out of bed to chair 3 times a day   - Out of bed for meals 3 times a day  - Out of bed for toileting  - Record patient progress and toleration of activity level   8/4/2021 1835 by Chavo Gilmore RN  Outcome: Adequate for Discharge  8/4/2021 0702 by Chavo Gilmore RN  Outcome: Progressing     Problem: Prexisting or High Potential for Compromised Skin Integrity  Goal: Skin integrity is maintained or improved  Description: INTERVENTIONS:  - Identify patients at risk for skin breakdown  - Assess and monitor skin integrity  - Assess and monitor nutrition and hydration status  - Monitor labs   - Assess for incontinence   - Turn and reposition patient  - Assist with mobility/ambulation  - Relieve pressure over bony prominences  - Avoid friction and shearing  - Provide appropriate hygiene as needed including keeping skin clean and dry  - Evaluate need for skin moisturizer/barrier cream  - Collaborate with interdisciplinary team   - Patient/family teaching  - Consider wound care consult   8/4/2021 1835 by Chavo Gilmore RN  Outcome: Adequate for Discharge  8/4/2021 0702 by Chavo Gilmore RN  Outcome: Progressing     Problem: PAIN - ADULT  Goal: Verbalizes/displays adequate comfort level or baseline comfort level  Description: Interventions:  - Encourage patient to monitor pain and request assistance  - Assess pain using appropriate pain scale  - Administer analgesics based on type and severity of pain and evaluate response  - Implement non-pharmacological measures as appropriate and evaluate response  - Consider cultural and social influences on pain and pain management  - Notify physician/advanced practitioner if interventions unsuccessful or patient reports new pain  8/4/2021 1835 by El Rey RN  Outcome: Adequate for Discharge  8/4/2021 0702 by El Rey RN  Outcome: Progressing     Problem: INFECTION - ADULT  Goal: Absence or prevention of progression during hospitalization  Description: INTERVENTIONS:  - Assess and monitor for signs and symptoms of infection  - Monitor lab/diagnostic results  - Monitor all insertion sites, i e  indwelling lines, tubes, and drains  - Monitor endotracheal if appropriate and nasal secretions for changes in amount and color  - [place] appropriate cooling/warming therapies per order  - Administer medications as ordered  - Instruct and encourage patient and family to use good hand hygiene technique  - Identify and instruct in appropriate isolation precautions for identified infection/condition  8/4/2021 1835 by El Rey RN  Outcome: Adequate for Discharge  8/4/2021 0702 by El Rey RN  Outcome: Progressing     Problem: SAFETY ADULT  Goal: Patient will remain free of falls  Description: INTERVENTIONS:  - Educate patient/family on patient safety including physical limitations  - Instruct patient to call for assistance with activity   - Consult OT/PT to assist with strengthening/mobility   - Keep Call bell within reach  - Keep bed low and locked with side rails adjusted as appropriate  - Keep care items and personal belongings within reach  - Initiate and maintain comfort rounds  - Make Fall Risk Sign visible to staff  - Offer Toileting every 2 Hours, in advance of need  - Initiate/Maintain bed/chair alarm  - Obtain necessary fall risk management equipment: alarms  - Apply yellow socks and bracelet for high fall risk patients  - Consider moving patient to room near nurses station  8/4/2021 1835 by El Rey RN  Outcome: Adequate for Discharge  8/4/2021 8776 by Mohsen Torre Barb Booth RN  Outcome: Progressing  Goal: Maintain or return to baseline ADL function  Description: INTERVENTIONS:  -  Assess patient's ability to carry out ADLs; assess patient's baseline for ADL function and identify physical deficits which impact ability to perform ADLs (bathing, care of mouth/teeth, toileting, grooming, dressing, etc )  - Assess/evaluate cause of self-care deficits   - Assess range of motion  - Assess patient's mobility; develop plan if impaired  - Assess patient's need for assistive devices and provide as appropriate  - Encourage maximum independence but intervene and supervise when necessary  - Involve family in performance of ADLs  - Assess for home care needs following discharge   - Consider OT consult to assist with ADL evaluation and planning for discharge  - Provide patient education as appropriate  8/4/2021 1835 by Lisa Shannon RN  Outcome: Adequate for Discharge  8/4/2021 0702 by Lisa Shannon RN  Outcome: Progressing  Goal: Maintains/Returns to pre admission functional level  Description: INTERVENTIONS:  - Perform BMAT or MOVE assessment daily    - Set and communicate daily mobility goal to care team and patient/family/caregiver     - Collaborate with rehabilitation services on mobility goals if consulted    - Out of bed for toileting  - Record patient progress and toleration of activity level   8/4/2021 1835 by Lisa Shannon RN  Outcome: Adequate for Discharge  8/4/2021 0702 by Lisa Shannon RN  Outcome: Progressing     Problem: DISCHARGE PLANNING  Goal: Discharge to home or other facility with appropriate resources  Description: INTERVENTIONS:  - Identify barriers to discharge w/patient and caregiver  - Arrange for needed discharge resources and transportation as appropriate  - Identify discharge learning needs (meds, wound care, etc )  - Arrange for interpretive services to assist at discharge as needed  - Refer to Case Management Department for coordinating discharge planning if the patient needs post-hospital services based on physician/advanced practitioner order or complex needs related to functional status, cognitive ability, or social support system  8/4/2021 1835 by Hayde Tejeda RN  Outcome: Adequate for Discharge  8/4/2021 0702 by Hayde Tejeda RN  Outcome: Progressing     Problem: Knowledge Deficit  Goal: Patient/family/caregiver demonstrates understanding of disease process, treatment plan, medications, and discharge instructions  Description: Complete learning assessment and assess knowledge base    Interventions:  - Provide teaching at level of understanding  - Provide teaching via preferred learning methods  8/4/2021 1835 by Hayde Tejeda RN  Outcome: Adequate for Discharge  8/4/2021 0702 by Hayde Tejeda RN  Outcome: Progressing     Problem: METABOLIC, FLUID AND ELECTROLYTES - ADULT  Goal: Electrolytes maintained within normal limits  Description: INTERVENTIONS:  - Monitor labs and assess patient for signs and symptoms of electrolyte imbalances  - Administer electrolyte replacement as ordered  - Monitor response to electrolyte replacements, including repeat lab results as appropriate  - Instruct patient on fluid and nutrition as appropriate  8/4/2021 1835 by Hayde Tejeda RN  Outcome: Adequate for Discharge  8/4/2021 0702 by Hayde Tejeda RN  Outcome: Progressing

## 2021-08-04 NOTE — PLAN OF CARE
Problem: Potential for Falls  Goal: Patient will remain free of falls  Description: INTERVENTIONS:  - Educate patient/family on patient safety including physical limitations  - Instruct patient to call for assistance with activity   - Consult OT/PT to assist with strengthening/mobility   - Keep Call bell within reach  - Keep bed low and locked with side rails adjusted as appropriate  - Keep care items and personal belongings within reach  - Initiate and maintain comfort rounds  - Make Fall Risk Sign visible to staff  - Apply yellow socks and bracelet for high fall risk patients  - Consider moving patient to room near nurses station  Outcome: Progressing     Problem: MOBILITY - ADULT  Goal: Maintain or return to baseline ADL function  Description: INTERVENTIONS:  -  Assess patient's ability to carry out ADLs; assess patient's baseline for ADL function and identify physical deficits which impact ability to perform ADLs (bathing, care of mouth/teeth, toileting, grooming, dressing, etc )  - Assess/evaluate cause of self-care deficits   - Assess range of motion  - Assess patient's mobility; develop plan if impaired  - Assess patient's need for assistive devices and provide as appropriate  - Encourage maximum independence but intervene and supervise when necessary  - Involve family in performance of ADLs  - Assess for home care needs following discharge   - Consider OT consult to assist with ADL evaluation and planning for discharge  - Provide patient education as appropriate  Outcome: Progressing  Goal: Maintains/Returns to pre admission functional level  Description: INTERVENTIONS:  - Perform BMAT or MOVE assessment daily    - Set and communicate daily mobility goal to care team and patient/family/caregiver  - Collaborate with rehabilitation services on mobility goals if consulted  - Perform Range of Motion 3 times a day  - Reposition patient every 3 hours    - Dangle patient 3 times a day  - Stand patient 3 times a day  - Ambulate patient 3 times a day  - Out of bed to chair 3 times a day   - Out of bed for meals 3 times a day  - Out of bed for toileting  - Record patient progress and toleration of activity level   Outcome: Progressing     Problem: Prexisting or High Potential for Compromised Skin Integrity  Goal: Skin integrity is maintained or improved  Description: INTERVENTIONS:  - Identify patients at risk for skin breakdown  - Assess and monitor skin integrity  - Assess and monitor nutrition and hydration status  - Monitor labs   - Assess for incontinence   - Turn and reposition patient  - Assist with mobility/ambulation  - Relieve pressure over bony prominences  - Avoid friction and shearing  - Provide appropriate hygiene as needed including keeping skin clean and dry  - Evaluate need for skin moisturizer/barrier cream  - Collaborate with interdisciplinary team   - Patient/family teaching  - Consider wound care consult   Outcome: Progressing     Problem: PAIN - ADULT  Goal: Verbalizes/displays adequate comfort level or baseline comfort level  Description: Interventions:  - Encourage patient to monitor pain and request assistance  - Assess pain using appropriate pain scale  - Administer analgesics based on type and severity of pain and evaluate response  - Implement non-pharmacological measures as appropriate and evaluate response  - Consider cultural and social influences on pain and pain management  - Notify physician/advanced practitioner if interventions unsuccessful or patient reports new pain  Outcome: Progressing     Problem: INFECTION - ADULT  Goal: Absence or prevention of progression during hospitalization  Description: INTERVENTIONS:  - Assess and monitor for signs and symptoms of infection  - Monitor lab/diagnostic results  - Monitor all insertion sites, i e  indwelling lines, tubes, and drains  - Monitor endotracheal if appropriate and nasal secretions for changes in amount and color  - [place] appropriate cooling/warming therapies per order  - Administer medications as ordered  - Instruct and encourage patient and family to use good hand hygiene technique  - Identify and instruct in appropriate isolation precautions for identified infection/condition  Outcome: Progressing     Problem: SAFETY ADULT  Goal: Patient will remain free of falls  Description: INTERVENTIONS:  - Educate patient/family on patient safety including physical limitations  - Instruct patient to call for assistance with activity   - Consult OT/PT to assist with strengthening/mobility   - Keep Call bell within reach  - Keep bed low and locked with side rails adjusted as appropriate  - Keep care items and personal belongings within reach  - Initiate and maintain comfort rounds  - Make Fall Risk Sign visible to staff  - Offer Toileting every 2 Hours, in advance of need  - Apply yellow socks and bracelet for high fall risk patients  - Consider moving patient to room near nurses station  Outcome: Progressing  Goal: Maintain or return to baseline ADL function  Description: INTERVENTIONS:  -  Assess patient's ability to carry out ADLs; assess patient's baseline for ADL function and identify physical deficits which impact ability to perform ADLs (bathing, care of mouth/teeth, toileting, grooming, dressing, etc )  - Assess/evaluate cause of self-care deficits   - Assess range of motion  - Assess patient's mobility; develop plan if impaired  - Assess patient's need for assistive devices and provide as appropriate  - Encourage maximum independence but intervene and supervise when necessary  - Involve family in performance of ADLs  - Assess for home care needs following discharge   - Consider OT consult to assist with ADL evaluation and planning for discharge  - Provide patient education as appropriate  Outcome: Progressing  Goal: Maintains/Returns to pre admission functional level  Description: INTERVENTIONS:  - Perform BMAT or MOVE assessment daily    - Set and communicate daily mobility goal to care team and patient/family/caregiver  - Collaborate with rehabilitation services on mobility goals if consulted  - Perform Range of Motion 3 times a day  - Reposition patient every 3 hours  - Dangle patient 3 times a day  - Stand patient 3 times a day  - Ambulate patient 3 times a day  - Out of bed to chair 3 times a day   - Out of bed for meals 3 times a day  - Out of bed for toileting  - Record patient progress and toleration of activity level   Outcome: Progressing     Problem: DISCHARGE PLANNING  Goal: Discharge to home or other facility with appropriate resources  Description: INTERVENTIONS:  - Identify barriers to discharge w/patient and caregiver  - Arrange for needed discharge resources and transportation as appropriate  - Identify discharge learning needs (meds, wound care, etc )  - Arrange for interpretive services to assist at discharge as needed  - Refer to Case Management Department for coordinating discharge planning if the patient needs post-hospital services based on physician/advanced practitioner order or complex needs related to functional status, cognitive ability, or social support system  Outcome: Progressing     Problem: Knowledge Deficit  Goal: Patient/family/caregiver demonstrates understanding of disease process, treatment plan, medications, and discharge instructions  Description: Complete learning assessment and assess knowledge base    Interventions:  - Provide teaching at level of understanding  - Provide teaching via preferred learning methods  Outcome: Progressing     Problem: METABOLIC, FLUID AND ELECTROLYTES - ADULT  Goal: Electrolytes maintained within normal limits  Description: INTERVENTIONS:  - Monitor labs and assess patient for signs and symptoms of electrolyte imbalances  - Administer electrolyte replacement as ordered  - Monitor response to electrolyte replacements, including repeat lab results as appropriate  - Instruct patient on fluid and nutrition as appropriate  Outcome: Progressing

## 2021-08-04 NOTE — CASE MANAGEMENT
[facility] and they still have not reviewed patient's clinicals  I called [facility] and they now said they do not have any more beds today , and they have not reviewed patient's clinicals yet

## 2021-08-04 NOTE — ASSESSMENT & PLAN NOTE
Patient received phenobarbital in the ED  Initially thought to be at high risk for withdrawal, reporting a prior history of seizure activity and upwards of a qt of vodka daily - patient has exhibited no signs of withdrawal   Specifically, patient remains non tachycardic and without hypertension or labile blood pressure, no diaphoresis, and no hallucination  Currently not scoring on CIWA protocol  Plan had been for inpatient detox - unfortunately this did not occur due to a lack of bed availability  Patient reports having a strong outpatient support system including family and group programs  Encouraged the patient to continue attending his outpatient facilities, and engaging with his primary care provider  Will also attempt to procure outpatient case management for additional needs  Will prescribe a short course of a benzo taper as well  At time of discharge patient remains stable

## 2021-08-04 NOTE — NURSING NOTE
Avs reviewed with pt and spouse  All questions answered and pt states he understand list of meds  Pt vitals stable and all belongings sent home with pt

## 2021-08-04 NOTE — CASE MANAGEMENT
I again called [address] [place] [address] to see if they had made a determination if they could accept the patient , but they said they were still reviewing the case  Pt wanted me to see how much it would cost for him to private pay at [facility]  It does cost $650 /day  They do have bed available at this time  Pt is going to notify his wife of same  Pt is going to follow up with His AA sponsor  AVS and discharge instructions reviewed with patient with good understanding

## 2021-08-04 NOTE — CASE MANAGEMENT
Case Management Progress Note    Patient name Ariane Khan  Location /250-41 MRN [id number]  : 1950 Date 2021       LOS (days): 4  Geometric Mean LOS (GMLOS) (days): 3 40  Days to GMLOS:-0 5        BUNDLE:      OBJECTIVE:  Pt is a 70y o  year old /[employer], white or  [1], male with [employer] preference of None admitted on 2021  8:59 AM  Pt is admitted to [facility] at [address] [place] [address] with complaints of Alcohol withdrawal syndrome with complication ([facility] [address] )   Current admission status: Inpatient  Preferred Pharmacy:   [address], [address] [address]  Phone: [phone] Fax: [phone] [address] [facility],[address], [address] [facility] [address]  Phone: [phone] Fax: [phone]    Primary Care Provider: Anat Rodriguez MD    Primary Insurance: MEDICARE  Secondary Insurance: MEDICARE    PROGRESS NOTE:  Pt continues to be medically ready for discharge to go to Alcohol rehab  The following facilities do not have beds : [facility] , [address] [place] [address] both have beds and are reviewing patient's clinicals

## 2021-08-04 NOTE — DISCHARGE SUMMARY
[address] [facility] [address]  Discharge- Velia Barajas 1950, 70 y o  male MRN: [id number]  Unit/Bed#: 501-97 Encounter: [id number]  Primary Care Provider: Angel Moreno MD   Date and time admitted to hospital: 7/31/2021  8:59 AM    * Alcohol withdrawal syndrome with complication [facility])  Assessment & Plan  Patient received phenobarbital in the ED  Initially thought to be at high risk for withdrawal, reporting a prior history of seizure activity and upwards of a qt of vodka daily - patient has exhibited no signs of withdrawal   Specifically, patient remains non tachycardic and without hypertension or labile blood pressure, no diaphoresis, and no hallucination  Currently not scoring on CIWA protocol  Plan had been for inpatient detox - unfortunately this did not occur due to a lack of bed availability  Patient reports having a strong outpatient support system including family and group programs  Encouraged the patient to continue attending his outpatient facilities, and engaging with his primary care provider  Will also attempt to procure outpatient case management for additional needs  Will prescribe a short course of a benzo taper as well  At time of discharge patient remains stable  Essential hypertension  Assessment & Plan  Continue CCB and BB therapy  BPH (benign prostatic hyperplasia)  Assessment & Plan  Continue Flomax/Proscar    Mixed hyperlipidemia  Assessment & Plan  Continue statin therapy        Discharging Physician / Practitioner: Daxa Gunter MD  PCP: Angel Moreno MD  Admission Date:   Admission Orders (From admission, onward)     Ordered        07/31/21 1208  INPATIENT ADMISSION  Once                   Discharge Date: 08/04/21    Medical Problems     Resolved Problems  Date Reviewed: 8/4/2021    None                Consultations During Hospital Stay:  · None    Procedures Performed:   · None    Significant Findings / Test Results:   XR chest 1 view portable    Result Date: 8/1/2021  Impression: No acute cardiopulmonary disease  Workstation performed: [id number]     Incidental Findings:   · None     Test Results Pending at Discharge (will require follow up): · None     Outpatient Tests Requested:  · None    Complications:  None    Reason for Admission:  ETOH abuse    HPI from original H&P dated 07/31/2021:     Sumi Miller is a pleasant 43-year-old male with past medical history significant for alcohol dependence presents to the emergency room after stating he regressed and was drinking for the last 2 days  States last drink was 6:00 p m  Last evening  He was previously treated at [address] [place] [address]  He denies any suicidal or homicidal ideations  States that he drinks vodka, and consumed approximately 24 oz in the last 48 hours  Please see above list of diagnoses and related plan for additional information  Condition at Discharge: stable     Discharge Day Visit / Exam:     Vitals: Blood Pressure: 141/81 (08/04/21 1206)  Pulse: 62 (08/04/21 1206)  Temperature: 98 3 °F (36 8 °C) (08/04/21 1206)  Temp Source: Oral (08/04/21 0743)  Respirations: 18 (08/04/21 1206)  Height: 5' 7" (170 2 cm) (07/31/21 1345)  Weight - Scale: 91 2 kg (201 lb 1 oz) (07/31/21 1345)  SpO2: 95 % (08/04/21 1206)    Discharge instructions/Information to patient and family:   See after visit summary for information provided to patient and family  Provisions for Follow-Up Care:  See after visit summary for information related to follow-up care and any pertinent home health orders  Disposition:     Home    For Discharges to [facility] SNF:   · Not Applicable to this Patient - Not Applicable to this Patient    Planned Readmission: No     Discharge Statement:  I spent 45 minutes discharging the patient  This time was spent on the day of discharge  I had direct contact with the patient on the day of discharge   Greater than 50% of the total time was spent examining patient, answering all patient questions, arranging and discussing plan of care with patient as well as directly providing post-discharge instructions  Additional time then spent on discharge activities  Discharge Medications:  See after visit summary for reconciled discharge medications provided to patient and family        ** Please Note: This note has been constructed using a voice recognition system **

## 2021-09-05 ENCOUNTER — APPOINTMENT (EMERGENCY)
Dept: CT IMAGING | Facility: HOSPITAL | Age: 71
DRG: 641 | End: 2021-09-05
Payer: MEDICARE

## 2021-09-05 ENCOUNTER — APPOINTMENT (EMERGENCY)
Dept: RADIOLOGY | Facility: HOSPITAL | Age: 71
DRG: 641 | End: 2021-09-05
Payer: MEDICARE

## 2021-09-05 ENCOUNTER — HOSPITAL ENCOUNTER (INPATIENT)
Facility: HOSPITAL | Age: 71
LOS: 1 days | Discharge: HOME | DRG: 641 | End: 2021-09-07
Attending: EMERGENCY MEDICINE | Admitting: INTERNAL MEDICINE
Payer: MEDICARE

## 2021-09-05 DIAGNOSIS — L29.9 PRURITUS: ICD-10-CM

## 2021-09-05 DIAGNOSIS — G47.34 NOCTURNAL HYPOXIA: ICD-10-CM

## 2021-09-05 DIAGNOSIS — G47.33 OBSTRUCTIVE SLEEP APNEA: ICD-10-CM

## 2021-09-05 DIAGNOSIS — E87.0 HYPERNATREMIA: ICD-10-CM

## 2021-09-05 DIAGNOSIS — F10.239 ALCOHOL WITHDRAWAL (HCC): Primary | ICD-10-CM

## 2021-09-05 DIAGNOSIS — I10 ESSENTIAL HYPERTENSION: ICD-10-CM

## 2021-09-05 DIAGNOSIS — Z87.39 HISTORY OF GOUT: ICD-10-CM

## 2021-09-05 DIAGNOSIS — E83.42 HYPOMAGNESEMIA: ICD-10-CM

## 2021-09-05 DIAGNOSIS — R00.1 BRADYCARDIA: ICD-10-CM

## 2021-09-05 DIAGNOSIS — F10.20 ALCOHOL USE DISORDER, SEVERE, DEPENDENCE (HCC): ICD-10-CM

## 2021-09-05 DIAGNOSIS — E87.6 HYPOKALEMIA: ICD-10-CM

## 2021-09-05 DIAGNOSIS — K64.4 EXTERNAL HEMORRHOID: ICD-10-CM

## 2021-09-05 DIAGNOSIS — F10.10 ALCOHOL ABUSE: ICD-10-CM

## 2021-09-05 PROBLEM — F10.939 ALCOHOL WITHDRAWAL (HCC): Chronic | Status: ACTIVE | Noted: 2021-06-19

## 2021-09-05 LAB
ALBUMIN SERPL BCP-MCNC: 3.7 G/DL (ref 3.5–5)
ALBUMIN SERPL BCP-MCNC: 3.8 G/DL (ref 3.5–5)
ALP SERPL-CCNC: 121 U/L (ref 46–116)
ALP SERPL-CCNC: 131 U/L (ref 46–116)
ALT SERPL W P-5'-P-CCNC: 42 U/L (ref 12–78)
ALT SERPL W P-5'-P-CCNC: 45 U/L (ref 12–78)
ANION GAP SERPL CALCULATED.3IONS-SCNC: 11 MMOL/L (ref 4–13)
ANION GAP SERPL CALCULATED.3IONS-SCNC: 13 MMOL/L (ref 4–13)
AST SERPL W P-5'-P-CCNC: 23 U/L (ref 5–45)
AST SERPL W P-5'-P-CCNC: 25 U/L (ref 5–45)
BASE EX.OXY STD BLDV CALC-SCNC: 82.6 % (ref 60–80)
BASE EXCESS BLDV CALC-SCNC: 2.9 MMOL/L
BASOPHILS # BLD AUTO: 0.08 THOUSANDS/ΜL (ref 0–0.1)
BASOPHILS NFR BLD AUTO: 1 % (ref 0–1)
BILIRUB SERPL-MCNC: 0.26 MG/DL (ref 0.2–1)
BILIRUB SERPL-MCNC: 0.48 MG/DL (ref 0.2–1)
BILIRUB UR QL STRIP: NEGATIVE
BUN SERPL-MCNC: 11 MG/DL (ref 5–25)
BUN SERPL-MCNC: 14 MG/DL (ref 5–25)
CALCIUM SERPL-MCNC: 7.9 MG/DL (ref 8.3–10.1)
CALCIUM SERPL-MCNC: 8.3 MG/DL (ref 8.3–10.1)
CHLORIDE SERPL-SCNC: 103 MMOL/L (ref 100–108)
CHLORIDE SERPL-SCNC: 105 MMOL/L (ref 100–108)
CLARITY UR: NORMAL
CO2 SERPL-SCNC: 28 MMOL/L (ref 21–32)
CO2 SERPL-SCNC: 30 MMOL/L (ref 21–32)
COLOR UR: YELLOW
CREAT SERPL-MCNC: 0.66 MG/DL (ref 0.6–1.3)
CREAT SERPL-MCNC: 0.72 MG/DL (ref 0.6–1.3)
EOSINOPHIL # BLD AUTO: 0.18 THOUSAND/ΜL (ref 0–0.61)
EOSINOPHIL NFR BLD AUTO: 3 % (ref 0–6)
ERYTHROCYTE [DISTWIDTH] IN BLOOD BY AUTOMATED COUNT: 13.3 % (ref 11.6–15.1)
ETHANOL SERPL-MCNC: 53 MG/DL (ref 0–3)
GFR SERPL CREATININE-BSD FRML MDRD: 94 ML/MIN/1.73SQ M
GFR SERPL CREATININE-BSD FRML MDRD: 97 ML/MIN/1.73SQ M
GLUCOSE SERPL-MCNC: 102 MG/DL (ref 65–140)
GLUCOSE SERPL-MCNC: 84 MG/DL (ref 65–140)
GLUCOSE UR STRIP-MCNC: NEGATIVE MG/DL
HCO3 BLDV-SCNC: 27.1 MMOL/L (ref 24–30)
HCT VFR BLD AUTO: 45 % (ref 36.5–49.3)
HGB BLD-MCNC: 14.2 G/DL (ref 12–17)
HGB UR QL STRIP.AUTO: NEGATIVE
IMM GRANULOCYTES # BLD AUTO: 0.1 THOUSAND/UL (ref 0–0.2)
IMM GRANULOCYTES NFR BLD AUTO: 1 % (ref 0–2)
KETONES UR STRIP-MCNC: NEGATIVE MG/DL
LEUKOCYTE ESTERASE UR QL STRIP: NEGATIVE
LIPASE SERPL-CCNC: 125 U/L (ref 73–393)
LYMPHOCYTES # BLD AUTO: 1.42 THOUSANDS/ΜL (ref 0.6–4.47)
LYMPHOCYTES NFR BLD AUTO: 20 % (ref 14–44)
MAGNESIUM SERPL-MCNC: 1.5 MG/DL (ref 1.6–2.6)
MCH RBC QN AUTO: 32.1 PG (ref 26.8–34.3)
MCHC RBC AUTO-ENTMCNC: 31.6 G/DL (ref 31.4–37.4)
MCV RBC AUTO: 102 FL (ref 82–98)
MONOCYTES # BLD AUTO: 0.5 THOUSAND/ΜL (ref 0.17–1.22)
MONOCYTES NFR BLD AUTO: 7 % (ref 4–12)
NEUTROPHILS # BLD AUTO: 4.8 THOUSANDS/ΜL (ref 1.85–7.62)
NEUTS SEG NFR BLD AUTO: 68 % (ref 43–75)
NITRITE UR QL STRIP: NEGATIVE
NRBC BLD AUTO-RTO: 0 /100 WBCS
O2 CT BLDV-SCNC: 16.7 ML/DL
PCO2 BLDV: 40.1 MM HG (ref 42–50)
PH BLDV: 7.45 [PH] (ref 7.3–7.4)
PH UR STRIP.AUTO: 6 [PH]
PLATELET # BLD AUTO: 232 THOUSANDS/UL (ref 149–390)
PMV BLD AUTO: 9 FL (ref 8.9–12.7)
PO2 BLDV: 50.1 MM HG (ref 35–45)
POTASSIUM SERPL-SCNC: 3.4 MMOL/L (ref 3.5–5.3)
POTASSIUM SERPL-SCNC: 3.6 MMOL/L (ref 3.5–5.3)
PROT SERPL-MCNC: 7 G/DL (ref 6.4–8.2)
PROT SERPL-MCNC: 7.2 G/DL (ref 6.4–8.2)
PROT UR STRIP-MCNC: NEGATIVE MG/DL
RBC # BLD AUTO: 4.43 MILLION/UL (ref 3.88–5.62)
SODIUM SERPL-SCNC: 144 MMOL/L (ref 136–145)
SODIUM SERPL-SCNC: 146 MMOL/L (ref 136–145)
SP GR UR STRIP.AUTO: 1.02 (ref 1–1.03)
TROPONIN I SERPL-MCNC: <0.02 NG/ML
UROBILINOGEN UR QL STRIP.AUTO: 0.2 E.U./DL
WBC # BLD AUTO: 7.08 THOUSAND/UL (ref 4.31–10.16)

## 2021-09-05 PROCEDURE — 70450 CT HEAD/BRAIN W/O DYE: CPT

## 2021-09-05 PROCEDURE — 80053 COMPREHEN METABOLIC PANEL: CPT

## 2021-09-05 PROCEDURE — 96375 TX/PRO/DX INJ NEW DRUG ADDON: CPT

## 2021-09-05 PROCEDURE — 84484 ASSAY OF TROPONIN QUANT: CPT

## 2021-09-05 PROCEDURE — 99220 PR INITIAL OBSERVATION CARE/DAY 70 MINUTES: CPT | Performed by: INTERNAL MEDICINE

## 2021-09-05 PROCEDURE — 85025 COMPLETE CBC W/AUTO DIFF WBC: CPT

## 2021-09-05 PROCEDURE — 81003 URINALYSIS AUTO W/O SCOPE: CPT | Performed by: EMERGENCY MEDICINE

## 2021-09-05 PROCEDURE — 82805 BLOOD GASES W/O2 SATURATION: CPT | Performed by: EMERGENCY MEDICINE

## 2021-09-05 PROCEDURE — 83690 ASSAY OF LIPASE: CPT

## 2021-09-05 PROCEDURE — 96374 THER/PROPH/DIAG INJ IV PUSH: CPT

## 2021-09-05 PROCEDURE — 83735 ASSAY OF MAGNESIUM: CPT | Performed by: EMERGENCY MEDICINE

## 2021-09-05 PROCEDURE — 71045 X-RAY EXAM CHEST 1 VIEW: CPT

## 2021-09-05 PROCEDURE — 82077 ASSAY SPEC XCP UR&BREATH IA: CPT | Performed by: EMERGENCY MEDICINE

## 2021-09-05 PROCEDURE — 99285 EMERGENCY DEPT VISIT HI MDM: CPT | Performed by: EMERGENCY MEDICINE

## 2021-09-05 PROCEDURE — 94762 N-INVAS EAR/PLS OXIMTRY CONT: CPT

## 2021-09-05 PROCEDURE — 36415 COLL VENOUS BLD VENIPUNCTURE: CPT

## 2021-09-05 PROCEDURE — G1004 CDSM NDSC: HCPCS

## 2021-09-05 PROCEDURE — 99285 EMERGENCY DEPT VISIT HI MDM: CPT

## 2021-09-05 PROCEDURE — 96376 TX/PRO/DX INJ SAME DRUG ADON: CPT

## 2021-09-05 PROCEDURE — 99213 OFFICE O/P EST LOW 20 MIN: CPT | Performed by: PSYCHIATRY & NEUROLOGY

## 2021-09-05 PROCEDURE — 93005 ELECTROCARDIOGRAM TRACING: CPT

## 2021-09-05 PROCEDURE — 96361 HYDRATE IV INFUSION ADD-ON: CPT

## 2021-09-05 RX ORDER — DIAZEPAM 5 MG/ML
15 INJECTION, SOLUTION INTRAMUSCULAR; INTRAVENOUS ONCE
Status: COMPLETED | OUTPATIENT
Start: 2021-09-05 | End: 2021-09-05

## 2021-09-05 RX ORDER — FINASTERIDE 5 MG/1
5 TABLET, FILM COATED ORAL DAILY
Status: DISCONTINUED | OUTPATIENT
Start: 2021-09-05 | End: 2021-09-07 | Stop reason: HOSPADM

## 2021-09-05 RX ORDER — LANOLIN ALCOHOL/MO/W.PET/CERES
3 CREAM (GRAM) TOPICAL
Status: DISCONTINUED | OUTPATIENT
Start: 2021-09-05 | End: 2021-09-07 | Stop reason: HOSPADM

## 2021-09-05 RX ORDER — TRAZODONE HYDROCHLORIDE 50 MG/1
100 TABLET ORAL
Status: DISCONTINUED | OUTPATIENT
Start: 2021-09-05 | End: 2021-09-07 | Stop reason: HOSPADM

## 2021-09-05 RX ORDER — CHLORDIAZEPOXIDE HYDROCHLORIDE 5 MG/1
10 CAPSULE, GELATIN COATED ORAL EVERY 8 HOURS SCHEDULED
Status: DISCONTINUED | OUTPATIENT
Start: 2021-09-05 | End: 2021-09-05

## 2021-09-05 RX ORDER — NADOLOL 40 MG/1
20 TABLET ORAL DAILY
Status: DISCONTINUED | OUTPATIENT
Start: 2021-09-05 | End: 2021-09-07 | Stop reason: HOSPADM

## 2021-09-05 RX ORDER — CHLORDIAZEPOXIDE HYDROCHLORIDE 25 MG/1
25 CAPSULE, GELATIN COATED ORAL EVERY 8 HOURS SCHEDULED
Status: DISCONTINUED | OUTPATIENT
Start: 2021-09-05 | End: 2021-09-05

## 2021-09-05 RX ORDER — ONDANSETRON 2 MG/ML
4 INJECTION INTRAMUSCULAR; INTRAVENOUS EVERY 4 HOURS PRN
Status: DISCONTINUED | OUTPATIENT
Start: 2021-09-05 | End: 2021-09-07 | Stop reason: HOSPADM

## 2021-09-05 RX ORDER — POTASSIUM CHLORIDE 20 MEQ/1
40 TABLET, EXTENDED RELEASE ORAL ONCE
Status: COMPLETED | OUTPATIENT
Start: 2021-09-05 | End: 2021-09-05

## 2021-09-05 RX ORDER — PROCHLORPERAZINE MALEATE 5 MG/1
5 TABLET ORAL ONCE
Status: COMPLETED | OUTPATIENT
Start: 2021-09-05 | End: 2021-09-05

## 2021-09-05 RX ORDER — FLUOXETINE HYDROCHLORIDE 20 MG/1
60 CAPSULE ORAL DAILY
Status: DISCONTINUED | OUTPATIENT
Start: 2021-09-05 | End: 2021-09-07 | Stop reason: HOSPADM

## 2021-09-05 RX ORDER — MAGNESIUM SULFATE HEPTAHYDRATE 40 MG/ML
2 INJECTION, SOLUTION INTRAVENOUS ONCE
Status: COMPLETED | OUTPATIENT
Start: 2021-09-05 | End: 2021-09-05

## 2021-09-05 RX ORDER — POTASSIUM CHLORIDE AND SODIUM CHLORIDE 450; 150 MG/100ML; MG/100ML
100 INJECTION, SOLUTION INTRAVENOUS CONTINUOUS
Status: DISCONTINUED | OUTPATIENT
Start: 2021-09-05 | End: 2021-09-07 | Stop reason: HOSPADM

## 2021-09-05 RX ORDER — DIAZEPAM 5 MG/ML
10 INJECTION, SOLUTION INTRAMUSCULAR; INTRAVENOUS ONCE
Status: COMPLETED | OUTPATIENT
Start: 2021-09-05 | End: 2021-09-05

## 2021-09-05 RX ORDER — LORAZEPAM 1 MG/1
2 TABLET ORAL ONCE
Status: COMPLETED | OUTPATIENT
Start: 2021-09-05 | End: 2021-09-05

## 2021-09-05 RX ORDER — LANOLIN ALCOHOL/MO/W.PET/CERES
100 CREAM (GRAM) TOPICAL DAILY
Status: DISCONTINUED | OUTPATIENT
Start: 2021-09-05 | End: 2021-09-07 | Stop reason: HOSPADM

## 2021-09-05 RX ORDER — BACLOFEN 10 MG/1
10 TABLET ORAL 2 TIMES DAILY
Status: DISCONTINUED | OUTPATIENT
Start: 2021-09-05 | End: 2021-09-07 | Stop reason: HOSPADM

## 2021-09-05 RX ORDER — ACETAMINOPHEN 325 MG/1
650 TABLET ORAL EVERY 6 HOURS PRN
Status: DISCONTINUED | OUTPATIENT
Start: 2021-09-05 | End: 2021-09-07 | Stop reason: HOSPADM

## 2021-09-05 RX ORDER — ATORVASTATIN CALCIUM 40 MG/1
40 TABLET, FILM COATED ORAL DAILY
Status: DISCONTINUED | OUTPATIENT
Start: 2021-09-05 | End: 2021-09-07 | Stop reason: HOSPADM

## 2021-09-05 RX ORDER — ONDANSETRON 2 MG/ML
4 INJECTION INTRAMUSCULAR; INTRAVENOUS ONCE
Status: COMPLETED | OUTPATIENT
Start: 2021-09-05 | End: 2021-09-05

## 2021-09-05 RX ORDER — ALLOPURINOL 100 MG/1
100 TABLET ORAL DAILY
Status: DISCONTINUED | OUTPATIENT
Start: 2021-09-06 | End: 2021-09-07 | Stop reason: HOSPADM

## 2021-09-05 RX ORDER — POTASSIUM CHLORIDE 20 MEQ/1
20 TABLET, EXTENDED RELEASE ORAL DAILY
Status: DISCONTINUED | OUTPATIENT
Start: 2021-09-05 | End: 2021-09-05

## 2021-09-05 RX ORDER — LOPERAMIDE HYDROCHLORIDE 2 MG/1
4 CAPSULE ORAL 2 TIMES DAILY PRN
Status: DISCONTINUED | OUTPATIENT
Start: 2021-09-05 | End: 2021-09-07 | Stop reason: HOSPADM

## 2021-09-05 RX ORDER — CHLORDIAZEPOXIDE HYDROCHLORIDE 5 MG/1
10 CAPSULE, GELATIN COATED ORAL EVERY 8 HOURS SCHEDULED
Status: DISCONTINUED | OUTPATIENT
Start: 2021-09-05 | End: 2021-09-07 | Stop reason: HOSPADM

## 2021-09-05 RX ORDER — FOLIC ACID 1 MG/1
1 TABLET ORAL DAILY
Status: DISCONTINUED | OUTPATIENT
Start: 2021-09-05 | End: 2021-09-07 | Stop reason: HOSPADM

## 2021-09-05 RX ORDER — AMLODIPINE BESYLATE 5 MG/1
5 TABLET ORAL DAILY
Status: DISCONTINUED | OUTPATIENT
Start: 2021-09-05 | End: 2021-09-07 | Stop reason: HOSPADM

## 2021-09-05 RX ADMIN — ONDANSETRON 4 MG: 2 INJECTION INTRAMUSCULAR; INTRAVENOUS at 09:25

## 2021-09-05 RX ADMIN — NADOLOL 20 MG: 40 TABLET ORAL at 15:46

## 2021-09-05 RX ADMIN — ATORVASTATIN CALCIUM 40 MG: 40 TABLET, FILM COATED ORAL at 15:45

## 2021-09-05 RX ADMIN — FINASTERIDE 5 MG: 5 TABLET, FILM COATED ORAL at 15:46

## 2021-09-05 RX ADMIN — ENOXAPARIN SODIUM 40 MG: 40 INJECTION SUBCUTANEOUS at 15:45

## 2021-09-05 RX ADMIN — CHLORDIAZEPOXIDE HYDROCHLORIDE 10 MG: 5 CAPSULE ORAL at 21:54

## 2021-09-05 RX ADMIN — LORAZEPAM 2 MG: 1 TABLET ORAL at 21:54

## 2021-09-05 RX ADMIN — MAGNESIUM SULFATE HEPTAHYDRATE 2 G: 40 INJECTION, SOLUTION INTRAVENOUS at 15:46

## 2021-09-05 RX ADMIN — DIAZEPAM 15 MG: 5 INJECTION, SOLUTION INTRAMUSCULAR; INTRAVENOUS at 09:25

## 2021-09-05 RX ADMIN — FLUOXETINE 60 MG: 20 CAPSULE ORAL at 15:45

## 2021-09-05 RX ADMIN — AMLODIPINE BESYLATE 5 MG: 5 TABLET ORAL at 15:45

## 2021-09-05 RX ADMIN — POTASSIUM CHLORIDE 40 MEQ: 1500 TABLET, EXTENDED RELEASE ORAL at 15:46

## 2021-09-05 RX ADMIN — MAGNESIUM OXIDE TAB 400 MG (241.3 MG ELEMENTAL MG) 800 MG: 400 (241.3 MG) TAB at 10:01

## 2021-09-05 RX ADMIN — LOPERAMIDE HYDROCHLORIDE 4 MG: 2 CAPSULE ORAL at 17:46

## 2021-09-05 RX ADMIN — SODIUM CHLORIDE 1000 ML: 0.9 INJECTION, SOLUTION INTRAVENOUS at 09:24

## 2021-09-05 RX ADMIN — POTASSIUM CHLORIDE AND SODIUM CHLORIDE 100 ML/HR: 450; 150 INJECTION, SOLUTION INTRAVENOUS at 15:46

## 2021-09-05 RX ADMIN — DIAZEPAM 10 MG: 5 INJECTION, SOLUTION INTRAMUSCULAR; INTRAVENOUS at 11:37

## 2021-09-05 RX ADMIN — FOLIC ACID 1 MG: 1 TABLET ORAL at 15:45

## 2021-09-05 RX ADMIN — CHLORDIAZEPOXIDE HYDROCHLORIDE 25 MG: 25 CAPSULE ORAL at 15:45

## 2021-09-05 RX ADMIN — PROCHLORPERAZINE MALEATE 5 MG: 5 TABLET ORAL at 09:21

## 2021-09-05 RX ADMIN — THIAMINE HCL TAB 100 MG 100 MG: 100 TAB at 15:46

## 2021-09-05 RX ADMIN — BACLOFEN 10 MG: 10 TABLET ORAL at 17:19

## 2021-09-05 RX ADMIN — MULTIPLE VITAMINS W/ MINERALS TAB 1 TABLET: TAB at 15:46

## 2021-09-05 RX ADMIN — LORAZEPAM 2 MG: 1 TABLET ORAL at 18:23

## 2021-09-05 RX ADMIN — TRAZODONE HYDROCHLORIDE 100 MG: 50 TABLET ORAL at 21:54

## 2021-09-05 RX ADMIN — POTASSIUM CHLORIDE 40 MEQ: 1500 TABLET, EXTENDED RELEASE ORAL at 11:36

## 2021-09-05 RX ADMIN — MELATONIN 3 MG: 3 TAB ORAL at 21:54

## 2021-09-05 NOTE — ASSESSMENT & PLAN NOTE
This is a chronic condition  Patient has a history of alcohol use disorder for the past 40 years  Have been progressing for the past 15 years  He have repetitive admissions due to intoxication/withdrawal during past several years most recently a month ago  The patient admits that he drank 1 pt of vodka yesterday  Last drink was at 5:00 p m  Sherre Soulier Symptoms are consistent with alcohol withdrawal     CIWA protocol   Chlordiazepoxide 10 mg Q 8 started  Continue Multivitamins  Psych team consulted: recommended IP rehab or OP follow up   To send home with vivitrol  To be discussed with case management  Follow with a m  CMP, mg, pH  Check vital signs periodically  Reassess patient in the morning

## 2021-09-05 NOTE — ASSESSMENT & PLAN NOTE
This is a chronic condition  Patient has a history of alcohol use disorder for the past 40 years  Have been progressing for the past 15 years  He have repetitive admissions due to intoxication/withdrawal during past several years most recently a month ago  The patient admits that he drank 1 pt of vodka yesterday  Last drink was at 5:00 p m  Bartholome Pinks Symptoms are consistent with alcohol withdrawal     CIWA protocol activated  Chlordiazepoxide 10 mg Q 8 started  Electrolyte repletion started  Follow with a m  CMP, mg, pH  Check vital signs periodically  Reassess patient in the morning

## 2021-09-05 NOTE — ASSESSMENT & PLAN NOTE
Initially serum magnesium level was 1 5  Repleted with 2 g IV magnesium sulfate  Follow-up with labs

## 2021-09-05 NOTE — ED PROVIDER NOTES
History  Chief Complaint   Patient presents with    Dizziness     patient reports that he is an alcoholic and drank a pint of vodka yesterday evening  he states that he now feels dizzy and as if he is going to pass out     66-year-old male history alcohol use disorder, anxiety, depression, hypertension, ureterolithiasis, hearing impairment presents to the emergency department stating that he feels tremulous and anxious since yesterday with a lightheaded sensation in diffuse dull/throbbing headache  He is concerned about the possibility of recurrent alcohol withdrawal   Longstanding alcohol use disorder  Underwent inpatient alcohol detox treatment at [facility] x2 in June 2021 and has been seen in this ED since that time for alcohol withdrawal  States that over the past several weeks he has been drinking approximately one pt of vodka every two days; yesterday evening he consumed one pt of vodka space over approximately 2 hours between [id number]  He has not had any alcohol consumption subsequent to that  States he has been eating/drinking normally over the past several days  States that he became tremulous and felt unwell yesterday evening  He developed a dull/throbbing/diffuse headache yesterday as well  He feels lightheaded upon standing and has difficulty with gait at times  Two episodes of nb/nb vomiting with sx; he reports feeling nauseated now with no ongoing vomiting  No fever/chills/dyspnea/chest pain/abd pain/vision changes/extremity weakness/extremity paresthesias  No unilateral weakness or paresthesias  No dysarthria  No recent head injury that he recalls  A/p:  Likely alcohol withdrawal given temporality of symptoms compared to last alcohol intake  Doubt CVA/sepsis/ACS  Check electrolytes and ETOH for volume status  The headache may simply be a feature withdrawal but given potential for unrecognized head trauma with consistent alcohol use, will obtain CT head    Will give loading dose of IV diazepam workup is pending  Disposition pending  History provided by:  Patient and medical records  Dizziness  Associated symptoms: headaches, nausea and vomiting    Associated symptoms: no chest pain, no diarrhea, no palpitations, no shortness of breath and no weakness        Prior to Admission Medications   Prescriptions Last Dose Informant Patient Reported? Taking? FLUoxetine (PROzac) 20 mg capsule 2021 at Unknown time  Yes Yes   Sig: Take 60 mg by mouth daily   LORazepam (Ativan) 1 mg tablet 2021 at Unknown time  No Yes   Sig: Take 1 tablet (1 mg total) by mouth 2 (two) times a day as needed for anxiety Do not drive or drink alcohol while using   NIFEdipine 0 3%-lidocaine 5% rectal ointment 2021 at Unknown time  No Yes   Sig: Apply 1 application topically every 4 (four) hours as needed for discomfort or pain Apply a small amount to anal fissure   alfuzosin (UROXATRAL) 10 mg 24 hr tablet 2021 at Unknown time  Yes Yes   Sig: Take 40 mg by mouth daily  allopurinol (ZYLOPRIM) 100 mg tablet 2021 at Unknown time  Yes Yes   amLODIPine (NORVASC) 5 mg tablet 2021 at Unknown time  No Yes   Sig: Take 1 tablet (5 mg total) by mouth daily   atorvastatin (LIPITOR) 40 mg tablet 2021 at Unknown time  Yes Yes   Sig: Take 40 mg by mouth daily   baclofen 10 mg tablet 2021 at Unknown time  Yes Yes   Sig: Take 10 mg by mouth Three times a day   cycloSPORINE (RESTASIS) 0 05 % ophthalmic emulsion 2021 at Unknown time  Yes Yes   Si drop 2 (two) times a day   finasteride (PROSCAR) 5 mg tablet 2021 at Unknown time  Yes Yes   Sig: Take 5 mg by mouth daily   folic acid (FOLVITE) 1 mg tablet   No No   Sig: Take 1 tablet (1 mg total) by mouth daily   magnesium oxide (MAG-OX) 400 mg   No No   Sig: Take 2 tablets (800 mg total) by mouth 2 (two) times a day for 3 days Take this in place of your normal magnesium dose   After this prescription is completed, please go back to your normal magnesium dose  melatonin 3 mg 9/4/2021 at Unknown time  No Yes   Sig: Take 1 tablet (3 mg total) by mouth daily at bedtime   nadolol (CORGARD) 20 mg tablet 9/4/2021 at Unknown time  Yes Yes   Sig: Take 20 mg by mouth   oxazepam (SERAX) 10 mg capsule   No No   Sig: Take 1 capsule (10 mg total) by mouth every 8 (eight) hours for 2 days, THEN 1 capsule (10 mg total) 2 (two) times a day for 3 days, THEN 1 capsule (10 mg total) daily for 3 days  potassium chloride (K-DUR,KLOR-CON) 20 mEq tablet 9/4/2021 at Unknown time  No Yes   Sig: Take 1 tablet (20 mEq total) by mouth 2 (two) times a day   psyllium (METAMUCIL SMOOTH TEXTURE) 28 % packet   No No   Sig: Take 1 packet by mouth 2 (two) times a day for 10 days   thiamine 100 MG tablet   No No   Sig: Take 1 tablet (100 mg total) by mouth daily   traZODone (DESYREL) 100 mg tablet 9/4/2021 at Unknown time  No Yes   Sig: Take 1 tablet (100 mg total) by mouth daily at bedtime   white petrolatum-mineral oil (EUCERIN,HYDROCERIN) cream 9/4/2021 at Unknown time  No Yes   Sig: Apply topically 3 (three) times a day as needed (pruritis)      Facility-Administered Medications: None       Past Medical History:   Diagnosis Date    [facility] dependency ([facility] [address] )     Anxiety     Depression     Lime (hard of hearing)     Hypertension     Kidney stones     Prostate enlargement     Renal disorder     kidney    Shoulder dislocation        Past Surgical History:   Procedure Laterality Date    CHOLECYSTECTOMY      SHOULDER SURGERY      for dislocation       History reviewed  No pertinent family history  I have reviewed and agree with the history as documented      E-Cigarette/Vaping    E-Cigarette Use Never User      E-Cigarette/Vaping Substances    Nicotine No     THC No     CBD No     Flavoring No     Other No     Unknown No      Social History     Tobacco Use    Smoking status: Never Smoker    Smokeless tobacco: Never Used   Vaping Use    Vaping Use: Never used   Substance Use Topics    Alcohol use: Yes     Alcohol/week: 6 0 standard drinks     Types: 6 Shots of liquor per week     Comment: 1/2- 1 jovanny mccain daily    Drug use: Never       Review of Systems   Constitutional: Positive for fatigue  Negative for chills and fever  HENT: Negative for congestion and sore throat  Respiratory: Negative for cough and shortness of breath  Cardiovascular: Negative for chest pain and palpitations  Gastrointestinal: Positive for nausea and vomiting  Negative for abdominal pain and diarrhea  Genitourinary: Positive for dysuria  Negative for difficulty urinating and flank pain  Musculoskeletal: Negative for neck pain and neck stiffness  Skin: Negative for color change, pallor, rash and wound  Neurological: Positive for dizziness and headaches  Negative for syncope, weakness, light-headedness and numbness  Hematological: Negative for adenopathy  Does not bruise/bleed easily  All other systems reviewed and are negative  Physical Exam  Physical Exam  Vitals and nursing note reviewed  Constitutional:       General: He is awake  He is not in acute distress  Appearance: Normal appearance  He is well-developed  HENT:      Head: Normocephalic and atraumatic  Right Ear: External ear normal  Decreased hearing noted  Left Ear: External ear normal  Decreased hearing noted  Ears:      Comments: Extremely hard of hearing  Neck:      Trachea: Trachea and phonation normal    Cardiovascular:      Rate and Rhythm: Normal rate and regular rhythm  Pulses:           Radial pulses are 2+ on the right side and 2+ on the left side  Dorsalis pedis pulses are 2+ on the right side and 2+ on the left side  Posterior tibial pulses are 2+ on the right side and 2+ on the left side  Heart sounds: Normal heart sounds, S1 normal and S2 normal  No murmur heard  No friction rub  No gallop      Pulmonary:      Effort: Pulmonary effort is normal  No respiratory distress  Breath sounds: Normal breath sounds  No stridor  No decreased breath sounds, wheezing, rhonchi or rales  Abdominal:      Tenderness: There is no abdominal tenderness  There is no guarding or rebound  Skin:     General: Skin is warm and dry  Neurological:      Mental Status: He is alert and oriented to person, place, and time  GCS: GCS eye subscore is 4  GCS verbal subscore is 5  GCS motor subscore is 6  Cranial Nerves: No cranial nerve deficit  Sensory: No sensory deficit  Motor: Tremor (There is a mild resting tremor in distal fingers aprox 4-5 Hz; there are tongue fasciculations as well) present  No abnormal muscle tone  Comments: PERRLA; EOMI  Sensation intact to light touch over face in V1-V3 distribution bilaterally  Facial expressions symmetric  Tongue/uvula midline  Shoulder shrug equal bilaterally  Strength 5/5 in UE/LE bilaterally  Sensation intact to light touch in UE/LE bilaterally           Vital Signs  ED Triage Vitals   Temperature Pulse Respirations Blood Pressure SpO2   09/05/21 0846 09/05/21 0846 09/05/21 0846 09/05/21 0846 09/05/21 0846   100 3 °F (37 9 °C) 69 18 (!) 182/88 95 %      Temp Source Heart Rate Source Patient Position - Orthostatic VS BP Location FiO2 (%)   09/05/21 0846 09/05/21 0846 09/05/21 0846 09/05/21 0846 --   Temporal Monitor Sitting Left arm       Pain Score       09/05/21 1426       No Pain           Vitals:    09/05/21 2030 09/05/21 2200 09/06/21 0230 09/06/21 0630   BP: 133/62 125/60 136/71 162/74   Pulse: 62 59 68 56   Patient Position - Orthostatic VS:   Sitting          Visual Acuity      ED Medications  Medications   thiamine tablet 100 mg (100 mg Oral Given 0/6/98 0134)   folic acid (FOLVITE) tablet 1 mg (1 mg Oral Given 9/5/21 1545)   multivitamin-minerals (CENTRUM) tablet 1 tablet (1 tablet Oral Given 9/5/21 1546)   amLODIPine (NORVASC) tablet 5 mg (5 mg Oral Given 9/5/21 1545)   atorvastatin (LIPITOR) tablet 40 mg (40 mg Oral Given 9/5/21 1545)   baclofen tablet 10 mg (10 mg Oral Given 9/5/21 1719)   finasteride (PROSCAR) tablet 5 mg (5 mg Oral Given 9/5/21 1546)   FLUoxetine (PROzac) capsule 60 mg (60 mg Oral Given 9/5/21 1545)   traZODone (DESYREL) tablet 100 mg (100 mg Oral Given 9/5/21 2154)   nadolol (CORGARD) tablet 20 mg (20 mg Oral Given 9/5/21 1546)   sodium chloride 0 45 % with KCl 20 mEq/L infusion (premix) (100 mL/hr Intravenous New Bag 9/6/21 0339)   enoxaparin (LOVENOX) subcutaneous injection 40 mg (40 mg Subcutaneous Given 9/5/21 1545)   melatonin tablet 3 mg (3 mg Oral Given 9/5/21 2154)   allopurinol (ZYLOPRIM) tablet 100 mg (has no administration in time range)   loperamide (IMODIUM) capsule 4 mg (4 mg Oral Given 9/5/21 1746)   acetaminophen (TYLENOL) tablet 650 mg (650 mg Oral Given 9/6/21 0246)   ondansetron (ZOFRAN) injection 4 mg (has no administration in time range)   chlordiazePOXIDE (LIBRIUM) capsule 10 mg (10 mg Oral Given 9/6/21 0634)   diazepam (VALIUM) injection 15 mg (15 mg Intravenous Given 9/5/21 0925)   sodium chloride 0 9 % bolus 1,000 mL (0 mL Intravenous Stopped 9/5/21 1137)   prochlorperazine (COMPAZINE) tablet 5 mg (5 mg Oral Given 9/5/21 0921)   ondansetron (ZOFRAN) injection 4 mg (4 mg Intravenous Given 9/5/21 0925)   magnesium oxide (MAG-OX) tablet 800 mg (800 mg Oral Given 9/5/21 1001)   diazepam (VALIUM) injection 10 mg (10 mg Intravenous Given 9/5/21 1137)   potassium chloride (K-DUR,KLOR-CON) CR tablet 40 mEq (40 mEq Oral Given 9/5/21 1136)   magnesium sulfate 2 g/50 mL IVPB (premix) 2 g (0 g Intravenous Stopped 9/5/21 2021)   potassium chloride (K-DUR,KLOR-CON) CR tablet 40 mEq (40 mEq Oral Given 9/5/21 1546)   LORazepam (ATIVAN) tablet 2 mg (2 mg Oral Given 9/5/21 1823)   LORazepam (ATIVAN) tablet 2 mg (2 mg Oral Given 9/5/21 2154)       Diagnostic Studies  Results Reviewed     Procedure Component Value Units Date/Time    UA w Reflex to Microscopic w Reflex to Culture [[id number]] Collected: 09/05/21 0934    Lab Status: Final result Specimen: Urine, Clean Catch Updated: 09/05/21 0942     Color, UA Yellow     Clarity, UA Slightly Cloudy     Specific [place], UA 1 020     pH, UA 6 0     Leukocytes, UA Negative     Nitrite, UA Negative     Protein, UA Negative mg/dl      Glucose, UA Negative mg/dl      Ketones, UA Negative mg/dl      Urobilinogen, UA 0 2 E U /dl      Bilirubin, UA Negative     Blood, UA Negative    Ethanol [[id number]]  (Abnormal) Collected: 09/05/21 0924    Lab Status: Final result Specimen: Blood from Arm, Right Updated: 09/05/21 0941     Ethanol Lvl 53 mg/dL     Magnesium [[id number]]  (Abnormal) Collected: 09/05/21 0906    Lab Status: Final result Specimen: Blood Updated: 09/05/21 0937     Magnesium 1 5 mg/dL     Blood gas, venous [[id number]]  (Abnormal) Collected: 09/05/21 0924    Lab Status: Final result Specimen: Blood from Arm, Right Updated: 09/05/21 0933     pH, Shahid 7 448     pCO2, Shahid 40 1 mm Hg      pO2, Shahid 50 1 mm Hg      HCO3, Shahid 27 1 mmol/L      Base Excess, Shahid 2 9 mmol/L      O2 Content, Shahid 16 7 ml/dL      O2 HGB, VENOUS 82 6 %     Comprehensive metabolic panel [[id number]]  (Abnormal) Collected: 09/05/21 0906    Lab Status: Final result Specimen: Blood from Arm, Right Updated: 09/05/21 0931     Sodium 146 mmol/L      Potassium 3 4 mmol/L      Chloride 105 mmol/L      CO2 28 mmol/L      ANION GAP 13 mmol/L      BUN 14 mg/dL      Creatinine 0 66 mg/dL      Glucose 102 mg/dL      Calcium 8 3 mg/dL      AST 23 U/L      ALT 45 U/L      Alkaline Phosphatase 131 U/L      Total Protein 7 2 g/dL      Albumin 3 8 g/dL      Total Bilirubin 0 26 mg/dL      eGFR 97 ml/min/1 73sq m     Milli:      Romina guidelines for Chronic Kidney Disease (CKD):     Stage 1 with normal or high GFR (GFR > 90 mL/min/1 73 square meters)    Stage 2 Mild CKD (GFR = 60-89 mL/min/1 73 square meters)    Stage 3A Moderate CKD (GFR = 45-59 mL/min/1 73 square meters)    Stage 3B Moderate CKD (GFR = 30-44 mL/min/1 73 square meters)    Stage 4 Severe CKD (GFR = 15-29 mL/min/1 73 square meters)    Stage 5 End Stage CKD (GFR <15 mL/min/1 73 square meters)  Note: GFR calculation is accurate only with a steady state creatinine    Troponin I [[id number]]  (Normal) Collected: 09/05/21 0906    Lab Status: Final result Specimen: Blood from Arm, Right Updated: 09/05/21 0929     Troponin I <0 02 ng/mL     CBC and differential [[id number]]  (Abnormal) Collected: 09/05/21 0906    Lab Status: Final result Specimen: Blood from Arm, Right Updated: 09/05/21 0911     WBC 7 08 Thousand/uL      RBC 4 43 Million/uL      Hemoglobin 14 2 g/dL      Hematocrit 45 0 %       fL      MCH 32 1 pg      MCHC 31 6 g/dL      RDW 13 3 %      MPV 9 0 fL      Platelets 993 Thousands/uL      nRBC 0 /100 WBCs      Neutrophils Relative 68 %      Immat GRANS % 1 %      Lymphocytes Relative 20 %      Monocytes Relative 7 %      Eosinophils Relative 3 %      Basophils Relative 1 %      Neutrophils Absolute 4 80 Thousands/µL      Immature Grans Absolute 0 10 Thousand/uL      Lymphocytes Absolute 1 42 Thousands/µL      Monocytes Absolute 0 50 Thousand/µL      Eosinophils Absolute 0 18 Thousand/µL      Basophils Absolute 0 08 Thousands/µL                  CT head without contrast   Final Result by Wesley Villanueva MD (09/05 1012)      No acute intracranial abnormality  Workstation performed: [id number]         XR chest 1 view portable    (Results Pending)              Procedures  Procedures         ED Course  ED Course as of Sep 06 0744   Pardo Cousins Sep 05, 2021   0900 CIWA score 10 at arrival      0902 ECG NSR 65 bpm  Normal axis   QRS 94 Qtc 457  No ectopy  Mild baseline variability  No acute st/t changes  Interpreted by me  2097 There is mild hypernatremia  There is mild hypokalemia    Mild elevation in alkaline phosphatase   Comprehensive metabolic panel(!)   0043 WBC normal  Hemoglobin/hematocrit normal   Platelet normal   CBC and differential(!)   0942 Negative   Troponin I   0943 Mild hypomagnesemia  Will replete orally  Magnesium(!)   0943 Remains mildly elevated  Ethanol(!)   0943 High range specific gravity  Otherwise negative   UA w Reflex to Microscopic w Reflex to Culture   0943 There is mild respiratory alkalosis  Elevated PO2   Blood gas, venous(!)   1001 CT completed and awaiting interpretation      1001 Airway midline  Left basilar atelectasis with no focal consolidation  No pneumothorax  No pleural effusion  Cardiac silhouette within normal limits  Aortic ectasia  Osseous structures appear normal    XR chest 1 view portable   1016 IMPRESSION:     No acute intracranial abnormality  CT head without contrast   1115 Repeat CIWA score seven  Patient still feels anxious and is mildly tremulous in the extremities  He states the headache feels similar to when he arrived  He did feel that he benefited from the initial dose of diazepam   It is reasonable to trial an additional dose of diazepam   He does not appear to be in severe withdrawal       1142 D/w Dr Estee Alfaro of medical toxicology covering [facility] inpatient detox  Patient would be a candidate for inpatient detox but no beds available at [address] Was delayed re-evaluating the patient due to the critically ill patient required my prolonged attention  Discussed results of workup with him at length  Discussed the fact that inpatient admission for detox would be reasonable in this setting but there are no detox beds available in the dedicated unit  He was agreeable to admit here if that were felt necessary        Kaylan Nair 32 Text to Dr Foster Yarbrough of [facility]            MDM    Disposition  Final diagnoses:   Alcohol withdrawal ([facility] [address] )   Hypokalemia   Hypomagnesemia   Hypernatremia     Time reflects when diagnosis was documented in both MDM as applicable and the Disposition within this note     Time User Action Codes Description Comment    9/5/2021  1:32 PM Sachin Elver Add [S54 992] Alcohol withdrawal ([address] )     9/5/2021  1:33 PM Sachin Elver Add [E87 6] Hypokalemia     9/5/2021  1:33 PM Sachin Elver Add [E83 42] Hypomagnesemia     9/5/2021  1:33 PM Sachin Elver Add [E87 0] Hypernatremia     9/5/2021  2:57 PM Bulawrence Otter Add [F10 20] Alcohol use disorder, severe, dependence [facility])       ED Disposition     ED Disposition Condition Date/Time Comment    Admit Stable Sun Sep 5, 2021  1:32 PM Case was discussed with Dr Vinicio Whelan and the patient's admission status was agreed to be Admission Status: observation status to the service of Dr Vinicio Whelan   Follow-up Information    None         Current Discharge Medication List      CONTINUE these medications which have NOT CHANGED    Details   alfuzosin (UROXATRAL) 10 mg 24 hr tablet Take 40 mg by mouth daily        allopurinol (ZYLOPRIM) 100 mg tablet       amLODIPine (NORVASC) 5 mg tablet Take 1 tablet (5 mg total) by mouth daily  Qty: 30 tablet, Refills: 0    Associated Diagnoses: Hypertension, unspecified type      atorvastatin (LIPITOR) 40 mg tablet Take 40 mg by mouth daily      baclofen 10 mg tablet Take 10 mg by mouth Three times a day      cycloSPORINE (RESTASIS) 0 05 % ophthalmic emulsion 1 drop 2 (two) times a day      finasteride (PROSCAR) 5 mg tablet Take 5 mg by mouth daily      FLUoxetine (PROzac) 20 mg capsule Take 60 mg by mouth daily      LORazepam (Ativan) 1 mg tablet Take 1 tablet (1 mg total) by mouth 2 (two) times a day as needed for anxiety Do not drive or drink alcohol while using  Qty: 13 tablet, Refills: 0    Associated Diagnoses: History of alcohol use disorder      melatonin 3 mg Take 1 tablet (3 mg total) by mouth daily at bedtime  Qty: 30 tablet, Refills: 0    Associated Diagnoses: Insomnia, unspecified type      nadolol (CORGARD) 20 mg tablet Take 20 mg by mouth      NIFEdipine 0 3%-lidocaine 5% rectal ointment Apply 1 application topically every 4 (four) hours as needed for discomfort or pain Apply a small amount to anal fissure  Qty: 1 g, Refills: 0    Associated Diagnoses: External hemorrhoid      potassium chloride (K-DUR,KLOR-CON) 20 mEq tablet Take 1 tablet (20 mEq total) by mouth 2 (two) times a day  Qty: 20 tablet, Refills: 0    Associated Diagnoses: Hypokalemia      traZODone (DESYREL) 100 mg tablet Take 1 tablet (100 mg total) by mouth daily at bedtime  Qty: 30 tablet, Refills: 0    Associated Diagnoses: Unspecified mood (affective) disorder ([facility])      white petrolatum-mineral oil (EUCERIN,HYDROCERIN) cream Apply topically 3 (three) times a day as needed (pruritis)  Qty: 113 g, Refills: 0    Associated Diagnoses: Pruritus      folic acid (FOLVITE) 1 mg tablet Take 1 tablet (1 mg total) by mouth daily  Qty: 30 tablet, Refills: 0    Associated Diagnoses: Alcohol use disorder, severe, dependence ([facility])      magnesium oxide (MAG-OX) 400 mg Take 2 tablets (800 mg total) by mouth 2 (two) times a day for 3 days Take this in place of your normal magnesium dose  After this prescription is completed, please go back to your normal magnesium dose  Qty: 12 tablet, Refills: 0    Associated Diagnoses: Hypomagnesemia      oxazepam (SERAX) 10 mg capsule Take 1 capsule (10 mg total) by mouth every 8 (eight) hours for 2 days, THEN 1 capsule (10 mg total) 2 (two) times a day for 3 days, THEN 1 capsule (10 mg total) daily for 3 days  Qty: 15 capsule, Refills: 0    Associated Diagnoses: Alcohol withdrawal syndrome with complication (HCC)      psyllium (METAMUCIL SMOOTH TEXTURE) 28 % packet Take 1 packet by mouth 2 (two) times a day for 10 days  Qty: 20 packet, Refills: 0    Associated Diagnoses: External hemorrhoid      thiamine 100 MG tablet Take 1 tablet (100 mg total) by mouth daily  Qty: 30 tablet, Refills: 0    Associated Diagnoses: Alcohol abuse           No discharge procedures on file      PDMP Review       Value Time User    PDMP Reviewed  Yes 6/9/2021 12:02 PM Lalla Pontes Holter, DO          ED Provider  Electronically Signed by           Yang Mcmillan DO  09/06/21 1192

## 2021-09-05 NOTE — ASSESSMENT & PLAN NOTE
This is a chronic condition  Patient is stable  Home medications continued during admission  Assess vital signs daily

## 2021-09-05 NOTE — ASSESSMENT & PLAN NOTE
Patient presented to the ED with alcohol withdrawal symptoms (nausea, headache, palpitations, dizziness, anxiety, tremors)  Initial CIWA score was 10  His past medical history is remarkable for 40 year history of alcohol use  For the past 15 years he have been having admissions due to alcohol intoxication/withdrawal to the hospital   Most recent admission was last month  CIWA protocol activated  Chlordiazepoxide 10 mg Q 8 started  Electrolyte repletion started  Follow with a m  CMP, mg, pH  Check vital signs periodically  Reassess patient in the morning

## 2021-09-05 NOTE — H&P
[facility] [address] [place]  H&P- Henrine Nipper 1950, 70 y o  male MRN: [id number]  Unit/Bed#:  Encounter: [id number]  Primary Care Provider: Priti Che MD   Date and time admitted to hospital: 9/5/2021  8:44 AM    * Alcohol withdrawal [facility])  Assessment & Plan  Patient presented to the ED with alcohol withdrawal symptoms (nausea, headache, palpitations, dizziness, anxiety, tremors)  Initial CIWA score was 10  His past medical history is remarkable for 40 year history of alcohol use  For the past 15 years he have been having admissions due to alcohol intoxication/withdrawal to the hospital   Most recent admission was last month  CIWA protocol activated  Chlordiazepoxide 10 mg Q 8 started  Electrolyte repletion started  Follow with a m  CMP, mg, pH  Check vital signs periodically  Reassess patient in the morning  Alcohol use disorder, severe, dependence ([facility] [address] )  Assessment & Plan  This is a chronic condition  Patient has a history of alcohol use disorder for the past 40 years  Have been progressing for the past 15 years  He have repetitive admissions due to intoxication/withdrawal during past several years most recently a month ago  The patient admits that he drank 1 pt of vodka yesterday  Last drink was at 5:00 p m  Lianet Jacobo Symptoms are consistent with alcohol withdrawal     CIWA protocol activated  Chlordiazepoxide 25 mg Q 8 started  Electrolyte repletion started  Follow with a m  CMP, mg, pH  Check vital signs periodically  Reassess patient in the morning  Essential hypertension  Assessment & Plan  This is a chronic condition  Patient is stable  Home medications continued during admission  Assess vital signs daily  Hypomagnesemia  Assessment & Plan  Initially serum magnesium level was 1 5  Repleted with 2 g IV magnesium sulfate  Follow-up with labs      VTE Prophylaxis: Enoxaparin (Lovenox)  / sequential compression device   Code Status: full  POLST: POLST form is on file already (pre-hospital)  Discussion with family: none available at bedside  Anticipated Length of Stay:  Patient will be admitted on an Observation basis with an anticipated length of stay of  2 or more midnights  Justification for Hospital Stay:  Alcohol withdrawal/dependence  Total Time for Visit, including Counseling / Coordination of Care: 60 minutes  Greater than 50% of this total time spent on direct patient counseling and coordination of care  Chief Complaint:   Tremors, nausea, headache for 1 day due to alcohol intoxication  History of Present Illness:    Saroj Garcia is a 70 y o  male with a PMH of alcohol use disorder, anxiety, depression, hypertension presents to the emergency department stating that he feels tremulous and anxious since yesterday with a lightheaded sensation and diffuse dull/throbbing headache  He have been drinking alcohol every day for the past week and consumed 1 pt of vodka yesterday  The last drink was at 6:00 p m  Since then he started feeling tremulous, anxious, and had headaches  Symptoms have progressed since onset and he was not able to sleep during the night  The patient states that he has been consuming alcohol for the past 40 years  Since 15 years his alcohol consumption have increased remarkably  His medical history is notable for multiple admissions per year for the past several years due to alcohol intoxication/withdrawal   Most recent admission was on June 2021 due to alcohol intoxication  Review of Systems:    Review of Systems   Constitutional: Positive for diaphoresis and fatigue  Negative for appetite change, chills, fever and unexpected weight change  HENT: Negative for postnasal drip and rhinorrhea  Respiratory: Negative for apnea, cough, choking, chest tightness, shortness of breath, wheezing and stridor  Cardiovascular: Negative for chest pain, palpitations and leg swelling     Gastrointestinal: Positive for nausea  Negative for abdominal pain, constipation, diarrhea and vomiting  Genitourinary: Negative for hematuria and urgency  Neurological: Positive for tremors and headaches  Negative for dizziness, seizures, facial asymmetry and weakness  Psychiatric/Behavioral: Negative for agitation, confusion, dysphoric mood, hallucinations and suicidal ideas  The patient is nervous/anxious  Past Medical and Surgical History:     Past Medical History:   Diagnosis Date    Alcohol dependency ([facility] [address]     Anxiety     Depression     [place]     Hypertension     Kidney stones     Prostate enlargement     Renal disorder     kidney    Shoulder dislocation        Past Surgical History:   Procedure Laterality Date    CHOLECYSTECTOMY      SHOULDER SURGERY      for dislocation       Meds/Allergies:    Prior to Admission medications    Medication Sig Start Date End Date Taking? Authorizing Provider   alfuzosin (UROXATRAL) 10 mg 24 hr tablet Take 40 mg by mouth daily     Yes Historical Provider, MD   allopurinol (ZYLOPRIM) 100 mg tablet    Yes Historical Provider, MD   amLODIPine (NORVASC) 5 mg tablet Take 1 tablet (5 mg total) by mouth daily 7/16/19  Yes Jody Kenyon MD   atorvastatin (LIPITOR) 40 mg tablet Take 40 mg by mouth daily 4/28/21  Yes Historical Provider, MD   baclofen 10 mg tablet Take 10 mg by mouth Three times a day 3/4/21 3/4/22 Yes Historical Provider, MD   cycloSPORINE (RESTASIS) 0 05 % ophthalmic emulsion 1 drop 2 (two) times a day   Yes Historical Provider, MD   finasteride (PROSCAR) 5 mg tablet Take 5 mg by mouth daily   Yes Historical Provider, MD   FLUoxetine (PROzac) 20 mg capsule Take 60 mg by mouth daily 4/1/21  Yes Historical Provider, MD   LORazepam (Ativan) 1 mg tablet Take 1 tablet (1 mg total) by mouth 2 (two) times a day as needed for anxiety Do not drive or drink alcohol while using 7/3/21  Yes Heidy Rider,    melatonin 3 mg Take 1 tablet (3 mg total) by mouth daily at bedtime 6/9/21  Yes Jordan C Holter, DO   nadolol (CORGARD) 20 mg tablet Take 20 mg by mouth 3/4/21 3/4/22 Yes Yana Storm MD   NIFEdipine 0 3%-lidocaine 5% rectal ointment Apply 1 application topically every 4 (four) hours as needed for discomfort or pain Apply a small amount to anal fissure 7/21/21  Yes Henry Pichardo MD   potassium chloride (K-DUR,KLOR-CON) 20 mEq tablet Take 1 tablet (20 mEq total) by mouth 2 (two) times a day 7/3/21  Yes Mandeep Alfaro DO   traZODone (DESYREL) 100 mg tablet Take 1 tablet (100 mg total) by mouth daily at bedtime 6/9/21  Yes [place] C Holter, DO   white petrolatum-mineral oil (EUCERIN,HYDROCERIN) cream Apply topically 3 (three) times a day as needed (pruritis) 6/9/21  Yes [place] C Holter, DO   folic acid (FOLVITE) 1 mg tablet Take 1 tablet (1 mg total) by mouth daily 6/10/21   [place] C Holter, DO   magnesium oxide (MAG-OX) 400 mg Take 2 tablets (800 mg total) by mouth 2 (two) times a day for 3 days Take this in place of your normal magnesium dose  After this prescription is completed, please go back to your normal magnesium dose  7/3/21 7/6/21  Mandeep Alfaro DO   oxazepam (SERAX) 10 mg capsule Take 1 capsule (10 mg total) by mouth every 8 (eight) hours for 2 days, THEN 1 capsule (10 mg total) 2 (two) times a day for 3 days, THEN 1 capsule (10 mg total) daily for 3 days  8/4/21 8/12/21  Nathaniel Jacobs MD   psyllium (METAMUCIL SMOOTH TEXTURE) 28 % packet Take 1 packet by mouth 2 (two) times a day for 10 days 7/21/21 7/31/21  Henry Pichardo MD   thiamine 100 MG tablet Take 1 tablet (100 mg total) by mouth daily 6/22/21 7/22/21  MALGORZATA Marin     I have reviewed home medications with patient personally  Allergies:    Allergies   Allergen Reactions    Sulfa Antibiotics Anaphylaxis and Rash    Sulfur Rash and Edema       Social History:     Marital Status: /Civil Union   Occupation:  Retired  Patient Pre-hospital Living Situation: Stable  Patient Pre-hospital Level of Mobility:  Within normal   Patient Pre-hospital Diet Restrictions:  None  Substance Use History:   Social History     Substance and Sexual Activity   Alcohol Use Yes    Alcohol/week: 6 0 standard drinks    Types: 6 Shots of liquor per week    Comment: 1/2- 1 jovanny martineza daily     Social History     Tobacco Use   Smoking Status Never Smoker   Smokeless Tobacco Never Used     Social History     Substance and Sexual Activity   Drug Use Never       Family History:    History reviewed  No pertinent family history  Physical Exam:     Vitals:   Blood Pressure: 124/59 (09/05/21 1800)  Pulse: 69 (09/05/21 1800)  Temperature: 98 °F (36 7 °C) (09/05/21 1611)  Temp Source: Temporal (09/05/21 1611)  Respirations: 16 (09/05/21 1611)  Height: 5' 7" (170 2 cm) (09/05/21 1426)  Weight - Scale: 94 5 kg (208 lb 5 4 oz) (09/05/21 1426)  SpO2: 93 % (09/05/21 1525)    Physical Exam  Constitutional:       General: He is not in acute distress  HENT:      Head: Normocephalic and atraumatic  Mouth/Throat:      Mouth: Mucous membranes are moist    Eyes:      Conjunctiva/sclera: Conjunctivae normal       Pupils: Pupils are equal, round, and reactive to light  Cardiovascular:      Rate and Rhythm: Normal rate and regular rhythm  Pulses: Normal pulses  Carotid pulses are 2+ on the right side and 2+ on the left side  Radial pulses are 2+ on the right side and 2+ on the left side  Dorsalis pedis pulses are 2+ on the right side and 2+ on the left side  Heart sounds: Normal heart sounds, S1 normal and S2 normal  No murmur heard  Pulmonary:      Effort: No tachypnea, bradypnea or accessory muscle usage  Breath sounds: Normal air entry  Examination of the right-middle field reveals rales  Examination of the left-middle field reveals rales  Rales present  No decreased breath sounds, wheezing or rhonchi  Abdominal:      General: Abdomen is flat   Bowel sounds are decreased  Palpations: Abdomen is soft  There is no fluid wave or pulsatile mass  Tenderness: There is no abdominal tenderness  Musculoskeletal:      Right lower leg: No edema  Left lower leg: No edema  Neurological:      General: No focal deficit present  Mental Status: He is alert and oriented to person, place, and time  Additional Data:     Lab Results: I have personally reviewed pertinent reports  Results from last 7 days   Lab Units 09/05/21  0906   WBC Thousand/uL 7 08   HEMOGLOBIN g/dL 14 2   HEMATOCRIT % 45 0   PLATELETS Thousands/uL 232   NEUTROS PCT % 68   LYMPHS PCT % 20   MONOS PCT % 7   EOS PCT % 3     Results from last 7 days   Lab Units 09/05/21  1610   SODIUM mmol/L 144   POTASSIUM mmol/L 3 6   CHLORIDE mmol/L 103   CO2 mmol/L 30   BUN mg/dL 11   CREATININE mg/dL 0 72   ANION GAP mmol/L 11   CALCIUM mg/dL 7 9*   ALBUMIN g/dL 3 7   TOTAL BILIRUBIN mg/dL 0 48   ALK PHOS U/L 121*   ALT U/L 42   AST U/L 25   GLUCOSE RANDOM mg/dL 84                       Imaging: I have personally reviewed pertinent reports  CT head without contrast   Final Result by Forrest Moore MD (09/05 1012)      No acute intracranial abnormality  Workstation performed: [id number]         XR chest 1 view portable    (Results Pending)       EKG, Pathology, and Other Studies Reviewed on Admission:   · EKG:  Normal sinus rhythm  No acute changes  Allscripts / Epic Records Reviewed: Yes     ** Please Note: This note has been constructed using a voice recognition system   **

## 2021-09-05 NOTE — ASSESSMENT & PLAN NOTE
Patient presented to the ED with alcohol withdrawal symptoms (nausea, headache, palpitations, dizziness, anxiety, tremors)  Initial CIWA score was 10  His past medical history is remarkable for 40 year history of alcohol use  For the past 15 years he have been having admissions due to alcohol intoxication/withdrawal to the hospital   Most recent admission was last month  Continue on CIWA protocol   Chlordiazepoxide 10 mg Q 8 started  Follow with a m  CMP, mg, pH  Check vital signs periodically  Reassess patient in the morning

## 2021-09-05 NOTE — PLAN OF CARE
Problem: Potential for Falls  Goal: Patient will remain free of falls  Description: INTERVENTIONS:  - Educate patient/family on patient safety including physical limitations  - Instruct patient to call for assistance with activity   - Consult OT/PT to assist with strengthening/mobility   - Keep Call bell within reach  - Keep bed low and locked with side rails adjusted as appropriate  - Keep care items and personal belongings within reach  - Initiate and maintain comfort rounds  - Make Fall Risk Sign visible to staff  - Offer Toileting every Hours, in advance of need  - Initiate/Maintain alarm  - Obtain necessary fall risk management equipment:   - Apply yellow socks and bracelet for high fall risk patients  - Consider moving patient to room near nurses station  Outcome: Progressing     Problem: MOBILITY - ADULT  Goal: Maintain or return to baseline ADL function  Description: INTERVENTIONS:  -  Assess patient's ability to carry out ADLs; assess patient's baseline for ADL function and identify physical deficits which impact ability to perform ADLs (bathing, care of mouth/teeth, toileting, grooming, dressing, etc )  - Assess/evaluate cause of self-care deficits   - Assess range of motion  - Assess patient's mobility; develop plan if impaired  - Assess patient's need for assistive devices and provide as appropriate  - Encourage maximum independence but intervene and supervise when necessary  - Involve family in performance of ADLs  - Assess for home care needs following discharge   - Consider OT consult to assist with ADL evaluation and planning for discharge  - Provide patient education as appropriate  Outcome: Progressing  Goal: Maintains/Returns to pre admission functional level  Description: INTERVENTIONS:  - Perform BMAT or MOVE assessment daily    - Set and communicate daily mobility goal to care team and patient/family/caregiver     - Collaborate with rehabilitation services on mobility goals if consulted  - Perform Range of Motion  times a day  - Reposition patient every hours    - Dangle patient times a day  - Stand patient times a day  - Ambulate patient  times a day  - Out of bed to chair times a day   - Out of bed for meals times a day  - Out of bed for toileting  - Record patient progress and toleration of activity level   Outcome: Progressing     Problem: NEUROSENSORY - ADULT  Goal: Achieves stable or improved neurological status  Description: INTERVENTIONS  - Monitor and report changes in neurological status  - Monitor vital signs such as temperature, blood pressure, glucose, and any other labs ordered   - Initiate measures to prevent increased intracranial pressure  - Monitor for seizure activity and implement precautions if appropriate      Outcome: Progressing  Goal: Remains free of injury related to seizures activity  Description: INTERVENTIONS  - Maintain airway, patient safety  and administer oxygen as ordered  - Monitor patient for seizure activity, document and report duration and description of seizure to physician/advanced practitioner  - If seizure occurs,  ensure patient safety during seizure  - Reorient patient post seizure  - Seizure pads on all 4 side rails  - Instruct patient/family to notify RN of any seizure activity including if an aura is experienced  - Instruct patient/family to call for assistance with activity based on nursing assessment  - Administer anti-seizure medications if ordered    Outcome: Progressing     Problem: GASTROINTESTINAL - ADULT  Goal: Maintains or returns to baseline bowel function  Description: INTERVENTIONS:  - Assess bowel function  - Encourage oral fluids to ensure adequate hydration  - Administer IV fluids if ordered to ensure adequate hydration  - Administer ordered medications as needed  - Encourage mobilization and activity  - Consider nutritional services referral to assist patient with adequate nutrition and appropriate food choices  Outcome: Progressing  Goal: Oral mucous membranes remain intact  Description: INTERVENTIONS  - Assess oral mucosa and hygiene practices  - Implement preventative oral hygiene regimen  - Implement oral medicated treatments as ordered  - Initiate Nutrition services referral as needed  Outcome: Progressing     Problem: METABOLIC, FLUID AND ELECTROLYTES - ADULT  Goal: Electrolytes maintained within normal limits  Description: INTERVENTIONS:  - Monitor labs and assess patient for signs and symptoms of electrolyte imbalances  - Administer electrolyte replacement as ordered  - Monitor response to electrolyte replacements, including repeat lab results as appropriate  - Instruct patient on fluid and nutrition as appropriate  Outcome: Progressing  Goal: Glucose maintained within target range  Description: INTERVENTIONS:  - Monitor Blood Glucose as ordered  - Assess for signs and symptoms of hyperglycemia and hypoglycemia  - Administer ordered medications to maintain glucose within target range  - Assess nutritional intake and initiate nutrition service referral as needed  Outcome: Progressing     Problem: PAIN - ADULT  Goal: Verbalizes/displays adequate comfort level or baseline comfort level  Description: Interventions:  - Encourage patient to monitor pain and request assistance  - Assess pain using appropriate pain scale  - Administer analgesics based on type and severity of pain and evaluate response  - Implement non-pharmacological measures as appropriate and evaluate response  - Consider cultural and social influences on pain and pain management  - Notify physician/advanced practitioner if interventions unsuccessful or patient reports new pain  Outcome: Progressing     Problem: INFECTION - ADULT  Goal: Absence or prevention of progression during hospitalization  Description: INTERVENTIONS:  - Assess and monitor for signs and symptoms of infection  - Monitor lab/diagnostic results  - Monitor all insertion sites, i e  indwelling lines, tubes, and drains  - Monitor endotracheal if appropriate and nasal secretions for changes in amount and color  - [place] appropriate cooling/warming therapies per order  - Administer medications as ordered  - Instruct and encourage patient and family to use good hand hygiene technique  - Identify and instruct in appropriate isolation precautions for identified infection/condition  Outcome: Progressing  Goal: Absence of fever/infection during neutropenic period  Description: INTERVENTIONS:  - Monitor WBC    Outcome: Progressing     Problem: SAFETY ADULT  Goal: Patient will remain free of falls  Description: INTERVENTIONS:  - Educate patient/family on patient safety including physical limitations  - Instruct patient to call for assistance with activity   - Consult OT/PT to assist with strengthening/mobility   - Keep Call bell within reach  - Keep bed low and locked with side rails adjusted as appropriate  - Keep care items and personal belongings within reach  - Initiate and maintain comfort rounds  - Make Fall Risk Sign visible to staff  - Offer Toileting every Hours, in advance of need  - Initiate/Maintain alarm  - Obtain necessary fall risk management equipment:   - Apply yellow socks and bracelet for high fall risk patients  - Consider moving patient to room near nurses station  Outcome: Progressing  Goal: Maintain or return to baseline ADL function  Description: INTERVENTIONS:  -  Assess patient's ability to carry out ADLs; assess patient's baseline for ADL function and identify physical deficits which impact ability to perform ADLs (bathing, care of mouth/teeth, toileting, grooming, dressing, etc )  - Assess/evaluate cause of self-care deficits   - Assess range of motion  - Assess patient's mobility; develop plan if impaired  - Assess patient's need for assistive devices and provide as appropriate  - Encourage maximum independence but intervene and supervise when necessary  - Involve family in performance of ADLs  - Assess for home care needs following discharge   - Consider OT consult to assist with ADL evaluation and planning for discharge  - Provide patient education as appropriate  Outcome: Progressing  Goal: Maintains/Returns to pre admission functional level  Description: INTERVENTIONS:  - Perform BMAT or MOVE assessment daily    - Set and communicate daily mobility goal to care team and patient/family/caregiver  - Collaborate with rehabilitation services on mobility goals if consulted  - Perform Range of Motion  times a day  - Reposition patient every hours  - Dangle patient times a day  - Stand patient times a day  - Ambulate patient  times a day  - Out of bed to chair times a day   - Out of bed for meals times a day  - Out of bed for toileting  - Record patient progress and toleration of activity level   Outcome: Progressing     Problem: DISCHARGE PLANNING  Goal: Discharge to home or other facility with appropriate resources  Description: INTERVENTIONS:  - Identify barriers to discharge w/patient and caregiver  - Arrange for needed discharge resources and transportation as appropriate  - Identify discharge learning needs (meds, wound care, etc )  - Arrange for interpretive services to assist at discharge as needed  - Refer to Case Management Department for coordinating discharge planning if the patient needs post-hospital services based on physician/advanced practitioner order or complex needs related to functional status, cognitive ability, or social support system  Outcome: Progressing     Problem: Knowledge Deficit  Goal: Patient/family/caregiver demonstrates understanding of disease process, treatment plan, medications, and discharge instructions  Description: Complete learning assessment and assess knowledge base    Interventions:  - Provide teaching at level of understanding  - Provide teaching via preferred learning methods  Outcome: Progressing

## 2021-09-05 NOTE — ED NOTES
Patients oxygen saturation dropped to 86% while sleeping  He was placed on 3L NC and oxygen saturation returned to 94%  Dr Serenity Castrejon RN  09/05/21 9359

## 2021-09-06 PROBLEM — R00.1 BRADYCARDIA: Status: ACTIVE | Noted: 2021-09-06

## 2021-09-06 PROBLEM — E83.42 HYPOMAGNESEMIA: Chronic | Status: RESOLVED | Noted: 2019-07-13 | Resolved: 2021-09-06

## 2021-09-06 LAB
ALBUMIN SERPL BCP-MCNC: 3.2 G/DL (ref 3.5–5)
ALP SERPL-CCNC: 100 U/L (ref 46–116)
ALT SERPL W P-5'-P-CCNC: 34 U/L (ref 12–78)
ANION GAP SERPL CALCULATED.3IONS-SCNC: 5 MMOL/L (ref 4–13)
AST SERPL W P-5'-P-CCNC: 16 U/L (ref 5–45)
BILIRUB SERPL-MCNC: 0.47 MG/DL (ref 0.2–1)
BUN SERPL-MCNC: 10 MG/DL (ref 5–25)
CALCIUM ALBUM COR SERPL-MCNC: 8.6 MG/DL (ref 8.3–10.1)
CALCIUM SERPL-MCNC: 8 MG/DL (ref 8.3–10.1)
CHLORIDE SERPL-SCNC: 105 MMOL/L (ref 100–108)
CO2 SERPL-SCNC: 31 MMOL/L (ref 21–32)
CREAT SERPL-MCNC: 0.74 MG/DL (ref 0.6–1.3)
GFR SERPL CREATININE-BSD FRML MDRD: 93 ML/MIN/1.73SQ M
GLUCOSE SERPL-MCNC: 101 MG/DL (ref 65–140)
LACTATE SERPL-SCNC: 0.8 MMOL/L (ref 0.5–2)
MAGNESIUM SERPL-MCNC: 1.9 MG/DL (ref 1.6–2.6)
PHOSPHATE SERPL-MCNC: 3 MG/DL (ref 2.3–4.1)
POTASSIUM SERPL-SCNC: 3.8 MMOL/L (ref 3.5–5.3)
PROT SERPL-MCNC: 6 G/DL (ref 6.4–8.2)
SARS-COV-2 RNA RESP QL NAA+PROBE: NEGATIVE
SODIUM SERPL-SCNC: 141 MMOL/L (ref 136–145)

## 2021-09-06 PROCEDURE — 97162 PT EVAL MOD COMPLEX 30 MIN: CPT

## 2021-09-06 PROCEDURE — U0003 INFECTIOUS AGENT DETECTION BY NUCLEIC ACID (DNA OR RNA); SEVERE ACUTE RESPIRATORY SYNDROME CORONAVIRUS 2 (SARS-COV-2) (CORONAVIRUS DISEASE [COVID-19]), AMPLIFIED PROBE TECHNIQUE, MAKING USE OF HIGH THROUGHPUT TECHNOLOGIES AS DESCRIBED BY CMS-2020-01-R: HCPCS | Performed by: INTERNAL MEDICINE

## 2021-09-06 PROCEDURE — 99232 SBSQ HOSP IP/OBS MODERATE 35: CPT | Performed by: INTERNAL MEDICINE

## 2021-09-06 PROCEDURE — 84100 ASSAY OF PHOSPHORUS: CPT

## 2021-09-06 PROCEDURE — 97165 OT EVAL LOW COMPLEX 30 MIN: CPT

## 2021-09-06 PROCEDURE — 83605 ASSAY OF LACTIC ACID: CPT

## 2021-09-06 PROCEDURE — U0005 INFEC AGEN DETEC AMPLI PROBE: HCPCS | Performed by: INTERNAL MEDICINE

## 2021-09-06 PROCEDURE — 83735 ASSAY OF MAGNESIUM: CPT

## 2021-09-06 PROCEDURE — 80053 COMPREHEN METABOLIC PANEL: CPT | Performed by: INTERNAL MEDICINE

## 2021-09-06 RX ORDER — POTASSIUM CHLORIDE 20 MEQ/1
20 TABLET, EXTENDED RELEASE ORAL ONCE
Status: COMPLETED | OUTPATIENT
Start: 2021-09-06 | End: 2021-09-06

## 2021-09-06 RX ADMIN — POTASSIUM CHLORIDE AND SODIUM CHLORIDE 100 ML/HR: 450; 150 INJECTION, SOLUTION INTRAVENOUS at 13:57

## 2021-09-06 RX ADMIN — CHLORDIAZEPOXIDE HYDROCHLORIDE 10 MG: 5 CAPSULE ORAL at 06:34

## 2021-09-06 RX ADMIN — FOLIC ACID 1 MG: 1 TABLET ORAL at 09:38

## 2021-09-06 RX ADMIN — ATORVASTATIN CALCIUM 40 MG: 40 TABLET, FILM COATED ORAL at 09:38

## 2021-09-06 RX ADMIN — POTASSIUM CHLORIDE 20 MEQ: 1500 TABLET, EXTENDED RELEASE ORAL at 09:36

## 2021-09-06 RX ADMIN — BACLOFEN 10 MG: 10 TABLET ORAL at 18:21

## 2021-09-06 RX ADMIN — ENOXAPARIN SODIUM 40 MG: 40 INJECTION SUBCUTANEOUS at 18:21

## 2021-09-06 RX ADMIN — MAGNESIUM OXIDE TAB 400 MG (241.3 MG ELEMENTAL MG) 400 MG: 400 (241.3 MG) TAB at 09:36

## 2021-09-06 RX ADMIN — CHLORDIAZEPOXIDE HYDROCHLORIDE 10 MG: 5 CAPSULE ORAL at 13:46

## 2021-09-06 RX ADMIN — BACLOFEN 10 MG: 10 TABLET ORAL at 09:38

## 2021-09-06 RX ADMIN — POTASSIUM CHLORIDE AND SODIUM CHLORIDE 100 ML/HR: 450; 150 INJECTION, SOLUTION INTRAVENOUS at 03:39

## 2021-09-06 RX ADMIN — THIAMINE HCL TAB 100 MG 100 MG: 100 TAB at 09:36

## 2021-09-06 RX ADMIN — CHLORDIAZEPOXIDE HYDROCHLORIDE 10 MG: 5 CAPSULE ORAL at 22:25

## 2021-09-06 RX ADMIN — FINASTERIDE 5 MG: 5 TABLET, FILM COATED ORAL at 09:38

## 2021-09-06 RX ADMIN — MELATONIN 3 MG: 3 TAB ORAL at 22:25

## 2021-09-06 RX ADMIN — FLUOXETINE 60 MG: 20 CAPSULE ORAL at 09:36

## 2021-09-06 RX ADMIN — MULTIPLE VITAMINS W/ MINERALS TAB 1 TABLET: TAB at 09:36

## 2021-09-06 RX ADMIN — AMLODIPINE BESYLATE 5 MG: 5 TABLET ORAL at 09:38

## 2021-09-06 RX ADMIN — ACETAMINOPHEN 650 MG: 325 TABLET, FILM COATED ORAL at 02:46

## 2021-09-06 RX ADMIN — TRAZODONE HYDROCHLORIDE 100 MG: 50 TABLET ORAL at 22:24

## 2021-09-06 RX ADMIN — ALLOPURINOL 100 MG: 100 TABLET ORAL at 09:36

## 2021-09-06 RX ADMIN — NADOLOL 20 MG: 40 TABLET ORAL at 09:36

## 2021-09-06 NOTE — OCCUPATIONAL THERAPY NOTE
Occupational Therapy Evaluation     Patient Name: Sol Palomares  ZCCXV'Z Date: 9/6/2021  Problem List  Principal Problem:    Alcohol withdrawal ([facility] )  Active Problems:    Essential hypertension    Alcohol use disorder, severe, dependence ([facility] )    Bradycardia    Past Medical History  Past Medical History:   Diagnosis Date    Alcohol dependency ([facility] )     Anxiety     Depression     [place] (hard of hearing)     Hypertension     Kidney stones     Prostate enlargement     Renal disorder     kidney    Shoulder dislocation      Past Surgical History  Past Surgical History:   Procedure Laterality Date    CHOLECYSTECTOMY      SHOULDER SURGERY      for dislocation        09/06/21 1417   OT Last Visit   OT Visit Date 09/06/21   Note Type   Note type Evaluation   Restrictions/Precautions   Weight Bearing Precautions Per Order No   Other Precautions Multiple lines; Fall Risk   Pain Assessment   Pain Assessment Tool Pain Assessment not indicated - pt denies pain   Pain Score No Pain   Home Living   Type of Home House   Home Layout Two level; Able to live on main level with bedroom/bathroom;Stairs to enter without rails  (1 ESCOBAR without HR)   Bathroom Shower/Tub Tub/shower unit   Bathroom Toilet Standard   Bathroom Equipment Grab bars around toilet; Shower chair   Bathroom Accessibility Accessible   Home Equipment Walker;Cane   Additional Comments Pt reports use of SPC for functional mobility at baseline  Prior Function   Level of [place] Independent with ADLs and functional mobility   Lives With Spouse   Receives Help From Family   ADL Assistance Independent   IADLs Independent   Falls in the last 6 months 0   Vocational Part time employment  ()   Comments Pt drives  Lifestyle   Autonomy Pt reporting independence in ADLs, IADLs and use of SPC for functional mobility      Reciprocal Relationships wife   Psychosocial   Psychosocial (WDL) WDL   Subjective   Subjective "they normally start me off with the walker"   ADL   Where Assessed Chair   Additional Comments Pt asked if he was able to reach bilateral feet to don/doff socks, pt demonstrated how he was able to quickly and easily pull socks up and down while seated in recliner  Bed Mobility   Additional Comments Pt seated EOB at start of session  Pt on RA  Vitals WNL  Transfers   Sit to Stand 7  Independent   Stand to Sit 7  Independent   Additional Comments No LOB or unsteadiness  Functional Mobility   Functional Mobility 7  Independent   Additional Comments Pt performed functional mobility in hallway, ~400 feet, with use of RW and independently  Additional items Rolling walker   Balance   Static Sitting Normal   Dynamic Sitting Good   Static Standing Good   Dynamic Standing Good   Ambulatory Good   Activity Tolerance   Activity Tolerance Patient tolerated treatment well   Medical Staff Made Aware RN verbalized pt appropriate for OT eval  PT present for co-eval due to pt's medical complexity, requiring 2 skilled therapists to ensure pt's safety when completing evaluation  RUE Assessment   RUE Assessment WFL   LUE Assessment   LUE Assessment WFL   Hand Function   Gross Motor Coordination Functional   Fine Motor Coordination Functional   Cognition   Overall Cognitive Status WFL   Arousal/Participation Alert; Responsive; Cooperative   Attention Within functional limits   Orientation Level Oriented X4   Memory Within functional limits   Following Commands Follows all commands and directions without difficulty   Comments Pt agreeable to OT evaluation  Assessment   Assessment Pt is a 70 y o  male who was admitted to [address] [place] [address],[address] on 9/5/2021 with Alcohol withdrawal ([address] )  At this time, patient is also affected by the comorbidities of: HTN, alcohol use disorder, BPH, tremor of left hand, hyperlipidemia, alcohol induced fatty liver   Additionally, patient is affected by the following personal factors:steps to enter environment and health management   Orders placed for OT evaluation/treatment  Prior to admission, pt was independent in ADLs, IADLs, and use of SPC for functional mobility  Upon OT evaluation, patient is independent  for UB ADLs and independent  for LB ADLs  Patient presents with functional use of BUEs, with intact prehension and fine motor coordination, and symmetrical muscle strength  Patient appears to be functioning at baseline, no further Acute OT needs identified at this time to warrant OT services  D/C OT and from OT standpoint, recommendation at time of d/c would be home independent   Plan   OT Frequency Eval only   Recommendation   OT Discharge Recommendation No rehabilitation needs   OT - OK to Discharge Yes  (once medically cleared)   Additional Comments  Pt left seated in recliner at end of session, all needs met, call bell within reach     AM-PAC Daily Activity Inpatient   Lower Body Dressing 4   Bathing 4   Toileting 4   Upper Body Dressing 4   Grooming 4   Eating 4   Daily Activity Raw Score 24   Daily Activity Standardized Score (Calc for Raw Score >=11) 57 54   AM-PAC Applied Cognition Inpatient   Following a Speech/Presentation 4   Understanding Ordinary Conversation 4   Taking Medications 4   Remembering Where Things Are Placed or Put Away 4   Remembering List of 4-5 Errands 4   Taking Care of Complicated Tasks 4   Applied Cognition Raw Score 24   Applied Cognition Standardized Score 62 21        Ada Fermo, OT

## 2021-09-06 NOTE — TELEMEDICINE
TeleConsultation - [address] [facility] 70 y o  male MRN: [id number]  Unit/Bed#:  Encounter: [id number]        REQUIRED DOCUMENTATION:     1  This service was provided via Telemedicine  2  Provider located at [place]  3  TeleMed provider: Zachary Garces  4  Identify all parties in room with patient during tele consult:  patient  5  Patient was then informed that this was a Telemedicine visit and that the exam was being conducted confidentially over secure lines  My office door was closed  No one else was in the room  Patient acknowledged consent and understanding of privacy and security of the Telemedicine visit, and gave us permission to have the assistant stay in the room in order to assist with the history and to conduct the exam   I informed the patient that I have reviewed their record in Epic and presented the opportunity for them to ask any questions regarding the visit today  The patient agreed to participate  Assessment/Plan     Assessment:  Alcohol use disorder; alcohol withdrawal    Plan:   Continue current management of alcohol withdrawal   Continue current psychiatric medication  Upon medical clearance alcohol rehab is indicated  Patient is open to inpatient rehab if his insurance will pay for  He would like to review options that would be available for him with   If inpatient rehab is not option from an insurance perspective, intensive outpatient treatment/partial hospitalization would be the most ideal step-down following detox short of inpatient rehab  Vivitrol would be worthy of consideration once the patient has completed detox and is medically cleared  Chief Complaint:  I am here for detox    History of Present Illness     Reason for Consult / Principal Problem:  Alcohol use disorder and alcohol withdrawal    Patient is a 70 y o  male who presents for alcohol withdrawal detox    He states he is the drinking about a pt of vodka every other day over the last 3 months  He reports that alcohol was never a problem for him until he was about 3years old  He states he has attempted to do inpatient rehab the past but was not able locate anything that would be covered by his insurance  He has done IOP rehab before as well as 12 step programming  Past psychiatric history:  Other than the rehab admission above unaware of any other past psychiatric history  Social/family history:  Patient is  and has an adult daughter  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 making sure that he does not have any alcohol in his system when he does so  Mental status examination:  The patient is alert and grossly oriented in all spheres  He is seated for the evaluation  Sensorium is clear  Speech is unremarkable  He is somewhat hard of hearing  Thought process is logical linear  Thought content is reality based  He denies suicidal homicidal ideation  He denies any hallucinations and other psychotic features  Insight and judgment are intact she is expressing motivation for rehab following detox      Inpatient consult to Psychiatry  Consult performed by: Margaret Alves  Consult ordered by: Shorty De La Torre MD            Past Medical History:   Diagnosis Date    [facility] dependency ([facility] )     Anxiety     Depression     [place] (hard of hearing)     Hypertension     Kidney stones     Prostate enlargement     Renal disorder     kidney    Shoulder dislocation        Medical Review Of Systems:  Review of Systems    Meds/Allergies   all current active meds have been reviewed  Allergies   Allergen Reactions    Sulfa Antibiotics Anaphylaxis and Rash    Sulfur Rash and Edema       Objective   Vital signs in last 24 hours:  Temp:  [98 °F (36 7 °C)-100 3 °F (37 9 °C)] 98 °F (36 7 °C)  HR:  [60-71] 60  Resp:  [16-18] 16  BP: (117-185)/(59-90) 136/66      Intake/Output Summary (Last 24 hours) at 9/5/2021 2020  Last data filed at 9/5/2021 1825  Gross per 24 hour   Intake 1600 ml   Output 225 ml   Net 1375 ml     Lab Results:  Reviewed  Imaging Studies:  Reviewed  EKG, Pathology, and Other Studies:  Reviewed    Code Status: Prior  Advance Directive and Living Will:      Power of :    POLST:      Counseling / Coordination of Care  Total floor / unit time spent today 30 minutes  Greater than 50% of total time was spent with the patient and / or family counseling and / or coordination of care   A description of the counseling / coordination of care:  Chart review, patient evaluation, coordination and communication with staff and provider

## 2021-09-06 NOTE — PHYSICAL THERAPY NOTE
Physical Therapy Evaluation    Patient Name: Carolina Curtis    RSQGY'A Date: 9/6/2021     Problem List  Principal Problem:    Alcohol withdrawal ([facility] )  Active Problems:    Essential hypertension    Alcohol use disorder, severe, dependence ([facility] )    Bradycardia       Past Medical History  Past Medical History:   Diagnosis Date    Alcohol dependency ([facility] )     Anxiety     Depression     [place] (hard of hearing)     Hypertension     Kidney stones     Prostate enlargement     Renal disorder     kidney    Shoulder dislocation         Past Surgical History  Past Surgical History:   Procedure Laterality Date    CHOLECYSTECTOMY      SHOULDER SURGERY      for dislocation           09/06/21 1418   PT Last Visit   PT Visit Date 09/06/21   Note Type   Note type Evaluation   Pain Assessment   Pain Assessment Tool Pain Assessment not indicated - pt denies pain   Pain Score No Pain   Home Living   Type of Home House   Home Layout Two level;Performs ADLs on one level; Able to live on main level with bedroom/bathroom;Stairs to enter without rails  (1 ESCOBAR)   Bathroom Shower/Tub Tub/shower unit   Bathroom Toilet Standard   Bathroom Equipment Shower chair;Grab bars around toilet   [address] Walker;Cane   Additional Comments pt reports use of cane at baseline   Prior Function   Level of [place] Independent with ADLs and functional mobility   Lives With Spouse   ADL Assistance Independent   IADLs Independent   Falls in the last 6 months 1 to 4   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   Restrictions/Precautions   Weight Bearing Precautions Per Order No   Other Precautions Telemetry;Multiple lines   General   Family/Caregiver Present No   Cognition   Overall Cognitive Status WFL   Arousal/Participation Alert   Orientation Level Oriented X4   Following Commands Follows all commands and directions without difficulty Comments pt is pleasant and cooperative   RLE Assessment   RLE Assessment WFL   LLE Assessment   LLE Assessment WFL   Bed Mobility   Additional Comments pt OOB at start/end of session   Transfers   Sit to Stand 7  Independent   Stand to Sit 7  Independent   Additional Comments RW used   Ambulation/Elevation   Gait pattern   [facility])   Gait Assistance 6  Modified independent   Assistive Device Rolling walker   Distance 400'   Balance   Static Sitting Good   Dynamic Sitting Good   Static Standing Good   Dynamic Standing Good   Ambulatory Fair +  (with RW)   Endurance Deficit   Endurance Deficit No   Activity Tolerance   Activity Tolerance Patient tolerated treatment well   Assessment   Prognosis Good   Assessment Patient is a 70 y o  male evaluated by Physical Therapy s/p admit to [address] [place] [address],[address] on 9/5/2021 with admitting diagnosis of: Alcohol withdrawal, Hypokalemia, Hypomagnesemia, Hypernatremia, and principal problem of: Alcohol withdrawal  PT was consulted to assess patient's functional mobility and discharge needs  Upon evaluation, pt able to perform all functional mobility mod(I) with RW  Pt very motivated to ambulate with therapy services and receive feedback on his performance  Pt able to ambulate 400' before taking seated rest break; no LOB experienced and HR/SpO2 remained WFL on RA throughout  Continued PT intervention in the acute setting not indicated; will d/c current PT orders  The patient's AM-PAC Basic Mobility Inpatient Short Form Raw Score is 24, Standardized Score is 57 68  A standardized score greater than 42 9 suggests the patient may benefit from discharge to home  Please also refer to the recommendation of the Physical Therapist for safe discharge planning   Co treatment with OT secondary to complex medical condition of pt, possible A of 2 required to achieve and maintain transitional movements, requiring the need of skilled therapeutic intervention of 2 therapists to achieve delivery of services  Goals   Patient Goals to go home   Plan   PT Frequency One time visit   Recommendation   PT Discharge Recommendation No rehabilitation needs   PT - OK to Discharge Yes   AM-PAC Basic Mobility Inpatient   Turning in Bed Without Bedrails 4   Lying on Back to Sitting on Edge of Flat Bed 4   Moving Bed to Chair 4   Standing Up From Chair 4   Walk in Room 4   Climb 3-5 Stairs 4   Basic Mobility Inpatient Raw Score 24   Basic Mobility Standardized Score 57 68     Pt seated in recliner at end of session with all needs in reach

## 2021-09-06 NOTE — PROGRESS NOTES
[address] [facility]  Progress Note - Roberto Monet 1950, 70 y o  male MRN: [id number]  Unit/Bed#:  Encounter: [id number]  Primary Care Provider: Jan Mchugh MD   Date and time admitted to hospital: 9/5/2021  8:44 AM    * Alcohol withdrawal [facility])  Assessment & Plan  Patient presented to the ED with alcohol withdrawal symptoms (nausea, headache, palpitations, dizziness, anxiety, tremors)  Initial CIWA score was 10  His past medical history is remarkable for 40 year history of alcohol use  For the past 15 years he have been having admissions due to alcohol intoxication/withdrawal to the hospital   Most recent admission was last month  Continue on CIWA protocol   Chlordiazepoxide 10 mg Q 8 started  Follow with a m  CMP, mg, pH  Check vital signs periodically  Reassess patient in the morning  Alcohol use disorder, severe, dependence ([facility] )  Assessment & Plan  This is a chronic condition  Patient has a history of alcohol use disorder for the past 40 years  Have been progressing for the past 15 years  He have repetitive admissions due to intoxication/withdrawal during past several years most recently a month ago  The patient admits that he drank 1 pt of vodka yesterday  Last drink was at 5:00 p m  Robbie Saez Symptoms are consistent with alcohol withdrawal     CIWA protocol   Chlordiazepoxide 10 mg Q 8 started  Continue Multivitamins  Psych team consulted: recommended IP rehab or OP follow up   To send home with vivitrol  To be discussed with case management  Follow with a m  CMP, mg, pH  Check vital signs periodically  Reassess patient in the morning  Essential hypertension  Assessment & Plan  This is a chronic condition  Patient is stable  Home medications continued during admission  Assess vital signs daily  Hypomagnesemia-resolved as of 9/6/2021  Assessment & Plan  Initially serum magnesium level was 1 5  Repleted with 2 g IV magnesium sulfate    Follow-up with labs     Bradycardia  Assessment & Plan  Bradycardia overnight  Asymptomatic  Resolved  Will continue to check vitals  VTE Pharmacologic Prophylaxis:   Pharmacologic: Enoxaparin (Lovenox)  Mechanical VTE Prophylaxis in Place: Yes    Patient Centered Rounds: I have performed bedside rounds with nursing staff today  Discussions with Specialists or Other Care Team Provider: Discussed with the psychiatrist further management plan    Education and Discussions with Family / Patient: educated and counseled him about the impact of alcohol consumption on his health  Time Spent for Care: 30 minutes  More than 50% of total time spent on counseling and coordination of care as described above  Current Length of Stay: 0 day(s)    Current Patient Status: Observation   Certification Statement: The patient will continue to require additional inpatient hospital stay due to ongoing medical management  Discharge Plan: 2 or more night  If stable  Code Status: Prior      Subjective:   Patient was seen today at bedside  He is complaining of unsteady gait  His  headache, diarrhea, sweating,tremors have resolved since yesterday  In addition, No shortness of breath, chest pain, chest tightness, palpitations, vomiting, diarrhea or constipation, fever, sweating noted  Objective:     Alert and oriented  X 3   Not in acute distress  Vitals:   Temp (24hrs), Av 1 °F (36 7 °C), Min:97 4 °F (36 3 °C), Max:99 6 °F (37 6 °C)    Temp:  [97 4 °F (36 3 °C)-99 6 °F (37 6 °C)] 97 9 °F (36 6 °C)  HR:  [56-71] 61  Resp:  [16-25] 22  BP: (117-185)/(59-90) 140/70  SpO2:  [92 %-99 %] 97 %  Body mass index is 32 63 kg/m²  Input and Output Summary (last 24 hours): Intake/Output Summary (Last 24 hours) at 2021 0910  Last data filed at 2021 0813  Gross per 24 hour   Intake 3746 33 ml   Output 1000 ml   Net 2746 33 ml       Physical Exam:     Physical Exam  Vitals reviewed     Constitutional: General: He is not in acute distress  Appearance: Normal appearance  HENT:      Head: Normocephalic and atraumatic  Mouth/Throat:      Mouth: Mucous membranes are moist    Eyes:      Conjunctiva/sclera: Conjunctivae normal       Pupils: Pupils are equal, round, and reactive to light  Cardiovascular:      Rate and Rhythm: Normal rate and regular rhythm  Pulses: Normal pulses  Carotid pulses are 2+ on the right side and 2+ on the left side  Radial pulses are 2+ on the right side and 2+ on the left side  Dorsalis pedis pulses are 2+ on the right side and 2+ on the left side  Heart sounds: Normal heart sounds, S1 normal and S2 normal  No murmur heard  Pulmonary:      Effort: No tachypnea, bradypnea or accessory muscle usage  Breath sounds: Normal breath sounds and air entry  No decreased breath sounds, wheezing, rhonchi or rales  Abdominal:      General: Bowel sounds are normal    Musculoskeletal:      Right lower leg: No edema  Left lower leg: No edema  Neurological:      Mental Status: He is alert and oriented to person, place, and time  Mental status is at baseline  Additional Data:     Labs:    Results from last 7 days   Lab Units 09/05/21  0906   WBC Thousand/uL 7 08   HEMOGLOBIN g/dL 14 2   HEMATOCRIT % 45 0   PLATELETS Thousands/uL 232   NEUTROS PCT % 68   LYMPHS PCT % 20   MONOS PCT % 7   EOS PCT % 3     Results from last 7 days   Lab Units 09/06/21  0526   SODIUM mmol/L 141   POTASSIUM mmol/L 3 8   CHLORIDE mmol/L 105   CO2 mmol/L 31   BUN mg/dL 10   CREATININE mg/dL 0 74   ANION GAP mmol/L 5   CALCIUM mg/dL 8 0*   ALBUMIN g/dL 3 2*   TOTAL BILIRUBIN mg/dL 0 47   ALK PHOS U/L 100   ALT U/L 34   AST U/L 16   GLUCOSE RANDOM mg/dL 101                           * I Have Reviewed All Lab Data Listed Above  * Additional Pertinent Lab Tests Reviewed:  [address] Admission Reviewed    Imaging:    Imaging Reports Reviewed Today Include:   CT head without contrast   Final Result      No acute intracranial abnormality                    Workstation performed: [id number]         XR chest 1 view portable    (Results Pending)           Recent Cultures (last 7 days):           Last 24 Hours Medication List:   Current Facility-Administered Medications   Medication Dose Route Frequency Provider Last Rate    acetaminophen  650 mg Oral Q6H PRN Teresia Dakins, MD      allopurinol  100 mg Oral Daily Teresia Dakins, MD      amLODIPine  5 mg Oral Daily Ana Watts MD      atorvastatin  40 mg Oral Daily Ana Watts MD      baclofen  10 mg Oral BID Ana Watts MD      chlordiazePOXIDE  10 mg Oral [facility] Lisa Shows, DO      enoxaparin  40 mg Subcutaneous Q24H [facility] Ana Watts MD      finasteride  5 mg Oral Daily Ana Watts MD      FLUoxetine  60 mg Oral Daily Ana Watts MD      folic acid  1 mg Oral Daily Ana Watts MD      loperamide  4 mg Oral BID PRN Ana Watts MD      magnesium oxide  400 mg Oral [employer], DO      melatonin  3 mg Oral HS Teresia Dakins, MD      multivitamin-minerals  1 tablet Oral Daily Ana Watts MD      nadolol  20 mg Oral Daily Ana Watts MD      ondansetron  4 mg Intravenous Q4H PRN Teresia Dakins, MD      potassium chloride  20 mEq Oral [employer], DO      sodium chloride 0 45 % with KCl 20 mEq/L  100 mL/hr Intravenous Continuous Ana Watts  mL/hr (09/06/21 1284)    thiamine  100 mg Oral Daily Ana Watts MD      traZODone  100 mg Oral HS Ana Watts MD          Today, Patient Was Seen By: Ana Watts MD

## 2021-09-07 ENCOUNTER — TELEPHONE (OUTPATIENT)
Dept: PSYCHIATRY | Facility: CLINIC | Age: 71
End: 2021-09-07

## 2021-09-07 VITALS
BODY MASS INDEX: 32.7 KG/M2 | RESPIRATION RATE: 18 BRPM | HEIGHT: 67 IN | WEIGHT: 208.34 LBS | TEMPERATURE: 97.8 F | DIASTOLIC BLOOD PRESSURE: 78 MMHG | OXYGEN SATURATION: 96 % | HEART RATE: 65 BPM | SYSTOLIC BLOOD PRESSURE: 168 MMHG

## 2021-09-07 PROBLEM — R00.1 BRADYCARDIA: Status: RESOLVED | Noted: 2021-09-06 | Resolved: 2021-09-07

## 2021-09-07 PROBLEM — F10.239 ALCOHOL WITHDRAWAL (HCC): Chronic | Status: RESOLVED | Noted: 2021-06-19 | Resolved: 2021-09-07

## 2021-09-07 PROBLEM — G47.34 NOCTURNAL HYPOXIA: Status: ACTIVE | Noted: 2021-09-07

## 2021-09-07 PROBLEM — F10.939 ALCOHOL WITHDRAWAL (HCC): Chronic | Status: RESOLVED | Noted: 2021-06-19 | Resolved: 2021-09-07

## 2021-09-07 LAB
ALBUMIN SERPL BCP-MCNC: 3.4 G/DL (ref 3.5–5)
ALP SERPL-CCNC: 110 U/L (ref 46–116)
ALT SERPL W P-5'-P-CCNC: 29 U/L (ref 12–78)
ANION GAP SERPL CALCULATED.3IONS-SCNC: 5 MMOL/L (ref 4–13)
AST SERPL W P-5'-P-CCNC: 13 U/L (ref 5–45)
ATRIAL RATE: 65 BPM
BASOPHILS # BLD AUTO: 0.04 THOUSANDS/ΜL (ref 0–0.1)
BASOPHILS NFR BLD AUTO: 1 % (ref 0–1)
BILIRUB SERPL-MCNC: 0.24 MG/DL (ref 0.2–1)
BUN SERPL-MCNC: 11 MG/DL (ref 5–25)
CALCIUM ALBUM COR SERPL-MCNC: 9 MG/DL (ref 8.3–10.1)
CALCIUM SERPL-MCNC: 8.5 MG/DL (ref 8.3–10.1)
CHLORIDE SERPL-SCNC: 106 MMOL/L (ref 100–108)
CK SERPL-CCNC: 49 U/L (ref 39–308)
CO2 SERPL-SCNC: 31 MMOL/L (ref 21–32)
CREAT SERPL-MCNC: 0.73 MG/DL (ref 0.6–1.3)
EOSINOPHIL # BLD AUTO: 0.2 THOUSAND/ΜL (ref 0–0.61)
EOSINOPHIL NFR BLD AUTO: 4 % (ref 0–6)
ERYTHROCYTE [DISTWIDTH] IN BLOOD BY AUTOMATED COUNT: 13.3 % (ref 11.6–15.1)
GFR SERPL CREATININE-BSD FRML MDRD: 93 ML/MIN/1.73SQ M
GLUCOSE SERPL-MCNC: 117 MG/DL (ref 65–140)
HAV IGM SER QL: NORMAL
HBV CORE IGM SER QL: NORMAL
HBV SURFACE AG SER QL: NORMAL
HCT VFR BLD AUTO: 40.5 % (ref 36.5–49.3)
HCV AB SER QL: NORMAL
HGB BLD-MCNC: 12.6 G/DL (ref 12–17)
IMM GRANULOCYTES # BLD AUTO: 0.08 THOUSAND/UL (ref 0–0.2)
IMM GRANULOCYTES NFR BLD AUTO: 2 % (ref 0–2)
LACTATE SERPL-SCNC: 0.7 MMOL/L (ref 0.5–2)
LYMPHOCYTES # BLD AUTO: 1.46 THOUSANDS/ΜL (ref 0.6–4.47)
LYMPHOCYTES NFR BLD AUTO: 27 % (ref 14–44)
MAGNESIUM SERPL-MCNC: 1.9 MG/DL (ref 1.6–2.6)
MCH RBC QN AUTO: 31.4 PG (ref 26.8–34.3)
MCHC RBC AUTO-ENTMCNC: 31.1 G/DL (ref 31.4–37.4)
MCV RBC AUTO: 101 FL (ref 82–98)
MONOCYTES # BLD AUTO: 0.6 THOUSAND/ΜL (ref 0.17–1.22)
MONOCYTES NFR BLD AUTO: 11 % (ref 4–12)
NEUTROPHILS # BLD AUTO: 3.01 THOUSANDS/ΜL (ref 1.85–7.62)
NEUTS SEG NFR BLD AUTO: 55 % (ref 43–75)
NRBC BLD AUTO-RTO: 0 /100 WBCS
P AXIS: 57 DEGREES
PHOSPHATE SERPL-MCNC: 3.2 MG/DL (ref 2.3–4.1)
PLATELET # BLD AUTO: 181 THOUSANDS/UL (ref 149–390)
PMV BLD AUTO: 9.9 FL (ref 8.9–12.7)
POTASSIUM SERPL-SCNC: 4.2 MMOL/L (ref 3.5–5.3)
PR INTERVAL: 176 MS
PROCALCITONIN SERPL-MCNC: <0.05 NG/ML
PROT SERPL-MCNC: 6.7 G/DL (ref 6.4–8.2)
QRS AXIS: 48 DEGREES
QRSD INTERVAL: 94 MS
QT INTERVAL: 440 MS
QTC INTERVAL: 457 MS
RBC # BLD AUTO: 4.01 MILLION/UL (ref 3.88–5.62)
SODIUM SERPL-SCNC: 142 MMOL/L (ref 136–145)
T WAVE AXIS: 56 DEGREES
TROPONIN I SERPL-MCNC: <0.02 NG/ML
VENTRICULAR RATE: 65 BPM
WBC # BLD AUTO: 5.39 THOUSAND/UL (ref 4.31–10.16)

## 2021-09-07 PROCEDURE — 83735 ASSAY OF MAGNESIUM: CPT | Performed by: INTERNAL MEDICINE

## 2021-09-07 PROCEDURE — 84100 ASSAY OF PHOSPHORUS: CPT | Performed by: INTERNAL MEDICINE

## 2021-09-07 PROCEDURE — 84484 ASSAY OF TROPONIN QUANT: CPT | Performed by: INTERNAL MEDICINE

## 2021-09-07 PROCEDURE — 99239 HOSP IP/OBS DSCHRG MGMT >30: CPT | Performed by: INTERNAL MEDICINE

## 2021-09-07 PROCEDURE — 80053 COMPREHEN METABOLIC PANEL: CPT | Performed by: INTERNAL MEDICINE

## 2021-09-07 PROCEDURE — 82550 ASSAY OF CK (CPK): CPT | Performed by: INTERNAL MEDICINE

## 2021-09-07 PROCEDURE — 93010 ELECTROCARDIOGRAM REPORT: CPT | Performed by: INTERNAL MEDICINE

## 2021-09-07 PROCEDURE — 83605 ASSAY OF LACTIC ACID: CPT | Performed by: INTERNAL MEDICINE

## 2021-09-07 PROCEDURE — 80074 ACUTE HEPATITIS PANEL: CPT | Performed by: INTERNAL MEDICINE

## 2021-09-07 PROCEDURE — 85025 COMPLETE CBC W/AUTO DIFF WBC: CPT | Performed by: INTERNAL MEDICINE

## 2021-09-07 PROCEDURE — 84145 PROCALCITONIN (PCT): CPT | Performed by: INTERNAL MEDICINE

## 2021-09-07 RX ORDER — CHLORDIAZEPOXIDE HYDROCHLORIDE 10 MG/1
CAPSULE, GELATIN COATED ORAL
Qty: 9 CAPSULE | Refills: 0 | Status: SHIPPED | OUTPATIENT
Start: 2021-09-07 | End: 2021-09-07 | Stop reason: HOSPADM

## 2021-09-07 RX ORDER — ALLOPURINOL 100 MG/1
100 TABLET ORAL DAILY
Refills: 0
Start: 2021-09-07

## 2021-09-07 RX ORDER — NADOLOL 20 MG/1
10 TABLET ORAL DAILY
Refills: 0 | Status: ON HOLD
Start: 2021-09-07 | End: 2021-10-01 | Stop reason: SDUPTHER

## 2021-09-07 RX ORDER — CHLORDIAZEPOXIDE HYDROCHLORIDE 5 MG/1
CAPSULE, GELATIN COATED ORAL
Qty: 18 CAPSULE | Refills: 0 | Status: SHIPPED | OUTPATIENT
Start: 2021-09-07 | End: 2021-10-01 | Stop reason: HOSPADM

## 2021-09-07 RX ORDER — LANOLIN ALCOHOL/MO/W.PET/CERES
100 CREAM (GRAM) TOPICAL DAILY
Qty: 30 TABLET | Refills: 1 | Status: SHIPPED | OUTPATIENT
Start: 2021-09-07

## 2021-09-07 RX ORDER — FOLIC ACID 1 MG/1
1 TABLET ORAL DAILY
Qty: 30 TABLET | Refills: 1 | Status: SHIPPED | OUTPATIENT
Start: 2021-09-07

## 2021-09-07 RX ORDER — MULTIVITAMIN
1 TABLET ORAL DAILY
Qty: 30 TABLET | Refills: 0 | Status: SHIPPED | OUTPATIENT
Start: 2021-09-07

## 2021-09-07 RX ADMIN — CHLORDIAZEPOXIDE HYDROCHLORIDE 10 MG: 5 CAPSULE ORAL at 06:16

## 2021-09-07 RX ADMIN — NADOLOL 20 MG: 40 TABLET ORAL at 08:30

## 2021-09-07 RX ADMIN — BACLOFEN 10 MG: 10 TABLET ORAL at 17:44

## 2021-09-07 RX ADMIN — CHLORDIAZEPOXIDE HYDROCHLORIDE 10 MG: 5 CAPSULE ORAL at 12:09

## 2021-09-07 RX ADMIN — BACLOFEN 10 MG: 10 TABLET ORAL at 08:31

## 2021-09-07 RX ADMIN — MAGNESIUM OXIDE TAB 400 MG (241.3 MG ELEMENTAL MG) 400 MG: 400 (241.3 MG) TAB at 09:50

## 2021-09-07 RX ADMIN — FLUOXETINE 60 MG: 20 CAPSULE ORAL at 08:31

## 2021-09-07 RX ADMIN — POTASSIUM CHLORIDE AND SODIUM CHLORIDE 100 ML/HR: 450; 150 INJECTION, SOLUTION INTRAVENOUS at 00:25

## 2021-09-07 RX ADMIN — ATORVASTATIN CALCIUM 40 MG: 40 TABLET, FILM COATED ORAL at 08:31

## 2021-09-07 RX ADMIN — MULTIPLE VITAMINS W/ MINERALS TAB 1 TABLET: TAB at 08:32

## 2021-09-07 RX ADMIN — CHLORDIAZEPOXIDE HYDROCHLORIDE 10 MG: 5 CAPSULE ORAL at 17:44

## 2021-09-07 RX ADMIN — THIAMINE HCL TAB 100 MG 100 MG: 100 TAB at 08:32

## 2021-09-07 RX ADMIN — ACETAMINOPHEN 650 MG: 325 TABLET, FILM COATED ORAL at 06:15

## 2021-09-07 RX ADMIN — AMLODIPINE BESYLATE 5 MG: 5 TABLET ORAL at 08:31

## 2021-09-07 RX ADMIN — FOLIC ACID 1 MG: 1 TABLET ORAL at 08:32

## 2021-09-07 RX ADMIN — ALLOPURINOL 100 MG: 100 TABLET ORAL at 08:32

## 2021-09-07 RX ADMIN — FINASTERIDE 5 MG: 5 TABLET, FILM COATED ORAL at 08:32

## 2021-09-07 NOTE — DISCHARGE SUMMARY
[address] [facility] [address]  Discharge- Kanika Torres 1950, 70 y o  male MRN: [id number]  Unit/Bed#:  Encounter: [id number]  Primary Care Provider: Gary Ha MD   Date and time admitted to hospital: 9/5/2021  8:44 AM    * Alcohol withdrawal (HCC)-resolved as of 9/7/2021  Assessment & Plan  Patient presented to the ED with alcohol withdrawal symptoms (nausea, headache, palpitations, dizziness, anxiety, tremors)  Initial CIWA score was 10  He was managed with librium and CIWA protocol accordingly  He is asymptomatic as of the discharge time and back to his normal baseline  Follow ups arranged with psychiatry and PCP  Alcohol use disorder, severe, dependence ([facility] )  Assessment & Plan  This is a chronic condition  Patient has a history of alcohol use disorder for the past 40 years  Have been progressing for the past 15 years  He have repetitive admissions due to intoxication/withdrawal during past several years most recently a month ago  The patient admits that he drank 1 pt of vodka yesterday  Last drink was at 5:00 p m  Lynita Ped Symptoms are consistent with alcohol withdrawal     He was managed with librium and CIWA protocol accordingly  He is asymptomatic as of the discharge time and back to his normal baseline  Follow ups arranged with psychiatry and PCP  Essential hypertension  Assessment & Plan  This is a chronic condition  Patient is stable  Continue home medications  Follow up with PCP  Hypomagnesemia-resolved as of 9/6/2021  Assessment & Plan  Initially serum magnesium level was 1 5  Repleted with 2 g IV magnesium sulfate  Follow-up with labs      Nocturnal hypoxia  Assessment & Plan  · Previously diagnosed with obstructive sleep apnea  · Requires 2 lpm of supplemental oxygen QHS and anytime while sleeping    Outpatient follow up with his PCP regarding further investigations including doing a possible sleep study    Bradycardia-resolved as of 9/7/2021  Assessment & Plan  Bradycardia overnight  Asymptomatic  Resolved        Discharging Physician / Practitioner: Isac Tolentino MD  PCP: Torito Nguyen MD  Admission Date:   Admission Orders (From admission, onward)     Ordered        09/06/21 1219  Inpatient Admission  Once         09/05/21 1332  Place in Observation  Once                   Discharge Date: 09/07/21    Medical Problems     Resolved Problems  Date Reviewed: 9/7/2021        Resolved    Hypomagnesemia 9/6/2021     Resolved by  Isac Tolentino MD    * (Principal) Alcohol withdrawal ([facility] [address] ) 9/7/2021     Resolved by  Isac Tolentino MD    Bradycardia 9/7/2021     Resolved by  Isac Tolentino MD                Consultations During Hospital Stay:  · Psychiatry, case management  Procedures Performed:   CT head without contrast   Final Result      No acute intracranial abnormality  Workstation performed: [id number]         XR chest 1 view portable   Final Result      No acute cardiopulmonary disease                    Workstation performed: [id number]               Significant Findings / Test Results:   Results for orders placed or performed during the hospital encounter of 09/05/21   NOVEL CORONAVIRUS (COVID-19), PCR [facility]    Specimen: Nasopharyngeal Swab   Result Value Ref Range    SARS-CoV-2 Negative Negative   CBC and differential   Result Value Ref Range    WBC 7 08 4 31 - 10 16 Thousand/uL    RBC 4 43 3 88 - 5 62 Million/uL    Hemoglobin 14 2 12 0 - 17 0 g/dL    Hematocrit 45 0 36 5 - 49 3 %     (H) 82 - 98 fL    MCH 32 1 26 8 - 34 3 pg    MCHC 31 6 31 4 - 37 4 g/dL    RDW 13 3 11 6 - 15 1 %    MPV 9 0 8 9 - 12 7 fL    Platelets 284 175 - 754 Thousands/uL    nRBC 0 /100 WBCs    Neutrophils Relative 68 43 - 75 %    Immat GRANS % 1 0 - 2 %    Lymphocytes Relative 20 14 - 44 %    Monocytes Relative 7 4 - 12 %    Eosinophils Relative 3 0 - 6 %    Basophils Relative 1 0 - 1 %    Neutrophils Absolute 4 80 1 85 - 7 62 Thousands/µL    Immature Grans Absolute 0 10 0 00 - 0 20 Thousand/uL    Lymphocytes Absolute 1 42 0 60 - 4 47 Thousands/µL    Monocytes Absolute 0 50 0 17 - 1 22 Thousand/µL    Eosinophils Absolute 0 18 0 00 - 0 61 Thousand/µL    Basophils Absolute 0 08 0 00 - 0 10 Thousands/µL   Comprehensive metabolic panel   Result Value Ref Range    Sodium 146 (H) 136 - 145 mmol/L    Potassium 3 4 (L) 3 5 - 5 3 mmol/L    Chloride 105 100 - 108 mmol/L    CO2 28 21 - 32 mmol/L    ANION GAP 13 4 - 13 mmol/L    BUN 14 5 - 25 mg/dL    Creatinine 0 66 0 60 - 1 30 mg/dL    Glucose 102 65 - 140 mg/dL    Calcium 8 3 8 3 - 10 1 mg/dL    AST 23 5 - 45 U/L    ALT 45 12 - 78 U/L    Alkaline Phosphatase 131 (H) 46 - 116 U/L    Total Protein 7 2 6 4 - 8 2 g/dL    Albumin 3 8 3 5 - 5 0 g/dL    Total Bilirubin 0 26 0 20 - 1 00 mg/dL    eGFR 97 ml/min/1 73sq m   Troponin I   Result Value Ref Range    Troponin I <0 02 <=0 04 ng/mL   Magnesium   Result Value Ref Range    Magnesium 1 5 (L) 1 6 - 2 6 mg/dL   Ethanol   Result Value Ref Range    Ethanol Lvl 53 (H) 0 - 3 mg/dL   Blood gas, venous   Result Value Ref Range    pH, Shahid 7 448 (H) 7 300 - 7 400    pCO2, Shahid 40 1 (L) 42 0 - 50 0 mm Hg    pO2, Shahid 50 1 (H) 35 0 - 45 0 mm Hg    HCO3, Shahid 27 1 24 - 30 mmol/L    Base Excess, Shahid 2 9 mmol/L    O2 Content, Shahid 16 7 ml/dL    O2 HGB, VENOUS 82 6 (H) 60 0 - 80 0 %   UA w Reflex to Microscopic w Reflex to Culture    Specimen: Urine, Clean Catch   Result Value Ref Range    Color, UA Yellow     Clarity, UA Slightly Cloudy     Specific E[place], UA 1 020 1 003 - 1 030    pH, UA 6 0 4 5, 5 0, 5 5, 6 0, 6 5, 7 0, 7 5, 8 0    Leukocytes, UA Negative Negative    Nitrite, UA Negative Negative    Protein, UA Negative Negative mg/dl    Glucose, UA Negative Negative mg/dl    Ketones, UA Negative Negative mg/dl    Urobilinogen, UA 0 2 0 2, 1 0 E U /dl E U /dl    Bilirubin, UA Negative Negative    Blood, UA Negative Negative   Lipase   Result Value Ref Range Lipase 125 73 - 393 u/L   Comprehensive metabolic panel   Result Value Ref Range    Sodium 144 136 - 145 mmol/L    Potassium 3 6 3 5 - 5 3 mmol/L    Chloride 103 100 - 108 mmol/L    CO2 30 21 - 32 mmol/L    ANION GAP 11 4 - 13 mmol/L    BUN 11 5 - 25 mg/dL    Creatinine 0 72 0 60 - 1 30 mg/dL    Glucose 84 65 - 140 mg/dL    Calcium 7 9 (L) 8 3 - 10 1 mg/dL    AST 25 5 - 45 U/L    ALT 42 12 - 78 U/L    Alkaline Phosphatase 121 (H) 46 - 116 U/L    Total Protein 7 0 6 4 - 8 2 g/dL    Albumin 3 7 3 5 - 5 0 g/dL    Total Bilirubin 0 48 0 20 - 1 00 mg/dL    eGFR 94 ml/min/1 73sq m   Comprehensive metabolic panel   Result Value Ref Range    Sodium 141 136 - 145 mmol/L    Potassium 3 8 3 5 - 5 3 mmol/L    Chloride 105 100 - 108 mmol/L    CO2 31 21 - 32 mmol/L    ANION GAP 5 4 - 13 mmol/L    BUN 10 5 - 25 mg/dL    Creatinine 0 74 0 60 - 1 30 mg/dL    Glucose 101 65 - 140 mg/dL    Calcium 8 0 (L) 8 3 - 10 1 mg/dL    Corrected Calcium 8 6 8 3 - 10 1 mg/dL    AST 16 5 - 45 U/L    ALT 34 12 - 78 U/L    Alkaline Phosphatase 100 46 - 116 U/L    Total Protein 6 0 (L) 6 4 - 8 2 g/dL    Albumin 3 2 (L) 3 5 - 5 0 g/dL    Total Bilirubin 0 47 0 20 - 1 00 mg/dL    eGFR 93 ml/min/1 73sq m   Magnesium   Result Value Ref Range    Magnesium 1 9 1 6 - 2 6 mg/dL   Phosphorus   Result Value Ref Range    Phosphorus 3 0 2 3 - 4 1 mg/dL   Lactic acid, plasma   Result Value Ref Range    LACTIC ACID 0 8 0 5 - 2 0 mmol/L   CBC and differential   Result Value Ref Range    WBC 5 39 4 31 - 10 16 Thousand/uL    RBC 4 01 3 88 - 5 62 Million/uL    Hemoglobin 12 6 12 0 - 17 0 g/dL    Hematocrit 40 5 36 5 - 49 3 %     (H) 82 - 98 fL    MCH 31 4 26 8 - 34 3 pg    MCHC 31 1 (L) 31 4 - 37 4 g/dL    RDW 13 3 11 6 - 15 1 %    MPV 9 9 8 9 - 12 7 fL    Platelets 352 435 - 679 Thousands/uL    nRBC 0 /100 WBCs    Neutrophils Relative 55 43 - 75 %    Immat GRANS % 2 0 - 2 %    Lymphocytes Relative 27 14 - 44 %    Monocytes Relative 11 4 - 12 %    Eosinophils Relative 4 0 - 6 %    Basophils Relative 1 0 - 1 %    Neutrophils Absolute 3 01 1 85 - 7 62 Thousands/µL    Immature Grans Absolute 0 08 0 00 - 0 20 Thousand/uL    Lymphocytes Absolute 1 46 0 60 - 4 47 Thousands/µL    Monocytes Absolute 0 60 0 17 - 1 22 Thousand/µL    Eosinophils Absolute 0 20 0 00 - 0 61 Thousand/µL    Basophils Absolute 0 04 0 00 - 0 10 Thousands/µL   CK   Result Value Ref Range    Total CK 49 39 - 308 U/L   Comprehensive metabolic panel   Result Value Ref Range    Sodium 142 136 - 145 mmol/L    Potassium 4 2 3 5 - 5 3 mmol/L    Chloride 106 100 - 108 mmol/L    CO2 31 21 - 32 mmol/L    ANION GAP 5 4 - 13 mmol/L    BUN 11 5 - 25 mg/dL    Creatinine 0 73 0 60 - 1 30 mg/dL    Glucose 117 65 - 140 mg/dL    Calcium 8 5 8 3 - 10 1 mg/dL    Corrected Calcium 9 0 8 3 - 10 1 mg/dL    AST 13 5 - 45 U/L    ALT 29 12 - 78 U/L    Alkaline Phosphatase 110 46 - 116 U/L    Total Protein 6 7 6 4 - 8 2 g/dL    Albumin 3 4 (L) 3 5 - 5 0 g/dL    Total Bilirubin 0 24 0 20 - 1 00 mg/dL    eGFR 93 ml/min/1 73sq m   Magnesium   Result Value Ref Range    Magnesium 1 9 1 6 - 2 6 mg/dL   Phosphorus   Result Value Ref Range    Phosphorus 3 2 2 3 - 4 1 mg/dL   Procalcitonin   Result Value Ref Range    Procalcitonin <0 05 <=0 25 ng/ml   Lactic acid, plasma   Result Value Ref Range    LACTIC ACID 0 7 0 5 - 2 0 mmol/L   Hepatitis panel, acute   Result Value Ref Range    Hepatitis B Surface Ag Non-reactive Non-reactive, NonReactive - Confirmed    Hep A IgM Non-reactive Non-reactive, Equivocal-Suggest Recollect    Hepatitis C Ab Non-reactive Non-reactive    Hep B C IgM Non-reactive Non-reactive   Troponin I   Result Value Ref Range    Troponin I <0 02 <=0 04 ng/mL       Incidental Findings:   · none     Test Results Pending at Discharge (will require follow up):   · none     Outpatient Tests Requested:  · none    Complications:  None, stable    Reason for Admission: alcohol withdrawal         * Alcohol withdrawal (HCC)-resolved as of 9/7/2021  Assessment & Plan  Patient presented to the ED with alcohol withdrawal symptoms (nausea, headache, palpitations, dizziness, anxiety, tremors)  Initial CIWA score was 10  He was managed with librium and CIWA protocol accordingly  He is asymptomatic as of the discharge time and back to his normal baseline  Follow ups arranged with psychiatry and PCP  Alcohol use disorder, severe, dependence ([facility] [address] )  Assessment & Plan  This is a chronic condition  Patient has a history of alcohol use disorder for the past 40 years  Have been progressing for the past 15 years  He have repetitive admissions due to intoxication/withdrawal during past several years most recently a month ago  The patient admits that he drank 1 pt of vodka yesterday  Last drink was at 5:00 p m  Bartholome Pinks Symptoms are consistent with alcohol withdrawal     He was managed with librium and CIWA protocol accordingly  He is asymptomatic as of the discharge time and back to his normal baseline  Follow ups arranged with psychiatry and PCP  Essential hypertension  Assessment & Plan  This is a chronic condition  Patient is stable  Continue home medications  Follow up with PCP  Hypomagnesemia-resolved as of 9/6/2021  Assessment & Plan  Initially serum magnesium level was 1 5  Repleted with 2 g IV magnesium sulfate  Follow-up with labs  Nocturnal hypoxia  Assessment & Plan  · Previously diagnosed with obstructive sleep apnea  · Requires 2 lpm of supplemental oxygen QHS and anytime while sleeping    Outpatient follow up with his PCP regarding further investigations including doing a possible sleep study    Bradycardia-resolved as of 9/7/2021  Assessment & Plan  Bradycardia overnight  Asymptomatic       Resolved        Hospital Course:  Jose Santos is a 70 y o  male patient who originally presented to the hospital on 9/5/2021     HPI as per admission     70 y o  male with a PMH of alcohol use disorder, anxiety, depression, hypertension presents to the emergency department stating that he feels tremulous and anxious since yesterday with a lightheaded sensation and diffuse dull/throbbing headache  He have been drinking alcohol every day for the past week and consumed 1 pt of vodka yesterday  The last drink was at 6:00 p m  Since then he started feeling tremulous, anxious, and had headaches  Symptoms have progressed since onset and he was not able to sleep during the night  The patient states that he has been consuming alcohol for the past 40 years  Since 15 years his alcohol consumption have increased remarkably  His medical history is notable for multiple admissions per year for the past several years due to alcohol intoxication/withdrawal   Most recent admission was on June 2021 due to alcohol intoxication      Please see above list of diagnoses and related plan for additional information  Condition at Discharge: stable     Discharge Day Visit / Exam:     Subjective:  Patient seen at bedside  Not in distress  Back to his normal baseline  No SOB,Chest pain or tightness, nausea or vomiting, altered bowel movements, headache or dizziness  Vitals: Blood Pressure: 168/78 (09/07/21 0825)  Pulse: 65 (09/07/21 0735)  Temperature: 97 8 °F (36 6 °C) (09/07/21 0825)  Temp Source: Temporal (09/07/21 0825)  Respirations: 18 (09/07/21 0825)  Height: 5' 7" (170 2 cm) (09/05/21 1426)  Weight - Scale: 94 5 kg (208 lb 5 4 oz) (09/05/21 1426)  SpO2: 96 % (09/07/21 8848)  Exam:   Physical Exam  Vitals reviewed  Constitutional:       General: He is not in acute distress  Appearance: Normal appearance  HENT:      Head: Normocephalic and atraumatic  Mouth/Throat:      Mouth: Mucous membranes are moist    Eyes:      Conjunctiva/sclera: Conjunctivae normal       Pupils: Pupils are equal, round, and reactive to light  Cardiovascular:      Rate and Rhythm: Normal rate and regular rhythm  Pulses: Normal pulses  Carotid pulses are 2+ on the right side and 2+ on the left side  Radial pulses are 2+ on the right side and 2+ on the left side  Dorsalis pedis pulses are 2+ on the right side and 2+ on the left side  Heart sounds: Normal heart sounds, S1 normal and S2 normal  No murmur heard  Pulmonary:      Effort: No tachypnea, bradypnea or accessory muscle usage  Breath sounds: Normal breath sounds and air entry  No decreased breath sounds, wheezing, rhonchi or rales  Abdominal:      General: Abdomen is flat  Bowel sounds are normal       Tenderness: There is no abdominal tenderness  Musculoskeletal:      Right lower le+ Pitting Edema present  Left lower le+ Pitting Edema present  Neurological:      General: No focal deficit present  Mental Status: He is alert and oriented to person, place, and time  Mental status is at baseline  Psychiatric:         Mood and Affect: Mood normal          Discussion with Family: none available at bedside    Discharge instructions/Information to patient and family:   See after visit summary for information provided to patient and family  Provisions for Follow-Up Care:  See after visit summary for information related to follow-up care and any pertinent home health orders  Disposition:     Home    For Discharges to [facility]:   · Not Applicable to this Patient - Not Applicable to this Patient    Planned Readmission: none     Discharge Statement:  I spent 60 minutes discharging the patient  This time was spent on the day of discharge  I had direct contact with the patient on the day of discharge  Greater than 50% of the total time was spent examining patient, answering all patient questions, arranging and discussing plan of care with patient as well as directly providing post-discharge instructions  Additional time then spent on discharge activities      Discharge Medications:  See after visit summary for reconciled discharge medications provided to patient and family        ** Please Note: This note has been constructed using a voice recognition system **

## 2021-09-07 NOTE — DISCHARGE INSTRUCTIONS
Alcohol Withdrawal   WHAT YOU NEED TO KNOW:   Alcohol withdrawal is a group of symptoms that occur when you drink alcohol daily and suddenly stop  It can begin within 5 hours of your last drink and get worse over 2 to 3 days  Withdrawal may also happen if you suddenly reduce the amount of alcohol that you normally drink  DISCHARGE INSTRUCTIONS:   Call your local emergency number (911 in the [address] [facility],[address]) for any of the following:   · You have sudden chest pain or trouble breathing  · You pass out or think you had a seizure  · You feel like you want to harm yourself or others  Call your doctor if:   · Your breathing or heartbeat is faster than usual     · You are confused, hallucinating, or extremely agitated  · You cannot stop vomiting, or you vomit blood  · You are shaking and it does not get better after you take your medicine  · You have questions or concerns about your condition or care  Medicines:   · Medicines  may be given to calm you and help manage your symptoms  Vitamin supplements such as thiamine (vitamin B1) may be recommended  · Take your medicine as directed  Contact your healthcare provider if you think your medicine is not helping or if you have side effects  Tell him or her if you are allergic to any medicine  Keep a list of the medicines, vitamins, and herbs you take  Include the amounts, and when and why you take them  Bring the list or the pill bottles to follow-up visits  Carry your medicine list with you in case of an emergency  Have someone stay with you during withdrawal:  This person should help you take your medicine and keep you in a calm, quiet environment  He or she should also watch your symptoms and know what to do if your symptoms get worse  Learn to stop drinking alcohol safely:  Work with your healthcare provider to develop a plan for you to stop drinking safely  A sudden stop or change can be life-threatening    For support and more information: · Alcoholics Anonymous  Web Address: http://www [employer]/    · Substance Abuse and [place] [address] [place] [phone]  Web Address: https://[employer]/  Follow up with your healthcare provider within 1 day:  Write down your questions so you remember to ask them during your visits  © Copyright [employer] 2021 Information is for End User's use only and may not be sold, redistributed or otherwise used for commercial purposes  All illustrations and images included in CareNotes® are the copyrighted property of A D A M , Inc  or [facility] Christi Taveras   The above information is an  only  It is not intended as medical advice for individual conditions or treatments  Talk to your doctor, nurse or pharmacist before following any medical regimen to see if it is safe and effective for you  Alcohol Withdrawal   WHAT YOU NEED TO KNOW:   What is alcohol withdrawal?  Alcohol withdrawal is a group of symptoms that occurs when you drink alcohol daily and suddenly stop  Withdrawal may also happen if you suddenly reduce the amount of alcohol that you normally drink  What are the signs and symptoms of alcohol withdrawal?  The signs and symptoms of alcohol withdrawal normally start 4 to 24 hours after you stop drinking  Signs and symptoms may be mild at first and get worse over 2 to 3 days during the detoxification process  Detoxification means your body is working to remove the alcohol  You may have any of the following:  · Headaches    · Fever, sweating, shakiness, and a fast heartbeat    · Confusion, trouble remembering, or hallucinations    · Nervousness, anxiety, and agitation    · Nausea and vomiting    · Poor sleep, restlessness, or nightmares    · Seizures 24 hours to 1 week after your last drink    What are delirium tremens (DTs)? DTs are severe symptoms of alcohol withdrawal that can start 3 to 4 days after you stop drinking   DTs may include any of the following:  · Fever    · Feeling restless or agitated    · Tremors    · Constant hallucinations    · Faster heartbeat and breathing than usual    How is alcohol withdrawal treated? Your healthcare provider will ask how much and how often you drink  He or she will also ask how long it has been since you had your last drink  Blood or urine tests may be used to check the amount of alcohol in your blood  The tests may also show organ damage or low vitamin or electrolyte levels  The goal of treatment is to manage your symptoms and help prevent severe symptoms from developing  You may need to be treated in the hospital if you have severe withdrawal symptoms  · Medicines  may be given to calm you and help manage your symptoms  · Vitamin supplements  such as thiamine (vitamin B1) may be recommended  High alcohol intake can keep your body from absorbing enough vitamins from food  Where can I find support and more information? · Alcoholics Anonymous  Web Address: http://[employer]/    · Substance Abuse and [place] [address] [place] [phone]  Web Address: https://[employer]/  Call your local emergency number [phone] in the [address] [place] [address],[address]) for any of the following:   · You have sudden chest pain or trouble breathing  · You pass out or think you had a seizure  · You feel like you want to harm yourself or others  When should I call my doctor? · Your breathing or heartbeat is faster than usual     · You are confused, hallucinating, or extremely agitated  · You cannot stop vomiting, or you vomit blood  · You are shaking and it does not get better after you take your medicine  · You have questions or concerns about your condition or care  CARE AGREEMENT:   You have the right to help plan your care  Learn about your health condition and how it may be treated  Discuss treatment options with your healthcare providers to decide what care you want to receive   You always have the right to refuse treatment  The above information is an  only  It is not intended as medical advice for individual conditions or treatments  Talk to your doctor, nurse or pharmacist before following any medical regimen to see if it is safe and effective for you  © Copyright [employer] 2021 Information is for End User's use only and may not be sold, redistributed or otherwise used for commercial purposes  All illustrations and images included in CareNotes® are the copyrighted property of A D A M , Inc  or Abi Hinson      Abuse of Alcohol   WHAT YOU NEED TO KNOW:   Alcohol abuse means you drink more than the recommended daily or weekly limits  You may be drinking alcohol regularly or drinking large amounts in a short period of time (binge drinking)  You continue to drink even though it causes legal, work, or relationship problems  DISCHARGE INSTRUCTIONS:   Call your local emergency number (911 in the [address] [facility],[address]) for any of the following:   · You have sudden chest pain or trouble breathing  · You want to harm yourself or others  · You have a seizure or have shaking or trembling  Call your doctor if:   · You have hallucinations (you see or hear things that are not real)  · You cannot stop vomiting or you vomit blood  · You need help to stop drinking alcohol  · You have questions or concerns about your condition or care  Medicines:   · Vitamin supplements  may be given to treat low vitamin levels  Alcohol can make it hard for your body to absorb enough vitamins such as B1  Vitamin supplements may also be given to prevent alcohol related brain damage  · Take your medicine as directed  Contact your healthcare provider if you think your medicine is not helping or if you have side effects  Tell him or her if you are allergic to any medicine  Keep a list of the medicines, vitamins, and herbs you take  Include the amounts, and when and why you take them   Bring the list or the pill bottles to follow-up visits  Carry your medicine list with you in case of an emergency  Recommended alcohol limits:   · Men 21 to 64 years  should limit alcohol to 2 drinks a day  Do not have more than 4 drinks in 1 day or more than 14 in 1 week  · All women, and men 72 or older  should limit alcohol to 1 drink in a day  Do not have more than 3 drinks in 1 day or more than 7 in 1 week  No amount of alcohol is okay during pregnancy  Health problems alcohol abuse can cause:   · Cancer in your liver, pancreas, stomach, colon, kidney, or breast    · Stroke or a heart attack    · Liver, kidney, or lung disease    · Blackouts, memory loss, brain damage, or dementia    · Diabetes, immune system problems, or thiamine (vitamin B1) deficiency    · Problems for you and your baby if you drink while pregnant    Manage alcohol use:   · Decrease the amount you drink  This can help prevent health problems such as brain, heart, and liver damage, high blood pressure, diabetes, and cancer  If you cannot stop completely, healthcare providers can help you set goals to decrease the amount you drink  · Plan weekly alcohol use  You will be less likely to drink more than the recommended limit if you plan ahead  · Have food when you drink alcohol  Food will prevent alcohol from getting into your system too quickly  Eat before you have your first alcohol drink  · Time your drinks carefully  Have no more than 1 drink in an hour  Have a liquid such as water, coffee, or a soft drink between alcohol drinks  · Do not drive if you have had alcohol  Make sure someone who has not been drinking can help you get home  · Do not drink alcohol if you are taking medicine  Alcohol is dangerous when you combine it with certain medicines, such as acetaminophen or blood pressure medicine  Talk to your healthcare provider about all the medicines you currently take      Follow up with your healthcare provider as directed:  Write down your questions so you remember to ask them during your visits  For support and more information:   · Alcoholics Anonymous  Web Address: http://www [employer]/    · Substance Abuse and [address] [place]  Web Address: https://[employer]/  © [address] [facility] 2021 Information is for End User's use only and may not be sold, redistributed or otherwise used for commercial purposes  All illustrations and images included in CareNotes® are the copyrighted property of A D A [employer] , Inc  or [facility]   The above information is an  only  It is not intended as medical advice for individual conditions or treatments  Talk to your doctor, nurse or pharmacist before following any medical regimen to see if it is safe and effective for you  Abuse of Alcohol   WHAT YOU NEED TO KNOW:   What is alcohol abuse? Alcohol abuse means you drink more than the recommended daily or weekly limits  You may be drinking alcohol regularly or drinking large amounts in a short period of time (binge drinking)  You continue to drink even though it causes legal, work, or relationship problems  What do I need to know about recommended alcohol limits? · Men 21 to 64 years  should limit alcohol to 2 drinks a day  Do not have more than 4 drinks in 1 day or more than 14 in 1 week  · All women, and men 72 or older  should limit alcohol to 1 drink in a day  Do not have more than 3 drinks in 1 day or more than 7 in 1 week  No amount of alcohol is okay during pregnancy  What are the signs and symptoms of alcohol abuse?    · Loss of interest in activities, work, and school    · Hiding alcohol, or drinking in private    · Depression, or guilt about drinking    · Constant thoughts about alcohol    · Drinking in the morning to relieve the effects of a hangover    · Not being able to control the amount you drink    · Restlessness, or erratic and violent behavior    What health problems can alcohol abuse cause? · Cancer in your liver, pancreas, stomach, colon, kidney, or breast    · Stroke or a heart attack    · Liver, kidney, or lung disease    · Blackouts, memory loss, brain damage, or dementia    · Diabetes, immune system problems, or thiamine (vitamin B1) deficiency    · Problems for you and your baby if you drink while pregnant    How is alcohol abuse treated? Treatment helps you understand the reasons you abuse alcohol  Counselors and therapists provide you with support and help you find ways to cope instead of drinking  You may need inpatient treatment to provide a controlled environment  You may need outpatient treatment after your inpatient treatment is complete  · Detoxification (detox)  is a program used to flush alcohol from your body  During detox, medicines are given to help prevent withdrawal symptoms when you stop drinking alcohol  · In brief intervention therapy,  a healthcare provider helps you think about your alcohol use differently  He or she helps you set goals to decrease the amount of alcohol you drink  Therapy may continue after you leave the hospital     · Vitamin supplements  such as B1 may be needed  Alcohol can make it hard for your body to absorb enough vitamin B1  You may be given vitamin B1 if you have low levels  It is also given to prevent brain damage from alcohol use  What can I do to manage my alcohol use? · Decrease the amount you drink  This can help prevent health problems such as brain, heart, and liver damage, high blood pressure, diabetes, and cancer  If you cannot stop completely, healthcare providers can help you set goals to decrease the amount you drink  · Plan weekly alcohol use  You will be less likely to drink more than the recommended limit if you plan ahead  · Have food when you drink alcohol  Food will prevent alcohol from getting into your system too quickly   Eat before you have your first alcohol drink  · Time your drinks carefully  Have no more than 1 drink in an hour  Have a liquid such as water, coffee, or a soft drink between alcohol drinks  · Do not drive if you have had alcohol  Make sure someone who has not been drinking can help you get home  · Do not drink alcohol if you are taking medicine  Alcohol is dangerous when you combine it with certain medicines, such as acetaminophen or blood pressure medicine  Talk to your healthcare provider about all the medicines you currently take  Where can I find support and more information? · Alcoholics Anonymous  Web Address: http://www [employer]/    · Substance Abuse and [place] [address] [place] [phone]  Web Address: https://[employer]/  Call your local emergency number [phone] in the [address] [facility]) for any of the following:   · You have sudden chest pain or trouble breathing  · You want to harm yourself or others  · You have a seizure or have shaking or trembling  When should I call my doctor? · You have hallucinations (you see or hear things that are not real)  · You cannot stop vomiting or you vomit blood  · You need help to stop drinking alcohol  · You have questions or concerns about your condition or care  CARE AGREEMENT:   You have the right to help plan your care  Learn about your health condition and how it may be treated  Discuss treatment options with your healthcare providers to decide what care you want to receive  You always have the right to refuse treatment  The above information is an  only  It is not intended as medical advice for individual conditions or treatments  Talk to your doctor, nurse or pharmacist before following any medical regimen to see if it is safe and effective for you  © Copyright [employer] 2021 Information is for End User's use only and may not be sold, redistributed or otherwise used for commercial purposes   All illustrations and images included in CareNotes® are the copyrighted property of A D A M , Inc  or Abi Taveras               Hypertension   WHAT YOU NEED TO KNOW:   Hypertension is high blood pressure  Your blood pressure is the force of your blood moving against the walls of your arteries  Hypertension causes your blood pressure to get so high that your heart has to work much harder than normal  This can damage your heart  The cause of hypertension may not be known  This is called essential or primary hypertension  Hypertension caused by another medical condition, such as kidney disease, is called secondary hypertension  DISCHARGE INSTRUCTIONS:   Call your local emergency number (911 in the [address] [facility],[address]) or have someone call if:   · You have chest pain  · You have any of the following signs of a heart attack:      ? Squeezing, pressure, or pain in your chest    ? You may  also have any of the following:     § Discomfort or pain in your back, neck, jaw, stomach, or arm    § Shortness of breath    § Nausea or vomiting    § Lightheadedness or a sudden cold sweat    · You become confused or have trouble speaking  · You suddenly feel lightheaded or have trouble breathing  Seek care immediately if:   · You have a severe headache or vision loss  · You have weakness in an arm or leg  Call your doctor if:   · You feel faint, dizzy, confused, or drowsy  · You have been taking your blood pressure medicine but your pressure is higher than your provider says it should be  · You have questions or concerns about your condition or care  Medicines: You may  need any of the following:  · Antihypertensives  may be used to help lower your blood pressure  Several kinds of medicines are available  Your healthcare provider will choose medicines based on the kind of hypertension you have  You may need more than one type of medicine  Take the medicine exactly as directed      · Diuretics  help decrease extra fluid that collects in your body  This will help lower your blood pressure  You may urinate more often while you take this medicine  · Cholesterol medicine  helps lower your cholesterol level  A low cholesterol level helps prevent heart disease and makes it easier to control your blood pressure  · Take your medicine as directed  Contact your healthcare provider if you think your medicine is not helping or if you have side effects  Tell him or her if you are allergic to any medicine  Keep a list of the medicines, vitamins, and herbs you take  Include the amounts, and when and why you take them  Bring the list or the pill bottles to follow-up visits  Carry your medicine list with you in case of an emergency  Follow up with your doctor as directed: You will need to return to have your blood pressure checked and to have other lab tests done  Write down your questions so you remember to ask them during your visits  Stages of hypertension:       · Normal blood pressure is 119/79 or lower   Your healthcare provider may only check your blood pressure each year if it stays at a normal level  · Elevated blood pressure is 120/79 to 129/79   This is sometimes called prehypertension  Your healthcare provider may suggest lifestyle changes to help lower your blood pressure to a normal level  He or she may then check it again in 3 to 6 months  · Stage 1 hypertension is 130/80  to 139/89   Your provider may recommend lifestyle changes, medication, and checks every 3 to 6 months until your blood pressure is controlled  · Stage 2 hypertension is 140/90 or higher   Your provider will recommend lifestyle changes and have you take 2 kinds of hypertension medicines  You will also need to have your blood pressure checked monthly until it is controlled  Manage hypertension:   · Check your blood pressure at home  Avoid smoking, caffeine, and exercise at least 30 minutes before checking your blood pressure   Sit and rest for 5 minutes before you take your blood pressure  Extend your arm and support it on a flat surface  Your arm should be at the same level as your heart  Follow the directions that came with your blood pressure monitor  Check your blood pressure 2 times, 1 minute apart, before you take your medicine in the morning  Also check your blood pressure before your evening meal  Keep a record of your readings and bring it to your follow-up visits  Ask your healthcare provider what your blood pressure should be  · Manage any other health conditions you have  Health conditions such as diabetes can increase your risk for hypertension  Follow your healthcare provider's instructions and take all your medicines as directed  · Ask about all medicines  Certain medicines can increase your blood pressure  Examples include oral birth control pills, decongestants, herbal supplements, and NSAIDs, such as ibuprofen  Your healthcare provider can tell you which medicines are safe for you to take  This includes prescription and over-the-counter medicines  Lifestyle changes you can make to manage hypertension:  Your healthcare provider may recommend you work with a team to manage hypertension  The team may include medical experts such as a dietitian, an exercise or physical therapist, and a behavior therapist  Your family members may be included in helping you create lifestyle changes  · Limit sodium (salt) as directed  Too much sodium can affect your fluid balance  Check labels to find low-sodium or no-salt-added foods  Some low-sodium foods use potassium salts for flavor  Too much potassium can also cause health problems  Your healthcare provider will tell you how much sodium and potassium are safe for you to have in a day  He or she may recommend that you limit sodium to 2,300 mg a day  · Follow the meal plan recommended by your healthcare provider    A dietitian or your provider can give you more information on low-sodium plans or the DASH (Dietary Approaches to Stop Hypertension) eating plan  The DASH plan is low in sodium, processed sugar, unhealthy fats, and total fat  It is high in potassium, calcium, and fiber  These can be found in vegetables, fruit, and whole-grain foods  · Be physically active throughout the day  Physical activity, such as exercise, can help control your blood pressure and your weight  Be physically active for at least 30 minutes per day, on most days of the week  Include aerobic activity, such as walking or riding a bicycle  Also include strength training at least 2 times each week  Your healthcare providers can help you create a physical activity plan  · Decrease stress  This may help lower your blood pressure  Learn ways to relax, such as deep breathing or listening to music  · Limit alcohol as directed  Alcohol can increase your blood pressure  A drink of alcohol is 12 ounces of beer, 5 ounces of wine, or 1½ ounces of liquor  · Do not smoke  Nicotine and other chemicals in cigarettes and cigars can increase your blood pressure and also cause lung damage  Ask your healthcare provider for information if you currently smoke and need help to quit  E-cigarettes or smokeless tobacco still contain nicotine  Talk to your healthcare provider before you use these products  © Copyright [employer] 2021 Information is for End User's use only and may not be sold, redistributed or otherwise used for commercial purposes  All illustrations and images included in CareNotes® are the copyrighted property of A D A [employer]  or Abi Taveras   The above information is an  only  It is not intended as medical advice for individual conditions or treatments  Talk to your doctor, nurse or pharmacist before following any medical regimen to see if it is safe and effective for you  Hypertension   WHAT YOU NEED TO KNOW:   What is hypertension? Hypertension is high blood pressure   Your blood pressure is the force of your blood moving against the walls of your arteries  Hypertension causes your blood pressure to get so high that your heart has to work much harder than normal  This can damage your heart  The cause of hypertension may not be known  This is called essential or primary hypertension  Hypertension caused by another medical condition, such as kidney disease, is called secondary hypertension  What do I need to know about the stages of hypertension? · Normal blood pressure is 119/79 or lower   Your healthcare provider may only check your blood pressure each year if it stays at a normal level  · Elevated blood pressure is 120/79 to 129/79   This is sometimes called prehypertension  Your healthcare provider may suggest lifestyle changes to help lower your blood pressure to a normal level  He or she may then check it again in 3 to 6 months  · Stage 1 hypertension is 130/80  to 139/89   Your provider may recommend lifestyle changes, medication, and checks every 3 to 6 months until your blood pressure is controlled  · Stage 2 hypertension is 140/90 or higher   Your provider will recommend lifestyle changes and have you take 2 kinds of hypertension medicines  You will also need to have your blood pressure checked monthly until it is controlled  What increases my risk for hypertension? · Age older than 54 years (men) or 72 (women)    · Stress, or a family history of hypertension or heart disease    · Obesity, lack of exercise, or too many high-sodium foods    · Use of tobacco, alcohol, or illegal drugs    · A medical condition, such as diabetes, kidney disease, thyroid disease, or adrenal gland disorder    · Certain medicines, such as steroids or birth control pills    What are the signs and symptoms of hypertension?   You may have no signs or symptoms, or you may have any of the following:  · Headache    · Blurred vision    · Chest pain    · Dizziness or weakness    · Trouble breathing    · Nosebleeds    How is hypertension diagnosed? Your healthcare provider will take your blood pressure at several visits  You may also need to check your blood pressure at home  The provider will examine you and ask what medicines you take  He or she will also ask if you have a family history of high blood pressure and about any health conditions you have  He or she will also check your blood pressure and weight and examine your heart, lungs, and eyes  You may need any of the following tests:  · An ambulatory blood pressure monitor (ABPM)  is a device that you wear  ABPM measures your blood pressure while you do your regular daily activities  It records your blood pressure every 15 to 30 minutes during the day  It also records your blood pressure every 15 minutes to 1 hour at night  The recorded blood pressures help your healthcare provider know if you have hypertension not seen at your appointment  · Blood tests  may help healthcare providers find the cause of your hypertension  Blood tests can also help find other health problems caused by hypertension  · Urine tests  will be done to check your kidney function  Kidney problems can increase your risk for hypertension  Which medicines are used to treat hypertension? · Antihypertensives  may be used to help lower your blood pressure  Several kinds of medicines are available  Your healthcare provider will choose medicines based on the kind of hypertension you have  You may need more than one type of medicine  Take the medicine exactly as directed  · Diuretics  help decrease extra fluid that collects in your body  This will help lower your blood pressure  You may urinate more often while you take this medicine  · Cholesterol medicine  helps lower your cholesterol level  A low cholesterol level helps prevent heart disease and makes it easier to control your blood pressure  What can I do to manage hypertension?    · Check your blood pressure at home  Avoid smoking, caffeine, and exercise at least 30 minutes before checking your blood pressure  Sit and rest for 5 minutes before you take your blood pressure  Extend your arm and support it on a flat surface  Your arm should be at the same level as your heart  Follow the directions that came with your blood pressure monitor  Check your blood pressure 2 times, 1 minute apart, before you take your medicine in the morning  Also check your blood pressure before your evening meal  Keep a record of your readings and bring it to your follow-up visits  Ask your healthcare provider what your blood pressure should be  · Manage any other health conditions you have  Health conditions such as diabetes can increase your risk for hypertension  Follow your healthcare provider's instructions and take all your medicines as directed  · Ask about all medicines  Certain medicines can increase your blood pressure  Examples include oral birth control pills, decongestants, herbal supplements, and NSAIDs, such as ibuprofen  Your healthcare provider can tell you which medicines are safe for you to take  This includes prescription and over-the-counter medicines  What lifestyle changes can I make to manage hypertension? Your healthcare provider may recommend you work with a team to manage hypertension  The team may include medical experts such as a dietitian, an exercise or physical therapist, and a behavior therapist  Your family members may be included in helping you create lifestyle changes  · Limit sodium (salt) as directed  Too much sodium can affect your fluid balance  Check labels to find low-sodium or no-salt-added foods  Some low-sodium foods use potassium salts for flavor  Too much potassium can also cause health problems  Your healthcare provider will tell you how much sodium and potassium are safe for you to have in a day  He or she may recommend that you limit sodium to 2,300 mg a day  · Follow the meal plan recommended by your healthcare provider  A dietitian or your provider can give you more information on low-sodium plans or the DASH (Dietary Approaches to Stop Hypertension) eating plan  The DASH plan is low in sodium, processed sugar, unhealthy fats, and total fat  It is high in potassium, calcium, and fiber  These can be found in vegetables, fruit, and whole-grain foods  · Be physically active throughout the day  Physical activity, such as exercise, can help control your blood pressure and your weight  Be physically active for at least 30 minutes per day, on most days of the week  Include aerobic activity, such as walking or riding a bicycle  Also include strength training at least 2 times each week  Your healthcare providers can help you create a physical activity plan  · Decrease stress  This may help lower your blood pressure  Learn ways to relax, such as deep breathing or listening to music  · Limit alcohol as directed  Alcohol can increase your blood pressure  A drink of alcohol is 12 ounces of beer, 5 ounces of wine, or 1½ ounces of liquor  · Do not smoke  Nicotine and other chemicals in cigarettes and cigars can increase your blood pressure and also cause lung damage  Ask your healthcare provider for information if you currently smoke and need help to quit  E-cigarettes or smokeless tobacco still contain nicotine  Talk to your healthcare provider before you use these products  Call your local emergency number (911 in the [address] [place] [address],[address]) or have someone call if:   · You have chest pain  · You have any of the following signs of a heart attack:      ? Squeezing, pressure, or pain in your chest    ? You may  also have any of the following:     § Discomfort or pain in your back, neck, jaw, stomach, or arm    § Shortness of breath    § Nausea or vomiting    § Lightheadedness or a sudden cold sweat    · You become confused or have trouble speaking      · You suddenly feel lightheaded or have trouble breathing  When should I seek immediate care? · You have a severe headache or vision loss  · You have weakness in an arm or leg  When should I call my doctor? · You feel faint, dizzy, confused, or drowsy  · You have been taking your blood pressure medicine but your pressure is higher than your provider says it should be  · You have questions or concerns about your condition or care  CARE AGREEMENT:   You have the right to help plan your care  Learn about your health condition and how it may be treated  Discuss treatment options with your healthcare providers to decide what care you want to receive  You always have the right to refuse treatment  The above information is an  only  It is not intended as medical advice for individual conditions or treatments  Talk to your doctor, nurse or pharmacist before following any medical regimen to see if it is safe and effective for you  © Copyright [employer] 2021 Information is for End User's use only and may not be sold, redistributed or otherwise used for commercial purposes   All illustrations and images included in CareNotes® are the copyrighted property of A D A M , Inc  or [address] [place] [address]

## 2021-09-07 NOTE — CASE MANAGEMENT
Case Management Assessment & Discharge Planning Note    Patient name Jaki Daniels  Location / MRN [id number]  : 1950 Date 2021       Current Admission Date: 2021  Current Admission Diagnosis:  Al[facility] withdrawal ([facility] )   Patient Active Problem List   Diagnosis    Essential hypertension    Ataxia    Alcohol use disorder, severe, dependence ([facility] )    BPH (benign prostatic hyperplasia)    Transaminitis    Tremor of left hand    History of prolonged Q-T interval on ECG    Swelling of right lower extremity    Unspecified mood (affective) disorder (HCC)    Mixed hyperlipidemia    Alcohol induced fatty liver    Elevated lipase    Pruritus    Insomnia    Alcohol withdrawal [facility] )    Depression with anxiety    Electrolyte abnormality    Bradycardia    Nocturnal hypoxia    Previous Admission - Discharge Date:21   LOS (days): 1  Geometric Mean LOS (GMLOS) (days):   Days to GMLOS: Previous Discharge Diagnosis:  There are no discharge diagnoses documented for the most recent discharge         Risk of Unplanned Readmission Score  Predictive Model Details          30 (Moderate)  Factor Value    Calculated 2021 08:02 34% Number of ED visits in last six months 8    Risk of Unplanned Readmission Model 18% Number of active Rx orders 34     15% Number of hospitalizations in last year 4     7% ECG/EKG order present in last 6 months     6% Diagnosis of electrolyte disorder present     5% Imaging order present in last 6 months     5% Phosphorous result present     4% Age 70     3% Active anticoagulant Rx order present     2% Charlson Comorbidity Index 2     1% Current length of stay 0 822 days         BUNDLE:      Reason for Referral:    OBJECTIVE:  Pt is a 70y o  year old /Civil Union, white or  [1], male with Spiritism preference of None admitted on 2021  8:44 AM  Pt is admitted to  at [address] with complaints of Alcohol withdrawal ([facility] )   Current admission status: Inpatient       Preferred Pharmacy:   [address], [address]  [address] [address]  Phone: [phone] Fax: [phone] [address] [facility],[address], [address] [place] [address]  Phone: [phone] Fax: [phone]    Primary Care Provider: Ruth Bustamante MD    Primary Insurance: MEDICARE  Secondary Insurance: [facility] HEALTH OPTIONS PROGRAM    ASSESSMENT:  Mark [address] Agents     [place], [address] Representative - Spouse   Primary Phone: [phone] (Mobile)  Home Phone: [phone]               Advance Directives  Does patient have a [address] [facility] [address]?: No  Was patient offered paperwork?: Yes (declined)  Does patient currently have a Health Care decision maker?: Yes, please see Health Care Proxy section  Does patient have Advance Directives?: No  Was patient offered paperwork?: Yes (declined)         [facility] of Residence: [place]    Readmission Root Cause  30 Day Readmission: No    Patient Information  Mental Status: Alert  During Assessment patient was accompanied by: Not accompanied during assessment  Assessment information provided by[de-identified] Patient  Primary Caregiver: Self  Caregiver's Name[de-identified] Handy Ospina Relationship to Patient[de-identified] Family Member  Caregiver's Telephone Number[de-identified] [phone]  What city do you live in?: [address] [place] [address],[address] entry access options   Select all that apply : Stairs  Number of steps to enter home : 2  Type of Current Residence:  ([place] 2 story, his bedroom on first floor)  Living Arrangements: Lives w/ Spouse/significant other  Is patient a ?: No    Activities of Daily Living Prior to Admission  Functional Status: Independent  Completes ADLs independently?: Yes  Ambulates independently?: Yes  Does patient use assisted devices?: No (pt has a walker and a cane but does not use)  Does patient currently own DME?: Yes  What DME does the patient currently own?: Giana Tate  Does patient have a history of Outpatient Therapy (PT/OT)?: No  Does the patient have a history of Short-Term Rehab?: No  Does patient currently have [facility]?: No         Patient Information Continued  Income Source: Employed  Does patient have prescription coverage?: Yes  Does patient receive dialysis treatments?: No  Does patient have a history of substance abuse?: Yes  Historical substance use preference: Alcohol/ETOH  History of Withdrawal Symptoms: Other withdrawal symptoms (specify in comment) (nausea, vomiting)  Is patient currently in treatment for substance abuse?: No  Patient declined treatment information  ([facility], also had a hospital stay 7/31-8/4 for alcohol withdrawl  Pt states he is Set up with o/p for counseling he is unsure of name and he also attends AA in [place]  He does not want any additional information)  Does patient have a history of Mental Health Diagnosis?: Yes  Is patient receiving treatment for mental health?: No  Patient declined treatment information  (pt states he has anxiety and depression  he follows with PCP for same and he does not want any additional information )  Has patient received inpatient treatment related to mental health in the last 2 years?: No         Means of Transportation  Means of Transport to Appts[de-identified] Family transport  In the past 12 months, has lack of transportation kept you from medical appointments or from getting medications?: No  In the past 12 months, has lack of transportation kept you from meetings, work, or from getting things needed for daily living?: No  Was application for public transport provided?: No    DISCHARGE DETAILS:    Discharge planning discussed with[de-identified] patient       Contacts  Contact Method:  In Person  Reason/Outcome: Discharge [address]         Is the patient interested in [facility] at discharge?: No    DME Referral Provided  Referral made for DME?: No         We would like to be able to fill any required prescriptions on discharge at our [address] and have them delivered to you at discharge in your room    Would you like to participate in this program? : No - Declined    Discharge Destination Plan[de-identified] Home  Transportation at Discharge?: Yes  Type of Transport: Auto with designated                           IMM Given (Date):: 09/07/21

## 2021-09-07 NOTE — ASSESSMENT & PLAN NOTE
Patient presented to the ED with alcohol withdrawal symptoms (nausea, headache, palpitations, dizziness, anxiety, tremors)  Initial CIWA score was 10  He was managed with librium and CIWA protocol accordingly  He is asymptomatic as of the discharge time and back to his normal baseline  Follow ups arranged with psychiatry and PCP

## 2021-09-07 NOTE — CASE MANAGEMENT
Case Management Discharge Planning Note    Patient name Erlin Nguyen  Location / MRN [id number]  : 1950 Date 2021       Current Admission Date: 2021  Current Admission Diagnosis:  Essential hypertension   Patient Active Problem List   Diagnosis    Essential hypertension    Ataxia    Alcohol use disorder, severe, dependence ([facility] )    BPH (benign prostatic hyperplasia)    Transaminitis    Tremor of left hand    History of prolonged Q-T interval on ECG    Swelling of right lower extremity    Unspecified mood (affective) disorder (HCC)    Mixed hyperlipidemia    Alcohol induced fatty liver    Elevated lipase    Pruritus    Insomnia    Depression with anxiety    Electrolyte abnormality    Nocturnal hypoxia    Previous Admission - Discharge Date:21   LOS (days): 1  Geometric Mean LOS (GMLOS) (days): 3 40  Days to [facility] Previous Discharge Diagnosis:  There are no discharge diagnoses documented for the most recent discharge  Risk of Unplanned Readmission Score  Predictive Model Details          31 (Moderate)  Factor Value    Calculated 2021 16:02 33% Number of ED visits in last six months 8    Risk of Unplanned Readmission Model 20% Number of active Rx orders 38     15% Number of hospitalizations in last year 4     7% ECG/EKG order present in last 6 months     6% Diagnosis of electrolyte disorder present     5% Imaging order present in last 6 months     5% Phosphorous result present     4% Age 70     3% Active anticoagulant Rx order present     2% Charlson Comorbidity Index 2     1% Current length of stay 1 155 days         BUNDLE:      OBJECTIVE:  Pt is a 70y o  year old /Civil Union, white or  [1], male with Baptist preference of None admitted on 2021  8:44 AM  Pt is admitted to  at [address] [facility] [address] with complaints of Essential hypertension     Current admission status: Inpatient  Preferred Pharmacy:   [facility], [place] - [address] [address]  Phone: [phone] Fax: [phone]    [facility],[address], [address]  [address] [address]  Phone: [phone] Fax: [phone]    Primary Care Provider: Rhiannon Lopez MD    Primary Insurance: MEDICARE  Secondary Insurance: [employer] HEALTH OPTIONS PROGRAM    DISCHARGE DETAILS:    Discharge planning discussed with[de-identified] patient       Contacts  Contact Method: In Person  Reason/Outcome: Discharge [address]         Is the patient interested in [facility] at discharge?: No    DME Referral Provided  Referral made for DME?: No         We would like to be able to fill any required prescriptions on discharge at our [address] [place] [address] and have them delivered to you at discharge in your room  Would you like to participate in this program? : No - Declined    Discharge Destination Plan[de-identified] Home  Transportation at Discharge?: Yes  Type of Transport: Auto with designated     IMM Given (Date):: 09/07/21    went back in to speak to pt about home sleep study and pt states he has home o2 he is to wear at night  Pt did not mention this to CM during assessment this am  Dr Melody Ferrell made aware of same  Pt is still to follow up with sleep study  Pt states he wants to go to place in [place] that he went to prior  Pt waiting for wife to transport home   Nursing to review AVS

## 2021-09-07 NOTE — NURSING NOTE
Pt wheeled off floor by RN, accompanied by wife  Discharge instructions given to pt and wife, verbal understanding of same  Pt in no acute distress

## 2021-09-07 NOTE — ASSESSMENT & PLAN NOTE
This is a chronic condition  Patient has a history of alcohol use disorder for the past 40 years  Have been progressing for the past 15 years  He have repetitive admissions due to intoxication/withdrawal during past several years most recently a month ago  The patient admits that he drank 1 pt of vodka yesterday  Last drink was at 5:00 p m  Georgette Ormond Symptoms are consistent with alcohol withdrawal     He was managed with librium and CIWA protocol accordingly  He is asymptomatic as of the discharge time and back to his normal baseline  Follow ups arranged with psychiatry and PCP

## 2021-09-07 NOTE — ASSESSMENT & PLAN NOTE
· Previously diagnosed with obstructive sleep apnea  · Requires 2 lpm of supplemental oxygen QHS and anytime while sleeping    Outpatient follow up with his PCP regarding further investigations including doing a possible sleep study

## 2021-09-07 NOTE — CASE MANAGEMENT
Case Management Progress Note    Patient name Brigette Olivares  Location / MRN [id number]  : 1950 Date 2021       LOS (days): 1  Geometric Mean LOS (GMLOS) (days): 3 40  Days to GMLOS:2 4        BUNDLE:      OBJECTIVE:  Pt is a 70y o  year old /Civil Union, white or  [1], male with Mormon preference of None admitted on 2021  8:44 AM  Pt is admitted to  at [address] [facility] [address] [place] with complaints of Essential hypertension   Current admission status: Inpatient  Preferred Pharmacy:   [address], [address]  [address] [facility] [address]  Phone: [phone] Fax: [phone] [address] [facility],[address], [address]  [facility] [address]  Phone: [phone] Fax: [phone]    Primary Care Provider: Adriana Car MD    Primary Insurance: MEDICARE  Secondary Insurance: [employer] HEALTH OPTIONS PROGRAM    PROGRESS NOTE: pt does not qualify for home o2 per Sathish Lagos at [facility] medical  Call placed to Sleep study dept and [place] appts are out till Sakina Adams is out until Dec  MD made aware that if he gives pt a script for in home study that may get done quicker  TT made to MD regarding same, then pt can call Central scheduling and make appt according to his schedule

## 2021-09-13 NOTE — PHYSICIAN ADVISOR
Current patient class: Inpatient  The patient is currently on Hospital Day: 3 at [address]      The patient was admitted to the hospital at 12:19 PM on 9/6/21 for the following diagnosis:  Alcohol withdrawal ([facility] [address] ) [F10 239]  Hypokalemia [E87 6]  Hypomagnesemia [E83 42]  Hypernatremia [E87 0]       There is documentation in the medical record of an expected length of stay of at least 2 midnights  The patient is therefore expected to satisfy the 2 midnight benchmark and given the 2 midnight presumption is appropriate for INPATIENT ADMISSION  Given this expectation of a satisfying stay, CMS instructs us that the patient is most often appropriate for inpatient admission under part A provided medical necessity is documented in the chart  After review of the relevant documentation, labs, vital signs and test results, the patient is appropriate for INPATIENT ADMISSION  Admission to the hospital as an inpatient is a complex decision making process which requires the practitioner to consider the patients presenting complaint, history and physical examination and all relevant testing  With this in mind, in this case, the patient was deemed appropriate for INPATIENT ADMISSION  After review of the documentation and testing available at the time of the admission I concur with this clinical determination of medical necessity  Rationale is as follows: The patient is a 70 yrs old Male who presented to the ED at 9/5/2021  8:44 AM with a chief complaint of Dizziness (patient reports that he is an alcoholic and drank a pint of vodka yesterday evening  he states that he now feels dizzy and as if he is going to pass out)  Patient admitted for alcohol withdrawal  Patient presented to the ED with alcohol withdrawal symptoms (nausea, headache, palpitations, dizziness, anxiety, tremors)  Initial CIWA score was 10  He was managed with librium and CIWA protocol accordingly   He is asymptomatic as of the discharge time and back to his normal baseline  Follow ups arranged with psychiatry and PCP  The patients vitals on arrival were   ED Triage Vitals   Temperature Pulse Respirations Blood Pressure SpO2   09/05/21 0846 09/05/21 0846 09/05/21 0846 09/05/21 0846 09/05/21 0846   100 3 °F (37 9 °C) 69 18 (!) 182/88 95 %      Temp Source Heart Rate Source Patient Position - Orthostatic VS BP Location FiO2 (%)   09/05/21 0846 09/05/21 0846 09/05/21 0846 09/05/21 0846 --   Temporal Monitor Sitting Left arm       Pain Score       09/05/21 1426       No Pain           Past Medical History:   Diagnosis Date    Alcohol dependency ([facility] [place] [address] )     Anxiety     Depression     [place] (hard of hearing)     Hypertension     Kidney stones     Prostate enlargement     Renal disorder     kidney    Shoulder dislocation      Past Surgical History:   Procedure Laterality Date    CHOLECYSTECTOMY      SHOULDER SURGERY      for dislocation           Consults have been placed to:   IP CONSULT TO PSYCHIATRY    Vitals:    09/07/21 0600 09/07/21 0607 09/07/21 0735 09/07/21 0825   BP: 157/83 157/83  168/78   BP Location:  Left arm  Left arm   Pulse: 64 64 65    Resp:  14  18   Temp:  97 8 °F (36 6 °C)  97 8 °F (36 6 °C)   TempSrc:  Temporal  Temporal   SpO2:  96%     Weight:       Height:           Most recent labs:    No results for input(s): WBC, HGB, HCT, PLT, K, NA, CALCIUM, BUN, CREATININE, LIPASE, AMYLASE, INR, TROPONINI, CKTOTAL, AST, ALT, ALKPHOS, BILITOT in the last 72 hours  Scheduled Meds:  Continuous Infusions:No current facility-administered medications for this encounter  PRN Meds:      Surgical procedures (if appropriate):

## 2021-09-30 ENCOUNTER — APPOINTMENT (EMERGENCY)
Dept: RADIOLOGY | Facility: HOSPITAL | Age: 71
End: 2021-09-30
Payer: MEDICARE

## 2021-09-30 ENCOUNTER — APPOINTMENT (EMERGENCY)
Dept: CT IMAGING | Facility: HOSPITAL | Age: 71
End: 2021-09-30
Payer: MEDICARE

## 2021-09-30 ENCOUNTER — HOSPITAL ENCOUNTER (OUTPATIENT)
Facility: HOSPITAL | Age: 71
Setting detail: OBSERVATION
Discharge: HOME | End: 2021-10-01
Attending: EMERGENCY MEDICINE | Admitting: INTERNAL MEDICINE
Payer: MEDICARE

## 2021-09-30 DIAGNOSIS — F39 UNSPECIFIED MOOD (AFFECTIVE) DISORDER (HCC): ICD-10-CM

## 2021-09-30 DIAGNOSIS — R74.8 ELEVATED LIPASE: ICD-10-CM

## 2021-09-30 DIAGNOSIS — F10.20 ALCOHOL USE DISORDER, SEVERE, DEPENDENCE (HCC): ICD-10-CM

## 2021-09-30 DIAGNOSIS — E87.6 LOW BLOOD POTASSIUM: ICD-10-CM

## 2021-09-30 DIAGNOSIS — F10.239 ALCOHOL WITHDRAWAL (HCC): Primary | ICD-10-CM

## 2021-09-30 DIAGNOSIS — R79.0 LOW MAGNESIUM LEVEL: ICD-10-CM

## 2021-09-30 DIAGNOSIS — I10 ESSENTIAL HYPERTENSION: ICD-10-CM

## 2021-09-30 LAB
ALBUMIN SERPL BCP-MCNC: 4 G/DL (ref 3.5–5)
ALP SERPL-CCNC: 110 U/L (ref 46–116)
ALT SERPL W P-5'-P-CCNC: 58 U/L (ref 12–78)
AMPHETAMINES SERPL QL SCN: NEGATIVE
ANION GAP SERPL CALCULATED.3IONS-SCNC: 13 MMOL/L (ref 4–13)
APAP SERPL-MCNC: <2 UG/ML (ref 10–20)
APTT PPP: 26 SECONDS (ref 23–37)
AST SERPL W P-5'-P-CCNC: 46 U/L (ref 5–45)
BARBITURATES UR QL: NEGATIVE
BASOPHILS # BLD AUTO: 0.05 THOUSANDS/ΜL (ref 0–0.1)
BASOPHILS NFR BLD AUTO: 1 % (ref 0–1)
BENZODIAZ UR QL: POSITIVE
BILIRUB DIRECT SERPL-MCNC: 0.19 MG/DL (ref 0–0.2)
BILIRUB SERPL-MCNC: 0.63 MG/DL (ref 0.2–1)
BILIRUB UR QL STRIP: NEGATIVE
BUN SERPL-MCNC: 15 MG/DL (ref 5–25)
CALCIUM SERPL-MCNC: 8.4 MG/DL (ref 8.3–10.1)
CHLORIDE SERPL-SCNC: 103 MMOL/L (ref 100–108)
CLARITY UR: CLEAR
CO2 SERPL-SCNC: 28 MMOL/L (ref 21–32)
COCAINE UR QL: NEGATIVE
COLOR UR: YELLOW
CREAT SERPL-MCNC: 0.75 MG/DL (ref 0.6–1.3)
EOSINOPHIL # BLD AUTO: 0.16 THOUSAND/ΜL (ref 0–0.61)
EOSINOPHIL NFR BLD AUTO: 2 % (ref 0–6)
ERYTHROCYTE [DISTWIDTH] IN BLOOD BY AUTOMATED COUNT: 13 % (ref 11.6–15.1)
ETHANOL SERPL-MCNC: <3 MG/DL (ref 0–3)
GFR SERPL CREATININE-BSD FRML MDRD: 92 ML/MIN/1.73SQ M
GLUCOSE SERPL-MCNC: 82 MG/DL (ref 65–140)
GLUCOSE UR STRIP-MCNC: NEGATIVE MG/DL
HCT VFR BLD AUTO: 41 % (ref 36.5–49.3)
HGB BLD-MCNC: 13.7 G/DL (ref 12–17)
HGB UR QL STRIP.AUTO: NEGATIVE
IMM GRANULOCYTES # BLD AUTO: 0.04 THOUSAND/UL (ref 0–0.2)
IMM GRANULOCYTES NFR BLD AUTO: 1 % (ref 0–2)
INR PPP: 1.05 (ref 0.84–1.19)
KETONES UR STRIP-MCNC: ABNORMAL MG/DL
LEUKOCYTE ESTERASE UR QL STRIP: NEGATIVE
LIPASE SERPL-CCNC: 810 U/L (ref 73–393)
LYMPHOCYTES # BLD AUTO: 1.32 THOUSANDS/ΜL (ref 0.6–4.47)
LYMPHOCYTES NFR BLD AUTO: 16 % (ref 14–44)
MAGNESIUM SERPL-MCNC: 1.4 MG/DL (ref 1.6–2.6)
MCH RBC QN AUTO: 31.6 PG (ref 26.8–34.3)
MCHC RBC AUTO-ENTMCNC: 33.4 G/DL (ref 31.4–37.4)
MCV RBC AUTO: 95 FL (ref 82–98)
METHADONE UR QL: NEGATIVE
MONOCYTES # BLD AUTO: 0.53 THOUSAND/ΜL (ref 0.17–1.22)
MONOCYTES NFR BLD AUTO: 7 % (ref 4–12)
NEUTROPHILS # BLD AUTO: 6.01 THOUSANDS/ΜL (ref 1.85–7.62)
NEUTS SEG NFR BLD AUTO: 73 % (ref 43–75)
NITRITE UR QL STRIP: NEGATIVE
NRBC BLD AUTO-RTO: 0 /100 WBCS
OPIATES UR QL SCN: NEGATIVE
OXYCODONE+OXYMORPHONE UR QL SCN: NEGATIVE
PCP UR QL: NEGATIVE
PH UR STRIP.AUTO: 6 [PH]
PLATELET # BLD AUTO: 230 THOUSANDS/UL (ref 149–390)
PMV BLD AUTO: 9 FL (ref 8.9–12.7)
POTASSIUM SERPL-SCNC: 3.4 MMOL/L (ref 3.5–5.3)
PROT SERPL-MCNC: 7.3 G/DL (ref 6.4–8.2)
PROT UR STRIP-MCNC: NEGATIVE MG/DL
PROTHROMBIN TIME: 13.2 SECONDS (ref 11.6–14.5)
RBC # BLD AUTO: 4.33 MILLION/UL (ref 3.88–5.62)
SALICYLATES SERPL-MCNC: <3 MG/DL (ref 3–20)
SARS-COV-2 RNA RESP QL NAA+PROBE: NEGATIVE
SODIUM SERPL-SCNC: 144 MMOL/L (ref 136–145)
SP GR UR STRIP.AUTO: 1.02 (ref 1–1.03)
THC UR QL: NEGATIVE
TROPONIN I SERPL-MCNC: <0.02 NG/ML
UROBILINOGEN UR QL STRIP.AUTO: 0.2 E.U./DL
WBC # BLD AUTO: 8.11 THOUSAND/UL (ref 4.31–10.16)

## 2021-09-30 PROCEDURE — 80179 DRUG ASSAY SALICYLATE: CPT | Performed by: EMERGENCY MEDICINE

## 2021-09-30 PROCEDURE — 71045 X-RAY EXAM CHEST 1 VIEW: CPT

## 2021-09-30 PROCEDURE — U0005 INFEC AGEN DETEC AMPLI PROBE: HCPCS | Performed by: EMERGENCY MEDICINE

## 2021-09-30 PROCEDURE — 80143 DRUG ASSAY ACETAMINOPHEN: CPT | Performed by: EMERGENCY MEDICINE

## 2021-09-30 PROCEDURE — G1004 CDSM NDSC: HCPCS

## 2021-09-30 PROCEDURE — 83735 ASSAY OF MAGNESIUM: CPT | Performed by: EMERGENCY MEDICINE

## 2021-09-30 PROCEDURE — 96368 THER/DIAG CONCURRENT INF: CPT

## 2021-09-30 PROCEDURE — 80307 DRUG TEST PRSMV CHEM ANLYZR: CPT | Performed by: EMERGENCY MEDICINE

## 2021-09-30 PROCEDURE — 96366 THER/PROPH/DIAG IV INF ADDON: CPT

## 2021-09-30 PROCEDURE — 99285 EMERGENCY DEPT VISIT HI MDM: CPT | Performed by: EMERGENCY MEDICINE

## 2021-09-30 PROCEDURE — 93005 ELECTROCARDIOGRAM TRACING: CPT

## 2021-09-30 PROCEDURE — 83690 ASSAY OF LIPASE: CPT | Performed by: EMERGENCY MEDICINE

## 2021-09-30 PROCEDURE — U0003 INFECTIOUS AGENT DETECTION BY NUCLEIC ACID (DNA OR RNA); SEVERE ACUTE RESPIRATORY SYNDROME CORONAVIRUS 2 (SARS-COV-2) (CORONAVIRUS DISEASE [COVID-19]), AMPLIFIED PROBE TECHNIQUE, MAKING USE OF HIGH THROUGHPUT TECHNOLOGIES AS DESCRIBED BY CMS-2020-01-R: HCPCS | Performed by: EMERGENCY MEDICINE

## 2021-09-30 PROCEDURE — 82077 ASSAY SPEC XCP UR&BREATH IA: CPT | Performed by: EMERGENCY MEDICINE

## 2021-09-30 PROCEDURE — 96365 THER/PROPH/DIAG IV INF INIT: CPT

## 2021-09-30 PROCEDURE — 81003 URINALYSIS AUTO W/O SCOPE: CPT | Performed by: EMERGENCY MEDICINE

## 2021-09-30 PROCEDURE — 85610 PROTHROMBIN TIME: CPT | Performed by: EMERGENCY MEDICINE

## 2021-09-30 PROCEDURE — 99219 PR INITIAL OBSERVATION CARE/DAY 50 MINUTES: CPT | Performed by: PHYSICIAN ASSISTANT

## 2021-09-30 PROCEDURE — 96367 TX/PROPH/DG ADDL SEQ IV INF: CPT

## 2021-09-30 PROCEDURE — 96375 TX/PRO/DX INJ NEW DRUG ADDON: CPT

## 2021-09-30 PROCEDURE — 80076 HEPATIC FUNCTION PANEL: CPT | Performed by: EMERGENCY MEDICINE

## 2021-09-30 PROCEDURE — 36415 COLL VENOUS BLD VENIPUNCTURE: CPT | Performed by: EMERGENCY MEDICINE

## 2021-09-30 PROCEDURE — G0008 ADMIN INFLUENZA VIRUS VAC: HCPCS | Performed by: INTERNAL MEDICINE

## 2021-09-30 PROCEDURE — 90662 IIV NO PRSV INCREASED AG IM: CPT | Performed by: INTERNAL MEDICINE

## 2021-09-30 PROCEDURE — 84484 ASSAY OF TROPONIN QUANT: CPT | Performed by: EMERGENCY MEDICINE

## 2021-09-30 PROCEDURE — 80048 BASIC METABOLIC PNL TOTAL CA: CPT | Performed by: EMERGENCY MEDICINE

## 2021-09-30 PROCEDURE — 70450 CT HEAD/BRAIN W/O DYE: CPT

## 2021-09-30 PROCEDURE — 85730 THROMBOPLASTIN TIME PARTIAL: CPT | Performed by: EMERGENCY MEDICINE

## 2021-09-30 PROCEDURE — 85025 COMPLETE CBC W/AUTO DIFF WBC: CPT | Performed by: EMERGENCY MEDICINE

## 2021-09-30 PROCEDURE — 99285 EMERGENCY DEPT VISIT HI MDM: CPT

## 2021-09-30 RX ORDER — ATORVASTATIN CALCIUM 40 MG/1
40 TABLET, FILM COATED ORAL EVERY EVENING
Status: DISCONTINUED | OUTPATIENT
Start: 2021-09-30 | End: 2021-10-01 | Stop reason: HOSPADM

## 2021-09-30 RX ORDER — FLUOXETINE HYDROCHLORIDE 20 MG/1
60 CAPSULE ORAL DAILY
Status: DISCONTINUED | OUTPATIENT
Start: 2021-10-01 | End: 2021-10-01 | Stop reason: HOSPADM

## 2021-09-30 RX ORDER — POTASSIUM CHLORIDE 20 MEQ/1
20 TABLET, EXTENDED RELEASE ORAL ONCE
Status: COMPLETED | OUTPATIENT
Start: 2021-09-30 | End: 2021-09-30

## 2021-09-30 RX ORDER — TAMSULOSIN HYDROCHLORIDE 0.4 MG/1
0.4 CAPSULE ORAL
Status: DISCONTINUED | OUTPATIENT
Start: 2021-09-30 | End: 2021-10-01 | Stop reason: HOSPADM

## 2021-09-30 RX ORDER — PHENOBARBITAL SODIUM 130 MG/ML
130 INJECTION INTRAMUSCULAR ONCE
Status: COMPLETED | OUTPATIENT
Start: 2021-09-30 | End: 2021-09-30

## 2021-09-30 RX ORDER — TRAZODONE HYDROCHLORIDE 50 MG/1
100 TABLET ORAL
Status: DISCONTINUED | OUTPATIENT
Start: 2021-09-30 | End: 2021-10-01 | Stop reason: HOSPADM

## 2021-09-30 RX ORDER — ALFUZOSIN HYDROCHLORIDE 10 MG/1
10 TABLET, EXTENDED RELEASE ORAL
Status: DISCONTINUED | OUTPATIENT
Start: 2021-10-01 | End: 2021-09-30

## 2021-09-30 RX ORDER — AMLODIPINE BESYLATE 5 MG/1
5 TABLET ORAL DAILY
Status: DISCONTINUED | OUTPATIENT
Start: 2021-10-01 | End: 2021-10-01 | Stop reason: HOSPADM

## 2021-09-30 RX ORDER — PHENOBARBITAL SODIUM 130 MG/ML
65 INJECTION INTRAMUSCULAR ONCE
Status: COMPLETED | OUTPATIENT
Start: 2021-09-30 | End: 2021-09-30

## 2021-09-30 RX ORDER — NADOLOL 40 MG/1
10 TABLET ORAL DAILY
Status: DISCONTINUED | OUTPATIENT
Start: 2021-10-01 | End: 2021-10-01 | Stop reason: HOSPADM

## 2021-09-30 RX ORDER — MINERAL OIL AND PETROLATUM 150; 830 MG/G; MG/G
OINTMENT OPHTHALMIC
Status: DISCONTINUED | OUTPATIENT
Start: 2021-09-30 | End: 2021-10-01 | Stop reason: HOSPADM

## 2021-09-30 RX ORDER — MAGNESIUM SULFATE HEPTAHYDRATE 40 MG/ML
2 INJECTION, SOLUTION INTRAVENOUS ONCE
Status: COMPLETED | OUTPATIENT
Start: 2021-09-30 | End: 2021-09-30

## 2021-09-30 RX ORDER — FINASTERIDE 5 MG/1
5 TABLET, FILM COATED ORAL DAILY
Status: DISCONTINUED | OUTPATIENT
Start: 2021-10-01 | End: 2021-10-01 | Stop reason: HOSPADM

## 2021-09-30 RX ORDER — ALLOPURINOL 100 MG/1
100 TABLET ORAL DAILY
Status: DISCONTINUED | OUTPATIENT
Start: 2021-10-01 | End: 2021-10-01 | Stop reason: HOSPADM

## 2021-09-30 RX ORDER — POTASSIUM CHLORIDE 20 MEQ/1
20 TABLET, EXTENDED RELEASE ORAL DAILY
Status: DISCONTINUED | OUTPATIENT
Start: 2021-10-01 | End: 2021-10-01 | Stop reason: HOSPADM

## 2021-09-30 RX ORDER — CHLORDIAZEPOXIDE HYDROCHLORIDE 5 MG/1
5 CAPSULE, GELATIN COATED ORAL 2 TIMES DAILY
Status: DISCONTINUED | OUTPATIENT
Start: 2021-09-30 | End: 2021-10-01 | Stop reason: HOSPADM

## 2021-09-30 RX ORDER — LANOLIN ALCOHOL/MO/W.PET/CERES
1 CREAM (GRAM) TOPICAL 3 TIMES DAILY PRN
Status: DISCONTINUED | OUTPATIENT
Start: 2021-09-30 | End: 2021-10-01 | Stop reason: HOSPADM

## 2021-09-30 RX ORDER — LANOLIN ALCOHOL/MO/W.PET/CERES
3 CREAM (GRAM) TOPICAL
Status: DISCONTINUED | OUTPATIENT
Start: 2021-09-30 | End: 2021-10-01 | Stop reason: HOSPADM

## 2021-09-30 RX ORDER — ACETAMINOPHEN 325 MG/1
650 TABLET ORAL ONCE
Status: COMPLETED | OUTPATIENT
Start: 2021-09-30 | End: 2021-09-30

## 2021-09-30 RX ADMIN — ACETAMINOPHEN 650 MG: 325 TABLET, FILM COATED ORAL at 09:05

## 2021-09-30 RX ADMIN — TRAZODONE HYDROCHLORIDE 100 MG: 50 TABLET ORAL at 21:28

## 2021-09-30 RX ADMIN — POTASSIUM CHLORIDE 20 MEQ: 1500 TABLET, EXTENDED RELEASE ORAL at 10:57

## 2021-09-30 RX ADMIN — MINERAL OIL AND PETROLATUM 1 APPLICATION: 150; 830 OINTMENT OPHTHALMIC at 21:29

## 2021-09-30 RX ADMIN — TAMSULOSIN HYDROCHLORIDE 0.4 MG: 0.4 CAPSULE ORAL at 17:32

## 2021-09-30 RX ADMIN — DESMOPRESSIN ACETATE 40 MG: 0.2 TABLET ORAL at 17:43

## 2021-09-30 RX ADMIN — PHENOBARBITAL SODIUM 130 MG: 130 INJECTION INTRAMUSCULAR; INTRAVENOUS at 09:05

## 2021-09-30 RX ADMIN — CHLORDIAZEPOXIDE HYDROCHLORIDE 5 MG: 5 CAPSULE ORAL at 21:29

## 2021-09-30 RX ADMIN — PHENOBARBITAL SODIUM 65 MG: 130 INJECTION INTRAMUSCULAR; INTRAVENOUS at 08:38

## 2021-09-30 RX ADMIN — MAGNESIUM OXIDE TAB 400 MG (241.3 MG ELEMENTAL MG) 400 MG: 400 (241.3 MG) TAB at 17:32

## 2021-09-30 RX ADMIN — MAGNESIUM SULFATE HEPTAHYDRATE 2 G: 40 INJECTION, SOLUTION INTRAVENOUS at 09:17

## 2021-09-30 RX ADMIN — Medication 3 MG: at 21:28

## 2021-09-30 RX ADMIN — PHENOBARBITAL SODIUM 130 MG: 130 INJECTION INTRAMUSCULAR; INTRAVENOUS at 12:23

## 2021-09-30 RX ADMIN — SODIUM CHLORIDE, SODIUM LACTATE, POTASSIUM CHLORIDE, AND CALCIUM CHLORIDE 1000 ML: .6; .31; .03; .02 INJECTION, SOLUTION INTRAVENOUS at 08:38

## 2021-09-30 RX ADMIN — THIAMINE HYDROCHLORIDE 200 MG: 100 INJECTION, SOLUTION INTRAMUSCULAR; INTRAVENOUS at 08:38

## 2021-09-30 RX ADMIN — ENOXAPARIN SODIUM 40 MG: 40 INJECTION SUBCUTANEOUS at 17:32

## 2021-09-30 RX ADMIN — INFLUENZA A VIRUS A/VICTORIA/2570/2019 IVR-215 (H1N1) ANTIGEN (FORMALDEHYDE INACTIVATED), INFLUENZA A VIRUS A/TASMANIA/503/2020 IVR-221 (H3N2) ANTIGEN (FORMALDEHYDE INACTIVATED), INFLUENZA B VIRUS B/PHUKET/3073/2013 ANTIGEN (FORMALDEHYDE INACTIVATED), AND INFLUENZA B VIRUS B/WASHINGTON/02/2019 ANTIGEN (FORMALDEHYDE INACTIVATED) 0.7 ML: 60; 60; 60; 60 INJECTION, SUSPENSION INTRAMUSCULAR at 17:39

## 2021-10-01 VITALS
TEMPERATURE: 98 F | BODY MASS INDEX: 33.18 KG/M2 | SYSTOLIC BLOOD PRESSURE: 160 MMHG | HEIGHT: 67 IN | HEART RATE: 64 BPM | DIASTOLIC BLOOD PRESSURE: 82 MMHG | RESPIRATION RATE: 18 BRPM | OXYGEN SATURATION: 94 % | WEIGHT: 211.42 LBS

## 2021-10-01 LAB
ALBUMIN SERPL BCP-MCNC: 3.2 G/DL (ref 3.5–5)
ALP SERPL-CCNC: 82 U/L (ref 46–116)
ALT SERPL W P-5'-P-CCNC: 38 U/L (ref 12–78)
ANION GAP SERPL CALCULATED.3IONS-SCNC: 7 MMOL/L (ref 4–13)
AST SERPL W P-5'-P-CCNC: 26 U/L (ref 5–45)
ATRIAL RATE: 65 BPM
BASOPHILS # BLD AUTO: 0.04 THOUSANDS/ΜL (ref 0–0.1)
BASOPHILS NFR BLD AUTO: 1 % (ref 0–1)
BILIRUB SERPL-MCNC: 0.94 MG/DL (ref 0.2–1)
BUN SERPL-MCNC: 11 MG/DL (ref 5–25)
CALCIUM ALBUM COR SERPL-MCNC: 8.7 MG/DL (ref 8.3–10.1)
CALCIUM SERPL-MCNC: 8.1 MG/DL (ref 8.3–10.1)
CHLORIDE SERPL-SCNC: 102 MMOL/L (ref 100–108)
CO2 SERPL-SCNC: 29 MMOL/L (ref 21–32)
CREAT SERPL-MCNC: 0.73 MG/DL (ref 0.6–1.3)
EOSINOPHIL # BLD AUTO: 0.11 THOUSAND/ΜL (ref 0–0.61)
EOSINOPHIL NFR BLD AUTO: 2 % (ref 0–6)
ERYTHROCYTE [DISTWIDTH] IN BLOOD BY AUTOMATED COUNT: 13 % (ref 11.6–15.1)
GFR SERPL CREATININE-BSD FRML MDRD: 93 ML/MIN/1.73SQ M
GLUCOSE SERPL-MCNC: 100 MG/DL (ref 65–140)
HCT VFR BLD AUTO: 35.6 % (ref 36.5–49.3)
HGB BLD-MCNC: 11.8 G/DL (ref 12–17)
IMM GRANULOCYTES # BLD AUTO: 0.04 THOUSAND/UL (ref 0–0.2)
IMM GRANULOCYTES NFR BLD AUTO: 1 % (ref 0–2)
LIPASE SERPL-CCNC: 1058 U/L (ref 73–393)
LYMPHOCYTES # BLD AUTO: 1.46 THOUSANDS/ΜL (ref 0.6–4.47)
LYMPHOCYTES NFR BLD AUTO: 21 % (ref 14–44)
MCH RBC QN AUTO: 31.2 PG (ref 26.8–34.3)
MCHC RBC AUTO-ENTMCNC: 33.1 G/DL (ref 31.4–37.4)
MCV RBC AUTO: 94 FL (ref 82–98)
MONOCYTES # BLD AUTO: 0.76 THOUSAND/ΜL (ref 0.17–1.22)
MONOCYTES NFR BLD AUTO: 11 % (ref 4–12)
NEUTROPHILS # BLD AUTO: 4.44 THOUSANDS/ΜL (ref 1.85–7.62)
NEUTS SEG NFR BLD AUTO: 64 % (ref 43–75)
NRBC BLD AUTO-RTO: 0 /100 WBCS
P AXIS: 42 DEGREES
PLATELET # BLD AUTO: 158 THOUSANDS/UL (ref 149–390)
PMV BLD AUTO: 9.1 FL (ref 8.9–12.7)
POTASSIUM SERPL-SCNC: 3.2 MMOL/L (ref 3.5–5.3)
PR INTERVAL: 168 MS
PROT SERPL-MCNC: 6.1 G/DL (ref 6.4–8.2)
QRS AXIS: 46 DEGREES
QRSD INTERVAL: 108 MS
QT INTERVAL: 444 MS
QTC INTERVAL: 461 MS
RBC # BLD AUTO: 3.78 MILLION/UL (ref 3.88–5.62)
SODIUM SERPL-SCNC: 138 MMOL/L (ref 136–145)
T WAVE AXIS: 36 DEGREES
VENTRICULAR RATE: 65 BPM
WBC # BLD AUTO: 6.85 THOUSAND/UL (ref 4.31–10.16)

## 2021-10-01 PROCEDURE — 93010 ELECTROCARDIOGRAM REPORT: CPT | Performed by: INTERNAL MEDICINE

## 2021-10-01 PROCEDURE — 80053 COMPREHEN METABOLIC PANEL: CPT | Performed by: PHYSICIAN ASSISTANT

## 2021-10-01 PROCEDURE — 83690 ASSAY OF LIPASE: CPT | Performed by: PHYSICIAN ASSISTANT

## 2021-10-01 PROCEDURE — 85025 COMPLETE CBC W/AUTO DIFF WBC: CPT | Performed by: PHYSICIAN ASSISTANT

## 2021-10-01 RX ORDER — TRAZODONE HYDROCHLORIDE 100 MG/1
150 TABLET ORAL
Qty: 45 TABLET | Refills: 0 | Status: SHIPPED | OUTPATIENT
Start: 2021-10-01

## 2021-10-01 RX ORDER — OXYCODONE HYDROCHLORIDE AND ACETAMINOPHEN 5; 325 MG/1; MG/1
1 TABLET ORAL ONCE
Status: COMPLETED | OUTPATIENT
Start: 2021-10-01 | End: 2021-10-01

## 2021-10-01 RX ORDER — POTASSIUM CHLORIDE 20 MEQ/1
40 TABLET, EXTENDED RELEASE ORAL ONCE
Status: COMPLETED | OUTPATIENT
Start: 2021-10-01 | End: 2021-10-01

## 2021-10-01 RX ORDER — BUSPIRONE HYDROCHLORIDE 10 MG/1
10 TABLET ORAL 2 TIMES DAILY
Qty: 60 TABLET | Refills: 0 | Status: SHIPPED | OUTPATIENT
Start: 2021-10-01 | End: 2021-10-15 | Stop reason: HOSPADM

## 2021-10-01 RX ORDER — BUSPIRONE HYDROCHLORIDE 10 MG/1
10 TABLET ORAL 2 TIMES DAILY
Status: DISCONTINUED | OUTPATIENT
Start: 2021-10-01 | End: 2021-10-01 | Stop reason: HOSPADM

## 2021-10-01 RX ORDER — CHLORDIAZEPOXIDE HYDROCHLORIDE 5 MG/1
CAPSULE, GELATIN COATED ORAL
Qty: 15 CAPSULE | Refills: 0 | Status: SHIPPED | OUTPATIENT
Start: 2021-10-01 | End: 2021-10-15 | Stop reason: HOSPADM

## 2021-10-01 RX ORDER — NADOLOL 20 MG/1
10 TABLET ORAL DAILY
Start: 2021-10-01 | End: 2022-10-01

## 2021-10-01 RX ORDER — CHLORDIAZEPOXIDE HYDROCHLORIDE 5 MG/1
5 CAPSULE, GELATIN COATED ORAL ONCE
Status: COMPLETED | OUTPATIENT
Start: 2021-10-01 | End: 2021-10-01

## 2021-10-01 RX ORDER — ONDANSETRON 2 MG/ML
4 INJECTION INTRAMUSCULAR; INTRAVENOUS EVERY 6 HOURS PRN
Status: DISCONTINUED | OUTPATIENT
Start: 2021-10-01 | End: 2021-10-01 | Stop reason: HOSPADM

## 2021-10-01 RX ORDER — ONDANSETRON 4 MG/1
4 TABLET, ORALLY DISINTEGRATING ORAL EVERY 6 HOURS PRN
Qty: 20 TABLET | Refills: 0 | Status: SHIPPED | OUTPATIENT
Start: 2021-10-01

## 2021-10-01 RX ADMIN — FLUOXETINE 60 MG: 20 CAPSULE ORAL at 08:35

## 2021-10-01 RX ADMIN — AMLODIPINE BESYLATE 5 MG: 5 TABLET ORAL at 08:35

## 2021-10-01 RX ADMIN — BUSPIRONE HYDROCHLORIDE 10 MG: 10 TABLET ORAL at 11:12

## 2021-10-01 RX ADMIN — CHLORDIAZEPOXIDE HYDROCHLORIDE 5 MG: 5 CAPSULE ORAL at 08:35

## 2021-10-01 RX ADMIN — MAGNESIUM OXIDE TAB 400 MG (241.3 MG ELEMENTAL MG) 400 MG: 400 (241.3 MG) TAB at 08:36

## 2021-10-01 RX ADMIN — FINASTERIDE 5 MG: 5 TABLET, FILM COATED ORAL at 08:36

## 2021-10-01 RX ADMIN — ONDANSETRON 4 MG: 2 INJECTION INTRAMUSCULAR; INTRAVENOUS at 09:10

## 2021-10-01 RX ADMIN — POTASSIUM CHLORIDE 40 MEQ: 1500 TABLET, EXTENDED RELEASE ORAL at 08:39

## 2021-10-01 RX ADMIN — OXYCODONE HYDROCHLORIDE AND ACETAMINOPHEN 1 TABLET: 5; 325 TABLET ORAL at 02:19

## 2021-10-01 RX ADMIN — ENOXAPARIN SODIUM 40 MG: 40 INJECTION SUBCUTANEOUS at 08:40

## 2021-10-01 RX ADMIN — CHLORDIAZEPOXIDE HYDROCHLORIDE 5 MG: 5 CAPSULE ORAL at 13:34

## 2021-10-01 RX ADMIN — POTASSIUM CHLORIDE 20 MEQ: 1500 TABLET, EXTENDED RELEASE ORAL at 08:36

## 2021-10-01 RX ADMIN — ALLOPURINOL 100 MG: 100 TABLET ORAL at 08:35

## 2021-10-01 RX ADMIN — NADOLOL 10 MG: 40 TABLET ORAL at 08:36

## 2021-10-01 RX ADMIN — THIAMINE HYDROCHLORIDE: 100 INJECTION, SOLUTION INTRAMUSCULAR; INTRAVENOUS at 09:15

## 2021-10-01 RX ADMIN — MULTIPLE VITAMINS W/ MINERALS TAB 1 TABLET: TAB at 08:36

## 2021-10-13 ENCOUNTER — HOSPITAL ENCOUNTER (INPATIENT)
Facility: HOSPITAL | Age: 71
LOS: 1 days | Discharge: HOME | DRG: 897 | End: 2021-10-15
Attending: EMERGENCY MEDICINE | Admitting: FAMILY MEDICINE
Payer: MEDICARE

## 2021-10-13 ENCOUNTER — APPOINTMENT (EMERGENCY)
Dept: CT IMAGING | Facility: HOSPITAL | Age: 71
DRG: 897 | End: 2021-10-13
Payer: MEDICARE

## 2021-10-13 DIAGNOSIS — S20.211D CONTUSION OF RIB ON RIGHT SIDE, SUBSEQUENT ENCOUNTER: ICD-10-CM

## 2021-10-13 DIAGNOSIS — R25.1 SHAKINESS: ICD-10-CM

## 2021-10-13 DIAGNOSIS — F41.8 DEPRESSION WITH ANXIETY: ICD-10-CM

## 2021-10-13 DIAGNOSIS — F10.10 ALCOHOL ABUSE: Primary | ICD-10-CM

## 2021-10-13 DIAGNOSIS — B37.9 CANDIDIASIS: ICD-10-CM

## 2021-10-13 LAB
ANION GAP SERPL CALCULATED.3IONS-SCNC: 12 MMOL/L (ref 4–13)
APTT PPP: 23 SECONDS (ref 23–37)
BASOPHILS # BLD AUTO: 0.06 THOUSANDS/ΜL (ref 0–0.1)
BASOPHILS NFR BLD AUTO: 1 % (ref 0–1)
BUN SERPL-MCNC: 15 MG/DL (ref 5–25)
CALCIUM SERPL-MCNC: 8.6 MG/DL (ref 8.3–10.1)
CHLORIDE SERPL-SCNC: 102 MMOL/L (ref 100–108)
CO2 SERPL-SCNC: 28 MMOL/L (ref 21–32)
CREAT SERPL-MCNC: 0.72 MG/DL (ref 0.6–1.3)
EOSINOPHIL # BLD AUTO: 0.05 THOUSAND/ΜL (ref 0–0.61)
EOSINOPHIL NFR BLD AUTO: 1 % (ref 0–6)
ERYTHROCYTE [DISTWIDTH] IN BLOOD BY AUTOMATED COUNT: 13.2 % (ref 11.6–15.1)
ETHANOL SERPL-MCNC: 75 MG/DL (ref 0–3)
GFR SERPL CREATININE-BSD FRML MDRD: 94 ML/MIN/1.73SQ M
GLUCOSE SERPL-MCNC: 85 MG/DL (ref 65–140)
GLUCOSE SERPL-MCNC: 96 MG/DL (ref 65–140)
HCT VFR BLD AUTO: 46.3 % (ref 36.5–49.3)
HGB BLD-MCNC: 15.1 G/DL (ref 12–17)
IMM GRANULOCYTES # BLD AUTO: 0.11 THOUSAND/UL (ref 0–0.2)
IMM GRANULOCYTES NFR BLD AUTO: 1 % (ref 0–2)
INR PPP: 1.09 (ref 0.84–1.19)
LYMPHOCYTES # BLD AUTO: 1.57 THOUSANDS/ΜL (ref 0.6–4.47)
LYMPHOCYTES NFR BLD AUTO: 15 % (ref 14–44)
MAGNESIUM SERPL-MCNC: 1.7 MG/DL (ref 1.6–2.6)
MCH RBC QN AUTO: 31.1 PG (ref 26.8–34.3)
MCHC RBC AUTO-ENTMCNC: 32.6 G/DL (ref 31.4–37.4)
MCV RBC AUTO: 96 FL (ref 82–98)
MONOCYTES # BLD AUTO: 0.94 THOUSAND/ΜL (ref 0.17–1.22)
MONOCYTES NFR BLD AUTO: 9 % (ref 4–12)
NEUTROPHILS # BLD AUTO: 7.83 THOUSANDS/ΜL (ref 1.85–7.62)
NEUTS SEG NFR BLD AUTO: 73 % (ref 43–75)
NRBC BLD AUTO-RTO: 0 /100 WBCS
PHOSPHATE SERPL-MCNC: 3.6 MG/DL (ref 2.3–4.1)
PLATELET # BLD AUTO: 301 THOUSANDS/UL (ref 149–390)
PMV BLD AUTO: 9.4 FL (ref 8.9–12.7)
POTASSIUM SERPL-SCNC: 4.5 MMOL/L (ref 3.5–5.3)
PROTHROMBIN TIME: 13.6 SECONDS (ref 11.6–14.5)
RBC # BLD AUTO: 4.85 MILLION/UL (ref 3.88–5.62)
SODIUM SERPL-SCNC: 142 MMOL/L (ref 136–145)
TROPONIN I SERPL-MCNC: 0.03 NG/ML
WBC # BLD AUTO: 10.56 THOUSAND/UL (ref 4.31–10.16)

## 2021-10-13 PROCEDURE — 99285 EMERGENCY DEPT VISIT HI MDM: CPT | Performed by: EMERGENCY MEDICINE

## 2021-10-13 PROCEDURE — 80048 BASIC METABOLIC PNL TOTAL CA: CPT | Performed by: EMERGENCY MEDICINE

## 2021-10-13 PROCEDURE — 85610 PROTHROMBIN TIME: CPT | Performed by: EMERGENCY MEDICINE

## 2021-10-13 PROCEDURE — 84484 ASSAY OF TROPONIN QUANT: CPT | Performed by: EMERGENCY MEDICINE

## 2021-10-13 PROCEDURE — 93005 ELECTROCARDIOGRAM TRACING: CPT

## 2021-10-13 PROCEDURE — 99222 1ST HOSP IP/OBS MODERATE 55: CPT | Performed by: PSYCHIATRY & NEUROLOGY

## 2021-10-13 PROCEDURE — 82948 REAGENT STRIP/BLOOD GLUCOSE: CPT

## 2021-10-13 PROCEDURE — 85730 THROMBOPLASTIN TIME PARTIAL: CPT | Performed by: EMERGENCY MEDICINE

## 2021-10-13 PROCEDURE — 94762 N-INVAS EAR/PLS OXIMTRY CONT: CPT

## 2021-10-13 PROCEDURE — 96360 HYDRATION IV INFUSION INIT: CPT

## 2021-10-13 PROCEDURE — 70450 CT HEAD/BRAIN W/O DYE: CPT

## 2021-10-13 PROCEDURE — 83735 ASSAY OF MAGNESIUM: CPT | Performed by: EMERGENCY MEDICINE

## 2021-10-13 PROCEDURE — 84100 ASSAY OF PHOSPHORUS: CPT | Performed by: EMERGENCY MEDICINE

## 2021-10-13 PROCEDURE — 36415 COLL VENOUS BLD VENIPUNCTURE: CPT | Performed by: EMERGENCY MEDICINE

## 2021-10-13 PROCEDURE — 85025 COMPLETE CBC W/AUTO DIFF WBC: CPT | Performed by: EMERGENCY MEDICINE

## 2021-10-13 PROCEDURE — 82077 ASSAY SPEC XCP UR&BREATH IA: CPT | Performed by: EMERGENCY MEDICINE

## 2021-10-13 PROCEDURE — 97163 PT EVAL HIGH COMPLEX 45 MIN: CPT

## 2021-10-13 PROCEDURE — 99285 EMERGENCY DEPT VISIT HI MDM: CPT

## 2021-10-13 PROCEDURE — 97166 OT EVAL MOD COMPLEX 45 MIN: CPT

## 2021-10-13 PROCEDURE — 96361 HYDRATE IV INFUSION ADD-ON: CPT

## 2021-10-13 PROCEDURE — 99220 PR INITIAL OBSERVATION CARE/DAY 70 MINUTES: CPT | Performed by: PHYSICIAN ASSISTANT

## 2021-10-13 RX ORDER — TRAZODONE HYDROCHLORIDE 50 MG/1
150 TABLET ORAL
Status: DISCONTINUED | OUTPATIENT
Start: 2021-10-13 | End: 2021-10-15 | Stop reason: HOSPADM

## 2021-10-13 RX ORDER — LORAZEPAM 1 MG/1
2 TABLET ORAL ONCE
Status: COMPLETED | OUTPATIENT
Start: 2021-10-13 | End: 2021-10-13

## 2021-10-13 RX ORDER — ATORVASTATIN CALCIUM 40 MG/1
40 TABLET, FILM COATED ORAL DAILY
Status: DISCONTINUED | OUTPATIENT
Start: 2021-10-14 | End: 2021-10-15 | Stop reason: HOSPADM

## 2021-10-13 RX ORDER — AMLODIPINE BESYLATE 5 MG/1
5 TABLET ORAL DAILY
Status: DISCONTINUED | OUTPATIENT
Start: 2021-10-14 | End: 2021-10-15 | Stop reason: HOSPADM

## 2021-10-13 RX ORDER — NYSTATIN 100000 [USP'U]/G
POWDER TOPICAL 2 TIMES DAILY
Status: DISCONTINUED | OUTPATIENT
Start: 2021-10-13 | End: 2021-10-15 | Stop reason: HOSPADM

## 2021-10-13 RX ORDER — LANOLIN ALCOHOL/MO/W.PET/CERES
3 CREAM (GRAM) TOPICAL
Status: DISCONTINUED | OUTPATIENT
Start: 2021-10-13 | End: 2021-10-15 | Stop reason: HOSPADM

## 2021-10-13 RX ORDER — FOLIC ACID 1 MG/1
1 TABLET ORAL DAILY
Status: DISCONTINUED | OUTPATIENT
Start: 2021-10-14 | End: 2021-10-15 | Stop reason: HOSPADM

## 2021-10-13 RX ORDER — ALFUZOSIN HYDROCHLORIDE 10 MG/1
10 TABLET, EXTENDED RELEASE ORAL
Status: DISCONTINUED | OUTPATIENT
Start: 2021-10-14 | End: 2021-10-15 | Stop reason: HOSPADM

## 2021-10-13 RX ORDER — CHLORDIAZEPOXIDE HYDROCHLORIDE 5 MG/1
5 CAPSULE, GELATIN COATED ORAL ONCE
Status: DISCONTINUED | OUTPATIENT
Start: 2021-10-13 | End: 2021-10-13

## 2021-10-13 RX ORDER — FLUOXETINE HYDROCHLORIDE 20 MG/1
60 CAPSULE ORAL DAILY
Status: DISCONTINUED | OUTPATIENT
Start: 2021-10-14 | End: 2021-10-14

## 2021-10-13 RX ORDER — DIAZEPAM 5 MG/1
10 TABLET ORAL ONCE
Status: COMPLETED | OUTPATIENT
Start: 2021-10-13 | End: 2021-10-13

## 2021-10-13 RX ORDER — LORAZEPAM 2 MG/ML
4 INJECTION INTRAMUSCULAR ONCE
Status: COMPLETED | OUTPATIENT
Start: 2021-10-13 | End: 2021-10-13

## 2021-10-13 RX ORDER — DIAZEPAM 5 MG/1
10 TABLET ORAL ONCE
Status: DISCONTINUED | OUTPATIENT
Start: 2021-10-13 | End: 2021-10-13

## 2021-10-13 RX ORDER — FINASTERIDE 5 MG/1
5 TABLET, FILM COATED ORAL DAILY
Status: DISCONTINUED | OUTPATIENT
Start: 2021-10-14 | End: 2021-10-15 | Stop reason: HOSPADM

## 2021-10-13 RX ORDER — ONDANSETRON 4 MG/1
4 TABLET, ORALLY DISINTEGRATING ORAL EVERY 6 HOURS PRN
Status: DISCONTINUED | OUTPATIENT
Start: 2021-10-13 | End: 2021-10-15 | Stop reason: HOSPADM

## 2021-10-13 RX ORDER — ALLOPURINOL 100 MG/1
100 TABLET ORAL DAILY
Status: DISCONTINUED | OUTPATIENT
Start: 2021-10-14 | End: 2021-10-15 | Stop reason: HOSPADM

## 2021-10-13 RX ORDER — NADOLOL 40 MG/1
10 TABLET ORAL DAILY
Status: DISCONTINUED | OUTPATIENT
Start: 2021-10-14 | End: 2021-10-15 | Stop reason: HOSPADM

## 2021-10-13 RX ADMIN — THIAMINE HYDROCHLORIDE: 100 INJECTION, SOLUTION INTRAMUSCULAR; INTRAVENOUS at 17:22

## 2021-10-13 RX ADMIN — LORAZEPAM 2 MG: 1 TABLET ORAL at 17:51

## 2021-10-13 RX ADMIN — LORAZEPAM 4 MG: 2 INJECTION INTRAMUSCULAR; INTRAVENOUS at 15:00

## 2021-10-13 RX ADMIN — ENOXAPARIN SODIUM 40 MG: 40 INJECTION SUBCUTANEOUS at 17:22

## 2021-10-13 RX ADMIN — LORAZEPAM 2 MG: 1 TABLET ORAL at 16:07

## 2021-10-13 RX ADMIN — NYSTATIN: 100000 POWDER TOPICAL at 17:22

## 2021-10-13 RX ADMIN — TRAZODONE HYDROCHLORIDE 150 MG: 50 TABLET ORAL at 21:15

## 2021-10-13 RX ADMIN — DIAZEPAM 10 MG: 5 TABLET ORAL at 08:30

## 2021-10-13 RX ADMIN — DIAZEPAM 10 MG: 5 TABLET ORAL at 10:01

## 2021-10-13 RX ADMIN — Medication 3 MG: at 21:15

## 2021-10-13 RX ADMIN — SODIUM CHLORIDE 1000 ML: 0.9 INJECTION, SOLUTION INTRAVENOUS at 08:30

## 2021-10-13 RX ADMIN — LORAZEPAM 2 MG: 1 TABLET ORAL at 21:46

## 2021-10-14 ENCOUNTER — APPOINTMENT (OUTPATIENT)
Dept: RADIOLOGY | Facility: HOSPITAL | Age: 71
DRG: 897 | End: 2021-10-14
Payer: MEDICARE

## 2021-10-14 LAB
ALBUMIN SERPL BCP-MCNC: 3.2 G/DL (ref 3.5–5)
ALP SERPL-CCNC: 89 U/L (ref 46–116)
ALT SERPL W P-5'-P-CCNC: 31 U/L (ref 12–78)
ANION GAP SERPL CALCULATED.3IONS-SCNC: 9 MMOL/L (ref 4–13)
AST SERPL W P-5'-P-CCNC: 20 U/L (ref 5–45)
BASOPHILS # BLD AUTO: 0.04 THOUSANDS/ΜL (ref 0–0.1)
BASOPHILS NFR BLD AUTO: 1 % (ref 0–1)
BILIRUB SERPL-MCNC: 0.75 MG/DL (ref 0.2–1)
BUN SERPL-MCNC: 8 MG/DL (ref 5–25)
CALCIUM ALBUM COR SERPL-MCNC: 8.4 MG/DL (ref 8.3–10.1)
CALCIUM SERPL-MCNC: 7.8 MG/DL (ref 8.3–10.1)
CHLORIDE SERPL-SCNC: 102 MMOL/L (ref 100–108)
CO2 SERPL-SCNC: 27 MMOL/L (ref 21–32)
CREAT SERPL-MCNC: 0.62 MG/DL (ref 0.6–1.3)
EOSINOPHIL # BLD AUTO: 0.1 THOUSAND/ΜL (ref 0–0.61)
EOSINOPHIL NFR BLD AUTO: 1 % (ref 0–6)
ERYTHROCYTE [DISTWIDTH] IN BLOOD BY AUTOMATED COUNT: 13.2 % (ref 11.6–15.1)
GFR SERPL CREATININE-BSD FRML MDRD: 100 ML/MIN/1.73SQ M
GLUCOSE SERPL-MCNC: 119 MG/DL (ref 65–140)
HCT VFR BLD AUTO: 38.3 % (ref 36.5–49.3)
HGB BLD-MCNC: 12.5 G/DL (ref 12–17)
IMM GRANULOCYTES # BLD AUTO: 0.1 THOUSAND/UL (ref 0–0.2)
IMM GRANULOCYTES NFR BLD AUTO: 1 % (ref 0–2)
LIPASE SERPL-CCNC: 1146 U/L (ref 73–393)
LYMPHOCYTES # BLD AUTO: 1.64 THOUSANDS/ΜL (ref 0.6–4.47)
LYMPHOCYTES NFR BLD AUTO: 21 % (ref 14–44)
MCH RBC QN AUTO: 31.2 PG (ref 26.8–34.3)
MCHC RBC AUTO-ENTMCNC: 32.6 G/DL (ref 31.4–37.4)
MCV RBC AUTO: 96 FL (ref 82–98)
MONOCYTES # BLD AUTO: 0.95 THOUSAND/ΜL (ref 0.17–1.22)
MONOCYTES NFR BLD AUTO: 12 % (ref 4–12)
NEUTROPHILS # BLD AUTO: 4.91 THOUSANDS/ΜL (ref 1.85–7.62)
NEUTS SEG NFR BLD AUTO: 64 % (ref 43–75)
NRBC BLD AUTO-RTO: 0 /100 WBCS
PLATELET # BLD AUTO: 243 THOUSANDS/UL (ref 149–390)
PMV BLD AUTO: 9.1 FL (ref 8.9–12.7)
POTASSIUM SERPL-SCNC: 2.8 MMOL/L (ref 3.5–5.3)
PROT SERPL-MCNC: 6.4 G/DL (ref 6.4–8.2)
RBC # BLD AUTO: 4.01 MILLION/UL (ref 3.88–5.62)
SODIUM SERPL-SCNC: 138 MMOL/L (ref 136–145)
WBC # BLD AUTO: 7.74 THOUSAND/UL (ref 4.31–10.16)

## 2021-10-14 PROCEDURE — 71045 X-RAY EXAM CHEST 1 VIEW: CPT

## 2021-10-14 PROCEDURE — 80053 COMPREHEN METABOLIC PANEL: CPT | Performed by: PHYSICIAN ASSISTANT

## 2021-10-14 PROCEDURE — 83690 ASSAY OF LIPASE: CPT | Performed by: PHYSICIAN ASSISTANT

## 2021-10-14 PROCEDURE — 99232 SBSQ HOSP IP/OBS MODERATE 35: CPT | Performed by: PHYSICIAN ASSISTANT

## 2021-10-14 PROCEDURE — 85025 COMPLETE CBC W/AUTO DIFF WBC: CPT | Performed by: PHYSICIAN ASSISTANT

## 2021-10-14 PROCEDURE — 94760 N-INVAS EAR/PLS OXIMETRY 1: CPT

## 2021-10-14 RX ORDER — LOPERAMIDE HYDROCHLORIDE 2 MG/1
2 CAPSULE ORAL ONCE
Status: COMPLETED | OUTPATIENT
Start: 2021-10-14 | End: 2021-10-14

## 2021-10-14 RX ORDER — FLUOXETINE HYDROCHLORIDE 20 MG/1
80 CAPSULE ORAL DAILY
Status: DISCONTINUED | OUTPATIENT
Start: 2021-10-15 | End: 2021-10-15 | Stop reason: HOSPADM

## 2021-10-14 RX ORDER — HYDROXYZINE HYDROCHLORIDE 25 MG/1
25 TABLET, FILM COATED ORAL EVERY 6 HOURS PRN
Status: DISCONTINUED | OUTPATIENT
Start: 2021-10-14 | End: 2021-10-15 | Stop reason: HOSPADM

## 2021-10-14 RX ORDER — LIDOCAINE 50 MG/G
1 PATCH TOPICAL DAILY
Status: DISCONTINUED | OUTPATIENT
Start: 2021-10-14 | End: 2021-10-15 | Stop reason: HOSPADM

## 2021-10-14 RX ADMIN — BUSPIRONE HYDROCHLORIDE 15 MG: 10 TABLET ORAL at 21:21

## 2021-10-14 RX ADMIN — FINASTERIDE 5 MG: 5 TABLET, FILM COATED ORAL at 08:27

## 2021-10-14 RX ADMIN — ENOXAPARIN SODIUM 40 MG: 40 INJECTION SUBCUTANEOUS at 17:00

## 2021-10-14 RX ADMIN — LIDOCAINE 5% 1 PATCH: 700 PATCH TOPICAL at 12:29

## 2021-10-14 RX ADMIN — ALLOPURINOL 100 MG: 100 TABLET ORAL at 08:27

## 2021-10-14 RX ADMIN — NYSTATIN: 100000 POWDER TOPICAL at 08:28

## 2021-10-14 RX ADMIN — NYSTATIN: 100000 POWDER TOPICAL at 17:00

## 2021-10-14 RX ADMIN — LOPERAMIDE HYDROCHLORIDE 2 MG: 2 CAPSULE ORAL at 12:29

## 2021-10-14 RX ADMIN — FLUOXETINE 60 MG: 20 CAPSULE ORAL at 08:27

## 2021-10-14 RX ADMIN — BUSPIRONE HYDROCHLORIDE 15 MG: 10 TABLET ORAL at 16:52

## 2021-10-14 RX ADMIN — ATORVASTATIN CALCIUM 40 MG: 40 TABLET, FILM COATED ORAL at 08:27

## 2021-10-14 RX ADMIN — AMLODIPINE BESYLATE 5 MG: 5 TABLET ORAL at 08:27

## 2021-10-14 RX ADMIN — HYDROXYZINE HYDROCHLORIDE 25 MG: 25 TABLET ORAL at 16:55

## 2021-10-14 RX ADMIN — Medication 3 MG: at 21:21

## 2021-10-14 RX ADMIN — FOLIC ACID 1 MG: 1 TABLET ORAL at 08:27

## 2021-10-14 RX ADMIN — TRAZODONE HYDROCHLORIDE 150 MG: 50 TABLET ORAL at 21:21

## 2021-10-14 RX ADMIN — THIAMINE HYDROCHLORIDE: 100 INJECTION, SOLUTION INTRAMUSCULAR; INTRAVENOUS at 08:27

## 2021-10-14 RX ADMIN — NADOLOL 10 MG: 40 TABLET ORAL at 08:27

## 2021-10-15 VITALS
RESPIRATION RATE: 16 BRPM | TEMPERATURE: 97.4 F | SYSTOLIC BLOOD PRESSURE: 147 MMHG | OXYGEN SATURATION: 93 % | WEIGHT: 211.2 LBS | DIASTOLIC BLOOD PRESSURE: 80 MMHG | HEIGHT: 67 IN | BODY MASS INDEX: 33.15 KG/M2 | HEART RATE: 71 BPM

## 2021-10-15 PROBLEM — E87.6 HYPOKALEMIA DUE TO INADEQUATE POTASSIUM INTAKE: Status: ACTIVE | Noted: 2021-10-15

## 2021-10-15 PROBLEM — S20.211A CONTUSION OF RIB ON RIGHT SIDE: Status: ACTIVE | Noted: 2021-10-15

## 2021-10-15 LAB
ALBUMIN SERPL BCP-MCNC: 3.4 G/DL (ref 3.5–5)
ALP SERPL-CCNC: 93 U/L (ref 46–116)
ALT SERPL W P-5'-P-CCNC: 28 U/L (ref 12–78)
ANION GAP SERPL CALCULATED.3IONS-SCNC: 7 MMOL/L (ref 4–13)
AST SERPL W P-5'-P-CCNC: 16 U/L (ref 5–45)
BASOPHILS # BLD AUTO: 0.05 THOUSANDS/ΜL (ref 0–0.1)
BASOPHILS NFR BLD AUTO: 1 % (ref 0–1)
BILIRUB SERPL-MCNC: 0.4 MG/DL (ref 0.2–1)
BUN SERPL-MCNC: 9 MG/DL (ref 5–25)
CALCIUM ALBUM COR SERPL-MCNC: 9 MG/DL (ref 8.3–10.1)
CALCIUM SERPL-MCNC: 8.5 MG/DL (ref 8.3–10.1)
CHLORIDE SERPL-SCNC: 103 MMOL/L (ref 100–108)
CO2 SERPL-SCNC: 30 MMOL/L (ref 21–32)
CREAT SERPL-MCNC: 0.84 MG/DL (ref 0.6–1.3)
EOSINOPHIL # BLD AUTO: 0.19 THOUSAND/ΜL (ref 0–0.61)
EOSINOPHIL NFR BLD AUTO: 3 % (ref 0–6)
ERYTHROCYTE [DISTWIDTH] IN BLOOD BY AUTOMATED COUNT: 13.2 % (ref 11.6–15.1)
GFR SERPL CREATININE-BSD FRML MDRD: 88 ML/MIN/1.73SQ M
GLUCOSE SERPL-MCNC: 125 MG/DL (ref 65–140)
HCT VFR BLD AUTO: 39.4 % (ref 36.5–49.3)
HGB BLD-MCNC: 13 G/DL (ref 12–17)
IMM GRANULOCYTES # BLD AUTO: 0.12 THOUSAND/UL (ref 0–0.2)
IMM GRANULOCYTES NFR BLD AUTO: 2 % (ref 0–2)
LYMPHOCYTES # BLD AUTO: 1.48 THOUSANDS/ΜL (ref 0.6–4.47)
LYMPHOCYTES NFR BLD AUTO: 20 % (ref 14–44)
MCH RBC QN AUTO: 31.3 PG (ref 26.8–34.3)
MCHC RBC AUTO-ENTMCNC: 33 G/DL (ref 31.4–37.4)
MCV RBC AUTO: 95 FL (ref 82–98)
MONOCYTES # BLD AUTO: 0.76 THOUSAND/ΜL (ref 0.17–1.22)
MONOCYTES NFR BLD AUTO: 10 % (ref 4–12)
NEUTROPHILS # BLD AUTO: 5 THOUSANDS/ΜL (ref 1.85–7.62)
NEUTS SEG NFR BLD AUTO: 64 % (ref 43–75)
NRBC BLD AUTO-RTO: 0 /100 WBCS
PLATELET # BLD AUTO: 248 THOUSANDS/UL (ref 149–390)
PMV BLD AUTO: 9.3 FL (ref 8.9–12.7)
POTASSIUM SERPL-SCNC: 3 MMOL/L (ref 3.5–5.3)
PROT SERPL-MCNC: 6.6 G/DL (ref 6.4–8.2)
RBC # BLD AUTO: 4.15 MILLION/UL (ref 3.88–5.62)
SODIUM SERPL-SCNC: 140 MMOL/L (ref 136–145)
WBC # BLD AUTO: 7.6 THOUSAND/UL (ref 4.31–10.16)

## 2021-10-15 PROCEDURE — 97110 THERAPEUTIC EXERCISES: CPT

## 2021-10-15 PROCEDURE — 85025 COMPLETE CBC W/AUTO DIFF WBC: CPT | Performed by: PHYSICIAN ASSISTANT

## 2021-10-15 PROCEDURE — 97116 GAIT TRAINING THERAPY: CPT

## 2021-10-15 PROCEDURE — 94762 N-INVAS EAR/PLS OXIMTRY CONT: CPT

## 2021-10-15 PROCEDURE — 99239 HOSP IP/OBS DSCHRG MGMT >30: CPT | Performed by: PHYSICIAN ASSISTANT

## 2021-10-15 PROCEDURE — 80053 COMPREHEN METABOLIC PANEL: CPT | Performed by: PHYSICIAN ASSISTANT

## 2021-10-15 RX ORDER — HYDROXYZINE HYDROCHLORIDE 25 MG/1
25 TABLET, FILM COATED ORAL EVERY 6 HOURS PRN
Qty: 45 TABLET | Refills: 0 | Status: SHIPPED | OUTPATIENT
Start: 2021-10-15

## 2021-10-15 RX ORDER — LIDOCAINE 50 MG/G
1 PATCH TOPICAL DAILY
Qty: 30 PATCH | Refills: 0 | Status: SHIPPED | OUTPATIENT
Start: 2021-10-16

## 2021-10-15 RX ORDER — FLUOXETINE HYDROCHLORIDE 40 MG/1
80 CAPSULE ORAL DAILY
Qty: 60 CAPSULE | Refills: 0 | Status: SHIPPED | OUTPATIENT
Start: 2021-10-16

## 2021-10-15 RX ORDER — POTASSIUM CHLORIDE 20 MEQ/1
40 TABLET, EXTENDED RELEASE ORAL ONCE
Status: COMPLETED | OUTPATIENT
Start: 2021-10-15 | End: 2021-10-15

## 2021-10-15 RX ORDER — NYSTATIN 100000 [USP'U]/G
POWDER TOPICAL 2 TIMES DAILY
Qty: 15 G | Refills: 0 | Status: SHIPPED | OUTPATIENT
Start: 2021-10-15

## 2021-10-15 RX ORDER — ACETAMINOPHEN 325 MG/1
650 TABLET ORAL ONCE
Status: COMPLETED | OUTPATIENT
Start: 2021-10-15 | End: 2021-10-15

## 2021-10-15 RX ORDER — LOPERAMIDE HYDROCHLORIDE 2 MG/1
2 CAPSULE ORAL ONCE
Status: COMPLETED | OUTPATIENT
Start: 2021-10-15 | End: 2021-10-15

## 2021-10-15 RX ORDER — LOPERAMIDE HYDROCHLORIDE 2 MG/1
2 CAPSULE ORAL ONCE
Qty: 30 CAPSULE | Refills: 0 | Status: SHIPPED | OUTPATIENT
Start: 2021-10-15 | End: 2021-10-15

## 2021-10-15 RX ORDER — BUSPIRONE HYDROCHLORIDE 15 MG/1
15 TABLET ORAL 3 TIMES DAILY
Qty: 90 TABLET | Refills: 0 | Status: SHIPPED | OUTPATIENT
Start: 2021-10-15

## 2021-10-15 RX ADMIN — FINASTERIDE 5 MG: 5 TABLET, FILM COATED ORAL at 08:42

## 2021-10-15 RX ADMIN — POTASSIUM CHLORIDE 40 MEQ: 1500 TABLET, EXTENDED RELEASE ORAL at 08:42

## 2021-10-15 RX ADMIN — AMLODIPINE BESYLATE 5 MG: 5 TABLET ORAL at 08:42

## 2021-10-15 RX ADMIN — FLUOXETINE 80 MG: 20 CAPSULE ORAL at 08:44

## 2021-10-15 RX ADMIN — THIAMINE HYDROCHLORIDE: 100 INJECTION, SOLUTION INTRAMUSCULAR; INTRAVENOUS at 08:42

## 2021-10-15 RX ADMIN — NADOLOL 10 MG: 40 TABLET ORAL at 08:42

## 2021-10-15 RX ADMIN — HYDROXYZINE HYDROCHLORIDE 25 MG: 25 TABLET ORAL at 08:47

## 2021-10-15 RX ADMIN — ATORVASTATIN CALCIUM 40 MG: 40 TABLET, FILM COATED ORAL at 08:42

## 2021-10-15 RX ADMIN — FOLIC ACID 1 MG: 1 TABLET ORAL at 08:42

## 2021-10-15 RX ADMIN — ACETAMINOPHEN 650 MG: 325 TABLET, FILM COATED ORAL at 09:44

## 2021-10-15 RX ADMIN — LOPERAMIDE HYDROCHLORIDE 2 MG: 2 CAPSULE ORAL at 09:44

## 2021-10-15 RX ADMIN — NYSTATIN: 100000 POWDER TOPICAL at 08:45

## 2021-10-15 RX ADMIN — LIDOCAINE 5% 1 PATCH: 700 PATCH TOPICAL at 08:42

## 2021-10-15 RX ADMIN — ALLOPURINOL 100 MG: 100 TABLET ORAL at 08:42

## 2021-10-15 RX ADMIN — BUSPIRONE HYDROCHLORIDE 15 MG: 10 TABLET ORAL at 08:42

## 2021-10-18 LAB
ATRIAL RATE: 64 BPM
P AXIS: 68 DEGREES
PR INTERVAL: 172 MS
QRS AXIS: 77 DEGREES
QRSD INTERVAL: 102 MS
QT INTERVAL: 436 MS
QTC INTERVAL: 449 MS
T WAVE AXIS: 71 DEGREES
VENTRICULAR RATE: 64 BPM

## 2021-10-18 PROCEDURE — 93010 ELECTROCARDIOGRAM REPORT: CPT | Performed by: INTERNAL MEDICINE

## 2021-10-26 NOTE — PROGRESS NOTES
Problem: Organ Transplant History  Goal: Activity level is maintained/returned to level needed for d/c  Outcome: Outcome Met, Continue evaluating goal progress toward completion  Flowsheets  Taken 10/26/2021 1343 by Leann Barrios RN  Activity Response: No abnormal symptoms  Taken 10/26/2021 0842 by Bethany Rios RN  Activity:   Ambulated   Chair (all types)  Taken 10/26/2021 0818 by Leann Barrios RN  Positioning: Semi-fowlers      Daily Note     Today's date: 6/15/2021  Patient name: Canelo Morales  : 1950  MRN: [id number]  Referring provider: Abby Leon MD  Dx:   Encounter Diagnosis     ICD-10-CM    1  Neck pain  M54 2    2  Back pain with radiation  M54 9    3  Chronic shoulder pain, unspecified laterality  M25 519     G89 29    4  Muscle spasms of neck  M62 838        Start Time: 0900  Stop Time: 1000  Total time in clinic (min): 60 minutes    Subjective: Pt reports continued neck/back discomfort  Objective: See treatment diary below  Resumed TE, deferred modalities due to time restraints  Assessment: Tolerated treatment fair  Patient would benefit from continued PT      Plan: Continue per plan of care        Precautions: unsteadiness, recent discharge from detox  Date 5/5  5/28 6/15 4/30 5/3   Visit 6  7 8 4 5   Pain 6B   8B B5 N 3   Manuals             LE stretch ds ds ds ds ds   STM bilateral C-spine UT ds ds ---> ----> ds B UT   Manual cervical traction, suboccipital release, manual stretch ---- ------  -------       STM lumbar paraspinals ----   -----> ----> ---->   PA mobilization L-spine      --------       Neuro Re-Ed             Cervical rotation with mob/glide seated     ------ ----> ------>   Chin tucks 20x 20x x20 20x 20x   Scapular retraction 2/10 2/10 2/10 2/10 2/10   T-band row L4 2/10  L5 2/15  L5 2/15   L4 2/10   T-band ELISHA L4 2/10  L5 2/15  L5 2/15   L4 2/10   T-band trunk rotation             Tandem stance             NBOS with ABC             BOSU marching             sidestepping             Ther Ex             LTR 1 5# 2/20  2# 2/20 2# 2/20 10x 3" 1 5# 2/20   Adductor squeeze w/ bridge 30x 3"    30x 3" add 30x 3" 30x 3"   clamshells L4 2/15  L5 2/15  L5 2/15 L3  2/15 L4 2/15   PPT 30x 3" 30x 3" 30x 3" 30x 3" 30x 3"   PPT with marching 1 5#  2#   2#  1#  1 5#    PPT with leg raise 1 5# 2/15 2#   2# 2/10 1# 2/10 1 5# 2/15                 Nu-step 15m L5 5m  L3 10m L4 10m L4 15m L5   Ther Activity                           Gait Training                           Modalities             HP w/ pre-mod C-spine and L-spine 15 m 15m -----> 15m 15m

## 2021-10-28 NOTE — NURSING NOTE
Patient given discharge instructions  Patient acknowledged understanding  RN walked patient to car and saw patient picked up by wife  [de-identified] : We talked about the nature of the condition and treatment options. Anticipatory guidance regarding mechanically oriented lower back pain was given. Based on 3 - 5 months of non traumatic mechanically oriented lower back pain Patient was provided a Rx for Naproxen Sodium 500Mg BID. Patient has been referred to physical therapy for decreased pain modalities, stretching and strengthening modalities, soft tissue modalities, and physical modalities. The patient will follow up in 4 - 6 weeks for a repeat clinical assessment If pain persists at this point we will consider ordering an MRI to further assess these complaints. \par \par Prior to appointment and during encounter with patient extensive medical records were reviewed including but not limited to, hospital records, out patient records, imaging results, and lab data. During this appointment the patient was examined, diagnoses were discussed and explained in a face to face manner. In addition extensive time was spent reviewing aforementioned diagnostic studies. Counseling including abnormal image results, differential diagnoses, treatment options, risk and benefits, lifestyle changes, current condition, and current medications was performed. Patient's comments, questions, and concerns were address and patient verbalized understanding. Based on this patient's presentation at our office, which is an orthopedic spine surgeon's office, this patient inherently / intrinsically has a risk, however minute, of developing  issues such as Cauda equina syndrome, bowel and bladder changes, or progression of motor or neurological deficits such as paralysis which may be permanent.

## 2022-05-23 NOTE — ASSESSMENT & PLAN NOTE
Continue Flomax/Proscar Mohs Rapid Report Verbiage: The area of clinically evident tumor was marked with skin marking ink and appropriately hatched.  The initial incision was made following the Mohs approach through the skin.  The specimen was taken to the lab, divided into the necessary number of pieces, chromacoded and processed according to the Mohs protocol.  This was repeated in successive stages until a tumor free defect was achieved.

## 2022-06-11 ENCOUNTER — OFFICE VISIT (OUTPATIENT)
Dept: URGENT CARE | Facility: MEDICAL CENTER | Age: 72
End: 2022-06-11
Payer: MEDICARE

## 2022-06-11 VITALS
RESPIRATION RATE: 20 BRPM | HEART RATE: 63 BPM | BODY MASS INDEX: 36.93 KG/M2 | DIASTOLIC BLOOD PRESSURE: 70 MMHG | SYSTOLIC BLOOD PRESSURE: 114 MMHG | WEIGHT: 235.8 LBS | TEMPERATURE: 98 F | OXYGEN SATURATION: 99 %

## 2022-06-11 DIAGNOSIS — M79.89 LEG SWELLING: ICD-10-CM

## 2022-06-11 DIAGNOSIS — I87.2 VENOUS INSUFFICIENCY: Primary | ICD-10-CM

## 2022-06-11 PROCEDURE — G0463 HOSPITAL OUTPT CLINIC VISIT: HCPCS | Performed by: PHYSICIAN ASSISTANT

## 2022-06-11 PROCEDURE — 99213 OFFICE O/P EST LOW 20 MIN: CPT | Performed by: PHYSICIAN ASSISTANT

## 2022-06-11 RX ORDER — HYDROCHLOROTHIAZIDE 25 MG/1
25 TABLET ORAL DAILY
COMMUNITY

## 2022-06-11 NOTE — PATIENT INSTRUCTIONS
Elevation   Compression stockings ([employer])  Decreased sodium intake (limit to 1g per day)  Follow up with PCP in 3-5 days  Proceed to  ER if symptoms worsen

## 2022-06-11 NOTE — PROGRESS NOTES
[facility]        NAME: Nell Zuñiga is a 67 y o  male  : 1950    MRN: [id number]  DATE: 2022  TIME: 1:01 PM    Assessment and Plan   Venous insufficiency [I87 2]  1  Venous insufficiency     2  Leg swelling           Patient Instructions     Elevation   Compression stockings ([employer])  Decreased sodium intake (limit to 1g per day)  Follow up with PCP in 3-5 days  Proceed to  ER if symptoms worsen  Chief Complaint     Chief Complaint   Patient presents with    Leg Swelling         History of Present Illness       Reports b/l leg swelling and aching x 1 week  Patient currently weighs 235 8lb and typically weighs 225-230  Denies surgery in past 4 weeks, immobilization over 3 days, calf pain/swelling, hemoptysis, fevers, chills, SOB, chest pain, prior DVT/PE, clotting disorder, or current CA diagnosis/tx  Reports prior similar symptoms in January that resolved with HCTZ  Patient started taking his leftover HCTZ on Tuesday without complete relief  Patient had an echo and U/S at that time, which were both normal  He has been unable to see his PCP  PMH of HTN  Review of Systems   Review of Systems   Constitutional: Negative for chills and fever  Respiratory: Negative for cough and shortness of breath  Cardiovascular: Positive for leg swelling  Negative for chest pain and palpitations  Gastrointestinal: Negative for abdominal pain, nausea and vomiting  Musculoskeletal: Negative for gait problem and joint swelling  Skin: Negative for color change  Neurological: Negative for weakness and numbness  Current Medications       Current Outpatient Medications:     alfuzosin (UROXATRAL) 10 mg 24 hr tablet, Take 40 mg by mouth daily  , Disp: , Rfl:     allopurinol (ZYLOPRIM) 100 mg tablet, Take 1 tablet (100 mg total) by mouth daily, Disp: , Rfl: 0    amLODIPine (NORVASC) 5 mg tablet, Take 1 tablet (5 mg total) by mouth daily, Disp: 30 tablet, Rfl: 0   atorvastatin (LIPITOR) 40 mg tablet, Take 40 mg by mouth daily, Disp: , Rfl:     busPIRone (BUSPAR) 15 mg tablet, Take 1 tablet (15 mg total) by mouth 3 (three) times a day, Disp: 90 tablet, Rfl: 0    finasteride (PROSCAR) 5 mg tablet, Take 5 mg by mouth daily, Disp: , Rfl:     FLUoxetine (PROzac) 40 MG capsule, Take 2 capsules (80 mg total) by mouth daily, Disp: 60 capsule, Rfl: 0    folic acid (FOLVITE) 1 mg tablet, Take 1 tablet (1 mg total) by mouth daily, Disp: 30 tablet, Rfl: 1    hydrochlorothiazide (HYDRODIURIL) 25 mg tablet, Take 25 mg by mouth daily, Disp: , Rfl:     hydrOXYzine HCL (ATARAX) 25 mg tablet, Take 1 tablet (25 mg total) by mouth every 6 (six) hours as needed for anxiety, Disp: 45 tablet, Rfl: 0    lidocaine (LIDODERM) 5 %, Apply 1 patch topically daily Remove & Discard patch within 12 hours or as directed by MD, Disp: 30 patch, Rfl: 0    melatonin 3 mg, Take 1 tablet (3 mg total) by mouth daily at bedtime, Disp: 30 tablet, Rfl: 0    Multiple Vitamin (multivitamin) tablet, Take 1 tablet by mouth daily, Disp: 30 tablet, Rfl: 0    nadolol (CORGARD) 20 mg tablet, Take 0 5 tablets (10 mg total) by mouth daily Take 1 tablet a day   Unsure of dose, Disp: , Rfl:     nystatin (MYCOSTATIN) powder, Apply topically 2 (two) times a day, Disp: 15 g, Rfl: 0    ondansetron (Zofran ODT) 4 mg disintegrating tablet, Take 1 tablet (4 mg total) by mouth every 6 (six) hours as needed for nausea or vomiting, Disp: 20 tablet, Rfl: 0    psyllium (METAMUCIL SMOOTH TEXTURE) 28 % packet, Take 1 packet by mouth daily as needed (constipation), Disp: , Rfl: 0    thiamine 100 MG tablet, Take 1 tablet (100 mg total) by mouth daily, Disp: 30 tablet, Rfl: 1    traZODone (DESYREL) 100 mg tablet, Take 1 5 tablets (150 mg total) by mouth daily at bedtime, Disp: 45 tablet, Rfl: 0    white petrolatum-mineral oil (EUCERIN,HYDROCERIN) cream, Apply topically 3 (three) times a day as needed (pruritis), Disp: 113 g, Rfl: 0    Current Allergies     Allergies as of 06/11/2022 - Reviewed 06/11/2022   Allergen Reaction Noted    Sulfa antibiotics Anaphylaxis and Rash 11/07/2014    Elemental sulfur Rash and Edema 11/04/2014            The following portions of the patient's history were reviewed and updated as appropriate: allergies, current medications, past family history, past medical history, past social history, past surgical history and problem list      Past Medical History:   Diagnosis Date    Alcohol dependency ([address] )     Anxiety     Depression     [place] (hard of hearing)     Hypertension     Kidney stones     Prostate enlargement     Renal disorder     kidney    Shoulder dislocation        Past Surgical History:   Procedure Laterality Date    CHOLECYSTECTOMY      SHOULDER SURGERY      for dislocation       No family history on file  Medications have been verified  Objective   /70   Pulse 63   Temp 98 °F (36 7 °C)   Resp 20   Wt 107 kg (235 lb 12 8 oz)   SpO2 99%   BMI 36 93 kg/m²   No LMP for male patient  Physical Exam     Physical Exam  Vitals reviewed  Constitutional:       General: He is not in acute distress  Appearance: He is well-developed  He is not diaphoretic  HENT:      Head: Normocephalic  Cardiovascular:      Rate and Rhythm: Normal rate and regular rhythm  Heart sounds: Normal heart sounds  No murmur heard  No friction rub  No gallop  Pulmonary:      Effort: Pulmonary effort is normal  No respiratory distress  Breath sounds: Normal breath sounds  No wheezing or rales  Chest:      Chest wall: No tenderness  Musculoskeletal:         General: Swelling present  Normal range of motion  Comments: B/L 2+ pitting edema and mild erythema  R calf 30cm  L calf 28  Skin:     General: Skin is warm  Capillary Refill: Capillary refill takes less than 2 seconds  Findings: Erythema present  No bruising     Neurological:      Mental Status: He is alert and oriented to person, place, and time  Sensory: No sensory deficit  Psychiatric:         Behavior: Behavior normal          Thought Content:  Thought content normal          Judgment: Judgment normal

## 2022-09-01 ENCOUNTER — OFFICE VISIT (OUTPATIENT)
Dept: URGENT CARE | Facility: CLINIC | Age: 72
End: 2022-09-01
Payer: MEDICARE

## 2022-09-01 VITALS
TEMPERATURE: 98.2 F | DIASTOLIC BLOOD PRESSURE: 70 MMHG | SYSTOLIC BLOOD PRESSURE: 142 MMHG | HEART RATE: 67 BPM | RESPIRATION RATE: 17 BRPM | OXYGEN SATURATION: 97 %

## 2022-09-01 DIAGNOSIS — M25.50 POLYARTHRALGIA: ICD-10-CM

## 2022-09-01 DIAGNOSIS — S76.912A MUSCLE STRAIN OF LEFT THIGH, INITIAL ENCOUNTER: Primary | ICD-10-CM

## 2022-09-01 PROCEDURE — G0463 HOSPITAL OUTPT CLINIC VISIT: HCPCS

## 2022-09-01 PROCEDURE — 99203 OFFICE O/P NEW LOW 30 MIN: CPT

## 2022-09-01 RX ORDER — PREDNISONE 10 MG/1
10 TABLET ORAL DAILY
COMMUNITY
Start: 2022-08-30 | End: 2022-09-27

## 2022-09-01 RX ORDER — FINASTERIDE 5 MG/1
5 TABLET, FILM COATED ORAL 2 TIMES DAILY
COMMUNITY
End: 2022-10-28 | Stop reason: SDUPTHER

## 2022-09-01 RX ORDER — NAPROXEN 500 MG/1
500 TABLET ORAL 2 TIMES DAILY PRN
Qty: 28 TABLET | Refills: 0 | Status: SHIPPED | OUTPATIENT
Start: 2022-09-01

## 2022-09-01 NOTE — PATIENT INSTRUCTIONS
Take the naprosyn as needed for pain  Use a combo of heat and ice for 20 minutes every 1-2 hours while awake

## 2022-09-01 NOTE — PROGRESS NOTES
[facility]        NAME: Amanda Eagle is a 67 y o  male  : 1950    MRN: [id number]  DATE: 2022  TIME: 10:46 AM    Assessment and Plan   Muscle strain of left thigh, initial encounter [G54 290I]  1  Muscle strain of left thigh, initial encounter  naproxen (Naprosyn) 500 mg tablet    Ambulatory Referral to Physical Therapy   2  Polyarthralgia  Ambulatory Referral to Rheumatology         Patient Instructions     Take the naprosyn as needed for pain  Use a combo of heat and ice for 20 minutes every 1-2 hours while awake  Follow up with PCP in 3-5 days  Proceed to  ER if symptoms worsen  Chief Complaint     Chief Complaint   Patient presents with    Leg Pain     Left thigh, and knee pain  Pain started   This am left knee pain started this am in shower         History of Present Illness       Patient is a 72YOM presenting with pain in his left upper thigh radiating into his knee  He thinks he may have injured it while doing yard work on  and then this morning in the shower he felt pain again  He denies any numbness, tingling, weakness or difficulty walking  He has been taking ES tylenol without relief  He denies any previous injury  Leg Pain         Review of Systems   Review of Systems   Constitutional: Negative for activity change, appetite change, chills, fatigue and fever  Respiratory: Negative for shortness of breath  Cardiovascular: Negative for chest pain  Musculoskeletal: Positive for arthralgias  Negative for joint swelling  Neurological: Negative for dizziness, weakness and headaches  All other systems reviewed and are negative  Current Medications       Current Outpatient Medications:     naproxen (Naprosyn) 500 mg tablet, Take 1 tablet (500 mg total) by mouth 2 (two) times a day as needed for mild pain, Disp: 28 tablet, Rfl: 0    alfuzosin (UROXATRAL) 10 mg 24 hr tablet, Take 40 mg by mouth daily  , Disp: , Rfl:     allopurinol (ZYLOPRIM) 100 mg tablet, Take 1 tablet (100 mg total) by mouth daily, Disp: , Rfl: 0    amLODIPine (NORVASC) 5 mg tablet, Take 1 tablet (5 mg total) by mouth daily, Disp: 30 tablet, Rfl: 0    atorvastatin (LIPITOR) 40 mg tablet, Take 40 mg by mouth daily, Disp: , Rfl:     busPIRone (BUSPAR) 15 mg tablet, Take 1 tablet (15 mg total) by mouth 3 (three) times a day, Disp: 90 tablet, Rfl: 0    finasteride (PROSCAR) 5 mg tablet, Take 5 mg by mouth daily, Disp: , Rfl:     finasteride (PROSCAR) 5 mg tablet, Take 5 mg by mouth 2 (two) times a day, Disp: , Rfl:     FLUoxetine (PROzac) 40 MG capsule, Take 2 capsules (80 mg total) by mouth daily, Disp: 60 capsule, Rfl: 0    folic acid (FOLVITE) 1 mg tablet, Take 1 tablet (1 mg total) by mouth daily, Disp: 30 tablet, Rfl: 1    hydrochlorothiazide (HYDRODIURIL) 25 mg tablet, Take 25 mg by mouth daily, Disp: , Rfl:     hydrOXYzine HCL (ATARAX) 25 mg tablet, Take 1 tablet (25 mg total) by mouth every 6 (six) hours as needed for anxiety, Disp: 45 tablet, Rfl: 0    lidocaine (LIDODERM) 5 %, Apply 1 patch topically daily Remove & Discard patch within 12 hours or as directed by MD, Disp: 30 patch, Rfl: 0    melatonin 3 mg, Take 1 tablet (3 mg total) by mouth daily at bedtime, Disp: 30 tablet, Rfl: 0    Multiple Vitamin (multivitamin) tablet, Take 1 tablet by mouth daily, Disp: 30 tablet, Rfl: 0    nadolol (CORGARD) 20 mg tablet, Take 0 5 tablets (10 mg total) by mouth daily Take 1 tablet a day   Unsure of dose, Disp: , Rfl:     nystatin (MYCOSTATIN) powder, Apply topically 2 (two) times a day, Disp: 15 g, Rfl: 0    ondansetron (Zofran ODT) 4 mg disintegrating tablet, Take 1 tablet (4 mg total) by mouth every 6 (six) hours as needed for nausea or vomiting, Disp: 20 tablet, Rfl: 0    predniSONE 10 mg tablet, Take 10 mg by mouth daily, Disp: , Rfl:     psyllium (METAMUCIL SMOOTH TEXTURE) 28 % packet, Take 1 packet by mouth daily as needed (constipation), Disp: , Rfl: 0    thiamine 100 MG tablet, Take 1 tablet (100 mg total) by mouth daily, Disp: 30 tablet, Rfl: 1    traZODone (DESYREL) 100 mg tablet, Take 1 5 tablets (150 mg total) by mouth daily at bedtime, Disp: 45 tablet, Rfl: 0    white petrolatum-mineral oil (EUCERIN,HYDROCERIN) cream, Apply topically 3 (three) times a day as needed (pruritis), Disp: 113 g, Rfl: 0    Current Allergies     Allergies as of 09/01/2022 - Reviewed 06/11/2022   Allergen Reaction Noted    Sulfa antibiotics Anaphylaxis and Rash 11/07/2014    Elemental sulfur Rash and Edema 11/04/2014            The following portions of the patient's history were reviewed and updated as appropriate: allergies, current medications, past family history, past medical history, past social history, past surgical history and problem list      Past Medical History:   Diagnosis Date    Alcohol dependency ([facility] [address] )     Anxiety     Depression     [place] (hard of hearing)     Hypertension     Kidney stones     Prostate enlargement     Renal disorder     kidney    Shoulder dislocation        Past Surgical History:   Procedure Laterality Date    CHOLECYSTECTOMY      SHOULDER SURGERY      for dislocation       No family history on file  Medications have been verified  Objective   /70   Pulse 67   Temp 98 2 °F (36 8 °C)   Resp 17   SpO2 97%        Physical Exam     Physical Exam  Vitals and nursing note reviewed  Constitutional:       General: He is not in acute distress  Appearance: Normal appearance  He is normal weight  He is not ill-appearing or toxic-appearing  HENT:      Mouth/Throat:      Pharynx: Oropharynx is clear  Cardiovascular:      Rate and Rhythm: Normal rate and regular rhythm  Pulses: Normal pulses  Pulmonary:      Effort: Pulmonary effort is normal    Musculoskeletal:        Legs:    Skin:     General: Skin is warm and dry  Capillary Refill: Capillary refill takes less than 2 seconds     Neurological: General: No focal deficit present  Mental Status: He is alert and oriented to person, place, and time

## 2022-09-26 NOTE — PROGRESS NOTES
Assessment and Plan:   Cyndi Clement is a 67 y o   male who presents as a Rheumatology consult referred for evaluation of likely polymyalgia rheumatica  He had symptoms of pain and stiffness at proximal upper and lower extremities, and currently has bilateral ankle pain, right thigh pain, and proximal arm pain bilaterally  Do labs  Increase prednisone to 20mg daily for 2 weeks then go down to 15mg daily for 2 weeks, then go down by 2 5mg increments every 2 weeks until off  Start hydroxychloroquine twice a day as steroid-sparing agent; get regular eye exams    Return to clinic in 7 months    Plan:  Diagnoses and all orders for this visit:    [facility]  -     Sedimentation rate, automated  -     C-reactive protein  -     CK  -     Aldolase  -     Vitamin D 25 hydroxy  -     CBC and differential  -     Comprehensive metabolic panel  -     predniSONE 5 mg tablet; Take 4 tablets (20 mg total) by mouth daily for 14 days, THEN 3 tablets (15 mg total) daily  -     hydroxychloroquine (PLAQUENIL) 200 mg tablet; Take 1 tablet (200 mg total) by mouth 2 (two) times a day  -     Manual Differential(PHLEBS Do Not Order)  -     predniSONE 2 5 mg tablet; Take 5 tablets (12 5 mg total) by mouth daily for 14 days, THEN 4 tablets (10 mg total) daily for 14 days, THEN 3 tablets (7 5 mg total) daily for 14 days, THEN 2 tablets (5 mg total) daily for 14 days, THEN 1 tablet (2 5 mg total) daily for 14 days  Do not start before October 31, 2022  -     Sedimentation rate, automated  -     C-reactive protein    Inflammatory arthritis  -     CK  -     Aldolase  -     predniSONE 5 mg tablet; Take 4 tablets (20 mg total) by mouth daily for 14 days, THEN 3 tablets (15 mg total) daily  -     hydroxychloroquine (PLAQUENIL) 200 mg tablet; Take 1 tablet (200 mg total) by mouth 2 (two) times a day  -     predniSONE 2 5 mg tablet;  Take 5 tablets (12 5 mg total) by mouth daily for 14 days, THEN 4 tablets (10 mg total) daily for 14 days, THEN 3 tablets (7 5 mg total) daily for 14 days, THEN 2 tablets (5 mg total) daily for 14 days, THEN 1 tablet (2 5 mg total) daily for 14 days  Do not start before October 31, 2022  Low vitamin D level  -     Vitamin D 25 hydroxy    Disorder of bone, unspecified   -     Vitamin D 25 hydroxy    High risk medication use  High risk medication use - Benefits and risks of hydroxychloroquine, including but not limited to retinal toxicity, corneal deposits, gastrointestinal side effects, and headaches were discussed with the patient  The need for a regular eye exam to monitor for ocular toxicity while on this medication was also explained to the patient  Follow-up plan: RTC in 7 months        HPI  Delona Lesches is a 67 y o   male who presents as a Rheumatology consult for evaluation of likely polymyalgia rheumatica  Was diagnosed with polymyalgia rheumatica and started on prednisone 2 months ago  He was on 50 mg for 1 week, 40 mg for 1 week, 30 mg for 1 week, 20 mg for 1 week, then 10 mg for the past 3 weeks  He admits that the pain is the most at the bottom of his feet and thighs  Review of Systems  Review of Systems   Constitutional: Negative for fatigue  HENT: Negative for mouth sores  Respiratory: Negative for cough and shortness of breath  Cardiovascular: Negative for chest pain and leg swelling  Gastrointestinal: Negative for abdominal pain, constipation and diarrhea  Musculoskeletal: Positive for arthralgias and myalgias  Negative for back pain and joint swelling  Skin: Negative for color change and rash  Neurological: Negative for weakness  Hematological: Negative for adenopathy  Psychiatric/Behavioral: Negative for sleep disturbance  Reviewed and agree      Allergies  Allergies   Allergen Reactions   • Sulfa Antibiotics Anaphylaxis and Rash     Face Swelling   • Elemental Sulfur Rash and Edema       Home Medications    Current Outpatient Medications:   •  alfuzosin (UROXATRAL) 10 mg 24 hr tablet, Take 40 mg by mouth daily   (Patient not taking: Reported on 10/13/2022), Disp: , Rfl:   •  allopurinol (ZYLOPRIM) 100 mg tablet, Take 1 tablet (100 mg total) by mouth daily (Patient not taking: No sig reported), Disp: , Rfl: 0  •  amLODIPine (NORVASC) 5 mg tablet, Take 1 tablet (5 mg total) by mouth daily, Disp: 30 tablet, Rfl: 0  •  atorvastatin (LIPITOR) 40 mg tablet, Take 40 mg by mouth daily (Patient not taking: No sig reported), Disp: , Rfl:   •  busPIRone (BUSPAR) 15 mg tablet, Take 1 tablet (15 mg total) by mouth 3 (three) times a day, Disp: 90 tablet, Rfl: 0  •  finasteride (PROSCAR) 5 mg tablet, Take 5 mg by mouth daily, Disp: , Rfl:   •  finasteride (PROSCAR) 5 mg tablet, Take 5 mg by mouth 2 (two) times a day (Patient not taking: Reported on 10/13/2022), Disp: , Rfl:   •  FLUoxetine (PROzac) 40 MG capsule, Take 2 capsules (80 mg total) by mouth daily (Patient not taking: No sig reported), Disp: 60 capsule, Rfl: 0  •  folic acid (FOLVITE) 1 mg tablet, Take 1 tablet (1 mg total) by mouth daily (Patient not taking: No sig reported), Disp: 30 tablet, Rfl: 1  •  hydrochlorothiazide (HYDRODIURIL) 25 mg tablet, Take 25 mg by mouth daily, Disp: , Rfl:   •  hydroxychloroquine (PLAQUENIL) 200 mg tablet, Take 1 tablet (200 mg total) by mouth 2 (two) times a day, Disp: 60 tablet, Rfl: 6  •  hydrOXYzine HCL (ATARAX) 25 mg tablet, Take 1 tablet (25 mg total) by mouth every 6 (six) hours as needed for anxiety, Disp: 45 tablet, Rfl: 0  •  lidocaine (LIDODERM) 5 %, Apply 1 patch topically daily Remove & Discard patch within 12 hours or as directed by MD (Patient not taking: No sig reported), Disp: 30 patch, Rfl: 0  •  melatonin 3 mg, Take 1 tablet (3 mg total) by mouth daily at bedtime (Patient not taking: No sig reported), Disp: 30 tablet, Rfl: 0  •  Multiple Vitamin (multivitamin) tablet, Take 1 tablet by mouth daily, Disp: 30 tablet, Rfl: 0  •  nadolol (CORGARD) 20 mg tablet, Take 0 5 tablets (10 mg total) by mouth daily Take 1 tablet a day  Unsure of dose, Disp: , Rfl:   •  naproxen (Naprosyn) 500 mg tablet, Take 1 tablet (500 mg total) by mouth 2 (two) times a day as needed for mild pain (Patient not taking: No sig reported), Disp: 28 tablet, Rfl: 0  •  nystatin (MYCOSTATIN) powder, Apply topically 2 (two) times a day (Patient not taking: No sig reported), Disp: 15 g, Rfl: 0  •  ondansetron (Zofran ODT) 4 mg disintegrating tablet, Take 1 tablet (4 mg total) by mouth every 6 (six) hours as needed for nausea or vomiting (Patient not taking: No sig reported), Disp: 20 tablet, Rfl: 0  •  [START ON 10/31/2022] predniSONE 2 5 mg tablet, Take 5 tablets (12 5 mg total) by mouth daily for 14 days, THEN 4 tablets (10 mg total) daily for 14 days, THEN 3 tablets (7 5 mg total) daily for 14 days, THEN 2 tablets (5 mg total) daily for 14 days, THEN 1 tablet (2 5 mg total) daily for 14 days  Do not start before October 31, 2022 , Disp: 210 tablet, Rfl: 0  •  predniSONE 5 mg tablet, Take 4 tablets (20 mg total) by mouth daily for 14 days, THEN 3 tablets (15 mg total) daily  , Disp: 146 tablet, Rfl: 0  •  psyllium (METAMUCIL SMOOTH TEXTURE) 28 % packet, Take 1 packet by mouth daily as needed (constipation) (Patient not taking: No sig reported), Disp: , Rfl: 0  •  thiamine 100 MG tablet, Take 1 tablet (100 mg total) by mouth daily (Patient not taking: Reported on 10/13/2022), Disp: 30 tablet, Rfl: 1  •  traZODone (DESYREL) 100 mg tablet, Take 1 5 tablets (150 mg total) by mouth daily at bedtime, Disp: 45 tablet, Rfl: 0  •  white petrolatum-mineral oil (EUCERIN,HYDROCERIN) cream, Apply topically 3 (three) times a day as needed (pruritis) (Patient not taking: No sig reported), Disp: 113 g, Rfl: 0  •  azithromycin (ZITHROMAX) 250 mg tablet, Take 1 tablet (250 mg total) by mouth in the morning for 6 days Please take 2 tablets 500mg  on day 1, then take 250 mg 1 tablet for 4 consecutive days, Disp: 6 tablet, Rfl: 0  • benzonatate (TESSALON) 200 MG capsule, Take 1 capsule (200 mg total) by mouth 3 (three) times a day as needed for cough (Patient not taking: Reported on 10/13/2022), Disp: 20 capsule, Rfl: 0  •  clobetasol (TEMOVATE) 0 05 % external solution, MIX INTO JAR OF CERAVE CREAM AND APPLY GENEROUSLY TO ALL AFFECTED AREAS ON BODY TWICE DAILY, Disp: , Rfl:   •  fluticasone (Flovent HFA) 110 MCG/ACT inhaler, Inhale 2 puffs 2 (two) times a day Rinse mouth after use , Disp: 12 g, Rfl: 1  •  guaiFENesin (MUCINEX) 600 mg 12 hr tablet, Take 2 tablets (1,200 mg total) by mouth every 12 (twelve) hours, Disp: 30 tablet, Rfl: 0  •  triamcinolone (KENALOG) 0 1 % cream, APPLY DIRECTLY TO ITCHY RED AREAS ON LEFT SIDE OF CHEST AND ARM W   (REFER TO PRESCRIPTION NOTES)  , Disp: , Rfl:     Past Medical History  Past Medical History:   Diagnosis Date   • Alcohol dependency ([facility] )    • Anxiety    • Depression    • [place] (hard of hearing)    • Hypertension    • Kidney stones    • Prostate enlargement    • Renal disorder     kidney   • Shoulder dislocation        Past Surgical History   Past Surgical History:   Procedure Laterality Date   • CHOLECYSTECTOMY      2010   • SHOULDER SURGERY Left     for dislocation x 2, 20 years ago an the second 25 years ago       Family History    Family History   Problem Relation Age of Onset   • Dementia Mother    • Colon cancer Mother    • Heart disease Father    • COPD Father    • Heart failure Father      No known family history of autoimmune or inflammatory diseases       Social History  Social History     Substance and Sexual Activity   Alcohol Use Yes   • Alcohol/week: 6 0 standard drinks   • Types: 6 Shots of liquor per week    Comment: 1 pint [place] daily     Social History     Substance and Sexual Activity   Drug Use Never     Social History     Tobacco Use   Smoking Status Never Smoker   Smokeless Tobacco Never Used       Objective:  Vitals:    09/27/22 0839   BP: 138/74   Weight: 106 kg (234 lb) Height: 5' 7" (1 702 m)       Physical Exam  Constitutional:       General: He is not in acute distress  HENT:      Head: Normocephalic and atraumatic  Eyes:      Conjunctiva/sclera: Conjunctivae normal    Cardiovascular:      Rate and Rhythm: Normal rate and regular rhythm  Heart sounds: S1 normal and S2 normal      No friction rub  Pulmonary:      Effort: Pulmonary effort is normal  No respiratory distress  Breath sounds: Normal breath sounds  No wheezing, rhonchi or rales  Musculoskeletal:         General: Tenderness present  Cervical back: Neck supple  Comments: Bilateral ankle tenderness; right thigh tenderness; proximal arm tenderness   Skin:     General: Skin is warm and dry  Coloration: Skin is not pale  Findings: No rash  Neurological:      Mental Status: He is alert  Mental status is at baseline  Psychiatric:         Mood and Affect: Mood normal          Behavior: Behavior normal        Reviewed labs and imaging  Imaging:   Echo 1/19/2022  Left Ventricle   Technically difficult study  Image quality is suboptimal  Left ventricle is normal in size  There is mild hypertrophy  Systolic function is normal with an ejection fraction of 60-65%  Wall motion is within normal limits  There is normal diastolic function  Right Ventricle   Right ventricle cavity is normal  Systolic function is normal      Left Atrium   Left atrium cavity size is normal      Right Atrium   Right atrium cavity is normal      IVC/SVC   The inferior vena cava demonstrates a diameter of <=21 mm and collapses >50%; therefore, the right atrial pressure is estimated at 0-5 mmHg  Mitral Valve   Mitral valve structure is normal  Trace mitral regurgitation  No mitral stenosis     Tricuspid Valve   Tricuspid valve structure is normal  There is trace regurgitation  There is no evidence of tricuspid valve stenosis   Pulmonary artery systolic pressure cannot be estimated due to incomplete TR envelope  Aortic Valve   The aortic valve is trileaflet  There is no regurgitation or stenosis  Pulmonic Valve   Pulmonic valve structure is normal  There is no regurgitation or stenosis  Ascending Aorta   The aorta appears normal in size  No coarctation of aorta by Doppler data     Pericardium   There is no pericardial effusion  XR chest 10/14/2021  No acute cardiopulmonary disease  EKG 10/13/2021  Normal sinus rhythm  Normal ECG    CT cervical spine 6/2/2021  There is normal cervical lordosis with similar-appearing grade 1 anterolisthesis   of C2 over C3  Stable minimal chronic appearing compression deformity of the T1   vertebra  The vertebral bodies are otherwise normal in height and alignment  No   fracture is identified  The craniocervical junction is normal in appearance  The   atlantodental distance is not widened  There is no prevertebral soft tissue   swelling  Moderate to severe multilevel degenerative changes including disc space   narrowing, subchondral sclerosis and cystic changes, and bridging and marginal   anterior endplate osteophyte formation  Ankylosis across the right C3-C4 facet   joint which is most likely degenerative  No high-grade osseous spinal canal   stenosis  Uncovertebral and facet hypertrophy contribute to varying degrees of   foraminal stenosis  CT abdomen pelvis 4/17/2021  Mild inflammatory change noted pancreatic head and uncinate process suspicious for acute uncomplicated pancreatitis  Left lower renal pole cortical cyst with thin calcified septa consistent with a Bosniak 2 cyst  Based on Bosniak Classification of Cystic Renal Masses, Version 2019, this lesion is a Bosniak II: Likely benign Bosniak II renal mass requiring no follow-up  VAS lower extremity 4/17/2021  RIGHT LOWER LIMB:  No evidence of acute or chronic deep vein thrombosis  No evidence of superficial thrombophlebitis noted    Doppler evaluation shows a normal response to augmentation maneuvers  Popliteal, posterior tibial and anterior tibial arterial Doppler waveforms are  triphasic/biphasic  LEFT LOWER LIMB:  No evidence of acute or chronic deep vein thrombosis  No evidence of superficial thrombophlebitis noted  Doppler evaluation shows a normal response to augmentation maneuvers  Popliteal, posterior tibial and anterior tibial arterial Doppler waveforms are  Triphasic  XR chest 4/17/2021  The lungs are clear  Previously suspected 9 mm right mid lung nodular density is not identified on this study and could be obscured by the rotation or may have resolved  CT thoracic and lumbar spine 2/28/2021  Thoracic: Incompletely healed mild compression fracture of the superior   endplate of T3 which was not present on the prior CT scan of 11/17/2020  Lumbar: No acute fracture or subluxation  No change in the mild chronic   compression fracture of the superior endplate of L2      CT chest abdomen pelvis 2/28/2021  No acute injury in the chest, abdomen, or pelvis  Diffuse peripancreatic edema likely indicates acute edematous pancreatitis  No evidence of necrosis or abscess, at this time  Diverticulosis is noted in the left colon, without evidence of active   diverticulitis  Moderate right basilar atelectasis is noted with elevation the right   hemidiaphragm  XR left shoulder 11/18/2020  Left glenohumeral joint osteoarthritis  CT chest abdomen pelvis 11/17/2020  Right basilar atelectasis, stable  Complex cystic lesion with calcification of the lower pole left kidney  Ultrasound can be obtained for further evaluation  Minimal sigmoid diverticulosis  Enlarged prostate  XR pelvis 11/17/2020   No evidence of acute fracture       Labs:   Office Visit on 09/27/2022   Component Date Value Ref Range Status   • Sed Rate 09/27/2022 9  0 - 19 mm/hour Final   • CRP 09/27/2022 4 5 (A) <3 0 mg/L Final   • Total CK 09/27/2022 58  39 - 308 U/L Final   • Aldolase 09/27/2022 4 4  3 3 - 10 3 U/L Final   • Vit D, 25-Hydroxy 09/27/2022 23 7 (A) 30 0 - 100 0 ng/mL Final   • WBC 09/27/2022 10 07  4 31 - 10 16 Thousand/uL Final   • RBC 09/27/2022 4 78  3 88 - 5 62 Million/uL Final   • Hemoglobin 09/27/2022 14 4  12 0 - 17 0 g/dL Final   • Hematocrit 09/27/2022 44 4  36 5 - 49 3 % Final   • MCV 09/27/2022 93  82 - 98 fL Final   • MCH 09/27/2022 30 1  26 8 - 34 3 pg Final   • MCHC 09/27/2022 32 4  31 4 - 37 4 g/dL Final   • RDW 09/27/2022 14 1  11 6 - 15 1 % Final   • MPV 09/27/2022 9 8  8 9 - 12 7 fL Final   • Platelets 12/07/1443 222  149 - 390 Thousands/uL Final   • Sodium 09/27/2022 138  135 - 147 mmol/L Final   • Potassium 09/27/2022 4 2  3 5 - 5 3 mmol/L Final   • Chloride 09/27/2022 107  96 - 108 mmol/L Final   • CO2 09/27/2022 29  21 - 32 mmol/L Final   • ANION GAP 09/27/2022 2 (A) 4 - 13 mmol/L Final   • BUN 09/27/2022 26 (A) 5 - 25 mg/dL Final   • Creatinine 09/27/2022 0 92  0 60 - 1 30 mg/dL Final    Standardized to IDMS reference method   • Glucose, Fasting 09/27/2022 110 (A) 65 - 99 mg/dL Final    Specimen collection should occur prior to Sulfasalazine administration due to the potential for falsely depressed results  Specimen collection should occur prior to Sulfapyridine administration due to the potential for falsely elevated results  • Calcium 09/27/2022 8 9  8 3 - 10 1 mg/dL Final   • AST 09/27/2022 9  5 - 45 U/L Final    Specimen collection should occur prior to Sulfasalazine administration due to the potential for falsely depressed results  • ALT 09/27/2022 24  12 - 78 U/L Final    Specimen collection should occur prior to Sulfasalazine and/or Sulfapyridine administration due to the potential for falsely depressed results      • Alkaline Phosphatase 09/27/2022 71  46 - 116 U/L Final   • Total Protein 09/27/2022 7 7  6 4 - 8 4 g/dL Final   • Albumin 09/27/2022 3 7  3 5 - 5 0 g/dL Final   • Total Bilirubin 09/27/2022 0 39  0 20 - 1 00 mg/dL Final    Use of this assay is not recommended for patients undergoing treatment with eltrombopag due to the potential for falsely elevated results     • eGFR 09/27/2022 82  ml/min/1 73sq m Final   • Segmented % 09/27/2022 79 (A) 43 - 75 % Final   • Bands % 09/27/2022 7  0 - 8 % Final   • Lymphocytes % 09/27/2022 5 (A) 14 - 44 % Final   • Monocytes % 09/27/2022 5  4 - 12 % Final   • Eosinophils, % 09/27/2022 0  0 - 6 % Final   • Basophils % 09/27/2022 1  0 - 1 % Final   • Atypical Lymphocytes % 09/27/2022 3 (A) <=0 % Final   • Absolute Neutrophils 09/27/2022 8 66 (A) 1 85 - 7 62 Thousand/uL Final   • Lymphocytes Absolute 09/27/2022 0 50 (A) 0 60 - 4 47 Thousand/uL Final   • Monocytes Absolute 09/27/2022 0 50  0 00 - 1 22 Thousand/uL Final   • Eosinophils Absolute 09/27/2022 0 00  0 00 - 0 40 Thousand/uL Final   • Basophils Absolute 09/27/2022 0 10  0 00 - 0 10 Thousand/uL Final   • RBC Morphology 09/27/2022 Present   Final   • Anisocytosis 09/27/2022 Present   Final   • Polychromasia 09/27/2022 Present   Final   • Platelet Estimate 55/84/4199 Adequate  Adequate Final   • Giant PLTs 09/27/2022 Present   Final

## 2022-09-27 ENCOUNTER — APPOINTMENT (OUTPATIENT)
Dept: LAB | Facility: CLINIC | Age: 72
End: 2022-09-27
Payer: MEDICARE

## 2022-09-27 ENCOUNTER — OFFICE VISIT (OUTPATIENT)
Dept: RHEUMATOLOGY | Facility: CLINIC | Age: 72
End: 2022-09-27
Payer: MEDICARE

## 2022-09-27 VITALS
SYSTOLIC BLOOD PRESSURE: 138 MMHG | WEIGHT: 234 LBS | BODY MASS INDEX: 36.73 KG/M2 | DIASTOLIC BLOOD PRESSURE: 74 MMHG | HEIGHT: 67 IN

## 2022-09-27 DIAGNOSIS — Z79.899 HIGH RISK MEDICATION USE: ICD-10-CM

## 2022-09-27 DIAGNOSIS — M89.9 DISORDER OF BONE, UNSPECIFIED: ICD-10-CM

## 2022-09-27 DIAGNOSIS — M35.3 POLYMYALGIA RHEUMATICA (HCC): Primary | ICD-10-CM

## 2022-09-27 DIAGNOSIS — M19.90 INFLAMMATORY ARTHRITIS: ICD-10-CM

## 2022-09-27 DIAGNOSIS — R79.89 LOW VITAMIN D LEVEL: ICD-10-CM

## 2022-09-27 PROBLEM — E66.01 OBESITY, MORBID (HCC): Status: ACTIVE | Noted: 2022-09-27

## 2022-09-27 LAB
25(OH)D3 SERPL-MCNC: 23.7 NG/ML (ref 30–100)
ALBUMIN SERPL BCP-MCNC: 3.7 G/DL (ref 3.5–5)
ALP SERPL-CCNC: 71 U/L (ref 46–116)
ALT SERPL W P-5'-P-CCNC: 24 U/L (ref 12–78)
ANION GAP SERPL CALCULATED.3IONS-SCNC: 2 MMOL/L (ref 4–13)
ANISOCYTOSIS BLD QL SMEAR: PRESENT
AST SERPL W P-5'-P-CCNC: 9 U/L (ref 5–45)
BASOPHILS # BLD MANUAL: 0.1 THOUSAND/UL (ref 0–0.1)
BASOPHILS NFR MAR MANUAL: 1 % (ref 0–1)
BILIRUB SERPL-MCNC: 0.39 MG/DL (ref 0.2–1)
BUN SERPL-MCNC: 26 MG/DL (ref 5–25)
CALCIUM SERPL-MCNC: 8.9 MG/DL (ref 8.3–10.1)
CHLORIDE SERPL-SCNC: 107 MMOL/L (ref 96–108)
CK SERPL-CCNC: 58 U/L (ref 39–308)
CO2 SERPL-SCNC: 29 MMOL/L (ref 21–32)
CREAT SERPL-MCNC: 0.92 MG/DL (ref 0.6–1.3)
CRP SERPL QL: 4.5 MG/L
EOSINOPHIL # BLD MANUAL: 0 THOUSAND/UL (ref 0–0.4)
EOSINOPHIL NFR BLD MANUAL: 0 % (ref 0–6)
ERYTHROCYTE [DISTWIDTH] IN BLOOD BY AUTOMATED COUNT: 14.1 % (ref 11.6–15.1)
ERYTHROCYTE [SEDIMENTATION RATE] IN BLOOD: 9 MM/HOUR (ref 0–19)
GFR SERPL CREATININE-BSD FRML MDRD: 82 ML/MIN/1.73SQ M
GIANT PLATELETS BLD QL SMEAR: PRESENT
GLUCOSE P FAST SERPL-MCNC: 110 MG/DL (ref 65–99)
HCT VFR BLD AUTO: 44.4 % (ref 36.5–49.3)
HGB BLD-MCNC: 14.4 G/DL (ref 12–17)
LYMPHOCYTES # BLD AUTO: 0.5 THOUSAND/UL (ref 0.6–4.47)
LYMPHOCYTES # BLD AUTO: 5 % (ref 14–44)
MCH RBC QN AUTO: 30.1 PG (ref 26.8–34.3)
MCHC RBC AUTO-ENTMCNC: 32.4 G/DL (ref 31.4–37.4)
MCV RBC AUTO: 93 FL (ref 82–98)
MONOCYTES # BLD AUTO: 0.5 THOUSAND/UL (ref 0–1.22)
MONOCYTES NFR BLD: 5 % (ref 4–12)
NEUTROPHILS # BLD MANUAL: 8.66 THOUSAND/UL (ref 1.85–7.62)
NEUTS BAND NFR BLD MANUAL: 7 % (ref 0–8)
NEUTS SEG NFR BLD AUTO: 79 % (ref 43–75)
PLATELET # BLD AUTO: 222 THOUSANDS/UL (ref 149–390)
PLATELET BLD QL SMEAR: ADEQUATE
PMV BLD AUTO: 9.8 FL (ref 8.9–12.7)
POLYCHROMASIA BLD QL SMEAR: PRESENT
POTASSIUM SERPL-SCNC: 4.2 MMOL/L (ref 3.5–5.3)
PROT SERPL-MCNC: 7.7 G/DL (ref 6.4–8.4)
RBC # BLD AUTO: 4.78 MILLION/UL (ref 3.88–5.62)
RBC MORPH BLD: PRESENT
SODIUM SERPL-SCNC: 138 MMOL/L (ref 135–147)
VARIANT LYMPHS # BLD AUTO: 3 %
WBC # BLD AUTO: 10.07 THOUSAND/UL (ref 4.31–10.16)

## 2022-09-27 PROCEDURE — 82306 VITAMIN D 25 HYDROXY: CPT | Performed by: INTERNAL MEDICINE

## 2022-09-27 PROCEDURE — 36415 COLL VENOUS BLD VENIPUNCTURE: CPT | Performed by: INTERNAL MEDICINE

## 2022-09-27 PROCEDURE — 86140 C-REACTIVE PROTEIN: CPT | Performed by: INTERNAL MEDICINE

## 2022-09-27 PROCEDURE — 85652 RBC SED RATE AUTOMATED: CPT | Performed by: INTERNAL MEDICINE

## 2022-09-27 PROCEDURE — 85007 BL SMEAR W/DIFF WBC COUNT: CPT | Performed by: INTERNAL MEDICINE

## 2022-09-27 PROCEDURE — 80053 COMPREHEN METABOLIC PANEL: CPT | Performed by: INTERNAL MEDICINE

## 2022-09-27 PROCEDURE — 82550 ASSAY OF CK (CPK): CPT | Performed by: INTERNAL MEDICINE

## 2022-09-27 PROCEDURE — 99204 OFFICE O/P NEW MOD 45 MIN: CPT | Performed by: INTERNAL MEDICINE

## 2022-09-27 PROCEDURE — 82085 ASSAY OF ALDOLASE: CPT | Performed by: INTERNAL MEDICINE

## 2022-09-27 PROCEDURE — 85027 COMPLETE CBC AUTOMATED: CPT | Performed by: INTERNAL MEDICINE

## 2022-09-27 RX ORDER — PREDNISONE 1 MG/1
TABLET ORAL
Qty: 146 TABLET | Refills: 0 | Status: SHIPPED | OUTPATIENT
Start: 2022-09-27 | End: 2022-11-10

## 2022-09-27 RX ORDER — HYDROXYCHLOROQUINE SULFATE 200 MG/1
200 TABLET, FILM COATED ORAL 2 TIMES DAILY
Qty: 60 TABLET | Refills: 6 | Status: SHIPPED | OUTPATIENT
Start: 2022-09-27 | End: 2023-04-25

## 2022-09-27 NOTE — PATIENT INSTRUCTIONS
Do labs  Increase prednisone to 20mg daily for 2 weeks then go down 15mg daily  Start hydroxychloroquine twice a day; get regular eye exams    Return to clinic in 7 months

## 2022-09-28 LAB — ALDOLASE SERPL-CCNC: 4.4 U/L (ref 3.3–10.3)

## 2022-10-01 RX ORDER — PREDNISONE 2.5 MG
TABLET ORAL
Qty: 210 TABLET | Refills: 0 | Status: SHIPPED | OUTPATIENT
Start: 2022-10-31 | End: 2023-01-09

## 2022-10-02 ENCOUNTER — OFFICE VISIT (OUTPATIENT)
Dept: URGENT CARE | Facility: CLINIC | Age: 72
End: 2022-10-02
Payer: MEDICARE

## 2022-10-02 ENCOUNTER — APPOINTMENT (OUTPATIENT)
Dept: RADIOLOGY | Facility: CLINIC | Age: 72
End: 2022-10-02
Payer: MEDICARE

## 2022-10-02 VITALS
HEIGHT: 67 IN | RESPIRATION RATE: 16 BRPM | SYSTOLIC BLOOD PRESSURE: 120 MMHG | BODY MASS INDEX: 36.73 KG/M2 | DIASTOLIC BLOOD PRESSURE: 80 MMHG | OXYGEN SATURATION: 98 % | HEART RATE: 58 BPM | TEMPERATURE: 98.2 F | WEIGHT: 234 LBS

## 2022-10-02 DIAGNOSIS — R19.7 DIARRHEA, UNSPECIFIED TYPE: ICD-10-CM

## 2022-10-02 DIAGNOSIS — R05.1 ACUTE COUGH: ICD-10-CM

## 2022-10-02 DIAGNOSIS — R05.1 ACUTE COUGH: Primary | ICD-10-CM

## 2022-10-02 LAB
SARS-COV-2 AG UPPER RESP QL IA: NEGATIVE
VALID CONTROL: NORMAL

## 2022-10-02 PROCEDURE — 87636 SARSCOV2 & INF A&B AMP PRB: CPT | Performed by: PHYSICIAN ASSISTANT

## 2022-10-02 PROCEDURE — 71046 X-RAY EXAM CHEST 2 VIEWS: CPT

## 2022-10-02 PROCEDURE — 99214 OFFICE O/P EST MOD 30 MIN: CPT | Performed by: PHYSICIAN ASSISTANT

## 2022-10-02 PROCEDURE — G0463 HOSPITAL OUTPT CLINIC VISIT: HCPCS | Performed by: PHYSICIAN ASSISTANT

## 2022-10-02 RX ORDER — BENZONATATE 200 MG/1
200 CAPSULE ORAL 3 TIMES DAILY PRN
Qty: 20 CAPSULE | Refills: 0 | Status: SHIPPED | OUTPATIENT
Start: 2022-10-02 | End: 2022-10-28 | Stop reason: ALTCHOICE

## 2022-10-02 NOTE — PATIENT INSTRUCTIONS
Upper Respiratory Infection   WHAT YOU NEED TO KNOW:   An upper respiratory infection is also called a cold  It can affect your nose, throat, ears, and sinuses  Cold symptoms are usually worst for the first 3 to 5 days  Most people get better in 7 to 14 days  You may continue to cough for 2 to 3 weeks  Colds are caused by viruses and do not get better with antibiotics  DISCHARGE INSTRUCTIONS:   Call your local emergency number (911 in the [address] [place] [address],[address]) if:   You have chest pain or trouble breathing  Return to the emergency department if:   You have a fever over 102ºF (39ºC)  Call your doctor if:   You have a low fever  Your sore throat gets worse or you see white or yellow spots in your throat  Your symptoms get worse after 3 to 5 days or are not better in 14 days  You have a rash anywhere on your skin  You have large, tender lumps in your neck  You have thick, green, or yellow drainage from your nose  You cough up thick yellow, green, or bloody mucus  You have a bad earache  You have questions or concerns about your condition or care  Medicines: You may need any of the following:  Decongestants  help reduce nasal congestion and help you breathe more easily  If you take decongestant pills, they may make you feel restless or cause problems with your sleep  Do not use decongestant sprays for more than a few days  Cough suppressants  help reduce coughing  Ask your healthcare provider which type of cough medicine is best for you  NSAIDs , such as ibuprofen, help decrease swelling, pain, and fever  NSAIDs can cause stomach bleeding or kidney problems in certain people  If you take blood thinner medicine, always ask your healthcare provider if NSAIDs are safe for you  Always read the medicine label and follow directions  Acetaminophen  decreases pain and fever  It is available without a doctor's order  Ask how much to take and how often to take it  Follow directions   Read the labels of all other medicines you are using to see if they also contain acetaminophen, or ask your doctor or pharmacist  Acetaminophen can cause liver damage if not taken correctly  Do not use more than 4 grams (4,000 milligrams) total of acetaminophen in one day  Take your medicine as directed  Contact your healthcare provider if you think your medicine is not helping or if you have side effects  Tell him or her if you are allergic to any medicine  Keep a list of the medicines, vitamins, and herbs you take  Include the amounts, and when and why you take them  Bring the list or the pill bottles to follow-up visits  Carry your medicine list with you in case of an emergency  Self-care:   Rest as much as possible  Slowly start to do more each day  Drink more liquids as directed  Liquids will help thin and loosen mucus so you can cough it up  Liquids will also help prevent dehydration  Liquids that help prevent dehydration include water, fruit juice, and broth  Do not drink liquids that contain caffeine  Caffeine can increase your risk for dehydration  Ask your healthcare provider how much liquid to drink each day  Soothe a sore throat  Gargle with warm salt water  Make salt water by dissolving ¼ teaspoon salt in 1 cup warm water  You may also suck on hard candy or throat lozenges  You may use a sore throat spray  Use a humidifier or vaporizer  Use a cool mist humidifier or a vaporizer to increase air moisture in your home  This may make it easier for you to breathe and help decrease your cough  Use saline nasal drops as directed  These help relieve congestion  Apply petroleum-based jelly around the outside of your nostrils  This can decrease irritation from blowing your nose  Do not smoke  Nicotine and other chemicals in cigarettes and cigars can make your symptoms worse  They can also cause infections such as bronchitis or pneumonia   Ask your healthcare provider for information if you currently smoke and need help to quit  E-cigarettes or smokeless tobacco still contain nicotine  Talk to your healthcare provider before you use these products  Prevent a cold: Wash your hands often  Use soap and water every time you wash your hands  Rub your soapy hands together, lacing your fingers  Use the fingers of one hand to scrub under the nails of the other hand  Wash for at least 20 seconds  Rinse with warm, running water for several seconds  Then dry your hands  Use germ-killing gel if soap and water are not available  Do not touch your eyes or mouth without washing your hands first          Cover a sneeze or cough  Use a tissue that covers your mouth and nose  Put the used tissue in the trash right away  Use the bend of your arm if a tissue is not available  Wash your hands well with soap and water or use a hand   Do not stand close to anyone who is sneezing or coughing  Try to stay away from others while you are sick  This is especially important during the first 2 to 3 days when the virus is more easily spread  Wait until a fever, cough, or other symptoms are gone before you return to work or other regular activities  Do not share items while you are sick  This includes food, drinks, eating utensils, and dishes  Follow up with your doctor as directed:  Write down your questions so you remember to ask them during your visits  © Copyright [employer] 2022 Information is for End User's use only and may not be sold, redistributed or otherwise used for commercial purposes  All illustrations and images included in CareNotes® are the copyrighted property of A D A M , Inc  or [facility]   The above information is an  only  It is not intended as medical advice for individual conditions or treatments  Talk to your doctor, nurse or pharmacist before following any medical regimen to see if it is safe and effective for you

## 2022-10-02 NOTE — PROGRESS NOTES
[facility]        NAME: Fatou Coleman is a 67 y o  male  : 1950    MRN: [id number]  DATE: 2022  TIME: 2:34 PM    Assessment and Plan   Acute cough [R05 1]  1  Acute cough  XR chest pa & lateral    Poct Covid 19 Rapid Antigen Test    Covid/Flu-Office Collect    benzonatate (TESSALON) 200 MG capsule   2  Diarrhea, unspecified type           Patient Instructions   Patient Instructions     Upper Respiratory Infection   WHAT YOU NEED TO KNOW:   An upper respiratory infection is also called a cold  It can affect your nose, throat, ears, and sinuses  Cold symptoms are usually worst for the first 3 to 5 days  Most people get better in 7 to 14 days  You may continue to cough for 2 to 3 weeks  Colds are caused by viruses and do not get better with antibiotics  DISCHARGE INSTRUCTIONS:   Call your local emergency number (911 in the [address] [place] [address],[address]) if:   · You have chest pain or trouble breathing  Return to the emergency department if:   · You have a fever over 102ºF (39ºC)  Call your doctor if:   · You have a low fever  · Your sore throat gets worse or you see white or yellow spots in your throat  · Your symptoms get worse after 3 to 5 days or are not better in 14 days  · You have a rash anywhere on your skin  · You have large, tender lumps in your neck  · You have thick, green, or yellow drainage from your nose  · You cough up thick yellow, green, or bloody mucus  · You have a bad earache  · You have questions or concerns about your condition or care  Medicines: You may need any of the following:  · Decongestants  help reduce nasal congestion and help you breathe more easily  If you take decongestant pills, they may make you feel restless or cause problems with your sleep  Do not use decongestant sprays for more than a few days  · Cough suppressants  help reduce coughing  Ask your healthcare provider which type of cough medicine is best for you       · NSAIDs , such as ibuprofen, help decrease swelling, pain, and fever  NSAIDs can cause stomach bleeding or kidney problems in certain people  If you take blood thinner medicine, always ask your healthcare provider if NSAIDs are safe for you  Always read the medicine label and follow directions  · Acetaminophen  decreases pain and fever  It is available without a doctor's order  Ask how much to take and how often to take it  Follow directions  Read the labels of all other medicines you are using to see if they also contain acetaminophen, or ask your doctor or pharmacist  Acetaminophen can cause liver damage if not taken correctly  Do not use more than 4 grams (4,000 milligrams) total of acetaminophen in one day  · Take your medicine as directed  Contact your healthcare provider if you think your medicine is not helping or if you have side effects  Tell him or her if you are allergic to any medicine  Keep a list of the medicines, vitamins, and herbs you take  Include the amounts, and when and why you take them  Bring the list or the pill bottles to follow-up visits  Carry your medicine list with you in case of an emergency  Self-care:   · Rest as much as possible  Slowly start to do more each day  · Drink more liquids as directed  Liquids will help thin and loosen mucus so you can cough it up  Liquids will also help prevent dehydration  Liquids that help prevent dehydration include water, fruit juice, and broth  Do not drink liquids that contain caffeine  Caffeine can increase your risk for dehydration  Ask your healthcare provider how much liquid to drink each day  · Soothe a sore throat  Gargle with warm salt water  Make salt water by dissolving ¼ teaspoon salt in 1 cup warm water  You may also suck on hard candy or throat lozenges  You may use a sore throat spray  · Use a humidifier or vaporizer  Use a cool mist humidifier or a vaporizer to increase air moisture in your home   This may make it easier for you to breathe and help decrease your cough  · Use saline nasal drops as directed  These help relieve congestion  · Apply petroleum-based jelly around the outside of your nostrils  This can decrease irritation from blowing your nose  · Do not smoke  Nicotine and other chemicals in cigarettes and cigars can make your symptoms worse  They can also cause infections such as bronchitis or pneumonia  Ask your healthcare provider for information if you currently smoke and need help to quit  E-cigarettes or smokeless tobacco still contain nicotine  Talk to your healthcare provider before you use these products  Prevent a cold:   · Wash your hands often  Use soap and water every time you wash your hands  Rub your soapy hands together, lacing your fingers  Use the fingers of one hand to scrub under the nails of the other hand  Wash for at least 20 seconds  Rinse with warm, running water for several seconds  Then dry your hands  Use germ-killing gel if soap and water are not available  Do not touch your eyes or mouth without washing your hands first          · Cover a sneeze or cough  Use a tissue that covers your mouth and nose  Put the used tissue in the trash right away  Use the bend of your arm if a tissue is not available  Wash your hands well with soap and water or use a hand   Do not stand close to anyone who is sneezing or coughing  · Try to stay away from others while you are sick  This is especially important during the first 2 to 3 days when the virus is more easily spread  Wait until a fever, cough, or other symptoms are gone before you return to work or other regular activities  · Do not share items while you are sick  This includes food, drinks, eating utensils, and dishes  Follow up with your doctor as directed:  Write down your questions so you remember to ask them during your visits    © Copyright [employer] 2022 Information is for End User's use only and may not be sold, redistributed or otherwise used for commercial purposes  All illustrations and images included in CareNotes® are the copyrighted property of [employer] A M , Inc  or [employer]  The above information is an  only  It is not intended as medical advice for individual conditions or treatments  Talk to your doctor, nurse or pharmacist before following any medical regimen to see if it is safe and effective for you  Follow up with PCP in 3-5 days  Proceed to  ER if symptoms worsen  Chief Complaint     Chief Complaint   Patient presents with   Sharran Boop Like Symptoms     Started Tuesday, coughing, sneezing, headache, body aches  Diarrhea started this morning         History of Present Illness       The patient presents to the clinic complaining of cough, sneezing, and body aches for the last 5 days  He started with episodes of diarrhea this morning  The patient is not up-to-date on his COVID vaccines  He does have a history of cholecystectomy  Patient states that he has not had a colonoscopy in many years  He denies associated melena or blood in his stools, fevers, or chills  He was recently started on Plaquenil and prednisone for polyarthritis rheumatica      Review of Systems   Review of Systems   Constitutional: Negative for chills and fever  HENT: Negative for congestion, ear pain, rhinorrhea, sinus pressure, sinus pain, sneezing and sore throat  Eyes: Negative for pain and visual disturbance  Respiratory: Positive for cough  Negative for shortness of breath and wheezing  Cardiovascular: Negative for chest pain and palpitations  Gastrointestinal: Positive for diarrhea  Negative for abdominal pain and vomiting  Genitourinary: Negative for dysuria and hematuria  Musculoskeletal: Negative for arthralgias and back pain  Skin: Negative for color change and rash  Neurological: Positive for headaches  Negative for seizures and syncope     All other systems reviewed and are negative  Current Medications       Current Outpatient Medications:     alfuzosin (UROXATRAL) 10 mg 24 hr tablet, Take 40 mg by mouth daily  , Disp: , Rfl:     benzonatate (TESSALON) 200 MG capsule, Take 1 capsule (200 mg total) by mouth 3 (three) times a day as needed for cough, Disp: 20 capsule, Rfl: 0    busPIRone (BUSPAR) 15 mg tablet, Take 1 tablet (15 mg total) by mouth 3 (three) times a day, Disp: 90 tablet, Rfl: 0    finasteride (PROSCAR) 5 mg tablet, Take 5 mg by mouth daily, Disp: , Rfl:     hydrochlorothiazide (HYDRODIURIL) 25 mg tablet, Take 25 mg by mouth daily, Disp: , Rfl:     hydroxychloroquine (PLAQUENIL) 200 mg tablet, Take 1 tablet (200 mg total) by mouth 2 (two) times a day, Disp: 60 tablet, Rfl: 6    Multiple Vitamin (multivitamin) tablet, Take 1 tablet by mouth daily, Disp: 30 tablet, Rfl: 0    [START ON 10/31/2022] predniSONE 2 5 mg tablet, Take 5 tablets (12 5 mg total) by mouth daily for 14 days, THEN 4 tablets (10 mg total) daily for 14 days, THEN 3 tablets (7 5 mg total) daily for 14 days, THEN 2 tablets (5 mg total) daily for 14 days, THEN 1 tablet (2 5 mg total) daily for 14 days  Do not start before October 31, 2022 , Disp: 210 tablet, Rfl: 0    predniSONE 5 mg tablet, Take 4 tablets (20 mg total) by mouth daily for 14 days, THEN 3 tablets (15 mg total) daily  , Disp: 146 tablet, Rfl: 0    thiamine 100 MG tablet, Take 1 tablet (100 mg total) by mouth daily, Disp: 30 tablet, Rfl: 1    traZODone (DESYREL) 100 mg tablet, Take 1 5 tablets (150 mg total) by mouth daily at bedtime, Disp: 45 tablet, Rfl: 0    allopurinol (ZYLOPRIM) 100 mg tablet, Take 1 tablet (100 mg total) by mouth daily (Patient not taking: Reported on 10/2/2022), Disp: , Rfl: 0    amLODIPine (NORVASC) 5 mg tablet, Take 1 tablet (5 mg total) by mouth daily, Disp: 30 tablet, Rfl: 0    atorvastatin (LIPITOR) 40 mg tablet, Take 40 mg by mouth daily (Patient not taking: Reported on 10/2/2022), Disp: , Rfl:   finasteride (PROSCAR) 5 mg tablet, Take 5 mg by mouth 2 (two) times a day, Disp: , Rfl:     FLUoxetine (PROzac) 40 MG capsule, Take 2 capsules (80 mg total) by mouth daily (Patient not taking: Reported on 10/2/2022), Disp: 60 capsule, Rfl: 0    folic acid (FOLVITE) 1 mg tablet, Take 1 tablet (1 mg total) by mouth daily (Patient not taking: Reported on 10/2/2022), Disp: 30 tablet, Rfl: 1    hydrOXYzine HCL (ATARAX) 25 mg tablet, Take 1 tablet (25 mg total) by mouth every 6 (six) hours as needed for anxiety (Patient not taking: Reported on 10/2/2022), Disp: 45 tablet, Rfl: 0    lidocaine (LIDODERM) 5 %, Apply 1 patch topically daily Remove & Discard patch within 12 hours or as directed by MD (Patient not taking: Reported on 10/2/2022), Disp: 30 patch, Rfl: 0    melatonin 3 mg, Take 1 tablet (3 mg total) by mouth daily at bedtime (Patient not taking: Reported on 10/2/2022), Disp: 30 tablet, Rfl: 0    nadolol (CORGARD) 20 mg tablet, Take 0 5 tablets (10 mg total) by mouth daily Take 1 tablet a day   Unsure of dose, Disp: , Rfl:     naproxen (Naprosyn) 500 mg tablet, Take 1 tablet (500 mg total) by mouth 2 (two) times a day as needed for mild pain (Patient not taking: Reported on 10/2/2022), Disp: 28 tablet, Rfl: 0    nystatin (MYCOSTATIN) powder, Apply topically 2 (two) times a day (Patient not taking: Reported on 10/2/2022), Disp: 15 g, Rfl: 0    ondansetron (Zofran ODT) 4 mg disintegrating tablet, Take 1 tablet (4 mg total) by mouth every 6 (six) hours as needed for nausea or vomiting (Patient not taking: Reported on 10/2/2022), Disp: 20 tablet, Rfl: 0    psyllium (METAMUCIL SMOOTH TEXTURE) 28 % packet, Take 1 packet by mouth daily as needed (constipation) (Patient not taking: Reported on 10/2/2022), Disp: , Rfl: 0    white petrolatum-mineral oil (EUCERIN,HYDROCERIN) cream, Apply topically 3 (three) times a day as needed (pruritis) (Patient not taking: Reported on 10/2/2022), Disp: 113 g, Rfl: 0    Current Allergies     Allergies as of 10/02/2022 - Reviewed 10/02/2022   Allergen Reaction Noted    Sulfa antibiotics Anaphylaxis and Rash 11/07/2014    Elemental sulfur Rash and Edema 11/04/2014            The following portions of the patient's history were reviewed and updated as appropriate: allergies, current medications, past family history, past medical history, past social history, past surgical history and problem list      Past Medical History:   Diagnosis Date    Alcohol dependency ([address] )     Anxiety     Depression     [place] (hard of hearing)     Hypertension     Kidney stones     Prostate enlargement     Renal disorder     kidney    Shoulder dislocation        Past Surgical History:   Procedure Laterality Date    CHOLECYSTECTOMY      SHOULDER SURGERY      for dislocation       History reviewed  No pertinent family history  Medications have been verified  Objective   /80   Pulse 58   Temp 98 2 °F (36 8 °C)   Resp 16   Ht 5' 7" (1 702 m)   Wt 106 kg (234 lb)   SpO2 98%   BMI 36 65 kg/m²        Physical Exam     Physical Exam  Constitutional:       Appearance: He is well-developed  He is obese  HENT:      Head: Normocephalic  Right Ear: Tympanic membrane normal       Left Ear: Tympanic membrane normal       Nose: No congestion or rhinorrhea  Mouth/Throat:      Pharynx: No oropharyngeal exudate or posterior oropharyngeal erythema  Eyes:      General:         Left eye: No discharge  Pupils: Pupils are equal, round, and reactive to light  Neck:      Thyroid: No thyromegaly  Trachea: No tracheal deviation  Cardiovascular:      Rate and Rhythm: Normal rate and regular rhythm  Heart sounds: No murmur heard  Pulmonary:      Effort: Pulmonary effort is normal  No respiratory distress  Breath sounds: Rhonchi present  No wheezing or rales  Chest:      Chest wall: No tenderness     Abdominal:      General: Bowel sounds are normal  There is no distension  Palpations: Abdomen is soft  There is no mass  Tenderness: There is no abdominal tenderness  There is no guarding or rebound  Musculoskeletal:         General: Normal range of motion  Cervical back: Normal range of motion  Skin:     General: Skin is warm  Neurological:      Mental Status: He is alert  EXAM PERFORMED/VIEWS:  XR CHEST PA & LATERAL        FINDINGS:     Cardiomediastinal silhouette appears unremarkable  Aortic arch calcifications are again seen      Mildly elevated right hemidiaphragm with basilar atelectasis and/or parenchymal scarring again noted  New subtle left basilar atelectasis and/or mild airspace disease is also seen  The upper to midlung fields are well aerated and clear      Multilevel degenerative changes throughout the thoracic spine and bilateral glenohumeral joints  Multiple old healed right-sided rib fracture deformities with callus formation again noted      No free air in the upper abdomen  Right upper abdominal surgical clips could be from prior cholecystectomy      IMPRESSION:     Mildly elevated right hemidiaphragm with basilar atelectasis and/or parenchymal scarring again noted  New subtle left basilar atelectasis and/or mild airspace disease is also seen  The upper to midlung fields are well aerated and clear        -I reviewed the chest chest x-ray with the patient he is aware that there are no signs of pneumonia, however there are new signs of atelectasis versus mild airspace disease on the left  COVID test was negative  I will send out a flu swab to the lab  I will hold off on antibiotics since he currently has diarrhea  I suggest he follow up with his primary care doctor in the next 48-72 hours  The patient may need a repeat chest x-ray in 1-2 weeks to check for resolution of the airway disease

## 2022-10-03 LAB
FLUAV RNA RESP QL NAA+PROBE: NEGATIVE
FLUBV RNA RESP QL NAA+PROBE: NEGATIVE
SARS-COV-2 RNA RESP QL NAA+PROBE: NEGATIVE

## 2022-10-13 ENCOUNTER — OFFICE VISIT (OUTPATIENT)
Dept: URGENT CARE | Facility: CLINIC | Age: 72
End: 2022-10-13
Payer: MEDICARE

## 2022-10-13 ENCOUNTER — APPOINTMENT (OUTPATIENT)
Dept: RADIOLOGY | Facility: CLINIC | Age: 72
End: 2022-10-13
Payer: MEDICARE

## 2022-10-13 VITALS
HEIGHT: 67 IN | DIASTOLIC BLOOD PRESSURE: 71 MMHG | HEART RATE: 55 BPM | TEMPERATURE: 98.1 F | OXYGEN SATURATION: 94 % | RESPIRATION RATE: 18 BRPM | WEIGHT: 250 LBS | BODY MASS INDEX: 39.24 KG/M2 | SYSTOLIC BLOOD PRESSURE: 111 MMHG

## 2022-10-13 DIAGNOSIS — R05.1 ACUTE COUGH: Primary | ICD-10-CM

## 2022-10-13 DIAGNOSIS — R05.1 ACUTE COUGH: ICD-10-CM

## 2022-10-13 PROCEDURE — 71046 X-RAY EXAM CHEST 2 VIEWS: CPT

## 2022-10-13 PROCEDURE — G0463 HOSPITAL OUTPT CLINIC VISIT: HCPCS

## 2022-10-13 PROCEDURE — 99213 OFFICE O/P EST LOW 20 MIN: CPT

## 2022-10-13 RX ORDER — TRIAMCINOLONE ACETONIDE 1 MG/G
CREAM TOPICAL
COMMUNITY
Start: 2022-10-03

## 2022-10-13 RX ORDER — DOXYCYCLINE 100 MG/1
100 TABLET ORAL 2 TIMES DAILY
Qty: 10 TABLET | Refills: 0 | Status: SHIPPED | OUTPATIENT
Start: 2022-10-13 | End: 2022-10-18

## 2022-10-13 RX ORDER — CLOBETASOL PROPIONATE 0.46 MG/ML
SOLUTION TOPICAL
COMMUNITY
Start: 2022-10-03

## 2022-10-13 RX ORDER — GUAIFENESIN 600 MG/1
1200 TABLET, EXTENDED RELEASE ORAL EVERY 12 HOURS SCHEDULED
Qty: 30 TABLET | Refills: 0 | Status: SHIPPED | OUTPATIENT
Start: 2022-10-13 | End: 2022-10-28 | Stop reason: ALTCHOICE

## 2022-10-13 RX ORDER — ALBUTEROL SULFATE 90 UG/1
2 AEROSOL, METERED RESPIRATORY (INHALATION) EVERY 6 HOURS PRN
Qty: 8.5 G | Refills: 0 | Status: SHIPPED | OUTPATIENT
Start: 2022-10-13 | End: 2022-10-21 | Stop reason: CLARIF

## 2022-10-13 NOTE — PROGRESS NOTES
[address] [facility]        NAME: Jeramie Almanza is a 67 y o  male  : 1950    MRN: [id number]  DATE: 2022  TIME: 4:21 PM    Assessment and Plan   Acute cough [R05 1]  1  Acute cough  XR chest pa & lateral    doxycycline (ADOXA) 100 MG tablet    albuterol (ProAir HFA) 90 mcg/act inhaler    guaiFENesin (MUCINEX) 600 mg 12 hr tablet     X-ray reviewed by myself  No consolidation or pleural effusion seen  Patient Instructions     Take the doxycycline with food and drink a full glass of water  Use the inhaler as needed for wheezing and shortness of breath  Take the mucinex daily for mucus production  Follow up with PCP in 3-5 days  Proceed to  ER if symptoms worsen  Chief Complaint     Chief Complaint   Patient presents with   • CHEST CONGESTION     Approx  X3 wks ago: started with chest congestion & issue persists with moist prod cough (cream)  Getting more difficult to expel mucus  Audible wheezing also noted  Increased B/L LE edema noted  Seen here previously on Oct 2nd for same issue  History of Present Illness       Patient is a 72YOM presenting with a productive cough, congestion, intermittent shortness of breath,  And fatigue for several weeks  He denies any fever ,chills, nausea, vomiting or chest pain  He was seen 10/2 and given tessalon perls at the time for the cough  Patient also has a weight gain since his last visit but has been on a extended prednisone taper from Rheumatology  Review of Systems   Review of Systems   Constitutional: Positive for fatigue  Negative for activity change, appetite change, chills and fever  HENT: Positive for congestion, sinus pressure and sinus pain  Negative for sore throat  Respiratory: Positive for cough, chest tightness, shortness of breath and wheezing  Cardiovascular: Negative for chest pain and palpitations  Gastrointestinal: Negative for abdominal pain, diarrhea and vomiting     Musculoskeletal: Positive for arthralgias  Skin: Negative for rash  Neurological: Negative for dizziness, weakness, numbness and headaches  All other systems reviewed and are negative  Current Medications       Current Outpatient Medications:   •  albuterol (ProAir HFA) 90 mcg/act inhaler, Inhale 2 puffs every 6 (six) hours as needed for wheezing, Disp: 8 5 g, Rfl: 0  •  amLODIPine (NORVASC) 5 mg tablet, Take 1 tablet (5 mg total) by mouth daily, Disp: 30 tablet, Rfl: 0  •  busPIRone (BUSPAR) 15 mg tablet, Take 1 tablet (15 mg total) by mouth 3 (three) times a day, Disp: 90 tablet, Rfl: 0  •  clobetasol (TEMOVATE) 0 05 % external solution, MIX INTO JAR OF CERAVE CREAM AND APPLY GENEROUSLY TO ALL AFFECTED AREAS ON BODY TWICE DAILY, Disp: , Rfl:   •  doxycycline (ADOXA) 100 MG tablet, Take 1 tablet (100 mg total) by mouth 2 (two) times a day for 5 days, Disp: 10 tablet, Rfl: 0  •  finasteride (PROSCAR) 5 mg tablet, Take 5 mg by mouth daily, Disp: , Rfl:   •  guaiFENesin (MUCINEX) 600 mg 12 hr tablet, Take 2 tablets (1,200 mg total) by mouth every 12 (twelve) hours, Disp: 30 tablet, Rfl: 0  •  hydrochlorothiazide (HYDRODIURIL) 25 mg tablet, Take 25 mg by mouth daily, Disp: , Rfl:   •  hydroxychloroquine (PLAQUENIL) 200 mg tablet, Take 1 tablet (200 mg total) by mouth 2 (two) times a day, Disp: 60 tablet, Rfl: 6  •  hydrOXYzine HCL (ATARAX) 25 mg tablet, Take 1 tablet (25 mg total) by mouth every 6 (six) hours as needed for anxiety, Disp: 45 tablet, Rfl: 0  •  Multiple Vitamin (multivitamin) tablet, Take 1 tablet by mouth daily, Disp: 30 tablet, Rfl: 0  •  [START ON 10/31/2022] predniSONE 2 5 mg tablet, Take 5 tablets (12 5 mg total) by mouth daily for 14 days, THEN 4 tablets (10 mg total) daily for 14 days, THEN 3 tablets (7 5 mg total) daily for 14 days, THEN 2 tablets (5 mg total) daily for 14 days, THEN 1 tablet (2 5 mg total) daily for 14 days   Do not start before October 31, 2022 , Disp: 210 tablet, Rfl: 0  •  predniSONE 5 mg tablet, Take 4 tablets (20 mg total) by mouth daily for 14 days, THEN 3 tablets (15 mg total) daily  , Disp: 146 tablet, Rfl: 0  •  traZODone (DESYREL) 100 mg tablet, Take 1 5 tablets (150 mg total) by mouth daily at bedtime, Disp: 45 tablet, Rfl: 0  •  triamcinolone (KENALOG) 0 1 % cream, APPLY DIRECTLY TO ITCHY RED AREAS ON LEFT SIDE OF CHEST AND ARM W   (REFER TO PRESCRIPTION NOTES)  , Disp: , Rfl:   •  alfuzosin (UROXATRAL) 10 mg 24 hr tablet, Take 40 mg by mouth daily  (Patient not taking: Reported on 10/13/2022), Disp: , Rfl:   •  allopurinol (ZYLOPRIM) 100 mg tablet, Take 1 tablet (100 mg total) by mouth daily (Patient not taking: No sig reported), Disp: , Rfl: 0  •  atorvastatin (LIPITOR) 40 mg tablet, Take 40 mg by mouth daily (Patient not taking: No sig reported), Disp: , Rfl:   •  benzonatate (TESSALON) 200 MG capsule, Take 1 capsule (200 mg total) by mouth 3 (three) times a day as needed for cough (Patient not taking: Reported on 10/13/2022), Disp: 20 capsule, Rfl: 0  •  finasteride (PROSCAR) 5 mg tablet, Take 5 mg by mouth 2 (two) times a day (Patient not taking: Reported on 10/13/2022), Disp: , Rfl:   •  FLUoxetine (PROzac) 40 MG capsule, Take 2 capsules (80 mg total) by mouth daily (Patient not taking: No sig reported), Disp: 60 capsule, Rfl: 0  •  folic acid (FOLVITE) 1 mg tablet, Take 1 tablet (1 mg total) by mouth daily (Patient not taking: No sig reported), Disp: 30 tablet, Rfl: 1  •  lidocaine (LIDODERM) 5 %, Apply 1 patch topically daily Remove & Discard patch within 12 hours or as directed by MD (Patient not taking: No sig reported), Disp: 30 patch, Rfl: 0  •  melatonin 3 mg, Take 1 tablet (3 mg total) by mouth daily at bedtime (Patient not taking: No sig reported), Disp: 30 tablet, Rfl: 0  •  nadolol (CORGARD) 20 mg tablet, Take 0 5 tablets (10 mg total) by mouth daily Take 1 tablet a day   Unsure of dose, Disp: , Rfl:   •  naproxen (Naprosyn) 500 mg tablet, Take 1 tablet (500 mg total) by mouth 2 (two) times a day as needed for mild pain (Patient not taking: No sig reported), Disp: 28 tablet, Rfl: 0  •  nystatin (MYCOSTATIN) powder, Apply topically 2 (two) times a day (Patient not taking: No sig reported), Disp: 15 g, Rfl: 0  •  ondansetron (Zofran ODT) 4 mg disintegrating tablet, Take 1 tablet (4 mg total) by mouth every 6 (six) hours as needed for nausea or vomiting (Patient not taking: No sig reported), Disp: 20 tablet, Rfl: 0  •  psyllium (METAMUCIL SMOOTH TEXTURE) 28 % packet, Take 1 packet by mouth daily as needed (constipation) (Patient not taking: No sig reported), Disp: , Rfl: 0  •  thiamine 100 MG tablet, Take 1 tablet (100 mg total) by mouth daily (Patient not taking: Reported on 10/13/2022), Disp: 30 tablet, Rfl: 1  •  white petrolatum-mineral oil (EUCERIN,HYDROCERIN) cream, Apply topically 3 (three) times a day as needed (pruritis) (Patient not taking: No sig reported), Disp: 113 g, Rfl: 0    Current Allergies     Allergies as of 10/13/2022 - Reviewed 10/13/2022   Allergen Reaction Noted   • Sulfa antibiotics Anaphylaxis and Rash 11/07/2014   • Elemental sulfur Rash and Edema 11/04/2014            The following portions of the patient's history were reviewed and updated as appropriate: allergies, current medications, past family history, past medical history, past social history, past surgical history and problem list      Past Medical History:   Diagnosis Date   • Alcohol dependency ([facility] [address] )    • Anxiety    • Depression    • [place] (hard of hearing)    • Hypertension    • Kidney stones    • Prostate enlargement    • Renal disorder     kidney   • Shoulder dislocation        Past Surgical History:   Procedure Laterality Date   • CHOLECYSTECTOMY     • SHOULDER SURGERY      for dislocation       No family history on file  Medications have been verified          Objective   /71   Pulse 55   Temp 98 1 °F (36 7 °C)   Resp 18   Ht 5' 7" (1 702 m)   Wt 113 kg (250 lb)   SpO2 94%   BMI 39 16 kg/m²        Physical Exam     Physical Exam  Vitals and nursing note reviewed  Constitutional:       General: He is not in acute distress  Appearance: Normal appearance  He is normal weight  He is not ill-appearing or toxic-appearing  HENT:      Right Ear: Tympanic membrane normal       Left Ear: Tympanic membrane normal       Nose: Congestion present  Mouth/Throat:      Pharynx: Oropharynx is clear  Cardiovascular:      Rate and Rhythm: Normal rate and regular rhythm  Pulses: Normal pulses  Heart sounds: Normal heart sounds  Pulmonary:      Effort: Pulmonary effort is normal  No respiratory distress  Breath sounds: Wheezing and rhonchi present  Skin:     General: Skin is warm and dry  Capillary Refill: Capillary refill takes less than 2 seconds  Neurological:      General: No focal deficit present  Mental Status: He is alert and oriented to person, place, and time

## 2022-10-13 NOTE — PATIENT INSTRUCTIONS
Take the doxycycline with food and drink a full glass of water  Use the inhaler as needed for wheezing and shortness of breath  Take the mucinex daily for mucus production

## 2022-10-17 ENCOUNTER — TELEPHONE (OUTPATIENT)
Dept: FAMILY MEDICINE CLINIC | Facility: CLINIC | Age: 72
End: 2022-10-17

## 2022-10-17 NOTE — TELEPHONE ENCOUNTER
Reschedule 10/19 appointment,  schedule complications  PT stated he couldn't talk at the moment, so he would give us a call back at a better time

## 2022-10-21 ENCOUNTER — OFFICE VISIT (OUTPATIENT)
Dept: FAMILY MEDICINE CLINIC | Facility: CLINIC | Age: 72
End: 2022-10-21
Payer: MEDICARE

## 2022-10-21 ENCOUNTER — TELEPHONE (OUTPATIENT)
Dept: FAMILY MEDICINE CLINIC | Facility: CLINIC | Age: 72
End: 2022-10-21

## 2022-10-21 VITALS
OXYGEN SATURATION: 93 % | HEART RATE: 65 BPM | SYSTOLIC BLOOD PRESSURE: 98 MMHG | TEMPERATURE: 99 F | WEIGHT: 247.2 LBS | HEIGHT: 65 IN | DIASTOLIC BLOOD PRESSURE: 58 MMHG | BODY MASS INDEX: 41.19 KG/M2

## 2022-10-21 DIAGNOSIS — F41.8 DEPRESSION WITH ANXIETY: ICD-10-CM

## 2022-10-21 DIAGNOSIS — R05.2 SUBACUTE COUGH: ICD-10-CM

## 2022-10-21 DIAGNOSIS — R06.02 SOB (SHORTNESS OF BREATH): ICD-10-CM

## 2022-10-21 DIAGNOSIS — R19.8 INCREASED ABDOMINAL GIRTH: Primary | ICD-10-CM

## 2022-10-21 PROBLEM — R25.1 TREMOR OF LEFT HAND: Chronic | Status: RESOLVED | Noted: 2019-12-02 | Resolved: 2022-10-21

## 2022-10-21 PROBLEM — R74.8 ELEVATED LIPASE: Status: RESOLVED | Noted: 2021-06-03 | Resolved: 2022-10-21

## 2022-10-21 PROBLEM — R74.01 TRANSAMINITIS: Status: RESOLVED | Noted: 2019-12-02 | Resolved: 2022-10-21

## 2022-10-21 PROBLEM — M79.89 SWELLING OF RIGHT LOWER EXTREMITY: Status: RESOLVED | Noted: 2021-04-17 | Resolved: 2022-10-21

## 2022-10-21 PROBLEM — F10.11 ALCOHOL USE DISORDER, MILD, IN EARLY REMISSION, ABUSE: Status: ACTIVE | Noted: 2019-02-12

## 2022-10-21 PROBLEM — R27.0 ATAXIA: Status: RESOLVED | Noted: 2019-02-10 | Resolved: 2022-10-21

## 2022-10-21 PROBLEM — J96.02 ACUTE RESPIRATORY FAILURE WITH HYPERCAPNIA (HCC): Status: RESOLVED | Noted: 2022-10-21 | Resolved: 2022-10-21

## 2022-10-21 PROBLEM — S20.211A CONTUSION OF RIB ON RIGHT SIDE: Status: RESOLVED | Noted: 2021-10-15 | Resolved: 2022-10-21

## 2022-10-21 PROBLEM — J96.02 ACUTE RESPIRATORY FAILURE WITH HYPERCAPNIA (HCC): Status: ACTIVE | Noted: 2022-10-21

## 2022-10-21 PROBLEM — E87.8 ELECTROLYTE ABNORMALITY: Status: RESOLVED | Noted: 2021-07-31 | Resolved: 2022-10-21

## 2022-10-21 PROBLEM — M35.3 PMR (POLYMYALGIA RHEUMATICA) (HCC): Status: ACTIVE | Noted: 2022-10-21

## 2022-10-21 PROBLEM — F39 UNSPECIFIED MOOD (AFFECTIVE) DISORDER (HCC): Status: RESOLVED | Noted: 2021-04-17 | Resolved: 2022-10-21

## 2022-10-21 PROBLEM — G47.34 NOCTURNAL HYPOXIA: Status: RESOLVED | Noted: 2021-09-07 | Resolved: 2022-10-21

## 2022-10-21 PROCEDURE — 99203 OFFICE O/P NEW LOW 30 MIN: CPT | Performed by: FAMILY MEDICINE

## 2022-10-21 RX ORDER — FLUTICASONE PROPIONATE 110 UG/1
2 AEROSOL, METERED RESPIRATORY (INHALATION) 2 TIMES DAILY
Qty: 12 G | Refills: 1 | Status: SHIPPED | OUTPATIENT
Start: 2022-10-21

## 2022-10-21 RX ORDER — AZITHROMYCIN 250 MG/1
250 TABLET, FILM COATED ORAL DAILY
Qty: 6 TABLET | Refills: 0 | Status: SHIPPED | OUTPATIENT
Start: 2022-10-21 | End: 2022-10-28 | Stop reason: ALTCHOICE

## 2022-10-21 NOTE — PROGRESS NOTES
Assessment/Plan:    Mr Ross Ramires is a very pleasant 68 yo M with pmhx of HTN, Alcohol abuse disorder in remission x 1 year, PMR currently on steroid taper,Inflammatory arthritis, BPH, depression and anxiety who present to establish care  Patient c/o worsening cough x 3 weeks and has noted increasing abdominal girth x 3-4 months  Discussed that while steroids may cause fluid retention would like to assess for other underlying etiology of presenting symptoms given multiple co-morbidities  Will trial on flovent and zpack for possible acute bronchitis  RTO in 2 weeks to assess symptom progression  No problem-specific Assessment & Plan notes found for this encounter  Diagnoses and all orders for this visit:    Increased abdominal girth  -     CT chest abdomen pelvis wo contrast; Future    SOB (shortness of breath)  -     CT chest abdomen pelvis wo contrast; Future  -     fluticasone (Flovent HFA) 110 MCG/ACT inhaler; Inhale 2 puffs 2 (two) times a day Rinse mouth after use  -     azithromycin (ZITHROMAX) 250 mg tablet; Take 1 tablet (250 mg total) by mouth in the morning for 6 days Please take 2 tablets 500mg  on day 1, then take 250 mg 1 tablet for 4 consecutive days    Subacute cough  -     CT chest abdomen pelvis wo contrast; Future        Subjective:      Patient ID: Soledad Galvez is a 67 y o  male  Cough  This is a new (Patient reports worsening cough x 3 weeks  Recently seen at urgent care and prescribed albuterol inhaler and doxycycline ) problem  The problem has been gradually worsening  Cough characteristics: Productive with yellow-colored sputum  Associated symptoms include shortness of breath  Pertinent negatives include no chest pain, chills, fever, headaches or postnasal drip  Nothing aggravates the symptoms  He has tried a beta-agonist inhaler and rest for the symptoms  The treatment provided no relief  There is no history of bronchitis or COPD       Patient also reports that over the last 3-4 months he has noticed his abdomen becoming larger and larger  Patient now reports worsening cough and SOB x 3 weeks with little improvement on albuterol inhaler and doxycyline abx therapy  Discussed with patient that while fluid retention is a possible side effect of steroids, would like to obtain further imaging to ensure no other underlying etiology given hx of liver disease in setting of alcohol abuse disorder in remission  The following portions of the patient's history were reviewed and updated as appropriate: allergies, current medications, past family history, past medical history, past social history, past surgical history and problem list     Review of Systems   Constitutional: Negative for chills and fever  HENT: Negative for postnasal drip  Respiratory: Positive for cough and shortness of breath  Cardiovascular: Negative for chest pain and palpitations  Gastrointestinal: Negative for nausea and vomiting  Genitourinary: Negative for difficulty urinating  Neurological: Negative for headaches  Objective:      BP 98/58   Pulse 65   Temp 99 °F (37 2 °C)   Ht 5' 5" (1 651 m)   Wt 112 kg (247 lb 3 2 oz)   SpO2 93%   BMI 41 14 kg/m²          Physical Exam  Constitutional:       Appearance: He is well-developed  HENT:      Head: Normocephalic and atraumatic  Right Ear: Tympanic membrane, ear canal and external ear normal       Left Ear: Tympanic membrane, ear canal and external ear normal       Nose: Nose normal    Eyes:      Conjunctiva/sclera: Conjunctivae normal    Cardiovascular:      Rate and Rhythm: Normal rate and regular rhythm  Heart sounds: Normal heart sounds  Pulmonary:      Effort: Pulmonary effort is normal       Breath sounds: Wheezing present  Abdominal:      General: Bowel sounds are normal  There is distension  Palpations: Abdomen is soft  Tenderness: There is no abdominal tenderness  Musculoskeletal:      Cervical back: Neck supple  Right lower leg: Edema present  Left lower leg: Edema present  Skin:     General: Skin is warm and dry  Neurological:      Mental Status: He is alert and oriented to person, place, and time

## 2022-10-21 NOTE — TELEPHONE ENCOUNTER
Identifier was available  Left VM  Since test was ordered without contrast, we informed PT that Barium was not needed for the CT scan with [facility] on 10/24

## 2022-10-28 ENCOUNTER — OFFICE VISIT (OUTPATIENT)
Dept: FAMILY MEDICINE CLINIC | Facility: CLINIC | Age: 72
End: 2022-10-28

## 2022-10-28 VITALS
HEART RATE: 64 BPM | HEIGHT: 66 IN | OXYGEN SATURATION: 92 % | TEMPERATURE: 99.3 F | DIASTOLIC BLOOD PRESSURE: 68 MMHG | SYSTOLIC BLOOD PRESSURE: 122 MMHG | WEIGHT: 246.62 LBS | BODY MASS INDEX: 39.63 KG/M2

## 2022-10-28 DIAGNOSIS — I10 ESSENTIAL HYPERTENSION: ICD-10-CM

## 2022-10-28 DIAGNOSIS — J40 BRONCHITIS: Primary | ICD-10-CM

## 2022-10-28 DIAGNOSIS — N40.0 BENIGN PROSTATIC HYPERPLASIA WITHOUT LOWER URINARY TRACT SYMPTOMS: ICD-10-CM

## 2022-10-28 RX ORDER — BUSPIRONE HYDROCHLORIDE 15 MG/1
15 TABLET ORAL DAILY
COMMUNITY

## 2022-10-28 RX ORDER — NADOLOL 20 MG/1
20 TABLET ORAL DAILY
COMMUNITY

## 2022-10-28 RX ORDER — FINASTERIDE 5 MG/1
5 TABLET, FILM COATED ORAL DAILY
Qty: 90 TABLET | Refills: 1 | Status: SHIPPED | OUTPATIENT
Start: 2022-10-28

## 2022-10-28 RX ORDER — BUSPIRONE HYDROCHLORIDE 15 MG/1
15 TABLET ORAL 3 TIMES DAILY
Qty: 90 TABLET | Refills: 0 | Status: CANCELLED | OUTPATIENT
Start: 2022-10-28

## 2022-10-28 NOTE — PROGRESS NOTES
Assessment/Plan:    No problem-specific Assessment & Plan notes found for this encounter  Diagnoses and all orders for this visit:    Bronchitis  - Patient reports improvement status post 5 day course of Z-Deacon along with Flovent inhaler  - On examination, significant expiratory wheezing b/l and have recommended both spirometer use at home and continued Flovent inhaler use x2 weeks    Benign prostatic hyperplasia without lower urinary tract symptoms  -     finasteride (PROSCAR) 5 mg tablet; Take 1 tablet (5 mg total) by mouth daily          Subjective:      Patient ID: Cleo Hamilton is a 67 y o  male  Patient returns for complaint of shortness of breath and worsening cough status post Z-Deacon x5 days and transition to Flovent inhaler  Patient reports significant improvement in symptoms at this time  Reviewed CT abd/pelvis with patient during today's visit, which showed volume loss with atelectasis and scarring at the RLL base   Liver, spleen, and pancreas appear normal  There is some gynecomastia which I discussed with patient is likely due to steroid medications  Regarding continued scarring and atelectasis discussed with patient that would recommend spirometer use over the next 2 weeks to completely deep breathing exercises at home  Patient reports that he still has a spirometer from his previous hospitalization and is amenable to using this over the next 2 weeks           The following portions of the patient's history were reviewed and updated as appropriate: allergies, current medications, past family history, past medical history, past social history, past surgical history and problem list     Review of Systems   Constitutional: Negative for chills and fever  Respiratory: Negative for cough and shortness of breath  Gastrointestinal: Negative for nausea and vomiting  Neurological: Negative for headaches           Objective:      /68   Pulse 64   Temp 99 3 °F (37 4 °C) (Tympanic)   Ht 5' 6" (1 676 m)   Wt 112 kg (246 lb 9 9 oz)   SpO2 92%   BMI 39 81 kg/m²          Physical Exam  Constitutional:       Appearance: He is well-developed  HENT:      Head: Normocephalic and atraumatic  Nose: Nose normal    Eyes:      Conjunctiva/sclera: Conjunctivae normal    Cardiovascular:      Rate and Rhythm: Normal rate and regular rhythm  Heart sounds: Normal heart sounds  Pulmonary:      Effort: Pulmonary effort is normal       Breath sounds: Wheezing present  No rales  Comments: Expiratory wheezing bilaterally  Abdominal:      General: Bowel sounds are normal       Palpations: Abdomen is soft  Musculoskeletal:      Cervical back: Normal range of motion and neck supple  Skin:     General: Skin is warm and dry  Neurological:      Mental Status: He is alert and oriented to person, place, and time

## 2022-11-17 ENCOUNTER — TELEPHONE (OUTPATIENT)
Dept: FAMILY MEDICINE CLINIC | Facility: CLINIC | Age: 72
End: 2022-11-17

## 2022-11-17 NOTE — TELEPHONE ENCOUNTER
Lisa Vidales stopped in to make sure  had received his Solange Sykes results from his visit on 11/3/2022  I confirmed we had received a copy  Additionally, Mr Tayo Ryan mentioned that he believes he's due for a colonoscopy   He requested a call back to confirm if the order for the colonoscopy would be placed or not

## 2022-11-17 NOTE — TELEPHONE ENCOUNTER
Nadolol 20 mg once/day refill requested - 30 day supply  Buspirone 15 mg once/day refill requested  [facility] on [address]

## 2022-11-18 DIAGNOSIS — I10 ESSENTIAL HYPERTENSION: Primary | ICD-10-CM

## 2022-11-18 DIAGNOSIS — Z12.11 ENCOUNTER FOR COLORECTAL CANCER SCREENING: ICD-10-CM

## 2022-11-18 DIAGNOSIS — Z12.12 ENCOUNTER FOR COLORECTAL CANCER SCREENING: ICD-10-CM

## 2022-11-18 DIAGNOSIS — F41.8 DEPRESSION WITH ANXIETY: ICD-10-CM

## 2022-11-18 RX ORDER — NADOLOL 20 MG/1
20 TABLET ORAL DAILY
Qty: 30 TABLET | Refills: 1 | Status: SHIPPED | OUTPATIENT
Start: 2022-11-18

## 2022-11-18 RX ORDER — BUSPIRONE HYDROCHLORIDE 15 MG/1
15 TABLET ORAL DAILY
Qty: 30 TABLET | Refills: 1 | Status: SHIPPED | OUTPATIENT
Start: 2022-11-18

## 2022-11-21 ENCOUNTER — TELEPHONE (OUTPATIENT)
Dept: FAMILY MEDICINE CLINIC | Facility: CLINIC | Age: 72
End: 2022-11-21

## 2022-11-21 NOTE — TELEPHONE ENCOUNTER
Identifier available, so  was left  Informed Mr Tereso Khan that his refills were placed  I also asked that he give us a call back to let us know if/when he would be picking up the referral for gastro - colonoscopy

## 2022-11-21 NOTE — TELEPHONE ENCOUNTER
Mr Nneka Lincoln called back about his colonoscopy  He mentioned that he has noticed blood in his stool for about a week now  Although there is no/little pain, he has been experiencing some gas discomfort and has had diarrhea a few times over the past weeks  I was wondering if we could change the colonoscopy urgency to urgent instead of routine in case Lorna Navarro is able to get him in sooner  Or are there other recommendations we could provide?

## 2022-11-21 NOTE — TELEPHONE ENCOUNTER
Check-in appointment scheduled for 11/22 due to concerns regarding information provided in recent call

## 2022-11-22 ENCOUNTER — CONSULT (OUTPATIENT)
Dept: GASTROENTEROLOGY | Facility: CLINIC | Age: 72
End: 2022-11-22

## 2022-11-22 ENCOUNTER — OFFICE VISIT (OUTPATIENT)
Dept: FAMILY MEDICINE CLINIC | Facility: CLINIC | Age: 72
End: 2022-11-22

## 2022-11-22 VITALS
SYSTOLIC BLOOD PRESSURE: 118 MMHG | TEMPERATURE: 98.5 F | DIASTOLIC BLOOD PRESSURE: 62 MMHG | WEIGHT: 248.4 LBS | BODY MASS INDEX: 40.09 KG/M2 | HEART RATE: 59 BPM | OXYGEN SATURATION: 93 %

## 2022-11-22 VITALS
HEIGHT: 65 IN | TEMPERATURE: 98.4 F | OXYGEN SATURATION: 94 % | HEART RATE: 67 BPM | BODY MASS INDEX: 41.79 KG/M2 | WEIGHT: 250.8 LBS | RESPIRATION RATE: 18 BRPM

## 2022-11-22 DIAGNOSIS — K64.8 INTERNAL HEMORRHOIDS: Primary | ICD-10-CM

## 2022-11-22 DIAGNOSIS — K62.5 BLOOD PER RECTUM: ICD-10-CM

## 2022-11-22 DIAGNOSIS — N40.0 BENIGN PROSTATIC HYPERPLASIA, UNSPECIFIED WHETHER LOWER URINARY TRACT SYMPTOMS PRESENT: ICD-10-CM

## 2022-11-22 DIAGNOSIS — L30.4 INTERTRIGO: ICD-10-CM

## 2022-11-22 RX ORDER — KETOCONAZOLE 20 MG/G
CREAM TOPICAL DAILY
Qty: 15 G | Refills: 0 | Status: SHIPPED | OUTPATIENT
Start: 2022-11-22

## 2022-11-22 RX ORDER — ALFUZOSIN HYDROCHLORIDE 10 MG/1
10 TABLET, EXTENDED RELEASE ORAL DAILY
Qty: 90 TABLET | Refills: 0 | Status: SHIPPED | OUTPATIENT
Start: 2022-11-22 | End: 2023-02-20

## 2022-11-22 RX ORDER — METOPROLOL SUCCINATE 25 MG/1
TABLET, EXTENDED RELEASE ORAL
COMMUNITY
End: 2022-11-30 | Stop reason: ALTCHOICE

## 2022-11-22 RX ORDER — HYDROCORTISONE ACETATE 25 MG/1
25 SUPPOSITORY RECTAL 2 TIMES DAILY
Qty: 12 SUPPOSITORY | Refills: 0 | Status: SHIPPED | OUTPATIENT
Start: 2022-11-22

## 2022-11-22 NOTE — PROGRESS NOTES
Name: Constanza Grant      : 1950      MRN: [id number]  Encounter Provider: Trena Duran MD  Encounter Date: 2022   Encounter department: [place] [address]   Based on clinical presentation the patient likely has internal hemorrhoids, no external palpated or appreciated on exam   However, the patient is well overdue for colonoscopy and we will refer him for such to be safe  Fungal infection in the intertriginous folds the buttocks also noted and will be treated as such  1  Internal hemorrhoids  -     hydrocortisone (ANUSOL-HC) 25 mg suppository; Insert 1 suppository (25 mg total) into the rectum 2 (two) times a day    2  Benign prostatic hyperplasia, unspecified whether lower urinary tract symptoms present  -     alfuzosin (UROXATRAL) 10 mg 24 hr tablet; Take 1 tablet (10 mg total) by mouth daily    3  Intertrigo  -     ketoconazole (NIZORAL) 2 % cream; Apply topically daily    4  Blood per rectum  -     Ambulatory referral for colonoscopy; Future           Subjective      CC: bloody stools  1 5-2weeks of intermittent bright red blood per rectum  Non-painful, no systemic symptoms nausea/vomiting  Itching of the buttocks during wiping  Immodium daily, used to use metamucil daily for very hard BM's but not as hard now  x2BM in the morning, soft and then loose/watery, bright red blood  Frequently has "gas pain" relieved by gasX, frequently flatulent  Hx external hemorrhoids surgically treated 1-2 years ago, states this does not feel like prior ones  Colonoscopy >10-15 years ago  Not wiping intensely or irritating backside, notes a scab on the outside of the rectum that hurts when seated  Eats chicken, beef, not much vegetables but does eat oatmeal daily, not much fiber  Past surgeries cholecystectomy, orchiopexy on left side  Also notes soft lump on left lateral thigh which is longstanding but has increased in sensitivity, and peripheral neuropathy   Will return at a later date to discuss it  Review of Systems   Constitutional: Negative for appetite change, chills, fever and unexpected weight change  HENT: Negative for ear pain and sore throat  Eyes: Negative for pain and visual disturbance  Respiratory: Negative for cough and shortness of breath  Cardiovascular: Negative for chest pain and palpitations  Gastrointestinal: Positive for abdominal pain, blood in stool and constipation  Negative for diarrhea, nausea and vomiting  Flatulence   Genitourinary: Negative for dysuria and hematuria  Musculoskeletal: Negative for arthralgias and back pain  Skin: Negative for color change and rash  Allodynia of left thigh lipoma, itching between buttocks, scab of right inner buttock   Neurological: Negative for seizures and syncope  All other systems reviewed and are negative  Current Outpatient Medications on File Prior to Visit   Medication Sig   • amLODIPine (NORVASC) 5 mg tablet Take 1 tablet (5 mg total) by mouth daily   • atorvastatin (LIPITOR) 40 mg tablet Take 40 mg by mouth daily   • busPIRone (BUSPAR) 15 mg tablet Take 1 tablet (15 mg total) by mouth daily   • clobetasol (TEMOVATE) 0 05 % external solution MIX INTO JAR OF CERAVE CREAM AND APPLY GENEROUSLY TO ALL AFFECTED AREAS ON BODY TWICE DAILY   • finasteride (PROSCAR) 5 mg tablet Take 1 tablet (5 mg total) by mouth daily   • FLUoxetine (PROzac) 40 MG capsule Take 2 capsules (80 mg total) by mouth daily   • fluticasone (Flovent HFA) 110 MCG/ACT inhaler Inhale 2 puffs 2 (two) times a day Rinse mouth after use     • folic acid (FOLVITE) 1 mg tablet Take 1 tablet (1 mg total) by mouth daily   • hydrochlorothiazide (HYDRODIURIL) 25 mg tablet Take 25 mg by mouth daily   • hydroxychloroquine (PLAQUENIL) 200 mg tablet Take 1 tablet (200 mg total) by mouth 2 (two) times a day   • hydrOXYzine HCL (ATARAX) 25 mg tablet Take 1 tablet (25 mg total) by mouth every 6 (six) hours as needed for anxiety   • Multiple Vitamin (multivitamin) tablet Take 1 tablet by mouth daily   • nadolol (CORGARD) 20 mg tablet Take 1 tablet (20 mg total) by mouth daily   • nystatin (MYCOSTATIN) powder Apply topically 2 (two) times a day   • predniSONE 2 5 mg tablet Take 5 tablets (12 5 mg total) by mouth daily for 14 days, THEN 4 tablets (10 mg total) daily for 14 days, THEN 3 tablets (7 5 mg total) daily for 14 days, THEN 2 tablets (5 mg total) daily for 14 days, THEN 1 tablet (2 5 mg total) daily for 14 days  Do not start before October 31, 2022  • thiamine 100 MG tablet Take 1 tablet (100 mg total) by mouth daily   • traZODone (DESYREL) 100 mg tablet Take 1 5 tablets (150 mg total) by mouth daily at bedtime   • triamcinolone (KENALOG) 0 1 % cream APPLY DIRECTLY TO ITCHY RED AREAS ON LEFT SIDE OF CHEST AND ARM W   (REFER TO PRESCRIPTION NOTES)  • white petrolatum-mineral oil (EUCERIN,HYDROCERIN) cream Apply topically 3 (three) times a day as needed (pruritis)   • [DISCONTINUED] alfuzosin (UROXATRAL) 10 mg 24 hr tablet    • allopurinol (ZYLOPRIM) 100 mg tablet Take 1 tablet (100 mg total) by mouth daily   • Carboxymethylcellulose Sodium (LUBRICANT EYE DROPS OP) Apply 1 drop to eye 2 (two) times a day   • naproxen (Naprosyn) 500 mg tablet Take 1 tablet (500 mg total) by mouth 2 (two) times a day as needed for mild pain       Objective     /62   Pulse 59   Temp 98 5 °F (36 9 °C) (Tympanic)   Wt 113 kg (248 lb 6 4 oz)   SpO2 93%   BMI 40 09 kg/m²     Physical Exam  Vitals reviewed  Exam conducted with a chaperone present  Constitutional:       General: He is not in acute distress  Appearance: Normal appearance  He is not ill-appearing  HENT:      Head: Normocephalic  Nose: No rhinorrhea  Eyes:      General:         Right eye: No discharge  Left eye: No discharge  Conjunctiva/sclera: Conjunctivae normal    Cardiovascular:      Rate and Rhythm: Normal rate and regular rhythm  Heart sounds: Normal heart sounds  No murmur heard  No friction rub  No gallop  Pulmonary:      Effort: Pulmonary effort is normal  No respiratory distress  Breath sounds: Normal breath sounds  No wheezing, rhonchi or rales  Abdominal:      General: Bowel sounds are normal  There is no distension  Palpations: There is no mass  Tenderness: There is abdominal tenderness (diffuse mild lower abdominal)  There is no guarding or rebound  Comments: Mildly firm throughout   Genitourinary:     Prostate: Normal       Rectum: Normal       Comments: External anal skin with 1cm scab on right  Intertriginous folds red and pruritic on examination but nontender  No external hemorrhoids or prostate nodules appreciated on NICOLE  Musculoskeletal:      Cervical back: Neck supple  Skin:     General: Skin is warm and dry  Neurological:      Mental Status: He is alert         Yo Reza MD

## 2022-11-22 NOTE — PROGRESS NOTES
[facility] - Outpatient Consultation  Stephanie Sanchez 67 y o  male MRN: [id number]  Encounter: [id number]          ASSESSMENT AND PLAN:      1  Blood per rectum      Patient presents with BRBPR over the past couple weeks  He was found to have a fungal infection by his PCP and was ordered cream and suppositories  He reports his bowel movements are soft and sometimes watery but denies any issues with constipation  He reports that it has been at least 10 years since his last colonoscopy but probably more  Unable to find results in chart review  Suspect bleeding related to hemorrhoids however would recommend colonoscopy to evaluate for other causes such as bleeding polyp, diverticular bleed or AVM  Process, risks and benefits discussed with patient, he is agreeable  - Ambulatory referral for colonoscopy  - Colonoscopy; Future    Will see patient back after procedure   ______________________________________________________________________    HPI:  Lakesha Lacy is a 19-year-old male that presents today for a consult of BRBPR  Patient states that his bowel movements are typically anywhere from a 4-7 on the BSS  He states that he does not ever have any issues with constipation  He reports that he has had some rectal bleeding recently but was seen by his PCP and found to have a yeast infection around his rectum  He was given a cream and suppositories  He reports the rectal bleeding is bright red and on the toilet paper when wiping but not mixed within the stool  He does report some lower abdominal discomfort and bloating  He reports that he had increased bloating after a cheese steak for lunch the other day  He reports taking simethicone with good relief  He reports that it has been at least 10 but more like 15 years from his last colonoscopy  He does not recall results  He denies any upper GI symptoms such as nausea, vomiting, heartburn or dysphagia        REVIEW OF SYSTEMS:    Review of Systems   HENT: Negative for trouble swallowing  Gastrointestinal: Positive for abdominal distention, abdominal pain (Lower abdominal) and anal bleeding  Negative for blood in stool, constipation, diarrhea, nausea and vomiting           Historical Information   Past Medical History:   Diagnosis Date   • Alcohol dependency ([address] )    • Anxiety    • Depression    • [place] (hard of hearing)    • Hypertension    • Kidney stones    • Prostate enlargement    • Renal disorder     kidney   • Shoulder dislocation      Past Surgical History:   Procedure Laterality Date   • CHOLECYSTECTOMY      2010   • SHOULDER SURGERY Left     for dislocation x 2, 20 years ago an the second 18 years ago     Social History   Social History     Substance and Sexual Activity   Alcohol Use Yes   • Alcohol/week: 6 0 standard drinks   • Types: 6 Shots of liquor per week    Comment: 1 pint vodka daily     Social History     Substance and Sexual Activity   Drug Use Never     Social History     Tobacco Use   Smoking Status Never   Smokeless Tobacco Never     Family History   Problem Relation Age of Onset   • Dementia Mother    • Colon cancer Mother    • Heart disease Father    • COPD Father    • Heart failure Father        Meds/Allergies       Current Outpatient Medications:   •  alfuzosin (UROXATRAL) 10 mg 24 hr tablet  •  amLODIPine (NORVASC) 5 mg tablet  •  atorvastatin (LIPITOR) 40 mg tablet  •  busPIRone (BUSPAR) 15 mg tablet  •  clobetasol (TEMOVATE) 0 05 % external solution  •  finasteride (PROSCAR) 5 mg tablet  •  FLUoxetine (PROzac) 40 MG capsule  •  fluticasone (Flovent HFA) 110 MCG/ACT inhaler  •  folic acid (FOLVITE) 1 mg tablet  •  hydrochlorothiazide (HYDRODIURIL) 25 mg tablet  •  hydrocortisone (ANUSOL-HC) 25 mg suppository  •  hydroxychloroquine (PLAQUENIL) 200 mg tablet  •  hydrOXYzine HCL (ATARAX) 25 mg tablet  •  ketoconazole (NIZORAL) 2 % cream  •  Multiple Vitamin (multivitamin) tablet  •  nadolol (CORGARD) 20 mg tablet  •  nystatin (MYCOSTATIN) powder  •  predniSONE 2 5 mg tablet  •  thiamine 100 MG tablet  •  traZODone (DESYREL) 100 mg tablet  •  triamcinolone (KENALOG) 0 1 % cream  •  white petrolatum-mineral oil (EUCERIN,HYDROCERIN) cream  •  allopurinol (ZYLOPRIM) 100 mg tablet  •  Carboxymethylcellulose Sodium (LUBRICANT EYE DROPS OP)  •  metoprolol succinate (TOPROL-XL) 25 mg 24 hr tablet  •  naproxen (Naprosyn) 500 mg tablet  •  nystatin-triamcinolone (MYCOLOG-II) cream    Allergies   Allergen Reactions   • Sulfa Antibiotics Anaphylaxis and Rash     Face Swelling   • Sulfacetamide Anaphylaxis and Rash     Face Swelling   • Misc  Sulfonamide Containing Compounds Facial Swelling   • Elemental Sulfur Rash and Edema           Objective     Pulse 67, temperature 98 4 °F (36 9 °C), temperature source Tympanic, resp  rate 18, height 5' 5" (1 651 m), weight 114 kg (250 lb 12 8 oz), SpO2 94 %  Body mass index is 41 74 kg/m²  PHYSICAL EXAM:      General Appearance:   Alert, cooperative, no distress   HEENT:   Normocephalic, atraumatic, anicteric  Neck:  Symmetrical, trachea midline   Lungs:   Clear to auscultation bilaterally; no rales, rhonchi or wheezing; respirations unlabored    Heart[de-identified]   Regular rate and rhythm; no murmur, rub, or gallop  Abdomen:   Soft, non-tender, non-distended; normal bowel sounds; no masses, no organomegaly    Genitalia:   Deferred    Rectal:   Deferred    Skin:  No jaundice, rashes, or lesions             Lab Results:   No visits with results within 1 Day(s) from this visit  Latest known visit with results is:   Office Visit on 10/02/2022   Component Date Value   • POCT SARS-CoV-2 Ag 10/02/2022 Negative    • VALID CONTROL 10/02/2022 Valid    • SARS-CoV-2 10/02/2022 Negative    • INFLUENZA A PCR 10/02/2022 Negative    • INFLUENZA B PCR 10/02/2022 Negative          Radiology Results:   No results found

## 2022-11-22 NOTE — PATIENT INSTRUCTIONS
Scheduled date of colonoscopy (as of today): 11/30/2022  Physician performing colonoscopy: Dr Krzysztof Busch MD  Location of colonoscopy: [address] [place] [address]  Bowel prep reviewed with patient:  Miralax/Dulcolax  Instructions reviewed with patient by:  Magalis Wagoner  Clearances:  N/A    Follow up appointment made

## 2022-11-23 ENCOUNTER — TELEPHONE (OUTPATIENT)
Dept: FAMILY MEDICINE CLINIC | Facility: CLINIC | Age: 72
End: 2022-11-23

## 2022-11-23 DIAGNOSIS — G62.9 POLYNEUROPATHY: Primary | ICD-10-CM

## 2022-11-23 NOTE — PROGRESS NOTES
Patient with recent EMG testing, notable for polyneuropathy and will require further PT for this condition   PCP discussed this case with PT Lea Earl and all that is required at this time is referral

## 2022-11-23 NOTE — TELEPHONE ENCOUNTER
Arleth Rivers from [facility] stopped by since he misplaced the note you left for him yesterday regarding Mr Franco Ramos   He left his number for you to reach him [phone]

## 2022-11-25 DIAGNOSIS — F39 UNSPECIFIED MOOD (AFFECTIVE) DISORDER (HCC): ICD-10-CM

## 2022-11-25 RX ORDER — TRAZODONE HYDROCHLORIDE 100 MG/1
150 TABLET ORAL
Qty: 135 TABLET | Refills: 0 | Status: SHIPPED | OUTPATIENT
Start: 2022-11-25

## 2022-11-30 ENCOUNTER — HOSPITAL ENCOUNTER (OUTPATIENT)
Dept: PERIOP | Facility: HOSPITAL | Age: 72
Setting detail: OUTPATIENT SURGERY
Discharge: HOME | End: 2022-11-30

## 2022-11-30 ENCOUNTER — ANESTHESIA (OUTPATIENT)
Dept: PERIOP | Facility: HOSPITAL | Age: 72
End: 2022-11-30

## 2022-11-30 ENCOUNTER — ANESTHESIA EVENT (OUTPATIENT)
Dept: PERIOP | Facility: HOSPITAL | Age: 72
End: 2022-11-30

## 2022-11-30 VITALS
OXYGEN SATURATION: 96 % | DIASTOLIC BLOOD PRESSURE: 65 MMHG | SYSTOLIC BLOOD PRESSURE: 118 MMHG | HEART RATE: 58 BPM | RESPIRATION RATE: 18 BRPM | TEMPERATURE: 98 F

## 2022-11-30 DIAGNOSIS — K62.5 BLOOD PER RECTUM: ICD-10-CM

## 2022-11-30 RX ORDER — PROPOFOL 10 MG/ML
INJECTION, EMULSION INTRAVENOUS AS NEEDED
Status: DISCONTINUED | OUTPATIENT
Start: 2022-11-30 | End: 2022-11-30

## 2022-11-30 RX ORDER — SODIUM CHLORIDE, SODIUM LACTATE, POTASSIUM CHLORIDE, CALCIUM CHLORIDE 600; 310; 30; 20 MG/100ML; MG/100ML; MG/100ML; MG/100ML
125 INJECTION, SOLUTION INTRAVENOUS CONTINUOUS
Status: CANCELLED | OUTPATIENT
Start: 2022-11-30

## 2022-11-30 RX ORDER — SODIUM CHLORIDE, SODIUM LACTATE, POTASSIUM CHLORIDE, CALCIUM CHLORIDE 600; 310; 30; 20 MG/100ML; MG/100ML; MG/100ML; MG/100ML
125 INJECTION, SOLUTION INTRAVENOUS CONTINUOUS
Status: DISCONTINUED | OUTPATIENT
Start: 2022-11-30 | End: 2022-12-04 | Stop reason: HOSPADM

## 2022-11-30 RX ORDER — EPHEDRINE SULFATE 50 MG/ML
INJECTION INTRAVENOUS AS NEEDED
Status: DISCONTINUED | OUTPATIENT
Start: 2022-11-30 | End: 2022-11-30

## 2022-11-30 RX ORDER — SODIUM CHLORIDE, SODIUM LACTATE, POTASSIUM CHLORIDE, CALCIUM CHLORIDE 600; 310; 30; 20 MG/100ML; MG/100ML; MG/100ML; MG/100ML
INJECTION, SOLUTION INTRAVENOUS CONTINUOUS PRN
Status: DISCONTINUED | OUTPATIENT
Start: 2022-11-30 | End: 2022-11-30

## 2022-11-30 RX ORDER — LIDOCAINE HYDROCHLORIDE 10 MG/ML
INJECTION, SOLUTION EPIDURAL; INFILTRATION; INTRACAUDAL; PERINEURAL AS NEEDED
Status: DISCONTINUED | OUTPATIENT
Start: 2022-11-30 | End: 2022-11-30

## 2022-11-30 RX ADMIN — PROPOFOL 100 MG: 10 INJECTION, EMULSION INTRAVENOUS at 11:00

## 2022-11-30 RX ADMIN — LIDOCAINE HYDROCHLORIDE 50 MG: 10 INJECTION, SOLUTION EPIDURAL; INFILTRATION; INTRACAUDAL; PERINEURAL at 11:00

## 2022-11-30 RX ADMIN — SODIUM CHLORIDE, SODIUM LACTATE, POTASSIUM CHLORIDE, AND CALCIUM CHLORIDE 125 ML/HR: 600; 310; 30; 20 INJECTION, SOLUTION INTRAVENOUS at 09:23

## 2022-11-30 RX ADMIN — PROPOFOL 50 MG: 10 INJECTION, EMULSION INTRAVENOUS at 11:09

## 2022-11-30 RX ADMIN — EPHEDRINE SULFATE 10 MG: 50 INJECTION, SOLUTION INTRAVENOUS at 11:11

## 2022-11-30 RX ADMIN — SODIUM CHLORIDE, SODIUM LACTATE, POTASSIUM CHLORIDE, AND CALCIUM CHLORIDE: .6; .31; .03; .02 INJECTION, SOLUTION INTRAVENOUS at 10:57

## 2022-11-30 RX ADMIN — PROPOFOL 50 MG: 10 INJECTION, EMULSION INTRAVENOUS at 11:05

## 2022-11-30 RX ADMIN — PROPOFOL 20 MG: 10 INJECTION, EMULSION INTRAVENOUS at 11:12

## 2022-11-30 NOTE — H&P
History and Physical - [facility]  Emma Clements 67 y o  male MRN: [id number]                  HPI: Emma Clements is a 67y o  year old male who presents for rectal bleeding  REVIEW OF SYSTEMS: Per the HPI, and otherwise unremarkable      Historical Information   Past Medical History:   Diagnosis Date   • Alcohol dependency ([address] )    • Anxiety    • Depression    • [place] (hard of hearing)    • Hypertension    • Kidney stones    • Prostate enlargement    • Renal disorder     kidney   • Shoulder dislocation      Past Surgical History:   Procedure Laterality Date   • CHOLECYSTECTOMY      2010   • COLONOSCOPY     • SHOULDER SURGERY Left     for dislocation x 2, 20 years ago an the second 18 years ago     Social History   Social History     Substance and Sexual Activity   Alcohol Use Not Currently    Comment: last drink 14 months ago     Social History     Substance and Sexual Activity   Drug Use Never     Social History     Tobacco Use   Smoking Status Never   Smokeless Tobacco Never     Family History   Problem Relation Age of Onset   • Dementia Mother    • Colon cancer Mother    • Heart disease Father    • COPD Father    • Heart failure Father        Meds/Allergies       Current Outpatient Medications:   •  alfuzosin (UROXATRAL) 10 mg 24 hr tablet  •  busPIRone (BUSPAR) 15 mg tablet  •  Carboxymethylcellulose Sodium (LUBRICANT EYE DROPS OP)  •  clobetasol (TEMOVATE) 0 05 % external solution  •  finasteride (PROSCAR) 5 mg tablet  •  hydrochlorothiazide (HYDRODIURIL) 25 mg tablet  •  hydroxychloroquine (PLAQUENIL) 200 mg tablet  •  Multiple Vitamin (multivitamin) tablet  •  nadolol (CORGARD) 20 mg tablet  •  predniSONE 2 5 mg tablet  •  traZODone (DESYREL) 100 mg tablet  •  triamcinolone (KENALOG) 0 1 % cream  •  hydrocortisone (ANUSOL-HC) 25 mg suppository  •  ketoconazole (NIZORAL) 2 % cream  •  naproxen (Naprosyn) 500 mg tablet  •  nystatin (MYCOSTATIN) powder  •  nystatin-triamcinolone (MYCOLOG-II) cream  •  white petrolatum-mineral oil (EUCERIN,HYDROCERIN) cream    Current Facility-Administered Medications:   •  lactated ringers infusion, 125 mL/hr, Intravenous, Continuous, 125 mL/hr at 11/30/22 9220    Allergies   Allergen Reactions   • Sulfa Antibiotics Anaphylaxis and Rash     Face Swelling   • Sulfacetamide Anaphylaxis and Rash     Face Swelling   • Misc  Sulfonamide Containing Compounds Facial Swelling   • Elemental Sulfur Rash and Edema       Objective     /59   Pulse 62   Temp 99 5 °F (37 5 °C) (Tympanic)   Resp 18   SpO2 95%       PHYSICAL EXAM    Gen: NAD  Head: NCAT  CV: RRR  CHEST: Clear  ABD: soft, NT/ND  EXT: no edema      ASSESSMENT/PLAN:  This is a 67y o  year old male here for colonoscopy, and he is stable and optimized for his procedure

## 2022-11-30 NOTE — ANESTHESIA POSTPROCEDURE EVALUATION
Post-Op Assessment Note    CV Status:  Stable  Pain Score: 0    Pain management: adequate     Mental Status:  Alert and awake   Hydration Status:  Euvolemic   PONV Controlled:  Controlled   Airway Patency:  Patent      Post Op Vitals Reviewed: Yes      Staff: CRNA         No notable events documented      BP   110/57   Temp      Pulse  57   Resp   18   SpO2   97

## 2022-11-30 NOTE — ANESTHESIA PREPROCEDURE EVALUATION
Procedure:  COLONOSCOPY    Relevant Problems   CARDIO   (+) Essential hypertension   (+) Mixed hyperlipidemia      GI/HEPATIC   (+) Alcohol induced fatty liver      /RENAL   (+) BPH (benign prostatic hyperplasia)      NEURO/PSYCH   (+) Depression with anxiety   (+) History of prolonged Q-T interval on ECG        Physical Exam    Airway    Mallampati score: II  TM Distance: >3 FB  Neck ROM: full     Dental   No notable dental hx     Cardiovascular      Pulmonary      Other Findings        Anesthesia Plan  ASA Score- 3     Anesthesia Type- IV sedation with anesthesia with ASA Monitors  Additional Monitors:   Airway Plan:           Plan Factors-Exercise tolerance (METS): >4 METS  Chart reviewed  EKG reviewed  Patient summary reviewed  Patient is not a current smoker  There is medical exclusion for perioperative obstructive sleep apnea risk education  Induction- intravenous  Postoperative Plan-     Informed Consent- Anesthetic plan and risks discussed with patient  I personally reviewed this patient with the CRNA  Discussed and agreed on the Anesthesia Plan with the CRNA  Aditya Simpson

## 2022-12-02 ENCOUNTER — RA CDI HCC (OUTPATIENT)
Dept: OTHER | Facility: HOSPITAL | Age: 72
End: 2022-12-02

## 2022-12-02 NOTE — PROGRESS NOTES
[facility]  coding opportunities          Chart Reviewed number of suggestions sent to Provider: 1  [phone]     Patients Insurance     Medicare Insurance: [employer]

## 2022-12-07 NOTE — RESULT ENCOUNTER NOTE
Good Morning Mr  Aimee,    You colonoscopy results showed that you did have some diverticula and small internal hemorrhoids (which are likely the source of your recent bleeding)  You may continue with routine colonoscopy screening every 10 years  However, I did want to highlight that by your next due screening you will have aged out of the recommended age ranges for this typically 39-70 years old  Please let me know if you have any other questions or concerns      Dinorah Mario

## 2022-12-09 ENCOUNTER — OFFICE VISIT (OUTPATIENT)
Dept: FAMILY MEDICINE CLINIC | Facility: CLINIC | Age: 72
End: 2022-12-09

## 2022-12-09 VITALS
TEMPERATURE: 99.5 F | HEIGHT: 65 IN | OXYGEN SATURATION: 92 % | HEART RATE: 75 BPM | SYSTOLIC BLOOD PRESSURE: 118 MMHG | DIASTOLIC BLOOD PRESSURE: 68 MMHG | BODY MASS INDEX: 41.02 KG/M2 | WEIGHT: 246.2 LBS

## 2022-12-09 DIAGNOSIS — M76.01 GLUTEAL TENDONITIS OF RIGHT BUTTOCK: ICD-10-CM

## 2022-12-09 DIAGNOSIS — L30.4 INTERTRIGO: Primary | ICD-10-CM

## 2022-12-09 RX ORDER — FLUCONAZOLE 100 MG/1
100 TABLET ORAL DAILY
Qty: 7 TABLET | Refills: 0 | Status: SHIPPED | OUTPATIENT
Start: 2022-12-09 | End: 2022-12-16 | Stop reason: DRUGHIGH

## 2022-12-09 NOTE — PROGRESS NOTES
Assessment/Plan:    No problem-specific Assessment & Plan notes found for this encounter  Diagnoses and all orders for this visit:    Deep Roche  - Patient returns for f/u of presumed gluteal intertrigo, he did experience improvement with ketoconazole cream,however, ran out and unfortunately did not request refill with PCP  - Upon examination, notable persistence of rash and pruritis will trial on oral antifungal therapy at this time  - RTO in 2 weeks to reassess symptoms at that time     - fluconazole (DIFLUCAN) 100 mg tablet; Take 1 tablet (100 mg total) by mouth daily for 7 days    Gluteal tendonitis of right buttock  -     Ambulatory Referral to Physical Therapy; Future        Subjective:      Patient ID: Mary Grace Mulligan is a 67 y o  male  Patient returns for follow up of gluteal rash consistent with intertrigo  Patient reports he used ketoconazole cream x 3-4 days then ran out, however, he did not call PCP for refill  He states that when he was using the cream he did feel the rash was improving, but began to worsen after he finished the tube  Patient also reports rash was so itchy that he decided to use triamcinolone on it to alleviate the itching, but then also noticed the rash worsened  PCP counseled patient to refrain from using any topical agents including soaps etc on the gluteal cleft at this time  Upon examination, rash has unfortunately spread since his least visit  Now encompassing about 1/2 of gluteal clefts b/l  Regarding, steroid suppository for internal hemorrhoids patient reports he has tried to insert them, however, had difficulty in doing so   Discussed with patient to see if wife or another family member would be willing to help with suppository insertion  PCP did review results of colonoscopy at today's visit which showed internal hemorrhoids and       The following portions of the patient's history were reviewed and updated as appropriate: allergies, current medications, past family history, past medical history, past social history, past surgical history and problem list     Review of Systems   Constitutional: Negative for chills and fever  Respiratory: Negative for shortness of breath  Cardiovascular: Negative for chest pain  Gastrointestinal: Negative for nausea and vomiting  Soft stools, no diarrhea   Genitourinary: Negative for dysuria  Objective:      /68   Pulse 75   Temp 99 5 °F (37 5 °C) (Tympanic)   Ht 5' 5" (1 651 m)   Wt 112 kg (246 lb 3 2 oz)   SpO2 92%   BMI 40 97 kg/m²          Physical Exam  Constitutional:       Appearance: Normal appearance  HENT:      Head: Normocephalic and atraumatic  Eyes:      Conjunctiva/sclera: Conjunctivae normal    Genitourinary:     Comments: Gluteal folds erythematous and pruritic on examination but non-tender, no purulence, or open sore noted  Right buttock tenderness to palpation, no mass or induration noted  Neurological:      Mental Status: He is alert and oriented to person, place, and time     Psychiatric:         Mood and Affect: Mood normal          Behavior: Behavior normal

## 2022-12-16 ENCOUNTER — TELEPHONE (OUTPATIENT)
Dept: FAMILY MEDICINE CLINIC | Facility: CLINIC | Age: 72
End: 2022-12-16

## 2022-12-16 DIAGNOSIS — L30.4 INTERTRIGO: Primary | ICD-10-CM

## 2022-12-16 RX ORDER — FLUCONAZOLE 200 MG/1
200 TABLET ORAL WEEKLY
Qty: 3 TABLET | Refills: 0 | Status: SHIPPED | OUTPATIENT
Start: 2022-12-16 | End: 2022-12-31

## 2022-12-16 NOTE — TELEPHONE ENCOUNTER
Pt states he has been taking the medicine for 7 days  States it has gotten better but not totally cleared up  He just wanted to let you know in case he needs another round of medications    [facility]

## 2022-12-16 NOTE — TELEPHONE ENCOUNTER
Given patient's improvement but not yet resolution of tinea infection will start an extended antifungal course at this time  Patient is to take 200mg of the diflucan weekly for the next 3 weeks  Patient was told via voice message to please still keep 12/23 appointment so that PCP can appreciate rash progression  If needed will treat for a total of 6 weeks

## 2022-12-20 ENCOUNTER — OFFICE VISIT (OUTPATIENT)
Dept: URGENT CARE | Facility: CLINIC | Age: 72
End: 2022-12-20

## 2022-12-20 VITALS
HEART RATE: 76 BPM | HEIGHT: 65 IN | WEIGHT: 243 LBS | TEMPERATURE: 97.5 F | RESPIRATION RATE: 18 BRPM | OXYGEN SATURATION: 93 % | BODY MASS INDEX: 40.48 KG/M2

## 2022-12-20 DIAGNOSIS — R68.89 FLU-LIKE SYMPTOMS: Primary | ICD-10-CM

## 2022-12-20 LAB
SARS-COV-2 AG UPPER RESP QL IA: NEGATIVE
VALID CONTROL: NORMAL

## 2022-12-20 NOTE — PROGRESS NOTES
[facility]        NAME: Tello Vasquez is a 67 y o  male  : 1950    MRN: [id number]  DATE: 2022  TIME: 10:14 AM    Assessment and Plan   Flu-like symptoms [R68 89]  1  Flu-like symptoms  Poct Covid 19 Rapid Antigen Test        Rapid covid was negative    Patient Instructions     Use a warm mist humidifier or vaporizer  Hot tea with honey  Warm saline gargle or throat lozenge may help with a sore throat  OTC saline nasal sprays   Drink plenty of fluids  Follow up with PCP in 3-5 days  Proceed to  ER if symptoms worsen  Chief Complaint     Chief Complaint   Patient presents with   • Cold Like Symptoms     X2 days: nasal tahir, headaches, chills, decreased appetite & pushing fluids, fatigue, body aches, nausea, & a few episodes of loose stools  Flu shot in September  History of Present Illness       Patient is a 72YOM presenting with headache, body aches, fatigue and intermittent nausea for the last 2 days  He has been taking Tylenol with mild relief  He denies any shortness of breath, chest pain, fever or chills  Review of Systems   Review of Systems   Constitutional: Positive for activity change, appetite change and fatigue  Negative for chills and fever  HENT: Negative for congestion and sore throat  Respiratory: Negative for cough, chest tightness, shortness of breath and wheezing  Cardiovascular: Negative for chest pain  Gastrointestinal: Positive for diarrhea and nausea  Negative for abdominal pain and vomiting  Musculoskeletal: Positive for arthralgias  All other systems reviewed and are negative          Current Medications       Current Outpatient Medications:   •  alfuzosin (UROXATRAL) 10 mg 24 hr tablet, Take 1 tablet (10 mg total) by mouth daily, Disp: 90 tablet, Rfl: 0  •  busPIRone (BUSPAR) 15 mg tablet, Take 1 tablet (15 mg total) by mouth daily, Disp: 30 tablet, Rfl: 1  •  clobetasol (TEMOVATE) 0 05 % external solution, MIX INTO JAR OF CERAVE CREAM AND APPLY GENEROUSLY TO ALL AFFECTED AREAS ON BODY TWICE DAILY, Disp: , Rfl:   •  finasteride (PROSCAR) 5 mg tablet, Take 1 tablet (5 mg total) by mouth daily, Disp: 90 tablet, Rfl: 1  •  fluconazole (DIFLUCAN) 200 mg tablet, Take 1 tablet (200 mg total) by mouth once a week for 3 doses, Disp: 3 tablet, Rfl: 0  •  hydrochlorothiazide (HYDRODIURIL) 25 mg tablet, Take 25 mg by mouth daily, Disp: , Rfl:   •  hydrocortisone (ANUSOL-HC) 25 mg suppository, Insert 1 suppository (25 mg total) into the rectum 2 (two) times a day, Disp: 12 suppository, Rfl: 0  •  hydroxychloroquine (PLAQUENIL) 200 mg tablet, Take 1 tablet (200 mg total) by mouth 2 (two) times a day, Disp: 60 tablet, Rfl: 6  •  Multiple Vitamin (multivitamin) tablet, Take 1 tablet by mouth daily, Disp: 30 tablet, Rfl: 0  •  nadolol (CORGARD) 20 mg tablet, Take 1 tablet (20 mg total) by mouth daily, Disp: 30 tablet, Rfl: 1  •  predniSONE 2 5 mg tablet, Take 5 tablets (12 5 mg total) by mouth daily for 14 days, THEN 4 tablets (10 mg total) daily for 14 days, THEN 3 tablets (7 5 mg total) daily for 14 days, THEN 2 tablets (5 mg total) daily for 14 days, THEN 1 tablet (2 5 mg total) daily for 14 days  Do not start before October 31, 2022 , Disp: 210 tablet, Rfl: 0  •  traZODone (DESYREL) 100 mg tablet, Take 1 5 tablets (150 mg total) by mouth daily at bedtime, Disp: 135 tablet, Rfl: 0  •  triamcinolone (KENALOG) 0 1 % cream, APPLY DIRECTLY TO ITCHY RED AREAS ON LEFT SIDE OF CHEST AND ARM W   (REFER TO PRESCRIPTION NOTES)  , Disp: , Rfl:   •  white petrolatum-mineral oil (EUCERIN,HYDROCERIN) cream, Apply topically 3 (three) times a day as needed (pruritis), Disp: 113 g, Rfl: 0  •  ketoconazole (NIZORAL) 2 % cream, Apply topically daily, Disp: 15 g, Rfl: 0    Current Allergies     Allergies as of 12/20/2022 - Reviewed 12/20/2022   Allergen Reaction Noted   • Sulfa antibiotics Anaphylaxis and Rash 11/07/2014   • Sulfacetamide Anaphylaxis and Rash 11/07/2014   • Misc  sulfonamide containing compounds Facial Swelling 11/22/2022   • Elemental sulfur Rash and Edema 11/04/2014            The following portions of the patient's history were reviewed and updated as appropriate: allergies, current medications, past family history, past medical history, past social history, past surgical history and problem list      Past Medical History:   Diagnosis Date   • Alcohol dependency ([address] )    • Anxiety    • Depression    • [place] (hard of hearing)    • Hypertension    • Kidney stones    • Prostate enlargement    • Renal disorder     kidney   • Shoulder dislocation        Past Surgical History:   Procedure Laterality Date   • CHOLECYSTECTOMY      2010   • COLONOSCOPY     • SHOULDER SURGERY Left     for dislocation x 2, 20 years ago an the second 25 years ago       Family History   Problem Relation Age of Onset   • Dementia Mother    • Colon cancer Mother    • Heart disease Father    • COPD Father    • Heart failure Father          Medications have been verified  Objective   Pulse 76   Temp 97 5 °F (36 4 °C) (Oral)   Resp 18   Ht 5' 5" (1 651 m)   Wt 110 kg (243 lb)   SpO2 93%   BMI 40 44 kg/m²        Physical Exam     Physical Exam  Vitals and nursing note reviewed  Constitutional:       General: He is not in acute distress  Appearance: Normal appearance  He is normal weight  He is not ill-appearing or toxic-appearing  HENT:      Right Ear: Tympanic membrane normal       Nose: No congestion  Mouth/Throat:      Pharynx: Oropharynx is clear  Cardiovascular:      Rate and Rhythm: Normal rate and regular rhythm  Pulses: Normal pulses  Heart sounds: Normal heart sounds  Pulmonary:      Effort: Pulmonary effort is normal       Breath sounds: Wheezing (few scattered wheezes) present  Musculoskeletal:         General: Normal range of motion  Skin:     General: Skin is warm and dry        Capillary Refill: Capillary refill takes less than 2 seconds  Neurological:      General: No focal deficit present  Mental Status: He is alert and oriented to person, place, and time

## 2022-12-29 ENCOUNTER — TELEPHONE (OUTPATIENT)
Dept: UROLOGY | Facility: AMBULATORY SURGERY CENTER | Age: 72
End: 2022-12-29

## 2022-12-29 ENCOUNTER — OFFICE VISIT (OUTPATIENT)
Dept: FAMILY MEDICINE CLINIC | Facility: CLINIC | Age: 72
End: 2022-12-29

## 2022-12-29 VITALS
BODY MASS INDEX: 41.12 KG/M2 | HEART RATE: 57 BPM | OXYGEN SATURATION: 98 % | TEMPERATURE: 98.9 F | HEIGHT: 65 IN | SYSTOLIC BLOOD PRESSURE: 128 MMHG | DIASTOLIC BLOOD PRESSURE: 58 MMHG | WEIGHT: 246.8 LBS

## 2022-12-29 DIAGNOSIS — N40.1 BENIGN PROSTATIC HYPERPLASIA WITH NOCTURIA: ICD-10-CM

## 2022-12-29 DIAGNOSIS — Z00.00 MEDICARE ANNUAL WELLNESS VISIT, SUBSEQUENT: Primary | ICD-10-CM

## 2022-12-29 DIAGNOSIS — E66.01 OBESITY, MORBID (HCC): ICD-10-CM

## 2022-12-29 DIAGNOSIS — Z23 ENCOUNTER FOR VACCINATION: ICD-10-CM

## 2022-12-29 DIAGNOSIS — R35.1 BENIGN PROSTATIC HYPERPLASIA WITH NOCTURIA: ICD-10-CM

## 2022-12-29 DIAGNOSIS — D69.6 THROMBOCYTOPENIA (HCC): ICD-10-CM

## 2022-12-29 PROBLEM — R56.9 SEIZURE (HCC): Status: ACTIVE | Noted: 2022-12-29

## 2022-12-29 PROBLEM — H35.3230 BILATERAL EXUDATIVE AGE-RELATED MACULAR DEGENERATION, UNSPECIFIED STAGE (HCC): Status: ACTIVE | Noted: 2022-12-29

## 2022-12-29 PROBLEM — E87.6 HYPOKALEMIA DUE TO INADEQUATE POTASSIUM INTAKE: Status: RESOLVED | Noted: 2021-10-15 | Resolved: 2022-12-29

## 2022-12-29 NOTE — PATIENT INSTRUCTIONS
Medicare Preventive Visit Patient Instructions  Thank you for completing your Welcome to Medicare Visit or Medicare Annual Wellness Visit today  Your next wellness visit will be due in one year (12/30/2023)  The screening/preventive services that you may require over the next 5-10 years are detailed below  Some tests may not apply to you based off risk factors and/or age  Screening tests ordered at today's visit but not completed yet may show as past due  Also, please note that scanned in results may not display below  Preventive Screenings:  Service Recommendations Previous Testing/Comments   Colorectal Cancer Screening  · Colonoscopy    · Fecal Occult Blood Test (FOBT)/Fecal Immunochemical Test (FIT)  · Fecal DNA/Cologuard Test  · Flexible Sigmoidoscopy Age: 39-70 years old   Colonoscopy: every 10 years (May be performed more frequently if at higher risk)  OR  FOBT/FIT: every 1 year  OR  Cologuard: every 3 years  OR  Sigmoidoscopy: every 5 years  Screening may be recommended earlier than age 39 if at higher risk for colorectal cancer  Also, an individualized decision between you and your healthcare provider will decide whether screening between the ages of 74-80 would be appropriate   Colonoscopy: 11/30/2022  FOBT/FIT: 04/20/2021  Cologuard: Not on file  Sigmoidoscopy: Not on file    Screening Current     Prostate Cancer Screening Individualized decision between patient and health care provider in men between ages of 53-78   Medicare will cover every 12 months beginning on the day after your 50th birthday PSA: No results in last 5 years           Hepatitis C Screening Once for adults born between 1945 and 1965  More frequently in patients at high risk for Hepatitis C Hep C Antibody: 09/07/2021    Screening Current   Diabetes Screening 1-2 times per year if you're at risk for diabetes or have pre-diabetes Fasting glucose: 110 mg/dL (9/27/2022)  A1C: No results in last 5 years (No results in last 5 years)  Screening Current   Cholesterol Screening Once every 5 years if you don't have a lipid disorder  May order more often based on risk factors  Lipid panel: 11/08/2021  Screening Not Indicated  History Lipid Disorder      Other Preventive Screenings Covered by Medicare:  1  Abdominal Aortic Aneurysm (AAA) Screening: covered once if your at risk  You're considered to be at risk if you have a family history of AAA or a male between the age of 73-68 who smoking at least 100 cigarettes in your lifetime  2  Lung Cancer Screening: covers low dose CT scan once per year if you meet all of the following conditions: (1) Age 50-69; (2) No signs or symptoms of lung cancer; (3) Current smoker or have quit smoking within the last 15 years; (4) You have a tobacco smoking history of at least 20 pack years (packs per day x number of years you smoked); (5) You get a written order from a healthcare provider  3  Glaucoma Screening: covered annually if you're considered high risk: (1) You have diabetes OR (2) Family history of glaucoma OR (3)  aged 48 and older OR (3)  American aged 72 and older  3  Osteoporosis Screening: covered every 2 years if you meet one of the following conditions: (1) Have a vertebral abnormality; (2) On glucocorticoid therapy for more than 3 months; (3) Have primary hyperparathyroidism; (4) On osteoporosis medications and need to assess response to drug therapy  5  HIV Screening: covered annually if you're between the age of 12-76  Also covered annually if you are younger than 13 and older than 72 with risk factors for HIV infection  For pregnant patients, it is covered up to 3 times per pregnancy      Immunizations:  Immunization Recommendations   Influenza Vaccine Annual influenza vaccination during flu season is recommended for all persons aged >= 6 months who do not have contraindications   Pneumococcal Vaccine   * Pneumococcal conjugate vaccine = PCV13 (Prevnar 13), PCV15 (Vaxneuvance), PCV20 (Prevnar 20)  * Pneumococcal polysaccharide vaccine = PPSV23 (Pneumovax) Adults 2364 years old: 1-3 doses may be recommended based on certain risk factors  Adults 72 years old: 1-2 doses may be recommended based off what pneumonia vaccine you previously received   Hepatitis B Vaccine 3 dose series if at intermediate or high risk (ex: diabetes, end stage renal disease, liver disease)   Tetanus (Td) Vaccine - COST NOT COVERED BY MEDICARE PART B Following completion of primary series, a booster dose should be given every 10 years to maintain immunity against tetanus  Td may also be given as tetanus wound prophylaxis  Tdap Vaccine - COST NOT COVERED BY MEDICARE PART B Recommended at least once for all adults  For pregnant patients, recommended with each pregnancy  Shingles Vaccine (Shingrix) - COST NOT COVERED BY MEDICARE PART B  2 shot series recommended in those aged 48 and above     Health Maintenance Due:      Topic Date Due   • Colorectal Cancer Screening  11/27/2032   • Hepatitis C Screening  Completed     Immunizations Due:      Topic Date Due   • COVID-19 Vaccine (1) Never done   • Hepatitis A Vaccine (1 of 2 - Risk 2-dose series) Never done   • Hepatitis B Vaccine (1 of 3 - Risk 3-dose series) Never done   • Pneumococcal Vaccine: 65+ Years (2 - PPSV23 if available, else PCV20) 12/04/2020   • Influenza Vaccine (1) 09/01/2022     Advance Directives   What are advance directives? Advance directives are legal documents that state your wishes and plans for medical care  These plans are made ahead of time in case you lose your ability to make decisions for yourself  Advance directives can apply to any medical decision, such as the treatments you want, and if you want to donate organs  What are the types of advance directives? There are many types of advance directives, and each state has rules about how to use them   You may choose a combination of any of the following:  · Living will: This is a written record of the treatment you want  You can also choose which treatments you do not want, which to limit, and which to stop at a certain time  This includes surgery, medicine, IV fluid, and tube feedings  · Durable power of  for healthcare [facility]): This is a written record that states who you want to make healthcare choices for you when you are unable to make them for yourself  This person, called a proxy, is usually a family member or a friend  You may choose more than 1 proxy  · Do not resuscitate (DNR) order:  A DNR order is used in case your heart stops beating or you stop breathing  It is a request not to have certain forms of treatment, such as CPR  A DNR order may be included in other types of advance directives  · Medical directive: This covers the care that you want if you are in a coma, near death, or unable to make decisions for yourself  You can list the treatments you want for each condition  Treatment may include pain medicine, surgery, blood transfusions, dialysis, IV or tube feedings, and a ventilator (breathing machine)  · Values history: This document has questions about your views, beliefs, and how you feel and think about life  This information can help others choose the care that you would choose  Why are advance directives important? An advance directive helps you control your care  Although spoken wishes may be used, it is better to have your wishes written down  Spoken wishes can be misunderstood, or not followed  Treatments may be given even if you do not want them  An advance directive may make it easier for your family to make difficult choices about your care  Weight Management   Why it is important to manage your weight:  Being overweight increases your risk of health conditions such as heart disease, high blood pressure, type 2 diabetes, and certain types of cancer   It can also increase your risk for osteoarthritis, sleep apnea, and other respiratory problems  Aim for a slow, steady weight loss  Even a small amount of weight loss can lower your risk of health problems  How to lose weight safely:  A safe and healthy way to lose weight is to eat fewer calories and get regular exercise  You can lose up about 1 pound a week by decreasing the number of calories you eat by 500 calories each day  Healthy meal plan for weight management:  A healthy meal plan includes a variety of foods, contains fewer calories, and helps you stay healthy  A healthy meal plan includes the following:  · Eat whole-grain foods more often  A healthy meal plan should contain fiber  Fiber is the part of grains, fruits, and vegetables that is not broken down by your body  Whole-grain foods are healthy and provide extra fiber in your diet  Some examples of whole-grain foods are whole-wheat breads and pastas, oatmeal, brown rice, and bulgur  · Eat a variety of vegetables every day  Include dark, leafy greens such as spinach, kale, mikel greens, and mustard greens  Eat yellow and orange vegetables such as carrots, sweet potatoes, and winter squash  · Eat a variety of fruits every day  Choose fresh or canned fruit (canned in its own juice or light syrup) instead of juice  Fruit juice has very little or no fiber  · Eat low-fat dairy foods  Drink fat-free (skim) milk or 1% milk  Eat fat-free yogurt and low-fat cottage cheese  Try low-fat cheeses such as mozzarella and other reduced-fat cheeses  · Choose meat and other protein foods that are low in fat  Choose beans or other legumes such as split peas or lentils  Choose fish, skinless poultry (chicken or turkey), or lean cuts of red meat (beef or pork)  Before you cook meat or poultry, cut off any visible fat  · Use less fat and oil  Try baking foods instead of frying them  Add less fat, such as margarine, sour cream, regular salad dressing and mayonnaise to foods  Eat fewer high-fat foods   Some examples of high-fat foods include french fries, doughnuts, ice cream, and cakes  · Eat fewer sweets  Limit foods and drinks that are high in sugar  This includes candy, cookies, regular soda, and sweetened drinks  Exercise:  Exercise at least 30 minutes per day on most days of the week  Some examples of exercise include walking, biking, dancing, and swimming  You can also fit in more physical activity by taking the stairs instead of the elevator or parking farther away from stores  Ask your healthcare provider about the best exercise plan for you  © Copyright [employer] 2018 Information is for End User's use only and may not be sold, redistributed or otherwise used for commercial purposes   All illustrations and images included in CareNotes® are the copyrighted property of [employer]  or [address] [place] [address]

## 2022-12-29 NOTE — PROGRESS NOTES
Assessment and Plan:     Problem List Items Addressed This Visit        Hematopoietic and Hemostatic    RESOLVED: Thrombocytopenia ([facility] [address] )       Genitourinary    BPH (benign prostatic hyperplasia)    Relevant Orders    Ambulatory Referral to Urology       Other    Obesity, morbid ([facility] [address] )   Other Visit Diagnoses     Medicare annual wellness visit, subsequent    -  Primary    Encounter for vaccination        Relevant Orders    Pneumococcal Conjugate Vaccine 20-valent (Pcv20)        BMI Counseling: Body mass index is 41 07 kg/m²  The BMI is above normal  Nutrition recommendations include encouraging healthy choices of fruits and vegetables  Exercise recommendations include exercising 3-5 times per week  Rationale for BMI follow-up plan is due to patient being overweight or obese  Preventive health issues were discussed with patient, and age appropriate screening tests were ordered as noted in patient's After Visit Summary  Personalized health advice and appropriate referrals for health education or preventive services given if needed, as noted in patient's After Visit Summary  History of Present Illness:     Patient presents for a Medicare Wellness Visit    Patient reports transient worsening of depression and issues with sleeping due to continued family stressors  Patient reports his mother is currently in a nursing home and this has been difficulty on the family  Patient denies any SI/HI, and does note a healthy support system with his AA group  Patient was applauded for his continued sobriety of over 1 year  Lastly, patient has been treated for anal intertrigo on oral diflucan with good response and almost complete resolution of symptoms  Patient Care Team:  Tata Bone MD as PCP - General (Family Medicine)  MD João Gifford MD     Review of Systems:     Review of Systems   Constitutional: Negative for appetite change, fatigue and unexpected weight change     HENT: Negative for hearing loss  Eyes: Negative for visual disturbance  Respiratory: Negative for chest tightness and shortness of breath  Cardiovascular: Negative for chest pain and palpitations  Gastrointestinal: Negative for blood in stool, nausea and vomiting  Endocrine: Negative for polydipsia  Genitourinary: Negative for decreased urine volume and difficulty urinating  Musculoskeletal: Positive for arthralgias  Skin: Negative for rash  Dry skin   Neurological: Negative for dizziness and headaches          Problem List:     Patient Active Problem List   Diagnosis   • Essential hypertension   • Alcohol use disorder, mild, in early remission, abuse   • BPH (benign prostatic hyperplasia)   • History of prolonged Q-T interval on ECG   • Mixed hyperlipidemia   • Alcohol induced fatty liver   • Pruritus   • Insomnia   • Depression with anxiety   • Obesity, morbid ([facility])   • PMR (polymyalgia rheumatica) ([facility])   • 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 degeneration, unspecified stage ([facility])   • Seizure ([facility])      Past Medical and Surgical History:     Past Medical History:   Diagnosis Date   • Alcohol dependency ([facility] [address] )    • Anxiety    • Arthritis    • Depression    • [place] (hard of hearing)    • Hypertension    • Kidney stones    • Prostate enlargement    • Renal disorder     kidney   • Shoulder dislocation      Past Surgical History:   Procedure Laterality Date   • CHOLECYSTECTOMY      2010   • COLONOSCOPY     • SHOULDER SURGERY Left     for dislocation x 2, 20 years ago an the second 25 years ago      Family History:     Family History   Problem Relation Age of Onset   • Dementia Mother    • Colon cancer Mother    • Heart disease Father    • COPD Father    • Heart failure Father    • Coronary artery disease Father       Social History:     Social History     Socioeconomic History   • Marital status: /Civil Union     Spouse name: None   • Number of children: None   • Years of education: None   • Highest education level: None   Occupational History   • None   Tobacco Use   • Smoking status: Never   • Smokeless tobacco: Never   Vaping Use   • Vaping Use: Never used   Substance and Sexual Activity   • Alcohol use: Yes     Alcohol/week: 6 0 standard drinks     Types: 6 Shots of liquor per week     Comment: last drink 14 months ago   • Drug use: Never   • Sexual activity: Not Currently     Partners: Female   Other Topics Concern   • None   Social History Narrative   • None     Social Determinants of Health     Financial Resource Strain: Low Risk    • Difficulty of Paying Living Expenses: Not very hard   Food Insecurity: Not on file   Transportation Needs: No Transportation Needs   • Lack of Transportation (Medical): No   • Lack of Transportation (Non-Medical):  No   Physical Activity: Not on file   Stress: Not on file   Social Connections: Not on file   Intimate Partner Violence: Not on file   Housing Stability: Not on file      Medications and Allergies:     Current Outpatient Medications   Medication Sig Dispense Refill   • alfuzosin (UROXATRAL) 10 mg 24 hr tablet Take 1 tablet (10 mg total) by mouth daily 90 tablet 0   • busPIRone (BUSPAR) 15 mg tablet Take 1 tablet (15 mg total) by mouth daily 30 tablet 1   • clobetasol (TEMOVATE) 0 05 % external solution MIX INTO JAR OF CERAVE CREAM AND APPLY GENEROUSLY TO ALL AFFECTED AREAS ON BODY TWICE DAILY     • finasteride (PROSCAR) 5 mg tablet Take 1 tablet (5 mg total) by mouth daily 90 tablet 1   • fluconazole (DIFLUCAN) 200 mg tablet Take 1 tablet (200 mg total) by mouth once a week for 3 doses 3 tablet 0   • hydrochlorothiazide (HYDRODIURIL) 25 mg tablet Take 25 mg by mouth daily     • hydrocortisone (ANUSOL-HC) 25 mg suppository Insert 1 suppository (25 mg total) into the rectum 2 (two) times a day 12 suppository 0   • hydroxychloroquine (PLAQUENIL) 200 mg tablet Take 1 tablet (200 mg total) by mouth 2 (two) times a day 60 tablet 6   • Multiple Vitamin (multivitamin) tablet Take 1 tablet by mouth daily 30 tablet 0   • nadolol (CORGARD) 20 mg tablet Take 1 tablet (20 mg total) by mouth daily 30 tablet 1   • predniSONE 2 5 mg tablet Take 5 tablets (12 5 mg total) by mouth daily for 14 days, THEN 4 tablets (10 mg total) daily for 14 days, THEN 3 tablets (7 5 mg total) daily for 14 days, THEN 2 tablets (5 mg total) daily for 14 days, THEN 1 tablet (2 5 mg total) daily for 14 days  Do not start before October 31, 2022  210 tablet 0   • traZODone (DESYREL) 100 mg tablet Take 1 5 tablets (150 mg total) by mouth daily at bedtime 135 tablet 0   • triamcinolone (KENALOG) 0 1 % cream APPLY DIRECTLY TO ITCHY RED AREAS ON LEFT SIDE OF CHEST AND ARM W   (REFER TO PRESCRIPTION NOTES)  • white petrolatum-mineral oil (EUCERIN,HYDROCERIN) cream Apply topically 3 (three) times a day as needed (pruritis) 113 g 0   • ketoconazole (NIZORAL) 2 % cream Apply topically daily 15 g 0     No current facility-administered medications for this visit  Allergies   Allergen Reactions   • Sulfa Antibiotics Anaphylaxis and Rash     Face Swelling   • Sulfacetamide Anaphylaxis and Rash     Face Swelling   • Misc   Sulfonamide Containing Compounds Facial Swelling   • Elemental Sulfur Rash and Edema      Immunizations:     Immunization History   Administered Date(s) Administered   • INFLUENZA 09/01/2022   • Influenza, high dose seasonal 0 7 mL 09/30/2021   • Pneumococcal Conjugate 13-Valent 12/04/2019   • Tdap 09/19/2020      Health Maintenance:         Topic Date Due   • Colorectal Cancer Screening  11/27/2032   • Hepatitis C Screening  Completed         Topic Date Due   • COVID-19 Vaccine (1) Never done   • Hepatitis A Vaccine (1 of 2 - Risk 2-dose series) Never done   • Hepatitis B Vaccine (1 of 3 - Risk 3-dose series) Never done   • Pneumococcal Vaccine: 65+ Years (2 - PPSV23 if available, else PCV20) 12/04/2020      Medicare Screening Tests and Risk Assessments:     Vinicio Arriaga is here for his Subsequent Wellness visit  Last Medicare Wellness visit information reviewed, patient interviewed and updates made to the record today  Health Risk Assessment:   Patient rates overall health as fair  Patient feels that their physical health rating is same  Patient is dissatisfied with their life  Eyesight was rated as slightly worse  Hearing was rated as same  Patient feels that their emotional and mental health rating is slightly worse  Patients states they are sometimes angry  Patient states they are often unusually tired/fatigued  Pain experienced in the last 7 days has been some  Patient's pain rating has been 6/10  Patient states that he has experienced weight loss or gain in last 6 months  Depression Screening:   PHQ-9 Score: 9      Fall Risk Screening: In the past year, patient has experienced: no history of falling in past year      Home Safety:  Patient has trouble with stairs inside or outside of their home  Patient has working smoke alarms and has working carbon monoxide detector  Home safety hazards include: none  Nutrition:   Current diet is Regular  Medications:   Patient is not currently taking any over-the-counter supplements  Patient is able to manage medications  Activities of Daily Living (ADLs)/Instrumental Activities of Daily Living (IADLs):   Walk and transfer into and out of bed and chair?: Yes  Dress and groom yourself?: Yes    Bathe or shower yourself?: Yes    Feed yourself? Yes  Do your laundry/housekeeping?: Yes  Manage your money, pay your bills and track your expenses?: Yes  Make your own meals?: Yes    Do your own shopping?: Yes    Previous Hospitalizations:   Any hospitalizations or ED visits within the last 12 months?: No      Advance Care Planning:   Living will: Yes    Durable POA for healthcare:  Yes    Advanced directive: No    Advanced directive counseling given: Yes      Comments: Victor Hugo Puga (patient's daughter) is his designated healthcare proxy    PREVENTIVE SCREENINGS      Cardiovascular Screening:    General: Screening Not Indicated, History Lipid Disorder and Risks and Benefits Discussed      Diabetes Screening:     General: Screening Current and Risks and Benefits Discussed      Colorectal Cancer Screening:     General: Screening Current and Risks and Benefits Discussed      Abdominal Aortic Aneurysm (AAA) Screening:    Risk factors include: age between 73-67 yo        General: Screening Not Indicated and Risks and Benefits Discussed      Lung Cancer Screening:     General: Screening Not Indicated      Hepatitis C Screening:    General: Screening Current and Risks and Benefits Discussed    Screening, Brief Intervention, and Referral to Treatment (SBIRT)    Screening  Typical number of drinks in a day: 0  Typical number of drinks in a week: 0  Interpretation: Low risk drinking behavior  AUDIT-C Screenin) How often did you have a drink containing alcohol in the past year? never  2) How many drinks did you have on a typical day when you were drinking in the past year? 0  3) How often did you have 6 or more drinks on one occasion in the past year? never    AUDIT-C Score: 0  Interpretation: Score 0-3 (male): Negative screen for alcohol misuse    Single Item Drug Screening:  How often have you used an illegal drug (including marijuana) or a prescription medication for non-medical reasons in the past year? never    Single Item Drug Screen Score: 0  Interpretation: Negative screen for possible drug use disorder    No results found  Physical Exam:     /58   Pulse 57   Temp 98 9 °F (37 2 °C) (Tympanic)   Ht 5' 5" (1 651 m)   Wt 112 kg (246 lb 12 8 oz)   SpO2 98%   BMI 41 07 kg/m²     Physical Exam  Vitals reviewed  Constitutional:       Appearance: Normal appearance  HENT:      Head: Normocephalic and atraumatic  Right Ear: Tympanic membrane, ear canal and external ear normal       Left Ear: External ear normal  There is impacted cerumen  Nose: Nose normal       Mouth/Throat:      Mouth: Mucous membranes are moist       Pharynx: Oropharynx is clear  Eyes:      Conjunctiva/sclera: Conjunctivae normal    Cardiovascular:      Rate and Rhythm: Normal rate and regular rhythm  Pulses: Normal pulses  Pulmonary:      Effort: Pulmonary effort is normal       Breath sounds: Normal breath sounds  Abdominal:      General: Bowel sounds are normal       Palpations: Abdomen is soft  Tenderness: There is no abdominal tenderness  Musculoskeletal:         General: Normal range of motion  Cervical back: Neck supple  Skin:     General: Skin is warm and dry  Neurological:      Mental Status: He is alert  Mental status is at baseline     Psychiatric:         Mood and Affect: Mood normal           Pete Cho MD

## 2022-12-29 NOTE — TELEPHONE ENCOUNTER
Please Triage  New Patient    What is the reason for the patient’s appointment? Referral N40 1, R35 1 (ICD-10-CM) - Benign prostatic hyperplasia with nocturiaff    What office location does the patient prefer? [place]    Imaging/Lab Results:    Do we accept the patient's insurance or is the patient Self-Pay? Insurance Provider: medicare   Plan Type/Number:  Member ID#: Has the patient had any previous Urologist(s)? Yes several years ago not sure how logn     Have patient records been requested? If not are records showing in Epic:     Has the patient had any outside testing done? Does the patient have a personal history of cancer?  no

## 2023-01-12 ENCOUNTER — OFFICE VISIT (OUTPATIENT)
Dept: GASTROENTEROLOGY | Facility: CLINIC | Age: 73
End: 2023-01-12

## 2023-01-12 VITALS
BODY MASS INDEX: 41.82 KG/M2 | HEART RATE: 90 BPM | SYSTOLIC BLOOD PRESSURE: 140 MMHG | RESPIRATION RATE: 16 BRPM | OXYGEN SATURATION: 96 % | WEIGHT: 251 LBS | DIASTOLIC BLOOD PRESSURE: 70 MMHG | TEMPERATURE: 97.7 F | HEIGHT: 65 IN

## 2023-01-12 DIAGNOSIS — R19.7 INTERMITTENT DIARRHEA: ICD-10-CM

## 2023-01-12 DIAGNOSIS — R10.84 GENERALIZED ABDOMINAL PAIN: Primary | ICD-10-CM

## 2023-01-12 RX ORDER — DICYCLOMINE HYDROCHLORIDE 10 MG/1
10 CAPSULE ORAL 4 TIMES DAILY PRN
Qty: 30 CAPSULE | Refills: 2 | Status: SHIPPED | OUTPATIENT
Start: 2023-01-12

## 2023-01-12 NOTE — PROGRESS NOTES
[facility] - Outpatient Follow-up Note  Jarett Garrido 67 y o  male MRN: [id number]  Encounter: [id number]          ASSESSMENT AND PLAN:      1  Generalized abdominal pain  2  Intermittent diarrhea    Patient recently underwent a colonoscopy for rectal bleeding that revealed multiple diverticula and small internal hemorrhoids but otherwise normal   He was recommended for repeat in 10 years  He denies any further rectal bleeding or bloody stools  He states that he is taking Metamucil most days with fairly regular bowel movements  He does report occasional bouts of significant abdominal cramping resulting in liquid stools with fecal urgency  Discussed the possibility of irritable bowel syndrome  Patient states that he does not know of any foods that he eats differently during these occasions  He does however report high stress  Discussed benefits of stress management related to irritable bowel syndrome  Also discussed the use of Bentyl as needed for abdominal cramping  Patient is agreeable to a trial of this  - dicyclomine (BENTYL) 10 mg capsule; Take 1 capsule (10 mg total) by mouth 4 (four) times a day as needed (abdominal pain)  Dispense: 30 capsule; Refill: 2    We will see patient back in 3 months or sooner if needed  ______________________________________________________________________    SUBJECTIVE:  Gail Richardson is a 70-year-old male that presents today for a follow-up after a colonoscopy for rectal bleeding  His colonoscopy revealed a normal-appearing cecum, ascending colon, hepatic flexure, transverse colon and splenic flexure with multiple diverticula in the descending colon and small internal hemorrhoids  He was recommended for repeat in 10 years  He denies any further rectal bleeding or bloody stools    He states that he has been taking Metamucil most days with fairly regular bowel movements but will occasionally have bouts of significant abdominal cramping and watery stools  He states that he does not know of anything that he eats differently during these episodes  He denies any nausea or vomiting  REVIEW OF SYSTEMS:  Review of Systems   Constitutional: Negative for fever  Gastrointestinal: Positive for abdominal pain and diarrhea  Negative for abdominal distention, anal bleeding, blood in stool, constipation, nausea and vomiting  Genitourinary: Negative for dysuria, frequency and hematuria  Musculoskeletal: Positive for arthralgias and myalgias  Neurological: Negative for headaches           Historical Information   Past Medical History:   Diagnosis Date   • Alcohol dependency ([facility] [address] )    • Anxiety    • Arthritis    • Depression    • [place] (hard of hearing)    • Hypertension    • Kidney stones    • Prostate enlargement    • Renal disorder     kidney   • Shoulder dislocation      Past Surgical History:   Procedure Laterality Date   • CHOLECYSTECTOMY      2010   • COLONOSCOPY     • SHOULDER SURGERY Left     for dislocation x 2, 20 years ago an the second 18 years ago     Social History   Social History     Substance and Sexual Activity   Alcohol Use Yes   • Alcohol/week: 6 0 standard drinks   • Types: 6 Shots of liquor per week    Comment: last drink 14 months ago     Social History     Substance and Sexual Activity   Drug Use Never     Social History     Tobacco Use   Smoking Status Never   Smokeless Tobacco Never     Family History   Problem Relation Age of Onset   • Dementia Mother    • Colon cancer Mother    • Heart disease Father    • COPD Father    • Heart failure Father    • Coronary artery disease Father        Meds/Allergies       Current Outpatient Medications:   •  alfuzosin (UROXATRAL) 10 mg 24 hr tablet  •  busPIRone (BUSPAR) 15 mg tablet  •  clobetasol (TEMOVATE) 0 05 % external solution  •  dicyclomine (BENTYL) 10 mg capsule  •  finasteride (PROSCAR) 5 mg tablet  •  hydrochlorothiazide (HYDRODIURIL) 25 mg tablet  •  hydrocortisone (ANUSOL-HC) 25 mg suppository  •  hydroxychloroquine (PLAQUENIL) 200 mg tablet  •  ketoconazole (NIZORAL) 2 % cream  •  Multiple Vitamin (multivitamin) tablet  •  nadolol (CORGARD) 20 mg tablet  •  traZODone (DESYREL) 100 mg tablet  •  triamcinolone (KENALOG) 0 1 % cream  •  white petrolatum-mineral oil (EUCERIN,HYDROCERIN) cream    Allergies   Allergen Reactions   • Sulfa Antibiotics Anaphylaxis and Rash     Face Swelling   • Sulfacetamide Anaphylaxis and Rash     Face Swelling   • Misc  Sulfonamide Containing Compounds Facial Swelling   • Elemental Sulfur Rash and Edema           Objective     Blood pressure 140/70, pulse 90, temperature 97 7 °F (36 5 °C), temperature source Tympanic, resp  rate 16, height 5' 5" (1 651 m), weight 114 kg (251 lb), SpO2 96 %  Body mass index is 41 77 kg/m²  PHYSICAL EXAM:      General Appearance:   Alert, cooperative, no distress   HEENT:   Normocephalic, atraumatic, anicteric  Neck:  Supple, symmetrical, trachea midline   Lungs:   Clear to auscultation bilaterally; no rales, rhonchi or wheezing; respirations unlabored    Heart[de-identified]   Regular rate and rhythm; no murmur, rub, or gallop  Abdomen:   Soft, non-tender, non-distended; normal bowel sounds; no masses, no organomegaly    Genitalia:   Deferred    Rectal:   Deferred    Extremities:  No cyanosis, clubbing or edema    Skin:  No jaundice, rashes, or lesions             Lab Results:   No visits with results within 1 Day(s) from this visit  Latest known visit with results is:   Office Visit on 12/20/2022   Component Date Value   • POCT SARS-CoV-2 Ag 12/20/2022 Negative    • VALID CONTROL 12/20/2022 Valid          Radiology Results:   No results found

## 2023-01-19 ENCOUNTER — OFFICE VISIT (OUTPATIENT)
Dept: UROLOGY | Facility: CLINIC | Age: 73
End: 2023-01-19

## 2023-01-19 VITALS
BODY MASS INDEX: 41.09 KG/M2 | WEIGHT: 246.6 LBS | HEART RATE: 73 BPM | HEIGHT: 65 IN | SYSTOLIC BLOOD PRESSURE: 124 MMHG | DIASTOLIC BLOOD PRESSURE: 64 MMHG

## 2023-01-19 DIAGNOSIS — R35.1 BENIGN PROSTATIC HYPERPLASIA WITH NOCTURIA: Primary | ICD-10-CM

## 2023-01-19 DIAGNOSIS — N40.1 BENIGN PROSTATIC HYPERPLASIA WITH NOCTURIA: Primary | ICD-10-CM

## 2023-01-19 DIAGNOSIS — R97.20 ELEVATED PSA: ICD-10-CM

## 2023-01-19 LAB

## 2023-01-19 NOTE — PROGRESS NOTES
1/19/2023    Chief Complaint   Patient presents with   • BPH with nocturia       Assessment and Plan    67 y o  male new patient to office    1  BPH with Nocturia  · Long standing history of BPH and has been managed on alfuzosin 10 mg and finasteride 5 mg daily for several years  More recently has been experiencing worsening nocturia upwards of 3-4 times per night  He does take HCTZ and admits to drinking upwards of a gallon of water per day and throughout the night  · I discussed recommendations to reduce his overall water intake and to avoid drinking anything 2 hours prior to bedtime  If he were to experience no change in his nocturia by reducing his water intake I would then recommend we set him up for a cystoscopy for further evaluation and discussion for possible surgery  · Follow-up 3 months with Renal US prior to visit     2  Elevated PSA  · Last PSA 2 57/5 0 corrected for finasteride (11/08/2021)  · Denies any family history of prostate cancer  · NICOLE today benign  · Recommend recheck PSA and will reevaluate in 3 months  History of Present Illness  Arslan Wooten is a 67 y o  male new patient to office with PMHx significant for HTN, BPH, HLD, Depression, Alcohol abuse (former), Polymyalgia rheumatica, Obesity, and Seizures  Here for evaluation of nocturia  He reports prior Urologic care over 5 years ago  He has a history BPH and has been managed on alfuzosin 10 mg and finasteride 5 mg daily for several years  He reports his nocturia has worsened over the past 3-4 months and reports having to get up 3-4 times per night  He does report being started on a water pill about 6 months ago and does drink a lot of water  In addition he does report a history of Kidney stones in the past and was told to avoid Ice tea before, he does drink about 1 glass of ice tea per day  He drinks about 1 gallon of water per day  Denies alcohol, soda, or coffee intake         Prostate cancer screening: Negative family history of prostate cancer  Last PSA available to review through [facility] was 2 57/5 0 when corrected for finasteride on 11/08/2021  He is unsure what his PSA has been in the past        Review of Systems   Constitutional: Negative for chills and fever  HENT: Negative for congestion and sore throat  Respiratory: Negative for cough and shortness of breath  Cardiovascular: Negative for chest pain and leg swelling  Gastrointestinal: Negative for abdominal pain, constipation, diarrhea, nausea and vomiting  Genitourinary: Positive for frequency  Negative for difficulty urinating, dysuria, hematuria and urgency  Nocturia   Musculoskeletal: Negative for back pain and gait problem  Skin: Negative for wound  Allergic/Immunologic: Negative for immunocompromised state  Neurological: Negative for dizziness, weakness and numbness  Hematological: Does not bruise/bleed easily             AUA SYMPTOM SCORE    Flowsheet Row Most Recent Value   AUA SYMPTOM SCORE    How often have you had a sensation of not emptying your bladder completely after you finished urinating? 2 (P)     How often have you had to urinate again less than two hours after you finished urinating? 1 (P)     How often have you found you stopped and started again several times when you urinate? 1 (P)     How often have you found it difficult to postpone urination? 2 (P)     How often have you had a weak urinary stream? 0 (P)     How often have you had to push or strain to begin urination? 0 (P)     How many times did you most typically get up to urinate from the time you went to bed at night until the time you got up in the morning? 4 (P)     Quality of Life: If you were to spend the rest of your life with your urinary condition just the way it is now, how would you feel about that? 3 (P)     AUA SYMPTOM SCORE 10 (P)           Vitals  Vitals:    01/19/23 0950   BP: 124/64   Pulse: 73   Weight: 112 kg (246 lb 9 6 oz)   Height: 5' 5" (1 651 m)       Physical Exam  Vitals reviewed  Constitutional:       General: He is not in acute distress  Appearance: Normal appearance  He is not ill-appearing or toxic-appearing  HENT:      Head: Normocephalic and atraumatic  Eyes:      General: No scleral icterus  Conjunctiva/sclera: Conjunctivae normal    Cardiovascular:      Rate and Rhythm: Normal rate  Pulmonary:      Effort: Pulmonary effort is normal  No respiratory distress  Abdominal:      Tenderness: There is no right CVA tenderness or left CVA tenderness  Hernia: No hernia is present  Genitourinary:     Comments: Rectal exam reveals normal tone, no masses and his prostate is smooth, symmetric, and benign  No nodules  Musculoskeletal:      Cervical back: Normal range of motion  Right lower leg: No edema  Left lower leg: No edema  Skin:     General: Skin is warm and dry  Coloration: Skin is not jaundiced or pale  Neurological:      General: No focal deficit present  Mental Status: He is alert and oriented to person, place, and time  Mental status is at baseline  Gait: Gait normal    Psychiatric:         Mood and Affect: Mood normal          Behavior: Behavior normal          Thought Content:  Thought content normal          Judgment: Judgment normal          Past History  Past Medical History:   Diagnosis Date   • Alcohol dependency ([facility] [address] )    • Anxiety    • Arthritis    • Depression    • Tribal (hard of hearing)    • Hypertension    • Kidney stones    • Prostate enlargement    • Renal disorder     kidney   • Shoulder dislocation      Social History     Socioeconomic History   • Marital status: /Civil Union     Spouse name: None   • Number of children: None   • Years of education: None   • Highest education level: None   Occupational History   • None   Tobacco Use   • Smoking status: Never   • Smokeless tobacco: Never   Vaping Use   • Vaping Use: Never used   Substance and Sexual Activity   • Alcohol use: Yes     Alcohol/week: 6 0 standard drinks     Types: 6 Shots of liquor per week     Comment: last drink 14 months ago   • Drug use: Never   • Sexual activity: Not Currently     Partners: Female   Other Topics Concern   • None   Social History Narrative   • None     Social Determinants of Health     Financial Resource Strain: Low Risk    • Difficulty of Paying Living Expenses: Not very hard   Food Insecurity: Not on file   Transportation Needs: No Transportation Needs   • Lack of Transportation (Medical): No   • Lack of Transportation (Non-Medical):  No   Physical Activity: Not on file   Stress: Not on file   Social Connections: Not on file   Intimate Partner Violence: Not on file   Housing Stability: Not on file     Social History     Tobacco Use   Smoking Status Never   Smokeless Tobacco Never     Family History   Problem Relation Age of Onset   • Dementia Mother    • Colon cancer Mother    • Heart disease Father    • COPD Father    • Heart failure Father    • Coronary artery disease Father        The following portions of the patient's history were reviewed and updated as appropriate allergies, current medications, past medical history, past social history, past surgical history and problem list    Imaging:    Results  Recent Results (from the past 1 hour(s))   POCT urine dip    Collection Time: 01/19/23  9:57 AM   Result Value Ref Range    LEUKOCYTE ESTERASE,UA -     NITRITE,UA -     SL AMB POCT UROBILINOGEN 0 2     POCT URINE PROTEIN -      PH,UA 5 0     BLOOD,UA -     SPECIFIC GRAVITY,UA 1 020     KETONES,UA -     BILIRUBIN,UA -     GLUCOSE, UA -      COLOR,UA yellow     CLARITY,UA clear    POCT Measure PVR    Collection Time: 01/19/23  9:58 AM   Result Value Ref Range    POST-VOID RESIDUAL VOLUME, ML POC 22 mL   ]  No results found for: PSA  Lab Results   Component Value Date    GLUCOSE 119 12/29/2014    CALCIUM 8 9 09/27/2022     12/29/2014    K 4 2 09/27/2022    CO2 29 09/27/2022     09/27/2022    BUN 26 (H) 09/27/2022    CREATININE 0 92 09/27/2022     Lab Results   Component Value Date    WBC 10 07 09/27/2022    HGB 14 4 09/27/2022    HCT 44 4 09/27/2022    MCV 93 09/27/2022     09/27/2022       Please Note:  Voice dictation software has been used to create this document  There may be inadvertent transcriptions errors       Bhavana Ngo

## 2023-01-27 ENCOUNTER — TELEPHONE (OUTPATIENT)
Dept: OBGYN CLINIC | Facility: HOSPITAL | Age: 73
End: 2023-01-27

## 2023-01-27 ENCOUNTER — OFFICE VISIT (OUTPATIENT)
Dept: FAMILY MEDICINE CLINIC | Facility: CLINIC | Age: 73
End: 2023-01-27

## 2023-01-27 VITALS
HEIGHT: 65 IN | WEIGHT: 244.4 LBS | SYSTOLIC BLOOD PRESSURE: 126 MMHG | HEART RATE: 62 BPM | DIASTOLIC BLOOD PRESSURE: 62 MMHG | TEMPERATURE: 96.4 F | BODY MASS INDEX: 40.72 KG/M2 | OXYGEN SATURATION: 96 %

## 2023-01-27 DIAGNOSIS — L73.8 BACTERIAL FOLLICULITIS: Primary | ICD-10-CM

## 2023-01-27 RX ORDER — CLINDAMYCIN HYDROCHLORIDE 300 MG/1
300 CAPSULE ORAL 4 TIMES DAILY
Qty: 28 CAPSULE | Refills: 0 | Status: SHIPPED | OUTPATIENT
Start: 2023-01-27 | End: 2023-02-03

## 2023-01-27 NOTE — TELEPHONE ENCOUNTER
Caller: Patient    Doctor: Jose David Scott    Reason for call: Pain has increased since d/c prednisone  His PCP recommended that he call for recommendations?  [facility]    Call back#: [phone]

## 2023-01-27 NOTE — PROGRESS NOTES
Assessment/Plan:    No problem-specific Assessment & Plan notes found for this encounter  Diagnoses and all orders for this visit:    Bacterial folliculitis  -Patient presents with rash consistent with gradually spreading bacterial folliculitis, denies any fevers chills nausea vomiting  -We will trial on 7-day course of clindamycin, RTO in 1 week to assess rash progression      - benzoyl peroxide (BREVOXYL) 4 % gel; Apply topically daily at bedtime  -     clindamycin (CLEOCIN) 300 MG capsule; Take 1 capsule (300 mg total) by mouth 4 (four) times a day for 7 days        Subjective:      Patient ID: Estefania Hanna is a 68 y o  male  Patient presents for chief complaint of worsening rash since 1/23/2023  Patient states that the rash started on his lower extremity and they were small red pustules  Patient denies any bleeding, purulence or induration at any of the follicular sites  Patient denies any fevers, chills, nausea or vomiting  Patient denies any changes in soaps, lotions or detergents that he can recall  However, patient does report that during physical therapy they do use warm moist towels to help with pain during treatment  Patient states that the towels are only used in his lower extremities      The following portions of the patient's history were reviewed and updated as appropriate: allergies, current medications, past family history, past medical history, past social history, past surgical history and problem list     Review of Systems   Constitutional: Negative for chills and fever  Gastrointestinal: Negative for nausea and vomiting  Skin: Positive for rash  Objective:      /62   Pulse 62   Temp (!) 96 4 °F (35 8 °C)   Ht 5' 5" (1 651 m)   Wt 111 kg (244 lb 6 4 oz)   SpO2 96%   BMI 40 67 kg/m²          Physical Exam  Constitutional:       Appearance: Normal appearance     HENT:      Right Ear: External ear normal       Left Ear: External ear normal    Eyes: Conjunctiva/sclera: Conjunctivae normal    Skin:     Comments: Generalized erythematous pustules noted in the lower extremities laterally  Small red pustules noted on the dorsal aspect of upper extremities bilaterally   Neurological:      Mental Status: He is alert and oriented to person, place, and time     Psychiatric:         Mood and Affect: Mood normal          Behavior: Behavior normal

## 2023-01-27 NOTE — TELEPHONE ENCOUNTER
Please ask him to get the blood work drawn in his chart for ESR and CRP and we will see if he needs to be restarted on prednisone

## 2023-01-27 NOTE — TELEPHONE ENCOUNTER
The scripts are already in his chart from October - ESR and CRP  He can go to any [facility] lab  Prednisone will be prescribed if needed after reviewing the labs

## 2023-01-27 NOTE — TELEPHONE ENCOUNTER
Caller: Patient    Doctor: Mariely Lorenzana    Reason for call: Patient inquiring about his prednisone and was not aware of the blood work he needed to do  He is asking for the scripts for the lab so that way he can get that done at [facility]  Please make patient aware when the scripts are in his chart      Call back#: [phone]

## 2023-01-27 NOTE — PATIENT INSTRUCTIONS
Folliculitis   WHAT YOU NEED TO KNOW:   Folliculitis is inflammation of your hair follicles  A hair follicle is a sac under your skin  Your hair grows out of the follicle  Folliculitis is caused by bacteria or fungus, most commonly a germ called Staph  Folliculitis can occur anywhere you have hair  DISCHARGE INSTRUCTIONS:   Medicines:   Antibiotics: This medicine is given to fight or prevent an infection caused by bacteria  It may be given as an ointment that you apply to your skin or as a pill  Always take your antibiotics exactly as ordered by your healthcare provider  Never save antibiotics or take leftover antibiotics that were given to you for another illness  Antifungal medicine: This medicine helps kill fungus that may be causing your folliculitis  It may be given as an cream that you apply to your skin or as a pill  NSAIDs , such as ibuprofen, help decrease swelling, pain, and fever  This medicine is available with or without a doctor's order  NSAIDs can cause stomach bleeding or kidney problems in certain people  If you take blood thinner medicine, always ask if NSAIDs are safe for you  Always read the medicine label and follow directions  Do not give these medicines to children under 10months of age without direction from your child's healthcare provider  Antihistamines: This medicine may be given to help decrease itching  Take your medicine as directed  Contact your healthcare provider if you think your medicine is not helping or if you have side effects  Tell him of her if you are allergic to any medicine  Keep a list of the medicines, vitamins, and herbs you take  Include the amounts, and when and why you take them  Bring the list or the pill bottles to follow-up visits  Carry your medicine list with you in case of an emergency  Follow up with your healthcare provider or dermatologist as directed:  Write down your questions so you remember to ask them during your visits    Manage folliculitis:   Use warm compresses:  Wet a washcloth with warm water and apply it to the infected skin area to help decrease pain and swelling  Warm compresses may also help drain pus and improve healing  Clean the area:  Use antibacterial soap to wash the affected area  Change your washcloths and towels every day  Avoid shaving the area: If possible, do not shave areas that have folliculitis  If you must shave, use an electric razor or new blade every time you shave  Prevent folliculitis:   Do not share personal items:  Do not share towels, soap, or any personal items with other people  Do not wear tight clothing:  Do not wear tight-fitting clothes that rub against and irritate your skin  Treat skin injuries right away:  Treat injuries such as cuts and scrapes right away  Wash them with warm, soapy water, and cover the area to prevent infection  Contact your healthcare provider or dermatologist if:  You have a fever  You have foul-smelling pus coming from the bumps on your skin  Your rash is spreading  You have questions or concerns about your condition or care  Return to the emergency department if:  You develop large areas of red, warm, tender skin around the folliculitis  You develop boils  © Copyright [employer] 2022 Information is for End User's use only and may not be sold, redistributed or otherwise used for commercial purposes  All illustrations and images included in CareNotes® are the copyrighted property of [employer]  or [employer]  The above information is an  only  It is not intended as medical advice for individual conditions or treatments  Talk to your doctor, nurse or pharmacist before following any medical regimen to see if it is safe and effective for you

## 2023-01-30 ENCOUNTER — TELEPHONE (OUTPATIENT)
Dept: RHEUMATOLOGY | Facility: CLINIC | Age: 73
End: 2023-01-30

## 2023-01-30 NOTE — TELEPHONE ENCOUNTER
Patient called and asked to complete his labs so his inflammation markers could be looked at to determine if he needs the prednisone

## 2023-01-31 ENCOUNTER — APPOINTMENT (OUTPATIENT)
Dept: LAB | Facility: CLINIC | Age: 73
End: 2023-01-31

## 2023-01-31 LAB
CRP SERPL QL: 7.7 MG/L
ERYTHROCYTE [SEDIMENTATION RATE] IN BLOOD: 10 MM/HOUR (ref 0–19)

## 2023-02-01 ENCOUNTER — TELEPHONE (OUTPATIENT)
Dept: FAMILY MEDICINE CLINIC | Facility: CLINIC | Age: 73
End: 2023-02-01

## 2023-02-01 ENCOUNTER — TELEPHONE (OUTPATIENT)
Dept: OBGYN CLINIC | Facility: HOSPITAL | Age: 73
End: 2023-02-01

## 2023-02-01 NOTE — TELEPHONE ENCOUNTER
Meghan Shrestha called and asked if he should keep his appointment for tomorrow   He said he went to his Dermatologist    He said he has psoriasics  I stated Doctor is out off office   If it wasn't an incontinence for him to still keep appointment so he can discuss with Doctor  He said he will keep the appointment

## 2023-02-01 NOTE — TELEPHONE ENCOUNTER
Caller: Dr Mercy Gaspar    Doctor: Natalie    Reason for call: Patient is being weaned off of prednisone  Since then the Psoriasis has gotten worse  Dr Flor Russ would like to discuss patients condition  Would he be a candidate for Methotrexate?     Call back#: [phone] cell

## 2023-02-02 NOTE — TELEPHONE ENCOUNTER
spoke with patient  Dr felt since he saw specialist, February 2 follow up appointment with our office was no longer needed  Appointment was cancelled

## 2023-02-02 NOTE — TELEPHONE ENCOUNTER
Caller: Patient    Doctor: Pt of Dr Carmelo Ordaz    Reason for call: Patient had bloodwork done  CRP was elevated  Saw Dermatology  They were going to contact us to discuss medications for the patient      Call back#: [phone]

## 2023-02-03 NOTE — TELEPHONE ENCOUNTER
Caller: Jonnathan Clifton    Doctor: Sara Smith    Reason for call: patient called, he is confused on what to do next  Please call to discuss lab work      Call back#: [phone]

## 2023-02-03 NOTE — TELEPHONE ENCOUNTER
Patient has swelling and pain in his knees and legs  States he has been taking tylenol for the pain but that it has not helped at all  Appointment made for 2/10  Patient would like to know if anything can be prescribed for his pain to get him to his appointment  Would appreciate being reached out to on MyChart if possible with any medication updates

## 2023-02-03 NOTE — TELEPHONE ENCOUNTER
Please contact patient and let him know of plan, can also consider a follow up appointment with you to discuss the plan

## 2023-02-03 NOTE — TELEPHONE ENCOUNTER
Caller: Patient    Doctor/Office: Wilfrido    Call regarding :  Message left earlier  Calling to find out how he should proceed       Call was transferred to: Rheumatology MA

## 2023-02-06 DIAGNOSIS — M19.90 INFLAMMATORY ARTHRITIS: Primary | ICD-10-CM

## 2023-02-06 RX ORDER — IBUPROFEN 600 MG/1
600 TABLET ORAL EVERY 6 HOURS PRN
Status: DISCONTINUED | OUTPATIENT
Start: 2023-02-06 | End: 2023-02-06

## 2023-02-06 RX ORDER — IBUPROFEN 200 MG
600 TABLET ORAL 3 TIMES DAILY
Qty: 126 TABLET | Refills: 0 | Status: SHIPPED | OUTPATIENT
Start: 2023-02-06 | End: 2023-02-20

## 2023-02-06 NOTE — TELEPHONE ENCOUNTER
Caller: Patient    Doctor: Kristin Rdz    Reason for call: Patient at [facility]  They haven't received script  Advised patient 24-48 hours to process   He wants to confirm it was sent    Call back#: [phone]

## 2023-02-15 ENCOUNTER — OFFICE VISIT (OUTPATIENT)
Dept: RHEUMATOLOGY | Facility: CLINIC | Age: 73
End: 2023-02-15

## 2023-02-15 VITALS
SYSTOLIC BLOOD PRESSURE: 142 MMHG | HEIGHT: 65 IN | WEIGHT: 249 LBS | BODY MASS INDEX: 41.48 KG/M2 | DIASTOLIC BLOOD PRESSURE: 90 MMHG

## 2023-02-15 DIAGNOSIS — L40.9 PSORIASIS: ICD-10-CM

## 2023-02-15 DIAGNOSIS — M19.90 INFLAMMATORY ARTHRITIS: Primary | ICD-10-CM

## 2023-02-15 DIAGNOSIS — M35.3 POLYMYALGIA RHEUMATICA (HCC): ICD-10-CM

## 2023-02-15 DIAGNOSIS — Z79.899 HIGH RISK MEDICATION USE: ICD-10-CM

## 2023-02-15 RX ORDER — IBUPROFEN 600 MG/1
TABLET ORAL
COMMUNITY
Start: 2023-02-06

## 2023-02-15 RX ORDER — FOLIC ACID 1 MG/1
TABLET ORAL
COMMUNITY
Start: 2023-02-13

## 2023-02-15 NOTE — PROGRESS NOTES
Assessment and Plan:   Mr Sveta Toure is a pleasant 77-year-old male with past medical history significant for polymyalgia rheumatica, inflammatory arthritis, psoriasis, fatty liver, and HTN who presents for follow-up in the rheumatology office for follow-up of polymyalgia rheumatica, but also uncontrolled inflammatory arthritis suspicious for psoriatic arthritis  At his last visit in September 2022, he was experiencing symptoms of PMR and was directed to increase prednisone to 20 mg daily for 2 weeks then go down to 15mg daily for 2 weeks, then go down by 2 5mg increments every 2 weeks until off he was also started on hydroxychloroquine 200 mg twice daily  The prednisone did provide relief of his symptoms, which returned upon discontinuation of the steroid  In the interim, the patient experienced worsening psoriasis after he has come off of his prednisone course (initially for PMR), also began to experience wrist pain  There is concern for possible psoriatic arthritis based on symptoms + elevated inflammatory markers  I received a call from Dermatology (Dr Hayden Gresham) and personally spoke to him on 2/2/23  Dr Hayden Gresham does not advise future prednisone courses, as he feels that this will aggravate the psoriasis   We discussed at that time that Dr Hayden Gresham would initiate the patient on methotrexate (to combat both the psoriasis and the inflammatory arthritis) and folic acid therapy as well as order labs  The patient started his first dose of methotrexate 2/13/2023  He has been taking the folic acid daily and denies any side effects such as oral or nasal ulcers or nausea  Upon discussion with the patient, he is still having symptoms related to the polymyalgia rheumatica as well as inflammatory arthritis symptoms, complicated by uncontrolled psoriasis  I discussed with the patient and his wife that we will continue the hydroxychloroquine to try and control the polymyalgia rheumatica    We reviewed the importance of follow-up with ophthalmology annually and he confirms he has an appointment coming up  In terms of the inflammatory arthritis, this is likely secondary to psoriatic arthritis based on the overlap of symptoms  The methotrexate that was just initiated should provide relief for both the joint symptoms as well as the psoriasis  He can continue over-the-counter anti-inflammatories such as ibuprofen, Motrin, Aleve, as well as Tylenol as needed  I would like to get baseline hand and feet x-rays now to see if there is any evidence of inflammation/joint erosion  He is planned to get labs in 1 month for high risk drug monitoring as well as to monitor the inflammatory markers  We discussed that he will need to have labs drawn every 3 months going forward  I provided him with a prescription for this  Follow-up in May with Dr Tabitha Schwab as scheduled        Plan:  Diagnoses and all orders for this visit:    Inflammatory arthritis  -     CBC and differential; Standing  -     Comprehensive metabolic panel; Standing  -     C-reactive protein; Standing  -     Sedimentation rate, automated; Standing  -     XR hand 3+ vw left  -     XR hand 3+ vw right  -     XR foot 3+ vw right  -     XR foot 3+ vw left  -     CBC and differential  -     Comprehensive metabolic panel  -     C-reactive protein  -     Sedimentation rate, automated    Psoriasis  -     XR hand 3+ vw left  -     XR hand 3+ vw right  -     XR foot 3+ vw right  -     XR foot 3+ vw left    Polymyalgia rheumatica (HCC)  -     C-reactive protein; Standing  -     Sedimentation rate, automated; Standing  -     C-reactive protein  -     Sedimentation rate, automated    High risk medication use  -     CBC and differential; Standing  -     Comprehensive metabolic panel; Standing  -     C-reactive protein; Standing  -     Sedimentation rate, automated; Standing  -     CBC and differential  -     Comprehensive metabolic panel  -     C-reactive protein  -     Sedimentation rate, automated    Other orders  -     methotrexate 2 5 mg tablet; Take 10 mg by mouth once a week  -     ibuprofen (MOTRIN) 600 mg tablet; take 1 tablet by mouth three times a day for 14 days  -     folic acid (FOLVITE) 1 mg tablet; take 1 tablet by mouth once daily (EXCEPT ON DAY YOU TAKE YOUR METHOTREXATE)        I have personally reviewed prior notes, recent laboratory results, and pertinent films in PACS  Activities as tolerated  Exercise: try to maintain a low impact exercise regimen as much as possible  Walk for 30 minutes a day for at least 3 days a week  Continue other medications as prescribed by PCP and other specialists  RTC in 3 months as scheduled    Rheumatic Disease Summary:         HPI  Mr Jacquie Morgan is a pleasant 20-year-old male with past medical history significant for polymyalgia rheumatica, inflammatory arthritis, psoriasis, fatty liver, and HTN who presents for follow-up in the rheumatology office for follow-up of polymyalgia rheumatica, but also uncontrolled inflammatory arthritis suspicious for psoriatic arthritis  The patient is accompanied by his wife today and history was obtained by both the patient and his wife  The patient reports that when he was on the steroid taper, he did have improvement in both his joint and muscular symptoms as well as the muscular weakness  As he began to taper off and eventually stopped the prednisone, his symptoms returned  He experiences morning stiffness and joint swelling, specifically in the bilateral hands, wrists, knees, ankles  He has been taking the ibuprofen as needed, with mild relief  He recently saw the dermatologist and took his first dose of methotrexate on Monday  He has not experienced any side effects of oral or nasal ulcers or nausea  He he confirms that he is taking folic acid 1 mg daily as well    He is using the topical steroid, triamcinolone cream, as directed by the dermatologist     The following portions of the patient's history were reviewed and updated as appropriate: allergies, current medications, past family history, past medical history, past social history, past surgical history and problem list       Review of Systems  Constitutional: Negative for weight change, fevers, chills, night sweats, fatigue  ENT/Mouth: + Presbycusis  Negative for ear pain, nasal congestion, sinus pain, hoarseness, sore throat, rhinorrhea, swallowing difficulty  Eyes: Negative for pain, redness, discharge, vision changes  Cardiovascular: Negative for chest pain, SOB, palpitations  Respiratory: Negative for cough, sputum, wheezing, dyspnea  Gastrointestinal: Negative for nausea, vomiting, diarrhea, constipation, pain, heartburn  Genitourinary: + Nocturia  negative for dysuria, urinary frequency, hematuria  Musculoskeletal: As per HPI  Skin: + Psoriasis of the upper and lower bilateral extremities, as well as lower back  Negative for color changes  Neuro: + Weakness  Psych: Negative for anxiety, depression  Heme/Lymph: Negative for easy bruising, bleeding, lymphadenopathy          Past Medical History:   Diagnosis Date   • Alcohol dependency ([facility] [address] )    • Anxiety    • Arthritis    • Depression    • [place] (hard of hearing)    • Hypertension    • Kidney stones    • Prostate enlargement    • Renal disorder     kidney   • Shoulder dislocation        Past Surgical History:   Procedure Laterality Date   • CHOLECYSTECTOMY      2010   • COLONOSCOPY     • SHOULDER SURGERY Left     for dislocation x 2, 20 years ago an the second 18 years ago       Social History     Socioeconomic History   • Marital status: /Civil Union     Spouse name: Not on file   • Number of children: Not on file   • Years of education: Not on file   • Highest education level: Not on file   Occupational History   • Not on file   Tobacco Use   • Smoking status: Never   • Smokeless tobacco: Never   Vaping Use   • Vaping Use: Never used   Substance and Sexual Activity   • Alcohol use: Yes     Alcohol/week: 6 0 standard drinks     Types: 6 Shots of liquor per week     Comment: last drink 16 months ago   • Drug use: Never   • Sexual activity: Not Currently     Partners: Female   Other Topics Concern   • Not on file   Social History Narrative   • Not on file     Social Determinants of Health     Financial Resource Strain: Low Risk    • Difficulty of Paying Living Expenses: Not very hard   Food Insecurity: Not on file   Transportation Needs: No Transportation Needs   • Lack of Transportation (Medical): No   • Lack of Transportation (Non-Medical): No   Physical Activity: Not on file   Stress: Not on file   Social Connections: Not on file   Intimate Partner Violence: Not on file   Housing Stability: Not on file       Family History   Problem Relation Age of Onset   • Dementia Mother    • Colon cancer Mother    • Heart disease Father    • COPD Father    • Heart failure Father    • Coronary artery disease Father        Allergies   Allergen Reactions   • Sulfa Antibiotics Anaphylaxis and Rash     Face Swelling   • Sulfacetamide Anaphylaxis and Rash     Face Swelling   • Misc   Sulfonamide Containing Compounds Facial Swelling   • Elemental Sulfur Rash and Edema         Current Outpatient Medications:   •  alfuzosin (UROXATRAL) 10 mg 24 hr tablet, Take 1 tablet (10 mg total) by mouth daily, Disp: 90 tablet, Rfl: 0  •  benzoyl peroxide (BREVOXYL) 4 % gel, Apply topically daily at bedtime, Disp: 42 5 g, Rfl: 0  •  busPIRone (BUSPAR) 15 mg tablet, Take 1 tablet (15 mg total) by mouth daily, Disp: 30 tablet, Rfl: 1  •  clobetasol (TEMOVATE) 0 05 % external solution, , Disp: , Rfl:   •  dicyclomine (BENTYL) 10 mg capsule, Take 1 capsule (10 mg total) by mouth 4 (four) times a day as needed (abdominal pain), Disp: 30 capsule, Rfl: 2  •  finasteride (PROSCAR) 5 mg tablet, Take 1 tablet (5 mg total) by mouth daily, Disp: 90 tablet, Rfl: 1  •  folic acid (FOLVITE) 1 mg tablet, take 1 tablet by mouth once daily (EXCEPT ON DAY YOU TAKE YOUR METHOTREXATE), Disp: , Rfl:   •  hydrochlorothiazide (HYDRODIURIL) 25 mg tablet, Take 25 mg by mouth daily, Disp: , Rfl:   •  hydrocortisone (ANUSOL-HC) 25 mg suppository, Insert 1 suppository (25 mg total) into the rectum 2 (two) times a day, Disp: 12 suppository, Rfl: 0  •  hydroxychloroquine (PLAQUENIL) 200 mg tablet, Take 1 tablet (200 mg total) by mouth 2 (two) times a day, Disp: 60 tablet, Rfl: 6  •  ibuprofen (MOTRIN) 200 mg tablet, Take 3 tablets (600 mg total) by mouth 3 (three) times a day for 14 days, Disp: 126 tablet, Rfl: 0  •  ibuprofen (MOTRIN) 600 mg tablet, take 1 tablet by mouth three times a day for 14 days, Disp: , Rfl:   •  methotrexate 2 5 mg tablet, Take 10 mg by mouth once a week, Disp: , Rfl:   •  Multiple Vitamin (multivitamin) tablet, Take 1 tablet by mouth daily, Disp: 30 tablet, Rfl: 0  •  nadolol (CORGARD) 20 mg tablet, Take 1 tablet (20 mg total) by mouth daily, Disp: 30 tablet, Rfl: 1  •  traZODone (DESYREL) 100 mg tablet, Take 1 5 tablets (150 mg total) by mouth daily at bedtime, Disp: 135 tablet, Rfl: 0  •  triamcinolone (KENALOG) 0 1 % cream, APPLY DIRECTLY TO ITCHY RED AREAS ON LEFT SIDE OF CHEST AND ARM W   (REFER TO PRESCRIPTION NOTES)  , Disp: , Rfl:   •  white petrolatum-mineral oil (EUCERIN,HYDROCERIN) cream, Apply topically 3 (three) times a day as needed (pruritis), Disp: 113 g, Rfl: 0      Objective:    Vitals:    02/15/23 1303   BP: 142/90   Weight: 113 kg (249 lb)   Height: 5' 5" (1 651 m)       Physical Exam  General: Well appearing, well nourished, in no acute distress  Oriented x 3, normal mood and affect  Ambulating without difficulty  Skin: + Psoriasis appreciated on bilateral lower extremities, bilateral forearms, bilateral lower back  No unusual bruising  Hair: Normal texture and distribution  Nails: Normal color, no deformities  HEENT:  + Presbycusis  Head: Normocephalic, atraumatic    Eyes: Conjunctiva clear, sclera non-icteric, EOM intact  Nose: No external lesions, mucosa non-inflamed  Mouth: Mucous membranes moist, no mucosal lesions  Neck: Supple  Heart: Regular rate and rhythm, no murmur or gallop  Lungs: Clear to auscultation, no crackles or wheezing  Abdomen: Soft, non-tender, non-distended, bowel sounds normal    Extremities: No amputations or deformities, cyanosis, edema  Musculoskeletal:   + Bilateral hands appear swollen and warm to the touch   + Synovitis of bilateral PIPs and MCPs   No asymmetry or deformities noted of bilateral upper and lower extremities  + Mild swelling of bilateral ankles  + Pain with active range of motion of bilateral shoulders  No tenderness to palpation or swelling of joints bilaterally to include: shoulder, elbow, wrist, PIP I-V, knee, MTP I-V  Normal active and passive ROM of neck and bilateral upper and lower extremities, except for mentioned above  Negative squeeze test of bilateral MCPs and MTPs  Neurologic: Alert and oriented  No focal neurological deficits appreciated  Psychiatric: Normal mood and affect         Cristian Britton PA-C  Rheumatology

## 2023-02-15 NOTE — PATIENT INSTRUCTIONS
Continue the hydroxychloroquine 1 tab twice daily - tell the eye doctor  Continue methotrexate and folic acid  Get the xrays done, too    Get labs every 3 months     See Dr Medrano Cornea May 4th on [address]

## 2023-02-16 ENCOUNTER — APPOINTMENT (OUTPATIENT)
Dept: RADIOLOGY | Facility: CLINIC | Age: 73
End: 2023-02-16

## 2023-02-16 DIAGNOSIS — I10 ESSENTIAL HYPERTENSION: ICD-10-CM

## 2023-02-16 RX ORDER — NADOLOL 20 MG/1
20 TABLET ORAL DAILY
Qty: 90 TABLET | Refills: 1 | Status: SHIPPED | OUTPATIENT
Start: 2023-02-16

## 2023-02-16 NOTE — TELEPHONE ENCOUNTER
Divya Alcala stopped in and asked for his Nadolol (corgard) 20mg  1 tablet daily  We did remind him of the office policy for refills  He has no medication left

## 2023-02-17 DIAGNOSIS — S69.92XD INJURY OF LEFT WRIST, SUBSEQUENT ENCOUNTER: Primary | ICD-10-CM

## 2023-02-20 ENCOUNTER — HOSPITAL ENCOUNTER (OUTPATIENT)
Dept: ULTRASOUND IMAGING | Facility: HOSPITAL | Age: 73
Discharge: HOME | End: 2023-02-20

## 2023-02-20 DIAGNOSIS — N40.1 BENIGN PROSTATIC HYPERPLASIA WITH NOCTURIA: ICD-10-CM

## 2023-02-20 DIAGNOSIS — R35.1 BENIGN PROSTATIC HYPERPLASIA WITH NOCTURIA: ICD-10-CM

## 2023-02-22 DIAGNOSIS — F41.8 DEPRESSION WITH ANXIETY: ICD-10-CM

## 2023-02-22 RX ORDER — BUSPIRONE HYDROCHLORIDE 15 MG/1
15 TABLET ORAL DAILY
Qty: 30 TABLET | Refills: 1 | Status: SHIPPED | OUTPATIENT
Start: 2023-02-22

## 2023-02-22 NOTE — TELEPHONE ENCOUNTER
Patient stopped into the office to get a medication refill  He stated he had  a hard time with the phone call

## 2023-02-28 ENCOUNTER — TELEPHONE (OUTPATIENT)
Dept: RHEUMATOLOGY | Facility: CLINIC | Age: 73
End: 2023-02-28

## 2023-02-28 NOTE — TELEPHONE ENCOUNTER
----- Message from Ashley Grimes PA-C sent at 2/21/2023  9:19 AM EST -----  Please let the patient know that I reviewed the x-ray results that he recently had done  Both of the feet do show the wear and tear/osteoarthritis changes and he does have heel spurs from this  There is nothing to do about this, there are chronic changes  The right hand x-ray shows the osteoarthritis of the wrist joint  The left hand x-ray reveals something called a perilunate dislocation of the wrist, likely due to a past injury  The radiologist is recommending a CT scan to follow-up on this and get a better look at it  I have placed orders in the chart for this to be done at his next convenience, prior to his visit with Dr Susie Luis  It is not urgent

## 2023-02-28 NOTE — TELEPHONE ENCOUNTER
Caller: Patient     Doctor: Kyle Solorio    Reason for call: Patient is returning call   He stated he has an issue hearing and if details can be sent into his [facility]    Call back#:

## 2023-03-12 ENCOUNTER — OFFICE VISIT (OUTPATIENT)
Dept: URGENT CARE | Facility: CLINIC | Age: 73
End: 2023-03-12

## 2023-03-12 VITALS
HEIGHT: 67 IN | DIASTOLIC BLOOD PRESSURE: 80 MMHG | HEART RATE: 76 BPM | BODY MASS INDEX: 38.45 KG/M2 | WEIGHT: 245 LBS | TEMPERATURE: 97.8 F | SYSTOLIC BLOOD PRESSURE: 130 MMHG | RESPIRATION RATE: 18 BRPM | OXYGEN SATURATION: 95 %

## 2023-03-12 DIAGNOSIS — U07.1 COVID-19: Primary | ICD-10-CM

## 2023-03-12 DIAGNOSIS — R19.7 DIARRHEA, UNSPECIFIED TYPE: ICD-10-CM

## 2023-03-12 DIAGNOSIS — J06.9 ACUTE URI: ICD-10-CM

## 2023-03-12 DIAGNOSIS — R05.1 ACUTE COUGH: ICD-10-CM

## 2023-03-12 LAB
SARS-COV-2 AG UPPER RESP QL IA: POSITIVE
VALID CONTROL: ABNORMAL

## 2023-03-12 RX ORDER — BENZONATATE 200 MG/1
200 CAPSULE ORAL 3 TIMES DAILY PRN
Qty: 20 CAPSULE | Refills: 0 | Status: SHIPPED | OUTPATIENT
Start: 2023-03-12

## 2023-03-12 RX ORDER — METHYLPREDNISOLONE 4 MG/1
TABLET ORAL
Qty: 21 TABLET | Refills: 0 | Status: SHIPPED | OUTPATIENT
Start: 2023-03-12

## 2023-03-12 RX ORDER — ALBUTEROL SULFATE 90 UG/1
2 AEROSOL, METERED RESPIRATORY (INHALATION) EVERY 6 HOURS PRN
Qty: 8.5 G | Refills: 0 | Status: SHIPPED | OUTPATIENT
Start: 2023-03-12

## 2023-03-12 RX ORDER — FLUTICASONE PROPIONATE 50 MCG
2 SPRAY, SUSPENSION (ML) NASAL DAILY
Qty: 11.1 ML | Refills: 0 | Status: SHIPPED | OUTPATIENT
Start: 2023-03-12

## 2023-03-12 NOTE — PROGRESS NOTES
[facility]        NAME: Rola León is a 68 y o  male  : 1950    MRN: [id number]  DATE: 2023  TIME: 10:28 AM    Assessment and Plan   COVID-19 [U07 1]  1  COVID-19  methylPREDNISolone 4 MG tablet therapy pack    benzonatate (TESSALON) 200 MG capsule    albuterol (ProAir HFA) 90 mcg/act inhaler      2  Acute cough  Poct Covid 19 Rapid Antigen Test    methylPREDNISolone 4 MG tablet therapy pack    benzonatate (TESSALON) 200 MG capsule    albuterol (ProAir HFA) 90 mcg/act inhaler      3  Acute URI  fluticasone (FLONASE) 50 mcg/act nasal spray      4  Diarrhea, unspecified type          Patient tested positive for COVID  Given advice on at home remedies  Sending in Medrol Dosepak, albuterol inhaler, Tessalon Perles, and Flonase  Given advice for at home remedies regarding vitamins and hydration  Advised to follow-up with his PCP for further evaluation and possible prescription of Paxlovid  Advised patient to follow-up with PCP regarding diarrhea if it returns  Advised patient to go to the emergency room if he experiences any worsening of symptoms  Patient agreed with the plan of care and is willing to follow  Patient Instructions     You tested positive for COVID today and 3/12  Due to symptoms starting 2 days ago on 3/10, recommended 5 days of isolation would end after 3/15  Recommended to wear a mask while out in public  If you wish to seek new medications for COVID-19 such as Paxlovid, it is recommended you follow-up with your primary care provider for prescription  Daily multivitamin  Zinc and magnesium  Vitamin D3 2000 IU daily  Vitamin C 1000 mg twice daily  Take prescribed medications as instructed  Tylenol for pain or fever  Take sure to stay well-hydrated and rest   May try over-the-counter Flonase or other nasal sprays for congestion  May try warm salt gargles or throat lozenges for sore throat  May try tea with honey as well    You may continue with Imodium over-the-counter if diarrhea returns  If you notice any blood in your stool or black-colored stools, go to the emergency room immediately  Follow-up with your family doctor if diarrhea does not resolve or returns  If symptoms do not improve or worsen, or you experience any chest pain or shortness of breath-go to the emergency room  Follow-up with your primary care provider  Follow up with PCP in 3-5 days  Proceed to  ER if symptoms worsen  Chief Complaint     Chief Complaint   Patient presents with   • Diarrhea     Started 2 days ago, 2x yesterday  Took imodium with relief, reports foul odor, denies blood in stool  No diarrhea since yesterday  • Cold Like Symptoms     On Friday pt c/o dry cough, worse at night  Afebrile  Denies body aches or chills  Denies sick contacts  Reports nasal congestion, woke up with sore throat  History of Present Illness       1year-old male presents with 2 days of nasal congestion, hoarse voice, and cough  PT also admits to having diarrhea 2 days ago, had 2 episodes yesterday and took Imodium with relief  PT denies any diarrhea today  PT denies any blood in his stool  Denies eating any new foods or any sick contacts  He states that his diarrhea and flatulence had a foul smell to it  Denies any recent antibiotic use  PT has been taking over-the-counter Tylenol Cold and flu with minimal relief  PT describes cough as dry and worse at night  Denies any fevers, chills, chest pain, shortness of breath, abdominal pain, nausea, vomiting, constipation  Review of Systems   Review of Systems   Constitutional: Negative  HENT: Positive for congestion, sinus pressure and sore throat  Negative for ear discharge and ear pain  Eyes: Negative  Respiratory: Positive for cough  Negative for shortness of breath and wheezing  Cardiovascular: Negative  Gastrointestinal: Positive for diarrhea   Negative for abdominal distention, abdominal pain, blood in stool, constipation, nausea and vomiting  Musculoskeletal: Negative  Skin: Negative  Neurological: Positive for headaches  Negative for dizziness, syncope, weakness, light-headedness and numbness           Current Medications       Current Outpatient Medications:   •  albuterol (ProAir HFA) 90 mcg/act inhaler, Inhale 2 puffs every 6 (six) hours as needed for wheezing, Disp: 8 5 g, Rfl: 0  •  benzonatate (TESSALON) 200 MG capsule, Take 1 capsule (200 mg total) by mouth 3 (three) times a day as needed for cough, Disp: 20 capsule, Rfl: 0  •  busPIRone (BUSPAR) 15 mg tablet, Take 1 tablet (15 mg total) by mouth daily, Disp: 30 tablet, Rfl: 1  •  dicyclomine (BENTYL) 10 mg capsule, Take 1 capsule (10 mg total) by mouth 4 (four) times a day as needed (abdominal pain), Disp: 30 capsule, Rfl: 2  •  finasteride (PROSCAR) 5 mg tablet, Take 1 tablet (5 mg total) by mouth daily, Disp: 90 tablet, Rfl: 1  •  fluticasone (FLONASE) 50 mcg/act nasal spray, 2 sprays into each nostril daily, Disp: 11 1 mL, Rfl: 0  •  folic acid (FOLVITE) 1 mg tablet, take 1 tablet by mouth once daily (EXCEPT ON DAY YOU TAKE YOUR METHOTREXATE), Disp: , Rfl:   •  hydrochlorothiazide (HYDRODIURIL) 25 mg tablet, Take 25 mg by mouth daily, Disp: , Rfl:   •  hydrocortisone (ANUSOL-HC) 25 mg suppository, Insert 1 suppository (25 mg total) into the rectum 2 (two) times a day, Disp: 12 suppository, Rfl: 0  •  hydroxychloroquine (PLAQUENIL) 200 mg tablet, Take 1 tablet (200 mg total) by mouth 2 (two) times a day, Disp: 60 tablet, Rfl: 6  •  ibuprofen (MOTRIN) 600 mg tablet, take 1 tablet by mouth three times a day for 14 days, Disp: , Rfl:   •  methotrexate 2 5 mg tablet, Take 10 mg by mouth once a week, Disp: , Rfl:   •  methylPREDNISolone 4 MG tablet therapy pack, Use as directed on package, Disp: 21 tablet, Rfl: 0  •  Multiple Vitamin (multivitamin) tablet, Take 1 tablet by mouth daily, Disp: 30 tablet, Rfl: 0  •  nadolol (CORGARD) 20 mg tablet, Take 1 tablet (20 mg total) by mouth daily, Disp: 90 tablet, Rfl: 1  •  traZODone (DESYREL) 100 mg tablet, Take 1 5 tablets (150 mg total) by mouth daily at bedtime, Disp: 135 tablet, Rfl: 0  •  triamcinolone (KENALOG) 0 1 % cream, APPLY DIRECTLY TO ITCHY RED AREAS ON LEFT SIDE OF CHEST AND ARM W   (REFER TO PRESCRIPTION NOTES)  , Disp: , Rfl:   •  white petrolatum-mineral oil (EUCERIN,HYDROCERIN) cream, Apply topically 3 (three) times a day as needed (pruritis), Disp: 113 g, Rfl: 0  •  alfuzosin (UROXATRAL) 10 mg 24 hr tablet, Take 1 tablet (10 mg total) by mouth daily, Disp: 90 tablet, Rfl: 0  •  benzoyl peroxide (BREVOXYL) 4 % gel, Apply topically daily at bedtime, Disp: 42 5 g, Rfl: 0  •  clobetasol (TEMOVATE) 0 05 % external solution, , Disp: , Rfl:   •  ibuprofen (MOTRIN) 200 mg tablet, Take 3 tablets (600 mg total) by mouth 3 (three) times a day for 14 days, Disp: 126 tablet, Rfl: 0    Current Allergies     Allergies as of 03/12/2023 - Reviewed 03/12/2023   Allergen Reaction Noted   • Sulfa antibiotics Anaphylaxis and Rash 11/07/2014   • Sulfacetamide Anaphylaxis and Rash 11/07/2014   • Misc  sulfonamide containing compounds Facial Swelling 11/22/2022   • Elemental sulfur Rash and Edema 11/04/2014            The following portions of the patient's history were reviewed and updated as appropriate: allergies, current medications, past family history, past medical history, past social history, past surgical history and problem list      Past Medical History:   Diagnosis Date   • Alcohol dependency ([facility] [address] )    • Anxiety    • Arthritis    • Depression    • [place] (hard of hearing)    • Hypertension    • Kidney stones    • Prostate enlargement    • Renal disorder     kidney   • Shoulder dislocation        Past Surgical History:   Procedure Laterality Date   • CHOLECYSTECTOMY      2010   • COLONOSCOPY     • SHOULDER SURGERY Left     for dislocation x 2, 20 years ago an the second 18 years ago       Family History   Problem Relation Age of Onset   • Dementia Mother    • Colon cancer Mother    • Heart disease Father    • COPD Father    • Heart failure Father    • Coronary artery disease Father          Medications have been verified  Objective   /80   Pulse 76   Temp 97 8 °F (36 6 °C)   Resp 18   Ht 5' 7" (1 702 m)   Wt 111 kg (245 lb)   SpO2 95%   BMI 38 37 kg/m²        Physical Exam     Physical Exam  Constitutional:       General: He is not in acute distress  Appearance: Normal appearance  He is ill-appearing  HENT:      Head: Normocephalic and atraumatic  Right Ear: Tympanic membrane, ear canal and external ear normal       Left Ear: Tympanic membrane, ear canal and external ear normal       Nose: Nose normal       Mouth/Throat:      Mouth: Mucous membranes are moist       Pharynx: Oropharynx is clear  Posterior oropharyngeal erythema (Mild with postnasal drip ) present  Eyes:      Extraocular Movements: Extraocular movements intact  Conjunctiva/sclera: Conjunctivae normal       Pupils: Pupils are equal, round, and reactive to light  Cardiovascular:      Rate and Rhythm: Normal rate and regular rhythm  Pulses: Normal pulses  Heart sounds: Normal heart sounds  No murmur heard  No friction rub  No gallop  Pulmonary:      Effort: Pulmonary effort is normal       Breath sounds: No stridor  Decreased breath sounds (mild diffuse) and wheezing (Very mild expiratory throughout ) present  No rhonchi  Chest:      Chest wall: No tenderness  Abdominal:      General: Abdomen is flat  Bowel sounds are normal  There is no distension  Palpations: Abdomen is soft  There is no mass  Tenderness: There is no abdominal tenderness  There is no right CVA tenderness, left CVA tenderness, guarding or rebound  Hernia: No hernia is present  Musculoskeletal:      Cervical back: Normal range of motion and neck supple  No tenderness     Lymphadenopathy:      Cervical: No cervical adenopathy  Skin:     General: Skin is warm and dry  Capillary Refill: Capillary refill takes less than 2 seconds  Findings: No rash  Neurological:      General: No focal deficit present  Mental Status: He is alert and oriented to person, place, and time  Mental status is at baseline     Psychiatric:         Mood and Affect: Mood normal          Behavior: Behavior normal

## 2023-03-12 NOTE — PATIENT INSTRUCTIONS
You tested positive for COVID today and 3/12  Due to symptoms starting 2 days ago on 3/10, recommended 5 days of isolation would end after 3/15  Recommended to wear a mask while out in public  If you wish to seek new medications for COVID-19 such as Paxlovid, it is recommended you follow-up with your primary care provider for prescription  Daily multivitamin  Zinc and magnesium  Vitamin D3 2000 IU daily  Vitamin C 1000 mg twice daily  Take prescribed medications as instructed  Tylenol for pain or fever  Take sure to stay well-hydrated and rest   May try over-the-counter Flonase or other nasal sprays for congestion  May try warm salt gargles or throat lozenges for sore throat  May try tea with honey as well  You may continue with Imodium over-the-counter if diarrhea returns  If you notice any blood in your stool or black-colored stools, go to the emergency room immediately  Follow-up with your family doctor if diarrhea does not resolve or returns  If symptoms do not improve or worsen, or you experience any chest pain or shortness of breath-go to the emergency room  Follow-up with your primary care provider  Follow up with PCP in 3-5 days  Proceed to  ER if symptoms worsen  Nutrition Tips for Relief of Diarrhea   WHAT YOU NEED TO KNOW:   There are diet changes you can make to help relieve or stop diarrhea  These changes include limiting or avoiding foods and liquids that are high in sugar, fat, fiber, and lactose  Lactose is a sugar found in milk products  Milk products can cause diarrhea in people who are lactose intolerant  You should also drink extra liquids to replace fluids that are lost when you have diarrhea  Diarrhea can lead to dehydration     DISCHARGE INSTRUCTIONS:   Foods to limit or avoid:   Dairy:      Whole milk    Half-and-half, cream, and sour cream    Regular (whole milk) ice cream    Grains:      Whole wheat and whole grain breads, pasta, cereals, and crackers    Brown and wild rice    Breads and cereals with seeds or nuts    Popcorn    Fruit and vegetables: All raw fruits, except bananas and melon    Dried fruits, including prunes and raisins    Canned fruit in heavy syrup    Prune juice and any fruit juice with pulp    Raw vegetables, except lettuce     Fried vegetables    Corn, raw and cooked broccoli, cabbage, cauliflower, and mikel greens    Protein:      Fried meat, poultry, and fish    High-fat luncheon meats, such as bologna    Fatty meats, such as sausage, crandall, and hot dogs    Beans and nuts    Liquids:      Sodas and fruit-flavored drinks    Drinks that contain caffeine, such as energy drinks, coffee, and tea     Drinks that contain alcohol or sugar alcohol, such as sorbitol    Foods and liquids you may eat or drink:  Most people can tolerate the foods and liquids listed below  If any of them make your symptoms worse, stop eating or drinking them until you feel better  If you are lactose intolerant, avoid milk products  Dairy:      Skim or low-fat milk or evaporated milk    Soy milk or buttermilk     Low-fat, part-skim, and aged cheese    Yogurt, low-fat ice cream, or sherbert    Grains:  (Choose foods with less than 2 grams of dietary fiber per serving )     White or refined flour breads, bagels, pasta, and crackers    Cold or hot cereals made from white or refined flour such as puffed rice, cornflakes, or cream of wheat    White rice    Fruit and vegetables:      Bananas or melon    Fruit juice without pulp, except prune juice    Canned fruit in juice or light syrup    Lettuce and most well-cooked vegetables without seeds or skins     Strained vegetable juice    Protein:      Tender, well-cooked meat, poultry, or fish    Well-cooked eggs or soy foods (cooked without added fat)    Smooth nut butters    Fats:  (Limit fats to less than 8 teaspoons a day)     Oil, butter, or margarine, or mayonnaise    Cream cheese or salad dressings    Liquids:       For infants, breast milk or formula    Oral rehydration solution     Decaffeinated coffee or caffeine-free teas    Soft drinks without caffeine    Other guidelines to follow:   Drink liquids as directed  You may need to drink more liquids than usual to prevent dehydration  Ask how much liquid to drink each day and which liquids are best for you  You may need to drink an oral rehydration solution (ORS)  An ORS helps replace fluids and electrolytes that you lose when you have diarrhea  Eat small meals or snacks every 3 to 4 hours  instead of large meals  Continue eating even if you still have diarrhea  Your diarrhea will continue for a few days but should gradually go away  © Copyright Manny Van 2022 Information is for End User's use only and may not be sold, redistributed or otherwise used for commercial purposes  The above information is an  only  It is not intended as medical advice for individual conditions or treatments  Talk to your doctor, nurse or pharmacist before following any medical regimen to see if it is safe and effective for you  COVID-19 and Chronic Health Conditions   WHAT YOU NEED TO KNOW:   Your chronic condition may increase your risk for COVID-19 or serious problems it can cause  Healthcare providers might need to make changes that affect how you usually manage your chronic health condition  Providers may change hours of operation or not have patients come in to be seen  You may not be able to make appointments to get blood drawn or to have tests or procedures  This may continue until the virus that causes COVID-19 is controlled  Until then, you can take steps to manage your condition  The steps will also lower your risk for COVID-19 or the serious problems it causes  If you do develop COVID-19, healthcare providers will tell you when it is okay to be around others after you recover   This depends on your chronic condition, any symptoms of COVID-19 that developed, and how severe the symptoms were   DISCHARGE INSTRUCTIONS:   Call your local emergency number [phone] in the [facility]) or an emergency department if:   You have trouble breathing or shortness of breath  You have chest pain or pressure that lasts longer than 5 minutes  You become confused or hard to wake  Your lips or face are blue  Return to the emergency department if:   You have a fever of 104°F (40°C) or higher  Call your doctor or healthcare provider if:   You have symptoms of COVID-19  You have questions or concerns about COVID-19 or your chronic condition  What you need to know about serious problems from the virus:  You may develop long-term health problems caused by the virus  Your risk is higher if you are 65 or older  A weak immune system, obesity, diabetes, chronic kidney disease, or a heart or lung condition can also increase your risk  Your risk is also higher if you are a current or former cigarette smoker  COVID-19 can lead to any of the following:  Multisymptom inflammatory syndrome in adults (MIS-A) or in children (MIS-C), causing inflammation in the heart, digestive system, skin, or brain    Shortness of breath, serious lower respiratory conditions, such as pneumonia or acute respiratory distress syndrome (ARDS)    Blood clots or blood vessel damage    Organ damage from a lack of oxygen or from blood clots    Sleep problems    Problems thinking clearly, remembering information, or concentrating    Mood changes, depression, or anxiety    Long-term problems tasting or smelling    Loss of appetite and weight loss    Nerve pain    Fatigue (feeling mentally and physically tired)    How the 2019 coronavirus spreads: The virus spreads quickly and easily  The virus can be passed starting 2 to 3 days before symptoms begin or before a positive test if symptoms never begin  Droplets are the main way all coronaviruses spread  The virus travels in droplets that form when a person talks, sings, coughs, or sneezes   The droplets can also float in the air for minutes or hours  Infection happens when you breathe in the droplets or get them in your eyes or nose  Close personal contact with an infected person increases your risk for infection  This means being within 6 feet (2 meters) of the person for at least 15 minutes over 24 hours  Person-to-person contact can spread the virus  For example, a person with the virus on his or her hands can spread it by shaking hands with someone  The virus can stay on objects and surfaces for up to 3 days  You may become infected by touching the object or surface and then touching your eyes or mouth  What you need to know about the signs and symptoms of COVID-19:  Signs and symptoms usually start about 5 days after infection but can take 2 to 14 days  Signs and symptoms range from mild to severe  You may feel like you have the flu or a bad cold  Your chronic health condition may cause some of the same symptoms COVID-19 causes  This can make it hard to know if a symptom is from COVID-19 or your chronic condition  Keep a record of any new or worsening symptom you have  This is especially important if you have a condition that often causes shortness of breath  Your provider can tell you if you should be tested for COVID-19   Tell your healthcare provider if you think you were infected but develop signs or symptoms not listed below:  A cough    Shortness of breath or trouble breathing that may become severe    A fever of at least 100 4°F, or 38°C (may be lower in adults 65 or older)    Chills that might include shaking    Muscle pain, body aches, or a headache    A sore throat    Suddenly not being able to taste or smell anything    Feeling very tired (fatigue)    Congestion (stuffy head and nose), or a runny nose    Diarrhea, nausea, or vomiting    Manage your chronic health condition during this time:  If you do not have a regular healthcare provider, experts recommend you contact a local [facility] or health department  The following can help you manage your condition and prevent COVID-19:  Get emergency care for your condition if needed  Talk to your healthcare providers about symptoms of your chronic condition that need immediate care  Your providers can help you create a plan or add exacerbation management to your plan  The plan will include when to go to an emergency department and when to call your local emergency number  This will depend on where you live and the services that are available during this time  Go to dialysis appointments as scheduled  It is important to stay on schedule  You will need to have enough food to be able to follow the emergency diet plan if you must miss a session  The emergency diet needs to be part of the management plan for your condition  Reschedule any upcoming appointments as needed  Medical facilities may be closed until the coronavirus is better controlled  This means you may need to reschedule a surgery, procedure, or check-up appointment  If you cannot have a phone or video appointment, you will need to make a new appointment  Some providers may be scheduling appointments several months in advance  Some surgeries and procedures will happen as scheduled  This depends on the medical condition and the reason for the surgery or procedure  You may need to have extra testing for COVID-19 several days before  Follow any regular management plan you use  Your healthcare provider will tell you if you need to make any changes to your regular management plan  For example, if you have asthma, continue to follow your asthma action plan  If you have diabetes, you may need to check your blood sugar level more often  Stress and illness can make blood sugar levels go up  You may need to adjust medicine such as insulin  If you have a heart condition or high blood pressure, you may need to check your blood pressure more often   Stress and illness can also raise your blood pressure  Talk to your healthcare providers about your medicines  You may be able to get more than 1 month of medicine at a time  This will lower the number of times you need to go to a pharmacy to get your medicines  Make sure you have enough medicine if you have a condition that can lead to an emergency  Examples include asthma medicines, insulin, or an epinephrine pen  Check the expiration dates on the medicines you currently have  Ask for refills as soon as possible, if needed  If it is not time to refill prescriptions, you may be able to get an emergency supply of some medicines  Medicine plans vary, so ask your healthcare provider or pharmacist for options  Have supplies available in your home  If possible, get extra supplies you use regularly  Examples include absorbent pads, syringes, and wound cleaning solutions  This will limit the number of trips out of your home to get supplies  What you can do to prevent having to go out of your home during this time:   Ask your healthcare provider for other ways to have appointments  Some providers offer phone, video, or other types of appointments  Have food, medicines, and other supplies delivered  Some pharmacies can send certain medicines to you through the mail  Grocery stores and restaurants may be able to deliver food and other items  If possible, have delivered items placed somewhere  Try not to have someone hand you an item  You will be so close to the person that the virus can spread between you  Ask someone to get items you need  The person can get groceries, medicines, or other needed items for you  Choose a person who does not have signs or symptoms of COVID-19 or has tested negative for it  The person should not be waiting for test results  He or she should not have a condition that increases the risk for COVID-19 or serious problems it causes  What you need to know about COVID-19 vaccines:   Your healthcare provider can give you more information about what to expect, depending on your chronic condition  You are considered fully vaccinated against COVID-19 two weeks after the final dose of any COVID-19 vaccine  Let your healthcare provider know when you have received the final dose of the vaccine  Make a copy of your vaccination card  Keep the original with you in case you need to show it  Keep the copy in a safe place  Get a COVID-19 vaccine as directed  Vaccination is recommended for everyone 6 months or older  COVID-19 vaccines are given as a shot in 1 to 3 doses as a primary series  This depends on the vaccine brand and the age of the person who receives it  A booster dose is recommended for everyone 5 years or older after the primary series is complete  A second booster  is recommended for all adults 48 or older and for immunocompromised adolescents  The second booster is also recommended for anyone who got the 1-dose brand of vaccine for the first dose and a booster  Your provider can give you more information on boosters and help you schedule all needed doses  Continue social distancing and other measures  You can become infected even after you get the vaccine  You may also be able to pass the virus to others without knowing you are infected  After you get the vaccine, check local, national, and international travel rules  You may need to be tested before you travel  Some countries require proof of a negative test before you travel  You may also need to quarantine after you return  Medicine may be given to prevent infection  The medicine can be given if you are at high risk for infection and cannot get the vaccine  It can also be given if your immune system does not respond well to the vaccine  Lower your risk for COVID-19:   Wash your hands often throughout the day  Use soap and water  Rub your soapy hands together, lacing your fingers, for at least 20 seconds   Rinse with warm, running water  Dry your hands with a clean towel or paper towel  Use hand  that contains alcohol if soap and water are not available  Teach children how to wash their hands and use hand   Cover sneezes and coughs  Turn your face away and cover your mouth and nose with a tissue  Throw the tissue away  Use the bend of your arm if a tissue is not available  Then wash your hands with soap and water or use hand   Teach children how to cover a cough or sneeze  Follow worldwide, national, and local social distancing guidelines  Keep at least 6 feet (2 meters) between you and others  Wear a face covering (mask) around anyone who does not live in your home  Use a cloth covering with at least 2 layers  You can also create layers by putting a cloth covering over a disposable non-medical mask  Cover your mouth and your nose  Try not to touch your face  If you get the virus on your hands, you can transfer it to your eyes, nose, or mouth and become infected  You can also transfer it to objects, surfaces, or people  Clean and disinfect high-touch surfaces and objects in your home often  Use disinfecting wipes, or make a solution by mixing 4 teaspoons of bleach with 1 quart (4 cups) of water  Do not  use any cleaning or disinfecting products that can trigger an asthma attack or other breathing problems  Open windows or have circulating air as you clean  Do not  mix ammonia with bleach  This will create toxic fumes  How to follow social distancing guidelines:  National and local social distancing rules vary  Rules and restrictions may change over time as restrictions are lifted  The following are general guidelines:  Stay home if you are sick or think you may have COVID-19  It is important to stay home if you are waiting for a testing appointment or for test results  Avoid close physical contact with anyone who does not live in your home    Do not shake hands with, hug, or kiss a person as a greeting  If you must use public transportation (such as a bus or subway), try to sit or stand away from others  Wear your face covering  Avoid in-person gatherings and crowds  Attend virtually if possible  Help strengthen your immune system:   Ask about other vaccines you may need  Get the influenza (flu) vaccine as soon as recommended each year, usually starting in September or October  Get the pneumonia vaccine if recommended  Your healthcare provider can tell you if you should also get other vaccines, and when to get them  Do not smoke  Nicotine and other chemicals in cigarettes and cigars can increase your risk for infection and for serious COVID-19 effects  Ask your healthcare provider for information if you currently smoke and need help to quit  E-cigarettes or smokeless tobacco still contain nicotine  Talk to your healthcare provider before you use these products  Eat a variety of healthy foods  Examples include vegetables, fruits, whole-grain breads and cereals, lean meats and poultry, fish, low-fat dairy products, and cooked beans  Healthy foods contain nutrients that help keep your immune system strong  Find ways to manage stress  You may be feeling more stressed than usual because of the COVID-19 outbreak  The situation is very stressful to many people  Talk to your healthcare providers about ways to manage stress during this time  Stress can lead to breathing problems or trigger an attack or exacerbation of many health conditions  Pick 1 or 2 times a day to watch the news  Constant news watching can increase your stress levels  Instead, do things you enjoy  Talk to a friend or go for a walk together  Read a book or magazine  Take a warm bath or listen to soothing music  Follow up with your doctor or healthcare provider as directed: Your providers will tell you when you can come in for tests, procedures, or check-ups   Bring your symptom record with you to all appointments  Write down your questions so you remember to ask them during your visits  For more information:   [facility]  [address] , [address]  Phone: 7- 191 - 0298202  Phone: 3- 165 - 4049304  Web Address: [employer] com darin    © Copyright Rossy Steel 2022 Information is for End User's use only and may not be sold, redistributed or otherwise used for commercial purposes  The above information is an  only  It is not intended as medical advice for individual conditions or treatments  Talk to your doctor, nurse or pharmacist before following any medical regimen to see if it is safe and effective for you  Acute Cough   WHAT YOU NEED TO KNOW:   An acute cough can last up to 3 weeks  Common causes of an acute cough include a cold, allergies, or a lung infection  DISCHARGE INSTRUCTIONS:   Return to the emergency department if:   You have trouble breathing or feel short of breath  You cough up blood, or you see blood in your mucus  You faint or feel weak or dizzy  You have chest pain when you cough or take a deep breath  You have new wheezing  Contact your healthcare provider if:   You have a fever  Your cough lasts longer than 4 weeks  Your symptoms do not improve with treatment  You have questions or concerns about your condition or care  Medicines:   Medicines  may be needed to stop the cough, decrease swelling in your airways, or help open your airways  Medicine may also be given to help you cough up mucus  Ask your healthcare provider what over-the-counter medicines you can take  If you have an infection caused by bacteria, you may need antibiotics  Take your medicine as directed  Contact your healthcare provider if you think your medicine is not helping or if you have side effects  Tell your provider if you are allergic to any medicine  Keep a list of the medicines, vitamins, and herbs you take   Include the amounts, and when and why you take them  Bring the list or the pill bottles to follow-up visits  Carry your medicine list with you in case of an emergency  Manage your symptoms:   Do not smoke and stay away from others who smoke  Nicotine and other chemicals in cigarettes and cigars can cause lung damage and make your cough worse  Ask your healthcare provider for information if you currently smoke and need help to quit  E-cigarettes or smokeless tobacco still contain nicotine  Talk to your healthcare provider before you use these products  Drink extra liquids as directed  Liquids will help thin and loosen mucus so you can cough it up  Liquids will also help prevent dehydration  Examples of good liquids to drink include water, fruit juice, and broth  Do not drink liquids that contain caffeine  Caffeine can increase your risk for dehydration  Ask your healthcare provider how much liquid to drink each day  Rest as directed  Do not do activities that make your cough worse, such as exercise  Use a humidifier or vaporizer  Use a cool mist humidifier or a vaporizer to increase air moisture in your home  This may make it easier for you to breathe and help decrease your cough  Eat 2 to 5 mL of honey 2 times each day  Honey can help thin mucus and decrease your cough  Use cough drops or lozenges  These can help decrease throat irritation and your cough  Follow up with your healthcare provider as directed:  Write down your questions so you remember to ask them during your visits  © Copyright Derryl Cristiane 2022 Information is for End User's use only and may not be sold, redistributed or otherwise used for commercial purposes  The above information is an  only  It is not intended as medical advice for individual conditions or treatments  Talk to your doctor, nurse or pharmacist before following any medical regimen to see if it is safe and effective for you

## 2023-03-23 DIAGNOSIS — N40.0 BENIGN PROSTATIC HYPERPLASIA, UNSPECIFIED WHETHER LOWER URINARY TRACT SYMPTOMS PRESENT: ICD-10-CM

## 2023-03-23 RX ORDER — ALFUZOSIN HYDROCHLORIDE 10 MG/1
TABLET, EXTENDED RELEASE ORAL
Qty: 90 TABLET | Refills: 0 | OUTPATIENT
Start: 2023-03-23

## 2023-03-27 DIAGNOSIS — N40.0 BENIGN PROSTATIC HYPERPLASIA, UNSPECIFIED WHETHER LOWER URINARY TRACT SYMPTOMS PRESENT: ICD-10-CM

## 2023-03-27 RX ORDER — ALFUZOSIN HYDROCHLORIDE 10 MG/1
10 TABLET, EXTENDED RELEASE ORAL DAILY
Qty: 90 TABLET | Refills: 1 | Status: SHIPPED | OUTPATIENT
Start: 2023-03-27 | End: 2023-06-25

## 2023-03-27 NOTE — TELEPHONE ENCOUNTER
Called patient to see if he needed a refill on Alfuzosin 10MG  Patient does need refill  He only has 3 pills left

## 2023-03-27 NOTE — PROGRESS NOTES
Patient saw PCP in the vicinity of office and requested refill for BPH med at this time  This was sent over to the [facility] on [address]

## 2023-03-27 NOTE — TELEPHONE ENCOUNTER
Hi Doctor,    I believe this is the medication Mr Yu was talking about when you saw him earlier today and he mentioned a refill request

## 2023-03-28 ENCOUNTER — APPOINTMENT (OUTPATIENT)
Dept: RADIOLOGY | Facility: CLINIC | Age: 73
End: 2023-03-28

## 2023-03-28 ENCOUNTER — OFFICE VISIT (OUTPATIENT)
Dept: URGENT CARE | Facility: CLINIC | Age: 73
End: 2023-03-28

## 2023-03-28 VITALS — RESPIRATION RATE: 18 BRPM | OXYGEN SATURATION: 95 % | TEMPERATURE: 98.6 F | HEART RATE: 67 BPM

## 2023-03-28 DIAGNOSIS — R05.1 ACUTE COUGH: ICD-10-CM

## 2023-03-28 DIAGNOSIS — B34.9 VIRAL SYNDROME: Primary | ICD-10-CM

## 2023-03-28 NOTE — PATIENT INSTRUCTIONS
Dextromethorphan cough medication as prescribed  Vitamin D3 2000 IU daily  Vitamin C 1000mg twice per day  Multivitamin daily  Fluids and rest  Follow up with PCP in 3-5 days  Proceed to  ER if symptoms worsen

## 2023-03-28 NOTE — PROGRESS NOTES
"  [facility]        NAME: Zita Arriaza is a 68 y o  male  : 1950    MRN: [id number]  DATE: 2023  TIME: 2:33 PM    Assessment and Plan   Viral syndrome [B34 9]  1  Viral syndrome        2  Acute cough  XR chest pa & lateral    dextromethorphan 15 MG/5ML syrup        Patient states tessalon does not help  States pharmacies have been sold out of OTC medication  Patient has a history of alcohol dependency and HTN  Sent dextromethorphan  CXR: No acute cardiopulmonary disease  Poor inspiration  Patient Instructions     Dextromethorphan cough medication as prescribed  Vitamin D3 2000 IU daily  Vitamin C 1000mg twice per day  Multivitamin daily  Fluids and rest  Follow up with PCP in 3-5 days  Proceed to  ER if symptoms worsen  Chief Complaint     Chief Complaint   Patient presents with   • Cough     Onset 3 days ago with \"chest burring\" when he coughs dx with covid 3/12/23 no recent covid testing         History of Present Illness       Patient tested positive for COVID on 3/12/2023  Reports a burning chest sensation when coughing x 3 days  URI   This is a new problem  Episode onset: 3d  There has been no fever  Associated symptoms include congestion, coughing, headaches and rhinorrhea  Pertinent negatives include no abdominal pain, diarrhea, ear pain, nausea, rash, sinus pain, sneezing, sore throat, vomiting or wheezing  Treatments tried: tessalon  The treatment provided no relief  Review of Systems   Review of Systems   Constitutional: Negative for chills and fever  HENT: Positive for congestion and rhinorrhea  Negative for dental problem, ear discharge, ear pain, facial swelling, postnasal drip, sinus pressure, sinus pain, sneezing, sore throat and trouble swallowing  Eyes: Negative for itching  Respiratory: Positive for cough and chest tightness  Negative for shortness of breath and wheezing      Gastrointestinal: Negative for abdominal pain, constipation, " diarrhea, nausea and vomiting  Musculoskeletal: Negative for myalgias  Skin: Negative for rash  Neurological: Positive for headaches  Negative for dizziness, weakness and light-headedness           Current Medications       Current Outpatient Medications:   •  dextromethorphan 15 MG/5ML syrup, Take 10 mL (30 mg total) by mouth 4 (four) times a day as needed for cough, Disp: 400 mL, Rfl: 0  •  albuterol (ProAir HFA) 90 mcg/act inhaler, Inhale 2 puffs every 6 (six) hours as needed for wheezing, Disp: 8 5 g, Rfl: 0  •  alfuzosin (UROXATRAL) 10 mg 24 hr tablet, Take 1 tablet (10 mg total) by mouth daily, Disp: 90 tablet, Rfl: 1  •  benzonatate (TESSALON) 200 MG capsule, Take 1 capsule (200 mg total) by mouth 3 (three) times a day as needed for cough, Disp: 20 capsule, Rfl: 0  •  benzoyl peroxide (BREVOXYL) 4 % gel, Apply topically daily at bedtime, Disp: 42 5 g, Rfl: 0  •  busPIRone (BUSPAR) 15 mg tablet, Take 1 tablet (15 mg total) by mouth daily, Disp: 30 tablet, Rfl: 1  •  clobetasol (TEMOVATE) 0 05 % external solution, , Disp: , Rfl:   •  dicyclomine (BENTYL) 10 mg capsule, Take 1 capsule (10 mg total) by mouth 4 (four) times a day as needed (abdominal pain), Disp: 30 capsule, Rfl: 2  •  finasteride (PROSCAR) 5 mg tablet, Take 1 tablet (5 mg total) by mouth daily, Disp: 90 tablet, Rfl: 1  •  fluticasone (FLONASE) 50 mcg/act nasal spray, 2 sprays into each nostril daily, Disp: 11 1 mL, Rfl: 0  •  folic acid (FOLVITE) 1 mg tablet, take 1 tablet by mouth once daily (EXCEPT ON DAY YOU TAKE YOUR METHOTREXATE), Disp: , Rfl:   •  hydrochlorothiazide (HYDRODIURIL) 25 mg tablet, Take 25 mg by mouth daily, Disp: , Rfl:   •  hydrocortisone (ANUSOL-HC) 25 mg suppository, Insert 1 suppository (25 mg total) into the rectum 2 (two) times a day, Disp: 12 suppository, Rfl: 0  •  hydroxychloroquine (PLAQUENIL) 200 mg tablet, Take 1 tablet (200 mg total) by mouth 2 (two) times a day, Disp: 60 tablet, Rfl: 6  •  ibuprofen (MOTRIN) 200 mg tablet, Take 3 tablets (600 mg total) by mouth 3 (three) times a day for 14 days, Disp: 126 tablet, Rfl: 0  •  ibuprofen (MOTRIN) 600 mg tablet, take 1 tablet by mouth three times a day for 14 days, Disp: , Rfl:   •  methotrexate 2 5 mg tablet, Take 10 mg by mouth once a week, Disp: , Rfl:   •  methylPREDNISolone 4 MG tablet therapy pack, Use as directed on package, Disp: 21 tablet, Rfl: 0  •  Multiple Vitamin (multivitamin) tablet, Take 1 tablet by mouth daily, Disp: 30 tablet, Rfl: 0  •  nadolol (CORGARD) 20 mg tablet, Take 1 tablet (20 mg total) by mouth daily, Disp: 90 tablet, Rfl: 1  •  traZODone (DESYREL) 100 mg tablet, Take 1 5 tablets (150 mg total) by mouth daily at bedtime, Disp: 135 tablet, Rfl: 0  •  triamcinolone (KENALOG) 0 1 % cream, APPLY DIRECTLY TO ITCHY RED AREAS ON LEFT SIDE OF CHEST AND ARM W   (REFER TO PRESCRIPTION NOTES)  , Disp: , Rfl:   •  white petrolatum-mineral oil (EUCERIN,HYDROCERIN) cream, Apply topically 3 (three) times a day as needed (pruritis), Disp: 113 g, Rfl: 0    Current Allergies     Allergies as of 03/28/2023 - Reviewed 03/28/2023   Allergen Reaction Noted   • Sulfa antibiotics Anaphylaxis and Rash 11/07/2014   • Sulfacetamide Anaphylaxis and Rash 11/07/2014   • Misc  sulfonamide containing compounds Facial Swelling 11/22/2022   • Elemental sulfur Rash and Edema 11/04/2014            The following portions of the patient's history were reviewed and updated as appropriate: allergies, current medications, past family history, past medical history, past social history, past surgical history and problem list      Past Medical History:   Diagnosis Date   • Alcohol dependency ([facility] [address] )    • Anxiety    • Arthritis    • Depression    • [place] (hard of hearing)    • Hypertension    • Kidney stones    • Prostate enlargement    • Renal disorder     kidney   • Shoulder dislocation        Past Surgical History:   Procedure Laterality Date   • CHOLECYSTECTOMY      2010   • COLONOSCOPY • SHOULDER SURGERY Left     for dislocation x 2, 20 years ago an the second 25 years ago       Family History   Problem Relation Age of Onset   • Dementia Mother    • Colon cancer Mother    • Heart disease Father    • COPD Father    • Heart failure Father    • Coronary artery disease Father          Medications have been verified  Objective   Pulse 67   Temp 98 6 °F (37 °C)   Resp 18   SpO2 95%   No LMP for male patient  Physical Exam     Physical Exam  Vitals reviewed  Constitutional:       General: He is not in acute distress  Appearance: He is well-developed  He is not diaphoretic  HENT:      Head: Normocephalic  Right Ear: Tympanic membrane, ear canal and external ear normal       Left Ear: Tympanic membrane, ear canal and external ear normal       Nose: Nose normal       Mouth/Throat:      Pharynx: No oropharyngeal exudate or posterior oropharyngeal erythema  Eyes:      Conjunctiva/sclera: Conjunctivae normal    Cardiovascular:      Rate and Rhythm: Normal rate and regular rhythm  Heart sounds: Normal heart sounds  No murmur heard  No friction rub  No gallop  Pulmonary:      Effort: Pulmonary effort is normal  No respiratory distress  Breath sounds: Rhonchi present  No wheezing or rales  Lymphadenopathy:      Cervical: No cervical adenopathy  Skin:     General: Skin is warm  Neurological:      Mental Status: He is alert and oriented to person, place, and time  Psychiatric:         Behavior: Behavior normal          Thought Content:  Thought content normal          Judgment: Judgment normal

## 2023-04-06 ENCOUNTER — OFFICE VISIT (OUTPATIENT)
Dept: UROLOGY | Facility: CLINIC | Age: 73
End: 2023-04-06

## 2023-04-06 ENCOUNTER — TELEPHONE (OUTPATIENT)
Dept: FAMILY MEDICINE CLINIC | Facility: CLINIC | Age: 73
End: 2023-04-06

## 2023-04-06 VITALS
HEIGHT: 67 IN | WEIGHT: 243 LBS | SYSTOLIC BLOOD PRESSURE: 112 MMHG | DIASTOLIC BLOOD PRESSURE: 62 MMHG | BODY MASS INDEX: 38.14 KG/M2

## 2023-04-06 DIAGNOSIS — N28.1 COMPLEX RENAL CYST: ICD-10-CM

## 2023-04-06 DIAGNOSIS — R97.20 ELEVATED PSA: ICD-10-CM

## 2023-04-06 DIAGNOSIS — R35.1 BENIGN PROSTATIC HYPERPLASIA WITH NOCTURIA: Primary | ICD-10-CM

## 2023-04-06 DIAGNOSIS — N40.1 BENIGN PROSTATIC HYPERPLASIA WITH NOCTURIA: Primary | ICD-10-CM

## 2023-04-06 LAB — POST-VOID RESIDUAL VOLUME, ML POC: 0 ML

## 2023-04-06 NOTE — TELEPHONE ENCOUNTER
PT REQUESTED [facility] FOR [facility]- SCHEDULED FOR 10/9- ALSO WAS TOLD BY FACILITY TO HAVE LABS DRAWN BEFORE CT  ORDER FAXED TO FACILITY AND PT SCHEDULED FOR F/U IN 6 MO APPT ALONG WITH PSA

## 2023-04-06 NOTE — PROGRESS NOTES
4/6/2023    No chief complaint on file  Assessment and Plan    68 y o  male     1  BPH with nocturia  · He did not see much improvement with reducing fluid intake prior to bedtime  He continues to experience nocturia 2-3 times per night  · Only managed on alfuzosin 10 mg daily and finasteride 5 mg daily  · Does have good bladder emptying with PVR on renal ultrasound of 26 mL  · We did discuss further evaluation with a cystoscopy, at this time he is unsure if he would like to proceed with this  We will continue monitoring and I will see him in 6 months for follow-up  2   Elevated PSA  · PSA trend with correction for finasteride  · 1 77/3 54 (1/20/2023)  · 2 57/5 0 (11/8/2021)  · Negative family history of prostate cancer  · NICOLE benign during initial office exam  · Continue with annual PSA in 1 year    3  Complex renal cyst  · Bilateral mildly complex renal cyst noted on renal ultrasound from 2/20/2023  · 1 5 cm mildly complex cyst in the mid right kidney with internal septations and probable calcification  · 2 6 cm mildly complex cyst with internal septation in the left lower pole  This was previously identified as a Bosniak 2 cyst on CT imaging from April 2021 and is stable in size  · As the 1 5 cm right renal cyst was not noted on prior CT imaging from 2021 I recommend we check a CT A/P w/wo contrast in 6 months to ensure size stability  Subjective:      He presents today reporting he is seen little to no improvement in his nocturia with limiting fluid intake  He continues to experience this upwards of 2-3 times per night  During the daytime his voiding seems to be normal and denies any urgency or frequency  History of Present Illness  Theo Gambino is a 68 y o  male here for follow-up evaluation of BPH with nocturia and elevated PSA  He was initially seen by me on 1/19/2023 for evaluation of nocturia  He reports prior Urologic care over 5 years ago   He has a history BPH and has been managed on alfuzosin 10 mg and finasteride 5 mg daily for several years  He reports his nocturia has worsened over the past 3-4 months and reports having to get up 3-4 times per night  He does report being started on a water pill about 6 months ago and does drink a lot of water  In addition he does report a history of Kidney stones in the past and was told to avoid Ice tea before, he does drink about 1 glass of ice tea per day  He drinks about 1 gallon of water per day  Denies alcohol, soda, or coffee intake       Prostate cancer screening: Negative family history of prostate cancer  Last PSA available to review through [facility] was 2 57/5 0 when corrected for finasteride on 11/08/2021  He is unsure what his PSA has been in the past    His rectal exam was benign during initial office visit  Repeat PSA was completed on 1/20/2023 and is improved at 1 77/3 54 corrected for finasteride  Renal ultrasound completed on 2/20/2023 with good bladder emptying with PVR of 26 2 mL  Patient does have bilateral mildly complex renal cyst with internal internal septations  1 5 cm cyst in the mid right kidney and a 2 6 cm cyst within the lower pole the left kidney  The 2 6 cm left lower pole renal cyst was previously identified as a Bosniak 2 cyst on CT imaging from 4/17/2021 and is stable in size  Review of Systems   Constitutional: Negative for chills and fever  HENT: Negative for congestion and sore throat  Respiratory: Negative for cough and shortness of breath  Cardiovascular: Negative for chest pain and leg swelling  Gastrointestinal: Negative for abdominal pain, constipation and diarrhea  Genitourinary: Positive for urgency  Negative for difficulty urinating, dysuria, frequency and hematuria  Nocturia   Musculoskeletal: Negative for back pain and gait problem  Skin: Negative for wound  Allergic/Immunologic: Negative for immunocompromised state  "  Hematological: Does not bruise/bleed easily  Vitals  Vitals:    04/06/23 1016   BP: 112/62   Weight: 110 kg (243 lb)   Height: 5' 7\" (1 702 m)       Physical Exam  Vitals reviewed  Constitutional:       General: He is not in acute distress  Appearance: Normal appearance  He is not ill-appearing or toxic-appearing  HENT:      Head: Normocephalic and atraumatic  Eyes:      General: No scleral icterus  Conjunctiva/sclera: Conjunctivae normal    Cardiovascular:      Rate and Rhythm: Normal rate  Pulmonary:      Effort: Pulmonary effort is normal  No respiratory distress  Abdominal:      Tenderness: There is no right CVA tenderness or left CVA tenderness  Hernia: No hernia is present  Musculoskeletal:      Cervical back: Normal range of motion  Right lower leg: No edema  Left lower leg: No edema  Skin:     General: Skin is warm and dry  Coloration: Skin is not jaundiced or pale  Neurological:      General: No focal deficit present  Mental Status: He is alert and oriented to person, place, and time  Mental status is at baseline  Gait: Gait normal    Psychiatric:         Mood and Affect: Mood normal          Behavior: Behavior normal          Thought Content:  Thought content normal          Judgment: Judgment normal          Past History  Past Medical History:   Diagnosis Date   • Alcohol dependency ([facility] [address] )    • Anxiety    • Arthritis    • Depression    • [place] (hard of hearing)    • Hypertension    • Kidney stones    • Prostate enlargement    • Renal disorder     kidney   • Shoulder dislocation      Social History     Socioeconomic History   • Marital status: /Civil Union     Spouse name: None   • Number of children: None   • Years of education: None   • Highest education level: None   Occupational History   • None   Tobacco Use   • Smoking status: Never   • Smokeless tobacco: Never   Vaping Use   • Vaping Use: Never used   Substance and Sexual " Activity   • Alcohol use: Yes     Alcohol/week: 6 0 standard drinks     Types: 6 Shots of liquor per week     Comment: last drink 16 months ago   • Drug use: Never   • Sexual activity: Not Currently     Partners: Female   Other Topics Concern   • None   Social History Narrative   • None     Social Determinants of Health     Financial Resource Strain: Low Risk    • Difficulty of Paying Living Expenses: Not very hard   Food Insecurity: Not on file   Transportation Needs: No Transportation Needs   • Lack of Transportation (Medical): No   • Lack of Transportation (Non-Medical): No   Physical Activity: Not on file   Stress: Not on file   Social Connections: Not on file   Intimate Partner Violence: Not on file   Housing Stability: Not on file     Social History     Tobacco Use   Smoking Status Never   Smokeless Tobacco Never     Family History   Problem Relation Age of Onset   • Dementia Mother    • Colon cancer Mother    • Heart disease Father    • COPD Father    • Heart failure Father    • Coronary artery disease Father        The following portions of the patient's history were reviewed and updated as appropriate allergies, current medications, past medical history, past social history, past surgical history and problem list    [email or web address]  RENAL ULTRASOUND WITH PVR     INDICATION:   N40 1: Benign prostatic hyperplasia with lower urinary tract symptoms  R35 1: Nocturia      COMPARISON: CT abdomen and pelvis dated 2021      TECHNIQUE:   Ultrasound of the retroperitoneum was performed with a curvilinear transducer utilizing volumetric sweeps and still imaging techniques       FINDINGS:     KIDNEYS:  Symmetric and normal size  Right kidney:  13 2 x 6 5 x 5 4 cm  Volume 243 5 mL  Left kidney:  14 1 x 7 0 x 6 3 cm  Volume 324 9 mL     Right kidney  Normal echogenicity and contour  No mass is identified   Mildly complex 1 5 cm cyst in the mid kidney with thin internal septation and probable calcification  No solid components or internal vascular flow  No hydronephrosis  Probable nonobstructing calculi measuring 3 mm and 4 mm in the lower pole  No perinephric fluid collections      Left kidney  Normal echogenicity and contour  No mass is identified  Mildly complex cyst with thin internal septation in the lower pole measuring 2 6 x 2 4 x 1 7 cm, similar to prior CT  No discrete solid components or internal vascular flow  No hydronephrosis  Probable 2 mm calculus in the mid kidney  No perinephric fluid collections      URETERS:  Nonvisualized      BLADDER:   Normally distended  No focal thickening or mass lesions  Bilateral ureteral jets detected  Prevoid: 170 5   No significant post void volume  Measured post void volume in mL: 26 2        IMPRESSION:     1  No hydronephrosis      2   Probable small nonobstructing renal calculi bilaterally      3   Mildly complex small renal cysts bilaterally  Results  Recent Results (from the past 1 hour(s))   POCT Measure PVR    Collection Time: 04/06/23 10:23 AM   Result Value Ref Range    POST-VOID RESIDUAL VOLUME, ML POC 0 mL   ]  No results found for: PSA  Lab Results   Component Value Date    GLUCOSE 119 12/29/2014    CALCIUM 8 9 09/27/2022     12/29/2014    K 4 2 09/27/2022    CO2 29 09/27/2022     09/27/2022    BUN 26 (H) 09/27/2022    CREATININE 0 92 09/27/2022     Lab Results   Component Value Date    WBC 10 07 09/27/2022    HGB 14 4 09/27/2022    HCT 44 4 09/27/2022    MCV 93 09/27/2022     09/27/2022       Please Note:  Voice dictation software has been used to create this document  There may be inadvertent transcriptions errors       MALGORZATA Monson 04/06/23

## 2023-05-04 ENCOUNTER — OFFICE VISIT (OUTPATIENT)
Dept: RHEUMATOLOGY | Facility: CLINIC | Age: 73
End: 2023-05-04

## 2023-05-04 VITALS
HEIGHT: 67 IN | DIASTOLIC BLOOD PRESSURE: 84 MMHG | WEIGHT: 242 LBS | BODY MASS INDEX: 37.98 KG/M2 | SYSTOLIC BLOOD PRESSURE: 122 MMHG

## 2023-05-04 DIAGNOSIS — L40.9 PSORIASIS: ICD-10-CM

## 2023-05-04 DIAGNOSIS — G62.9 NEUROPATHY: ICD-10-CM

## 2023-05-04 DIAGNOSIS — M35.3 POLYMYALGIA RHEUMATICA (HCC): Primary | ICD-10-CM

## 2023-05-04 DIAGNOSIS — L40.50 PSORIATIC ARTHRITIS (HCC): ICD-10-CM

## 2023-05-04 DIAGNOSIS — Z79.899 HIGH RISK MEDICATION USE: ICD-10-CM

## 2023-05-04 DIAGNOSIS — M19.90 INFLAMMATORY ARTHRITIS: ICD-10-CM

## 2023-05-04 RX ORDER — HYDROXYCHLOROQUINE SULFATE 200 MG/1
200 TABLET, FILM COATED ORAL 2 TIMES DAILY
Qty: 60 TABLET | Refills: 5 | Status: SHIPPED | OUTPATIENT
Start: 2023-05-04 | End: 2023-10-31

## 2023-05-04 RX ORDER — FOLIC ACID 1 MG/1
1 TABLET ORAL DAILY
Qty: 30 TABLET | Refills: 5 | Status: SHIPPED | OUTPATIENT
Start: 2023-05-04

## 2023-05-04 RX ORDER — CYCLOSPORINE 100 MG/1
200 CAPSULE, GELATIN COATED ORAL 2 TIMES DAILY
Qty: 56 CAPSULE | Refills: 0 | Status: SHIPPED | OUTPATIENT
Start: 2023-05-04 | End: 2023-05-18

## 2023-05-04 NOTE — PATIENT INSTRUCTIONS
"Increase methotrexate to 7 tabs once a week  Continue folic acid daily  Continue hydroxychloroquine twice a day  Take cyclosporine 2 capsules twice a day (4 in a day) for 1-2 weeks  Physical therapy referral made  Do labs in a month    Return to clinic in 4 months    Psoriatic Arthritis in Adults    What is psoriatic arthritis? -- Psoriatic arthritis is a condition that causes joint pain, swelling, and stiffness  It happens in people who have a long-term skin condition called psoriasis  People with psoriasis have patches of thick, red skin that are often covered by silver or white scales  Doctors don't know what causes psoriasis or psoriatic arthritis  What are the symptoms of psoriatic arthritis? -- Psoriatic arthritis causes pain, stiffness, and swelling in the affected joints  It can also affect the spine in some people  Because of the joint and spine problems, people can have trouble moving their body  Stiffness in the joints or low back is usually worse in the morning and lasts 30 minutes or longer  It usually gets better with exercise  Psoriatic arthritis can affect joints on one or both sides of the body  It usually affects more than one joint  In addition to joint symptoms (and the skin symptoms of psoriasis), people sometimes have other symptoms  These can include:  ? Swelling of a finger or toe, or the hands or feet  ? Swelling and pain in the back of the ankle or in the heel   ? Nail symptoms - The nails can look \"pitted,\" as if they were pricked by a pin  The nail can also come up off the nail bed  ? Eye pain or redness  Is there a test for psoriatic arthritis? -- Yes  Your doctor or nurse will ask about your symptoms and do an exam  He or she will order X-rays of your painful joints  He or she might order an imaging test called an MRI  Imaging tests create pictures of the inside of the body    To check that another condition isn't causing your symptoms, your doctor or nurse might also order:  ?Blood " "tests  ? Lab tests on a sample of fluid from a swollen joint - To get a sample of fluid, the doctor will put a thin needle in your joint  How is psoriatic arthritis treated? -- There is no cure for psoriatic arthritis, but different treatments can help ease and control symptoms  Treatment for joint symptoms usually involves one or more of the following:  ?Medicines called nonsteroidal antiinflammatory drugs, or \"NSAIDs\" for short - Examples of NSAIDs are aspirin, ibuprofen (sample brand names: Advil, Motrin), and naproxen (sample brand name: Aleve)  ? Medicines that are usually used to treat other types of arthritis - Some of these include methotrexate and leflunomide  ? Medicines that block a substance called tumor necrosis factor, or \"TNF\" for short - TNF plays a role in psoriasis and psoriatic arthritis  Medicines that block TNF are called \"anti-TNF\" medicines  Examples include etanercept (brand name: Enbrel) and adalimumab (brand name: Humira)  ?Other medicines - If the options above don't help, your doctor might suggest trying a different medicine  Examples include ustekinumab (brand name: Yancy Velez), secukinumab (brand name: Cosentyx), tofacitinib (brand name: Derik Urrutia), abatacept (brand name: Risa Lawton), and apremilast (brand name: Israel Pal)  ? Shots of medicines called steroids that go into the painful joint - These are not the same as the steroids some athletes take illegally  These steroids help reduce swelling and pain  ? Heat - Heat, especially in the morning, can help reduce pain and stiffness  Do not use heat for longer than 20 minutes at a time  Also, do not use anything too hot that could burn your skin  ? Physical and occupational therapy - This involves learning exercises, movements, and ways of doing everyday tasks  ? Special shoe inserts (called \"orthotics\") - These can help keep your feet, ankles, and knees in the proper position  ?Treatment for psoriatic arthritis is usually long term   That's " because even after symptoms get better, they sometimes return later on  Is there anything I can do on my own to feel better? -- Yes  It is very important that you stay active  You might want to avoid being active because you are in pain  But this can make things worse  It can make your muscles weak and your joints stiffer than they already are  Your doctor, nurse, or physical therapist can help you figure out which activities and exercises are right for you

## 2023-05-04 NOTE — PROGRESS NOTES
Assessment and Plan:   Mr Dasha Dodson is a pleasant 80-year-old male with past medical history significant for polymyalgia rheumatica, inflammatory arthritis, psoriasis, fatty liver, and HTN who presents for follow-up in the rheumatology office for follow-up of PMR and possible psoriatic arthritis  Has significant psoriasis when trying to taper off prednisone  Increase methotrexate to 7 tabs once a week to better address inflammatory arthritis  Continue folic acid daily  Continue hydroxychloroquine twice a day  Take cyclosporine 2 capsules twice a day (4 in a day) for 1-2 weeks to clear current psoriasis  Physical therapy referral made  Do labs in a month    Return to clinic in 4 months    Plan:  Diagnoses and all orders for this visit:    Polymyalgia rheumatica ([address] )  -     hydroxychloroquine (PLAQUENIL) 200 mg tablet; Take 1 tablet (200 mg total) by mouth 2 (two) times a day  -     Cancel: Ambulatory referral to Physical Therapy; Future  -     Ambulatory referral to Physical Therapy; Future    Inflammatory arthritis  -     folic acid (FOLVITE) 1 mg tablet; Take 1 tablet (1 mg total) by mouth daily  -     hydroxychloroquine (PLAQUENIL) 200 mg tablet; Take 1 tablet (200 mg total) by mouth 2 (two) times a day  -     Cancel: Ambulatory referral to Physical Therapy; Future  -     Ambulatory referral to Physical Therapy; Future  -     predniSONE 5 mg tablet; 4 tabs x7 days, then 3 tabs x7 days, then 2 tabs x7 days, then 1 tab x7 days, then stop  Psoriatic arthritis (HCC)  -     CBC and differential  -     Comprehensive metabolic panel  -     C-reactive protein  -     Sedimentation rate, automated  -     methotrexate 2 5 mg tablet; Take 7 tablets (17 5 mg total) by mouth once a week    Psoriasis  -     cycloSPORINE non-modified (SandIMMUNE) 100 mg capsule; Take 2 capsules (200 mg total) by mouth 2 (two) times a day for 14 days    Neuropathy  -     Cancel: Ambulatory referral to Physical Therapy;  Future  - Ambulatory referral to Physical Therapy; Future    High risk medication use  -     CBC and differential  -     Comprehensive metabolic panel  High risk medication use - Benefits and risks of methotrexate use, including but not limited to gastrointestinal disturbances such as diarrhea, hair loss, fatigue, stomatitis, leukopenia, lung hypersensitivity reaction, hepatotoxicity, and lymphoma were discussed with the patient  CBC,CMP will be regularly monitored  Patient was prescribed Folic Acid 1 mg po daily to take while on methotrexate to help prevent side effects  Patient counseled on abstinence from alcohol while using methotrexate  Follow-up plan: RTC in 4 months        Rheumatic Disease Summary  9/27/22, initial visit: Viviana Parr is a 68 y o   male who presents as a Rheumatology consult referred for evaluation of likely polymyalgia rheumatica  He had symptoms of pain and stiffness at proximal upper and lower extremities, and currently has bilateral ankle pain, right thigh pain, and proximal arm pain bilaterally  Do labs  Increase prednisone to 20mg daily for 2 weeks then go down to 15mg daily for 2 weeks, then go down by 2 5mg increments every 2 weeks until off  Start hydroxychloroquine twice a day as steroid-sparing agent; get regular eye exams    2/15/23 with YOEL Tian Balloon: Patient is a pleasant 70-year-old male with past medical history significant for polymyalgia rheumatica, inflammatory arthritis, psoriasis, fatty liver, and HTN who presents for follow-up in the rheumatology office for follow-up of polymyalgia rheumatica, but also uncontrolled inflammatory arthritis suspicious for psoriatic arthritis    At his last visit in September 2022, he was experiencing symptoms of PMR and was directed to increase prednisone to 20 mg daily for 2 weeks then go down to 15mg daily for 2 weeks, then go down by 2 5mg increments every 2 weeks until off he was also started on hydroxychloroquine 200 mg twice daily   The prednisone did provide relief of his symptoms, which returned upon discontinuation of the steroid  In the interim, the patient experienced worsening psoriasis after he has come off of his prednisone course (initially for PMR), also began to experience wrist pain  There is concern for possible psoriatic arthritis based on symptoms + elevated inflammatory markers  I received a call from Dermatology (Dr Osmin Wadsworth) and personally spoke to him on 2/2/23  Dr Osmin Wadsworth does not advise future prednisone courses, as he feels that this will aggravate the psoriasis   We discussed at that time that Dr Osmin Wadsworth would initiate the patient on methotrexate (to combat both the psoriasis and the inflammatory arthritis) and folic acid therapy as well as order labs  The patient started his first dose of methotrexate 2/13/2023  He has been taking the folic acid daily and denies any side effects such as oral or nasal ulcers or nausea  Upon discussion with the patient, he is still having symptoms related to the polymyalgia rheumatica as well as inflammatory arthritis symptoms, complicated by uncontrolled psoriasis  I discussed with the patient and his wife that we will continue the hydroxychloroquine to try and control the polymyalgia rheumatica  We reviewed the importance of follow-up with ophthalmology annually and he confirms he has an appointment coming up  In terms of the inflammatory arthritis, this is likely secondary to psoriatic arthritis based on the overlap of symptoms  The methotrexate that was just initiated should provide relief for both the joint symptoms as well as the psoriasis  He can continue over-the-counter anti-inflammatories such as ibuprofen, Motrin, Aleve, as well as Tylenol as needed  I would like to get baseline hand and feet x-rays now to see if there is any evidence of inflammation/joint erosion    He is planned to get labs in 1 month for high risk drug monitoring as well as to monitor the inflammatory markers  We discussed that he will need to have labs drawn every 3 months going forward  HPI  Laura Clemente is a 68 y o   male who presents for follow-up of his PMR and likely psoriatic arthritis  Psoriasis is not coming control yet on methotrexate  The following portions of the patient's history were reviewed and updated as appropriate: allergies, current medications, past family history, past medical history, past social history, past surgical history and problem list     Review of Systems:   Review of Systems   Constitutional: Positive for fatigue  HENT: Negative for mouth sores  Eyes: Positive for visual disturbance  Negative for pain  Respiratory: Negative for shortness of breath  Cardiovascular: Positive for leg swelling  Musculoskeletal: Positive for arthralgias, joint swelling, myalgias and neck pain  Skin: Positive for rash  Neurological: Positive for numbness  Negative for weakness  Hematological: Negative for adenopathy  Bruises/bleeds easily  Psychiatric/Behavioral: Negative for sleep disturbance  Reviewed and agree      Home Medications:    Current Outpatient Medications:   •  albuterol (ProAir HFA) 90 mcg/act inhaler, Inhale 2 puffs every 6 (six) hours as needed for wheezing, Disp: 8 5 g, Rfl: 0  •  alfuzosin (UROXATRAL) 10 mg 24 hr tablet, Take 1 tablet (10 mg total) by mouth daily, Disp: 90 tablet, Rfl: 1  •  busPIRone (BUSPAR) 15 mg tablet, Take 1 tablet (15 mg total) by mouth daily, Disp: 30 tablet, Rfl: 1  •  dicyclomine (BENTYL) 10 mg capsule, Take 1 capsule (10 mg total) by mouth 4 (four) times a day as needed (abdominal pain), Disp: 30 capsule, Rfl: 2  •  folic acid (FOLVITE) 1 mg tablet, Take 1 tablet (1 mg total) by mouth daily, Disp: 30 tablet, Rfl: 5  •  hydroxychloroquine (PLAQUENIL) 200 mg tablet, Take 1 tablet (200 mg total) by mouth 2 (two) times a day, Disp: 60 tablet, Rfl: 5  •  methotrexate 2 5 mg tablet, Take 7 tablets (17 5 mg total) by mouth once a week, Disp: 35 tablet, Rfl: 5  •  predniSONE 5 mg tablet, 4 tabs x7 days, then 3 tabs x7 days, then 2 tabs x7 days, then 1 tab x7 days, then stop , Disp: 70 tablet, Rfl: 0  •  benzonatate (TESSALON) 200 MG capsule, Take 1 capsule (200 mg total) by mouth 3 (three) times a day as needed for cough, Disp: 20 capsule, Rfl: 0  •  benzoyl peroxide (BREVOXYL) 4 % gel, Apply topically daily at bedtime, Disp: 42 5 g, Rfl: 0  •  clobetasol (TEMOVATE) 0 05 % external solution, , Disp: , Rfl:   •  dextromethorphan 15 MG/5ML syrup, Take 10 mL (30 mg total) by mouth 4 (four) times a day as needed for cough, Disp: 400 mL, Rfl: 0  •  finasteride (PROSCAR) 5 mg tablet, Take 1 tablet (5 mg total) by mouth daily, Disp: 90 tablet, Rfl: 1  •  fluticasone (FLONASE) 50 mcg/act nasal spray, 2 sprays into each nostril daily, Disp: 11 1 mL, Rfl: 0  •  hydrochlorothiazide (HYDRODIURIL) 25 mg tablet, Take 25 mg by mouth daily, Disp: , Rfl:   •  hydrocortisone (ANUSOL-HC) 25 mg suppository, Insert 1 suppository (25 mg total) into the rectum 2 (two) times a day, Disp: 12 suppository, Rfl: 0  •  ibuprofen (MOTRIN) 200 mg tablet, Take 3 tablets (600 mg total) by mouth 3 (three) times a day for 14 days, Disp: 126 tablet, Rfl: 0  •  ibuprofen (MOTRIN) 600 mg tablet, take 1 tablet by mouth three times a day for 14 days, Disp: , Rfl:   •  methylPREDNISolone 4 MG tablet therapy pack, Use as directed on package (Patient not taking: Reported on 4/6/2023), Disp: 21 tablet, Rfl: 0  •  Multiple Vitamin (multivitamin) tablet, Take 1 tablet by mouth daily, Disp: 30 tablet, Rfl: 0  •  nadolol (CORGARD) 20 mg tablet, Take 1 tablet (20 mg total) by mouth daily, Disp: 90 tablet, Rfl: 1  •  traZODone (DESYREL) 100 mg tablet, Take 1 5 tablets (150 mg total) by mouth daily at bedtime, Disp: 135 tablet, Rfl: 0  •  triamcinolone (KENALOG) 0 1 % cream, APPLY DIRECTLY TO ITCHY RED AREAS ON LEFT SIDE OF CHEST AND ARM W   (REFER TO PRESCRIPTION NOTES)  , "Disp: , Rfl:   •  white petrolatum-mineral oil (EUCERIN,HYDROCERIN) cream, Apply topically 3 (three) times a day as needed (pruritis), Disp: 113 g, Rfl: 0    Objective:    Vitals:    05/04/23 1004   BP: 122/84   Weight: 110 kg (242 lb)   Height: 5' 7\" (1 702 m)       Physical Exam  Constitutional:       General: He is not in acute distress  HENT:      Head: Normocephalic and atraumatic  Eyes:      Conjunctiva/sclera: Conjunctivae normal    Cardiovascular:      Rate and Rhythm: Normal rate and regular rhythm  Heart sounds: S1 normal and S2 normal      No friction rub  Pulmonary:      Effort: Pulmonary effort is normal  No respiratory distress  Breath sounds: Normal breath sounds  No wheezing, rhonchi or rales  Musculoskeletal:         General: Tenderness present  Cervical back: Neck supple  Skin:     Coloration: Skin is not pale  Findings: Rash present  Comments: Diffuse psoriasis, especially on distal lower extremities   Neurological:      Mental Status: He is alert  Mental status is at baseline  Psychiatric:         Mood and Affect: Mood normal          Behavior: Behavior normal        Reviewed labs and imaging  Imaging:   Bilateral hand and right foot x-rays 2/16/23 - unremarkable    Right foot x-rays 2/16/23  Calcaneal spur(s) noted  Moderate to severe 1st tarsometatarsal joint arthrosis  No lytic or blastic osseous lesion  Soft tissues are unremarkable  IMPRESSION:  Degenerative changes as described above    No osseous erosive changes are seen    Labs:   Office Visit on 04/06/2023   Component Date Value Ref Range Status   • POST-VOID RESIDUAL VOLUME, ML POC 04/06/2023 0  mL Final   Office Visit on 03/12/2023   Component Date Value Ref Range Status   • POCT SARS-CoV-2 Ag 03/12/2023 Positive (A)  Negative Final   • VALID CONTROL 03/12/2023 Valid   Final   Office Visit on 01/19/2023   Component Date Value Ref Range Status   • LEUKOCYTE ESTERASE,UA 01/19/2023 -   Final   • " NITRITE,UA 01/19/2023 -   Final   • SL AMB POCT UROBILINOGEN 01/19/2023 0 2   Final   • POCT URINE PROTEIN 01/19/2023 -   Final   •  PH,UA 01/19/2023 5 0   Final   • BLOOD,UA 01/19/2023 -   Final   • SPECIFIC GRAVITY,UA 01/19/2023 1 020   Final   • Rande Pila 01/19/2023 -   Final   • BILIRUBIN,UA 01/19/2023 -   Final   • GLUCOSE, UA 01/19/2023 -   Final   •  COLOR,UA 01/19/2023 yellow   Final   • CLARITY,UA 01/19/2023 clear   Final   • POST-VOID RESIDUAL VOLUME, ML POC 01/19/2023 22  mL Final   Office Visit on 12/20/2022   Component Date Value Ref Range Status   • POCT SARS-CoV-2 Ag 12/20/2022 Negative  Negative Final   • VALID CONTROL 12/20/2022 Valid   Final

## 2023-05-05 ENCOUNTER — TELEPHONE (OUTPATIENT)
Dept: OBGYN CLINIC | Facility: HOSPITAL | Age: 73
End: 2023-05-05

## 2023-05-05 NOTE — TELEPHONE ENCOUNTER
Caller: Patient     Doctor: Adriane Estrella    Reason for call:Patient is calling because he went to PT and they advised him that his PT order has to state that its medically necessary on it for medicare to cover it      Call back#: [phone]

## 2023-05-08 NOTE — TELEPHONE ENCOUNTER
Please update him that I changed the physical therapy order to mention that it is medically necessary

## 2023-05-08 NOTE — TELEPHONE ENCOUNTER
Called and spoke to pt  Relayed provider message  Pt verbalized understanding  Pt requested copy of the order  Rx for PT - updated- was [facility] messaged to pt  Pt verbalized understanding

## 2023-05-18 ENCOUNTER — TELEPHONE (OUTPATIENT)
Dept: OBGYN CLINIC | Facility: HOSPITAL | Age: 73
End: 2023-05-18

## 2023-05-18 NOTE — TELEPHONE ENCOUNTER
Caller: Patient    Doctor: Carin Khan    Reason for call: Has been taking cycloSporine & Methotrexate for two weeks  Aching/swelling no relief  Pain 9/10 Spots on legs improved  Recommendations?  [facility]    Call back#: [phone]

## 2023-05-22 RX ORDER — PREDNISONE 5 MG/1
TABLET ORAL
Qty: 70 TABLET | Refills: 0 | Status: SHIPPED | OUTPATIENT
Start: 2023-05-22

## 2023-05-23 NOTE — TELEPHONE ENCOUNTER
"Caller: Patient    Doctor: Rodney Jade    Reason for call:    Patient is returning call and he states Jodie Tucker has not be taking Prednisone since January of 2023\"  He is asking for a call back relating the other medication he is taking  Does she want to take him off  The CycloSporine and prescribe any new medication? ? He uses the [employer] in [facility] on file    He is asking for a call back, stating he is disappointed in his response time        Call back#: [phone]  "

## 2023-05-30 NOTE — TELEPHONE ENCOUNTER
He should continue the prednisone taper until he comes off and he should continue the methotrexate while taking the Humira  Can you clarify who got him the Humira, was it dermatology?

## 2023-05-30 NOTE — TELEPHONE ENCOUNTER
Patient picked up script.   ID confirmed.   Spoke with patient and relayed information  Patient states it was derm who got him the Humira

## 2023-05-30 NOTE — TELEPHONE ENCOUNTER
Caller: patient    Doctor: Uli Quinones    Reason for call: patient was approved by insurance for the Humira  Should patient continue to take the prednisone and methotrexate?   Please call the patient and inform them of which medications to continue    Call back#: [phone]

## 2023-07-05 ENCOUNTER — OFFICE VISIT (OUTPATIENT)
Dept: FAMILY MEDICINE CLINIC | Facility: CLINIC | Age: 73
End: 2023-07-05
Payer: MEDICARE

## 2023-07-05 VITALS
HEIGHT: 67 IN | SYSTOLIC BLOOD PRESSURE: 128 MMHG | WEIGHT: 238.2 LBS | TEMPERATURE: 97.3 F | DIASTOLIC BLOOD PRESSURE: 62 MMHG | BODY MASS INDEX: 37.39 KG/M2 | HEART RATE: 59 BPM | OXYGEN SATURATION: 94 %

## 2023-07-05 DIAGNOSIS — F41.8 DEPRESSION WITH ANXIETY: ICD-10-CM

## 2023-07-05 DIAGNOSIS — M19.90 INFLAMMATORY ARTHRITIS: ICD-10-CM

## 2023-07-05 DIAGNOSIS — E66.01 OBESITY, MORBID (HCC): Primary | ICD-10-CM

## 2023-07-05 DIAGNOSIS — I10 ESSENTIAL HYPERTENSION: ICD-10-CM

## 2023-07-05 DIAGNOSIS — N50.811 TESTICULAR PAIN, RIGHT: ICD-10-CM

## 2023-07-05 DIAGNOSIS — F10.11 ALCOHOL USE DISORDER, MILD, IN EARLY REMISSION, ABUSE: ICD-10-CM

## 2023-07-05 DIAGNOSIS — N40.0 BENIGN PROSTATIC HYPERPLASIA WITHOUT LOWER URINARY TRACT SYMPTOMS: ICD-10-CM

## 2023-07-05 PROBLEM — L40.9 PSORIASIS: Status: ACTIVE | Noted: 2023-07-05

## 2023-07-05 PROCEDURE — 99214 OFFICE O/P EST MOD 30 MIN: CPT | Performed by: FAMILY MEDICINE

## 2023-07-05 RX ORDER — METOPROLOL SUCCINATE 25 MG/1
25 TABLET, EXTENDED RELEASE ORAL DAILY
COMMUNITY

## 2023-07-05 RX ORDER — BUSPIRONE HYDROCHLORIDE 15 MG/1
15 TABLET ORAL 2 TIMES DAILY
Qty: 30 TABLET | Refills: 1 | Status: SHIPPED | OUTPATIENT
Start: 2023-07-05

## 2023-07-05 NOTE — PROGRESS NOTES
Name: Coleman Vee      : 1950      MRN: [id number]  Encounter Provider: Julia Diaz MD  Encounter Date: 2023   Encounter department: [address] [place] [address]     1. Obesity, morbid ([address])  Comments:  BMI 37.31  Orders:  -     Lipid Panel with Direct LDL reflex; Future    2. Essential hypertension  Comments:  128/62 today  Stable on Nadolol 20 mg QD ; Takes HCTZ only as needed  No blurry vision, HA, N/V    3. Benign prostatic hyperplasia without lower urinary tract symptoms  Comments:  Stable on Alfluzosin 10 mg QD and Finasteride 5 mg QD  Followed by Urology, last visit     4. Inflammatory arthritis  Comments:  Per Rheumatology, increased Metheotrexate to 7 tabs once a week  Is compliant with Folic Acid  Counseled on adverse effects of MTX    5. Alcohol use disorder, mild, in early remission, abuse  Comments:  Last use 2021    6. Depression with anxiety  Comments:  Stable on Buspar 15 mg and Trazodone 100 mg PRN; no known triggers   Denies SI/HI  Would like refills  Orders:  -     busPIRone (BUSPAR) 15 mg tablet; Take 1 tablet (15 mg total) by mouth 2 (two) times a day    7. Testicular pain, right  Comments:  Patient feels as though it is swollen x 2 months. No trauma, not sexually active, no hematuria, dysuria  Will monitor for now, but if persists, will consider US           Subjective     Patient is a 67 y/o M presenting to the clinic for a follow up on chronic conditions. Patient sees Rheumatology for Psoriasis and Inflammatory Arthritis and recently has Methotrexate changed to 7 tabs once a week. Per patient, will be started on Humira on  per Rheumatology. Patient also sees Urology for BPH. Is stable on Alfusozin and Finasteride. No difficulties with urination. HTN is well controlled, does not measure BP at home, but states it is stable for over 1 year. Denies blurry vision, N/V, headaches.    Previous history of alcohol use disorder : last use September 29, 2021. Swollen Right testicle x 2 months. Not sexually active. Had a surgery for undescended testes on Left teste at age 5. No trauma, no hematuria, frequency, or dysuria. No pus or drainage from penis. Review of Systems   Constitutional: Negative for chills, fever and unexpected weight change. Respiratory: Negative for cough, shortness of breath and wheezing. Cardiovascular: Negative for chest pain and palpitations. Gastrointestinal: Negative for blood in stool, constipation, diarrhea, nausea and vomiting. Endocrine: Negative for polyuria. Genitourinary: Positive for scrotal swelling (Right teste). Negative for difficulty urinating, dysuria, frequency, hematuria, penile discharge, penile pain, penile swelling and urgency. Musculoskeletal: Positive for arthralgias. Skin: Positive for rash (Psoriasis of both legs). Neurological: Negative for syncope and headaches. Psychiatric/Behavioral: Negative for self-injury and suicidal ideas.        Past Medical History:   Diagnosis Date   • Alcohol dependency ([address])    • Anxiety    • Arthritis    • Depression    • [place] (hard of hearing)    • Hypertension    • Kidney stones    • Prostate enlargement    • Renal disorder     kidney   • Shoulder dislocation      Past Surgical History:   Procedure Laterality Date   • CHOLECYSTECTOMY      2010   • COLONOSCOPY     • SHOULDER SURGERY Left     for dislocation x 2, 20 years ago an the second 25 years ago     Family History   Problem Relation Age of Onset   • Dementia Mother    • Colon cancer Mother    • Heart disease Father    • COPD Father    • Heart failure Father    • Coronary artery disease Father      Social History     Socioeconomic History   • Marital status: /Civil Union     Spouse name: None   • Number of children: None   • Years of education: None   • Highest education level: None   Occupational History   • None   Tobacco Use   • Smoking status: Never   • Smokeless tobacco: Never   Vaping Use   • Vaping Use: Never used   Substance and Sexual Activity   • Alcohol use: Yes     Alcohol/week: 6.0 standard drinks of alcohol     Types: 6 Shots of liquor per week     Comment: last drink 16 months ago   • Drug use: Never   • Sexual activity: Not Currently     Partners: Female   Other Topics Concern   • None   Social History Narrative   • None     Social Determinants of Health     Financial Resource Strain: Low Risk  (12/27/2022)    Overall Financial Resource Strain (CARDIA)    • Difficulty of Paying Living Expenses: Not very hard   Food Insecurity: Not on file   Transportation Needs: No Transportation Needs (12/27/2022)    PRAPARE - Transportation    • Lack of Transportation (Medical): No    • Lack of Transportation (Non-Medical):  No   Physical Activity: Not on file   Stress: Not on file   Social Connections: Not on file   Intimate Partner Violence: Not on file   Housing Stability: Not on file     Current Outpatient Medications on File Prior to Visit   Medication Sig   • albuterol (ProAir HFA) 90 mcg/act inhaler Inhale 2 puffs every 6 (six) hours as needed for wheezing   • dicyclomine (BENTYL) 10 mg capsule Take 1 capsule (10 mg total) by mouth 4 (four) times a day as needed (abdominal pain)   • finasteride (PROSCAR) 5 mg tablet Take 1 tablet (5 mg total) by mouth daily   • fluticasone (FLONASE) 50 mcg/act nasal spray 2 sprays into each nostril daily   • folic acid (FOLVITE) 1 mg tablet Take 1 tablet (1 mg total) by mouth daily   • hydrochlorothiazide (HYDRODIURIL) 25 mg tablet Take 25 mg by mouth daily   • hydrocortisone (ANUSOL-HC) 25 mg suppository Insert 1 suppository (25 mg total) into the rectum 2 (two) times a day   • hydroxychloroquine (PLAQUENIL) 200 mg tablet Take 1 tablet (200 mg total) by mouth 2 (two) times a day   • ibuprofen (MOTRIN) 600 mg tablet take 1 tablet by mouth three times a day for 14 days   • methotrexate 2.5 mg tablet Take 7 tablets (17.5 mg total) by mouth once a week   • metoprolol succinate (TOPROL-XL) 25 mg 24 hr tablet Take 25 mg by mouth in the morning   • Multiple Vitamin (multivitamin) tablet Take 1 tablet by mouth daily   • nadolol (CORGARD) 20 mg tablet Take 1 tablet (20 mg total) by mouth daily   • traZODone (DESYREL) 100 mg tablet Take 1.5 tablets (150 mg total) by mouth daily at bedtime   • triamcinolone (KENALOG) 0.1 % cream APPLY DIRECTLY TO ITCHY RED AREAS ON LEFT SIDE OF CHEST AND ARM W...  (REFER TO PRESCRIPTION NOTES). • white petrolatum-mineral oil (EUCERIN,HYDROCERIN) cream Apply topically 3 (three) times a day as needed (pruritis)   • alfuzosin (UROXATRAL) 10 mg 24 hr tablet Take 1 tablet (10 mg total) by mouth daily     Allergies   Allergen Reactions   • Sulfa Antibiotics Anaphylaxis and Rash     Face Swelling   • Sulfacetamide Anaphylaxis and Rash     Face Swelling   • Misc. Sulfonamide Containing Compounds Facial Swelling   • Elemental Sulfur Rash and Edema     Immunization History   Administered Date(s) Administered   • COVID-19 MODERNA VACC 0.5 ML IM 04/08/2021, 05/06/2021, 11/15/2021   • INFLUENZA 09/01/2022   • Influenza, high dose seasonal 0.7 mL 09/30/2021   • Pneumococcal Conjugate 13-Valent 12/04/2019   • Pneumococcal Conjugate Vaccine 20-valent (Pcv20), Polysace 12/29/2022   • Tdap 09/19/2020   • Tuberculin Skin Test 03/10/2020       Objective     /62   Pulse 59   Temp (!) 97.3 °F (36.3 °C)   Ht 5' 7" (1.702 m)   Wt 108 kg (238 lb 3.2 oz)   SpO2 94%   BMI 37.31 kg/m²     Physical Exam  Vitals reviewed. Constitutional:       General: He is not in acute distress. Appearance: He is obese. He is not ill-appearing. Comments: Hearing aids in place, requiring louder volumes to converse. HENT:      Head: Normocephalic. Eyes:      Extraocular Movements: Extraocular movements intact. Pupils: Pupils are equal, round, and reactive to light. Cardiovascular:      Rate and Rhythm: Normal rate and regular rhythm. Pulses: Normal pulses. Heart sounds: Normal heart sounds. No murmur heard. Pulmonary:      Effort: Pulmonary effort is normal. No respiratory distress. Breath sounds: Normal breath sounds. No wheezing. Abdominal:      Palpations: Abdomen is soft. Genitourinary:     Penis: Normal.       Comments: R teste larger than L, mildly tender to palpation, non erythematous or warm. No penile discharge  Musculoskeletal:         General: No swelling. Normal range of motion. Right lower leg: No edema. Left lower leg: No edema. Neurological:      General: No focal deficit present. Mental Status: He is alert and oriented to person, place, and time.    Psychiatric:         Mood and Affect: Mood normal.         Behavior: Behavior normal.       Urszula Brewer MD

## 2023-07-07 ENCOUNTER — TELEPHONE (OUTPATIENT)
Dept: OBGYN CLINIC | Facility: HOSPITAL | Age: 73
End: 2023-07-07

## 2023-07-07 NOTE — TELEPHONE ENCOUNTER
Caller: Patient- Elena Catherine     Doctor: Dr Roland Lorenzo     Reason for call: Patient is calling in stating that he had called in earlier this morning due to it being the weekend and still has not heard anything back yet. He is in a lot of pain and is asking if medication can be sent to the pharmacy for him or what he is able to do for all his pain. Please advise as he is asking for a call back relating this as soon as possible.      Call back#: [phone]

## 2023-07-07 NOTE — TELEPHONE ENCOUNTER
Caller: Patient    Doctor: Dr. Kayli Wu    Reason for call: Patient calling in stating that he is in a lot of pain and was wondering if a dose of prednisone can be called into his pharmacy. Patient asking for call back when prescription is sent.     [place], [address] [place] [address]        Call back#: [phone]

## 2023-07-09 DIAGNOSIS — M19.90 INFLAMMATORY ARTHRITIS: ICD-10-CM

## 2023-07-09 DIAGNOSIS — M35.3 POLYMYALGIA RHEUMATICA (HCC): Primary | ICD-10-CM

## 2023-07-09 RX ORDER — PREDNISONE 5 MG/1
TABLET ORAL
Qty: 70 TABLET | Refills: 0 | Status: SHIPPED | OUTPATIENT
Start: 2023-07-09

## 2023-07-09 NOTE — TELEPHONE ENCOUNTER
Sent prednisone course to his pharmacy. Please double check that patient is still taking methotrexate and Humira. Has he been off of the cyclosporine pill that I had given for his psoriasis?

## 2023-07-10 NOTE — ASSESSMENT & PLAN NOTE
Hypokalemia due to alcohol abuse and associated nutritional deficiency  Continue supplementation as needed  Mary is a 30 year old year old female, , here for an OB visit. Gestational Age 13w6d; GAGE 2024. Pt has no complaints. She is feeling much better. Nausea is resolved. She denies contractions, leakage of fluid, vaginal bleeding, dysuria, abnormal vaginal discharge or edema.      PNC:    * declines genetic screening  * anatomy scan at 16 weeks    Danger S/S and FMC reviewed; contact the office with any concerns.  RTC in 4 week(s) or sooner if needed.    All of the patient’s questions were answered; she verbalizes understanding and is in agreement with the above mentioned plan.  She is certainly encouraged to call my office should any questions or concerns develop prior to her follow up appointment.    NADYA Muniz

## 2023-07-11 DIAGNOSIS — N40.0 BENIGN PROSTATIC HYPERPLASIA WITHOUT LOWER URINARY TRACT SYMPTOMS: ICD-10-CM

## 2023-07-11 RX ORDER — FINASTERIDE 5 MG/1
5 TABLET, FILM COATED ORAL DAILY
Qty: 90 TABLET | Refills: 1 | Status: SHIPPED | OUTPATIENT
Start: 2023-07-11

## 2023-07-11 NOTE — TELEPHONE ENCOUNTER
Voicemail:  Anjum, January 21st, 1950. I need my finasteride refilled FINASERIDE 5 milligrams once a day. Have any questions? Give me a call [phone]. Thank you. Listened to voicemail and confirmed it was this patient.

## 2023-07-12 NOTE — TELEPHONE ENCOUNTER
Called patient that medication was sent to the pharmacy. Patient received the [facility] message.

## 2023-07-19 ENCOUNTER — TELEPHONE (OUTPATIENT)
Dept: OBGYN CLINIC | Facility: HOSPITAL | Age: 73
End: 2023-07-19

## 2023-07-19 NOTE — TELEPHONE ENCOUNTER
Caller: [facility]-doctor    Doctor: Nani Masters    Reason for call: the facility called about management of his Psoriasis and pursuing Humira for treatment    Call back#: [phone] cell phone or [phone]

## 2023-07-21 NOTE — TELEPHONE ENCOUNTER
Have been in touch with dermatologist, she wants to start him on Humira, I'm okay with that in addition to continuing methotrexate. Can discontinue hydroxychloroquine. Will fax my latest clinic note to her practice once I get the number.

## 2023-08-05 DIAGNOSIS — F39 UNSPECIFIED MOOD (AFFECTIVE) DISORDER (HCC): ICD-10-CM

## 2023-08-05 DIAGNOSIS — M19.90 INFLAMMATORY ARTHRITIS: ICD-10-CM

## 2023-08-05 DIAGNOSIS — M35.3 POLYMYALGIA RHEUMATICA (HCC): ICD-10-CM

## 2023-08-06 RX ORDER — PREDNISONE 5 MG/1
TABLET ORAL
Qty: 70 TABLET | Refills: 0 | Status: SHIPPED | OUTPATIENT
Start: 2023-08-06

## 2023-08-07 ENCOUNTER — TELEPHONE (OUTPATIENT)
Dept: FAMILY MEDICINE CLINIC | Facility: CLINIC | Age: 73
End: 2023-08-07

## 2023-08-07 NOTE — TELEPHONE ENCOUNTER
Marino Tracey stopped into the office and said he sent a my chart message to get a refill on his trazodone on 8/5. I reminded him of the office policy to allow 5 business days for refills.

## 2023-08-08 RX ORDER — TRAZODONE HYDROCHLORIDE 100 MG/1
150 TABLET ORAL
Qty: 135 TABLET | Refills: 0 | Status: SHIPPED | OUTPATIENT
Start: 2023-08-08

## 2023-09-12 ENCOUNTER — TELEPHONE (OUTPATIENT)
Age: 73
End: 2023-09-12

## 2023-09-12 NOTE — TELEPHONE ENCOUNTER
Caller:Patient    Doctor: Sacha Altamirano     Reason for call: Patient is calling to see if he needs bloodwork done by his appt on Monday    Call back#: [phone]- ok to leave message

## 2023-09-12 NOTE — TELEPHONE ENCOUNTER
Yes, please let him know that the orders are in the system. Can go to any [facility]'s facility to get them done.

## 2023-09-13 NOTE — TELEPHONE ENCOUNTER
Caller: Patient     Doctor: Justine Gordillo     Reason for call: Patient  Calling to make sure the orders are the ones from July  That Da Reid put in.      Please advise     Call back#: [phone]

## 2023-09-14 ENCOUNTER — APPOINTMENT (OUTPATIENT)
Dept: LAB | Facility: CLINIC | Age: 73
End: 2023-09-14
Payer: MEDICARE

## 2023-09-14 LAB
ALBUMIN SERPL BCP-MCNC: 4.1 G/DL (ref 3.5–5)
ALP SERPL-CCNC: 57 U/L (ref 34–104)
ALT SERPL W P-5'-P-CCNC: 16 U/L (ref 7–52)
ANION GAP SERPL CALCULATED.3IONS-SCNC: 7 MMOL/L
AST SERPL W P-5'-P-CCNC: 14 U/L (ref 13–39)
BASOPHILS # BLD AUTO: 0.04 THOUSANDS/ÂΜL (ref 0–0.1)
BASOPHILS NFR BLD AUTO: 1 % (ref 0–1)
BILIRUB SERPL-MCNC: 0.56 MG/DL (ref 0.2–1)
BUN SERPL-MCNC: 19 MG/DL (ref 5–25)
CALCIUM SERPL-MCNC: 9.3 MG/DL (ref 8.4–10.2)
CHLORIDE SERPL-SCNC: 102 MMOL/L (ref 96–108)
CO2 SERPL-SCNC: 30 MMOL/L (ref 21–32)
CREAT SERPL-MCNC: 0.83 MG/DL (ref 0.6–1.3)
CRP SERPL QL: 4.7 MG/L
EOSINOPHIL # BLD AUTO: 0.2 THOUSAND/ÂΜL (ref 0–0.61)
EOSINOPHIL NFR BLD AUTO: 4 % (ref 0–6)
ERYTHROCYTE [DISTWIDTH] IN BLOOD BY AUTOMATED COUNT: 14 % (ref 11.6–15.1)
ERYTHROCYTE [SEDIMENTATION RATE] IN BLOOD: 7 MM/HOUR (ref 0–19)
GFR SERPL CREATININE-BSD FRML MDRD: 87 ML/MIN/1.73SQ M
GLUCOSE P FAST SERPL-MCNC: 97 MG/DL (ref 65–99)
HCT VFR BLD AUTO: 41.9 % (ref 36.5–49.3)
HGB BLD-MCNC: 13.8 G/DL (ref 12–17)
IMM GRANULOCYTES # BLD AUTO: 0.09 THOUSAND/UL (ref 0–0.2)
IMM GRANULOCYTES NFR BLD AUTO: 2 % (ref 0–2)
LYMPHOCYTES # BLD AUTO: 1.78 THOUSANDS/ÂΜL (ref 0.6–4.47)
LYMPHOCYTES NFR BLD AUTO: 34 % (ref 14–44)
MCH RBC QN AUTO: 30.9 PG (ref 26.8–34.3)
MCHC RBC AUTO-ENTMCNC: 32.9 G/DL (ref 31.4–37.4)
MCV RBC AUTO: 94 FL (ref 82–98)
MONOCYTES # BLD AUTO: 0.58 THOUSAND/ÂΜL (ref 0.17–1.22)
MONOCYTES NFR BLD AUTO: 11 % (ref 4–12)
NEUTROPHILS # BLD AUTO: 2.58 THOUSANDS/ÂΜL (ref 1.85–7.62)
NEUTS SEG NFR BLD AUTO: 48 % (ref 43–75)
NRBC BLD AUTO-RTO: 0 /100 WBCS
PLATELET # BLD AUTO: 227 THOUSANDS/UL (ref 149–390)
PMV BLD AUTO: 9.4 FL (ref 8.9–12.7)
POTASSIUM SERPL-SCNC: 4.1 MMOL/L (ref 3.5–5.3)
PROT SERPL-MCNC: 6.7 G/DL (ref 6.4–8.4)
RBC # BLD AUTO: 4.46 MILLION/UL (ref 3.88–5.62)
SODIUM SERPL-SCNC: 139 MMOL/L (ref 135–147)
WBC # BLD AUTO: 5.27 THOUSAND/UL (ref 4.31–10.16)

## 2023-09-18 ENCOUNTER — OFFICE VISIT (OUTPATIENT)
Dept: RHEUMATOLOGY | Facility: CLINIC | Age: 73
End: 2023-09-18
Payer: MEDICARE

## 2023-09-18 VITALS
WEIGHT: 242.2 LBS | HEIGHT: 67 IN | DIASTOLIC BLOOD PRESSURE: 70 MMHG | SYSTOLIC BLOOD PRESSURE: 124 MMHG | BODY MASS INDEX: 38.01 KG/M2 | HEART RATE: 74 BPM | OXYGEN SATURATION: 95 %

## 2023-09-18 DIAGNOSIS — L40.50 PSORIATIC ARTHRITIS (HCC): Primary | ICD-10-CM

## 2023-09-18 DIAGNOSIS — Z79.899 HIGH RISK MEDICATION USE: ICD-10-CM

## 2023-09-18 DIAGNOSIS — M19.90 INFLAMMATORY ARTHRITIS: ICD-10-CM

## 2023-09-18 DIAGNOSIS — R29.898 WEAKNESS OF BOTH LOWER EXTREMITIES: ICD-10-CM

## 2023-09-18 DIAGNOSIS — G62.9 NEUROPATHY: ICD-10-CM

## 2023-09-18 PROCEDURE — 99215 OFFICE O/P EST HI 40 MIN: CPT | Performed by: INTERNAL MEDICINE

## 2023-09-18 RX ORDER — PREDNISONE 1 MG/1
TABLET ORAL
Qty: 90 TABLET | Refills: 0 | Status: SHIPPED | OUTPATIENT
Start: 2023-09-18 | End: 2023-11-17

## 2023-09-18 RX ORDER — CLOBETASOL PROPIONATE 0.5 MG/G
CREAM TOPICAL
COMMUNITY
Start: 2023-09-06

## 2023-09-18 RX ORDER — GABAPENTIN 100 MG/1
100 CAPSULE ORAL 3 TIMES DAILY
Qty: 90 CAPSULE | Refills: 6 | Status: SHIPPED | OUTPATIENT
Start: 2023-09-18

## 2023-09-18 RX ORDER — FOLIC ACID 1 MG/1
1 TABLET ORAL DAILY
Qty: 30 TABLET | Refills: 5 | Status: SHIPPED | OUTPATIENT
Start: 2023-09-18

## 2023-09-18 RX ORDER — CLOBETASOL PROPIONATE 0.46 MG/ML
SOLUTION TOPICAL
COMMUNITY
Start: 2023-09-06

## 2023-09-18 RX ORDER — PREDNISONE 5 MG/1
5 TABLET ORAL DAILY
Qty: 30 TABLET | Refills: 5 | Status: SHIPPED | OUTPATIENT
Start: 2023-09-18

## 2023-09-18 NOTE — PATIENT INSTRUCTIONS
Increase methotrexate to 8 tabs once a week to better address inflammatory arthritis  Continue folic acid daily  Continue Humira every other week through Dermatology  Take prednisone 7mg daily for a month, then 6mg daily for a month, then 5mg daily  Do labs - evaluate for inflammatory myositis  Start gabapentin 100mg three times a day for neuropathy    Return to clinic in 6 months    Psoriatic Arthritis in Adults    What is psoriatic arthritis? -- Psoriatic arthritis is a condition that causes joint pain, swelling, and stiffness. It happens in people who have a long-term skin condition called psoriasis. People with psoriasis have patches of thick, red skin that are often covered by silver or white scales  Doctors don't know what causes psoriasis or psoriatic arthritis. What are the symptoms of psoriatic arthritis? -- Psoriatic arthritis causes pain, stiffness, and swelling in the affected joints. It can also affect the spine in some people. Because of the joint and spine problems, people can have trouble moving their body. Stiffness in the joints or low back is usually worse in the morning and lasts 30 minutes or longer. It usually gets better with exercise. Psoriatic arthritis can affect joints on one or both sides of the body. It usually affects more than one joint. In addition to joint symptoms (and the skin symptoms of psoriasis), people sometimes have other symptoms. These can include:  ? Swelling of a finger or toe, or the hands or feet  ? Swelling and pain in the back of the ankle or in the heel   ? Nail symptoms - The nails can look "pitted," as if they were pricked by a pin. The nail can also come up off the nail bed. ? Eye pain or redness  Is there a test for psoriatic arthritis? -- Yes. Your doctor or nurse will ask about your symptoms and do an exam. He or she will order X-rays of your painful joints. He or she might order an imaging test called an MRI.  Imaging tests create pictures of the inside of the body.  To check that another condition isn't causing your symptoms, your doctor or nurse might also order:  ?Blood tests  ? Lab tests on a sample of fluid from a swollen joint - To get a sample of fluid, the doctor will put a thin needle in your joint. How is psoriatic arthritis treated? -- There is no cure for psoriatic arthritis, but different treatments can help ease and control symptoms. Treatment for joint symptoms usually involves one or more of the following:  ?Medicines called nonsteroidal antiinflammatory drugs, or "NSAIDs" for short - Examples of NSAIDs are aspirin, ibuprofen (sample brand names: Advil, Motrin), and naproxen (sample brand name: Aleve). ? Medicines that are usually used to treat other types of arthritis - Some of these include methotrexate and leflunomide. ? Medicines that block a substance called tumor necrosis factor, or "TNF" for short - TNF plays a role in psoriasis and psoriatic arthritis. Medicines that block TNF are called "anti-TNF" medicines. Examples include etanercept (brand name: Enbrel) and adalimumab (brand name: Humira). ?Other medicines - If the options above don't help, your doctor might suggest trying a different medicine. Examples include ustekinumab (brand name: Ninoska Deer), secukinumab (brand name: Cosentyx), tofacitinib (brand name: Sasha Lightning), abatacept (brand name: Cecil Drown), and apremilast (brand name: Alledenise Rand). ? Shots of medicines called steroids that go into the painful joint - These are not the same as the steroids some athletes take illegally. These steroids help reduce swelling and pain. ? Heat - Heat, especially in the morning, can help reduce pain and stiffness. Do not use heat for longer than 20 minutes at a time. Also, do not use anything too hot that could burn your skin. ? Physical and occupational therapy - This involves learning exercises, movements, and ways of doing everyday tasks. ? Special shoe inserts (called "orthotics") - These can help keep your feet, ankles, and knees in the proper position. ?Treatment for psoriatic arthritis is usually long term. That's because even after symptoms get better, they sometimes return later on. Is there anything I can do on my own to feel better? -- Yes. It is very important that you stay active. You might want to avoid being active because you are in pain. But this can make things worse. It can make your muscles weak and your joints stiffer than they already are. Your doctor, nurse, or physical therapist can help you figure out which activities and exercises are right for you.

## 2023-09-18 NOTE — PROGRESS NOTES
Assessment and Plan:  Felipa Goldmann is a 68 y.o.  male who presents for follow-up of psoriatic arthritis. Joint pain is bothering more than his rash lately. Also having weakness and neuropathy in legs. Had abnormal EMG. Increase methotrexate to 8 tabs once a week to better address inflammatory arthritis  Continue folic acid daily  Continue Humira every other week through Dermatology  Take prednisone 7mg daily for a month, then 6mg daily for a month, then 5mg daily  Do labs - evaluate for inflammatory myositis  Start gabapentin 100mg three times a day for neuropathy    Return to clinic in 6 months    Plan:  Diagnoses and all orders for this visit:    Psoriatic arthritis ([address])  -     methotrexate 2.5 mg tablet; Take 8 tablets (20 mg total) by mouth once a week  -     predniSONE 5 mg tablet; Take 1 tablet (5 mg total) by mouth daily  -     predniSONE 1 mg tablet; Take 2 tablets (2 mg total) by mouth daily for 30 days, THEN 1 tablet (1 mg total) daily. Take with 5mg tabs. Inflammatory arthritis  -     folic acid (FOLVITE) 1 mg tablet; Take 1 tablet (1 mg total) by mouth daily  -     predniSONE 5 mg tablet; Take 1 tablet (5 mg total) by mouth daily  -     predniSONE 1 mg tablet; Take 2 tablets (2 mg total) by mouth daily for 30 days, THEN 1 tablet (1 mg total) daily. Take with 5mg tabs. Weakness of both lower extremities  -     CK  -     Aldolase  -     MyoMarker 3 Plus Profile (RDL)    Neuropathy  -     gabapentin (Neurontin) 100 mg capsule;  Take 1 capsule (100 mg total) by mouth 3 (three) times a day    High risk medication use    Other orders  -     clobetasol (TEMOVATE) 0.05 % cream; APPLY TWICE DAILY TO THE AFFECTED PSORIASIS AREA FOR NO LONGER THAN 2 WEEKS, DO NOT APPLY TO FACE  -     clobetasol (TEMOVATE) 0.05 % external solution; APPLY 20 DROPS TO ITCHY AREAS ON SCALP AND LET DRY AT BEDTIME FOR 2 WEEKS    High risk medication use - Benefits and risks of methotrexate use, including but not limited to gastrointestinal disturbances such as diarrhea, hair loss, fatigue, stomatitis, leukopenia, lung hypersensitivity reaction, hepatotoxicity, and lymphoma were discussed with the patient. CBC,CMP will be regularly monitored. Patient was prescribed Folic Acid 1 mg po daily to take while on methotrexate to help prevent side effects. Patient counseled on abstinence from alcohol while using methotrexate. Return to clinic in 6 months      Chief Complaint  Follow-up of psoriatic arthritis    Rheumatic Disease Summary  Last visit 5/4/23: Patient presents for follow-up of psoriatic arthritis and polymyalgia rheumatica. Increase methotrexate to 7 tabs once a week to better address inflammatory arthritis  Continue folic acid daily  Continue hydroxychloroquine twice a day  Take cyclosporine 2 capsules twice a day (4 in a day) for 1-2 weeks to clear current psoriasis  Physical therapy referral made  Do labs in a month    HPI  Rose Christopher is a 79-year-old male who presents today for follow-up. He consents to the use of [facility] services today. The following portions of the patient's history were reviewed and updated as appropriate: allergies, current medications, past family history, past medical history, past social history, past surgical history, and problem list.    Interval History:  He started taking prednisone in early 08/2023 and completed the regimen on 09/04/2023. He notes improvement in pain and walking but reports pain recurrence when prednisone was discontinued. He wishes for arthritis medication since it is more of a concern to him than his psoriasis. He was advised to discontinue taking hydroxychloroquine and believes no benefit with arthritis. He states discontinuing hydroxychloroquine was about the same time of starting Humira. He has been taking 7 tablets methotrexate every Monday, once a week. He reports benefit with prednisone 7.5 mg and notices lack appetite and difference when decreasing to 5 mg.  He has been taking folic acid every other day. He reports bilateral leg joint pain, morning leg stiffness for 30 minutes, and upper leg numbness and weakness. He has been seeing physical therapy once a week and does biking. He started Humira on 07/24/2023 and notes benefit with psoriasis and not for arthritis. He reports bilateral shoulder pain. He uses pulleys and elastics bands when he goes for physical therapy in the gym for his neck and shoulders. He inquires the benefit of keto gummies. He will be celebrating his 2 years of sobriety. He admits to neuropathy of his legs at the bottom of his feet for close to a year now. He had an outside EMG done on 11/03/2022 which showed moderate chronic axonal and demyelinating process affecting the distal greater than proximal sensory, motor nerves of the lower greater than upper extremities. Started Humira 7/1st, is not helping arthritis. Review of Systems:   Pertinent ROS positive for fatigue, headaches, blurry vision, abnormal vision, joint pain, joint swelling, back pain, muscle pain, skin rashes, color changes, weakness, numbness, and bruising. Rest of 14 point ROS reviewed and were negative.     Home Medications:    Current Outpatient Medications:   •  busPIRone (BUSPAR) 15 mg tablet, Take 1 tablet (15 mg total) by mouth 2 (two) times a day, Disp: 30 tablet, Rfl: 1  •  clobetasol (TEMOVATE) 0.05 % cream, APPLY TWICE DAILY TO THE AFFECTED PSORIASIS AREA FOR NO LONGER THAN 2 WEEKS, DO NOT APPLY TO FACE, Disp: , Rfl:   •  clobetasol (TEMOVATE) 0.05 % external solution, APPLY 20 DROPS TO ITCHY AREAS ON SCALP AND LET DRY AT BEDTIME FOR 2 WEEKS, Disp: , Rfl:   •  dicyclomine (BENTYL) 10 mg capsule, Take 1 capsule (10 mg total) by mouth 4 (four) times a day as needed (abdominal pain), Disp: 30 capsule, Rfl: 2  •  finasteride (PROSCAR) 5 mg tablet, Take 1 tablet (5 mg total) by mouth daily, Disp: 90 tablet, Rfl: 1  •  folic acid (FOLVITE) 1 mg tablet, Take 1 tablet (1 mg total) by mouth daily, Disp: 30 tablet, Rfl: 5  •  gabapentin (Neurontin) 100 mg capsule, Take 1 capsule (100 mg total) by mouth 3 (three) times a day, Disp: 90 capsule, Rfl: 6  •  hydrochlorothiazide (HYDRODIURIL) 25 mg tablet, Take 25 mg by mouth daily, Disp: , Rfl:   •  hydrocortisone (ANUSOL-HC) 25 mg suppository, Insert 1 suppository (25 mg total) into the rectum 2 (two) times a day, Disp: 12 suppository, Rfl: 0  •  hydroxychloroquine (PLAQUENIL) 200 mg tablet, Take 1 tablet (200 mg total) by mouth 2 (two) times a day, Disp: 60 tablet, Rfl: 5  •  ibuprofen (MOTRIN) 600 mg tablet, take 1 tablet by mouth three times a day for 14 days, Disp: , Rfl:   •  methotrexate 2.5 mg tablet, Take 8 tablets (20 mg total) by mouth once a week, Disp: 40 tablet, Rfl: 5  •  metoprolol succinate (TOPROL-XL) 25 mg 24 hr tablet, Take 25 mg by mouth in the morning, Disp: , Rfl:   •  Multiple Vitamin (multivitamin) tablet, Take 1 tablet by mouth daily, Disp: 30 tablet, Rfl: 0  •  nadolol (CORGARD) 20 mg tablet, Take 1 tablet (20 mg total) by mouth daily, Disp: 90 tablet, Rfl: 1  •  predniSONE 1 mg tablet, Take 2 tablets (2 mg total) by mouth daily for 30 days, THEN 1 tablet (1 mg total) daily. Take with 5mg tabs. , Disp: 90 tablet, Rfl: 0  •  predniSONE 5 mg tablet, Take 1 tablet (5 mg total) by mouth daily, Disp: 30 tablet, Rfl: 5  •  traZODone (DESYREL) 100 mg tablet, Take 1.5 tablets (150 mg total) by mouth daily at bedtime, Disp: 135 tablet, Rfl: 0  •  triamcinolone (KENALOG) 0.1 % cream, APPLY DIRECTLY TO ITCHY RED AREAS ON LEFT SIDE OF CHEST AND ARM W...  (REFER TO PRESCRIPTION NOTES). , Disp: , Rfl:   •  white petrolatum-mineral oil (EUCERIN,HYDROCERIN) cream, Apply topically 3 (three) times a day as needed (pruritis), Disp: 113 g, Rfl: 0  •  albuterol (ProAir HFA) 90 mcg/act inhaler, Inhale 2 puffs every 6 (six) hours as needed for wheezing, Disp: 8.5 g, Rfl: 0  •  alfuzosin (UROXATRAL) 10 mg 24 hr tablet, Take 1 tablet (10 mg total) by mouth daily, Disp: 90 tablet, Rfl: 1  •  fluticasone (FLONASE) 50 mcg/act nasal spray, 2 sprays into each nostril daily, Disp: 11.1 mL, Rfl: 0    Objective:    Vitals:    09/18/23 1153   BP: 124/70   Pulse: 74   SpO2: 95%   Weight: 110 kg (242 lb 3.2 oz)   Height: 5' 7" (1.702 m)       Physical Exam:  Constitutional:       General: He is not in acute distress. HENT:      Head: Normocephalic and atraumatic. Eyes:      Conjunctiva/sclera: Conjunctivae normal.   Cardiovascular:      Rate and Rhythm: Normal rate and regular rhythm. Heart sounds: S1 normal and S2 normal.      No friction rub. Pulmonary:      Effort: Pulmonary effort is normal. No respiratory distress. Breath sounds: Normal breath sounds. No wheezing, rhonchi or rales. Musculoskeletal:      General: Bruise on the dorsum of the left hand. Healing psoriasis lesions on bilateral upper and lower extremities. Lower extremities lesions are hyperpigmented non crusty or scaly. Patient needing assistance to get up from the chair. Cervical back: Neck supple. Skin:     Coloration: Skin is not pale. Neurological:      Mental Status: He is alert. Mental status is at baseline. Psychiatric:         Mood and Affect: Mood normal.         Behavior: Behavior normal.      I have personally reviewed results with the patient. Imaging:   Bilateral hand and foot x-rays 2/16/23 - degenerative changes, no inflammatory changes    Labs:   Office Visit on 04/06/2023   Component Date Value Ref Range Status   • POST-VOID RESIDUAL VOLUME, ML POC 04/06/2023 0  mL Final       Transcribed for Nisa Roberto MD, by Zenaida Jacobson on 09/22/23 at 5:44 AM. Powered by [employer].

## 2023-09-18 NOTE — ASSESSMENT & PLAN NOTE
Advised to continue Humira every other week through dermatology. Will increase methotrexate to 8 tablets once a week.
Will order prednisone 7 mg daily for 1 month, 1 tablet of 5 mg and 2 tablets of 1 mg and will gradually decrease to 6 mg for 1 month then 5 mg for 1 month. Advised to continue folic acid daily. Recommended to currently avoid hydroxychloroquine.
Will rule out polymyositis and will check muscle enzymes and possible consideration of switching medications.
Detail Level: Zone

## 2023-10-06 ENCOUNTER — TELEPHONE (OUTPATIENT)
Age: 73
End: 2023-10-06

## 2023-10-06 NOTE — TELEPHONE ENCOUNTER
[facility]  calling to see if pt got his bloodwork done. I explained there were orders to be done. She decided to talk to pt when he comes in today.     No further action is necessary
Implemented All Universal Safety Interventions:  [place] to call system. Call bell, personal items and telephone within reach. Instruct patient to call for assistance. Room bathroom lighting operational. Non-slip footwear when patient is off stretcher. Physically safe environment: no spills, clutter or unnecessary equipment. Stretcher in lowest position, wheels locked, appropriate side rails in place.

## 2023-10-11 ENCOUNTER — APPOINTMENT (OUTPATIENT)
Dept: LAB | Facility: CLINIC | Age: 73
End: 2023-10-11
Payer: MEDICARE

## 2023-10-11 DIAGNOSIS — N28.1 COMPLEX RENAL CYST: ICD-10-CM

## 2023-10-11 LAB
ANION GAP SERPL CALCULATED.3IONS-SCNC: 9 MMOL/L
BUN SERPL-MCNC: 16 MG/DL (ref 5–25)
CALCIUM SERPL-MCNC: 9.3 MG/DL (ref 8.4–10.2)
CHLORIDE SERPL-SCNC: 102 MMOL/L (ref 96–108)
CK SERPL-CCNC: 38 U/L (ref 39–308)
CO2 SERPL-SCNC: 30 MMOL/L (ref 21–32)
CREAT SERPL-MCNC: 0.82 MG/DL (ref 0.6–1.3)
GFR SERPL CREATININE-BSD FRML MDRD: 87 ML/MIN/1.73SQ M
GLUCOSE P FAST SERPL-MCNC: 84 MG/DL (ref 65–99)
POTASSIUM SERPL-SCNC: 4 MMOL/L (ref 3.5–5.3)
SODIUM SERPL-SCNC: 141 MMOL/L (ref 135–147)

## 2023-10-11 PROCEDURE — 80048 BASIC METABOLIC PNL TOTAL CA: CPT

## 2023-10-11 PROCEDURE — 83516 IMMUNOASSAY NONANTIBODY: CPT

## 2023-10-12 LAB — ALDOLASE SERPL-CCNC: 3.9 U/L (ref 3.3–10.3)

## 2023-10-25 ENCOUNTER — OFFICE VISIT (OUTPATIENT)
Dept: UROLOGY | Facility: CLINIC | Age: 73
End: 2023-10-25
Payer: MEDICARE

## 2023-10-25 VITALS
DIASTOLIC BLOOD PRESSURE: 82 MMHG | HEART RATE: 66 BPM | SYSTOLIC BLOOD PRESSURE: 132 MMHG | BODY MASS INDEX: 37.43 KG/M2 | WEIGHT: 239 LBS

## 2023-10-25 DIAGNOSIS — Z12.5 PROSTATE CANCER SCREENING: ICD-10-CM

## 2023-10-25 DIAGNOSIS — N28.1 COMPLEX RENAL CYST: Primary | ICD-10-CM

## 2023-10-25 PROCEDURE — 99213 OFFICE O/P EST LOW 20 MIN: CPT

## 2023-10-25 NOTE — PROGRESS NOTES
10/25/2023    No chief complaint on file. Assessment and Plan    68 y.o. male     Complex renal cyst  Bilateral mildly complex renal cyst noted on renal ultrasound from 2/20/2023  1.5 cm mildly complex cyst in the mid right kidney with internal septations and probable calcification. 2.6 cm mildly complex cyst with internal septation in the left lower pole. This was previously identified as a Bosniak 2 cyst on CT imaging from April 2021 and is stable in size. He would have a follow-up CT with and without IV contrast completed through outside imaging on 10/19/2023. According to report this shows a cystic lesion at the left lower pole, today measured up to 3.7 cm x 3.1 cm. This appears grossly stable compared to recent CT 10/2022 there appears to be a subtle indwelling linear septation making this a slightly complex cyst. On precontrast image this shows Hounsfield density 25 units suggesting a complex cyst. On a postcontrast image this shows Hounsfield density 31 units suggesting no significant enhancement. Therefore probably benign but complex cyst.  There is no mention of a right renal cyst  I contacted [facility] to have their radiologist review CT imaging as there is conflicting information in regards to the size stability. Current reports measures the left renal cyst at 3.7 x 3.1 cm and appears grossly stable when compared to prior CT 10/2022. Review of that CT report and most recent Renal US from 2/2023 measure the left renal cyst at 2.6 cm. If current CT reports is truly accurate and if the cyst does currently measure at 3.7 cm I would not consider growth of 1 cm over a 6 month period to be stable. I will contact the patient when I hear from [facility] imaging to determine follow-up recommendations. He is asymptomatic. 2. BPH with nocturia   No changes to baseline lower urinary tract symptoms. Still with nocturia and hesitancy reporting 2-3 times per night.   Not currently interested in cystoscopy evaluation for possible bladder outlet obstructive surgery. Continue alfuzosin 10 mg daily and finasteride 5 mg daily follow-up in 1 year    Subjective:    He presents today reporting no overall changes to his lower urinary tract symptoms. He continues to report urinary hesitancy mostly at night as well as nocturia. This has been stable. During the daytime his stream is strong and denies any issues with incomplete emptying. History of Present Illness  [place] is a 68 y.o. male here for follow-up evaluation of BPH with nocturia and complex renal cyst.     He was initially seen by me on 1/19/2023 for evaluation of nocturia. He reports prior Urologic care over 5 years ago. He has a history BPH and has been managed on alfuzosin 10 mg and finasteride 5 mg daily for several years. He reports his nocturia has worsened over the past 3-4 months and reports having to get up 3-4 times per night. He does report being started on a water pill about 6 months ago and does drink a lot of water. In addition he does report a history of Kidney stones in the past and was told to avoid Ice tea before, he does drink about 1 glass of ice tea per day. He drinks about 1 gallon of water per day. Denies alcohol, soda, or coffee intake. Prostate cancer screening: Negative family history of prostate cancer. Last PSA available to review through [facility] was 2.57/5.0 when corrected for finasteride on 11/08/2021. He is unsure what his PSA has been in the past.   His rectal exam was benign during initial office visit. Repeat PSA was completed on 1/20/2023 and is improved at 1.77/3.54 corrected for finasteride. Renal ultrasound completed on 2/20/2023 with good bladder emptying with PVR of 26.2 mL. Patient does have bilateral mildly complex renal cyst with internal  septations. 1.5 cm cyst in the mid right kidney and a 2.6 cm cyst within the lower pole the left kidney.   The 2.6 cm left lower pole renal cyst was previously identified as a Bosniak 2 cyst on CT imaging from 4/17/2021 and is stable in size. I recommended a surveillance CT with and without contrast which was completed on 10/19/2023 through outside imaging. According to report this shows a cystic lesion at the left lower pole, today measured up to 3.7 cm x 3.1 cm. This appears grossly stable compared to recent CT 10/2022 there appears to be a subtle indwelling linear septation making this a slightly complex cyst. On precontrast image this shows Hounsfield density 25 units suggesting a complex cyst. On a postcontrast image this shows Hounsfield density 31 units suggesting no significant enhancement. Therefore probably benign but complex cyst.             Review of Systems   Constitutional:  Negative for chills and fever. HENT:  Negative for congestion and sore throat. Respiratory:  Negative for cough and shortness of breath. Cardiovascular:  Negative for chest pain and leg swelling. Gastrointestinal:  Negative for abdominal pain, constipation and diarrhea. Genitourinary:  Negative for difficulty urinating, dysuria, frequency, hematuria and urgency. Nocturia   Musculoskeletal:  Negative for back pain and gait problem. Skin:  Negative for wound. Allergic/Immunologic: Negative for immunocompromised state. Hematological:  Does not bruise/bleed easily. Vitals  Vitals:    10/25/23 1047   BP: 132/82   Pulse: 66   Weight: 108 kg (239 lb)       Physical Exam  Vitals reviewed. Constitutional:       General: He is not in acute distress. Appearance: Normal appearance. He is not ill-appearing or toxic-appearing. HENT:      Head: Normocephalic and atraumatic. Eyes:      General: No scleral icterus. Conjunctiva/sclera: Conjunctivae normal.   Cardiovascular:      Rate and Rhythm: Normal rate. Pulmonary:      Effort: Pulmonary effort is normal. No respiratory distress. Abdominal:      Tenderness:  There is no right CVA tenderness or left CVA tenderness. Hernia: No hernia is present. Musculoskeletal:      Cervical back: Normal range of motion. Right lower leg: No edema. Left lower leg: No edema. Skin:     General: Skin is warm and dry. Coloration: Skin is not jaundiced or pale. Neurological:      General: No focal deficit present. Mental Status: He is alert and oriented to person, place, and time. Mental status is at baseline. Gait: Gait normal.   Psychiatric:         Mood and Affect: Mood normal.         Behavior: Behavior normal.         Thought Content:  Thought content normal.         Judgment: Judgment normal.         Past History  Past Medical History:   Diagnosis Date    Alcohol dependency ([address])     Anxiety     Arthritis     Depression     [place] (hard of hearing)     Hypertension     Kidney stones     Prostate enlargement     Renal disorder     kidney    Shoulder dislocation      Social History     Socioeconomic History    Marital status: /Civil Union     Spouse name: None    Number of children: None    Years of education: None    Highest education level: None   Occupational History    None   Tobacco Use    Smoking status: Never    Smokeless tobacco: Never   Vaping Use    Vaping Use: Never used   Substance and Sexual Activity    Alcohol use: Not Currently     Alcohol/week: 6.0 standard drinks of alcohol     Types: 6 Shots of liquor per week     Comment: last drink 16 months ago    Drug use: Never    Sexual activity: Not Currently     Partners: Female   Other Topics Concern    None   Social History Narrative    None     Social Determinants of Health     Financial Resource Strain: Low Risk  (12/27/2022)    Overall Financial Resource Strain (CARDIA)     Difficulty of Paying Living Expenses: Not very hard   Food Insecurity: Not on file   Transportation Needs: No Transportation Needs (12/27/2022)    PRAPARE - Transportation     Lack of Transportation (Medical): No     Lack of Transportation (Non-Medical): No   Physical Activity: Not on file   Stress: Not on file   Social Connections: Not on file   Intimate Partner Violence: Not on file   Housing Stability: Not on file     Social History     Tobacco Use   Smoking Status Never   Smokeless Tobacco Never     Family History   Problem Relation Age of Onset    Dementia Mother     Colon cancer Mother     Heart disease Father     COPD Father     Heart failure Father     Coronary artery disease Father        The following portions of the patient's history were reviewed and updated as appropriate allergies, current medications, past medical history, past social history, past surgical history and problem list    Imaging:    Results  No results found for this or any previous visit (from the past 1 hour(s)). ]  No results found for: "PSA"  Lab Results   Component Value Date    GLUCOSE 119 12/29/2014    CALCIUM 9.3 10/11/2023     12/29/2014    K 4.0 10/11/2023    CO2 30 10/11/2023     10/11/2023    BUN 16 10/11/2023    CREATININE 0.82 10/11/2023     Lab Results   Component Value Date    WBC 5.27 09/14/2023    HGB 13.8 09/14/2023    HCT 41.9 09/14/2023    MCV 94 09/14/2023     09/14/2023       Please Note:  Voice dictation software has been used to create this document. There may be inadvertent transcriptions errors.      MALGORZATA Bonilla 10/25/23

## 2023-10-27 ENCOUNTER — OFFICE VISIT (OUTPATIENT)
Dept: URGENT CARE | Facility: CLINIC | Age: 73
End: 2023-10-27
Payer: MEDICARE

## 2023-10-27 VITALS
DIASTOLIC BLOOD PRESSURE: 70 MMHG | HEART RATE: 65 BPM | RESPIRATION RATE: 18 BRPM | OXYGEN SATURATION: 97 % | TEMPERATURE: 98.5 F | SYSTOLIC BLOOD PRESSURE: 140 MMHG

## 2023-10-27 DIAGNOSIS — R09.81 NASAL CONGESTION: ICD-10-CM

## 2023-10-27 DIAGNOSIS — R05.1 ACUTE COUGH: Primary | ICD-10-CM

## 2023-10-27 LAB
EJ AB SER QL: NEGATIVE
ENA JO1 AB SER IA-ACNC: <20 UNITS
ENA PM/SCL AB SER-ACNC: <20 UNITS
ENA SS-A 52KD IGG SER IA-ACNC: <20 UNITS
FIBRILLARIN AB SER QL: NEGATIVE
KU AB SER QL: NEGATIVE
MDA5 AB SER LINE BLOT-ACNC: <20 UNITS
MI2 AB SER QL: NEGATIVE
MJ AB SER LINE BLOT-ACNC: <20 UNITS
OJ AB SER QL: NEGATIVE
PL12 AB SER QL: NEGATIVE
PL7 AB SER QL: NEGATIVE
SAE1 IGG SER QL LINE BLOT: <20 UNITS
SRP AB SERPL QL: NEGATIVE
TIF1-GAMMA AB SER LINE BLOT-ACNC: <20 UNITS
U1 SNRNP AB SER IA-ACNC: <20 UNITS
U2 SNRNP AB SER QL: NEGATIVE

## 2023-10-27 PROCEDURE — G0463 HOSPITAL OUTPT CLINIC VISIT: HCPCS

## 2023-10-27 PROCEDURE — 99213 OFFICE O/P EST LOW 20 MIN: CPT

## 2023-10-27 NOTE — PROGRESS NOTES
[facility]        NAME: Cesar Gonzalez is a 68 y.o. male  : 1950    MRN: [id number]  DATE: 2023  TIME: 3:44 PM    Assessment and Plan   Acute cough [R05.1]  1. Acute cough  dextromethorphan 15 MG/5ML syrup      2. Nasal congestion  sodium chloride (OCEAN) 0.65 % nasal spray        3 days of cough and nasal congestion/chest congestion. No sick contacts. No fevers, chills, body aches. Taking over-the-counter Tylenol Cold and flu. We will send in dextromethorphan Numorphan cough syrup and nasal saline for rinsing. Vital signs within normal limits. Lung sounds clear to auscultation. Advised follow-up with family doctor. Advised to go to the ER symptoms worsen. Patient Instructions     Use prescribed medication as instructed. Tylenol for pain or fever. Make sure to stay well-hydrated and rest.  Try warm tea with honey or warm soup broths to help coat the throat. If no improvement, follow-up with your family doctor. Go to the ER if any symptoms worsen. Follow up with PCP in 3-5 days. Proceed to  ER if symptoms worsen. Chief Complaint     Chief Complaint   Patient presents with    Cough    chest congestion     Started 3 days ago  OTC tylenol cold and flu         History of Present Illness       77-year-old male presents the clinic with 3 days of cough, nasal congestion/runny nose, and chest congestion. Denies any sick contacts. States has been taking over-the-counter Tylenol Cold and flu with some relief. Denies any fever, chills, chest pain, shortness of breath, abdominal pain, nausea, vomiting, diarrhea, ear pain/ear drainage. Review of Systems   Review of Systems   Constitutional: Negative. HENT: Negative. Eyes: Negative. Respiratory:  Positive for cough. Negative for shortness of breath and wheezing. Cardiovascular: Negative. Gastrointestinal: Negative. Musculoskeletal: Negative. Skin: Negative. Neurological: Negative.           Current Medications       Current Outpatient Medications:     busPIRone (BUSPAR) 15 mg tablet, Take 1 tablet (15 mg total) by mouth 2 (two) times a day, Disp: 30 tablet, Rfl: 1    clobetasol (TEMOVATE) 0.05 % cream, APPLY TWICE DAILY TO THE AFFECTED PSORIASIS AREA FOR NO LONGER THAN 2 WEEKS, DO NOT APPLY TO FACE, Disp: , Rfl:     clobetasol (TEMOVATE) 0.05 % external solution, APPLY 20 DROPS TO ITCHY AREAS ON SCALP AND LET DRY AT BEDTIME FOR 2 WEEKS, Disp: , Rfl:     dextromethorphan 15 MG/5ML syrup, Take 10 mL (30 mg total) by mouth 4 (four) times a day as needed for cough, Disp: 120 mL, Rfl: 1    dicyclomine (BENTYL) 10 mg capsule, Take 1 capsule (10 mg total) by mouth 4 (four) times a day as needed (abdominal pain), Disp: 30 capsule, Rfl: 2    finasteride (PROSCAR) 5 mg tablet, Take 1 tablet (5 mg total) by mouth daily, Disp: 90 tablet, Rfl: 1    folic acid (FOLVITE) 1 mg tablet, Take 1 tablet (1 mg total) by mouth daily, Disp: 30 tablet, Rfl: 5    gabapentin (Neurontin) 100 mg capsule, Take 1 capsule (100 mg total) by mouth 3 (three) times a day, Disp: 90 capsule, Rfl: 6    hydrochlorothiazide (HYDRODIURIL) 25 mg tablet, Take 25 mg by mouth daily, Disp: , Rfl:     hydroxychloroquine (PLAQUENIL) 200 mg tablet, Take 1 tablet (200 mg total) by mouth 2 (two) times a day, Disp: 60 tablet, Rfl: 5    ibuprofen (MOTRIN) 600 mg tablet, take 1 tablet by mouth three times a day for 14 days, Disp: , Rfl:     methotrexate 2.5 mg tablet, Take 8 tablets (20 mg total) by mouth once a week, Disp: 40 tablet, Rfl: 5    metoprolol succinate (TOPROL-XL) 25 mg 24 hr tablet, Take 25 mg by mouth in the morning, Disp: , Rfl:     Multiple Vitamin (multivitamin) tablet, Take 1 tablet by mouth daily, Disp: 30 tablet, Rfl: 0    nadolol (CORGARD) 20 mg tablet, Take 1 tablet (20 mg total) by mouth daily, Disp: 90 tablet, Rfl: 1    predniSONE 1 mg tablet, Take 2 tablets (2 mg total) by mouth daily for 30 days, THEN 1 tablet (1 mg total) daily.  Take with 5mg tabs. , Disp: 90 tablet, Rfl: 0    sodium chloride (OCEAN) 0.65 % nasal spray, 1 spray into each nostril as needed for congestion, Disp: 15 mL, Rfl: 1    traZODone (DESYREL) 100 mg tablet, Take 1.5 tablets (150 mg total) by mouth daily at bedtime, Disp: 135 tablet, Rfl: 0    triamcinolone (KENALOG) 0.1 % cream, APPLY DIRECTLY TO ITCHY RED AREAS ON LEFT SIDE OF CHEST AND ARM W...  (REFER TO PRESCRIPTION NOTES). , Disp: , Rfl:     albuterol (ProAir HFA) 90 mcg/act inhaler, Inhale 2 puffs every 6 (six) hours as needed for wheezing, Disp: 8.5 g, Rfl: 0    alfuzosin (UROXATRAL) 10 mg 24 hr tablet, Take 1 tablet (10 mg total) by mouth daily, Disp: 90 tablet, Rfl: 1    fluticasone (FLONASE) 50 mcg/act nasal spray, 2 sprays into each nostril daily, Disp: 11.1 mL, Rfl: 0    hydrocortisone (ANUSOL-HC) 25 mg suppository, Insert 1 suppository (25 mg total) into the rectum 2 (two) times a day, Disp: 12 suppository, Rfl: 0    predniSONE 5 mg tablet, Take 1 tablet (5 mg total) by mouth daily, Disp: 30 tablet, Rfl: 5    white petrolatum-mineral oil (EUCERIN,HYDROCERIN) cream, Apply topically 3 (three) times a day as needed (pruritis), Disp: 113 g, Rfl: 0    Current Allergies     Allergies as of 10/27/2023 - Reviewed 10/27/2023   Allergen Reaction Noted    Sulfa antibiotics Anaphylaxis and Rash 11/07/2014    Sulfacetamide Anaphylaxis and Rash 11/07/2014    Misc. sulfonamide containing compounds Facial Swelling 11/22/2022    Elemental sulfur Rash and Edema 11/04/2014            The following portions of the patient's history were reviewed and updated as appropriate: allergies, current medications, past family history, past medical history, past social history, past surgical history and problem list.     Past Medical History:   Diagnosis Date    Alcohol dependency ([address])     Anxiety     Arthritis     Depression     [place] (hard of hearing)     Hypertension     Kidney stones     Prostate enlargement     Renal disorder     kidney Shoulder dislocation        Past Surgical History:   Procedure Laterality Date    CHOLECYSTECTOMY      2010    COLONOSCOPY      SHOULDER SURGERY Left     for dislocation x 2, 20 years ago an the second 25 years ago       Family History   Problem Relation Age of Onset    Dementia Mother     Colon cancer Mother     Heart disease Father     COPD Father     Heart failure Father     Coronary artery disease Father          Medications have been verified. Objective   /70   Pulse 65   Temp 98.5 °F (36.9 °C)   Resp 18   SpO2 97%        Physical Exam     Physical Exam  Constitutional:       General: He is not in acute distress. Appearance: Normal appearance. He is not ill-appearing or diaphoretic. HENT:      Head: Normocephalic and atraumatic. Right Ear: Tympanic membrane, ear canal and external ear normal.      Left Ear: Tympanic membrane, ear canal and external ear normal.      Nose: Congestion and rhinorrhea present. Mouth/Throat:      Mouth: Mucous membranes are moist.      Pharynx: Oropharynx is clear. No oropharyngeal exudate or posterior oropharyngeal erythema. Eyes:      Extraocular Movements: Extraocular movements intact. Conjunctiva/sclera: Conjunctivae normal.      Pupils: Pupils are equal, round, and reactive to light. Cardiovascular:      Rate and Rhythm: Normal rate and regular rhythm. Pulses: Normal pulses. Heart sounds: Normal heart sounds. Pulmonary:      Effort: Pulmonary effort is normal. No respiratory distress. Breath sounds: Normal breath sounds. No stridor. No wheezing, rhonchi or rales. Chest:      Chest wall: No tenderness. Musculoskeletal:      Cervical back: Normal range of motion and neck supple. Lymphadenopathy:      Cervical: No cervical adenopathy. Skin:     General: Skin is warm and dry. Capillary Refill: Capillary refill takes less than 2 seconds. Findings: No rash.    Neurological:      General: No focal deficit present. Mental Status: He is alert. Mental status is at baseline.    Psychiatric:         Mood and Affect: Mood normal.

## 2023-10-27 NOTE — PATIENT INSTRUCTIONS
Use prescribed medication as instructed. Tylenol for pain or fever. Make sure to stay well-hydrated and rest.  Try warm tea with honey or warm soup broths to help coat the throat. If no improvement, follow-up with your family doctor. Go to the ER if any symptoms worsen. Follow up with PCP in 3-5 days. Proceed to  ER if symptoms worsen. Acute Cough   WHAT YOU NEED TO KNOW:   An acute cough can last up to 3 weeks. Common causes of an acute cough include a cold, allergies, or a lung infection. DISCHARGE INSTRUCTIONS:   Return to the emergency department if:   You have trouble breathing or feel short of breath. You cough up blood, or you see blood in your mucus. You faint or feel weak or dizzy. You have chest pain when you cough or take a deep breath. You have new wheezing. Contact your healthcare provider if:   You have a fever. Your cough lasts longer than 4 weeks. Your symptoms do not improve with treatment. You have questions or concerns about your condition or care. Medicines:   Medicines  may be needed to stop the cough, decrease swelling in your airways, or help open your airways. Medicine may also be given to help you cough up mucus. Ask your healthcare provider what over-the-counter medicines you can take. If you have an infection caused by bacteria, you may need antibiotics. Take your medicine as directed. Contact your healthcare provider if you think your medicine is not helping or if you have side effects. Tell your provider if you are allergic to any medicine. Keep a list of the medicines, vitamins, and herbs you take. Include the amounts, and when and why you take them. Bring the list or the pill bottles to follow-up visits. Carry your medicine list with you in case of an emergency. Manage your symptoms:   Do not smoke and stay away from others who smoke. Nicotine and other chemicals in cigarettes and cigars can cause lung damage and make your cough worse.  Ask your healthcare provider for information if you currently smoke and need help to quit. E-cigarettes or smokeless tobacco still contain nicotine. Talk to your healthcare provider before you use these products. Drink extra liquids as directed. Liquids will help thin and loosen mucus so you can cough it up. Liquids will also help prevent dehydration. Examples of good liquids to drink include water, fruit juice, and broth. Do not drink liquids that contain caffeine. Caffeine can increase your risk for dehydration. Ask your healthcare provider how much liquid to drink each day. Rest as directed. Do not do activities that make your cough worse, such as exercise. Use a humidifier or vaporizer. Use a cool mist humidifier or a vaporizer to increase air moisture in your home. This may make it easier for you to breathe and help decrease your cough. Eat 2 to 5 mL of honey 2 times each day. Honey can help thin mucus and decrease your cough. Use cough drops or lozenges. These can help decrease throat irritation and your cough. Follow up with your healthcare provider as directed:  Write down your questions so you remember to ask them during your visits. © Copyright [employer] 2023 Information is for End User's use only and may not be sold, redistributed or otherwise used for commercial purposes. The above information is an  only. It is not intended as medical advice for individual conditions or treatments. Talk to your doctor, nurse or pharmacist before following any medical regimen to see if it is safe and effective for you.

## 2024-01-05 ENCOUNTER — OFFICE VISIT (OUTPATIENT)
Dept: FAMILY MEDICINE CLINIC | Facility: CLINIC | Age: 74
End: 2024-01-05
Payer: MEDICARE

## 2024-01-05 VITALS
HEART RATE: 62 BPM | TEMPERATURE: 95.7 F | BODY MASS INDEX: 40.72 KG/M2 | HEIGHT: 65 IN | DIASTOLIC BLOOD PRESSURE: 62 MMHG | OXYGEN SATURATION: 94 % | SYSTOLIC BLOOD PRESSURE: 118 MMHG | WEIGHT: 244.4 LBS

## 2024-01-05 DIAGNOSIS — Z00.00 MEDICARE ANNUAL WELLNESS VISIT, SUBSEQUENT: Primary | ICD-10-CM

## 2024-01-05 DIAGNOSIS — E66.01 OBESITY, MORBID (HCC): ICD-10-CM

## 2024-01-05 PROCEDURE — G0439 PPPS, SUBSEQ VISIT: HCPCS | Performed by: FAMILY MEDICINE

## 2024-01-05 NOTE — PROGRESS NOTES
Assessment and Plan:     Problem List Items Addressed This Visit    None      Depression Screening and Follow-up Plan: Patient's depression screening was positive with a PHQ-9 score of 5. Clincally patient does not have depression. No treatment is required.       Preventive health issues were discussed with patient, and age appropriate screening tests were ordered as noted in patient's After Visit Summary.  Personalized health advice and appropriate referrals for health education or preventive services given if needed, as noted in patient's After Visit Summary.     History of Present Illness:     Patient presents for a Medicare Wellness Visit    HPI   Patient Care Team:  Tejal Garcia MD as PCP - General (Family Medicine)  MD Hernandez Tobias MD     Review of Systems:     Review of Systems   Respiratory:  Negative for shortness of breath.    Cardiovascular:  Negative for chest pain.   Gastrointestinal:  Negative for blood in stool.        Problem List:     Patient Active Problem List   Diagnosis    Essential hypertension    Alcohol use disorder, mild, in early remission, abuse    BPH (benign prostatic hyperplasia)    History of prolonged Q-T interval on ECG    Mixed hyperlipidemia    Alcohol induced fatty liver    Pruritus    Insomnia    Depression with anxiety    Obesity, morbid (HCC)    PMR (polymyalgia rheumatica) (HCC)    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 degeneration, unspecified stage (HCC)    Seizure (HCC)    Psoriasis    Inflammatory arthritis      Past Medical and Surgical History:     Past Medical History:   Diagnosis Date    Alcohol dependency (HCC)     Anxiety     Arthritis     Depression     [place] (hard of hearing)     Hypertension     Kidney stones     Prostate enlargement     Renal disorder     kidney    Shoulder dislocation      Past Surgical History:   Procedure Laterality Date    CHOLECYSTECTOMY      2010    COLONOSCOPY      SHOULDER SURGERY Left     for dislocation x 2, 20  years ago an the second 18 years ago      Family History:     Family History   Problem Relation Age of Onset    Dementia Mother     Colon cancer Mother     Heart disease Father     COPD Father     Heart failure Father     Coronary artery disease Father       Social History:     Social History     Socioeconomic History    Marital status: /Civil Union     Spouse name: None    Number of children: None    Years of education: None    Highest education level: None   Occupational History    None   Tobacco Use    Smoking status: Never    Smokeless tobacco: Never   Vaping Use    Vaping status: Never Used   Substance and Sexual Activity    Alcohol use: Yes     Alcohol/week: 6.0 standard drinks of alcohol     Types: 6 Shots of liquor per week     Comment: last drink 16 months ago    Drug use: Never    Sexual activity: Not Currently     Partners: Female   Other Topics Concern    None   Social History Narrative    None     Social Determinants of Health     Financial Resource Strain: Low Risk  (1/4/2024)    Overall Financial Resource Strain (CARDIA)     Difficulty of Paying Living Expenses: Not very hard   Food Insecurity: Not on file   Transportation Needs: No Transportation Needs (1/4/2024)    PRAPARE - Transportation     Lack of Transportation (Medical): No     Lack of Transportation (Non-Medical): No   Physical Activity: Not on file   Stress: Not on file   Social Connections: Not on file   Intimate Partner Violence: Not on file   Housing Stability: Not on file      Medications and Allergies:     Current Outpatient Medications   Medication Sig Dispense Refill    busPIRone (BUSPAR) 15 mg tablet Take 1 tablet (15 mg total) by mouth 2 (two) times a day 30 tablet 1    clobetasol (TEMOVATE) 0.05 % external solution APPLY 20 DROPS TO ITCHY AREAS ON SCALP AND LET DRY AT BEDTIME FOR 2 WEEKS      dicyclomine (BENTYL) 10 mg capsule Take 1 capsule (10 mg total) by mouth 4 (four) times a day as needed (abdominal pain) 30 capsule 2     finasteride (PROSCAR) 5 mg tablet Take 1 tablet (5 mg total) by mouth daily 90 tablet 1    folic acid (FOLVITE) 1 mg tablet Take 1 tablet (1 mg total) by mouth daily 30 tablet 5    gabapentin (Neurontin) 100 mg capsule Take 1 capsule (100 mg total) by mouth 3 (three) times a day 90 capsule 6    hydrochlorothiazide (HYDRODIURIL) 25 mg tablet Take 25 mg by mouth daily      hydrocortisone (ANUSOL-HC) 25 mg suppository Insert 1 suppository (25 mg total) into the rectum 2 (two) times a day 12 suppository 0    ibuprofen (MOTRIN) 600 mg tablet take 1 tablet by mouth three times a day for 14 days      methotrexate 2.5 mg tablet Take 8 tablets (20 mg total) by mouth once a week 40 tablet 5    metoprolol succinate (TOPROL-XL) 25 mg 24 hr tablet Take 25 mg by mouth in the morning      Multiple Vitamin (multivitamin) tablet Take 1 tablet by mouth daily 30 tablet 0    nadolol (CORGARD) 20 mg tablet Take 1 tablet (20 mg total) by mouth daily 90 tablet 1    predniSONE 5 mg tablet Take 1 tablet (5 mg total) by mouth daily 30 tablet 5    traZODone (DESYREL) 100 mg tablet Take 1.5 tablets (150 mg total) by mouth daily at bedtime 135 tablet 0    triamcinolone (KENALOG) 0.1 % cream APPLY DIRECTLY TO ITCHY RED AREAS ON LEFT SIDE OF CHEST AND ARM W...  (REFER TO PRESCRIPTION NOTES).      white petrolatum-mineral oil (EUCERIN,HYDROCERIN) cream Apply topically 3 (three) times a day as needed (pruritis) 113 g 0    albuterol (ProAir HFA) 90 mcg/act inhaler Inhale 2 puffs every 6 (six) hours as needed for wheezing 8.5 g 0    alfuzosin (UROXATRAL) 10 mg 24 hr tablet Take 1 tablet (10 mg total) by mouth daily 90 tablet 1    clobetasol (TEMOVATE) 0.05 % cream APPLY TWICE DAILY TO THE AFFECTED PSORIASIS AREA FOR NO LONGER THAN 2 WEEKS, DO NOT APPLY TO FACE      dextromethorphan 15 MG/5ML syrup Take 10 mL (30 mg total) by mouth 4 (four) times a day as needed for cough 120 mL 1    fluticasone (FLONASE) 50 mcg/act nasal spray 2 sprays into each  nostril daily 11.1 mL 0    hydroxychloroquine (PLAQUENIL) 200 mg tablet Take 1 tablet (200 mg total) by mouth 2 (two) times a day 60 tablet 5    sodium chloride (OCEAN) 0.65 % nasal spray 1 spray into each nostril as needed for congestion 15 mL 1     No current facility-administered medications for this visit.     Allergies   Allergen Reactions    Sulfa Antibiotics Anaphylaxis and Rash     Face Swelling    Sulfacetamide Anaphylaxis and Rash     Face Swelling    Misc. Sulfonamide Containing Compounds Facial Swelling    Elemental Sulfur Rash and Edema      Immunizations:     Immunization History   Administered Date(s) Administered    COVID-19 MODERNA VACC 0.5 ML IM 04/08/2021, 05/06/2021, 11/15/2021    INFLUENZA 09/01/2022    Influenza, high dose seasonal 0.7 mL 09/30/2021    Pneumococcal Conjugate 13-Valent 12/04/2019    Pneumococcal Conjugate Vaccine 20-valent (Pcv20), Polysace 12/29/2022    Tdap 09/19/2020    Tuberculin Skin Test 03/10/2020      Health Maintenance:         Topic Date Due    Colorectal Cancer Screening  11/27/2032    Hepatitis C Screening  Completed         Topic Date Due    Hepatitis A Vaccine (1 of 2 - Risk 2-dose series) Never done    Influenza Vaccine (1) 09/01/2023    COVID-19 Vaccine (4 - 2023-24 season) 09/01/2023      Medicare Screening Tests and Risk Assessments:     Fredy is here for his Subsequent Wellness visit. Last Medicare Wellness visit information reviewed, patient interviewed, no change since last AWV.     Health Risk Assessment:   Patient rates overall health as good. Patient feels that their physical health rating is same. Patient is satisfied with their life. Eyesight was rated as slightly worse. Hearing was rated as slightly worse. Patient feels that their emotional and mental health rating is same. Patients states they are sometimes angry. Patient states they are sometimes unusually tired/fatigued. Pain experienced in the last 7 days has been some. Patient's pain rating has  been 7/10. Patient states that he has experienced weight loss or gain in last 6 months.     Depression Screening:   PHQ-9 Score: 5      Fall Risk Screening:   In the past year, patient has experienced: no history of falling in past year      Home Safety:  Patient does not have trouble with stairs inside or outside of their home. Patient has working smoke alarms and has working carbon monoxide detector. Home safety hazards include: none.     Nutrition:   Current diet is Regular.     Medications:   Patient is currently taking over-the-counter supplements. OTC medications include: see medication list. Patient is able to manage medications.     Activities of Daily Living (ADLs)/Instrumental Activities of Daily Living (IADLs):   Walk and transfer into and out of bed and chair?: Yes  Dress and groom yourself?: Yes    Bathe or shower yourself?: Yes    Feed yourself? Yes  Do your laundry/housekeeping?: Yes  Manage your money, pay your bills and track your expenses?: Yes  Make your own meals?: Yes    Do your own shopping?: Yes    Durable Medical Equipment Suppliers  None    Previous Hospitalizations:   Any hospitalizations or ED visits within the last 12 months?: No      Advance Care Planning:   Living will: Yes    Durable POA for healthcare: Yes    Advanced directive: Yes      PREVENTIVE SCREENINGS      Cardiovascular Screening:    General: Screening Not Indicated, History Lipid Disorder and Risks and Benefits Discussed      Diabetes Screening:     General: Screening Current and Risks and Benefits Discussed      Colorectal Cancer Screening:     General: Screening Current      Prostate Cancer Screening:    General: Risks and Benefits Discussed    Due for: PSA      Abdominal Aortic Aneurysm (AAA) Screening:    Risk factors include: age between 65-76 yo        Lung Cancer Screening:     General: Screening Not Indicated      Hepatitis C Screening:    General: Screening Current    Screening, Brief Intervention, and Referral to  Treatment (SBIRT)    Screening  Typical number of drinks in a day: 0  Typical number of drinks in a week: 0  Interpretation: Low risk drinking behavior.    AUDIT-C Screenin) How often did you have a drink containing alcohol in the past year? never  2) How many drinks did you have on a typical day when you were drinking in the past year? 0  3) How often did you have 6 or more drinks on one occasion in the past year? never    AUDIT-C Score: 0  Interpretation: Score 0-3 (male): Negative screen for alcohol misuse    Single Item Drug Screening:  How often have you used an illegal drug (including marijuana) or a prescription medication for non-medical reasons in the past year? never    Single Item Drug Screen Score: 0  Interpretation: Negative screen for possible drug use disorder    No results found.     Physical Exam:     There were no vitals taken for this visit.    Physical Exam  Vitals reviewed.   Constitutional:       Appearance: Normal appearance.   HENT:      Head: Normocephalic and atraumatic.      Right Ear: External ear normal.      Left Ear: External ear normal.      Nose: Nose normal.      Mouth/Throat:      Mouth: Mucous membranes are moist.      Pharynx: Oropharynx is clear.   Eyes:      Conjunctiva/sclera: Conjunctivae normal.   Cardiovascular:      Rate and Rhythm: Normal rate and regular rhythm.      Pulses: Normal pulses.   Pulmonary:      Effort: Pulmonary effort is normal.      Breath sounds: Normal breath sounds.   Abdominal:      General: Bowel sounds are normal.      Palpations: Abdomen is soft.      Tenderness: There is no abdominal tenderness.   Musculoskeletal:      Cervical back: Neck supple.   Skin:     General: Skin is warm and dry.   Neurological:      Mental Status: He is alert. Mental status is at baseline.   Psychiatric:         Mood and Affect: Mood normal.          Tejal Garcia MD

## 2024-01-05 NOTE — PATIENT INSTRUCTIONS
Medicare Preventive Visit Patient Instructions  Thank you for completing your Welcome to Medicare Visit or Medicare Annual Wellness Visit today. Your next wellness visit will be due in one year (1/5/2025).  The screening/preventive services that you may require over the next 5-10 years are detailed below. Some tests may not apply to you based off risk factors and/or age. Screening tests ordered at today's visit but not completed yet may show as past due. Also, please note that scanned in results may not display below.  Preventive Screenings:  Service Recommendations Previous Testing/Comments   Colorectal Cancer Screening  Colonoscopy    Fecal Occult Blood Test (FOBT)/Fecal Immunochemical Test (FIT)  Fecal DNA/Cologuard Test  Flexible Sigmoidoscopy Age: 45-75 years old   Colonoscopy: every 10 years (May be performed more frequently if at higher risk)  OR  FOBT/FIT: every 1 year  OR  Cologuard: every 3 years  OR  Sigmoidoscopy: every 5 years  Screening may be recommended earlier than age 45 if at higher risk for colorectal cancer. Also, an individualized decision between you and your healthcare provider will decide whether screening between the ages of 76-85 would be appropriate. Colonoscopy: 11/30/2022  FOBT/FIT: 04/20/2021  Cologuard: Not on file  Sigmoidoscopy: Not on file    Screening Current     Prostate Cancer Screening Individualized decision between patient and health care provider in men between ages of 55-69   Medicare will cover every 12 months beginning on the day after your 50th birthday PSA: No results in last 5 years           Hepatitis C Screening Once for adults born between 1945 and 1965  More frequently in patients at high risk for Hepatitis C Hep C Antibody: 09/07/2021    Screening Current   Diabetes Screening 1-2 times per year if you're at risk for diabetes or have pre-diabetes Fasting glucose: 84 mg/dL (10/11/2023)  A1C: No results in last 5 years (No results in last 5 years)  Screening Current    Cholesterol Screening Once every 5 years if you don't have a lipid disorder. May order more often based on risk factors. Lipid panel: 11/08/2021  Screening Not Indicated  History Lipid Disorder      Other Preventive Screenings Covered by Medicare:  Abdominal Aortic Aneurysm (AAA) Screening: covered once if your at risk. You're considered to be at risk if you have a family history of AAA or a male between the age of 65-75 who smoking at least 100 cigarettes in your lifetime.  Lung Cancer Screening: covers low dose CT scan once per year if you meet all of the following conditions: (1) Age 55-77; (2) No signs or symptoms of lung cancer; (3) Current smoker or have quit smoking within the last 15 years; (4) You have a tobacco smoking history of at least 20 pack years (packs per day x number of years you smoked); (5) You get a written order from a healthcare provider.  Glaucoma Screening: covered annually if you're considered high risk: (1) You have diabetes OR (2) Family history of glaucoma OR (3)  aged 50 and older OR (4)  American aged 65 and older  Osteoporosis Screening: covered every 2 years if you meet one of the following conditions: (1) Have a vertebral abnormality; (2) On glucocorticoid therapy for more than 3 months; (3) Have primary hyperparathyroidism; (4) On osteoporosis medications and need to assess response to drug therapy.  HIV Screening: covered annually if you're between the age of 15-65. Also covered annually if you are younger than 15 and older than 65 with risk factors for HIV infection. For pregnant patients, it is covered up to 3 times per pregnancy.    Immunizations:  Immunization Recommendations   Influenza Vaccine Annual influenza vaccination during flu season is recommended for all persons aged >= 6 months who do not have contraindications   Pneumococcal Vaccine   * Pneumococcal conjugate vaccine = PCV13 (Prevnar 13), PCV15 (Vaxneuvance), PCV20 (Prevnar 20)  *  Pneumococcal polysaccharide vaccine = PPSV23 (Pneumovax) Adults 19-65 yo with certain risk factors or if 65+ yo  If never received any pneumonia vaccine: recommend Prevnar 20 (PCV20)  Give PCV20 if previously received 1 dose of PCV13 or PPSV23   Hepatitis B Vaccine 3 dose series if at intermediate or high risk (ex: diabetes, end stage renal disease, liver disease)   Respiratory syncytial virus (RSV) Vaccine - COVERED BY MEDICARE PART D  * RSVPreF3 (Arexvy) CDC recommends that adults 60 years of age and older may receive a single dose of RSV vaccine using shared clinical decision-making (SCDM)   Tetanus (Td) Vaccine - COST NOT COVERED BY MEDICARE PART B Following completion of primary series, a booster dose should be given every 10 years to maintain immunity against tetanus. Td may also be given as tetanus wound prophylaxis.   Tdap Vaccine - COST NOT COVERED BY MEDICARE PART B Recommended at least once for all adults. For pregnant patients, recommended with each pregnancy.   Shingles Vaccine (Shingrix) - COST NOT COVERED BY MEDICARE PART B  2 shot series recommended in those 19 years and older who have or will have weakened immune systems or those 50 years and older     Health Maintenance Due:      Topic Date Due   • Colorectal Cancer Screening  11/27/2032   • Hepatitis C Screening  Completed     Immunizations Due:      Topic Date Due   • Hepatitis A Vaccine (1 of 2 - Risk 2-dose series) Never done   • Influenza Vaccine (1) 09/01/2023   • COVID-19 Vaccine (4 - 2023-24 season) 09/01/2023     Advance Directives   What are advance directives?  Advance directives are legal documents that state your wishes and plans for medical care. These plans are made ahead of time in case you lose your ability to make decisions for yourself. Advance directives can apply to any medical decision, such as the treatments you want, and if you want to donate organs.   What are the types of advance directives?  There are many types of  advance directives, and each state has rules about how to use them. You may choose a combination of any of the following:  Living will:  This is a written record of the treatment you want. You can also choose which treatments you do not want, which to limit, and which to stop at a certain time. This includes surgery, medicine, IV fluid, and tube feedings.   Durable power of  for healthcare (DPAHC):  This is a written record that states who you want to make healthcare choices for you when you are unable to make them for yourself. This person, called a proxy, is usually a family member or a friend. You may choose more than 1 proxy.  Do not resuscitate (DNR) order:  A DNR order is used in case your heart stops beating or you stop breathing. It is a request not to have certain forms of treatment, such as CPR. A DNR order may be included in other types of advance directives.  Medical directive:  This covers the care that you want if you are in a coma, near death, or unable to make decisions for yourself. You can list the treatments you want for each condition. Treatment may include pain medicine, surgery, blood transfusions, dialysis, IV or tube feedings, and a ventilator (breathing machine).  Values history:  This document has questions about your views, beliefs, and how you feel and think about life. This information can help others choose the care that you would choose.  Why are advance directives important?  An advance directive helps you control your care. Although spoken wishes may be used, it is better to have your wishes written down. Spoken wishes can be misunderstood, or not followed. Treatments may be given even if you do not want them. An advance directive may make it easier for your family to make difficult choices about your care.   Weight Management   Why it is important to manage your weight:  Being overweight increases your risk of health conditions such as heart disease, high blood pressure,  type 2 diabetes, and certain types of cancer. It can also increase your risk for osteoarthritis, sleep apnea, and other respiratory problems. Aim for a slow, steady weight loss. Even a small amount of weight loss can lower your risk of health problems.  How to lose weight safely:  A safe and healthy way to lose weight is to eat fewer calories and get regular exercise. You can lose up about 1 pound a week by decreasing the number of calories you eat by 500 calories each day.   Healthy meal plan for weight management:  A healthy meal plan includes a variety of foods, contains fewer calories, and helps you stay healthy. A healthy meal plan includes the following:  Eat whole-grain foods more often.  A healthy meal plan should contain fiber. Fiber is the part of grains, fruits, and vegetables that is not broken down by your body. Whole-grain foods are healthy and provide extra fiber in your diet. Some examples of whole-grain foods are whole-wheat breads and pastas, oatmeal, brown rice, and bulgur.  Eat a variety of vegetables every day.  Include dark, leafy greens such as spinach, kale, mikel greens, and mustard greens. Eat yellow and orange vegetables such as carrots, sweet potatoes, and winter squash.   Eat a variety of fruits every day.  Choose fresh or canned fruit (canned in its own juice or light syrup) instead of juice. Fruit juice has very little or no fiber.  Eat low-fat dairy foods.  Drink fat-free (skim) milk or 1% milk. Eat fat-free yogurt and low-fat cottage cheese. Try low-fat cheeses such as mozzarella and other reduced-fat cheeses.  Choose meat and other protein foods that are low in fat.  Choose beans or other legumes such as split peas or lentils. Choose fish, skinless poultry (chicken or turkey), or lean cuts of red meat (beef or pork). Before you cook meat or poultry, cut off any visible fat.   Use less fat and oil.  Try baking foods instead of frying them. Add less fat, such as margarine, sour  cream, regular salad dressing and mayonnaise to foods. Eat fewer high-fat foods. Some examples of high-fat foods include french fries, doughnuts, ice cream, and cakes.  Eat fewer sweets.  Limit foods and drinks that are high in sugar. This includes candy, cookies, regular soda, and sweetened drinks.  Exercise:  Exercise at least 30 minutes per day on most days of the week. Some examples of exercise include walking, biking, dancing, and swimming. You can also fit in more physical activity by taking the stairs instead of the elevator or parking farther away from stores. Ask your healthcare provider about the best exercise plan for you.      © Copyright [employer] 2018 Information is for End User's use only and may not be sold, redistributed or otherwise used for commercial purposes. All illustrations and images included in CareNotes® are the copyrighted property of [employer] or [employer]

## 2024-01-16 DIAGNOSIS — F39 UNSPECIFIED MOOD (AFFECTIVE) DISORDER (HCC): ICD-10-CM

## 2024-01-16 DIAGNOSIS — N40.0 BENIGN PROSTATIC HYPERPLASIA, UNSPECIFIED WHETHER LOWER URINARY TRACT SYMPTOMS PRESENT: ICD-10-CM

## 2024-01-17 ENCOUNTER — TELEPHONE (OUTPATIENT)
Dept: FAMILY MEDICINE CLINIC | Facility: CLINIC | Age: 74
End: 2024-01-17

## 2024-01-17 NOTE — TELEPHONE ENCOUNTER
Fredy called asking for a printout of his physical and another print out of lab work he has to get.  Fredy stated he will be into the office sometime tomorrow to pick it up. I printed it out and put it in an envelope with his name on it.      Patient stopped into the office and picked up his paperwork.

## 2024-01-18 NOTE — TELEPHONE ENCOUNTER
Office : [phone]     Fax :[phone]       Nephrology progress  Note      Patient's Name: Kamron Cortés    2/13/2023    Reason for Consult:  ESRD management       Requesting Physician:  MARILEE Amor CNP      Chief Complaint:    Chief Complaint   Patient presents with    Abdominal Pain     Pt via EMS from home, c/o LUQ pain 10/10, has had gallbladder removed. Pt states pain is making her sob, 89% ora, put on 2L, pt received 325 mg aspirin, has been treated for cellulitis since December, new antibiotic last two weeks, states she has been getting hives and itching        History of Present Ilness:    Kamron Cortés is a 76 y.o. female who presented with complaints of shortness of breath. Patient apparently has been treated for cellulitis lower extremity on antibiotics patient started having worsening itching and hives. Came to the ER. Patient with increased shortness of breath. Also with leg swelling. No reported fevers chills. Patient was found to be hypoxic was placed on 2 L nasal cannula. Patient with history of chronic CHF COPD chronic kidney disease admitted with bilateral worse. Baseline creat is 1.2   Now elevated at 2.0     BNP 05841      Interval hx     C/o left side abdominal pain   Has mild edema   Good UOP    Creat improved         I/O last 3 completed shifts: In: 550.4 [P.O.:270; I.V.:170.1;  IV Piggyback:110.2]  Out: -     Past Medical History:   Diagnosis Date    Arthritis     Atrial fibrillation and flutter ([address]     Hypertension     Kidney disease     Pt states  \"stage 3\"    Pneumonia     Sleep apnea     no c-pap       Past Surgical History:   Procedure Laterality Date    CARDIOVERSION      COLONOSCOPY N/A 11/20/2018    COLONOSCOPY POLYPECTOMY Patient sent a my chart message but was not able to forward to you.   SNARE/COLD BIOPSY performed by Koby Alfredo MD at [facility] N/A 03/31/2022    COLONOSCOPY POLYPECTOMY SNARE/COLD BIOPSY performed by Kamila Leung MD at [address] [place] [address]      ankle rt has pins and rods, and rt elbow    GASTRIC BYPASS SURGERY      LARYNX SURGERY      TOTAL KNEE ARTHROPLASTY Bilateral 12/19/2012    bilateral knee replacements    TUBAL LIGATION      tubes tied    UPPER GASTROINTESTINAL ENDOSCOPY N/A 11/20/2018    EGD BIOPSY performed by Koby Alfredo MD at [address] [place] [address] N/A 03/31/2022    EGD DILATION BALLOON performed by Kamila Leung MD at [facility] N/A 03/31/2022    EGD BIOPSY performed by Kamila Leung MD at [address]       Family History   Problem Relation Age of Onset    Other Mother         blood clot    Cancer Father         stomach cancer    Stroke Brother          Current Medications:    pantoprazole (PROTONIX) 80 mg in sodium chloride 0.9 % 100 mL infusion, Continuous  furosemide (LASIX) injection 20 mg, Daily  cefepime (MAXIPIME) 1,000 mg in sodium chloride 0.9 % 50 mL IVPB (mini-bag), Q12H  ipratropium-albuterol (DUONEB) nebulizer solution 1 ampule, BID  iron sucrose (VENOFER) 200 mg in sodium chloride 0.9 % 100 mL IVPB, Q24H  lidocaine PF 1 % injection 5 mL, Once  sodium chloride flush 0.9 % injection 5-40 mL, 2 times per day  sodium chloride flush 0.9 % injection 5-40 mL, PRN  0.9 % sodium chloride infusion, PRN  metoprolol tartrate (LOPRESSOR) tablet 75 mg, BID  predniSONE (DELTASONE) tablet 40 mg, Daily  diphenhydrAMINE (BENADRYL) tablet 50 mg, Q6H PRN  ondansetron (ZOFRAN) injection 4 mg, Q6H PRN  albuterol sulfate HFA (PROVENTIL;VENTOLIN;PROAIR) 108 (90 Base) MCG/ACT inhaler 2 puff, Q4H PRN  amiodarone (CORDARONE) tablet 200 mg, Daily  [Held by provider] apixaban (ELIQUIS) tablet 5 mg, BID  aspirin EC tablet 81 mg, Daily  traMADol (ULTRAM) tablet 50 mg, Q6H PRN  sodium chloride flush 0.9 % injection 5-40 mL, 2 times per day  sodium chloride flush 0.9 % injection 5-40 mL, PRN  0.9 % sodium chloride infusion, PRN  ondansetron (ZOFRAN-ODT) disintegrating tablet 4 mg, Q8H PRN   Or  ondansetron (ZOFRAN) injection 4 mg, Q6H PRN  polyethylene glycol (GLYCOLAX) packet 17 g, Daily PRN  acetaminophen (TYLENOL) tablet 650 mg, Q6H PRN   Or  acetaminophen (TYLENOL) suppository 650 mg, Q6H PRN        Physical exam:     Vitals:  /69   Pulse 89   Temp 97.9 °F (36.6 °C) (Oral)   Resp 18   Ht 5' 7\" (1.702 m)   Wt 247 lb (112 kg)   SpO2 93%   BMI 38.69 kg/m²   Constitutional:  OAA X3 NAD  Skin: no rash, turgor wnl  Heent:  eomi, mmm  Neck: no bruits or jvd noted  Cardiovascular:  S1, S2 without m/r/g  Respiratory: CTA B without w/r/r  Abdomen:  +bs, soft, nt, nd  Ext: 1 +  lower extremity edema  Psychiatric: mood and affect appropriate  Musculoskeletal:  Rom, muscular strength intact    Labs:  CBC:   Recent Labs     02/11/23  0850 02/13/23  1043   WBC 17.1* 21.9*   HGB 12.0 10.1*    189     BMP:    Recent Labs     02/11/23  0850 02/12/23  0905 02/13/23  1043    142 140   K 5.1 4.5 4.1    104 103   CO2 24 33* 34*   BUN 68* 68* 61*   CREATININE 2.3* 1.8* 1.4*   GLUCOSE 86 127* 101*     Ca/Mg/Phos:   Recent Labs     02/11/23  0850 02/12/23  0905 02/13/23  1043   CALCIUM 8.7 8.1* 8.8     Hepatic:   No results for input(s): AST, ALT, ALB, BILITOT, ALKPHOS in the last 72 hours. IMAGING:  CT CHEST ABDOMEN PELVIS WO CONTRAST Additional Contrast? None   Final Result   1. Scattered ground-glass opacities with interlobular septal thickening. Findings may be related to pulmonary edema with other considerations   including an inflammatory/infectious process in the appropriate clinical   setting. 2. Right upper lobe consolidation.  Additional bilateral scattered curvilinear   opacities may be related to atelectasis versus developing consolidation. 3. Enlarged pulmonary artery diameter. Finding may be related to pulmonary   arterial hypertension. 4. Coronary artery calcifications present. 5. Moderate hiatal hernia. 6. Questionable circumferential thickening of the descending colon with mild   surrounding fat stranding versus colonic underdistention. Finding raises the   possibility of colitis in the appropriate clinical setting. 7. Colonic diverticulosis, predominately sigmoid. No evidence of acute   diverticulitis. 8. Nonobstructing punctate left nephrolithiasis. RECOMMENDATIONS:   2 cm left adrenal mass, consistent with lipid-rich benign adenoma. No   follow-up imaging is recommended. JACR 2017 Aug; 14(8):1038-44, JCAT 2016 Fang Arguello; 40(2):194-200, Urol J 2006   Spring; 3(2):71-4. XR CHEST PORTABLE   Final Result   1. Diffuse hazy multifocal opacity throughout both lungs, right worse than   left, likely a combination of moderate pulmonary edema and multifocal   pneumonia. 2. Stable cardiomegaly. 3. Stable hiatal hernia. CT ABDOMEN WO CONTRAST Additional Contrast? None    (Results Pending)                         Assessment/Plan :      1. Panda   Creatinine  improved     Has mild edema   Will start low dose  lasix         Recommend to dose adjust all medications  based on renal functions  Maintain SBP> 90 mmHg   Daily weights   AVOID NSAIDs  Avoid Nephrotoxins  Monitor Intake/Output  Call if significant decrease in urine output        2. HTN. BP low borderline range   Hold clonidine if SBP < 110 mm HG     3. COPD    4. H/o atrial fib   Monitor       5.  Hyperkalemia   Resolved after getting lokelma         D/w primary team      Thank you for allowing us to participate in care of Shruthi Espinosa         Electronically signed by: Alyx Smith MD, 2/13/2023, 11:25 AM      [facility]  [address]  Office : [phone]  Fax :[phone]

## 2024-01-19 RX ORDER — TRAZODONE HYDROCHLORIDE 100 MG/1
150 TABLET ORAL
Qty: 135 TABLET | Refills: 1 | Status: SHIPPED | OUTPATIENT
Start: 2024-01-19

## 2024-01-19 RX ORDER — ALFUZOSIN HYDROCHLORIDE 10 MG/1
10 TABLET, EXTENDED RELEASE ORAL DAILY
Qty: 90 TABLET | Refills: 1 | Status: SHIPPED | OUTPATIENT
Start: 2024-01-19 | End: 2024-07-17

## 2024-02-21 ENCOUNTER — OFFICE VISIT (OUTPATIENT)
Dept: URGENT CARE | Facility: CLINIC | Age: 74
End: 2024-02-21
Payer: MEDICARE

## 2024-02-21 VITALS
SYSTOLIC BLOOD PRESSURE: 130 MMHG | HEART RATE: 88 BPM | TEMPERATURE: 97.8 F | OXYGEN SATURATION: 98 % | RESPIRATION RATE: 16 BRPM | DIASTOLIC BLOOD PRESSURE: 70 MMHG

## 2024-02-21 DIAGNOSIS — J01.40 ACUTE PANSINUSITIS, RECURRENCE NOT SPECIFIED: Primary | ICD-10-CM

## 2024-02-21 PROCEDURE — 99213 OFFICE O/P EST LOW 20 MIN: CPT

## 2024-02-21 PROCEDURE — G0463 HOSPITAL OUTPT CLINIC VISIT: HCPCS

## 2024-02-21 RX ORDER — AMOXICILLIN AND CLAVULANATE POTASSIUM 875; 125 MG/1; MG/1
1 TABLET, FILM COATED ORAL EVERY 12 HOURS SCHEDULED
Qty: 14 TABLET | Refills: 0 | Status: SHIPPED | OUTPATIENT
Start: 2024-02-21 | End: 2024-02-28

## 2024-02-21 NOTE — PROGRESS NOTES
[facility]        NAME: Fredy Yu is a 74 y.o. male  : 1950    MRN: [id number]  DATE: 2024  TIME: 9:06 AM    Assessment and Plan   Acute pansinusitis, recurrence not specified [J01.40]  1. Acute pansinusitis, recurrence not specified  amoxicillin-clavulanate (AUGMENTIN) 875-125 mg per tablet        Frontal and maxillary sinus tenderness and pressure going on for the last 3 to 4 days.  Did not try any over-the-counter nasal sprays.  Has been taking Tylenol Cold and sinus.  No other symptoms suggesting viral etiology such as fever, chills, cough, fatigue, body aches.  Lungs clear to auscultation without wheezing, rales, rhonchi.  Will start on Augmentin.  Advised to follow-up with family doctor.  Advised to go to the ER if any symptoms worsen.    Patient Instructions     Take prescribed antibiotic as instructed.  Take with food avoid upset stomach.  Yogurt with active live culture.  Probiotic supplement.  Tylenol for pain or fever.  Saline flushes,  Antoinette pot, Flonase, Astelin over-the-counter to help with congestion and sinus pressure.    If no improvement, follow-up with family doctor.  Go to the emergency room if any symptoms worsen.  Follow up with PCP in 3-5 days.  Proceed to  ER if symptoms worsen.    Chief Complaint     Chief Complaint   Patient presents with    Nasal Congestion     Started 3 days ago  OTC tylenol cold and sinus         History of Present Illness       74 yoM here for 3 days of nasal congestion and sinus pressure. Took OTC tylenol cold/sinuses without relief.  Sinus pressure and congestion has been causing headaches.  Denies sick contacts. Denies any fever, chills, fatigue, body aches, chest pain, SOB, sore throat, ear pain, abdominal pain, nausea/vomiting/diarrhea.        Review of Systems   Review of Systems   Constitutional: Negative.    HENT:  Positive for congestion and sinus pressure. Negative for ear discharge, ear pain and sore throat.    Eyes:  Negative.    Respiratory: Negative.     Cardiovascular: Negative.    Gastrointestinal: Negative.    Musculoskeletal: Negative.          Current Medications       Current Outpatient Medications:     adalimumab (HUMIRA) 40 mg/0.8 mL PSKT, Inject 40 mg under the skin every 14 (fourteen) days, Disp: , Rfl:     alfuzosin (UROXATRAL) 10 mg 24 hr tablet, Take 1 tablet (10 mg total) by mouth daily, Disp: 90 tablet, Rfl: 1    amoxicillin-clavulanate (AUGMENTIN) 875-125 mg per tablet, Take 1 tablet by mouth every 12 (twelve) hours for 7 days, Disp: 14 tablet, Rfl: 0    busPIRone (BUSPAR) 15 mg tablet, Take 1 tablet (15 mg total) by mouth 2 (two) times a day, Disp: 30 tablet, Rfl: 1    finasteride (PROSCAR) 5 mg tablet, Take 1 tablet (5 mg total) by mouth daily, Disp: 90 tablet, Rfl: 1    folic acid (FOLVITE) 1 mg tablet, Take 1 tablet (1 mg total) by mouth daily, Disp: 30 tablet, Rfl: 5    gabapentin (Neurontin) 100 mg capsule, Take 1 capsule (100 mg total) by mouth 3 (three) times a day, Disp: 90 capsule, Rfl: 6    hydrochlorothiazide (HYDRODIURIL) 25 mg tablet, Take 25 mg by mouth daily, Disp: , Rfl:     methotrexate 2.5 mg tablet, Take 8 tablets (20 mg total) by mouth once a week, Disp: 40 tablet, Rfl: 5    Multiple Vitamin (multivitamin) tablet, Take 1 tablet by mouth daily, Disp: 30 tablet, Rfl: 0    nadolol (CORGARD) 20 mg tablet, Take 1 tablet (20 mg total) by mouth daily, Disp: 90 tablet, Rfl: 1    predniSONE 5 mg tablet, Take 1 tablet (5 mg total) by mouth daily, Disp: 30 tablet, Rfl: 5    traZODone (DESYREL) 100 mg tablet, Take 1.5 tablets (150 mg total) by mouth daily at bedtime, Disp: 135 tablet, Rfl: 1    white petrolatum-mineral oil (EUCERIN,HYDROCERIN) cream, Apply topically 3 (three) times a day as needed (pruritis), Disp: 113 g, Rfl: 0    Current Allergies     Allergies as of 02/21/2024 - Reviewed 02/21/2024   Allergen Reaction Noted    Sulfa antibiotics Anaphylaxis and Rash 11/07/2014    Sulfacetamide  Anaphylaxis and Rash 11/07/2014    Misc. sulfonamide containing compounds Facial Swelling 11/22/2022    Elemental sulfur Rash and Edema 11/04/2014            The following portions of the patient's history were reviewed and updated as appropriate: allergies, current medications, past family history, past medical history, past social history, past surgical history and problem list.     Past Medical History:   Diagnosis Date    Alcohol dependency (HCC)     Anxiety     Arthritis     Depression     [place] (hard of hearing)     Hypertension     Kidney stones     Prostate enlargement     Renal disorder     kidney    Shoulder dislocation        Past Surgical History:   Procedure Laterality Date    CHOLECYSTECTOMY      2010    COLONOSCOPY      SHOULDER SURGERY Left     for dislocation x 2, 20 years ago an the second 18 years ago       Family History   Problem Relation Age of Onset    Dementia Mother     Colon cancer Mother     Heart disease Father     COPD Father     Heart failure Father     Coronary artery disease Father          Medications have been verified.        Objective   /70   Pulse 88   Temp 97.8 °F (36.6 °C)   Resp 16   SpO2 98%        Physical Exam     Physical Exam  Constitutional:       General: He is awake. He is not in acute distress.     Appearance: Normal appearance. He is not ill-appearing, toxic-appearing or diaphoretic.   HENT:      Head: Normocephalic and atraumatic.      Right Ear: Tympanic membrane, ear canal and external ear normal.      Left Ear: Tympanic membrane, ear canal and external ear normal.      Nose: Congestion present.      Right Sinus: Maxillary sinus tenderness and frontal sinus tenderness present.      Left Sinus: Maxillary sinus tenderness and frontal sinus tenderness present.      Mouth/Throat:      Mouth: Mucous membranes are moist.      Pharynx: Oropharynx is clear. No oropharyngeal exudate or posterior oropharyngeal erythema.   Eyes:      Extraocular Movements:  Extraocular movements intact.      Conjunctiva/sclera: Conjunctivae normal.      Pupils: Pupils are equal, round, and reactive to light.   Cardiovascular:      Rate and Rhythm: Normal rate and regular rhythm.      Pulses: Normal pulses.      Heart sounds: Normal heart sounds.   Pulmonary:      Effort: Pulmonary effort is normal. No tachypnea, bradypnea, accessory muscle usage, prolonged expiration, respiratory distress or retractions.      Breath sounds: Normal breath sounds and air entry. No stridor, decreased air movement or transmitted upper airway sounds. No decreased breath sounds, wheezing, rhonchi or rales.   Chest:      Chest wall: No tenderness.   Musculoskeletal:      Cervical back: Normal range of motion and neck supple. No tenderness.   Lymphadenopathy:      Cervical: No cervical adenopathy.   Skin:     General: Skin is warm and dry.      Capillary Refill: Capillary refill takes less than 2 seconds.      Coloration: Skin is not pale.      Findings: No rash.   Neurological:      General: No focal deficit present.      Mental Status: He is alert, oriented to person, place, and time and easily aroused. Mental status is at baseline.   Psychiatric:         Mood and Affect: Mood normal.         Behavior: Behavior normal. Behavior is cooperative.

## 2024-02-21 NOTE — PATIENT INSTRUCTIONS
Take prescribed antibiotic as instructed.  Take with food avoid upset stomach.  Yogurt with active live culture.  Probiotic supplement.  Tylenol for pain or fever.  Saline flushes,  Antoinette pot, Flonase, Astelin over-the-counter to help with congestion and sinus pressure.    If no improvement, follow-up with family doctor.  Go to the emergency room if any symptoms worsen.  Follow up with PCP in 3-5 days.  Proceed to  ER if symptoms worsen.  Sinusitis   WHAT YOU NEED TO KNOW:   Sinusitis is inflammation or infection of your sinuses. Sinusitis is most often caused by a virus. Acute sinusitis may last up to 12 weeks. Chronic sinusitis lasts longer than 12 weeks. Recurrent sinusitis means you have 4 or more infections in 1 year.        DISCHARGE INSTRUCTIONS:   Return to the emergency department if:   You have trouble breathing or wheezing that is getting worse.    You have a stiff neck, a fever, or a bad headache.     You cannot open your eye.     Your eyeball bulges out or you cannot move your eye.     You are more sleepy than normal, or you notice changes in your ability to think, move, or talk.    You have swelling of your forehead or scalp.    Call your doctor if:   You have vision changes, such as double vision.    Your eye and eyelid are red, swollen, and painful.     Your symptoms do not improve or go away after 10 days.    You have nausea and are vomiting.    Your nose is bleeding.    You have questions or concerns about your condition or care.    Medicines:  Your symptoms may go away on their own. Your healthcare provider may recommend watchful waiting for up to 10 days before starting antibiotics. You may need any of the following:  Acetaminophen  decreases pain and fever. It is available without a doctor's order. Ask how much to take and how often to take it. Follow directions. Read the labels of all other medicines you are using to see if they also contain acetaminophen, or ask your doctor or pharmacist.  Acetaminophen can cause liver damage if not taken correctly.    NSAIDs , such as ibuprofen, help decrease swelling, pain, and fever. This medicine is available with or without a doctor's order. NSAIDs can cause stomach bleeding or kidney problems in certain people. If you take blood thinner medicine, always ask your healthcare provider if NSAIDs are safe for you. Always read the medicine label and follow directions.    Nasal steroid sprays  may help decrease inflammation in your nose and sinuses.    Decongestants  help reduce swelling and drain mucus in the nose and sinuses. They may help you breathe easier.     Antihistamines  help dry mucus in the nose and relieve sneezing.     Antibiotics  help treat or prevent a bacterial infection.    Take your medicine as directed.  Contact your healthcare provider if you think your medicine is not helping or if you have side effects. Tell your provider if you are allergic to any medicine. Keep a list of the medicines, vitamins, and herbs you take. Include the amounts, and when and why you take them. Bring the list or the pill bottles to follow-up visits. Carry your medicine list with you in case of an emergency.    Self-care:   Rinse your sinuses as directed.  Use a sinus rinse device to rinse your nasal passages with a saline (salt water) solution or distilled water. Do not use tap water. This will help thin the mucus in your nose and rinse away pollen and dirt. It will also help reduce swelling so you can breathe normally.    Use a humidifier  to increase air moisture in your home. This may make it easier for you to breathe and help decrease your cough.     Sleep with your head elevated.  Place an extra pillow under your head before you go to sleep to help your sinuses drain.     Drink liquids as directed.  Ask your healthcare provider how much liquid to drink each day and which liquids are best for you. Liquids will thin the mucus in your nose and help it drain. Avoid  drinks that contain alcohol or caffeine.     Do not smoke, and avoid secondhand smoke.  Nicotine and other chemicals in cigarettes and cigars can make your symptoms worse. Ask your healthcare provider for information if you currently smoke and need help to quit. E-cigarettes or smokeless tobacco still contain nicotine. Talk to your healthcare provider before you use these products.    Prevent the spread of germs:   Wash your hands often with soap and water.  Wash your hands after you use the bathroom, change a child's diaper, or sneeze. Wash your hands before you prepare or eat food.         Stay away from people who are sick.  Some germs spread easily and quickly through contact.    Follow up with your doctor as directed:  You may be referred to an ear, nose, and throat specialist. Write down your questions so you remember to ask them during your visits.   © Copyright [employer] 2023 Information is for End User's use only and may not be sold, redistributed or otherwise used for commercial purposes.  The above information is an  only. It is not intended as medical advice for individual conditions or treatments. Talk to your doctor, nurse or pharmacist before following any medical regimen to see if it is safe and effective for you.

## 2024-03-12 ENCOUNTER — APPOINTMENT (OUTPATIENT)
Dept: LAB | Facility: CLINIC | Age: 74
End: 2024-03-12
Payer: MEDICARE

## 2024-03-12 ENCOUNTER — OFFICE VISIT (OUTPATIENT)
Dept: RHEUMATOLOGY | Facility: CLINIC | Age: 74
End: 2024-03-12
Payer: MEDICARE

## 2024-03-12 VITALS
HEIGHT: 65 IN | WEIGHT: 249 LBS | DIASTOLIC BLOOD PRESSURE: 70 MMHG | BODY MASS INDEX: 41.48 KG/M2 | SYSTOLIC BLOOD PRESSURE: 116 MMHG

## 2024-03-12 DIAGNOSIS — M19.90 INFLAMMATORY ARTHRITIS: ICD-10-CM

## 2024-03-12 DIAGNOSIS — L40.50 PSORIATIC ARTHRITIS (HCC): Primary | ICD-10-CM

## 2024-03-12 DIAGNOSIS — G62.9 NEUROPATHY: ICD-10-CM

## 2024-03-12 DIAGNOSIS — L40.50 PSORIATIC ARTHRITIS (HCC): ICD-10-CM

## 2024-03-12 DIAGNOSIS — Z79.899 HIGH RISK MEDICATION USE: ICD-10-CM

## 2024-03-12 PROCEDURE — 99214 OFFICE O/P EST MOD 30 MIN: CPT | Performed by: INTERNAL MEDICINE

## 2024-03-12 RX ORDER — METHOTREXATE 2.5 MG/1
20 TABLET ORAL WEEKLY
Qty: 120 TABLET | Refills: 1 | Status: SHIPPED | OUTPATIENT
Start: 2024-03-12

## 2024-03-12 RX ORDER — GABAPENTIN 300 MG/1
300 CAPSULE ORAL
Qty: 90 CAPSULE | Refills: 1 | Status: SHIPPED | OUTPATIENT
Start: 2024-03-12

## 2024-03-12 RX ORDER — FOLIC ACID 1 MG/1
1 TABLET ORAL DAILY
Qty: 90 TABLET | Refills: 1 | Status: SHIPPED | OUTPATIENT
Start: 2024-03-12

## 2024-03-12 RX ORDER — PREDNISONE 1 MG/1
TABLET ORAL DAILY
Qty: 300 TABLET | Refills: 0 | Status: SHIPPED | OUTPATIENT
Start: 2024-03-12 | End: 2024-07-10

## 2024-03-12 NOTE — PROGRESS NOTES
Assessment and Plan:  Fredy Yu is a 74 y.o.  male who presents for follow-up of psoriatic arthritis, which has been stable. Admits to having numbness in thighs for last month.    Psoriatic arthritis.  Advised to continue the Humira every other week, take folic acid daily, and methotrexate 8 tablets once a week.    Neuropathy.  He has significant neuropathy in his upper legs, worse than his upper arms.   Recommend taking gabapentin 300 mg at bedtime.    Continue methotrexate 8 tabs once a week   Continue folic acid daily  Continue Humira every other week through Dermatology  Go down on prednisone to 4mg daily for a month, then 3mg daily for a month, then 2 mg daily for a month, then 1mg daily for a month, then stiop  Take 300mg of gabapentin at bedtime  Neurology referral made  Do labs now, then every 3 months  Will order DEXA scan at next visit     Return to clinic in 4 months    Plan:  1. Psoriatic arthritis (HCC)  -     CBC and differential; Standing  -     Comprehensive metabolic panel; Standing  -     C-reactive protein; Standing  -     Sedimentation rate, automated; Standing  -     predniSONE 1 mg tablet; Take 4 tablets (4 mg total) by mouth daily for 30 days, THEN 3 tablets (3 mg total) daily for 30 days, THEN 2 tablets (2 mg total) daily for 30 days, THEN 1 tablet (1 mg total) daily.  -     methotrexate 2.5 MG tablet; Take 8 tablets (20 mg total) by mouth once a week    2. Inflammatory arthritis  -     predniSONE 1 mg tablet; Take 4 tablets (4 mg total) by mouth daily for 30 days, THEN 3 tablets (3 mg total) daily for 30 days, THEN 2 tablets (2 mg total) daily for 30 days, THEN 1 tablet (1 mg total) daily.  -     folic acid (FOLVITE) 1 mg tablet; Take 1 tablet (1 mg total) by mouth daily    3. Neuropathy  -     Ambulatory referral to Neurology; Future  -     gabapentin (Neurontin) 300 mg capsule; Take 1 capsule (300 mg total) by mouth daily at bedtime    4. High risk medication use  -     CBC and  differential; Standing  -     Comprehensive metabolic panel; Standing    High risk medication use - Benefits and risks of methotrexate use, including but not limited to gastrointestinal disturbances such as diarrhea, hair loss, fatigue, stomatitis, leukopenia, lung hypersensitivity reaction, hepatotoxicity, and lymphoma were discussed with the patient.  CBC,CMP will be regularly monitored.  Patient was prescribed Folic Acid 1 mg po daily to take while on methotrexate to help prevent side effects.  Patient counseled on abstinence from alcohol while using methotrexate.      RTC in 4 months      Chief Complaint  Follow-up of his psoriatic arthritis.    Rheumatic Disease Summary    HPI  Fredy Yu is a 74-year-old male who is here for follow-up of his psoriatic arthritis. He consents to [facility]YAN recording.     He has been experiencing numbness in his thighs for about a month, radiating slightly down his legs but not significantly. The numbness is more pronounced when he is at rest than when he is standing. It is more severe on the right side than on the left. He persistently had right-sided lower back pain, which he thinks is due to a disc problem. He is not undergoing physical therapy for his back, but he is doing it by himself despite covering the co-pay. He is taking 5 mg of prednisone daily, which seems to be effective in managing his condition and his supply will last until 03/2024. He wonders if the prednisone is causing weight gain. He has not noticed he is eating more than average. His joint pain is under control. He takes methotrexate 8 tablets every Monday and administers himself Humira injection every second week.     He has neuropathy in his legs. He takes gabapentin once a day at bedtime despite being prescribed to take it 3 times daily, however it makes him drowsy. He believes he should be taking it more often, especially he has discomfort in the bottom of his feet. His podiatrist advised him not to walk  barefoot at home.     He had an MRI of his lower spine over time. He had a few compression fractures in his spine. He does not remember being told he has osteoporosis, and he is not taking medications for it. He had an EMG test in his arms a long time ago at [facility], but not his legs. His arms do not cause him much discomfort. He has not seen a neurologist. He has intermittent numbness in his fingertips and thighs.    He has not had any labs done since 09/2023.    Review of Systems:   REVIEW OF SYSTEMS:  Pertinent ROS positive for headaches, blurry vision, dry eyes, joint pain, back pain, neck pain, and muscle pain. Otherwise, 14-point review of systems negative.    Home Medications:    Current Outpatient Medications:     adalimumab (HUMIRA) 40 mg/0.8 mL PSKT, Inject 40 mg under the skin every 14 (fourteen) days, Disp: , Rfl:     alfuzosin (UROXATRAL) 10 mg 24 hr tablet, Take 1 tablet (10 mg total) by mouth daily, Disp: 90 tablet, Rfl: 1    busPIRone (BUSPAR) 15 mg tablet, Take 1 tablet (15 mg total) by mouth 2 (two) times a day, Disp: 30 tablet, Rfl: 1    finasteride (PROSCAR) 5 mg tablet, Take 1 tablet (5 mg total) by mouth daily, Disp: 90 tablet, Rfl: 1    folic acid (FOLVITE) 1 mg tablet, Take 1 tablet (1 mg total) by mouth daily, Disp: 90 tablet, Rfl: 1    gabapentin (Neurontin) 300 mg capsule, Take 1 capsule (300 mg total) by mouth daily at bedtime, Disp: 90 capsule, Rfl: 1    hydrochlorothiazide (HYDRODIURIL) 25 mg tablet, Take 25 mg by mouth daily, Disp: , Rfl:     methotrexate 2.5 MG tablet, Take 8 tablets (20 mg total) by mouth once a week, Disp: 120 tablet, Rfl: 1    Multiple Vitamin (multivitamin) tablet, Take 1 tablet by mouth daily, Disp: 30 tablet, Rfl: 0    nadolol (CORGARD) 20 mg tablet, Take 1 tablet (20 mg total) by mouth daily, Disp: 90 tablet, Rfl: 1    predniSONE 1 mg tablet, Take 4 tablets (4 mg total) by mouth daily for 30 days, THEN 3 tablets (3 mg total) daily for 30 days, THEN 2 tablets (2 mg  "total) daily for 30 days, THEN 1 tablet (1 mg total) daily., Disp: 300 tablet, Rfl: 0    traZODone (DESYREL) 100 mg tablet, Take 1.5 tablets (150 mg total) by mouth daily at bedtime, Disp: 135 tablet, Rfl: 1    white petrolatum-mineral oil (EUCERIN,HYDROCERIN) cream, Apply topically 3 (three) times a day as needed (pruritis) (Patient not taking: Reported on 3/18/2024), Disp: 113 g, Rfl: 0    Objective:    Vitals:    03/12/24 1215   BP: 116/70   Weight: 113 kg (249 lb)   Height: 5' 5\" (1.651 m)       Physical Exam:  PHYSICAL EXAM:  Constitutional:    General: He is not in acute distress.  HENT:   Head: Normocephalic and atraumatic.   Eyes:   Conjunctiva/sclera: Conjunctivae normal.   Cardiovascular:   Rate and Rhythm: Normal rate and regular rhythm.   Heart sounds: S1 normal and S2 normal.   No friction rub.   Pulmonary:   Effort: Pulmonary effort is normal. No respiratory distress.   Breath sounds: Normal breath sounds. No wheezing, rhonchi or rales.   Musculoskeletal:   Cervical back: Neck supple.   Skin:  Coloration: Skin is not pale.   Neurological:   Mental Status: He is alert. Mental status is at baseline.   Psychiatric:      Mood and Affect: Mood normal.      Behavior: Behavior normal.    I have personally reviewed results with the patient.    Imaging:   No results found.    Labs:   Appointment on 10/11/2023   Component Date Value Ref Range Status    Sodium 10/11/2023 141  135 - 147 mmol/L Final    Potassium 10/11/2023 4.0  3.5 - 5.3 mmol/L Final    Chloride 10/11/2023 102  96 - 108 mmol/L Final    CO2 10/11/2023 30  21 - 32 mmol/L Final    ANION GAP 10/11/2023 9  mmol/L Final    BUN 10/11/2023 16  5 - 25 mg/dL Final    Creatinine 10/11/2023 0.82  0.60 - 1.30 mg/dL Final    Standardized to IDMS reference method    Glucose, Fasting 10/11/2023 84  65 - 99 mg/dL Final    Calcium 10/11/2023 9.3  8.4 - 10.2 mg/dL Final    eGFR 10/11/2023 87  ml/min/1.73sq m Final   Office Visit on 09/18/2023   Component Date Value " Ref Range Status    Total CK 10/11/2023 38 (L)  39 - 308 U/L Final    Aldolase 10/11/2023 3.9  3.3 - 10.3 U/L Final    HEMA-1 Antibody 10/11/2023 <20  <20 Units Final    PL 7 AutoAbs 10/11/2023 Negative  Negative Final    PL 12 AutoAbs 10/11/2023 Negative  Negative Final    EJ AutoAbs 10/11/2023 Negative  Negative Final    OJ AutoAbs 10/11/2023 Negative  Negative Final    SRP AutoAbs 10/11/2023 Negative  Negative Final    MI-2 Autoab 10/11/2023 Negative  Negative Final    Anti-TIF-1 gamma Ab (RDL) 10/11/2023 <20  <20 Units Final    Anti-MDA-5-Ab  10/11/2023 <20  <20 Units Final    ANTI-NXP-2 10/11/2023 <20  <20 Units Final    ANTI-SAE1 AB, IGG (RDL) 10/11/2023 <20  <20 Units Final    PM-SCL Ab 10/11/2023 <20  <20 Units Final    KU AutoAbs 10/11/2023 Negative  Negative Final    ANTI-SS-A 52KD AB, IGG (RDL) 10/11/2023 <20  <20 Units Final    Anti-U1 RNP Ab (RDL) 10/11/2023 <20  <20 Units Final    ANTI-U2 RNP AB (RDL) 10/11/2023 Negative  Negative Final    U3 RNP  10/11/2023 Negative  Negative Final        Interpretation for Anti-Hema-1, Anti-TIF-1gamma,      Anti-MDA-5, Anti-NXP-2, Anti-SAE1, Anti-PM/Scl-100,      Anti-SS-A 52 kD, Anti-U1 RNP:          Negative:                                <20          Weak Positive:                       20 - 39          Moderate Positive:                   40 - 80          Strong Positive:                         >80       Transcribed for Girish Anna MD, by Estrellita Bell on 03/21/24 at 6:13 AM. Powered by [employer].

## 2024-03-12 NOTE — PATIENT INSTRUCTIONS
"Continue methotrexate 8 tabs once a week   Continue folic acid daily  Continue Humira every other week through Dermatology  Go down on prednisone to 4mg daily for a month, then 3mg daily for a month, then 2 mg daily for a month, then 1mg daily for a month, then stiop  Take 300mg of gabapentin at bedtime  Neurology referral made  Will order DEXA scan at next visit     Return to clinic in 4 months    Psoriatic Arthritis in Adults    What is psoriatic arthritis? -- Psoriatic arthritis is a condition that causes joint pain, swelling, and stiffness. It happens in people who have a long-term skin condition called psoriasis. People with psoriasis have patches of thick, red skin that are often covered by silver or white scales  Doctors don't know what causes psoriasis or psoriatic arthritis.  What are the symptoms of psoriatic arthritis? -- Psoriatic arthritis causes pain, stiffness, and swelling in the affected joints. It can also affect the spine in some people. Because of the joint and spine problems, people can have trouble moving their body. Stiffness in the joints or low back is usually worse in the morning and lasts 30 minutes or longer. It usually gets better with exercise.  Psoriatic arthritis can affect joints on one or both sides of the body. It usually affects more than one joint.  In addition to joint symptoms (and the skin symptoms of psoriasis), people sometimes have other symptoms. These can include:  ?Swelling of a finger or toe, or the hands or feet  ?Swelling and pain in the back of the ankle or in the heel   ?Nail symptoms - The nails can look \"pitted,\" as if they were pricked by a pin. The nail can also come up off the nail bed.  ?Eye pain or redness  Is there a test for psoriatic arthritis? -- Yes. Your doctor or nurse will ask about your symptoms and do an exam. He or she will order X-rays of your painful joints. He or she might order an imaging test called an MRI. Imaging tests create pictures of the " "inside of the body.  To check that another condition isn't causing your symptoms, your doctor or nurse might also order:  ?Blood tests  ?Lab tests on a sample of fluid from a swollen joint - To get a sample of fluid, the doctor will put a thin needle in your joint.  How is psoriatic arthritis treated? -- There is no cure for psoriatic arthritis, but different treatments can help ease and control symptoms. Treatment for joint symptoms usually involves one or more of the following:  ?Medicines called nonsteroidal antiinflammatory drugs, or \"NSAIDs\" for short - Examples of NSAIDs are aspirin, ibuprofen (sample brand names: Advil, Motrin), and naproxen (sample brand name: Aleve).  ?Medicines that are usually used to treat other types of arthritis - Some of these include methotrexate and leflunomide.  ?Medicines that block a substance called tumor necrosis factor, or \"TNF\" for short - TNF plays a role in psoriasis and psoriatic arthritis. Medicines that block TNF are called \"anti-TNF\" medicines. Examples include etanercept (brand name: Enbrel) and adalimumab (brand name: Humira).  ?Other medicines - If the options above don't help, your doctor might suggest trying a different medicine. Examples include ustekinumab (brand name: Stelara), secukinumab (brand name: Cosentyx), tofacitinib (brand name: Xeljanz), abatacept (brand name: Orencia), and apremilast (brand name: Otezla).     ?Shots of medicines called steroids that go into the painful joint - These are not the same as the steroids some athletes take illegally. These steroids help reduce swelling and pain.  ?Heat - Heat, especially in the morning, can help reduce pain and stiffness. Do not use heat for longer than 20 minutes at a time. Also, do not use anything too hot that could burn your skin.  ?Physical and occupational therapy - This involves learning exercises, movements, and ways of doing everyday tasks.  ?Special shoe inserts (called \"orthotics\") - These can " help keep your feet, ankles, and knees in the proper position.  ?Treatment for psoriatic arthritis is usually long term. That's because even after symptoms get better, they sometimes return later on.  Is there anything I can do on my own to feel better? -- Yes. It is very important that you stay active. You might want to avoid being active because you are in pain. But this can make things worse. It can make your muscles weak and your joints stiffer than they already are. Your doctor, nurse, or physical therapist can help you figure out which activities and exercises are right for you.

## 2024-03-13 ENCOUNTER — TELEPHONE (OUTPATIENT)
Dept: NEUROLOGY | Facility: CLINIC | Age: 74
End: 2024-03-13

## 2024-03-13 NOTE — TELEPHONE ENCOUNTER
ADD ON, Dr. Reeves, ALL, 3/18/2024, 9 am, NP .     E-verified    No MA listed in The list this week for , Reason I have cc the MA Clinical team...     Thank you all,     Chanel

## 2024-03-18 ENCOUNTER — CONSULT (OUTPATIENT)
Dept: NEUROLOGY | Facility: CLINIC | Age: 74
End: 2024-03-18
Payer: MEDICARE

## 2024-03-18 VITALS
SYSTOLIC BLOOD PRESSURE: 120 MMHG | BODY MASS INDEX: 41.87 KG/M2 | DIASTOLIC BLOOD PRESSURE: 72 MMHG | HEART RATE: 58 BPM | TEMPERATURE: 98.1 F | WEIGHT: 251.6 LBS | OXYGEN SATURATION: 95 %

## 2024-03-18 DIAGNOSIS — R20.0 NUMBNESS: Primary | ICD-10-CM

## 2024-03-18 DIAGNOSIS — R56.9 SEIZURE (HCC): ICD-10-CM

## 2024-03-18 DIAGNOSIS — G62.9 NEUROPATHY: ICD-10-CM

## 2024-03-18 PROCEDURE — 99204 OFFICE O/P NEW MOD 45 MIN: CPT | Performed by: PSYCHIATRY & NEUROLOGY

## 2024-03-18 NOTE — ASSESSMENT & PLAN NOTE
Mr Yu reports diminished sensation of the left face, arm, and leg.  He had been diagnosed with neuropathy in the past, but this does not appear to correspond to his symptoms.  I would expect neuropathy to cause stocking distribution symptoms that are relatively symmetrical, perhaps spreading upwards later in the clinical course.  In addition, his electrical findings were quite severe which does not correspond to the other portions of his case.  I would be concerned that there may have been technical factors that could have interfered.  I am going to repeat his testing myself.  Given the one-sided nature of his symptoms I would be more concerned that he may have a right-sided intracranial lesion.  He needs a cranial MRI to assess for this possibility.  Further measures will be considered based on his test results.

## 2024-03-18 NOTE — PROGRESS NOTES
"Please note: this note was created with voice recognition software. Occasional wrong word or \"sound alike\" substitutions may occur due to the inherent limitations of voice recognition software. Read the chart carefully and recognize (using context) where substitutions may have occurred. I am available to discuss any questions.       1. Numbness  Assessment & Plan:  Mr Yu reports diminished sensation of the left face, arm, and leg.  He had been diagnosed with neuropathy in the past, but this does not appear to correspond to his symptoms.  I would expect neuropathy to cause stocking distribution symptoms that are relatively symmetrical, perhaps spreading upwards later in the clinical course.  In addition, his electrical findings were quite severe which does not correspond to the other portions of his case.  I would be concerned that there may have been technical factors that could have interfered.  I am going to repeat his testing myself.  Given the one-sided nature of his symptoms I would be more concerned that he may have a right-sided intracranial lesion.  He needs a cranial MRI to assess for this possibility.  Further measures will be considered based on his test results.    Orders:  -     MRI brain without contrast; Future; Expected date: 03/18/2024    2. Neuropathy  -     Ambulatory referral to Neurology  -     EMG 1 Upper/1 Lower Neuropathy; Future    3. Seizure (HCC)        Problem List Items Addressed This Visit       Seizure (HCC)    neuropathy - Primary     Mr Yu reports diminished sensation of the left face, arm, and leg.  He had been diagnosed with neuropathy in the past, but this does not appear to correspond to his symptoms.  I would expect neuropathy to cause stocking distribution symptoms that are relatively symmetrical, perhaps spreading upwards later in the clinical course.  In addition, his electrical findings were quite severe which does not correspond to the other portions of his case.  I " would be concerned that there may have been technical factors that could have interfered.  I am going to repeat his testing myself.  Given the one-sided nature of his symptoms I would be more concerned that he may have a right-sided intracranial lesion.  He needs a cranial MRI to assess for this possibility.  Further measures will be considered based on his test results.         Relevant Orders    MRI brain without contrast     Other Visit Diagnoses       Neuropathy        Relevant Orders    EMG 1 Upper/1 Lower Neuropathy            Problem List       Essential hypertension    Alcohol use disorder, mild, in early remission, abuse    BPH (benign prostatic hyperplasia)    History of prolonged Q-T interval on ECG    Mixed hyperlipidemia    Alcohol induced fatty liver    Pruritus    Insomnia    Depression with anxiety    Obesity, morbid (HCC)    PMR (polymyalgia rheumatica) ([facility])    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 degeneration, unspecified stage ([facility])    Seizure ([facility])    Psoriasis    Inflammatory arthritis    neuropathy    Current Assessment & Plan     Mr Yu reports diminished sensation of the left face, arm, and leg.  He had been diagnosed with neuropathy in the past, but this does not appear to correspond to his symptoms.  I would expect neuropathy to cause stocking distribution symptoms that are relatively symmetrical, perhaps spreading upwards later in the clinical course.  In addition, his electrical findings were quite severe which does not correspond to the other portions of his case.  I would be concerned that there may have been technical factors that could have interfered.  I am going to repeat his testing myself.  Given the one-sided nature of his symptoms I would be more concerned that he may have a right-sided intracranial lesion.  He needs a cranial MRI to assess for this possibility.  Further measures will be considered based on his test results.          Other Visit Diagnoses       Neuropathy  "                 I had the pleasure of seeing Fredy Yu, a 74 y.o. male, for neuromuscular consultation. The referral was for numbness.     He has a history of polymyalgia rheumatica, rheumatoid arthritis, and psoriatic arthritis.  He reports 2 months of numbness in the anterior thighs.  He \"cannot feel it\" intermittently, more prominently on the left.  He denies involvement below the knees; he denies involvement of the lateral thighs.  He has a history of left carpal tunnel release but also reports diminished sensation of the left medial hand.  These symptoms worsen while driving but not with activities such as working on the telephone or the computer.  He has noticed that when he touches the left side of his face that it seems diminished compared with the right.  There is no change in his speech, swallowing, or vision.  He denies changes in his balance or ambulation.  A physical therapist performed EMG in 2022; based on the raw data the study suggested generalized demyelinating pathology although no abnormalities were present on the needle examination.      Past Medical History:   Diagnosis Date    Alcohol dependency (HCC)     Anxiety     Arthritis     Depression     [place] (hard of hearing)     Hypertension     Kidney stones     Prostate enlargement     Renal disorder     kidney    Shoulder dislocation        Past Surgical History:   Procedure Laterality Date    CHOLECYSTECTOMY      2010    COLONOSCOPY      SHOULDER SURGERY Left     for dislocation x 2, 20 years ago an the second 18 years ago       Sulfa antibiotics, Sulfacetamide, Misc. sulfonamide containing compounds, and Elemental sulfur    Social History     Tobacco Use   Smoking Status Never   Smokeless Tobacco Never       Social History     Substance and Sexual Activity   Alcohol Use Yes    Alcohol/week: 6.0 standard drinks of alcohol    Types: 6 Shots of liquor per week    Comment: last drink 16 months ago       Social History     Substance and Sexual " Activity   Drug Use Never       Family History   Problem Relation Age of Onset    Dementia Mother     Colon cancer Mother     Heart disease Father     COPD Father     Heart failure Father     Coronary artery disease Father          Current Outpatient Medications:     adalimumab (HUMIRA) 40 mg/0.8 mL PSKT, Inject 40 mg under the skin every 14 (fourteen) days, Disp: , Rfl:     alfuzosin (UROXATRAL) 10 mg 24 hr tablet, Take 1 tablet (10 mg total) by mouth daily, Disp: 90 tablet, Rfl: 1    busPIRone (BUSPAR) 15 mg tablet, Take 1 tablet (15 mg total) by mouth 2 (two) times a day, Disp: 30 tablet, Rfl: 1    finasteride (PROSCAR) 5 mg tablet, Take 1 tablet (5 mg total) by mouth daily, Disp: 90 tablet, Rfl: 1    folic acid (FOLVITE) 1 mg tablet, Take 1 tablet (1 mg total) by mouth daily, Disp: 90 tablet, Rfl: 1    gabapentin (Neurontin) 300 mg capsule, Take 1 capsule (300 mg total) by mouth daily at bedtime, Disp: 90 capsule, Rfl: 1    hydrochlorothiazide (HYDRODIURIL) 25 mg tablet, Take 25 mg by mouth daily, Disp: , Rfl:     methotrexate 2.5 MG tablet, Take 8 tablets (20 mg total) by mouth once a week, Disp: 120 tablet, Rfl: 1    Multiple Vitamin (multivitamin) tablet, Take 1 tablet by mouth daily, Disp: 30 tablet, Rfl: 0    nadolol (CORGARD) 20 mg tablet, Take 1 tablet (20 mg total) by mouth daily, Disp: 90 tablet, Rfl: 1    predniSONE 1 mg tablet, Take 4 tablets (4 mg total) by mouth daily for 30 days, THEN 3 tablets (3 mg total) daily for 30 days, THEN 2 tablets (2 mg total) daily for 30 days, THEN 1 tablet (1 mg total) daily., Disp: 300 tablet, Rfl: 0    traZODone (DESYREL) 100 mg tablet, Take 1.5 tablets (150 mg total) by mouth daily at bedtime, Disp: 135 tablet, Rfl: 1    white petrolatum-mineral oil (EUCERIN,HYDROCERIN) cream, Apply topically 3 (three) times a day as needed (pruritis) (Patient not taking: Reported on 3/18/2024), Disp: 113 g, Rfl: 0    Appointment on 10/11/2023   Component Date Value Ref Range Status     Sodium 10/11/2023 141  135 - 147 mmol/L Final    Potassium 10/11/2023 4.0  3.5 - 5.3 mmol/L Final    Chloride 10/11/2023 102  96 - 108 mmol/L Final    CO2 10/11/2023 30  21 - 32 mmol/L Final    ANION GAP 10/11/2023 9  mmol/L Final    BUN 10/11/2023 16  5 - 25 mg/dL Final    Creatinine 10/11/2023 0.82  0.60 - 1.30 mg/dL Final    Standardized to IDMS reference method    Glucose, Fasting 10/11/2023 84  65 - 99 mg/dL Final    Calcium 10/11/2023 9.3  8.4 - 10.2 mg/dL Final    eGFR 10/11/2023 87  ml/min/1.73sq m Final        Results for orders placed during the hospital encounter of 02/10/19    MRI brain wo contrast    Narrative  MRI BRAIN WITHOUT CONTRAST    INDICATION: ataxia.    COMPARISON:   CT and CT angiography dated 2/10.    TECHNIQUE:  Limited examination.  Patient could only tolerate sagittal T1, axial T2 and axial diffusion-weighted imaging.  Sagittal T1 is limited by patient motion.    IMAGE QUALITY:  Uncontrollable tremors.  Patient could not complete the examination.    FINDINGS:    BRAIN PARENCHYMA:  Diffusion imaging is unremarkable with no signs of acute ischemia.  No mass, mass effect or midline shift.  No gross evidence of acute or chronic hemorrhage.    Mild cerebral volume loss.  Suggestion of mild chronic microangiopathic change on T2-weighted imaging.    VENTRICLES:  The ventricles are normal in size and contour.    SELLA AND PITUITARY GLAND:  Normal.    ORBITS:  Normal.    PARANASAL SINUSES:  Left maxillary sinus mucosal thickening.    VASCULATURE:  Evaluation of the major intracranial vasculature demonstrates appropriate flow voids.    CALVARIUM AND SKULL BASE:  Normal.    EXTRACRANIAL SOFT TISSUES:  Normal.    Impression  Limited examination.  Only 3 sequences could be obtained due to severe tremors.  Diffusion imaging is unremarkable.  No gross evidence of mass, hemorrhage or acute intracranial pathology.  Consider repeat examination with anesthesia if warranted.    Workstation performed:  [id number]     No results found for this or any previous visit.    No results found for this or any previous visit.    No results found for this or any previous visit.    No results found for this or any previous visit.    No results found for this or any previous visit.    No results found for this or any previous visit.    No results found for this or any previous visit.    No results found for this or any previous visit.    No results found for this or any previous visit.    No results found for this or any previous visit.    No results found for this or any previous visit.      Review of Systems   Constitutional:  Negative for appetite change, fatigue and fever.   HENT: Negative.  Negative for hearing loss, tinnitus, trouble swallowing and voice change.    Eyes: Negative.  Negative for photophobia, pain and visual disturbance.   Respiratory: Negative.  Negative for shortness of breath.    Cardiovascular: Negative.  Negative for palpitations.   Gastrointestinal: Negative.  Negative for nausea and vomiting.   Endocrine: Negative.  Negative for cold intolerance.   Genitourinary: Negative.  Negative for dysuria, frequency and urgency.   Musculoskeletal:  Positive for gait problem (balance issues). Negative for back pain, myalgias, neck pain and neck stiffness.   Skin: Negative.  Negative for rash.   Allergic/Immunologic: Negative.    Neurological:  Positive for dizziness (on and off), facial asymmetry (numbess L side face), weakness, numbness (L arm and b/l thighs x 2 mo) and headaches. Negative for tremors, seizures, syncope, speech difficulty and light-headedness.   Hematological: Negative.  Does not bruise/bleed easily.   Psychiatric/Behavioral: Negative.  Negative for confusion, hallucinations and sleep disturbance.          On examination,     Blood pressure 120/72, pulse 58, temperature 98.1 °F (36.7 °C), temperature source Temporal, weight 114 kg (251 lb 9.6 oz), SpO2 95%.    Well developed, well nourished, in  no acute distress    Normocephalic, atraumatic    Heart: regular rate and rhythm  I did not hear a carotid bruit    Extremities: no clubbing, cyanosis, or edema    Speech and cognition appeared normal    Cranial nerves:  II: Pupils equal, round, and reactive to light. No gross visual field defect. I did not appreciate optic disc edema.  III, IV, VI: Extraocular movements intact  V: Normal facial sensation in all three divisions of the trigeminal nerve bilaterally  VII: Normal facial strength  VIII: Hearing aids bilaterally  IX, X: Palate elevated symmetrically  XI: Sternocleidomastoid strength normal bilaterally  XII: Tongue protruded in midline without atrophy or fibrillations    Motor:  Normal tone and bulk throughout.   Muscle strength testing by the MRC scale was 5/5 in the deltoid, biceps, triceps, wrist extensors, wrist flexors, finger extensors, finger flexors,. Hip flexors, quadriceps, ankle dorsiflexors, ankle plantar flexors, and EHL bilaterally    Deep tendon reflexes:   2+ and symmetrical in the biceps, triceps, brachioradialis, and patellas; absent in the ankles  Toes downgoing (no Babinski sign)  No Davis's sign    Sensation: Normal light touch throughout. Diminished pinprick in the left medial hand and forearmk    Cerebellar: normal finger to nose and heel to shin testing    Gait: Normal heel, toe, and tandem gait            There are no Patient Instructions on file for this visit.      Thank you very much for allowing me to participate in your patient's care. Please feel free to contact me for any questions or concerns. Please be aware of the inherent limitations of voice recognition software, which may result in transcriptional errors.    Sukumar Reeves MD

## 2024-03-19 ENCOUNTER — HOSPITAL ENCOUNTER (OUTPATIENT)
Dept: NEUROLOGY | Facility: CLINIC | Age: 74
Discharge: HOME | End: 2024-03-19
Payer: MEDICARE

## 2024-03-19 DIAGNOSIS — G62.9 NEUROPATHY: ICD-10-CM

## 2024-03-19 PROBLEM — G56.02 CARPAL TUNNEL SYNDROME OF LEFT WRIST: Status: ACTIVE | Noted: 2024-03-19

## 2024-03-19 PROCEDURE — 95911 NRV CNDJ TEST 9-10 STUDIES: CPT | Performed by: PSYCHIATRY & NEUROLOGY

## 2024-03-19 PROCEDURE — 95886 MUSC TEST DONE W/N TEST COMP: CPT | Performed by: PSYCHIATRY & NEUROLOGY

## 2024-04-01 ENCOUNTER — HOSPITAL ENCOUNTER (OUTPATIENT)
Dept: MRI IMAGING | Facility: HOSPITAL | Age: 74
Discharge: HOME | End: 2024-04-01
Attending: PSYCHIATRY & NEUROLOGY
Payer: MEDICARE

## 2024-04-01 DIAGNOSIS — R20.0 NUMBNESS: ICD-10-CM

## 2024-04-01 PROCEDURE — 70551 MRI BRAIN STEM W/O DYE: CPT

## 2024-04-11 ENCOUNTER — TELEPHONE (OUTPATIENT)
Dept: NEUROLOGY | Facility: CLINIC | Age: 74
End: 2024-04-11

## 2024-04-11 NOTE — TELEPHONE ENCOUNTER
4/9/2024, 12:24pm    Patient left a voicemail requesting results of MRI.    [phone].     - please advise on MRI findings.

## 2024-04-12 NOTE — TELEPHONE ENCOUNTER
Sukumar Reeves MD   to Jennifer Hawk RN   SL    4/11/24 12:34 PM  He still likely has neuropathy, and we need to do the nerve testing that we discussed.  However, most of his symptoms were one-sided so I wondered whether he might have had a small stroke at some point.  And to me, that is what his MRI suggests.  So I am going to try to have him see a stroke specialist and have contacted 1 to make these arrangements.

## 2024-04-12 NOTE — TELEPHONE ENCOUNTER
Received VM transcription from 4/10/24, 9:59 AM:    Yes, my name's Barrington Yu. Date of birth, January 21, 1950. Dr. Reeves is my doctor. He had me get an MRI on April first of my brain. I didn't hear anything as far as the results. I would appreciate a call back to find out what the results were because no one has contacted me. It's not showing up in [employer]. Thank you.  ------------------------    Spoke with pt and advised him of Dr. Reeves's review of results. Pt verbalizes understanding and will await contact from our office for appt with stroke specialist.

## 2024-04-12 NOTE — TELEPHONE ENCOUNTER
Received vm from 4/11 at 10:09am-  brianna avila, this is the 3rd day, I'm trying to reach you people to find out what the results of what my MRI of my brain. I definitely would appreciate a call back from somebody. stuart ray,   date of birth january 21st 1950. Still waiting for results on my MRI brain that was done back on April 1st. I have definitely appreciate a call back. So I know what's going on. seems like no one even wants to respond to me.  [phone]  ---------------------------------------  Already addressed

## 2024-04-19 ENCOUNTER — OFFICE VISIT (OUTPATIENT)
Dept: NEUROLOGY | Facility: CLINIC | Age: 74
End: 2024-04-19

## 2024-04-19 VITALS
TEMPERATURE: 98.2 F | WEIGHT: 251.7 LBS | OXYGEN SATURATION: 96 % | HEIGHT: 65 IN | DIASTOLIC BLOOD PRESSURE: 80 MMHG | SYSTOLIC BLOOD PRESSURE: 130 MMHG | HEART RATE: 57 BPM | BODY MASS INDEX: 41.94 KG/M2

## 2024-04-19 DIAGNOSIS — R90.89 ABNORMAL BRAIN MRI: ICD-10-CM

## 2024-04-19 DIAGNOSIS — G47.19 EXCESSIVE DAYTIME SLEEPINESS: ICD-10-CM

## 2024-04-19 DIAGNOSIS — F40.240 CLAUSTROPHOBIA: ICD-10-CM

## 2024-04-19 DIAGNOSIS — G62.9 NEUROPATHY: Primary | ICD-10-CM

## 2024-04-19 RX ORDER — LORAZEPAM 1 MG/1
1 TABLET ORAL
Qty: 2 TABLET | Refills: 0 | Status: SHIPPED | OUTPATIENT
Start: 2024-04-19

## 2024-04-19 NOTE — PROGRESS NOTES
Review of Systems   Constitutional:  Negative for appetite change, fatigue and fever.   HENT: Negative.  Negative for hearing loss, tinnitus, trouble swallowing and voice change.    Eyes: Negative.  Negative for photophobia, pain and visual disturbance.   Respiratory: Negative.  Negative for shortness of breath.    Cardiovascular: Negative.  Negative for palpitations.   Gastrointestinal: Negative.  Negative for nausea and vomiting.   Endocrine: Negative.  Negative for cold intolerance.   Genitourinary: Negative.  Negative for dysuria, frequency and urgency.   Musculoskeletal:  Negative for back pain, gait problem, myalgias, neck pain and neck stiffness.   Skin: Negative.  Negative for rash.   Allergic/Immunologic: Negative.    Neurological:  Positive for numbness. Negative for dizziness, tremors, seizures, syncope, facial asymmetry, speech difficulty, weakness, light-headedness and headaches.        Pt reports twitching to left eye and numbness and tingling in face.   Hematological: Negative.  Does not bruise/bleed easily.   Psychiatric/Behavioral: Negative.  Negative for confusion, hallucinations and sleep disturbance.    All other systems reviewed and are negative.

## 2024-04-19 NOTE — PROGRESS NOTES
"[facility]  PATIENT:  Fredy Yu  MRN:  [id number]  :  1950  DATE OF SERVICE:  2024  PCP: Tejal Garcia MD    Assessment/Plan:     Neuropathy  Patient was last seen on 3/18/24 by Dr. Reeves for diminished sensation of the left face, arm, and leg.  Per last office visit note, \"He had been diagnosed with neuropathy in the past, but this does not appear to correspond to his symptoms.Patient doing well since his office visit with Dr. Reeves.  Still reports having numbness and tingling left side of his face, arm, and leg.  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 Brain MRI was without acute infarction, edema, or mass effect. It does display a subcentimeter T2 hypertintense focus in the anterior right temporal lobe, likely a chronic microbleed or cavernoma.  Patient denies any stroke like symptoms at this time  Referral to PT for gait/balance 2/2 neuropathy  MRI brain ordered to complete in early July- complete bloodwork prior to MRI to check kidney function  Recommend to check blood pressure occasionally away from the doctor's office to make sure that those numbers are typically less than 130/80.  If they are frequently higher than that, we recommend checking it more frequently and following up with primary care team/cardiologist   Continue all medication as prescribed by your primary care team/cardiologist  Will defer to primary care team for monitoring of cholesterol panel and blood sugar numbers with target LDL cholesterol of less than 70 and hemoglobin A1c less than 7%  Recommend following a low salt, mediterranean diet   Recommend routine physical exercise as tolerated     Excessive daytime sleepiness  Order placed for sleep study since patient reports poor sleep and excessive daytime sleepines    Abnormal brain MRI  Repeat brain MRI w wo contrast to be " "completed in 3 months to monitor area of small chronic microbleed vs. cavernoma  Lab work ordered to check kidney function prior  Referral to neurosurgery at that time if indicated       Diagnoses and all orders for this visit:    Neuropathy  -     Ambulatory referral to Physical Therapy; Future    Excessive daytime sleepiness  -     Ambulatory referral to Sleep Medicine; Future    Abnormal brain MRI  -     MRI brain w wo contrast; Future    Claustrophobia  -     LORazepam (ATIVAN) 1 mg tablet; Take 1 tablet (1 mg total) by mouth 60 minutes pre-procedure May repeat x1 30 mins prior if needed         We will plan for her to return to the office in 6 months to see myself or Dr. Dye, but would be happy to see him sooner if the need should arise.  If he has any symptoms concerning for TIA or stroke including sudden painless loss of vision or double vision, difficulty speaking or swallowing, vertigo/room spinning that does not quickly resolve, or weakness/numbness/loss of coordination affecting 1 side of the face or body, he should proceed by ambulance to the nearest emergency room immediately.     CC:     We had the pleasure of evaluating Fredy in neurological follow-up today. He is a 74 y.o. year-old male who presents today for a follow up after MRI brain.      History obtained from patient as well as available medical record review.    History of Present Illness:     Patient was last seen on 3/18/24 by Dr. Reeves for diminished sensation of the left face, arm, and leg.  Per last office visit note, \"He had been diagnosed with neuropathy in the past, but this does not appear to correspond to his symptoms. I would expect neuropathy to cause stocking distribution symptoms that are relatively symmetrical, perhaps spreading upwards later in the clinical course. In addition, his electrical findings were quite severe which does not correspond to the other portions of his case. I would be concerned that there may have " "been technical factors that could have interfered. I am going to repeat his testing myself. Given the one-sided nature of his symptoms I would be more concerned that he may have a right-sided intracranial lesion. He needs a cranial MRI to assess for this possibility. Further measures will be considered based on his test results.\"    Interval History:    Patient doing well since his office visit with Dr. Reeves.  Still reports having numbness and tingling left side of his face, arm, and leg.  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 Brain MRI was without acute infarction, edema, or mass effect. It does display a subcentimeter T2 hypertintense focus in the anterior right temporal lobe, likely a chronic microbleed or cavernoma.  Patient denies any stroke like symptoms at this time    Stroke/TIA risk factors were evaluated including:     AP/AC therapy: None.    Statin therapy:None at this time. Will repeat lipid panel and discuss if needed at that time.     Blood pressure today and as of late:130/80 or less     Most recent LDL:   Lab Results   Component Value Date    LDLCALC 74 06/03/2021        Most recent hemoglobin A1C: No recent results to review- will order    Cardiology evaluation/Cardiac Monitoring:     Endocrinology evaluation: N/A at this time.     Lifestyle history/modifications:    -Diet/Exercise regimen: Well-balanced diet.      -PT/OT/ST: none but would like to do some PT for his gait and balance due to neuropathy.  I will order this for him.    -Sleep: trazadone is helping with his sleep. Also takes Gabapentin 300mg QHS.       -Post-stroke depression/anxiety:Denies at this time    -Smoking:none    -ETOH: none since oct 2021    -Illicit drug use: none    Safety:    Independent of all ADLs  Denies any falls at home in the last month.  And ambulates without an assistive device.  Manages all of his " medications and finances on his own.  Denies any issues with short-term memory.     Past Medical History:     Past Medical History:   Diagnosis Date    Alcohol dependency (HCC)     Anxiety     Arthritis     Depression     [place]us[place] (hard of hearing)     Hypertension     Kidney stones     Peripheral neuropathy     Prostate enlargement     Renal disorder     kidney    Shoulder dislocation        Patient Active Problem List   Diagnosis    Essential hypertension    Alcohol use disorder, mild, in early remission, abuse    BPH (benign prostatic hyperplasia)    History of prolonged Q-T interval on ECG    Mixed hyperlipidemia    Alcohol induced fatty liver    Pruritus    Insomnia    Depression with anxiety    Obesity, morbid (HCC)    PMR (polymyalgia rheumatica) (HCC)    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 degeneration, unspecified stage (HCC)    Seizure (HCC)    Psoriasis    Inflammatory arthritis    neuropathy    Neuropathy    Carpal tunnel syndrome of left wrist    Excessive daytime sleepiness    Abnormal brain MRI       Medications:     Current Outpatient Medications   Medication Sig Dispense Refill    adalimumab (HUMIRA) 40 mg/0.8 mL PSKT Inject 40 mg under the skin every 14 (fourteen) days      alfuzosin (UROXATRAL) 10 mg 24 hr tablet Take 1 tablet (10 mg total) by mouth daily 90 tablet 1    busPIRone (BUSPAR) 15 mg tablet Take 1 tablet (15 mg total) by mouth 2 (two) times a day 30 tablet 1    finasteride (PROSCAR) 5 mg tablet Take 1 tablet (5 mg total) by mouth daily 90 tablet 1    folic acid (FOLVITE) 1 mg tablet Take 1 tablet (1 mg total) by mouth daily 90 tablet 1    gabapentin (Neurontin) 300 mg capsule Take 1 capsule (300 mg total) by mouth daily at bedtime 90 capsule 1    hydrochlorothiazide (HYDRODIURIL) 25 mg tablet Take 25 mg by mouth daily      LORazepam (ATIVAN) 1 mg tablet Take 1 tablet (1 mg total) by mouth 60 minutes pre-procedure May repeat x1 30 mins prior if needed 2 tablet 0    methotrexate 2.5 MG  tablet Take 8 tablets (20 mg total) by mouth once a week 120 tablet 1    Multiple Vitamin (multivitamin) tablet Take 1 tablet by mouth daily 30 tablet 0    nadolol (CORGARD) 20 mg tablet Take 1 tablet (20 mg total) by mouth daily 90 tablet 1    predniSONE 1 mg tablet Take 4 tablets (4 mg total) by mouth daily for 30 days, THEN 3 tablets (3 mg total) daily for 30 days, THEN 2 tablets (2 mg total) daily for 30 days, THEN 1 tablet (1 mg total) daily. 300 tablet 0    traZODone (DESYREL) 100 mg tablet Take 1.5 tablets (150 mg total) by mouth daily at bedtime 135 tablet 1    white petrolatum-mineral oil (EUCERIN,HYDROCERIN) cream Apply topically 3 (three) times a day as needed (pruritis) (Patient not taking: Reported on 3/18/2024) 113 g 0     No current facility-administered medications for this visit.        Allergies:     Allergies   Allergen Reactions    Sulfa Antibiotics Anaphylaxis and Rash     Face Swelling    Sulfacetamide Anaphylaxis and Rash     Face Swelling    Misc. Sulfonamide Containing Compounds Facial Swelling    Elemental Sulfur Rash and Edema       Family History:     Family History   Problem Relation Age of Onset    Dementia Mother     Colon cancer Mother     Heart disease Father     COPD Father     Heart failure Father     Coronary artery disease Father        Social History:     Social History     Socioeconomic History    Marital status: /Civil Union     Spouse name: Not on file    Number of children: Not on file    Years of education: Not on file    Highest education level: Not on file   Occupational History    Not on file   Tobacco Use    Smoking status: Never    Smokeless tobacco: Never   Vaping Use    Vaping status: Never Used   Substance and Sexual Activity    Alcohol use: Not Currently     Alcohol/week: 6.0 standard drinks of alcohol     Comment: In recovery    Drug use: Never    Sexual activity: Not Currently     Partners: Female   Other Topics Concern    Not on file   Social History  "Narrative    Not on file     Social Determinants of Health     Financial Resource Strain: Low Risk  (1/4/2024)    Overall Financial Resource Strain (CARDIA)     Difficulty of Paying Living Expenses: Not very hard   Food Insecurity: Not on file   Transportation Needs: No Transportation Needs (1/4/2024)    PRAPARE - Transportation     Lack of Transportation (Medical): No     Lack of Transportation (Non-Medical): No   Physical Activity: Not on file   Stress: Not on file   Social Connections: Not on file   Intimate Partner Violence: Not on file   Housing Stability: Not on file       Objective:     Physical Exam:    Vitals:  /80 (BP Location: Right arm, Patient Position: Sitting, Cuff Size: Adult)   Pulse 57   Temp 98.2 °F (36.8 °C) (Temporal)   Ht 5' 5\" (1.651 m)   Wt 114 kg (251 lb 11.2 oz)   SpO2 96%   BMI 41.89 kg/m²   BP Readings from Last 3 Encounters:   04/19/24 130/80   03/18/24 120/72   03/12/24 116/70     Pulse Readings from Last 3 Encounters:   04/19/24 57   03/18/24 58   02/21/24 88       Constitutional:       Appearance: Normal appearance.   HENT:      Head: Normocephalic and atraumatic.      Nose: Nose normal.      Mouth: Mucous membranes are moist.   Pulmonary:      Effort: Pulmonary effort is normal.    Skin:     General: Skin is warm and dry.   Psychiatric:         Affect normal.   Neurologic Exam     Mental Status   Oriented to person, place, and time.   Speech: speech is normal   Level of consciousness: alert  Able to name object.     Cranial Nerves     CN II   Visual fields full to confrontation.     CN III, IV, VI   Pupils are equal, round, and reactive to light.  Extraocular motions are normal.     CN V   Right facial sensation deficit: none  Left facial sensation deficit: cheeks    CN VII   Facial expression full, symmetric.     CN VIII   CN VIII normal.     CN IX, X   CN IX normal.   CN X normal.     CN XI   CN XI normal.     CN XII   CN XII normal.     Motor Exam   Muscle bulk: " "normal  Right arm pronator drift: absent  Left arm pronator drift: absent    Strength   Strength 5/5 throughout.     Sensory Exam   Right arm light touch: normal  Left arm light touch: normal  Right leg light touch: decreased from knee  Left leg light touch: decreased from knee    Gait, Coordination, and Reflexes     Gait  Gait: normal    Reflexes   Right patellar: 2+  Left patellar: 2+      Pertinent lab results:    Lab Results   Component Value Date/Time    CHOLESTEROL 131 2022 07:15 AM     Lab Results   Component Value Date/Time    TRIG 60 2021 03:37 AM    TRIG 233 (H) 2020 05:32 AM     Lab Results   Component Value Date/Time    HDL 61 2021 03:37 AM     Lab Results   Component Value Date/Time    LDLCALC 74 2021 03:37 AM        No results found for: \"HGBA1C\"  No results found for: \"EAG\"     Pertinent Imagin/1/24 MRI brain: No acute infarction, edema, or mass effect.Subcentimeter T2 hyperintense focus within the anterior right temporal lobe with associated susceptibility. Findings can be seen with a chronic microbleed or cavernoma.Moderate chronic microangiopathic ischemic changes.     Review of Systems:     Constitutional:  Negative for appetite change, fatigue and fever.   HENT: Negative.  Negative for hearing loss, tinnitus, trouble swallowing and voice change.    Eyes: Negative.  Negative for photophobia, pain and visual disturbance.   Respiratory: Negative.  Negative for shortness of breath.    Cardiovascular: Negative.  Negative for palpitations.   Gastrointestinal: Negative.  Negative for nausea and vomiting.   Endocrine: Negative.  Negative for cold intolerance.   Genitourinary: Negative.  Negative for dysuria, frequency and urgency.   Musculoskeletal:  Negative for back pain, gait problem, myalgias, neck pain and neck stiffness.   Skin: Negative.  Negative for rash.   Allergic/Immunologic: Negative.    Neurological:  Positive for numbness. Negative for dizziness, " tremors, seizures, syncope, facial asymmetry, speech difficulty, weakness, light-headedness and headaches.        Pt reports twitching to left eye and numbness and tingling in face.   Hematological: Negative.  Does not bruise/bleed easily.   Psychiatric/Behavioral: Negative.  Negative for confusion, hallucinations and sleep disturbance.    All other systems reviewed and are negative.    I have reviewed the ROS as entered by medical assistant.     I have spent a total time of 45 minutes on 04/22/24 in caring for this patient including Diagnostic results, Prognosis, Risks and benefits of tx options, Instructions for management, Patient and family education, Importance of tx compliance, Risk factor reductions, Impressions, Counseling / Coordination of care, Documenting in the medical record, Reviewing / ordering tests, medicine, procedures  , and Obtaining or reviewing history  .       Author:  MALGORZATA Corona 4/22/2024 2:01 PM

## 2024-04-19 NOTE — PATIENT INSTRUCTIONS
- Recommend to check blood pressure occasionally away from the doctor's office to make sure that those numbers are typically less than 130/80.  If they are frequently higher than that, we recommend checking a little more often and to follow up with primary care team.  -Continue all medications as prescribed be your primary care team/cardiologist.   - Will defer to primary care team for monitoring of cholesterol panel and blood sugar numbers with target LDL cholesterol of less than 70 and hemoglobin A1c less than 7%.  - Recommend following a low salt, mediterranean diet.   - Physical therapy script placed for neuropathy   -MRI brain ordered to complete in early July- complete bloodwork prior to MRI  -Order placed for sleep study    We will plan for him to return to the office in 3 months to see MD, but would be happy to see him sooner if the need should arise.  If he has any symptoms concerning for TIA or stroke including sudden painless loss of vision or double vision, difficulty speaking or swallowing, vertigo/room spinning that does not quickly resolve, or weakness/numbness/loss of coordination affecting 1 side of the face or body, he should proceed by ambulance to the nearest emergency room immediately.

## 2024-04-20 LAB — HBA1C MFR BLD HPLC: 5.8 %

## 2024-04-22 PROBLEM — G47.19 EXCESSIVE DAYTIME SLEEPINESS: Status: ACTIVE | Noted: 2024-04-22

## 2024-04-22 PROBLEM — R90.89 ABNORMAL BRAIN MRI: Status: ACTIVE | Noted: 2024-04-22

## 2024-04-22 NOTE — ASSESSMENT & PLAN NOTE
"Patient was last seen on 3/18/24 by Dr. Reeves for diminished sensation of the left face, arm, and leg.  Per last office visit note, \"He had been diagnosed with neuropathy in the past, but this does not appear to correspond to his symptoms.Patient doing well since his office visit with Dr. Reeves.  Still reports having numbness and tingling left side of his face, arm, and leg.  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 Brain MRI was without acute infarction, edema, or mass effect. It does display a subcentimeter T2 hypertintense focus in the anterior right temporal lobe, likely a chronic microbleed or cavernoma.  Patient denies any stroke like symptoms at this time  Referral to PT for gait/balance 2/2 neuropathy  MRI brain ordered to complete in early July- complete bloodwork prior to MRI to check kidney function  Recommend to check blood pressure occasionally away from the doctor's office to make sure that those numbers are typically less than 130/80.  If they are frequently higher than that, we recommend checking it more frequently and following up with primary care team/cardiologist   Continue all medication as prescribed by your primary care team/cardiologist  Will defer to primary care team for monitoring of cholesterol panel and blood sugar numbers with target LDL cholesterol of less than 70 and hemoglobin A1c less than 7%  Recommend following a low salt, mediterranean diet   Recommend routine physical exercise as tolerated   "

## 2024-04-22 NOTE — ASSESSMENT & PLAN NOTE
Repeat brain MRI w wo contrast to be completed in 3 months to monitor area of small chronic microbleed vs. cavernoma  Lab work ordered to check kidney function prior  Referral to neurosurgery at that time if indicated

## 2024-04-23 ENCOUNTER — TELEPHONE (OUTPATIENT)
Dept: FAMILY MEDICINE CLINIC | Facility: CLINIC | Age: 74
End: 2024-04-23

## 2024-04-23 NOTE — TELEPHONE ENCOUNTER
Patient stopped by here, he asked if the doctor could see the results of the labs that were carried out at [facility]. I informed him that we could not see any results performed outside our facilities. I called directly to [facility] to have his results faxed to us. He asks if the doctor can call him to let him know how they are and put it on mychart.

## 2024-04-24 DIAGNOSIS — G47.19 EXCESSIVE DAYTIME SLEEPINESS: ICD-10-CM

## 2024-04-24 DIAGNOSIS — G47.30 SLEEP APNEA, UNSPECIFIED TYPE: Primary | ICD-10-CM

## 2024-04-25 PROBLEM — R73.03 PREDIABETES: Status: ACTIVE | Noted: 2024-04-25

## 2024-04-29 NOTE — TELEPHONE ENCOUNTER
I contacted patient to review note from Doctor Liam .  Patient asked if labs are in the chart.  I was able to see under media that they were scanned into the chart.

## 2024-05-16 ENCOUNTER — TELEPHONE (OUTPATIENT)
Age: 74
End: 2024-05-16

## 2024-05-16 NOTE — TELEPHONE ENCOUNTER
Pt calls in and states that since going down to 3mg for prednisone he has been having trouble walking due to pain in his legs. He states he was instructed to let  know if symptoms got worse with lowering dose of the prednisone. Please advise

## 2024-05-17 NOTE — TELEPHONE ENCOUNTER
"Left message for patient stating the he can take  \"4mg daily of prednisone and stay on that dose until I see him again in clinic\" reminded of appopintment in July and told any questions or concerns then to call the office  "

## 2024-05-17 NOTE — TELEPHONE ENCOUNTER
Please let him know to go back to 4mg daily of prednisone and stay on that dose until I see him again in clinic

## 2024-05-21 ENCOUNTER — HOSPITAL ENCOUNTER (INPATIENT)
Facility: HOSPITAL | Age: 74
LOS: 1 days | Discharge: HOME | DRG: 897 | End: 2024-05-22
Attending: EMERGENCY MEDICINE | Admitting: FAMILY MEDICINE
Payer: MEDICARE

## 2024-05-21 ENCOUNTER — APPOINTMENT (EMERGENCY)
Dept: RADIOLOGY | Facility: HOSPITAL | Age: 74
DRG: 897 | End: 2024-05-21
Payer: MEDICARE

## 2024-05-21 ENCOUNTER — APPOINTMENT (INPATIENT)
Dept: ULTRASOUND IMAGING | Facility: HOSPITAL | Age: 74
DRG: 897 | End: 2024-05-21
Payer: MEDICARE

## 2024-05-21 ENCOUNTER — APPOINTMENT (INPATIENT)
Dept: NON INVASIVE DIAGNOSTICS | Facility: HOSPITAL | Age: 74
DRG: 897 | End: 2024-05-21
Payer: MEDICARE

## 2024-05-21 DIAGNOSIS — F41.8 DEPRESSION WITH ANXIETY: ICD-10-CM

## 2024-05-21 DIAGNOSIS — F10.939 ALCOHOL WITHDRAWAL (HCC): Primary | ICD-10-CM

## 2024-05-21 DIAGNOSIS — E83.42 HYPOMAGNESEMIA: ICD-10-CM

## 2024-05-21 LAB
2HR DELTA HS TROPONIN: -5 NG/L
ALBUMIN SERPL BCP-MCNC: 4.3 G/DL (ref 3.5–5)
ALP SERPL-CCNC: 82 U/L (ref 34–104)
ALT SERPL W P-5'-P-CCNC: 25 U/L (ref 7–52)
ANION GAP SERPL CALCULATED.3IONS-SCNC: 13 MMOL/L (ref 4–13)
AST SERPL W P-5'-P-CCNC: 23 U/L (ref 13–39)
AV LVOT PEAK GRADIENT: 4 MMHG
AV PEAK GRADIENT: 6 MMHG
BASOPHILS # BLD AUTO: 0.04 THOUSANDS/ÂΜL (ref 0–0.1)
BASOPHILS NFR BLD AUTO: 0 % (ref 0–1)
BILIRUB SERPL-MCNC: 0.64 MG/DL (ref 0.2–1)
BSA FOR ECHO PROCEDURE: 2.19 M2
BUN SERPL-MCNC: 18 MG/DL (ref 5–25)
CALCIUM SERPL-MCNC: 9 MG/DL (ref 8.4–10.2)
CARDIAC TROPONIN I PNL SERPL HS: 17 NG/L
CARDIAC TROPONIN I PNL SERPL HS: 22 NG/L
CHLORIDE SERPL-SCNC: 103 MMOL/L (ref 96–108)
CO2 SERPL-SCNC: 26 MMOL/L (ref 21–32)
CREAT SERPL-MCNC: 0.83 MG/DL (ref 0.6–1.3)
E WAVE DECELERATION TIME: 184 MS
E/A RATIO: 0.65
EOSINOPHIL # BLD AUTO: 0.06 THOUSAND/ÂΜL (ref 0–0.61)
EOSINOPHIL NFR BLD AUTO: 1 % (ref 0–6)
ERYTHROCYTE [DISTWIDTH] IN BLOOD BY AUTOMATED COUNT: 14.1 % (ref 11.6–15.1)
ETHANOL SERPL-MCNC: 93 MG/DL
GFR SERPL CREATININE-BSD FRML MDRD: 86 ML/MIN/1.73SQ M
GLUCOSE SERPL-MCNC: 99 MG/DL (ref 65–140)
HCT VFR BLD AUTO: 46.5 % (ref 36.5–49.3)
HGB BLD-MCNC: 15.6 G/DL (ref 12–17)
IMM GRANULOCYTES # BLD AUTO: 0.13 THOUSAND/UL (ref 0–0.2)
IMM GRANULOCYTES NFR BLD AUTO: 1 % (ref 0–2)
LYMPHOCYTES # BLD AUTO: 2.12 THOUSANDS/ÂΜL (ref 0.6–4.47)
LYMPHOCYTES NFR BLD AUTO: 19 % (ref 14–44)
MAGNESIUM SERPL-MCNC: 1.4 MG/DL (ref 1.9–2.7)
MCH RBC QN AUTO: 31.4 PG (ref 26.8–34.3)
MCHC RBC AUTO-ENTMCNC: 33.5 G/DL (ref 31.4–37.4)
MCV RBC AUTO: 94 FL (ref 82–98)
MONOCYTES # BLD AUTO: 1.23 THOUSAND/ÂΜL (ref 0.17–1.22)
MONOCYTES NFR BLD AUTO: 11 % (ref 4–12)
MV E'TISSUE VEL-SEP: 10 CM/S
MV PEAK A VEL: 0.74 M/S
MV PEAK E VEL: 48 CM/S
MV STENOSIS PRESSURE HALF TIME: 53 MS
MV VALVE AREA P 1/2 METHOD: 4.15
NEUTROPHILS # BLD AUTO: 7.88 THOUSANDS/ÂΜL (ref 1.85–7.62)
NEUTS SEG NFR BLD AUTO: 68 % (ref 43–75)
NRBC BLD AUTO-RTO: 0 /100 WBCS
PLATELET # BLD AUTO: 233 THOUSANDS/UL (ref 149–390)
PMV BLD AUTO: 8.7 FL (ref 8.9–12.7)
POTASSIUM SERPL-SCNC: 3.5 MMOL/L (ref 3.5–5.3)
PROT SERPL-MCNC: 7.3 G/DL (ref 6.4–8.4)
RBC # BLD AUTO: 4.97 MILLION/UL (ref 3.88–5.62)
SL CV LV EF: 65
SODIUM SERPL-SCNC: 142 MMOL/L (ref 135–147)
TRICUSPID ANNULAR PLANE SYSTOLIC EXCURSION: 2.5 CM
TRICUSPID VALVE PEAK E WAVE VELOCITY: 0.08 M/S
TRICUSPID VALVE PEAK REGURGITATION VELOCITY: 1.28 M/S
WBC # BLD AUTO: 11.46 THOUSAND/UL (ref 4.31–10.16)

## 2024-05-21 PROCEDURE — C8929 TTE W OR WO FOL WCON,DOPPLER: HCPCS

## 2024-05-21 PROCEDURE — 80053 COMPREHEN METABOLIC PANEL: CPT | Performed by: EMERGENCY MEDICINE

## 2024-05-21 PROCEDURE — 36415 COLL VENOUS BLD VENIPUNCTURE: CPT | Performed by: EMERGENCY MEDICINE

## 2024-05-21 PROCEDURE — 84484 ASSAY OF TROPONIN QUANT: CPT | Performed by: EMERGENCY MEDICINE

## 2024-05-21 PROCEDURE — 76705 ECHO EXAM OF ABDOMEN: CPT

## 2024-05-21 PROCEDURE — 85025 COMPLETE CBC W/AUTO DIFF WBC: CPT | Performed by: EMERGENCY MEDICINE

## 2024-05-21 PROCEDURE — 96365 THER/PROPH/DIAG IV INF INIT: CPT

## 2024-05-21 PROCEDURE — 82077 ASSAY SPEC XCP UR&BREATH IA: CPT | Performed by: EMERGENCY MEDICINE

## 2024-05-21 PROCEDURE — 71046 X-RAY EXAM CHEST 2 VIEWS: CPT

## 2024-05-21 PROCEDURE — 99285 EMERGENCY DEPT VISIT HI MDM: CPT

## 2024-05-21 PROCEDURE — 83735 ASSAY OF MAGNESIUM: CPT | Performed by: EMERGENCY MEDICINE

## 2024-05-21 PROCEDURE — 99223 1ST HOSP IP/OBS HIGH 75: CPT | Performed by: FAMILY MEDICINE

## 2024-05-21 PROCEDURE — 93306 TTE W/DOPPLER COMPLETE: CPT | Performed by: INTERNAL MEDICINE

## 2024-05-21 PROCEDURE — 93005 ELECTROCARDIOGRAM TRACING: CPT

## 2024-05-21 PROCEDURE — 99285 EMERGENCY DEPT VISIT HI MDM: CPT | Performed by: EMERGENCY MEDICINE

## 2024-05-21 PROCEDURE — 96375 TX/PRO/DX INJ NEW DRUG ADDON: CPT

## 2024-05-21 RX ORDER — NADOLOL 40 MG/1
20 TABLET ORAL DAILY
Status: DISCONTINUED | OUTPATIENT
Start: 2024-05-21 | End: 2024-05-22 | Stop reason: HOSPADM

## 2024-05-21 RX ORDER — LOPERAMIDE HYDROCHLORIDE 2 MG/1
2 CAPSULE ORAL 3 TIMES DAILY PRN
Status: DISCONTINUED | OUTPATIENT
Start: 2024-05-21 | End: 2024-05-22 | Stop reason: HOSPADM

## 2024-05-21 RX ORDER — GABAPENTIN 300 MG/1
300 CAPSULE ORAL
Status: DISCONTINUED | OUTPATIENT
Start: 2024-05-21 | End: 2024-05-22 | Stop reason: HOSPADM

## 2024-05-21 RX ORDER — FINASTERIDE 5 MG/1
5 TABLET, FILM COATED ORAL DAILY
Status: DISCONTINUED | OUTPATIENT
Start: 2024-05-21 | End: 2024-05-22 | Stop reason: HOSPADM

## 2024-05-21 RX ORDER — LANOLIN ALCOHOL/MO/W.PET/CERES
100 CREAM (GRAM) TOPICAL DAILY
Status: DISCONTINUED | OUTPATIENT
Start: 2024-05-21 | End: 2024-05-22 | Stop reason: HOSPADM

## 2024-05-21 RX ORDER — PREDNISONE 1 MG/1
4 TABLET ORAL DAILY
Status: DISCONTINUED | OUTPATIENT
Start: 2024-05-21 | End: 2024-05-22 | Stop reason: HOSPADM

## 2024-05-21 RX ORDER — FOLIC ACID 1 MG/1
1 TABLET ORAL DAILY
Status: DISCONTINUED | OUTPATIENT
Start: 2024-05-21 | End: 2024-05-22 | Stop reason: HOSPADM

## 2024-05-21 RX ORDER — FAMOTIDINE 20 MG/1
20 TABLET, FILM COATED ORAL 2 TIMES DAILY
Status: DISCONTINUED | OUTPATIENT
Start: 2024-05-21 | End: 2024-05-22 | Stop reason: HOSPADM

## 2024-05-21 RX ORDER — MAGNESIUM HYDROXIDE/ALUMINUM HYDROXICE/SIMETHICONE 120; 1200; 1200 MG/30ML; MG/30ML; MG/30ML
30 SUSPENSION ORAL EVERY 4 HOURS PRN
Status: DISCONTINUED | OUTPATIENT
Start: 2024-05-21 | End: 2024-05-22 | Stop reason: HOSPADM

## 2024-05-21 RX ORDER — TRAZODONE HYDROCHLORIDE 50 MG/1
150 TABLET ORAL
Status: DISCONTINUED | OUTPATIENT
Start: 2024-05-21 | End: 2024-05-22 | Stop reason: HOSPADM

## 2024-05-21 RX ORDER — MAGNESIUM SULFATE HEPTAHYDRATE 40 MG/ML
2 INJECTION, SOLUTION INTRAVENOUS ONCE
Status: COMPLETED | OUTPATIENT
Start: 2024-05-21 | End: 2024-05-21

## 2024-05-21 RX ORDER — DIAZEPAM 5 MG/ML
10 INJECTION, SOLUTION INTRAMUSCULAR; INTRAVENOUS ONCE
Status: COMPLETED | OUTPATIENT
Start: 2024-05-21 | End: 2024-05-21

## 2024-05-21 RX ORDER — ONDANSETRON 4 MG/1
4 TABLET, ORALLY DISINTEGRATING ORAL EVERY 6 HOURS PRN
Status: DISCONTINUED | OUTPATIENT
Start: 2024-05-21 | End: 2024-05-22 | Stop reason: HOSPADM

## 2024-05-21 RX ORDER — ALFUZOSIN HYDROCHLORIDE 10 MG/1
10 TABLET, EXTENDED RELEASE ORAL DAILY
Status: DISCONTINUED | OUTPATIENT
Start: 2024-05-21 | End: 2024-05-22 | Stop reason: HOSPADM

## 2024-05-21 RX ORDER — LORAZEPAM 1 MG/1
2 TABLET ORAL ONCE
Status: COMPLETED | OUTPATIENT
Start: 2024-05-21 | End: 2024-05-21

## 2024-05-21 RX ADMIN — BUSPIRONE HYDROCHLORIDE 15 MG: 5 TABLET ORAL at 11:45

## 2024-05-21 RX ADMIN — FOLIC ACID 1 MG: 1 TABLET ORAL at 11:18

## 2024-05-21 RX ADMIN — MULTIPLE VITAMINS W/ MINERALS TAB 1 TABLET: TAB ORAL at 11:18

## 2024-05-21 RX ADMIN — MAGNESIUM SULFATE HEPTAHYDRATE 2 G: 40 INJECTION, SOLUTION INTRAVENOUS at 09:06

## 2024-05-21 RX ADMIN — ALUMINUM HYDROXIDE, MAGNESIUM HYDROXIDE, DIMETHICONE 30 ML: 400; 400; 40 SUSPENSION ORAL at 17:16

## 2024-05-21 RX ADMIN — PERFLUTREN 0.6 ML/MIN: 6.52 INJECTION, SUSPENSION INTRAVENOUS at 13:09

## 2024-05-21 RX ADMIN — NADOLOL 20 MG: 40 TABLET ORAL at 11:45

## 2024-05-21 RX ADMIN — THIAMINE HCL TAB 100 MG 100 MG: 100 TAB at 11:18

## 2024-05-21 RX ADMIN — LORAZEPAM 2 MG: 1 TABLET ORAL at 11:18

## 2024-05-21 RX ADMIN — GABAPENTIN 300 MG: 300 CAPSULE ORAL at 21:24

## 2024-05-21 RX ADMIN — PREDNISONE 4 MG: 1 TABLET ORAL at 17:16

## 2024-05-21 RX ADMIN — MAGNESIUM SULFATE HEPTAHYDRATE 2 G: 40 INJECTION, SOLUTION INTRAVENOUS at 11:43

## 2024-05-21 RX ADMIN — BUSPIRONE HYDROCHLORIDE 15 MG: 5 TABLET ORAL at 21:24

## 2024-05-21 RX ADMIN — FAMOTIDINE 20 MG: 20 TABLET, FILM COATED ORAL at 18:59

## 2024-05-21 RX ADMIN — DIAZEPAM 10 MG: 5 INJECTION INTRAMUSCULAR; INTRAVENOUS at 08:31

## 2024-05-21 RX ADMIN — FINASTERIDE 5 MG: 5 TABLET, FILM COATED ORAL at 11:45

## 2024-05-21 RX ADMIN — TRAZODONE HYDROCHLORIDE 150 MG: 50 TABLET ORAL at 21:24

## 2024-05-21 NOTE — CASE MANAGEMENT
Case Management Progress Note    Patient name Fredy Yu  Location /409-01 MRN [id number]  : 1950 Date 2024       LOS (days): 0  Geometric Mean LOS (GMLOS) (days): 3.4  Days to GMLOS:3.1        OBJECTIVE:        Current admission status: Inpatient  Preferred Pharmacy:   [id number] - [place] - [address]  [place] [id number]  Phone: [phone] Fax: [phone]    Primary Care Provider: Tejal Garcia MD    Primary Insurance: MEDICARE  Secondary Insurance: hospitals [employer]    PROGRESS NOTE:Met with patient at bedside. Pt states he was sober for many years and recently relapsed 2 weeks ago after having stressors at home. Pt states he is always having arguments with wife about her always going out socializing. He wanted her to go to meeting with him but she refuses. He states he is faithful with going to AA meetings 5x week. He currently is not interested in going to detox or any rehab. He wants to resume going to AA meetings. Recommended possible couple counseling but he states his wife will not go. CM to follow for any discharge needs.

## 2024-05-21 NOTE — H&P
[facility]  H&P  Name: Fredy Yu 74 y.o. male I MRN: [id number]  Unit/Bed#: RM04 I Date of Admission: 5/21/2024   Date of Service: 5/21/2024 I Hospital Day: 0      Assessment & Plan   * Alcohol use disorder, mild, in early remission, abuse  Assessment & Plan  Hx alcohol abuse w/ previous hospitalizations for detox, remission for past two years  Relapsed 2 weeks ago, drinking a pint of vodka per day and two pints yesterday  Presented to ED with withdrawal symptoms, CIWA score 10 on admission  Received diazepam 10 mg and IV magnesium 2 g x 2 doses in the ED  Monitor alcohol withdrawal symptoms, PRN zofran and loperamide ordered  24-hour telemetry monitoring  CIWA protocol  Thiamine, folic acid, multivitamin supplementation  Echo ordered d/t chest pain, SOB with signs of volume overload on exam  Case management consult once stable    Neuropathy  Assessment & Plan  Continue PTA gabapentin 300 mg HS    Depression with anxiety  Assessment & Plan  Continue PTA buspar 15 mg BID    Insomnia  Assessment & Plan  Continue PTA trazodone 150 mg HS    Essential hypertension  Assessment & Plan  Continue nadolol 20 mg QD  Monitor vitals           Code Status: Level 1/Full code    Anticipated Length of Stay:  Patient will be admitted on an Inpatient basis with an anticipated length of stay of  > 2 midnights.   Justification for Hospital Stay: alcohol withdrawal, CIWA protocol    Total Time for Visit, including Counseling / Coordination of Care: 90 minutes.  Greater than 50% of this total time spent on direct patient counseling and coordination of care.    Chief Complaint:   chest pain    History of Present Illness:    Fredy Yu is a 74 y.o. male with past medical history of hypertension, peripheral neuropathy, anxiety, psoriatic arthritis and alcohol dependency who presents with symptoms of alcohol withdrawal. He states that he has been sober for two and a half years but started drinking two weeks ago  d/t life stressors. He has been drinking approximately 1 pint of vodka every few days, but last evening escalated to 2 pints over the course of a few hours. He endorses nausea, tremor, anxiety, SOB, diarrhea, and mild cramping abdominal pain. He reports chest pain that began last evening and radiated down his left arm but resolved prior to admission assessment.    Work-up in the ED was relevant for ethanol level of 93, magnesium 1.4, and WBC elevated to 11.46. He was given IV diazepam 10 mg and IV magnesium 2 g x 2 doses in the ED before being admitted on CIWA protocol to med-surg level of care for further treatment of alcohol withdrawal.     Review of Systems:    Review of Systems   Constitutional:  Negative for chills, diaphoresis and fever.   Respiratory:  Positive for chest tightness.    Cardiovascular:  Positive for leg swelling. Negative for chest pain.   Gastrointestinal:  Positive for abdominal pain, diarrhea and nausea. Negative for constipation and vomiting.   Genitourinary:  Negative for difficulty urinating, frequency and urgency.   Neurological:  Positive for tremors and headaches. Negative for dizziness, seizures, weakness, light-headedness and numbness.   Psychiatric/Behavioral:  The patient is nervous/anxious.        Past Medical and Surgical History:     Past Medical History:   Diagnosis Date    Alcohol dependency (HCC)     Anxiety     Arthritis     Depression     [place] (hard of hearing)     Hypertension     Kidney stones     Peripheral neuropathy     Prostate enlargement     Renal disorder     kidney    Shoulder dislocation        Past Surgical History:   Procedure Laterality Date    CHOLECYSTECTOMY      2010    COLONOSCOPY      SHOULDER SURGERY Left     for dislocation x 2, 20 years ago an the second 18 years ago       Meds/Allergies:    Prior to Admission medications    Medication Sig Start Date End Date Taking? Authorizing Provider   adalimumab (HUMIRA) 40 mg/0.8 mL PSKT Inject 40 mg under the  skin every 14 (fourteen) days    Historical Provider, MD   alfuzosin (UROXATRAL) 10 mg 24 hr tablet Take 1 tablet (10 mg total) by mouth daily 1/19/24 7/17/24  Tejal Garcia MD   busPIRone (BUSPAR) 15 mg tablet Take 1 tablet (15 mg total) by mouth 2 (two) times a day 7/5/23   Андрей Kong MD   finasteride (PROSCAR) 5 mg tablet Take 1 tablet (5 mg total) by mouth daily 7/11/23   Tejal Garcia MD   folic acid (FOLVITE) 1 mg tablet Take 1 tablet (1 mg total) by mouth daily 3/12/24   Girish Anna MD   gabapentin (Neurontin) 300 mg capsule Take 1 capsule (300 mg total) by mouth daily at bedtime 3/12/24   Girish Anna MD   hydrochlorothiazide (HYDRODIURIL) 25 mg tablet Take 25 mg by mouth daily    Historical Provider, MD   LORazepam (ATIVAN) 1 mg tablet Take 1 tablet (1 mg total) by mouth 60 minutes pre-procedure May repeat x1 30 mins prior if needed 4/19/24   MALGORZATA Corona   methotrexate 2.5 MG tablet Take 8 tablets (20 mg total) by mouth once a week 3/12/24   Girish Anna MD   Multiple Vitamin (multivitamin) tablet Take 1 tablet by mouth daily 9/7/21   Jackson Mcdowell DO   nadolol (CORGARD) 20 mg tablet Take 1 tablet (20 mg total) by mouth daily 9/25/23   Tejal Garcia MD   predniSONE 1 mg tablet Take 4 tablets (4 mg total) by mouth daily for 30 days, THEN 3 tablets (3 mg total) daily for 30 days, THEN 2 tablets (2 mg total) daily for 30 days, THEN 1 tablet (1 mg total) daily. 3/12/24 7/10/24  Girish Anna MD   traZODone (DESYREL) 100 mg tablet Take 1.5 tablets (150 mg total) by mouth daily at bedtime 1/19/24   Tejal Garcia MD   white petrolatum-mineral oil (EUCERIN,HYDROCERIN) cream Apply topically 3 (three) times a day as needed (pruritis)  Patient not taking: Reported on 3/18/2024 6/9/21   Jordan C Holter, DO     I have reviewed home medications with patient personally.    Allergies:   Allergies   Allergen Reactions    Sulfa Antibiotics  "Anaphylaxis and Rash     Face Swelling    Sulfacetamide Anaphylaxis and Rash     Face Swelling    Misc. Sulfonamide Containing Compounds Facial Swelling    Elemental Sulfur Rash and Edema       Social History:     Marital Status: /Civil Union   Patient Pre-hospital Living Situation: home  Patient Pre-hospital Level of Mobility: ambulates without issue  Patient Pre-hospital Diet Restrictions: none  Substance Use History:   Social History     Substance and Sexual Activity   Alcohol Use Not Currently    Alcohol/week: 6.0 standard drinks of alcohol    Comment: In recovery     Social History     Tobacco Use   Smoking Status Never   Smokeless Tobacco Never     Social History     Substance and Sexual Activity   Drug Use Never       Family History:    Family History   Problem Relation Age of Onset    Dementia Mother     Colon cancer Mother     Heart disease Father     COPD Father     Heart failure Father     Coronary artery disease Father        Physical Exam:     Vitals:   Blood Pressure: 166/87 (05/21/24 1239)  Pulse: 91 (05/21/24 1239)  Temperature: 97.9 °F (36.6 °C) (05/21/24 0806)  Temp Source: Temporal (05/21/24 0806)  Respirations: 16 (05/21/24 0856)  Height: 5' 5\" (165.1 cm) (05/21/24 1239)  Weight - Scale: 115 kg (253 lb 8.5 oz) (05/21/24 1239)  SpO2: 90 % (05/21/24 1239)    Physical Exam  Constitutional:       General: He is not in acute distress.     Appearance: He is obese.   HENT:      Head: Normocephalic and atraumatic.   Cardiovascular:      Rate and Rhythm: Normal rate and regular rhythm.      Pulses: Normal pulses.      Heart sounds: Normal heart sounds.   Pulmonary:      Effort: Pulmonary effort is normal.      Breath sounds: Normal breath sounds.   Abdominal:      Tenderness: There is abdominal tenderness in the epigastric area.   Musculoskeletal:      Cervical back: Normal range of motion and neck supple.   Skin:     General: Skin is warm and dry.   Neurological:      General: No focal deficit " present.      Mental Status: He is alert and oriented to person, place, and time.      Comments: B/l tremor present, L>R   Psychiatric:         Mood and Affect: Mood is anxious.             Additional Data:     Lab Results: I have personally reviewed pertinent reports.      Results from last 7 days   Lab Units 05/21/24  0821   WBC Thousand/uL 11.46*   HEMOGLOBIN g/dL 15.6   HEMATOCRIT % 46.5   PLATELETS Thousands/uL 233   SEGS PCT % 68   LYMPHO PCT % 19   MONO PCT % 11   EOS PCT % 1     Results from last 7 days   Lab Units 05/21/24  0821   SODIUM mmol/L 142   POTASSIUM mmol/L 3.5   CHLORIDE mmol/L 103   CO2 mmol/L 26   BUN mg/dL 18   CREATININE mg/dL 0.83   ANION GAP mmol/L 13   CALCIUM mg/dL 9.0   ALBUMIN g/dL 4.3   TOTAL BILIRUBIN mg/dL 0.64   ALK PHOS U/L 82   ALT U/L 25   AST U/L 23   GLUCOSE RANDOM mg/dL 99                       Imaging: I have personally reviewed pertinent reports.      XR chest 2 views    (Results Pending)       EKG, Pathology, and Other Studies Reviewed on Admission:   EKG: sinus rhythm, normal rate    Allscripts / Epic Records Reviewed: Yes     ** Please Note: This note has been constructed using a voice recognition system. **

## 2024-05-21 NOTE — ED PROVIDER NOTES
History  Chief Complaint   Patient presents with    Chest Pain     Chest pain off and on during the night. States that he is having pain down his left arm, but thinks its his neuropathy pain. States that he was a recovering alcoholic. Has been drinking vodka last night and thinks he may be having a panic attack      HPI    74-year-old male with past medical history of alcohol use disorder, hypertension, and hyperlipidemia who presents for evaluation of chest pain.  Patient states he has been having intermittent pain on the left side of his chest going into his left arm since last night.  He is really unable to describe what the pain feels like or how long it lasts.  He denies any pain at this time.  He also states he has been feeling very anxious.  He states he does have a history of alcohol use disorder.  He states he was sober for 2 and half years but has had increasing stress at home and started drinking in the past week.  He states he last drink last night.  He states he drank 2 pints of vodka overnight.  He also states he is feeling slightly short of breath.  He states he feels nauseous but denies any vomiting.  Denies abdominal pain.  He has been having diarrhea for the past few days.  He states he feels shaky.  Denies any history of alcohol withdrawal seizures.  He states he has been admitted to the hospital for detox in the past.    Prior to Admission Medications   Prescriptions Last Dose Informant Patient Reported? Taking?   LORazepam (ATIVAN) 1 mg tablet   No No   Sig: Take 1 tablet (1 mg total) by mouth 60 minutes pre-procedure May repeat x1 30 mins prior if needed   Multiple Vitamin (multivitamin) tablet  Self No No   Sig: Take 1 tablet by mouth daily   adalimumab (HUMIRA) 40 mg/0.8 mL PSKT  Self Yes No   Sig: Inject 40 mg under the skin every 14 (fourteen) days   alfuzosin (UROXATRAL) 10 mg 24 hr tablet  Self No No   Sig: Take 1 tablet (10 mg total) by mouth daily   busPIRone (BUSPAR) 15 mg tablet  Self  No No   Sig: Take 1 tablet (15 mg total) by mouth 2 (two) times a day   finasteride (PROSCAR) 5 mg tablet  Self No No   Sig: Take 1 tablet (5 mg total) by mouth daily   folic acid (FOLVITE) 1 mg tablet  Self No No   Sig: Take 1 tablet (1 mg total) by mouth daily   gabapentin (Neurontin) 300 mg capsule  Self No No   Sig: Take 1 capsule (300 mg total) by mouth daily at bedtime   hydrochlorothiazide (HYDRODIURIL) 25 mg tablet  Self Yes No   Sig: Take 25 mg by mouth daily   methotrexate 2.5 MG tablet  Self No No   Sig: Take 8 tablets (20 mg total) by mouth once a week   nadolol (CORGARD) 20 mg tablet  Self No No   Sig: Take 1 tablet (20 mg total) by mouth daily   predniSONE 1 mg tablet  Self No No   Sig: Take 4 tablets (4 mg total) by mouth daily for 30 days, THEN 3 tablets (3 mg total) daily for 30 days, THEN 2 tablets (2 mg total) daily for 30 days, THEN 1 tablet (1 mg total) daily.   traZODone (DESYREL) 100 mg tablet  Self No No   Sig: Take 1.5 tablets (150 mg total) by mouth daily at bedtime   white petrolatum-mineral oil (EUCERIN,HYDROCERIN) cream  Self No No   Sig: Apply topically 3 (three) times a day as needed (pruritis)   Patient not taking: Reported on 3/18/2024      Facility-Administered Medications: None       Past Medical History:   Diagnosis Date    Alcohol dependency (HCC)     Anxiety     Arthritis     Depression     [place] (hard of hearing)     Hypertension     Kidney stones     Peripheral neuropathy     Prostate enlargement     Renal disorder     kidney    Shoulder dislocation        Past Surgical History:   Procedure Laterality Date    CHOLECYSTECTOMY      2010    COLONOSCOPY      SHOULDER SURGERY Left     for dislocation x 2, 20 years ago an the second 18 years ago       Family History   Problem Relation Age of Onset    Dementia Mother     Colon cancer Mother     Heart disease Father     COPD Father     Heart failure Father     Coronary artery disease Father      I have reviewed and agree with the  history as documented.    E-Cigarette/Vaping    E-Cigarette Use Never User      E-Cigarette/Vaping Substances    Nicotine No     THC No     CBD No     Flavoring No     Other No     Unknown No      Social History     Tobacco Use    Smoking status: Never    Smokeless tobacco: Never   Vaping Use    Vaping status: Never Used   Substance Use Topics    Alcohol use: Not Currently     Alcohol/week: 6.0 standard drinks of alcohol     Comment: In recovery    Drug use: Never       Review of Systems   Constitutional:  Negative for appetite change, chills and fever.   HENT:  Negative for congestion, rhinorrhea and sore throat.    Respiratory:  Positive for shortness of breath. Negative for cough.    Cardiovascular:  Positive for chest pain. Negative for leg swelling.   Gastrointestinal:  Positive for diarrhea and nausea. Negative for abdominal pain and vomiting.   Genitourinary:  Negative for dysuria, frequency, hematuria and urgency.   Musculoskeletal:  Negative for arthralgias and myalgias.   Skin:  Negative for rash.   Neurological:  Positive for headaches. Negative for dizziness, weakness, light-headedness and numbness.   All other systems reviewed and are negative.      Physical Exam  Physical Exam  Vitals and nursing note reviewed.   Constitutional:       General: He is not in acute distress.     Appearance: Normal appearance. He is well-developed. He is obese. He is not ill-appearing, toxic-appearing or diaphoretic.   HENT:      Head: Normocephalic and atraumatic.      Right Ear: External ear normal.      Left Ear: External ear normal.      Nose: Nose normal.      Mouth/Throat:      Mouth: Mucous membranes are moist.      Pharynx: Oropharynx is clear.   Eyes:      Extraocular Movements: Extraocular movements intact.      Conjunctiva/sclera: Conjunctivae normal.   Cardiovascular:      Rate and Rhythm: Normal rate and regular rhythm.      Pulses: Normal pulses.      Heart sounds: Normal heart sounds. No murmur heard.     No  friction rub. No gallop.   Pulmonary:      Effort: Pulmonary effort is normal. No respiratory distress.      Breath sounds: Normal breath sounds. No decreased breath sounds, wheezing, rhonchi or rales.   Abdominal:      General: There is no distension.      Palpations: Abdomen is soft.      Tenderness: There is no abdominal tenderness. There is no guarding or rebound.   Musculoskeletal:         General: No tenderness.      Cervical back: Neck supple.      Right lower leg: No tenderness. No edema.      Left lower leg: No tenderness. No edema.   Skin:     General: Skin is warm and dry.      Coloration: Skin is not pale.      Findings: No rash.   Neurological:      General: No focal deficit present.      Mental Status: He is alert and oriented to person, place, and time.      Cranial Nerves: No cranial nerve deficit.      Sensory: No sensory deficit.      Motor: No weakness.      Comments: Mild tremors in bilateral upper extremities.   Psychiatric:         Mood and Affect: Mood normal.         Behavior: Behavior normal.      Comments: Mildly anxious appearing.         Vital Signs  ED Triage Vitals   Temperature Pulse Respirations Blood Pressure SpO2   05/21/24 0806 05/21/24 0809 05/21/24 0809 05/21/24 0809 05/21/24 0809   97.9 °F (36.6 °C) 87 18 (!) 171/93 91 %      Temp Source Heart Rate Source Patient Position - Orthostatic VS BP Location FiO2 (%)   05/21/24 0806 05/21/24 0809 05/21/24 0809 05/21/24 0809 --   Temporal Monitor Lying Left arm       Pain Score       --                  Vitals:    05/21/24 0809 05/21/24 0856 05/21/24 1145 05/21/24 1239   BP: (!) 171/93 136/77 166/87 166/87   Pulse: 87 89 86 91   Patient Position - Orthostatic VS: Lying Lying           Visual Acuity      ED Medications  Medications   busPIRone (BUSPAR) tablet 15 mg (15 mg Oral Given 5/21/24 1145)   alfuzosin (UROXATRAL) 24 hr tablet 10 mg (10 mg Oral Not Given 5/21/24 1104)   finasteride (PROSCAR) tablet 5 mg (5 mg Oral Given 5/21/24  1145)   folic acid (FOLVITE) tablet 1 mg (1 mg Oral Given 5/21/24 1118)   gabapentin (NEURONTIN) capsule 300 mg (has no administration in time range)   nadolol (CORGARD) tablet 20 mg (20 mg Oral Given 5/21/24 1145)   traZODone (DESYREL) tablet 150 mg (has no administration in time range)   thiamine tablet 100 mg (100 mg Oral Given 5/21/24 1118)   multivitamin-minerals (CENTRUM) tablet 1 tablet (1 tablet Oral Given 5/21/24 1118)   ondansetron (ZOFRAN-ODT) dispersible tablet 4 mg (has no administration in time range)   loperamide (IMODIUM) capsule 2 mg (has no administration in time range)   diazepam (VALIUM) injection 10 mg (10 mg Intravenous Given 5/21/24 0831)   magnesium sulfate 2 g/50 mL IVPB (premix) 2 g (0 g Intravenous Stopped 5/21/24 1033)   LORazepam (ATIVAN) tablet 2 mg (2 mg Oral Given 5/21/24 1118)   magnesium sulfate 2 g/50 mL IVPB (premix) 2 g (2 g Intravenous New Bag 5/21/24 1143)   perflutren lipid microsphere (DEFINITY) injection (0.6 mL/min Intravenous Given 5/21/24 1309)       Diagnostic Studies  Results Reviewed       Procedure Component Value Units Date/Time    HS Troponin I 2hr [[id number]]  (Normal) Collected: 05/21/24 1037    Lab Status: Final result Specimen: Blood from Arm, Right Updated: 05/21/24 1105     hs TnI 2hr 17 ng/L      Delta 2hr hsTnI -5 ng/L     Comprehensive metabolic panel [[id number]] Collected: 05/21/24 0821    Lab Status: Final result Specimen: Blood from Arm, Right Updated: 05/21/24 0901     Sodium 142 mmol/L      Potassium 3.5 mmol/L      Chloride 103 mmol/L      CO2 26 mmol/L      ANION GAP 13 mmol/L      BUN 18 mg/dL      Creatinine 0.83 mg/dL      Glucose 99 mg/dL      Calcium 9.0 mg/dL      AST 23 U/L      ALT 25 U/L      Alkaline Phosphatase 82 U/L      Total Protein 7.3 g/dL      Albumin 4.3 g/dL      Total Bilirubin 0.64 mg/dL      eGFR 86 ml/min/1.73sq m     Narrative:      National Kidney Disease Foundation guidelines for Chronic Kidney Disease (CKD):     Stage 1  with normal or high GFR (GFR > 90 mL/min/1.73 square meters)    Stage 2 Mild CKD (GFR = 60-89 mL/min/1.73 square meters)    Stage 3A Moderate CKD (GFR = 45-59 mL/min/1.73 square meters)    Stage 3B Moderate CKD (GFR = 30-44 mL/min/1.73 square meters)    Stage 4 Severe CKD (GFR = 15-29 mL/min/1.73 square meters)    Stage 5 End Stage CKD (GFR <15 mL/min/1.73 square meters)  Note: GFR calculation is accurate only with a steady state creatinine    Magnesium [[id number]]  (Abnormal) Collected: 05/21/24 0821    Lab Status: Final result Specimen: Blood from Arm, Right Updated: 05/21/24 0901     Magnesium 1.4 mg/dL     HS Troponin 0hr (reflex protocol) [[id number]]  (Normal) Collected: 05/21/24 0821    Lab Status: Final result Specimen: Blood from Arm, Right Updated: 05/21/24 0850     hs TnI 0hr 22 ng/L     Ethanol [[id number]]  (Abnormal) Collected: 05/21/24 0821    Lab Status: Final result Specimen: Blood from Arm, Right Updated: 05/21/24 0842     Ethanol Lvl 93 mg/dL     CBC and differential [[id number]]  (Abnormal) Collected: 05/21/24 0821    Lab Status: Final result Specimen: Blood from Arm, Right Updated: 05/21/24 0828     WBC 11.46 Thousand/uL      RBC 4.97 Million/uL      Hemoglobin 15.6 g/dL      Hematocrit 46.5 %      MCV 94 fL      MCH 31.4 pg      MCHC 33.5 g/dL      RDW 14.1 %      MPV 8.7 fL      Platelets 233 Thousands/uL      nRBC 0 /100 WBCs      Segmented % 68 %      Immature Grans % 1 %      Lymphocytes % 19 %      Monocytes % 11 %      Eosinophils Relative 1 %      Basophils Relative 0 %      Absolute Neutrophils 7.88 Thousands/µL      Absolute Immature Grans 0.13 Thousand/uL      Absolute Lymphocytes 2.12 Thousands/µL      Absolute Monocytes 1.23 Thousand/µL      Eosinophils Absolute 0.06 Thousand/µL      Basophils Absolute 0.04 Thousands/µL                    XR chest 2 views    (Results Pending)              Procedures  ECG 12 Lead Documentation Only    Date/Time: 5/21/2024 8:43 AM    Performed by: Mary Carmen  KESHAV Gould MD  Authorized by: Mary Carmen Gould MD    Indications / Diagnosis:  Chest pain  ECG reviewed by me, the ED Provider: yes    Patient location:  ED  Previous ECG:     Previous ECG:  Compared to current    Similarity:  No change    Comparison to cardiac monitor: Yes    Interpretation:     Interpretation: normal    Rate:     ECG rate:  87    ECG rate assessment: normal    Rhythm:     Rhythm: sinus rhythm    QRS:     QRS axis:  Normal    QRS intervals:  Normal  Conduction:     Conduction: normal    ST segments:     ST segments:  Normal  T waves:     T waves: normal             ED Course             HEART Risk Score      Flowsheet Row Most Recent Value   Heart Score Risk Calculator    History 0 Filed at: 05/21/2024 1123   ECG 0 Filed at: 05/21/2024 1123   Age 2 Filed at: 05/21/2024 1123   Risk Factors 2 Filed at: 05/21/2024 1123   Troponin 1 Filed at: 05/21/2024 1123   HEART Score 5 Filed at: 05/21/2024 1123                                        Medical Decision Making  Amount and/or Complexity of Data Reviewed  Labs: ordered.  Radiology: ordered.    Risk  Prescription drug management.  Decision regarding hospitalization.      74-year-old male with past medical history of alcohol use disorder, hypertension, and hyperlipidemia who presents for evaluation of chest pain, also with symptoms of alcohol withdrawals.  Patient is slightly hypertensive, mild tremors bilateral upper extremities daily for the past week, last drink was last night.  Differential diagnosis includes: Electrolyte abnormality, ACS, arrhythmia, anemia, alcohol withdrawals.  CBC to evaluate for anemia, CMP to evaluate for metabolic abnormality, EKG and troponin to evaluate for ACS or arrhythmia, chest x-ray to evaluate for pneumothorax or cardiomegaly.  Will check ethanol level.  Will also check magnesium level.  Will treat symptomatically with Valium and reassess.  Reviewed labs, ethanol level mildly elevated.  Labs otherwise without marked  abnormalities.  Reassessed patient, he states symptoms are slightly improved after Valium.  Discussed with patient about admission for alcohol detox.  He is interested in admission but does not want to go to [place].  He is agreeable for admission here.  Discussed with [facility], they will accept patient for admission.         Disposition  Final diagnoses:   Alcohol withdrawal (HCC)   Hypomagnesemia     Time reflects when diagnosis was documented in both MDM as applicable and the Disposition within this note       Time User Action Codes Description Comment    5/21/2024  9:53 AM Mary Carmen Gould Add [F10.939] Alcohol withdrawal (HCC)     5/21/2024  9:53 AM Mary Carmen Gould [E83.42] Hypomagnesemia           ED Disposition       ED Disposition   Admit    Condition   Stable    Date/Time   Tue May 21, 2024 0953    Comment   Case was discussed with [facility] and the patient's admission status was agreed to be Admission Status: inpatient status to the service of Dr. Covarrubias.               Follow-up Information    None         Patient's Medications   Discharge Prescriptions    No medications on file       No discharge procedures on file.    PDMP Review         Value Time User    PDMP Reviewed  Yes 6/9/2021 12:02 PM Jordan C Holter, DO            ED Provider  Electronically Signed by             Mary Carmen Gould MD  05/21/24 9317

## 2024-05-21 NOTE — ASSESSMENT & PLAN NOTE
Hx alcohol abuse w/ previous hospitalizations for detox, remission for past two years  Relapsed 2 weeks ago, drinking a pint of vodka per day and two pints yesterday  Presented to ED with withdrawal symptoms, CIWA score 10 on admission  Received diazepam 10 mg and IV magnesium 2 g x 2 doses in the ED  Monitor alcohol withdrawal symptoms, PRN zofran and loperamide ordered  24-hour telemetry monitoring  CIWA protocol  Thiamine, folic acid, multivitamin supplementation  Echo ordered d/t chest pain, SOB with signs of volume overload on exam  Case management consult once stable

## 2024-05-21 NOTE — PLAN OF CARE
Problem: Potential for Falls  Goal: Patient will remain free of falls  Description: INTERVENTIONS:  - Educate patient/family on patient safety including physical limitations  - Instruct patient to call for assistance with activity   - Consult OT/PT to assist with strengthening/mobility   - Keep Call bell within reach  - Keep bed low and locked with side rails adjusted as appropriate  - Keep care items and personal belongings within reach  - Initiate and maintain comfort rounds  - Make Fall Risk Sign visible to staff  - Offer Toileting every 2 Hours, in advance of need  - Initiate/Maintain bed/chair alarm  - Obtain necessary fall risk management equipment: nonskid socks, call bell in reach   - Apply yellow socks and bracelet for high fall risk patients  - Consider moving patient to room near nurses station  5/21/2024 1043 by Leonila Choudhury RN  Outcome: Progressing  5/21/2024 1043 by Leonila Choudhury RN  Outcome: Progressing     Problem: Prexisting or High Potential for Compromised Skin Integrity  Goal: Skin integrity is maintained or improved  Description: INTERVENTIONS:  - Identify patients at risk for skin breakdown  - Assess and monitor skin integrity  - Assess and monitor nutrition and hydration status  - Monitor labs   - Assess for incontinence   - Turn and reposition patient  - Assist with mobility/ambulation  - Relieve pressure over bony prominences  - Avoid friction and shearing  - Provide appropriate hygiene as needed including keeping skin clean and dry  - Evaluate need for skin moisturizer/barrier cream  - Collaborate with interdisciplinary team   - Patient/family teaching  - Consider wound care consult   5/21/2024 1043 by Leonila Choudhury RN  Outcome: Progressing  5/21/2024 1043 by Leonila Choudhury RN  Outcome: Progressing     Problem: PAIN - ADULT  Goal: Verbalizes/displays adequate comfort level or baseline comfort level  Description: Interventions:  - Encourage patient to monitor pain and  request assistance  - Assess pain using appropriate pain scale  - Administer analgesics based on type and severity of pain and evaluate response  - Implement non-pharmacological measures as appropriate and evaluate response  - Consider cultural and social influences on pain and pain management  - Notify physician/advanced practitioner if interventions unsuccessful or patient reports new pain  5/21/2024 1043 by Leonila Choudhury RN  Outcome: Progressing  5/21/2024 1043 by Leonila Choudhury RN  Outcome: Progressing     Problem: INFECTION - ADULT  Goal: Absence or prevention of progression during hospitalization  Description: INTERVENTIONS:  - Assess and monitor for signs and symptoms of infection  - Monitor lab/diagnostic results  - Monitor all insertion sites, i.e. indwelling lines, tubes, and drains  - Monitor endotracheal if appropriate and nasal secretions for changes in amount and color  - Johnson appropriate cooling/warming therapies per order  - Administer medications as ordered  - Instruct and encourage patient and family to use good hand hygiene technique  - Identify and instruct in appropriate isolation precautions for identified infection/condition  5/21/2024 1043 by Leonila Choudhury RN  Outcome: Progressing  5/21/2024 1043 by Leonila Choudhury RN  Outcome: Progressing     Problem: SAFETY ADULT  Goal: Patient will remain free of falls  Description: INTERVENTIONS:  - Educate patient/family on patient safety including physical limitations  - Instruct patient to call for assistance with activity   - Consult OT/PT to assist with strengthening/mobility   - Keep Call bell within reach  - Keep bed low and locked with side rails adjusted as appropriate  - Keep care items and personal belongings within reach  - Initiate and maintain comfort rounds  - Make Fall Risk Sign visible to staff  - Offer Toileting every 2 Hours, in advance of need  - Initiate/Maintain bed/chair alarm  - Obtain necessary fall risk  management equipment: nonskid socks, call bell in reach   - Apply yellow socks and bracelet for high fall risk patients  - Consider moving patient to room near nurses station  5/21/2024 1043 by Leonila Choudhury RN  Outcome: Progressing  5/21/2024 1043 by Leonila Choudhury RN  Outcome: Progressing  Goal: Maintain or return to baseline ADL function  Description: INTERVENTIONS:  -  Assess patient's ability to carry out ADLs; assess patient's baseline for ADL function and identify physical deficits which impact ability to perform ADLs (bathing, care of mouth/teeth, toileting, grooming, dressing, etc.)  - Assess/evaluate cause of self-care deficits   - Assess range of motion  - Assess patient's mobility; develop plan if impaired  - Assess patient's need for assistive devices and provide as appropriate  - Encourage maximum independence but intervene and supervise when necessary  - Involve family in performance of ADLs  - Assess for home care needs following discharge   - Consider OT consult to assist with ADL evaluation and planning for discharge  - Provide patient education as appropriate  5/21/2024 1043 by Leonila Choudhury RN  Outcome: Progressing  5/21/2024 1043 by Leonila Choudhury RN  Outcome: Progressing  Goal: Maintains/Returns to pre admission functional level  Description: INTERVENTIONS:  - Perform AM-PAC 6 Click Basic Mobility/ Daily Activity assessment daily.  - Set and communicate daily mobility goal to care team and patient/family/caregiver.   - Collaborate with rehabilitation services on mobility goals if consulted  - Perform Range of Motion 3 times a day.  - Reposition patient every 3 hours.  - Dangle patient 3 times a day  - Stand patient 3 times a day  - Ambulate patient 3 times a day  - Out of bed to chair 3 times a day   - Out of bed for meals 3 times a day  - Out of bed for toileting  - Record patient progress and toleration of activity level   5/21/2024 1043 by Leonila Choudhury RN  Outcome:  Progressing  5/21/2024 1043 by Leonila Choudhury RN  Outcome: Progressing     Problem: DISCHARGE PLANNING  Goal: Discharge to home or other facility with appropriate resources  Description: INTERVENTIONS:  - Identify barriers to discharge w/patient and caregiver  - Arrange for needed discharge resources and transportation as appropriate  - Identify discharge learning needs (meds, wound care, etc.)  - Arrange for interpretive services to assist at discharge as needed  - Refer to Case Management Department for coordinating discharge planning if the patient needs post-hospital services based on physician/advanced practitioner order or complex needs related to functional status, cognitive ability, or social support system  5/21/2024 1043 by Leonila Choudhury RN  Outcome: Progressing  5/21/2024 1043 by Leonila Choudhury RN  Outcome: Progressing     Problem: Knowledge Deficit  Goal: Patient/family/caregiver demonstrates understanding of disease process, treatment plan, medications, and discharge instructions  Description: Complete learning assessment and assess knowledge base.  Interventions:  - Provide teaching at level of understanding  - Provide teaching via preferred learning methods  5/21/2024 1043 by Leonila Choudhury RN  Outcome: Progressing  5/21/2024 1043 by Leonial Choudhury RN  Outcome: Progressing     Problem: CARDIOVASCULAR - ADULT  Goal: Maintains optimal cardiac output and hemodynamic stability  Description: INTERVENTIONS:  - Monitor I/O, vital signs and rhythm  - Monitor for S/S and trends of decreased cardiac output  - Administer and titrate ordered vasoactive medications to optimize hemodynamic stability  - Assess quality of pulses, skin color and temperature  - Assess for signs of decreased coronary artery perfusion  - Instruct patient to report change in severity of symptoms  5/21/2024 1043 by Leonila Choudhury RN  Outcome: Progressing  5/21/2024 1043 by Leonila Choudhury RN  Outcome:  Progressing  Goal: Absence of cardiac dysrhythmias or at baseline rhythm  Description: INTERVENTIONS:  - Continuous cardiac monitoring, vital signs, obtain 12 lead EKG if ordered  - Administer antiarrhythmic and heart rate control medications as ordered  - Monitor electrolytes and administer replacement therapy as ordered  5/21/2024 1043 by Leonila Chuodhury RN  Outcome: Progressing  5/21/2024 1043 by Leonila Choudhury RN  Outcome: Progressing

## 2024-05-22 VITALS
OXYGEN SATURATION: 91 % | RESPIRATION RATE: 20 BRPM | WEIGHT: 250 LBS | HEIGHT: 65 IN | BODY MASS INDEX: 41.65 KG/M2 | HEART RATE: 79 BPM | SYSTOLIC BLOOD PRESSURE: 148 MMHG | DIASTOLIC BLOOD PRESSURE: 69 MMHG | TEMPERATURE: 97.8 F

## 2024-05-22 LAB
ALBUMIN SERPL BCP-MCNC: 3.8 G/DL (ref 3.5–5)
ALP SERPL-CCNC: 72 U/L (ref 34–104)
ALT SERPL W P-5'-P-CCNC: 18 U/L (ref 7–52)
ANION GAP SERPL CALCULATED.3IONS-SCNC: 7 MMOL/L (ref 4–13)
AST SERPL W P-5'-P-CCNC: 18 U/L (ref 13–39)
BASOPHILS # BLD AUTO: 0.04 THOUSANDS/ÂΜL (ref 0–0.1)
BASOPHILS NFR BLD AUTO: 0 % (ref 0–1)
BILIRUB SERPL-MCNC: 0.97 MG/DL (ref 0.2–1)
BUN SERPL-MCNC: 16 MG/DL (ref 5–25)
CALCIUM SERPL-MCNC: 8.6 MG/DL (ref 8.4–10.2)
CHLORIDE SERPL-SCNC: 100 MMOL/L (ref 96–108)
CO2 SERPL-SCNC: 30 MMOL/L (ref 21–32)
CREAT SERPL-MCNC: 0.7 MG/DL (ref 0.6–1.3)
EOSINOPHIL # BLD AUTO: 0.06 THOUSAND/ÂΜL (ref 0–0.61)
EOSINOPHIL NFR BLD AUTO: 1 % (ref 0–6)
ERYTHROCYTE [DISTWIDTH] IN BLOOD BY AUTOMATED COUNT: 14 % (ref 11.6–15.1)
GFR SERPL CREATININE-BSD FRML MDRD: 92 ML/MIN/1.73SQ M
GLUCOSE SERPL-MCNC: 122 MG/DL (ref 65–140)
HCT VFR BLD AUTO: 42.8 % (ref 36.5–49.3)
HGB BLD-MCNC: 14.2 G/DL (ref 12–17)
IMM GRANULOCYTES # BLD AUTO: 0.08 THOUSAND/UL (ref 0–0.2)
IMM GRANULOCYTES NFR BLD AUTO: 1 % (ref 0–2)
INR PPP: 1.1 (ref 0.84–1.19)
LYMPHOCYTES # BLD AUTO: 2.16 THOUSANDS/ÂΜL (ref 0.6–4.47)
LYMPHOCYTES NFR BLD AUTO: 23 % (ref 14–44)
MAGNESIUM SERPL-MCNC: 1.9 MG/DL (ref 1.9–2.7)
MCH RBC QN AUTO: 31.5 PG (ref 26.8–34.3)
MCHC RBC AUTO-ENTMCNC: 33.2 G/DL (ref 31.4–37.4)
MCV RBC AUTO: 95 FL (ref 82–98)
MONOCYTES # BLD AUTO: 1.24 THOUSAND/ÂΜL (ref 0.17–1.22)
MONOCYTES NFR BLD AUTO: 13 % (ref 4–12)
NEUTROPHILS # BLD AUTO: 5.82 THOUSANDS/ÂΜL (ref 1.85–7.62)
NEUTS SEG NFR BLD AUTO: 62 % (ref 43–75)
NRBC BLD AUTO-RTO: 0 /100 WBCS
PLATELET # BLD AUTO: 193 THOUSANDS/UL (ref 149–390)
PMV BLD AUTO: 8.9 FL (ref 8.9–12.7)
POTASSIUM SERPL-SCNC: 3.7 MMOL/L (ref 3.5–5.3)
PROT SERPL-MCNC: 6.5 G/DL (ref 6.4–8.4)
PROTHROMBIN TIME: 14.1 SECONDS (ref 11.6–14.5)
RBC # BLD AUTO: 4.51 MILLION/UL (ref 3.88–5.62)
SODIUM SERPL-SCNC: 137 MMOL/L (ref 135–147)
WBC # BLD AUTO: 9.4 THOUSAND/UL (ref 4.31–10.16)

## 2024-05-22 PROCEDURE — 85610 PROTHROMBIN TIME: CPT | Performed by: FAMILY MEDICINE

## 2024-05-22 PROCEDURE — 85025 COMPLETE CBC W/AUTO DIFF WBC: CPT

## 2024-05-22 PROCEDURE — 99239 HOSP IP/OBS DSCHRG MGMT >30: CPT | Performed by: FAMILY MEDICINE

## 2024-05-22 PROCEDURE — 83735 ASSAY OF MAGNESIUM: CPT

## 2024-05-22 PROCEDURE — 80053 COMPREHEN METABOLIC PANEL: CPT

## 2024-05-22 RX ORDER — LORAZEPAM 0.5 MG/1
0.5 TABLET ORAL EVERY 8 HOURS PRN
Qty: 20 TABLET | Refills: 0 | Status: SHIPPED | OUTPATIENT
Start: 2024-05-22 | End: 2024-06-01

## 2024-05-22 RX ORDER — LORAZEPAM 0.5 MG/1
0.5 TABLET ORAL EVERY 8 HOURS PRN
Status: DISCONTINUED | OUTPATIENT
Start: 2024-05-22 | End: 2024-05-22 | Stop reason: HOSPADM

## 2024-05-22 RX ADMIN — FOLIC ACID 1 MG: 1 TABLET ORAL at 08:44

## 2024-05-22 RX ADMIN — FINASTERIDE 5 MG: 5 TABLET, FILM COATED ORAL at 08:44

## 2024-05-22 RX ADMIN — MULTIPLE VITAMINS W/ MINERALS TAB 1 TABLET: TAB ORAL at 08:44

## 2024-05-22 RX ADMIN — PREDNISONE 4 MG: 1 TABLET ORAL at 08:44

## 2024-05-22 RX ADMIN — FAMOTIDINE 20 MG: 20 TABLET, FILM COATED ORAL at 08:44

## 2024-05-22 RX ADMIN — BUSPIRONE HYDROCHLORIDE 15 MG: 5 TABLET ORAL at 08:44

## 2024-05-22 RX ADMIN — THIAMINE HCL TAB 100 MG 100 MG: 100 TAB at 08:44

## 2024-05-22 RX ADMIN — NADOLOL 20 MG: 40 TABLET ORAL at 08:44

## 2024-05-22 RX ADMIN — LORAZEPAM 0.5 MG: 0.5 TABLET ORAL at 08:44

## 2024-05-22 NOTE — ASSESSMENT & PLAN NOTE
Hx alcohol abuse w/ previous hospitalizations for detox, remission for past two years  Relapsed 2 weeks ago, drinking a pint of vodka per day and two pints yesterday  Presented to ED with withdrawal symptoms, CIWA score 10 on admission  CIWA protocol, PRN zofran and loperamide ordered  24-hour telemetry monitoring showed no significant events  CIWA protocol, most recent scores 2>1>0  Thiamine, folic acid, multivitamin supplementation  Echo ordered d/t chest pain, SOB with signs of volume overload on exam; showed EF 65% with normal systolic/diastolic function  Spoke with case management, not interested in rehab at this time  Stable for discharge, will follow-up with PCP

## 2024-05-22 NOTE — CASE MANAGEMENT
Case Management Discharge Planning Note    Patient name Fredy Yu  Location /409-01 MRN [id number]  : 1950 Date 2024       Current Admission Date: 2024  Current Admission Diagnosis:Alcohol use disorder, mild, in early remission, abuse   Patient Active Problem List    Diagnosis Date Noted Date Diagnosed    Prediabetes 2024     Excessive daytime sleepiness 2024     Abnormal brain MRI 2024     Neuropathy 2024     Carpal tunnel syndrome of left wrist 2024     neuropathy 2024     Psoriasis 2023     Inflammatory arthritis 2023     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 degeneration, unspecified stage ([facility]) 2022     Seizure ([facility]) 2022     PMR (polymyalgia rheumatica) ([facility]) 10/21/2022     Obesity, morbid ([facility]) 2022     Depression with anxiety 2021     Pruritus 2021     Insomnia 2021     Mixed hyperlipidemia 2021     Alcohol induced fatty liver 2021     History of prolonged Q-T interval on ECG 2021     BPH (benign prostatic hyperplasia) 2019     Alcohol use disorder, mild, in early remission, abuse 2019     Essential hypertension 02/10/2019       LOS (days): 1  Geometric Mean LOS (GMLOS) (days): 3.4  Days to GMLOS:2.3     OBJECTIVE:  Risk of Unplanned Readmission Score: 10.5         Current admission status: Inpatient   Preferred Pharmacy:   [id number] - [place] [address]  [place] [address]  Phone: [phone] Fax: [phone]    Primary Care Provider: Tejal Garcia MD    Primary Insurance: MEDICARE  Secondary Insurance: \[facility]\"" [employer]    DISCHARGE DETAILS:patient discharging home. No discharge needs noted. Provide substance abuse disorder phone number if needed. Pt plans on returning to AA meeting. Nurse to review AVS.

## 2024-05-22 NOTE — PLAN OF CARE
Problem: Potential for Falls  Goal: Patient will remain free of falls  Description: INTERVENTIONS:  - Educate patient/family on patient safety including physical limitations  - Instruct patient to call for assistance with activity   - Consult OT/PT to assist with strengthening/mobility   - Keep Call bell within reach  - Keep bed low and locked with side rails adjusted as appropriate  - Keep care items and personal belongings within reach  - Initiate and maintain comfort rounds  - Make Fall Risk Sign visible to staff  - Offer Toileting every 2 Hours, in advance of need  - Initiate/Maintain bed/chair alarm  - Obtain necessary fall risk management equipment: nonskid socks, call bell in reach   - Apply yellow socks and bracelet for high fall risk patients  - Consider moving patient to room near nurses station  Outcome: Progressing     Problem: Prexisting or High Potential for Compromised Skin Integrity  Goal: Skin integrity is maintained or improved  Description: INTERVENTIONS:  - Identify patients at risk for skin breakdown  - Assess and monitor skin integrity  - Assess and monitor nutrition and hydration status  - Monitor labs   - Assess for incontinence   - Turn and reposition patient  - Assist with mobility/ambulation  - Relieve pressure over bony prominences  - Avoid friction and shearing  - Provide appropriate hygiene as needed including keeping skin clean and dry  - Evaluate need for skin moisturizer/barrier cream  - Collaborate with interdisciplinary team   - Patient/family teaching  - Consider wound care consult   Outcome: Progressing     Problem: PAIN - ADULT  Goal: Verbalizes/displays adequate comfort level or baseline comfort level  Description: Interventions:  - Encourage patient to monitor pain and request assistance  - Assess pain using appropriate pain scale  - Administer analgesics based on type and severity of pain and evaluate response  - Implement non-pharmacological measures as appropriate and  evaluate response  - Consider cultural and social influences on pain and pain management  - Notify physician/advanced practitioner if interventions unsuccessful or patient reports new pain  Outcome: Progressing     Problem: INFECTION - ADULT  Goal: Absence or prevention of progression during hospitalization  Description: INTERVENTIONS:  - Assess and monitor for signs and symptoms of infection  - Monitor lab/diagnostic results  - Monitor all insertion sites, i.e. indwelling lines, tubes, and drains  - Monitor endotracheal if appropriate and nasal secretions for changes in amount and color  - Likely appropriate cooling/warming therapies per order  - Administer medications as ordered  - Instruct and encourage patient and family to use good hand hygiene technique  - Identify and instruct in appropriate isolation precautions for identified infection/condition  Outcome: Progressing     Problem: SAFETY ADULT  Goal: Patient will remain free of falls  Description: INTERVENTIONS:  - Educate patient/family on patient safety including physical limitations  - Instruct patient to call for assistance with activity   - Consult OT/PT to assist with strengthening/mobility   - Keep Call bell within reach  - Keep bed low and locked with side rails adjusted as appropriate  - Keep care items and personal belongings within reach  - Initiate and maintain comfort rounds  - Make Fall Risk Sign visible to staff  - Offer Toileting every 2 Hours, in advance of need  - Initiate/Maintain bed/chair alarm  - Obtain necessary fall risk management equipment: nonskid socks, call bell in reach   - Apply yellow socks and bracelet for high fall risk patients  - Consider moving patient to room near nurses station  Outcome: Progressing  Goal: Maintain or return to baseline ADL function  Description: INTERVENTIONS:  -  Assess patient's ability to carry out ADLs; assess patient's baseline for ADL function and identify physical deficits which impact  ability to perform ADLs (bathing, care of mouth/teeth, toileting, grooming, dressing, etc.)  - Assess/evaluate cause of self-care deficits   - Assess range of motion  - Assess patient's mobility; develop plan if impaired  - Assess patient's need for assistive devices and provide as appropriate  - Encourage maximum independence but intervene and supervise when necessary  - Involve family in performance of ADLs  - Assess for home care needs following discharge   - Consider OT consult to assist with ADL evaluation and planning for discharge  - Provide patient education as appropriate  Outcome: Progressing  Goal: Maintains/Returns to pre admission functional level  Description: INTERVENTIONS:  - Perform AM-PAC 6 Click Basic Mobility/ Daily Activity assessment daily.  - Set and communicate daily mobility goal to care team and patient/family/caregiver.   - Collaborate with rehabilitation services on mobility goals if consulted  - Perform Range of Motion 3 times a day.  - Reposition patient every 3 hours.  - Dangle patient 3 times a day  - Stand patient 3 times a day  - Ambulate patient 3 times a day  - Out of bed to chair 3 times a day   - Out of bed for meals 3 times a day  - Out of bed for toileting  - Record patient progress and toleration of activity level   Outcome: Progressing     Problem: DISCHARGE PLANNING  Goal: Discharge to home or other facility with appropriate resources  Description: INTERVENTIONS:  - Identify barriers to discharge w/patient and caregiver  - Arrange for needed discharge resources and transportation as appropriate  - Identify discharge learning needs (meds, wound care, etc.)  - Arrange for interpretive services to assist at discharge as needed  - Refer to Case Management Department for coordinating discharge planning if the patient needs post-hospital services based on physician/advanced practitioner order or complex needs related to functional status, cognitive ability, or social support  system  Outcome: Progressing     Problem: Knowledge Deficit  Goal: Patient/family/caregiver demonstrates understanding of disease process, treatment plan, medications, and discharge instructions  Description: Complete learning assessment and assess knowledge base.  Interventions:  - Provide teaching at level of understanding  - Provide teaching via preferred learning methods  Outcome: Progressing     Problem: CARDIOVASCULAR - ADULT  Goal: Maintains optimal cardiac output and hemodynamic stability  Description: INTERVENTIONS:  - Monitor I/O, vital signs and rhythm  - Monitor for S/S and trends of decreased cardiac output  - Administer and titrate ordered vasoactive medications to optimize hemodynamic stability  - Assess quality of pulses, skin color and temperature  - Assess for signs of decreased coronary artery perfusion  - Instruct patient to report change in severity of symptoms  Outcome: Progressing  Goal: Absence of cardiac dysrhythmias or at baseline rhythm  Description: INTERVENTIONS:  - Continuous cardiac monitoring, vital signs, obtain 12 lead EKG if ordered  - Administer antiarrhythmic and heart rate control medications as ordered  - Monitor electrolytes and administer replacement therapy as ordered  Outcome: Progressing

## 2024-05-22 NOTE — ASSESSMENT & PLAN NOTE
Continue PTA buspar 15 mg BID  Consider outpatient behavioral health therapy for recent increase in life stressors

## 2024-05-22 NOTE — DISCHARGE SUMMARY
[facility]  Discharge- Fredy Yu 1950, 74 y.o. male MRN: [id number]  Unit/Bed#: 409-01 Encounter: [id number]  Primary Care Provider: Tejal Garcia MD   Date and time admitted to hospital: 5/21/2024  8:01 AM    * Alcohol use disorder, mild, in early remission, abuse  Assessment & Plan  Hx alcohol abuse w/ previous hospitalizations for detox, remission for past two years  Relapsed 2 weeks ago, drinking a pint of vodka per day and two pints yesterday  Presented to ED with withdrawal symptoms, CIWA score 10 on admission  CIWA protocol, PRN zofran and loperamide ordered  24-hour telemetry monitoring showed no significant events  CIWA protocol, most recent scores 2>1>0  Thiamine, folic acid, multivitamin supplementation  Echo ordered d/t chest pain, SOB with signs of volume overload on exam; showed EF 65% with normal systolic/diastolic function  Spoke with case management, not interested in rehab at this time  Stable for discharge, will follow-up with PCP     Neuropathy  Assessment & Plan  Continue PTA gabapentin 300 mg HS    Depression with anxiety  Assessment & Plan  Continue PTA buspar 15 mg BID  Consider outpatient behavioral health therapy for recent increase in life stressors    Insomnia  Assessment & Plan  Continue PTA trazodone 150 mg HS    Essential hypertension  Assessment & Plan  Continue nadolol 20 mg QD  Monitor vitals        Discharging Physician / Practitioner: Carolyn Wilhelm MD  PCP: Tejal Garcia MD  Admission Date:   Admission Orders (From admission, onward)       Ordered        05/21/24 0954  INPATIENT ADMISSION  Once                          Discharge Date: 05/22/24    Medical Problems       Resolved Problems  Date Reviewed: 3/18/2024   None         Consultations During Hospital Stay:  None    Procedures Performed:     US right upper quadrant   Final Result      Hepatomegaly and hepatic steatosis.      Status post cholecystectomy.      Workstation  "performed: [id number]         XR chest 2 views   Final Result      No acute cardiopulmonary disease.            Workstation performed: [id number]               Significant Findings / Test Results:   Results for orders placed or performed during the hospital encounter of 05/21/24   CBC and differential   Result Value Ref Range    WBC 11.46 (H) 4.31 - 10.16 Thousand/uL    RBC 4.97 3.88 - 5.62 Million/uL    Hemoglobin 15.6 12.0 - 17.0 g/dL    Hematocrit 46.5 36.5 - 49.3 %    MCV 94 82 - 98 fL    MCH 31.4 26.8 - 34.3 pg    MCHC 33.5 31.4 - 37.4 g/dL    RDW 14.1 11.6 - 15.1 %    MPV 8.7 (L) 8.9 - 12.7 fL    Platelets 233 149 - 390 Thousands/uL    nRBC 0 /100 WBCs    Segmented % 68 43 - 75 %    Immature Grans % 1 0 - 2 %    Lymphocytes % 19 14 - 44 %    Monocytes % 11 4 - 12 %    Eosinophils Relative 1 0 - 6 %    Basophils Relative 0 0 - 1 %    Absolute Neutrophils 7.88 (H) 1.85 - 7.62 Thousands/µL    Absolute Immature Grans 0.13 0.00 - 0.20 Thousand/uL    Absolute Lymphocytes 2.12 0.60 - 4.47 Thousands/µL    Absolute Monocytes 1.23 (H) 0.17 - 1.22 Thousand/µL    Eosinophils Absolute 0.06 0.00 - 0.61 Thousand/µL    Basophils Absolute 0.04 0.00 - 0.10 Thousands/µL   Comprehensive metabolic panel   Result Value Ref Range    Sodium 142 135 - 147 mmol/L    Potassium 3.5 3.5 - 5.3 mmol/L    Chloride 103 96 - 108 mmol/L    CO2 26 21 - 32 mmol/L    ANION GAP 13 4 - 13 mmol/L    BUN 18 5 - 25 mg/dL    Creatinine 0.83 0.60 - 1.30 mg/dL    Glucose 99 65 - 140 mg/dL    Calcium 9.0 8.4 - 10.2 mg/dL    AST 23 13 - 39 U/L    ALT 25 7 - 52 U/L    Alkaline Phosphatase 82 34 - 104 U/L    Total Protein 7.3 6.4 - 8.4 g/dL    Albumin 4.3 3.5 - 5.0 g/dL    Total Bilirubin 0.64 0.20 - 1.00 mg/dL    eGFR 86 ml/min/1.73sq m   HS Troponin 0hr (reflex protocol)   Result Value Ref Range    hs TnI 0hr 22 \"Refer to ACS Flowchart\"- see link ng/L   Ethanol   Result Value Ref Range    Ethanol Lvl 93 (H) <10 mg/dL   Magnesium   Result Value Ref Range " "   Magnesium 1.4 (L) 1.9 - 2.7 mg/dL   HS Troponin I 2hr   Result Value Ref Range    hs TnI 2hr 17 \"Refer to ACS Flowchart\"- see link ng/L    Delta 2hr hsTnI -5 <20 ng/L   CBC and differential   Result Value Ref Range    WBC 9.40 4.31 - 10.16 Thousand/uL    RBC 4.51 3.88 - 5.62 Million/uL    Hemoglobin 14.2 12.0 - 17.0 g/dL    Hematocrit 42.8 36.5 - 49.3 %    MCV 95 82 - 98 fL    MCH 31.5 26.8 - 34.3 pg    MCHC 33.2 31.4 - 37.4 g/dL    RDW 14.0 11.6 - 15.1 %    MPV 8.9 8.9 - 12.7 fL    Platelets 193 149 - 390 Thousands/uL    nRBC 0 /100 WBCs    Segmented % 62 43 - 75 %    Immature Grans % 1 0 - 2 %    Lymphocytes % 23 14 - 44 %    Monocytes % 13 (H) 4 - 12 %    Eosinophils Relative 1 0 - 6 %    Basophils Relative 0 0 - 1 %    Absolute Neutrophils 5.82 1.85 - 7.62 Thousands/µL    Absolute Immature Grans 0.08 0.00 - 0.20 Thousand/uL    Absolute Lymphocytes 2.16 0.60 - 4.47 Thousands/µL    Absolute Monocytes 1.24 (H) 0.17 - 1.22 Thousand/µL    Eosinophils Absolute 0.06 0.00 - 0.61 Thousand/µL    Basophils Absolute 0.04 0.00 - 0.10 Thousands/µL   Magnesium   Result Value Ref Range    Magnesium 1.9 1.9 - 2.7 mg/dL   Comprehensive metabolic panel   Result Value Ref Range    Sodium 137 135 - 147 mmol/L    Potassium 3.7 3.5 - 5.3 mmol/L    Chloride 100 96 - 108 mmol/L    CO2 30 21 - 32 mmol/L    ANION GAP 7 4 - 13 mmol/L    BUN 16 5 - 25 mg/dL    Creatinine 0.70 0.60 - 1.30 mg/dL    Glucose 122 65 - 140 mg/dL    Calcium 8.6 8.4 - 10.2 mg/dL    AST 18 13 - 39 U/L    ALT 18 7 - 52 U/L    Alkaline Phosphatase 72 34 - 104 U/L    Total Protein 6.5 6.4 - 8.4 g/dL    Albumin 3.8 3.5 - 5.0 g/dL    Total Bilirubin 0.97 0.20 - 1.00 mg/dL    eGFR 92 ml/min/1.73sq m   Protime-INR   Result Value Ref Range    Protime 14.1 11.6 - 14.5 seconds    INR 1.10 0.84 - 1.19   ECG 12 lead   Result Value Ref Range    Ventricular Rate 87 BPM    Atrial Rate 87 BPM    RI Interval 158 ms    QRSD Interval 104 ms    QT Interval 378 ms    QTC Interval 454 " ms    P Axis 47 degrees    QRS Axis 55 degrees    T Wave Axis 39 degrees   Echo complete w/ contrast if indicated   Result Value Ref Range    MV Peak A Ez 0.74 m/s    MV stenosis pressure 1/2 time 53 ms    MV Peak E Ez 48 cm/s    AV peak gradient 6 mmHg    E wave deceleration time 184 ms    E/A ratio 0.65     MV valve area p 1/2 method 4.15     AV LVOT peak gradient 4 mmHg    Tricuspid valve peak regurgitation velocity 1.28 m/s    Tricuspid valve peak E wave velocity 0.08 m/s    Tricuspid annular plane systolic excursion 2.50 cm    MV E' Tissue Velocity Septal 10 cm/s    BSA 2.19 m2    LV EF 65          Incidental Findings:   None     Test Results Pending at Discharge (will require follow up):   None     Outpatient Tests Requested:  None    Complications:  None    Reason for Admission: chest pain, alcohol withdrawal    Hospital Course:     Fredy Yu is a 74 y.o. male patient who originally presented to the hospital on 5/21/2024 due to symptoms of alcohol withdrawal. He had previously been sober for 2-1/2 years but started drinking approximately 2 weeks ago due to life stressors. He has been drinking approximately 1 pint of vodka every few days, but drank 2 pints of vodka immediately prior to admission. Initially reported tremors, anxiety, diarrhea, and chest pain; chest pain resolved prior to admission interview. Workup in the ED showed slightly elevated white blood cells, hypomagnesia, ethanol level of 93, and initial CIWA score of 10. He was admitted to [facility] level of care for further treatment of alcohol withdrawal. He was placed on CIWA protocol with thiamine/folic acid supplementation, magnesium was repleted as indicated, and he was placed on 24-hour telemetry. Echo was ordered due to previous complaints of chest pain and signs of volume overload on exam; showed EF 65% with normal systolic and diastolic function. CIWA scores decreased throughout the day and were consistently below threshold recommended  for inpatient admission overnight and this morning. He declined drug and alcohol rehabilitation upon reassessment in the morning. He showed symptomatic improvement with stable vitals and was medically cleared for discharge. He will follow-up with his outpatient primary care physician.    The patient, initially admitted to the hospital as inpatient, was discharged earlier than expected given the following: stable vitals, labs wnl, CIWA scores consistently below threshold recommended for inpatient admission.    Please see above list of diagnoses and related plan for additional information.     HPI per Admission:   Fredy Yu is a 74 y.o. male with past medical history of hypertension, peripheral neuropathy, anxiety, psoriatic arthritis and alcohol dependency who presents with symptoms of alcohol withdrawal. He states that he has been sober for two and a half years but started drinking two weeks ago d/t life stressors. He has been drinking approximately 1 pint of vodka every few days, but last evening escalated to 2 pints over the course of a few hours. He endorses nausea, tremor, anxiety, SOB, diarrhea, and mild cramping abdominal pain. He reports chest pain that began last evening and radiated down his left arm but resolved prior to admission assessment.     Work-up in the ED was relevant for ethanol level of 93, magnesium 1.4, and WBC elevated to 11.46. He was given IV diazepam 10 mg and IV magnesium 2 g x 2 doses in the ED before being admitted on CIWA protocol to med-surg level of care for further treatment of alcohol withdrawal.       Condition at Discharge: stable     Discharge Day Visit / Exam:     Subjective:  Patient seen and examined at bedside. No acute events overnight. CIWA scores 2>1>0 overnight. No new complaints, although anxiety over recent stressors continues. Feels ready for discharge.     Vitals: Blood Pressure: 148/69 (05/22/24 0843)  Pulse: 79 (05/22/24 0843)  Temperature: 97.8 °F (36.6 °C)  "(05/22/24 0643)  Temp Source: Tympanic (05/21/24 1510)  Respirations: 20 (05/22/24 0643)  Height: 5' 5\" (165.1 cm) (05/21/24 1510)  Weight - Scale: 113 kg (250 lb) (05/21/24 1510)  SpO2: 91 % (05/22/24 0843)    Exam:   Physical Exam  Constitutional:       General: He is not in acute distress.     Appearance: Normal appearance. He is obese.   HENT:      Head: Normocephalic and atraumatic.   Cardiovascular:      Rate and Rhythm: Normal rate and regular rhythm.      Pulses: Normal pulses.      Heart sounds: Normal heart sounds.   Pulmonary:      Effort: Pulmonary effort is normal.   Musculoskeletal:      Cervical back: Normal range of motion and neck supple.   Skin:     General: Skin is warm and dry.   Neurological:      General: No focal deficit present.      Mental Status: He is alert and oriented to person, place, and time.         Discharge instructions/Information to patient and family:   See after visit summary for information provided to patient and family.      Provisions for Follow-Up Care:  See after visit summary for information related to follow-up care and any pertinent home health orders.      Disposition:     Home      Planned Readmission: no     Discharge Statement:  I spent 90 minutes discharging the patient. This time was spent on the day of discharge. I had direct contact with the patient on the day of discharge. Greater than 50% of the total time was spent examining patient, answering all patient questions, arranging and discussing plan of care with patient as well as directly providing post-discharge instructions.  Additional time then spent on discharge activities.    Discharge Medications:  See after visit summary for reconciled discharge medications provided to patient and family.      ** Please Note: This note has been constructed using a voice recognition system **        "

## 2024-05-22 NOTE — MALNUTRITION/BMI
This medical record reflects morbid obesity.    BMI Findings:           Body mass index is 41.6 kg/m².   Class 3 obesity.  Declined diet education.    See Nutrition note dated 5/22/204 for additional details.  Completed nutrition assessment is viewable in the nutrition documentation.

## 2024-05-23 ENCOUNTER — TRANSITIONAL CARE MANAGEMENT (OUTPATIENT)
Dept: FAMILY MEDICINE CLINIC | Facility: CLINIC | Age: 74
End: 2024-05-23

## 2024-05-23 ENCOUNTER — HOSPITAL ENCOUNTER (OUTPATIENT)
Dept: SLEEP CENTER | Facility: HOSPITAL | Age: 74
Discharge: HOME | End: 2024-05-23
Payer: MEDICARE

## 2024-05-23 DIAGNOSIS — G47.30 SLEEP APNEA, UNSPECIFIED TYPE: ICD-10-CM

## 2024-05-23 DIAGNOSIS — G47.19 EXCESSIVE DAYTIME SLEEPINESS: ICD-10-CM

## 2024-05-23 LAB
ATRIAL RATE: 87 BPM
P AXIS: 47 DEGREES
PR INTERVAL: 158 MS
QRS AXIS: 55 DEGREES
QRSD INTERVAL: 104 MS
QT INTERVAL: 378 MS
QTC INTERVAL: 454 MS
T WAVE AXIS: 39 DEGREES
VENTRICULAR RATE: 87 BPM

## 2024-05-23 PROCEDURE — 93010 ELECTROCARDIOGRAM REPORT: CPT | Performed by: INTERNAL MEDICINE

## 2024-05-23 PROCEDURE — G0399 HOME SLEEP TEST/TYPE 3 PORTA: HCPCS

## 2024-05-24 NOTE — PROGRESS NOTES
Home Sleep Study Documentation    HOME STUDY DEVICE: Noxturnal no                                           Elise G3 yes      Pre-Sleep Home Study:    Set-up and instructions performed by: Bertha Bal    Technician performed demonstration for Patient: yes    Return demonstration performed by Patient: yes    Written instructions provided to Patient: yes    Patient signed consent form: yes        Post-Sleep Home Study:    Additional comments by Patient: Patient did not return sleep diary.     Home Sleep Study Failed:pending    Failure reason: pending    Reported or Detected: pending    Scored by: pending

## 2024-05-28 PROBLEM — R06.83 SNORING: Status: ACTIVE | Noted: 2024-05-28

## 2024-05-28 PROBLEM — G47.30 SLEEP APNEA: Status: ACTIVE | Noted: 2024-05-28

## 2024-05-28 PROBLEM — G47.33 OSA (OBSTRUCTIVE SLEEP APNEA): Status: ACTIVE | Noted: 2024-05-28

## 2024-05-28 PROCEDURE — 95806 SLEEP STUDY UNATT&RESP EFFT: CPT

## 2024-05-29 ENCOUNTER — TELEPHONE (OUTPATIENT)
Dept: ADMINISTRATIVE | Facility: OTHER | Age: 74
End: 2024-05-29

## 2024-05-29 DIAGNOSIS — G47.33 OSA (OBSTRUCTIVE SLEEP APNEA): Primary | ICD-10-CM

## 2024-05-29 NOTE — TELEPHONE ENCOUNTER
05/29/24 2:18 PM    Patient contacted to bring Advance Directive, POLST, or Living Will document to next scheduled pcp visit.[facility] Department spoke with patient.    Thank you.  Karli Perez  [facility]

## 2024-05-30 ENCOUNTER — OFFICE VISIT (OUTPATIENT)
Dept: FAMILY MEDICINE CLINIC | Facility: CLINIC | Age: 74
End: 2024-05-30
Payer: MEDICARE

## 2024-05-30 VITALS
OXYGEN SATURATION: 93 % | SYSTOLIC BLOOD PRESSURE: 110 MMHG | BODY MASS INDEX: 41.32 KG/M2 | TEMPERATURE: 97.2 F | WEIGHT: 248 LBS | HEIGHT: 65 IN | DIASTOLIC BLOOD PRESSURE: 64 MMHG | HEART RATE: 69 BPM

## 2024-05-30 DIAGNOSIS — F41.8 DEPRESSION WITH ANXIETY: ICD-10-CM

## 2024-05-30 DIAGNOSIS — F10.11 ALCOHOL USE DISORDER, MILD, IN EARLY REMISSION, ABUSE: Primary | ICD-10-CM

## 2024-05-30 PROCEDURE — 99495 TRANSJ CARE MGMT MOD F2F 14D: CPT | Performed by: PHYSICIAN ASSISTANT

## 2024-05-30 NOTE — PROGRESS NOTES
Ambulatory Visit  Name: Fredy Yu      : 1950      MRN: [id number]  Encounter Provider: Yoni Hirsch PA-C  Encounter Date: 2024   Encounter department: [facility] PRIMARY CARE    Assessment & Plan   1. Alcohol use disorder, mild, in early remission, abuse  2. Depression with anxiety  -     Ambulatory referral to Psych Services; Future    Fredy Yu is a 74 y.o. male presenting to clinic for TCM visit post hospitalization for relapse of alcohol abuse. Patient was sober for 2 years prior to relapsing two weeks prior to admission, drinking a pint of vodka per day. Pt presented to ED w/ symptoms of withdrawal, CIWA 10. Pt was treated w/ CIWA protocol, thiamine/folic acid/multivitamin supplementation and PRN zofran and loperamide. CIWA went from 2<1<0 during hospital stay and patient was discharged on 2024.     Patient has not had a drink since hospitalization and denies any cravings or symptoms of withdrawal. Pt continues with 4-5 meetings of AA weekly. He continues with anxiety secondary to stressors at home, particularly dysfunction with his wife. He says this stressor led him to relapse in the first place. He says that she would not be agreeable to marriage counseling but patient was open to referral to psych services for talk therapy.      History of Present Illness     Fredy Yu is a 74 y.o. male PMH of alcohol abuse presenting to clinic today for TCM visit related to hospitalization for relapse of alcohol use disorder. Patient had been two years sober prior to a relapse two weeks prior to hospital admission on 2024. Over those two weeks, he drank a pint of vodka per day and drank two pints of alcohol a day prior to presenting to the emergency department. He presented to ER with symptoms of withdrawal and had a CIWA score of 10. CIWA protocol was initiated, with thiamine, folic acid, multivitamin supplementation along with PRN zofran and loperamide. Cardiac echo  "performed during admisison showed EF of 65% with normal systolic/diastolic function. CIWA scores went from 2>1>0 during admission. He was stable for discharge and declined rehab.     Since hospital discharge on 5/22, patient has not consumed any alcohol and denies having any symptoms of withdrawal. CIWA performed at visit and patient scored 0. He denies any cravings for alcohol. He denies auditory/visual hallucinations, SI/HI. He says that he attends his AA meetings regularly 4-5 times per week. Patient admits he has many stressors at home, including providing care to his parents who reside in a local nursing home. He also says that his relationship with his wife has deteriorated and he chronically feels anxious and on edge while at home due to the poor state of their relationship. He says that it was primarily the stress that he feels from his relationship with his wife that led him into relapse.           Review of Systems   Constitutional:  Negative for chills, fatigue and fever.   HENT:  Negative for sore throat.    Eyes:  Negative for visual disturbance.   Respiratory:  Negative for cough and shortness of breath.    Cardiovascular:  Negative for chest pain and palpitations.   Gastrointestinal:  Negative for abdominal pain, constipation, diarrhea, nausea and vomiting.   Genitourinary:  Negative for dysuria and hematuria.   Neurological:  Negative for syncope, light-headedness and headaches.   Psychiatric/Behavioral:  Negative for hallucinations and suicidal ideas. The patient is nervous/anxious.    All other systems reviewed and are negative.      Objective     /64 (BP Location: Right arm, Patient Position: Sitting)   Pulse 69   Temp (!) 97.2 °F (36.2 °C) (Tympanic)   Ht 5' 5\" (1.651 m)   Wt 112 kg (248 lb)   SpO2 93%   BMI 41.27 kg/m²     Physical Exam  Constitutional:       Appearance: Normal appearance.   HENT:      Head: Normocephalic.      Right Ear: External ear normal.      Left Ear: External " ear normal.      Nose: Nose normal.      Mouth/Throat:      Mouth: Mucous membranes are moist.      Pharynx: Oropharynx is clear.   Eyes:      Conjunctiva/sclera: Conjunctivae normal.   Cardiovascular:      Rate and Rhythm: Normal rate and regular rhythm.      Heart sounds: Normal heart sounds.   Pulmonary:      Effort: Pulmonary effort is normal.      Breath sounds: Normal breath sounds.   Abdominal:      General: Bowel sounds are normal.      Palpations: Abdomen is soft.   Musculoskeletal:      Right lower le+ Edema present.      Left lower le+ Edema present.   Neurological:      Mental Status: He is alert and oriented to person, place, and time.   Psychiatric:         Behavior: Behavior normal.       Administrative Statements     TCM Call       Date and time call was made  2024 11:48 AM    Hospital care reviewed  Records reviewed    Patient was hospitialized at  [facility]    Date of Admission  24    Date of discharge  24    Diagnosis  Alcohol withdraw    Disposition  Home    Were the patients medications reviewed and updated  Yes    Current Symptoms  Fatigue    Fatigue severity  Moderate          TCM Call       Post hospital issues  Reduced activity    Should patient be enrolled in anticoag monitoring?  No    Scheduled for follow up?  No    Did you obtain your prescribed medications  Yes    Do you need help managing your prescriptions or medications  No    Is transportation to your appointment needed  No    Living Arrangements  Alone    Are you recieving any outpatient services  No    Are you recieving home care services  No    Are you using any community resources  No    Current waiver services  No    Have you fallen in the last 12 months  No    Interperter language line needed  No    Counseling  Patient    Counseling topics  Prognosis

## 2024-06-11 ENCOUNTER — TELEPHONE (OUTPATIENT)
Dept: SLEEP CENTER | Facility: CLINIC | Age: 74
End: 2024-06-11

## 2024-06-12 ENCOUNTER — PATIENT MESSAGE (OUTPATIENT)
Dept: RHEUMATOLOGY | Facility: CLINIC | Age: 74
End: 2024-06-12

## 2024-06-12 ENCOUNTER — HOSPITAL ENCOUNTER (OUTPATIENT)
Dept: SLEEP CENTER | Facility: HOSPITAL | Age: 74
Discharge: HOME | End: 2024-06-12
Attending: STUDENT IN AN ORGANIZED HEALTH CARE EDUCATION/TRAINING PROGRAM
Payer: MEDICARE

## 2024-06-12 ENCOUNTER — OFFICE VISIT (OUTPATIENT)
Dept: SLEEP CENTER | Facility: CLINIC | Age: 74
End: 2024-06-12
Payer: MEDICARE

## 2024-06-12 VITALS
SYSTOLIC BLOOD PRESSURE: 120 MMHG | BODY MASS INDEX: 41.48 KG/M2 | HEART RATE: 59 BPM | DIASTOLIC BLOOD PRESSURE: 70 MMHG | HEIGHT: 65 IN | WEIGHT: 249 LBS | TEMPERATURE: 98.1 F | OXYGEN SATURATION: 94 %

## 2024-06-12 DIAGNOSIS — H35.3230 BILATERAL EXUDATIVE AGE-RELATED MACULAR DEGENERATION, UNSPECIFIED STAGE (HCC): ICD-10-CM

## 2024-06-12 DIAGNOSIS — R90.89 ABNORMAL BRAIN MRI: ICD-10-CM

## 2024-06-12 DIAGNOSIS — G47.33 OSA (OBSTRUCTIVE SLEEP APNEA): ICD-10-CM

## 2024-06-12 DIAGNOSIS — E66.01 OBESITY, MORBID (HCC): ICD-10-CM

## 2024-06-12 DIAGNOSIS — I10 ESSENTIAL HYPERTENSION: ICD-10-CM

## 2024-06-12 DIAGNOSIS — M35.3 POLYMYALGIA RHEUMATICA (HCC): ICD-10-CM

## 2024-06-12 DIAGNOSIS — Z87.898 HISTORY OF PROLONGED Q-T INTERVAL ON ECG: ICD-10-CM

## 2024-06-12 DIAGNOSIS — F10.11 ALCOHOL USE DISORDER, MILD, IN EARLY REMISSION, ABUSE: ICD-10-CM

## 2024-06-12 DIAGNOSIS — L40.50 PSORIATIC ARTHRITIS (HCC): Primary | ICD-10-CM

## 2024-06-12 DIAGNOSIS — G47.33 OSA (OBSTRUCTIVE SLEEP APNEA): Primary | ICD-10-CM

## 2024-06-12 PROCEDURE — 99204 OFFICE O/P NEW MOD 45 MIN: CPT | Performed by: STUDENT IN AN ORGANIZED HEALTH CARE EDUCATION/TRAINING PROGRAM

## 2024-06-12 PROCEDURE — 99215 OFFICE O/P EST HI 40 MIN: CPT | Performed by: STUDENT IN AN ORGANIZED HEALTH CARE EDUCATION/TRAINING PROGRAM

## 2024-06-12 PROCEDURE — 95811 POLYSOM 6/>YRS CPAP 4/> PARM: CPT

## 2024-06-12 NOTE — PATIENT INSTRUCTIONS
Your recent sleep study was positive for obstructive sleep apnea.  Sleep apnea is a serious sleep disorder that occurs when a person's breathing is interrupted during sleep. People with untreated sleep apnea stop breathing repeatedly during their sleep, sometimes hundreds of times during the night.  If left untreated, obstructive sleep apnea can result in a number of health problems including hypertension, stroke, arrhythmias, cardiomyopathy (enlargement of the muscle tissue of the heart), heart failure, diabetes, obesity, and heart attacks.  Treatment options for obstructive sleep apnea may include positive airway pressure (PAP) therapy, oral appliance, hypoglossal nerve stimulator, nose/throat surgery, weight loss, side sleeping, or avoidance of medications or substances that can relax the airway muscles (alcohol, benzodiazepines, and opioids).    We have ordered a study known as a PAP Titration  This is an in-lab sleep study utilizing CPAP, where we slowly adjust the pressure in order to determine the optimal settings to treat your sleep apnea.   This test should be scheduled at your earliest convenience.    Avoid driving if drowsy. We recommend that if you are dozing off while driving, that you do not drive until your sleepiness is appropriately treated.  We encourage a healthy lifestyle with adequate sleep (7-9 hours per night), diet and exercise.  Recommend good sleep hygiene, as outlined below    Myself and/or my team will reach out to you via MyChart and/or phone call with the results and next steps.      Good Sleep Hygiene  Wake up at the same time every day, even on the weekends.  Use your bed for sleep and intimacy only.  If you have been in bed awake for 30 minutes, get up and leave the bedroom. Choose a dull activity not involving a blue screen (TV, computer, handheld devices). Go back to bed when you feel sleepy.  Avoid caffeine, nicotine and alcohol before you go to bed.  Avoid large meals before  you go to bed.  Avoid using screens (computers, tablets, smartphones, etc.) for at least 1 hour before bedtime  Exercise regularly, but do not exercise right before you go to bed.  Avoid daytime naps. If you do take a nap, sleep for 20-40 minutes, and not after dinner.

## 2024-06-12 NOTE — Clinical Note
Brigitte Abreu,  Could we get this gentleman into the sleep lab for his PAP Titration ASAP due to severe ROSARIO and hypoxemia on HSAT?  Thank you! Dr. Ward

## 2024-06-12 NOTE — PROGRESS NOTES
[facility]  Sleep Medicine New Patient Evaluation    PATIENT NAME: Fredy Yu  DATE OF SERVICE: June 12, 2024    CONSULTING PROVIDER:  MALGORZATA Corona  [address] [place] [id number]      Assessment/Plan:  1. ROSARIO (obstructive sleep apnea)  Ambulatory referral to Sleep Medicine    CPAP Study      2. 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 degeneration, unspecified stage (HCC)  CPAP Study      3. Obesity, morbid (HCC)  CPAP Study      4. Essential hypertension  CPAP Study      5. Abnormal brain MRI  CPAP Study      6. History of prolonged Q-T interval on ECG  CPAP Study      7. Alcohol use disorder, mild, in early remission, abuse  CPAP Study         Fredy is a pleasant 74-year-old gentleman with PMHx of HTN, depression, macular degeneration, polymyalgia rheumatica, seizure, HLD, prolonged QT interval, obesity, prediabetes who presents for obstructive sleep apnea (PARTHA 33.6, O2 ginger 67%).  He does have several symptoms and comorbidities associated with his severe obstructive sleep apnea, and these coupled with the severe hypoxemia noted on his exam does merit further evaluation and treatment.    Discussed with patient the pathophysiology of OSAS and medical conditions associated with OSAS such as DM, HTN, CAD, Depression, Stroke, Headache.  Treatment options including surgery, Dental appliances, positional therapy, and CPAP were discussed.  I have ordered a PAP titration study to evaluate for most effective PAP pressures and, moreover, the degree of hypoxemia whether PAP therapy alone will be effective in treating it or if he will require nocturnal oxygen administration as well.  Advised patient to avoid activities that could harm self or others when tired/sleepy, including driving.  Encouraged maintaining normal weight  Discussed importance of good sleep hygiene.  Pending the results of the sleep study, we will plan to order CPAP with a subsequent compliance  follow-up.  He will Return for Follow-up pending results of sleep study..    Thank you for allowing me to participate in the care of your patient.  If there are any questions regarding evaluation please feel free to reach out.       ________________________________________________________________________________________________    HPI: Fredy Yu is a 74 y.o. male with PMHx of HTN, depression, macular degeneration, polymyalgia rheumatica, seizure, HLD, prolonged QT interval, obesity, prediabetes who presents for obstructive sleep apnea (PARTHA 33.6, O2 ginger 67%).    Sleep-Related History:     He went to Neurology, was diagnosed with neuropathy. Then underwent MRI of his brain, which showed a slight stroke. When he mentioned his sleep issues, it ws recommended that he undergo the sleep study. Study was ordered in March.       Ulysses Sleepiness Scale:  What are your chances of dozing?   0= no chance  1= slight chance  2= moderate chance  3= high chance    Sitting and readin   Watching TV: 1  Sitting, inactive in a public place (e.g. a theatre or a meeting):0  As a passenger in a car for an hour without a break: 0  Lying down to rest in the afternoon when circumstances permit: 0   Sitting and talking to someone: 0  Sitting quietly after a lunch without alcohol: 0  In a car, while stopped for a few minutes in the traffic: 0       TOTAL    Greater or equal to 10 is positive for excessive daytime sleepiness        SLEEP-WAKE SCHEDULE  Sleep Schedule:  Weekdays:  On trazodone 150 mg at bedtime; does not remember why. Been on it for quite a while. Sleeps in lounge chair.  Bedtime: 2100   Asleep in 16-30 minutes   Nighttime awakenings: 3-4     Wake: 0700    Naps: 0    Average total sleep time (in a 24 hour period): ~9 hours.    Weekends:   Same    Sleep-Related Details:  Preferred Sleep Position: upright  SDB Symptoms: snoring, witnessed apneas, multiple awakenings, and waking unrefreshed      He denies any  "parasomnia activity.    Wake-Related Details:  Daytime Sleepiness: Yes, \"somewhat\"    Work Schedule: Private ; works 6-8 hours/week  Caffeine: Yes, iced tea regularly  Alcohol Use: No; recovering alcoholic, sober 2 years 8 months now.  Substance Use: No  Tobacco Use: No      PAST TREATMENTS:  None     PRIOR SLEEP STUDIES:  -HSAT 5/28/2024: TRT: 608.5 minutes, PARTHA: 33.6, O2 ginger: 67%.  Hypoxia was noted even in the absence of respiratory events, although unclear whether this was true hypoxemia or technical artifact.    OTHER RELEVANT LABS AND STUDIES:  None     Past Medical History:   Diagnosis Date    Alcohol dependency (HCC)     Anxiety     Arthritis     Depression     [place] (hard of hearing)     Hypertension     Kidney stones     Peripheral neuropathy     Prostate enlargement     Renal disorder     kidney    Shoulder dislocation       Past Surgical History:   Procedure Laterality Date    CHOLECYSTECTOMY      2010    COLONOSCOPY      SHOULDER SURGERY Left     for dislocation x 2, 20 years ago an the second 18 years ago      Patient Active Problem List   Diagnosis    Essential hypertension    Alcohol use disorder, mild, in early remission, abuse    BPH (benign prostatic hyperplasia)    History of prolonged Q-T interval on ECG    Mixed hyperlipidemia    Alcohol induced fatty liver    Pruritus    Insomnia    Depression with anxiety    Obesity, morbid (HCC)    PMR (polymyalgia rheumatica) (HCC)    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 sulfonamide containing compounds Facial Swelling 11/22/2022    Elemental sulfur Rash and Edema 11/04/2014         Review of Symptoms:    Review of Systems  10-point system review completed, all of which are negative except as mentioned above.    CURRENT MEDICATIONS:  Current Outpatient Medications   Medication Instructions    adalimumab (HUMIRA) 40 mg, Subcutaneous, Every 14 days    alfuzosin (UROXATRAL) 10 mg, Oral, Daily    busPIRone (BUSPAR) 15 mg, Oral, 2 times daily    finasteride (PROSCAR) 5 mg, Oral, Daily    folic acid (FOLVITE) 1 mg, Oral, Daily    gabapentin (NEURONTIN) 300 mg, Oral, Daily at bedtime    hydroCHLOROthiazide 25 mg, Oral, Daily    LORazepam (ATIVAN) 0.5 mg, Oral, Every 8 hours PRN    methotrexate 20 mg, Oral, Weekly    Multiple Vitamin (multivitamin) tablet 1 tablet, Oral, Daily    nadolol (CORGARD) 20 mg, Oral, Daily    predniSONE 1 mg tablet Take 4 tablets (4 mg total) by mouth daily for 30 days, THEN 3 tablets (3 mg total) daily for 30 days, THEN 2 tablets (2 mg total) daily for 30 days, THEN 1 tablet (1 mg total) daily.    traZODone (DESYREL) 150 mg, Oral, Daily at bedtime         SOCIAL HISTORY:  Social History     Tobacco Use    Smoking status: Never    Smokeless tobacco: Never   Vaping Use    Vaping status: Never Used   Substance Use Topics    Alcohol use: Not Currently     Alcohol/week: 6.0 standard drinks of alcohol     Comment: In recovery    Drug use: Never       FAMILY HISTORY:  Family History   Problem Relation Age of Onset    Dementia Mother     Colon cancer Mother     Heart disease Father     COPD Father     Heart failure Father     Coronary artery disease Father     Sleep apnea Brother       Family History of Sleep Disorders:Brother ROSARIO        PHYSICAL EXAMINATION:  Vital Signs:  /70 (BP Location: Left arm, Patient Position: Sitting, Cuff Size: Large)   Pulse 59   Temp 98.1 °F (36.7 °C)   Ht 5' 5\" (1.651 m)   Wt 113 kg (249 lb)   SpO2 94%   BMI 41.44 kg/m²  " Body mass index is 41.44 kg/m².    Constitutional: NAD, well appearing   Mental Status: AAOx3  Neck Circumference: Neck Circumference: 20.5 inches  Skin: Warm, dry, no rashes noted   Eyes: PERRL, normal conjunctiva  ENT: Nasal congestion absent, nasal valve incompetence absent.  Posterior Airspace:   Weber Tongue Position: 4  Retrognathia: absent  Overbite: absent  High Arched Palate: present  Tongue Scalloping/Ridging: present  Uvula: normal  Chest: No evidence of respiratory distress, no accessory muscle use; no evidence of peripheral cyanosis   Abdomen: Soft, NT/ND  Extremities: No digital clubbing or pedal edema    NEUROLOGICAL EXAM:  General: Awake, alert, speech fluent, comprehension, naming, repetition intact. Short and long term memory intact.  CN: PERRL, EOMI without nystagmus, facial sensation and strength are normal and symmetric, hearing is intact to conversational tone, palate and tongue movements are intact and symmetric.  Motor: Normal tone, bulk and strength bilaterally.   Sensation: LT intact throughout.  Gait: Able to walk without difficulty. Stance normal.      I have spent a total time of 40-50 minutes on 06/12/24 in caring for this patient including Diagnostic results, Prognosis, Risks and benefits of tx options, Instructions for management, Patient and family education, Importance of tx compliance, Risk factor reductions, Documenting in the medical record, Reviewing / ordering tests, medicine, procedures  , and Obtaining or reviewing history  .        Electronically signed by:    Ta Ward DO  Board-certified Neurology and Sleep Medicine  [facility]  06/12/24

## 2024-06-13 NOTE — PROGRESS NOTES
Sleep Study Documentation    Pre-Sleep Study       Sleep testing procedure explained to patient:YES    Patient napped prior to study:NO    Caffeine:Dayshift worker after 12PM.  Caffeine use:NO    Alcohol:Dayshift workers after 5PM: Alcohol use:NO    Typical day for patient:NO       Study Documentation    Sleep Study Indications: The patient is here for a CPAP titration. He had a home study that resulted in PARTHA>5.     Sleep Study: Treatment   Optimal PAP pressure: 19cm/15cm  Leak:Small  Snore:Eliminated  REM Obtained:yes  Supplemental O2: no    Minimum SaO2 83  Baseline SaO2 91  PAP mask tried (list all) ResMed AirFit F20 size medium   PAP mask choice (final)ResMed AirFit F20 size medium  PAP mask type:full face  PAP pressure at which snoring was eliminated 8cm  Mode of Therapy:BiPAP  ETCO2:No  CPAP changed to BiPAP; Yes, the patient continued to have respiratory events at CPAP pressure of 15cm.     Mode of Therapy:BiPAP    EKG abnormalities: no     EEG abnormalities: no    Were abnormal behaviors in sleep observed:NO    Is Total Sleep Study Recording Time < 2 hours: N/A    Is Total Sleep Study Recording Time > 2 hours but study is incomplete: N/A    Is Total Sleep Study Recording Time 6 hours or more but sleep was not obtained: NO    Patient classification: retired       Post-Sleep Study    Medication used at bedtime or during sleep study:YES other prescription medications    Patient reports time it took to fall asleep:greater than 60 minutes    Patient reports waking up during study:1 to 2 times.  Patient reports returning to sleep in 10 to 30 minutes.    Patient reports sleeping 4 to 6 hours with dreaming.    Does the Patient feel this is a typical night of sleep:better than usual    Patient rated sleepiness: Somewhat sleepy or tired    PAP treatment:yes: Post PAP treatment patient reports feeling unsure if a change is noted and  would wear PAP mask at home.                   within defined limits

## 2024-06-15 RX ORDER — PREDNISONE 1 MG/1
4 TABLET ORAL DAILY
Qty: 120 TABLET | Refills: 1 | Status: SHIPPED | OUTPATIENT
Start: 2024-06-15

## 2024-06-17 ENCOUNTER — TELEPHONE (OUTPATIENT)
Dept: PSYCHIATRY | Facility: CLINIC | Age: 74
End: 2024-06-17

## 2024-06-17 NOTE — TELEPHONE ENCOUNTER
Contacted Pt. In regards to Routine Referral for Talk Therapy. Pt is not interested in services at this time.

## 2024-06-21 ENCOUNTER — PATIENT MESSAGE (OUTPATIENT)
Dept: SLEEP CENTER | Facility: CLINIC | Age: 74
End: 2024-06-21

## 2024-06-21 DIAGNOSIS — G47.33 OSA (OBSTRUCTIVE SLEEP APNEA): Primary | ICD-10-CM

## 2024-06-24 ENCOUNTER — TELEPHONE (OUTPATIENT)
Dept: SLEEP CENTER | Facility: CLINIC | Age: 74
End: 2024-06-24

## 2024-06-24 NOTE — TELEPHONE ENCOUNTER
CPAP titration study resulted, confirms previously diagnosed severe ROSARIO was effectively treated with BiPAP.  BiPAP ordered.    Call placed to patient.  Left call back message.    [employer] message sent providing results, recommendations ad instructions.

## 2024-06-25 ENCOUNTER — TELEPHONE (OUTPATIENT)
Age: 74
End: 2024-06-25

## 2024-06-25 DIAGNOSIS — I65.21 CAROTID STENOSIS, ASYMPTOMATIC, RIGHT: Primary | ICD-10-CM

## 2024-06-25 NOTE — TELEPHONE ENCOUNTER
----- Message from MALGORZATA Buenrostro sent at 6/25/2024  1:30 PM EDT -----  Regarding: Carotid Doppler  Hey!    Please contact patient at your convenience and let him know that I have ordered a carotid ultrasound for him to complete at his convenience to assess for carotid artery disease.     Please let me know if he has any additional questions or concerns,    Olga

## 2024-06-27 ENCOUNTER — TELEPHONE (OUTPATIENT)
Dept: SLEEP CENTER | Facility: CLINIC | Age: 74
End: 2024-06-27

## 2024-06-27 ENCOUNTER — HOSPITAL ENCOUNTER (OUTPATIENT)
Dept: NON INVASIVE DIAGNOSTICS | Facility: HOSPITAL | Age: 74
Discharge: HOME | End: 2024-06-27
Payer: MEDICARE

## 2024-06-27 DIAGNOSIS — I65.21 CAROTID STENOSIS, ASYMPTOMATIC, RIGHT: ICD-10-CM

## 2024-06-27 PROCEDURE — 93880 EXTRACRANIAL BILAT STUDY: CPT | Performed by: SURGERY

## 2024-06-27 PROCEDURE — 93880 EXTRACRANIAL BILAT STUDY: CPT

## 2024-06-27 NOTE — TELEPHONE ENCOUNTER
Called and left a message for patient to call back and schedule his compliance appointment for September in [facility].

## 2024-07-01 ENCOUNTER — TELEPHONE (OUTPATIENT)
Age: 74
End: 2024-07-01

## 2024-07-11 ENCOUNTER — HOSPITAL ENCOUNTER (INPATIENT)
Facility: HOSPITAL | Age: 74
LOS: 1 days | Discharge: HOME | DRG: 641 | End: 2024-07-13
Admitting: FAMILY MEDICINE
Payer: MEDICARE

## 2024-07-11 DIAGNOSIS — M19.90 INFLAMMATORY ARTHRITIS: ICD-10-CM

## 2024-07-11 DIAGNOSIS — F10.10 ALCOHOL ABUSE: ICD-10-CM

## 2024-07-11 DIAGNOSIS — E83.42 HYPOMAGNESEMIA: ICD-10-CM

## 2024-07-11 DIAGNOSIS — F10.939 ALCOHOL WITHDRAWAL (HCC): ICD-10-CM

## 2024-07-11 DIAGNOSIS — L40.50 PSORIATIC ARTHRITIS (HCC): ICD-10-CM

## 2024-07-11 DIAGNOSIS — E87.29 ALCOHOLIC KETOACIDOSIS: ICD-10-CM

## 2024-07-11 DIAGNOSIS — F10.929 ALCOHOL INTOXICATION (HCC): Primary | ICD-10-CM

## 2024-07-11 PROBLEM — D72.829 LEUKOCYTOSIS: Status: ACTIVE | Noted: 2024-07-11

## 2024-07-11 PROBLEM — F10.130 ALCOHOL ABUSE WITH WITHDRAWAL WITHOUT COMPLICATION (HCC): Status: ACTIVE | Noted: 2024-07-11

## 2024-07-11 LAB
ALBUMIN SERPL BCG-MCNC: 4.4 G/DL (ref 3.5–5)
ALP SERPL-CCNC: 92 U/L (ref 34–104)
ALT SERPL W P-5'-P-CCNC: 20 U/L (ref 7–52)
ANION GAP SERPL CALCULATED.3IONS-SCNC: 18 MMOL/L (ref 4–13)
AST SERPL W P-5'-P-CCNC: 22 U/L (ref 13–39)
B-OH-BUTYR SERPL-MCNC: 2.08 MMOL/L (ref 0.02–0.27)
BACTERIA UR QL AUTO: ABNORMAL /HPF
BASE EX.OXY STD BLDV CALC-SCNC: 98.9 % (ref 60–80)
BASE EXCESS BLDV CALC-SCNC: 5.1 MMOL/L
BASOPHILS # BLD AUTO: 0.04 THOUSANDS/ÂΜL (ref 0–0.1)
BASOPHILS NFR BLD AUTO: 0 % (ref 0–1)
BILIRUB SERPL-MCNC: 0.87 MG/DL (ref 0.2–1)
BILIRUB UR QL STRIP: NEGATIVE
BUN SERPL-MCNC: 22 MG/DL (ref 5–25)
CALCIUM SERPL-MCNC: 8.9 MG/DL (ref 8.4–10.2)
CHLORIDE SERPL-SCNC: 100 MMOL/L (ref 96–108)
CLARITY UR: ABNORMAL
CO2 SERPL-SCNC: 24 MMOL/L (ref 21–32)
COLOR UR: YELLOW
CREAT SERPL-MCNC: 0.86 MG/DL (ref 0.6–1.3)
DME PARACHUTE DELIVERY DATE REQUESTED: NORMAL
DME PARACHUTE ITEM DESCRIPTION: NORMAL
DME PARACHUTE ORDER STATUS: NORMAL
DME PARACHUTE SUPPLIER NAME: NORMAL
DME PARACHUTE SUPPLIER PHONE: NORMAL
EOSINOPHIL # BLD AUTO: 0.02 THOUSAND/ÂΜL (ref 0–0.61)
EOSINOPHIL NFR BLD AUTO: 0 % (ref 0–6)
ERYTHROCYTE [DISTWIDTH] IN BLOOD BY AUTOMATED COUNT: 14.1 % (ref 11.6–15.1)
ETHANOL SERPL-MCNC: 249 MG/DL
GFR SERPL CREATININE-BSD FRML MDRD: 85 ML/MIN/1.73SQ M
GLUCOSE SERPL-MCNC: 63 MG/DL (ref 65–140)
GLUCOSE UR STRIP-MCNC: NEGATIVE MG/DL
HCO3 BLDV-SCNC: 32.3 MMOL/L (ref 24–30)
HCT VFR BLD AUTO: 52.1 % (ref 36.5–49.3)
HGB BLD-MCNC: 16.8 G/DL (ref 12–17)
HGB UR QL STRIP.AUTO: ABNORMAL
HYALINE CASTS #/AREA URNS LPF: ABNORMAL /LPF
IMM GRANULOCYTES # BLD AUTO: 0.08 THOUSAND/UL (ref 0–0.2)
IMM GRANULOCYTES NFR BLD AUTO: 1 % (ref 0–2)
KETONES UR STRIP-MCNC: ABNORMAL MG/DL
LEUKOCYTE ESTERASE UR QL STRIP: NEGATIVE
LIPASE SERPL-CCNC: 578 U/L (ref 11–82)
LYMPHOCYTES # BLD AUTO: 1.86 THOUSANDS/ÂΜL (ref 0.6–4.47)
LYMPHOCYTES NFR BLD AUTO: 14 % (ref 14–44)
MAGNESIUM SERPL-MCNC: 1.6 MG/DL (ref 1.9–2.7)
MCH RBC QN AUTO: 31.2 PG (ref 26.8–34.3)
MCHC RBC AUTO-ENTMCNC: 32.2 G/DL (ref 31.4–37.4)
MCV RBC AUTO: 97 FL (ref 82–98)
MONOCYTES # BLD AUTO: 0.62 THOUSAND/ÂΜL (ref 0.17–1.22)
MONOCYTES NFR BLD AUTO: 5 % (ref 4–12)
MUCOUS THREADS UR QL AUTO: ABNORMAL
NEUTROPHILS # BLD AUTO: 10.31 THOUSANDS/ÂΜL (ref 1.85–7.62)
NEUTS SEG NFR BLD AUTO: 80 % (ref 43–75)
NITRITE UR QL STRIP: NEGATIVE
NON-SQ EPI CELLS URNS QL MICRO: ABNORMAL /HPF
NRBC BLD AUTO-RTO: 0 /100 WBCS
O2 CT BLDV-SCNC: 18.6 ML/DL
PCO2 BLDV: 58.9 MM HG (ref 42–50)
PH BLDV: 7.36 [PH] (ref 7.3–7.4)
PH UR STRIP.AUTO: 5.5 [PH]
PLATELET # BLD AUTO: 288 THOUSANDS/UL (ref 149–390)
PMV BLD AUTO: 8.9 FL (ref 8.9–12.7)
PO2 BLDV: 146 MM HG (ref 35–45)
POTASSIUM SERPL-SCNC: 3.9 MMOL/L (ref 3.5–5.3)
PROT SERPL-MCNC: 7.5 G/DL (ref 6.4–8.4)
PROT UR STRIP-MCNC: ABNORMAL MG/DL
RBC # BLD AUTO: 5.39 MILLION/UL (ref 3.88–5.62)
RBC #/AREA URNS AUTO: ABNORMAL /HPF
SODIUM SERPL-SCNC: 142 MMOL/L (ref 135–147)
SP GR UR STRIP.AUTO: >=1.03 (ref 1–1.03)
UROBILINOGEN UR STRIP-ACNC: <2 MG/DL
WBC # BLD AUTO: 12.93 THOUSAND/UL (ref 4.31–10.16)
WBC #/AREA URNS AUTO: ABNORMAL /HPF

## 2024-07-11 PROCEDURE — 96367 TX/PROPH/DG ADDL SEQ IV INF: CPT

## 2024-07-11 PROCEDURE — 80053 COMPREHEN METABOLIC PANEL: CPT

## 2024-07-11 PROCEDURE — 82010 KETONE BODYS QUAN: CPT

## 2024-07-11 PROCEDURE — 94760 N-INVAS EAR/PLS OXIMETRY 1: CPT

## 2024-07-11 PROCEDURE — 83735 ASSAY OF MAGNESIUM: CPT

## 2024-07-11 PROCEDURE — 85025 COMPLETE CBC W/AUTO DIFF WBC: CPT

## 2024-07-11 PROCEDURE — 99284 EMERGENCY DEPT VISIT MOD MDM: CPT

## 2024-07-11 PROCEDURE — 36415 COLL VENOUS BLD VENIPUNCTURE: CPT

## 2024-07-11 PROCEDURE — 82805 BLOOD GASES W/O2 SATURATION: CPT

## 2024-07-11 PROCEDURE — 82077 ASSAY SPEC XCP UR&BREATH IA: CPT

## 2024-07-11 PROCEDURE — 96365 THER/PROPH/DIAG IV INF INIT: CPT

## 2024-07-11 PROCEDURE — 93005 ELECTROCARDIOGRAM TRACING: CPT

## 2024-07-11 PROCEDURE — 94660 CPAP INITIATION&MGMT: CPT

## 2024-07-11 PROCEDURE — 99222 1ST HOSP IP/OBS MODERATE 55: CPT | Performed by: PHYSICIAN ASSISTANT

## 2024-07-11 PROCEDURE — 94664 DEMO&/EVAL PT USE INHALER: CPT

## 2024-07-11 PROCEDURE — 99285 EMERGENCY DEPT VISIT HI MDM: CPT

## 2024-07-11 PROCEDURE — 81001 URINALYSIS AUTO W/SCOPE: CPT

## 2024-07-11 PROCEDURE — 96361 HYDRATE IV INFUSION ADD-ON: CPT

## 2024-07-11 PROCEDURE — 83690 ASSAY OF LIPASE: CPT

## 2024-07-11 RX ORDER — NADOLOL 40 MG/1
20 TABLET ORAL DAILY
Status: DISCONTINUED | OUTPATIENT
Start: 2024-07-12 | End: 2024-07-13 | Stop reason: HOSPADM

## 2024-07-11 RX ORDER — FOLIC ACID 1 MG/1
1 TABLET ORAL DAILY
Status: DISCONTINUED | OUTPATIENT
Start: 2024-07-12 | End: 2024-07-13 | Stop reason: HOSPADM

## 2024-07-11 RX ORDER — ACETAMINOPHEN 325 MG/1
650 TABLET ORAL EVERY 6 HOURS PRN
Status: DISCONTINUED | OUTPATIENT
Start: 2024-07-11 | End: 2024-07-13 | Stop reason: HOSPADM

## 2024-07-11 RX ORDER — LANOLIN ALCOHOL/MO/W.PET/CERES
100 CREAM (GRAM) TOPICAL DAILY
Status: DISCONTINUED | OUTPATIENT
Start: 2024-07-12 | End: 2024-07-13 | Stop reason: HOSPADM

## 2024-07-11 RX ORDER — SODIUM CHLORIDE 9 MG/ML
75 INJECTION, SOLUTION INTRAVENOUS CONTINUOUS
Status: DISCONTINUED | OUTPATIENT
Start: 2024-07-11 | End: 2024-07-12

## 2024-07-11 RX ORDER — PREDNISONE 1 MG/1
4 TABLET ORAL DAILY
Status: DISCONTINUED | OUTPATIENT
Start: 2024-07-12 | End: 2024-07-13 | Stop reason: HOSPADM

## 2024-07-11 RX ORDER — FOLIC ACID 1 MG/1
1 TABLET ORAL DAILY
Status: DISCONTINUED | OUTPATIENT
Start: 2024-07-12 | End: 2024-07-11 | Stop reason: SDUPTHER

## 2024-07-11 RX ORDER — GABAPENTIN 300 MG/1
300 CAPSULE ORAL
Status: DISCONTINUED | OUTPATIENT
Start: 2024-07-11 | End: 2024-07-13 | Stop reason: HOSPADM

## 2024-07-11 RX ORDER — FINASTERIDE 5 MG/1
5 TABLET, FILM COATED ORAL DAILY
Status: DISCONTINUED | OUTPATIENT
Start: 2024-07-12 | End: 2024-07-13 | Stop reason: HOSPADM

## 2024-07-11 RX ORDER — TRAZODONE HYDROCHLORIDE 50 MG/1
150 TABLET ORAL
Status: DISCONTINUED | OUTPATIENT
Start: 2024-07-11 | End: 2024-07-13 | Stop reason: HOSPADM

## 2024-07-11 RX ORDER — HYDROCHLOROTHIAZIDE 25 MG/1
25 TABLET ORAL DAILY
Status: DISCONTINUED | OUTPATIENT
Start: 2024-07-12 | End: 2024-07-13 | Stop reason: HOSPADM

## 2024-07-11 RX ORDER — HEPARIN SODIUM 5000 [USP'U]/ML
5000 INJECTION, SOLUTION INTRAVENOUS; SUBCUTANEOUS EVERY 8 HOURS SCHEDULED
Status: DISCONTINUED | OUTPATIENT
Start: 2024-07-11 | End: 2024-07-13 | Stop reason: HOSPADM

## 2024-07-11 RX ORDER — MAGNESIUM SULFATE HEPTAHYDRATE 40 MG/ML
2 INJECTION, SOLUTION INTRAVENOUS ONCE
Status: COMPLETED | OUTPATIENT
Start: 2024-07-11 | End: 2024-07-11

## 2024-07-11 RX ORDER — ONDANSETRON 2 MG/ML
4 INJECTION INTRAMUSCULAR; INTRAVENOUS EVERY 6 HOURS PRN
Status: DISCONTINUED | OUTPATIENT
Start: 2024-07-11 | End: 2024-07-13 | Stop reason: HOSPADM

## 2024-07-11 RX ORDER — DIAZEPAM 5 MG/ML
10 INJECTION, SOLUTION INTRAMUSCULAR; INTRAVENOUS ONCE
Status: COMPLETED | OUTPATIENT
Start: 2024-07-11 | End: 2024-07-11

## 2024-07-11 RX ORDER — LORAZEPAM 1 MG/1
2 TABLET ORAL ONCE
Status: COMPLETED | OUTPATIENT
Start: 2024-07-11 | End: 2024-07-11

## 2024-07-11 RX ADMIN — DEXTROSE AND SODIUM CHLORIDE 250 ML: 5; .9 INJECTION, SOLUTION INTRAVENOUS at 20:52

## 2024-07-11 RX ADMIN — TRAZODONE HYDROCHLORIDE 150 MG: 50 TABLET ORAL at 23:06

## 2024-07-11 RX ADMIN — FOLIC ACID: 5 INJECTION, SOLUTION INTRAMUSCULAR; INTRAVENOUS; SUBCUTANEOUS at 19:32

## 2024-07-11 RX ADMIN — HEPARIN SODIUM 5000 UNITS: 5000 INJECTION INTRAVENOUS; SUBCUTANEOUS at 23:06

## 2024-07-11 RX ADMIN — SODIUM CHLORIDE 1000 ML: 0.9 INJECTION, SOLUTION INTRAVENOUS at 16:38

## 2024-07-11 RX ADMIN — LORAZEPAM 2 MG: 1 TABLET ORAL at 23:06

## 2024-07-11 RX ADMIN — SODIUM CHLORIDE 75 ML/HR: 0.9 INJECTION, SOLUTION INTRAVENOUS at 23:06

## 2024-07-11 RX ADMIN — DIAZEPAM 10 MG: 5 INJECTION, SOLUTION INTRAMUSCULAR; INTRAVENOUS at 21:25

## 2024-07-11 RX ADMIN — ACETAMINOPHEN 650 MG: 325 TABLET, FILM COATED ORAL at 23:06

## 2024-07-11 RX ADMIN — MAGNESIUM SULFATE HEPTAHYDRATE 2 G: 40 INJECTION, SOLUTION INTRAVENOUS at 18:09

## 2024-07-11 RX ADMIN — GABAPENTIN 300 MG: 300 CAPSULE ORAL at 23:06

## 2024-07-11 NOTE — ED PROVIDER NOTES
History  Chief Complaint   Patient presents with    Alcohol Intoxication     Patient reports that he was an alcoholic but has not drank in two years. States that his family went to [place] and he spend the past two days drinking.      This is a 74-year-old male who is presenting today with self-described alcohol intoxication, and concerns for anxiety.  Patient reports that his daughter is going through a very rough time overseas, and that his wife recently left the country to go help her.  He states that since his wife's left he has been struggling with feelings of significant anxiety about his wife being gone.  He is especially anxious about the fact that he was a recovering alcoholic until May when he relapsed, and since his wife has left, he has been drinking heavily again (for the past 2 to 3 days).  Patient does have some fear that he may go through withdrawal here in the emergency department as well.  He denies any prior history of severe withdrawal symptoms including delirium tremens, or alcohol withdrawal seizure.  At this time, patient states that his last drink was approximately 1 hour ago, and states that he has been drinking approximately a pint of vodka every day for the past 2 to 3 days.  Patient is currently denying any nausea, vomiting, abdominal pain, chest pain, shortness of breath, or other severe medical symptoms other than the alcohol intoxication.        Prior to Admission Medications   Prescriptions Last Dose Informant Patient Reported? Taking?   LORazepam (ATIVAN) 0.5 mg tablet  Self No No   Sig: Take 1 tablet (0.5 mg total) by mouth every 8 (eight) hours as needed for anxiety for up to 10 days   adalimumab (HUMIRA) 40 mg/0.8 mL PSKT 7/1/2024 Self Yes No   Sig: Inject 40 mg under the skin every 14 (fourteen) days   alfuzosin (UROXATRAL) 10 mg 24 hr tablet  Self No No   Sig: Take 1 tablet (10 mg total) by mouth daily   busPIRone (BUSPAR) 15 mg tablet  Self No No   Sig: Take 1 tablet (15 mg  total) by mouth 2 (two) times a day   Patient not taking: Reported on 5/30/2024   finasteride (PROSCAR) 5 mg tablet  Self No No   Sig: Take 1 tablet (5 mg total) by mouth daily   folic acid (FOLVITE) 1 mg tablet  Self No No   Sig: Take 1 tablet (1 mg total) by mouth daily   gabapentin (Neurontin) 300 mg capsule  Self No No   Sig: Take 1 capsule (300 mg total) by mouth daily at bedtime   hydrochlorothiazide (HYDRODIURIL) 25 mg tablet  Self Yes No   Sig: Take 25 mg by mouth daily   methotrexate 2.5 MG tablet 7/8/2024 Self No No   Sig: Take 8 tablets (20 mg total) by mouth once a week   nadolol (CORGARD) 20 mg tablet  Self No No   Sig: Take 1 tablet (20 mg total) by mouth daily   predniSONE 1 mg tablet  Self No No   Sig: Take 4 tablets (4 mg total) by mouth daily for 30 days, THEN 3 tablets (3 mg total) daily for 30 days, THEN 2 tablets (2 mg total) daily for 30 days, THEN 1 tablet (1 mg total) daily.   predniSONE 1 mg tablet   No No   Sig: Take 4 tablets (4 mg total) by mouth daily   traZODone (DESYREL) 100 mg tablet  Self No No   Sig: Take 1.5 tablets (150 mg total) by mouth daily at bedtime      Facility-Administered Medications: None       Past Medical History:   Diagnosis Date    Alcohol dependency (HCC)     Anxiety     Arthritis     Depression     [place] (hard of hearing)     Hypertension     Kidney stones     Peripheral neuropathy     Prostate enlargement     Renal disorder     kidney    Shoulder dislocation        Past Surgical History:   Procedure Laterality Date    CHOLECYSTECTOMY      2010    COLONOSCOPY      SHOULDER SURGERY Left     for dislocation x 2, 20 years ago an the second 18 years ago       Family History   Problem Relation Age of Onset    Dementia Mother     Colon cancer Mother     Heart disease Father     COPD Father     Heart failure Father     Coronary artery disease Father     Sleep apnea Brother      I have reviewed and agree with the history as documented.    E-Cigarette/Vaping    E-Cigarette  Use Never User      E-Cigarette/Vaping Substances    Nicotine No     THC No     CBD No     Flavoring No     Other No     Unknown No      Social History     Tobacco Use    Smoking status: Never    Smokeless tobacco: Never   Vaping Use    Vaping status: Never Used   Substance Use Topics    Alcohol use: Not Currently     Alcohol/week: 6.0 standard drinks of alcohol     Comment: In recovery    Drug use: Never       Review of Systems   Constitutional:  Negative for chills, fatigue and fever.   HENT:  Negative for congestion and sore throat.    Eyes:  Negative for pain and visual disturbance.   Respiratory:  Negative for cough, chest tightness and shortness of breath.    Cardiovascular:  Negative for chest pain and palpitations.   Gastrointestinal:  Negative for abdominal pain, blood in stool, constipation, diarrhea, nausea and vomiting.   Genitourinary:  Negative for dysuria, flank pain and hematuria.   Musculoskeletal:  Negative for arthralgias, back pain and neck pain.   Skin:  Negative for color change and rash.   Neurological:  Negative for dizziness, syncope and light-headedness.   Hematological:  Negative for adenopathy. Does not bruise/bleed easily.   Psychiatric/Behavioral:  The patient is nervous/anxious.    All other systems reviewed and are negative.      Physical Exam  Physical Exam  Vitals and nursing note reviewed.   Constitutional:       General: He is not in acute distress.     Appearance: He is well-developed. He is obese. He is not toxic-appearing or diaphoretic.   HENT:      Head: Normocephalic and atraumatic.      Right Ear: External ear normal.      Left Ear: External ear normal.      Nose: Nose normal. No congestion or rhinorrhea.      Mouth/Throat:      Mouth: Mucous membranes are moist.      Pharynx: No oropharyngeal exudate or posterior oropharyngeal erythema.   Eyes:      General: No scleral icterus.     Extraocular Movements: Extraocular movements intact.      Conjunctiva/sclera: Conjunctivae  normal.      Pupils: Pupils are equal, round, and reactive to light.   Cardiovascular:      Rate and Rhythm: Normal rate and regular rhythm.      Pulses: Normal pulses.      Heart sounds: No murmur heard.  Pulmonary:      Effort: Pulmonary effort is normal. No respiratory distress.      Breath sounds: Normal breath sounds. No wheezing or rales.   Abdominal:      Palpations: Abdomen is soft. There is no mass.      Tenderness: There is no abdominal tenderness. There is no right CVA tenderness, left CVA tenderness or guarding.      Hernia: No hernia is present.   Musculoskeletal:         General: No swelling. Normal range of motion.      Cervical back: Normal range of motion and neck supple.      Right lower leg: No edema.      Left lower leg: No edema.   Skin:     General: Skin is warm and dry.      Capillary Refill: Capillary refill takes less than 2 seconds.   Neurological:      Mental Status: He is alert.      Comments: Patient has mildly slurred speech, smells of alcohol, answers most questions appropriately, but occasionally seems to train of thought, mild ataxia present on gait assessment    On initial assessment patient has no tremor, diaphoresis, or other objective findings consistent with withdrawal   Psychiatric:      Comments: Patient is anxious, somewhat labile, somewhat tearful at times         Vital Signs  ED Triage Vitals   Temperature Pulse Respirations Blood Pressure SpO2   07/11/24 1523 07/11/24 1523 07/11/24 1523 07/11/24 1523 07/11/24 1523   (!) 96.1 °F (35.6 °C) 77 18 154/84 94 %      Temp Source Heart Rate Source Patient Position - Orthostatic VS BP Location FiO2 (%)   07/11/24 1523 07/11/24 1523 07/11/24 1523 07/11/24 1523 07/11/24 2332   Temporal Monitor Sitting Left arm 30      Pain Score       07/11/24 1523       2           Vitals:    07/11/24 2204 07/11/24 2227 07/11/24 2247 07/12/24 0000   BP: (!) 173/93   168/86   Pulse: 92 94 87 86   Patient Position - Orthostatic VS: Sitting             Visual Acuity      ED Medications  Medications   predniSONE tablet 4 mg (has no administration in time range)   traZODone (DESYREL) tablet 150 mg (150 mg Oral Given 7/11/24 2306)   nadolol (CORGARD) tablet 20 mg (has no administration in time range)   hydroCHLOROthiazide tablet 25 mg (has no administration in time range)   gabapentin (NEURONTIN) capsule 300 mg (300 mg Oral Given 7/11/24 2306)   folic acid (FOLVITE) tablet 1 mg (has no administration in time range)   finasteride (PROSCAR) tablet 5 mg (has no administration in time range)   sodium chloride 0.9 % infusion (75 mL/hr Intravenous New Bag 7/11/24 2306)   acetaminophen (TYLENOL) tablet 650 mg (650 mg Oral Given 7/11/24 2306)   ondansetron (ZOFRAN) injection 4 mg (has no administration in time range)   heparin (porcine) subcutaneous injection 5,000 Units (5,000 Units Subcutaneous Given 7/11/24 2306)   thiamine tablet 100 mg (has no administration in time range)   multivitamin-minerals (CENTRUM) tablet 1 tablet (has no administration in time range)   sodium chloride 0.9 % bolus 1,000 mL (0 mL Intravenous Stopped 7/11/24 1809)   magnesium sulfate 2 g/50 mL IVPB (premix) 2 g (0 g Intravenous Stopped 7/11/24 1931)   dextrose 5 % and sodium chloride 0.9 % bolus 250 mL (250 mL Intravenous New Bag 7/11/24 2052)   folic acid 1 mg, thiamine (VITAMIN B1) 100 mg in sodium chloride 0.9 % 100 mL IV piggyback ( Intravenous Stopped 7/11/24 2050)   diazepam (VALIUM) injection 10 mg (10 mg Intravenous Given 7/11/24 2125)   LORazepam (ATIVAN) tablet 2 mg (2 mg Oral Given 7/11/24 2306)       Diagnostic Studies  Results Reviewed       Procedure Component Value Units Date/Time    Urine Microscopic [[id number]]  (Abnormal) Collected: 07/11/24 2124    Lab Status: Final result Specimen: Urine, Other Updated: 07/11/24 2150     RBC, UA 30-50 /hpf      WBC, UA 0-1 /hpf      Epithelial Cells Occasional /hpf      Bacteria, UA None Seen /hpf      MUCUS THREADS Occasional      Hyaline Casts, UA 0-1 /lpf     UA w Reflex to Microscopic w Reflex to Culture [[id number]]  (Abnormal) Collected: 07/11/24 2124    Lab Status: Final result Specimen: Urine, Other Updated: 07/11/24 2141     Color, UA Yellow     Clarity, UA Slightly Cloudy     Specific Gravity, UA >=1.030     pH, UA 5.5     Leukocytes, UA Negative     Nitrite, UA Negative     Protein,  (2+) mg/dl      Glucose, UA Negative mg/dl      Ketones, UA 80 (3+) mg/dl      Urobilinogen, UA <2.0 mg/dl      Bilirubin, UA Negative     Occult Blood, UA Large    Beta Hydroxybutyrate [[id number]]  (Abnormal) Collected: 07/11/24 1728    Lab Status: Final result Specimen: Blood from Arm, Right Updated: 07/11/24 1754     Beta- Hydroxybutyrate 2.08 mmol/L     Blood gas, venous [[id number]]  (Abnormal) Collected: 07/11/24 1728    Lab Status: Final result Specimen: Blood from Arm, Right Updated: 07/11/24 1742     pH, Shahid 7.357     pCO2, Shahid 58.9 mm Hg      pO2, Shahid 146.0 mm Hg      HCO3, Shahid 32.3 mmol/L      Base Excess, Shahid 5.1 mmol/L      O2 Content, Shahid 18.6 ml/dL      O2 HGB, VENOUS 98.9 %     Ethanol [[id number]]  (Abnormal) Collected: 07/11/24 1637    Lab Status: Final result Specimen: Blood from Arm, Right Updated: 07/11/24 1659     Ethanol Lvl 249 mg/dL     Lipase [[id number]]  (Abnormal) Collected: 07/11/24 1637    Lab Status: Final result Specimen: Blood from Arm, Right Updated: 07/11/24 1659     Lipase 578 u/L     Comprehensive metabolic panel [[id number]]  (Abnormal) Collected: 07/11/24 1637    Lab Status: Final result Specimen: Blood from Arm, Right Updated: 07/11/24 1659     Sodium 142 mmol/L      Potassium 3.9 mmol/L      Chloride 100 mmol/L      CO2 24 mmol/L      ANION GAP 18 mmol/L      BUN 22 mg/dL      Creatinine 0.86 mg/dL      Glucose 63 mg/dL      Calcium 8.9 mg/dL      AST 22 U/L      ALT 20 U/L      Alkaline Phosphatase 92 U/L      Total Protein 7.5 g/dL      Albumin 4.4 g/dL      Total Bilirubin 0.87 mg/dL      eGFR 85  ml/min/1.73sq m     Narrative:      National Kid[employer] Disease Foundation guidelines for Chronic Kidney Disease (CKD):     Stage 1 with normal or high GFR (GFR > 90 mL/min/1.73 square meters)    Stage 2 Mild CKD (GFR = 60-89 mL/min/1.73 square meters)    Stage 3A Moderate CKD (GFR = 45-59 mL/min/1.73 square meters)    Stage 3B Moderate CKD (GFR = 30-44 mL/min/1.73 square meters)    Stage 4 Severe CKD (GFR = 15-29 mL/min/1.73 square meters)    Stage 5 End Stage CKD (GFR <15 mL/min/1.73 square meters)  Note: GFR calculation is accurate only with a steady state creatinine    Magnesium [[id number]]  (Abnormal) Collected: 07/11/24 1637    Lab Status: Final result Specimen: Blood from Arm, Right Updated: 07/11/24 1659     Magnesium 1.6 mg/dL     CBC and differential [[id number]]  (Abnormal) Collected: 07/11/24 1637    Lab Status: Final result Specimen: Blood from Arm, Right Updated: 07/11/24 1644     WBC 12.93 Thousand/uL      RBC 5.39 Million/uL      Hemoglobin 16.8 g/dL      Hematocrit 52.1 %      MCV 97 fL      MCH 31.2 pg      MCHC 32.2 g/dL      RDW 14.1 %      MPV 8.9 fL      Platelets 288 Thousands/uL      nRBC 0 /100 WBCs      Segmented % 80 %      Immature Grans % 1 %      Lymphocytes % 14 %      Monocytes % 5 %      Eosinophils Relative 0 %      Basophils Relative 0 %      Absolute Neutrophils 10.31 Thousands/µL      Absolute Immature Grans 0.08 Thousand/uL      Absolute Lymphocytes 1.86 Thousands/µL      Absolute Monocytes 0.62 Thousand/µL      Eosinophils Absolute 0.02 Thousand/µL      Basophils Absolute 0.04 Thousands/µL                    No orders to display              Procedures  Procedures         ED Course  ED Course as of 07/12/24 0107   Thu Jul 11, 2024   1838 Beta- Hydroxybutyrate(!): 2.08  Was likely etiology: Alcoholic ketoacidosis.  This patient seems to be compensating fairly frequently, no evidence of severe disease at this time.  Rebleeding potassium, repleted magnesium, and providing glucose via  IV fluids as patient is resting comfortably without signs of withdrawal at this time.   1952 In spite of patient's blood work that indicates he may have mild alcoholic ketoacidosis, he is still tolerating p.o. without any difficulty, has had no vomiting throughout his stay here in the emergency department, and demonstrates no pH acidosis and his VBG.  He has however still undergoing treatment with D5 normal saline.  Will reassess as patient continues to sober up.   1953 GLUCOSE(!): 63            CIWA-Ar Score       Row Name 07/12/24 0000 07/11/24 2247 07/11/24 2032       CIWA-Ar    Blood Pressure 168/86 -- 140/86    Pulse 86 87 85    Nausea and Vomiting 0 0 0    Tactile Disturbances 0 0 0    Tremor 1 4 0    Auditory Disturbances 0 0 0    Paroxysmal Sweats 0 0 0    Visual Disturbances 0 0 0    Anxiety 2 4 2    Headache, Fullness in Head 2 4 0    Agitation 1 2 0    Orientation and Clouding of Sensorium 0 0 0    CIWA-Ar Total 6 -- 2                                                    Medical Decision Making  Medical complexity: Patient presenting with evident alcohol intoxication, concern for metabolic disturbances, alcoholic ketoacidosis, hypomagnesemia, possible cirrhosis, pancreatitis, or other complications associated with heavy binge alcohol drinking.  Patient was a recovering alcoholic until he relapsed in May.  Since then he has been intermittently drinking.  Wife is out of town currently.  He is very anxious, and does have some risk factors for moderate withdrawal.  Will observe here in the emergency department and patient begins to develop withdrawal symptoms as he begins to sober up, would need treatment, and would offer detox either an inpatient detox center ([place]) or here in the inpatient hospital.    Reassessment: Patient is beginning to have withdrawal symptoms approximately 6 hours into his admission here in the emergency department.  I did order first dose of Valium, and then assessed with the  patient whether or not he would like to reach out to our [facility].  At this time patient prefers not to be transferred and wants to detox here in this hospital.  Patient also has mild alcoholic ketoacidosis which I am treating with dextrose containing fluids.  Patient will be admitted for ongoing treatment of his alcohol withdrawal and alcoholic ketoacidosis.  Magnesium was repleted.    Amount and/or Complexity of Data Reviewed  Labs: ordered. Decision-making details documented in ED Course.    Risk  Prescription drug management.  Decision regarding hospitalization.                 Disposition  Final diagnoses:   Alcohol intoxication (HCC)   Alcohol abuse   Alcoholic ketoacidosis   Hypomagnesemia   Alcohol withdrawal (HCC)     Time reflects when diagnosis was documented in both MDM as applicable and the Disposition within this note       Time User Action Codes Description Comment    7/11/2024  6:36 PM Ross Colbert [F10.929] Alcohol intoxication (HCC)     7/11/2024  6:36 PM Ross Colbert [F10.10] Alcohol abuse     7/11/2024  6:36 PM Ross Colbert [E87.29] Alcoholic ketoacidosis     7/11/2024  6:36 PM Ross Colbert [E83.42] Hypomagnesemia     7/11/2024  9:16 PM Ross Colbert [F10.939] Alcohol withdrawal (HCC)           ED Disposition       ED Disposition   Admit    Condition   Stable    Date/Time   Thu Jul 11, 2024  9:16 PM    Comment   Case was discussed with JAIME and the patient's admission status was agreed to be Admission Status: observation status to the service of Dr. Meza .               Follow-up Information    None         Current Discharge Medication List        CONTINUE these medications which have NOT CHANGED    Details   adalimumab (HUMIRA) 40 mg/0.8 mL PSKT Inject 40 mg under the skin every 14 (fourteen) days      alfuzosin (UROXATRAL) 10 mg 24 hr tablet Take 1 tablet (10 mg total) by mouth daily  Qty: 90 tablet, Refills: 1    Associated Diagnoses: Benign  prostatic hyperplasia, unspecified whether lower urinary tract symptoms present      busPIRone (BUSPAR) 15 mg tablet Take 1 tablet (15 mg total) by mouth 2 (two) times a day  Qty: 30 tablet, Refills: 1    Associated Diagnoses: Depression with anxiety      finasteride (PROSCAR) 5 mg tablet Take 1 tablet (5 mg total) by mouth daily  Qty: 90 tablet, Refills: 1    Associated Diagnoses: Benign prostatic hyperplasia without lower urinary tract symptoms      folic acid (FOLVITE) 1 mg tablet Take 1 tablet (1 mg total) by mouth daily  Qty: 90 tablet, Refills: 1    Associated Diagnoses: Inflammatory arthritis      gabapentin (Neurontin) 300 mg capsule Take 1 capsule (300 mg total) by mouth daily at bedtime  Qty: 90 capsule, Refills: 1    Associated Diagnoses: Neuropathy      hydrochlorothiazide (HYDRODIURIL) 25 mg tablet Take 25 mg by mouth daily      LORazepam (ATIVAN) 0.5 mg tablet Take 1 tablet (0.5 mg total) by mouth every 8 (eight) hours as needed for anxiety for up to 10 days  Qty: 20 tablet, Refills: 0    Associated Diagnoses: Depression with anxiety      methotrexate 2.5 MG tablet Take 8 tablets (20 mg total) by mouth once a week  Qty: 120 tablet, Refills: 1    Associated Diagnoses: Psoriatic arthritis (HCC)      nadolol (CORGARD) 20 mg tablet Take 1 tablet (20 mg total) by mouth daily  Qty: 90 tablet, Refills: 1    Associated Diagnoses: Essential hypertension      predniSONE 1 mg tablet Take 4 tablets (4 mg total) by mouth daily  Qty: 120 tablet, Refills: 1    Associated Diagnoses: Psoriatic arthritis (HCC); Polymyalgia rheumatica (HCC)      traZODone (DESYREL) 100 mg tablet Take 1.5 tablets (150 mg total) by mouth daily at bedtime  Qty: 135 tablet, Refills: 1    Associated Diagnoses: Unspecified mood (affective) disorder (HCC)             No discharge procedures on file.    PDMP Review         Value Time User    PDMP Reviewed  Yes 7/11/2024  9:45 PM Adan Hoang PA-C            ED Provider  Electronically Signed  by             Ross Colbert MD  07/12/24 0107

## 2024-07-12 LAB
ALBUMIN SERPL BCG-MCNC: 3.6 G/DL (ref 3.5–5)
ALP SERPL-CCNC: 75 U/L (ref 34–104)
ALT SERPL W P-5'-P-CCNC: 14 U/L (ref 7–52)
ANION GAP SERPL CALCULATED.3IONS-SCNC: 13 MMOL/L (ref 4–13)
AST SERPL W P-5'-P-CCNC: 16 U/L (ref 13–39)
ATRIAL RATE: 68 BPM
BASOPHILS # BLD AUTO: 0.03 THOUSANDS/ÂΜL (ref 0–0.1)
BASOPHILS NFR BLD AUTO: 0 % (ref 0–1)
BILIRUB SERPL-MCNC: 0.89 MG/DL (ref 0.2–1)
BUN SERPL-MCNC: 16 MG/DL (ref 5–25)
CALCIUM SERPL-MCNC: 8.1 MG/DL (ref 8.4–10.2)
CHLORIDE SERPL-SCNC: 102 MMOL/L (ref 96–108)
CO2 SERPL-SCNC: 21 MMOL/L (ref 21–32)
CREAT SERPL-MCNC: 0.63 MG/DL (ref 0.6–1.3)
EOSINOPHIL # BLD AUTO: 0.05 THOUSAND/ÂΜL (ref 0–0.61)
EOSINOPHIL NFR BLD AUTO: 1 % (ref 0–6)
ERYTHROCYTE [DISTWIDTH] IN BLOOD BY AUTOMATED COUNT: 14 % (ref 11.6–15.1)
ETHANOL SERPL-MCNC: 10 MG/DL
GFR SERPL CREATININE-BSD FRML MDRD: 97 ML/MIN/1.73SQ M
GLUCOSE SERPL-MCNC: 121 MG/DL (ref 65–140)
HCT VFR BLD AUTO: 43.9 % (ref 36.5–49.3)
HGB BLD-MCNC: 14.4 G/DL (ref 12–17)
IMM GRANULOCYTES # BLD AUTO: 0.05 THOUSAND/UL (ref 0–0.2)
IMM GRANULOCYTES NFR BLD AUTO: 1 % (ref 0–2)
LIPASE SERPL-CCNC: 484 U/L (ref 11–82)
LYMPHOCYTES # BLD AUTO: 1.57 THOUSANDS/ÂΜL (ref 0.6–4.47)
LYMPHOCYTES NFR BLD AUTO: 16 % (ref 14–44)
MAGNESIUM SERPL-MCNC: 1.8 MG/DL (ref 1.9–2.7)
MAGNESIUM SERPL-MCNC: 1.9 MG/DL (ref 1.9–2.7)
MCH RBC QN AUTO: 31.2 PG (ref 26.8–34.3)
MCHC RBC AUTO-ENTMCNC: 32.8 G/DL (ref 31.4–37.4)
MCV RBC AUTO: 95 FL (ref 82–98)
MONOCYTES # BLD AUTO: 1.34 THOUSAND/ÂΜL (ref 0.17–1.22)
MONOCYTES NFR BLD AUTO: 14 % (ref 4–12)
NEUTROPHILS # BLD AUTO: 6.82 THOUSANDS/ÂΜL (ref 1.85–7.62)
NEUTS SEG NFR BLD AUTO: 68 % (ref 43–75)
NRBC BLD AUTO-RTO: 0 /100 WBCS
P AXIS: 66 DEGREES
PHOSPHATE SERPL-MCNC: 1.6 MG/DL (ref 2.3–4.1)
PLATELET # BLD AUTO: 240 THOUSANDS/UL (ref 149–390)
PMV BLD AUTO: 9.3 FL (ref 8.9–12.7)
POTASSIUM SERPL-SCNC: 3.8 MMOL/L (ref 3.5–5.3)
PR INTERVAL: 178 MS
PROT SERPL-MCNC: 6 G/DL (ref 6.4–8.4)
QRS AXIS: 64 DEGREES
QRSD INTERVAL: 108 MS
QT INTERVAL: 438 MS
QTC INTERVAL: 465 MS
RBC # BLD AUTO: 4.62 MILLION/UL (ref 3.88–5.62)
SODIUM SERPL-SCNC: 136 MMOL/L (ref 135–147)
T WAVE AXIS: 64 DEGREES
VENTRICULAR RATE: 68 BPM
WBC # BLD AUTO: 9.86 THOUSAND/UL (ref 4.31–10.16)

## 2024-07-12 PROCEDURE — 97167 OT EVAL HIGH COMPLEX 60 MIN: CPT

## 2024-07-12 PROCEDURE — 80053 COMPREHEN METABOLIC PANEL: CPT | Performed by: PHYSICIAN ASSISTANT

## 2024-07-12 PROCEDURE — 83690 ASSAY OF LIPASE: CPT | Performed by: PHYSICIAN ASSISTANT

## 2024-07-12 PROCEDURE — 99232 SBSQ HOSP IP/OBS MODERATE 35: CPT | Performed by: FAMILY MEDICINE

## 2024-07-12 PROCEDURE — 93010 ELECTROCARDIOGRAM REPORT: CPT | Performed by: INTERNAL MEDICINE

## 2024-07-12 PROCEDURE — 97163 PT EVAL HIGH COMPLEX 45 MIN: CPT

## 2024-07-12 PROCEDURE — 82077 ASSAY SPEC XCP UR&BREATH IA: CPT | Performed by: PHYSICIAN ASSISTANT

## 2024-07-12 PROCEDURE — 83735 ASSAY OF MAGNESIUM: CPT | Performed by: PHYSICIAN ASSISTANT

## 2024-07-12 PROCEDURE — 94760 N-INVAS EAR/PLS OXIMETRY 1: CPT

## 2024-07-12 PROCEDURE — 85025 COMPLETE CBC W/AUTO DIFF WBC: CPT | Performed by: PHYSICIAN ASSISTANT

## 2024-07-12 PROCEDURE — 84100 ASSAY OF PHOSPHORUS: CPT | Performed by: PHYSICIAN ASSISTANT

## 2024-07-12 RX ORDER — CHLORDIAZEPOXIDE HYDROCHLORIDE 25 MG/1
25 CAPSULE, GELATIN COATED ORAL EVERY 8 HOURS SCHEDULED
Status: DISCONTINUED | OUTPATIENT
Start: 2024-07-12 | End: 2024-07-13

## 2024-07-12 RX ORDER — LORAZEPAM 1 MG/1
2 TABLET ORAL ONCE
Status: COMPLETED | OUTPATIENT
Start: 2024-07-12 | End: 2024-07-12

## 2024-07-12 RX ADMIN — MULTIPLE VITAMINS W/ MINERALS TAB 1 TABLET: TAB ORAL at 08:17

## 2024-07-12 RX ADMIN — HEPARIN SODIUM 5000 UNITS: 5000 INJECTION INTRAVENOUS; SUBCUTANEOUS at 05:06

## 2024-07-12 RX ADMIN — LORAZEPAM 2 MG: 1 TABLET ORAL at 04:23

## 2024-07-12 RX ADMIN — FINASTERIDE 5 MG: 5 TABLET, FILM COATED ORAL at 08:17

## 2024-07-12 RX ADMIN — ACETAMINOPHEN 650 MG: 325 TABLET, FILM COATED ORAL at 21:41

## 2024-07-12 RX ADMIN — THIAMINE HCL TAB 100 MG 100 MG: 100 TAB at 08:18

## 2024-07-12 RX ADMIN — NADOLOL 20 MG: 40 TABLET ORAL at 08:18

## 2024-07-12 RX ADMIN — PREDNISONE 4 MG: 1 TABLET ORAL at 08:18

## 2024-07-12 RX ADMIN — CHLORDIAZEPOXIDE HYDROCHLORIDE 25 MG: 25 CAPSULE ORAL at 21:41

## 2024-07-12 RX ADMIN — CHLORDIAZEPOXIDE HYDROCHLORIDE 25 MG: 25 CAPSULE ORAL at 08:17

## 2024-07-12 RX ADMIN — HEPARIN SODIUM 5000 UNITS: 5000 INJECTION INTRAVENOUS; SUBCUTANEOUS at 13:26

## 2024-07-12 RX ADMIN — TRAZODONE HYDROCHLORIDE 150 MG: 50 TABLET ORAL at 21:40

## 2024-07-12 RX ADMIN — CHLORDIAZEPOXIDE HYDROCHLORIDE 25 MG: 25 CAPSULE ORAL at 13:26

## 2024-07-12 RX ADMIN — GABAPENTIN 300 MG: 300 CAPSULE ORAL at 21:40

## 2024-07-12 RX ADMIN — HEPARIN SODIUM 5000 UNITS: 5000 INJECTION INTRAVENOUS; SUBCUTANEOUS at 21:41

## 2024-07-12 RX ADMIN — HYDROCHLOROTHIAZIDE 25 MG: 25 TABLET ORAL at 08:17

## 2024-07-12 RX ADMIN — FOLIC ACID 1 MG: 1 TABLET ORAL at 08:17

## 2024-07-12 NOTE — PLAN OF CARE
Problem: PAIN - ADULT  Goal: Verbalizes/displays adequate comfort level or baseline comfort level  Description: Interventions:  - Encourage patient to monitor pain and request assistance  - Assess pain using appropriate pain scale  - Administer analgesics based on type and severity of pain and evaluate response  - Implement non-pharmacological measures as appropriate and evaluate response  - Consider cultural and social influences on pain and pain management  - Notify physician/advanced practitioner if interventions unsuccessful or patient reports new pain  Outcome: Progressing     Problem: SAFETY ADULT  Goal: Patient will remain free of falls  Description: INTERVENTIONS:  - Educate patient/family on patient safety including physical limitations  - Instruct patient to call for assistance with activity   - Consult OT/PT to assist with strengthening/mobility   - Keep Call bell within reach  - Keep bed low and locked with side rails adjusted as appropriate  - Keep care items and personal belongings within reach  - Initiate and maintain comfort rounds  - Make Fall Risk Sign visible to staff  - Offer Toileting every 2 Hours, in advance of need  - Initiate/Maintain bed/chair alarm  - Obtain necessary fall risk management equipment: alarms, assistive devices, non skid footwear, needs and call bell in reach  - Apply yellow socks and bracelet for high fall risk patients  - Consider moving patient to room near nurses station  Outcome: Progressing  Goal: Maintain or return to baseline ADL function  Description: INTERVENTIONS:  -  Assess patient's ability to carry out ADLs; assess patient's baseline for ADL function and identify physical deficits which impact ability to perform ADLs (bathing, care of mouth/teeth, toileting, grooming, dressing, etc.)  - Assess/evaluate cause of self-care deficits   - Assess range of motion  - Assess patient's mobility; develop plan if impaired  - Assess patient's need for assistive devices and  provide as appropriate  - Encourage maximum independence but intervene and supervise when necessary  - Involve family in performance of ADLs  - Assess for home care needs following discharge   - Consider OT consult to assist with ADL evaluation and planning for discharge  - Provide patient education as appropriate  Outcome: Progressing  Goal: Maintains/Returns to pre admission functional level  Description: INTERVENTIONS:  - Perform AM-PAC 6 Click Basic Mobility/ Daily Activity assessment daily.  - Set and communicate daily mobility goal to care team and patient/family/caregiver.   - Collaborate with rehabilitation services on mobility goals if consulted  - Perform Range of Motion 3 times a day.  - Reposition patient every 2 hours.  - Dangle patient 3 times a day  - Stand patient 3 times a day  - Ambulate patient 3 times a day  - Out of bed to chair 3 times a day   - Out of bed for meals 3 times a day  - Out of bed for toileting  - Record patient progress and toleration of activity level   Outcome: Progressing     Problem: DISCHARGE PLANNING  Goal: Discharge to home or other facility with appropriate resources  Description: INTERVENTIONS:  - Identify barriers to discharge w/patient and caregiver  - Arrange for needed discharge resources and transportation as appropriate  - Identify discharge learning needs (meds, wound care, etc.)  - Arrange for interpretive services to assist at discharge as needed  - Refer to Case Management Department for coordinating discharge planning if the patient needs post-hospital services based on physician/advanced practitioner order or complex needs related to functional status, cognitive ability, or social support system  Outcome: Progressing     Problem: Knowledge Deficit  Goal: Patient/family/caregiver demonstrates understanding of disease process, treatment plan, medications, and discharge instructions  Description: Complete learning assessment and assess knowledge  base.  Interventions:  - Provide teaching at level of understanding  - Provide teaching via preferred learning methods  Outcome: Progressing     Problem: Nutrition/Hydration-ADULT  Goal: Nutrient/Hydration intake appropriate for improving, restoring or maintaining nutritional needs  Description: Monitor and assess patient's nutrition/hydration status for malnutrition. Collaborate with interdisciplinary team and initiate plan and interventions as ordered.  Monitor patient's weight and dietary intake as ordered or per policy. Utilize nutrition screening tool and intervene as necessary. Determine patient's food preferences and provide high-protein, high-caloric foods as appropriate.     INTERVENTIONS:  - Monitor oral intake, urinary output, labs, and treatment plans  - Assess nutrition and hydration status and recommend course of action  - Evaluate amount of meals eaten  - Assist patient with eating if necessary   - Allow adequate time for meals  - Recommend/ encourage appropriate diets, oral nutritional supplements, and vitamin/mineral supplements  - Order, calculate, and assess calorie counts as needed  - Recommend, monitor, and adjust tube feedings and TPN/PPN based on assessed needs  - Assess need for intravenous fluids  - Provide specific nutrition/hydration education as appropriate  - Include patient/family/caregiver in decisions related to nutrition  Outcome: Progressing

## 2024-07-12 NOTE — ASSESSMENT & PLAN NOTE
Presented to the ER for evaluation of alcohol intoxication  Patient reports that he last consumed alcohol today. He reports drinking at least 1 pint of vodka daily over the last 2 to 3 days  Blood alcohol level a 249  Patient reports feeling very anxious and is concerned about alcohol withdrawal  Patient is refusing to be transferred to [facility] for furter management   Received Valium 10 mg IV, thiamine, folic acid in the ER  Admit on observation  Initiate CIWA,   Telemetry monitoring  PRN Ativan  Continue daily thiamine, folic acid, vitamin  Repeat Alcohol levels  OT/PT eval  Am labs  Supportive care

## 2024-07-12 NOTE — CASE MANAGEMENT
Case Management Assessment & Discharge Planning Note    Patient name Fredy Yu  Location /402-01 MRN [id number]  : 1950 Date 2024       Current Admission Date: 2024  Current Admission Diagnosis:Alcohol abuse with withdrawal without complication (Former[facility])   Patient Active Problem List    Diagnosis Date Noted Date Diagnosed    Alcohol abuse with withdrawal without complication ([facility]) 2024     Leukocytosis 2024     ROSARIO (obstructive sleep apnea) 2024     Snoring 2024     Sleep apnea 2024     Prediabetes 2024     Excessive daytime sleepiness 2024     Abnormal brain MRI 2024     Neuropathy 2024     Carpal tunnel syndrome of left wrist 2024     neuropathy 2024     Psoriasis 2023     Inflammatory arthritis 2023     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 degeneration, unspecified stage ([facility]) 2022     Seizure ([facility]) 2022     PMR (polymyalgia rheumatica) (Formerly [facility]) 10/21/2022     Obesity, morbid ([facility]ly [facility]) 2022     Depression with anxiety 2021     Pruritus 2021     Insomnia 2021     Mixed hyperlipidemia 2021     Alcohol induced fatty liver 2021     Elevated lipase 2021     History of prolonged Q-T interval on ECG 2021     BPH (benign prostatic hyperplasia) 2019     Alcohol use disorder, mild, in early remission, abuse 2019     Essential hypertension 02/10/2019       LOS (days): 0  Geometric Mean LOS (GMLOS) (days):   Days to GMLOS:     OBJECTIVE:              Current admission status: Observation       Preferred Pharmacy:   [id number] - [place] - [address]  [address]  [place] [address]  Phone: [phone] Fax: [phone]    Primary Care Provider: Tejal Garcia MD    Primary Insurance: MEDICARE  Secondary Insurance: [employer]    ASSESSMENT:  Active Health Care Proxies       Itzel Yu  Health Care Representative - Spouse   Primary Phone: [phone] (Mobile)                 Advance Directives  Does patient have a Health Care POA?: No  Was patient offered paperwork?: Yes (declines)  Does patient currently have a Health Care decision maker?: Yes, please see Health Care Proxy section  Does patient have Advance Directives?: No  Was patient offered paperwork?: Yes (declines)  Primary Contact: Itzel Yu (Spouse)     [phone] (M)         Readmission Root Cause  30 Day Readmission: No    Patient Information  Admitted from:: Home  Mental Status: Alert  During Assessment patient was accompanied by: Not accompanied during assessment  Assessment information provided by:: Patient  Primary Caregiver: Self  Support Systems: Spouse/significant other, Daughter  County of Residence: [place]specify in comment box[place]  What city do you live in?: [place]  Type of Current Residence: 2 story home  Upon entering residence, is there a bedroom on the main floor (no further steps)?: No  A bedroom is located on the following floor levels of residence (select all that apply):: 2nd Floor  Upon entering residence, is there a bathroom on the main floor (no further steps)?: No  Indicate which floors of current residence have a bathroom (select all the apply):: 2nd Floor  Number of steps to 2nd floor from main floor: One Flight  Living Arrangements: Lives w/ Spouse/significant other  Is patient a ?: No    Activities of Daily Living Prior to Admission  Functional Status: Independent  Completes ADLs independently?: Yes  Ambulates independently?: Yes  Does patient use assisted devices?: No  Does patient currently own DME?: No  Does patient have a history of Outpatient Therapy (PT/OT)?: No  Does the patient have a history of Short-Term Rehab?: No  Does patient have a history of HHC?: No  Does patient currently have HHC?: No  Hx of going to AA meetings  Hx in [facility] D[facility]A [facility] the past [facility]         Patient  Information Continued  Income Source: Pension/shelter  Does patient have prescription coverage?: Yes  Does patient receive dialysis treatments?: No  Does patient have a history of substance abuse?: Yes  Historical substance use preference: Alcohol/ETOH  History of Withdrawal Symptoms: Delirium tremors  Is patient currently in treatment for substance abuse?: No. Patient declined treatment information.  Does patient have a history of Mental Health Diagnosis?: Yes (anxiety; depression)  Is patient receiving treatment for mental health?: No. Treatment options were provided.  Has patient received inpatient treatment related to mental health in the last 2 years?: No         Means of Transportation  Means of Transport to Appts:: Drives Self      Social Determinants of Health (SDOH)      Flowsheet Row Most Recent Value   Housing Stability    In the last 12 months, was there a time when you were not able to pay the mortgage or rent on time? N   In the past 12 months, how many times have you moved where you were living? 1   At any time in the past 12 months, were you homeless or living in a shelter (including now)? N   Transportation Needs    In the past 12 months, has lack of transportation kept you from medical appointments or from getting medications? no   In the past 12 months, has lack of transportation kept you from meetings, work, or from getting things needed for daily living? No   Food Insecurity    Within the past 12 months, you worried that your food would run out before you got the money to buy more. Never true   Within the past 12 months, the food you bought just didn't last and you didn't have money to get more. Never true            DISCHARGE DETAILS:    Discharge planning discussed with:: patient        CM contacted family/caregiver?: No- see comments (declines)  Were Treatment Team discharge recommendations reviewed with patient/caregiver?: Yes  Did patient/caregiver verbalize understanding of patient care  needs?: Yes  Were patient/caregiver advised of the risks associated with not following Treatment Team discharge recommendations?: Yes         Requested Home Health Care         Is the patient interested in HHC at discharge?: No    DME Referral Provided  Referral made for DME?: No         Would you like to participate in our [facility] service program?  : No - Declined       Discharge Destination Plan:: Home  Transport at Discharge : Family      Pt plans home on dc and declining inpt/OP D&A.

## 2024-07-12 NOTE — PROGRESS NOTES
"[facility]  Progress Note  Name: Fredy Yu I  MRN: [id number]  Unit/Bed#: 402-01 I Date of Admission: 7/11/2024   Date of Service: 7/12/2024 I Hospital Day: 0    Assessment & Plan   * Alcohol abuse with withdrawal without complication (HCC)  Assessment & Plan  Presented to the ER for evaluation of alcohol intoxication    Endorses 1 pint of vodka daily for the past week, was previously sober for about 2 weeks.  He was transferred to detox    Start Librium 25 mg p.o. every 8  Continue CIWA protocol  Thiamine folate and multivitamin daily  DC IV fluids, patient eating and drinking    ROSARIO (obstructive sleep apnea)  Assessment & Plan  CPAP at bedtime    Depression with anxiety  Assessment & Plan  Outpatient psych eval    Essential hypertension  Assessment & Plan  Corgard 20 mg daily  HCTZ 25 mg daily             Progress Note - Fredy Yu 74 y.o. male MRN: [id number]    Unit/Bed#: 402-01 Encounter: [id number]        Subjective:     Patient seen and examined at bedside, he offers no acute complaints other than being a little anxious.  He states that he :fell off the wagon\", because his daughter is having some mental health issues.  He is not interesting in going to detox.    Objective:     Vitals:   Vitals:    07/12/24 0421   BP: 165/92   Pulse: 104   Resp:    Temp: 98 °F (36.7 °C)   SpO2: 91%     Body mass index is 40.94 kg/m².    Intake/Output Summary (Last 24 hours) at 7/12/2024 0752  Last data filed at 7/12/2024 0134  Gross per 24 hour   Intake 1335 ml   Output 100 ml   Net 1235 ml       Physical Exam:   /92   Pulse 104   Temp 98 °F (36.7 °C)   Resp 16   Ht 5' 5\" (1.651 m)   Wt 112 kg (246 lb 0.5 oz)   SpO2 91%   BMI 40.94 kg/m²   General appearance: alert  Head: Normocephalic, without obvious abnormality, atraumatic  Lungs: clear to auscultation bilaterally  Heart: s1/s2 tachycardic,   Abdomen: soft, non-tender; bowel sounds normal; no masses,  no organomegaly  Extremities: " extremities normal, warm and well-perfused; no cyanosis, clubbing, or edema  Pulses: 2+ and symmetric  Neurologic: CN2-12  Appears slightly anxious      Invasive Devices       Peripheral Intravenous Line  Duration             Peripheral IV 07/11/24 Right Antecubital <1 day                    Results from last 7 days   Lab Units 07/12/24  0428 07/11/24  1637   WBC Thousand/uL 9.86 12.93*   HEMOGLOBIN g/dL 14.4 16.8   HEMATOCRIT % 43.9 52.1*   PLATELETS Thousands/uL 240 288       Results from last 7 days   Lab Units 07/12/24  0428 07/11/24  1637   POTASSIUM mmol/L 3.8 3.9   CHLORIDE mmol/L 102 100   CO2 mmol/L 21 24   BUN mg/dL 16 22   CREATININE mg/dL 0.63 0.86   CALCIUM mg/dL 8.1* 8.9   ALK PHOS U/L 75 92   ALT U/L 14 20   AST U/L 16 22       Medication Administration - last 24 hours from 07/11/2024 0752 to 07/12/2024 0752         Date/Time Order Dose Route Action Action by     07/11/2024 1809 EDT sodium chloride 0.9 % bolus 1,000 mL 0 mL Intravenous Stopped [facility]     07/11/2024 1638 EDT sodium chloride 0.9 % bolus 1,000 mL 1,000 mL Intravenous New Bag Carolyn Vargas RN     07/11/2024 1931 EDT magnesium sulfate 2 g/50 mL IVPB (premix) 2 g 0 g Intravenous Stopped [facility]     07/11/2024 1809 EDT magnesium sulfate 2 g/50 mL IVPB (premix) 2 g 2 g Intravenous New Bag [facility]     07/11/2024 2052 EDT dextrose 5 % and sodium chloride 0.9 % bolus 250 mL 250 mL Intravenous New Bag [facility]     07/11/2024 2050 EDT folic acid 1 mg, thiamine (VITAMIN B1) 100 mg in sodium chloride 0.9 % 100 mL IV piggyback -- Intravenous Stopped [facility]     07/11/2024 1932 EDT folic acid 1 mg, thiamine (VITAMIN B1) 100 mg in sodium chloride 0.9 % 100 mL IV piggyback -- Intravenous New Bag [facility]     07/11/2024 2125 EDT diazepam (VALIUM) injection 10 mg 10 mg Intravenous Given Marsha Sutherland RN     07/11/2024 7990 EDT traZODone (DESYREL) tablet 150 mg 150 mg Oral Given [place]  ROB Rizo     07/11/2024 2306 EDT gabapentin (NEURONTIN) capsule 300 mg 300 mg Oral Given [place] Rizo RN     07/12/2024 0516 EDT sodium chloride 0.9 % infusion 0 mL/hr Intravenous Hold Halley Gongora RN     07/12/2024 0134 EDT sodium chloride 0.9 % infusion 75 mL/hr Intravenous Rate/Dose Verify [place] Rizo RN     07/11/2024 2306 EDT sodium chloride 0.9 % infusion 75 mL/hr Intravenous New Bag [place] Rizo RN     07/11/2024 2306 EDT acetaminophen (TYLENOL) tablet 650 mg 650 mg Oral Given [place] Rizo RN     07/12/2024 0506 EDT heparin (porcine) subcutaneous injection 5,000 Units 5,000 Units Subcutaneous Given Halley Gongora RN     07/11/2024 2306 EDT heparin (porcine) subcutaneous injection 5,000 Units 5,000 Units Subcutaneous Given [place] Rzio RN     07/11/2024 2306 EDT LORazepam (ATIVAN) tablet 2 mg 2 mg Oral Given [place] Rizo RN     07/12/2024 0423 EDT LORazepam (ATIVAN) tablet 2 mg 2 mg Oral Given Halley Gongora RN              Lab, Imaging and other studies: I have personally reviewed pertinent reports.    VTE Pharmacologic Prophylaxis: Heparin  VTE Mechanical Prophylaxis: sequential compression device     Joaquín Penaloza MD  7/12/2024,7:52 AM

## 2024-07-12 NOTE — OCCUPATIONAL THERAPY NOTE
"    Occupational Therapy Evaluation     Patient Name: Fredy Yu  Today's Date: 7/12/2024  Problem List  Principal Problem:    Alcohol abuse with withdrawal without complication (HCC)  Active Problems:    Essential hypertension    Elevated lipase    Depression with anxiety    ROSARIO (obstructive sleep apnea)    Leukocytosis    Past Medical History  Past Medical History:   Diagnosis Date    Alcohol dependency (HCC)     Anxiety     Arthritis     Depression     [place] (hard of hearing)     Hypertension     Kidney stones     Peripheral neuropathy     Prostate enlargement     Renal disorder     kidney    Shoulder dislocation      Past Surgical History  Past Surgical History:   Procedure Laterality Date    CHOLECYSTECTOMY      2010    COLONOSCOPY      SHOULDER SURGERY Left     for dislocation x 2, 20 years ago an the second 18 years ago         07/12/24 1256   OT Last Visit   OT Visit Date 07/12/24   Note Type   Note type Evaluation   Pain Assessment   Pain Assessment Tool 0-10   Pain Score No Pain   Restrictions/Precautions   Weight Bearing Precautions Per Order No   Other Precautions Hard of hearing;Fall Risk;Chair Alarm;Multiple lines   Home Living   Type of Home House   Home Layout Two level;Able to live on main level with bedroom/bathroom;Performs ADLs on one level;Stairs to enter without rails;Other (Comment)  (1 ESCOBAR c HR)   Bathroom Shower/Tub Tub/shower unit   Bathroom Toilet Standard   Bathroom Equipment Shower chair;Grab bars around toilet   Bathroom Accessibility Accessible   Home Equipment Walker;Cane   Additional Comments When asked if pt utilizes AD, pt states \"sometimes\" and does not directly specify after question is asked 3x   Prior Function   Level of [place] Independent with ADLs;Independent with functional mobility;Independent with IADLS   Lives With Spouse   Receives Help From Family   IADLs Family/Friend/Other provides transportation;Family/Friend/Other provides meals;Family/Friend/Other provides " "medication management   Falls in the last 6 months 0   Vocational Retired   Comments 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 Per chart review, pt is currently alone in his home due to [place] travelling [place].   Subjective   Subjective \"Can I have more of that medication to calm me down?\"   ADL   Where Assessed Other (Comment)  (chair, standard toilet)   Grooming Assistance 5  Supervision/Setup   Grooming Deficit Increased time to complete;Supervision/safety;Verbal cueing;Standing with assistive device;Wash/dry hands   UB Bathing Assistance 5  Supervision/Setup   LB Bathing Assistance 4  Minimal Assistance   LB Bathing Deficit Increased time to complete;Verbal cueing;Buttocks   UB Dressing Assistance 5  Supervision/Setup   LB Dressing Assistance 4  Minimal Assistance   Toileting Assistance  5  Supervision/Setup   Toileting Deficit Verbal cueing;Setup;Supervison/safety;Increased time to complete;Grab bar use;Clothing management down;Clothing management up;Other (Comment)  (Pt sits for BM, stands to perform hygiene and requests to turn and stand to urinate.)   Additional Comments Given fxl performance skills + medical complexity, therapist suspecting via clinical judgement + skilled analysis; pt currently requires stated assist above to perform each area of ADL d/t limitations including: generalized muscle weakness, sitting/standing balance deficits, fxl activity tolerance limitations, difficulty performing bend/reach-anterior trunk flexion, decreased core strength, decreased postural control, instability in stance, cognitive deficits, safety awareness concerns, and decreased problem solving abilities.   Bed Mobility   Additional Comments Pt OOB in chair at start/end of session, all needs within reach. Upon entering pt's room, pt in a deep sleep and difficult to arouse - SPO2 reading 84% on RA. SPO2 increases to >90% upon waking. Pt reports he does have sleep apnea. Pt agreeable to participation in " session.   Transfers   Sit to Stand 5  Supervision   Additional items Armrests;Increased time required;Verbal cues   Stand to Sit 5  Supervision   Additional items Armrests;Increased time required;Verbal cues   Toilet transfer 5  Supervision   Additional items Armrests;Increased time required;Verbal cues   Additional Comments RW used   Functional Mobility   Functional Mobility 5  Supervision   Additional Comments Pt performs functional mobility x community distances using RW, SPV for safety. Increased time necessary due to slow pace. Pt on RA with SPO2 remaining WFL during mobility.   Additional items Rolling walker   Balance   Static Sitting Good   Dynamic Sitting Fair +   Static Standing Fair   Dynamic Standing Fair -   Ambulatory Fair -   Activity Tolerance   Activity Tolerance Patient limited by fatigue   Medical Staff Made Aware PT Akanksha   Nurse Made Aware RN Alyssa BOLIVAR Assessment   RUE Assessment WFL   LUE Assessment   LUE Assessment WFL   Hand Function   Gross Motor Coordination Functional   Fine Motor Coordination Functional   Psychosocial   Psychosocial (WDL) X   Patient Behaviors/Mood Anxious   Cognition   Overall Cognitive Status Impaired   Arousal/Participation Alert;Cooperative   Attention Attends with cues to redirect   Orientation Level Oriented X4   Memory Within functional limits   Following Commands Follows one step commands with increased time or repetition   Comments [place] - may affect pt's ability to follow commands and provide appropriate responses   Assessment   Limitation Decreased ADL status;Decreased UE strength;Decreased cognition;Decreased Safe judgement during ADL;Decreased endurance;Decreased self-care trans;Decreased high-level ADLs   Prognosis Fair   Assessment Pt is a 74 y.o. male seen for OT evaluation s/p admit to [facility] on 7/11/2024 w/ Alcohol abuse with withdrawal without complication (HCC).  Comorbidities affecting pt's functional performance at time of assessment include:   alcohol abuse, hypomagnesemia, alcoholic ketoacidosis, HTN, [place], depression, anxiety, arthritis . Personal factors affecting pt at time of IE include:limited home support, difficulty performing ADLS, and difficulty performing IADLS . Prior to admission, pt was reportedly IND for ADL/IADL tasks, performed functional mobility independently with occasional use of AD, and drove. Upon evaluation: Pt requires SPV-Min A 2* the following deficits impacting occupational performance: weakness, decreased strength, decreased balance, decreased tolerance, impaired problem solving, impulsivity, and decreased safety awareness. Pt to benefit from continued skilled OT tx while in the hospital to address deficits as defined above and maximize level of functional independence w ADL's and functional mobility. Occupational Performance areas to address include: grooming, bathing/shower, toilet hygiene, dressing, medication management, health maintenance, functional mobility, community mobility, clothing management, cleaning, meal prep, and household maintenance. The patient's raw score on the -PAC Daily Activity Inpatient Short Form is 21. A raw score of greater than or equal to 19 suggests the patient may benefit from discharge to home. Discharge recommendation at this time is level 3, min resource intensity.  Pt benefited from co-evaluation of skilled OT and PT therapists in order to most appropriately address functional deficits d/t extensive assistance required for safe functional mobility, decreased activity tolerance, and regression from functioning level prior to admission and/or onset of present illness. OT/PT objectives were addressed separately; please see PT note for specific goal areas targeted.   Goals   Patient Goals to return home   Short Term Goal #1 Pt will complete UB/LB bathing tasks at Mod I level.   Short Term Goal #2 Pt will complete UB/LB dressing tasks at Mod I level.   Short Term Goal  Pt will complete all  functional tfers and mobility at Mod I level, use of least restrictive AD.   Long Term Goal #1 Pt will complete B UE strengthening exercises.   Plan   Treatment Interventions ADL retraining;Functional transfer training;Endurance training;Neuromuscular reeducation;Compensatory technique education;Energy conservation;Activityengagement;UE strengthening/ROM   Goal Expiration Date 07/26/24   OT Frequency 3-5x/wk   Discharge Recommendation   Rehab Resource Intensity Level, OT III (Minimum Resource Intensity)   AM-PAC Daily Activity Inpatient   Lower Body Dressing 3   Bathing 3   Toileting 3   Upper Body Dressing 4   Grooming 4   Eating 4   Daily Activity Raw Score 21   Daily Activity Standardized Score (Calc for Raw Score >=11) 44.27   AM-PAC Applied Cognition Inpatient   Following a Speech/Presentation 4   Understanding Ordinary Conversation 4   Taking Medications 3   Remembering Where Things Are Placed or Put Away 3   Remembering List of 4-5 Errands 2   Taking Care of Complicated Tasks 2   Applied Cognition Raw Score 18   Applied Cognition Standardized Score 38.07     Pt will benefit from continued OT services in order to maximize (I) c ADL performance, FM c least restrictive AD, and improve overall endurance/strength required to complete functional tasks in preparation for d/c.    Pt benefited from co-evaluation of skilled OT and PT therapists in order to most appropriately address functional deficits d/t extensive assistance required for safe functional mobility, decreased activity tolerance, and regression from functioning level prior to admission and/or onset of present illness. OT/PT objectives were addressed separately; please see PT note for specific goal areas targeted.    Pt left seated in chair at end of session; all needs within reach; all lines intact.    Serina Fuentes

## 2024-07-12 NOTE — ASSESSMENT & PLAN NOTE
History of depression with anxiety  Patient reports feeling anxious  Continue PTA medication  Monitor while admitted   Consider inpatient Psych consult vs patient follow up

## 2024-07-12 NOTE — PHYSICAL THERAPY NOTE
Physical Therapy Evaluation    Patient Name: Fredy Yu    Today's Date: 7/12/2024     Problem List  Principal Problem:    Alcohol abuse with withdrawal without complication (HCC)  Active Problems:    Essential hypertension    Elevated lipase    Depression with anxiety    ROSARIO (obstructive sleep apnea)    Leukocytosis       Past Medical History  Past Medical History:   Diagnosis Date    Alcohol dependency (HCC)     Anxiety     Arthritis     Depression     [place] (hard of hearing)     Hypertension     Kidney stones     Peripheral neuropathy     Prostate enlargement     Renal disorder     kidney    Shoulder dislocation         Past Surgical History  Past Surgical History:   Procedure Laterality Date    CHOLECYSTECTOMY      2010    COLONOSCOPY      SHOULDER SURGERY Left     for dislocation x 2, 20 years ago an the second 18 years ago           07/12/24 1257   PT Last Visit   PT Visit Date 07/12/24   Note Type   Note type Evaluation   Pain Assessment   Pain Assessment Tool 0-10   Pain Score No Pain   Restrictions/Precautions   Weight Bearing Precautions Per Order No   Other Precautions Fall Risk;Multiple lines;Chair Alarm   Home Living   Type of Home House   Home Layout Two level;Performs ADLs on one level;Able to live on main level with bedroom/bathroom;Stairs to enter without rails  (1 ESCOBAR)   Bathroom Shower/Tub Tub/shower unit   Bathroom Toilet Standard   Bathroom Equipment Grab bars in shower;Shower chair   Bathroom Accessibility Accessible   Home Equipment Walker;Cane   Additional Comments unsure of device use at baseline   Prior Function   Level of [place] Independent with ADLs;Independent with functional mobility;Independent with IADLS   Lives With Spouse   Receives Help From Family   IADLs Independent with driving   Falls in the last 6 months 0   Vocational Retired   General   Family/Caregiver Present No   Cognition   Overall Cognitive Status Impaired    Arousal/Participation Alert   Orientation Level Oriented X4   Following Commands Follows one step commands with increased time or repetition   Comments pt [place] which may impact command following and questioning   Subjective   Subjective pt requesting more medication to help with his anxiety   RLE Assessment   RLE Assessment WFL  (assessed functionally)   LLE Assessment   LLE Assessment WFL  (assessed functionally)   Bed Mobility   Additional Comments pt OOB at start/end of session   Transfers   Sit to Stand 5  Supervision   Additional items Armrests;Increased time required;Verbal cues   Stand to Sit 5  Supervision   Additional items Armrests;Increased time required;Verbal cues   Toilet transfer 5  Supervision   Additional items Increased time required;Verbal cues;Standard toilet   Additional Comments RW used   Ambulation/Elevation   Gait pattern Short stride;Foward flexed;Inconsistent iram;Decreased foot clearance;Wide MARGY   Gait Assistance 5  Supervision   Additional items Verbal cues   Assistive Device Rolling walker   Distance 200'   Balance   Static Sitting Good   Dynamic Sitting Fair +   Static Standing Fair   Dynamic Standing Fair -   Ambulatory Fair -  (with RW)   Endurance Deficit   Endurance Deficit Yes   Endurance Deficit Description pt appears fatigued with increased ambulation   Activity Tolerance   Activity Tolerance Patient limited by fatigue   Assessment   Prognosis Good   Problem List Decreased strength;Decreased endurance;Impaired balance;Decreased mobility   Assessment Patient is a 74 y.o. male evaluated by Physical Therapy s/p admit to [facility] on 7/11/2024 with admitting diagnosis of: Alcohol withdrawal, Alcoholic ketoacidosis, Hypomagnesemia, Alcohol intoxication, Alcohol abuse, and principal problem of: Alcohol abuse with withdrawal without complication. PT was consulted to assess patient's functional mobility and discharge needs. Ordered are PT Evaluation and treatment with  activity level of: up and OOB as tolerated. Comorbidities affecting patient's physical performance at time of assessment include:  HTN, ROSARIO, BPH, HLD, hx of seizures, neuropathy . Personal factors affecting the patient at time of IE include: lives in 2 story home, ambulating with assistive device, step(s) to enter home, impaired safety awareness, limited insight into impairments, inability/difficulty performing IADLs, and inability/difficulty performing ADLs. Please locate objective findings from PT assessment regarding body systems outlined above. Upon evaluation, pt able to perform all functional mobility with SUP, RW, and increased time. Frequent verbal cuing provided for safety awareness and sequencing. Seated rest break taken following 200' of ambulation d/t fatigue. Moderate postural sway and impulsive mobility demonstrated throughout session but no true LOB experienced. HR and SpO2 remained WFL on RA throughout. Of note, pt sleeping upon arrival and SpO2 84%. The patient's AM-PAC Basic Mobility Inpatient Short Form Raw Score is 18. A Raw score of greater than 16 suggests the patient may benefit from discharge to home. Please also refer to the recommendation of the Physical Therapist for safe discharge planning. Co treatment with OT secondary to complex medical condition of pt, possible A of 2 required to achieve and maintain transitional movements, requiring the need of skilled therapeutic intervention of 2 therapists to achieve delivery of services. Pt will benefit from continued PT intervention during LOS to address current deficits, increase LOF, and facilitate safe d/c to next level of care when medically appropriate. D/c recommendation at this time is rehabilitation resource intensity level III.   Goals   Patient Goals to go home   LTG Expiration Date 07/26/24   Long Term Goal #1 Pt will participate in B LE strengthening exercises to facilitate improved functional activity tolerance. Pt will perform all  functional transfers and bed mobility mod(I) with good safety awareness. Pt will ambulate 450' mod(I) with LRAD while maintaining good functional dynamic balance. Pt will ascend/descend a FFOS with HR and SUP to reflect the ability to safely navigate the home.   Plan   Treatment/Interventions Functional transfer training;LE strengthening/ROM;Elevations;Therapeutic exercise;Endurance training;Gait training;Bed mobility   PT Frequency 3-5x/wk   Discharge Recommendation   Rehab Resource Intensity Level, PT III (Minimum Resource Intensity)   AM-PAC Basic Mobility Inpatient   Turning in Flat Bed Without Bedrails 3   Lying on Back to Sitting on Edge of Flat Bed Without Bedrails 3   Moving Bed to Chair 3   Standing Up From Chair Using Arms 3   Walk in Room 3   Climb 3-5 Stairs With Railing 3   Basic Mobility Inpatient Raw Score 18   Basic Mobility Standardized Score 41.05   [facility] Highest Level Of Mobility   -HLM Goal 6: Walk 10 steps or more   -HLM Achieved 7: Walk 25 feet or more   End of Consult   Patient Position at End of Consult Bedside chair;All needs within reach;Bed/Chair alarm activated

## 2024-07-12 NOTE — RESPIRATORY THERAPY NOTE
RT Protocol Note  Fredy Yu 74 y.o. male MRN: [id number]  Unit/Bed#: 402-01 Encounter: [id number]    Assessment    Principal Problem:    Alcohol abuse with withdrawal without complication (HCC)  Active Problems:    Essential hypertension    Depression with anxiety    ROSARIO (obstructive sleep apnea)      Home Pulmonary Medications: None  Home Devices/Therapy: BiPAP/CPAP (Waiting for BiPAP unit to arrive. Willing to wear hospital unit durring admission.)    Past Medical History:   Diagnosis Date    Alcohol dependency (HCC)     Anxiety     Arthritis     Depression     [place] (hard of hearing)     Hypertension     Kidney stones     Peripheral neuropathy     Prostate enlargement     Renal disorder     kidney    Shoulder dislocation      Social History     Socioeconomic History    Marital status: /Civil Union     Spouse name: None    Number of children: None    Years of education: None    Highest education level: None   Occupational History    None   Tobacco Use    Smoking status: Never    Smokeless tobacco: Never   Vaping Use    Vaping status: Never Used   Substance and Sexual Activity    Alcohol use: Not Currently     Alcohol/week: 6.0 standard drinks of alcohol     Comment: In recovery    Drug use: Never    Sexual activity: Not Currently     Partners: Female   Other Topics Concern    None   Social History Narrative    None     Social Determinants of Health     Financial Resource Strain: Low Risk  (1/4/2024)    Overall Financial Resource Strain (CARDIA)     Difficulty of Paying Living Expenses: Not very hard   Food Insecurity: Not on file   Transportation Needs: No Transportation Needs (1/4/2024)    PRAPARE - Transportation     Lack of Transportation (Medical): No     Lack of Transportation (Non-Medical): No   Physical Activity: Not on file   Stress: Not on file   Social Connections: Not on file   Intimate Partner Violence: Not on file   Housing Stability: Not on file       Subjective         Objective    Physical  "Exam:   Assessment Type: Assess only  General Appearance: Alert, Awake  Respiratory Pattern: Normal  Chest Assessment: Chest expansion symmetrical  Bilateral Breath Sounds: Diminished  Cough: None  O2 Device: RA    Vitals:  Blood pressure (!) 173/93, pulse 94, temperature (!) 97.4 °F (36.3 °C), temperature source Oral, resp. rate 18, height 5' 5\" (1.651 m), weight 112 kg (246 lb 14.6 oz), SpO2 94%.          Imaging and other studies: I have personally reviewed pertinent reports.      O2 Device: RA     Plan    Respiratory Plan: Vent/NIV/HFNC  Airway Clearance Plan: Incentive Spirometer, Discontinue Protocol         "

## 2024-07-12 NOTE — PLAN OF CARE
Problem: OCCUPATIONAL THERAPY ADULT  Goal: Performs self-care activities at highest level of function for planned discharge setting.  See evaluation for individualized goals.  Description: Treatment Interventions: ADL retraining, Functional transfer training, Endurance training, Neuromuscular reeducation, Compensatory technique education, Energy conservation, Activityengagement, UE strengthening/ROM          See flowsheet documentation for full assessment, interventions and recommendations.   7/12/2024 1425 by Serina Fuentes OT  Outcome: Progressing  Note: Limitation: Decreased ADL status, Decreased UE strength, Decreased cognition, Decreased Safe judgement during ADL, Decreased endurance, Decreased self-care trans, Decreased high-level ADLs  Prognosis: Fair  Assessment: Pt is a 74 y.o. male seen for OT evaluation s/p admit to [facility] on 7/11/2024 w/ Alcohol abuse with withdrawal without complication (HCC).  Comorbidities affecting pt's functional performance at time of assessment include:  alcohol abuse, hypomagnesemia, alcoholic ketoacidosis, HTN, [place], depression, anxiety, arthritis . Personal factors affecting pt at time of IE include:limited home support, difficulty performing ADLS, and difficulty performing IADLS . Prior to admission, pt was reportedly IND for ADL/IADL tasks, performed functional mobility independently with occasional use of AD, and drove. Upon evaluation: Pt requires SPV-Min A 2* the following deficits impacting occupational performance: weakness, decreased strength, decreased balance, decreased tolerance, impaired problem solving, impulsivity, and decreased safety awareness. Pt to benefit from continued skilled OT tx while in the hospital to address deficits as defined above and maximize level of functional independence w ADL's and functional mobility. Occupational Performance areas to address include: grooming, bathing/shower, toilet hygiene, dressing, medication management, health  maintenance, functional mobility, community mobility, clothing management, cleaning, meal prep, and household maintenance. The patient's raw score on the AM-PAC Daily Activity Inpatient Short Form is 21. A raw score of greater than or equal to 19 suggests the patient may benefit from discharge to home. Discharge recommendation at this time is level 3, min resource intensity.  Pt benefited from co-evaluation of skilled OT and PT therapists in order to most appropriately address functional deficits d/t extensive assistance required for safe functional mobility, decreased activity tolerance, and regression from functioning level prior to admission and/or onset of present illness. OT/PT objectives were addressed separately; please see PT note for specific goal areas targeted.     Rehab Resource Intensity Level, OT: III (Minimum Resource Intensity)       7/12/2024 1423 by Serina Fuentes OT  Outcome: Progressing  Note: Limitation: Decreased ADL status, Decreased UE strength, Decreased cognition, Decreased Safe judgement during ADL, Decreased endurance, Decreased self-care trans, Decreased high-level ADLs  Prognosis: Fair  Assessment: Pt is a 74 y.o. male seen for OT evaluation s/p admit to [facility] on 7/11/2024 w/ Alcohol abuse with withdrawal without complication (HCC).  Comorbidities affecting pt's functional performance at time of assessment include:  alcohol abuse, hypomagnesemia, alcoholic ketoacidosis, HTN, [place], depression, anxiety, arthritis . Personal factors affecting pt at time of IE include:limited home support, difficulty performing ADLS, and difficulty performing IADLS . Prior to admission, pt was reportedly IND for ADL/IADL tasks, performed functional mobility independently with occasional use of AD, and drove. Upon evaluation: Pt requires SPV-Min A 2* the following deficits impacting occupational performance: weakness, decreased strength, decreased balance, decreased tolerance, impaired problem solving,  impulsivity, and decreased safety awareness. Pt to benefit from continued skilled OT tx while in the hospital to address deficits as defined above and maximize level of functional independence w ADL's and functional mobility. Occupational Performance areas to address include: grooming, bathing/shower, toilet hygiene, dressing, medication management, health maintenance, functional mobility, community mobility, clothing management, cleaning, meal prep, and household maintenance. The patient's raw score on the AM-PAC Daily Activity Inpatient Short Form is 21. A raw score of greater than or equal to 19 suggests the patient may benefit from discharge to home. Discharge recommendation at this time is level 3, min resource intensity.  Pt benefited from co-evaluation of skilled OT and PT therapists in order to most appropriately address functional deficits d/t extensive assistance required for safe functional mobility, decreased activity tolerance, and regression from functioning level prior to admission and/or onset of present illness. OT/PT objectives were addressed separately; please see PT note for specific goal areas targeted.     Rehab Resource Intensity Level, OT: III (Minimum Resource Intensity)       7/12/2024 1423 by Serina Fuentes OT  Note: Limitation: Decreased ADL status, Decreased UE strength, Decreased cognition, Decreased Safe judgement during ADL, Decreased endurance, Decreased self-care trans, Decreased high-level ADLs  Prognosis: Fair  Assessment: Pt is a 74 y.o. male seen for OT evaluation s/p admit to [facility] on 7/11/2024 w/ Alcohol abuse with withdrawal without complication (HCC).  Comorbidities affecting pt's functional performance at time of assessment include:  alcohol abuse, hypomagnesemia, alcoholic ketoacidosis, HTN, [place], depression, anxiety, arthritis . Personal factors affecting pt at time of IE include:limited home support, difficulty performing ADLS, and difficulty performing IADLS . Prior to  admission, pt was reportedly IND for ADL/IADL tasks, performed functional mobility independently with occasional use of AD, and drove. Upon evaluation: Pt requires SPV-Min A 2* the following deficits impacting occupational performance: weakness, decreased strength, decreased balance, decreased tolerance, impaired problem solving, impulsivity, and decreased safety awareness. Pt to benefit from continued skilled OT tx while in the hospital to address deficits as defined above and maximize level of functional independence w ADL's and functional mobility. Occupational Performance areas to address include: grooming, bathing/shower, toilet hygiene, dressing, medication management, health maintenance, functional mobility, community mobility, clothing management, cleaning, meal prep, and household maintenance. The patient's raw score on the -PAC Daily Activity Inpatient Short Form is 21. A raw score of greater than or equal to 19 suggests the patient may benefit from discharge to home. Discharge recommendation at this time is level 3, min resource intensity.  Pt benefited from co-evaluation of skilled OT and PT therapists in order to most appropriately address functional deficits d/t extensive assistance required for safe functional mobility, decreased activity tolerance, and regression from functioning level prior to admission and/or onset of present illness. OT/PT objectives were addressed separately; please see PT note for specific goal areas targeted.     Rehab Resource Intensity Level, OT: III (Minimum Resource Intensity)

## 2024-07-12 NOTE — ASSESSMENT & PLAN NOTE
Blood pressure is stable  Continue PTA blood pressure medication  Monitor blood pressure while admitted

## 2024-07-12 NOTE — PLAN OF CARE
Problem: PHYSICAL THERAPY ADULT  Goal: Performs mobility at highest level of function for planned discharge setting.  See evaluation for individualized goals.  Description: Treatment/Interventions: Functional transfer training, LE strengthening/ROM, Elevations, Therapeutic exercise, Endurance training, Gait training, Bed mobility          See flowsheet documentation for full assessment, interventions and recommendations.  Note: Prognosis: Good  Problem List: Decreased strength, Decreased endurance, Impaired balance, Decreased mobility  Assessment: Patient is a 74 y.o. male evaluated by Physical Therapy s/p admit to [facility] on 7/11/2024 with admitting diagnosis of: Alcohol withdrawal, Alcoholic ketoacidosis, Hypomagnesemia, Alcohol intoxication, Alcohol abuse, and principal problem of: Alcohol abuse with withdrawal without complication. PT was consulted to assess patient's functional mobility and discharge needs. Ordered are PT Evaluation and treatment with activity level of: up and OOB as tolerated. Comorbidities affecting patient's physical performance at time of assessment include:  HTN, ROSARIO, BPH, HLD, hx of seizures, neuropathy . Personal factors affecting the patient at time of IE include: lives in 2 story home, ambulating with assistive device, step(s) to enter home, impaired safety awareness, limited insight into impairments, inability/difficulty performing IADLs, and inability/difficulty performing ADLs. Please locate objective findings from PT assessment regarding body systems outlined above. Upon evaluation, pt able to perform all functional mobility with SUP, RW, and increased time. Frequent verbal cuing provided for safety awareness and sequencing. Seated rest break taken following 200' of ambulation d/t fatigue. Moderate postural sway and impulsive mobility demonstrated throughout session but no true LOB experienced. HR and SpO2 remained WFL on RA throughout. Of note, pt sleeping upon  arrival and SpO2 84%. The patient's AM-PAC Basic Mobility Inpatient Short Form Raw Score is 18. A Raw score of greater than 16 suggests the patient may benefit from discharge to home. Please also refer to the recommendation of the Physical Therapist for safe discharge planning. Co treatment with OT secondary to complex medical condition of pt, possible A of 2 required to achieve and maintain transitional movements, requiring the need of skilled therapeutic intervention of 2 therapists to achieve delivery of services. Pt will benefit from continued PT intervention during LOS to address current deficits, increase LOF, and facilitate safe d/c to next level of care when medically appropriate. D/c recommendation at this time is rehabilitation resource intensity level III.        Rehab Resource Intensity Level, PT: III (Minimum Resource Intensity)    See flowsheet documentation for full assessment.

## 2024-07-12 NOTE — PLAN OF CARE
Problem: PAIN - ADULT  Goal: Verbalizes/displays adequate comfort level or baseline comfort level  Description: Interventions:  - Encourage patient to monitor pain and request assistance  - Assess pain using appropriate pain scale  - Administer analgesics based on type and severity of pain and evaluate response  - Implement non-pharmacological measures as appropriate and evaluate response  - Consider cultural and social influences on pain and pain management  - Notify physician/advanced practitioner if interventions unsuccessful or patient reports new pain  7/12/2024 0959 by Alyssa Blackmon RN  Outcome: Progressing  7/12/2024 0958 by Alyssa Blackmon RN  Outcome: Progressing     Problem: SAFETY ADULT  Goal: Patient will remain free of falls  Description: INTERVENTIONS:  - Educate patient/family on patient safety including physical limitations  - Instruct patient to call for assistance with activity   - Consult OT/PT to assist with strengthening/mobility   - Keep Call bell within reach  - Keep bed low and locked with side rails adjusted as appropriate  - Keep care items and personal belongings within reach  - Initiate and maintain comfort rounds  - Make Fall Risk Sign visible to staff  - Offer Toileting every 2 Hours, in advance of need  - Initiate/Maintain bed/chair alarm  - Obtain necessary fall risk management equipment: alarms, assistive devices, non skid footwear, needs and call bell in reach  - Apply yellow socks and bracelet for high fall risk patients  - Consider moving patient to room near nurses station  7/12/2024 0959 by Alyssa Blackmon RN  Outcome: Progressing  7/12/2024 0958 by Alyssa Blackmno RN  Outcome: Progressing  Goal: Maintain or return to baseline ADL function  Description: INTERVENTIONS:  -  Assess patient's ability to carry out ADLs; assess patient's baseline for ADL function and identify physical deficits which impact ability to perform ADLs (bathing, care of mouth/teeth, toileting,  grooming, dressing, etc.)  - Assess/evaluate cause of self-care deficits   - Assess range of motion  - Assess patient's mobility; develop plan if impaired  - Assess patient's need for assistive devices and provide as appropriate  - Encourage maximum independence but intervene and supervise when necessary  - Involve family in performance of ADLs  - Assess for home care needs following discharge   - Consider OT consult to assist with ADL evaluation and planning for discharge  - Provide patient education as appropriate  7/12/2024 0959 by Alyssa Blackmon RN  Outcome: Progressing  7/12/2024 0958 by Alyssa Blackmon RN  Outcome: Progressing  Goal: Maintains/Returns to pre admission functional level  Description: INTERVENTIONS:  - Perform AM-PAC 6 Click Basic Mobility/ Daily Activity assessment daily.  - Set and communicate daily mobility goal to care team and patient/family/caregiver.   - Collaborate with rehabilitation services on mobility goals if consulted  - Perform Range of Motion 3 times a day.  - Reposition patient every 2 hours.  - Dangle patient 3 times a day  - Stand patient 3 times a day  - Ambulate patient 3 times a day  - Out of bed to chair 3 times a day   - Out of bed for meals 3 times a day  - Out of bed for toileting  - Record patient progress and toleration of activity level   7/12/2024 0959 by Alyssa Blackmon RN  Outcome: Progressing  7/12/2024 0958 by Alyssa Blackmon RN  Outcome: Progressing     Problem: DISCHARGE PLANNING  Goal: Discharge to home or other facility with appropriate resources  Description: INTERVENTIONS:  - Identify barriers to discharge w/patient and caregiver  - Arrange for needed discharge resources and transportation as appropriate  - Identify discharge learning needs (meds, wound care, etc.)  - Arrange for interpretive services to assist at discharge as needed  - Refer to Case Management Department for coordinating discharge planning if the patient needs post-hospital  services based on physician/advanced practitioner order or complex needs related to functional status, cognitive ability, or social support system  7/12/2024 0959 by Alyssa Blacmkon RN  Outcome: Progressing  7/12/2024 0958 by Alyssa Blackmon RN  Outcome: Progressing     Problem: Knowledge Deficit  Goal: Patient/family/caregiver demonstrates understanding of disease process, treatment plan, medications, and discharge instructions  Description: Complete learning assessment and assess knowledge base.  Interventions:  - Provide teaching at level of understanding  - Provide teaching via preferred learning methods  7/12/2024 0959 by Alyssa Blackmon RN  Outcome: Progressing  7/12/2024 0958 by Alyssa Blackmon RN  Outcome: Progressing     Problem: Nutrition/Hydration-ADULT  Goal: Nutrient/Hydration intake appropriate for improving, restoring or maintaining nutritional needs  Description: Monitor and assess patient's nutrition/hydration status for malnutrition. Collaborate with interdisciplinary team and initiate plan and interventions as ordered.  Monitor patient's weight and dietary intake as ordered or per policy. Utilize nutrition screening tool and intervene as necessary. Determine patient's food preferences and provide high-protein, high-caloric foods as appropriate.     INTERVENTIONS:  - Monitor oral intake, urinary output, labs, and treatment plans  - Assess nutrition and hydration status and recommend course of action  - Evaluate amount of meals eaten  - Assist patient with eating if necessary   - Allow adequate time for meals  - Recommend/ encourage appropriate diets, oral nutritional supplements, and vitamin/mineral supplements  - Order, calculate, and assess calorie counts as needed  - Recommend, monitor, and adjust tube feedings and TPN/PPN based on assessed needs  - Assess need for intravenous fluids  - Provide specific nutrition/hydration education as appropriate  - Include patient/family/caregiver in  decisions related to nutrition  7/12/2024 0959 by Alyssa Blackmon RN  Outcome: Progressing  7/12/2024 0958 by Alyssa Blackmon RN  Outcome: Progressing

## 2024-07-12 NOTE — ASSESSMENT & PLAN NOTE
Patient with history of ROSARIO  He reports that he is prescribed CPAP at bedtime but has not started to use it  Respiratory protocol  Continue with CPAP at bedtime

## 2024-07-12 NOTE — ASSESSMENT & PLAN NOTE
Patient with elevated Lipase levels at 528  This is decreased from prior levels dating back to 2021  Patient denies abdominal pain or discomfort   Will monitor for new onset abdominal pain. Consider imaging if present  Continue to monitor while admitted

## 2024-07-12 NOTE — ASSESSMENT & PLAN NOTE
WBC level at 12.93  No evidence of infection  Likely reactive and/or secondary to daily prednisone use  UA negative of acute UTI  Will check Procalcitonin  Monitor of Antibiotic   Check Am CBC

## 2024-07-12 NOTE — H&P
[facility]  H&P  Name: Fredy Yu 74 y.o. male I MRN: [id number]  Unit/Bed#: 402-01 I Date of Admission: 7/11/2024   Date of Service: 7/11/2024 I Hospital Day: 0      Assessment & Plan   * Alcohol abuse with withdrawal without complication (HCC)  Assessment & Plan  Presented to the ER for evaluation of alcohol intoxication  Patient reports that he last consumed alcohol today. He reports drinking at least 1 pint of vodka daily over the last 2 to 3 days  Blood alcohol level a 249  Patient reports feeling very anxious and is concerned about alcohol withdrawal  Patient is refusing to be transferred to [facility] for furter management   Received Valium 10 mg IV, thiamine, folic acid in the ER  Admit on observation  Initiate CIWA,   Telemetry monitoring  PRN Ativan  Continue daily thiamine, folic acid, vitamin  Repeat Alcohol levels  OT/PT eval  Am labs  Supportive care     Leukocytosis  Assessment & Plan  WBC level at 12.93  No evidence of infection  Likely reactive and/or secondary to daily prednisone use  UA negative of acute UTI  Will check Procalcitonin  Monitor of Antibiotic   Check Am CBC    Elevated lipase  Assessment & Plan  Patient with elevated Lipase levels at 528  This is decreased from prior levels dating back to 2021  Patient denies abdominal pain or discomfort   Will monitor for new onset abdominal pain. Consider imaging if present  Continue to monitor while admitted     ROSARIO (obstructive sleep apnea)  Assessment & Plan  Patient with history of ROSARIO  He reports that he is prescribed CPAP at bedtime but has not started to use it  Respiratory protocol  Continue with CPAP at bedtime      Depression with anxiety  Assessment & Plan  History of depression with anxiety  Patient reports feeling anxious  Continue PTA medication  Monitor while admitted   Consider inpatient Psych consult vs patient follow up    Essential hypertension  Assessment & Plan  Blood pressure is stable  Continue  PTA blood pressure medication  Monitor blood pressure while admitted          VTE Pharmacologic Prophylaxis:   Moderate Risk (Score 3-4) - Pharmacological DVT Prophylaxis Ordered: heparin.  Code Status: Level 1 - Full Code   Discussion with family: Patient declined call to .     Anticipated Length of Stay: Patient will be admitted on an observation basis with an anticipated length of stay of less than 2 midnights secondary to alcohol abuse with withdrawals, elevated lipase level, leukocytosis.    Total Time Spent on Date of Encounter in care of patient: 40 mins. This time was spent on one or more of the following: performing physical exam; counseling and coordination of care; obtaining or reviewing history; documenting in the medical record; reviewing/ordering tests, medications or procedures; communicating with other healthcare professionals and discussing with patient's family/caregivers.    Chief Complaint: Alcohol intoxication    History of Present Illness:  Fredy Yu is a 74 y.o. male with a PMH of hypertension, anxiety/depression, alcohol abuse, prostate enlargement who presents to the emergency room for evaluation of complaint of alcohol intoxication.  Patient also reports feeling very anxious fears that he may go into withdrawals.  Patient reports that he has been recovering from alcohol abuse however in May he relapsed has been drinking heavily since then..  Patient reports that over the last 2 to 3 days he has been drinking at least 2 pints of vodka daily.  He reports his last drink was about an hour prior to coming to the emergency room.  Patient denies having any chest pain, shortness of breath, cough, fever, chills, nausea, vomiting, diarrhea, abdominal pain.     Work up in the ER included labs significant for anion gap of 18, glucose of 63, magnesium of 1.6, lipase of 578, BBC of 12.93, ethanol level of 249, beta hydroxybutyrate of 2.08 urine negative for acute UTI.  EKG shows sinus  rhythm at a rate of 68 bpm.  While in the emergency room patient treated with diazepam 10 mg IV, folic acid and thiamine sodium chloride 100 mL IV. Patient was reportedly not interested in being transferred to [place].    Patient is being admitted on observation status [facility] level care for further management of alcohol abuse with withdrawal, leukocytosis, elevated lipase level.    Review of Systems:  Review of Systems   Constitutional:  Positive for activity change. Negative for appetite change, diaphoresis, fatigue and fever.   Eyes:  Negative for photophobia and visual disturbance.   Respiratory:  Negative for cough, shortness of breath and wheezing.    Cardiovascular:  Negative for chest pain, palpitations and leg swelling.   Gastrointestinal:  Negative for abdominal pain, diarrhea, nausea and vomiting.   Genitourinary:  Negative for difficulty urinating, dysuria and flank pain.   Musculoskeletal:  Negative for back pain, neck pain and neck stiffness.   Skin:  Negative for rash and wound.   Neurological:  Positive for tremors. Negative for dizziness, seizures, syncope and headaches.   Psychiatric/Behavioral:  Negative for agitation and confusion. The patient is nervous/anxious.        Past Medical and Surgical History:   Past Medical History:   Diagnosis Date    Alcohol dependency (HCC)     Anxiety     Arthritis     Depression     [place] (hard of hearing)     Hypertension     Kidney stones     Peripheral neuropathy     Prostate enlargement     Renal disorder     kidney    Shoulder dislocation        Past Surgical History:   Procedure Laterality Date    CHOLECYSTECTOMY      2010    COLONOSCOPY      SHOULDER SURGERY Left     for dislocation x 2, 20 years ago an the second 18 years ago       Meds/Allergies:  Prior to Admission medications    Medication Sig Start Date End Date Taking? Authorizing Provider   adalimumab (HUMIRA) 40 mg/0.8 mL PSKT Inject 40 mg under the skin every 14 (fourteen) days    Historical  Provider, MD   alfuzosin (UROXATRAL) 10 mg 24 hr tablet Take 1 tablet (10 mg total) by mouth daily 1/19/24 7/17/24  Tejal Garcia MD   busPIRone (BUSPAR) 15 mg tablet Take 1 tablet (15 mg total) by mouth 2 (two) times a day  Patient not taking: Reported on 5/30/2024 7/5/23   Андрей Kong MD   finasteride (PROSCAR) 5 mg tablet Take 1 tablet (5 mg total) by mouth daily 7/11/23   Tejal Garcia MD   folic acid (FOLVITE) 1 mg tablet Take 1 tablet (1 mg total) by mouth daily 3/12/24   Girish Anna MD   gabapentin (Neurontin) 300 mg capsule Take 1 capsule (300 mg total) by mouth daily at bedtime 3/12/24   Girish Anna MD   hydrochlorothiazide (HYDRODIURIL) 25 mg tablet Take 25 mg by mouth daily    Historical Provider, MD   LORazepam (ATIVAN) 0.5 mg tablet Take 1 tablet (0.5 mg total) by mouth every 8 (eight) hours as needed for anxiety for up to 10 days 5/22/24 6/1/24  Jillian Covarrubias MD   methotrexate 2.5 MG tablet Take 8 tablets (20 mg total) by mouth once a week 3/12/24   Girish Anna MD   Multiple Vitamin (multivitamin) tablet Take 1 tablet by mouth daily 9/7/21   Jackson Mcdowell DO   nadolol (CORGARD) 20 mg tablet Take 1 tablet (20 mg total) by mouth daily 9/25/23   Tejal Garcia MD   predniSONE 1 mg tablet Take 4 tablets (4 mg total) by mouth daily for 30 days, THEN 3 tablets (3 mg total) daily for 30 days, THEN 2 tablets (2 mg total) daily for 30 days, THEN 1 tablet (1 mg total) daily. 3/12/24 7/10/24  Girish Anna MD   predniSONE 1 mg tablet Take 4 tablets (4 mg total) by mouth daily 6/15/24   Girish Anna MD   traZODone (DESYREL) 100 mg tablet Take 1.5 tablets (150 mg total) by mouth daily at bedtime 1/19/24   Tejal Garcia MD     I have reviewed home medications using recent Epic encounter.    Allergies:   Allergies   Allergen Reactions    Sulfa Antibiotics Anaphylaxis and Rash     Face Swelling    Sulfacetamide Anaphylaxis and Rash     Face Swelling  "   Misc. Sulfonamide Containing Compounds Facial Swelling    Elemental Sulfur Rash and Edema       Social History:  Marital Status: /Civil Union   Occupation:   Patient Pre-hospital Living Situation: Home  Patient Pre-hospital Level of Mobility: walks  Patient Pre-hospital Diet Restrictions: None reported   Substance Use History:   Social History     Substance and Sexual Activity   Alcohol Use Not Currently    Alcohol/week: 6.0 standard drinks of alcohol    Comment: In recovery     Social History     Tobacco Use   Smoking Status Never   Smokeless Tobacco Never     Social History     Substance and Sexual Activity   Drug Use Never       Family History:  Family History   Problem Relation Age of Onset    Dementia Mother     Colon cancer Mother     Heart disease Father     COPD Father     Heart failure Father     Coronary artery disease Father     Sleep apnea Brother        Physical Exam:     Vitals:   Blood Pressure: (!) 173/93 (07/11/24 2204)  Pulse: 87 (07/11/24 2247)  Temperature: (!) 97.4 °F (36.3 °C) (07/11/24 2204)  Temp Source: Oral (07/11/24 2204)  Respirations: 18 (07/11/24 2204)  Height: 5' 5\" (165.1 cm) (07/11/24 2204)  Weight - Scale: 112 kg (246 lb 14.6 oz) (07/11/24 2204)  SpO2: 94 % (07/11/24 2227)    Physical Exam  Constitutional:       General: He is in acute distress.      Appearance: He is not ill-appearing or diaphoretic.   HENT:      Head: Normocephalic and atraumatic.      Mouth/Throat:      Mouth: Mucous membranes are moist.      Pharynx: Oropharynx is clear. No oropharyngeal exudate or posterior oropharyngeal erythema.   Eyes:      General:         Right eye: No discharge.         Left eye: No discharge.      Pupils: Pupils are equal, round, and reactive to light.   Cardiovascular:      Rate and Rhythm: Normal rate and regular rhythm.      Pulses: Normal pulses.   Pulmonary:      Effort: Pulmonary effort is normal.   Abdominal:      General: There is no distension.      Palpations: Abdomen " is soft.      Tenderness: There is no abdominal tenderness.   Musculoskeletal:      Right lower leg: No edema.      Left lower leg: No edema.   Skin:     General: Skin is warm and dry.      Capillary Refill: Capillary refill takes less than 2 seconds.      Coloration: Skin is not jaundiced or pale.      Findings: No erythema.   Neurological:      Mental Status: He is alert and oriented to person, place, and time.   Psychiatric:      Comments: Anxious          Additional Data:     Lab Results:  Results from last 7 days   Lab Units 07/11/24  1637   WBC Thousand/uL 12.93*   HEMOGLOBIN g/dL 16.8   HEMATOCRIT % 52.1*   PLATELETS Thousands/uL 288   SEGS PCT % 80*   LYMPHO PCT % 14   MONO PCT % 5   EOS PCT % 0     Results from last 7 days   Lab Units 07/11/24  1637   SODIUM mmol/L 142   POTASSIUM mmol/L 3.9   CHLORIDE mmol/L 100   CO2 mmol/L 24   BUN mg/dL 22   CREATININE mg/dL 0.86   ANION GAP mmol/L 18*   CALCIUM mg/dL 8.9   ALBUMIN g/dL 4.4   TOTAL BILIRUBIN mg/dL 0.87   ALK PHOS U/L 92   ALT U/L 20   AST U/L 22   GLUCOSE RANDOM mg/dL 63*             Lab Results   Component Value Date    HGBA1C 5.8 04/20/2024           Lines/Drains:  Invasive Devices       Peripheral Intravenous Line  Duration             Peripheral IV 07/11/24 Right Antecubital <1 day                        Imaging: No pertinent imaging reviewed.  No orders to display       EKG and Other Studies Reviewed on Admission:   EKG: NSR. HR 68 bpm.    ** Please Note: This note has been constructed using a voice recognition system. **

## 2024-07-12 NOTE — PLAN OF CARE
Problem: PAIN - ADULT  Goal: Verbalizes/displays adequate comfort level or baseline comfort level  Description: Interventions:  - Encourage patient to monitor pain and request assistance  - Assess pain using appropriate pain scale  - Administer analgesics based on type and severity of pain and evaluate response  - Implement non-pharmacological measures as appropriate and evaluate response  - Consider cultural and social influences on pain and pain management  - Notify physician/advanced practitioner if interventions unsuccessful or patient reports new pain  Outcome: Progressing     Problem: SAFETY ADULT  Goal: Patient will remain free of falls  Description: INTERVENTIONS:  - Educate patient/family on patient safety including physical limitations  - Instruct patient to call for assistance with activity   - Consult OT/PT to assist with strengthening/mobility   - Keep Call bell within reach  - Keep bed low and locked with side rails adjusted as appropriate  - Keep care items and personal belongings within reach  - Initiate and maintain comfort rounds  - Make Fall Risk Sign visible to staff  - Offer Toileting every 2 Hours, in advance of need  - Initiate/Maintain bed/chair alarm  - Obtain necessary fall risk management equipment: alarms, assistive devices, non skid footwear, needs and call bell in reach  - Apply yellow socks and bracelet for high fall risk patients  - Consider moving patient to room near nurses station  Outcome: Progressing  Goal: Maintain or return to baseline ADL function  Description: INTERVENTIONS:  -  Assess patient's ability to carry out ADLs; assess patient's baseline for ADL function and identify physical deficits which impact ability to perform ADLs (bathing, care of mouth/teeth, toileting, grooming, dressing, etc.)  - Assess/evaluate cause of self-care deficits   - Assess range of motion  - Assess patient's mobility; develop plan if impaired  - Assess patient's need for assistive devices and  provide as appropriate  - Encourage maximum independence but intervene and supervise when necessary  - Involve family in performance of ADLs  - Assess for home care needs following discharge   - Consider OT consult to assist with ADL evaluation and planning for discharge  - Provide patient education as appropriate  Outcome: Progressing  Goal: Maintains/Returns to pre admission functional level  Description: INTERVENTIONS:  - Perform AM-PAC 6 Click Basic Mobility/ Daily Activity assessment daily.  - Set and communicate daily mobility goal to care team and patient/family/caregiver.   - Collaborate with rehabilitation services on mobility goals if consulted  - Perform Range of Motion 3 times a day.  - Reposition patient every 2 hours.  - Dangle patient 3 times a day  - Stand patient 3 times a day  - Ambulate patient 3 times a day  - Out of bed to chair 3 times a day   - Out of bed for meals 3 times a day  - Out of bed for toileting  - Record patient progress and toleration of activity level   Outcome: Progressing     Problem: DISCHARGE PLANNING  Goal: Discharge to home or other facility with appropriate resources  Description: INTERVENTIONS:  - Identify barriers to discharge w/patient and caregiver  - Arrange for needed discharge resources and transportation as appropriate  - Identify discharge learning needs (meds, wound care, etc.)  - Arrange for interpretive services to assist at discharge as needed  - Refer to Case Management Department for coordinating discharge planning if the patient needs post-hospital services based on physician/advanced practitioner order or complex needs related to functional status, cognitive ability, or social support system  Outcome: Progressing     Problem: Knowledge Deficit  Goal: Patient/family/caregiver demonstrates understanding of disease process, treatment plan, medications, and discharge instructions  Description: Complete learning assessment and assess knowledge  base.  Interventions:  - Provide teaching at level of understanding  - Provide teaching via preferred learning methods  Outcome: Progressing

## 2024-07-12 NOTE — NURSING NOTE
Pt IV keeps occluding. Pt refusing to be stuck for an IV at this time. Fluids currently on hold. Provider made aware

## 2024-07-12 NOTE — ASSESSMENT & PLAN NOTE
Presented to the ER for evaluation of alcohol intoxication    Endorses 1 pint of vodka daily for the past week, was previously sober for about 2 weeks.  He was transferred to detox    Start Librium 25 mg p.o. every 8  Continue CIWA protocol  Thiamine folate and multivitamin daily  DC IV fluids, patient eating and drinking

## 2024-07-13 VITALS
WEIGHT: 245.59 LBS | HEART RATE: 91 BPM | HEIGHT: 65 IN | BODY MASS INDEX: 40.92 KG/M2 | TEMPERATURE: 97.5 F | SYSTOLIC BLOOD PRESSURE: 145 MMHG | RESPIRATION RATE: 18 BRPM | OXYGEN SATURATION: 91 % | DIASTOLIC BLOOD PRESSURE: 90 MMHG

## 2024-07-13 PROCEDURE — 99239 HOSP IP/OBS DSCHRG MGMT >30: CPT | Performed by: FAMILY MEDICINE

## 2024-07-13 PROCEDURE — 94660 CPAP INITIATION&MGMT: CPT

## 2024-07-13 RX ORDER — CHLORDIAZEPOXIDE HYDROCHLORIDE 25 MG/1
CAPSULE, GELATIN COATED ORAL
Qty: 30 CAPSULE | Refills: 0 | Status: SHIPPED | OUTPATIENT
Start: 2024-07-13

## 2024-07-13 RX ORDER — CHLORDIAZEPOXIDE HYDROCHLORIDE 25 MG/1
25 CAPSULE, GELATIN COATED ORAL EVERY 12 HOURS
Status: DISCONTINUED | OUTPATIENT
Start: 2024-07-13 | End: 2024-07-13 | Stop reason: HOSPADM

## 2024-07-13 RX ADMIN — FINASTERIDE 5 MG: 5 TABLET, FILM COATED ORAL at 08:50

## 2024-07-13 RX ADMIN — NADOLOL 20 MG: 40 TABLET ORAL at 08:50

## 2024-07-13 RX ADMIN — MULTIPLE VITAMINS W/ MINERALS TAB 1 TABLET: TAB ORAL at 08:50

## 2024-07-13 RX ADMIN — CHLORDIAZEPOXIDE HYDROCHLORIDE 25 MG: 25 CAPSULE ORAL at 05:36

## 2024-07-13 RX ADMIN — FOLIC ACID 1 MG: 1 TABLET ORAL at 08:50

## 2024-07-13 RX ADMIN — HYDROCHLOROTHIAZIDE 25 MG: 25 TABLET ORAL at 08:50

## 2024-07-13 RX ADMIN — THIAMINE HCL TAB 100 MG 100 MG: 100 TAB at 08:50

## 2024-07-13 RX ADMIN — PREDNISONE 4 MG: 1 TABLET ORAL at 08:50

## 2024-07-13 RX ADMIN — HEPARIN SODIUM 5000 UNITS: 5000 INJECTION INTRAVENOUS; SUBCUTANEOUS at 05:36

## 2024-07-13 NOTE — NURSING NOTE
Pt discharged via wheelchair with PCA and daughter; PT's wife given librium to cover tonight and tomorrow doses; Pt and wife with no complaints or questions at this time

## 2024-07-13 NOTE — DISCHARGE SUMMARY
Discharge Summary - Fredy Yu 74 y.o. male MRN: [id number]    Unit/Bed#: 402-01 Encounter: [id number]    Admission Date:   Admission Orders (From admission, onward)       Ordered        07/13/24 0735  INPATIENT ADMISSION  Once            07/11/24 2116  Place in Observation  Once                            Admitting Diagnosis: Alcohol withdrawal (HCC) [F10.939]  Alcoholic ketoacidosis [E87.29]  Hypomagnesemia [E83.42]  Alcohol intoxication (HCC) [F10.929]  Alcohol abuse [F10.10]    HPI: Fredy Yu is a 74 y.o. male with a PMH of hypertension, anxiety/depression, alcohol abuse, prostate enlargement who presents to the emergency room for evaluation of complaint of alcohol intoxication.  Patient also reports feeling very anxious fears that he may go into withdrawals.  Patient reports that he has been recovering from alcohol abuse however in May he relapsed has been drinking heavily since then..  Patient reports that over the last 2 to 3 days he has been drinking at least 2 pints of vodka daily.  He reports his last drink was about an hour prior to coming to the emergency room.  Patient denies having any chest pain, shortness of breath, cough, fever, chills, nausea, vomiting, diarrhea, abdominal pain.      Work up in the ER included labs significant for anion gap of 18, glucose of 63, magnesium of 1.6, lipase of 578, BBC of 12.93, ethanol level of 249, beta hydroxybutyrate of 2.08 urine negative for acute UTI.  EKG shows sinus rhythm at a rate of 68 bpm.  While in the emergency room patient treated with diazepam 10 mg IV, folic acid and thiamine sodium chloride 100 mL IV. Patient was reportedly not interested in being transferred to [place].     Patient is being admitted on observation status [facility] level care for further management of alcohol abuse with withdrawal, leukocytosis, elevated lipase level.    Procedures Performed: No orders of the defined types were placed in this encounter.      Summary of  Hospital Course:     Alcohol abuse with withdrawal without complication  Elevated Lipase    74-year-old male with history history of alcohol use disorder presents to the emergency room after he consumed 1 pint of vodka.  Patient declined any transfer to detox center, was treated conservatively with IV benzodiazepines, oral thiamine and folic acid.  He was initiated along CIWA protocol and Librium 25 mg p.o. every 8.  Patient fortunately did not require significant doses of benzodiazepines.  He felt well, was tolerating diet, and had no withdrawal symptoms on day of discharge.  He will be discharged on 2 additional doses of Librium and will follow-up with his PCP.  Need an elevated lipase, suspect he has an element of chronic pancreatitis.  He was tolerating a regular diet without any abdominal pain.    ROSARIO: Refused CPAP while hospitalized      Significant Findings, Care, Treatment and Services Provided:         Complications: none    Discharge Diagnosis: see above    Medical Problems       Resolved Problems  Date Reviewed: 7/12/2024   None         Condition at Discharge: fair         Discharge instructions/Information to patient and family:   See after visit summary for information provided to patient and family.      Provisions for Follow-Up Care:  See after visit summary for information related to follow-up care and any pertinent home health orders.      PCP: Tejal Garcia MD    Disposition: Home    Planned Readmission: No      Discharge Statement   I spent 40 minutes discharging the patient. This time was spent on the day of discharge. I had direct contact with the patient on the day of discharge. Additional documentation is required if more than 30 minutes were spent on discharge.     Discharge Medications:  See after visit summary for reconciled discharge medications provided to patient and family.

## 2024-07-13 NOTE — RESPIRATORY THERAPY NOTE
"   07/12/24 2110   Respiratory Assessment   Assessment Type Assess only   General Appearance Alert;Awake   Respiratory Pattern Normal   Chest Assessment Chest expansion symmetrical   Resp Comments (S)  Consulte with patient on wearing his HOS BiPAP tonight.  He stated to me that he does not want to, because last night it was too tight to his face and uncomfortable.  I explained to the patient that I am able to adjust the straps or mask size to make it more comfortable for him.  He unfortunately continues to refuse and stated, \"just give me some oxygen while I sleep if I need it.\"  Will continue to monitor this situation and oxygenation.   Oxygen Therapy/Pulse Ox   O2 Device None (Room air)   O2 Therapy Room air   SpO2 94 %   SpO2 Activity At Rest   $ Pulse Oximetry Spot Check Charge Completed        "

## 2024-07-13 NOTE — CASE MANAGEMENT
Case Management Progress Note    Patient name Fredy Yu  Location /402-01 MRN [id number]  : 1950 Date 2024       LOS (days): 0  Geometric Mean LOS (GMLOS) (days):   Days to GMLOS:        OBJECTIVE:        Current admission status: Inpatient  Preferred Pharmacy:   [id number] - [place] - [address]  [address]  [place] [address]  Phone: [phone] Fax: [phone]    Primary Care Provider: Tejal Garcia MD    Primary Insurance: MEDICARE  Secondary Insurance: [facility] HE[employer] OPTIONS PROGRAM    PROGRESS NOTE:received message from nurse that pt called pharmacy and he is unable to  librium. Call placed to [facility] and its not in stock until Monday. Our pharmacy can provide him two doses to hold him over till Monday . Provider and primary nurse made aware of same.

## 2024-07-13 NOTE — CASE MANAGEMENT
Case Management Discharge Planning Note    Patient name Fredy Yu  Location /402-01 MRN [id number]  : 1950 Date 2024       Current Admission Date: 2024  Current Admission Diagnosis:Alcohol abuse with withdrawal without complication ([facility])   Patient Active Problem List    Diagnosis Date Noted Date Diagnosed    Alcohol abuse with withdrawal without complication ([facility]) 2024     Leukocytosis 2024     ROSARIO (obstructive sleep apnea) 2024     Snoring 2024     Sleep apnea 2024     Prediabetes 2024     Excessive daytime sleepiness 2024     Abnormal brain MRI 2024     Neuropathy 2024     Carpal tunnel syndrome of left wrist 2024     neuropathy 2024     Psoriasis 2023     Inflammatory arthritis 2023     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 degeneration, unspecified stage ([facility]) 2022     Seizure ([facility]) 2022     PMR (polymyalgia rheumatica) ([facility]) 10/21/2022     Obesity, morbid ([facility]) 2022     Depression with anxiety 2021     Pruritus 2021     Insomnia 2021     Mixed hyperlipidemia 2021     Alcohol induced fatty liver 2021     Elevated lipase 2021     History of prolonged Q-T interval on ECG 2021     BPH (benign prostatic hyperplasia) 2019     Alcohol use disorder, mild, in early remission, abuse 2019     Essential hypertension 02/10/2019       LOS (days): 0  Geometric Mean LOS (GMLOS) (days):   Days to GMLOS:     OBJECTIVE:  Risk of Unplanned Readmission Score: 15.01         Current admission status: Inpatient   Preferred Pharmacy:   [id number] - [place] - [address]  [address]  [place] [address]  Phone: [phone] Fax: [phone]    Primary Care Provider: Tejal Garcia MD    Primary Insurance: MEDICARE  Secondary Insurance: [facility] HE[employer] OPTIONS PROGRAM    DISCHARGE DETAILS:    Discharge planning  discussed with:: patient via phone        CM contacted family/caregiver?: No- see comments (declined)  Were Treatment Team discharge recommendations reviewed with patient/caregiver?: Yes  Did patient/caregiver verbalize understanding of patient care needs?: Yes  Were patient/caregiver advised of the risks associated with not following Treatment Team discharge recommendations?: Yes    Contacts  Reason/Outcome: Discharge Planning              Other Referral/Resources/Interventions Provided:  Interventions: Other (Specify)  Referral Comments: offerred D&A cessation information, declined. patient states he is going to go to meeting tonight at 1930 at [facility].    Would you like to participate in our [facility] service program?  : No - Declined    Treatment Team Recommendation: Home (patient states he is already set up [facility] physical therapy in [place])  Discharge Destination Plan:: Home   Patient discharging home today. Nurse to review AVS, all follow up provider listed. Wife to transport home.

## 2024-07-14 ENCOUNTER — HOSPITAL ENCOUNTER (EMERGENCY)
Facility: HOSPITAL | Age: 74
Discharge: HOME | End: 2024-07-14
Attending: EMERGENCY MEDICINE
Payer: MEDICARE

## 2024-07-14 ENCOUNTER — APPOINTMENT (EMERGENCY)
Dept: CT IMAGING | Facility: HOSPITAL | Age: 74
End: 2024-07-14
Payer: MEDICARE

## 2024-07-14 VITALS
OXYGEN SATURATION: 93 % | SYSTOLIC BLOOD PRESSURE: 177 MMHG | DIASTOLIC BLOOD PRESSURE: 93 MMHG | RESPIRATION RATE: 18 BRPM | TEMPERATURE: 98 F | HEART RATE: 69 BPM

## 2024-07-14 DIAGNOSIS — K85.90 PANCREATITIS: ICD-10-CM

## 2024-07-14 DIAGNOSIS — R10.9 ABDOMINAL PAIN: Primary | ICD-10-CM

## 2024-07-14 DIAGNOSIS — F10.90 ALCOHOL USE DISORDER: ICD-10-CM

## 2024-07-14 LAB
ALBUMIN SERPL BCG-MCNC: 3.9 G/DL (ref 3.5–5)
ALP SERPL-CCNC: 62 U/L (ref 34–104)
ALT SERPL W P-5'-P-CCNC: 16 U/L (ref 7–52)
ANION GAP SERPL CALCULATED.3IONS-SCNC: 8 MMOL/L (ref 4–13)
AST SERPL W P-5'-P-CCNC: 20 U/L (ref 13–39)
BASOPHILS # BLD AUTO: 0.04 THOUSANDS/ÂΜL (ref 0–0.1)
BASOPHILS NFR BLD AUTO: 1 % (ref 0–1)
BILIRUB SERPL-MCNC: 0.67 MG/DL (ref 0.2–1)
BUN SERPL-MCNC: 11 MG/DL (ref 5–25)
CALCIUM SERPL-MCNC: 9.5 MG/DL (ref 8.4–10.2)
CHLORIDE SERPL-SCNC: 96 MMOL/L (ref 96–108)
CO2 SERPL-SCNC: 33 MMOL/L (ref 21–32)
CREAT SERPL-MCNC: 0.76 MG/DL (ref 0.6–1.3)
EOSINOPHIL # BLD AUTO: 0.14 THOUSAND/ÂΜL (ref 0–0.61)
EOSINOPHIL NFR BLD AUTO: 2 % (ref 0–6)
ERYTHROCYTE [DISTWIDTH] IN BLOOD BY AUTOMATED COUNT: 13.9 % (ref 11.6–15.1)
GFR SERPL CREATININE-BSD FRML MDRD: 89 ML/MIN/1.73SQ M
GLUCOSE SERPL-MCNC: 127 MG/DL (ref 65–140)
HCT VFR BLD AUTO: 44.3 % (ref 36.5–49.3)
HGB BLD-MCNC: 14.9 G/DL (ref 12–17)
IMM GRANULOCYTES # BLD AUTO: 0.12 THOUSAND/UL (ref 0–0.2)
IMM GRANULOCYTES NFR BLD AUTO: 2 % (ref 0–2)
LIPASE SERPL-CCNC: 481 U/L (ref 11–82)
LYMPHOCYTES # BLD AUTO: 2.07 THOUSANDS/ÂΜL (ref 0.6–4.47)
LYMPHOCYTES NFR BLD AUTO: 31 % (ref 14–44)
MCH RBC QN AUTO: 32 PG (ref 26.8–34.3)
MCHC RBC AUTO-ENTMCNC: 33.6 G/DL (ref 31.4–37.4)
MCV RBC AUTO: 95 FL (ref 82–98)
MONOCYTES # BLD AUTO: 0.83 THOUSAND/ÂΜL (ref 0.17–1.22)
MONOCYTES NFR BLD AUTO: 13 % (ref 4–12)
NEUTROPHILS # BLD AUTO: 3.44 THOUSANDS/ÂΜL (ref 1.85–7.62)
NEUTS SEG NFR BLD AUTO: 51 % (ref 43–75)
NRBC BLD AUTO-RTO: 0 /100 WBCS
PLATELET # BLD AUTO: 207 THOUSANDS/UL (ref 149–390)
PMV BLD AUTO: 10.6 FL (ref 8.9–12.7)
POTASSIUM SERPL-SCNC: 3.2 MMOL/L (ref 3.5–5.3)
PROT SERPL-MCNC: 6.7 G/DL (ref 6.4–8.4)
RBC # BLD AUTO: 4.66 MILLION/UL (ref 3.88–5.62)
SODIUM SERPL-SCNC: 137 MMOL/L (ref 135–147)
WBC # BLD AUTO: 6.64 THOUSAND/UL (ref 4.31–10.16)

## 2024-07-14 PROCEDURE — 85025 COMPLETE CBC W/AUTO DIFF WBC: CPT | Performed by: EMERGENCY MEDICINE

## 2024-07-14 PROCEDURE — 99284 EMERGENCY DEPT VISIT MOD MDM: CPT

## 2024-07-14 PROCEDURE — 74177 CT ABD & PELVIS W/CONTRAST: CPT

## 2024-07-14 PROCEDURE — 80053 COMPREHEN METABOLIC PANEL: CPT | Performed by: EMERGENCY MEDICINE

## 2024-07-14 PROCEDURE — 96374 THER/PROPH/DIAG INJ IV PUSH: CPT

## 2024-07-14 PROCEDURE — 83690 ASSAY OF LIPASE: CPT | Performed by: EMERGENCY MEDICINE

## 2024-07-14 PROCEDURE — 99285 EMERGENCY DEPT VISIT HI MDM: CPT | Performed by: EMERGENCY MEDICINE

## 2024-07-14 PROCEDURE — 36415 COLL VENOUS BLD VENIPUNCTURE: CPT | Performed by: EMERGENCY MEDICINE

## 2024-07-14 PROCEDURE — 96375 TX/PRO/DX INJ NEW DRUG ADDON: CPT

## 2024-07-14 PROCEDURE — 96361 HYDRATE IV INFUSION ADD-ON: CPT

## 2024-07-14 RX ORDER — POTASSIUM CHLORIDE 20 MEQ/1
40 TABLET, EXTENDED RELEASE ORAL ONCE
Status: COMPLETED | OUTPATIENT
Start: 2024-07-14 | End: 2024-07-14

## 2024-07-14 RX ORDER — ONDANSETRON 4 MG/1
4 TABLET, ORALLY DISINTEGRATING ORAL EVERY 6 HOURS PRN
Qty: 20 TABLET | Refills: 0 | Status: SHIPPED | OUTPATIENT
Start: 2024-07-14 | End: 2024-07-18 | Stop reason: ALTCHOICE

## 2024-07-14 RX ORDER — HYDROMORPHONE HCL IN WATER/PF 6 MG/30 ML
0.2 PATIENT CONTROLLED ANALGESIA SYRINGE INTRAVENOUS ONCE
Status: COMPLETED | OUTPATIENT
Start: 2024-07-14 | End: 2024-07-14

## 2024-07-14 RX ORDER — LORAZEPAM 2 MG/ML
1 INJECTION INTRAMUSCULAR ONCE
Status: COMPLETED | OUTPATIENT
Start: 2024-07-14 | End: 2024-07-14

## 2024-07-14 RX ADMIN — HYDROMORPHONE HYDROCHLORIDE 0.2 MG: 0.2 INJECTION, SOLUTION INTRAMUSCULAR; INTRAVENOUS; SUBCUTANEOUS at 07:11

## 2024-07-14 RX ADMIN — POTASSIUM CHLORIDE 40 MEQ: 1500 TABLET, EXTENDED RELEASE ORAL at 08:04

## 2024-07-14 RX ADMIN — SODIUM CHLORIDE 1000 ML: 0.9 INJECTION, SOLUTION INTRAVENOUS at 07:00

## 2024-07-14 RX ADMIN — LORAZEPAM 1 MG: 2 INJECTION INTRAMUSCULAR; INTRAVENOUS at 07:11

## 2024-07-14 RX ADMIN — IOHEXOL 100 ML: 350 INJECTION, SOLUTION INTRAVENOUS at 07:37

## 2024-07-14 NOTE — ED CARE HANDOFF
Emergency Department Sign Out Note        Sign out and transfer of care from Dr. Mcgarry. See Separate Emergency Department note.     The patient, Fredy Yu, was evaluated by the previous provider for abdominal pain.    Workup Completed:  CBC    ED Course / Workup Pending (followup):  Lipase, CMP, CT abd/pelvis                                  ED Course as of 07/14/24 1044   Sun Jul 14, 2024   0701 SO: upper abdominal pain, discharged yesterday, chronic alcoholism, history of chronic pancreatitis, pending labs and CT scan and reassessment     Procedures  Medical Decision Making  Amount and/or Complexity of Data Reviewed  Labs: ordered.  Radiology: ordered.    Risk  Prescription drug management.      74-year-old male with history of alcohol use disorder presenting for upper abdominal pain for the past few weeks.  Associated with decreased appetite.  Labs reviewed, lipase slightly elevated but improving from prior.  Patient also had recent admission for alcoholic ketoacidosis was discharged yesterday, has not been drinking since being discharged.  Reviewed CT scan, shows acute interstitial pancreatitis, reassessed patient, he is still having mild pain.  Will p.o. challenge with plan for likely discharge if tolerating PO.  Reassessed patient, able to tolerate p.o.  Will have patient follow-up with his primary care doctor.  Strict return precautions discussed.      Disposition  Final diagnoses:   Abdominal pain   Pancreatitis   Alcohol use disorder     Time reflects when diagnosis was documented in both MDM as applicable and the Disposition within this note       Time User Action Codes Description Comment    7/14/2024 10:30 AM Mary Carmen Gould Add [R10.9] Abdominal pain     7/14/2024 10:30 AM Mary Carmen Gould Add [K85.90] Pancreatitis     7/14/2024 10:30 AM Mary Carmen Gould [F10.90] Alcohol use disorder           ED Disposition       ED Disposition   Discharge    Condition   Stable    Date/Time   Sun Jul 14, 2024  10:30 AM    Comment   Fredy Yu discharge to home/self care.                   Follow-up Information       Follow up With Specialties Details Why Contact Info    Tejal Garcia MD Family Medicine Schedule an appointment as soon as possible for a visit   [address] [place] [address]  [phone]            Patient's Medications   Discharge Prescriptions    ONDANSETRON (ZOFRAN-ODT) 4 MG DISINTEGRATING TABLET    Take 1 tablet (4 mg total) by mouth every 6 (six) hours as needed for nausea or vomiting       Start Date: 7/14/2024 End Date: --       Order Dose: 4 mg       Quantity: 20 tablet    Refills: 0     No discharge procedures on file.       ED Provider  Electronically Signed by     Mary Carmen Gould MD  07/14/24 9990

## 2024-07-14 NOTE — ED PROVIDER NOTES
Final Diagnosis:  1. Abdominal pain    2. Pancreatitis    3. Alcohol use disorder        Chief Complaint   Patient presents with    Abdominal Pain     Pt presents with abdominal pain, pt was recently discharged from hospital due to alcohol withdrawal, doctor told him to come back if he has pain, also states that he feels like he is constipated     HPI  Patient presents for evaluation of generalized upper abdominal pain.  Patient tells me that he was recently discharged from the hospital secondary to issues with alcohol.  He states that while he was in the hospital he was having some pain in his upper abdomen which has been persistent and somewhat worsening since he was discharged home.  He identifies this as being a in the bilateral upper quadrants with occasional radiations towards his back.  Denies any nausea and vomiting but he states that sometimes he gets very anxious and now he feels like he has some burning type sensation.  Denies any fevers or chills.    Review of records show that the patient was recently admitted to the hospital for approximately 3 days secondary to alcohol issues.  Lipase was trended and was decreasing.  At that time it was believed that he has a aspect of chronic pancreatitis.  Patient was not interested in rehab.      Unless otherwise specified:  - No language barrier.   - History obtained from patient.   - There are no limitations to the history obtained.  - Previous charting was reviewed    PMH:   has a past medical history of Alcohol dependency (HCC), Anxiety, Arthritis, Depression, [place] (hard of hearing), Hypertension, Kidney stones, Peripheral neuropathy, Prostate enlargement, Renal disorder, and Shoulder dislocation.    PSH:   has a past surgical history that includes Shoulder surgery (Left); Cholecystectomy; and Colonoscopy.       ROS:  Review of Systems   - 13 point ROS was performed and all are normal unless stated in the history above.   - Nursing note reviewed. Vitals  reviewed.   - Orders placed by myself and/or advanced practitioner / resident.        PE:   Vitals:    07/14/24 0608 07/14/24 0800 07/14/24 0900   BP: 169/87 (!) 187/86 (!) 177/93   BP Location: Right arm Right arm Right arm   Pulse: 82 68 69   Resp: 18 18 18   Temp: 98 °F (36.7 °C)     TempSrc: Temporal     SpO2: 91% 92% 93%     Vitals reviewed by me.     Patient is nondistressed.  Large, nondistended abdomen.  No rebound or guarding.  Unless otherwise specified above:    General: VS reviewed  Appears in NAD    Head: Normocephalic, atraumatic  Eyes: EOM-I.     ENT: Atraumatic external nose and ears.    No drooling.     Neck: No JVD.    CV: No pallor noted  Lungs:   No tachypnea  No respiratory distress    Abdomen:  Soft, non-tender, non-distended    MSK:   No obvious deformity    Skin: Dry, intact. No obvious rash.    Psychiatric/Behavioral: Appropriate mood and affect   Exam: deferred    Physical Exam     Procedures   A:  - Nursing note reviewed.                      CT abdomen pelvis with contrast   Final Result      Mild acute interstitial edematous pancreatitis.      Nonobstructing 1.0 cm calculus in the left renal pelvis and mural thickening with enhancement of the left renal pelvis indicating irritation/inflammation. Nonobstructing right renal calculi.      A 4.0 cm left lower pole renal cyst with few smooth thin enhancing septa, present since 10/7/2010 although slowly increased in size. Based on Bosniak Classification of Cystic Renal Masses, Version 2019, this lesion is a Bosniak II: Likely benign Bosniak    II renal mass requiring no follow-up.      Hepatic steatosis.      Workstation performed: [id number]           Orders Placed This Encounter   Procedures    CT abdomen pelvis with contrast    CBC and differential    Comprehensive metabolic panel    Lipase     Labs Reviewed   CBC AND DIFFERENTIAL - Abnormal       Result Value Ref Range Status    WBC 6.64  4.31 - 10.16 Thousand/uL Final    RBC 4.66  3.88 -  5.62 Million/uL Final    Hemoglobin 14.9  12.0 - 17.0 g/dL Final    Hematocrit 44.3  36.5 - 49.3 % Final    MCV 95  82 - 98 fL Final    MCH 32.0  26.8 - 34.3 pg Final    MCHC 33.6  31.4 - 37.4 g/dL Final    RDW 13.9  11.6 - 15.1 % Final    MPV 10.6  8.9 - 12.7 fL Final    Platelets 207  149 - 390 Thousands/uL Final    nRBC 0  /100 WBCs Final    Segmented % 51  43 - 75 % Final    Immature Grans % 2  0 - 2 % Final    Lymphocytes % 31  14 - 44 % Final    Monocytes % 13 (*) 4 - 12 % Final    Eosinophils Relative 2  0 - 6 % Final    Basophils Relative 1  0 - 1 % Final    Absolute Neutrophils 3.44  1.85 - 7.62 Thousands/µL Final    Absolute Immature Grans 0.12  0.00 - 0.20 Thousand/uL Final    Absolute Lymphocytes 2.07  0.60 - 4.47 Thousands/µL Final    Absolute Monocytes 0.83  0.17 - 1.22 Thousand/µL Final    Eosinophils Absolute 0.14  0.00 - 0.61 Thousand/µL Final    Basophils Absolute 0.04  0.00 - 0.10 Thousands/µL Final   COMPREHENSIVE METABOLIC PANEL - Abnormal    Sodium 137  135 - 147 mmol/L Final    Potassium 3.2 (*) 3.5 - 5.3 mmol/L Final    Chloride 96  96 - 108 mmol/L Final    CO2 33 (*) 21 - 32 mmol/L Final    ANION GAP 8  4 - 13 mmol/L Final    BUN 11  5 - 25 mg/dL Final    Creatinine 0.76  0.60 - 1.30 mg/dL Final    Comment: Standardized to IDMS reference method    Glucose 127  65 - 140 mg/dL Final    Comment: If the patient is fasting, the ADA then defines impaired fasting glucose as > 100 mg/dL and diabetes as > or equal to 123 mg/dL.    Calcium 9.5  8.4 - 10.2 mg/dL Final    AST 20  13 - 39 U/L Final    ALT 16  7 - 52 U/L Final    Comment: Specimen collection should occur prior to Sulfasalazine administration due to the potential for falsely depressed results.     Alkaline Phosphatase 62  34 - 104 U/L Final    Total Protein 6.7  6.4 - 8.4 g/dL Final    Albumin 3.9  3.5 - 5.0 g/dL Final    Total Bilirubin 0.67  0.20 - 1.00 mg/dL Final    Comment: Use of this assay is not recommended for patients undergoing  treatment with eltrombopag due to the potential for falsely elevated results.  N-acetyl-p-benzoquinone imine (metabolite of Acetaminophen) will generate erroneously low results in samples for patients that have taken an overdose of Acetaminophen.    eGFR 89  ml/min/1.73sq m Final    Narrative:     National Kidney Disease Foundation guidelines for Chronic Kidney Disease (CKD):     Stage 1 with normal or high GFR (GFR > 90 mL/min/1.73 square meters)    Stage 2 Mild CKD (GFR = 60-89 mL/min/1.73 square meters)    Stage 3A Moderate CKD (GFR = 45-59 mL/min/1.73 square meters)    Stage 3B Moderate CKD (GFR = 30-44 mL/min/1.73 square meters)    Stage 4 Severe CKD (GFR = 15-29 mL/min/1.73 square meters)    Stage 5 End Stage CKD (GFR <15 mL/min/1.73 square meters)  Note: GFR calculation is accurate only with a steady state creatinine   LIPASE - Abnormal    Lipase 481 (*) 11 - 82 u/L Final         Final Diagnosis:  1. Abdominal pain    2. Pancreatitis    3. Alcohol use disorder        P:  -Patient is concern for acute intra-abdominal pathology given his age and the ongoing pain.  Will evaluate for acute pancreatitis, nephrolithiasis, biliary issues but think these to be less likely.  Will provide analgesia as an antianxiety.  Provide fluids.  - CT showed mild pancreatitis.  Patient is tolerating oral without any issues.  Mild pain.  Continue follow-up with specialist.  Alcohol cessation.      Unless otherwise noted the patient's medications were reviewed and they should continue as directed.    Unless otherwise specified:  CC is exclusive from any separately billable procedures  CC is exclusive of treating other patients  CC is exclusive of teaching time   All splints are static    Medications   sodium chloride 0.9 % bolus 1,000 mL (0 mL Intravenous Stopped 7/14/24 1050)   LORazepam (ATIVAN) injection 1 mg (1 mg Intravenous Given 7/14/24 0711)   HYDROmorphone HCl (DILAUDID) injection 0.2 mg (0.2 mg Intravenous Given 7/14/24  0711)   potassium chloride (Klor-Con M20) CR tablet 40 mEq (40 mEq Oral Given 7/14/24 0804)   iohexol (OMNIPAQUE) 350 MG/ML injection (MULTI-DOSE) 100 mL (100 mL Intravenous Given 7/14/24 0737)     Time reflects when diagnosis was documented in both MDM as applicable and the Disposition within this note       Time User Action Codes Description Comment    7/14/2024 10:30 AM Mary Carmen Gould [R10.9] Abdominal pain     7/14/2024 10:30 AM Mary Cramen Gould Add [K85.90] Pancreatitis     7/14/2024 10:30 AM Mary Carmen Gould [F10.90] Alcohol use disorder           ED Disposition       ED Disposition   Discharge    Condition   Stable    Date/Time   Sun Jul 14, 2024 10:30 AM    Comment   Fredy Yu discharge to home/self care.                   Follow-up Information       Follow up With Specialties Details Why Contact Info    Tejal Garcia MD Family Medicine Schedule an appointment as soon as possible for a visit   [address] [place] [address]  [phone]            Discharge Medication List as of 7/14/2024 10:31 AM        START taking these medications    Details   ondansetron (ZOFRAN-ODT) 4 mg disintegrating tablet Take 1 tablet (4 mg total) by mouth every 6 (six) hours as needed for nausea or vomiting, Starting Sun 7/14/2024, Normal           CONTINUE these medications which have NOT CHANGED    Details   adalimumab (HUMIRA) 40 mg/0.8 mL PSKT Inject 40 mg under the skin every 14 (fourteen) days, Historical Med      alfuzosin (UROXATRAL) 10 mg 24 hr tablet Take 1 tablet (10 mg total) by mouth daily, Starting Fri 1/19/2024, Until Wed 7/17/2024, Normal      chlordiazePOXIDE (LIBRIUM) 25 mg capsule Take 1 tablet this evening and then 1 tablet tomorrow, Normal      finasteride (PROSCAR) 5 mg tablet Take 1 tablet (5 mg total) by mouth daily, Starting Tue 7/11/2023, Normal      folic acid (FOLVITE) 1 mg tablet Take 1 tablet (1 mg total) by mouth daily, Starting Tue 3/12/2024, Normal      gabapentin  (Neurontin) 300 mg capsule Take 1 capsule (300 mg total) by mouth daily at bedtime, Starting Tue 3/12/2024, Normal      hydrochlorothiazide (HYDRODIURIL) 25 mg tablet Take 25 mg by mouth daily, Historical Med      methotrexate 2.5 MG tablet Take 8 tablets (20 mg total) by mouth once a week, Starting Tue 3/12/2024, Normal      nadolol (CORGARD) 20 mg tablet Take 1 tablet (20 mg total) by mouth daily, Starting Mon 9/25/2023, Normal      predniSONE 1 mg tablet Take 4 tablets (4 mg total) by mouth daily, Starting Sat 6/15/2024, Normal      traZODone (DESYREL) 100 mg tablet Take 1.5 tablets (150 mg total) by mouth daily at bedtime, Starting Fri 1/19/2024, Normal           No discharge procedures on file.  Prior to Admission Medications   Prescriptions Last Dose Informant Patient Reported? Taking?   adalimumab (HUMIRA) 40 mg/0.8 mL PSKT  Self Yes No   Sig: Inject 40 mg under the skin every 14 (fourteen) days   alfuzosin (UROXATRAL) 10 mg 24 hr tablet  Self No No   Sig: Take 1 tablet (10 mg total) by mouth daily   chlordiazePOXIDE (LIBRIUM) 25 mg capsule   No No   Sig: Take 1 tablet this evening and then 1 tablet tomorrow   finasteride (PROSCAR) 5 mg tablet  Self No No   Sig: Take 1 tablet (5 mg total) by mouth daily   folic acid (FOLVITE) 1 mg tablet  Self No No   Sig: Take 1 tablet (1 mg total) by mouth daily   gabapentin (Neurontin) 300 mg capsule  Self No No   Sig: Take 1 capsule (300 mg total) by mouth daily at bedtime   hydrochlorothiazide (HYDRODIURIL) 25 mg tablet  Self Yes No   Sig: Take 25 mg by mouth daily   methotrexate 2.5 MG tablet  Self No No   Sig: Take 8 tablets (20 mg total) by mouth once a week   nadolol (CORGARD) 20 mg tablet  Self No No   Sig: Take 1 tablet (20 mg total) by mouth daily   predniSONE 1 mg tablet   No No   Sig: Take 4 tablets (4 mg total) by mouth daily   traZODone (DESYREL) 100 mg tablet  Self No No   Sig: Take 1.5 tablets (150 mg total) by mouth daily at bedtime     "  Facility-Administered Medications: None       Portions of the record may have been created with voice recognition software. Occasional wrong word or \"sound a like\" substitutions may have occurred due to the inherent limitations of voice recognition software. Read the chart carefully and recognize, using context, where substitutions have occurred.    Electronically signed by:  MD Alberto Ball MD  07/14/24 5767    "

## 2024-07-15 ENCOUNTER — TRANSITIONAL CARE MANAGEMENT (OUTPATIENT)
Dept: FAMILY MEDICINE CLINIC | Facility: CLINIC | Age: 74
End: 2024-07-15

## 2024-07-16 ENCOUNTER — TELEPHONE (OUTPATIENT)
Age: 74
End: 2024-07-16

## 2024-07-16 NOTE — TELEPHONE ENCOUNTER
Patient would like a call from Dr. Wheeler or the head nurse.  He said it was in regards to getting the number for Dr. Ward which I gave him. He still requested a call back.    Please advise, thank you.

## 2024-07-16 NOTE — TELEPHONE ENCOUNTER
2022      To Whom it May Concern:      Mesfin Moralez was seen in our office today and was accompanied by her mother.        Sincerely,            Marcy Babin MD   [facility]   [facility]  [address]  [address] [place] [address]                 Hello all,    Could we please reach out to this gentleman to help clear this up?  See my message to the patient for details on PAP settings; his ROSARIO is severe (PARTHA 33.6, O2 ginger 67%).    Thank you,  Dr. Ward

## 2024-07-16 NOTE — PATIENT COMMUNICATION
Pt called the neurology office. He expressed frustration at the break down in communication. He stated that he never received the PAP equipment. He is requesting a return call from the sleep medicine clinical team to discuss. He would also like Dr. Ward to be made aware of this issue.

## 2024-07-16 NOTE — PATIENT COMMUNICATION
Per chart, BiPAP Rx was sent via [place] on 6/27/24 but [employer] was unable to process as there was missing documentation.  Missing documentation was uploaded yesterday 7/15/24.     Email sent to [employer] management team requesting an expedited set up.     Called patient and advised of eyal.  Advised to call sleep center if he isn't contacted by [employer] in the next few days.

## 2024-07-16 NOTE — TELEPHONE ENCOUNTER
Patient called in asking about his Bipap machine not being delivered yet. Patient is upset with no communication and would like a message sent to Dr. Ward's office to get an update because he has a follow up scheduled but has not been using it.

## 2024-07-18 ENCOUNTER — OFFICE VISIT (OUTPATIENT)
Dept: FAMILY MEDICINE CLINIC | Facility: CLINIC | Age: 74
End: 2024-07-18
Payer: MEDICARE

## 2024-07-18 VITALS
WEIGHT: 242.4 LBS | HEIGHT: 65 IN | OXYGEN SATURATION: 96 % | SYSTOLIC BLOOD PRESSURE: 112 MMHG | BODY MASS INDEX: 40.39 KG/M2 | TEMPERATURE: 96 F | DIASTOLIC BLOOD PRESSURE: 70 MMHG | HEART RATE: 67 BPM

## 2024-07-18 DIAGNOSIS — I10 ESSENTIAL HYPERTENSION: ICD-10-CM

## 2024-07-18 DIAGNOSIS — Z87.898 HISTORY OF PROLONGED Q-T INTERVAL ON ECG: ICD-10-CM

## 2024-07-18 DIAGNOSIS — M35.3 PMR (POLYMYALGIA RHEUMATICA) (HCC): ICD-10-CM

## 2024-07-18 DIAGNOSIS — G62.9 NEUROPATHY: ICD-10-CM

## 2024-07-18 DIAGNOSIS — Z09 HOSPITAL DISCHARGE FOLLOW-UP: ICD-10-CM

## 2024-07-18 DIAGNOSIS — K85.20 ALCOHOL-INDUCED ACUTE PANCREATITIS WITHOUT INFECTION OR NECROSIS: ICD-10-CM

## 2024-07-18 DIAGNOSIS — F10.139 ALCOHOL ABUSE WITH WITHDRAWAL (HCC): ICD-10-CM

## 2024-07-18 DIAGNOSIS — Z76.89 ENCOUNTER FOR SUPPORT AND COORDINATION OF TRANSITION OF CARE: Primary | ICD-10-CM

## 2024-07-18 DIAGNOSIS — G47.33 OSA (OBSTRUCTIVE SLEEP APNEA): ICD-10-CM

## 2024-07-18 DIAGNOSIS — F41.9 ANXIETY: ICD-10-CM

## 2024-07-18 LAB
DME PARACHUTE DELIVERY DATE ACTUAL: NORMAL
DME PARACHUTE DELIVERY DATE EXPECTED: NORMAL
DME PARACHUTE DELIVERY DATE REQUESTED: NORMAL
DME PARACHUTE ITEM DESCRIPTION: NORMAL
DME PARACHUTE ORDER STATUS: NORMAL
DME PARACHUTE SUPPLIER NAME: NORMAL
DME PARACHUTE SUPPLIER PHONE: NORMAL

## 2024-07-18 PROCEDURE — 99496 TRANSJ CARE MGMT HIGH F2F 7D: CPT | Performed by: FAMILY MEDICINE

## 2024-07-18 RX ORDER — LANOLIN ALCOHOL/MO/W.PET/CERES
100 CREAM (GRAM) TOPICAL DAILY
COMMUNITY

## 2024-07-18 RX ORDER — BUSPIRONE HYDROCHLORIDE 7.5 MG/1
7.5 TABLET ORAL 2 TIMES DAILY
Qty: 60 TABLET | Refills: 2 | Status: SHIPPED | OUTPATIENT
Start: 2024-07-18

## 2024-07-18 NOTE — PROGRESS NOTES
Transition of Care Visit  Name: Fredy Yu      : 1950      MRN: [id number]  Encounter Provider: Tita Galan MD  Encounter Date: 2024   Encounter department: [facility] PRIMARY CARE    Assessment & Plan   1. Encounter for support and coordination of transition of care  2. Hospital discharge follow-up  3. Alcohol abuse with withdrawal (HCC)  Comments:  Patient's last alcohol use was 2024  Was admitted on observation from 24-24  Advised patient to taper off Librium(1 tablet next 2 days) as patient has been feeling shaky  Denies seizures  Encouraged to take thiamine and folic acid daily  Counseled on alcohol cessation  Follow-up in 1 month  4. Alcohol-induced acute pancreatitis without infection or necrosis  Comments:  Treated with IV fluids in the ED  Patient able to tolerate food currently  Denies abdominal pain  Likely has chronic pancreatitis at baseline given significant alcohol use  5. Essential hypertension  Comments:  BP stabe  Continue current Rx  Corgard 20 mg daily  HCTZ 25 mg daily  6. ROSARIO (obstructive sleep apnea)  Comments:  Picked CPAP today  Encouraged to wear everyday  7. Neuropathy  Comments:  Continue Gabapentin  8. Anxiety  Comments:  Patient reports increased anxiety and requests medications to calm him down  We will re-initiate Buspar 7.5mg BID  Tapering off Librium might help with his shakiness  We will continue to monitor  Orders:  -     busPIRone (BUSPAR) 7.5 mg tablet; Take 1 tablet (7.5 mg total) by mouth 2 (two) times a day  9. History of prolonged Q-T interval on ECG  Comments:  EKG reviewed from hospital admissions  Caution with medications that could prolong QT  Can d/c Zofran as pt has no symptoms  10. PMR (polymyalgia rheumatica) ([facility])  Comments:  Following Rheumatology  Plan per Rheum         History of Present Illness     Transitional Care Management Review:   Fredy Yu is a 74 y.o. male here for TCM follow up.     During the TCM  phone call patient stated:  TCM Call       Date and time call was made  7/16/2024 10:29 AM    Hospital care reviewed  Records reviewed    Patient was hospitialized at  [facility]    Date of Admission  07/11/24    Date of discharge  07/14/24    Diagnosis  Alcohol abuse with withdrawal without complication    Disposition  Home    Were the patients medications reviewed and updated  Yes    Current Symptoms  None    Fatigue severity  Moderate          TCM Call       Post hospital issues  None    Should patient be enrolled in anticoag monitoring?  No    Scheduled for follow up?  No    Did you obtain your prescribed medications  Yes    Do you need help managing your prescriptions or medications  No    Is transportation to your appointment needed  No    Living Arrangements  Alone    Are you recieving any outpatient services  No    Are you recieving home care services  No    Are you using any community resources  No    Current waiver services  No    Have you fallen in the last 12 months  No    Interperter language line needed  No    Counseling  Patient    Counseling topics  Prognosis          Patient is a 74-year-old male who presents to the office today for TCM follow-up.  Patient was admitted on observation in the hospital for alcohol withdrawal on 7/11/2024. Patient has been recovering from alcohol abuse but return to heavy usage since May.  His last drink was prior to the hospital admission. Patient is being admitted on observation status [facility] for further management of alcohol abuse with withdrawal, leukocytosis, elevated lipase level.  Patient states that he was subsequently discharged on 7/13/2024 and returned to the ER on 7/14/2024 for abdominal pain.  He was noted to have acute mild pancreatitis.  He was treated with IV fluids and discharged from the ED.  Patient reports that he no longer has any abdominal pain.  It is that he feels shaky since discharge.  He requests medications to calm him  "down.  Denies seizure, headaches, pain, SOB abdominal pain, nausea/vomiting.  Patient states that he picked up CPAP machine today and will start wearing his CPAP from today.        Review of Systems   Constitutional:  Negative for chills and fever.   HENT:  Negative for ear pain and sore throat.    Eyes:  Negative for pain and visual disturbance.   Respiratory:  Negative for cough and shortness of breath.    Cardiovascular:  Negative for chest pain and palpitations.   Gastrointestinal:  Negative for abdominal pain and vomiting.   Genitourinary:  Negative for dysuria and hematuria.   Musculoskeletal:  Negative for arthralgias and back pain.   Skin:  Negative for color change and rash.   Neurological:  Positive for dizziness. Negative for seizures and syncope.   All other systems reviewed and are negative.    Objective     /70 (BP Location: Right arm)   Pulse 67   Temp (!) 96 °F (35.6 °C) (Tympanic)   Ht 5' 5\" (1.651 m)   Wt 110 kg (242 lb 6.4 oz)   SpO2 96%   BMI 40.34 kg/m²     Physical Exam  Vitals and nursing note reviewed.   Constitutional:       General: He is not in acute distress.     Appearance: He is well-developed.   HENT:      Head: Normocephalic and atraumatic.      Right Ear: Tympanic membrane normal.      Left Ear: Tympanic membrane normal.      Nose: Nose normal.      Mouth/Throat:      Mouth: Mucous membranes are moist.      Pharynx: Oropharynx is clear.   Eyes:      Conjunctiva/sclera: Conjunctivae normal.   Cardiovascular:      Rate and Rhythm: Normal rate and regular rhythm.      Heart sounds: No murmur heard.  Pulmonary:      Effort: Pulmonary effort is normal. No respiratory distress.      Breath sounds: Normal breath sounds.   Abdominal:      Palpations: Abdomen is soft.      Tenderness: There is no abdominal tenderness.   Musculoskeletal:         General: No swelling.      Cervical back: Neck supple.   Skin:     General: Skin is warm and dry.      Capillary Refill: Capillary refill " takes less than 2 seconds.   Neurological:      Mental Status: He is alert and oriented to person, place, and time.   Psychiatric:      Comments: Mildly anxious       Medications have been reviewed by provider in current encounter    Administrative Statements   I have spent a total time of 45 minutes in caring for this patient on the day of the visit/encounter including Diagnostic results, Risks and benefits of tx options, Instructions for management, Patient and family education, Importance of tx compliance, Impressions, Counseling / Coordination of care, and Obtaining or reviewing history  .

## 2024-07-23 ENCOUNTER — OFFICE VISIT (OUTPATIENT)
Dept: RHEUMATOLOGY | Facility: CLINIC | Age: 74
End: 2024-07-23
Payer: MEDICARE

## 2024-07-23 VITALS
OXYGEN SATURATION: 96 % | BODY MASS INDEX: 39.49 KG/M2 | WEIGHT: 237 LBS | SYSTOLIC BLOOD PRESSURE: 120 MMHG | HEIGHT: 65 IN | DIASTOLIC BLOOD PRESSURE: 60 MMHG | HEART RATE: 58 BPM

## 2024-07-23 DIAGNOSIS — L40.9 PSORIASIS: ICD-10-CM

## 2024-07-23 DIAGNOSIS — M19.90 INFLAMMATORY ARTHRITIS: ICD-10-CM

## 2024-07-23 DIAGNOSIS — S32.000S COMPRESSION FRACTURE OF LUMBAR VERTEBRA, UNSPECIFIED LUMBAR VERTEBRAL LEVEL, SEQUELA: ICD-10-CM

## 2024-07-23 DIAGNOSIS — R20.0 NUMBNESS: ICD-10-CM

## 2024-07-23 DIAGNOSIS — M35.3 PMR (POLYMYALGIA RHEUMATICA) (HCC): Primary | ICD-10-CM

## 2024-07-23 DIAGNOSIS — M35.3 POLYMYALGIA RHEUMATICA (HCC): ICD-10-CM

## 2024-07-23 DIAGNOSIS — M80.88XA OTHER OSTEOPOROSIS WITH CURRENT PATHOLOGICAL FRACTURE, VERTEBRA(E), INITIAL ENCOUNTER FOR FRACTURE (HCC): ICD-10-CM

## 2024-07-23 DIAGNOSIS — L40.50 PSORIATIC ARTHRITIS (HCC): ICD-10-CM

## 2024-07-23 DIAGNOSIS — G62.9 NEUROPATHY: ICD-10-CM

## 2024-07-23 DIAGNOSIS — Z79.899 HIGH RISK MEDICATION USE: ICD-10-CM

## 2024-07-23 PROBLEM — S32.000A COMPRESSION OF LUMBAR VERTEBRA (HCC): Status: ACTIVE | Noted: 2024-07-23

## 2024-07-23 PROCEDURE — G2211 COMPLEX E/M VISIT ADD ON: HCPCS | Performed by: INTERNAL MEDICINE

## 2024-07-23 PROCEDURE — 99214 OFFICE O/P EST MOD 30 MIN: CPT | Performed by: INTERNAL MEDICINE

## 2024-07-23 RX ORDER — FOLIC ACID 1 MG/1
1 TABLET ORAL DAILY
Qty: 90 TABLET | Refills: 1 | Status: SHIPPED | OUTPATIENT
Start: 2024-07-23

## 2024-07-23 RX ORDER — METHOTREXATE 2.5 MG/1
20 TABLET ORAL WEEKLY
Qty: 120 TABLET | Refills: 1 | Status: SHIPPED | OUTPATIENT
Start: 2024-07-23

## 2024-07-23 RX ORDER — GABAPENTIN 400 MG/1
400 CAPSULE ORAL
Qty: 90 CAPSULE | Refills: 1 | Status: SHIPPED | OUTPATIENT
Start: 2024-07-23

## 2024-07-23 RX ORDER — PREDNISONE 1 MG/1
4 TABLET ORAL DAILY
Qty: 120 TABLET | Refills: 6 | Status: SHIPPED | OUTPATIENT
Start: 2024-07-23

## 2024-07-23 NOTE — ASSESSMENT & PLAN NOTE
Stable on Humira and methotrexate, no current flares  Plan:  Continue current regimen  Q3 month labs   Continue current prednisone dose

## 2024-07-23 NOTE — PATIENT INSTRUCTIONS
Continue methotrexate 8 tabs once a week   Continue folic acid daily  Continue Humira every other week through Dermatology  Continue prednisone 4mg daily  Increase to 400mg of gabapentin at bedtime  Take daily Vit. D  Schedule DEXA scan  Do labs every 3 months     Return to clinic in 6 months

## 2024-07-23 NOTE — ASSESSMENT & PLAN NOTE
On chronic steroids, some prior compression fractures of spine  Plan:  Check Vitamin D level   Prescribed vitamin D  DXA scan

## 2024-07-23 NOTE — PROGRESS NOTES
Ambulatory Visit  Name: Fredy Yu      : 1950      MRN: [id number]  Encounter Provider: Girish Anna MD  Encounter Date: 2024   Encounter department: [facility] RHEUMATOLOGY [facility]    Assessment & Plan   1. PMR (polymyalgia rheumatica) ([facility])  2. Neuropathy  -     gabapentin (Neurontin) 400 mg capsule; Take 1 capsule (400 mg total) by mouth daily at bedtime  3. Psoriatic arthritis ([facility])  Assessment & Plan:  Stable on Humira and methotrexate, no current flares  Plan:  Continue current regimen  Q3 month labs   Continue current prednisone dose  Orders:  -     methotrexate 2.5 MG tablet; Take 8 tablets (20 mg total) by mouth once a week  -     CBC and differential; Standing  -     Comprehensive metabolic panel; Standing  -     C-reactive protein; Standing  -     Sedimentation rate, automated; Standing  -     predniSONE 1 mg tablet; Take 4 tablets (4 mg total) by mouth daily  4. Inflammatory arthritis  -     folic acid (FOLVITE) 1 mg tablet; Take 1 tablet (1 mg total) by mouth daily  -     CBC and differential; Standing  -     Comprehensive metabolic panel; Standing  -     C-reactive protein; Standing  -     Sedimentation rate, automated; Standing  5. Compression fracture of lumbar vertebra, unspecified lumbar vertebral level, sequela  -     DXA bone density spine hip and pelvis; Future  6. Other osteoporosis with current pathological fracture, vertebra(e), initial encounter for fracture ([facility])  Assessment & Plan:  On chronic steroids, some prior compression fractures of spine  Plan:  Check Vitamin D level   Prescribed vitamin D  DXA scan  Orders:  -     DXA bone density spine hip and pelvis; Future  -     Vitamin D 25 hydroxy  7. High risk medication use  Assessment & Plan:  Taking Humira and Methotrexate  Plan:  Q3 month labs  Orders:  -     CBC and differential; Standing  -     Comprehensive metabolic panel; Standing  8. Polymyalgia rheumatica (HCC)  -     predniSONE 1 mg tablet;  Take 4 tablets (4 mg total) by mouth daily  9. Psoriasis  Assessment & Plan:  Following with Dermatology  Plan:  Continue to use topical ointment for skin lesions  10. neuropathy  Assessment & Plan:  Continuing to have neuropathy in upper arms, mildly improved since starting gabapentin  Plan:  Increased Gabapentin dose to 400 mg qhs       History of Present Illness       Rheumatic Disease Summary   Fredy Yu is a 74-year-old male who is here for follow-up of his psoriatic arthritis. He consents to RYAN recording.      He has been experiencing numbness in his thighs for about a month, radiating slightly down his legs but not significantly. The numbness is more pronounced when he is at rest than when he is standing. It is more severe on the right side than on the left. He persistently had right-sided lower back pain, which he thinks is due to a disc problem. He is not undergoing physical therapy for his back, but he is doing it by himself despite covering the co-pay. He is taking 5 mg of prednisone daily, which seems to be effective in managing his condition and his supply will last until 03/2024. He wonders if the prednisone is causing weight gain. He has not noticed he is eating more than average. His joint pain is under control. He takes methotrexate 8 tablets every Monday and administers himself Humira injection every second week.      He has neuropathy in his legs. He takes gabapentin once a day at bedtime despite being prescribed to take it 3 times daily, however it makes him drowsy. He believes he should be taking it more often, especially he has discomfort in the bottom of his feet. His podiatrist advised him not to walk barefoot at home.      He had an MRI of his lower spine over time. He had a few compression fractures in his spine. He does not remember being told he has osteoporosis, and he is not taking medications for it. He had an EMG test in his arms a long time ago at [facility], but not his legs. His arms  do not cause him much discomfort. He has not seen a neurologist. He has intermittent numbness in his fingertips and thighs.     He has not had any labs done since 09/2023.    Last visit 3/12/24:presents for follow-up of psoriatic arthritis, which has been stable. Admits to having numbness in thighs for last month.     Psoriatic arthritis.  Advised to continue the Humira every other week, take folic acid daily, and methotrexate 8 tablets once a week.     Neuropathy.  He has significant neuropathy in his upper legs, worse than his upper arms.   Recommend taking gabapentin 300 mg at bedtime.     Continue methotrexate 8 tabs once a week   Continue folic acid daily  Continue Humira every other week through Dermatology  Go down on prednisone to 4mg daily for a month, then 3mg daily for a month, then 2 mg daily for a month, then 1mg daily for a month, then stiop  Take 300mg of gabapentin at bedtime  Neurology referral made  Do labs now, then every 3 months  Will order DEXA scan at next visit      Return to clinic in 4 months      Today's HPI  Fredy Yu is a 74 y.o. male who presents for follow up for psoriatic arthritis, currently managed with Humira every other week as well as MTX 8 tablets per week with folic acid. Last visit he had been tapering down on prednisone, but after a week of being on 3 mg daily he was getting leg arthralgias in the knees, so currently he is taking 4 mg of prednisone daily.  He recently started noticing more dry skin patches in the last 2 weeks. His last Humira injection was yesterday. He has mild as neuropathy in his arms, he is taking gabapentin for this and it has improved. He had an appointment with Neurology and had a sleep study done, and was given CPAP.      He recently had a relapse with his alcohol use and was in the hospital and just got discharged. He is currently stressed out because his daughter has mental health issues and currently is in [place] and him and his wife are  "trying to get her to come back to the [place] for further help.           Review of Systems   Constitutional: Negative.    HENT: Negative.     Eyes: Negative.    Respiratory: Negative.     Genitourinary: Negative.    Musculoskeletal:  Positive for arthralgias, back pain and gait problem.   Skin:  Positive for rash.   Neurological:  Positive for numbness.       Objective     /60   Pulse 58   Ht 5' 5\" (1.651 m)   Wt 108 kg (237 lb)   SpO2 96%   BMI 39.44 kg/m²     Physical Exam  Constitutional:       Appearance: He is obese.   HENT:      Head: Normocephalic.      Mouth/Throat:      Mouth: Mucous membranes are moist.   Eyes:      Extraocular Movements: Extraocular movements intact.      Pupils: Pupils are equal, round, and reactive to light.   Cardiovascular:      Pulses: Normal pulses.      Heart sounds: Normal heart sounds.   Pulmonary:      Effort: Pulmonary effort is normal.      Breath sounds: Normal breath sounds.   Musculoskeletal:         General: No swelling or tenderness. Normal range of motion.   Skin:     General: Skin is dry.      Findings: Rash present.      Comments: Psoriatic lesions all over skin, dry and erythematous patches     Neurological:      Mental Status: He is alert and oriented to person, place, and time.       Administrative Statements           "

## 2024-07-23 NOTE — ASSESSMENT & PLAN NOTE
Continuing to have neuropathy in upper arms, mildly improved since starting gabapentin  Plan:  Increased Gabapentin dose to 400 mg qhs

## 2024-07-29 ENCOUNTER — RA CDI HCC (OUTPATIENT)
Dept: OTHER | Facility: HOSPITAL | Age: 74
End: 2024-07-29

## 2024-07-30 ENCOUNTER — TELEPHONE (OUTPATIENT)
Age: 74
End: 2024-07-30

## 2024-07-30 NOTE — TELEPHONE ENCOUNTER
Spoke to patient he stated he had a family emergency and had to cancel appointment.  Patient would like a call back on Friday to see if he could reschedule appointment.

## 2024-07-31 ENCOUNTER — TELEPHONE (OUTPATIENT)
Dept: ADMINISTRATIVE | Facility: OTHER | Age: 74
End: 2024-07-31

## 2024-07-31 NOTE — TELEPHONE ENCOUNTER
07/31/24 10:10 AM    Patient contacted to bring Advance Directive, POLST, or Living Will document to next scheduled pcp visit.[facility] Department left message.    Thank you.  Naye Randall  [facility]

## 2024-08-02 NOTE — TELEPHONE ENCOUNTER
Called patient and LMOM to reschedule appointment. When patient calls back, please reschedule to Olga's next available spot.

## 2024-08-05 ENCOUNTER — OFFICE VISIT (OUTPATIENT)
Dept: FAMILY MEDICINE CLINIC | Facility: CLINIC | Age: 74
End: 2024-08-05
Payer: MEDICARE

## 2024-08-05 VITALS
DIASTOLIC BLOOD PRESSURE: 66 MMHG | HEART RATE: 58 BPM | OXYGEN SATURATION: 93 % | WEIGHT: 237.4 LBS | TEMPERATURE: 98.2 F | HEIGHT: 65 IN | BODY MASS INDEX: 39.55 KG/M2 | SYSTOLIC BLOOD PRESSURE: 118 MMHG

## 2024-08-05 DIAGNOSIS — F41.9 ANXIETY: Primary | ICD-10-CM

## 2024-08-05 PROCEDURE — G2211 COMPLEX E/M VISIT ADD ON: HCPCS | Performed by: FAMILY MEDICINE

## 2024-08-05 PROCEDURE — 99214 OFFICE O/P EST MOD 30 MIN: CPT | Performed by: FAMILY MEDICINE

## 2024-08-05 NOTE — PROGRESS NOTES
"Ambulatory Visit  Name: Fredy Yu      : 1950      MRN: [id number]  Encounter Provider: Tejal Garcia MD  Encounter Date: 2024   Encounter department: [facility] PRIMARY CARE    Assessment & Plan   1. Anxiety  -Patient returns for follow-up of worsening anxiety despite starting BuSpar 7.5 mg twice daily x 2 weeks ago  -Patient reports significant family stressors including his daughter who has recently moved back from [place] and is now living in her home with significant psychiatric disease burden  -PCP discussed at length with patient that given above would  on combination of long-acting pharmacotherapy with short acting for better symptom control  -Patient denies any SI/HI at this time.  Patient was applauded as he has not experienced return to use since discharge  -sertraline (Zoloft) 50 mg tablet; Take 1 tablet (50 mg total) by mouth daily       History of Present Illness     Anxiety  Presents for follow-up visit. Symptoms include irritability, nervous/anxious behavior and restlessness. Symptoms occur constantly. The severity of symptoms is moderate. The quality of sleep is poor.     Compliance with medications is %.       Review of Systems   Constitutional:  Positive for irritability.   Psychiatric/Behavioral:  The patient is nervous/anxious.        Objective     /66   Pulse 58   Temp 98.2 °F (36.8 °C)   Ht 5' 5\" (1.651 m)   Wt 108 kg (237 lb 6.4 oz)   SpO2 93%   BMI 39.51 kg/m²     Physical Exam  Vitals reviewed.   Constitutional:       Appearance: Normal appearance.   HENT:      Head: Normocephalic and atraumatic.      Right Ear: External ear normal.      Left Ear: External ear normal.      Nose: Nose normal.   Eyes:      Conjunctiva/sclera: Conjunctivae normal.   Cardiovascular:      Rate and Rhythm: Normal rate and regular rhythm.      Pulses: Normal pulses.   Musculoskeletal:      Cervical back: Neck supple.   Skin:     General: Skin is warm and " dry.   Neurological:      Mental Status: He is alert. Mental status is at baseline.   Psychiatric:         Mood and Affect: Mood normal.         Behavior: Behavior normal.         Thought Content: Thought content normal.         Judgment: Judgment normal.       Administrative Statements

## 2024-08-09 ENCOUNTER — TELEPHONE (OUTPATIENT)
Dept: FAMILY MEDICINE CLINIC | Facility: CLINIC | Age: 74
End: 2024-08-09

## 2024-08-09 NOTE — TELEPHONE ENCOUNTER
Sondra from [facility] lab called and was needing the diagnosis codes for the pt. I gave them to her and she was all set.

## 2024-08-09 NOTE — TELEPHONE ENCOUNTER
Called patient regarding bill from [facility]     Spoke to patient and informed him that I received the bill and called [facility] to provide them additional Diagnosis codes. I advised Fredy to hold off paying the bill until the charges have been resubmitted. In addition he can contact the lab with any questions or concerns and ou office as well.     Fredy verbalized understanding.    Thank you

## 2024-08-09 NOTE — TELEPHONE ENCOUNTER
Patient brought in a bill from [facility]. Acct number [id number].     It is requesting a different Diagnosis code.     Called [facility] [phone] On hold for a few minutes and then sent to voice mail. Advised on voicemail they could contact the office and explain there will be a note on the patients chart.     Please relay the following:    Lipid Panel Diagnosis Code E78.2  Hemoglobin Test E74.818    Thank you

## 2024-08-13 ENCOUNTER — HOSPITAL ENCOUNTER (OUTPATIENT)
Dept: BONE DENSITY | Facility: HOSPITAL | Age: 74
Discharge: HOME | End: 2024-08-13

## 2024-08-13 DIAGNOSIS — M80.88XA OTHER OSTEOPOROSIS WITH CURRENT PATHOLOGICAL FRACTURE, VERTEBRA(E), INITIAL ENCOUNTER FOR FRACTURE (HCC): ICD-10-CM

## 2024-08-13 DIAGNOSIS — S32.000S COMPRESSION FRACTURE OF LUMBAR VERTEBRA, UNSPECIFIED LUMBAR VERTEBRAL LEVEL, SEQUELA: ICD-10-CM

## 2024-08-14 ENCOUNTER — TELEPHONE (OUTPATIENT)
Dept: ADMINISTRATIVE | Facility: OTHER | Age: 74
End: 2024-08-14

## 2024-08-14 NOTE — TELEPHONE ENCOUNTER
08/14/24 11:54 AM    Patient contacted to bring Advance Directive, POLST, or Living Will document to next scheduled pcp visit.[facility] Department spoke with patient/ caregiver.    Thank you.  Naye Randall  [facility]

## 2024-08-15 ENCOUNTER — OFFICE VISIT (OUTPATIENT)
Dept: FAMILY MEDICINE CLINIC | Facility: CLINIC | Age: 74
End: 2024-08-15
Payer: MEDICARE

## 2024-08-15 VITALS
SYSTOLIC BLOOD PRESSURE: 118 MMHG | WEIGHT: 235 LBS | TEMPERATURE: 97.4 F | OXYGEN SATURATION: 93 % | HEART RATE: 76 BPM | HEIGHT: 65 IN | BODY MASS INDEX: 39.15 KG/M2 | DIASTOLIC BLOOD PRESSURE: 58 MMHG

## 2024-08-15 DIAGNOSIS — F10.11 ALCOHOL USE DISORDER, MILD, IN EARLY REMISSION, ABUSE: ICD-10-CM

## 2024-08-15 DIAGNOSIS — F41.9 ANXIETY: Primary | ICD-10-CM

## 2024-08-15 PROCEDURE — 99213 OFFICE O/P EST LOW 20 MIN: CPT | Performed by: FAMILY MEDICINE

## 2024-08-15 PROCEDURE — G2211 COMPLEX E/M VISIT ADD ON: HCPCS | Performed by: FAMILY MEDICINE

## 2024-08-15 NOTE — PROGRESS NOTES
Ambulatory Visit  Name: Fredy Yu      : 1950      MRN: [id number]  Encounter Provider: Андрей Kong MD  Encounter Date: 8/15/2024   Encounter department: [facility] PRIMARY CARE    Assessment & Plan   1. Anxiety  Comments:  Currently on BuSpar and Zoloft, denies any N/V or adverse effects  Pt feels some relief. Advised full 4-6 wk course  Will continue to monitor and reassess  2. Alcohol use disorder, mild, in early remission, abuse  Comments:  Last drink prior to hospitalization in 2024  Denies seizures, dizziness, N/V, tremors  Going to AA meetings regularly  Continue to monitor         History of Present Illness     Patient presenting to the clinic for follow-up on anxiety.  Patient is currently taking BuSpar 7.5 mg twice a day as well as recently started on Zoloft 50 mg 10 days ago.  Patient notes significant life stresses with daughter and has to drive out to [place] now to help daughter.  Patient did not clarify exactly what the issue was that is causing him stress, but continues he states that he has to leave very soon to drive to [place].    Patient states that he has not had any alcohol since before his hospital admission and patient has been going to AA meetings regularly, 3 times this week.  Patient has been taking his BuSpar and Zoloft.  Zoloft was started 10 days ago and patient notes no adverse effects from this.  Patient states he is compliant with this medication and does feel slightly better on this medication.  Patient currently denies any SI/HI or A/V hallucinations.  Patient denies any chest pain, shortness of breath, nausea or vomiting, seizures, tremors.    I advised patient that it would take 4 to 6 weeks to see maximum effect and patient was agreeable.  Patient has an upcoming visit in October to recheck his anxiety.  At that time we will reassess his need for changes in medication.      Review of Systems   Respiratory:  Negative for shortness of breath and  wheezing.    Cardiovascular:  Negative for chest pain.   Gastrointestinal:  Negative for nausea and vomiting.   Neurological:  Negative for tremors and seizures.     Past Medical History:   Diagnosis Date    Alcohol dependency (HCC)     Anxiety     Arthritis     Depression     Kiana (hard of hearing)     Hypertension     Kidney stones     Peripheral neuropathy     Prostate enlargement     Renal disorder     kidney    Shoulder dislocation      Past Surgical History:   Procedure Laterality Date    CHOLECYSTECTOMY      2010    COLONOSCOPY      SHOULDER SURGERY Left     for dislocation x 2, 20 years ago an the second 18 years ago     Family History   Problem Relation Age of Onset    Dementia Mother     Colon cancer Mother     Heart disease Father     COPD Father     Heart failure Father     Coronary artery disease Father     Sleep apnea Brother      Social History     Tobacco Use    Smoking status: Never    Smokeless tobacco: Never   Vaping Use    Vaping status: Never Used   Substance and Sexual Activity    Alcohol use: Not Currently     Alcohol/week: 6.0 standard drinks of alcohol     Comment: In recovery    Drug use: Never    Sexual activity: Not Currently     Partners: Female     Current Outpatient Medications on File Prior to Visit   Medication Sig    adalimumab (HUMIRA) 40 mg/0.8 mL PSKT Inject 40 mg under the skin every 14 (fourteen) days    busPIRone (BUSPAR) 7.5 mg tablet Take 1 tablet (7.5 mg total) by mouth 2 (two) times a day    chlordiazePOXIDE (LIBRIUM) 25 mg capsule Take 1 tablet this evening and then 1 tablet tomorrow    folic acid (FOLVITE) 1 mg tablet Take 1 tablet (1 mg total) by mouth daily    gabapentin (Neurontin) 400 mg capsule Take 1 capsule (400 mg total) by mouth daily at bedtime    methotrexate 2.5 MG tablet Take 8 tablets (20 mg total) by mouth once a week    nadolol (CORGARD) 20 mg tablet Take 1 tablet (20 mg total) by mouth daily    predniSONE 1 mg tablet Take 4 tablets (4 mg total) by mouth  "daily    sertraline (Zoloft) 50 mg tablet Take 1 tablet (50 mg total) by mouth daily    thiamine 100 MG tablet Take 100 mg by mouth daily    traZODone (DESYREL) 100 mg tablet Take 1.5 tablets (150 mg total) by mouth daily at bedtime    alfuzosin (UROXATRAL) 10 mg 24 hr tablet Take 1 tablet (10 mg total) by mouth daily    finasteride (PROSCAR) 5 mg tablet Take 1 tablet (5 mg total) by mouth daily (Patient not taking: Reported on 8/15/2024)    hydrochlorothiazide (HYDRODIURIL) 25 mg tablet Take 25 mg by mouth if needed (Patient not taking: Reported on 8/15/2024)     Allergies   Allergen Reactions    Sulfa Antibiotics Anaphylaxis and Rash     Face Swelling    Sulfacetamide Anaphylaxis and Rash     Face Swelling    Misc. Sulfonamide Containing Compounds Facial Swelling    Elemental Sulfur Rash and Edema     Immunization History   Administered Date(s) Administered    COVID-19 MODERNA VACC 0.5 ML IM 04/08/2021, 05/06/2021, 11/15/2021    INFLUENZA 09/01/2022    Influenza, high dose seasonal 0.7 mL 09/30/2021    Pneumococcal Conjugate 13-Valent 12/04/2019    Pneumococcal Conjugate Vaccine 20-valent (Pcv20), Polysace 12/29/2022    Tdap 09/19/2020    Tuberculin Skin Test 03/10/2020     Objective     /58 (BP Location: Left arm)   Pulse 76   Temp (!) 97.4 °F (36.3 °C) (Tympanic)   Ht 5' 5\" (1.651 m)   Wt 107 kg (235 lb)   SpO2 93%   BMI 39.11 kg/m²     Physical Exam  Constitutional:       Comments: Anxious appearing   Eyes:      General: No scleral icterus.     Extraocular Movements: Extraocular movements intact.   Cardiovascular:      Rate and Rhythm: Normal rate and regular rhythm.      Pulses: Normal pulses.      Heart sounds: Normal heart sounds. No murmur heard.  Pulmonary:      Effort: Pulmonary effort is normal. No respiratory distress.      Breath sounds: Normal breath sounds. No wheezing.   Musculoskeletal:      Right lower leg: No edema.      Left lower leg: No edema.   Skin:     General: Skin is warm.     "  Coloration: Skin is not jaundiced.   Neurological:      Mental Status: He is alert.      Sensory: Sensation is intact.      Motor: No weakness.      Coordination: Coordination is intact. Finger-Nose-Finger Test normal.      Gait: Gait is intact. Gait normal.      Comments: Right upper extremity 3/5 muscle strength.  Patient stated his right arm has always given him problems.  Left upper extremity 5/5 muscle strength  Strong handgrip bilaterally   Psychiatric:         Attention and Perception: He does not perceive auditory or visual hallucinations.         Behavior: Behavior is agitated.         Thought Content: Thought content does not include homicidal or suicidal ideation.      Comments: Patient is agitated today because he has to  his daughter from [place] right after this appointment.  Otherwise patient feels well and states that the medication has been helping.

## 2024-08-19 ENCOUNTER — PATIENT MESSAGE (OUTPATIENT)
Dept: NEUROLOGY | Facility: CLINIC | Age: 74
End: 2024-08-19

## 2024-08-19 DIAGNOSIS — R90.89 ABNORMAL BRAIN MRI: ICD-10-CM

## 2024-08-19 DIAGNOSIS — F40.240 CLAUSTROPHOBIA: Primary | ICD-10-CM

## 2024-08-20 RX ORDER — LORAZEPAM 1 MG/1
TABLET ORAL
Qty: 2 TABLET | Refills: 0 | Status: SHIPPED | OUTPATIENT
Start: 2024-08-20

## 2024-08-20 NOTE — PATIENT COMMUNICATION
Pt calling to inform that according to the MRI department, pt needs orders placed for BUN and creatinine to check kidney function for contrast.     Pt also asking if an order for Ativan can be put in. He says this was ordered in past for other MRI procedures.    Olga - Please place orders for BUN and Creatinine. Rx entered for Lorazepam. Please review and sign if in agreement. Thank you!

## 2024-08-20 NOTE — PATIENT COMMUNICATION
MALGORZATA Corona    8/20/24 12:59 PM  Script for bun and creat ordered and ativan signed.  Thanks

## 2024-08-20 NOTE — PATIENT COMMUNICATION
Called pt, no answer. Left detailed message advising pt that lab orders placed and script for Lorazapam sent to his pharmacy. Advised pt to call back if any questions. Nothing further at this time.

## 2024-08-21 ENCOUNTER — APPOINTMENT (OUTPATIENT)
Dept: LAB | Facility: CLINIC | Age: 74
End: 2024-08-21
Payer: MEDICARE

## 2024-08-21 DIAGNOSIS — R90.89 ABNORMAL BRAIN MRI: ICD-10-CM

## 2024-08-21 LAB
BUN SERPL-MCNC: 15 MG/DL (ref 5–25)
CREAT SERPL-MCNC: 0.85 MG/DL (ref 0.6–1.3)
GFR SERPL CREATININE-BSD FRML MDRD: 85 ML/MIN/1.73SQ M

## 2024-08-21 PROCEDURE — 82565 ASSAY OF CREATININE: CPT

## 2024-08-21 PROCEDURE — 36415 COLL VENOUS BLD VENIPUNCTURE: CPT

## 2024-08-21 PROCEDURE — 84520 ASSAY OF UREA NITROGEN: CPT

## 2024-08-23 ENCOUNTER — TELEPHONE (OUTPATIENT)
Dept: SLEEP CENTER | Facility: CLINIC | Age: 74
End: 2024-08-23

## 2024-08-23 NOTE — TELEPHONE ENCOUNTER
"Received message from MA that patient called and was upset about the compliance visit, stating that it was \"too soon\".  She explained that the compliance visit needs to be 31-90 days after receiving the machine and his appointment on 9/12 falls within that time.  I tried calling the patient back and got his Voicemail.  I left a message to contact me if he would like.    "

## 2024-08-26 DIAGNOSIS — N40.0 BENIGN PROSTATIC HYPERPLASIA WITHOUT LOWER URINARY TRACT SYMPTOMS: ICD-10-CM

## 2024-08-26 DIAGNOSIS — I10 ESSENTIAL HYPERTENSION: ICD-10-CM

## 2024-08-27 ENCOUNTER — TELEPHONE (OUTPATIENT)
Dept: NEUROLOGY | Facility: CLINIC | Age: 74
End: 2024-08-27

## 2024-08-27 ENCOUNTER — HOSPITAL ENCOUNTER (OUTPATIENT)
Dept: BONE DENSITY | Facility: HOSPITAL | Age: 74
Discharge: HOME | End: 2024-08-27
Attending: INTERNAL MEDICINE
Payer: MEDICARE

## 2024-08-27 VITALS — HEIGHT: 64 IN | BODY MASS INDEX: 40.29 KG/M2 | WEIGHT: 236 LBS

## 2024-08-27 DIAGNOSIS — S32.000S COMPRESSION FRACTURE OF LUMBAR VERTEBRA, UNSPECIFIED LUMBAR VERTEBRAL LEVEL, SEQUELA: ICD-10-CM

## 2024-08-27 DIAGNOSIS — M80.88XA OTHER OSTEOPOROSIS WITH CURRENT PATHOLOGICAL FRACTURE, VERTEBRA(E), INITIAL ENCOUNTER FOR FRACTURE (HCC): ICD-10-CM

## 2024-08-27 PROCEDURE — 77080 DXA BONE DENSITY AXIAL: CPT

## 2024-08-27 RX ORDER — FINASTERIDE 5 MG/1
5 TABLET, FILM COATED ORAL DAILY
Qty: 90 TABLET | Refills: 1 | Status: SHIPPED | OUTPATIENT
Start: 2024-08-27

## 2024-08-27 RX ORDER — NADOLOL 20 MG/1
20 TABLET ORAL DAILY
Qty: 90 TABLET | Refills: 1 | Status: SHIPPED | OUTPATIENT
Start: 2024-08-27

## 2024-08-28 ENCOUNTER — HOSPITAL ENCOUNTER (OUTPATIENT)
Dept: MRI IMAGING | Facility: HOSPITAL | Age: 74
Discharge: HOME | End: 2024-08-28
Payer: MEDICARE

## 2024-08-28 DIAGNOSIS — R90.89 ABNORMAL BRAIN MRI: ICD-10-CM

## 2024-08-28 PROCEDURE — A9585 GADOBUTROL INJECTION: HCPCS

## 2024-08-28 PROCEDURE — 70553 MRI BRAIN STEM W/O & W/DYE: CPT

## 2024-08-28 RX ORDER — GADOBUTROL 604.72 MG/ML
10 INJECTION INTRAVENOUS
Status: COMPLETED | OUTPATIENT
Start: 2024-08-28 | End: 2024-08-28

## 2024-08-28 RX ADMIN — GADOBUTROL 10 ML: 604.72 INJECTION INTRAVENOUS at 12:17

## 2024-08-30 NOTE — RESULT ENCOUNTER NOTE
Javier Daniel,    I was able to review your brain MRI along with the report.  It appears to be stable when compared to your previous MRI in April.  Please let me know if you have any additional questions or concerns.

## 2024-09-03 NOTE — PATIENT INSTRUCTIONS
Numbness:  -Blood work to complete at your convenience-along with rheumatology labs  -Start physical therapy for balance  -Gabapentin 400mg twice daily for 5 days.  Then you may increase to 400mg three times daily- new script sent to pharmacy  - Recommend to check blood pressure occasionally away from the doctor's office to make sure that those numbers are typically less than 130/80.  If they are frequently higher than that, we recommend checking a little more often and to follow up with primary care team.  -Continue all medications as prescribed be your primary care team/cardiologist.   - Will defer to primary care team for monitoring of cholesterol panel and blood sugar numbers with target LDL cholesterol of less than 70 and hemoglobin A1c less than 7%.  - Recommend following a low salt, mediterranean diet.     We will plan for him to return to the office in 4 months to see MD, but would be happy to see him sooner if the need should arise.  If he has any symptoms concerning for TIA or stroke including sudden painless loss of vision or double vision, difficulty speaking or swallowing, vertigo/room spinning that does not quickly resolve, or weakness/numbness/loss of coordination affecting 1 side of the face or body, he should proceed by ambulance to the nearest emergency room immediately.

## 2024-09-03 NOTE — PROGRESS NOTES
"[facility]  PATIENT:  Fredy Yu  MRN:  [id number]  :  1950  DATE OF SERVICE:  2024  PCP: Tejal Garcia MD    Assessment/Plan:     Neuropathy  Patient was last seen on 3/18/24 by Dr. Reeves for diminished sensation of the left face, arm, and leg.  Per last office visit note, \"He had been diagnosed with neuropathy in the past, but this does not appear to correspond to his symptoms.Patient doing well since his office visit with Dr. Reeves.  Still reports having numbness and tingling left side of his face, arm, and leg.  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   Referral to PT for gait/balance 2/2 neuropathy  Increase gabapentin to 400 mg twice daily x 5 days then may increase to 400 mg 3 times daily if he is tolerating well  Recommend to check blood pressure occasionally away from the doctor's office to make sure that those numbers are typically less than 130/80.  If they are frequently higher than that, we recommend checking it more frequently and following up with primary care team/cardiologist   Continue all medication as prescribed by your primary care team/cardiologist  Will defer to primary care team for monitoring of cholesterol panel and blood sugar numbers with target LDL cholesterol of less than 70 and hemoglobin A1c less than 7%  Recommend following a low salt, mediterranean diet   Recommend routine physical exercise as tolerated     Abnormal brain MRI  Brain MRI was without acute infarction, edema, or mass effect. Showed an unchanged signal abnormality in the anterior right temporal lobe.  Cavernoma is  favored. Patient denies any stroke like symptoms at this time  Repeat brain MRI w wo contrast to be completed in 3 months to monitor area of likely cavernoma  Referral to neurosurgery at that time if indicated         Diagnoses and all orders " "for this visit:    Mixed hyperlipidemia  -     Lipid Panel with Direct LDL reflex; Future    Neuropathy  -     Vitamin B12/Folate, Serum Panel; Future  -     gabapentin (Neurontin) 400 mg capsule; Take 400mg twice daily x 5 days. Then increase to three times daily thereafter    Abnormal brain MRI  -     MRI brain w wo contrast; Future    Cavernoma  -     MRI brain w wo contrast; Future  -     BUN; Future  -     Creatinine, serum; Future    Essential hypertension    ROSARIO (obstructive sleep apnea)         We will plan for her to return to the office in 4 months to see myself or Dr. Dye, but would be happy to see him sooner if the need should arise.  If he has any symptoms concerning for TIA or stroke including sudden painless loss of vision or double vision, difficulty speaking or swallowing, vertigo/room spinning that does not quickly resolve, or weakness/numbness/loss of coordination affecting 1 side of the face or body, he should proceed by ambulance to the nearest emergency room immediately.     CC:     We had the pleasure of evaluating Fredy in neurological follow-up today. He is a 74 y.o. year-old male who presents today for a follow up after MRI brain.      History obtained from patient as well as available medical record review.    History of Present Illness:     Patient was last seen on 3/18/24 by Dr. Reeves for diminished sensation of the left face, arm, and leg.  Per last office visit note, \"He had been diagnosed with neuropathy in the past, but this does not appear to correspond to his symptoms. I would expect neuropathy to cause stocking distribution symptoms that are relatively symmetrical, perhaps spreading upwards later in the clinical course. In addition, his electrical findings were quite severe which does not correspond to the other portions of his case. I would be concerned that there may have been technical factors that could have interfered. I am going to repeat his testing myself. Given the " "one-sided nature of his symptoms I would be more concerned that he may have a right-sided intracranial lesion. He needs a cranial MRI to assess for this possibility. Further measures will be considered based on his test results.\"  Last OV with me 4/19/24: Patient doing well since his office visit with Dr. Reeves.  Still reports having numbness and tingling left side of his face, arm, and leg.  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 Brain MRI was without acute infarction, edema, or mass effect. It does display a subcentimeter T2 hypertintense focus in the anterior right temporal lobe, likely a chronic microbleed or cavernoma.  Patient denies any stroke like symptoms at this time    Interval  history as of 9/4/24:  Ongoing numbness and tingling on the left side of his face and arm.  Vision has gotten worse in the last few months as well and he also reports increased tearing and twitching of the left eye.  He follows closely with an ophthalmologist as he receives eye injections on a regular basis.  He denies any new strokelike symptoms at this time.  He does endorse ongoing neuropathic pain that is especially troublesome during the night but can also keep him from being able to be as mobile as he would like to be during the daytime.  He currently takes gabapentin 400 mg at night.  He was evaluated by sleep medicine since her last office visit and it was determined through his sleep study that he has severe sleep apnea.  He has been wearing his CPAP machine on a nightly basis.    We reviewed his MRI of the brain which again showed an unchanged signal abnormality in the anterior right temporal lobe.  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    Stroke/TIA risk factors were evaluated including:     AP/AC therapy: None.    Statin therapy:None at this time. Will repeat lipid panel and discuss if needed at that time.     Blood pressure " today and as of late:130/80 or less     Most recent LDL:   Lab Results   Component Value Date    LDLCALC 74 06/03/2021      Lab Results   Component Value Date    HGBA1C 5.8 04/20/2024     Cardiology evaluation/Cardiac Monitoring: Echo 5/21/2024: Ejection fraction 65%.  Bilateral atria not well-visualized-extremely technically difficult study with poor endocardial definition and visualization of cardiac structures despite administration of echo contrast.    Endocrinology evaluation: N/A at this time.     Lifestyle history/modifications:    -Diet/Exercise regimen: Well-balanced diet.      -PT/OT/ST: none but would like to do some PT for his gait and balance due to neuropathy.  I will order this for him.    -Sleep: trazadone is helping with his sleep. Also takes Gabapentin 300mg QHS.     -Post-stroke depression/anxiety:Denies at this time    -Smoking:none    -ETOH: none since oct 2021    -Illicit drug use: none    Safety:    Independent of all ADLs  Denies any falls at home in the last month.  And ambulates without an assistive device.  Manages all of his medications and finances on his own.  Denies any issues with short-term memory.     Past Medical History:     Past Medical History:   Diagnosis Date    Alcohol dependency (HCC)     Anxiety     Arthritis     Depression     [place] (hard of hearing)     Hypertension     Kidney stones     Peripheral neuropathy     Prostate enlargement     Renal disorder     kidney    Shoulder dislocation        Patient Active Problem List   Diagnosis    Essential hypertension    Alcohol use disorder, mild, in early remission, abuse    BPH (benign prostatic hyperplasia)    History of prolonged Q-T interval on ECG    Mixed hyperlipidemia    Alcohol induced fatty liver    Elevated lipase    Pruritus    Insomnia    Depression with anxiety    Obesity, morbid (HCC)    Polymyalgia rheumatica (HCC)    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 degeneration, unspecified stage (HCC)    Seizure (HCC)     Psoriasis    Inflammatory arthritis    neuropathy    Neuropathy    Carpal tunnel syndrome of left wrist    Excessive daytime sleepiness    Abnormal brain MRI    Prediabetes    ROSARIO (obstructive sleep apnea)    Snoring    Sleep apnea    Alcohol abuse with withdrawal without complication (HCC)    Leukocytosis    Psoriatic arthritis (HCC)    Other osteoporosis with current pathological fracture, vertebra(e), initial encounter for fracture (HCC)    High risk medication use    Compression of lumbar vertebra (HCC)       Medications:     Current Outpatient Medications   Medication Sig Dispense Refill    adalimumab (HUMIRA) 40 mg/0.8 mL PSKT Inject 40 mg under the skin every 14 (fourteen) days      chlordiazePOXIDE (LIBRIUM) 25 mg capsule Take 1 tablet this evening and then 1 tablet tomorrow 30 capsule 0    finasteride (PROSCAR) 5 mg tablet Take 1 tablet (5 mg total) by mouth daily 90 tablet 1    folic acid (FOLVITE) 1 mg tablet Take 1 tablet (1 mg total) by mouth daily 90 tablet 1    gabapentin (Neurontin) 400 mg capsule Take 400mg twice daily x 5 days. Then increase to three times daily thereafter 90 capsule 3    hydrochlorothiazide (HYDRODIURIL) 25 mg tablet Take 25 mg by mouth if needed      methotrexate 2.5 MG tablet Take 8 tablets (20 mg total) by mouth once a week 120 tablet 1    nadolol (CORGARD) 20 mg tablet Take 1 tablet (20 mg total) by mouth daily 90 tablet 1    predniSONE 1 mg tablet Take 4 tablets (4 mg total) by mouth daily 120 tablet 6    sertraline (Zoloft) 50 mg tablet Take 1 tablet (50 mg total) by mouth daily 60 tablet 0    thiamine 100 MG tablet Take 100 mg by mouth daily      traZODone (DESYREL) 100 mg tablet Take 1.5 tablets (150 mg total) by mouth daily at bedtime 135 tablet 1    alfuzosin (UROXATRAL) 10 mg 24 hr tablet Take 1 tablet (10 mg total) by mouth daily (Patient not taking: Reported on 9/4/2024) 90 tablet 1    busPIRone (BUSPAR) 7.5 mg tablet Take 1 tablet (7.5 mg total) by mouth 2 (two)  times a day (Patient not taking: Reported on 9/4/2024) 60 tablet 2    LORazepam (ATIVAN) 1 mg tablet Take 1 tablet (1 mg total) by mouth 60 minutes pre-procedure May repeat x1 30 mins prior if needed (Patient not taking: Reported on 9/4/2024) 2 tablet 0     No current facility-administered medications for this visit.        Allergies:     Allergies   Allergen Reactions    Sulfa Antibiotics Anaphylaxis and Rash     Face Swelling    Sulfacetamide Anaphylaxis and Rash     Face Swelling    Misc. Sulfonamide Containing Compounds Facial Swelling    Elemental Sulfur Rash and Edema       Family History:     Family History   Problem Relation Age of Onset    Dementia Mother     Colon cancer Mother     Heart disease Father     COPD Father     Heart failure Father     Coronary artery disease Father     Sleep apnea Brother        Social History:     Social History     Socioeconomic History    Marital status: /Civil Union     Spouse name: Not on file    Number of children: Not on file    Years of education: Not on file    Highest education level: Not on file   Occupational History    Not on file   Tobacco Use    Smoking status: Never    Smokeless tobacco: Never   Vaping Use    Vaping status: Never Used   Substance and Sexual Activity    Alcohol use: Not Currently     Alcohol/week: 6.0 standard drinks of alcohol     Comment: In recovery    Drug use: Never    Sexual activity: Not Currently     Partners: Female   Other Topics Concern    Not on file   Social History Narrative    Not on file     Social Determinants of Health     Financial Resource Strain: Low Risk  (1/4/2024)    Overall Financial Resource Strain (CARDIA)     Difficulty of Paying Living Expenses: Not very hard   Food Insecurity: No Food Insecurity (7/13/2024)    Hunger Vital Sign     Worried About Running Out of Food in the Last Year: Never true     Ran Out of Food in the Last Year: Never true   Transportation Needs: No Transportation Needs (7/13/2024)     "PRAPARE - Transportation     Lack of Transportation (Medical): No     Lack of Transportation (Non-Medical): No   Physical Activity: Not on file   Stress: Not on file   Social Connections: Not on file   Intimate Partner Violence: Not on file   Housing Stability: Low Risk  (2024)    Housing Stability Vital Sign     Unable to Pay for Housing in the Last Year: No     Number of Times Moved in the Last Year: 1     Homeless in the Last Year: No       Objective:     Physical Exam:    Vitals:  /64 (BP Location: Left arm, Patient Position: Sitting, Cuff Size: Large)   Pulse 55   Temp 98.3 °F (36.8 °C) (Temporal)   Ht 5' 3.25\" (1.607 m)   Wt 108 kg (238 lb 8 oz)   SpO2 96%   BMI 41.92 kg/m²   BP Readings from Last 3 Encounters:   24 132/64   08/15/24 118/58   24 118/66     Pulse Readings from Last 3 Encounters:   24 55   08/15/24 76   24 58     On neurological examination the patient was awake, alert, attentive, oriented to person, place, and time. Recent and remote memory intact to conversation with no evidence of language dysfunction. Satisfactory fund of knowledge. Normal attention span and concentration.  Mood, affect and judgement are appropriate. Speech is fluent without dysarthria or aphasia. Face appears symmetric, with no obvious weakness noted.  Audition is intact to casual conversation.  Eye movements are intact. Able to move bilateral upper extremities antigravity without difficulty.     Pertinent lab results:    Lab Results   Component Value Date/Time    CHOLESTEROL 131 2022 07:15 AM     Lab Results   Component Value Date/Time    TRIG 60 2021 03:37 AM    TRIG 233 (H) 2020 05:32 AM     Lab Results   Component Value Date/Time    HDL 61 2021 03:37 AM     Lab Results   Component Value Date/Time    LDLCALC 74 2021 03:37 AM      Lab Results   Component Value Date/Time    HGBA1C 5.8 2024 12:00 AM     Pertinent Imagin24 MRI Brain: No " acute intracranial process.Unchanged signal abnormality in the anterior right temporal lobe as discussed, most consistent with cavernoma or sequelae of chronic microhemorrhage. Cavernoma is favored.Moderate chronic small vessel ischemic changes.  6/27/24 Carotid Ultrasound: <50% stenosis bilaterally  4/1/24 MRI brain: No acute infarction, edema, or mass effect.Subcentimeter T2 hyperintense focus within the anterior right temporal lobe with associated susceptibility. Findings can be seen with a chronic microbleed or cavernoma.Moderate chronic microangiopathic ischemic changes.     Review of Systems:     Constitutional:  Negative for appetite change, fatigue and fever.   HENT: Negative.  Negative for hearing loss, tinnitus, trouble swallowing and voice change.    Eyes:  Positive for visual disturbance (vision worse since last apt). Negative for photophobia and pain.   Respiratory: Negative.  Negative for shortness of breath.    Cardiovascular: Negative.  Negative for palpitations.   Gastrointestinal: Negative.  Negative for nausea and vomiting.   Endocrine: Negative.  Negative for cold intolerance.   Genitourinary: Negative.  Negative for dysuria, frequency and urgency.   Musculoskeletal:  Negative for back pain, gait problem, myalgias, neck pain and neck stiffness.   Skin: Negative.  Negative for rash.   Allergic/Immunologic: Negative.    Neurological: Negative.  Negative for dizziness, tremors, seizures, syncope, facial asymmetry, speech difficulty, weakness, light-headedness, numbness and headaches.   Hematological: Negative.  Does not bruise/bleed easily.   Psychiatric/Behavioral: Negative.  Negative for confusion, hallucinations and sleep disturbance.    All other systems reviewed and are negative.    I have reviewed the ROS as entered by medical assistant.     I have spent a total time of 45 minutes on 09/06/24 in caring for this patient including Diagnostic results, Prognosis, Risks and benefits of tx options,  Instructions for management, Patient and family education, Importance of tx compliance, Risk factor reductions, Impressions, Counseling / Coordination of care, Documenting in the medical record, Reviewing / ordering tests, medicine, procedures  , and Obtaining or reviewing history  .       Author:  MALGORZATA Corona 9/6/2024 12:10 PM

## 2024-09-04 ENCOUNTER — OFFICE VISIT (OUTPATIENT)
Dept: NEUROLOGY | Facility: CLINIC | Age: 74
End: 2024-09-04

## 2024-09-04 VITALS
OXYGEN SATURATION: 96 % | SYSTOLIC BLOOD PRESSURE: 132 MMHG | HEIGHT: 63 IN | DIASTOLIC BLOOD PRESSURE: 64 MMHG | HEART RATE: 55 BPM | BODY MASS INDEX: 42.26 KG/M2 | WEIGHT: 238.5 LBS | TEMPERATURE: 98.3 F

## 2024-09-04 DIAGNOSIS — G47.33 OSA (OBSTRUCTIVE SLEEP APNEA): ICD-10-CM

## 2024-09-04 DIAGNOSIS — D18.00 CAVERNOMA: ICD-10-CM

## 2024-09-04 DIAGNOSIS — G62.9 NEUROPATHY: ICD-10-CM

## 2024-09-04 DIAGNOSIS — E78.2 MIXED HYPERLIPIDEMIA: Primary | ICD-10-CM

## 2024-09-04 DIAGNOSIS — R90.89 ABNORMAL BRAIN MRI: ICD-10-CM

## 2024-09-04 DIAGNOSIS — I10 ESSENTIAL HYPERTENSION: ICD-10-CM

## 2024-09-04 RX ORDER — GABAPENTIN 400 MG/1
CAPSULE ORAL
Qty: 90 CAPSULE | Refills: 3 | Status: ON HOLD | OUTPATIENT
Start: 2024-09-04

## 2024-09-04 NOTE — PROGRESS NOTES
Review of Systems   Constitutional:  Negative for appetite change, fatigue and fever.   HENT: Negative.  Negative for hearing loss, tinnitus, trouble swallowing and voice change.    Eyes:  Positive for visual disturbance (vision worse since last apt). Negative for photophobia and pain.   Respiratory: Negative.  Negative for shortness of breath.    Cardiovascular: Negative.  Negative for palpitations.   Gastrointestinal: Negative.  Negative for nausea and vomiting.   Endocrine: Negative.  Negative for cold intolerance.   Genitourinary: Negative.  Negative for dysuria, frequency and urgency.   Musculoskeletal:  Negative for back pain, gait problem, myalgias, neck pain and neck stiffness.   Skin: Negative.  Negative for rash.   Allergic/Immunologic: Negative.    Neurological:  Positive for numbness (right side of face and tingling). Negative for dizziness, tremors, seizures, syncope, facial asymmetry, speech difficulty, light-headedness and headaches.   Hematological: Negative.  Does not bruise/bleed easily.   Psychiatric/Behavioral: Negative.  Negative for confusion, hallucinations and sleep disturbance.    All other systems reviewed and are negative.

## 2024-09-06 NOTE — ASSESSMENT & PLAN NOTE
Brain MRI was without acute infarction, edema, or mass effect. Showed an unchanged signal abnormality in the anterior right temporal lobe.  Cavernoma is  favored. Patient denies any stroke like symptoms at this time  Repeat brain MRI w wo contrast to be completed in 3 months to monitor area of likely cavernoma  Referral to neurosurgery at that time if indicated

## 2024-09-06 NOTE — ASSESSMENT & PLAN NOTE
"Patient was last seen on 3/18/24 by Dr. Reeves for diminished sensation of the left face, arm, and leg.  Per last office visit note, \"He had been diagnosed with neuropathy in the past, but this does not appear to correspond to his symptoms.Patient doing well since his office visit with Dr. Reeves.  Still reports having numbness and tingling left side of his face, arm, and leg.  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   Referral to PT for gait/balance 2/2 neuropathy  Increase gabapentin to 400 mg twice daily x 5 days then may increase to 400 mg 3 times daily if he is tolerating well  Recommend to check blood pressure occasionally away from the doctor's office to make sure that those numbers are typically less than 130/80.  If they are frequently higher than that, we recommend checking it more frequently and following up with primary care team/cardiologist   Continue all medication as prescribed by your primary care team/cardiologist  Will defer to primary care team for monitoring of cholesterol panel and blood sugar numbers with target LDL cholesterol of less than 70 and hemoglobin A1c less than 7%  Recommend following a low salt, mediterranean diet   Recommend routine physical exercise as tolerated   "

## 2024-09-12 ENCOUNTER — OFFICE VISIT (OUTPATIENT)
Dept: SLEEP CENTER | Facility: CLINIC | Age: 74
End: 2024-09-12
Payer: MEDICARE

## 2024-09-12 VITALS
DIASTOLIC BLOOD PRESSURE: 78 MMHG | WEIGHT: 237 LBS | RESPIRATION RATE: 16 BRPM | HEIGHT: 63 IN | BODY MASS INDEX: 41.99 KG/M2 | TEMPERATURE: 98.1 F | SYSTOLIC BLOOD PRESSURE: 136 MMHG | OXYGEN SATURATION: 95 % | HEART RATE: 70 BPM

## 2024-09-12 DIAGNOSIS — E66.01 OBESITY, MORBID (HCC): ICD-10-CM

## 2024-09-12 DIAGNOSIS — Z87.898 HISTORY OF PROLONGED Q-T INTERVAL ON ECG: ICD-10-CM

## 2024-09-12 DIAGNOSIS — F41.8 DEPRESSION WITH ANXIETY: ICD-10-CM

## 2024-09-12 DIAGNOSIS — F10.11 ALCOHOL USE DISORDER, MILD, IN EARLY REMISSION, ABUSE: ICD-10-CM

## 2024-09-12 DIAGNOSIS — H35.3230 BILATERAL EXUDATIVE AGE-RELATED MACULAR DEGENERATION, UNSPECIFIED STAGE (HCC): ICD-10-CM

## 2024-09-12 DIAGNOSIS — G47.33 OSA (OBSTRUCTIVE SLEEP APNEA): Primary | ICD-10-CM

## 2024-09-12 PROCEDURE — 99214 OFFICE O/P EST MOD 30 MIN: CPT | Performed by: STUDENT IN AN ORGANIZED HEALTH CARE EDUCATION/TRAINING PROGRAM

## 2024-09-12 PROCEDURE — G2211 COMPLEX E/M VISIT ADD ON: HCPCS | Performed by: STUDENT IN AN ORGANIZED HEALTH CARE EDUCATION/TRAINING PROGRAM

## 2024-09-12 NOTE — PROGRESS NOTES
[facility]  Sleep Medicine Follow up/ Established Patient Visit      Assessment/Plan:  1. ROSARIO (obstructive sleep apnea)  PAP DME Resupply/Reorder    Mask fitting only      2. 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 degeneration, unspecified stage (HCC)  PAP DME Resupply/Reorder    Mask fitting only      3. Obesity, morbid (HCC)  PAP DME Resupply/Reorder    Mask fitting only      4. History of prolonged Q-T interval on ECG  PAP DME Resupply/Reorder    Mask fitting only      5. Alcohol use disorder, mild, in early remission, abuse  PAP DME Resupply/Reorder    Mask fitting only    Ambulatory Referral to Psych Services      6. Depression with anxiety  Ambulatory Referral to Psych Services          Fredy is a pleasant 74-year-old gentleman with a PMHx of HTN, depression, macular degeneration, polymyalgia rheumatica, seizure, HLD, prolonged QT interval, obesity, prediabetes who presents in follow up for obstructive sleep apnea (PARTHA 33.6, O2 ginger 67% 5/2024).  From a purely sleep apnea standpoint, he is actually doing fairly well.  He endorses some difficulty when he initially started using it, however he currently endorses good benefit, which is reflected in his residual AHI on his compliance report.  He does appear to have a fairly severe mask leak issue, however, which should be addressed.    He also unfortunately has been dealing with a very distressing family issue (see HPI), which sounds with been exacerbating his depression.  He has had some medication adjustments made by his PCP, which he thinks may have helped, however the situation is ongoing.  Certainly, this depression could it self exacerbate any daytime sleepiness or fatigue he may have, as could side effects of medications, so we will need to keep that in mind when evaluating symptoms.    Continue auto BiPAP at settings of max IPAP 25 cm H2O, minimum EPAP 14 cm H2O, PS 4 cm H2O  I placed an order for a formal mask fitting  appointment to address the severe mask leak  Prescription for new supplies ordered today  Reviewed CMS/insurance requirements and resupply guidelines  Information provided on the above as well as general maintenance steps  Once things have settled down (if possible), we may consider a nocturnal pulse oximetry study to ensure adequate oxygenation, likely at his next appointment.  We did spend quite some time reviewing the effects of depression on daytime fatigue and sleepiness.  For his and his wife's request, I have placed a referral to [facility]'s psych services, however I also counseled him to continue to follow with his PCP and/or potentially look for local psychologic services, based on the [facility] psych departments current wait times.  I also reviewed the effects of alcohol on sleep, and encouraged him to continue with his regular AA meetings, despite his recent relapse in July.  He will Return in about 6 months (around 3/12/2025).      ________________________________________________________________________________________________    Per Last Visit Note (Date: 6/12/2024):  Fredy is a pleasant 74-year-old gentleman with PMHx of HTN, depression, macular degeneration, polymyalgia rheumatica, seizure, HLD, prolonged QT interval, obesity, prediabetes who presents for obstructive sleep apnea (PARTHA 33.6, O2 ginger 67%).  He does have several symptoms and comorbidities associated with his severe obstructive sleep apnea, and these coupled with the severe hypoxemia noted on his exam does merit further evaluation and treatment.     Discussed with patient the pathophysiology of OSAS and medical conditions associated with OSAS such as DM, HTN, CAD, Depression, Stroke, Headache.  Treatment options including surgery, Dental appliances, positional therapy, and CPAP were discussed.  I have ordered a PAP titration study to evaluate for most effective PAP pressures and, moreover, the degree of hypoxemia whether PAP therapy alone  will be effective in treating it or if he will require nocturnal oxygen administration as well.  Advised patient to avoid activities that could harm self or others when tired/sleepy, including driving.  Encouraged maintaining normal weight  Discussed importance of good sleep hygiene.  Pending the results of the sleep study, we will plan to order CPAP with a subsequent compliance follow-up.  He will Return for Follow-up pending results of sleep study..      Sleep Studies:  -HSAT 5/28/2024: TRT: 608.5 minutes, PARTHA: 33.6, O2 ginger: 67%.  Hypoxia was noted even in the absence of respiratory events, although unclear whether this was true hypoxemia or technical artifact.      -PAP Titration Study 6/13/2024: Titration started at 4 cmH2O, titrated to 19/15 cmH2O. Best pressure noted to be 19/15 cmH2O. however, some flow limitation still seen at this level, therefore auto BiPAP recommended      ________________________________________________________________________________________________      Interval History: Fredy Yu is a 74 y.o. male with a PMHx of HTN, depression, macular degeneration, polymyalgia rheumatica, seizure, HLD, prolonged QT interval, obesity, prediabetes who presents in follow up for obstructive sleep apnea (PARTHA 33.6, O2 ginger 67% 5/2024).    SDB:  -Current experience with PAP Therapy: Been using it more lately. Has had an increase in his antidepressant and other medications (trazodone, gabapentin; BuSpar; then Zoloft added); daughter moved back home and is herself very depressed. He does endorse some benefit.   -Mask type: Full-face mask  -Difficulties with mask: Does endorse leaking and xerostomia. Has not put water in the machine.  -Device: ResMed AirCurve 11; received 7/18/2024. Didn't really start using it (situation at home for a time)  -Difficulties with device: Denies  -DME: [employer]  -Compliance:          S[place]gwick Sleepiness Scale:  What are your chances of dozing?   0= no chance  1=  slight chance  2= moderate chance  3= high chance    Sitting and readin   Watching TV: 1  Sitting, inactive in a public place (e.g. a theatre or a meeting):1  As a passenger in a car for an hour without a break: 1  Lying down to rest in the afternoon when circumstances permit: 1   Sitting and talking to someone: 1  Sitting quietly after a lunch without alcohol: 1  In a car, while stopped for a few minutes in the traffic: 1       TOTAL  /  Greater or equal to 10 is positive for excessive daytime sleepiness        SLEEP HYGIENE QUESTIONS:  Bedtime: 1689-4178   Time it takes to fall sleep: 31-45 minutes  Wake up Time: 1530/1600, then prays for several hours.    Number of times patient wakes up per night: 2  Reason (s) why patient wakes up during the night: Restroom   Naps: 3337-4458, for 1-2 hours. Uses CPAP.  Estimated total sleep time ( in a 24 hour period of time): ~8 hours.       Changes to PMH, PSH, SH: See above. Also an alcohol use relapse in July.     SLEEP RELATED ROS  Review of Systems  Allergies   Allergen Reactions    Sulfa Antibiotics Anaphylaxis and Rash     Face Swelling    Sulfacetamide Anaphylaxis and Rash     Face Swelling    Misc. Sulfonamide Containing Compounds Facial Swelling    Elemental Sulfur Rash and Edema       CURRENT MEDICATIONS:  Current Outpatient Medications   Medication Instructions    adalimumab (HUMIRA) 40 mg, Subcutaneous, Every 14 days    alfuzosin (UROXATRAL) 10 mg, Oral, Daily    busPIRone (BUSPAR) 7.5 mg, Oral, 2 times daily    chlordiazePOXIDE (LIBRIUM) 25 mg capsule Take 1 tablet this evening and then 1 tablet tomorrow    finasteride (PROSCAR) 5 mg, Oral, Daily    folic acid (FOLVITE) 1 mg, Oral, Daily    gabapentin (Neurontin) 400 mg capsule Take 400mg twice daily x 5 days. Then increase to three times daily thereafter    hydroCHLOROthiazide 25 mg, Oral, As needed    LORazepam (ATIVAN) 1 mg tablet Take 1 tablet (1 mg total) by mouth 60 minutes pre-procedure May repeat x1  "30 mins prior if needed    methotrexate 20 mg, Oral, Weekly    nadolol (CORGARD) 20 mg, Oral, Daily    predniSONE 4 mg, Oral, Daily    sertraline (ZOLOFT) 50 mg, Oral, Daily    thiamine 100 mg, Oral, Daily    traZODone (DESYREL) 150 mg, Oral, Daily at bedtime           PHYSICAL EXAMINATION:  Vital Signs: /78 (BP Location: Left arm, Patient Position: Sitting, Cuff Size: Large)   Pulse 70   Temp 98.1 °F (36.7 °C) (Temporal)   Resp 16   Ht 5' 3.25\" (1.607 m)   Wt 108 kg (237 lb)   SpO2 95%   BMI 41.65 kg/m²     Constitutional: NAD, well appearing   Mental Status: AAOx3  Skin: Warm, dry, no rashes noted   Eyes: PERRL, normal conjunctiva  ENT: Nasal congestion absent  Posterior Airspace:   Weber Tongue Position: 4  Retrognathia: absent  Overbite: absent  High Arched Palate: present  Tongue Scalloping/Ridging: present  Uvula: normal  Chest: No evidence of respiratory distress, no accessory muscle use; no evidence of peripheral cyanosis  Abdomen: Soft, NT/ND  Extremities: No digital clubbing or pedal edema  Neuro: Strength 5/5 throughout, sensation grossly intact          Electronically signed by:    Ta Ward DO  Board-Certified Neurology and Sleep Medicine  [facility]  09/12/24      "

## 2024-09-12 NOTE — PATIENT INSTRUCTIONS
Continue PAP Therapy  Continue AutoBiPAP at current settings  Remember to clean your mask and equipment regularly, as directed.  I am ordering a formal mask fitting appointment; this is an appointment with the DME to ensure that you have the optimal mask and fit for your face structure    You should be eligible for new supplies approximately every 3-6 months, depending on your insurance coverage. Contact your Durable Medical Equipment (DME) company for new supplies as needed.  Practice good Sleep Hygiene, as outlined below.  I would recommend trying to establish with Psychiatry and/or psychology; referral placed today. Also discuss this with your PCP. Consider looking into local practices as well.   Follow up in 6 months.      Care and Maintenance  Headgear should be washed as needed. Daily inspection and weekly washings are recommended. Do not disassemble the straps. Machine wash in warm water, making sure to attach Velcro hooks and tabs before washing. Line dry or machine dry on a low setting.  Masks should be washed every day. Daily inspection is recommended. Leave the mask and tubing attached. Gently wash the mask with a soft cloth using warm water and mild detergent, concentrating on the mask cushion flaps. DO NOT use alcohol or bleach. Rinse thoroughly and air dry.  Tubing and headgear should be washed weekly. Daily inspection is recommended. Wash in warm water and mild detergent and rinse thoroughly. Hook the tubing to the machine and blow until dry.  Humidifier should be washed daily and filled with DISTILLED water before use. Wash with warm water and mild detergent. Disinfect weekly by soaking with a solution of 1 part white vinegar and 3 parts water for 30 minutes. Rinse thoroughly and air dry.  Disposable filters should be replaced once a month. Wash reusable foam filters with warm water and mild detergent at least once a month. Rinse thoroughly and dry with paper towels.  Avoid  that contain  fragrance or conditioners, as these will leave a residue.  NEVER iron any soft goods.      CMS Requirements    Your insurance requires a face-to-face follow up visit within a 31-90 day period after starting CPAP.  Your insurance requires compliance with CPAP, which is at least 4 hours per night for 70% of the time. This must be done over a 30 day period and must occur within the initial 31-90 day period after starting CPAP.  Your insurance also requires at least yearly follow ups to continue to pay for CPAP supplies.       PAP Supply Guidelines    Below are the guidelines for reordering your supplies. You will be responsible for your deductible, co payments, and out of pocket expenses.    Item Frequency   Nasal Mask (no headgear) 1 every 3 months   Nasal Mask Cushion 1 every 2 weeks   Full Face Mask (no headgear) 1 every 3 months   Full Face Mask Cushion 1 every month   Nasal Pillows 1 every 2 weeks   Headgear 1 every 6 months   hin Strap 1 every 6 months   jessica 1 every 3 months   Filters: Reusable 1 every 6 months   Filters: Disposable 1 every 2 weeks   Humidifier Chamber(disposable) 1 every 6 months         Good Sleep Hygiene    Wake up at the same time every day, even on the weekends.  Use your bed for sleep and intimacy only.  If you have been in bed awake for 30 minutes, get up and leave the bedroom. Choose a dull activity not involving a blue screen (TV, computer, handheld devices). Go back to bed when you feel sleepy.  Avoid caffeine, nicotine and alcohol before you go to bed.  Avoid large meals before you go to bed.  Avoid using screens (computers, tablets, smartphones, etc.) for at least 1 hour before bedtime  Exercise regularly, but do not exercise right before you go to bed.  Avoid daytime naps. If you do take a nap, sleep for 20-40 minutes, and not after dinner.

## 2024-09-15 ENCOUNTER — HOSPITAL ENCOUNTER (INPATIENT)
Facility: HOSPITAL | Age: 74
LOS: 1 days | Discharge: HOME | DRG: 948 | End: 2024-09-17
Attending: EMERGENCY MEDICINE | Admitting: FAMILY MEDICINE
Payer: MEDICARE

## 2024-09-15 DIAGNOSIS — F10.929 ALCOHOL INTOXICATION (HCC): ICD-10-CM

## 2024-09-15 DIAGNOSIS — F10.10 ALCOHOL ABUSE: Primary | ICD-10-CM

## 2024-09-15 PROBLEM — R60.0 BILATERAL LOWER EXTREMITY EDEMA: Status: ACTIVE | Noted: 2024-09-15

## 2024-09-15 LAB
ALBUMIN SERPL BCG-MCNC: 4.1 G/DL (ref 3.5–5)
ALP SERPL-CCNC: 70 U/L (ref 34–104)
ALT SERPL W P-5'-P-CCNC: 31 U/L (ref 7–52)
ANION GAP SERPL CALCULATED.3IONS-SCNC: 13 MMOL/L (ref 4–13)
AST SERPL W P-5'-P-CCNC: 27 U/L (ref 13–39)
B-OH-BUTYR SERPL-MCNC: 0.48 MMOL/L (ref 0.02–0.27)
BACTERIA UR QL AUTO: NORMAL /HPF
BASOPHILS # BLD AUTO: 0.03 THOUSANDS/ΜL (ref 0–0.1)
BASOPHILS NFR BLD AUTO: 1 % (ref 0–1)
BILIRUB SERPL-MCNC: 0.51 MG/DL (ref 0.2–1)
BILIRUB UR QL STRIP: NEGATIVE
BUN SERPL-MCNC: 14 MG/DL (ref 5–25)
CALCIUM SERPL-MCNC: 9.1 MG/DL (ref 8.4–10.2)
CHLORIDE SERPL-SCNC: 101 MMOL/L (ref 96–108)
CLARITY UR: CLEAR
CO2 SERPL-SCNC: 26 MMOL/L (ref 21–32)
COLOR UR: YELLOW
CREAT SERPL-MCNC: 1.17 MG/DL (ref 0.6–1.3)
EOSINOPHIL # BLD AUTO: 0.07 THOUSAND/ΜL (ref 0–0.61)
EOSINOPHIL NFR BLD AUTO: 1 % (ref 0–6)
ERYTHROCYTE [DISTWIDTH] IN BLOOD BY AUTOMATED COUNT: 14.1 % (ref 11.6–15.1)
ETHANOL SERPL-MCNC: 95 MG/DL
GFR SERPL CREATININE-BSD FRML MDRD: 61 ML/MIN/1.73SQ M
GLUCOSE SERPL-MCNC: 84 MG/DL (ref 65–140)
GLUCOSE UR STRIP-MCNC: NEGATIVE MG/DL
HCT VFR BLD AUTO: 38.3 % (ref 36.5–49.3)
HGB BLD-MCNC: 12.9 G/DL (ref 12–17)
HGB UR QL STRIP.AUTO: NEGATIVE
IMM GRANULOCYTES # BLD AUTO: 0.11 THOUSAND/UL (ref 0–0.2)
IMM GRANULOCYTES NFR BLD AUTO: 2 % (ref 0–2)
KETONES UR STRIP-MCNC: ABNORMAL MG/DL
LEUKOCYTE ESTERASE UR QL STRIP: NEGATIVE
LIPASE SERPL-CCNC: 109 U/L (ref 11–82)
LYMPHOCYTES # BLD AUTO: 1.4 THOUSANDS/ΜL (ref 0.6–4.47)
LYMPHOCYTES NFR BLD AUTO: 22 % (ref 14–44)
MAGNESIUM SERPL-MCNC: 1.5 MG/DL (ref 1.9–2.7)
MCH RBC QN AUTO: 32.5 PG (ref 26.8–34.3)
MCHC RBC AUTO-ENTMCNC: 33.7 G/DL (ref 31.4–37.4)
MCV RBC AUTO: 97 FL (ref 82–98)
MONOCYTES # BLD AUTO: 0.5 THOUSAND/ΜL (ref 0.17–1.22)
MONOCYTES NFR BLD AUTO: 8 % (ref 4–12)
NEUTROPHILS # BLD AUTO: 4.17 THOUSANDS/ΜL (ref 1.85–7.62)
NEUTS SEG NFR BLD AUTO: 66 % (ref 43–75)
NITRITE UR QL STRIP: NEGATIVE
NON-SQ EPI CELLS URNS QL MICRO: NORMAL /HPF
NRBC BLD AUTO-RTO: 0 /100 WBCS
PH UR STRIP.AUTO: 6 [PH]
PLATELET # BLD AUTO: 154 THOUSANDS/UL (ref 149–390)
PMV BLD AUTO: 9.6 FL (ref 8.9–12.7)
POTASSIUM SERPL-SCNC: 3.7 MMOL/L (ref 3.5–5.3)
PROT SERPL-MCNC: 6.8 G/DL (ref 6.4–8.4)
PROT UR STRIP-MCNC: ABNORMAL MG/DL
RBC # BLD AUTO: 3.97 MILLION/UL (ref 3.88–5.62)
RBC #/AREA URNS AUTO: NORMAL /HPF
SODIUM SERPL-SCNC: 140 MMOL/L (ref 135–147)
SP GR UR STRIP.AUTO: 1.02 (ref 1–1.03)
UROBILINOGEN UR STRIP-ACNC: <2 MG/DL
WBC # BLD AUTO: 6.28 THOUSAND/UL (ref 4.31–10.16)
WBC #/AREA URNS AUTO: NORMAL /HPF

## 2024-09-15 PROCEDURE — 96374 THER/PROPH/DIAG INJ IV PUSH: CPT

## 2024-09-15 PROCEDURE — 82077 ASSAY SPEC XCP UR&BREATH IA: CPT | Performed by: EMERGENCY MEDICINE

## 2024-09-15 PROCEDURE — 99285 EMERGENCY DEPT VISIT HI MDM: CPT | Performed by: EMERGENCY MEDICINE

## 2024-09-15 PROCEDURE — 96361 HYDRATE IV INFUSION ADD-ON: CPT

## 2024-09-15 PROCEDURE — 81001 URINALYSIS AUTO W/SCOPE: CPT

## 2024-09-15 PROCEDURE — 83690 ASSAY OF LIPASE: CPT | Performed by: EMERGENCY MEDICINE

## 2024-09-15 PROCEDURE — 99285 EMERGENCY DEPT VISIT HI MDM: CPT

## 2024-09-15 PROCEDURE — 36415 COLL VENOUS BLD VENIPUNCTURE: CPT | Performed by: EMERGENCY MEDICINE

## 2024-09-15 PROCEDURE — 99223 1ST HOSP IP/OBS HIGH 75: CPT | Performed by: HOSPITALIST

## 2024-09-15 PROCEDURE — 83735 ASSAY OF MAGNESIUM: CPT | Performed by: EMERGENCY MEDICINE

## 2024-09-15 PROCEDURE — 82010 KETONE BODYS QUAN: CPT | Performed by: EMERGENCY MEDICINE

## 2024-09-15 PROCEDURE — 80053 COMPREHEN METABOLIC PANEL: CPT | Performed by: EMERGENCY MEDICINE

## 2024-09-15 PROCEDURE — 85025 COMPLETE CBC W/AUTO DIFF WBC: CPT | Performed by: EMERGENCY MEDICINE

## 2024-09-15 PROCEDURE — 93005 ELECTROCARDIOGRAM TRACING: CPT

## 2024-09-15 RX ORDER — DIAZEPAM 10 MG/2ML
5 INJECTION, SOLUTION INTRAMUSCULAR; INTRAVENOUS ONCE
Status: COMPLETED | OUTPATIENT
Start: 2024-09-15 | End: 2024-09-15

## 2024-09-15 RX ORDER — NADOLOL 40 MG/1
20 TABLET ORAL DAILY
Status: DISCONTINUED | OUTPATIENT
Start: 2024-09-15 | End: 2024-09-17 | Stop reason: HOSPADM

## 2024-09-15 RX ORDER — LANOLIN ALCOHOL/MO/W.PET/CERES
100 CREAM (GRAM) TOPICAL DAILY
Status: DISCONTINUED | OUTPATIENT
Start: 2024-09-15 | End: 2024-09-17 | Stop reason: HOSPADM

## 2024-09-15 RX ORDER — CALCIUM CARBONATE 500 MG/1
1000 TABLET, CHEWABLE ORAL DAILY PRN
Status: DISCONTINUED | OUTPATIENT
Start: 2024-09-15 | End: 2024-09-17 | Stop reason: HOSPADM

## 2024-09-15 RX ORDER — DOCUSATE SODIUM 100 MG/1
100 CAPSULE, LIQUID FILLED ORAL 2 TIMES DAILY
Status: DISCONTINUED | OUTPATIENT
Start: 2024-09-15 | End: 2024-09-17 | Stop reason: HOSPADM

## 2024-09-15 RX ORDER — MAGNESIUM SULFATE 1 G/100ML
1 INJECTION INTRAVENOUS ONCE
Status: COMPLETED | OUTPATIENT
Start: 2024-09-15 | End: 2024-09-15

## 2024-09-15 RX ORDER — GABAPENTIN 400 MG/1
400 CAPSULE ORAL 2 TIMES DAILY
Status: DISCONTINUED | OUTPATIENT
Start: 2024-09-15 | End: 2024-09-17 | Stop reason: HOSPADM

## 2024-09-15 RX ORDER — ACETAMINOPHEN 325 MG/1
650 TABLET ORAL EVERY 6 HOURS PRN
Status: DISCONTINUED | OUTPATIENT
Start: 2024-09-15 | End: 2024-09-17 | Stop reason: HOSPADM

## 2024-09-15 RX ORDER — HEPARIN SODIUM 5000 [USP'U]/ML
5000 INJECTION, SOLUTION INTRAVENOUS; SUBCUTANEOUS EVERY 8 HOURS SCHEDULED
Status: DISCONTINUED | OUTPATIENT
Start: 2024-09-15 | End: 2024-09-17 | Stop reason: HOSPADM

## 2024-09-15 RX ORDER — DIAZEPAM 5 MG
10 TABLET ORAL ONCE
Status: COMPLETED | OUTPATIENT
Start: 2024-09-15 | End: 2024-09-15

## 2024-09-15 RX ORDER — FINASTERIDE 5 MG/1
5 TABLET, FILM COATED ORAL DAILY
Status: DISCONTINUED | OUTPATIENT
Start: 2024-09-15 | End: 2024-09-17 | Stop reason: HOSPADM

## 2024-09-15 RX ORDER — ONDANSETRON 2 MG/ML
4 INJECTION INTRAMUSCULAR; INTRAVENOUS EVERY 6 HOURS PRN
Status: DISCONTINUED | OUTPATIENT
Start: 2024-09-15 | End: 2024-09-17 | Stop reason: HOSPADM

## 2024-09-15 RX ORDER — FOLIC ACID 1 MG/1
1 TABLET ORAL DAILY
Status: DISCONTINUED | OUTPATIENT
Start: 2024-09-15 | End: 2024-09-17 | Stop reason: HOSPADM

## 2024-09-15 RX ORDER — CHLORDIAZEPOXIDE HYDROCHLORIDE 25 MG/1
25 CAPSULE, GELATIN COATED ORAL EVERY 8 HOURS SCHEDULED
Status: DISCONTINUED | OUTPATIENT
Start: 2024-09-15 | End: 2024-09-16

## 2024-09-15 RX ORDER — TRAZODONE HYDROCHLORIDE 50 MG/1
150 TABLET, FILM COATED ORAL
Status: DISCONTINUED | OUTPATIENT
Start: 2024-09-15 | End: 2024-09-17 | Stop reason: HOSPADM

## 2024-09-15 RX ORDER — HYDROCHLOROTHIAZIDE 25 MG/1
25 TABLET ORAL DAILY
Status: DISCONTINUED | OUTPATIENT
Start: 2024-09-15 | End: 2024-09-17 | Stop reason: HOSPADM

## 2024-09-15 RX ORDER — BUSPIRONE HYDROCHLORIDE 5 MG/1
7.5 TABLET ORAL 2 TIMES DAILY
Status: DISCONTINUED | OUTPATIENT
Start: 2024-09-15 | End: 2024-09-17 | Stop reason: HOSPADM

## 2024-09-15 RX ORDER — LORAZEPAM 1 MG/1
2 TABLET ORAL ONCE
Status: COMPLETED | OUTPATIENT
Start: 2024-09-15 | End: 2024-09-15

## 2024-09-15 RX ADMIN — GABAPENTIN 400 MG: 400 CAPSULE ORAL at 17:24

## 2024-09-15 RX ADMIN — THIAMINE HCL TAB 100 MG 100 MG: 100 TAB at 13:48

## 2024-09-15 RX ADMIN — LORAZEPAM 2 MG: 1 TABLET ORAL at 13:48

## 2024-09-15 RX ADMIN — HYDROCHLOROTHIAZIDE 25 MG: 25 TABLET ORAL at 13:48

## 2024-09-15 RX ADMIN — FINASTERIDE 5 MG: 5 TABLET, FILM COATED ORAL at 13:48

## 2024-09-15 RX ADMIN — MAGNESIUM SULFATE HEPTAHYDRATE 1 G: 1 INJECTION, SOLUTION INTRAVENOUS at 13:53

## 2024-09-15 RX ADMIN — HEPARIN SODIUM 5000 UNITS: 5000 INJECTION, SOLUTION INTRAVENOUS; SUBCUTANEOUS at 13:48

## 2024-09-15 RX ADMIN — FOLIC ACID 1 MG: 1 TABLET ORAL at 13:48

## 2024-09-15 RX ADMIN — HEPARIN SODIUM 5000 UNITS: 5000 INJECTION, SOLUTION INTRAVENOUS; SUBCUTANEOUS at 21:38

## 2024-09-15 RX ADMIN — Medication 1 TABLET: at 13:48

## 2024-09-15 RX ADMIN — BUSPIRONE HYDROCHLORIDE 7.5 MG: 5 TABLET ORAL at 17:24

## 2024-09-15 RX ADMIN — DIAZEPAM 10 MG: 5 TABLET ORAL at 11:11

## 2024-09-15 RX ADMIN — NADOLOL 20 MG: 40 TABLET ORAL at 13:48

## 2024-09-15 RX ADMIN — DOCUSATE SODIUM 100 MG: 100 CAPSULE, LIQUID FILLED ORAL at 17:23

## 2024-09-15 RX ADMIN — SODIUM CHLORIDE 1000 ML: 0.9 INJECTION, SOLUTION INTRAVENOUS at 11:30

## 2024-09-15 RX ADMIN — DIAZEPAM 5 MG: 10 INJECTION, SOLUTION INTRAMUSCULAR; INTRAVENOUS at 12:15

## 2024-09-15 RX ADMIN — CHLORDIAZEPOXIDE HYDROCHLORIDE 25 MG: 25 CAPSULE ORAL at 14:34

## 2024-09-15 RX ADMIN — SERTRALINE HYDROCHLORIDE 50 MG: 50 TABLET ORAL at 13:48

## 2024-09-15 RX ADMIN — CHLORDIAZEPOXIDE HYDROCHLORIDE 25 MG: 25 CAPSULE ORAL at 21:38

## 2024-09-15 RX ADMIN — TRAZODONE HYDROCHLORIDE 150 MG: 50 TABLET ORAL at 21:38
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
## 2024-09-15 NOTE — ASSESSMENT & PLAN NOTE
Reports recent binge drinking over the last few weeks consuming approximately 1 pint of vodka daily. Last drink the evening prior to admission. Endorses persistent tremors, anxiety. Denies chest pain, shortness of breath or any other associated symptoms  Received Valium 10mg x2 dose in ED  Declining inpatient detox at this time   Monitor CMP, CBC  CIWA protocol  Thiamine, folic acid  Seizure precautions  Monitor on Tele for 24hrs  Case management consult

## 2024-09-15 NOTE — PLAN OF CARE
Problem: PAIN - ADULT  Goal: Verbalizes/displays adequate comfort level or baseline comfort level  Description: Interventions:  - Encourage patient to monitor pain and request assistance  - Assess pain using 0-10 pain scale  - Administer analgesics based on type and severity of pain and evaluate response  - Implement non-pharmacological measures as appropriate and evaluate response  - Notify physician/advanced practitioner if interventions unsuccessful or patient reports new pain  Outcome: Progressing     Problem: INFECTION - ADULT  Goal: Absence or prevention of progression during hospitalization  Description: INTERVENTIONS:  - Assess and monitor for signs and symptoms of infection  - Monitor temp  - Monitor WBC  - Monitor all IV insertion sites  - [place] appropriate cooling/warming therapies per order  - Administer medications as ordered  - Instruct and encourage patient and family to use good hand hygiene technique  - Identify and instruct in appropriate isolation precautions for identified infection/condition  Outcome: Progressing     Problem: SAFETY ADULT  Goal: Patient will remain free of falls  Description: INTERVENTIONS:  - Educate patient on patient safety   - Instruct patient to call for assistance with activity   - Keep Call bell within reach  - Keep bed low and locked with side rails adjusted as appropriate  - Keep care items and personal belongings within reach  - Initiate and maintain comfort rounds  - Offer Toileting every 2 Hours, in advance of need  - Initiate/Maintain bed/chair alarm  - Obtain necessary fall risk management equipment  - Apply yellow socks and bracelet   Outcome: Progressing     Problem: DISCHARGE PLANNING  Goal: Discharge to home or other facility with appropriate resources  Description: INTERVENTIONS:  - Identify barriers to discharge w/patient   - Arrange for needed discharge resources and transportation as appropriate  - Identify discharge learning needs (meds, wound care,  etc.)  - Refer to Case Management Department for coordinating discharge planning if the patient needs post-hospital services based on physician/advanced practitioner order or complex needs related to functional status, cognitive ability, or social support system  Outcome: Progressing     Problem: Knowledge Deficit  Goal: Patient demonstrates understanding of disease process, treatment plan, medications, and discharge instructions  Description: Complete learning assessment and assess knowledge base.  Interventions:  - Provide teaching at level of understanding  - Provide teaching via preferred learning methods  Outcome: Progressing     Problem: NEUROSENSORY - ADULT  Goal: Achieves stable or improved neurological status  Description: INTERVENTIONS  - Monitor CIWA and alcohol withdrawal symptoms to prevent seizures  - Monitor and report changes in neurological status  - Monitor vital signs such as temperature, blood pressure, and any other labs ordered   - Monitor for seizure activity due to alcohol withdrawl    Outcome: Progressing     Problem: CARDIOVASCULAR - ADULT  Goal: Absence of cardiac dysrhythmias or at baseline rhythm  Description: INTERVENTIONS:  - Continuous cardiac monitoring, vital signs    Outcome: Progressing
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
## 2024-09-15 NOTE — ASSESSMENT & PLAN NOTE
Chronic LE edema  Continue home dose hydrochlorothiazide  Recent Echo on 5/2024  EF 65%  Keep  LE extremity elevated  Monitor
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
## 2024-09-15 NOTE — ASSESSMENT & PLAN NOTE
/77 Hr 63    Plan:  Elevated on admission  Continue home dose  nadolol, hydrochlorothiazide  Monitor blood pressure

## 2024-09-15 NOTE — ASSESSMENT & PLAN NOTE
Chronically elevated ,109 on admission, down trending from baseline  Likely secondary to alcohol abuse, denies abdominal pain, nausea, vomiting  Monitor CMP   [] : No [de-identified] : occasional  [de-identified] : walks [de-identified] : watching diet carefully [[id number]] : 0 [Change in mental status noted] : No change in mental status noted [Reports changes in hearing] : Reports no changes in hearing [Reports changes in vision] : Reports no changes in vision [MammogramDate] : 09/20 [ColonoscopyComments] : annual FIT testing [AdvancecareDate] : 06/10/21 [FreeTextEntry4] : -Nilesh

## 2024-09-16 LAB
ALBUMIN SERPL BCG-MCNC: 3.6 G/DL (ref 3.5–5)
ALP SERPL-CCNC: 60 U/L (ref 34–104)
ALT SERPL W P-5'-P-CCNC: 19 U/L (ref 7–52)
ANION GAP SERPL CALCULATED.3IONS-SCNC: 8 MMOL/L (ref 4–13)
AST SERPL W P-5'-P-CCNC: 14 U/L (ref 13–39)
BILIRUB SERPL-MCNC: 0.65 MG/DL (ref 0.2–1)
BUN SERPL-MCNC: 17 MG/DL (ref 5–25)
CALCIUM SERPL-MCNC: 8.8 MG/DL (ref 8.4–10.2)
CHLORIDE SERPL-SCNC: 100 MMOL/L (ref 96–108)
CO2 SERPL-SCNC: 29 MMOL/L (ref 21–32)
CREAT SERPL-MCNC: 1.27 MG/DL (ref 0.6–1.3)
ERYTHROCYTE [DISTWIDTH] IN BLOOD BY AUTOMATED COUNT: 14.4 % (ref 11.6–15.1)
GFR SERPL CREATININE-BSD FRML MDRD: 55 ML/MIN/1.73SQ M
GLUCOSE SERPL-MCNC: 93 MG/DL (ref 65–140)
HCT VFR BLD AUTO: 33.7 % (ref 36.5–49.3)
HGB BLD-MCNC: 11.3 G/DL (ref 12–17)
MCH RBC QN AUTO: 32.3 PG (ref 26.8–34.3)
MCHC RBC AUTO-ENTMCNC: 33.5 G/DL (ref 31.4–37.4)
MCV RBC AUTO: 96 FL (ref 82–98)
PLATELET # BLD AUTO: 133 THOUSANDS/UL (ref 149–390)
PMV BLD AUTO: 8.5 FL (ref 8.9–12.7)
POTASSIUM SERPL-SCNC: 3.5 MMOL/L (ref 3.5–5.3)
PROT SERPL-MCNC: 6.1 G/DL (ref 6.4–8.4)
RBC # BLD AUTO: 3.5 MILLION/UL (ref 3.88–5.62)
SODIUM SERPL-SCNC: 137 MMOL/L (ref 135–147)
WBC # BLD AUTO: 6.16 THOUSAND/UL (ref 4.31–10.16)

## 2024-09-16 PROCEDURE — 80053 COMPREHEN METABOLIC PANEL: CPT

## 2024-09-16 PROCEDURE — 97163 PT EVAL HIGH COMPLEX 45 MIN: CPT

## 2024-09-16 PROCEDURE — 99232 SBSQ HOSP IP/OBS MODERATE 35: CPT | Performed by: FAMILY MEDICINE

## 2024-09-16 PROCEDURE — 97167 OT EVAL HIGH COMPLEX 60 MIN: CPT

## 2024-09-16 PROCEDURE — 85027 COMPLETE CBC AUTOMATED: CPT

## 2024-09-16 RX ORDER — PREDNISONE 1 MG/1
4 TABLET ORAL DAILY
Status: DISCONTINUED | OUTPATIENT
Start: 2024-09-16 | End: 2024-09-17 | Stop reason: HOSPADM

## 2024-09-16 RX ORDER — METHOTREXATE 2.5 MG/1
20 TABLET ORAL WEEKLY
Status: DISCONTINUED | OUTPATIENT
Start: 2024-09-16 | End: 2024-09-17 | Stop reason: HOSPADM

## 2024-09-16 RX ORDER — CHLORDIAZEPOXIDE HYDROCHLORIDE 25 MG/1
25 CAPSULE, GELATIN COATED ORAL 2 TIMES DAILY
Status: DISCONTINUED | OUTPATIENT
Start: 2024-09-16 | End: 2024-09-17 | Stop reason: HOSPADM

## 2024-09-16 RX ADMIN — TRAZODONE HYDROCHLORIDE 150 MG: 50 TABLET ORAL at 21:18

## 2024-09-16 RX ADMIN — BUSPIRONE HYDROCHLORIDE 7.5 MG: 5 TABLET ORAL at 17:41

## 2024-09-16 RX ADMIN — BUSPIRONE HYDROCHLORIDE 7.5 MG: 5 TABLET ORAL at 08:36

## 2024-09-16 RX ADMIN — DOCUSATE SODIUM 100 MG: 100 CAPSULE, LIQUID FILLED ORAL at 08:36

## 2024-09-16 RX ADMIN — FOLIC ACID 1 MG: 1 TABLET ORAL at 08:36

## 2024-09-16 RX ADMIN — NADOLOL 20 MG: 40 TABLET ORAL at 08:36

## 2024-09-16 RX ADMIN — GABAPENTIN 400 MG: 400 CAPSULE ORAL at 08:36

## 2024-09-16 RX ADMIN — HEPARIN SODIUM 5000 UNITS: 5000 INJECTION, SOLUTION INTRAVENOUS; SUBCUTANEOUS at 21:18

## 2024-09-16 RX ADMIN — METHOTREXATE 20 MG: 2.5 TABLET ORAL at 09:32

## 2024-09-16 RX ADMIN — Medication 1 TABLET: at 08:36

## 2024-09-16 RX ADMIN — HEPARIN SODIUM 5000 UNITS: 5000 INJECTION, SOLUTION INTRAVENOUS; SUBCUTANEOUS at 13:29

## 2024-09-16 RX ADMIN — PREDNISONE 4 MG: 1 TABLET ORAL at 09:19

## 2024-09-16 RX ADMIN — FINASTERIDE 5 MG: 5 TABLET, FILM COATED ORAL at 08:36

## 2024-09-16 RX ADMIN — CHLORDIAZEPOXIDE HYDROCHLORIDE 25 MG: 25 CAPSULE ORAL at 17:41

## 2024-09-16 RX ADMIN — SERTRALINE HYDROCHLORIDE 50 MG: 50 TABLET ORAL at 08:36

## 2024-09-16 RX ADMIN — HYDROCHLOROTHIAZIDE 25 MG: 25 TABLET ORAL at 08:36

## 2024-09-16 RX ADMIN — HEPARIN SODIUM 5000 UNITS: 5000 INJECTION, SOLUTION INTRAVENOUS; SUBCUTANEOUS at 05:12

## 2024-09-16 RX ADMIN — THIAMINE HCL TAB 100 MG 100 MG: 100 TAB at 08:36

## 2024-09-16 RX ADMIN — GABAPENTIN 400 MG: 400 CAPSULE ORAL at 17:41

## 2024-09-16 RX ADMIN — CHLORDIAZEPOXIDE HYDROCHLORIDE 25 MG: 25 CAPSULE ORAL at 05:12

## 2024-09-16 RX ADMIN — DOCUSATE SODIUM 100 MG: 100 CAPSULE, LIQUID FILLED ORAL at 17:42

## 2024-09-16 NOTE — ASSESSMENT & PLAN NOTE
Patient maintained on prednisone 4 mg daily, 20 mg methotrexate once a week  Continue outpatient follow-up with rheumatology

## 2024-09-16 NOTE — PLAN OF CARE
Problem: PHYSICAL THERAPY ADULT  Goal: Performs mobility at highest level of function for planned discharge setting.  See evaluation for individualized goals.  Description: Treatment/Interventions: Functional transfer training, LE strengthening/ROM, Therapeutic exercise, Endurance training, Gait training, Bed mobility          See flowsheet documentation for full assessment, interventions and recommendations.  Note: Prognosis: Good  Problem List: Decreased strength, Decreased endurance, Impaired balance, Decreased mobility  Assessment: Patient is a 74 y.o. male evaluated by Physical Therapy s/p admit to [facility] on 9/15/2024 with admitting diagnosis of: Alcohol abuse, Alcohol problem drinking, and principal problem of: Alcohol abuse with withdrawal without complication. PT was consulted to assess patient's functional mobility and discharge needs. Ordered are PT Evaluation and treatment with activity level of: up and OOB as tolerated. Comorbidities affecting patient's physical performance at time of assessment include:  HTN, BPH, neuropathy, ROSARIO, psoriatic arthritis, osteoporosis . Personal factors affecting the patient at time of IE include: lives in 2 story home, ambulating with assistive device, step(s) to enter home, inability to navigate community distances, inability/difficulty performing IADLs, and inability/difficulty performing ADLs. Please locate objective findings from PT assessment regarding body systems outlined above. Upon evaluation, pt able to perform all functional mobility with SUP, RW, and occasional verbal cuing. Pt mildly impulsive with mobility but no true LOB experienced. Seated rest break taken following 450' of ambulation. HR and SpO2 remained WFL on RA throughout. The patient's AM-PAC Basic Mobility Inpatient Short Form Raw Score is 20. A Raw score of greater than 16 suggests the patient may benefit from discharge to home. Please also refer to the recommendation of the  Physical Therapist for safe discharge planning. Co treatment with OT secondary to complex medical condition of pt, possible A of 2 required to achieve and maintain transitional movements, requiring the need of skilled therapeutic intervention of 2 therapists to achieve delivery of services. Pt will benefit from continued PT intervention during LOS to address current deficits, increase LOF, and facilitate safe d/c to next level of care when medically appropriate. D/c recommendation at this time is rehabilitation resource intensity level III.        Rehab Resource Intensity Level, PT: III (Minimum Resource Intensity)    See flowsheet documentation for full assessment.

## 2024-09-16 NOTE — PLAN OF CARE
Problem: PAIN - ADULT  Goal: Verbalizes/displays adequate comfort level or baseline comfort level  Description: Interventions:  - Encourage patient to monitor pain and request assistance  - Assess pain using 0-10 pain scale  - Administer analgesics based on type and severity of pain and evaluate response  - Implement non-pharmacological measures as appropriate and evaluate response  - Notify physician/advanced practitioner if interventions unsuccessful or patient reports new pain  Outcome: Progressing     Problem: INFECTION - ADULT  Goal: Absence or prevention of progression during hospitalization  Description: INTERVENTIONS:  - Assess and monitor for signs and symptoms of infection  - Monitor temp  - Monitor WBC  - Monitor all IV insertion sites  - [place] appropriate cooling/warming therapies per order  - Administer medications as ordered  - Instruct and encourage patient and family to use good hand hygiene technique  - Identify and instruct in appropriate isolation precautions for identified infection/condition  Outcome: Progressing     Problem: SAFETY ADULT  Goal: Patient will remain free of falls  Description: INTERVENTIONS:  - Educate patient on patient safety   - Instruct patient to call for assistance with activity   - Keep Call bell within reach  - Keep bed low and locked with side rails adjusted as appropriate  - Keep care items and personal belongings within reach  - Initiate and maintain comfort rounds  - Offer Toileting every 2 Hours, in advance of need  - Initiate/Maintain bed/chair alarm  - Obtain necessary fall risk management equipment  - Apply yellow socks and bracelet   Outcome: Progressing     Problem: DISCHARGE PLANNING  Goal: Discharge to home or other facility with appropriate resources  Description: INTERVENTIONS:  - Identify barriers to discharge w/patient   - Arrange for needed discharge resources and transportation as appropriate  - Identify discharge learning needs (meds, wound care,  etc.)  - Refer to Case Management Department for coordinating discharge planning if the patient needs post-hospital services based on physician/advanced practitioner order or complex needs related to functional status, cognitive ability, or social support system  Outcome: Progressing     Problem: Knowledge Deficit  Goal: Patient demonstrates understanding of disease process, treatment plan, medications, and discharge instructions  Description: Complete learning assessment and assess knowledge base.  Interventions:  - Provide teaching at level of understanding  - Provide teaching via preferred learning methods  Outcome: Progressing     Problem: NEUROSENSORY - ADULT  Goal: Achieves stable or improved neurological status  Description: INTERVENTIONS  - Monitor CIWA and alcohol withdrawal symptoms to prevent seizures  - Monitor and report changes in neurological status  - Monitor vital signs such as temperature, blood pressure, and any other labs ordered   - Monitor for seizure activity due to alcohol withdrawl    Outcome: Progressing     Problem: CARDIOVASCULAR - ADULT  Goal: Absence of cardiac dysrhythmias or at baseline rhythm  Description: INTERVENTIONS:  - Continuous cardiac monitoring, vital signs    Outcome: Progressing     Problem: PAIN - ADULT  Goal: Verbalizes/displays adequate comfort level or baseline comfort level  Description: Interventions:  - Encourage patient to monitor pain and request assistance  - Assess pain using 0-10 pain scale  - Administer analgesics based on type and severity of pain and evaluate response  - Implement non-pharmacological measures as appropriate and evaluate response  - Notify physician/advanced practitioner if interventions unsuccessful or patient reports new pain  Outcome: Progressing     Problem: INFECTION - ADULT  Goal: Absence or prevention of progression during hospitalization  Description: INTERVENTIONS:  - Assess and monitor for signs and symptoms of infection  - Monitor  temp  - Monitor WBC  - Monitor all IV insertion sites  - [place] appropriate cooling/warming therapies per order  - Administer medications as ordered  - Instruct and encourage patient and family to use good hand hygiene technique  - Identify and instruct in appropriate isolation precautions for identified infection/condition  Outcome: Progressing     Problem: SAFETY ADULT  Goal: Patient will remain free of falls  Description: INTERVENTIONS:  - Educate patient on patient safety   - Instruct patient to call for assistance with activity   - Keep Call bell within reach  - Keep bed low and locked with side rails adjusted as appropriate  - Keep care items and personal belongings within reach  - Initiate and maintain comfort rounds  - Offer Toileting every 2 Hours, in advance of need  - Initiate/Maintain bed/chair alarm  - Obtain necessary fall risk management equipment  - Apply yellow socks and bracelet   Outcome: Progressing     Problem: DISCHARGE PLANNING  Goal: Discharge to home or other facility with appropriate resources  Description: INTERVENTIONS:  - Identify barriers to discharge w/patient   - Arrange for needed discharge resources and transportation as appropriate  - Identify discharge learning needs (meds, wound care, etc.)  - Refer to Case Management Department for coordinating discharge planning if the patient needs post-hospital services based on physician/advanced practitioner order or complex needs related to functional status, cognitive ability, or social support system  Outcome: Progressing     Problem: Knowledge Deficit  Goal: Patient demonstrates understanding of disease process, treatment plan, medications, and discharge instructions  Description: Complete learning assessment and assess knowledge base.  Interventions:  - Provide teaching at level of understanding  - Provide teaching via preferred learning methods  Outcome: Progressing     Problem: NEUROSENSORY - ADULT  Goal: Achieves stable or  improved neurological status  Description: INTERVENTIONS  - Monitor CIWA and alcohol withdrawal symptoms to prevent seizures  - Monitor and report changes in neurological status  - Monitor vital signs such as temperature, blood pressure, and any other labs ordered   - Monitor for seizure activity due to alcohol withdrawl    Outcome: Progressing     Problem: CARDIOVASCULAR - ADULT  Goal: Absence of cardiac dysrhythmias or at baseline rhythm  Description: INTERVENTIONS:  - Continuous cardiac monitoring, vital signs    Outcome: Progressing

## 2024-09-16 NOTE — DISCHARGE SUMMARY
Discharge Summary - Hospitalist   Name: Fredy Yu 74 y.o. male I MRN: [id number]  Unit/Bed#: 403-01 I Date of Admission: 9/15/2024   Date of Service: 9/17/2024 I Hospital Day: 1     Assessment & Plan  Alcohol abuse with withdrawal without complication (HCC)  Reports recent binge drinking over the last few weeks consuming approximately 1 pint of vodka daily. Last drink the evening prior to admission. Endorses persistent tremors, anxiety. Denies chest pain, shortness of breath or any other associated symptoms  Received Valium 10mg x2 dose in ED  Declining inpatient detox at this time     Patient reports mild shaking in his left hand, improved from prior exams  Continue Librium 25 mg for 2 more doses to complete on 9/18/24  Continue thiamine, folic acid  Encourage going to AA meetings regularly  Counseled on alcohol cessation  Consider naltrexone as outpatient therapy for alcohol cessation  F/u with PCP within 1 week  Essential hypertension  /77 , Elevated on admission     BP stable   Continue home dose nadolol, hydrochlorothiazide  Psoriatic arthritis (HCC)  Patient maintained on prednisone 4 mg daily, 20 mg methotrexate once a week  Continue outpatient follow-up with rheumatology  BPH (benign prostatic hyperplasia)  Continue finasteride   Elevated lipase    Chronically elevated ,109 on admission, down trending from baseline  Likely secondary to alcohol abuse, denies abdominal pain, nausea, vomiting  Monitor  Depression with anxiety  Symptoms exacerbated due to conflict at home with wife and daughter  Continue home Zoloft, Buspar, trazodone  F/u with PCP outpatient    Neuropathy  Continue home dose gabapentin 400 mg BID  ROSARIO (obstructive sleep apnea)  Continue CPAP at night  Bilateral lower extremity edema  Chronic LE edema  Continue home dose hydrochlorothiazide  Recent Echo on 5/2024  EF 65%  Keep  LE extremity elevated       Medical Problems       Resolved Problems  Date Reviewed: 7/29/2024   None        Discharging Physician / Practitioner: Tita Galan MD  PCP: Tejal Garcia MD  Admission Date:   Admission Orders (From admission, onward)       Ordered        09/16/24 1017  INPATIENT ADMISSION  Once            09/15/24 1230  Place in Observation  Once                          Discharge Date: 09/17/24    Consultations During Hospital Stay:  Physical therapy  Occupational Therapy    Procedures Performed:   None    Significant Findings / Test Results:   None    Incidental Findings:   None     Test Results Pending at Discharge (will require follow up):   None     Outpatient Tests Requested:  None    Complications:  None    Reason for Admission: alcohol withdrawal symptoms    Hospital Course:   Fredy Yu is a 74 y.o. male with PMH alcohol abuse, anxiety/depression, hypertension, psoriatic arthritis, and BPH who originally presented to the hospital on 9/15/2024 due to alcohol withdrawal symptoms. He reports uncontrolled tremors in the setting of drinking about 1 pint of vodka daily for the past few weeks due to home stressors. He states that his last drink was 9/14 at 9 pm. He also endorses some mild lower abdominal discomfort.  In the ED, vitals were significant for /86. Labs were significant for lipase 109, beta-hydroxybutyrate 0.48, ethanol level 95. UA showed 1+ ketones. He receives 2 doses of 10 mg Valium with improvement in symptoms. He declined the option to go to inpatient detox.    He was admitted to the floors and placed on seizure precautions as well as CIWA protocol. He was treated with Librium 25 mg BID with improvement in symptoms. He was also treated with high dose thiamine and folate. He had improvement in his tremors and denied diaphoresis and restlessness. He was evaluated by PT and OT who recommended discharge to home.  Patient was hemodynamically stable for discharge with close follow-up with PCP outpatient.    Recommend follow up with PCP in one week to talk about  "alcohol cessation.           Please see above list of diagnoses and related plan for additional information.     Condition at Discharge: good    Discharge Day Visit / Exam:   Subjective:  NAEON. Endorses decrease in tremulousness, denies restless feeling or diaphoresis. Denies chest pain, shortness of breath. States that he is ready to go home.  Vitals: Blood Pressure: 152/80 (09/17/24 0701)  Pulse: 59 (09/17/24 0701)  Temperature: 97.5 °F (36.4 °C) (09/17/24 0701)  Temp Source: Oral (09/17/24 0701)  Respirations: 17 (09/17/24 0701)  Height: 5' 7\" (170.2 cm) (09/15/24 1319)  Weight - Scale: 107 kg (235 lb 12.8 oz) (09/17/24 0600)  SpO2: 94 % (09/17/24 0701)  Exam:   Physical Exam  Vitals reviewed.   Constitutional:       Appearance: Normal appearance.   HENT:      Mouth/Throat:      Mouth: Mucous membranes are moist.   Eyes:      Pupils: Pupils are equal, round, and reactive to light.   Cardiovascular:      Rate and Rhythm: Normal rate and regular rhythm.      Pulses: Normal pulses.      Heart sounds: Normal heart sounds.   Pulmonary:      Effort: Pulmonary effort is normal.      Breath sounds: Normal breath sounds.   Abdominal:      General: Bowel sounds are normal. There is no distension.      Palpations: Abdomen is soft.      Tenderness: There is no abdominal tenderness.   Musculoskeletal:         General: Normal range of motion.      Cervical back: Normal range of motion and neck supple.      Right lower leg: Edema present.      Left lower leg: Edema present.   Skin:     General: Skin is warm.      Capillary Refill: Capillary refill takes less than 2 seconds.   Neurological:      General: No focal deficit present.      Mental Status: He is alert and oriented to person, place, and time.      Comments: Mild L hand tremor, improved from prior exams.   Psychiatric:         Mood and Affect: Mood normal.         Behavior: Behavior normal.         Thought Content: Thought content normal.        Discharge " instructions/Information to patient and family:   See after visit summary for information provided to patient and family.      Provisions for Follow-Up Care:  See after visit summary for information related to follow-up care and any pertinent home health orders.      Mobility at time of Discharge:   Basic Mobility Inpatient Raw Score: 20  JH-HLM Goal: 6: Walk 10 steps or more  JH-HLM Achieved: 6: Walk 10 steps or more  HLM Goal achieved. Continue to encourage appropriate mobility.     Disposition:   Home    Discharge Medications:  See after visit summary for reconciled discharge medications provided to patient and/or family.      Administrative Statements   Discharge Statement:  I have spent a total time of 45 minutes in caring for this patient on the day of the visit/encounter. >30 minutes of time was spent on: Patient and family education, Counseling / Coordination of care, Documenting in the medical record, Reviewing / ordering tests, medicine, procedures  , and Communicating with other healthcare professionals .    **Please Note: This note may have been constructed using a voice recognition system**

## 2024-09-16 NOTE — OCCUPATIONAL THERAPY NOTE
Occupational Therapy Evaluation     Patient Name: Fredy Yu  Today's Date: 9/16/2024  Problem List  Principal Problem:    Alcohol abuse with withdrawal without complication (HCC)  Active Problems:    Essential hypertension    BPH (benign prostatic hyperplasia)    Elevated lipase    Depression with anxiety    Neuropathy    ROSARIO (obstructive sleep apnea)    Psoriatic arthritis (HCC)    Bilateral lower extremity edema    Past Medical History  Past Medical History:   Diagnosis Date    Alcohol dependency (HCC)     Anxiety     Arthritis     Depression     [place] (hard of hearing)     Hypertension     Kidney stones     Peripheral neuropathy     Prostate enlargement     Renal disorder     kidney    Shoulder dislocation      Past Surgical History  Past Surgical History:   Procedure Laterality Date    CHOLECYSTECTOMY      2010    COLONOSCOPY      SHOULDER SURGERY Left     for dislocation x 2, 20 years ago an the second 18 years ago           09/16/24 1440   OT Last Visit   OT Visit Date 09/16/24   Note Type   Note type Evaluation   Pain Assessment   Pain Score No Pain   Restrictions/Precautions   Weight Bearing Precautions Per Order No   Other Precautions Fall Risk;Telemetry;Chair Alarm;Cognitive;Hard of hearing   Home Living   Type of Home House   Home Layout Two level;Performs ADLs on one level;Able to live on main level with bedroom/bathroom;Stairs to enter with rails  (1 ESCOBAR)   Bathroom Shower/Tub Tub/shower unit   Bathroom Toilet Standard   Bathroom Equipment Grab bars in shower;Shower chair   Bathroom Accessibility Accessible   Home Equipment Walker;Cane;Grab bars   Additional Comments pt reports use of RW at as baseline, however then later in session, reports no device at baseline   Prior Function   Level of [place] Independent with ADLs;Independent with functional mobility;Independent with IADLS   Lives With Spouse   IADLs Independent with driving   Falls in the last 6 months 0   Vocational Part time  "employment   Comments pt is a    Subjective   Subjective \"are you here to take me for a walk?\"   ADL   Where Assessed Chair   LB Dressing Assistance 5  Supervision/Setup   LB Dressing Deficit Don/doff R sock;Don/doff L sock   Additional Comments increased time required to complete tasks at LB level   Bed Mobility   Additional Comments pt seated in chair at start and end of session; SpO2 WFL with no complaints of SOB; HR WFL   Transfers   Sit to Stand 5  Supervision   Additional items Increased time required;Verbal cues  (RW)   Stand to Sit 5  Supervision   Additional items Increased time required;Verbal cues  (RW)   Additional Comments pt is mildly impulsive, however also requires increased time to perform tasks with safety concerns   Functional Mobility   Functional Mobility 5  Supervision   Additional Comments pt performs ~200ft of functional mobility with RW; no significant LOB, mild impulsivity and quick pace at times   Additional items Rolling walker   Balance   Static Sitting Good   Dynamic Sitting Good   Static Standing Fair   Dynamic Standing Fair   Ambulatory Fair -   Activity Tolerance   Activity Tolerance Patient limited by fatigue   RUE Assessment   RUE Assessment WFL   LUE Assessment   LUE Assessment WFL   Hand Function   Gross Motor Coordination Functional   Fine Motor Coordination Functional   Sensation   Light Touch No apparent deficits   Sharp/Dull No apparent deficits   Psychosocial   Psychosocial (WDL) X   Patient Behaviors/Mood Anxious   Cognition   Overall Cognitive Status WFL   Arousal/Participation Alert   Attention Within functional limits   Orientation Level Oriented X4   Memory Within functional limits   Following Commands Follows one step commands without difficulty   Comments pt is moderately [place] and requires increased volume with hearing aides   Assessment   Limitation Decreased ADL status;Decreased UE strength;Decreased Safe judgement during ADL;Decreased " endurance;Decreased self-care trans;Decreased high-level ADLs   Assessment Pt is a 74 y.o. male seen for OT evaluation s/p admit to [facility] on 9/15/2024 w/ Alcohol abuse with withdrawal without complication (HCC).  Comorbidities affecting pt's functional performance at time of assessment include:  HTN, BPH, elevated lipase, depression, neuropathy, ROSARIO, alc abuse, B LE edema, psoriatic arthritis, . Personal factors affecting pt at time of IE include:steps to enter environment, behavioral pattern, difficulty performing ADLS, difficulty performing IADLS , limited insight into deficits, decreased initiation and engagement , and health management . Prior to admission, pt was (I) with ADLs and IADLs with use of RW during mobility at times. Upon evaluation: Pt requires (S) level with use of RW during mobility 2* the following deficits impacting occupational performance: weakness, decreased strength, decreased balance, decreased tolerance, impaired initiation, impaired memory, impaired sequencing, impaired problem solving, and decreased safety awareness. Pt to benefit from continued skilled OT tx while in the hospital to address deficits as defined above and maximize level of functional independence w ADL's and functional mobility. Occupational Performance areas to address include: grooming, bathing/shower, toilet hygiene, dressing, functional mobility, community mobility, and clothing management. The patient's raw score on the -PAC Daily Activity Inpatient Short Form is 21. A raw score of greater than or equal to 19 suggests the patient may benefit from discharge to home. Discharge recommendation at this time is level III minimum resource intensity.  Pt benefited from co-evaluation of skilled OT and PT therapists in order to most appropriately address functional deficits d/t extensive assistance required for safe functional mobility, decreased activity tolerance, and regression from functioning level prior to admission  and/or onset of present illness. OT/PT objectives were addressed separately; please see PT note for specific goal areas targeted.   Goals   Patient Goals to go home   Short Term Goal  pt will perform UE strengthening exercises   Long Term Goal #1 pt will demonstrate toilet transfers and hygiene at (I) level   Long Term Goal #2 pt will demonstrate functional mobility with RW at mod (I) level   Long Term Goal pt will demonstrate UB/LB bathing and grooming tasks at (I) level   Plan   Treatment Interventions ADL retraining;Functional transfer training;UE strengthening/ROM;Endurance training;Patient/family training;Equipment evaluation/education;Activityengagement   Goal Expiration Date 09/30/24   OT Frequency 3-5x/wk   Discharge Recommendation   Rehab Resource Intensity Level, OT III (Minimum Resource Intensity)   Additional Comments  OP PT services   AM-PAC Daily Activity Inpatient   Lower Body Dressing 3   Bathing 3   Toileting 3   Upper Body Dressing 4   Grooming 4   Eating 4   Daily Activity Raw Score 21   Daily Activity Standardized Score (Calc for Raw Score >=11) 44.27   AM-PAC Applied Cognition Inpatient   Following a Speech/Presentation 4   Understanding Ordinary Conversation 4   Taking Medications 4   Remembering Where Things Are Placed or Put Away 3   Remembering List of 4-5 Errands 3   Taking Care of Complicated Tasks 3   Applied Cognition Raw Score 21   Applied Cognition Standardized Score 44.3

## 2024-09-16 NOTE — PROGRESS NOTES
Progress Note - Hospitalist   Name: Fredy Yu 74 y.o. male I MRN: [id number]  Unit/Bed#: 403-01 I Date of Admission: 9/15/2024   Date of Service: 9/16/2024 I Hospital Day: 0    Assessment & Plan  Alcohol abuse with withdrawal without complication (HCC)  Reports recent binge drinking over the last few weeks consuming approximately 1 pint of vodka daily. Last drink the evening prior to admission. Endorses persistent tremors, anxiety. Denies chest pain, shortness of breath or any other associated symptoms  Received Valium 10mg x2 dose in ED  Declining inpatient detox at this time       Patient reports being a little shaky   CIWA - 2  Continue Librium 25 mg twice daily  Continue thiamine, folic acid  Seizure precautions  Consider referral to behavioral upon discharge  Reports going to AA meetings regularly  Counseling on alcohol cessation  Consider naltrexone upon discharge  Essential hypertension  /77 , Elevated on admission     BP stable now  Continue home dose  nadolol, hydrochlorothiazide  Monitor blood pressure      Psoriatic arthritis (HCC)  Patient maintained on prednisone 4 mg daily, 20 mg methotrexate once a week  Will continue the medications above  Continue outpatient follow-up with rheumatology  BPH (benign prostatic hyperplasia)  Continue finasteride   Elevated lipase    Chronically elevated ,109 on admission, down trending from baseline  Likely secondary to alcohol abuse, denies abdominal pain, nausea, vomiting  Monitor CMP  Depression with anxiety  Symptoms exacerbated due to conflict at home with wife and daughter  Continue home Zoloft, Buspar, trazodone  Ref to behavioral health upon discharge  Neuropathy  Continue home dose gabapentin 400 mg BID  ROSARIO (obstructive sleep apnea)  Continue CPAP at night  Bilateral lower extremity edema  Chronic LE edema  Continue home dose hydrochlorothiazide  Recent Echo on 5/2024  EF 65%  Keep  LE extremity elevated      VTE Pharmacologic Prophylaxis: VTE Score:  4 Moderate Risk (Score 3-4) - Pharmacological DVT Prophylaxis Ordered: heparin.    Mobility:   Basic Mobility Inpatient Raw Score: 22  JH-HLM Goal: 7: Walk 25 feet or more  JH-HLM Achieved: 7: Walk 25 feet or more  JH-HLM Goal achieved. Continue to encourage appropriate mobility.    Patient Centered Rounds: I performed bedside rounds with nursing staff today.     Current Length of Stay: 0 day(s)  Current Patient Status: Observation   Certification Statement: The patient will continue to require additional inpatient hospital stay due to alcohol withdrawal  Discharge Plan: Anticipate discharge tomorrow to home.    Code Status: Level 1 - Full Code    Subjective   Patient seen and examined at bedside today patient states that he is feeling better today.  He reports increased shakiness.  Denies overnight events.    Objective     Vitals:   Temp (24hrs), Av.5 °F (36.9 °C), Min:98.2 °F (36.8 °C), Max:98.7 °F (37.1 °C)    Temp:  [98.2 °F (36.8 °C)-98.7 °F (37.1 °C)] 98.4 °F (36.9 °C)  HR:  [56-73] 69  Resp:  [16-19] 17  BP: (117-178)/(53-97) 117/53  SpO2:  [91 %-97 %] 93 %  Body mass index is 36.84 kg/m².     Input and Output Summary (last 24 hours):     Intake/Output Summary (Last 24 hours) at 2024 0852  Last data filed at 2024 0516  Gross per 24 hour   Intake 340 ml   Output 600 ml   Net -260 ml       Physical Exam  Vitals and nursing note reviewed.   Constitutional:       General: He is not in acute distress.     Appearance: He is obese.   Eyes:      General: No scleral icterus.  Cardiovascular:      Rate and Rhythm: Normal rate and regular rhythm.      Pulses: Normal pulses.      Heart sounds: Normal heart sounds. No murmur heard.  Pulmonary:      Effort: Pulmonary effort is normal.      Breath sounds: Normal breath sounds.   Abdominal:      General: Bowel sounds are normal. There is no distension.      Palpations: Abdomen is soft.      Tenderness: There is no abdominal tenderness.   Musculoskeletal:       Right lower leg: Edema present.      Left lower leg: Edema present.   Skin:     General: Skin is warm.      Capillary Refill: Capillary refill takes less than 2 seconds.   Neurological:      Mental Status: He is alert and oriented to person, place, and time.   Psychiatric:         Attention and Perception: Attention normal.         Behavior: Behavior is cooperative.          Lines/Drains:  Lines/Drains/Airways       Active Status       None                      Telemetry:  Telemetry Orders (From admission, onward)               24 Hour Telemetry Monitoring  Continuous x 24 Hours (Telem)        Question:  Reason for 24 Hour Telemetry  Answer:  Alcohol withdrawal and CIWA >7, electrolyte abnormalities, abnormal ECG and/or heart disease                                  Lab Results: I have reviewed the following results:   Results from last 7 days   Lab Units 09/16/24  0424 09/15/24  1110   WBC Thousand/uL 6.16 6.28   HEMOGLOBIN g/dL 11.3* 12.9   HEMATOCRIT % 33.7* 38.3   PLATELETS Thousands/uL 133* 154   SEGS PCT %  --  66   LYMPHO PCT %  --  22   MONO PCT %  --  8   EOS PCT %  --  1     Results from last 7 days   Lab Units 09/16/24  0424   SODIUM mmol/L 137   POTASSIUM mmol/L 3.5   CHLORIDE mmol/L 100   CO2 mmol/L 29   BUN mg/dL 17   CREATININE mg/dL 1.27   ANION GAP mmol/L 8   CALCIUM mg/dL 8.8   ALBUMIN g/dL 3.6   TOTAL BILIRUBIN mg/dL 0.65   ALK PHOS U/L 60   ALT U/L 19   AST U/L 14   GLUCOSE RANDOM mg/dL 93                       Recent Cultures (last 7 days):         Imaging Review: No pertinent imaging studies reviewed.  Other Studies: No additional pertinent studies reviewed.    Last 24 Hours Medication List:     Current Facility-Administered Medications:     acetaminophen (TYLENOL) tablet 650 mg, Q6H PRN    busPIRone (BUSPAR) tablet 7.5 mg, BID    calcium carbonate (TUMS) chewable tablet 1,000 mg, Daily PRN    chlordiazePOXIDE (LIBRIUM) capsule 25 mg, BID    docusate sodium (COLACE) capsule 100 mg, BID     finasteride (PROSCAR) tablet 5 mg, Daily    folic acid (FOLVITE) tablet 1 mg, Daily    gabapentin (NEURONTIN) capsule 400 mg, BID    heparin (porcine) subcutaneous injection 5,000 Units, Q8H BRENDA    hydroCHLOROthiazide tablet 25 mg, Daily    methotrexate tablet 20 mg, Weekly    multivitamin-minerals (CENTRUM) tablet 1 tablet, Daily    nadolol (CORGARD) tablet 20 mg, Daily    ondansetron (ZOFRAN) injection 4 mg, Q6H PRN    predniSONE tablet 4 mg, Daily    sertraline (ZOLOFT) tablet 50 mg, Daily    thiamine tablet 100 mg, Daily    traZODone (DESYREL) tablet 150 mg, HS    Administrative Statements   Today, Patient Was Seen By: Tita Galan MD      **Please Note: This note may have been constructed using a voice recognition system.**

## 2024-09-16 NOTE — ASSESSMENT & PLAN NOTE
/77 , Elevated on admission     BP stable now  Continue home dose  nadolol, hydrochlorothiazide  Monitor blood pressure

## 2024-09-16 NOTE — CASE MANAGEMENT
Case Management Assessment & Discharge Planning Note    Patient name Fredy Yu  Location /403-01 MRN [id number]  : 1950 Date 2024       Current Admission Date: 9/15/2024  Current Admission Diagnosis:Alcohol abuse with withdrawal without complication ([facility])   Patient Active Problem List    Diagnosis Date Noted Date Diagnosed    Bilateral lower extremity edema 09/15/2024     Psoriatic arthritis ([facility]) 2024     Other osteoporosis with current pathological fracture, vertebra(e), initial encounter for fracture ([facility]) 2024     High risk medication use 2024     Compression of lumbar vertebra ([facility]) 2024     Alcohol abuse with withdrawal without complication ([facility]) 2024     Leukocytosis 2024     ROSARIO (obstructive sleep apnea) 2024     Snoring 2024     Sleep apnea 2024     Prediabetes 2024     Excessive daytime sleepiness 2024     Abnormal brain MRI 2024     Neuropathy 2024     Carpal tunnel syndrome of left wrist 2024     neuropathy 2024     Psoriasis 2023     Inflammatory arthritis 2023     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 degeneration, unspecified stage ([facility]) 2022     Seizure ([facility]) 2022     Polymyalgia rheumatica ([facility]) 10/21/2022     Obesity, morbid ([facility]) 2022     Depression with anxiety 2021     Pruritus 2021     Insomnia 2021     Mixed hyperlipidemia 2021     Alcohol induced fatty liver 2021     Elevated lipase 2021     History of prolonged Q-T interval on ECG 2021     BPH (benign prostatic hyperplasia) 2019     Alcohol use disorder, mild, in early remission, abuse 2019     Essential hypertension 02/10/2019       LOS (days): 0  Geometric Mean LOS (GMLOS) (days): 2.6  Days to GMLOS:2.5     OBJECTIVE:    Risk of Unplanned Readmission Score: 18.68         Current admission status: Inpatient  Referral Reason:  (discharge  plan)    Preferred Pharmacy:   [id number] [place] - [address]  [address]  [place] [address]  Phone: [phone] Fax: [phone]    Primary Care Provider: Tejal Garcia MD    Primary Insurance: MEDICARE  Secondary Insurance: [facility] HEALTH OPTIONS PROGRAM    ASSESSMENT:    CM met with patient at the bedside,baseline information  was obtained. CM discussed the role of CM in helping the patient develop a discharge plan and assist the patient in carry out their plan.    Patient lives with wife.   Baseline Independent with ADL'S  Working and drives     Patient is currently active with out patient  D&A therapy session and is planning to return on discharge.      Active Health Care Proxies       Diana Yu Health Care Representative - Daughter   Primary Phone: [phone] (Mobile)                 Advance Directives  Does patient have a Health Care POA?: No  Was patient offered paperwork?:  (declined)  Does patient currently have a Health Care decision maker?: Yes, please see Health Care Proxy section  Does patient have Advance Directives?: No  Was patient offered paperwork?: Yes (declined)  Primary Contact: Diana daughter         Readmission Root Cause  30 Day Readmission: No    Patient Information  Admitted from:: Home  Mental Status: Alert  During Assessment patient was accompanied by: Not accompanied during assessment  Assessment information provided by:: Patient  Primary Caregiver: Family  Caregiver's Name:: Itzel  Caregiver's Relationship to Patient:: Significant Other  Support Systems: Spouse/significant other  County of Residence: Other (specify in comment box) ([facility])  Home entry access options. Select all that apply.: Stairs  Number of steps to enter home.: 2  Do the steps have railings?: Yes  Type of Current Residence: 2 story home  Upon entering residence, is there a bedroom on the main floor (no further steps)?: No  A bedroom is located  on the following floor levels of residence (select all that apply):: 2nd Floor  Number of steps to 2nd floor from main floor: One Flight  Living Arrangements: Lives w/ Spouse/significant other  Is patient a ?: No    Activities of Daily Living Prior to Admission  Functional Status: Independent  Completes ADLs independently?: Yes  Ambulates independently?: Yes  Does patient use assisted devices?: No  Does patient currently own DME?: No  Does patient have a history of Outpatient Therapy (PT/OT)?: No  Does the patient have a history of Short-Term Rehab?: No  Does patient have a history of HHC?: No  Does patient currently have HHC?: No         Patient Information Continued  Income Source: Employed  Does patient have prescription coverage?: Yes  Does patient receive dialysis treatments?: No  Does patient have a history of substance abuse?: No         Means of Transportation  Means of Transport to App:: Drives Self      Social Determinants of Health (SDOH)      Flowsheet Row Most Recent Value   Housing Stability    In the last 12 months, was there a time when you were not able to pay the mortgage or rent on time? N   In the past 12 months, how many times have you moved where you were living? 0   At any time in the past 12 months, were you homeless or living in a shelter (including now)? N   Transportation Needs    In the past 12 months, has lack of transportation kept you from medical appointments or from getting medications? no   In the past 12 months, has lack of transportation kept you from meetings, work, or from getting things needed for daily living? No   Food Insecurity    Within the past 12 months, you worried that your food would run out before you got the money to buy more. Never true   Within the past 12 months, the food you bought just didn't last and you didn't have money to get more. Never true   Utilities    In the past 12 months has the electric, gas, oil, or water company threatened to shut off  services in your home? No            DISCHARGE DETAILS:    Discharge planning discussed with:: patient         Patient declined in patient treatment.   Patient is currently active with out patient D&A treatment programs.  Patient stated he will resume out patient treatment program on discharge.    CM offered to make PCP appt for patient and patient declined stated he will make his own appt with Dr Wheeler.    CM to follow for discharge needs

## 2024-09-16 NOTE — ASSESSMENT & PLAN NOTE
Reports recent binge drinking over the last few weeks consuming approximately 1 pint of vodka daily. Last drink the evening prior to admission. Endorses persistent tremors, anxiety. Denies chest pain, shortness of breath or any other associated symptoms  Received Valium 10mg x2 dose in ED  Declining inpatient detox at this time     Patient reports mild shaking in his left hand, improved from prior exams  Continue Librium 25 mg for 2 more doses to complete on 9/18/24  Continue thiamine, folic acid  Encourage going to AA meetings regularly  Counseled on alcohol cessation  Consider naltrexone as outpatient therapy for alcohol cessation  F/u with PCP within 1 week

## 2024-09-16 NOTE — ASSESSMENT & PLAN NOTE
Patient maintained on prednisone 4 mg daily, 20 mg methotrexate once a week  Will continue the medications above  Continue outpatient follow-up with rheumatology

## 2024-09-16 NOTE — ASSESSMENT & PLAN NOTE
Chronically elevated ,109 on admission, down trending from baseline  Likely secondary to alcohol abuse, denies abdominal pain, nausea, vomiting  Monitor CMP

## 2024-09-16 NOTE — ASSESSMENT & PLAN NOTE
Symptoms exacerbated due to conflict at home with wife and daughter  Continue home Zoloft, Buspar, trazodone  F/u with PCP outpatient

## 2024-09-16 NOTE — ASSESSMENT & PLAN NOTE
Chronically elevated ,109 on admission, down trending from baseline  Likely secondary to alcohol abuse, denies abdominal pain, nausea, vomiting  Monitor

## 2024-09-16 NOTE — PHYSICAL THERAPY NOTE
Physical Therapy Evaluation    Patient Name: Fredy Yu    Today's Date: 9/16/2024     Problem List  Principal Problem:    Alcohol abuse with withdrawal without complication (HCC)  Active Problems:    Essential hypertension    BPH (benign prostatic hyperplasia)    Elevated lipase    Depression with anxiety    Neuropathy    ROSARIO (obstructive sleep apnea)    Psoriatic arthritis (HCC)    Bilateral lower extremity edema       Past Medical History  Past Medical History:   Diagnosis Date    Alcohol dependency (HCC)     Anxiety     Arthritis     Depression     [place] (hard of hearing)     Hypertension     Kidney stones     Peripheral neuropathy     Prostate enlargement     Renal disorder     kidney    Shoulder dislocation         Past Surgical History  Past Surgical History:   Procedure Laterality Date    CHOLECYSTECTOMY      2010    COLONOSCOPY      SHOULDER SURGERY Left     for dislocation x 2, 20 years ago an the second 18 years ago           09/16/24 1442   PT Last Visit   PT Visit Date 09/16/24   Note Type   Note type Evaluation   Pain Assessment   Pain Assessment Tool 0-10   Pain Score No Pain   Restrictions/Precautions   Weight Bearing Precautions Per Order No   Other Precautions Fall Risk;Telemetry;Chair Alarm;Hard of hearing   Home Living   Type of Home House   Home Layout Two level;Performs ADLs on one level;Able to live on main level with bedroom/bathroom;Stairs to enter without rails  (1 ESCOBAR)   Bathroom Shower/Tub Tub/shower unit   Bathroom Toilet Standard   Bathroom Equipment Grab bars in shower;Shower chair   Bathroom Accessibility Accessible   Home Equipment Walker;Cane;Grab bars   Additional Comments pt denies use of DME at baseline   Prior Function   Level of [place] Independent with ADLs;Independent with functional mobility;Independent with IADLS   Lives With Spouse   Receives Help From Family   IADLs Independent with driving   Falls in the last 6  "months 0   Vocational Part time employment   Comments pt is a    General   Family/Caregiver Present No   Cognition   Overall Cognitive Status WFL   Arousal/Participation Alert   Attention Within functional limits   Orientation Level Oriented X4   Following Commands Follows one step commands without difficulty   Subjective   Subjective \"I don't watch a lot of TV\"   RLE Assessment   RLE Assessment WFL   LLE Assessment   LLE Assessment WFL   Bed Mobility   Additional Comments pt OOB at start/end of session   Transfers   Sit to Stand 5  Supervision   Additional items Impulsive;Verbal cues;Armrests   Stand to Sit 5  Supervision   Additional items Impulsive;Verbal cues;Armrests   Additional Comments RW used   Ambulation/Elevation   Gait pattern Short stride;Inconsistent iram;Decreased foot clearance;Narrow MARGY   Gait Assistance 5  Supervision   Additional items Verbal cues   Assistive Device Rolling walker   Distance 450'   Balance   Static Sitting Good   Dynamic Sitting Fair +   Static Standing Fair   Dynamic Standing Fair   Ambulatory Fair -  (with RW)   Endurance Deficit   Endurance Deficit Yes   Endurance Deficit Description pt appears SOB with increased ambulation   Activity Tolerance   Activity Tolerance Patient limited by fatigue   Assessment   Prognosis Good   Problem List Decreased strength;Decreased endurance;Impaired balance;Decreased mobility   Assessment Patient is a 74 y.o. male evaluated by Physical Therapy s/p admit to [facility] on 9/15/2024 with admitting diagnosis of: Alcohol abuse, Alcohol problem drinking, and principal problem of: Alcohol abuse with withdrawal without complication. PT was consulted to assess patient's functional mobility and discharge needs. Ordered are PT Evaluation and treatment with activity level of: up and OOB as tolerated. Comorbidities affecting patient's physical performance at time of assessment include:  HTN, BPH, neuropathy, ROSARIO, " psoriatic arthritis, osteoporosis . Personal factors affecting the patient at time of IE include: lives in 2 story home, ambulating with assistive device, step(s) to enter home, inability to navigate community distances, inability/difficulty performing IADLs, and inability/difficulty performing ADLs. Please locate objective findings from PT assessment regarding body systems outlined above. Upon evaluation, pt able to perform all functional mobility with SUP, RW, and occasional verbal cuing. Pt mildly impulsive with mobility but no true LOB experienced. Seated rest break taken following 450' of ambulation. HR and SpO2 remained WFL on RA throughout. The patient's AM-PAC Basic Mobility Inpatient Short Form Raw Score is 20. A Raw score of greater than 16 suggests the patient may benefit from discharge to home. Please also refer to the recommendation of the Physical Therapist for safe discharge planning. Co treatment with OT secondary to complex medical condition of pt, possible A of 2 required to achieve and maintain transitional movements, requiring the need of skilled therapeutic intervention of 2 therapists to achieve delivery of services. Pt will benefit from continued PT intervention during LOS to address current deficits, increase LOF, and facilitate safe d/c to next level of care when medically appropriate. D/c recommendation at this time is rehabilitation resource intensity level III.   Goals   Patient Goals to go home   LTG Expiration Date 09/30/24   Long Term Goal #1 Pt will participate in B LE strengthening exercises to facilitate improved functional activity tolerance. Pt will perform all functional transfers and bed mobility mod(I) with good safety awareness. Pt will ambulate community distances mod(I) with LRAD while maintaining good functional dynamic balance.   Plan   Treatment/Interventions Functional transfer training;LE strengthening/ROM;Therapeutic exercise;Endurance training;Gait training;Bed  mobility   PT Frequency 3-5x/wk   Discharge Recommendation   Rehab Resource Intensity Level, PT III (Minimum Resource Intensity)   AM-PAC Basic Mobility Inpatient   Turning in Flat Bed Without Bedrails 4   Lying on Back to Sitting on Edge of Flat Bed Without Bedrails 4   Moving Bed to Chair 3   Standing Up From Chair Using Arms 3   Walk in Room 3   Climb 3-5 Stairs With Railing 3   Basic Mobility Inpatient Raw Score 20   Basic Mobility Standardized Score 43.99   [facility] Highest Level Of Mobility   -[facility] Goal 6: Walk 10 steps or more   -HLM Achieved 8: Walk 250 feet ot more   End of Consult   Patient Position at End of Consult Bedside chair;Bed/Chair alarm activated;All needs within reach

## 2024-09-16 NOTE — PLAN OF CARE
Problem: OCCUPATIONAL THERAPY ADULT  Goal: Performs self-care activities at highest level of function for planned discharge setting.  See evaluation for individualized goals.  Description: Treatment Interventions: ADL retraining, Functional transfer training, UE strengthening/ROM, Endurance training, Patient/family training, Equipment evaluation/education, Activityengagement          See flowsheet documentation for full assessment, interventions and recommendations.   Note: Limitation: Decreased ADL status, Decreased UE strength, Decreased Safe judgement during ADL, Decreased endurance, Decreased self-care trans, Decreased high-level ADLs     Assessment: Pt is a 74 y.o. male seen for OT evaluation s/p admit to [facility] on 9/15/2024 w/ Alcohol abuse with withdrawal without complication (HCC).  Comorbidities affecting pt's functional performance at time of assessment include:  HTN, BPH, elevated lipase, depression, neuropathy, ROSARIO, alc abuse, B LE edema, psoriatic arthritis, . Personal factors affecting pt at time of IE include:steps to enter environment, behavioral pattern, difficulty performing ADLS, difficulty performing IADLS , limited insight into deficits, decreased initiation and engagement , and health management . Prior to admission, pt was (I) with ADLs and IADLs with use of RW during mobility at times. Upon evaluation: Pt requires (S) level with use of RW during mobility 2* the following deficits impacting occupational performance: weakness, decreased strength, decreased balance, decreased tolerance, impaired initiation, impaired memory, impaired sequencing, impaired problem solving, and decreased safety awareness. Pt to benefit from continued skilled OT tx while in the hospital to address deficits as defined above and maximize level of functional independence w ADL's and functional mobility. Occupational Performance areas to address include: grooming, bathing/shower, toilet hygiene, dressing, functional  mobility, community mobility, and clothing management. The patient's raw score on the -PAC Daily Activity Inpatient Short Form is 21. A raw score of greater than or equal to 19 suggests the patient may benefit from discharge to home. Discharge recommendation at this time is level III minimum resource intensity.  Pt benefited from co-evaluation of skilled OT and PT therapists in order to most appropriately address functional deficits d/t extensive assistance required for safe functional mobility, decreased activity tolerance, and regression from functioning level prior to admission and/or onset of present illness. OT/PT objectives were addressed separately; please see PT note for specific goal areas targeted.     Rehab Resource Intensity Level, OT: III (Minimum Resource Intensity)

## 2024-09-16 NOTE — ASSESSMENT & PLAN NOTE
Symptoms exacerbated due to conflict at home with wife and daughter  Continue home Zoloft, Buspar, trazodone  Ref to behavioral health upon discharge

## 2024-09-16 NOTE — ASSESSMENT & PLAN NOTE
Reports recent binge drinking over the last few weeks consuming approximately 1 pint of vodka daily. Last drink the evening prior to admission. Endorses persistent tremors, anxiety. Denies chest pain, shortness of breath or any other associated symptoms  Received Valium 10mg x2 dose in ED  Declining inpatient detox at this time       Patient reports being a little shaky   CIWA - 2  Continue Librium 25 mg twice daily  Continue thiamine, folic acid  Seizure precautions  Consider referral to behavioral upon discharge  Reports going to AA meetings regularly  Counseling on alcohol cessation  Consider naltrexone upon discharge

## 2024-09-16 NOTE — PLAN OF CARE
Problem: PAIN - ADULT  Goal: Verbalizes/displays adequate comfort level or baseline comfort level  Description: Interventions:  - Encourage patient to monitor pain and request assistance  - Assess pain using 0-10 pain scale  - Administer analgesics based on type and severity of pain and evaluate response  - Implement non-pharmacological measures as appropriate and evaluate response  - Notify physician/advanced practitioner if interventions unsuccessful or patient reports new pain  Outcome: Progressing     Problem: INFECTION - ADULT  Goal: Absence or prevention of progression during hospitalization  Description: INTERVENTIONS:  - Assess and monitor for signs and symptoms of infection  - Monitor temp  - Monitor WBC  - Monitor all IV insertion sites  - [place] appropriate cooling/warming therapies per order  - Administer medications as ordered  - Instruct and encourage patient and family to use good hand hygiene technique  - Identify and instruct in appropriate isolation precautions for identified infection/condition  Outcome: Progressing     Problem: SAFETY ADULT  Goal: Patient will remain free of falls  Description: INTERVENTIONS:  - Educate patient on patient safety   - Instruct patient to call for assistance with activity   - Keep Call bell within reach  - Keep bed low and locked with side rails adjusted as appropriate  - Keep care items and personal belongings within reach  - Initiate and maintain comfort rounds  - Offer Toileting every 2 Hours, in advance of need  - Initiate/Maintain bed/chair alarm  - Obtain necessary fall risk management equipment  - Apply yellow socks and bracelet   Outcome: Progressing     Problem: DISCHARGE PLANNING  Goal: Discharge to home or other facility with appropriate resources  Description: INTERVENTIONS:  - Identify barriers to discharge w/patient   - Arrange for needed discharge resources and transportation as appropriate  - Identify discharge learning needs (meds, wound care,  etc.)  - Refer to Case Management Department for coordinating discharge planning if the patient needs post-hospital services based on physician/advanced practitioner order or complex needs related to functional status, cognitive ability, or social support system  Outcome: Progressing     Problem: Knowledge Deficit  Goal: Patient demonstrates understanding of disease process, treatment plan, medications, and discharge instructions  Description: Complete learning assessment and assess knowledge base.  Interventions:  - Provide teaching at level of understanding  - Provide teaching via preferred learning methods  Outcome: Progressing     Problem: NEUROSENSORY - ADULT  Goal: Achieves stable or improved neurological status  Description: INTERVENTIONS  - Monitor CIWA and alcohol withdrawal symptoms to prevent seizures  - Monitor and report changes in neurological status  - Monitor vital signs such as temperature, blood pressure, and any other labs ordered   - Monitor for seizure activity due to alcohol withdrawl    Outcome: Progressing     Problem: CARDIOVASCULAR - ADULT  Goal: Absence of cardiac dysrhythmias or at baseline rhythm  Description: INTERVENTIONS:  - Continuous cardiac monitoring, vital signs    Outcome: Progressing

## 2024-09-16 NOTE — ASSESSMENT & PLAN NOTE
Chronic LE edema  Continue home dose hydrochlorothiazide  Recent Echo on 5/2024  EF 65%  Keep  LE extremity elevated

## 2024-09-17 ENCOUNTER — TRANSITIONAL CARE MANAGEMENT (OUTPATIENT)
Dept: FAMILY MEDICINE CLINIC | Facility: CLINIC | Age: 74
End: 2024-09-17

## 2024-09-17 ENCOUNTER — TELEPHONE (OUTPATIENT)
Age: 74
End: 2024-09-17

## 2024-09-17 VITALS
HEIGHT: 67 IN | WEIGHT: 235.8 LBS | OXYGEN SATURATION: 94 % | DIASTOLIC BLOOD PRESSURE: 80 MMHG | BODY MASS INDEX: 37.01 KG/M2 | SYSTOLIC BLOOD PRESSURE: 152 MMHG | TEMPERATURE: 97.5 F | RESPIRATION RATE: 17 BRPM | HEART RATE: 59 BPM

## 2024-09-17 LAB
ALBUMIN SERPL BCG-MCNC: 3.6 G/DL (ref 3.5–5)
ALP SERPL-CCNC: 65 U/L (ref 34–104)
ALT SERPL W P-5'-P-CCNC: 14 U/L (ref 7–52)
ANION GAP SERPL CALCULATED.3IONS-SCNC: 9 MMOL/L (ref 4–13)
AST SERPL W P-5'-P-CCNC: 11 U/L (ref 13–39)
ATRIAL RATE: 62 BPM
BASOPHILS # BLD AUTO: 0.01 THOUSANDS/ΜL (ref 0–0.1)
BASOPHILS NFR BLD AUTO: 0 % (ref 0–1)
BILIRUB SERPL-MCNC: 0.49 MG/DL (ref 0.2–1)
BUN SERPL-MCNC: 21 MG/DL (ref 5–25)
CALCIUM SERPL-MCNC: 8.8 MG/DL (ref 8.4–10.2)
CHLORIDE SERPL-SCNC: 99 MMOL/L (ref 96–108)
CO2 SERPL-SCNC: 30 MMOL/L (ref 21–32)
CREAT SERPL-MCNC: 1.31 MG/DL (ref 0.6–1.3)
EOSINOPHIL # BLD AUTO: 0.17 THOUSAND/ΜL (ref 0–0.61)
EOSINOPHIL NFR BLD AUTO: 3 % (ref 0–6)
ERYTHROCYTE [DISTWIDTH] IN BLOOD BY AUTOMATED COUNT: 14.2 % (ref 11.6–15.1)
GFR SERPL CREATININE-BSD FRML MDRD: 53 ML/MIN/1.73SQ M
GLUCOSE SERPL-MCNC: 122 MG/DL (ref 65–140)
HCT VFR BLD AUTO: 35.7 % (ref 36.5–49.3)
HGB BLD-MCNC: 11.7 G/DL (ref 12–17)
IMM GRANULOCYTES # BLD AUTO: 0.11 THOUSAND/UL (ref 0–0.2)
IMM GRANULOCYTES NFR BLD AUTO: 2 % (ref 0–2)
LYMPHOCYTES # BLD AUTO: 1.9 THOUSANDS/ΜL (ref 0.6–4.47)
LYMPHOCYTES NFR BLD AUTO: 31 % (ref 14–44)
MCH RBC QN AUTO: 32.3 PG (ref 26.8–34.3)
MCHC RBC AUTO-ENTMCNC: 32.8 G/DL (ref 31.4–37.4)
MCV RBC AUTO: 99 FL (ref 82–98)
MONOCYTES # BLD AUTO: 0.83 THOUSAND/ΜL (ref 0.17–1.22)
MONOCYTES NFR BLD AUTO: 14 % (ref 4–12)
NEUTROPHILS # BLD AUTO: 3.05 THOUSANDS/ΜL (ref 1.85–7.62)
NEUTS SEG NFR BLD AUTO: 50 % (ref 43–75)
NRBC BLD AUTO-RTO: 0 /100 WBCS
P AXIS: 29 DEGREES
PLATELET # BLD AUTO: 143 THOUSANDS/UL (ref 149–390)
PMV BLD AUTO: 9 FL (ref 8.9–12.7)
POTASSIUM SERPL-SCNC: 3.4 MMOL/L (ref 3.5–5.3)
PR INTERVAL: 156 MS
PROT SERPL-MCNC: 6.3 G/DL (ref 6.4–8.4)
QRS AXIS: 55 DEGREES
QRSD INTERVAL: 104 MS
QT INTERVAL: 432 MS
QTC INTERVAL: 438 MS
RBC # BLD AUTO: 3.62 MILLION/UL (ref 3.88–5.62)
SODIUM SERPL-SCNC: 138 MMOL/L (ref 135–147)
T WAVE AXIS: 23 DEGREES
VENTRICULAR RATE: 62 BPM
WBC # BLD AUTO: 6.07 THOUSAND/UL (ref 4.31–10.16)

## 2024-09-17 PROCEDURE — 99239 HOSP IP/OBS DSCHRG MGMT >30: CPT | Performed by: FAMILY MEDICINE

## 2024-09-17 PROCEDURE — 97116 GAIT TRAINING THERAPY: CPT

## 2024-09-17 PROCEDURE — 97110 THERAPEUTIC EXERCISES: CPT

## 2024-09-17 PROCEDURE — 80053 COMPREHEN METABOLIC PANEL: CPT

## 2024-09-17 PROCEDURE — 93010 ELECTROCARDIOGRAM REPORT: CPT | Performed by: INTERNAL MEDICINE

## 2024-09-17 PROCEDURE — 85025 COMPLETE CBC W/AUTO DIFF WBC: CPT

## 2024-09-17 RX ORDER — POTASSIUM CHLORIDE 1500 MG/1
20 TABLET, EXTENDED RELEASE ORAL ONCE
Status: COMPLETED | OUTPATIENT
Start: 2024-09-17 | End: 2024-09-17

## 2024-09-17 RX ORDER — CHLORDIAZEPOXIDE HYDROCHLORIDE 25 MG/1
CAPSULE, GELATIN COATED ORAL
Qty: 2 CAPSULE | Refills: 0 | Status: SHIPPED | OUTPATIENT
Start: 2024-09-17

## 2024-09-17 RX ADMIN — SERTRALINE HYDROCHLORIDE 50 MG: 50 TABLET ORAL at 08:39

## 2024-09-17 RX ADMIN — HYDROCHLOROTHIAZIDE 25 MG: 25 TABLET ORAL at 08:39

## 2024-09-17 RX ADMIN — PREDNISONE 4 MG: 1 TABLET ORAL at 08:38

## 2024-09-17 RX ADMIN — FINASTERIDE 5 MG: 5 TABLET, FILM COATED ORAL at 08:38

## 2024-09-17 RX ADMIN — DOCUSATE SODIUM 100 MG: 100 CAPSULE, LIQUID FILLED ORAL at 08:38

## 2024-09-17 RX ADMIN — NADOLOL 20 MG: 40 TABLET ORAL at 08:39

## 2024-09-17 RX ADMIN — FOLIC ACID 1 MG: 1 TABLET ORAL at 08:38

## 2024-09-17 RX ADMIN — BUSPIRONE HYDROCHLORIDE 7.5 MG: 5 TABLET ORAL at 08:38

## 2024-09-17 RX ADMIN — POTASSIUM CHLORIDE 20 MEQ: 1500 TABLET, EXTENDED RELEASE ORAL at 08:38

## 2024-09-17 RX ADMIN — Medication 1 TABLET: at 08:39

## 2024-09-17 RX ADMIN — HEPARIN SODIUM 5000 UNITS: 5000 INJECTION, SOLUTION INTRAVENOUS; SUBCUTANEOUS at 05:10

## 2024-09-17 RX ADMIN — GABAPENTIN 400 MG: 400 CAPSULE ORAL at 08:38

## 2024-09-17 RX ADMIN — CHLORDIAZEPOXIDE HYDROCHLORIDE 25 MG: 25 CAPSULE ORAL at 08:38

## 2024-09-17 RX ADMIN — THIAMINE HCL TAB 100 MG 100 MG: 100 TAB at 08:39

## 2024-09-17 NOTE — CASE MANAGEMENT
Case Management Discharge Planning Note    Patient name Fredy Yu  Location /403-01 MRN [id number]  : 1950 Date 2024       Current Admission Date: 9/15/2024  Current Admission Diagnosis:Alcohol abuse with withdrawal without complication ([facility])   Patient Active Problem List    Diagnosis Date Noted Date Diagnosed    Bilateral lower extremity edema 09/15/2024     Psoriatic arthritis (HCC) 2024     Other osteoporosis with current pathological fracture, vertebra(e), initial encounter for fracture ([facility]) 2024     High risk medication use 2024     Compression of lumbar vertebra (HCC) 2024     Alcohol abuse with withdrawal without complication ([facility]) 2024     Leukocytosis 2024     ROSARIO (obstructive sleep apnea) 2024     Snoring 2024     Sleep apnea 2024     Prediabetes 2024     Excessive daytime sleepiness 2024     Abnormal brain MRI 2024     Neuropathy 2024     Carpal tunnel syndrome of left wrist 2024     neuropathy 2024     Psoriasis 2023     Inflammatory arthritis 2023     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 degeneration, unspecified stage ([facility]) 2022     Seizure ([facility]) 2022     Polymyalgia rheumatica ([facility]) 10/21/2022     Obesity, morbid ([facility]) 2022     Depression with anxiety 2021     Pruritus 2021     Insomnia 2021     Mixed hyperlipidemia 2021     Alcohol induced fatty liver 2021     Elevated lipase 2021     History of prolonged Q-T interval on ECG 2021     BPH (benign prostatic hyperplasia) 2019     Alcohol use disorder, mild, in early remission, abuse 2019     Essential hypertension 02/10/2019       LOS (days): 1  Geometric Mean LOS (GMLOS) (days): 2.6  Days to GMLOS:1.6     OBJECTIVE:  Risk of Unplanned Readmission Score: 19.53         Current admission status: Inpatient   Preferred Pharmacy:   [id number] - [place] - [address]  [address]  [place] [address]  Phone: [phone] Fax: [phone]    Primary Care Provider: Tejal Garcia MD    Primary Insurance: MEDICARE  Secondary Insurance: [employer]    DISCHARGE DETAILS:  Pt is being dc'd home on this date with OP follow up and already set up with Outpatient D&A.

## 2024-09-17 NOTE — PLAN OF CARE
Problem: PAIN - ADULT  Goal: Verbalizes/displays adequate comfort level or baseline comfort level  Description: Interventions:  - Encourage patient to monitor pain and request assistance  - Assess pain using 0-10 pain scale  - Administer analgesics based on type and severity of pain and evaluate response  - Implement non-pharmacological measures as appropriate and evaluate response  - Notify physician/advanced practitioner if interventions unsuccessful or patient reports new pain  Outcome: Progressing     Problem: INFECTION - ADULT  Goal: Absence or prevention of progression during hospitalization  Description: INTERVENTIONS:  - Assess and monitor for signs and symptoms of infection  - Monitor temp  - Monitor WBC  - Monitor all IV insertion sites  - [place] appropriate cooling/warming therapies per order  - Administer medications as ordered  - Instruct and encourage patient and family to use good hand hygiene technique  - Identify and instruct in appropriate isolation precautions for identified infection/condition  Outcome: Progressing     Problem: SAFETY ADULT  Goal: Patient will remain free of falls  Description: INTERVENTIONS:  - Educate patient on patient safety   - Instruct patient to call for assistance with activity   - Keep Call bell within reach  - Keep bed low and locked with side rails adjusted as appropriate  - Keep care items and personal belongings within reach  - Initiate and maintain comfort rounds  - Offer Toileting every 2 Hours, in advance of need  - Initiate/Maintain bed/chair alarm  - Obtain necessary fall risk management equipment  - Apply yellow socks and bracelet   Outcome: Progressing     Problem: DISCHARGE PLANNING  Goal: Discharge to home or other facility with appropriate resources  Description: INTERVENTIONS:  - Identify barriers to discharge w/patient   - Arrange for needed discharge resources and transportation as appropriate  - Identify discharge learning needs (meds, wound care,  etc.)  - Refer to Case Management Department for coordinating discharge planning if the patient needs post-hospital services based on physician/advanced practitioner order or complex needs related to functional status, cognitive ability, or social support system  Outcome: Progressing     Problem: Knowledge Deficit  Goal: Patient demonstrates understanding of disease process, treatment plan, medications, and discharge instructions  Description: Complete learning assessment and assess knowledge base.  Interventions:  - Provide teaching at level of understanding  - Provide teaching via preferred learning methods  Outcome: Progressing     Problem: NEUROSENSORY - ADULT  Goal: Achieves stable or improved neurological status  Description: INTERVENTIONS  - Monitor CIWA and alcohol withdrawal symptoms to prevent seizures  - Monitor and report changes in neurological status  - Monitor vital signs such as temperature, blood pressure, and any other labs ordered   - Monitor for seizure activity due to alcohol withdrawl    Outcome: Progressing     Problem: CARDIOVASCULAR - ADULT  Goal: Absence of cardiac dysrhythmias or at baseline rhythm  Description: INTERVENTIONS:  - Continuous cardiac monitoring, vital signs    Outcome: Progressing

## 2024-09-17 NOTE — PHYSICAL THERAPY NOTE
PHYSICAL THERAPY NOTE          Patient Name: Fredy Yu  Today's Date: 9/17/2024 09/17/24 0814   PT Last Visit   PT Visit Date 09/17/24   Note Type   Note Type Treatment   Pain Assessment   Pain Assessment Tool 0-10   Pain Score No Pain   Restrictions/Precautions   Weight Bearing Precautions Per Order No   Other Precautions   (Fall Risk; Telemetry; Chair Alarm; Hard of hearing)   General   Chart Reviewed Yes   Family/Caregiver Present No   Cognition   Overall Cognitive Status WFL   Arousal/Participation Alert;Cooperative   Attention Within functional limits   Orientation Level Oriented X4   Following Commands Follows one step commands without difficulty   Subjective   Subjective Pt would like to ambulate   Bed Mobility   Additional Comments OOB in bathroom start session   Transfers   Sit to Stand 5  Supervision   Additional items Armrests;Increased time required;Verbal cues   Stand to Sit 5  Supervision   Additional items Armrests;Increased time required;Verbal cues   Toilet transfer 5  Supervision   Additional Comments Stood with RW fair balance. Able to manage clothing and hygiene in standing.   Ambulation/Elevation   Gait pattern   (Short stride; Inconsistent iram; Decreased foot clearance; Narrow MARGY)   Gait Assistance 5  Supervision   Additional items Verbal cues   Assistive Device Rolling walker   Distance 450'   Balance   Static Sitting Good   Dynamic Sitting Fair +   Static Standing Fair   Dynamic Standing Fair   Ambulatory Fair  (RW)   Endurance Deficit   Endurance Deficit Yes   Activity Tolerance   Activity Tolerance Patient tolerated treatment well   Assessment   Prognosis Good   Problem List   (Decreased strength; Decreased endurance; Impaired balance; Decreased mobility)   Assessment Pt. seen for PT treatment session this date with interventions consisting of   transfers and  gait training w/ emphasis on improving  pt's ability to ambulate. In comparison to previous session, Pt. With improvements in activity tolerance.   Pt is in need of continued activity in PT to improve strength balance endurance mobility transfers and ambulation with return to maximize LOF. From PT/mobility standpoint, recommendation at time of d/c would be level III: min resource intensity   in order to promote return to PLOF and independence.   The patient's AM-PAC Basic Mobility Inpatient Short Form Raw Score is 23. A Raw score of greater than 16 suggests the patient may benefit from discharge to home.  Please also refer to physical therapy recommendation for safe DC planning.   Goals   Patient Goals to go home   LTG Expiration Date 09/30/24   PT Treatment Day 1   Plan   Treatment/Interventions Functional transfer training;LE strengthening/ROM;Therapeutic exercise;Endurance training;Bed mobility;Gait training;Spoke to nursing   Progress Progressing toward goals   PT Frequency 3-5x/wk   Discharge Recommendation   Rehab Resource Intensity Level, PT III (Minimum Resource Intensity)   AM-PAC Basic Mobility Inpatient   Turning in Flat Bed Without Bedrails 4   Lying on Back to Sitting on Edge of Flat Bed Without Bedrails 4   Moving Bed to Chair 4   Standing Up From Chair Using Arms 4   Walk in Room 4   Climb 3-5 Stairs With Railing 3   Basic Mobility Inpatient Raw Score 23   Basic Mobility Standardized Score 50.88   [facility] Highest Level Of Mobility   JH-HLM Goal 7: Walk 25 feet or more   JH-HLM Achieved 8: Walk 250 feet ot more   Education   Education Provided Mobility training   Patient Demonstrates verbal understanding   End of Consult   Patient Position at End of Consult Bedside chair;All needs within reach   End of Consult Comments discussed POC with PT

## 2024-09-17 NOTE — PLAN OF CARE
Problem: PAIN - ADULT  Goal: Verbalizes/displays adequate comfort level or baseline comfort level  Description: Interventions:  - Encourage patient to monitor pain and request assistance  - Assess pain using 0-10 pain scale  - Administer analgesics based on type and severity of pain and evaluate response  - Implement non-pharmacological measures as appropriate and evaluate response  - Notify physician/advanced practitioner if interventions unsuccessful or patient reports new pain  Outcome: Progressing     Problem: INFECTION - ADULT  Goal: Absence or prevention of progression during hospitalization  Description: INTERVENTIONS:  - Assess and monitor for signs and symptoms of infection  - Monitor temp  - Monitor WBC  - Monitor all IV insertion sites  - [place] appropriate cooling/warming therapies per order  - Administer medications as ordered  - Instruct and encourage patient and family to use good hand hygiene technique  - Identify and instruct in appropriate isolation precautions for identified infection/condition  Outcome: Progressing     Problem: SAFETY ADULT  Goal: Patient will remain free of falls  Description: INTERVENTIONS:  - Educate patient on patient safety   - Instruct patient to call for assistance with activity   - Keep Call bell within reach  - Keep bed low and locked with side rails adjusted as appropriate  - Keep care items and personal belongings within reach  - Initiate and maintain comfort rounds  - Offer Toileting every 2 Hours, in advance of need  - Initiate/Maintain bed/chair alarm  - Obtain necessary fall risk management equipment  - Apply yellow socks and bracelet   Outcome: Progressing     Problem: DISCHARGE PLANNING  Goal: Discharge to home or other facility with appropriate resources  Description: INTERVENTIONS:  - Identify barriers to discharge w/patient   - Arrange for needed discharge resources and transportation as appropriate  - Identify discharge learning needs (meds, wound care,  etc.)  - Refer to Case Management Department for coordinating discharge planning if the patient needs post-hospital services based on physician/advanced practitioner order or complex needs related to functional status, cognitive ability, or social support system  Outcome: Progressing     Problem: Knowledge Deficit  Goal: Patient demonstrates understanding of disease process, treatment plan, medications, and discharge instructions  Description: Complete learning assessment and assess knowledge base.  Interventions:  - Provide teaching at level of understanding  - Provide teaching via preferred learning methods  Outcome: Progressing     Problem: NEUROSENSORY - ADULT  Goal: Achieves stable or improved neurological status  Description: INTERVENTIONS  - Monitor CIWA and alcohol withdrawal symptoms to prevent seizures  - Monitor and report changes in neurological status  - Monitor vital signs such as temperature, blood pressure, and any other labs ordered   - Monitor for seizure activity due to alcohol withdrawl    Outcome: Progressing     Problem: CARDIOVASCULAR - ADULT  Goal: Absence of cardiac dysrhythmias or at baseline rhythm  Description: INTERVENTIONS:  - Continuous cardiac monitoring, vital signs    Outcome: Progressing

## 2024-09-17 NOTE — TELEPHONE ENCOUNTER
Patient called after office hour    Pt called to schedule an appt for TCM follow up  Patient was discharged from hospital for Alcohol Abuse on 9/17/2024    Patient stated someone called from office, and missed the call.    Pt is requesting a call back ASAP to schedule appt with Dr. Wheeler only.      Pt was informed he would get a call back on 9/18 in the morning. Pt said he will wait for the call.    Please reach out to patient to schedule TCM appt    Please advise. Thank you

## 2024-09-18 NOTE — TELEPHONE ENCOUNTER
Attempted to contact patient to schedule his TCM visit.  I left a message on identifying voicemail to call the office back and schedule.

## 2024-09-24 ENCOUNTER — TELEPHONE (OUTPATIENT)
Dept: SLEEP CENTER | Facility: CLINIC | Age: 74
End: 2024-09-24

## 2024-09-24 LAB
DME PARACHUTE DELIVERY DATE REQUESTED: NORMAL
DME PARACHUTE ITEM DESCRIPTION: NORMAL
DME PARACHUTE ORDER STATUS: NORMAL
DME PARACHUTE SUPPLIER NAME: NORMAL
DME PARACHUTE SUPPLIER PHONE: NORMAL

## 2024-09-30 ENCOUNTER — TELEPHONE (OUTPATIENT)
Dept: ADMINISTRATIVE | Facility: OTHER | Age: 74
End: 2024-09-30

## 2024-09-30 NOTE — TELEPHONE ENCOUNTER
09/30/24 12:12 PM    Patient contacted to bring Advance Directive, POLST, or Living Will document to next scheduled pcp visit.[facility] Department left message.    Thank you.  Jeanie Calero MA  [facility]

## 2024-10-01 ENCOUNTER — OFFICE VISIT (OUTPATIENT)
Dept: FAMILY MEDICINE CLINIC | Facility: CLINIC | Age: 74
End: 2024-10-01
Payer: MEDICARE

## 2024-10-01 ENCOUNTER — TELEPHONE (OUTPATIENT)
Age: 74
End: 2024-10-01

## 2024-10-01 VITALS
DIASTOLIC BLOOD PRESSURE: 40 MMHG | HEIGHT: 64 IN | SYSTOLIC BLOOD PRESSURE: 94 MMHG | OXYGEN SATURATION: 100 % | HEART RATE: 48 BPM | BODY MASS INDEX: 38.96 KG/M2 | TEMPERATURE: 98.4 F | WEIGHT: 228.2 LBS

## 2024-10-01 DIAGNOSIS — F41.9 ANXIETY: ICD-10-CM

## 2024-10-01 DIAGNOSIS — Z76.89 ENCOUNTER FOR SUPPORT AND COORDINATION OF TRANSITION OF CARE: ICD-10-CM

## 2024-10-01 DIAGNOSIS — F10.130 ALCOHOL ABUSE WITH WITHDRAWAL WITHOUT COMPLICATION (HCC): Primary | ICD-10-CM

## 2024-10-01 DIAGNOSIS — F32.1 MODERATE MAJOR DEPRESSION (HCC): ICD-10-CM

## 2024-10-01 DIAGNOSIS — F10.130 ALCOHOL ABUSE WITH WITHDRAWAL WITHOUT COMPLICATION (HCC): ICD-10-CM

## 2024-10-01 DIAGNOSIS — L40.50 PSORIATIC ARTHRITIS (HCC): ICD-10-CM

## 2024-10-01 DIAGNOSIS — R19.7 DIARRHEA, UNSPECIFIED TYPE: ICD-10-CM

## 2024-10-01 DIAGNOSIS — N40.0 BENIGN PROSTATIC HYPERPLASIA, UNSPECIFIED WHETHER LOWER URINARY TRACT SYMPTOMS PRESENT: ICD-10-CM

## 2024-10-01 DIAGNOSIS — I10 ESSENTIAL HYPERTENSION: ICD-10-CM

## 2024-10-01 DIAGNOSIS — Z09 HOSPITAL DISCHARGE FOLLOW-UP: Primary | ICD-10-CM

## 2024-10-01 DIAGNOSIS — G47.33 OSA (OBSTRUCTIVE SLEEP APNEA): ICD-10-CM

## 2024-10-01 PROCEDURE — 99495 TRANSJ CARE MGMT MOD F2F 14D: CPT | Performed by: FAMILY MEDICINE

## 2024-10-01 RX ORDER — NALTREXONE HYDROCHLORIDE 50 MG/1
50 TABLET, FILM COATED ORAL DAILY
Qty: 30 TABLET | Refills: 1 | Status: SHIPPED | OUTPATIENT
Start: 2024-10-01

## 2024-10-01 RX ORDER — BLOOD PRESSURE TEST KIT
1 KIT MISCELLANEOUS DAILY
Qty: 1 KIT | Refills: 0 | Status: SHIPPED | OUTPATIENT
Start: 2024-10-01

## 2024-10-01 NOTE — ASSESSMENT & PLAN NOTE
- BP very low today  - BP 94/40, 104/46  - Suspect his diarrhea is also contributing to his low BP  - Has not taken HCTZ since 2 weeks  - We will dc Nodalol  - Advised patient to hold HCTZ  - Keep BP logs  - F/u in 3 weeks  - Advised pt to call office if BP > 180 or persistent low BP    Orders:    Blood Pressure KIT; Use 1 Device daily

## 2024-10-01 NOTE — TELEPHONE ENCOUNTER
Javier Romo,    Could you please let patient know that I am going to initiate him on oral naltrexone instead.  We will send medication to his pharmacy    Thanks,  Dr. Singh

## 2024-10-01 NOTE — PROGRESS NOTES
Transition of Care Visit  Name: Fredy Yu      : 1950      MRN: [id number]  Encounter Provider: Tita Galan MD  Encounter Date: 10/1/2024   Encounter department: [facility]    Assessment & Plan  Hospital discharge follow-up         Encounter for support and coordination of transition of care         Alcohol abuse with withdrawal without complication (HCC)  - Patient was admitted 9/15- for alcohol withdrawal  -Received Valium 10mg x2 dose in ED   -Completed Librium 25 mg for 2 more doses to complete on 24  -Last alcohol use prior to hospitalization  -Has been compliant with AA meetings weekly    Continue thiamine, folic acid  Encourage going to AA meetings regularly  Counseled on alcohol cessation  We will initiate patient on IM Naltrexone monthly   Patient to bring the vial for clinic administration  F/ u in 3 weeks    -Orders:    Naltrexone (Vivitrol) 380 MG SUSR; Inject 4 mL (380 mg total) into a muscle once for 1 dose    Psoriatic arthritis (HCC)  Patient maintained on prednisone 4 mg daily, 20 mg methotrexate once a week  Continue outpatient follow-up with rheumatology       Essential hypertension  - BP very low today  - BP 94/40, 104/46  - Suspect his diarrhea is also contributing to his low BP  - Has not taken HCTZ since 2 weeks  - We will dc Nodalol  - Advised patient to hold HCTZ  - Keep BP logs  - F/u in 3 weeks  - Advised pt to call office if BP > 180 or persistent low BP    Orders:    Blood Pressure KIT; Use 1 Device daily    Benign prostatic hyperplasia, unspecified whether lower urinary tract symptoms present  Continue finasteride        ROSARIO (obstructive sleep apnea)  - Continue CPAP HS       Moderate major depression (HCC)  Depression Screening Follow-up Plan: Patient's depression screening was positive with a PHQ-9 score of 13. Patient assessed for underlying major depression. They have no active suicidal ideations. Brief counseling provided and  recommend additional follow-up/re-evaluation next office visit.    -Symptoms exacerbated due to conflict at home with wife and daughter   -PHQ-9 score of 13  -We will increase Zoloft to 100 mg daily  -Continue trazodone 150 mg daily  -Follow-up in 3 weeks       Anxiety  -Continue BuSpar 7.5 mg twice daily  -We will increase Zoloft to 100 mg daily given moderate depression  -Follow-up in 3 weeks  Orders:    sertraline (Zoloft) 50 mg tablet; Take 2 tablets (100 mg total) by mouth daily    Diarrhea, unspecified type  -Diarrhea since yesterday  -Reports 4 episodes yesterday, 1 so far today  -No known sick contacts, denies nausea/vomiting/fevers  -Unsure etiology, less likely infectious  -Encourage patient to drink lots of fluids  -RTC if no improvement in diarrhea         Depression Screening and Follow-up Plan: Patient's depression screening was positive with a PHQ-9 score of 13. Patient assessed for underlying major depression. Brief counseling provided and recommend additional follow-up/re-evaluation next office visit. Patient advised to follow-up with PCP for further management.         History of Present Illness     Transitional Care Management Review:   Fredy Yu is a 74 y.o. male here for TCM follow up.     During the TCM phone call patient stated:  TCM Call       Date and time call was made  9/18/2024  1:52 PM    Hospital care reviewed  Records reviewed    Patient was hospitialized at  [facility]    Date of Admission  09/15/24    Date of discharge  09/17/24    Diagnosis  alcohol abuse with withdrawal    Disposition  Home    Were the patients medications reviewed and updated  No    Current Symptoms  Fatigue    Fatigue severity  Moderate          TCM Call       Post hospital issues  Reduced activity    Should patient be enrolled in anticoag monitoring?  No    Scheduled for follow up?  Yes    Did you obtain your prescribed medications  Yes    Do you need help managing your prescriptions or medications   No    Is transportation to your appointment needed  No    I have advised the patient to call PCP with any new or worsening symptoms  Bonita Pedroza    Living Arrangements  Spouse or Significiant other    Support System  Family    The type of support provided  Financial; Emotional    Do you have social support  Yes, some    Are you recieving any outpatient services  No    Are you recieving home care services  No    Are you using any community resources  No    Current waiver services  No    Have you fallen in the last 12 months  No    Interperter language line needed  No    Counseling  Patient    Counseling topics  Prognosis          Fredy Yu is a 74 y.o. male with PMH alcohol abuse, anxiety/depression, hypertension, psoriatic arthritis, and BPH who originally admitted to the hospital on 9/15/2024 due to alcohol withdrawal symptoms. He had uncontrolled tremors in the setting of drinking about 1 pint of vodka daily for the past few weeks due to home stressors. He states that his last drink was 9/14 at 9 pm. He also endorses some mild lower abdominal discomfort.  In the ED, vitals were significant for /86. Labs were significant for lipase 109, beta-hydroxybutyrate 0.48, ethanol level 95. UA showed 1+ ketones. He receives 2 doses of 10 mg Valium with improvement in symptoms. He declined the option to go to inpatient detox. He was admitted to the floors and placed on seizure precautions as well as CIWA protocol. He was treated with Librium 25 mg BID with improvement in symptoms. He was also treated with high dose thiamine and folate. He had improvement in his tremors and denied diaphoresis and restlessness.  Patient was hemodynamically stable for discharge with close follow-up with PCP outpatient.    Patient presents today for TCM visit. Patient states that he has not had alcohol since his hospitalization. He states that he has been regular with his AA visits weekly. He reports stress mainly financially caring  "for his daughter. He states that he has spent about 1000 dollars for 2 sessions for psychiatric needs for his daughter. He states that he continues to feel depressed. Denies suicidal thoughts. He states that Zoloft 50 mg has not been helping him much. He also c/o diarrhea since yesterday. 4 episodes yesterday and 1 today. He denies eating out, no known sick contacts, no n/v. Mild abdominal cramps. Patient otherwise denies fever, chills, chest pain, sob.               Review of Systems   Constitutional:  Negative for chills and fever.   HENT:  Negative for ear pain and sore throat.    Eyes:  Negative for pain and visual disturbance.   Respiratory:  Negative for cough and shortness of breath.    Cardiovascular:  Negative for chest pain and palpitations.   Gastrointestinal:  Positive for abdominal pain (mild cramps) and diarrhea. Negative for vomiting.   Genitourinary:  Negative for dysuria and hematuria.   Musculoskeletal:  Negative for arthralgias and back pain.   Skin:  Negative for color change and rash.   Neurological:  Negative for seizures and syncope.   All other systems reviewed and are negative.    Objective     BP (!) 94/40 (BP Location: Left arm)   Pulse (!) 48   Temp 98.4 °F (36.9 °C) (Tympanic)   Ht 5' 4\" (1.626 m)   Wt 104 kg (228 lb 3.2 oz)   SpO2 100%   BMI 39.17 kg/m²     Physical Exam  Vitals and nursing note reviewed.   Constitutional:       General: He is not in acute distress.     Appearance: He is well-developed.   HENT:      Head: Normocephalic and atraumatic.      Right Ear: There is impacted cerumen.      Left Ear: Tympanic membrane normal.   Eyes:      Conjunctiva/sclera: Conjunctivae normal.   Cardiovascular:      Rate and Rhythm: Normal rate and regular rhythm.      Heart sounds: No murmur heard.  Pulmonary:      Effort: Pulmonary effort is normal. No respiratory distress.      Breath sounds: Normal breath sounds.   Abdominal:      Palpations: Abdomen is soft.      Tenderness: There " is no abdominal tenderness.   Musculoskeletal:         General: No swelling.      Cervical back: Neck supple.   Skin:     General: Skin is warm and dry.      Capillary Refill: Capillary refill takes less than 2 seconds.   Neurological:      Mental Status: He is alert.   Psychiatric:         Mood and Affect: Mood normal.       Medications have been reviewed by provider in current encounter    Administrative Statements

## 2024-10-01 NOTE — ASSESSMENT & PLAN NOTE
Continue finasteride         19 yo F presenting for wound check, on exam in NAD and no evidence of infection. Suspect that the possible streaking noticed by pediatrician may have been from dried blood. The wound does not appear acutely infected at this time. Will DC w/ return precautions

## 2024-10-01 NOTE — TELEPHONE ENCOUNTER
Patient was seen today , stating the   Naltrexone (Vivitrol) 380 MG  will cost him over $300.00 it's too expensive.   If something else could be recommended

## 2024-10-01 NOTE — PROGRESS NOTES
Patient called the office and reported IM naltrexone is very expensive and is wondering if there is an alternative.    We will initiate patient on oral naltrexone instead.  Patient to take 50 mg naltrexone daily.  Follow-up in 3 weeks    Dr. Wheeler notified.

## 2024-10-01 NOTE — ASSESSMENT & PLAN NOTE
- Patient was admitted 9/15-9/17 for alcohol withdrawal  -Received Valium 10mg x2 dose in ED   -Completed Librium 25 mg for 2 more doses to complete on 9/18/24  -Last alcohol use prior to hospitalization  -Has been compliant with AA meetings weekly    Continue thiamine, folic acid  Encourage going to AA meetings regularly  Counseled on alcohol cessation  We will initiate patient on IM Naltrexone monthly   Patient to bring the vial for clinic administration  F/ u in 3 weeks    -Orders:    Naltrexone (Vivitrol) 380 MG SUSR; Inject 4 mL (380 mg total) into a muscle once for 1 dose

## 2024-10-21 ENCOUNTER — APPOINTMENT (EMERGENCY)
Dept: RADIOLOGY | Facility: HOSPITAL | Age: 74
End: 2024-10-21
Payer: MEDICARE

## 2024-10-21 ENCOUNTER — APPOINTMENT (EMERGENCY)
Dept: CT IMAGING | Facility: HOSPITAL | Age: 74
End: 2024-10-21
Payer: MEDICARE

## 2024-10-21 ENCOUNTER — HOSPITAL ENCOUNTER (EMERGENCY)
Facility: HOSPITAL | Age: 74
End: 2024-10-22
Attending: EMERGENCY MEDICINE | Admitting: EMERGENCY MEDICINE
Payer: MEDICARE

## 2024-10-21 ENCOUNTER — HOSPITAL ENCOUNTER (OUTPATIENT)
Facility: HOSPITAL | Age: 74
Setting detail: SURGERY ADMIT
DRG: 659 | End: 2024-10-21
Attending: UROLOGY | Admitting: UROLOGY
Payer: MEDICARE

## 2024-10-21 ENCOUNTER — ANESTHESIA EVENT (INPATIENT)
Dept: PERIOP | Facility: HOSPITAL | Age: 74
DRG: 659 | End: 2024-10-21
Payer: MEDICARE

## 2024-10-21 DIAGNOSIS — E83.42 HYPOMAGNESEMIA: ICD-10-CM

## 2024-10-21 DIAGNOSIS — N13.30 HYDRONEPHROSIS: ICD-10-CM

## 2024-10-21 DIAGNOSIS — N13.4 HYDROURETER: ICD-10-CM

## 2024-10-21 DIAGNOSIS — N20.0 NEPHROLITHIASIS: Primary | ICD-10-CM

## 2024-10-21 DIAGNOSIS — N20.0 KIDNEY STONE: ICD-10-CM

## 2024-10-21 LAB
2HR DELTA HS TROPONIN: -1 NG/L
4HR DELTA HS TROPONIN: 1 NG/L
ALBUMIN SERPL BCG-MCNC: 4.2 G/DL (ref 3.5–5)
ALBUMIN SERPL BCG-MCNC: 4.4 G/DL (ref 3.5–5)
ALP SERPL-CCNC: 73 U/L (ref 34–104)
ALP SERPL-CCNC: 76 U/L (ref 34–104)
ALT SERPL W P-5'-P-CCNC: 41 U/L (ref 7–52)
ALT SERPL W P-5'-P-CCNC: 45 U/L (ref 7–52)
ANION GAP SERPL CALCULATED.3IONS-SCNC: 14 MMOL/L (ref 4–13)
ANION GAP SERPL CALCULATED.3IONS-SCNC: 14 MMOL/L (ref 4–13)
AST SERPL W P-5'-P-CCNC: 38 U/L (ref 13–39)
AST SERPL W P-5'-P-CCNC: 51 U/L (ref 13–39)
BASOPHILS # BLD AUTO: 0.02 THOUSANDS/ΜL (ref 0–0.1)
BASOPHILS NFR BLD AUTO: 0 % (ref 0–1)
BILIRUB SERPL-MCNC: 0.58 MG/DL (ref 0.2–1)
BILIRUB SERPL-MCNC: 0.93 MG/DL (ref 0.2–1)
BUN SERPL-MCNC: 22 MG/DL (ref 5–25)
BUN SERPL-MCNC: 24 MG/DL (ref 5–25)
CALCIUM SERPL-MCNC: 9.4 MG/DL (ref 8.4–10.2)
CALCIUM SERPL-MCNC: 9.6 MG/DL (ref 8.4–10.2)
CARDIAC TROPONIN I PNL SERPL HS: 12 NG/L
CARDIAC TROPONIN I PNL SERPL HS: 13 NG/L
CARDIAC TROPONIN I PNL SERPL HS: 14 NG/L
CHLORIDE SERPL-SCNC: 92 MMOL/L (ref 96–108)
CHLORIDE SERPL-SCNC: 94 MMOL/L (ref 96–108)
CO2 SERPL-SCNC: 28 MMOL/L (ref 21–32)
CO2 SERPL-SCNC: 30 MMOL/L (ref 21–32)
CREAT SERPL-MCNC: 1.33 MG/DL (ref 0.6–1.3)
CREAT SERPL-MCNC: 1.57 MG/DL (ref 0.6–1.3)
EOSINOPHIL # BLD AUTO: 0.05 THOUSAND/ΜL (ref 0–0.61)
EOSINOPHIL NFR BLD AUTO: 1 % (ref 0–6)
ERYTHROCYTE [DISTWIDTH] IN BLOOD BY AUTOMATED COUNT: 14.8 % (ref 11.6–15.1)
FLUAV RNA RESP QL NAA+PROBE: NEGATIVE
FLUBV RNA RESP QL NAA+PROBE: NEGATIVE
GFR SERPL CREATININE-BSD FRML MDRD: 42 ML/MIN/1.73SQ M
GFR SERPL CREATININE-BSD FRML MDRD: 52 ML/MIN/1.73SQ M
GLUCOSE SERPL-MCNC: 175 MG/DL (ref 65–140)
GLUCOSE SERPL-MCNC: 97 MG/DL (ref 65–140)
HCT VFR BLD AUTO: 38.1 % (ref 36.5–49.3)
HGB BLD-MCNC: 12.8 G/DL (ref 12–17)
IMM GRANULOCYTES # BLD AUTO: 0.12 THOUSAND/UL (ref 0–0.2)
IMM GRANULOCYTES NFR BLD AUTO: 1 % (ref 0–2)
LIPASE SERPL-CCNC: 410 U/L (ref 11–82)
LIPASE SERPL-CCNC: 920 U/L (ref 11–82)
LYMPHOCYTES # BLD AUTO: 1.11 THOUSANDS/ΜL (ref 0.6–4.47)
LYMPHOCYTES NFR BLD AUTO: 13 % (ref 14–44)
MAGNESIUM SERPL-MCNC: 1.6 MG/DL (ref 1.9–2.7)
MAGNESIUM SERPL-MCNC: 1.9 MG/DL (ref 1.9–2.7)
MCH RBC QN AUTO: 32.7 PG (ref 26.8–34.3)
MCHC RBC AUTO-ENTMCNC: 33.6 G/DL (ref 31.4–37.4)
MCV RBC AUTO: 97 FL (ref 82–98)
MONOCYTES # BLD AUTO: 1.17 THOUSAND/ΜL (ref 0.17–1.22)
MONOCYTES NFR BLD AUTO: 13 % (ref 4–12)
NEUTROPHILS # BLD AUTO: 6.33 THOUSANDS/ΜL (ref 1.85–7.62)
NEUTS SEG NFR BLD AUTO: 72 % (ref 43–75)
NRBC BLD AUTO-RTO: 0 /100 WBCS
PHOSPHATE SERPL-MCNC: 3.5 MG/DL (ref 2.3–4.1)
PHOSPHATE SERPL-MCNC: 3.6 MG/DL (ref 2.3–4.1)
PLATELET # BLD AUTO: 169 THOUSANDS/UL (ref 149–390)
PMV BLD AUTO: 8.6 FL (ref 8.9–12.7)
POTASSIUM SERPL-SCNC: 3.6 MMOL/L (ref 3.5–5.3)
POTASSIUM SERPL-SCNC: 3.8 MMOL/L (ref 3.5–5.3)
PROT SERPL-MCNC: 7.1 G/DL (ref 6.4–8.4)
PROT SERPL-MCNC: 7.3 G/DL (ref 6.4–8.4)
RBC # BLD AUTO: 3.92 MILLION/UL (ref 3.88–5.62)
RSV RNA RESP QL NAA+PROBE: NEGATIVE
SARS-COV-2 RNA RESP QL NAA+PROBE: NEGATIVE
SODIUM SERPL-SCNC: 134 MMOL/L (ref 135–147)
SODIUM SERPL-SCNC: 138 MMOL/L (ref 135–147)
WBC # BLD AUTO: 8.8 THOUSAND/UL (ref 4.31–10.16)

## 2024-10-21 PROCEDURE — 96361 HYDRATE IV INFUSION ADD-ON: CPT

## 2024-10-21 PROCEDURE — 80053 COMPREHEN METABOLIC PANEL: CPT | Performed by: EMERGENCY MEDICINE

## 2024-10-21 PROCEDURE — 96366 THER/PROPH/DIAG IV INF ADDON: CPT

## 2024-10-21 PROCEDURE — 71260 CT THORAX DX C+: CPT

## 2024-10-21 PROCEDURE — 71046 X-RAY EXAM CHEST 2 VIEWS: CPT

## 2024-10-21 PROCEDURE — 83690 ASSAY OF LIPASE: CPT | Performed by: EMERGENCY MEDICINE

## 2024-10-21 PROCEDURE — 99291 CRITICAL CARE FIRST HOUR: CPT | Performed by: EMERGENCY MEDICINE

## 2024-10-21 PROCEDURE — 36415 COLL VENOUS BLD VENIPUNCTURE: CPT | Performed by: EMERGENCY MEDICINE

## 2024-10-21 PROCEDURE — 85025 COMPLETE CBC W/AUTO DIFF WBC: CPT | Performed by: EMERGENCY MEDICINE

## 2024-10-21 PROCEDURE — 93005 ELECTROCARDIOGRAM TRACING: CPT

## 2024-10-21 PROCEDURE — 99285 EMERGENCY DEPT VISIT HI MDM: CPT

## 2024-10-21 PROCEDURE — 96367 TX/PROPH/DG ADDL SEQ IV INF: CPT

## 2024-10-21 PROCEDURE — 84100 ASSAY OF PHOSPHORUS: CPT | Performed by: EMERGENCY MEDICINE

## 2024-10-21 PROCEDURE — 96376 TX/PRO/DX INJ SAME DRUG ADON: CPT

## 2024-10-21 PROCEDURE — 0241U HB NFCT DS VIR RESP RNA 4 TRGT: CPT | Performed by: EMERGENCY MEDICINE

## 2024-10-21 PROCEDURE — 96375 TX/PRO/DX INJ NEW DRUG ADDON: CPT

## 2024-10-21 PROCEDURE — 74177 CT ABD & PELVIS W/CONTRAST: CPT

## 2024-10-21 PROCEDURE — 96365 THER/PROPH/DIAG IV INF INIT: CPT

## 2024-10-21 PROCEDURE — 84484 ASSAY OF TROPONIN QUANT: CPT | Performed by: EMERGENCY MEDICINE

## 2024-10-21 PROCEDURE — 83735 ASSAY OF MAGNESIUM: CPT | Performed by: EMERGENCY MEDICINE

## 2024-10-21 RX ORDER — PHENOBARBITAL SODIUM 130 MG/ML
130 INJECTION, SOLUTION INTRAMUSCULAR; INTRAVENOUS ONCE
Status: COMPLETED | OUTPATIENT
Start: 2024-10-21 | End: 2024-10-21

## 2024-10-21 RX ORDER — PANTOPRAZOLE SODIUM 40 MG/10ML
40 INJECTION, POWDER, LYOPHILIZED, FOR SOLUTION INTRAVENOUS ONCE
Status: COMPLETED | OUTPATIENT
Start: 2024-10-21 | End: 2024-10-21

## 2024-10-21 RX ORDER — HYDROMORPHONE HCL/PF 1 MG/ML
0.5 SYRINGE (ML) INJECTION ONCE
Status: COMPLETED | OUTPATIENT
Start: 2024-10-21 | End: 2024-10-21

## 2024-10-21 RX ORDER — DEXTROSE, SODIUM CHLORIDE, SODIUM LACTATE, POTASSIUM CHLORIDE, AND CALCIUM CHLORIDE 5; .6; .31; .03; .02 G/100ML; G/100ML; G/100ML; G/100ML; G/100ML
125 INJECTION, SOLUTION INTRAVENOUS ONCE
Status: COMPLETED | OUTPATIENT
Start: 2024-10-21 | End: 2024-10-22

## 2024-10-21 RX ORDER — CEFAZOLIN SODIUM 2 G/50ML
2000 SOLUTION INTRAVENOUS
Status: CANCELLED | OUTPATIENT
Start: 2024-10-22 | End: 2024-10-23

## 2024-10-21 RX ORDER — HYDROMORPHONE HCL/PF 1 MG/ML
0.5 SYRINGE (ML) INJECTION EVERY 4 HOURS PRN
Status: DISCONTINUED | OUTPATIENT
Start: 2024-10-21 | End: 2024-10-22 | Stop reason: HOSPADM

## 2024-10-21 RX ORDER — PHENOBARBITAL SODIUM 130 MG/ML
INJECTION, SOLUTION INTRAMUSCULAR; INTRAVENOUS
Status: DISPENSED
Start: 2024-10-21 | End: 2024-10-22

## 2024-10-21 RX ORDER — MAGNESIUM SULFATE HEPTAHYDRATE 40 MG/ML
2 INJECTION, SOLUTION INTRAVENOUS ONCE
Status: COMPLETED | OUTPATIENT
Start: 2024-10-21 | End: 2024-10-21

## 2024-10-21 RX ORDER — IPRATROPIUM BROMIDE AND ALBUTEROL SULFATE 2.5; .5 MG/3ML; MG/3ML
3 SOLUTION RESPIRATORY (INHALATION) ONCE
Status: COMPLETED | OUTPATIENT
Start: 2024-10-21 | End: 2024-10-21

## 2024-10-21 RX ADMIN — PHENOBARBITAL SODIUM 130 MG: 130 INJECTION INTRAMUSCULAR at 14:10

## 2024-10-21 RX ADMIN — HYDROMORPHONE HYDROCHLORIDE 0.5 MG: 1 INJECTION, SOLUTION INTRAMUSCULAR; INTRAVENOUS; SUBCUTANEOUS at 18:02

## 2024-10-21 RX ADMIN — PHENOBARBITAL SODIUM 260 MG: 65 INJECTION INTRAMUSCULAR; INTRAVENOUS at 21:20

## 2024-10-21 RX ADMIN — PANTOPRAZOLE SODIUM 40 MG: 40 INJECTION, POWDER, FOR SOLUTION INTRAVENOUS at 14:16

## 2024-10-21 RX ADMIN — THIAMINE HYDROCHLORIDE 100 MG: 100 INJECTION, SOLUTION INTRAMUSCULAR; INTRAVENOUS at 15:27

## 2024-10-21 RX ADMIN — FOLIC ACID 1 MG: 5 INJECTION, SOLUTION INTRAMUSCULAR; INTRAVENOUS; SUBCUTANEOUS at 16:29

## 2024-10-21 RX ADMIN — IOHEXOL 100 ML: 350 INJECTION, SOLUTION INTRAVENOUS at 14:22

## 2024-10-21 RX ADMIN — MAGNESIUM SULFATE HEPTAHYDRATE 2 G: 40 INJECTION, SOLUTION INTRAVENOUS at 14:18

## 2024-10-21 RX ADMIN — PHENOBARBITAL SODIUM 260 MG: 130 INJECTION INTRAMUSCULAR at 17:14

## 2024-10-21 RX ADMIN — DEXTROSE, SODIUM CHLORIDE, SODIUM LACTATE, POTASSIUM CHLORIDE, AND CALCIUM CHLORIDE 125 ML/HR: 5; .6; .31; .03; .02 INJECTION, SOLUTION INTRAVENOUS at 18:35

## 2024-10-21 RX ADMIN — IPRATROPIUM BROMIDE AND ALBUTEROL SULFATE 3 ML: .5; 3 SOLUTION RESPIRATORY (INHALATION) at 14:45

## 2024-10-21 NOTE — QUICK NOTE
Fredy Yu is a 74 year old male found to have 5 mm left ureteral stone. VSS, afebrile. Creatinine 1.3. Medically stable.    Plan:  - Transfer to [facility] under [facility] with urology consult  - Fluids, flomax, strain urine  - NPO at MN  - Case requested for tomorrow  - Formal urology consult to follow tomorrow upon transfer      Megan aWhl PA-C

## 2024-10-21 NOTE — ED PROVIDER NOTES
Time reflects when diagnosis was documented in both MDM as applicable and the Disposition within this note       Time User Action Codes Description Comment    10/21/2024  3:59 PM Megan Wahl Add [N20.0] Nephrolithiasis     10/21/2024  4:39 PM Rogerdemarco Mason Add [N20.0] Kidney stone     10/21/2024  4:40 PM Mason Deras Add [N13.30] Hydronephrosis     10/21/2024  4:40 PM Mason Deras Add [N13.4] Hydroureter     10/21/2024  4:49 PM Mason Deras Add [E83.42] Hypomagnesemia           ED Disposition       ED Disposition   Transfer to Another Facility-In Network    Condition   --    Date/Time   Mon Oct 21, 2024  4:39 PM    Comment   Fredy Yu should be transferred out to [facility].               Assessment & Plan       Medical Decision Making  74-year-old male presents for evaluation of lower chest/upper abdominal pain.  Patient affirms history of EtOH dependence despite being in AA.  Last drink of vodka was 0700 hrs. this morning.  No home remedies.  No obvious alleviating or exacerbating factors.  No shortness of breath, nausea or vomiting, dizzy or diaphoresis.  The pain is described as sharp/aching and localized to the upper abdomen predominantly the right upper quadrant and is nonradiating.  No recent illness.  No fever or chills.  No cough or shortness of breath.  No melena or hematochezia.  No lower extremity edema or swelling.  No COMER, PND orthopnea.  Increased personal stress at home.  Patient states that he feels anxious and thirsty.  On exam he overall well-appearing and not in acute distress.    Labs, CT c/a/p, IV fluids, symptom management, disposition as appropriate.    Amount and/or Complexity of Data Reviewed  Labs: ordered. Decision-making details documented in ED Course.  Radiology: ordered and independent interpretation performed. Decision-making details documented in ED Course.  ECG/medicine tests: ordered and independent interpretation performed. Decision-making details documented in ED  Course.    Risk  Prescription drug management.      2200 hrs.: Patient reassessed.  Now resting comfortably.  Patient tolerated p.o. intake full diet tonight.  N.p.o. after midnight.  Phenobarbital dosing has been administered.  Patient feeling more comfortable.  Pending transfer to [facility].  Patient care signed out to overnight physician.       Medications   HYDROmorphone (DILAUDID) injection 0.5 mg (has no administration in time range)   PHENobarbital injection 130 mg (130 mg Intravenous Given 10/21/24 1410)   magnesium sulfate 2 g/50 mL IVPB (premix) 2 g (0 g Intravenous Stopped 10/21/24 1518)   thiamine (VITAMIN B1) 100 mg in sodium chloride 0.9 % 50 mL IVPB (0 mg Intravenous Stopped 10/21/24 1557)   folic acid 1 mg in sodium chloride 0.9 % 50 mL IVPB (0 mg Intravenous Stopped 10/21/24 1714)   dextrose 5 % in lactated Ringer's infusion (125 mL/hr Intravenous Restarted 10/21/24 2158)   pantoprazole (PROTONIX) injection 40 mg (40 mg Intravenous Given 10/21/24 1416)   ipratropium-albuterol (DUO-NEB) 0.5-2.5 mg/3 mL inhalation solution 3 mL (3 mL Nebulization Given 10/21/24 1445)   iohexol (OMNIPAQUE) 350 MG/ML injection (MULTI-DOSE) 100 mL (100 mL Intravenous Given 10/21/24 1422)   PHENobarbital 260 mg in sodium chloride 0.9 % 100 mL IVPB (0 mg Intravenous Stopped 10/21/24 1744)   HYDROmorphone (DILAUDID) injection 0.5 mg (0.5 mg Intravenous Given 10/21/24 1802)   PHENobarbital 260 mg in sodium chloride 0.9 % 100 mL IVPB (0 mg Intravenous Stopped 10/21/24 2150)       ED Risk Strat Scores   HEART Risk Score      Flowsheet Row Most Recent Value   Heart Score Risk Calculator    History 0 Filed at: 10/21/2024 1438   ECG 0 Filed at: 10/21/2024 1438   Age 2 Filed at: 10/21/2024 1438   Risk Factors 1 Filed at: 10/21/2024 1438   Troponin 1 Filed at: 10/21/2024 1438   HEART Score 4 Filed at: 10/21/2024 1438                    CIWA-Ar Score       Row Name 10/21/24 1714             CIWA-Ar    Blood Pressure  142/83      Pulse 80      Nausea and Vomiting 0      Tactile Disturbances 0      Tremor 4      Auditory Disturbances 1      Paroxysmal Sweats 1      Visual Disturbances 0      Anxiety 4      Headache, Fullness in Head 2      Agitation 1      Orientation and Clouding of Sensorium 0      CIWA-Ar Total 13                              SBIRT 22yo+      Flowsheet Row Most Recent Value   Initial Alcohol Screen: US AUDIT-C     1. How often do you have a drink containing alcohol? 0 Filed at: 10/21/2024 1231   2. How many drinks containing alcohol do you have on a typical day you are drinking?  0 Filed at: 10/21/2024 1231   3a. Male UNDER 65: How often do you have five or more drinks on one occasion? 0 Filed at: 10/21/2024 1231   3b. FEMALE Any Age, or MALE 65+: How often do you have 4 or more drinks on one occassion? 0 Filed at: 10/21/2024 1231   Audit-C Score 0 Filed at: 10/21/2024 1231   JEMIMA: How many times in the past year have you...    Used an illegal drug or used a prescription medication for non-medical reasons? Never Filed at: 10/21/2024 1231                            History of Present Illness       Chief Complaint   Patient presents with    Chest Pain     Patient reports CP that started around [id number] todaty, reports pain in on the L side of his chest, also complains of some SOB.        Past Medical History:   Diagnosis Date    Alcohol dependency (HCC)     Anxiety     Arthritis     Depression     [place] (hard of hearing)     Hypertension     Kidney stones     Peripheral neuropathy     Prostate enlargement     Renal disorder     kidney    Shoulder dislocation       Past Surgical History:   Procedure Laterality Date    CHOLECYSTECTOMY      2010    COLONOSCOPY      SHOULDER SURGERY Left     for dislocation x 2, 20 years ago an the second 18 years ago      Family History   Problem Relation Age of Onset    Dementia Mother     Colon cancer Mother     Heart disease Father     COPD Father     Heart failure Father      Coronary artery disease Father     Sleep apnea Brother       Social History     Tobacco Use    Smoking status: Never    Smokeless tobacco: Never   Vaping Use    Vaping status: Never Used   Substance Use Topics    Alcohol use: Not Currently     Alcohol/week: 6.0 standard drinks of alcohol     Comment: In recovery    Drug use: Never      E-Cigarette/Vaping    E-Cigarette Use Never User       E-Cigarette/Vaping Substances    Nicotine No     THC No     CBD No     Flavoring No     Other No     Unknown No       I have reviewed and agree with the history as documented.     74-year-old male presents for evaluation of lower chest/upper abdominal pain.  Patient affirms history of EtOH dependence despite being in AA.  Last drink of vodka was 0700 hrs. this morning.  No home remedies.  No obvious alleviating or exacerbating factors.  No shortness of breath, nausea or vomiting, dizzy or diaphoresis.  The pain is described as sharp/aching and localized to the upper abdomen predominantly the right upper quadrant and is nonradiating.  No recent illness.  No fever or chills.  No cough or shortness of breath.  No melena or hematochezia.  No lower extremity edema or swelling.  No COMER, PND orthopnea.  Increased personal stress at home.  Patient states that he feels anxious and thirsty.  On exam he overall well-appearing and not in acute distress.          Review of Systems   Constitutional:  Negative for activity change, appetite change, chills, diaphoresis, fatigue and fever.   HENT:  Negative for dental problem, ear pain, sore throat, trouble swallowing and voice change.    Eyes:  Negative for pain and visual disturbance.   Respiratory:  Negative for cough, chest tightness, shortness of breath and wheezing.    Cardiovascular:  Positive for chest pain. Negative for palpitations and leg swelling.   Gastrointestinal:  Positive for abdominal pain. Negative for anal bleeding, blood in stool, diarrhea, nausea, rectal pain and vomiting.    Endocrine: Negative for polydipsia, polyphagia and polyuria.   Genitourinary:  Negative for difficulty urinating, dysuria, flank pain, frequency, hematuria and urgency.   Musculoskeletal:  Negative for back pain, joint swelling, myalgias, neck pain and neck stiffness.   Skin:  Negative for pallor, rash and wound.   Neurological:  Negative for dizziness, facial asymmetry, speech difficulty, weakness, light-headedness, numbness and headaches.   Hematological:  Negative for adenopathy.   Psychiatric/Behavioral:  Negative for agitation. The patient is not nervous/anxious.    All other systems reviewed and are negative.          Objective       ED Triage Vitals   Temperature Pulse Blood Pressure Respirations SpO2 Patient Position - Orthostatic VS   10/21/24 1533 10/21/24 1232 10/21/24 1232 10/21/24 1232 10/21/24 1235 10/21/24 1232   98.4 °F (36.9 °C) 80 131/75 18 90 % Lying      Temp Source Heart Rate Source BP Location FiO2 (%) Pain Score    10/21/24 1533 10/21/24 1232 10/21/24 1232 -- 10/21/24 1232    Temporal Monitor Right arm  6      Vitals      Date and Time Temp Pulse SpO2 Resp BP Pain Score FACES Pain Rating User   10/21/24 1804 -- 73 94 % 18 162/74 -- -- JL   10/21/24 1802 -- -- -- -- -- 6 -- JL   10/21/24 1714 -- 80 -- -- 142/83 -- -- PP   10/21/24 1533 98.4 °F (36.9 °C) 74 94 % 18 142/88 -- -- JL   10/21/24 1235 -- -- 90 % -- -- -- -- SK   10/21/24 1232 -- 80 -- 18 131/75 6 -- SK            Physical Exam  Vitals and nursing note reviewed.   Constitutional:       General: He is not in acute distress.     Appearance: He is well-developed. He is obese. He is not ill-appearing, toxic-appearing or diaphoretic.   HENT:      Head: Normocephalic and atraumatic.      Right Ear: External ear normal.      Left Ear: External ear normal.      Nose: Nose normal.      Mouth/Throat:      Pharynx: No oropharyngeal exudate.   Eyes:      General: No visual field deficit or scleral icterus.        Right eye: No discharge.          Left eye: No discharge.      Conjunctiva/sclera: Conjunctivae normal.      Pupils: Pupils are equal, round, and reactive to light.   Neck:      Thyroid: No thyromegaly.      Vascular: No JVD.      Trachea: No tracheal deviation.   Cardiovascular:      Rate and Rhythm: Normal rate and regular rhythm.      Pulses: Normal pulses.      Heart sounds: Normal heart sounds, S1 normal and S2 normal. No murmur heard.     No friction rub. No gallop.   Pulmonary:      Effort: Pulmonary effort is normal. No tachypnea, accessory muscle usage, respiratory distress or retractions.      Breath sounds: Normal air entry. No stridor or decreased air movement. Examination of the right-upper field reveals wheezing. Examination of the left-upper field reveals wheezing. Examination of the right-middle field reveals wheezing. Examination of the left-middle field reveals wheezing. Wheezing present. No decreased breath sounds, rhonchi or rales.   Chest:      Chest wall: No tenderness.   Abdominal:      General: There is no distension.      Palpations: Abdomen is soft. There is hepatomegaly and splenomegaly. There is no mass.      Tenderness: There is abdominal tenderness in the right upper quadrant, epigastric area and left upper quadrant. There is no guarding or rebound. Positive signs include Koehler's sign.      Hernia: No hernia is present.   Musculoskeletal:         General: No swelling, tenderness or deformity. Normal range of motion.      Cervical back: Normal range of motion and neck supple.      Right lower leg: No edema.      Left lower leg: No edema.   Lymphadenopathy:      Cervical: No cervical adenopathy.   Skin:     General: Skin is warm and dry.      Capillary Refill: Capillary refill takes less than 2 seconds.      Coloration: Skin is not pale.      Findings: No erythema or rash.   Neurological:      General: No focal deficit present.      Mental Status: He is alert and oriented to person, place, and time.      GCS: GCS eye  subscore is 4. GCS verbal subscore is 5. GCS motor subscore is 6.      Cranial Nerves: Cranial nerves 2-12 are intact. No cranial nerve deficit, dysarthria or facial asymmetry.      Sensory: Sensation is intact. No sensory deficit.      Motor: Motor function is intact. No weakness, tremor, atrophy, abnormal muscle tone or seizure activity.      Coordination: Coordination is intact. Coordination normal.      Gait: Gait is intact.      Deep Tendon Reflexes: Reflexes are normal and symmetric. Reflexes normal.   Psychiatric:         Behavior: Behavior normal.         Results Reviewed       Procedure Component Value Units Date/Time    Comprehensive metabolic panel [[id number]]  (Abnormal) Collected: 10/21/24 2035    Lab Status: Final result Specimen: Blood from Arm, Right Updated: 10/21/24 2124     Sodium 134 mmol/L      Potassium 3.6 mmol/L      Chloride 92 mmol/L      CO2 28 mmol/L      ANION GAP 14 mmol/L      BUN 22 mg/dL      Creatinine 1.57 mg/dL      Glucose 175 mg/dL      Calcium 9.4 mg/dL      AST 38 U/L      ALT 41 U/L      Alkaline Phosphatase 73 U/L      Total Protein 7.1 g/dL      Albumin 4.2 g/dL      Total Bilirubin 0.93 mg/dL      eGFR 42 ml/min/1.73sq m     Narrative:      National Kidney Disease Foundation guidelines for Chronic Kidney Disease (CKD):     Stage 1 with normal or high GFR (GFR > 90 mL/min/1.73 square meters)    Stage 2 Mild CKD (GFR = 60-89 mL/min/1.73 square meters)    Stage 3A Moderate CKD (GFR = 45-59 mL/min/1.73 square meters)    Stage 3B Moderate CKD (GFR = 30-44 mL/min/1.73 square meters)    Stage 4 Severe CKD (GFR = 15-29 mL/min/1.73 square meters)    Stage 5 End Stage CKD (GFR <15 mL/min/1.73 square meters)  Note: GFR calculation is accurate only with a steady state creatinine    Magnesium [[id number]]  (Normal) Collected: 10/21/24 2035    Lab Status: Final result Specimen: Blood from Arm, Right Updated: 10/21/24 2124     Magnesium 1.9 mg/dL     Phosphorus [[id number]]  (Normal)  Collected: 10/21/24 2035    Lab Status: Final result Specimen: Blood from Arm, Right Updated: 10/21/24 2124     Phosphorus 3.5 mg/dL     Lipase [[id number]]  (Abnormal) Collected: 10/21/24 2035    Lab Status: Final result Specimen: Blood from Arm, Right Updated: 10/21/24 2124     Lipase 920 u/L     HS Troponin I 4hr [[id number]]  (Normal) Collected: 10/21/24 1713    Lab Status: Final result Specimen: Blood from Arm, Right Updated: 10/21/24 1746     hs TnI 4hr 14 ng/L      Delta 4hr hsTnI 1 ng/L     HS Troponin I 2hr [[id number]]  (Normal) Collected: 10/21/24 1526    Lab Status: Final result Specimen: Blood from Arm, Right Updated: 10/21/24 1706     hs TnI 2hr 12 ng/L      Delta 2hr hsTnI -1 ng/L     UA w Reflex to Microscopic w Reflex to Culture [[id number]]     Lab Status: No result Specimen: Urine     FLU/RSV/COVID - if FLU/RSV clinically relevant (2hr TAT) [[id number]]  (Normal) Collected: 10/21/24 1342    Lab Status: Final result Specimen: Nares from Nose Updated: 10/21/24 1514     SARS-CoV-2 Negative     INFLUENZA A PCR Negative     INFLUENZA B PCR Negative     RSV PCR Negative    Narrative:      This test has been performed using the CoV-2/Flu/RSV plus assay on the [employer] GeneXpert platform. This test has been validated by the  and verified by the performing laboratory.     This test is designed to amplify and detect the following: nucleocapsid (N), envelope (E), and RNA-dependent RNA polymerase (RdRP) genes of the SARS-CoV-2 genome; matrix (M), basic polymerase (PB2), and acidic protein (PA) segments of the influenza A genome; matrix (M) and non-structural protein (NS) segments of the influenza B genome, and the nucleocapsid genes of RSV A and RSV B.     Positive results are indicative of the presence of Flu A, Flu B, RSV, and/or SARS-CoV-2 RNA. Positive results for SARS-CoV-2 or suspected novel influenza should be reported to state, local, or federal health departments according to local reporting  requirements.      All results should be assessed in conjunction with clinical presentation and other laboratory markers for clinical management.     FOR PEDIATRIC PATIENTS - copy/paste COVID Guidelines URL to browser: [email or web address]       Lipase [[id number]]  (Abnormal) Collected: 10/21/24 1254    Lab Status: Final result Specimen: Blood Updated: 10/21/24 1354     Lipase 410 u/L     HS Troponin 0hr (reflex protocol) [[id number]]  (Normal) Collected: 10/21/24 1254    Lab Status: Final result Specimen: Blood from Arm, Right Updated: 10/21/24 1337     hs TnI 0hr 13 ng/L     Comprehensive metabolic panel [[id number]]  (Abnormal) Collected: 10/21/24 1254    Lab Status: Final result Specimen: Blood from Arm, Right Updated: 10/21/24 1328     Sodium 138 mmol/L      Potassium 3.8 mmol/L      Chloride 94 mmol/L      CO2 30 mmol/L      ANION GAP 14 mmol/L      BUN 24 mg/dL      Creatinine 1.33 mg/dL      Glucose 97 mg/dL      Calcium 9.6 mg/dL      AST 51 U/L      ALT 45 U/L      Alkaline Phosphatase 76 U/L      Total Protein 7.3 g/dL      Albumin 4.4 g/dL      Total Bilirubin 0.58 mg/dL      eGFR 52 ml/min/1.73sq m     Narrative:      National Kidney Disease Foundation guidelines for Chronic Kidney Disease (CKD):     Stage 1 with normal or high GFR (GFR > 90 mL/min/1.73 square meters)    Stage 2 Mild CKD (GFR = 60-89 mL/min/1.73 square meters)    Stage 3A Moderate CKD (GFR = 45-59 mL/min/1.73 square meters)    Stage 3B Moderate CKD (GFR = 30-44 mL/min/1.73 square meters)    Stage 4 Severe CKD (GFR = 15-29 mL/min/1.73 square meters)    Stage 5 End Stage CKD (GFR <15 mL/min/1.73 square meters)  Note: GFR calculation is accurate only with a steady state creatinine    Phosphorus [[id number]]  (Normal) Collected: 10/21/24 1254    Lab Status: Final result Specimen: Blood from Arm, Right Updated: 10/21/24 1328     Phosphorus 3.6 mg/dL     Magnesium [[id number]]  (Abnormal)  Collected: 10/21/24 1254    Lab Status: Final result Specimen: Blood from Arm, Right Updated: 10/21/24 1328     Magnesium 1.6 mg/dL     CBC and differential [[id number]]  (Abnormal) Collected: 10/21/24 1254    Lab Status: Final result Specimen: Blood from Arm, Right Updated: 10/21/24 1310     WBC 8.80 Thousand/uL      RBC 3.92 Million/uL      Hemoglobin 12.8 g/dL      Hematocrit 38.1 %      MCV 97 fL      MCH 32.7 pg      MCHC 33.6 g/dL      RDW 14.8 %      MPV 8.6 fL      Platelets 169 Thousands/uL      nRBC 0 /100 WBCs      Segmented % 72 %      Immature Grans % 1 %      Lymphocytes % 13 %      Monocytes % 13 %      Eosinophils Relative 1 %      Basophils Relative 0 %      Absolute Neutrophils 6.33 Thousands/µL      Absolute Immature Grans 0.12 Thousand/uL      Absolute Lymphocytes 1.11 Thousands/µL      Absolute Monocytes 1.17 Thousand/µL      Eosinophils Absolute 0.05 Thousand/µL      Basophils Absolute 0.02 Thousands/µL             CT chest abdomen pelvis w contrast   Final Interpretation by E. Alec Schoenberger, MD (10/21 9609)   5 mm stone in the distal left ureter with new left hydronephrosis.   Mild pancreatic inflammatory changes similar to prior. Correlate with lipase.      The study was marked in EPIC for immediate notification.      Workstation performed: [id number]         XR chest 2 views   ED Interpretation by Mason Deras DO (10/21 5198)   No obvious acute abnormalities.  Wide mediastinum which appears unchanged from prior x-rays.      Final Interpretation by Jude Hernandez MD (10/21 1440)      No acute cardiopulmonary disease.            Workstation performed: [id number]             ECG 12 Lead Documentation Only    Date/Time: 10/21/2024 12:43 PM    Performed by: Mason Deras DO  Authorized by: Mason Deras DO    Indications / Diagnosis:  Chest pain  ECG reviewed by me, the ED Provider: yes    Patient location:  ED  Previous ECG:     Previous ECG:  Compared to current     Comparison ECG info:  9/15/24, 7/11/24    Similarity:  No change  Interpretation:     Interpretation: normal    Rate:     ECG rate:  77    ECG rate assessment: normal    Rhythm:     Rhythm: sinus rhythm    Ectopy:     Ectopy: none    QRS:     QRS axis:  Normal    QRS intervals:  Normal  Conduction:     Conduction: normal    ST segments:     ST segments:  Normal  T waves:     T waves: normal    CriticalCare Time    Date/Time: 10/21/2024 10:24 PM    Performed by: Mason Deras DO  Authorized by: Mason Dears DO    Critical care provider statement:     Critical care time (minutes):  60    Critical care time was exclusive of:  Separately billable procedures and treating other patients and teaching time    Critical care was necessary to treat or prevent imminent or life-threatening deterioration of the following conditions: alcohol withdrawal.    Critical care was time spent personally by me on the following activities:  Blood draw for specimens, obtaining history from patient or surrogate, development of treatment plan with patient or surrogate, discussions with consultants, examination of patient, interpretation of cardiac output measurements, ordering and performing treatments and interventions, ordering and review of laboratory studies, ordering and review of radiographic studies, re-evaluation of patient's condition and review of old charts  Comments:      Alcohol withdrawal treated with phenobarbital IV.  Hypomagnesemia treated with magnesium sulfate replacement.      ED Medication and Procedure Management   Prior to Admission Medications   Prescriptions Last Dose Informant Patient Reported? Taking?   Blood Pressure KIT   No No   Sig: Use 1 Device daily   adalimumab (HUMIRA) 40 mg/0.8 mL PSKT  Self Yes No   Sig: Inject 40 mg under the skin every 14 (fourteen) days   alfuzosin (UROXATRAL) 10 mg 24 hr tablet  Self No No   Sig: Take 1 tablet (10 mg total) by mouth daily   busPIRone (BUSPAR) 7.5 mg  tablet  Self No No   Sig: Take 1 tablet (7.5 mg total) by mouth 2 (two) times a day   chlordiazePOXIDE (LIBRIUM) 25 mg capsule  Self No No   Sig: Take 1 tablet this evening and then 1 tablet tomorrow   Patient not taking: Reported on 10/1/2024   finasteride (PROSCAR) 5 mg tablet  Self No No   Sig: Take 1 tablet (5 mg total) by mouth daily   folic acid (FOLVITE) 1 mg tablet  Self No No   Sig: Take 1 tablet (1 mg total) by mouth daily   Patient taking differently: Take 1 mg by mouth daily Takes daily except on Monday   gabapentin (Neurontin) 400 mg capsule  Self No No   Sig: Take 400mg twice daily x 5 days. Then increase to three times daily thereafter   hydrochlorothiazide (HYDRODIURIL) 25 mg tablet  Self Yes No   Sig: Take 25 mg by mouth if needed   methotrexate 2.5 MG tablet  Self No No   Sig: Take 8 tablets (20 mg total) by mouth once a week   naltrexone (REVIA) 50 mg tablet   No No   Sig: Take 1 tablet (50 mg total) by mouth daily   predniSONE 1 mg tablet  Self No No   Sig: Take 4 tablets (4 mg total) by mouth daily   sertraline (Zoloft) 50 mg tablet   No No   Sig: Take 2 tablets (100 mg total) by mouth daily   thiamine 100 MG tablet  Self Yes No   Sig: Take 100 mg by mouth daily   traZODone (DESYREL) 100 mg tablet  Self No No   Sig: Take 1.5 tablets (150 mg total) by mouth daily at bedtime      Facility-Administered Medications: None     Patient's Medications   Discharge Prescriptions    No medications on file     No discharge procedures on file.  ED SEPSIS DOCUMENTATION   Time reflects when diagnosis was documented in both MDM as applicable and the Disposition within this note       Time User Action Codes Description Comment    10/21/2024  3:59 PM Megan Wahl Add [N20.0] Nephrolithiasis     10/21/2024  4:39 PM Mason Deras Add [N20.0] Kidney stone     10/21/2024  4:40 PM Mason Deras Add [N13.30] Hydronephrosis     10/21/2024  4:40 PM Mason Deras Add [N13.4] Hydroureter     10/21/2024  4:49 PM  Mason Deras Add [E83.42] Hypomagnesemia                  Mason Deras,   10/21/24 2223

## 2024-10-21 NOTE — ED NOTES
Patient's O2 saturation noted to be 87-88% on RA. Patient placed on 2L NC O2 and saturations improved to 94%. Provider made aware.     Carolyn Vargas RN  10/21/24 1905

## 2024-10-21 NOTE — EMTALA/ACUTE CARE TRANSFER
[facility] EMERGENCY DEPARTMENT  [address] / [facility] [address]  Dept: [phone]      EMTALA TRANSFER CONSENT    NAME Fredy Yu                                         1950                              MRN [id number]    I have been informed of my rights regarding examination, treatment, and transfer   by Dr. Mason Deras DO    Benefits: Specialized equipment and/or services available at the receiving facility (Include comment)________________________    Risks: Potential for delay in receiving treatment, Potential deterioration of medical condition, Loss of IV, Increased discomfort during transfer, Possible worsening of condition or death during transfer      Consent for Transfer:  I acknowledge that my medical condition has been evaluated and explained to me by the emergency department physician or other qualified medical person and/or my attending physician, who has recommended that I be transferred to the service of  Accepting Physician: Trevor at Accepting Facility Name, City & State : [facility]. The above potential benefits of such transfer, the potential risks associated with such transfer, and the probable risks of not being transferred have been explained to me, and I fully understand them.  The doctor has explained that, in my case, the benefits of transfer outweigh the risks.  I agree to be transferred.    I authorize the performance of emergency medical procedures and treatments upon me in both transit and upon arrival at the receiving facility.  Additionally, I authorize the release of any and all medical records to the receiving facility and request they be transported with me, if possible.  I understand that the safest mode of transportation during a medical emergency is an ambulance and that the Hospital advocates the use of this mode of transport. Risks of traveling to the receiving facility by car, including absence of medical control, life sustaining  equipment, such as oxygen, and medical personnel has been explained to me and I fully understand them.    (JOLIE CORRECT BOX BELOW)  [  ]  I consent to the stated transfer and to be transported by ambulance/helicopter.  [  ]  I consent to the stated transfer, but refuse transportation by ambulance and accept full responsibility for my transportation by car.  I understand the risks of non-ambulance transfers and I exonerate the Hospital and its staff from any deterioration in my condition that results from this refusal.    X___________________________________________    DATE  10/21/24  TIME________  Signature of patient or legally responsible individual signing on patient behalf           RELATIONSHIP TO PATIENT_________________________          Provider Certification    NAME Fredy Yu                                         1950                              MRN [id number]    A medical screening exam was performed on the above named patient.  Based on the examination:    Condition Necessitating Transfer The primary encounter diagnosis was Nephrolithiasis. Diagnoses of Kidney stone, Hydronephrosis, and Hydroureter were also pertinent to this visit.    Patient Condition: The patient has been stabilized such that within reasonable medical probability, no material deterioration of the patient condition or the condition of the unborn child(gabriela) is likely to result from the transfer    Reason for Transfer: Level of Care needed not available at this facility    Transfer Requirements: Facility [facility]   Space available and qualified personnel available for treatment as acknowledged by    Agreed to accept transfer and to provide appropriate medical treatment as acknowledged by       [facility]  Appropriate medical records of the examination and treatment of the patient are provided at the time of transfer   STAFF INITIAL WHEN COMPLETED _______  Transfer will be performed by qualified personnel from    and appropriate  transfer equipment as required, including the use of necessary and appropriate life support measures.    Provider Certification: I have examined the patient and explained the following risks and benefits of being transferred/refusing transfer to the patient/family:  General risk, such as traffic hazards, adverse weather conditions, rough terrain or turbulence, possible failure of equipment (including vehicle or aircraft), or consequences of actions of persons outside the control of the transport personnel, Unanticipated needs of medical equipment and personnel during transport, Risk of worsening condition, The possibility of a transport vehicle being unavailable      Based on these reasonable risks and benefits to the patient and/or the unborn child(gabriela), and based upon the information available at the time of the patient’s examination, I certify that the medical benefits reasonably to be expected from the provision of appropriate medical treatments at another medical facility outweigh the increasing risks, if any, to the individual’s medical condition, and in the case of labor to the unborn child, from effecting the transfer.    X____________________________________________ DATE 10/21/24        TIME_______      ORIGINAL - SEND TO MEDICAL RECORDS   COPY - SEND WITH PATIENT DURING TRANSFER

## 2024-10-21 NOTE — ED NOTES
Patient provided with dinner tray at this time, patient sitting up on side of bed eating. Call bell within reach.     Carolyn Vargas RN  10/21/24 [id number]

## 2024-10-22 ENCOUNTER — HOSPITAL ENCOUNTER (INPATIENT)
Facility: HOSPITAL | Age: 74
LOS: 2 days | Discharge: HOME | DRG: 659 | End: 2024-10-24
Attending: UROLOGY | Admitting: FAMILY MEDICINE
Payer: MEDICARE

## 2024-10-22 ENCOUNTER — APPOINTMENT (EMERGENCY)
Dept: RADIOLOGY | Facility: HOSPITAL | Age: 74
End: 2024-10-22
Payer: MEDICARE

## 2024-10-22 ENCOUNTER — ANESTHESIA (INPATIENT)
Dept: PERIOP | Facility: HOSPITAL | Age: 74
DRG: 659 | End: 2024-10-22
Payer: MEDICARE

## 2024-10-22 VITALS
WEIGHT: 233.03 LBS | SYSTOLIC BLOOD PRESSURE: 133 MMHG | TEMPERATURE: 97.8 F | RESPIRATION RATE: 16 BRPM | DIASTOLIC BLOOD PRESSURE: 74 MMHG | BODY MASS INDEX: 40 KG/M2 | OXYGEN SATURATION: 97 % | HEART RATE: 65 BPM

## 2024-10-22 DIAGNOSIS — N20.1 LEFT URETERAL STONE: Primary | ICD-10-CM

## 2024-10-22 DIAGNOSIS — N20.0 NEPHROLITHIASIS: ICD-10-CM

## 2024-10-22 DIAGNOSIS — F10.11 ALCOHOL USE DISORDER, MILD, IN EARLY REMISSION, ABUSE: ICD-10-CM

## 2024-10-22 DIAGNOSIS — M19.90 INFLAMMATORY ARTHRITIS: ICD-10-CM

## 2024-10-22 PROBLEM — N17.9 AKI (ACUTE KIDNEY INJURY) (HCC): Status: ACTIVE | Noted: 2024-10-22

## 2024-10-22 PROBLEM — G47.00 INSOMNIA: Status: RESOLVED | Noted: 2021-06-09 | Resolved: 2024-10-22

## 2024-10-22 PROBLEM — E78.2 MIXED HYPERLIPIDEMIA: Status: RESOLVED | Noted: 2021-06-03 | Resolved: 2024-10-22

## 2024-10-22 PROBLEM — K85.90 ACUTE PANCREATITIS: Status: ACTIVE | Noted: 2021-06-03

## 2024-10-22 PROBLEM — E87.8 ELECTROLYTE DISTURBANCE: Status: ACTIVE | Noted: 2024-10-22

## 2024-10-22 PROBLEM — E87.1 HYPONATREMIA: Status: ACTIVE | Noted: 2024-10-22

## 2024-10-22 PROBLEM — R74.01 TRANSAMINITIS: Status: RESOLVED | Noted: 2019-12-02 | Resolved: 2024-10-22

## 2024-10-22 LAB
ALBUMIN SERPL BCG-MCNC: 4 G/DL (ref 3.5–5)
ALP SERPL-CCNC: 61 U/L (ref 34–104)
ALT SERPL W P-5'-P-CCNC: 35 U/L (ref 7–52)
ANION GAP SERPL CALCULATED.3IONS-SCNC: 9 MMOL/L (ref 4–13)
APTT PPP: 28 SECONDS (ref 23–34)
AST SERPL W P-5'-P-CCNC: 29 U/L (ref 13–39)
BACTERIA UR QL AUTO: NORMAL /HPF
BILIRUB SERPL-MCNC: 0.99 MG/DL (ref 0.2–1)
BILIRUB UR QL STRIP: NEGATIVE
BUN SERPL-MCNC: 21 MG/DL (ref 5–25)
CALCIUM SERPL-MCNC: 8.9 MG/DL (ref 8.4–10.2)
CHLORIDE SERPL-SCNC: 91 MMOL/L (ref 96–108)
CLARITY UR: CLEAR
CO2 SERPL-SCNC: 32 MMOL/L (ref 21–32)
COLOR UR: YELLOW
CREAT SERPL-MCNC: 1.5 MG/DL (ref 0.6–1.3)
GFR SERPL CREATININE-BSD FRML MDRD: 45 ML/MIN/1.73SQ M
GLUCOSE SERPL-MCNC: 144 MG/DL (ref 65–140)
GLUCOSE UR STRIP-MCNC: NEGATIVE MG/DL
HGB UR QL STRIP.AUTO: ABNORMAL
INR PPP: 1.08 (ref 0.85–1.19)
KETONES UR STRIP-MCNC: ABNORMAL MG/DL
LEUKOCYTE ESTERASE UR QL STRIP: NEGATIVE
LIPASE SERPL-CCNC: 632 U/L (ref 11–82)
NITRITE UR QL STRIP: NEGATIVE
NON-SQ EPI CELLS URNS QL MICRO: NORMAL /HPF
PH UR STRIP.AUTO: 5.5 [PH]
POTASSIUM SERPL-SCNC: 3.3 MMOL/L (ref 3.5–5.3)
PROT SERPL-MCNC: 6.6 G/DL (ref 6.4–8.4)
PROT UR STRIP-MCNC: ABNORMAL MG/DL
PROTHROMBIN TIME: 14.5 SECONDS (ref 12.3–15)
RBC #/AREA URNS AUTO: NORMAL /HPF
SODIUM SERPL-SCNC: 132 MMOL/L (ref 135–147)
SP GR UR STRIP.AUTO: 1.02 (ref 1–1.03)
UROBILINOGEN UR STRIP-ACNC: <2 MG/DL
WBC #/AREA URNS AUTO: NORMAL /HPF

## 2024-10-22 PROCEDURE — 36415 COLL VENOUS BLD VENIPUNCTURE: CPT | Performed by: EMERGENCY MEDICINE

## 2024-10-22 PROCEDURE — 0TC78ZZ EXTIRPATION OF MATTER FROM LEFT URETER, VIA NATURAL OR ARTIFICIAL OPENING ENDOSCOPIC: ICD-10-PCS | Performed by: UROLOGY

## 2024-10-22 PROCEDURE — NC001 PR NO CHARGE: Performed by: FAMILY MEDICINE

## 2024-10-22 PROCEDURE — 96368 THER/DIAG CONCURRENT INF: CPT

## 2024-10-22 PROCEDURE — 85610 PROTHROMBIN TIME: CPT | Performed by: EMERGENCY MEDICINE

## 2024-10-22 PROCEDURE — BT1F1ZZ FLUOROSCOPY OF LEFT KIDNEY, URETER AND BLADDER USING LOW OSMOLAR CONTRAST: ICD-10-PCS | Performed by: UROLOGY

## 2024-10-22 PROCEDURE — 83690 ASSAY OF LIPASE: CPT | Performed by: EMERGENCY MEDICINE

## 2024-10-22 PROCEDURE — 85730 THROMBOPLASTIN TIME PARTIAL: CPT | Performed by: EMERGENCY MEDICINE

## 2024-10-22 PROCEDURE — 0T778DZ DILATION OF LEFT URETER WITH INTRALUMINAL DEVICE, VIA NATURAL OR ARTIFICIAL OPENING ENDOSCOPIC: ICD-10-PCS | Performed by: UROLOGY

## 2024-10-22 PROCEDURE — 80053 COMPREHEN METABOLIC PANEL: CPT | Performed by: EMERGENCY MEDICINE

## 2024-10-22 PROCEDURE — 81001 URINALYSIS AUTO W/SCOPE: CPT | Performed by: EMERGENCY MEDICINE

## 2024-10-22 PROCEDURE — 96366 THER/PROPH/DIAG IV INF ADDON: CPT

## 2024-10-22 PROCEDURE — 96375 TX/PRO/DX INJ NEW DRUG ADDON: CPT

## 2024-10-22 PROCEDURE — 96376 TX/PRO/DX INJ SAME DRUG ADON: CPT

## 2024-10-22 PROCEDURE — 96361 HYDRATE IV INFUSION ADD-ON: CPT

## 2024-10-22 RX ORDER — HEPARIN SODIUM 5000 [USP'U]/ML
5000 INJECTION, SOLUTION INTRAVENOUS; SUBCUTANEOUS EVERY 8 HOURS SCHEDULED
Status: COMPLETED | OUTPATIENT
Start: 2024-10-22 | End: 2024-10-22

## 2024-10-22 RX ORDER — ACETAMINOPHEN 325 MG/1
650 TABLET ORAL EVERY 6 HOURS PRN
Status: DISCONTINUED | OUTPATIENT
Start: 2024-10-22 | End: 2024-10-24 | Stop reason: HOSPADM

## 2024-10-22 RX ORDER — POTASSIUM CHLORIDE 20MEQ/15ML
40 LIQUID (ML) ORAL ONCE
Status: COMPLETED | OUTPATIENT
Start: 2024-10-22 | End: 2024-10-22

## 2024-10-22 RX ORDER — ALFUZOSIN HYDROCHLORIDE 10 MG/1
10 TABLET, EXTENDED RELEASE ORAL DAILY
Status: DISCONTINUED | OUTPATIENT
Start: 2024-10-22 | End: 2024-10-23

## 2024-10-22 RX ORDER — ONDANSETRON 2 MG/ML
4 INJECTION INTRAMUSCULAR; INTRAVENOUS EVERY 6 HOURS PRN
Status: DISCONTINUED | OUTPATIENT
Start: 2024-10-22 | End: 2024-10-24 | Stop reason: HOSPADM

## 2024-10-22 RX ORDER — SODIUM CHLORIDE 9 MG/ML
125 INJECTION, SOLUTION INTRAVENOUS CONTINUOUS
Status: DISCONTINUED | OUTPATIENT
Start: 2024-10-22 | End: 2024-10-23

## 2024-10-22 RX ORDER — POTASSIUM CHLORIDE 1500 MG/1
40 TABLET, EXTENDED RELEASE ORAL ONCE
Status: DISCONTINUED | OUTPATIENT
Start: 2024-10-22 | End: 2024-10-22

## 2024-10-22 RX ORDER — LORAZEPAM 2 MG/ML
2 INJECTION INTRAMUSCULAR
Status: DISCONTINUED | OUTPATIENT
Start: 2024-10-22 | End: 2024-10-23

## 2024-10-22 RX ORDER — HEPARIN SODIUM 5000 [USP'U]/ML
5000 INJECTION, SOLUTION INTRAVENOUS; SUBCUTANEOUS EVERY 8 HOURS SCHEDULED
Status: DISCONTINUED | OUTPATIENT
Start: 2024-10-23 | End: 2024-10-22

## 2024-10-22 RX ORDER — FOLIC ACID 1 MG/1
1 TABLET ORAL DAILY
Status: DISCONTINUED | OUTPATIENT
Start: 2024-10-22 | End: 2024-10-24 | Stop reason: HOSPADM

## 2024-10-22 RX ORDER — LANOLIN ALCOHOL/MO/W.PET/CERES
100 CREAM (GRAM) TOPICAL DAILY
Status: DISCONTINUED | OUTPATIENT
Start: 2024-10-22 | End: 2024-10-24 | Stop reason: HOSPADM

## 2024-10-22 RX ORDER — HYDROMORPHONE HCL/PF 1 MG/ML
0.5 SYRINGE (ML) INJECTION
Status: DISCONTINUED | OUTPATIENT
Start: 2024-10-22 | End: 2024-10-22

## 2024-10-22 RX ORDER — BUSPIRONE HYDROCHLORIDE 5 MG/1
7.5 TABLET ORAL 2 TIMES DAILY
Status: DISCONTINUED | OUTPATIENT
Start: 2024-10-22 | End: 2024-10-24 | Stop reason: HOSPADM

## 2024-10-22 RX ORDER — FINASTERIDE 5 MG/1
5 TABLET, FILM COATED ORAL DAILY
Status: DISCONTINUED | OUTPATIENT
Start: 2024-10-22 | End: 2024-10-24 | Stop reason: HOSPADM

## 2024-10-22 RX ORDER — MAGNESIUM SULFATE HEPTAHYDRATE 40 MG/ML
2 INJECTION, SOLUTION INTRAVENOUS ONCE
Status: COMPLETED | OUTPATIENT
Start: 2024-10-22 | End: 2024-10-22

## 2024-10-22 RX ORDER — LORAZEPAM 2 MG/ML
0.5 INJECTION INTRAMUSCULAR ONCE
Status: COMPLETED | OUTPATIENT
Start: 2024-10-22 | End: 2024-10-22

## 2024-10-22 RX ORDER — PREDNISONE 1 MG/1
4 TABLET ORAL DAILY
Status: DISCONTINUED | OUTPATIENT
Start: 2024-10-22 | End: 2024-10-24 | Stop reason: HOSPADM

## 2024-10-22 RX ORDER — DEXTROSE, SODIUM CHLORIDE, SODIUM LACTATE, POTASSIUM CHLORIDE, AND CALCIUM CHLORIDE 5; .6; .31; .03; .02 G/100ML; G/100ML; G/100ML; G/100ML; G/100ML
125 INJECTION, SOLUTION INTRAVENOUS ONCE
Status: COMPLETED | OUTPATIENT
Start: 2024-10-22 | End: 2024-10-22

## 2024-10-22 RX ORDER — SERTRALINE HYDROCHLORIDE 100 MG/1
100 TABLET, FILM COATED ORAL DAILY
Status: DISCONTINUED | OUTPATIENT
Start: 2024-10-22 | End: 2024-10-24 | Stop reason: HOSPADM

## 2024-10-22 RX ADMIN — HEPARIN SODIUM 5000 UNITS: 5000 INJECTION, SOLUTION INTRAVENOUS; SUBCUTANEOUS at 21:02

## 2024-10-22 RX ADMIN — PREDNISONE 4 MG: 1 TABLET ORAL at 21:02

## 2024-10-22 RX ADMIN — MAGNESIUM SULFATE HEPTAHYDRATE 2 G: 40 INJECTION, SOLUTION INTRAVENOUS at 09:46

## 2024-10-22 RX ADMIN — POTASSIUM CHLORIDE 40 MEQ: 1.5 SOLUTION ORAL at 09:47

## 2024-10-22 RX ADMIN — LORAZEPAM 0.5 MG: 2 INJECTION INTRAMUSCULAR; INTRAVENOUS at 04:10

## 2024-10-22 RX ADMIN — HYDROMORPHONE HYDROCHLORIDE 0.5 MG: 1 INJECTION, SOLUTION INTRAMUSCULAR; INTRAVENOUS; SUBCUTANEOUS at 02:50

## 2024-10-22 RX ADMIN — HYDROMORPHONE HYDROCHLORIDE 0.5 MG: 1 INJECTION, SOLUTION INTRAMUSCULAR; INTRAVENOUS; SUBCUTANEOUS at 17:54

## 2024-10-22 RX ADMIN — TRAZODONE HYDROCHLORIDE 150 MG: 100 TABLET ORAL at 21:02

## 2024-10-22 RX ADMIN — LORAZEPAM 2 MG: 2 INJECTION INTRAMUSCULAR; INTRAVENOUS at 18:30

## 2024-10-22 RX ADMIN — SODIUM CHLORIDE 125 ML/HR: 0.9 INJECTION, SOLUTION INTRAVENOUS at 17:54

## 2024-10-22 RX ADMIN — THIAMINE HYDROCHLORIDE 100 MG: 100 INJECTION, SOLUTION INTRAMUSCULAR; INTRAVENOUS at 11:04

## 2024-10-22 RX ADMIN — DEXTROSE, SODIUM CHLORIDE, SODIUM LACTATE, POTASSIUM CHLORIDE, AND CALCIUM CHLORIDE 125 ML/HR: 5; .6; .31; .03; .02 INJECTION, SOLUTION INTRAVENOUS at 09:55

## 2024-10-22 RX ADMIN — THIAMINE HCL TAB 100 MG 100 MG: 100 TAB at 19:27

## 2024-10-22 RX ADMIN — FOLIC ACID 1 MG: 1 TABLET ORAL at 19:27

## 2024-10-22 RX ADMIN — FINASTERIDE 5 MG: 5 TABLET, FILM COATED ORAL at 19:27

## 2024-10-22 RX ADMIN — BUSPIRONE HYDROCHLORIDE 7.5 MG: 5 TABLET ORAL at 19:27

## 2024-10-22 RX ADMIN — FOLIC ACID 1 MG: 5 INJECTION, SOLUTION INTRAMUSCULAR; INTRAVENOUS; SUBCUTANEOUS at 10:34

## 2024-10-22 RX ADMIN — SERTRALINE HYDROCHLORIDE 100 MG: 100 TABLET ORAL at 19:27

## 2024-10-22 NOTE — H&P
[facility]    H&P - Family Medicine    Fredy Yu 1950, 74 y.o. male.  MRN: [id number]  Unit/Bed#: [facility] 818-01 Encounter: [id number]  Primary Care Provider: Tejal Garcia MD      Admission Date: 10/22/2024 9215  Length of Stay: 0 days  Code Status: Level 1 - Full Code    Consult:   IP CONSULT TO UROLOGY    Assessments & Plans:     Disposition: medsurg    * Left ureteral stone  Assessment & Plan  -Presented to ED on 10/21 for chest pain and RUQ abdominal pain. CT scan showed 5 mm stone in the distal left ureter with new left hydronephrosis. Urology recommended transfer to \[facility]\"" for procedural intervention.  -CBC unremarkable. CMP showed acute NATASHA of Cr 1.33 (baseline 0.85-1.1) that increased to 1.57.  -UA showed +1 protein/ketone, trace blood. Reflex to microscopy showed no bacteria.     Assessment: L ureteral stone w/new L hydronephrosis without s/s infection    Plan:  -urology consulted; confirmed plan for procedure tomorrow  -10/23 CYSTOSCOPY URETEROSCOPY WITH LITHOTRIPSY HOLMIUM LASER, RETROGRADE PYELOGRAM AND INSERTION STENT URETERAL   -NPO midnight  -NS 125cc/hr  -c/w home afluzosin dosing   -strain urine  -pain regimen: prn tylenol, pain otherwise currently well controlled    NATASHA (acute kidney injury) (HCC)  Assessment & Plan  -baseline Cr 0.85-1.1  -on presentation to ED: Cr 1.33 -> 1.57 -> (post fluids) 1.50    Assessment: Most likely post-renal NATASHA from L ureteral stone.    Plan:   -trend Cr  -NS 125cc/hr    Acute pancreatitis  Assessment & Plan  -CT scan: Stable calcifications in the head/uncinate process of the pancreas. Mild pancreatic stranding similar to prior studies. No pancreatic necrosis or peripancreatic collection.   Lipase elevated max 920, decreased to 632  Most likely secondary to alcohol use leading to acute pancreatitis    Plan:   -trend lipase  -fluids 125cc/hr  -advance diet as tolerated    Alcohol use disorder, mild, in early remission, abuse  Assessment &  Plan  -Recent admission 9/15-9/17 for alcohol withdrawal. Received Valium 10mg x2, Librium 25mg x2. Started on Naltrexone 50mg daily.  Compliant with weekly AA meetings.  -30+ year h/o alcohol use disorder  -Home meds: Folate 1mg, Thiamine 100mg  -last drink was 10/21 7AM  -In ED, received phenobarbital injection 130 mg, IV phenobarbital 260mg, IV thiamine, D5LR @125    Assessment: Alcohol Use Disorder. Current user, will need to monitor for s/s withdrawal.    Plan:  -CIWA protocol  -ativan prn  -Continue thiamine, folic acid supplements  -hold naltrexone given NATASHA  - on alcohol cessation    Electrolyte disturbance  Assessment & Plan  -CMP on presentation Na and K wnl.   -Noted to be hyponatremic 132 and hypokalemic 3.3 after fluid administration    Assessment: Likely dilutional given remainder of electrolytes also downtrended. Also had 1x episode of diarrhea while in ED.    Plan:   -trend BMP  -replete as necessary    Psoriatic arthritis (HCC)  Assessment & Plan  -Home med: Prednisone 4 mg daily, Methotrexate 20mg once a week (administer on Mondays; per pt last dose was administered 10/14), Humira injections every 2 weeks (administer on Mondays, per pt last dose was 10/7)    Plan:   -hold MTX given NATASHA  -hold Humira since procedure tomorrow; consider administration therafter  -continue with prednisone    ROSARIO (obstructive sleep apnea)  Assessment & Plan  Uses nightly CPAP    Plan:   -provide CPAP at night    Essential hypertension  Assessment & Plan  -Per PCP visit on 10/1, BP was low in 90s/40s so Nadolol d/carina and HCTZ held and only administered prn    Plan  -hold HCTZ given NATASHA   -continue to monitor VS    Neuropathy  Assessment & Plan  -Home meds: Gabapentin 400mg TID    Plan:   -hold gabapentin given NATASHA    Depression with anxiety  Assessment & Plan  -Home med: buspar 7.5mg BID, Zoloft 100mg daily  -Zoloft increased from 50mg to 100mg on 10/1    Plan:   -continue home meds    BPH (benign prostatic  hyperplasia)  Assessment & Plan  -Home med: Finasteride 5mg    Plan:  -Continue home med  -Monitor urine output, I/O   -urinary retention protocol prn          Diet: Diet Regular; Regular House  Diet NPO; Sips with meds  [facility]TE [facility] PPX: Heparin - to be held at midnight tonight  V[facility] PPX: sequential compression device      Discussed with attending physician, Dr. Segal, and [facility] team.      Subjective:     HPI:  74 y.o. male who is being admitted for L ureteral stone extraction and NATASHA. Past medical history significant for HTN, HLD, alcohol use disorder (last drink 10/21 7AM per pt), psoriatic arthritis, insomnia, ROSARIO, BPH, depression w/anxiety. He initially presented to [facility] ED with complaints of lower chest and RUQ pain.  Denied any N/V, hematemesis, fevers. Endorses that he has intermittent abdominal pains that usually self-resolve or after he has a drink of alcohol. Otherwise states that he did have 1x episode of diarrhea while in ED yesterday.     CT chest abdomen pelvis with contrast showed a left 5 mm distal ureteral stone with new left hydronephrosis, as well as mild pancreatic inflammatory changes.  UA showed 1+ protein, 1+ ketones and trace occult blood; reflex to microscopy showed no bacteria. CBC showed normal WBC. CMP was also significant for NATASHA (baseline creatinine around 0.85-1.1; creatinine rising to 1.57).  Labs were also significant for rising lipase from 410 to 920 to 632. Otherwise EKG and troponins were unremarkable. PTT, PT/INR were wnl.    In the ED, pt was provided fluids. Was also provided phenobarbital to treat alcohol withdrawal. Repeat CMP did show Na 132, K 3.3. K repleted. Remained afebrile VSS during ED stay on 10/21. His epigastric/RUQ abdominal pain currently resolved and now concentrated in lower abdomen.     Urology was consulted while in the ED and recommended transfer to [facility] for ureteroscopy w/lithotripsy and retrograde pyelogram.    ED Management:     ED Triage Vitals    Temperature Pulse Respirations Blood Pressure SpO2   10/22/24 1702 10/22/24 1702 10/22/24 1702 10/22/24 1702 10/22/24 1702   98 °F (36.7 °C) 82 17 142/64 95 %      Temp src Heart Rate Source Patient Position - Orthostatic VS BP Location FiO2 (%)   -- -- -- -- --             Pain Score       10/22/24 1708       7         Medications   acetaminophen (TYLENOL) tablet 650 mg (has no administration in time range)   ondansetron (ZOFRAN) injection 4 mg (has no administration in time range)   sodium chloride 0.9 % infusion (125 mL/hr Intravenous New Bag 10/22/24 1754)   LORazepam (ATIVAN) injection 2 mg (2 mg Intravenous Given 10/22/24 1830)   busPIRone (BUSPAR) tablet 7.5 mg (7.5 mg Oral Given 10/22/24 1927)   finasteride (PROSCAR) tablet 5 mg (5 mg Oral Given 10/22/24 1927)   folic acid (FOLVITE) tablet 1 mg (1 mg Oral Given 10/22/24 1927)   predniSONE tablet 4 mg (has no administration in time range)   sertraline (ZOLOFT) tablet 100 mg (100 mg Oral Given 10/22/24 1927)   thiamine tablet 100 mg (100 mg Oral Given 10/22/24 1927)   traZODone (DESYREL) tablet 150 mg (has no administration in time range)   heparin (porcine) subcutaneous injection 5,000 Units (has no administration in time range)   alfuzosin (UROXATRAL) 24 hr tablet 10 mg (10 mg Oral Not Given 10/22/24 1925)       Historical Information   Past Medical History:   Diagnosis Date    Alcohol dependency (HCC)     Anxiety     Arthritis     Depression     [place] (hard of hearing)     Hypertension     Kidney stones     Peripheral neuropathy     Prostate enlargement     Renal disorder     kidney    Shoulder dislocation      Past Surgical History:   Procedure Laterality Date    CHOLECYSTECTOMY      2010    COLONOSCOPY      SHOULDER SURGERY Left     for dislocation x 2, 20 years ago an the second 18 years ago     Social History   Social History     Substance and Sexual Activity   Alcohol Use Not Currently    Comment: pint of vodka everyday     Social History     Substance  "and Sexual Activity   Drug Use Never     Social History     Tobacco Use   Smoking Status Never   Smokeless Tobacco Never     Family History:     Medications:  Meds/Allergies   all medications and allergies reviewed  Allergies   Allergen Reactions    Sulfa Antibiotics Anaphylaxis and Rash     Face Swelling    Sulfacetamide Anaphylaxis and Rash     Face Swelling    Misc. Sulfonamide Containing Compounds Facial Swelling    Elemental Sulfur Rash and Edema         Vitals:     Vitals:    10/22/24 1702 10/22/24 1708   BP: 142/64    Pulse: 82    Resp: 17    Temp: 98 °F (36.7 °C)    SpO2: 95%    Weight:  107 kg (235 lb)   Height:  5' 5\" (1.651 m)     Temp:  [97.8 °F (36.6 °C)-98 °F (36.7 °C)] 98 °F (36.7 °C)  HR:  [63-82] 82  BP: (125-154)/(61-74) 142/64  Resp:  [16-22] 17  SpO2:  [92 %-97 %] 95 %  O2 Device: None (Room air)  Nasal Cannula O2 Flow Rate (L/min):  [2 L/min] 2 L/min  Weight (last 2 days)       Date/Time Weight    10/22/24 1708 107 (235)            Intake/Output Summary (Last 24 hours) at 10/22/2024 2005  Last data filed at 10/22/2024 1724  Gross per 24 hour   Intake --   Output 250 ml   Net -250 ml     Invasive Devices       Peripheral Intravenous Line  Duration             Peripheral IV 10/21/24 Right Antecubital 1 day    Peripheral IV 10/22/24 Distal;Right;Ventral (anterior) Forearm <1 day                      Physical Exam:   Physical Exam  Vitals reviewed.   Constitutional:       General: He is not in acute distress.     Appearance: He is not ill-appearing, toxic-appearing or diaphoretic.   HENT:      Head: Normocephalic and atraumatic.      Nose: Nose normal. No congestion or rhinorrhea.      Mouth/Throat:      Mouth: Mucous membranes are moist.      Pharynx: Oropharynx is clear. No oropharyngeal exudate or posterior oropharyngeal erythema.   Eyes:      General: No scleral icterus.        Right eye: No discharge.         Left eye: No discharge.      Extraocular Movements: Extraocular movements intact.      " Conjunctiva/sclera: Conjunctivae normal.   Cardiovascular:      Rate and Rhythm: Normal rate and regular rhythm.      Pulses: Normal pulses.      Heart sounds: Normal heart sounds. No murmur heard.     No friction rub. No gallop.   Pulmonary:      Effort: Pulmonary effort is normal. No respiratory distress.      Breath sounds: Normal breath sounds. No stridor. No wheezing, rhonchi or rales.   Chest:      Chest wall: No tenderness.   Abdominal:      General: Abdomen is flat.      Palpations: Abdomen is soft. There is no mass.      Tenderness: There is no guarding or rebound.      Hernia: No hernia is present.      Comments: Distended tympanic abdomen, but soft to palpation. Mild tenderness in lower abdominal quadrants. No tenderness currently in RUQ and epigastric regions.    Musculoskeletal:      Cervical back: Normal range of motion and neck supple. No rigidity or tenderness.      Right lower leg: No edema.      Left lower leg: No edema.      Comments: Pedal pulses 2+ bilaterally   Lymphadenopathy:      Cervical: No cervical adenopathy.   Skin:     General: Skin is warm and dry.      Capillary Refill: Capillary refill takes less than 2 seconds.      Coloration: Skin is not jaundiced.   Neurological:      Mental Status: He is alert and oriented to person, place, and time.      Cranial Nerves: No cranial nerve deficit.      Sensory: No sensory deficit.      Motor: No weakness.      Comments: Conversant. 5/5 strength x4 extremities. No tremors noted.   Psychiatric:         Behavior: Behavior normal.      Comments: Endorsed mild anxiety, wanting to get his meds.            Labs:     I have personally reviewed all pertinent results.   Admission on 10/21/2024, Discharged on 10/22/2024   Component Date Value Ref Range Status    WBC 10/21/2024 8.80  4.31 - 10.16 Thousand/uL Final    RBC 10/21/2024 3.92  3.88 - 5.62 Million/uL Final    Hemoglobin 10/21/2024 12.8  12.0 - 17.0 g/dL Final    Hematocrit 10/21/2024 38.1  36.5 -  49.3 % Final    MCV 10/21/2024 97  82 - 98 fL Final    MCH 10/21/2024 32.7  26.8 - 34.3 pg Final    MCHC 10/21/2024 33.6  31.4 - 37.4 g/dL Final    RDW 10/21/2024 14.8  11.6 - 15.1 % Final    MPV 10/21/2024 8.6 (L)  8.9 - 12.7 fL Final    Platelets 10/21/2024 169  149 - 390 Thousands/uL Final    nRBC 10/21/2024 0  /100 WBCs Final    Segmented % 10/21/2024 72  43 - 75 % Final    Immature Grans % 10/21/2024 1  0 - 2 % Final    Lymphocytes % 10/21/2024 13 (L)  14 - 44 % Final    Monocytes % 10/21/2024 13 (H)  4 - 12 % Final    Eosinophils Relative 10/21/2024 1  0 - 6 % Final    Basophils Relative 10/21/2024 0  0 - 1 % Final    Absolute Neutrophils 10/21/2024 6.33  1.85 - 7.62 Thousands/µL Final    Absolute Immature Grans 10/21/2024 0.12  0.00 - 0.20 Thousand/uL Final    Absolute Lymphocytes 10/21/2024 1.11  0.60 - 4.47 Thousands/µL Final    Absolute Monocytes 10/21/2024 1.17  0.17 - 1.22 Thousand/µL Final    Eosinophils Absolute 10/21/2024 0.05  0.00 - 0.61 Thousand/µL Final    Basophils Absolute 10/21/2024 0.02  0.00 - 0.10 Thousands/µL Final    Sodium 10/21/2024 138  135 - 147 mmol/L Final    Potassium 10/21/2024 3.8  3.5 - 5.3 mmol/L Final    Chloride 10/21/2024 94 (L)  96 - 108 mmol/L Final    CO2 10/21/2024 30  21 - 32 mmol/L Final    ANION GAP 10/21/2024 14 (H)  4 - 13 mmol/L Final    BUN 10/21/2024 24  5 - 25 mg/dL Final    Creatinine 10/21/2024 1.33 (H)  0.60 - 1.30 mg/dL Final    Standardized to IDMS reference method    Glucose 10/21/2024 97  65 - 140 mg/dL Final    If the patient is fasting, the ADA then defines impaired fasting glucose as > 100 mg/dL and diabetes as > or equal to 123 mg/dL.    Calcium 10/21/2024 9.6  8.4 - 10.2 mg/dL Final    AST 10/21/2024 51 (H)  13 - 39 U/L Final    ALT 10/21/2024 45  7 - 52 U/L Final    Specimen collection should occur prior to Sulfasalazine administration due to the potential for falsely depressed results.     Alkaline Phosphatase 10/21/2024 76  34 - 104 U/L Final     "Total Protein 10/21/2024 7.3  6.4 - 8.4 g/dL Final    Albumin 10/21/2024 4.4  3.5 - 5.0 g/dL Final    Total Bilirubin 10/21/2024 0.58  0.20 - 1.00 mg/dL Final    Use of this assay is not recommended for patients undergoing treatment with eltrombopag due to the potential for falsely elevated results.  N-acetyl-p-benzoquinone imine (metabolite of Acetaminophen) will generate erroneously low results in samples for patients that have taken an overdose of Acetaminophen.    eGFR 10/21/2024 52  ml/min/1.73sq m Final    Phosphorus 10/21/2024 3.6  2.3 - 4.1 mg/dL Final    Magnesium 10/21/2024 1.6 (L)  1.9 - 2.7 mg/dL Final    hs TnI 0hr 10/21/2024 13  \"Refer to ACS Flowchart\"- see link ng/L Final    Comment:                                              Initial (time 0) result  If >=50 ng/L, Myocardial injury suggested ;  Type of myocardial injury and treatment strategy  to be determined.  If 5-49 ng/L, a delta result at 2 hours and or 4 hours will be needed to further evaluate.  If <4 ng/L, and chest pain has been >3 hours since onset, patient may qualify for discharge based on the HEART score in the ED.  If <5 ng/L and <3hours since onset of chest pain, a delta result at 2 hours will be needed to further evaluate.    HS Troponin 99th Percentile URL of a Health Population=12 ng/L with a 95% Confidence Interval of 8-18 ng/L.    Second Troponin (time 2 hours)  If calculated delta >= 20 ng/L,  Myocardial injury suggested ; Type of myocardial injury and treatment strategy to be determined.  If 5-49 ng/L and the calculated delta is 5-19 ng/L, consult medical service for evaluation.  Continue evaluation for ischemia on ecg and other possible etiology and repeat hs troponin at 4 hours.  If delta                            is <5 ng/L at 2 hours, consider discharge based on risk stratification via the HEART score (if in ED), or MARY KATE risk score in IP/Observation.    HS Troponin 99th Percentile URL of a Health Population=12 ng/L with a " "95% Confidence Interval of 8-18 ng/L.    Lipase 10/21/2024 410 (H)  11 - 82 u/L Final    hs TnI 2hr 10/21/2024 12  \"Refer to ACS Flowchart\"- see link ng/L Final    Comment:                                              Initial (time 0) result  If >=50 ng/L, Myocardial injury suggested ;  Type of myocardial injury and treatment strategy  to be determined.  If 5-49 ng/L, a delta result at 2 hours and or 4 hours will be needed to further evaluate.  If <4 ng/L, and chest pain has been >3 hours since onset, patient may qualify for discharge based on the HEART score in the ED.  If <5 ng/L and <3hours since onset of chest pain, a delta result at 2 hours will be needed to further evaluate.    HS Troponin 99th Percentile URL of a Health Population=12 ng/L with a 95% Confidence Interval of 8-18 ng/L.    Second Troponin (time 2 hours)  If calculated delta >= 20 ng/L,  Myocardial injury suggested ; Type of myocardial injury and treatment strategy to be determined.  If 5-49 ng/L and the calculated delta is 5-19 ng/L, consult medical service for evaluation.  Continue evaluation for ischemia on ecg and other possible etiology and repeat hs troponin at 4 hours.  If delta                            is <5 ng/L at 2 hours, consider discharge based on risk stratification via the HEART score (if in ED), or MARY KATE risk score in IP/Observation.    HS Troponin 99th Percentile URL of a Health Population=12 ng/L with a 95% Confidence Interval of 8-18 ng/L.    Delta 2hr hsTnI 10/21/2024 -1  <20 ng/L Final    SARS-CoV-2 10/21/2024 Negative  Negative Final    INFLUENZA A PCR 10/21/2024 Negative  Negative Final    INFLUENZA B PCR 10/21/2024 Negative  Negative Final    RSV PCR 10/21/2024 Negative  Negative Final    hs TnI 4hr 10/21/2024 14  \"Refer to ACS Flowchart\"- see link ng/L Final    Comment:                                              Initial (time 0) result  If >=50 ng/L, Myocardial injury suggested ;  Type of myocardial injury and treatment " strategy  to be determined.  If 5-49 ng/L, a delta result at 2 hours and or 4 hours will be needed to further evaluate.  If <4 ng/L, and chest pain has been >3 hours since onset, patient may qualify for discharge based on the HEART score in the ED.  If <5 ng/L and <3hours since onset of chest pain, a delta result at 2 hours will be needed to further evaluate.    HS Troponin 99th Percentile URL of a Health Population=12 ng/L with a 95% Confidence Interval of 8-18 ng/L.    Second Troponin (time 2 hours)  If calculated delta >= 20 ng/L,  Myocardial injury suggested ; Type of myocardial injury and treatment strategy to be determined.  If 5-49 ng/L and the calculated delta is 5-19 ng/L, consult medical service for evaluation.  Continue evaluation for ischemia on ecg and other possible etiology and repeat hs troponin at 4 hours.  If delta                            is <5 ng/L at 2 hours, consider discharge based on risk stratification via the HEART score (if in ED), or MARY KATE risk score in IP/Observation.    HS Troponin 99th Percentile URL of a Health Population=12 ng/L with a 95% Confidence Interval of 8-18 ng/L.    Delta 4hr hsTnI 10/21/2024 1  <20 ng/L Final    Color, UA 10/22/2024 Yellow   Final    Clarity, UA 10/22/2024 Clear   Final    Specific Gravity, UA 10/22/2024 1.025  1.005 - 1.030 Final    pH, UA 10/22/2024 5.5  4.5, 5.0, 5.5, 6.0, 6.5, 7.0, 7.5, 8.0 Final    Leukocytes, UA 10/22/2024 Negative  Negative Final    Nitrite, UA 10/22/2024 Negative  Negative Final    Protein, UA 10/22/2024 30 (1+) (A)  Negative mg/dl Final    Glucose, UA 10/22/2024 Negative  Negative mg/dl Final    Ketones, UA 10/22/2024 10 (1+) (A)  Negative mg/dl Final    Urobilinogen, UA 10/22/2024 <2.0  <2.0 mg/dl mg/dl Final    Bilirubin, UA 10/22/2024 Negative  Negative Final    Occult Blood, UA 10/22/2024 Trace (A)  Negative Final    Sodium 10/21/2024 134 (L)  135 - 147 mmol/L Final    Potassium 10/21/2024 3.6  3.5 - 5.3 mmol/L Final     Chloride 10/21/2024 92 (L)  96 - 108 mmol/L Final    CO2 10/21/2024 28  21 - 32 mmol/L Final    ANION GAP 10/21/2024 14 (H)  4 - 13 mmol/L Final    BUN 10/21/2024 22  5 - 25 mg/dL Final    Creatinine 10/21/2024 1.57 (H)  0.60 - 1.30 mg/dL Final    Standardized to IDMS reference method    Glucose 10/21/2024 175 (H)  65 - 140 mg/dL Final    If the patient is fasting, the ADA then defines impaired fasting glucose as > 100 mg/dL and diabetes as > or equal to 123 mg/dL.    Calcium 10/21/2024 9.4  8.4 - 10.2 mg/dL Final    AST 10/21/2024 38  13 - 39 U/L Final    ALT 10/21/2024 41  7 - 52 U/L Final    Specimen collection should occur prior to Sulfasalazine administration due to the potential for falsely depressed results.     Alkaline Phosphatase 10/21/2024 73  34 - 104 U/L Final    Total Protein 10/21/2024 7.1  6.4 - 8.4 g/dL Final    Albumin 10/21/2024 4.2  3.5 - 5.0 g/dL Final    Total Bilirubin 10/21/2024 0.93  0.20 - 1.00 mg/dL Final    Use of this assay is not recommended for patients undergoing treatment with eltrombopag due to the potential for falsely elevated results.  N-acetyl-p-benzoquinone imine (metabolite of Acetaminophen) will generate erroneously low results in samples for patients that have taken an overdose of Acetaminophen.    eGFR 10/21/2024 42  ml/min/1.73sq m Final    Magnesium 10/21/2024 1.9  1.9 - 2.7 mg/dL Final    Phosphorus 10/21/2024 3.5  2.3 - 4.1 mg/dL Final    Lipase 10/21/2024 920 (H)  11 - 82 u/L Final    Protime 10/22/2024 14.5  12.3 - 15.0 seconds Final    INR 10/22/2024 1.08  0.85 - 1.19 Final    PTT 10/22/2024 28  23 - 34 seconds Final    Therapeutic Heparin Range =  60-90 seconds    Sodium 10/22/2024 132 (L)  135 - 147 mmol/L Final    Potassium 10/22/2024 3.3 (L)  3.5 - 5.3 mmol/L Final    Chloride 10/22/2024 91 (L)  96 - 108 mmol/L Final    CO2 10/22/2024 32  21 - 32 mmol/L Final    ANION GAP 10/22/2024 9  4 - 13 mmol/L Final    BUN 10/22/2024 21  5 - 25 mg/dL Final    Creatinine  10/22/2024 1.50 (H)  0.60 - 1.30 mg/dL Final    Standardized to IDMS reference method    Glucose 10/22/2024 144 (H)  65 - 140 mg/dL Final    If the patient is fasting, the ADA then defines impaired fasting glucose as > 100 mg/dL and diabetes as > or equal to 123 mg/dL.    Calcium 10/22/2024 8.9  8.4 - 10.2 mg/dL Final    AST 10/22/2024 29  13 - 39 U/L Final    ALT 10/22/2024 35  7 - 52 U/L Final    Specimen collection should occur prior to Sulfasalazine administration due to the potential for falsely depressed results.     Alkaline Phosphatase 10/22/2024 61  34 - 104 U/L Final    Total Protein 10/22/2024 6.6  6.4 - 8.4 g/dL Final    Albumin 10/22/2024 4.0  3.5 - 5.0 g/dL Final    Total Bilirubin 10/22/2024 0.99  0.20 - 1.00 mg/dL Final    Use of this assay is not recommended for patients undergoing treatment with eltrombopag due to the potential for falsely elevated results.  N-acetyl-p-benzoquinone imine (metabolite of Acetaminophen) will generate erroneously low results in samples for patients that have taken an overdose of Acetaminophen.    eGFR 10/22/2024 45  ml/min/1.73sq m Final    Lipase 10/22/2024 632 (H)  11 - 82 u/L Final    RBC, UA 10/22/2024 1-2  None Seen, 0-1, 1-2, 2-4, 0-5 /hpf Final    WBC, UA 10/22/2024 0-1  None Seen, 0-1, 1-2, 0-5, 2-4 /hpf Final    Epithelial Cells 10/22/2024 None Seen  None Seen, Occasional /hpf Final    Bacteria, UA 10/22/2024 None Seen  None Seen, Occasional /hpf Final     Results from last 7 days   Lab Units 10/22/24  0413 10/21/24  2035 10/21/24  1254   POTASSIUM mmol/L 3.3* 3.6 3.8   CHLORIDE mmol/L 91* 92* 94*   CO2 mmol/L 32 28 30   BUN mg/dL 21 22 24   CREATININE mg/dL 1.50* 1.57* 1.33*   EGFR ml/min/1.73sq m 45 42 52   CALCIUM mg/dL 8.9 9.4 9.6   AST U/L 29 38 51*   ALT U/L 35 41 45   ALK PHOS U/L 61 73 76     Results from last 7 days   Lab Units 10/21/24  1254   WBC Thousand/uL 8.80   HEMOGLOBIN g/dL 12.8   PLATELETS Thousands/uL 169           Imaging:   I have  personally reviewed all pertinent results.  CT chest abdomen pelvis w contrast    Result Date: 10/21/2024  Impression: 5 mm stone in the distal left ureter with new left hydronephrosis. Mild pancreatic inflammatory changes similar to prior. Correlate with lipase. The study was marked in EPIC for immediate notification. Workstation performed: [id number]     XR chest 2 views    Result Date: 10/21/2024  Impression: No acute cardiopulmonary disease. Workstation performed: [id number]       EKG, Pathology, and Other Studies:   I have personally reviewed all pertinent results.    Medications:     Current Facility-Administered Medications:     acetaminophen (TYLENOL) tablet 650 mg, Q6H PRN    alfuzosin (UROXATRAL) 24 hr tablet 10 mg, Daily    busPIRone (BUSPAR) tablet 7.5 mg, BID    finasteride (PROSCAR) tablet 5 mg, Daily    folic acid (FOLVITE) tablet 1 mg, Daily    heparin (porcine) subcutaneous injection 5,000 Units, Q8H BRENDA **AND** [CANCELED] Platelet count, Once    LORazepam (ATIVAN) injection 2 mg, Q15 Min PRN    ondansetron (ZOFRAN) injection 4 mg, Q6H PRN    predniSONE tablet 4 mg, Daily    sertraline (ZOLOFT) tablet 100 mg, Daily    sodium chloride 0.9 % infusion, Continuous, Last Rate: 125 mL/hr (10/22/24 1754)    thiamine tablet 100 mg, Daily    traZODone (DESYREL) tablet 150 mg, HS    Patient was discussed with [facility] Attending on-call, Dr. Fela Segal.      Kayli Barboza DO, PGY-2  [facility]   10/22/2024

## 2024-10-22 NOTE — ASSESSMENT & PLAN NOTE
-CMP on presentation Na and K wnl.   -Noted to be hyponatremic 132 and hypokalemic 3.3 after fluid administration    Assessment: Likely dilutional given remainder of electrolytes also downtrended. Also had 1x episode of diarrhea while in ED. Chronic alcohol abuse.     Improving    Plan:   -trend BMP  -replete as necessary

## 2024-10-22 NOTE — ASSESSMENT & PLAN NOTE
-Home med: Prednisone 4 mg daily, Methotrexate 20mg once a week (administer on Mondays; per pt last dose was administered 10/14), Humira injections every 2 weeks (administer on Mondays, per pt last dose was 10/7)    Plan:   -hold MTX, resume outpatient  -hold Humira, resume outpatient  -continue with prednisone

## 2024-10-22 NOTE — ASSESSMENT & PLAN NOTE
"75 yo M with PMH of HTN, BPH who initally presented to [facility] with abdominal pain found to have \"5 mm stone in the distal left ureter with new left hydronephrosis\"  IVF   Zofran as needed  Pain control  Transferred to [facility] for intervention tomorrow  Npo after midnight  Urology consult  F/u urology recs     "

## 2024-10-22 NOTE — ED NOTES
Patient provided with meal tray at this time. Offers no complaints.      Ivan Ricci  10/22/24 [id number]

## 2024-10-22 NOTE — ASSESSMENT & PLAN NOTE
-Per PCP visit on 10/1, BP was low in 90s/40s so Nadolol d/carina and HCTZ held and only administered prn  Systolic (24hrs), Av , Min:135 , Max:168   Diastolic (24hrs), Av, Min:63, Max:76    Controlled    Plan  Resume home medications

## 2024-10-22 NOTE — ASSESSMENT & PLAN NOTE
-Presented to ED on 10/21 for chest pain and RUQ abdominal pain. CT scan showed 5 mm stone in the distal left ureter with new left hydronephrosis. Urology recommended transfer to [facility] for procedural intervention.  -CBC unremarkable. CMP showed acute NATASHA of Cr 1.33 (baseline 0.85-1.1) that increased to 1.57.  -UA showed +1 protein/ketone, trace blood. Reflex to microscopy showed no bacteria.     Assessment: L ureteral stone w/new L hydronephrosis without s/s infection  S/p surgery on 10/23 CYSTOSCOPY URETEROSCOPY WITH LITHOTRIPSY HOLMIUM LASER, RETROGRADE PYELOGRAM AND INSERTION STENT URETERAL     Plan:  - Keflex x 3 days (started 10/24) per urology  - Remove string on 10/26/24  - F/U urine culture  - F/U stone analysis   - Monitor kidney function  - Monitor I/O  - pain regimen: prn tylenol, pain otherwise currently well controlled

## 2024-10-22 NOTE — ASSESSMENT & PLAN NOTE
-Home med: buspar 7.5mg BID, Zoloft 100mg daily  -Zoloft increased from 50mg to 100mg on 10/1    Plan:   -continue home meds   - atarax BID

## 2024-10-22 NOTE — ASSESSMENT & PLAN NOTE
Baseline cr appears to be 0.8-1.2  Presented w cr of 1.5  Suspect from volume depletion and kidney stone  Will provide ivf   Repeat bmp in am

## 2024-10-22 NOTE — ED NOTES
OR Bath provided to the patient at this time. Fresh sheets, applied to the patients bed. Call bell in patients hand.      Nathaniel Larsen  10/22/24 [id number]

## 2024-10-22 NOTE — ASSESSMENT & PLAN NOTE
-CT scan: Stable calcifications in the head/uncinate process of the pancreas. Mild pancreatic stranding similar to prior studies. No pancreatic necrosis or peripancreatic collection.   Lipase elevated max 920, decreased to 632  Most likely secondary to alcohol use leading to acute pancreatitis  Lipase        Results from last 7 days   Lab Units 10/24/24  0643 10/23/24  0539 10/22/24  0413 10/21/24  2035 10/21/24  1254   LIPASE u/L 166* 269* 632* 920* 410*       Improving/resolving    Plan:   -trend lipase  -advance diet as tolerated

## 2024-10-22 NOTE — ED NOTES
Patient moved into inpatient bed at this time. Call bell within reach, patient offering no complaints.     Carolyn Vargas, RN  10/21/24 4561

## 2024-10-22 NOTE — ASSESSMENT & PLAN NOTE
-Recent admission 9/15-9/17 for alcohol withdrawal. Received Valium 10mg x2, Librium 25mg x2. Started on Naltrexone 50mg daily.  Compliant with weekly AA meetings.  -30+ year h/o alcohol use disorder  -Home meds: Folate 1mg, Thiamine 100mg  -last drink was 10/21 7AM  -In ED, received phenobarbital injection 130 mg, IV phenobarbital 260mg, IV thiamine, D5LR @125    Assessment: Alcohol Use Disorder. Current user, will need to monitor for s/s withdrawal.    Plan:  - Inpatient  referral   -[facility] protocol  -ativan prn  -Continue thiamine, folic acid supplements  - on alcohol cessation   For information on Fall & Injury Prevention, visit: [email or web address] OR  [email or web address] OR  https://www.cdc.gov/steadi/patient.html

## 2024-10-22 NOTE — PLAN OF CARE
Problem: PAIN - ADULT  Goal: Verbalizes/displays adequate comfort level or baseline comfort level  Description: Interventions:  - Encourage patient to monitor pain and request assistance  - Assess pain using appropriate pain scale  - Administer analgesics based on type and severity of pain and evaluate response  - Implement non-pharmacological measures as appropriate and evaluate response  - Consider cultural and social influences on pain and pain management  - Notify physician/advanced practitioner if interventions unsuccessful or patient reports new pain  Outcome: Progressing     Problem: INFECTION - ADULT  Goal: Absence or prevention of progression during hospitalization  Description: INTERVENTIONS:  - Assess and monitor for signs and symptoms of infection  - Monitor lab/diagnostic results  - Monitor all insertion sites, i.e. indwelling lines, tubes, and drains  - Monitor endotracheal if appropriate and nasal secretions for changes in amount and color  - [place] appropriate cooling/warming therapies per order  - Administer medications as ordered  - Instruct and encourage patient and family to use good hand hygiene technique  - Identify and instruct in appropriate isolation precautions for identified infection/condition  Outcome: Progressing  Goal: Absence of fever/infection during neutropenic period  Description: INTERVENTIONS:  - Monitor WBC    Outcome: Progressing     Problem: SAFETY ADULT  Goal: Patient will remain free of falls  Description: INTERVENTIONS:  - Educate patient/family on patient safety including physical limitations  - Instruct patient to call for assistance with activity   - Consult OT/PT to assist with strengthening/mobility   - Keep Call bell within reach  - Keep bed low and locked with side rails adjusted as appropriate  - Keep care items and personal belongings within reach  - Initiate and maintain comfort rounds  - Make Fall Risk Sign visible to staff  - Offer Toileting every 4 Hours,  in advance of need  - Initiate/Maintain bed alarm  - Obtain necessary fall risk management equipment:   - Apply yellow socks and bracelet for high fall risk patients  - Consider moving patient to room near nurses station  Outcome: Progressing  Goal: Maintain or return to baseline ADL function  Description: INTERVENTIONS:  -  Assess patient's ability to carry out ADLs; assess patient's baseline for ADL function and identify physical deficits which impact ability to perform ADLs (bathing, care of mouth/teeth, toileting, grooming, dressing, etc.)  - Assess/evaluate cause of self-care deficits   - Assess range of motion  - Assess patient's mobility; develop plan if impaired  - Assess patient's need for assistive devices and provide as appropriate  - Encourage maximum independence but intervene and supervise when necessary  - Involve family in performance of ADLs  - Assess for home care needs following discharge   - Consider OT consult to assist with ADL evaluation and planning for discharge  - Provide patient education as appropriate  Outcome: Progressing  Goal: Maintains/Returns to pre admission functional level  Description: INTERVENTIONS:  - Perform AM-PAC 6 Click Basic Mobility/ Daily Activity assessment daily.  - Set and communicate daily mobility goal to care team and patient/family/caregiver.   - Collaborate with rehabilitation services on mobility goals if consulted  - Perform Range of Motion 3 times a day.  - Reposition patient every 3 hours.  - Dangle patient 3 times a day  - Stand patient 3 times a day  - Ambulate patient 3 times a day  - Out of bed to chair 3 times a day   - Out of bed for meals 3 times a day  - Out of bed for toileting  - Record patient progress and toleration of activity level   Outcome: Progressing     Problem: DISCHARGE PLANNING  Goal: Discharge to home or other facility with appropriate resources  Description: INTERVENTIONS:  - Identify barriers to discharge w/patient and caregiver  -  Arrange for needed discharge resources and transportation as appropriate  - Identify discharge learning needs (meds, wound care, etc.)  - Arrange for interpretive services to assist at discharge as needed  - Refer to Case Management Department for coordinating discharge planning if the patient needs post-hospital services based on physician/advanced practitioner order or complex needs related to functional status, cognitive ability, or social support system  Outcome: Progressing     Problem: Knowledge Deficit  Goal: Patient/family/caregiver demonstrates understanding of disease process, treatment plan, medications, and discharge instructions  Description: Complete learning assessment and assess knowledge base.  Interventions:  - Provide teaching at level of understanding  - Provide teaching via preferred learning methods  Outcome: Progressing

## 2024-10-22 NOTE — ASSESSMENT & PLAN NOTE
-baseline Cr 0.85-1.1  -on presentation to ED: Cr 1.33 -> 1.57 -> (post fluids) 1.50    Assessment: Most likely post-renal NATASHA from L ureteral stone.    Renal      Results from last 7 days   Lab Units 10/24/24  0643 10/23/24  0539 10/22/24  0413 10/21/24  2035 10/21/24  1254   BUN mg/dL 12 16 21 22 24   CREATININE mg/dL 1.21 1.34* 1.50* 1.57* 1.33*   EGFR ml/min/1.73sq m 58 51 45 42 52     Stone removal on 10/23/24  Improving/resolved    Plan:   -trend Cr  - avoid nephrotoxic agents

## 2024-10-22 NOTE — ANESTHESIA PREPROCEDURE EVALUATION
Procedure:  CYSTOSCOPY URETEROSCOPY WITH LITHOTRIPSY HOLMIUM LASER, RETROGRADE PYELOGRAM AND INSERTION STENT URETERAL (Left: Bladder)    Relevant Problems   CARDIO   (+) Essential hypertension      GI/HEPATIC   (+) Acute pancreatitis   (+) Alcohol induced fatty liver      /RENAL   (+) NATASHA (acute kidney injury) (HCC)   (+) BPH (benign prostatic hyperplasia)      MUSCULOSKELETAL   (+) Inflammatory arthritis   (+) Psoriatic arthritis (HCC)      NEURO/PSYCH   (+) Depression with anxiety   (+) Seizure (HCC)      PULMONARY   (+) ROSARIO (obstructive sleep apnea)   (+) Sleep apnea      Behavioral Health   (+) Alcohol use disorder, mild, in early remission, abuse      Neurology/Sleep   (+) Abnormal brain MRI      Blood   (+) Leukocytosis      Care Coordination   (+) High risk medication use      Rheumatology   (+) Polymyalgia rheumatica (HCC)      Surgery/Wound/Pain   (+) Bilateral lower extremity edema      Orthopedic/Musculoskeletal   (+) Compression of lumbar vertebra (HCC)      Other   (+) Electrolyte disturbance   (+) History of prolonged Q-T interval on ECG   (+) Obesity, morbid (HCC)   (+) Prediabetes       TTE 5/21/24:   Left Ventricle Left ventricular cavity size is normal. Wall thickness is not well visualized. The left ventricular ejection fraction is 65%. Systolic function is normal. Wall motion cannot be accurately assessed. Diastolic function is normal for age.   Right Ventricle Right ventricle is not well visualized.   Left Atrium Left atrium is not well visualized.   Right Atrium Right atrium is not well visualized.   Aortic Valve The aortic valve was not well visualized.   Mitral Valve The mitral valve was not well visualized.  There is trace regurgitation.   Tricuspid Valve There is trace regurgitation. Right ventricular systolic pressure could not be assessed.   Pulmonic Valve The pulmonic valve was not well visualized.   Ascending Aorta The aorta was not well visualized.   IVC/SVC The inferior vena cava is  not well visualized.   Pericardium There is no pericardial effusion. The pericardium is normal in appearance.                  Component  Ref Range & Units 10/22/24 0413 10/21/24 2035 10/21/24 1254 9/17/24 0443 9/16/24 0424 9/15/24 1129 8/21/24 1056 8/21/24 1056   Sodium  135 - 147 mmol/L 132 Low  134 Low  138 138 137 140     Potassium  3.5 - 5.3 mmol/L 3.3 Low  3.6 3.8 3.4 Low  3.5 3.7     Chloride  96 - 108 mmol/L 91 Low  92 Low  94 Low  99 100 101     CO2  21 - 32 mmol/L 32 28 30 30 29 26     ANION GAP  4 - 13 mmol/L 9 14 High  14 High  9 8 13     BUN  5 - 25 mg/dL 21 22 24 21 17 14  15   Creatinine  0.60 - 1.30 mg/dL 1.50 High  1.57 High  CM 1.33 High  CM 1.31 High  CM 1.27 CM 1.17 CM 0.85 CM       Glucose  65 - 140 mg/dL 144 High  175 High  CM 97  CM 93 CM 84 CM     .   Calcium  8.4 - 10.2 mg/dL 8.9 9.4 9.6 8.8 8.8 9.1     AST  13 - 39 U/L 29 38 51 High  11 Low  14 27     ALT  7 - 52 U/L 35 41 CM 45 CM 14 CM 19 CM 31 CM        Alkaline Phosphatase  34 - 104 U/L 61 73 76 65 60 70     Total Protein  6.4 - 8.4 g/dL 6.6 7.1 7.3 6.3 Low  6.1 Low  6.8     Albumin  3.5 - 5.0 g/dL 4.0 4.2 4.4 3.6 3.6 4.1     Total Bilirubin  0.20 - 1.00 mg/dL 0.99 0.93 CM 0.58 CM 0.49 CM 0.65 CM 0.51 CM     .   eGFR  ml/min/1.73sq m 45 42 52 53 55 61 85               Physical Exam    Airway    Mallampati score: IV  TM Distance: >3 FB  Neck ROM: full     Dental       Cardiovascular      Pulmonary      Other Findings  Anesthesia Plan  ASA Score- 3     Anesthesia Type- general with ASA Monitors.         Additional Monitors:     Airway Plan: LMA.           Plan Factors-    Chart reviewed. EKG reviewed. Imaging results reviewed. Existing labs reviewed. Patient summary reviewed.    Patient is not a current smoker.  Patient did not smoke on day of surgery.            Induction- intravenous.    Postoperative Plan- Plan for postoperative opioid use. Planned trial extubation    Perioperative Resuscitation Plan - Level 1 - Full Code.        Informed Consent- Anesthetic plan and risks discussed with patient.  I personally reviewed this patient with the CRNA. Discussed and agreed on the Anesthesia Plan with the CRNA..

## 2024-10-22 NOTE — ASSESSMENT & PLAN NOTE
-Home med: Finasteride 5mg    Intake/Output Summary (Last 24 hours) at 10/24/2024 0848  Last data filed at 10/24/2024 0545  Gross per 24 hour   Intake 500 ml   Output 875 ml   Net -375 ml      Plan:  - Urology on consult   -Continue home med  -Monitor urine output, I/O   -urinary retention protocol prn

## 2024-10-23 ENCOUNTER — APPOINTMENT (EMERGENCY)
Dept: RADIOLOGY | Facility: HOSPITAL | Age: 74
DRG: 659 | End: 2024-10-23
Payer: MEDICARE

## 2024-10-23 PROBLEM — N20.1 LEFT URETERAL CALCULUS: Status: ACTIVE | Noted: 2024-10-23

## 2024-10-23 LAB
ALBUMIN SERPL BCG-MCNC: 3.5 G/DL (ref 3.5–5)
ALP SERPL-CCNC: 65 U/L (ref 34–104)
ALT SERPL W P-5'-P-CCNC: 45 U/L (ref 7–52)
ANION GAP SERPL CALCULATED.3IONS-SCNC: 7 MMOL/L (ref 4–13)
ANISOCYTOSIS BLD QL SMEAR: PRESENT
AST SERPL W P-5'-P-CCNC: 51 U/L (ref 13–39)
ATRIAL RATE: 77 BPM
BASOPHILS # BLD MANUAL: 0 THOUSAND/UL (ref 0–0.1)
BASOPHILS NFR MAR MANUAL: 0 % (ref 0–1)
BILIRUB DIRECT SERPL-MCNC: 0.12 MG/DL (ref 0–0.2)
BILIRUB SERPL-MCNC: 0.51 MG/DL (ref 0.2–1)
BUN SERPL-MCNC: 16 MG/DL (ref 5–25)
CALCIUM SERPL-MCNC: 8.5 MG/DL (ref 8.4–10.2)
CHLORIDE SERPL-SCNC: 98 MMOL/L (ref 96–108)
CO2 SERPL-SCNC: 30 MMOL/L (ref 21–32)
CREAT SERPL-MCNC: 1.34 MG/DL (ref 0.6–1.3)
EOSINOPHIL # BLD MANUAL: 0 THOUSAND/UL (ref 0–0.4)
EOSINOPHIL NFR BLD MANUAL: 0 % (ref 0–6)
ERYTHROCYTE [DISTWIDTH] IN BLOOD BY AUTOMATED COUNT: 14.8 % (ref 11.6–15.1)
GFR SERPL CREATININE-BSD FRML MDRD: 51 ML/MIN/1.73SQ M
GLUCOSE SERPL-MCNC: 143 MG/DL (ref 65–140)
HCT VFR BLD AUTO: 31.2 % (ref 36.5–49.3)
HGB BLD-MCNC: 10.3 G/DL (ref 12–17)
LIPASE SERPL-CCNC: 269 U/L (ref 11–82)
LYMPHOCYTES # BLD AUTO: 1.06 THOUSAND/UL (ref 0.6–4.47)
LYMPHOCYTES # BLD AUTO: 12 % (ref 14–44)
MAGNESIUM SERPL-MCNC: 1.9 MG/DL (ref 1.9–2.7)
MCH RBC QN AUTO: 32.7 PG (ref 26.8–34.3)
MCHC RBC AUTO-ENTMCNC: 33 G/DL (ref 31.4–37.4)
MCV RBC AUTO: 99 FL (ref 82–98)
MONOCYTES # BLD AUTO: 0.15 THOUSAND/UL (ref 0–1.22)
MONOCYTES NFR BLD: 3 % (ref 4–12)
NEUTROPHILS # BLD MANUAL: 3.82 THOUSAND/UL (ref 1.85–7.62)
NEUTS SEG NFR BLD AUTO: 76 % (ref 43–75)
P AXIS: 64 DEGREES
PHOSPHATE SERPL-MCNC: 2.4 MG/DL (ref 2.3–4.1)
PLATELET # BLD AUTO: 138 THOUSANDS/UL (ref 149–390)
PLATELET BLD QL SMEAR: ABNORMAL
PMV BLD AUTO: 8.6 FL (ref 8.9–12.7)
POTASSIUM SERPL-SCNC: 3.9 MMOL/L (ref 3.5–5.3)
PR INTERVAL: 158 MS
PROT SERPL-MCNC: 6.1 G/DL (ref 6.4–8.4)
QRS AXIS: 60 DEGREES
QRSD INTERVAL: 102 MS
QT INTERVAL: 400 MS
QTC INTERVAL: 452 MS
RBC # BLD AUTO: 3.15 MILLION/UL (ref 3.88–5.62)
RBC MORPH BLD: PRESENT
SODIUM SERPL-SCNC: 135 MMOL/L (ref 135–147)
T WAVE AXIS: 59 DEGREES
VARIANT LYMPHS # BLD AUTO: 9 %
VENTRICULAR RATE: 77 BPM
WBC # BLD AUTO: 5.03 THOUSAND/UL (ref 4.31–10.16)

## 2024-10-23 PROCEDURE — 82360 CALCULUS ASSAY QUANT: CPT | Performed by: UROLOGY

## 2024-10-23 PROCEDURE — 93010 ELECTROCARDIOGRAM REPORT: CPT | Performed by: INTERNAL MEDICINE

## 2024-10-23 PROCEDURE — 87086 URINE CULTURE/COLONY COUNT: CPT | Performed by: UROLOGY

## 2024-10-23 PROCEDURE — C1769 GUIDE WIRE: HCPCS | Performed by: UROLOGY

## 2024-10-23 PROCEDURE — 83690 ASSAY OF LIPASE: CPT

## 2024-10-23 PROCEDURE — 83735 ASSAY OF MAGNESIUM: CPT

## 2024-10-23 PROCEDURE — 99232 SBSQ HOSP IP/OBS MODERATE 35: CPT | Performed by: FAMILY MEDICINE

## 2024-10-23 PROCEDURE — 85027 COMPLETE CBC AUTOMATED: CPT

## 2024-10-23 PROCEDURE — 87077 CULTURE AEROBIC IDENTIFY: CPT | Performed by: UROLOGY

## 2024-10-23 PROCEDURE — C2625 STENT, NON-COR, TEM W/DEL SY: HCPCS | Performed by: UROLOGY

## 2024-10-23 PROCEDURE — 74420 UROGRAPHY RTRGR +-KUB: CPT

## 2024-10-23 PROCEDURE — 52356 CYSTO/URETERO W/LITHOTRIPSY: CPT | Performed by: UROLOGY

## 2024-10-23 PROCEDURE — 80076 HEPATIC FUNCTION PANEL: CPT

## 2024-10-23 PROCEDURE — 84100 ASSAY OF PHOSPHORUS: CPT

## 2024-10-23 PROCEDURE — 85007 BL SMEAR W/DIFF WBC COUNT: CPT

## 2024-10-23 PROCEDURE — 99223 1ST HOSP IP/OBS HIGH 75: CPT | Performed by: UROLOGY

## 2024-10-23 PROCEDURE — 80048 BASIC METABOLIC PNL TOTAL CA: CPT

## 2024-10-23 DEVICE — INLAY OPTIMA URETERAL STENT W/O GUIDEWIRE
Type: IMPLANTABLE DEVICE | Site: URETER | Status: FUNCTIONAL
Brand: BARD® INLAY OPTIMA® URETERAL STENT

## 2024-10-23 RX ORDER — SUCCINYLCHOLINE/SOD CL,ISO/PF 100 MG/5ML
SYRINGE (ML) INTRAVENOUS AS NEEDED
Status: DISCONTINUED | OUTPATIENT
Start: 2024-10-23 | End: 2024-10-23

## 2024-10-23 RX ORDER — DEXAMETHASONE SODIUM PHOSPHATE 10 MG/ML
INJECTION, SOLUTION INTRAMUSCULAR; INTRAVENOUS AS NEEDED
Status: DISCONTINUED | OUTPATIENT
Start: 2024-10-23 | End: 2024-10-23

## 2024-10-23 RX ORDER — MAGNESIUM HYDROXIDE 1200 MG/15ML
LIQUID ORAL AS NEEDED
Status: DISCONTINUED | OUTPATIENT
Start: 2024-10-23 | End: 2024-10-23 | Stop reason: HOSPADM

## 2024-10-23 RX ORDER — LORAZEPAM 2 MG/ML
0.5 INJECTION INTRAMUSCULAR
Status: DISCONTINUED | OUTPATIENT
Start: 2024-10-23 | End: 2024-10-24 | Stop reason: HOSPADM

## 2024-10-23 RX ORDER — SODIUM CHLORIDE, SODIUM LACTATE, POTASSIUM CHLORIDE, CALCIUM CHLORIDE 600; 310; 30; 20 MG/100ML; MG/100ML; MG/100ML; MG/100ML
10 INJECTION, SOLUTION INTRAVENOUS CONTINUOUS
Status: DISCONTINUED | OUTPATIENT
Start: 2024-10-23 | End: 2024-10-23

## 2024-10-23 RX ORDER — ONDANSETRON 2 MG/ML
4 INJECTION INTRAMUSCULAR; INTRAVENOUS ONCE AS NEEDED
Status: DISCONTINUED | OUTPATIENT
Start: 2024-10-23 | End: 2024-10-23 | Stop reason: HOSPADM

## 2024-10-23 RX ORDER — PROPOFOL 10 MG/ML
INJECTION, EMULSION INTRAVENOUS AS NEEDED
Status: DISCONTINUED | OUTPATIENT
Start: 2024-10-23 | End: 2024-10-23

## 2024-10-23 RX ORDER — LIDOCAINE HYDROCHLORIDE 20 MG/ML
INJECTION, SOLUTION EPIDURAL; INFILTRATION; INTRACAUDAL; PERINEURAL AS NEEDED
Status: DISCONTINUED | OUTPATIENT
Start: 2024-10-23 | End: 2024-10-23

## 2024-10-23 RX ORDER — SODIUM CHLORIDE, SODIUM LACTATE, POTASSIUM CHLORIDE, CALCIUM CHLORIDE 600; 310; 30; 20 MG/100ML; MG/100ML; MG/100ML; MG/100ML
INJECTION, SOLUTION INTRAVENOUS CONTINUOUS PRN
Status: DISCONTINUED | OUTPATIENT
Start: 2024-10-23 | End: 2024-10-23

## 2024-10-23 RX ORDER — FENTANYL CITRATE/PF 50 MCG/ML
25 SYRINGE (ML) INJECTION
Status: DISCONTINUED | OUTPATIENT
Start: 2024-10-23 | End: 2024-10-23 | Stop reason: HOSPADM

## 2024-10-23 RX ORDER — ONDANSETRON 2 MG/ML
INJECTION INTRAMUSCULAR; INTRAVENOUS AS NEEDED
Status: DISCONTINUED | OUTPATIENT
Start: 2024-10-23 | End: 2024-10-23

## 2024-10-23 RX ORDER — LORAZEPAM 0.5 MG/1
0.5 TABLET ORAL ONCE
Status: COMPLETED | OUTPATIENT
Start: 2024-10-23 | End: 2024-10-23

## 2024-10-23 RX ORDER — FENTANYL CITRATE 50 UG/ML
INJECTION, SOLUTION INTRAMUSCULAR; INTRAVENOUS AS NEEDED
Status: DISCONTINUED | OUTPATIENT
Start: 2024-10-23 | End: 2024-10-23

## 2024-10-23 RX ORDER — SODIUM CHLORIDE 9 MG/ML
100 INJECTION, SOLUTION INTRAVENOUS CONTINUOUS
Status: DISCONTINUED | OUTPATIENT
Start: 2024-10-23 | End: 2024-10-23

## 2024-10-23 RX ORDER — CEFAZOLIN SODIUM 2 G/50ML
2000 SOLUTION INTRAVENOUS
Status: COMPLETED | OUTPATIENT
Start: 2024-10-23 | End: 2024-10-23

## 2024-10-23 RX ORDER — HYDROMORPHONE HCL IN WATER/PF 6 MG/30 ML
0.2 PATIENT CONTROLLED ANALGESIA SYRINGE INTRAVENOUS
Status: DISCONTINUED | OUTPATIENT
Start: 2024-10-23 | End: 2024-10-23 | Stop reason: HOSPADM

## 2024-10-23 RX ORDER — MIDAZOLAM HYDROCHLORIDE 2 MG/2ML
INJECTION, SOLUTION INTRAMUSCULAR; INTRAVENOUS AS NEEDED
Status: DISCONTINUED | OUTPATIENT
Start: 2024-10-23 | End: 2024-10-23

## 2024-10-23 RX ORDER — SODIUM CHLORIDE, SODIUM LACTATE, POTASSIUM CHLORIDE, CALCIUM CHLORIDE 600; 310; 30; 20 MG/100ML; MG/100ML; MG/100ML; MG/100ML
100 INJECTION, SOLUTION INTRAVENOUS CONTINUOUS
Status: DISCONTINUED | OUTPATIENT
Start: 2024-10-23 | End: 2024-10-24

## 2024-10-23 RX ADMIN — FOLIC ACID 1 MG: 1 TABLET ORAL at 08:37

## 2024-10-23 RX ADMIN — SODIUM CHLORIDE 100 ML/HR: 0.9 INJECTION, SOLUTION INTRAVENOUS at 12:31

## 2024-10-23 RX ADMIN — FINASTERIDE 5 MG: 5 TABLET, FILM COATED ORAL at 17:18

## 2024-10-23 RX ADMIN — ONDANSETRON 4 MG: 2 INJECTION INTRAMUSCULAR; INTRAVENOUS at 10:06

## 2024-10-23 RX ADMIN — Medication 200 MG: at 10:00

## 2024-10-23 RX ADMIN — THIAMINE HCL TAB 100 MG 100 MG: 100 TAB at 08:37

## 2024-10-23 RX ADMIN — MIDAZOLAM 1 MG: 1 INJECTION INTRAMUSCULAR; INTRAVENOUS at 10:00

## 2024-10-23 RX ADMIN — FENTANYL CITRATE 25 MCG: 50 INJECTION INTRAMUSCULAR; INTRAVENOUS at 10:23

## 2024-10-23 RX ADMIN — CEFAZOLIN SODIUM 2000 MG: 2 SOLUTION INTRAVENOUS at 09:57

## 2024-10-23 RX ADMIN — LORAZEPAM 0.5 MG: 2 INJECTION INTRAMUSCULAR; INTRAVENOUS at 17:23

## 2024-10-23 RX ADMIN — PREDNISONE 4 MG: 1 TABLET ORAL at 21:49

## 2024-10-23 RX ADMIN — SERTRALINE HYDROCHLORIDE 100 MG: 100 TABLET ORAL at 17:18

## 2024-10-23 RX ADMIN — SODIUM CHLORIDE 125 ML/HR: 0.9 INJECTION, SOLUTION INTRAVENOUS at 01:51

## 2024-10-23 RX ADMIN — BUSPIRONE HYDROCHLORIDE 7.5 MG: 5 TABLET ORAL at 17:17

## 2024-10-23 RX ADMIN — LORAZEPAM 0.5 MG: 0.5 TABLET ORAL at 12:37

## 2024-10-23 RX ADMIN — ACETAMINOPHEN 650 MG: 325 TABLET, FILM COATED ORAL at 12:36

## 2024-10-23 RX ADMIN — LORAZEPAM 0.5 MG: 2 INJECTION INTRAMUSCULAR; INTRAVENOUS at 21:49

## 2024-10-23 RX ADMIN — PROPOFOL 150 MG: 10 INJECTION, EMULSION INTRAVENOUS at 10:00

## 2024-10-23 RX ADMIN — FENTANYL CITRATE 25 MCG: 50 INJECTION INTRAMUSCULAR; INTRAVENOUS at 10:19

## 2024-10-23 RX ADMIN — LIDOCAINE HYDROCHLORIDE 100 MG: 20 INJECTION, SOLUTION EPIDURAL; INFILTRATION; INTRACAUDAL at 10:00

## 2024-10-23 RX ADMIN — BUSPIRONE HYDROCHLORIDE 7.5 MG: 5 TABLET ORAL at 08:36

## 2024-10-23 RX ADMIN — SODIUM CHLORIDE, SODIUM LACTATE, POTASSIUM CHLORIDE, AND CALCIUM CHLORIDE 100 ML/HR: .6; .31; .03; .02 INJECTION, SOLUTION INTRAVENOUS at 17:32

## 2024-10-23 RX ADMIN — TRAZODONE HYDROCHLORIDE 150 MG: 100 TABLET ORAL at 21:49

## 2024-10-23 RX ADMIN — PROPOFOL 50 MG: 10 INJECTION, EMULSION INTRAVENOUS at 10:23

## 2024-10-23 RX ADMIN — ACETAMINOPHEN 650 MG: 325 TABLET, FILM COATED ORAL at 06:47

## 2024-10-23 RX ADMIN — SODIUM CHLORIDE, SODIUM LACTATE, POTASSIUM CHLORIDE, AND CALCIUM CHLORIDE: .6; .31; .03; .02 INJECTION, SOLUTION INTRAVENOUS at 09:50

## 2024-10-23 RX ADMIN — FENTANYL CITRATE 50 MCG: 50 INJECTION INTRAMUSCULAR; INTRAVENOUS at 10:00

## 2024-10-23 RX ADMIN — ACETAMINOPHEN 650 MG: 325 TABLET, FILM COATED ORAL at 17:19

## 2024-10-23 RX ADMIN — MIDAZOLAM 1 MG: 1 INJECTION INTRAMUSCULAR; INTRAVENOUS at 09:57

## 2024-10-23 RX ADMIN — DEXAMETHASONE SODIUM PHOSPHATE 10 MG: 10 INJECTION, SOLUTION INTRAMUSCULAR; INTRAVENOUS at 10:06

## 2024-10-23 NOTE — QUICK NOTE
Today Fredy underwent left ureteroscopy.  I was able to remove his stone.  He has a stent with a string.  The patient is cleared for discharge as long as he is afebrile, vitals are stable, he is able to void and tolerates a diet, and pain is controlled.  Please discharge home with 3 days of oral Keflex and 5 Norco tablets.  The patient should remove his own stent on Saturday.  Outpatient follow-up with urology will be arranged.  Discharge instructions completed.

## 2024-10-23 NOTE — PLAN OF CARE
Problem: PAIN - ADULT  Goal: Verbalizes/displays adequate comfort level or baseline comfort level  Description: Interventions:  - Encourage patient to monitor pain and request assistance  - Assess pain using appropriate pain scale  - Administer analgesics based on type and severity of pain and evaluate response  - Implement non-pharmacological measures as appropriate and evaluate response  - Consider cultural and social influences on pain and pain management  - Notify physician/advanced practitioner if interventions unsuccessful or patient reports new pain  Outcome: Progressing     Problem: INFECTION - ADULT  Goal: Absence or prevention of progression during hospitalization  Description: INTERVENTIONS:  - Assess and monitor for signs and symptoms of infection  - Monitor lab/diagnostic results  - Monitor all insertion sites, i.e. indwelling lines, tubes, and drains  - Monitor endotracheal if appropriate and nasal secretions for changes in amount and color  - [place] appropriate cooling/warming therapies per order  - Administer medications as ordered  - Instruct and encourage patient and family to use good hand hygiene technique  - Identify and instruct in appropriate isolation precautions for identified infection/condition  Outcome: Progressing  Goal: Absence of fever/infection during neutropenic period  Description: INTERVENTIONS:  - Monitor WBC    Outcome: Progressing     Problem: SAFETY ADULT  Goal: Patient will remain free of falls  Description: INTERVENTIONS:  - Educate patient/family on patient safety including physical limitations  - Instruct patient to call for assistance with activity   - Consult OT/PT to assist with strengthening/mobility   - Keep Call bell within reach  - Keep bed low and locked with side rails adjusted as appropriate  - Keep care items and personal belongings within reach  - Initiate and maintain comfort rounds  - Make Fall Risk Sign visible to staff  - Apply yellow socks and bracelet  for high fall risk patients  - Consider moving patient to room near nurses station  Outcome: Progressing  Goal: Maintain or return to baseline ADL function  Description: INTERVENTIONS:  -  Assess patient's ability to carry out ADLs; assess patient's baseline for ADL function and identify physical deficits which impact ability to perform ADLs (bathing, care of mouth/teeth, toileting, grooming, dressing, etc.)  - Assess/evaluate cause of self-care deficits   - Assess range of motion  - Assess patient's mobility; develop plan if impaired  - Assess patient's need for assistive devices and provide as appropriate  - Encourage maximum independence but intervene and supervise when necessary  - Involve family in performance of ADLs  - Assess for home care needs following discharge   - Consider OT consult to assist with ADL evaluation and planning for discharge  - Provide patient education as appropriate  Outcome: Progressing  Goal: Maintains/Returns to pre admission functional level  Description: INTERVENTIONS:  - Perform AM-PAC 6 Click Basic Mobility/ Daily Activity assessment daily.  - Set and communicate daily mobility goal to care team and patient/family/caregiver.   - Collaborate with rehabilitation services on mobility goals if consulted  - Perform Range of Motion 3 times a day.  - Reposition patient every 2 hours.  - Dangle patient 3 times a day  - Stand patient 3 times a day  - Ambulate patient 3 times a day  - Out of bed to chair 3 times a day   - Out of bed for meals 3 times a day  - Out of bed for toileting  - Record patient progress and toleration of activity level   Outcome: Progressing     Problem: DISCHARGE PLANNING  Goal: Discharge to home or other facility with appropriate resources  Description: INTERVENTIONS:  - Identify barriers to discharge w/patient and caregiver  - Arrange for needed discharge resources and transportation as appropriate  - Identify discharge learning needs (meds, wound care, etc.)  -  Arrange for interpretive services to assist at discharge as needed  - Refer to Case Management Department for coordinating discharge planning if the patient needs post-hospital services based on physician/advanced practitioner order or complex needs related to functional status, cognitive ability, or social support system  Outcome: Progressing     Problem: Knowledge Deficit  Goal: Patient/family/caregiver demonstrates understanding of disease process, treatment plan, medications, and discharge instructions  Description: Complete learning assessment and assess knowledge base.  Interventions:  - Provide teaching at level of understanding  - Provide teaching via preferred learning methods  Outcome: Progressing

## 2024-10-23 NOTE — OP NOTE
OPERATIVE REPORT  PATIENT NAME: Fredy Yu    :  1950  MRN: [id number]  Pt Location: [facility] ROOM 01    SURGERY DATE: 10/23/2024    Surgeons and Role:     * Eduardo Rivera MD - Primary    Preop Diagnosis:  Nephrolithiasis [N20.0]    Post-Op Diagnosis Codes:     * Nephrolithiasis [N20.0]    Procedure(s):  Cystoscopy, left ureteroscopy, holmium laser lithotripsy, left retrograde pyelography, basket stone retrieval, left ureteral stent insertion    Specimen(s):  ID Type Source Tests Collected by Time Destination   A :  Urine Urine, Cystoscopic URINE CULTURE Eduardo Rivera MD 10/23/2024 1026    B :  Calculus Kidney, Left STONE ANALYSIS Eduardo Rivera MD 10/23/2024 1027        Estimated Blood Loss:   0 mL    Drains:  Ureteral Internal Stent Left ureter 6 Fr. (Active)   Number of days: 0       Anesthesia Type:   General    Operative Indications:  Nephrolithiasis [N20.0]      Operative Findings:  Left ureteral calculus decimated with holmium laser lithotripsy.  Fragments removed with a Skylight basket.  Left 24-6 Greenlandic double-J ureteral stent with string placed      Complications:   None    Procedure and Technique:  Fredy is a 74-year-old male with a 5 mm left mid/distal ureteral calculus with left-sided hydronephrosis and pain.  Fortunately he has no sign of fever or leukocytosis.  Risk and benefits of cystoscopy with left ureteroscopy with holmium laser lithotripsy, left retrograde pyelography and left ureteral stent insertion were discussed and reviewed.  Informed consent obtained.  Patient was brought to the operating room on 2024.  After the smooth induction of general LMA anesthesia, patient was placed in a dorsolithotomy position.  His genitalia is prepped and draped in sterile fashion.  Intravenous Ancef was administered.  A timeout was performed with all members of the operative team confirming the patient's identity, procedure to be performed, and laterality of the  case.    22 Kyrgyz rigid cystoscope with 30 degree lens was inserted.  The bladder was thoroughly inspected.  There were no stones visualized.  Attention was focused on the left ureteral orifice.  5 Kyrgyz open-ended catheter was inserted and a left retrograde pyelogram was performed.  Filling defect in the distal left ureter was visualized consistent with the stone.  A wire was passed proximal.  The wire was secured.  A short semirigid ureteroscope was then passed adjacent to the wire.  The stone was identified and engaged in the mid/distal ureter.  The stone was decimated with a 365 µm holmium laser fiber until all fragments were small enough to be removed with a Skylight basket.  Multiple passes were made with the ureteroscope until were no sizable stone fragments remaining.  The scope passed well into the proximal ureter at which point I performed a left retrograde pyelogram showing left-sided hydronephrosis.  The ureteroscope was gently withdrawn through the entire length of the ureter.  The wire was backloaded through the cystoscope and a left 24-6 Kyrgyz double-J ureteral stent with string was then placed.  The proximal coil was appreciated within the left renal pelvis and the distal coil was visualized within the bladder.  The bladder was emptied and the cystoscope was removed.  The string was secured to the dorsum of the phallus with Tegaderm.    Overall the patient tolerated the procedure well and there were no complications.  The patient was activated in the operating room and transferred the PACU in stable condition at the conclusion of the case.    Patient Disposition:  PACU              SIGNATURE: Eduardo Rivera MD  DATE: October 23, 2024  TIME: 10:49 AM

## 2024-10-23 NOTE — CONSULTS
"Consult - Urology   Fredy Yu 1950, 74 y.o. male MRN: [id number]    [facility] Encounter: [id number]      Left ureteral 5 mm distal stone with hydro  Transfer from [facility]   Creat 1.5, trended from 1.57 yesterday, baseline 1.3   Urine without pyruia or bacteria   Afebrile, vitals stable  Strain urine  Flomax  Pain management  NPO at midnight  OR 10/23 for cystoscopy, ureteroscopy, laser lithotripsy, retrograde pyelogram insertion of ureteral stent.  Consent in OR holding          Subjective:   Fredy Yu is a 74-year-old male with past medical history of alcohol dependency with last drink 10/20 1 in the AM, anxiety, depression, hypertension, peripheral neuropathy, BPH and nephrolithiasis.  Patient presented to [facility] yesterday for evaluation of lower chest/abdominal pain.  Patient was found on CT imaging to have a 5 mm left ureteral stone with hydronephrosis.  Patient was then transferred today to [facility] once bed became available with plans of OR in the a.m.      Patient denies any nausea or vomiting and is tolerating p.o. intake.  Patient endorses mid abdominal pain rated a 4-5 out of 10.    Review of Systems   Constitutional: Negative.    HENT: Negative.     Eyes: Negative.    Respiratory: Negative.     Cardiovascular: Negative.    Gastrointestinal:  Positive for abdominal pain.   Genitourinary: Negative.    Musculoskeletal: Negative.    Skin: Negative.    Allergic/Immunologic: Negative.    Neurological: Negative.    Hematological: Negative.    Psychiatric/Behavioral: Negative.         Objective:  Vitals: Blood pressure 142/64, pulse 82, temperature 98 °F (36.7 °C), resp. rate 17, height 5' 5\" (1.651 m), weight 107 kg (235 lb), SpO2 95%.,Body mass index is 39.11 kg/m².    Physical Exam  Vitals reviewed.   Constitutional:       Appearance: Normal appearance. He is obese. He is not ill-appearing.   HENT:      Head: Normocephalic and atraumatic.   Cardiovascular:      Rate and " Rhythm: Normal rate.   Pulmonary:      Effort: Pulmonary effort is normal.   Abdominal:      Tenderness: There is abdominal tenderness.   Musculoskeletal:         General: Normal range of motion.      Cervical back: Normal range of motion.   Skin:     General: Skin is warm and dry.   Neurological:      Mental Status: He is alert and oriented to person, place, and time.   Psychiatric:         Mood and Affect: Mood normal.         Behavior: Behavior normal.         Thought Content: Thought content normal.         Judgment: Judgment normal.         Imaging:  Ct   5 mm stone in the distal left ureter with new left hydronephrosis.  Mild pancreatic inflammatory changes similar to prior. Correlate with lipase.  Imaging reviewed - both report and images personally reviewed.     Labs:  Recent Labs     10/21/24  1254   WBC 8.80     Recent Labs     10/21/24  1254   HGB 12.8       Recent Labs     10/21/24  1254 10/21/24  2035 10/22/24  0413   CREATININE 1.33* 1.57* 1.50*       Microbiology:  Negative urine    History:  Social History     Socioeconomic History    Marital status: /Civil Union     Spouse name: None    Number of children: None    Years of education: None    Highest education level: None   Occupational History    None   Tobacco Use    Smoking status: Never    Smokeless tobacco: Never   Vaping Use    Vaping status: Never Used   Substance and Sexual Activity    Alcohol use: Not Currently     Comment: pint of vodka everyday    Drug use: Never    Sexual activity: Not Currently     Partners: Female   Other Topics Concern    None   Social History Narrative    None     Social Determinants of Health     Financial Resource Strain: Low Risk  (1/4/2024)    Overall Financial Resource Strain (CARDIA)     Difficulty of Paying Living Expenses: Not very hard   Food Insecurity: No Food Insecurity (10/22/2024)    Nursing - Inadequate Food Risk Classification     Worried About Running Out of Food in the Last Year: Never true      Ran Out of Food in the Last Year: Never true     Ran Out of Food in the Last Year: 1   Transportation Needs: No Transportation Needs (10/22/2024)    Nursing - Transportation Risk Classification     Lack of Transportation: Not on file     Lack of Transportation: 2   Physical Activity: Not on file   Stress: Not on file   Social Connections: Not on file   Intimate Partner Violence: Unknown (10/22/2024)    Nursing IPS     Feels Physically and Emotionally Safe: Not on file     Physically Hurt by Someone: Not on file     Humiliated or Emotionally Abused by Someone: Not on file     Physically Hurt by Someone: 2     Hurt or Threatened by Someone: 2   Housing Stability: Unknown (10/22/2024)    Nursing: Inadequate Housing Risk Classification     Has Housing: Not on file     Worried About Losing Housing: Not on file     Unable to Get Utilities: Not on file     Unable to Pay for Housing in the Last Year: 2     Has Housin       Past Medical History:   Diagnosis Date    Alcohol dependency (HCC)     Anxiety     Arthritis     Depression     [place] (hard of hearing)     Hypertension     Kidney stones     Peripheral neuropathy     Prostate enlargement     Renal disorder     kidney    Shoulder dislocation      Past Surgical History:   Procedure Laterality Date    CHOLECYSTECTOMY          COLONOSCOPY      SHOULDER SURGERY Left     for dislocation x 2, 20 years ago an the second 18 years ago     Family History   Problem Relation Age of Onset    Dementia Mother     Colon cancer Mother     Heart disease Father     COPD Father     Heart failure Father     Coronary artery disease Father     Sleep apnea Brother        MALGORZATA Feliz  Date: 10/22/2024 Time: 9:34 PM

## 2024-10-23 NOTE — DISCHARGE INSTR - OTHER ORDERS
Betsy Maria   Certified     [facility]  [place] [place] [facility]  [phone]    AA meeting guide   https://www.aa.org/find-aa    AA Phone apps:   Meeting Guide  Everything AA  In the Rooms    AA/24 hour hotline   [phone]    [facility] [place]  [address] [place]  [address]  [phone]    [facility] [place]  [address], [place]  [id number] [phone]    [facility] [place]  [address] [place]  [address] [phone]          Skin Care Plan:  1-Cleanse sacro-buttocks with soap and water. Apply calazime cream to B/L Sacro-buttocks TID and PRN episodes of incontinence.   2-Turn/reposition q2h or when medically stable for pressure re-distribution on skin.  3-Elevate heels to offload pressure.  4-Moisturize skin daily with skin nourishing cream  5-Ehob cushion in chair when out of bed.  6-Preventative Hydraguard to bilateral heels BID and PRN.

## 2024-10-23 NOTE — ANESTHESIA POSTPROCEDURE EVALUATION
Post-Op Assessment Note    CV Status:  Stable  Pain Score: 0    Pain management: adequate       Mental Status:  Sleepy (Sleepy yet easily arousable.)   Hydration Status:  Stable   PONV Controlled:  None   Airway Patency:  Patent     Post Op Vitals Reviewed: Yes    No anethesia notable event occurred.    Staff: CRNA       Last Filed PACU Vitals:  Vitals Value Taken Time   Temp 97.8 °F (36.6 °C) 10/23/24 1106   Pulse 72 10/23/24 1107   /69 10/23/24 1106   Resp 21 10/23/24 1107   SpO2 97 % 10/23/24 1107   Vitals shown include unfiled device data.    Modified Yuri:  Activity: 2 (10/23/2024 11:00 AM)  Respiration: 2 (10/23/2024 11:00 AM)  Circulation: 2 (10/23/2024 11:00 AM)  Consciousness: 1 (10/23/2024 11:00 AM)  Oxygen Saturation: 1 (10/23/2024 11:00 AM)  Modified Yuri Score: 8 (10/23/2024 11:00 AM)

## 2024-10-23 NOTE — PLAN OF CARE
Problem: PAIN - ADULT  Goal: Verbalizes/displays adequate comfort level or baseline comfort level  Description: Interventions:  - Encourage patient to monitor pain and request assistance  - Assess pain using appropriate pain scale  - Administer analgesics based on type and severity of pain and evaluate response  - Implement non-pharmacological measures as appropriate and evaluate response  - Consider cultural and social influences on pain and pain management  - Notify physician/advanced practitioner if interventions unsuccessful or patient reports new pain  Outcome: Progressing     Problem: INFECTION - ADULT  Goal: Absence or prevention of progression during hospitalization  Description: INTERVENTIONS:  - Assess and monitor for signs and symptoms of infection  - Monitor lab/diagnostic results  - Monitor all insertion sites, i.e. indwelling lines, tubes, and drains  - Monitor endotracheal if appropriate and nasal secretions for changes in amount and color  - [place] appropriate cooling/warming therapies per order  - Administer medications as ordered  - Instruct and encourage patient and family to use good hand hygiene technique  - Identify and instruct in appropriate isolation precautions for identified infection/condition  Outcome: Progressing  Goal: Absence of fever/infection during neutropenic period  Description: INTERVENTIONS:  - Monitor WBC    Outcome: Progressing     Problem: SAFETY ADULT  Goal: Patient will remain free of falls  Description: INTERVENTIONS:  - Educate patient/family on patient safety including physical limitations  - Instruct patient to call for assistance with activity   - Consult OT/PT to assist with strengthening/mobility   - Keep Call bell within reach  - Keep bed low and locked with side rails adjusted as appropriate  - Keep care items and personal belongings within reach  - Initiate and maintain comfort rounds  - Make Fall Risk Sign visible to staff  - Apply yellow socks and bracelet  for high fall risk patients  - Consider moving patient to room near nurses station  Outcome: Progressing  Goal: Maintain or return to baseline ADL function  Description: INTERVENTIONS:  -  Assess patient's ability to carry out ADLs; assess patient's baseline for ADL function and identify physical deficits which impact ability to perform ADLs (bathing, care of mouth/teeth, toileting, grooming, dressing, etc.)  - Assess/evaluate cause of self-care deficits   - Assess range of motion  - Assess patient's mobility; develop plan if impaired  - Assess patient's need for assistive devices and provide as appropriate  - Encourage maximum independence but intervene and supervise when necessary  - Involve family in performance of ADLs  - Assess for home care needs following discharge   - Consider OT consult to assist with ADL evaluation and planning for discharge  - Provide patient education as appropriate  Outcome: Progressing  Goal: Maintains/Returns to pre admission functional level  Description: INTERVENTIONS:  - Perform AM-PAC 6 Click Basic Mobility/ Daily Activity assessment daily.  - Set and communicate daily mobility goal to care team and patient/family/caregiver.   - Collaborate with rehabilitation services on mobility goals if consulted  - Out of bed for toileting  - Record patient progress and toleration of activity level   Outcome: Progressing     Problem: DISCHARGE PLANNING  Goal: Discharge to home or other facility with appropriate resources  Description: INTERVENTIONS:  - Identify barriers to discharge w/patient and caregiver  - Arrange for needed discharge resources and transportation as appropriate  - Identify discharge learning needs (meds, wound care, etc.)  - Arrange for interpretive services to assist at discharge as needed  - Refer to Case Management Department for coordinating discharge planning if the patient needs post-hospital services based on physician/advanced practitioner order or complex needs  related to functional status, cognitive ability, or social support system  Outcome: Progressing     Problem: Knowledge Deficit  Goal: Patient/family/caregiver demonstrates understanding of disease process, treatment plan, medications, and discharge instructions  Description: Complete learning assessment and assess knowledge base.  Interventions:  - Provide teaching at level of understanding  - Provide teaching via preferred learning methods  Outcome: Progressing

## 2024-10-23 NOTE — ANESTHESIA POSTPROCEDURE EVALUATION
Post-Op Assessment Note    CV Status:  Stable         Mental Status:  Sleepy   Hydration Status:  Stable   PONV Controlled:  Controlled   Airway Patency:  Patent     Post Op Vitals Reviewed: Yes    No anethesia notable event occurred.    Staff: CRNA       Last Filed PACU Vitals:  Vitals Value Taken Time   Temp 98.1 °F (36.7 °C) 10/23/24 1130   Pulse 65 10/23/24 1132   /71 10/23/24 1130   Resp 15 10/23/24 1132   SpO2 99 % 10/23/24 1132   Vitals shown include unfiled device data.    Modified Yuri:  Activity: 2 (10/23/2024 11:30 AM)  Respiration: 2 (10/23/2024 11:30 AM)  Circulation: 2 (10/23/2024 11:30 AM)  Consciousness: 2 (10/23/2024 11:30 AM)  Oxygen Saturation: 1 (10/23/2024 11:30 AM)  Modified Yuri Score: 9 (10/23/2024 11:30 AM)

## 2024-10-23 NOTE — WOUND OSTOMY CARE
Consult Note - Wound   Fredy Yu 74 y.o. male MRN: [id number]  Unit/Bed#: [facility] 818-01 Encounter: [id number]        History and Present Illness:  Patient is a 73 yo male that was admitted to [facility] for treatment of left ureteral stone. Patient has a PMH of HTN HLD, alcohol use disorder, ROSARIO, BPH. Patient is a min assist for turning and repositioning. Patient is continent of bowel and bladder. On assessment, patient is seen sitting  OOB in recliner.    Wound Care was consulted for sacro-buttocks wound     Assessment Findings:   B/L heels are dry intact and scott with no skin loss or wounds present. Recommend preventative Hydraguard Cream and proper offloading/ repositioning.      Mid Sacro-Buttocks Wound: likely friction/moisture in etiology. Scattered irregular in shape areas of partial thickness skin loss with pink, blanchable wound beds measured together. Zonia-wounds are fragile. Scant serosanguineous drainage noted. Recommend calazime cream to areas     No induration, fluctuance, odor, warmth/temperature differences, redness, or purulence noted to the above noted wounds and skin areas assessed. New dressings applied per orders listed below. Patient tolerated well- no s/s of non-verbal pain or discomfort observed during the encounter. Bedside nurse aware of plan of care. See flow sheets for more detailed assessment findings.      Orders listed below and wound care will continue to follow, call or Secure Chat Message with questions.     Skin Care Plan:  1-Cleanse sacro-buttocks with soap and water. Apply calazime cream to B/L Sacro-buttocks TID and PRN episodes of incontinence.   2-Turn/reposition q2h or when medically stable for pressure re-distribution on skin.  3-Elevate heels to offload pressure.  4-Moisturize skin daily with skin nourishing cream  5-Ehob cushion in chair when out of bed.  6-Preventative Hydraguard to bilateral heels BID and PRN.       Wounds:  Wound 10/22/24 MASD Buttocks (Active)    Wound Image   10/23/24 0907   Wound Description Pink;Beefy red 10/23/24 1600   Zonia-wound Assessment Fragile 10/23/24 1600   Wound Length (cm) 2 cm 10/23/24 0907   Wound Width (cm) 2.5 cm 10/23/24 0907   Wound Depth (cm) 0.1 cm 10/23/24 0907   Wound Surface Area (cm^2) 5 cm^2 10/23/24 0907   Wound Volume (cm^3) 0.5 cm^3 10/23/24 0907   Calculated Wound Volume (cm^3) 0.5 cm^3 10/23/24 0907   Drainage Amount Scant 10/23/24 1600   Drainage Description Serosanguineous 10/23/24 1600   Non-staged Wound Description Partial thickness 10/23/24 1600   Treatments Cleansed;Irrigation with NSS;Site care 10/23/24 1600   Dressing Protective barrier 10/23/24 1600   Wound packed? No 10/23/24 1600   Dressing Changed Changed 10/23/24 1600   Patient Tolerance Tolerated well 10/23/24 1600   Dressing Status Clean;Dry;Intact 10/23/24 1600               Nisha Harper RN, BSN, CWOCN

## 2024-10-23 NOTE — PROGRESS NOTES
Progress Notes - Family Medicine Residency, [place] Aimee 1950, 74 y.o. male.  MRN: [id number]  Unit/Bed#: OR POOL Encounter: [id number]  Primary Care Provider: Tejal Garcia MD      Admission Date: 10/22/2024 1652  Length of Stay: 1 days  Code Status:  Level 1 - Full Code  Disposition:   Consult:   IP CONSULT TO UROLOGY  CONSULT TO CERTIFIED          Assessment/Plan:      Plans discussed with [facility] team and finalization is pending attending physician attestation. Please, call [phone] for any clarification.     * Left ureteral stone  Assessment & Plan  -Presented to ED on 10/21 for chest pain and RUQ abdominal pain. CT scan showed 5 mm stone in the distal left ureter with new left hydronephrosis. Urology recommended transfer to [facility] for procedural intervention.  -CBC unremarkable. CMP showed acute NATASHA of Cr 1.33 (baseline 0.85-1.1) that increased to 1.57.  -UA showed +1 protein/ketone, trace blood. Reflex to microscopy showed no bacteria.     Assessment: L ureteral stone w/new L hydronephrosis without s/s infection  S/p surgery on 10/23 CYSTOSCOPY URETEROSCOPY WITH LITHOTRIPSY HOLMIUM LASER, RETROGRADE PYELOGRAM AND INSERTION STENT URETERAL     Plan:  -NS 125cc/hr  -c/w home afluzosin dosing   -Flomax 0.4 mg daily added per urology  - Monitor kidney function  - Monitor I/O  -pain regimen: prn tylenol, pain otherwise currently well controlled    NATASHA (acute kidney injury) (HCC)  Assessment & Plan  -baseline Cr 0.85-1.1  -on presentation to ED: Cr 1.33 -> 1.57 -> (post fluids) 1.50    Assessment: Most likely post-renal NATASHA from L ureteral stone.    Renal      Results from last 7 days   Lab Units 10/23/24  0539 10/22/24  0413 10/21/24  2035 10/21/24  1254   BUN mg/dL 16 21 22 24   CREATININE mg/dL 1.34* 1.50* 1.57* 1.33*   EGFR ml/min/1.73sq m 51 45 42 52     Stone removal on 10/23/24  Improving    Plan:   -trend Cr  -NS 125cc/hr    Acute pancreatitis  Assessment & Plan  -CT scan:  Stable calcifications in the head/uncinate process of the pancreas. Mild pancreatic stranding similar to prior studies. No pancreatic necrosis or peripancreatic collection.   Lipase elevated max 920, decreased to 632  Most likely secondary to alcohol use leading to acute pancreatitis    Improving    Plan:   -trend lipase  -fluids 125cc/hr  -advance diet as tolerated    Electrolyte disturbance  Assessment & Plan  -CMP on presentation Na and K wnl.   -Noted to be hyponatremic 132 and hypokalemic 3.3 after fluid administration    Assessment: Likely dilutional given remainder of electrolytes also downtrended. Also had 1x episode of diarrhea while in ED. Chronic alcohol abuse.     Improving    Plan:   -trend BMP  -replete as necessary    Psoriatic arthritis (HCC)  Assessment & Plan  -Home med: Prednisone 4 mg daily, Methotrexate 20mg once a week (administer on Mondays; per pt last dose was administered 10/14), Humira injections every 2 weeks (administer on Mondays, per pt last dose was 10/7)    Plan:   -hold MTX given NATASHA  -hold Humira since procedure tomorrow; consider administration therafter  -continue with prednisone    ROSARIO (obstructive sleep apnea)  Assessment & Plan  Uses nightly CPAP    Plan:   -provide CPAP at night    Neuropathy  Assessment & Plan  -Home meds: Gabapentin 400mg TID    Plan:   -hold gabapentin given NATASHA    Depression with anxiety  Assessment & Plan  -Home med: buspar 7.5mg BID, Zoloft 100mg daily  -Zoloft increased from 50mg to 100mg on 10/1    Plan:   -continue home meds     BPH (benign prostatic hyperplasia)  Assessment & Plan  -Home med: Finasteride 5mg    Plan:  - Urology on consult   - Flomax 0.4 mg daily   -Continue home med  -Monitor urine output, I/O   -urinary retention protocol prn    Alcohol use disorder, mild, in early remission, abuse  Assessment & Plan  -Recent admission 9/15-9/17 for alcohol withdrawal. Received Valium 10mg x2, Librium 25mg x2. Started on Naltrexone 50mg daily.   Compliant with weekly AA meetings.  -30+ year h/o alcohol use disorder  -Home meds: Folate 1mg, Thiamine 100mg  -last drink was 10/21 7AM  -In ED, received phenobarbital injection 130 mg, IV phenobarbital 260mg, IV thiamine, D5LR @125    Assessment: Alcohol Use Disorder. Current user, will need to monitor for s/s withdrawal.    Plan:  - Inpatient  referral   -[facility] protocol  -ativan prn  -Continue thiamine, folic acid supplements  -hold naltrexone given NATASHA  - on alcohol cessation    Essential hypertension  Assessment & Plan  -Per PCP visit on 10/1, BP was low in 90s/40s so Nadolol d/carina and HCTZ held and only administered prn  Systolic (24hrs), Av , Min:135 , Max:157   Diastolic (24hrs), Av, Min:64, Max:76    Controlled    Plan  -hold HCTZ given NATASHA   -continue to monitor VS          Diet: Diet NPO    VTE Pharm PPX: Heparin  VTE Mech PPX: sequential compression device      Subjective:   74 y.o. male admitted 10/22/2024 for left ureteral stone removal, pancreatitis     Today 10/23/24, HD# 1    Per handoff, patient without acute events overnight.   Per nursing staff, no concern or report.  Continues with tenderness on RLQ and suprapubic and L CVA.  Patient seen and examined at bedside and without questions or concerns. Patient denies CP, SOB, N/V, headaches, dizziness, lightheadedness, changes in bowel movements, urinary symptoms at this time.    Medical management and treatment was discussed with patient, patient understands and agrees with plan.     Objective:     Vitals:    10/23/24 1100 10/23/24 1103 10/23/24 1106 10/23/24 1115   BP: 143/69  143/69 157/76   Pulse: 77  72 69   Resp: 20  16 22   Temp: 97.8 °F (36.6 °C)  97.8 °F (36.6 °C)    TempSrc:   Skin    SpO2: 95% 96% 97% 96%   Weight:       Height:         Temp:  [97.8 °F (36.6 °C)-99 °F (37.2 °C)] 97.8 °F (36.6 °C)  HR:  [59-82] 69  BP: (135-157)/(64-76) 157/76  Resp:  [16-22] 22  SpO2:  [92 %-97 %] 96 %  O2 Device: Nasal  cannula  Nasal Cannula O2 Flow Rate (L/min):  [2 L/min-4 L/min] 2 L/min  Weight (last 2 days)       Date/Time Weight    10/22/24 1708 107 (235)            Intake/Output Summary (Last 24 hours) at 10/23/2024 1130  Last data filed at 10/23/2024 1122  Gross per 24 hour   Intake 1713.75 ml   Output 825 ml   Net 888.75 ml     Invasive Devices       Peripheral Intravenous Line  Duration             Peripheral IV 10/21/24 Right Antecubital 1 day    Peripheral IV 10/22/24 Distal;Right;Ventral (anterior) Forearm 1 day              Drain  Duration             Ureteral Internal Stent Left ureter 6 Fr. <1 day                      Physical Exam:     Physical Exam  Vitals and nursing note reviewed.   Constitutional:       General: He is not in acute distress.     Appearance: Normal appearance. He is well-developed. He is obese. He is not ill-appearing, toxic-appearing or diaphoretic.   HENT:      Head: Normocephalic and atraumatic.      Right Ear: External ear normal.      Left Ear: External ear normal.      Ears:      Comments: Hard of hearing     Nose: Nose normal.   Eyes:      Conjunctiva/sclera: Conjunctivae normal.   Cardiovascular:      Rate and Rhythm: Normal rate and regular rhythm.      Pulses: Normal pulses.      Heart sounds: Normal heart sounds. No murmur heard.  Pulmonary:      Effort: Pulmonary effort is normal. No respiratory distress.      Breath sounds: Normal breath sounds.   Abdominal:      General: Abdomen is flat. Bowel sounds are normal.      Palpations: Abdomen is soft.      Tenderness: There is abdominal tenderness in the right lower quadrant and suprapubic area. There is left CVA tenderness.   Musculoskeletal:         General: Normal range of motion.      Cervical back: Normal range of motion and neck supple.   Skin:     General: Skin is warm and dry.      Capillary Refill: Capillary refill takes less than 2 seconds.      Coloration: Skin is not jaundiced.   Neurological:      Mental Status: He is alert  and oriented to person, place, and time. Mental status is at baseline.   Psychiatric:         Mood and Affect: Mood normal.         Behavior: Behavior normal.           Labs:     CBC:  Results from last 7 days   Lab Units 10/23/24  0539 10/21/24  1254   WBC Thousand/uL 5.03 8.80   HEMOGLOBIN g/dL 10.3* 12.8   HEMATOCRIT % 31.2* 38.1   PLATELETS Thousands/uL 138* 169   TOTAL NEUT ABS Thousands/µL  --  6.33       CMP:  Results from last 7 days   Lab Units 10/23/24  0539 10/22/24  0413 10/21/24  2035 10/21/24  1254   POTASSIUM mmol/L 3.9 3.3* 3.6 3.8   CHLORIDE mmol/L 98 91* 92* 94*   CO2 mmol/L 30 32 28 30   BUN mg/dL 16 21 22 24   CREATININE mg/dL 1.34* 1.50* 1.57* 1.33*   CALCIUM mg/dL 8.5 8.9 9.4 9.6   AST U/L 51* 29 38 51*   ALT U/L 45 35 41 45   ALK PHOS U/L 65 61 73 76   EGFR ml/min/1.73sq m 51 45 42 52   MAGNESIUM mg/dL 1.9  --  1.9 1.6*   PHOSPHORUS mg/dL 2.4  --  3.5 3.6       Sepsis:        Micro:         Imaging:     FL retrograde pyelogram    Result Date: 10/23/2024  Impression: Fluoroscopic guidance provided for left retrograde pyelogram. Please see separate procedure report for additional details. Workstation performed: [id number]         Medications:     Current Facility-Administered Medications:     acetaminophen (TYLENOL) tablet 650 mg, Q6H PRN    alfuzosin (UROXATRAL) 24 hr tablet 10 mg, Daily    busPIRone (BUSPAR) tablet 7.5 mg, BID    fentaNYL (SUBLIMAZE) injection 25 mcg, Q3 min PRN    finasteride (PROSCAR) tablet 5 mg, Daily    folic acid (FOLVITE) tablet 1 mg, Daily    HYDROmorphone HCl (DILAUDID) injection 0.2 mg, Q5 Min PRN    lactated ringers infusion, Continuous, Last Rate: 10 mL/hr (10/23/24 1117)    LORazepam (ATIVAN) injection 2 mg, Q15 Min PRN    ondansetron (ZOFRAN) injection 4 mg, Q6H PRN    ondansetron (ZOFRAN) injection 4 mg, Once PRN    predniSONE tablet 4 mg, Daily    sertraline (ZOLOFT) tablet 100 mg, Daily    thiamine tablet 100 mg, Daily    traZODone (DESYREL) tablet 150 mg,        Vikram Flowers MD  Family Medicine, PGY-3  11:30 AM 10/23/2024

## 2024-10-24 ENCOUNTER — TELEPHONE (OUTPATIENT)
Dept: FAMILY MEDICINE CLINIC | Facility: CLINIC | Age: 74
End: 2024-10-24

## 2024-10-24 VITALS
OXYGEN SATURATION: 95 % | BODY MASS INDEX: 39.15 KG/M2 | HEIGHT: 65 IN | WEIGHT: 235 LBS | SYSTOLIC BLOOD PRESSURE: 138 MMHG | HEART RATE: 60 BPM | DIASTOLIC BLOOD PRESSURE: 71 MMHG | TEMPERATURE: 98.8 F | RESPIRATION RATE: 16 BRPM

## 2024-10-24 PROBLEM — E87.8 ELECTROLYTE DISTURBANCE: Status: RESOLVED | Noted: 2024-10-22 | Resolved: 2024-10-24

## 2024-10-24 LAB
ALBUMIN SERPL BCG-MCNC: 3.4 G/DL (ref 3.5–5)
ALP SERPL-CCNC: 57 U/L (ref 34–104)
ALT SERPL W P-5'-P-CCNC: 28 U/L (ref 7–52)
ANION GAP SERPL CALCULATED.3IONS-SCNC: 7 MMOL/L (ref 4–13)
AST SERPL W P-5'-P-CCNC: 32 U/L (ref 13–39)
BILIRUB SERPL-MCNC: 0.45 MG/DL (ref 0.2–1)
BUN SERPL-MCNC: 12 MG/DL (ref 5–25)
CALCIUM ALBUM COR SERPL-MCNC: 9 MG/DL (ref 8.3–10.1)
CALCIUM SERPL-MCNC: 8.5 MG/DL (ref 8.4–10.2)
CHLORIDE SERPL-SCNC: 100 MMOL/L (ref 96–108)
CO2 SERPL-SCNC: 29 MMOL/L (ref 21–32)
CREAT SERPL-MCNC: 1.21 MG/DL (ref 0.6–1.3)
ERYTHROCYTE [DISTWIDTH] IN BLOOD BY AUTOMATED COUNT: 14.7 % (ref 11.6–15.1)
GFR SERPL CREATININE-BSD FRML MDRD: 58 ML/MIN/1.73SQ M
GLUCOSE SERPL-MCNC: 129 MG/DL (ref 65–140)
HCT VFR BLD AUTO: 34.1 % (ref 36.5–49.3)
HGB BLD-MCNC: 11.1 G/DL (ref 12–17)
LIPASE SERPL-CCNC: 166 U/L (ref 11–82)
MAGNESIUM SERPL-MCNC: 1.7 MG/DL (ref 1.9–2.7)
MCH RBC QN AUTO: 32.6 PG (ref 26.8–34.3)
MCHC RBC AUTO-ENTMCNC: 32.6 G/DL (ref 31.4–37.4)
MCV RBC AUTO: 100 FL (ref 82–98)
PHOSPHATE SERPL-MCNC: 2.1 MG/DL (ref 2.3–4.1)
PLATELET # BLD AUTO: 155 THOUSANDS/UL (ref 149–390)
PMV BLD AUTO: 8.8 FL (ref 8.9–12.7)
POTASSIUM SERPL-SCNC: 4.5 MMOL/L (ref 3.5–5.3)
PROT SERPL-MCNC: 6 G/DL (ref 6.4–8.4)
RBC # BLD AUTO: 3.4 MILLION/UL (ref 3.88–5.62)
SODIUM SERPL-SCNC: 136 MMOL/L (ref 135–147)
WBC # BLD AUTO: 7.09 THOUSAND/UL (ref 4.31–10.16)

## 2024-10-24 PROCEDURE — 99238 HOSP IP/OBS DSCHRG MGMT 30/<: CPT | Performed by: FAMILY MEDICINE

## 2024-10-24 PROCEDURE — 99232 SBSQ HOSP IP/OBS MODERATE 35: CPT | Performed by: NURSE PRACTITIONER

## 2024-10-24 PROCEDURE — 85027 COMPLETE CBC AUTOMATED: CPT

## 2024-10-24 PROCEDURE — NC001 PR NO CHARGE: Performed by: FAMILY MEDICINE

## 2024-10-24 PROCEDURE — 83735 ASSAY OF MAGNESIUM: CPT

## 2024-10-24 PROCEDURE — 84100 ASSAY OF PHOSPHORUS: CPT

## 2024-10-24 PROCEDURE — 97162 PT EVAL MOD COMPLEX 30 MIN: CPT

## 2024-10-24 PROCEDURE — 80053 COMPREHEN METABOLIC PANEL: CPT

## 2024-10-24 PROCEDURE — 83690 ASSAY OF LIPASE: CPT

## 2024-10-24 RX ORDER — NALTREXONE HYDROCHLORIDE 50 MG/1
50 TABLET, FILM COATED ORAL DAILY
Status: DISCONTINUED | OUTPATIENT
Start: 2024-10-24 | End: 2024-10-24 | Stop reason: HOSPADM

## 2024-10-24 RX ORDER — CEPHALEXIN 500 MG/1
500 CAPSULE ORAL EVERY 6 HOURS SCHEDULED
Qty: 11 CAPSULE | Refills: 0 | Status: CANCELLED | OUTPATIENT
Start: 2024-10-24 | End: 2024-10-27

## 2024-10-24 RX ORDER — HYDROCHLOROTHIAZIDE 25 MG/1
25 TABLET ORAL DAILY
Status: DISCONTINUED | OUTPATIENT
Start: 2024-10-24 | End: 2024-10-24 | Stop reason: HOSPADM

## 2024-10-24 RX ORDER — HYDROXYZINE HYDROCHLORIDE 50 MG/1
50 TABLET, FILM COATED ORAL 2 TIMES DAILY
Qty: 30 TABLET | Refills: 0 | Status: SHIPPED | OUTPATIENT
Start: 2024-10-24

## 2024-10-24 RX ORDER — OXYBUTYNIN CHLORIDE 5 MG/1
5 TABLET ORAL 3 TIMES DAILY PRN
Qty: 10 TABLET | Refills: 0 | Status: CANCELLED | OUTPATIENT
Start: 2024-10-24

## 2024-10-24 RX ORDER — OXYBUTYNIN CHLORIDE 5 MG/1
5 TABLET ORAL 3 TIMES DAILY PRN
Qty: 10 TABLET | Refills: 0 | Status: SHIPPED | OUTPATIENT
Start: 2024-10-24

## 2024-10-24 RX ORDER — HYDROCODONE BITARTRATE AND ACETAMINOPHEN 5; 325 MG/1; MG/1
1 TABLET ORAL EVERY 6 HOURS PRN
Qty: 5 TABLET | Refills: 0 | Status: CANCELLED | OUTPATIENT
Start: 2024-10-24 | End: 2024-11-03

## 2024-10-24 RX ORDER — CEPHALEXIN 500 MG/1
500 CAPSULE ORAL EVERY 6 HOURS SCHEDULED
Status: DISCONTINUED | OUTPATIENT
Start: 2024-10-24 | End: 2024-10-24 | Stop reason: HOSPADM

## 2024-10-24 RX ORDER — CEPHALEXIN 500 MG/1
500 CAPSULE ORAL EVERY 6 HOURS SCHEDULED
Qty: 12 CAPSULE | Refills: 0 | Status: SHIPPED | OUTPATIENT
Start: 2024-10-24 | End: 2024-10-29 | Stop reason: ALTCHOICE

## 2024-10-24 RX ORDER — GABAPENTIN 400 MG/1
400 CAPSULE ORAL 3 TIMES DAILY
Status: DISCONTINUED | OUTPATIENT
Start: 2024-10-24 | End: 2024-10-24 | Stop reason: HOSPADM

## 2024-10-24 RX ORDER — HYDROXYZINE HYDROCHLORIDE 50 MG/1
50 TABLET, FILM COATED ORAL 2 TIMES DAILY
Status: DISCONTINUED | OUTPATIENT
Start: 2024-10-24 | End: 2024-10-24 | Stop reason: HOSPADM

## 2024-10-24 RX ORDER — CEPHALEXIN 500 MG/1
500 CAPSULE ORAL EVERY 6 HOURS SCHEDULED
Status: DISCONTINUED | OUTPATIENT
Start: 2024-10-24 | End: 2024-10-24

## 2024-10-24 RX ORDER — OXYBUTYNIN CHLORIDE 5 MG/1
5 TABLET ORAL 3 TIMES DAILY PRN
Status: DISCONTINUED | OUTPATIENT
Start: 2024-10-24 | End: 2024-10-24 | Stop reason: HOSPADM

## 2024-10-24 RX ORDER — CEPHALEXIN 500 MG/1
500 CAPSULE ORAL EVERY 8 HOURS SCHEDULED
Qty: 9 CAPSULE | Refills: 0 | Status: CANCELLED | OUTPATIENT
Start: 2024-10-24 | End: 2024-10-27

## 2024-10-24 RX ADMIN — FINASTERIDE 5 MG: 5 TABLET, FILM COATED ORAL at 17:14

## 2024-10-24 RX ADMIN — BUSPIRONE HYDROCHLORIDE 7.5 MG: 5 TABLET ORAL at 17:14

## 2024-10-24 RX ADMIN — CEPHALEXIN 500 MG: 500 CAPSULE ORAL at 17:14

## 2024-10-24 RX ADMIN — SERTRALINE HYDROCHLORIDE 100 MG: 100 TABLET ORAL at 17:14

## 2024-10-24 RX ADMIN — GABAPENTIN 400 MG: 400 CAPSULE ORAL at 09:31

## 2024-10-24 RX ADMIN — ACETAMINOPHEN 650 MG: 325 TABLET, FILM COATED ORAL at 17:15

## 2024-10-24 RX ADMIN — CEPHALEXIN 500 MG: 500 CAPSULE ORAL at 09:30

## 2024-10-24 RX ADMIN — BUSPIRONE HYDROCHLORIDE 7.5 MG: 5 TABLET ORAL at 09:31

## 2024-10-24 RX ADMIN — HYDROXYZINE HYDROCHLORIDE 50 MG: 50 TABLET, FILM COATED ORAL at 09:31

## 2024-10-24 RX ADMIN — FOLIC ACID 1 MG: 1 TABLET ORAL at 09:31

## 2024-10-24 RX ADMIN — GABAPENTIN 400 MG: 400 CAPSULE ORAL at 17:14

## 2024-10-24 RX ADMIN — HYDROCHLOROTHIAZIDE 25 MG: 25 TABLET ORAL at 09:31

## 2024-10-24 RX ADMIN — NALTREXONE HYDROCHLORIDE 50 MG: 50 TABLET, FILM COATED ORAL at 13:37

## 2024-10-24 RX ADMIN — HYDROXYZINE HYDROCHLORIDE 50 MG: 50 TABLET, FILM COATED ORAL at 17:14

## 2024-10-24 RX ADMIN — THIAMINE HCL TAB 100 MG 100 MG: 100 TAB at 09:31

## 2024-10-24 RX ADMIN — SODIUM CHLORIDE, SODIUM LACTATE, POTASSIUM CHLORIDE, AND CALCIUM CHLORIDE 100 ML/HR: .6; .31; .03; .02 INJECTION, SOLUTION INTRAVENOUS at 03:35

## 2024-10-24 RX ADMIN — ACETAMINOPHEN 650 MG: 325 TABLET, FILM COATED ORAL at 06:31

## 2024-10-24 NOTE — CERTIFIED RECOVERY SPECIALIST
Certified  Note    Patient name: Fredy Yu  Location: [facility]-01  [place]: [facility]  Attending:  Demetrice Lim MD MRN [id number]  : 1950  Age: 74 y.o.    Sex: male Date 10/24/2024         Substance Use History:     Social History     Substance and Sexual Activity   Alcohol Use Not Currently    Comment: pint of vodka everyday        Social History     Substance and Sexual Activity   Drug Use Never     CRS attempt to engage with patient - Patient currently sleeping soundly.     Per notes patient is being discharged today and started on Naltrexone.     Per RN okay to leave contact and resources for patient as he sis not sleep well last night.    Betsy Maria

## 2024-10-24 NOTE — ASSESSMENT & PLAN NOTE
S/p cystoscopy, ureteroscopy, laser lithotripsy, retrograde pyelogram insertion ureteral stent 10/23  Discharge home per primary team if he is afebrile with stable vital signs  Discharged home with 3 days of oral Keflex and 5 Norco tablets  Remove stent on string at home Saturday  Follow-up with urology outpatient

## 2024-10-24 NOTE — PHYSICAL THERAPY NOTE
Physical Therapy Evaluation     Patient's Name: Fredy Yu    Admitting Diagnosis  Nephrolithiasis [N20.0]    Problem List  Patient Active Problem List   Diagnosis    Essential hypertension    Alcohol use disorder, mild, in early remission, abuse    BPH (benign prostatic hyperplasia)    History of prolonged Q-T interval on ECG    Alcohol induced fatty liver    Acute pancreatitis    Pruritus    Depression with anxiety    Obesity, morbid (HCC)    Polymyalgia rheumatica (HCC)    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 degeneration, unspecified stage (HCC)    Seizure (HCC)    Psoriasis    Inflammatory arthritis    neuropathy    Neuropathy    Carpal tunnel syndrome of left wrist    Excessive daytime sleepiness    Abnormal brain MRI    Prediabetes    ROSARIO (obstructive sleep apnea)    Snoring    Sleep apnea    Alcohol abuse with withdrawal without complication (HCC)    Leukocytosis    Psoriatic arthritis (HCC)    Other osteoporosis with current pathological fracture, vertebra(e), initial encounter for fracture (HCC)    High risk medication use    Compression of lumbar vertebra (HCC)    Bilateral lower extremity edema    Left ureteral stone    NATASHA (acute kidney injury) (HCC)    Left ureteral calculus       Past Medical History  Past Medical History:   Diagnosis Date    Alcohol dependency (HCC)     Anxiety     Arthritis     Depression     [place] (hard of hearing)     Hypertension     Kidney stones     Peripheral neuropathy     Prostate enlargement     Renal disorder     kidney    Shoulder dislocation        Past Surgical History  Past Surgical History:   Procedure Laterality Date    CHOLECYSTECTOMY      2010    COLONOSCOPY      CYSTOSCOPY W/ STONE MANIPULATION N/A 10/23/2024    Procedure: BASKET STONE EXTRACTION;  Surgeon: Eduardo Rivera MD;  Location: [facility];  Service: Urology    FL RETROGRADE PYELOGRAM  10/23/2024    HI CYSTO/URETERO W/LITHOTRIPSY &INDWELL STENT INSRT Left 10/23/2024    Procedure: CYSTOSCOPY  URETEROSCOPY WITH LITHOTRIPSY HOLMIUM LASER, RETROGRADE PYELOGRAM AND INSERTION STENT URETERAL;  Surgeon: Eduardo Rivera MD;  Location: [facility];  Service: Urology    SHOULDER SURGERY Left     for dislocation x 2, 20 years ago an the second 18 years ago          10/24/24 1056   PT Last Visit   PT Visit Date 10/24/24   Note Type   Note type Evaluation   Pain Assessment   Pain Assessment Tool 0-10   Pain Score 4   Pain Location/Orientation Location: Abdomen   Pain Onset/Description Frequency: Intermittent   Effect of Pain on Daily Activities Increased time for activity   Patient's Stated Pain Goal No pain   Hospital Pain Intervention(s) Ambulation/increased activity;Repositioned   Restrictions/Precautions   Weight Bearing Precautions Per Order No   Other Precautions Chair Alarm;Bed Alarm;Multiple lines;Pain   Home Living   Type of Home House   Home Layout Two level;Able to live on main level with bedroom/bathroom;Performs ADLs on one level;Stairs to enter with rails  (3-4 ESCOBAR)   Bathroom Shower/Tub Tub/shower unit   Bathroom Toilet Standard   Home Equipment Walker;Cane  (Uses Walker at baseline)   Additional Comments 2STH with FFSU , has 3-4 ESCOBAR with [employer]   Prior Function   Level of [place] Independent with ADLs;Independent with functional mobility   Lives With Spouse   Receives Help From Family   IADLs Family/Friend/Other provides meals   Falls in the last 6 months 0   Vocational Retired   General   Family/Caregiver Present No   Cognition   Overall Cognitive Status WFL   Arousal/Participation Alert   Orientation Level Oriented X4   Following Commands Follows one step commands without difficulty   Comments Pt cooperative during session   Subjective   Subjective Agreeable to mobilize   RLE Assessment   RLE Assessment WFL   LLE Assessment   LLE Assessment WFL   Bed Mobility   Supine to Sit Unable to assess   Sit to Supine Unable to assess   Additional Comments Pt OOB in chair upon arrival.   Transfers  "  Sit to Stand 5  Supervision   Additional items Increased time required   Stand to Sit 5  Supervision   Additional Comments w/RW   Ambulation/Elevation   Gait pattern Wide MARGY   Gait Assistance 5  Supervision   Additional items Verbal cues   Assistive Device Rolling walker   Distance 200 ft   Stair Management Assistance 5  Supervision   Additional items Verbal cues   Stair Management Technique Nonreciprocal;Two rails   Number of Stairs 4   Ambulation/Elevation Additional Comments Pt able to ambulate in hallway with RW, wide MARGY with slow gait speed , but overall steady , no LOB. Pt anxious regarding \"feeling of burning with peeing\", RN notified   Balance   Static Sitting Good   Dynamic Sitting Fair +   Static Standing Fair   Dynamic Standing Fair   Ambulatory Fair   Activity Tolerance   Activity Tolerance Patient tolerated treatment well   Nurse Made Aware RN cleared   Assessment   Prognosis Good   Problem List Pain   Assessment Pt is a 74 y.o. male seen for PT evaluation s/p admit to [facility] on 10/22/2024. Pt was admitted with a primary dx of: Left ureteral stone, Acute Kidney injury S/p cystoscopy, ureteroscopy, laser lithotripsy, retrograde pyelogram insertion ureteral stent 10/23.Pt has a past medical history of Alcohol dependency (HCC), Anxiety, Arthritis, Depression, [place] (hard of hearing), Hypertension, Kidney stones, Peripheral neuropathy, Prostate enlargement, Renal disorder, and Shoulder dislocation.   PT now consulted for assessment of mobility and d/c needs. Pt with Up and OOB as tolerated  orders. Pts current clinical presentation is Evolving (medium complexity) due to Ongoing medical management for primary dx, s/p surgical intervention. Prior to admission, pt was Ind for mobility and ADLs, does have FFSU. Pt lives with supportive spouse . Upon evaluation, pt currently is requiring supervision for transfers and ambulation .Used RW during session , with overall steady gait and no LOB. Pt " anxious durign session , reassurance provided. Pt presents with good mobility level at time of PT eval, appears close to functional baseline.  At conclusion of PT session pt returned back in chair, all needs in reach, RN notified of session findings/recommendations, and pt returned back in recliner chair with phone and call bell within reach. Pt denies any further questions at this time. The patient's AM-PAC Basic Mobility Inpatient Short Form Raw Score is 18. A Raw score of greater than 16 suggests the patient may benefit from discharge to home. Please also refer to the recommendation of the Physical Therapist for safe discharge planning. Recommend Level III upon hospital D/C with continued family support.Encourage continued mobility with restorative team and staff while in hospital. No further skilled PT services indicated.   Goals   Patient Goals to feel better   Plan   Treatment/Interventions Spoke to nursing;Spoke to case management   PT Frequency Other (Comment)  (PT Eval Only)   Discharge Recommendation   Rehab Resource Intensity Level, PT III (Minimum Resource Intensity)   Equipment Recommended Walker  (Pt owns)   AM-PAC Basic Mobility Inpatient   Turning in Flat Bed Without Bedrails 3   Lying on Back to Sitting on Edge of Flat Bed Without Bedrails 3   Moving Bed to Chair 3   Standing Up From Chair Using Arms 3   Walk in Room 3   Climb 3-5 Stairs With Railing 3   Basic Mobility Inpatient Raw Score 18   Basic Mobility Standardized Score 41.05   [facility] Highest Level Of Mobility   -HLM Goal 6: Walk 10 steps or more   JH-HLM Achieved 7: Walk 25 feet or more   Modified Colin Scale   Modified Colin Scale 2   End of Consult   Patient Position at End of Consult All needs within reach;Bed/Chair alarm activated;Bedside chair         Linad Schroeder, PT DPT

## 2024-10-24 NOTE — RESTORATIVE TECHNICIAN NOTE
Restorative Technician Note      Patient Name: Fredy Yu     Restorative Tech Visit Date: 10/24/24  Note Type: Mobility  Patient Position Upon Consult: Supine  Activity Performed: Ambulated  Assistive Device: Standard walker  Education Provided: Yes  Patient Position at End of Consult: Bedside chair; All needs within reach; Bed/Chair alarm activated    Teri Graham  DPT, Restorative Technician

## 2024-10-24 NOTE — DISCHARGE SUMMARY
"Discharge Summary - Fredy Yu 74 y.o. male MRN: [id number]    Unit/Bed#: [facility] 818-01 Encounter: [id number]    Admission Date: 10/22/2024   Discharge Date: 10/24/2024    Admitting Diagnosis:   Problem List Items Addressed This Visit       Alcohol use disorder, mild, in early remission, abuse     -Recent admission 9/15-9/17 for alcohol withdrawal. Received Valium 10mg x2, Librium 25mg x2. Started on Naltrexone 50mg daily.  Compliant with weekly AA meetings.  -30+ year h/o alcohol use disorder  -Home meds: Folate 1mg, Thiamine 100mg  -last drink was 10/21 7AM  -In ED, received phenobarbital injection 130 mg, IV phenobarbital 260mg, IV thiamine, D5LR @125    Assessment: Alcohol Use Disorder. Current user, will need to monitor for s/s withdrawal.    Plan:  - Inpatient  referral   -ALISIA protocol  -ativan prn  -Continue thiamine, folic acid supplements  - on alcohol cessation         Relevant Orders    Inpatient consult to Certified  (Completed)    Inflammatory arthritis    * (Principal) Left ureteral stone - Primary     73 yo M with PMH of HTN, BPH who initally presented to [facility] with abdominal pain found to have \"5 mm stone in the distal left ureter with new left hydronephrosis\"  IVF   Zofran as needed  Pain control  Transferred to [facility] [place] for intervention tomorrow  Npo after midnight  Urology consult  F/u urology recs            Relevant Medications    hydroCHLOROthiazide tablet 25 mg    Other Relevant Orders    Inpatient consult to Urology (Completed)     Other Visit Diagnoses       Nephrolithiasis        Relevant Medications    hydroCHLOROthiazide tablet 25 mg    Other Relevant Orders    Urine culture (Completed)    Stone analysis            HPI: From H&P dated: 10/22/2024 \"74 y.o. male who is being admitted for L ureteral stone extraction and NATASHA. Past medical history significant for HTN, HLD, alcohol use disorder (last drink 10/21 7AM per pt), psoriatic " arthritis, insomnia, ROSARIO, BPH, depression w/anxiety. He initially presented to [facility] ED with complaints of lower chest and RUQ pain.  Denied any N/V, hematemesis, fevers. Endorses that he has intermittent abdominal pains that usually self-resolve or after he has a drink of alcohol. Otherwise states that he did have 1x episode of diarrhea while in ED yesterday.      CT chest abdomen pelvis with contrast showed a left 5 mm distal ureteral stone with new left hydronephrosis, as well as mild pancreatic inflammatory changes.  UA showed 1+ protein, 1+ ketones and trace occult blood; reflex to microscopy showed no bacteria. CBC showed normal WBC. CMP was also significant for NATASHA (baseline creatinine around 0.85-1.1; creatinine rising to 1.57).  Labs were also significant for rising lipase from 410 to 920 to 632. Otherwise EKG and troponins were unremarkable. PTT, PT/INR were wnl.     In the ED, pt was provided fluids. Was also provided phenobarbital to treat alcohol withdrawal. Repeat CMP did show Na 132, K 3.3. K repleted. Remained afebrile VSS during ED stay on 10/21. His epigastric/RUQ abdominal pain currently resolved and now concentrated in lower abdomen.      Urology was consulted while in the ED and recommended transfer to [facility] for ureteroscopy w/lithotripsy and retrograde pyelogram.    Hospital Course:      Patient was placed NPO overnight and underwent reteroscopy w/lithotripsy and retrograde pyelogram the next day without complications. A stent was placed scheduled for removal by patient at home on 10/26/24 with 3 days of Keflex 500 mg every 6 hours and PRN oxybutanin for pain control for stent. Patient needs to follow up with urology outpatient.   Patient was treated with IVF for NATASHA and mild pancreatitis which resolved on day admission.   PT evaluated patient and minimum resource.  Encouraged for adequate oral hydration post discharge and follow up with PCP.   Patient was placed on CIWA protocol for alcohol  use and required 2 doses of ativan. Inpatient  was consulted. Patient will need to resume librium outpatient and follow up with PCP with possible outpatient addition medicine referral.   Patient was stable on day of discharge with all questions answered.       Exam on Day of Discharge:   Vitals:    10/24/24 0253 10/24/24 0647 10/24/24 0932 10/24/24 1130   BP: 144/69 153/75 124/59 138/71   Pulse: 60 65 71 60   Resp: 21 22  16   Temp:    98.8 °F (37.1 °C)   TempSrc:       SpO2: 96% 92% 93% 95%   Weight:       Height:         Physical Exam  Vitals reviewed.   Constitutional:       General: He is awake. He is not in acute distress.     Appearance: Normal appearance. He is obese. He is not ill-appearing.   HENT:      Head: Normocephalic and atraumatic.      Right Ear: External ear normal.      Left Ear: External ear normal.      Nose: Nose normal.      Mouth/Throat:      Mouth: Mucous membranes are moist.      Pharynx: Oropharynx is clear.   Eyes:      Extraocular Movements: Extraocular movements intact.   Cardiovascular:      Rate and Rhythm: Normal rate and regular rhythm.      Pulses: Normal pulses.      Heart sounds: Normal heart sounds. No murmur heard.  Pulmonary:      Effort: Pulmonary effort is normal. No respiratory distress.      Breath sounds: Normal breath sounds. No wheezing or rales.   Abdominal:      General: Bowel sounds are normal. There is no distension.      Palpations: Abdomen is soft.      Tenderness: There is abdominal tenderness in the suprapubic area and left lower quadrant. There is no right CVA tenderness or left CVA tenderness.   Musculoskeletal:         General: No swelling, deformity or signs of injury. Normal range of motion.      Cervical back: Normal range of motion. No rigidity.      Right lower leg: No edema.      Left lower leg: No edema.   Skin:     General: Skin is warm and dry.      Capillary Refill: Capillary refill takes less than 2 seconds.      Findings: No  "rash.   Neurological:      General: No focal deficit present.      Mental Status: He is alert and oriented to person, place, and time. Mental status is at baseline.   Psychiatric:         Mood and Affect: Mood normal.         Behavior: Behavior normal. Behavior is cooperative.           Significant Findings/Test Results:  NATASHA  Pancreatitis    Procedures Performed:   ureteroscopy w/lithotripsy and retrograde pyelogram    Consultation(s) During Hospital Stay:  Urology      Incidental Findings:   Elevated lipase    Discharge Medications:  See after visit summary for complete discharge medication reconciliation.  Significant medication changes during admission:  - Keflex 500 mg every 6 hours for 3 days   - Oxybutanin as needed for pain from stent  - Remove stent on 10/26/24    Required Outpatient Follow-up:   PCP: Tejal Garcia MD. Instructed patient to schedule PCP visit within 1 week of discharge from hospital/facility.  Urology  Please see \"Follow-up Providers\" section of AVS for detailed instructions and other providers not listed above     Future Appointments   Date Time Provider Department Center   1/6/2025  9:30 AM MALGORZATA Corona NEURO CTR [facility]   3/13/2025 10:00 AM Ta Ward DO [facility]   4/23/2025  2:30 PM Girish Anna MD [facility]        Test Results Pending at Discharge:  none    Complications: None  Condition at Discharge: Good   Disposition: Home     On day of discharge patient is seen and evaluated at bedside. Patient is stable and without concern. Patient denies any pain or SOB. Patient able to tolerate diet without n/v/d. Patient able to ambulate at baseline or to maximum level while an inpatient. Patient is aware of current health status and understand plan of treatment and outpatient follow-up. The patient understood and agreed with the plan. All pertinent lab results, imaging studies, procedures, and/or any incidental " findings have been disclosed to the patient. All pertinent questions are answered to patient's satisfaction. On day of discharge, the patient was hemodynamically stable and appropriate for discharge.    Discharge instructions/Information to patient and family:   See after visit summary for detailed information provided to patient and family    Provisions for Follow-Up Care: See after visit summary for information related to follow-up care and any pertinent home health orders.      Planned Readmission: None    Discharge Statement: I spent 30 minutes discharging the patient. This time was spent on the day of discharge. I had direct contact with the patient on the day of discharge. Additional documentation is required if more than 30 minutes were spent on discharge.     The senior resident, Dr. Flowers, and the attending physician, Dr. Lim, agreed with the assessment and plan.    Mar Joseph MD  Family Medicine, [facility]   10/24/2024

## 2024-10-24 NOTE — RESTORATIVE TECHNICIAN NOTE
Restorative Technician Note      Patient Name: Fredy Yu     Restorative Tech Visit Date: 10/24/24  Note Type: Mobility  Patient Position Upon Consult: Bedside chair  Activity Performed: Ambulated  Assistive Device: Standard walker; Other (Comment) (trialled no AD per pt request)  Education Provided: Yes  Patient Position at End of Consult: Bedside chair; All needs within reach; Bed/Chair alarm activated    Teri Graham  DPT, Restorative Technician

## 2024-10-24 NOTE — ASSESSMENT & PLAN NOTE
Creat 1.21, trending down (baseline 0.7-0.86)  Avoid nephrotoxins  Medical optimization per primary team

## 2024-10-24 NOTE — PROGRESS NOTES
Progress Notes - Family Medicine Residency, [place]  Fredy HEDRICK Aimee 1950, 74 y.o. male.  MRN: [id number]  Unit/Bed#: [facility] 818-01 Encounter: [id number]  Primary Care Provider: Tejal Garcia MD      Admission Date: 10/22/2024 1657  Length of Stay: 2 days  Code Status:  Level 1 - Full Code  Disposition:   Consult:   IP CONSULT TO UROLOGY  CONSULT TO CERTIFIED          Assessment/Plan:      Plans discussed with [facility] team and finalization is pending attending physician attestation. Please, call [phone] for any clarification.     * Left ureteral stone  Assessment & Plan  -Presented to ED on 10/21 for chest pain and RUQ abdominal pain. CT scan showed 5 mm stone in the distal left ureter with new left hydronephrosis. Urology recommended transfer to [facility] for procedural intervention.  -CBC unremarkable. CMP showed acute NATASHA of Cr 1.33 (baseline 0.85-1.1) that increased to 1.57.  -UA showed +1 protein/ketone, trace blood. Reflex to microscopy showed no bacteria.     Assessment: L ureteral stone w/new L hydronephrosis without s/s infection  S/p surgery on 10/23 CYSTOSCOPY URETEROSCOPY WITH LITHOTRIPSY HOLMIUM LASER, RETROGRADE PYELOGRAM AND INSERTION STENT URETERAL     Plan:  - Keflex x 3 days (started 10/24) per urology  - Remove string on 10/26/24  - F/U urine culture  - F/U stone analysis   - Monitor kidney function  - Monitor I/O  - pain regimen: prn tylenol, pain otherwise currently well controlled    NATASHA (acute kidney injury) ([facility])  Assessment & Plan  -baseline Cr 0.85-1.1  -on presentation to ED: Cr 1.33 -> 1.57 -> (post fluids) 1.50    Assessment: Most likely post-renal NATASHA from L ureteral stone.    Renal      Results from last 7 days   Lab Units 10/24/24  0643 10/23/24  0539 10/22/24  0413 10/21/24  2035 10/21/24  1254   BUN mg/dL 12 16 21 22 24   CREATININE mg/dL 1.21 1.34* 1.50* 1.57* 1.33*   EGFR ml/min/1.73sq m 58 51 45 42 52     Stone removal on 10/23/24  Improving/resolved    Plan:    -trend Cr  - avoid nephrotoxic agents    Acute pancreatitis  Assessment & Plan  -CT scan: Stable calcifications in the head/uncinate process of the pancreas. Mild pancreatic stranding similar to prior studies. No pancreatic necrosis or peripancreatic collection.   Lipase elevated max 920, decreased to 632  Most likely secondary to alcohol use leading to acute pancreatitis  Lipase        Results from last 7 days   Lab Units 10/24/24  0643 10/23/24  0539 10/22/24  0413 10/21/24  2035 10/21/24  1254   LIPASE u/L 166* 269* 632* 920* 410*       Improving/resolving    Plan:   -trend lipase  -advance diet as tolerated    Psoriatic arthritis (HCC)  Assessment & Plan  -Home med: Prednisone 4 mg daily, Methotrexate 20mg once a week (administer on Mondays; per pt last dose was administered 10/14), Humira injections every 2 weeks (administer on Mondays, per pt last dose was 10/7)    Plan:   -hold MTX, resume outpatient  -hold Humira, resume outpatient  -continue with prednisone    ROSARIO (obstructive sleep apnea)  Assessment & Plan  Uses nightly CPAP    Plan:   -provide CPAP at night     Neuropathy  Assessment & Plan  -Home meds: Gabapentin 400mg TID    Plan:   -Resume home med    Depression with anxiety  Assessment & Plan  -Home med: buspar 7.5mg BID, Zoloft 100mg daily  -Zoloft increased from 50mg to 100mg on 10/1    Plan:   -continue home meds   - atarax BID    BPH (benign prostatic hyperplasia)  Assessment & Plan  -Home med: Finasteride 5mg    Intake/Output Summary (Last 24 hours) at 10/24/2024 0848  Last data filed at 10/24/2024 0545  Gross per 24 hour   Intake 500 ml   Output 875 ml   Net -375 ml      Plan:  - Urology on consult   -Continue home med  -Monitor urine output, I/O   -urinary retention protocol prn    Alcohol use disorder, mild, in early remission, abuse  Assessment & Plan  -Recent admission 9/15-9/17 for alcohol withdrawal. Received Valium 10mg x2, Librium 25mg x2. Started on Naltrexone 50mg daily.  Compliant  with weekly AA meetings.  -30+ year h/o alcohol use disorder  -Home meds: Folate 1mg, Thiamine 100mg  -last drink was 10/21 7AM  -In ED, received phenobarbital injection 130 mg, IV phenobarbital 260mg, IV thiamine, D5LR @125    Assessment: Alcohol Use Disorder. Current user, will need to monitor for s/s withdrawal.    Plan:  - Inpatient  referral   -[facility] protocol  -ativan prn  -Continue thiamine, folic acid supplements  - on alcohol cessation    Essential hypertension  Assessment & Plan  -Per PCP visit on 10/1, BP was low in 90s/40s so Nadolol d/carina and HCTZ held and only administered prn  Systolic (24hrs), Av , Min:135 , Max:168   Diastolic (24hrs), Av, Min:63, Max:76    Controlled    Plan  Resume home medications    Electrolyte disturbance-resolved as of 10/24/2024  Assessment & Plan  -CMP on presentation Na and K wnl.   -Noted to be hyponatremic 132 and hypokalemic 3.3 after fluid administration    Assessment: Likely dilutional given remainder of electrolytes also downtrended. Also had 1x episode of diarrhea while in ED. Chronic alcohol abuse.     Improving    Plan:   -trend BMP  -replete as necessary          Diet: Diet Jesus/CHO Controlled; Consistent Carbohydrate Diet Level 2 (5 carb servings/75 grams CHO/meal); Lo Fat    VTE Pharm PPX: Enoxaparin (Lovenox)  VTE [facility] PPX: sequential compression device      Subjective:   74 y.o. male admitted 10/22/2024 for s/p left lithtripsy, alcohol abuse, mild pancreatitis    Today 10/24/24, HD# 2    Per handoff, patient without acute events overnight.   Per nursing staff, no concern or report.   Patient seen and examined at bedside and without questions or concerns. Patient denies CP, SOB, N/V, headaches, dizziness, lightheadedness, changes in bowel movements, urinary symptoms at this time.    Medical management and treatment was discussed with patient, patient understands and agrees with plan.     Objective:     Vitals:    10/23/24 1508  10/23/24 1906 10/24/24 0253 10/24/24 0647   BP: 142/63 135/63 144/69 153/75   BP Location:       Pulse: 68 69 60 65   Resp: 16 16 21 22   Temp: 98.2 °F (36.8 °C) 99.1 °F (37.3 °C)     TempSrc: Oral      SpO2: 91% 92% 96% 92%   Weight:       Height:         Temp:  [97.6 °F (36.4 °C)-99.1 °F (37.3 °C)] 99.1 °F (37.3 °C)  HR:  [60-77] 65  BP: (135-168)/(63-76) 153/75  Resp:  [16-22] 22  SpO2:  [90 %-99 %] 92 %  O2 Device: Nasal cannula  Nasal Cannula O2 Flow Rate (L/min):  [2 L/min-4 L/min] 2 L/min  Weight (last 2 days)       Date/Time Weight    10/22/24 1708 107 (235)            Intake/Output Summary (Last 24 hours) at 10/24/2024 0918  Last data filed at 10/24/2024 0545  Gross per 24 hour   Intake 500 ml   Output 875 ml   Net -375 ml     Invasive Devices       Peripheral Intravenous Line  Duration             Peripheral IV 10/21/24 Right Antecubital 2 days    Peripheral IV 10/22/24 Distal;Right;Ventral (anterior) Forearm 1 day              Drain  Duration             Ureteral Internal Stent Left ureter 6 Fr. <1 day                      Physical Exam:     Physical Exam  Vitals and nursing note reviewed.   Constitutional:       General: He is not in acute distress.     Appearance: Normal appearance. He is well-developed. He is obese. He is not ill-appearing, toxic-appearing or diaphoretic.   HENT:      Head: Normocephalic and atraumatic.      Right Ear: External ear normal.      Left Ear: External ear normal.      Ears:      Comments: Hard of hearing     Nose: Nose normal.   Eyes:      Conjunctiva/sclera: Conjunctivae normal.   Cardiovascular:      Rate and Rhythm: Normal rate and regular rhythm.      Pulses: Normal pulses.      Heart sounds: Normal heart sounds. No murmur heard.  Pulmonary:      Effort: Pulmonary effort is normal. No respiratory distress.      Breath sounds: Normal breath sounds.   Abdominal:      General: Abdomen is flat. Bowel sounds are normal.      Palpations: Abdomen is soft.      Tenderness:  There is abdominal tenderness in the left lower quadrant. There is no right CVA tenderness or left CVA tenderness.      Comments: Mild tenderness on LLQ, improving   Musculoskeletal:         General: Normal range of motion.      Cervical back: Normal range of motion and neck supple.      Right lower leg: No edema.      Left lower leg: No edema.   Skin:     General: Skin is warm and dry.      Capillary Refill: Capillary refill takes less than 2 seconds.      Coloration: Skin is not jaundiced.   Neurological:      Mental Status: He is alert and oriented to person, place, and time. Mental status is at baseline.   Psychiatric:         Mood and Affect: Mood normal.         Behavior: Behavior normal.           Labs:     CBC:  Results from last 7 days   Lab Units 10/24/24  0643 10/23/24  0539 10/21/24  1254   WBC Thousand/uL 7.09 5.03 8.80   HEMOGLOBIN g/dL 11.1* 10.3* 12.8   HEMATOCRIT % 34.1* 31.2* 38.1   PLATELETS Thousands/uL 155 138* 169   TOTAL NEUT ABS Thousands/µL  --   --  6.33       CMP:  Results from last 7 days   Lab Units 10/24/24  0643 10/23/24  0539 10/22/24  0413 10/21/24  2035 10/21/24  1254   POTASSIUM mmol/L 4.5 3.9 3.3* 3.6 3.8   CHLORIDE mmol/L 100 98 91* 92* 94*   CO2 mmol/L 29 30 32 28 30   BUN mg/dL 12 16 21 22 24   CREATININE mg/dL 1.21 1.34* 1.50* 1.57* 1.33*   CALCIUM mg/dL 8.5 8.5 8.9 9.4 9.6   AST U/L 32 51* 29 38 51*   ALT U/L 28 45 35 41 45   ALK PHOS U/L 57 65 61 73 76   EGFR ml/min/1.73sq m 58 51 45 42 52   MAGNESIUM mg/dL 1.7* 1.9  --  1.9 1.6*   PHOSPHORUS mg/dL 2.1* 2.4  --  3.5 3.6       Sepsis:        Micro:         Imaging:     FL retrograde pyelogram    Result Date: 10/23/2024  Impression: Fluoroscopic guidance provided for left retrograde pyelogram. Please see separate procedure report for additional details. Workstation performed: [id number]         Medications:     Current Facility-Administered Medications:     acetaminophen (TYLENOL) tablet 650 mg, Q6H PRN    busPIRone (BUSPAR)  tablet 7.5 mg, BID    cephalexin (KEFLEX) capsule 500 mg, Q6H BRENDA    finasteride (PROSCAR) tablet 5 mg, Daily    folic acid (FOLVITE) tablet 1 mg, Daily    gabapentin (NEURONTIN) capsule 400 mg, TID    hydroCHLOROthiazide tablet 25 mg, Daily    hydrOXYzine HCL (ATARAX) tablet 50 mg, BID    LORazepam (ATIVAN) injection 0.5 mg, Q15 Min PRN    naltrexone (REVIA) tablet 50 mg, Daily    ondansetron (ZOFRAN) injection 4 mg, Q6H PRN    predniSONE tablet 4 mg, Daily    sertraline (ZOLOFT) tablet 100 mg, Daily    thiamine tablet 100 mg, Daily    traZODone (DESYREL) tablet 150 mg,       Vikram Flowers MD  Family Medicine, PGY-1  9:18 AM 10/24/2024

## 2024-10-24 NOTE — DISCHARGE INSTR - AVS FIRST PAGE
You underwent cystoscopy (looking inside the bladder) and  ureteroscopy (looking inside the  ureter) with laser lithotripsy to break apart a kidney stone and placement of ureteral stent.    WHAT IS A STENT?  At the end of the procedure, your doctor may place a stent into your ureter. A stent is a thin, flexible piece of plastic that will hold open your ureter while the remaining small pieces of stone pass. This allows your kidney to drain easily and prevents you from having to ``pass´´ these small stone pieces on your own, which could be painful. The stent is about 12 inches long and looks and feels like a thin piece of spaghetti.    AFTER THE PROCEDURE  After the procedure you may experience the following symptoms. All of these are normal and should resolve within 1 or 2 days after your stent is removed.  1) Urinary frequency (urinating more often than usual)  2) Urinary urgency (the sensation that you need to urinate right away)  3) Painful urination (this can be pain in your bladder or in your back when  you urinate)  4) Blood in your urine ( a stent can irritate the lining of your bladder causing it to bleed)  5) Back/Flank pain, especially with urination    ° Your stent may cause you a lot of discomfort or blood in the urine this is normal  ° Please take oxybutynin 3 times a day as needed for discomfort  ° Please take Uroxatrol daily to help relax the ureter  ° You can take additional ibuprofen if needed for any discomfort  ° Make sure you are drinking at least 2 L of water a day unless you have a fluid restriction    General Instructions   1. Stent: You have a stent in your  ureter.  This is tied to a string which hangs out of your urethra. Please call to have this removed in the office in 4-5 days or you can remove it at home.  It is normal for the string to intermittently retract into your urethra. When this happens, you won't be able to see it temporarily. It should come back out the next time you void.  This is not dangerous.  2. Bathing: You may shower and bathe as per usual.   3. Diet: You may resume your pre-operative diet   4. Activity: Walk as much as possible. No strenuous exercise or work for the first 5 days. From then on, slowly increase your level of activity back to normal.   5. If you currently smoke or use tobacco product, consider quitting. There are resources available to help you stop. Please ask your provider.   6. Report fever 101.5 or higher, pain not controlled by medications, or anything you believe warrants medical attention    STENT REMOVAL  In some cases, your doctor will leave strings attached to your stent. The strings will be taped to your skin after the procedure. The strings will allow you to remove the thin flexible stent while you are at home. Normally, the stent can be removed 3-5 days after your procedure; your physician will tell you the specific date after your procedure.     On the day you are supposed to remove your stent, do the followin) When you wake up in the morning, take 1-2 pain pills with food.  Start your antibiotic pill the morning of schedule stent removal if prescribed  2) One hour later sit on the toilet or in the bath tub.  3) Take a deep breath in and while exhaling, pull the string.  4)  Dispose of the stent in the garbage.     Alternatively, you will come back for an office procedure to remove the stent by placing a small camera into your bladder to remove the stent.    During the next 48 hours after removing your stent, you may experience additional blood in your urine, pain with urination or back/side pain. You should take the pain medication you were prescribed to help you with the pain, as well as continue the Flomax. If the pain is severe, you are vomiting, and/or have a fever > 101.4 please call the clinic.

## 2024-10-24 NOTE — PLAN OF CARE
Problem: PAIN - ADULT  Goal: Verbalizes/displays adequate comfort level or baseline comfort level  Description: Interventions:  - Encourage patient to monitor pain and request assistance  - Assess pain using appropriate pain scale  - Administer analgesics based on type and severity of pain and evaluate response  - Implement non-pharmacological measures as appropriate and evaluate response  - Consider cultural and social influences on pain and pain management  - Notify physician/advanced practitioner if interventions unsuccessful or patient reports new pain  Outcome: Progressing     Problem: SAFETY ADULT  Goal: Patient will remain free of falls  Description: INTERVENTIONS:  - Educate patient/family on patient safety including physical limitations  - Instruct patient to call for assistance with activity   - Consult OT/PT to assist with strengthening/mobility   - Keep Call bell within reach  - Keep bed low and locked with side rails adjusted as appropriate  - Keep care items and personal belongings within reach  - Initiate and maintain comfort rounds  - Make Fall Risk Sign visible to staff  - Offer Toileting every 2 Hours, in advance of need  - Initiate/Maintain bed alarm  - Obtain necessary fall risk management equipment: assistive devices  - Apply yellow socks and bracelet for high fall risk patients  - Consider moving patient to room near nurses station  Outcome: Progressing     Problem: INFECTION - ADULT  Goal: Absence or prevention of progression during hospitalization  Description: INTERVENTIONS:  - Assess and monitor for signs and symptoms of infection  - Monitor lab/diagnostic results  - Monitor all insertion sites, i.e. indwelling lines, tubes, and drains  - Monitor endotracheal if appropriate and nasal secretions for changes in amount and color  - [place] appropriate cooling/warming therapies per order  - Administer medications as ordered  - Instruct and encourage patient and family to use good hand  hygiene technique  - Identify and instruct in appropriate isolation precautions for identified infection/condition  Outcome: Progressing     Problem: HEMATOLOGIC - ADULT  Goal: Maintains hematologic stability  Description: INTERVENTIONS  - Assess for signs and symptoms of bleeding or hemorrhage  - Monitor labs  - Administer supportive blood products/factors as ordered and appropriate  Outcome: Progressing     Problem: MUSCULOSKELETAL - ADULT  Goal: Maintain or return mobility to safest level of function  Description: INTERVENTIONS:  - Assess patient's ability to carry out ADLs; assess patient's baseline for ADL function and identify physical deficits which impact ability to perform ADLs (bathing, care of mouth/teeth, toileting, grooming, dressing, etc.)  - Assess/evaluate cause of self-care deficits   - Assess range of motion  - Assess patient's mobility  - Assess patient's need for assistive devices and provide as appropriate  - Encourage maximum independence but intervene and supervise when necessary  - Involve family in performance of ADLs  - Assess for home care needs following discharge   - Consider OT consult to assist with ADL evaluation and planning for discharge  - Provide patient education as appropriate  Outcome: Progressing     Problem: GENITOURINARY - ADULT  Goal: Maintains or returns to baseline urinary function  Description: INTERVENTIONS:  - Assess urinary function  - Encourage oral fluids to ensure adequate hydration if ordered  - Administer IV fluids as ordered to ensure adequate hydration  - Administer ordered medications as needed  - Offer frequent toileting  - Follow urinary retention protocol if ordered  Outcome: Progressing     Problem: Knowledge Deficit  Goal: Patient/family/caregiver demonstrates understanding of disease process, treatment plan, medications, and discharge instructions  Description: Complete learning assessment and assess knowledge base.  Interventions:  - Provide teaching at  level of understanding  - Provide teaching via preferred learning methods  Outcome: Progressing     Problem: DISCHARGE PLANNING  Goal: Discharge to home or other facility with appropriate resources  Description: INTERVENTIONS:  - Identify barriers to discharge w/patient and caregiver  - Arrange for needed discharge resources and transportation as appropriate  - Identify discharge learning needs (meds, wound care, etc.)  - Arrange for interpretive services to assist at discharge as needed  - Refer to Case Management Department for coordinating discharge planning if the patient needs post-hospital services based on physician/advanced practitioner order or complex needs related to functional status, cognitive ability, or social support system  Outcome: Progressing

## 2024-10-24 NOTE — TELEPHONE ENCOUNTER
10/24 Spoke to patient,he is currently in the hospital with a kidney stone. He asked for us to call him back in a week to schedule the 8 week appt with LATRICE

## 2024-10-24 NOTE — QUICK NOTE
"Patient found to be sleeping comfortably during evening rounds.      Currently vitals are otherwise stable.  /63   Pulse 69   Temp 99.1 °F (37.3 °C)   Resp 16   Ht 5' 5\" (1.651 m)   Wt 107 kg (235 lb)   SpO2 92%   BMI 39.11 kg/m²     Patient is a 74-year-old male who was transferred over to [facility] for urologic intervention of his left distal ureteral stone.  Patient is today 10/23 now status post cystoscopy, left ureteroscopy, lithotripsy, retrograde pyelogram as well as stone retrieval with stent placement.  From urologic standpoint patient is okay for discharge.  He continues to have an NATASHA that is improving.  Will continue patient on fluids.  His lipase is also downtrending.  Will continue patient on his current diet.  Patient is on the CIWA protocol with as needed Ativan ordered.  Spoke to nursing that patient does have a baseline anxiety as well as the overall plan for the patient.  Will continue to monitor patient overnight.    Kayli Barboza, DO  PGY-2  [facility]     "

## 2024-10-24 NOTE — PLAN OF CARE
Problem: PAIN - ADULT  Goal: Verbalizes/displays adequate comfort level or baseline comfort level  Description: Interventions:  - Encourage patient to monitor pain and request assistance  - Assess pain using appropriate pain scale  - Administer analgesics based on type and severity of pain and evaluate response  - Implement non-pharmacological measures as appropriate and evaluate response  - Consider cultural and social influences on pain and pain management  - Notify physician/advanced practitioner if interventions unsuccessful or patient reports new pain  Outcome: Progressing     Problem: INFECTION - ADULT  Goal: Absence or prevention of progression during hospitalization  Description: INTERVENTIONS:  - Assess and monitor for signs and symptoms of infection  - Monitor lab/diagnostic results  - Monitor all insertion sites, i.e. indwelling lines, tubes, and drains  - Monitor endotracheal if appropriate and nasal secretions for changes in amount and color  - [place] appropriate cooling/warming therapies per order  - Administer medications as ordered  - Instruct and encourage patient and family to use good hand hygiene technique  - Identify and instruct in appropriate isolation precautions for identified infection/condition  Outcome: Progressing  Goal: Absence of fever/infection during neutropenic period  Description: INTERVENTIONS:  - Monitor WBC    Outcome: Progressing

## 2024-10-24 NOTE — PROGRESS NOTES
Progress Note - Urology   Name: Fredy Yu 74 y.o. male I MRN: [id number]  [facility] Date of Admission: 10/22/2024   Date of Service: 10/24/2024 I Hospital Day: 2    Assessment & Plan  Left ureteral stone  S/p cystoscopy, ureteroscopy, laser lithotripsy, retrograde pyelogram insertion ureteral stent 10/23  Discharge home per primary team if he is afebrile with stable vital signs  Discharged home with 3 days of oral Keflex and 5 Norco tablets  Remove stent on string at home Saturday  Follow-up with urology outpatient  BPH (benign prostatic hyperplasia)  Continue finasteride 5 mg daily  Resume Uroxatrol daily  NATASHA (acute kidney injury) (HCC)  Creat 1.21, trending down (baseline 0.7-0.86)  Avoid nephrotoxins  Medical optimization per primary team    Ok for discharge from Urology service perspective.  Urology service signing off.  Please contact the SecureChat role for the Urology service with any questions/concerns.    Subjective   74-year-old male presented to [facility] for flank pain found to have a 5 mm left ureteral calculi.  Patient failed in hospital met and due to pain was transferred for surgical intervention.  Patient underwent cystoscopy, uroscopy, laser lithotripsy, retrograde pyelogram Surgeon ureteral stent 10/23.  From urological standpoint if patient is stable and cleared medically he can be discharged home with outpatient urology follow-up.    Objective :  Temp:  [97.6 °F (36.4 °C)-99.1 °F (37.3 °C)] 99.1 °F (37.3 °C)  HR:  [59-77] 65  BP: (135-168)/(63-76) 153/75  Resp:  [16-22] 22  SpO2:  [90 %-99 %] 92 %  O2 Device: Nasal cannula  Nasal Cannula O2 Flow Rate (L/min):  [2 L/min-4 L/min] 2 L/min    I/O         10/22 0701  10/23 0700 10/23 0701  10/24 0700 10/24 0701  10/25 0700    P.O. 220 0     I.V. (mL/kg) 993.8 (9.3) 500 (4.7)     Total Intake(mL/kg) 1213.8 (11.3) 500 (4.7)     Urine (mL/kg/hr) 700 875 (0.3)     Stool  0     Blood  0     Total Output 700 875     Net +513.8 -375             Unmeasured Urine Occurrence  2 x     Unmeasured Stool Occurrence  1 x           Lines/Drains/Airways       Active Status       Name Placement date Placement time Site Days    Ureteral Internal Stent Left ureter 6 Fr. 10/23/24  1034  Left ureter  less than 1                  Physical Exam  Constitutional:       General: He is not in acute distress.     Appearance: Normal appearance. He is not ill-appearing.   HENT:      Head: Normocephalic and atraumatic.      Comments: Very hard of hearing  Eyes:      General: No scleral icterus.  Cardiovascular:      Rate and Rhythm: Normal rate.   Pulmonary:      Effort: Pulmonary effort is normal. No respiratory distress.   Abdominal:      General: Abdomen is flat. There is no distension.   Musculoskeletal:         General: No swelling.   Skin:     General: Skin is warm and dry.      Coloration: Skin is not jaundiced or pale.      Findings: No rash.   Neurological:      General: No focal deficit present.      Mental Status: He is alert and oriented to person, place, and time.      Gait: Gait normal.   Psychiatric:         Mood and Affect: Mood normal.         Behavior: Behavior normal.         Lab Results: I have reviewed the following results:  Recent Labs     10/21/24  1254 10/21/24  1526 10/21/24  2035 10/22/24  0413 10/23/24  0539 10/24/24  0643   WBC 8.80  --   --   --  5.03 7.09   HGB 12.8  --   --   --  10.3* 11.1*   HCT 38.1  --   --   --  31.2* 34.1*     --   --   --  138* 155   SODIUM 138  --    < > 132* 135  --    K 3.8  --    < > 3.3* 3.9  --    CL 94*  --    < > 91* 98  --    CO2 30  --    < > 32 30  --    BUN 24  --    < > 21 16  --    CREATININE 1.33*  --    < > 1.50* 1.34*  --    GLUC 97  --    < > 144* 143*  --    MG 1.6*  --    < >  --  1.9  --    PHOS 3.6  --    < >  --  2.4  --    AST 51*  --    < > 29 51*  --    ALT 45  --    < > 35 45  --    ALB 4.4  --    < > 4.0 3.5  --    TBILI 0.58  --    < > 0.99 0.51  --    ALKPHOS 76  --    < > 61 65   --    PTT  --   --   --  28  --   --    INR  --   --   --  1.08  --   --    HSTNI0 13  --   --   --   --   --    HSTNI2  --  12  --   --   --   --     < > = values in this interval not displayed.       Imaging Results Review: No pertinent imaging studies reviewed.  Other Study Results Review: No additional pertinent studies reviewed.    VTE Pharmacologic Prophylaxis: VTE covered by:    None     VTE Mechanical Prophylaxis: sequential compression device

## 2024-10-24 NOTE — TELEPHONE ENCOUNTER
----- Message from Eduardo Rivera MD sent at 10/23/2024 10:55 AM EDT -----  Today Fredy underwent left ureteroscopy.  I was able to remove his ureteral calculus.  He has a stent in place with a string.  He was instructed to remove his own stent in 3 days.  Please call patient in 1 week to ensure that the stent has been removed.  Outpatient follow-up in the [place]  office in 8 weeks recommended.  FT

## 2024-10-25 ENCOUNTER — TELEPHONE (OUTPATIENT)
Dept: UROLOGY | Facility: CLINIC | Age: 74
End: 2024-10-25

## 2024-10-25 ENCOUNTER — TRANSITIONAL CARE MANAGEMENT (OUTPATIENT)
Dept: FAMILY MEDICINE CLINIC | Facility: CLINIC | Age: 74
End: 2024-10-25

## 2024-10-25 ENCOUNTER — HOSPITAL ENCOUNTER (INPATIENT)
Facility: HOSPITAL | Age: 74
LOS: 3 days | Discharge: HOME | DRG: 699 | End: 2024-10-29
Attending: EMERGENCY MEDICINE | Admitting: HOSPITALIST
Payer: MEDICARE

## 2024-10-25 DIAGNOSIS — R00.1 BRADYCARDIA: ICD-10-CM

## 2024-10-25 DIAGNOSIS — F10.130 ALCOHOL ABUSE WITH WITHDRAWAL WITHOUT COMPLICATION (HCC): ICD-10-CM

## 2024-10-25 DIAGNOSIS — R53.81 MALAISE AND FATIGUE: ICD-10-CM

## 2024-10-25 DIAGNOSIS — N39.0 UTI (URINARY TRACT INFECTION): ICD-10-CM

## 2024-10-25 DIAGNOSIS — Z96.0 URETERAL STENT PRESENT: ICD-10-CM

## 2024-10-25 DIAGNOSIS — E83.42 HYPOMAGNESEMIA: ICD-10-CM

## 2024-10-25 DIAGNOSIS — R42 LIGHTHEADEDNESS: Primary | ICD-10-CM

## 2024-10-25 DIAGNOSIS — I10 ESSENTIAL HYPERTENSION: ICD-10-CM

## 2024-10-25 DIAGNOSIS — R53.83 MALAISE AND FATIGUE: ICD-10-CM

## 2024-10-25 DIAGNOSIS — F10.929 ALCOHOL INTOXICATION (HCC): ICD-10-CM

## 2024-10-25 LAB
ALBUMIN SERPL BCG-MCNC: 4 G/DL (ref 3.5–5)
ALP SERPL-CCNC: 58 U/L (ref 34–104)
ALT SERPL W P-5'-P-CCNC: 32 U/L (ref 7–52)
ANION GAP SERPL CALCULATED.3IONS-SCNC: 8 MMOL/L (ref 4–13)
ANISOCYTOSIS BLD QL SMEAR: PRESENT
AST SERPL W P-5'-P-CCNC: 31 U/L (ref 13–39)
BACTERIA UR QL AUTO: ABNORMAL /HPF
BASOPHILS # BLD MANUAL: 0 THOUSAND/UL (ref 0–0.1)
BASOPHILS NFR MAR MANUAL: 0 % (ref 0–1)
BILIRUB SERPL-MCNC: 0.32 MG/DL (ref 0.2–1)
BILIRUB UR QL STRIP: NEGATIVE
BUN SERPL-MCNC: 14 MG/DL (ref 5–25)
CALCIUM SERPL-MCNC: 9.3 MG/DL (ref 8.4–10.2)
CHLORIDE SERPL-SCNC: 94 MMOL/L (ref 96–108)
CLARITY UR: ABNORMAL
CO2 SERPL-SCNC: 36 MMOL/L (ref 21–32)
COLOR UR: YELLOW
CREAT SERPL-MCNC: 1.18 MG/DL (ref 0.6–1.3)
EOSINOPHIL # BLD MANUAL: 0.05 THOUSAND/UL (ref 0–0.4)
EOSINOPHIL NFR BLD MANUAL: 1 % (ref 0–6)
ERYTHROCYTE [DISTWIDTH] IN BLOOD BY AUTOMATED COUNT: 14.6 % (ref 11.6–15.1)
ETHANOL SERPL-MCNC: <10 MG/DL
GFR SERPL CREATININE-BSD FRML MDRD: 60 ML/MIN/1.73SQ M
GLUCOSE SERPL-MCNC: 105 MG/DL (ref 65–140)
GLUCOSE UR STRIP-MCNC: NEGATIVE MG/DL
HCT VFR BLD AUTO: 35.2 % (ref 36.5–49.3)
HGB BLD-MCNC: 11.3 G/DL (ref 12–17)
HGB UR QL STRIP.AUTO: ABNORMAL
KETONES UR STRIP-MCNC: NEGATIVE MG/DL
LACTATE SERPL-SCNC: 0.9 MMOL/L (ref 0.5–2)
LEUKOCYTE ESTERASE UR QL STRIP: ABNORMAL
LYMPHOCYTES # BLD AUTO: 2.23 THOUSAND/UL (ref 0.6–4.47)
LYMPHOCYTES # BLD AUTO: 41 % (ref 14–44)
MACROCYTES BLD QL AUTO: PRESENT
MAGNESIUM SERPL-MCNC: 1.3 MG/DL (ref 1.9–2.7)
MCH RBC QN AUTO: 32.4 PG (ref 26.8–34.3)
MCHC RBC AUTO-ENTMCNC: 32.1 G/DL (ref 31.4–37.4)
MCV RBC AUTO: 101 FL (ref 82–98)
MONOCYTES # BLD AUTO: 0.62 THOUSAND/UL (ref 0–1.22)
MONOCYTES NFR BLD: 12 % (ref 4–12)
MYELOCYTE ABSOLUTE CT: 0.05 THOUSAND/UL (ref 0–0.1)
MYELOCYTES NFR BLD MANUAL: 1 % (ref 0–1)
NEUTROPHILS # BLD MANUAL: 2.23 THOUSAND/UL (ref 1.85–7.62)
NEUTS BAND NFR BLD MANUAL: 4 % (ref 0–8)
NEUTS SEG NFR BLD AUTO: 39 % (ref 43–75)
NITRITE UR QL STRIP: NEGATIVE
NON-SQ EPI CELLS URNS QL MICRO: ABNORMAL /HPF
PH UR STRIP.AUTO: 7 [PH]
PHOSPHATE SERPL-MCNC: 2.6 MG/DL (ref 2.3–4.1)
PLATELET # BLD AUTO: 181 THOUSANDS/UL (ref 149–390)
PLATELET BLD QL SMEAR: ADEQUATE
PMV BLD AUTO: 8.3 FL (ref 8.9–12.7)
POTASSIUM SERPL-SCNC: 3.9 MMOL/L (ref 3.5–5.3)
PROT SERPL-MCNC: 6.4 G/DL (ref 6.4–8.4)
PROT UR STRIP-MCNC: ABNORMAL MG/DL
RBC # BLD AUTO: 3.49 MILLION/UL (ref 3.88–5.62)
RBC #/AREA URNS AUTO: ABNORMAL /HPF
RBC MORPH BLD: PRESENT
SODIUM SERPL-SCNC: 138 MMOL/L (ref 135–147)
SP GR UR STRIP.AUTO: 1.01 (ref 1–1.03)
STOMATOCYTES BLD QL SMEAR: PRESENT
UROBILINOGEN UR STRIP-ACNC: <2 MG/DL
VARIANT LYMPHS # BLD AUTO: 2 %
WBC # BLD AUTO: 5.18 THOUSAND/UL (ref 4.31–10.16)
WBC #/AREA URNS AUTO: ABNORMAL /HPF

## 2024-10-25 PROCEDURE — 85027 COMPLETE CBC AUTOMATED: CPT | Performed by: EMERGENCY MEDICINE

## 2024-10-25 PROCEDURE — 84100 ASSAY OF PHOSPHORUS: CPT | Performed by: EMERGENCY MEDICINE

## 2024-10-25 PROCEDURE — 80053 COMPREHEN METABOLIC PANEL: CPT | Performed by: EMERGENCY MEDICINE

## 2024-10-25 PROCEDURE — 87086 URINE CULTURE/COLONY COUNT: CPT | Performed by: EMERGENCY MEDICINE

## 2024-10-25 PROCEDURE — 96366 THER/PROPH/DIAG IV INF ADDON: CPT

## 2024-10-25 PROCEDURE — 99284 EMERGENCY DEPT VISIT MOD MDM: CPT

## 2024-10-25 PROCEDURE — 96368 THER/DIAG CONCURRENT INF: CPT

## 2024-10-25 PROCEDURE — 96365 THER/PROPH/DIAG IV INF INIT: CPT

## 2024-10-25 PROCEDURE — 83735 ASSAY OF MAGNESIUM: CPT | Performed by: EMERGENCY MEDICINE

## 2024-10-25 PROCEDURE — 82077 ASSAY SPEC XCP UR&BREATH IA: CPT | Performed by: EMERGENCY MEDICINE

## 2024-10-25 PROCEDURE — 36415 COLL VENOUS BLD VENIPUNCTURE: CPT | Performed by: EMERGENCY MEDICINE

## 2024-10-25 PROCEDURE — 93005 ELECTROCARDIOGRAM TRACING: CPT

## 2024-10-25 PROCEDURE — 96376 TX/PRO/DX INJ SAME DRUG ADON: CPT

## 2024-10-25 PROCEDURE — 81001 URINALYSIS AUTO W/SCOPE: CPT | Performed by: EMERGENCY MEDICINE

## 2024-10-25 PROCEDURE — 85007 BL SMEAR W/DIFF WBC COUNT: CPT | Performed by: EMERGENCY MEDICINE

## 2024-10-25 PROCEDURE — 96367 TX/PROPH/DG ADDL SEQ IV INF: CPT

## 2024-10-25 PROCEDURE — 99223 1ST HOSP IP/OBS HIGH 75: CPT | Performed by: INTERNAL MEDICINE

## 2024-10-25 PROCEDURE — 83605 ASSAY OF LACTIC ACID: CPT | Performed by: EMERGENCY MEDICINE

## 2024-10-25 PROCEDURE — 99285 EMERGENCY DEPT VISIT HI MDM: CPT | Performed by: EMERGENCY MEDICINE

## 2024-10-25 RX ORDER — FOLIC ACID 1 MG/1
1 TABLET ORAL DAILY
Status: DISCONTINUED | OUTPATIENT
Start: 2024-10-26 | End: 2024-10-29 | Stop reason: HOSPADM

## 2024-10-25 RX ORDER — LANOLIN ALCOHOL/MO/W.PET/CERES
100 CREAM (GRAM) TOPICAL DAILY
Status: DISCONTINUED | OUTPATIENT
Start: 2024-10-26 | End: 2024-10-28

## 2024-10-25 RX ORDER — TAMSULOSIN HYDROCHLORIDE 0.4 MG/1
0.4 CAPSULE ORAL
Status: DISCONTINUED | OUTPATIENT
Start: 2024-10-26 | End: 2024-10-29 | Stop reason: HOSPADM

## 2024-10-25 RX ORDER — MAGNESIUM SULFATE HEPTAHYDRATE 40 MG/ML
2 INJECTION, SOLUTION INTRAVENOUS ONCE
Status: COMPLETED | OUTPATIENT
Start: 2024-10-25 | End: 2024-10-25

## 2024-10-25 RX ORDER — ONDANSETRON 2 MG/ML
4 INJECTION INTRAMUSCULAR; INTRAVENOUS EVERY 6 HOURS PRN
Status: DISCONTINUED | OUTPATIENT
Start: 2024-10-25 | End: 2024-10-29 | Stop reason: HOSPADM

## 2024-10-25 RX ORDER — POLYETHYLENE GLYCOL 3350 17 G/17G
17 POWDER, FOR SOLUTION ORAL DAILY
Status: DISCONTINUED | OUTPATIENT
Start: 2024-10-26 | End: 2024-10-29 | Stop reason: HOSPADM

## 2024-10-25 RX ORDER — FINASTERIDE 5 MG/1
5 TABLET, FILM COATED ORAL DAILY
Status: DISCONTINUED | OUTPATIENT
Start: 2024-10-26 | End: 2024-10-29 | Stop reason: HOSPADM

## 2024-10-25 RX ORDER — CHLORDIAZEPOXIDE HYDROCHLORIDE 25 MG/1
25 CAPSULE, GELATIN COATED ORAL EVERY 6 HOURS SCHEDULED
Status: DISCONTINUED | OUTPATIENT
Start: 2024-10-26 | End: 2024-10-26

## 2024-10-25 RX ORDER — OXAZEPAM 10 MG
10 CAPSULE ORAL ONCE
Status: COMPLETED | OUTPATIENT
Start: 2024-10-25 | End: 2024-10-25

## 2024-10-25 RX ORDER — CEFTRIAXONE 1 G/50ML
1000 INJECTION, SOLUTION INTRAVENOUS EVERY 24 HOURS
Status: DISCONTINUED | OUTPATIENT
Start: 2024-10-26 | End: 2024-10-27

## 2024-10-25 RX ORDER — ENOXAPARIN SODIUM 100 MG/ML
40 INJECTION SUBCUTANEOUS DAILY
Status: DISCONTINUED | OUTPATIENT
Start: 2024-10-26 | End: 2024-10-29 | Stop reason: HOSPADM

## 2024-10-25 RX ORDER — ACETAMINOPHEN 325 MG/1
650 TABLET ORAL EVERY 6 HOURS PRN
Status: DISCONTINUED | OUTPATIENT
Start: 2024-10-25 | End: 2024-10-29 | Stop reason: HOSPADM

## 2024-10-25 RX ORDER — OXYBUTYNIN CHLORIDE 5 MG/1
5 TABLET ORAL 3 TIMES DAILY PRN
Status: DISCONTINUED | OUTPATIENT
Start: 2024-10-25 | End: 2024-10-29 | Stop reason: HOSPADM

## 2024-10-25 RX ORDER — CEFTRIAXONE 2 G/50ML
2000 INJECTION, SOLUTION INTRAVENOUS ONCE
Status: COMPLETED | OUTPATIENT
Start: 2024-10-25 | End: 2024-10-25

## 2024-10-25 RX ORDER — SERTRALINE HYDROCHLORIDE 100 MG/1
100 TABLET, FILM COATED ORAL DAILY
Status: DISCONTINUED | OUTPATIENT
Start: 2024-10-26 | End: 2024-10-28

## 2024-10-25 RX ORDER — CEFTRIAXONE 1 G/50ML
1000 INJECTION, SOLUTION INTRAVENOUS EVERY 24 HOURS
Status: DISCONTINUED | OUTPATIENT
Start: 2024-10-25 | End: 2024-10-25

## 2024-10-25 RX ORDER — POTASSIUM CHLORIDE 20MEQ/15ML
40 LIQUID (ML) ORAL ONCE
Status: COMPLETED | OUTPATIENT
Start: 2024-10-25 | End: 2024-10-25

## 2024-10-25 RX ADMIN — CEFTRIAXONE 2000 MG: 2 INJECTION, SOLUTION INTRAVENOUS at 21:03

## 2024-10-25 RX ADMIN — MAGNESIUM SULFATE HEPTAHYDRATE 2 G: 40 INJECTION, SOLUTION INTRAVENOUS at 19:44

## 2024-10-25 RX ADMIN — POTASSIUM CHLORIDE 40 MEQ: 20 SOLUTION ORAL at 19:03

## 2024-10-25 RX ADMIN — MAGNESIUM SULFATE HEPTAHYDRATE 2 G: 40 INJECTION, SOLUTION INTRAVENOUS at 19:04

## 2024-10-25 RX ADMIN — POTASSIUM & SODIUM PHOSPHATES POWDER PACK 280-160-250 MG 2 PACKET: 280-160-250 PACK at 19:03

## 2024-10-25 RX ADMIN — OXAZEPAM 10 MG: 10 CAPSULE, GELATIN COATED ORAL at 19:03

## 2024-10-25 RX ADMIN — SODIUM CHLORIDE, SODIUM LACTATE, POTASSIUM CHLORIDE, AND CALCIUM CHLORIDE 1000 ML: .6; .31; .03; .02 INJECTION, SOLUTION INTRAVENOUS at 19:44

## 2024-10-25 RX ADMIN — CHLORDIAZEPOXIDE HYDROCHLORIDE 25 MG: 25 CAPSULE ORAL at 23:47

## 2024-10-25 NOTE — TELEPHONE ENCOUNTER
Pt called back, sts he was speaking with the RN but had to hang up and now he is calling back to speak with her.  Pt sts that he is having dizziness, shaky, lightheaded.  Pt also sts that he has no control over his urination.  Warm trans pt to the RN

## 2024-10-25 NOTE — ED PROVIDER NOTES
"Time reflects when diagnosis was documented in both MDM as applicable and the Disposition within this note       Time User Action Codes Description Comment    10/25/2024  9:55 PM Mason Deras [R42] Lightheadedness     10/25/2024  9:55 PM Mason Deras [R53.81,  R53.83] Malaise and fatigue     10/25/2024  9:55 PM Mason Deras [N39.0] UTI (urinary tract infection)     10/25/2024  9:56 PM Mason Deras [Z96.0] Ureteral stent present     10/25/2024  9:56 PM Mason Deras [R00.1] Bradycardia     10/25/2024  9:56 PM Mason Deras [E83.42] Hypomagnesemia           ED Disposition       ED Disposition   Admit    Condition   Stable    Date/Time   Fri Oct 25, 2024  9:55 PM    Comment   Case was discussed with JAIME and the patient's admission status was agreed to be Admission Status: observation status to the service of Dr. Badillo .               Assessment & Plan       Medical Decision Making  74-year-old male presents for evaluation of feeling anxious and shaky as well as lightheaded.  Patient recently evaluated emergency department on October 21 and diagnosed with a an obstructing 5 mm left ureteral kidney stone and was transferred to [facility] for stone removal by urology.  Patient with left ureter stent in place.  Patient returned home from the hospital last evening and took a hydroxyzine tablet that he was prescribed for anxiety.  Today, the patient spent the day resting/sleeping in his recliner and did not eat stating he did not have much of an appetite.  He presents this evening with generalized weakness, fatigue and feeling lightheaded as well as \"the shakes \".  Patient with history of alcohol dependence and last drank the morning of 10/21.  During the last visit to the emergency department, the patient was medicated with multiple doses of phenobarbital for concern of alcohol withdrawal.  He voices no other complaints today.  No chest pain, shortness of breath, " nausea or vomiting, dizziness or diaphoresis.  No fever or chills.  No dysuria but sightly increased urinary frequency with dark-colored urine.  No hematuria.  On exam, the patient is overall well-appearing and not in acute distress.  Note mild bradycardia on the cardiac monitor.    Will check basic labs, provide symptom management, IV fluids as needed, p.o. challenge, disposition as appropriate.    Amount and/or Complexity of Data Reviewed  Labs: ordered. Decision-making details documented in ED Course.  ECG/medicine tests: ordered and independent interpretation performed. Decision-making details documented in ED Course.    Risk  OTC drugs.  Prescription drug management.             Medications   oxazepam (SERAX) capsule 10 mg (10 mg Oral Given 10/25/24 1903)   potassium chloride oral solution 40 mEq (40 mEq Oral Given 10/25/24 1903)   magnesium sulfate 2 g/50 mL IVPB (premix) 2 g (0 g Intravenous Stopped 10/25/24 2103)   magnesium sulfate 2 g/50 mL IVPB (premix) 2 g (0 g Intravenous Stopped 10/25/24 2048)   potassium-sodium phosphates (PHOS-NAK) packet 2 packet (2 packets Oral Given 10/25/24 1903)   lactated ringers bolus 1,000 mL (0 mL Intravenous Stopped 10/25/24 2048)   cefTRIAXone (ROCEPHIN) IVPB (premix in dextrose) 2,000 mg 50 mL (2,000 mg Intravenous New Bag 10/25/24 2103)       ED Risk Strat Scores                           SBIRT 20yo+      Flowsheet Row Most Recent Value   Initial Alcohol Screen: US AUDIT-C     1. How often do you have a drink containing alcohol? 0 Filed at: 10/25/2024 1659   2. How many drinks containing alcohol do you have on a typical day you are drinking?  0 Filed at: 10/25/2024 1659   3a. Male UNDER 65: How often do you have five or more drinks on one occasion? 0 Filed at: 10/25/2024 1659   3b. FEMALE Any Age, or MALE 65+: How often do you have 4 or more drinks on one occassion? 0 Filed at: 10/25/2024 1659   Audit-C Score 0 Filed at: 10/25/2024 1659   JEMIMA: How many times in the past  year have you...    Used an illegal drug or used a prescription medication for non-medical reasons? Never Filed at: 10/25/2024 7135                            History of Present Illness       Chief Complaint   Patient presents with    Dizziness     Patient reports feeling dizzy and off balanced. States that he has not had a drink in one week.        Past Medical History:   Diagnosis Date    Alcohol dependency (HCC)     Anxiety     Arthritis     Depression     [place] (hard of hearing)     Hypertension     Kidney stones     Peripheral neuropathy     Prostate enlargement     Renal disorder     kidney    Shoulder dislocation       Past Surgical History:   Procedure Laterality Date    CHOLECYSTECTOMY      2010    COLONOSCOPY      CYSTOSCOPY W/ STONE MANIPULATION N/A 10/23/2024    Procedure: BASKET STONE EXTRACTION;  Surgeon: Eduardo Rivera MD;  Location: [facility];  Service: Urology    FL RETROGRADE PYELOGRAM  10/23/2024    DC CYSTO/URETERO W/LITHOTRIPSY &INDWELL STENT INSRT Left 10/23/2024    Procedure: CYSTOSCOPY URETEROSCOPY WITH LITHOTRIPSY HOLMIUM LASER, RETROGRADE PYELOGRAM AND INSERTION STENT URETERAL;  Surgeon: Eduardo Rivera MD;  Location: [facility];  Service: Urology    SHOULDER SURGERY Left     for dislocation x 2, 20 years ago an the second 18 years ago      Family History   Problem Relation Age of Onset    Dementia Mother     Colon cancer Mother     Heart disease Father     COPD Father     Heart failure Father     Coronary artery disease Father     Sleep apnea Brother       Social History     Tobacco Use    Smoking status: Never    Smokeless tobacco: Never   Vaping Use    Vaping status: Never Used   Substance Use Topics    Alcohol use: Not Currently     Comment: pint of vodka everyday- states that he hasn't drank in one week    Drug use: Never      E-Cigarette/Vaping    E-Cigarette Use Never User       E-Cigarette/Vaping Substances    Nicotine No     THC No     CBD No     Flavoring No      "Other No     Unknown No       I have reviewed and agree with the history as documented.     74-year-old male presents for evaluation of feeling anxious and shaky as well as lightheaded.  Patient recently evaluated emergency department on October 21 and diagnosed with a an obstructing 5 mm left ureteral kidney stone and was transferred to [facility] for stone removal by urology.  Patient with left ureter stent in place.  Patient returned home from the hospital last evening and took a hydroxyzine tablet that he was prescribed for anxiety.  Today, the patient spent the day resting/sleeping in his recliner and did not eat stating he did not have much of an appetite.  He presents this evening with generalized weakness, fatigue and feeling lightheaded as well as \"the shakes \".  Patient with history of alcohol dependence and last drank the morning of 10/21.  During the last visit to the emergency department, the patient was medicated with multiple doses of phenobarbital for concern of alcohol withdrawal.  He voices no other complaints today.  No chest pain, shortness of breath, nausea or vomiting, dizziness or diaphoresis.  No fever or chills.  No dysuria but sightly increased urinary frequency with dark-colored urine.  No hematuria.  On exam, the patient is overall well-appearing and not in acute distress.  Note mild bradycardia on the cardiac monitor.        Dizziness  Associated symptoms: no blood in stool, no chest pain, no diarrhea, no headaches, no nausea, no palpitations, no shortness of breath, no vomiting and no weakness        Review of Systems   Constitutional:  Negative for activity change, appetite change, chills, diaphoresis, fatigue and fever.   HENT:  Negative for dental problem, ear pain, sore throat, trouble swallowing and voice change.    Eyes:  Negative for pain and visual disturbance.   Respiratory:  Negative for cough, chest tightness, shortness of breath and wheezing.    Cardiovascular:  " Negative for chest pain, palpitations and leg swelling.   Gastrointestinal:  Negative for abdominal pain, anal bleeding, blood in stool, diarrhea, nausea, rectal pain and vomiting.   Endocrine: Negative for polydipsia, polyphagia and polyuria.   Genitourinary:  Negative for difficulty urinating, dysuria, flank pain, frequency, hematuria and urgency.   Musculoskeletal:  Negative for back pain, joint swelling, myalgias, neck pain and neck stiffness.   Skin:  Negative for pallor, rash and wound.   Neurological:  Positive for light-headedness. Negative for dizziness, facial asymmetry, speech difficulty, weakness, numbness and headaches.   Hematological:  Negative for adenopathy.   Psychiatric/Behavioral:  Negative for agitation. The patient is not nervous/anxious.    All other systems reviewed and are negative.          Objective       ED Triage Vitals [10/25/24 1658]   Temperature Pulse Blood Pressure Respirations SpO2 Patient Position - Orthostatic VS   97.6 °F (36.4 °C) 61 138/64 18 95 % Sitting      Temp Source Heart Rate Source BP Location FiO2 (%) Pain Score    Temporal Monitor Right arm -- No Pain      Vitals      Date and Time Temp Pulse SpO2 Resp BP Pain Score FACES Pain Rating User   10/25/24 2030 -- 56 93 % 20 129/58 -- --    10/25/24 2000 -- 58 93 % 17 135/62 -- --    10/25/24 1908 -- 73 91 % 20 146/79 -- --    10/25/24 1658 97.6 °F (36.4 °C) 61 95 % 18 138/64 No Pain -- PP            Physical Exam  Vitals and nursing note reviewed.   Constitutional:       General: He is not in acute distress.     Appearance: He is well-developed. He is not ill-appearing, toxic-appearing or diaphoretic.   HENT:      Head: Normocephalic and atraumatic.      Right Ear: External ear normal.      Left Ear: External ear normal.      Nose: Nose normal. No congestion or rhinorrhea.      Mouth/Throat:      Mouth: Mucous membranes are moist.   Eyes:      General: No visual field deficit or scleral icterus.        Right eye: No  discharge.         Left eye: No discharge.      Extraocular Movements: Extraocular movements intact.      Conjunctiva/sclera: Conjunctivae normal.      Pupils: Pupils are equal, round, and reactive to light.   Neck:      Thyroid: No thyromegaly.      Vascular: No JVD.      Trachea: No tracheal deviation.   Cardiovascular:      Rate and Rhythm: Normal rate and regular rhythm.      Pulses: Normal pulses.      Heart sounds: Normal heart sounds, S1 normal and S2 normal. No murmur heard.     No friction rub. No gallop.   Pulmonary:      Effort: Pulmonary effort is normal. No accessory muscle usage, respiratory distress or retractions.      Breath sounds: Normal breath sounds and air entry. No stridor or decreased air movement. No decreased breath sounds, wheezing, rhonchi or rales.   Chest:      Chest wall: No tenderness.   Abdominal:      General: There is no distension.      Palpations: Abdomen is soft. There is no mass.      Tenderness: There is no abdominal tenderness. There is no guarding or rebound.      Hernia: No hernia is present.   Musculoskeletal:         General: No swelling, tenderness or deformity. Normal range of motion.      Cervical back: Normal range of motion and neck supple.      Right lower leg: No edema.      Left lower leg: No edema.   Lymphadenopathy:      Cervical: No cervical adenopathy.   Skin:     General: Skin is warm and dry.      Capillary Refill: Capillary refill takes less than 2 seconds.      Coloration: Skin is not pale.      Findings: No erythema or rash.   Neurological:      General: No focal deficit present.      Mental Status: He is alert and oriented to person, place, and time.      GCS: GCS eye subscore is 4. GCS verbal subscore is 5. GCS motor subscore is 6.      Cranial Nerves: Cranial nerves 2-12 are intact. No cranial nerve deficit, dysarthria or facial asymmetry.      Sensory: Sensation is intact. No sensory deficit.      Motor: Motor function is intact. No weakness, tremor,  atrophy, abnormal muscle tone, seizure activity or pronator drift.      Coordination: Coordination is intact. Coordination normal. Finger-Nose-Finger Test and Heel to Shin Test normal.      Gait: Gait is intact.      Deep Tendon Reflexes: Reflexes are normal and symmetric. Reflexes normal.   Psychiatric:         Behavior: Behavior normal.         Results Reviewed       Procedure Component Value Units Date/Time    Urine Microscopic [[id number]]  (Abnormal) Collected: 10/25/24 1912    Lab Status: Final result Specimen: Urine, Clean Catch Updated: 10/25/24 1948     RBC, UA Innumerable /hpf      WBC, UA 10-20 /hpf      Epithelial Cells None Seen /hpf      Bacteria, UA Occasional /hpf     Urine culture [[id number]] Collected: 10/25/24 1912    Lab Status: In process Specimen: Urine, Clean Catch Updated: 10/25/24 1948    UA w Reflex to Microscopic w Reflex to Culture [[id number]]  (Abnormal) Collected: 10/25/24 1912    Lab Status: Final result Specimen: Urine, Clean Catch Updated: 10/25/24 1936     Color, UA Yellow     Clarity, UA Slightly Cloudy     Specific Gravity, UA 1.015     pH, UA 7.0     Leukocytes, UA Moderate     Nitrite, UA Negative     Protein, UA 30 (1+) mg/dl      Glucose, UA Negative mg/dl      Ketones, UA Negative mg/dl      Urobilinogen, UA <2.0 mg/dl      Bilirubin, UA Negative     Occult Blood, UA Large    RBC Morphology Reflex Test [[id number]] Collected: 10/25/24 1738    Lab Status: Final result Specimen: Blood from Arm, Right Updated: 10/25/24 1901    Comprehensive metabolic panel [[id number]]  (Abnormal) Collected: 10/25/24 1738    Lab Status: Final result Specimen: Blood from Arm, Right Updated: 10/25/24 1823     Sodium 138 mmol/L      Potassium 3.9 mmol/L      Chloride 94 mmol/L      CO2 36 mmol/L      ANION GAP 8 mmol/L      BUN 14 mg/dL      Creatinine 1.18 mg/dL      Glucose 105 mg/dL      Calcium 9.3 mg/dL      AST 31 U/L      ALT 32 U/L      Alkaline Phosphatase 58 U/L      Total Protein 6.4 g/dL       Albumin 4.0 g/dL      Total Bilirubin 0.32 mg/dL      eGFR 60 ml/min/1.73sq m     Narrative:      deltas reviewed  National Kidney Disease Foundation guidelines for Chronic Kidney Disease (CKD):     Stage 1 with normal or high GFR (GFR > 90 mL/min/1.73 square meters)    Stage 2 Mild CKD (GFR = 60-89 mL/min/1.73 square meters)    Stage 3A Moderate CKD (GFR = 45-59 mL/min/1.73 square meters)    Stage 3B Moderate CKD (GFR = 30-44 mL/min/1.73 square meters)    Stage 4 Severe CKD (GFR = 15-29 mL/min/1.73 square meters)    Stage 5 End Stage CKD (GFR <15 mL/min/1.73 square meters)  Note: GFR calculation is accurate only with a steady state creatinine    Phosphorus [[id number]]  (Normal) Collected: 10/25/24 1738    Lab Status: Final result Specimen: Blood from Arm, Right Updated: 10/25/24 1823     Phosphorus 2.6 mg/dL     Narrative:      deltas reviewed    Magnesium [[id number]]  (Abnormal) Collected: 10/25/24 1738    Lab Status: Final result Specimen: Blood from Arm, Right Updated: 10/25/24 1823     Magnesium 1.3 mg/dL     Narrative:      deltas reviewed    CBC and differential [[id number]]  (Abnormal) Collected: 10/25/24 1738    Lab Status: Final result Specimen: Blood from Arm, Right Updated: 10/25/24 1822     WBC 5.18 Thousand/uL      RBC 3.49 Million/uL      Hemoglobin 11.3 g/dL      Hematocrit 35.2 %       fL      MCH 32.4 pg      MCHC 32.1 g/dL      RDW 14.6 %      MPV 8.3 fL      Platelets 181 Thousands/uL     Narrative:      This is an appended report.  These results have been appended to a previously verified report.    Manual Differential(PHLEBS Do Not Order) [[id number]]  (Abnormal) Collected: 10/25/24 1738    Lab Status: Final result Specimen: Blood from Arm, Right Updated: 10/25/24 1822     Segmented % 39 %      Bands % 4 %      Lymphocytes % 41 %      Monocytes % 12 %      Eosinophils % 1 %      Basophils % 0 %      Myelocytes % 1 %      Atypical Lymphocytes % 2 %      Absolute Neutrophils 2.23  Thousand/uL      Absolute Lymphocytes 2.23 Thousand/uL      Absolute Monocytes 0.62 Thousand/uL      Absolute Eosinophils 0.05 Thousand/uL      Absolute Basophils 0.00 Thousand/uL      Absolute Myelocytes 0.05 Thousand/uL      Total Counted --     RBC Morphology Present     Platelet Estimate Adequate     Anisocytosis Present     Macrocytes Present     Stomatocytes Present    Lactic acid, plasma (w/reflex if result > 2.0) [[id number]]  (Normal) Collected: 10/25/24 1738    Lab Status: Final result Specimen: Blood from Arm, Right Updated: 10/25/24 1804     LACTIC ACID 0.9 mmol/L     Narrative:      Result may be elevated if tourniquet was used during collection.    Ethanol [[id number]]  (Normal) Collected: 10/25/24 1738    Lab Status: Final result Specimen: Blood from Arm, Right Updated: 10/25/24 1803     Ethanol Lvl <10 mg/dL             No orders to display       ECG 12 Lead Documentation Only    Date/Time: 10/25/2024 5:14 PM    Performed by: Mason Deras DO  Authorized by: Mason Deras DO    Indications / Diagnosis:  Lightheaded  ECG reviewed by me, the ED Provider: yes    Patient location:  ED  Previous ECG:     Previous ECG:  Compared to current    Comparison ECG info:  9/15/24, 10/21/24    Similarity:  Changes noted    Comparison to cardiac monitor: Yes    Interpretation:     Interpretation: abnormal    Rate:     ECG rate:  58    ECG rate assessment: bradycardic    Rhythm:     Rhythm: sinus bradycardia    Ectopy:     Ectopy: none    QRS:     QRS axis:  Normal    QRS intervals:  Normal  Conduction:     Conduction: normal    ST segments:     ST segments:  Non-specific  T waves:     T waves: normal    CriticalCare Time    Date/Time: 10/25/2024 9:57 PM    Performed by: Mason Deras DO  Authorized by: Mason Deras DO    Critical care provider statement:     Critical care time (minutes):  35    Critical care time was exclusive of:  Separately billable procedures and treating other  patients and teaching time    Critical care was necessary to treat or prevent imminent or life-threatening deterioration of the following conditions: electrolyte abnormality.    Critical care was time spent personally by me on the following activities:  Blood draw for specimens, obtaining history from patient or surrogate, evaluation of patient's response to treatment, examination of patient, development of treatment plan with patient or surrogate, ordering and performing treatments and interventions, interpretation of cardiac output measurements, ordering and review of laboratory studies, ordering and review of radiographic studies, re-evaluation of patient's condition and review of old charts  Comments:      Hypomagnesemia treated with magnesium sulfate IV.      ED Medication and Procedure Management   Prior to Admission Medications   Prescriptions Last Dose Informant Patient Reported? Taking?   Blood Pressure KIT  Self No No   Sig: Use 1 Device daily   adalimumab (HUMIRA) 40 mg/0.8 mL PSKT  Self Yes No   Sig: Inject 40 mg under the skin every 14 (fourteen) days   alfuzosin (UROXATRAL) 10 mg 24 hr tablet  Self No No   Sig: Take 1 tablet (10 mg total) by mouth daily   busPIRone (BUSPAR) 7.5 mg tablet  Self No No   Sig: Take 1 tablet (7.5 mg total) by mouth 2 (two) times a day   cephalexin (KEFLEX) 500 mg capsule  Self No No   Sig: Take 1 capsule (500 mg total) by mouth every 6 (six) hours for 12 doses   chlordiazePOXIDE (LIBRIUM) 25 mg capsule  Self No No   Sig: Take 1 tablet this evening and then 1 tablet tomorrow   Patient not taking: Reported on 10/1/2024   finasteride (PROSCAR) 5 mg tablet  Self No No   Sig: Take 1 tablet (5 mg total) by mouth daily   folic acid (FOLVITE) 1 mg tablet  Self No No   Sig: Take 1 tablet (1 mg total) by mouth daily   Patient taking differently: Take 1 mg by mouth daily Takes daily except on Monday   gabapentin (Neurontin) 400 mg capsule  Self No No   Sig: Take 400mg twice daily x 5  days. Then increase to three times daily thereafter   hydrOXYzine HCL (ATARAX) 50 mg tablet  Self No No   Sig: Take 1 tablet (50 mg total) by mouth 2 (two) times a day   hydrochlorothiazide (HYDRODIURIL) 25 mg tablet  Self Yes No   Sig: Take 25 mg by mouth if needed   methotrexate 2.5 MG tablet  Self No No   Sig: Take 8 tablets (20 mg total) by mouth once a week   naltrexone (REVIA) 50 mg tablet  Self No No   Sig: Take 1 tablet (50 mg total) by mouth daily   oxybutynin (DITROPAN) 5 mg tablet  Self No No   Sig: Take 1 tablet (5 mg total) by mouth 3 (three) times a day as needed (for stent discomfort)   predniSONE 1 mg tablet  Self No No   Sig: Take 4 tablets (4 mg total) by mouth daily   sertraline (Zoloft) 50 mg tablet  Self No No   Sig: Take 2 tablets (100 mg total) by mouth daily   thiamine 100 MG tablet  Self Yes No   Sig: Take 100 mg by mouth daily   traZODone (DESYREL) 100 mg tablet  Self No No   Sig: Take 1.5 tablets (150 mg total) by mouth daily at bedtime      Facility-Administered Medications: None     Patient's Medications   Discharge Prescriptions    No medications on file     No discharge procedures on file.  ED SEPSIS DOCUMENTATION   Time reflects when diagnosis was documented in both MDM as applicable and the Disposition within this note       Time User Action Codes Description Comment    10/25/2024  9:55 PM Mason Deras [R42] Lightheadedness     10/25/2024  9:55 PM Mason Deras [R53.81,  R53.83] Malaise and fatigue     10/25/2024  9:55 PM Mason Deras [N39.0] UTI (urinary tract infection)     10/25/2024  9:56 PM Mason Deras [Z96.0] Ureteral stent present     10/25/2024  9:56 PM Mason Deras [R00.1] Bradycardia     10/25/2024  9:56 PM Mason Deras [E83.42] Hypomagnesemia                  Mason Deras DO  10/25/24 2641

## 2024-10-25 NOTE — TELEPHONE ENCOUNTER
Patient needs to seek emergency evaluation to rule out sepsis following left ureteroscopy with lithotripsy 2 days ago.

## 2024-10-25 NOTE — TELEPHONE ENCOUNTER
Patient called back in. He is feeling weak, shaky and like he is going to pass out at any minute. He states he also is having complete incontinence. Denies the stent hanging out or being tugged. He suffers from anxiety and states this is setting him off. Wants to know if we can send something in for him for this. He is home alone. Informed that he should be seen in the ER and evaluated further as this is not normal symptoms. Offered to call 911 for patient but patient declined. He has not had a lot to drink since he is having the incontinence. Informed that he needs to increase his water intake. He is not diabetic, he denies fever or chills, he has pain under his rib but denies shortness of breath. He is having bowl movements as well.

## 2024-10-25 NOTE — TELEPHONE ENCOUNTER
Called and spoke with patient. Informed he needs to go to the ER to be further evaluated. He states he will try to get a ride. Offered again to call an ambulance but he declined. Informed to call his wife now, who is still at work. He states he will call her.

## 2024-10-25 NOTE — TELEPHONE ENCOUNTER
Post Op Note    Fredy Yu is a 74 y.o. male s/p Cystoscopy, left ureteroscopy, holmium laser lithotripsy, left retrograde pyelography, basket stone retrieval, left ureteral stent insertion preformed 10/23/2024.  Fredy Yu is a patient of our office and is seen at the [facility].     Called and spoke with patient. He states that he is feeling weak and he is having pain with urination. Informed that this is normal and explained normal stent symptoms. Patient then asked RN to hold. After a minute, he came back and stated he ws in the bathroom and asked RN to call back in a little. Asked if he was okay and he hung up.

## 2024-10-26 PROBLEM — N39.0 COMPLICATED UTI (URINARY TRACT INFECTION): Status: ACTIVE | Noted: 2024-10-26

## 2024-10-26 PROBLEM — E66.9 OBESITY (BMI 30-39.9): Chronic | Status: ACTIVE | Noted: 2024-10-26

## 2024-10-26 LAB
ALBUMIN SERPL BCG-MCNC: 3.7 G/DL (ref 3.5–5)
ALP SERPL-CCNC: 54 U/L (ref 34–104)
ALT SERPL W P-5'-P-CCNC: 31 U/L (ref 7–52)
ANION GAP SERPL CALCULATED.3IONS-SCNC: 8 MMOL/L (ref 4–13)
AST SERPL W P-5'-P-CCNC: 32 U/L (ref 13–39)
BACTERIA UR CULT: ABNORMAL
BACTERIA UR CULT: ABNORMAL
BACTERIA UR CULT: NORMAL
BILIRUB SERPL-MCNC: 0.24 MG/DL (ref 0.2–1)
BUN SERPL-MCNC: 14 MG/DL (ref 5–25)
CALCIUM SERPL-MCNC: 8.9 MG/DL (ref 8.4–10.2)
CHLORIDE SERPL-SCNC: 96 MMOL/L (ref 96–108)
CO2 SERPL-SCNC: 33 MMOL/L (ref 21–32)
CREAT SERPL-MCNC: 1.16 MG/DL (ref 0.6–1.3)
ERYTHROCYTE [DISTWIDTH] IN BLOOD BY AUTOMATED COUNT: 14.9 % (ref 11.6–15.1)
GFR SERPL CREATININE-BSD FRML MDRD: 61 ML/MIN/1.73SQ M
GLUCOSE SERPL-MCNC: 104 MG/DL (ref 65–140)
HCT VFR BLD AUTO: 34.4 % (ref 36.5–49.3)
HGB BLD-MCNC: 11.3 G/DL (ref 12–17)
MAGNESIUM SERPL-MCNC: 2.1 MG/DL (ref 1.9–2.7)
MCH RBC QN AUTO: 33.1 PG (ref 26.8–34.3)
MCHC RBC AUTO-ENTMCNC: 32.8 G/DL (ref 31.4–37.4)
MCV RBC AUTO: 101 FL (ref 82–98)
PHOSPHATE SERPL-MCNC: 3.2 MG/DL (ref 2.3–4.1)
PLATELET # BLD AUTO: 194 THOUSANDS/UL (ref 149–390)
PMV BLD AUTO: 8.5 FL (ref 8.9–12.7)
POTASSIUM SERPL-SCNC: 3.8 MMOL/L (ref 3.5–5.3)
PROCALCITONIN SERPL-MCNC: 0.08 NG/ML
PROT SERPL-MCNC: 6.3 G/DL (ref 6.4–8.4)
RBC # BLD AUTO: 3.41 MILLION/UL (ref 3.88–5.62)
SODIUM SERPL-SCNC: 137 MMOL/L (ref 135–147)
WBC # BLD AUTO: 5.45 THOUSAND/UL (ref 4.31–10.16)

## 2024-10-26 PROCEDURE — 84145 PROCALCITONIN (PCT): CPT | Performed by: INTERNAL MEDICINE

## 2024-10-26 PROCEDURE — 99233 SBSQ HOSP IP/OBS HIGH 50: CPT | Performed by: HOSPITALIST

## 2024-10-26 PROCEDURE — 84100 ASSAY OF PHOSPHORUS: CPT | Performed by: INTERNAL MEDICINE

## 2024-10-26 PROCEDURE — 85027 COMPLETE CBC AUTOMATED: CPT | Performed by: INTERNAL MEDICINE

## 2024-10-26 PROCEDURE — 83735 ASSAY OF MAGNESIUM: CPT | Performed by: INTERNAL MEDICINE

## 2024-10-26 PROCEDURE — 97163 PT EVAL HIGH COMPLEX 45 MIN: CPT

## 2024-10-26 PROCEDURE — 80053 COMPREHEN METABOLIC PANEL: CPT | Performed by: INTERNAL MEDICINE

## 2024-10-26 PROCEDURE — 90662 IIV NO PRSV INCREASED AG IM: CPT | Performed by: INTERNAL MEDICINE

## 2024-10-26 PROCEDURE — G0008 ADMIN INFLUENZA VIRUS VAC: HCPCS | Performed by: INTERNAL MEDICINE

## 2024-10-26 RX ORDER — LANOLIN ALCOHOL/MO/W.PET/CERES
6 CREAM (GRAM) TOPICAL
Status: DISCONTINUED | OUTPATIENT
Start: 2024-10-26 | End: 2024-10-29 | Stop reason: HOSPADM

## 2024-10-26 RX ORDER — LORAZEPAM 1 MG/1
2 TABLET ORAL ONCE
Status: COMPLETED | OUTPATIENT
Start: 2024-10-26 | End: 2024-10-26

## 2024-10-26 RX ORDER — KETOROLAC TROMETHAMINE 30 MG/ML
15 INJECTION, SOLUTION INTRAMUSCULAR; INTRAVENOUS ONCE
Status: COMPLETED | OUTPATIENT
Start: 2024-10-26 | End: 2024-10-26

## 2024-10-26 RX ORDER — CHLORDIAZEPOXIDE HYDROCHLORIDE 25 MG/1
25 CAPSULE, GELATIN COATED ORAL EVERY 8 HOURS SCHEDULED
Status: DISCONTINUED | OUTPATIENT
Start: 2024-10-26 | End: 2024-10-27

## 2024-10-26 RX ADMIN — TAMSULOSIN HYDROCHLORIDE 0.4 MG: 0.4 CAPSULE ORAL at 16:46

## 2024-10-26 RX ADMIN — CHLORDIAZEPOXIDE HYDROCHLORIDE 25 MG: 25 CAPSULE ORAL at 05:13

## 2024-10-26 RX ADMIN — CEFTRIAXONE 1000 MG: 1 INJECTION, SOLUTION INTRAVENOUS at 21:09

## 2024-10-26 RX ADMIN — KETOROLAC TROMETHAMINE 15 MG: 30 INJECTION, SOLUTION INTRAMUSCULAR at 16:46

## 2024-10-26 RX ADMIN — SERTRALINE 100 MG: 100 TABLET, FILM COATED ORAL at 08:41

## 2024-10-26 RX ADMIN — POLYETHYLENE GLYCOL 3350 17 G: 17 POWDER, FOR SOLUTION ORAL at 08:41

## 2024-10-26 RX ADMIN — FOLIC ACID 1 MG: 1 TABLET ORAL at 08:41

## 2024-10-26 RX ADMIN — Medication 6 MG: at 21:09

## 2024-10-26 RX ADMIN — MULTIPLE VITAMINS W/ MINERALS TAB 1 TABLET: TAB ORAL at 08:41

## 2024-10-26 RX ADMIN — ENOXAPARIN SODIUM 40 MG: 40 INJECTION SUBCUTANEOUS at 08:41

## 2024-10-26 RX ADMIN — CHLORDIAZEPOXIDE HYDROCHLORIDE 25 MG: 25 CAPSULE ORAL at 21:09

## 2024-10-26 RX ADMIN — CHLORDIAZEPOXIDE HYDROCHLORIDE 25 MG: 25 CAPSULE ORAL at 14:04

## 2024-10-26 RX ADMIN — LORAZEPAM 2 MG: 1 TABLET ORAL at 14:19

## 2024-10-26 RX ADMIN — INFLUENZA A VIRUS A/VICTORIA/4897/2022 IVR-238 (H1N1) ANTIGEN (FORMALDEHYDE INACTIVATED), INFLUENZA A VIRUS A/CALIFORNIA/122/2022 SAN-022 (H3N2) ANTIGEN (FORMALDEHYDE INACTIVATED), AND INFLUENZA B VIRUS B/MICHIGAN/01/2021 ANTIGEN (FORMALDEHYDE INACTIVATED) 0.5 ML: 60; 60; 60 INJECTION, SUSPENSION INTRAMUSCULAR at 11:26

## 2024-10-26 RX ADMIN — FINASTERIDE 5 MG: 5 TABLET, FILM COATED ORAL at 08:41

## 2024-10-26 RX ADMIN — THIAMINE HCL TAB 100 MG 100 MG: 100 TAB at 08:41

## 2024-10-26 RX ADMIN — ACETAMINOPHEN 650 MG: 325 TABLET, FILM COATED ORAL at 11:28

## 2024-10-26 NOTE — ASSESSMENT & PLAN NOTE
Patient reported drinking a gallon of vodka, last drink 3 to 4 days prior to admission  Patient feels shaky, feels like he is going through alcohol withdrawal  CIWA  Librium taper, 25 mg 3 times daily today, to wean daily  Thiamine folic acid

## 2024-10-26 NOTE — ASSESSMENT & PLAN NOTE
Severe hypomagnesemia due to alcoholism.  Consequent high risk of potentially lethal cardiac arrhythmias especially torsade.  Telemetry monitoring.  Aggressive IV/PO serum electrolyte replenishment, goal serum K and Mg at least 4 and 2 or greater respectively.

## 2024-10-26 NOTE — PLAN OF CARE
Problem: PAIN - ADULT  Goal: Verbalizes/displays adequate comfort level or baseline comfort level  Description: Interventions:  - Encourage patient to monitor pain and request assistance  - Assess pain using appropriate pain scale (0-10 pain scale)  - Administer analgesics based on type and severity of pain and evaluate response  - Implement non-pharmacological measures as appropriate and evaluate response  - Consider cultural and social influences on pain and pain management  - Notify physician/advanced practitioner if interventions unsuccessful or patient reports new pain  Outcome: Progressing     Problem: INFECTION - ADULT  Goal: Absence or prevention of progression during hospitalization  Description: INTERVENTIONS:  - Assess and monitor for signs and symptoms of infection  - Monitor lab/diagnostic results  - Monitor all insertion sites, i.e. indwelling lines  - Administer medications as ordered  - Instruct and encourage patient and family to use good hand hygiene technique  Outcome: Progressing     Problem: SAFETY ADULT  Goal: Patient will remain free of falls  Description: INTERVENTIONS:  - Educate patient/family on patient safety including physical limitations  - Instruct patient to call for assistance with activity (contact guard assist and walker)  - Consult OT/PT to assist with strengthening/mobility   - Keep Call bell within reach  - Keep bed low and locked with side rails adjusted as appropriate  - Keep care items and personal belongings within reach  - Initiate and maintain comfort rounds  - Make Fall Risk Sign visible to staff (high fall risk)  - Offer Toileting every 1-2 Hours, in advance of need  - Initiate/Maintain bed/chair alarm  - Obtain necessary fall risk management equipment: nonskid footwear  - Apply yellow socks and bracelet for high fall risk patients  Outcome: Progressing  Goal: Maintain or return to baseline ADL function  Description: INTERVENTIONS:  -  Assess patient's ability to carry  out ADLs; (min to mod assist)  - Assess/evaluate cause of self-care deficits (fatigue, limited movement, tremors, anxiety)  - Assess range of motion  - Assess patient's mobility; (contact guard assist and walker)  - Assess patient's need for assistive devices and provide as appropriate  - Encourage maximum independence but intervene and supervise when necessary  - Involve family in performance of ADLs  - Assess for home care needs following discharge   - Consider OT consult to assist with ADL evaluation and planning for discharge  - Provide patient education as appropriate  Outcome: Progressing  Goal: Maintains/Returns to pre admission functional level  Description: INTERVENTIONS:  - Perform AM-PAC 6 Click Basic Mobility/ Daily Activity assessment daily.  - Set and communicate daily mobility goal to care team and patient/family/caregiver.   - Collaborate with rehabilitation services on mobility goals if consulted  - Reposition patient every 2 hours.  - Ambulate patient 3-5 times a day  - Out of bed to chair 3 times a day   - Out of bed for meals 3 times a day  - Out of bed for toileting  - Record patient progress and toleration of activity level   Outcome: Progressing     Problem: DISCHARGE PLANNING  Goal: Discharge to home or other facility with appropriate resources  Description: INTERVENTIONS:  - Identify barriers to discharge w/patient and caregiver  - Arrange for needed discharge resources and transportation as appropriate  - Identify discharge learning needs (meds, wound care, etc.)  - Refer to Case Management Department for coordinating discharge planning if the patient needs post-hospital services based on physician/advanced practitioner order or complex needs related to functional status, cognitive ability, or social support system  Outcome: Progressing     Problem: Knowledge Deficit  Goal: Patient/family/caregiver demonstrates understanding of disease process, treatment plan, medications, and discharge  instructions  Description: Complete learning assessment and assess knowledge base.  Interventions:  - Provide teaching at level of understanding  - Provide teaching via preferred learning methods  Outcome: Progressing     Problem: Prexisting or High Potential for Compromised Skin Integrity  Goal: Skin integrity is maintained or improved  Description: INTERVENTIONS:  - Identify patients at risk for skin breakdown  - Assess and monitor skin integrity  - Assess and monitor nutrition and hydration status  - Monitor labs   - Assess for incontinence (every 1-2 hours prn)  - Turn and reposition patient (cue to weight shift and turn)  - Assist with mobility/ambulation (contact guard assist and walker)  - Relieve pressure over bony prominences  - Avoid friction and shearing  - Provide appropriate hygiene as needed including keeping skin clean and dry  - Evaluate need for skin moisturizer/barrier cream  - Collaborate with interdisciplinary team   - Patient/family teaching  Outcome: Progressing     Problem: GENITOURINARY - ADULT  Goal: Maintains or returns to baseline urinary function  Description: INTERVENTIONS:  - Assess urinary function  - Encourage oral fluids to ensure adequate hydration if ordered  - Administer IV fluids as ordered to ensure adequate hydration  - Administer ordered medications as needed  - Offer frequent toileting  - Follow urinary retention protocol if ordered  Outcome: Progressing

## 2024-10-26 NOTE — ED RE-EVALUATION NOTE
Attempt to call wife to update on patient's status and admission.  No answer.  No phone message option.     Mason Deras, DO  10/25/24 [id number]

## 2024-10-26 NOTE — ASSESSMENT & PLAN NOTE
"Patient reports daily consumption of \"a gallon of vodka\", last drink 3 to 4 days ago.  He feels he is going through alcohol withdrawal.  Will complete a 3-day course of scheduled Librium as tolerated for alcohol withdrawal prophylaxis.  Seizure prophylaxis.  IV lorazepam as needed for seizures, agitation or further alcohol withdrawal symptoms.  Immediate ICU transfer if hemodynamic instability or delirium tremens.  Continue daily nutritional supplements for alcoholism especially thiamine.  "

## 2024-10-26 NOTE — ASSESSMENT & PLAN NOTE
Recent stent placement on 10/23  Patient reports no overt urinary symptoms, other than some burning with urination, but this only occurred after cystoscopy procedure  UA with innumerable RBCs, WBC 10-20, occasional bacteria  No leukocytosis, afebrile  Will cover empirically given recent cystoscopy, low suspicion true UTI   Continue ceftriaxone x 3 days  Await cultures

## 2024-10-26 NOTE — H&P
"H&P - Internal Medicine   Name: Fredy Yu 74 y.o. male I MRN: [id number]  Unit/Bed#: 422-01 I Date of Admission: 10/25/2024   Date of Service: 10/26/2024 I Hospital Day: 0     Assessment & Plan  Complicated UTI (urinary tract infection)  Related to recent ureteral stent placement due to kidney stones.  Continue empiric IV ceftriaxone and await final urine c+s.  Ureteral stent present    Alcohol use disorder, mild, in early remission, abuse  Patient reports daily consumption of \"a gallon of vodka\", last drink 3 to 4 days ago.  He feels he is going through alcohol withdrawal.  Will complete a 3-day course of scheduled Librium as tolerated for alcohol withdrawal prophylaxis.  Seizure prophylaxis.  IV lorazepam as needed for seizures, agitation or further alcohol withdrawal symptoms.  Immediate ICU transfer if hemodynamic instability or delirium tremens.  Continue daily nutritional supplements for alcoholism especially thiamine.  Hypomagnesemia  Severe hypomagnesemia due to alcoholism.  Consequent high risk of potentially lethal cardiac arrhythmias especially torsade.  Telemetry monitoring.  Aggressive IV/PO serum electrolyte replenishment, goal serum K and Mg at least 4 and 2 or greater respectively.  Essential hypertension  Resume usual antihypertensive as tolerated.  Obesity (BMI 30-39.9)  This complicates all aspects of patient care.  I advised the patient to lose weight through lifestyle modification.      VTE Prophylaxis: Enoxaparin (Lovenox)  / sequential compression device   Code Status: full    Anticipated Length of Stay:  Patient will be admitted on an Observation basis with an anticipated length of stay of  < 2 midnights.   Justification for Hospital Stay: telemetry    Total Time for Visit, including Counseling / Coordination of Care:  75 mins .  Greater than 50% of this total time spent on direct patient counseling and coordination of care.    Chief Complaint:   shaking chills overnight    History of " "Present Illness:    Fredy Yu is a 74 y.o. male whose past medical history is remarkable for HTN, alcoholism, anxiety disorder/depression, kidney stones.  He was discharged home 10/24/2024 from [facility] after being hospitalized due to an obstructed kidney stone s/p left ureteral stent placement.  He reports shaking chills about 12 hours since he returned home with generalized weakness and malaise.  He sought medical attention tonight at this hospital's ED because he felt he was going through alcohol withdrawal with marked anxiety, tremulousness.  He reports daily consumption of \"a gallon of vodka\", with his last drink being about 3 to 4 days ago.  No nausea, vomiting, diarrhea, abdominal pain, chest pain, palpitations, cough, shortness of breath, headache, neck stiffness, acute confusion, hallucinations, suicidal ideation.  He has been hemodynamically stable and afebrile since his ED arrival.  Pertinent ED labs included serum sodium 138, potassium 3.9, magnesium 1.3, BUN 14, creatinine 1.18, glucose 105, lactate 0.9, WBC 5.18, hemoglobin 11.3, platelet count 181, UA remarkable for moderate leukocyte esterase with pyuria and hematuria and bacteria present.  Patient received at the ED IV magnesium sulfate 4 g, oral potassium chloride, Serax, a liter LR IV bolus, and 2 g IV ceftriaxone.  His hospitalization for further management was subsequently sought.    Review of Systems:    A 10+ point systems review was obtained & is as above, otherwise negative.  Review of Systems    Past Medical and Surgical History:     Past Medical History:   Diagnosis Date    Alcohol dependency (HCC)     Anxiety     Arthritis     Depression     [place] (hard of hearing)     Hypertension     Kidney stones     Peripheral neuropathy     Prostate enlargement     Renal disorder     kidney    Shoulder dislocation        Past Surgical History:   Procedure Laterality Date    CHOLECYSTECTOMY      2010    COLONOSCOPY      CYSTOSCOPY W/ " STONE MANIPULATION N/A 10/23/2024    Procedure: BASKET STONE EXTRACTION;  Surgeon: Eduarod Rivera MD;  Location: [facility];  Service: Urology    FL RETROGRADE PYELOGRAM  10/23/2024    TN CYSTO/URETERO W/LITHOTRIPSY &INDWELL STENT INSRT Left 10/23/2024    Procedure: CYSTOSCOPY URETEROSCOPY WITH LITHOTRIPSY HOLMIUM LASER, RETROGRADE PYELOGRAM AND INSERTION STENT URETERAL;  Surgeon: Eduardo Rivera MD;  Location: [facility];  Service: Urology    SHOULDER SURGERY Left     for dislocation x 2, 20 years ago an the second 18 years ago       Meds/Allergies:    Prior to Admission medications    Medication Sig Start Date End Date Taking? Authorizing Provider   adalimumab (HUMIRA) 40 mg/0.8 mL PSKT Inject 40 mg under the skin every 14 (fourteen) days    Historical Provider, MD   alfuzosin (UROXATRAL) 10 mg 24 hr tablet Take 1 tablet (10 mg total) by mouth daily 1/19/24 9/15/24  Tejal Garcia MD   Blood Pressure KIT Use 1 Device daily 10/1/24   Tita Galan MD   busPIRone (BUSPAR) 7.5 mg tablet Take 1 tablet (7.5 mg total) by mouth 2 (two) times a day 7/18/24   Tita Galan MD   cephalexin (KEFLEX) 500 mg capsule Take 1 capsule (500 mg total) by mouth every 6 (six) hours for 12 doses 10/24/24 10/27/24  Kevin Stokes DO   chlordiazePOXIDE (LIBRIUM) 25 mg capsule Take 1 tablet this evening and then 1 tablet tomorrow  Patient not taking: Reported on 10/1/2024 9/17/24   Joaquín Penaloza MD   finasteride (PROSCAR) 5 mg tablet Take 1 tablet (5 mg total) by mouth daily 8/27/24   Tejal Garcia MD   folic acid (FOLVITE) 1 mg tablet Take 1 tablet (1 mg total) by mouth daily  Patient taking differently: Take 1 mg by mouth daily Takes daily except on Monday 7/23/24   Girish Anna MD   gabapentin (Neurontin) 400 mg capsule Take 400mg twice daily x 5 days. Then increase to three times daily thereafter 9/4/24   MALGORZATA Corona   hydrochlorothiazide (HYDRODIURIL) 25 mg tablet Take  25 mg by mouth if needed    Historical Provider, MD   hydrOXYzine HCL (ATARAX) 50 mg tablet Take 1 tablet (50 mg total) by mouth 2 (two) times a day 10/24/24   Kevin Stokes DO   methotrexate 2.5 MG tablet Take 8 tablets (20 mg total) by mouth once a week 7/23/24   Girish Anna MD   naltrexone (REVIA) 50 mg tablet Take 1 tablet (50 mg total) by mouth daily 10/1/24   Tita Galan MD   oxybutynin (DITROPAN) 5 mg tablet Take 1 tablet (5 mg total) by mouth 3 (three) times a day as needed (for stent discomfort) 10/24/24   Kevin Stokes DO   predniSONE 1 mg tablet Take 4 tablets (4 mg total) by mouth daily 7/23/24   Girish Anna MD   sertraline (Zoloft) 50 mg tablet Take 2 tablets (100 mg total) by mouth daily 10/1/24   Tita Galan MD   thiamine 100 MG tablet Take 100 mg by mouth daily    Historical Provider, MD   traZODone (DESYREL) 100 mg tablet Take 1.5 tablets (150 mg total) by mouth daily at bedtime 1/19/24   Tejal Garcia MD     I have reviewed home medications with patient personally.    Allergies:   Allergies   Allergen Reactions    Sulfa Antibiotics Anaphylaxis and Rash     Face Swelling    Sulfacetamide Anaphylaxis and Rash     Face Swelling    Misc. Sulfonamide Containing Compounds Facial Swelling    Elemental Sulfur Rash and Edema       Social History:     Marital Status: /Civil Union   Patient Pre-hospital Living Situation: home  Patient Pre-hospital Level of Mobility: independent  Substance Use History:   Social History     Substance and Sexual Activity   Alcohol Use Not Currently    Comment: pint of Metconnexa everyday- states that he hasn't drank in one week     Social History     Tobacco Use   Smoking Status Never   Smokeless Tobacco Never     Social History     Substance and Sexual Activity   Drug Use Never       Family History:    Family history non-contributory    Physical Exam:   Constitutional: obese, in no overt distress  HEENT: oral mucosa moist, not pale or  "jaundiced  Neck: supple, no JVD  Resp: clear lungs, no crackles or wheeze  Cardiovascular: S1 S2 audible, regular  GI: soft abdomen, nontender, bowel sounds present  Musculoskeletal: no pedal edema or cyanosis  Skin: no petechiae or suspicious rash  Psych: appropriately oriented in all spheres, appears quite anxious  Neuro: Awake, alert, verbally responsive & follows commands, moves all extremities equally, mild asterixis +ve, essentially nonfocal      Vitals:   Blood Pressure: 126/64 (10/26/24 0437)  Pulse: 57 (10/26/24 0437)  Temperature: (!) 97.4 °F (36.3 °C) (10/26/24 0437)  Temp Source: Oral (10/26/24 0437)  Respirations: 17 (10/26/24 0437)  Height: 5' 5\" (165.1 cm) (10/25/24 2238)  Weight - Scale: 102 kg (225 lb 15.5 oz) (10/26/24 0600)  SpO2: 96 % (10/26/24 0437)      Additional Data:     Lab Results:     Results from last 7 days   Lab Units 10/25/24  1738 10/23/24  0539 10/21/24  1254   WBC Thousand/uL 5.18   < > 8.80   HEMOGLOBIN g/dL 11.3*   < > 12.8   HEMATOCRIT % 35.2*   < > 38.1   PLATELETS Thousands/uL 181   < > 169   SEGS PCT %  --   --  72   LYMPHO PCT % 41   < > 13*   MONO PCT % 12   < > 13*   EOS PCT % 1   < > 1    < > = values in this interval not displayed.     Results from last 7 days   Lab Units 10/25/24  1738   POTASSIUM mmol/L 3.9   CHLORIDE mmol/L 94*   CO2 mmol/L 36*   BUN mg/dL 14   CREATININE mg/dL 1.18   CALCIUM mg/dL 9.3   ALK PHOS U/L 58   ALT U/L 32   AST U/L 31     Results from last 7 days   Lab Units 10/22/24  0413   INR  1.08     Invalid input(s): \"TROIP\"    Imaging: Results Review Statement: I reviewed radiology reports from this admission including: procedure reports.    FL retrograde pyelogram    Result Date: 10/23/2024  Narrative: LEFT RETROGRADE PYELOGRAM INDICATION: Nephrolithiasis. COMPARISON: CT scan 10/21/2024 IMAGES: 8 FLUOROSCOPY TIME: 5.9 seconds CONTRAST: 20 mL of iohexol (OMNIPAQUE) FINDINGS: Fluoroscopic guidance provided for retrograde pyelogram. Opacified portions " of the left ureter demonstrate hydroureteronephrosis with passage of a guidewire to the upper collecting system and subsequent nephroureteral stent placement. The stone in the ureter which was seen on the CT scan is not confidently identified on these limited images. Osseous and soft tissue detail limited by technique.     Impression: Fluoroscopic guidance provided for left retrograde pyelogram. Please see separate procedure report for additional details. Workstation performed: [id number]     CT chest abdomen pelvis w contrast    Result Date: 10/21/2024  Narrative: CT CHEST, ABDOMEN AND PELVIS WITH IV CONTRAST INDICATION: hypoxemia, ruq and luq pain. COMPARISON: CT dated 7/14/2024 and 4/17/2021. TECHNIQUE: CT examination of the chest, abdomen and pelvis was performed. Multiplanar 2D reformatted images were created from the source data. This examination, like all CT scans performed in the [facility], was performed utilizing techniques to minimize radiation dose exposure, including the use of iterative reconstruction and automated exposure control. Radiation dose length product (DLP) for this visit: 1169.33 mGy-cm IV Contrast: 100 mL of iohexol (OMNIPAQUE) Enteric Contrast: Not administered. FINDINGS: CHEST LUNGS: Mild bibasilar subsegmental atelectasis. Stable elevated right hemidiaphragm. Tiny subpleural calcified granuloma in the right lower lobe stable tiny left lower lobe nodule image 162 series 3.. No tracheal or endobronchial lesion. PLEURA: Unremarkable. HEART/GREAT VESSELS: Heart is within normal limits. Coronary artery calcifications. No thoracic aortic aneurysm. MEDIASTINUM AND ARCELIA: Unremarkable. CHEST WALL AND LOWER NECK: Unremarkable. ABDOMEN LIVER/BILIARY TREE: Fatty infiltration of the liver. No focal liver lesion or biliary dilatation. GALLBLADDER: Post cholecystectomy. SPLEEN: Unremarkable. PANCREAS: Stable calcifications in the head/uncinate process of the pancreas. Mild pancreatic  stranding similar to prior studies. Correlate with lipase to assess for acute pancreatitis. No pancreatic necrosis or peripancreatic collection. ADRENAL GLANDS: Unremarkable. KIDNEYS/URETERS: Interval migration of stone from the left renal pelvis into the distal left ureter. Stone measures 5 mm in diameter (1 cm in length). There is new left hydronephrosis. Additional bilateral nonobstructing renal stones. No right hydronephrosis. Left lower pole Bosniak 2 cyst measuring 3 x 2.3 cm is decreased from 4 x 3.2 cm. STOMACH AND BOWEL: No obstruction or bowel wall thickening. Colonic diverticulosis without diverticulitis. Duodenal diverticulum. APPENDIX: No findings to suggest appendicitis. ABDOMINOPELVIC CAVITY: No ascites. No pneumoperitoneum. No lymphadenopathy. VESSELS: Atherosclerosis without abdominal aortic aneurysm. PELVIS REPRODUCTIVE ORGANS: Unremarkable for patient's age. URINARY BLADDER: Unremarkable. ABDOMINAL WALL/INGUINAL REGIONS: Unremarkable. BONES: No acute fracture or suspicious osseous lesion. Healed chronic rib fractures. Healed sacrococcygeal fracture.     Impression: 5 mm stone in the distal left ureter with new left hydronephrosis. Mild pancreatic inflammatory changes similar to prior. Correlate with lipase. The study was marked in EPIC for immediate notification. Workstation performed: [id number]     XR chest 2 views    Result Date: 10/21/2024  Narrative: XR CHEST PA AND LATERAL INDICATION: chest pain. COMPARISON: 5/21/2024 FINDINGS: Clear lungs. Elevated right hemidiaphragm noted. No pneumothorax or pleural effusion. Normal cardiomediastinal silhouette. Bones are unremarkable for age. Normal upper abdomen.     Impression: No acute cardiopulmonary disease. Workstation performed: [id number]         ** Please Note: Dragon 360 Dictation voice to text software may have been used in the creation of this document.

## 2024-10-26 NOTE — PLAN OF CARE
Problem: PHYSICAL THERAPY ADULT  Goal: Performs mobility at highest level of function for planned discharge setting.  See evaluation for individualized goals.  Description: Treatment/Interventions: Functional transfer training, LE strengthening/ROM, Elevations, Therapeutic exercise, Endurance training, Patient/family training, Bed mobility, Gait training       See flowsheet documentation for full assessment, interventions and recommendations.  Note: Prognosis: Good  Problem List: Decreased strength, Decreased endurance, Impaired balance, Decreased mobility, Decreased safety awareness  Assessment: Pt is 74 y.o. male seen for PT evaluation on 10/26/24 s/p admit to [facility] on 10/25/24 w/ dx UTI, dizziness, shakiness. PT consulted to assess pt's functional mobility and d/c needs. Order placed for PT eval and tx.. Performed at least 2 patient identifiers during session: Name and wristband. Comorbidities affecting pt's physical performance at time of assessment include: recent hospital stay from 10/21-10/24/24 due to L ureteral stone, NATASHA cystoscopy, lithotripsy, retrograde pyelogram, ureteral stent, alcohol dependency, depression, [place], BPH. LOF prior to admission was S with gait with RW. Personal factors affecting pt at time of IE include: recent hospitalization, steps to enter home, uses RW for gait, anxiety. Please find objective findings from PT assessment regarding body systems outlined above with impairments and limitations including weakness, impaired balance, decreased endurance, pain, decreased activity tolerance and fall risk, as well as mobility assessment.  Pt was motivated to participate. Limited by [place]. Pt reported feeling shaky therefore CGA provided for mobility with the RW.  Pt was able to ascend/descend 5 steps with 2 rails with CGA.  O2 sats 96% on room air, HR 60 bpm with activity. Pt's clinical presentation is currently unstable/unpredictable seen in pt's presentation of ongoing medical  assessment. Given co-morbidities and presented presentation, high level eval was completed.  Pt to benefit from continued PT tx to address deficits as defined above and maximize level of functional independent mobility and consistency. From PT/mobility standpoint, recommendation at time of d/c would be minimum resource intensity in order to promote return to PLOF and independence.        Rehab Resource Intensity Level, PT: III (Minimum Resource Intensity)    See flowsheet documentation for full assessment.

## 2024-10-26 NOTE — CASE MANAGEMENT
Case Management Discharge Planning Note    Patient name Fredy Yu  Location /422-01 MRN [id number]  : 1950 Date 10/26/2024       Current Admission Date: 10/25/2024  Current Admission Diagnosis:Complicated UTI (urinary tract infection)   Patient Active Problem List    Diagnosis Date Noted Date Diagnosed    Complicated UTI (urinary tract infection) 10/26/2024     Obesity (BMI 30-39.9) 10/26/2024     Ureteral stent present 10/25/2024     Left ureteral calculus 10/23/2024     Left ureteral stone 10/22/2024     NATASHA (acute kidney injury) ([facility]) 10/22/2024     Bilateral lower extremity edema 09/15/2024     Psoriatic arthritis ([facility]) 2024     Other osteoporosis with current pathological fracture, vertebra(e), initial encounter for fracture ([facility]) 2024     High risk medication use 2024     Compression of lumbar vertebra ([facility]) 2024     Alcohol abuse with withdrawal without complication ([facility]) 2024     Leukocytosis 2024     ROSARIO (obstructive sleep apnea) 2024     Snoring 2024     Sleep apnea 2024     Prediabetes 2024     Excessive daytime sleepiness 2024     Abnormal brain MRI 2024     Neuropathy 2024     Carpal tunnel syndrome of left wrist 2024     neuropathy 2024     Psoriasis 2023     Inflammatory arthritis 2023     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 degeneration, unspecified stage ([facility]) 2022     Seizure ([facility]) 2022     Polymyalgia rheumatica ([facility]) 10/21/2022     Obesity, morbid ([facility]) 2022     Depression with anxiety 2021     Pruritus 2021     Alcohol induced fatty liver 2021     Acute pancreatitis 2021     History of prolonged Q-T interval on ECG 2021     BPH (benign prostatic hyperplasia) 2019     Hypomagnesemia 2019     Alcohol use disorder, mild, in early remission, abuse 2019     Essential hypertension 02/10/2019       LOS (days):  "0  Geometric Mean LOS (GMLOS) (days):   Days to GMLOS:     OBJECTIVE:            Current admission status: Observation   Preferred Pharmacy:   [id number] - [place] - [address]  [address]  [place] [id number]  Phone: [phone] Fax: [phone]    Primary Care Provider: Tejal Garcia MD    Primary Insurance: MEDICARE  Secondary Insurance: [employer]    DISCHARGE DETAILS:    Discharge planning discussed with:: patient        CM contacted family/caregiver?: No- see comments (declined)  Were Treatment Team discharge recommendations reviewed with patient/caregiver?: Yes  Did patient/caregiver verbalize understanding of patient care needs?: Yes  Were patient/caregiver advised of the risks associated with not following Treatment Team discharge recommendations?: Yes    Contacts  Contact Method: In Person  Reason/Outcome: Discharge Planning    Requested Home Health Care         Is the patient interested in HHC at discharge?: No         Other Referral/Resources/Interventions Provided:  Interventions: D&A Warm Handoff, Other (Specify)  Referral Comments: declined D&A rehab, warm hand off declined. did provide lowell berry phone number on AVS if he changes his mind.         Treatment Team Recommendation: Home  Discharge Destination Plan:: Home   Met with patient and bedside. D&A warm hand off offered and declined. Pt states he goes to AA meetings but last week started drinking due to \"not knowing where my daughter is living\". Pt states \"I dont feel I need rehab. I need to talk to someone because family issues make me feel anxious and triggers me to drink\". Pt agreeable to speaking with psychiatry and provider updated on same. CM to follow for any discharge needs.                                                              "

## 2024-10-26 NOTE — PROGRESS NOTES
Progress Note - Hospitalist   Name: Fredy Yu 74 y.o. male I MRN: [id number]  Unit/Bed#: 422-01 I Date of Admission: 10/25/2024   Date of Service: 10/26/2024 I Hospital Day: 0    Assessment & Plan  Complicated UTI (urinary tract infection)  Recent stent placement on 10/23  Patient reports no overt urinary symptoms, other than some burning with urination, but this only occurred after cystoscopy procedure  UA with innumerable RBCs, WBC 10-20, occasional bacteria  No leukocytosis, afebrile  Will cover empirically given recent cystoscopy, low suspicion true UTI   Continue ceftriaxone x 3 days  Await cultures    Alcohol use disorder, mild, in early remission, abuse  Patient reported drinking a gallon of vodka, last drink 3 to 4 days prior to admission  Patient feels shaky, feels like he is going through alcohol withdrawal  CIWA  Librium taper, 25 mg 3 times daily today, to wean daily  Thiamine folic acid  Essential hypertension  Resume home antihypertensives  Hypomagnesemia  Magnesium repleted and improved  Ureteral stent present  See above, continue previously scheduled outpatient follow-up with urology  Obesity (BMI 30-39.9)  Recommend lifestyle changes    VTE Pharmacologic Prophylaxis:   Moderate Risk (Score 3-4) - Pharmacological DVT Prophylaxis Ordered: enoxaparin (Lovenox).    Mobility:   Basic Mobility Inpatient Raw Score: 19  JH-HLM Goal: 6: Walk 10 steps or more  JH-HLM Achieved: 6: Walk 10 steps or more  JH-HLM Goal achieved. Continue to encourage appropriate mobility.    Patient Centered Rounds: I performed bedside rounds with nursing staff today.   Discussions with Specialists or Other Care Team Provider: none    Education and Discussions with Family / Patient: Patient declined call to .     Current Length of Stay: 0 day(s)  Current Patient Status: Observation   Certification Statement: The patient will continue to require additional inpatient hospital stay due to etoh withdrawal, UTI  possible  Discharge Plan: Anticipate discharge in 24-48 hrs to home.    Code Status: Level 1 - Full Code    Subjective   Patient reports continued to feel shaky, denies any nausea or vomiting, lightheadedness, dizziness, chest pain, shortness of breath.  Most of patient's complaints are related to being woken up by dietary with his breakfast tray, and not getting enough assistance from nursing, despite ringing several times, and nursing is providing assistance with his needs    Objective :  Temp:  [97.4 °F (36.3 °C)-98.9 °F (37.2 °C)] 98.2 °F (36.8 °C)  HR:  [56-73] 64  BP: (126-146)/(58-79) 128/72  Resp:  [17-20] 18  SpO2:  [84 %-98 %] 98 %  O2 Device: None (Room air)    Body mass index is 37.6 kg/m².     Input and Output Summary (last 24 hours):     Intake/Output Summary (Last 24 hours) at 10/26/2024 1124  Last data filed at 10/26/2024 0948  Gross per 24 hour   Intake 200 ml   Output 725 ml   Net -525 ml       Physical Exam  Vitals and nursing note reviewed.   Constitutional:       General: He is not in acute distress.     Appearance: He is well-developed. He is obese.   HENT:      Head: Normocephalic and atraumatic.   Eyes:      Conjunctiva/sclera: Conjunctivae normal.   Cardiovascular:      Rate and Rhythm: Normal rate and regular rhythm.      Heart sounds: No murmur heard.  Pulmonary:      Effort: Pulmonary effort is normal. No respiratory distress.      Breath sounds: Normal breath sounds.   Abdominal:      Palpations: Abdomen is soft.      Tenderness: There is no abdominal tenderness.   Musculoskeletal:         General: No swelling.      Cervical back: Neck supple.   Skin:     General: Skin is warm and dry.      Capillary Refill: Capillary refill takes less than 2 seconds.   Neurological:      Mental Status: He is alert.      Comments: Minor tremor upper extremities bilaterally, very hard of hearing   Psychiatric:         Mood and Affect: Mood normal.           Lines/Drains:              Lab Results: I have  reviewed the following results:   Results from last 7 days   Lab Units 10/26/24  0458 10/25/24  1738 10/23/24  0539 10/21/24  1254   WBC Thousand/uL 5.45 5.18   < > 8.80   HEMOGLOBIN g/dL 11.3* 11.3*   < > 12.8   HEMATOCRIT % 34.4* 35.2*   < > 38.1   PLATELETS Thousands/uL 194 181   < > 169   BANDS PCT %  --  4  --   --    SEGS PCT %  --   --   --  72   LYMPHO PCT %  --  41   < > 13*   MONO PCT %  --  12   < > 13*   EOS PCT %  --  1   < > 1    < > = values in this interval not displayed.     Results from last 7 days   Lab Units 10/26/24  0458   SODIUM mmol/L 137   POTASSIUM mmol/L 3.8   CHLORIDE mmol/L 96   CO2 mmol/L 33*   BUN mg/dL 14   CREATININE mg/dL 1.16   ANION GAP mmol/L 8   CALCIUM mg/dL 8.9   ALBUMIN g/dL 3.7   TOTAL BILIRUBIN mg/dL 0.24   ALK PHOS U/L 54   ALT U/L 31   AST U/L 32   GLUCOSE RANDOM mg/dL 104     Results from last 7 days   Lab Units 10/22/24  0413   INR  1.08             Results from last 7 days   Lab Units 10/26/24  0458 10/25/24  1738   LACTIC ACID mmol/L  --  0.9   PROCALCITONIN ng/ml 0.08  --        Recent Cultures (last 7 days):   Results from last 7 days   Lab Units 10/23/24  1026   URINE CULTURE  Culture too young- will reincubate       Imaging Results Review: No pertinent imaging studies reviewed.  Other Study Results Review: No additional pertinent studies reviewed.    Last 24 Hours Medication List:     Current Facility-Administered Medications:     acetaminophen (TYLENOL) tablet 650 mg, Q6H PRN    cefTRIAXone (ROCEPHIN) IVPB (premix in dextrose) 1,000 mg 50 mL, Q24H    chlordiazePOXIDE (LIBRIUM) capsule 25 mg, Q8H BRENDA    enoxaparin (LOVENOX) subcutaneous injection 40 mg, Daily    finasteride (PROSCAR) tablet 5 mg, Daily    folic acid (FOLVITE) tablet 1 mg, Daily    influenza vaccine, high-dose (Fluzone High-Dose) IM injection 0.5 mL, Once    multivitamin-minerals (CENTRUM) tablet 1 tablet, Daily    ondansetron (ZOFRAN) injection 4 mg, Q6H PRN    oxybutynin (DITROPAN) tablet 5 mg,  TID PRN    polyethylene glycol (MIRALAX) packet 17 g, Daily    sertraline (ZOLOFT) tablet 100 mg, Daily    tamsulosin (FLOMAX) capsule 0.4 mg, Daily With Dinner    thiamine tablet 100 mg, Daily    Administrative Statements   Today, Patient Was Seen By: Feng Ta DO      **Please Note: This note may have been constructed using a voice recognition system.**

## 2024-10-26 NOTE — PHYSICAL THERAPY NOTE
Physical Therapy Evaluation    Patient Name: Fredy Yu    Today's Date: 10/26/2024     Problem List  Principal Problem:    Complicated UTI (urinary tract infection)  Active Problems:    Essential hypertension    Alcohol use disorder, mild, in early remission, abuse    Hypomagnesemia    Ureteral stent present    Obesity (BMI 30-39.9)       Past Medical History  Past Medical History:   Diagnosis Date    Alcohol dependency (HCC)     Anxiety     Arthritis     Depression     [place] (hard of hearing)     Hypertension     Kidney stones     Peripheral neuropathy     Prostate enlargement     Renal disorder     kidney    Shoulder dislocation         Past Surgical History  Past Surgical History:   Procedure Laterality Date    CHOLECYSTECTOMY      2010    COLONOSCOPY      CYSTOSCOPY W/ STONE MANIPULATION N/A 10/23/2024    Procedure: BASKET STONE EXTRACTION;  Surgeon: Eduardo Rivera MD;  Location: [facility];  Service: Urology    FL RETROGRADE PYELOGRAM  10/23/2024    IL CYSTO/URETERO W/LITHOTRIPSY &INDWELL STENT INSRT Left 10/23/2024    Procedure: CYSTOSCOPY URETEROSCOPY WITH LITHOTRIPSY HOLMIUM LASER, RETROGRADE PYELOGRAM AND INSERTION STENT URETERAL;  Surgeon: Eduardo Rivera MD;  Location: [facility];  Service: Urology    SHOULDER SURGERY Left     for dislocation x 2, 20 years ago an the second 18 years ago           10/26/24 0934   PT Last Visit   PT Visit Date 10/26/24   Note Type   Note type Evaluation   Pain Assessment   Pain Assessment Tool 0-10   Restrictions/Precautions   Other Precautions Chair Alarm;Fall Risk;Hard of hearing   Home Living   Type of Home House   Home Layout   (2 story home with 1st floor set up. 2 ESCOBAR with 2 rails.)   Bathroom Shower/Tub Tub/shower unit   Bathroom Toilet Standard   Bathroom Accessibility Accessible   Home Equipment Walker;Cane   Additional Comments Pt has been walking with a walker at home.   Prior Function  "  Level of [place] Independent with ADLs;Independent with functional mobility;Needs assistance with IADLS   Lives With Spouse   Receives Help From Family   IADLs Family/Friend/Other provides transportation;Family/Friend/Other provides meals;Family/Friend/Other provides medication management   Falls in the last 6 months 0   General   Family/Caregiver Present No   Cognition   Overall Cognitive Status WFL   Arousal/Participation Alert  (Anxious)   Orientation Level Oriented X4   Memory Within functional limits   Following Commands Follows one step commands with increased time or repetition   Comments Pt limited by [place].   Subjective   Subjective \"I can't hear anything. I need my hearing aid batteries to be charged.\"   RLE Assessment   RLE Assessment WFL   Strength RLE   RLE Overall Strength   (Hip 4-/5, knee and ankle 4/5)   LLE Assessment   LLE Assessment WFL   Strength LLE   LLE Overall Strength   (Hip 4-/5, knee and ankle 4/5)   Coordination   Movements are Fluid and Coordinated 1   Bed Mobility   Additional Comments Pt OOB in chair at start of eval.   Transfers   Sit to Stand   (CGA)   Additional items Verbal cues   Stand to Sit   (CGA)   Additional items Verbal cues   Additional Comments Intermittent cues for safe hand placement with transfers. CGA provided due to pt reporting that he felt unsteady and shaky.   Ambulation/Elevation   Gait pattern   (Decreased step length, decreased gait speed.)   Gait Assistance   (CGA to S)   Assistive Device Rolling walker   Distance 200   Stair Management Assistance   (CGA)   Additional items Verbal cues   Stair Management Technique Two rails  (reciprocal step pattern ascending, non reciprocal pattern descending)   Number of Stairs 5   Balance   Static Sitting Fair +   Dynamic Sitting Fair +   Static Standing Fair  (With RW)   Dynamic Standing Fair  (With RW)   Ambulatory Fair  (With RW)   Activity Tolerance   Activity Tolerance Patient tolerated treatment well   Nurse Made " Aware Nsg made aware that pt was reporting anxiety.   Assessment   Prognosis Good   Problem List Decreased strength;Decreased endurance;Impaired balance;Decreased mobility;Decreased safety awareness   Assessment Pt is 74 y.o. male seen for PT evaluation on 10/26/24 s/p admit to [facility] on 10/25/24 w/ dx UTI, dizziness, shakiness. PT consulted to assess pt's functional mobility and d/c needs. Order placed for PT eval and tx.. Performed at least 2 patient identifiers during session: Name and wristband. Comorbidities affecting pt's physical performance at time of assessment include: recent hospital stay from 10/21-10/24/24 due to L ureteral stone, NATASHA cystoscopy, lithotripsy, retrograde pyelogram, ureteral stent, alcohol dependency, depression, [place], BPH. LOF prior to admission was S with gait with RW. Personal factors affecting pt at time of IE include: recent hospitalization, steps to enter home, uses RW for gait, anxiety. Please find objective findings from PT assessment regarding body systems outlined above with impairments and limitations including weakness, impaired balance, decreased endurance, pain, decreased activity tolerance and fall risk, as well as mobility assessment.  Pt was motivated to participate. Limited by [place]. Pt reported feeling shaky therefore CGA provided for mobility with the RW.  Pt was able to ascend/descend 5 steps with 2 rails with CGA.  O2 sats 96% on room air, HR 60 bpm with activity. Pt's clinical presentation is currently unstable/unpredictable seen in pt's presentation of ongoing medical assessment. Given co-morbidities and presented presentation, high level eval was completed.  Pt to benefit from continued PT tx to address deficits as defined above and maximize level of functional independent mobility and consistency. From PT/mobility standpoint, recommendation at time of d/c would be minimum resource intensity in order to promote return to PLOF and independence.   Goals    Patient Goals To go home   LTG Expiration Date 11/09/24   Long Term Goal #1 Transfers- mod I from all surfaces.   Long Term Goal #2 Gait - 250' with RW with mod I. Steps - 2 steps with 2 rails so that pt can enter/exit home.   Plan   Treatment/Interventions Functional transfer training;LE strengthening/ROM;Elevations;Therapeutic exercise;Endurance training;Patient/family training;Bed mobility;Gait training   PT Frequency 3-5x/wk   Discharge Recommendation   Rehab Resource Intensity Level, PT III (Minimum Resource Intensity)   AM-PAC Basic Mobility Inpatient   Turning in Flat Bed Without Bedrails 3   Lying on Back to Sitting on Edge of Flat Bed Without Bedrails 3   Moving Bed to Chair 3   Standing Up From Chair Using Arms 3   Walk in Room 3   Climb 3-5 Stairs With Railing 3   Basic Mobility Inpatient Raw Score 18   Basic Mobility Standardized Score 41.05   [facility] Highest Level Of Mobility   JH-HLM Goal 6: Walk 10 steps or more   JH-HLM Achieved 7: Walk 25 feet or more   End of Consult   Patient Position at End of Consult Bedside chair;Bed/Chair alarm activated;All needs within reach

## 2024-10-26 NOTE — ASSESSMENT & PLAN NOTE
Related to recent ureteral stent placement due to kidney stones.  Continue empiric IV ceftriaxone and await final urine c+s.

## 2024-10-26 NOTE — ASSESSMENT & PLAN NOTE
This complicates all aspects of patient care.  I advised the patient to lose weight through lifestyle modification.

## 2024-10-27 LAB
ANION GAP SERPL CALCULATED.3IONS-SCNC: 10 MMOL/L (ref 4–13)
ATRIAL RATE: 58 BPM
BUN SERPL-MCNC: 19 MG/DL (ref 5–25)
CALCIUM SERPL-MCNC: 8.9 MG/DL (ref 8.4–10.2)
CHLORIDE SERPL-SCNC: 96 MMOL/L (ref 96–108)
CO2 SERPL-SCNC: 29 MMOL/L (ref 21–32)
CREAT SERPL-MCNC: 1.42 MG/DL (ref 0.6–1.3)
GFR SERPL CREATININE-BSD FRML MDRD: 48 ML/MIN/1.73SQ M
GLUCOSE SERPL-MCNC: 121 MG/DL (ref 65–140)
MAGNESIUM SERPL-MCNC: 1.9 MG/DL (ref 1.9–2.7)
P AXIS: 42 DEGREES
POTASSIUM SERPL-SCNC: 3.7 MMOL/L (ref 3.5–5.3)
PR INTERVAL: 168 MS
QRS AXIS: 42 DEGREES
QRSD INTERVAL: 104 MS
QT INTERVAL: 416 MS
QTC INTERVAL: 408 MS
SODIUM SERPL-SCNC: 135 MMOL/L (ref 135–147)
T WAVE AXIS: 38 DEGREES
VENTRICULAR RATE: 58 BPM

## 2024-10-27 PROCEDURE — 99233 SBSQ HOSP IP/OBS HIGH 50: CPT | Performed by: HOSPITALIST

## 2024-10-27 PROCEDURE — 83735 ASSAY OF MAGNESIUM: CPT | Performed by: HOSPITALIST

## 2024-10-27 PROCEDURE — 80048 BASIC METABOLIC PNL TOTAL CA: CPT | Performed by: HOSPITALIST

## 2024-10-27 PROCEDURE — 93010 ELECTROCARDIOGRAM REPORT: CPT | Performed by: INTERNAL MEDICINE

## 2024-10-27 RX ORDER — BUTALBITAL, ACETAMINOPHEN AND CAFFEINE 50; 325; 40 MG/1; MG/1; MG/1
1 TABLET ORAL EVERY 4 HOURS PRN
Status: DISCONTINUED | OUTPATIENT
Start: 2024-10-27 | End: 2024-10-29 | Stop reason: HOSPADM

## 2024-10-27 RX ORDER — CHLORDIAZEPOXIDE HYDROCHLORIDE 5 MG/1
10 CAPSULE, GELATIN COATED ORAL EVERY 8 HOURS SCHEDULED
Status: DISCONTINUED | OUTPATIENT
Start: 2024-10-27 | End: 2024-10-29

## 2024-10-27 RX ORDER — SODIUM CHLORIDE 9 MG/ML
75 INJECTION, SOLUTION INTRAVENOUS CONTINUOUS
Status: DISCONTINUED | OUTPATIENT
Start: 2024-10-27 | End: 2024-10-29 | Stop reason: HOSPADM

## 2024-10-27 RX ORDER — LORAZEPAM 1 MG/1
2 TABLET ORAL ONCE
Status: COMPLETED | OUTPATIENT
Start: 2024-10-27 | End: 2024-10-27

## 2024-10-27 RX ADMIN — Medication 6 MG: at 21:04

## 2024-10-27 RX ADMIN — TAMSULOSIN HYDROCHLORIDE 0.4 MG: 0.4 CAPSULE ORAL at 17:01

## 2024-10-27 RX ADMIN — CHLORDIAZEPOXIDE HYDROCHLORIDE 10 MG: 5 CAPSULE ORAL at 14:45

## 2024-10-27 RX ADMIN — THIAMINE HCL TAB 100 MG 100 MG: 100 TAB at 08:40

## 2024-10-27 RX ADMIN — ENOXAPARIN SODIUM 40 MG: 40 INJECTION SUBCUTANEOUS at 08:41

## 2024-10-27 RX ADMIN — MULTIPLE VITAMINS W/ MINERALS TAB 1 TABLET: TAB ORAL at 08:40

## 2024-10-27 RX ADMIN — CHLORDIAZEPOXIDE HYDROCHLORIDE 25 MG: 25 CAPSULE ORAL at 05:46

## 2024-10-27 RX ADMIN — LORAZEPAM 2 MG: 1 TABLET ORAL at 19:33

## 2024-10-27 RX ADMIN — BUTALBITAL, ACETAMINOPHEN, AND CAFFEINE 1 TABLET: 325; 50; 40 TABLET ORAL at 14:45

## 2024-10-27 RX ADMIN — SODIUM CHLORIDE 75 ML/HR: 0.9 INJECTION, SOLUTION INTRAVENOUS at 21:07

## 2024-10-27 RX ADMIN — SERTRALINE 100 MG: 100 TABLET, FILM COATED ORAL at 08:41

## 2024-10-27 RX ADMIN — CHLORDIAZEPOXIDE HYDROCHLORIDE 10 MG: 5 CAPSULE ORAL at 21:04

## 2024-10-27 RX ADMIN — FINASTERIDE 5 MG: 5 TABLET, FILM COATED ORAL at 08:41

## 2024-10-27 RX ADMIN — FOLIC ACID 1 MG: 1 TABLET ORAL at 08:40

## 2024-10-27 RX ADMIN — SODIUM CHLORIDE 75 ML/HR: 0.9 INJECTION, SOLUTION INTRAVENOUS at 08:42

## 2024-10-27 NOTE — PROGRESS NOTES
Progress Note - Hospitalist   Name: Fredy Yu 74 y.o. male I MRN: [id number]  Unit/Bed#: 422-01 I Date of Admission: 10/25/2024   Date of Service: 10/27/2024 I Hospital Day: 1    Assessment & Plan  Complicated UTI (urinary tract infection)  Recent stent placement on 10/23  Patient reports no overt urinary symptoms, other than some burning with urination, but this only occurred after cystoscopy procedure  UA with innumerable RBCs, WBC 10-20, occasional bacteria  No leukocytosis, afebrile  Will cover empirically given recent cystoscopy, low suspicion true UTI   Await cultures   -Urine culture from 10/23 with less than 10,000 colonies of Aerococcus and corynebacterium   -Urine culture from 10/25 with no growth  Stop Antibiotics    Alcohol use disorder, mild, in early remission, abuse  Patient reported drinking a gallon of vodka, last drink 3 to 4 days prior to admission. Patient feels shaky, feels like he is going through alcohol withdrawal  CIWA  Librium taper, wean to 10mg TID today  Thiamine, folic acid  Symptoms have improved  Ureteral stent present  See above, continue previously scheduled outpatient follow-up with urology  Patient was instructed to self remove stent on 10/26  Patient now in hospital, reached out to Urology for further recommendations  Plan to remove tomorrow (did not place formal consult since this was discussed, can reach out to Surgical [facility] in morning)  NATASHA (acute kidney injury) (HCC)  Baselines ~1.2 but does appear to fluctuate to higher ranges on review  Cr worsened 1.16 --> 1.42  He was given a dose of Toradol the day prior to pain  Will start IVF  Trend Cr  Avoid nephrotoxic medications  Essential hypertension  Resume home antihypertensives  Hypomagnesemia  Magnesium repleted and improved  Obesity (BMI 30-39.9)  Recommend lifestyle changes    VTE Pharmacologic Prophylaxis:   High Risk (Score >/= 5) - Pharmacological DVT Prophylaxis Ordered: enoxaparin (Lovenox). Sequential Compression  Devices Ordered.    Mobility:   Basic Mobility Inpatient Raw Score: 18  JH-HLM Goal: 6: Walk 10 steps or more  JH-HLM Achieved: 6: Walk 10 steps or more  JH-HLM Goal achieved. Continue to encourage appropriate mobility.    Patient Centered Rounds: I performed bedside rounds with nursing staff today.   Discussions with Specialists or Other Care Team Provider: urology    Education and Discussions with Family / Patient: Updated  (wife) via phone.    Current Length of Stay: 1 day(s)  Current Patient Status: Inpatient   Certification Statement: The patient will continue to require additional inpatient hospital stay due to EtOH withdrawal, [place]  Discharge Plan: Anticipate discharge in 24-48 hrs to home.    Code Status: Level 1 - Full Code    Subjective   Patient states his shakiness and withdrawal type symptoms feel little bit improved today.    Objective :  Temp:  [97.8 °F (36.6 °C)-99.3 °F (37.4 °C)] 97.8 °F (36.6 °C)  HR:  [54-71] 62  BP: (112-142)/(63-74) 140/70  Resp:  [16-17] 17  SpO2:  [90 %-97 %] 94 %  O2 Device: Nasal cannula  Nasal Cannula O2 Flow Rate (L/min):  [2 L/min] 2 L/min    Body mass index is 37.16 kg/m².     Input and Output Summary (last 24 hours):     Intake/Output Summary (Last 24 hours) at 10/27/2024 1155  Last data filed at 10/27/2024 1035  Gross per 24 hour   Intake 720 ml   Output 650 ml   Net 70 ml       Physical Exam  Vitals and nursing note reviewed.   Constitutional:       General: He is not in acute distress.     Appearance: He is well-developed. He is obese.   HENT:      Head: Normocephalic and atraumatic.   Eyes:      Conjunctiva/sclera: Conjunctivae normal.   Cardiovascular:      Rate and Rhythm: Normal rate and regular rhythm.      Heart sounds: No murmur heard.  Pulmonary:      Effort: Pulmonary effort is normal. No respiratory distress.      Breath sounds: Normal breath sounds.   Abdominal:      Palpations: Abdomen is soft.      Tenderness: There is no abdominal  tenderness.   Musculoskeletal:         General: No swelling.      Cervical back: Neck supple.   Skin:     General: Skin is warm and dry.      Capillary Refill: Capillary refill takes less than 2 seconds.   Neurological:      Mental Status: He is alert.      Comments: Very hard of hearing.  Very mild tremor improved from prior of the upper extremities   Psychiatric:         Mood and Affect: Mood normal.           Lines/Drains:              Lab Results: I have reviewed the following results:   Results from last 7 days   Lab Units 10/26/24  0458 10/25/24  1738 10/23/24  0539 10/21/24  1254   WBC Thousand/uL 5.45 5.18   < > 8.80   HEMOGLOBIN g/dL 11.3* 11.3*   < > 12.8   HEMATOCRIT % 34.4* 35.2*   < > 38.1   PLATELETS Thousands/uL 194 181   < > 169   BANDS PCT %  --  4  --   --    SEGS PCT %  --   --   --  72   LYMPHO PCT %  --  41   < > 13*   MONO PCT %  --  12   < > 13*   EOS PCT %  --  1   < > 1    < > = values in this interval not displayed.     Results from last 7 days   Lab Units 10/27/24  0441 10/26/24  0458   SODIUM mmol/L 135 137   POTASSIUM mmol/L 3.7 3.8   CHLORIDE mmol/L 96 96   CO2 mmol/L 29 33*   BUN mg/dL 19 14   CREATININE mg/dL 1.42* 1.16   ANION GAP mmol/L 10 8   CALCIUM mg/dL 8.9 8.9   ALBUMIN g/dL  --  3.7   TOTAL BILIRUBIN mg/dL  --  0.24   ALK PHOS U/L  --  54   ALT U/L  --  31   AST U/L  --  32   GLUCOSE RANDOM mg/dL 121 104     Results from last 7 days   Lab Units 10/22/24  0413   INR  1.08             Results from last 7 days   Lab Units 10/26/24  0458 10/25/24  1738   LACTIC ACID mmol/L  --  0.9   PROCALCITONIN ng/ml 0.08  --        Recent Cultures (last 7 days):   Results from last 7 days   Lab Units 10/25/24  1912 10/23/24  1026   URINE CULTURE  No Growth <1000 cfu/mL <1000 cfu/ml Aerococcus urinae*  <1000 cfu/ml Corynebacterium glucuronolyticum*       Imaging Results Review: No pertinent imaging studies reviewed.  Other Study Results Review: No additional pertinent studies reviewed.    Last  24 Hours Medication List:     Current Facility-Administered Medications:     acetaminophen (TYLENOL) tablet 650 mg, Q6H PRN    cefTRIAXone (ROCEPHIN) IVPB (premix in dextrose) 1,000 mg 50 mL, Q24H, Last Rate: 1,000 mg (10/26/24 2109)    chlordiazePOXIDE (LIBRIUM) capsule 10 mg, Q8H BRENDA    enoxaparin (LOVENOX) subcutaneous injection 40 mg, Daily    finasteride (PROSCAR) tablet 5 mg, Daily    folic acid (FOLVITE) tablet 1 mg, Daily    melatonin tablet 6 mg, HS    multivitamin-minerals (CENTRUM) tablet 1 tablet, Daily    ondansetron (ZOFRAN) injection 4 mg, Q6H PRN    oxybutynin (DITROPAN) tablet 5 mg, TID PRN    polyethylene glycol (MIRALAX) packet 17 g, Daily    sertraline (ZOLOFT) tablet 100 mg, Daily    sodium chloride 0.9 % infusion, Continuous, Last Rate: 75 mL/hr (10/27/24 0842)    tamsulosin (FLOMAX) capsule 0.4 mg, Daily With Dinner    thiamine tablet 100 mg, Daily    Administrative Statements   Today, Patient Was Seen By: Feng Ta DO      **Please Note: This note may have been constructed using a voice recognition system.**

## 2024-10-27 NOTE — ASSESSMENT & PLAN NOTE
Baselines ~1.2 but does appear to fluctuate to higher ranges on review  Cr worsened 1.16 --> 1.42  He was given a dose of Toradol the day prior to pain  Will start IVF  Trend Cr  Avoid nephrotoxic medications

## 2024-10-27 NOTE — ASSESSMENT & PLAN NOTE
Patient reported drinking a gallon of vodka, last drink 3 to 4 days prior to admission. Patient feels shaky, feels like he is going through alcohol withdrawal  CIWA  Librium taper, wean to 10mg TID today  Thiamine, folic acid  Symptoms have improved

## 2024-10-27 NOTE — ASSESSMENT & PLAN NOTE
Recent stent placement on 10/23  Patient reports no overt urinary symptoms, other than some burning with urination, but this only occurred after cystoscopy procedure  UA with innumerable RBCs, WBC 10-20, occasional bacteria  No leukocytosis, afebrile  Will cover empirically given recent cystoscopy, low suspicion true UTI   Await cultures   -Urine culture from 10/23 with less than 10,000 colonies of Aerococcus and corynebacterium   -Urine culture from 10/25 with no growth  Stop Antibiotics

## 2024-10-27 NOTE — ASSESSMENT & PLAN NOTE
See above, continue previously scheduled outpatient follow-up with urology  Patient was instructed to self remove stent on 10/26  Patient now in hospital, reached out to Urology for further recommendations  Plan to remove tomorrow (did not place formal consult since this was discussed, can reach out to [facility] in morning)

## 2024-10-28 ENCOUNTER — APPOINTMENT (INPATIENT)
Dept: RADIOLOGY | Facility: HOSPITAL | Age: 74
DRG: 699 | End: 2024-10-28
Payer: MEDICARE

## 2024-10-28 PROBLEM — D53.9 MACROCYTIC ANEMIA: Status: ACTIVE | Noted: 2024-10-28

## 2024-10-28 PROBLEM — F10.90 ALCOHOL USE DISORDER: Status: ACTIVE | Noted: 2019-02-12

## 2024-10-28 PROBLEM — E66.9 OBESITY (BMI 30-39.9): Chronic | Status: RESOLVED | Noted: 2024-10-26 | Resolved: 2024-10-28

## 2024-10-28 PROBLEM — F41.9 ANXIETY: Status: ACTIVE | Noted: 2024-10-28

## 2024-10-28 LAB
ANION GAP SERPL CALCULATED.3IONS-SCNC: 9 MMOL/L (ref 4–13)
BUN SERPL-MCNC: 13 MG/DL (ref 5–25)
CALCIUM SERPL-MCNC: 8.4 MG/DL (ref 8.4–10.2)
CHLORIDE SERPL-SCNC: 100 MMOL/L (ref 96–108)
CO2 SERPL-SCNC: 27 MMOL/L (ref 21–32)
CREAT SERPL-MCNC: 1.07 MG/DL (ref 0.6–1.3)
ERYTHROCYTE [DISTWIDTH] IN BLOOD BY AUTOMATED COUNT: 14.9 % (ref 11.6–15.1)
GFR SERPL CREATININE-BSD FRML MDRD: 68 ML/MIN/1.73SQ M
GLUCOSE SERPL-MCNC: 113 MG/DL (ref 65–140)
HCT VFR BLD AUTO: 33.3 % (ref 36.5–49.3)
HGB BLD-MCNC: 10.5 G/DL (ref 12–17)
MCH RBC QN AUTO: 32.8 PG (ref 26.8–34.3)
MCHC RBC AUTO-ENTMCNC: 31.5 G/DL (ref 31.4–37.4)
MCV RBC AUTO: 104 FL (ref 82–98)
PLATELET # BLD AUTO: 202 THOUSANDS/UL (ref 149–390)
PMV BLD AUTO: 8.5 FL (ref 8.9–12.7)
POTASSIUM SERPL-SCNC: 3.6 MMOL/L (ref 3.5–5.3)
RBC # BLD AUTO: 3.2 MILLION/UL (ref 3.88–5.62)
SODIUM SERPL-SCNC: 136 MMOL/L (ref 135–147)
WBC # BLD AUTO: 5.58 THOUSAND/UL (ref 4.31–10.16)

## 2024-10-28 PROCEDURE — 99222 1ST HOSP IP/OBS MODERATE 55: CPT | Performed by: STUDENT IN AN ORGANIZED HEALTH CARE EDUCATION/TRAINING PROGRAM

## 2024-10-28 PROCEDURE — 97167 OT EVAL HIGH COMPLEX 60 MIN: CPT

## 2024-10-28 PROCEDURE — 80048 BASIC METABOLIC PNL TOTAL CA: CPT | Performed by: HOSPITALIST

## 2024-10-28 PROCEDURE — NC001 PR NO CHARGE

## 2024-10-28 PROCEDURE — 99222 1ST HOSP IP/OBS MODERATE 55: CPT | Performed by: UROLOGY

## 2024-10-28 PROCEDURE — 99232 SBSQ HOSP IP/OBS MODERATE 35: CPT | Performed by: INTERNAL MEDICINE

## 2024-10-28 PROCEDURE — 85027 COMPLETE CBC AUTOMATED: CPT | Performed by: HOSPITALIST

## 2024-10-28 PROCEDURE — 74018 RADEX ABDOMEN 1 VIEW: CPT

## 2024-10-28 RX ORDER — GABAPENTIN 300 MG/1
300 CAPSULE ORAL 3 TIMES DAILY
Status: DISCONTINUED | OUTPATIENT
Start: 2024-10-28 | End: 2024-10-29 | Stop reason: HOSPADM

## 2024-10-28 RX ORDER — LORAZEPAM 2 MG/ML
1 INJECTION INTRAMUSCULAR ONCE
Status: COMPLETED | OUTPATIENT
Start: 2024-10-28 | End: 2024-10-28

## 2024-10-28 RX ORDER — AMLODIPINE BESYLATE 5 MG/1
5 TABLET ORAL DAILY
Status: DISCONTINUED | OUTPATIENT
Start: 2024-10-28 | End: 2024-10-29 | Stop reason: HOSPADM

## 2024-10-28 RX ORDER — CEFTRIAXONE 1 G/50ML
1000 INJECTION, SOLUTION INTRAVENOUS EVERY 24 HOURS
Status: DISCONTINUED | OUTPATIENT
Start: 2024-10-28 | End: 2024-10-29 | Stop reason: HOSPADM

## 2024-10-28 RX ADMIN — AMLODIPINE BESYLATE 5 MG: 5 TABLET ORAL at 12:51

## 2024-10-28 RX ADMIN — FOLIC ACID 1 MG: 1 TABLET ORAL at 08:45

## 2024-10-28 RX ADMIN — ENOXAPARIN SODIUM 40 MG: 40 INJECTION SUBCUTANEOUS at 08:45

## 2024-10-28 RX ADMIN — MULTIPLE VITAMINS W/ MINERALS TAB 1 TABLET: TAB ORAL at 08:45

## 2024-10-28 RX ADMIN — GABAPENTIN 300 MG: 300 CAPSULE ORAL at 13:36

## 2024-10-28 RX ADMIN — LORAZEPAM 1 MG: 2 INJECTION INTRAMUSCULAR; INTRAVENOUS at 08:43

## 2024-10-28 RX ADMIN — THIAMINE HCL TAB 100 MG 100 MG: 100 TAB at 08:45

## 2024-10-28 RX ADMIN — CHLORDIAZEPOXIDE HYDROCHLORIDE 10 MG: 5 CAPSULE ORAL at 21:07

## 2024-10-28 RX ADMIN — THIAMINE HYDROCHLORIDE 500 MG: 100 INJECTION, SOLUTION INTRAMUSCULAR; INTRAVENOUS at 13:38

## 2024-10-28 RX ADMIN — CHLORDIAZEPOXIDE HYDROCHLORIDE 10 MG: 5 CAPSULE ORAL at 13:36

## 2024-10-28 RX ADMIN — GABAPENTIN 300 MG: 300 CAPSULE ORAL at 21:07

## 2024-10-28 RX ADMIN — SERTRALINE 100 MG: 100 TABLET, FILM COATED ORAL at 08:45

## 2024-10-28 RX ADMIN — SERTRALINE HYDROCHLORIDE 50 MG: 50 TABLET ORAL at 13:36

## 2024-10-28 RX ADMIN — SODIUM CHLORIDE 75 ML/HR: 0.9 INJECTION, SOLUTION INTRAVENOUS at 10:21

## 2024-10-28 RX ADMIN — THIAMINE HYDROCHLORIDE 500 MG: 100 INJECTION, SOLUTION INTRAMUSCULAR; INTRAVENOUS at 21:08

## 2024-10-28 RX ADMIN — CEFTRIAXONE 1000 MG: 1 INJECTION, SOLUTION INTRAVENOUS at 12:48

## 2024-10-28 RX ADMIN — CHLORDIAZEPOXIDE HYDROCHLORIDE 10 MG: 5 CAPSULE ORAL at 05:16

## 2024-10-28 RX ADMIN — Medication 6 MG: at 21:07

## 2024-10-28 RX ADMIN — BUTALBITAL, ACETAMINOPHEN, AND CAFFEINE 1 TABLET: 325; 50; 40 TABLET ORAL at 21:15

## 2024-10-28 RX ADMIN — TAMSULOSIN HYDROCHLORIDE 0.4 MG: 0.4 CAPSULE ORAL at 16:48

## 2024-10-28 RX ADMIN — FINASTERIDE 5 MG: 5 TABLET, FILM COATED ORAL at 08:45

## 2024-10-28 NOTE — ASSESSMENT & PLAN NOTE
Hold HCTZ (hydrochlorothiazide) in the setting of acute kidney injury  Initiate amlodipine 5 mg PO Qdaily  Follow the blood pressure trend

## 2024-10-28 NOTE — CONSULTS
TELEConsultation - Behavioral Health   Fredy Yu 74 y.o. male MRN: [id number]  Unit/Bed#: 422-01 Encounter: [id number]  Hospital Day: 2    REQUIRED DOCUMENTATION:     1. This service was provided via Telemedicine.  2. Provider located in [place].  3. TeleMed provider: Hernandez Singleton MD  4. Identify all parties in room with patient during tele consult: None  5. After connecting through televideo, patient was identified by name and date of birth. Parent/patient was then informed that this was being conducted confidentially over secure lines. My office door was closed. Parties in the room listed above as per #4.  Patient acknowledged consent and understanding of privacy and security of the Telemedicine visit. The patient is aware this is a billable service and understands that he can discontinue the visit at any time. I informed the patient that I have reviewed their record in Epic and presented the opportunity for them to ask any questions regarding the visit today.  The patient agreed to participate.     Assessment & Plan     Assessment: This patient is a 74-year-old male with a history of depression, anxiety, and alcohol use disorder, who presented due to urinary tract infection.  Psychiatry consulted to evaluate for increased anxiety, affecting his recent increase in alcohol abuse.  On assessment, patient is pleasant and cooperative, though notably anxious throughout encounter.  He reports increase in terms of depression and anxiety recently, and that he has been managed by his primary care provider.  He notes that this has been partially effective by his daughter who has been not responding to him recently.  He does report that his anxiety is worse in the hospital than it has been while he has been at home, though has overall had increased depression and anxiety as above.  He denies any SI, HI, or AVH and is able to appropriately plan for safety.  He does not demonstrate an imminent danger to his safety or to  the safety of others at this time.  However, he would benefit from medication adjustment due to the effect that this has had on his sobriety.    Plan & Recommendations:  Patient currently denies any SI, HI, or AVH.  He is requesting to be able to return home soon.  He does not demonstrate an imminent danger to his safety or to the safety of others and does not require an inpatient psychiatric hospitalization at this time.  Recommend case management to place referral for outpatient psychiatric services and drug and alcohol .     Medication recommendations as below:    Assessment & Plan  Unspecified mood (affective) disorder (HCC)  Increase Zoloft to 150 mg daily.  Restart Trazodone 50 mg QHS PRN for insomnia.  Anxiety  Restart home BuSpar 7.5 mg twice daily.  Restart home gabapentin at 300 mg 3 times daily with plan to increase to 400 mg 3 times daily tomorrow.  Recommend Atarax 50 mg q8 PRN for anxiety.  Alcohol use disorder  Recommend outpatient drug and alcohol counseling.       Diagnoses, available treatment options, alternatives to treatment, and risks vs. benefits of current psychiatric treatment plan were discussed with the patient.  Prior records were reviewed in [facility].  The case was discussed with the primary team.    History of Present Illness   Physician Requesting Consult: Jackson Mcdowell DO    Chief Complaint: Anxiety    Reason for Consult / Principal Problem: Anxiety    Fredy Yu is a 74 y.o. male with a history of depression, anxiety, and alcohol use disorder, admitted for complicated urinary tract infection. Prior records were reviewed.  Patient was recently admitted to [facility] where he had undergone ureteral stent placement due to kidney stone.  He presented back to the hospital with symptoms of urinary tract infection.  Psychiatry consulted due to increased alcohol abuse in the setting of increased anxiety and depression.  Patient has been calm and  cooperative on the floor without any acute behavioral issues or acute safety concerns.    Today, patient is pleasant and cooperative with interview, though does have some limited ability to participate in interview due to his difficulty with hearing.  However, he does answer direct questions appropriately.  He reports that he has been struggling with increased pressure and anxiety for the past 2 months.  He notes that prior to this, his daughter had undergone a major life change after her significant other had left her.  He states that after this, she moved to [place] and has not been communicating with him.  He notes that this has been very difficult for him and he has been increasingly depressed and anxious at home.  He states that he has a history of alcohol abuse and that he has started drinking more within the last 2 months due to this issue and states that this was after a 3-year period of sobriety.  Patient notes that he does not see a psychiatrist and is manage primarily by his primary care provider.  He has been taking Zoloft and buspirone.  He also reports taking gabapentin at home for neuropathy.  He notes that he has been increasingly more anxious here in the hospital and has been off of the gabapentin and buspirone.  He does feel that, despite this, he still struggles with increased symptoms of depression and anxiety at home.  He notes that he has panic attacks.  He endorses low mood, decreased interest, poor sleep, no major changes in appetite.  He denies any SI, HI, or AVH.  He does not demonstrate any paranoid or delusional thoughts.  He denies any periods of time consistent with marlyn including decreased need for sleep with excessively elevated mood and energy.  Patient is amenable to titration of Zoloft and to restart gabapentin as he did feel that he was doing better with his anxiety at home than he has been in the hospital.  He also does find that trazodone helps him sleep at night and would  like to continue this.    Psychiatric Review Of Systems:  Problems with sleep: yes, decreased  Appetite changes: no  Weight changes: yes, decreased  Low energy/anergy: yes  Low interest/pleasure/anhedonia: yes  Somatic symptoms: yes  Anxiety/panic: yes  Natalee: no  Guilt/hopeless: yes  Self injurious behavior/risky behavior: no    Historical Information   Psychiatric History:   Diagnoses: Unspecified depression and anxiety, alcohol use disorder  Inpatient Hx: Denies  Outpatient Hx: Sees primary care provider  Suicide attempts: Denies  Medications/Trials: Zoloft, buspirone, trazodone, gabapentin    Substance Abuse History:    Social History     Tobacco Use    Smoking status: Never    Smokeless tobacco: Never   Vaping Use    Vaping status: Never Used   Substance Use Topics    Alcohol use: Not Currently     Comment: pint of vodka everyday- states that he hasn't drank in one week    Drug use: Never      Patient reports a history of chronic alcohol abuse.  He is unclear as to when this had started.  He notes that he did have a period of sobriety for 3 years until relapsing approximately 2 months ago.  He denies any other recreational substance use including marijuana.  He denies any tobacco use.   I have assessed this patient for substance use within the past 12 months    Family History:   Patient reports that his daughter has a history of depression. No other known family history of psychiatric illness, suicide attempt or substance abuse.    Social History  Currently living: [place] with his wife  Relationships:   Children: One daughter  Occupation: Retired  Rest of social history as per below:  Social History     Socioeconomic History    Marital status: /Civil Union     Spouse name: Not on file    Number of children: Not on file    Years of education: Not on file    Highest education level: Not on file   Occupational History    Not on file   Tobacco Use    Smoking status: Never    Smokeless tobacco:  Never   Vaping Use    Vaping status: Never Used   Substance and Sexual Activity    Alcohol use: Not Currently     Comment: pint of vodka everyday- states that he hasn't drank in one week    Drug use: Never    Sexual activity: Not Currently     Partners: Female   Other Topics Concern    Not on file   Social History Narrative    Not on file     Social Determinants of Health     Financial Resource Strain: Low Risk  (2024)    Overall Financial Resource Strain (CARDIA)     Difficulty of Paying Living Expenses: Not very hard   Food Insecurity: No Food Insecurity (10/25/2024)    Nursing - Inadequate Food Risk Classification     Worried About Running Out of Food in the Last Year: Never true     Ran Out of Food in the Last Year: Never true     Ran Out of Food in the Last Year: 1   Transportation Needs: No Transportation Needs (10/25/2024)    Nursing - Transportation Risk Classification     Lack of Transportation: Not on file     Lack of Transportation: 2   Physical Activity: Not on file   Stress: Not on file   Social Connections: Not on file   Intimate Partner Violence: Unknown (10/25/2024)    Nursing IPS     Feels Physically and Emotionally Safe: Not on file     Physically Hurt by Someone: Not on file     Humiliated or Emotionally Abused by Someone: Not on file     Physically Hurt by Someone: 2     Hurt or Threatened by Someone: 2   Housing Stability: Unknown (10/25/2024)    Nursing: Inadequate Housing Risk Classification     Has Housing: Not on file     Worried About Losing Housing: Not on file     Unable to Get Utilities: Not on file     Unable to Pay for Housing in the Last Year: 2     Has Housin       Past Medical History:   Diagnosis Date    Alcohol dependency (HCC)     Anxiety     Arthritis     Depression     [place] (hard of hearing)     Hypertension     Kidney stones     Peripheral neuropathy     Prostate enlargement     Renal disorder     kidney    Shoulder dislocation        Meds/Allergies   Allergies  "  Allergen Reactions    Sulfa Antibiotics Anaphylaxis and Rash     Face Swelling    Sulfacetamide Anaphylaxis and Rash     Face Swelling    Misc. Sulfonamide Containing Compounds Facial Swelling    Elemental Sulfur Rash and Edema       Current Facility-Administered Medications:     acetaminophen (TYLENOL) tablet 650 mg, Q6H PRN    amLODIPine (NORVASC) tablet 5 mg, Daily    butalbital-acetaminophen-caffeine (FIORICET,ESGIC) -40 mg per tablet 1 tablet, Q4H PRN    cefTRIAXone (ROCEPHIN) IVPB (premix in dextrose) 1,000 mg 50 mL, Q24H    chlordiazePOXIDE (LIBRIUM) capsule 10 mg, Q8H BRENDA    enoxaparin (LOVENOX) subcutaneous injection 40 mg, Daily    finasteride (PROSCAR) tablet 5 mg, Daily    folic acid (FOLVITE) tablet 1 mg, Daily    melatonin tablet 6 mg, HS    multivitamin-minerals (CENTRUM) tablet 1 tablet, Daily    ondansetron (ZOFRAN) injection 4 mg, Q6H PRN    oxybutynin (DITROPAN) tablet 5 mg, TID PRN    polyethylene glycol (MIRALAX) packet 17 g, Daily    sertraline (ZOLOFT) tablet 100 mg, Daily    sodium chloride 0.9 % infusion, Continuous, Last Rate: 75 mL/hr (10/28/24 1021)    tamsulosin (FLOMAX) capsule 0.4 mg, Daily With Dinner    thiamine (VITAMIN B1) 500 mg in sodium chloride 0.9 % 50 mL IVPB, TID    Current Medications:  Current medications as per above. All medications have been reviewed.   Risks, benefits, alternatives, and possible side effects of patient's psychiatric medications were discussed with patient.     Objective   Vital signs in last 24 hours:  Temp:  [97.5 °F (36.4 °C)-98.6 °F (37 °C)] 97.5 °F (36.4 °C)  HR:  [56-71] 67  BP: (134-159)/(70-77) 159/77  Resp:  [16-18] 16  SpO2:  [92 %-100 %] 100 %  O2 Device: Nasal cannula  Nasal Cannula O2 Flow Rate (L/min):  [2 L/min] 2 L/min    Mental Status Exam:  Appearance:  dressed appropriately and adequately groomed   Behavior:  pleasant, cooperative, and appears anxious   Speech:  normal rate and volume   Mood:  \"depressed\"   Affect:  " constricted   Thought Process:  linear and goal directed   Associations: intact associations   Thought Content:  no overt delusions   Perceptual Disturbances: denies auditory or visual hallucinations when asked, does not appear responding to internal stimuli   Risk Potential: Suicidal ideation - None  Homicidal ideation - None  Potential for aggression - Not at present   Sensorium:  oriented to person, place, and time/date   Memory:  recent and remote memory grossly intact   Consciousness:  alert and awake   Attention/Concentration: attention span and concentration are age appropriate   Insight:  fair   Judgment: limited   Gait/Station: normal gait/station, normal balance   Motor Activity: no abnormal movements       Laboratory results:  I have personally reviewed all pertinent laboratory/tests results.  Recent Results (from the past 48 hour(s))   Basic metabolic panel    Collection Time: 10/27/24  4:41 AM   Result Value Ref Range    Sodium 135 135 - 147 mmol/L    Potassium 3.7 3.5 - 5.3 mmol/L    Chloride 96 96 - 108 mmol/L    CO2 29 21 - 32 mmol/L    ANION GAP 10 4 - 13 mmol/L    BUN 19 5 - 25 mg/dL    Creatinine 1.42 (H) 0.60 - 1.30 mg/dL    Glucose 121 65 - 140 mg/dL    Calcium 8.9 8.4 - 10.2 mg/dL    eGFR 48 ml/min/1.73sq m   Magnesium    Collection Time: 10/27/24  4:41 AM   Result Value Ref Range    Magnesium 1.9 1.9 - 2.7 mg/dL   CBC and Platelet    Collection Time: 10/28/24  4:18 AM   Result Value Ref Range    WBC 5.58 4.31 - 10.16 Thousand/uL    RBC 3.20 (L) 3.88 - 5.62 Million/uL    Hemoglobin 10.5 (L) 12.0 - 17.0 g/dL    Hematocrit 33.3 (L) 36.5 - 49.3 %     (H) 82 - 98 fL    MCH 32.8 26.8 - 34.3 pg    MCHC 31.5 31.4 - 37.4 g/dL    RDW 14.9 11.6 - 15.1 %    Platelets 202 149 - 390 Thousands/uL    MPV 8.5 (L) 8.9 - 12.7 fL   Basic metabolic panel    Collection Time: 10/28/24  4:18 AM   Result Value Ref Range    Sodium 136 135 - 147 mmol/L    Potassium 3.6 3.5 - 5.3 mmol/L    Chloride 100 96 - 108  mmol/L    CO2 27 21 - 32 mmol/L    ANION GAP 9 4 - 13 mmol/L    BUN 13 5 - 25 mg/dL    Creatinine 1.07 0.60 - 1.30 mg/dL    Glucose 113 65 - 140 mg/dL    Calcium 8.4 8.4 - 10.2 mg/dL    eGFR 68 ml/min/1.73sq m        Hernandez Singleton MD    This note has been constructed using a voice recognition system. There may be translation, syntax, or grammatical errors. If you have any questions, please contact the dictating provider.

## 2024-10-28 NOTE — ASSESSMENT & PLAN NOTE
Restart home BuSpar 7.5 mg twice daily.  Restart home gabapentin at 300 mg 3 times daily with plan to increase to 400 mg 3 times daily tomorrow.  Recommend Atarax 50 mg q8 PRN for anxiety.

## 2024-10-28 NOTE — QUICK NOTE
Left ureteral stent not visible on KUB.  Patient is confused, jittery.  He is unaware of spontaneous passage of the ureteral stent with urination.    [facility] representing [facility] here at this hospital will sign off at this point, reconsult if any problems or questions arise.    Gabe Bacon PA-C          XR ABDOMEN 1 VIEW KUB     INDICATION: Left ureteral stent placement..     COMPARISON: CT chest/abdomen/pelvis 10/21/2024.     FINDINGS:     Nonobstructive bowel gas pattern.     No evidence of pneumoperitoneum on this supine study. Upright or left lateral decubitus imaging is more sensitive to detect subtle free air in the appropriate setting.     Phleboliths in the pelvis. No definite urinary tract calculus, noting somewhat limited evaluation due to patient body habitus. Cholecystectomy clips.     Clear lung bases.     Bones are unremarkable for the patient's age.     IMPRESSION:     No definite urinary tract calculus, noting somewhat limited evaluation due to patient body habitus.

## 2024-10-28 NOTE — PLAN OF CARE
Problem: OCCUPATIONAL THERAPY ADULT  Goal: Performs self-care activities at highest level of function for planned discharge setting.  See evaluation for individualized goals.  Description: Treatment Interventions: ADL retraining, Functional transfer training, UE strengthening/ROM, Endurance training, Patient/family training, Equipment evaluation/education, Activityengagement          See flowsheet documentation for full assessment, interventions and recommendations.   Note: Limitation: Decreased ADL status, Decreased UE strength, Decreased Safe judgement during ADL, Decreased endurance, Decreased self-care trans, Decreased high-level ADLs     Assessment: Pt is a 74 y.o. male seen for OT evaluation s/p admit to [facility] on 10/25/2024 w/ Complicated UTI (urinary tract infection).  Comorbidities affecting pt's functional performance at time of assessment include:  complicated UTI, hypomagnesemia, NATASHA, alc use disorder, anemia,, pancreatitis, depression, neuropathy, prediabetes . Personal factors affecting pt at time of IE include:steps to enter environment, difficulty performing ADLS, difficulty performing IADLS , flat affect, decreased initiation and engagement , and health management . Prior to admission, pt was (I) with ADLs and (A) with IADLs with use of RW during mobility. Upon evaluation: Pt requires (S) level with use of RW during mobility 2* the following deficits impacting occupational performance: weakness, decreased strength, decreased balance, decreased tolerance, impaired initiation, and decreased safety awareness. Pt to benefit from continued skilled OT tx while in the hospital to address deficits as defined above and maximize level of functional independence w ADL's and functional mobility. Occupational Performance areas to address include: grooming, bathing/shower, toilet hygiene, dressing, functional mobility, community mobility, and clothing management. The patient's raw score on the AM-PAC Daily  Activity Inpatient Short Form is 21. A raw score of greater than or equal to 19 suggests the patient may benefit from discharge to home. Discharge recommendation at this time is level III minimum resource intensity.        Rehab Resource Intensity Level, OT: III (Minimum Resource Intensity)

## 2024-10-28 NOTE — OCCUPATIONAL THERAPY NOTE
Occupational Therapy Evaluation     Patient Name: Fredy Yu  Today's Date: 10/28/2024  Problem List  Principal Problem:    Complicated UTI (urinary tract infection)  Active Problems:    Essential hypertension    Alcohol use disorder, mild, in early remission, abuse    Hypomagnesemia    NATASHA (acute kidney injury) (HCC)    Ureteral stent present    Obesity (BMI 30-39.9)    Macrocytic anemia    Past Medical History  Past Medical History:   Diagnosis Date    Alcohol dependency (HCC)     Anxiety     Arthritis     Depression     [place] (hard of hearing)     Hypertension     Kidney stones     Peripheral neuropathy     Prostate enlargement     Renal disorder     kidney    Shoulder dislocation      Past Surgical History  Past Surgical History:   Procedure Laterality Date    CHOLECYSTECTOMY      2010    COLONOSCOPY      CYSTOSCOPY W/ STONE MANIPULATION N/A 10/23/2024    Procedure: BASKET STONE EXTRACTION;  Surgeon: Eduardo Rivera MD;  [facility];  Service: Urology    FL RETROGRADE PYELOGRAM  10/23/2024    IN CYSTO/URETERO W/LITHOTRIPSY &INDWELL STENT INSRT Left 10/23/2024    Procedure: CYSTOSCOPY URETEROSCOPY WITH LITHOTRIPSY HOLMIUM LASER, RETROGRADE PYELOGRAM AND INSERTION STENT URETERAL;  Surgeon: Eduardo Rivera MD;  [facility];  Service: Urology    SHOULDER SURGERY Left     for dislocation x 2, 20 years ago an the second 18 years ago             10/28/24 0923   OT Last Visit   OT Visit Date 10/28/24   Note Type   Note type Evaluation   Pain Assessment   Pain Score No Pain   Restrictions/Precautions   Weight Bearing Precautions Per Order No   Other Precautions Hard of hearing;Fall Risk;Multiple lines;Bed Alarm   Home Living   Type of Home House   Home Layout Two level;Performs ADLs on one level;Able to live on main level with bedroom/bathroom;Stairs to enter with rails;Other (Comment)  (2 ESCOBAR c x2 HR)   Bathroom Shower/Tub Tub/shower unit   Bathroom Toilet Standard   Bathroom  "Accessibility Accessible   Home Equipment Walker;Cane   Additional Comments pt reports use of RW at baseline during functional mobility   Prior Function   Level of [place] Independent with ADLs;Independent with functional mobility;Needs assistance with IADLS   Lives With Spouse   Receives Help From Family   IADLs Family/Friend/Other provides transportation   Falls in the last 6 months 0   Vocational Retired   Subjective   Subjective \"I asked several times yesterday to go for a walk\"   ADL   Where Assessed Edge of bed   LB Dressing Assistance 5  Supervision/Setup   LB Dressing Deficit Don/doff R sock;Don/doff L sock   Bed Mobility   Supine to Sit   (seated EOB at start of session)   Sit to Supine 5  Supervision   Additional items Bedrails;Increased time required   Additional Comments pt on RA with no complaints of SOB; SPO2 WFL throughout session   Transfers   Sit to Stand 5  Supervision   Additional items Increased time required;Verbal cues  (RW)   Stand to Sit 5  Supervision   Additional items Increased time required;Verbal cues  (RW)   Additional Comments pt performs functional transfers with RW at (S) level; no significant LOB or instability   Functional Mobility   Functional Mobility 5  Supervision   Additional Comments pt performs functional mobility with (S) level ~450ft with no significant LOB, however mild fatigue following activity   Additional items Rolling walker   Balance   Static Sitting Good   Dynamic Sitting Good   Static Standing Fair +   Dynamic Standing Fair +   Ambulatory Fair -   Activity Tolerance   Activity Tolerance Patient limited by fatigue   RUE Assessment   RUE Assessment WFL   LUE Assessment   LUE Assessment WFL   Hand Function   Gross Motor Coordination Functional   Fine Motor Coordination Functional   Sensation   Light Touch No apparent deficits   Sharp/Dull No apparent deficits   Psychosocial   Psychosocial (WDL) X   Patient Behaviors/Mood Flat affect;Anxious   Cognition   Overall " Cognitive Status WFL   Arousal/Participation Alert   Attention Within functional limits   Orientation Level Oriented X4   Memory Within functional limits   Following Commands Follows one step commands with increased time or repetition   Assessment   Limitation Decreased ADL status;Decreased UE strength;Decreased Safe judgement during ADL;Decreased endurance;Decreased self-care trans;Decreased high-level ADLs   Assessment Pt is a 74 y.o. male seen for OT evaluation s/p admit to [facility] on 10/25/2024 w/ Complicated UTI (urinary tract infection).  Comorbidities affecting pt's functional performance at time of assessment include:  complicated UTI, hypomagnesemia, NATASHA, alc use disorder, anemia,, pancreatitis, depression, neuropathy, prediabetes . Personal factors affecting pt at time of IE include:steps to enter environment, difficulty performing ADLS, difficulty performing IADLS , flat affect, decreased initiation and engagement , and health management . Prior to admission, pt was (I) with ADLs and (A) with IADLs with use of RW during mobility. Upon evaluation: Pt requires (S) level with use of RW during mobility 2* the following deficits impacting occupational performance: weakness, decreased strength, decreased balance, decreased tolerance, impaired initiation, and decreased safety awareness. Pt to benefit from continued skilled OT tx while in the hospital to address deficits as defined above and maximize level of functional independence w ADL's and functional mobility. Occupational Performance areas to address include: grooming, bathing/shower, toilet hygiene, dressing, functional mobility, community mobility, and clothing management. The patient's raw score on the -PAC Daily Activity Inpatient Short Form is 21. A raw score of greater than or equal to 19 suggests the patient may benefit from discharge to home. Discharge recommendation at this time is level III minimum resource intensity.    Goals   Patient Goals to go  home   Short Term Goal  pt will perform UE strengthening exercises   Long Term Goal #1 pt will demonstrate toilet transfers and hygiene at (I) level   Long Term Goal #2 pt will demonstrate functional mobility with RW at mod (I) level   Long Term Goal pt will demonstrate UB/LB bathing and grooming tasks at (I) level   Plan   Treatment Interventions ADL retraining;Functional transfer training;UE strengthening/ROM;Endurance training;Patient/family training;Equipment evaluation/education;Activityengagement   Goal Expiration Date 11/11/24   OT Frequency 3-5x/wk   Discharge Recommendation   Rehab Resource Intensity Level, OT III (Minimum Resource Intensity)   AM-PAC Daily Activity Inpatient   Lower Body Dressing 3   Bathing 3   Toileting 3   Upper Body Dressing 4   Grooming 4   Eating 4   Daily Activity Raw Score 21   Daily Activity Standardized Score (Calc for Raw Score >=11) 44.27   AM-PAC Applied Cognition Inpatient   Following a Speech/Presentation 4   Understanding Ordinary Conversation 4   Taking Medications 4   Remembering Where Things Are Placed or Put Away 4   Remembering List of 4-5 Errands 3   Taking Care of Complicated Tasks 3   Applied Cognition Raw Score 22   Applied Cognition Standardized Score 47.83

## 2024-10-28 NOTE — ASSESSMENT & PLAN NOTE
POD #5 S/P  Procedure(s):  Cystoscopy, left ureteroscopy, holmium laser lithotripsy, left retrograde pyelography, basket stone retrieval, left ureteral stent insertion at [facility]  Consult Urology for ureteral stent removal

## 2024-10-28 NOTE — CONSULTS
Left Ureteral stent present    Urology consult requested for left ureteral stent removal.    Left ureteral stent with string is not visible upon inspection of the glans penis.    Review of the patient's chart finds that he does not have an outpatient follow-up with urology scheduled.  Patient was instructed to self remove stent on 10/26, was not done as the patient has been hospitalized now for the past 2 days.  Patient now in hospital, reached out to Urology for further recommendations    The patient is forgetful, jittery at present.  Very hard of hearing.  He is unaware if the stent came out on its own.  Discussed and inquired from nursing staff.  No ureteral stent identified in the urinal or in the bathroom, also nothing found in the patient's bed.    Plan :  Secure chat message with instructions from Ivon Green urology PA.  Left ureteral stent not visible.  Will obtain KUB to see if there is a retained stent in the bladder.    If the stent is retained then the patient will need cystoscopic retrieval performed.  Monitor urine output  Continue finasteride and tamsulosin as prescribed  Treat alcohol withdrawal symptoms, follow CIWA protocol.  General surgery PA representing coverage for urology service here will follow from the periphery through the remainder of the hospital stay, reconsult if any problems or questions arise.  Outpatient follow-up in the urology office in about 1 month    Complicated UTI (urinary tract infection)  Recent stent placement on 10/23  Patient reports no overt urinary symptoms, other than some burning with urination, but this only occurred after cystoscopy procedure  UA with innumerable RBCs, WBC 10-20, occasional bacteria  No leukocytosis, afebrile  Will cover empirically given recent cystoscopy, low suspicion true UTI   Await cultures              -Urine culture from 10/23 with less than 10,000 colonies of Aerococcus and corynebacterium              -Urine culture from 10/25 with  no growth  Stop Antibiotics     Alcohol use disorder, mild, in early remission, abuse  Patient reported drinking a gallon of vodka, last drink 3 to 4 days prior to admission. Patient feels shaky, feels like he is going through alcohol withdrawal  CIWA  Librium taper, wean to 10mg TID today  Thiamine, folic acid  Symptoms have improved    NATASHA (acute kidney injury) (HCC)  Baselines ~1.2 but does appear to fluctuate to higher ranges on review  Cr worsened 1.16 --> 1.42  He was given a dose of Toradol the day prior to pain  Will start IVF  Trend Cr  Avoid nephrotoxic medications    BMI 37.5    History of Present Illness     Fredy Yu is a 74 y.o. male was admitted with acute alcohol withdrawal 2 days ago, recently admitted to [facility] and discharged on October 24th after being hospitalized for obstructed left kidney stone.  Patient underwent left ureteral stent placement with Dr. Eduardo Rivera.  Patient presented here admitted 2 days ago with shaking chills which started 12 hours before accompanied with general lysed weakness and malaise.  He apparently reported heavy alcohol consumption with vodka, admitted drinking heavily 3 to 4 days prior to seeking care in the ED.  No nausea or vomiting.  No fever or chills.  His creatinine is normal.  UAshowed moderate leukocyte Estrace with pyuria and hematuria with some bacteria present.  He was admitted here with suspected acute UTI however urine culture with less than 10,000 K Aerococcus in Corynebacterium.  Urine culture from October 25 no growth.  Antibiotics have since been stopped since admission for empiric coverage.  Urology [facility]TY was consulted at this time for instructions of removal of left ureteral stent per secure chat direction from the urology NATTY, Ivon Green PA-C.  The patient is hemodynamically stable and afebrile.    Review of Systems  I have reviewed the patient's PMH, PSH, Social History, Family History, Meds, and Allergies  Historical  Information   Past Medical History:   Diagnosis Date    Alcohol dependency (HCC)     Anxiety     Arthritis     Depression     [place] (hard of hearing)     Hypertension     Kidney stones     Peripheral neuropathy     Prostate enlargement     Renal disorder     kidney    Shoulder dislocation      Past Surgical History:   Procedure Laterality Date    CHOLECYSTECTOMY      2010    COLONOSCOPY      CYSTOSCOPY W/ STONE MANIPULATION N/A 10/23/2024    Procedure: BASKET STONE EXTRACTION;  Surgeon: Eduardo Rivera MD;  [facility];  Service: Urology    FL RETROGRADE PYELOGRAM  10/23/2024    OR CYSTO/URETERO W/LITHOTRIPSY &INDWELL STENT INSRT Left 10/23/2024    Procedure: CYSTOSCOPY URETEROSCOPY WITH LITHOTRIPSY HOLMIUM LASER, RETROGRADE PYELOGRAM AND INSERTION STENT URETERAL;  Surgeon: Eduardo Rivera MD;  [facility];  Service: Urology    SHOULDER SURGERY Left     for dislocation x 2, 20 years ago an the second 18 years ago     Social History     Tobacco Use    Smoking status: Never    Smokeless tobacco: Never   Vaping Use    Vaping status: Never Used   Substance and Sexual Activity    Alcohol use: Not Currently     Comment: pint of vodka everyday- states that he hasn't drank in one week    Drug use: Never    Sexual activity: Not Currently     Partners: Female     E-Cigarette/Vaping    E-Cigarette Use Never User      E-Cigarette/Vaping Substances    Nicotine No     THC No     CBD No     Flavoring No     Other No     Unknown No      Family History   Problem Relation Age of Onset    Dementia Mother     Colon cancer Mother     Heart disease Father     COPD Father     Heart failure Father     Coronary artery disease Father     Sleep apnea Brother      Social History     Tobacco Use    Smoking status: Never    Smokeless tobacco: Never   Vaping Use    Vaping status: Never Used   Substance and Sexual Activity    Alcohol use: Not Currently     Comment: pint of vodka everyday- states that he hasn't drank  in one week    Drug use: Never    Sexual activity: Not Currently     Partners: Female       Current Facility-Administered Medications:     acetaminophen (TYLENOL) tablet 650 mg, Q6H PRN    amLODIPine (NORVASC) tablet 5 mg, Daily    butalbital-acetaminophen-caffeine (FIORICET,ESGIC) -40 mg per tablet 1 tablet, Q4H PRN    cefTRIAXone (ROCEPHIN) IVPB (premix in dextrose) 1,000 mg 50 mL, Q24H    chlordiazePOXIDE (LIBRIUM) capsule 10 mg, Q8H BRENDA    enoxaparin (LOVENOX) subcutaneous injection 40 mg, Daily    finasteride (PROSCAR) tablet 5 mg, Daily    folic acid (FOLVITE) tablet 1 mg, Daily    melatonin tablet 6 mg, HS    multivitamin-minerals (CENTRUM) tablet 1 tablet, Daily    ondansetron (ZOFRAN) injection 4 mg, Q6H PRN    oxybutynin (DITROPAN) tablet 5 mg, TID PRN    polyethylene glycol (MIRALAX) packet 17 g, Daily    sertraline (ZOLOFT) tablet 100 mg, Daily    sodium chloride 0.9 % infusion, Continuous, Last Rate: 75 mL/hr (10/28/24 1021)    tamsulosin (FLOMAX) capsule 0.4 mg, Daily With Dinner    thiamine tablet 100 mg, Daily  Prior to Admission Medications   Prescriptions Last Dose Informant Patient Reported? Taking?   Blood Pressure KIT Unknown Self No No   Sig: Use 1 Device daily   adalimumab (HUMIRA) 40 mg/0.8 mL PSKT Past Month Self Yes Yes   Sig: Inject 40 mg under the skin every 14 (fourteen) days   alfuzosin (UROXATRAL) 10 mg 24 hr tablet  Self No No   Sig: Take 1 tablet (10 mg total) by mouth daily   busPIRone (BUSPAR) 7.5 mg tablet 10/25/2024 Self No Yes   Sig: Take 1 tablet (7.5 mg total) by mouth 2 (two) times a day   cephalexin (KEFLEX) 500 mg capsule 10/25/2024 Self No Yes   Sig: Take 1 capsule (500 mg total) by mouth every 6 (six) hours for 12 doses   chlordiazePOXIDE (LIBRIUM) 25 mg capsule 10/26/2024 Self No Yes   Sig: Take 1 tablet this evening and then 1 tablet tomorrow   finasteride (PROSCAR) 5 mg tablet 10/26/2024 Self No Yes   Sig: Take 1 tablet (5 mg total) by mouth daily   folic acid  (FOLVITE) 1 mg tablet 10/26/2024 Self No Yes   Sig: Take 1 tablet (1 mg total) by mouth daily   Patient taking differently: Take 1 mg by mouth daily Takes daily except on Monday   gabapentin (Neurontin) 400 mg capsule Unknown Self No No   Sig: Take 400mg twice daily x 5 days. Then increase to three times daily thereafter   hydrOXYzine HCL (ATARAX) 50 mg tablet Unknown Self No No   Sig: Take 1 tablet (50 mg total) by mouth 2 (two) times a day   hydrochlorothiazide (HYDRODIURIL) 25 mg tablet Unknown Self Yes No   Sig: Take 25 mg by mouth if needed   methotrexate 2.5 MG tablet Past Week Self No Yes   Sig: Take 8 tablets (20 mg total) by mouth once a week   naltrexone (REVIA) 50 mg tablet Unknown Self No No   Sig: Take 1 tablet (50 mg total) by mouth daily   oxybutynin (DITROPAN) 5 mg tablet Unknown Self No No   Sig: Take 1 tablet (5 mg total) by mouth 3 (three) times a day as needed (for stent discomfort)   predniSONE 1 mg tablet Unknown Self No No   Sig: Take 4 tablets (4 mg total) by mouth daily   sertraline (Zoloft) 50 mg tablet 10/26/2024 Self No Yes   Sig: Take 2 tablets (100 mg total) by mouth daily   thiamine 100 MG tablet 10/26/2024 Self Yes Yes   Sig: Take 100 mg by mouth daily   traZODone (DESYREL) 100 mg tablet Past Week Self No Yes   Sig: Take 1.5 tablets (150 mg total) by mouth daily at bedtime      Facility-Administered Medications: None     Sulfa antibiotics, Sulfacetamide, Misc. sulfonamide containing compounds, and Elemental sulfur    Objective :  Temp:  [97.5 °F (36.4 °C)-98.6 °F (37 °C)] 97.5 °F (36.4 °C)  HR:  [56-71] 67  BP: (134-159)/(70-77) 159/77  Resp:  [16-18] 16  SpO2:  [92 %-100 %] 100 %  O2 Device: Nasal cannula  Nasal Cannula O2 Flow Rate (L/min):  [2 L/min] 2 L/min      Physical Exam  Vitals reviewed.   Constitutional:       Appearance: He is obese.      Comments: Profoundly obese.  Hard of hearing.  No acute distress.   HENT:      Head: Normocephalic.      Nose: Nose normal.       Mouth/Throat:      Pharynx: Oropharynx is clear.   Cardiovascular:      Rate and Rhythm: Regular rhythm.      Heart sounds: Normal heart sounds. No murmur heard.  Pulmonary:      Breath sounds: Normal breath sounds. No wheezing or rales.   Abdominal:      General: Bowel sounds are normal. There is distension.      Tenderness: There is no abdominal tenderness. There is no rebound.      Hernia: No hernia is present.   Genitourinary:     Penis: Normal.       Testes: Normal.      Comments: Left ureteral stent with string attached to glans penis WAS NOT VISIBLE.    Musculoskeletal:      Right lower leg: No edema.      Left lower leg: No edema.   Skin:     Capillary Refill: Capillary refill takes less than 2 seconds.      Coloration: Skin is not pale.      Findings: No erythema or rash.   Neurological:      Mental Status: He is alert. Mental status is at baseline.      Motor: No weakness.   Psychiatric:         Mood and Affect: Mood normal.         Thought Content: Thought content normal.         Judgment: Judgment normal.         Lab Results: I have reviewed the following results:  Recent Labs     10/25/24  1738 10/26/24  0458 10/27/24  0441 10/28/24  0418   WBC 5.18 5.45  --  5.58   HGB 11.3* 11.3*  --  10.5*   HCT 35.2* 34.4*  --  33.3*    194  --  202   BANDSPCT 4  --   --   --    SODIUM 138 137 135 136   K 3.9 3.8 3.7 3.6   CL 94* 96 96 100   CO2 36* 33* 29 27   BUN 14 14 19 13   CREATININE 1.18 1.16 1.42* 1.07   GLUC 105 104 121 113   MG 1.3* 2.1 1.9  --    PHOS 2.6 3.2  --   --    AST 31 32  --   --    ALT 32 31  --   --    ALB 4.0 3.7  --   --    TBILI 0.32 0.24  --   --    ALKPHOS 58 54  --   --    LACTICACID 0.9  --   --   --        Imaging Results Review: No pertinent imaging studies reviewed.  Other Study Results Review: Other studies reviewed include: AM labs    VTE Pharmacologic Prophylaxis: Enoxaparin (Lovenox)  VTE Mechanical Prophylaxis: sequential compression device    Administrative Statements    I have spent a total time of 40 minutes in caring for this patient on the day of the visit/encounter including Diagnostic results, Prognosis, Risks and benefits of tx options, Instructions for management, Patient and family education, Importance of tx compliance, Risk factor reductions, Impressions, Counseling / Coordination of care, Documenting in the medical record, Reviewing / ordering tests, medicine, procedures  , Obtaining or reviewing history  , and Communicating with other healthcare professionals .    Gabe Bacon PA-C

## 2024-10-28 NOTE — ASSESSMENT & PLAN NOTE
Baseline serum creatinine level of 0.8-1.1 mg/dl  The acute kidney injury resolved with IV fluids.  Continue to hold HCTZ (hydrochlorothiazide)  Avoid all nephrotoxic agents  Serial laboratory testing to monitor the patient's renal function and electrolyte levels

## 2024-10-28 NOTE — ASSESSMENT & PLAN NOTE
In the setting of alcohol abuse  The patient reports worsening anxiety in the recent past.  Consult Psychiatry

## 2024-10-28 NOTE — CASE MANAGEMENT
Case Management Discharge Planning Note    Patient name Fredy Yu  Location /422-01 MRN [id number]  : 1950 Date 10/28/2024       Current Admission Date: 10/25/2024  Current Admission Diagnosis:Complicated UTI (urinary tract infection)   Patient Active Problem List    Diagnosis Date Noted Date Diagnosed    Macrocytic anemia 10/28/2024     Anxiety 10/28/2024     Complicated UTI (urinary tract infection) 10/26/2024     Ureteral stent present 10/25/2024     Left ureteral calculus 10/23/2024     Left ureteral stone 10/22/2024     NATASHA (acute kidney injury) ([facility]) 10/22/2024     Bilateral lower extremity edema 09/15/2024     Psoriatic arthritis ([facility]) 2024     Other osteoporosis with current pathological fracture, vertebra(e), initial encounter for fracture ([facility]) 2024     High risk medication use 2024     Compression of lumbar vertebra ([facility]) 2024     Alcohol abuse with withdrawal without complication ([facility]) 2024     Leukocytosis 2024     ROSARIO (obstructive sleep apnea) 2024     Snoring 2024     Sleep apnea 2024     Prediabetes 2024     Excessive daytime sleepiness 2024     Abnormal brain MRI 2024     Neuropathy 2024     Carpal tunnel syndrome of left wrist 2024     neuropathy 2024     Psoriasis 2023     Inflammatory arthritis 2023     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 degeneration, unspecified stage ([facility]) 2022     Seizure ([facility]) 2022     Polymyalgia rheumatica ([facility]) 10/21/2022     Obesity, morbid ([facility]) 2022     Depression with anxiety 2021     Pruritus 2021     Alcohol induced fatty liver 2021     Acute pancreatitis 2021     History of prolonged Q-T interval on ECG 2021     Unspecified mood (affective) disorder ([facility]) 2021     BPH (benign prostatic hyperplasia) 2019     Hypomagnesemia 2019     Alcohol use disorder 2019     Essential  hypertension 02/10/2019       LOS (days): 2  Geometric Mean LOS (GMLOS) (days): 3.2  Days to GMLOS:1.2     OBJECTIVE:  Risk of Unplanned Readmission Score: 28.18         Current admission status: Inpatient   Preferred Pharmacy:   [id number] - [place] - [address]  [address]  [place] [address]  Phone: [phone] Fax: [phone]    Primary Care Provider: Tejal Garcia MD    Primary Insurance: MEDICARE  Secondary Insurance: [employer] PROGRAM    DISCHARGE DETAILS:    Discharge planning discussed with:: patient       Other Referral/Resources/Interventions Provided:  Interventions: D&A Warm Handoff  Referral Comments: declined warm hand off but offered.         Treatment Team Recommendation: Home  Discharge Destination Plan:: Home     IMM Given (Date):: 10/28/24  IMM Given to:: Patient   Tentative discharge home tomorrow

## 2024-10-28 NOTE — ASSESSMENT & PLAN NOTE
POD #5 S/P  Procedure(s):  Cystoscopy, left ureteroscopy, holmium laser lithotripsy, left retrograde pyelography, basket stone retrieval, left ureteral stent insertion at [facility]    Continue IV ceftriaxone  Consult Urology

## 2024-10-28 NOTE — PROGRESS NOTES
Progress Note - Hospitalist   Name: Fredy Yu 74 y.o. male I MRN: [id number]  Unit/Bed#: 422-01 I Date of Admission: 10/25/2024   Date of Service: 10/28/2024 I Hospital Day: 2    Assessment & Plan  Complicated UTI (urinary tract infection)  POD #5 S/P  Procedure(s):  Cystoscopy, left ureteroscopy, holmium laser lithotripsy, left retrograde pyelography, basket stone retrieval, left ureteral stent insertion at [facility]    Continue IV ceftriaxone  Consult Urology  Alcohol use disorder  Reports drinking a gallon of vodka daily  Continue Librium 10 mg PO every 8 hours  Monitor for signs of alcohol withdrawal and DT's (delirium tremens) with the CIWA protocol  Utilize thiamine 500 mg IV TID  Continue folic acid 1 mg PO Qdaily and a daily PO multivitamin  Consult Psychiatry  Ureteral stent present  POD #5 S/P  Procedure(s):  Cystoscopy, left ureteroscopy, holmium laser lithotripsy, left retrograde pyelography, basket stone retrieval, left ureteral stent insertion at [facility]  Consult Urology for ureteral stent removal  NATASHA (acute kidney injury) (HCC)  Baseline serum creatinine level of 0.8-1.1 mg/dl  The acute kidney injury resolved with IV fluids.  Continue to hold HCTZ (hydrochlorothiazide)  Avoid all nephrotoxic agents  Serial laboratory testing to monitor the patient's renal function and electrolyte levels  Essential hypertension  Hold HCTZ (hydrochlorothiazide) in the setting of acute kidney injury  Initiate amlodipine 5 mg PO Qdaily  Follow the blood pressure trend  Hypomagnesemia  Resolved with magnesium replacement therapy  Follow the magnesium level  Macrocytic anemia  Check an iron panel, vitamin B12 level, and folate level  Follow the CBC  Transfuse for a hemoglobin less than 7 g/dl    Anxiety  In the setting of alcohol abuse  The patient reports worsening anxiety in the recent past.  Consult Psychiatry    VTE Pharmacologic  Prophylaxis: VTE Score: 7 High Risk (Score >/= 5) - Pharmacological DVT Prophylaxis Ordered: enoxaparin (Lovenox). Sequential Compression Devices Ordered.    Mobility:   Basic Mobility Inpatient Raw Score: 18  JH-HLM Goal: 6: Walk 10 steps or more  JH-HLM Achieved: 6: Walk 10 steps or more  JH-HLM Goal achieved. Continue to encourage appropriate mobility.    Patient Centered Rounds: I performed bedside rounds with nursing staff today.   Discussions with Specialists or Other Care Team Provider: I discussed the case with Urology.    Current Length of Stay: 2 day(s)  Current Patient Status: Inpatient   Certification Statement: The patient will continue to require additional inpatient hospital stay due to the need for IV antibiotic treatment, for ureteral stent removal, and to monitor the patient for DT's (delirium tremens) with the CIWA protocol.  Discharge Plan: Anticipate discharge in 24-48 hrs to home.    Code Status: Level 1 - Full Code    Subjective   The patient was seen and examined.  The patient is experiencing increasing anxiety this morning.    Objective :  Temp:  [97.5 °F (36.4 °C)-98.6 °F (37 °C)] 97.5 °F (36.4 °C)  HR:  [56-71] 67  BP: (134-159)/(70-77) 159/77  Resp:  [16-18] 16  SpO2:  [92 %-100 %] 100 %  O2 Device: Nasal cannula  Nasal Cannula O2 Flow Rate (L/min):  [2 L/min] 2 L/min    Body mass index is 37.35 kg/m².     Input and Output Summary (last 24 hours):     Intake/Output Summary (Last 24 hours) at 10/28/2024 1204  Last data filed at 10/28/2024 0746  Gross per 24 hour   Intake 240 ml   Output 1275 ml   Net -1035 ml       Physical Exam  General:  NAD, follows commands, resting tremors of the bilateral upper extremities present  HEENT:  NC/AT, mucous membranes moist  Neck:  Supple, No JVP elevation  CV:  + S1, + S2, RRR  Pulm:  Lung fields are CTA bilaterally  Abd:  Soft, Non-tender, Non-distended  Ext:  No clubbing/cyanosis/edema  Skin:  No rashes  Neuro:  Awake, alert, oriented  Psych:  Normal  mood and affect      Lines/Drains:              Lab Results: I have reviewed the following results:   Results from last 7 days   Lab Units 10/28/24  0418 10/26/24  0458 10/25/24  1738 10/23/24  0539 10/21/24  1254   WBC Thousand/uL 5.58   < > 5.18   < > 8.80   HEMOGLOBIN g/dL 10.5*   < > 11.3*   < > 12.8   HEMATOCRIT % 33.3*   < > 35.2*   < > 38.1   PLATELETS Thousands/uL 202   < > 181   < > 169   BANDS PCT %  --   --  4  --   --    SEGS PCT %  --   --   --   --  72   LYMPHO PCT %  --   --  41   < > 13*   MONO PCT %  --   --  12   < > 13*   EOS PCT %  --   --  1   < > 1    < > = values in this interval not displayed.     Results from last 7 days   Lab Units 10/28/24  0418 10/27/24  0441 10/26/24  0458   SODIUM mmol/L 136   < > 137   POTASSIUM mmol/L 3.6   < > 3.8   CHLORIDE mmol/L 100   < > 96   CO2 mmol/L 27   < > 33*   BUN mg/dL 13   < > 14   CREATININE mg/dL 1.07   < > 1.16   ANION GAP mmol/L 9   < > 8   CALCIUM mg/dL 8.4   < > 8.9   ALBUMIN g/dL  --   --  3.7   TOTAL BILIRUBIN mg/dL  --   --  0.24   ALK PHOS U/L  --   --  54   ALT U/L  --   --  31   AST U/L  --   --  32   GLUCOSE RANDOM mg/dL 113   < > 104    < > = values in this interval not displayed.     Results from last 7 days   Lab Units 10/22/24  0413   INR  1.08             Results from last 7 days   Lab Units 10/26/24  0458 10/25/24  1738   LACTIC ACID mmol/L  --  0.9   PROCALCITONIN ng/ml 0.08  --        Recent Cultures (last 7 days):   Results from last 7 days   Lab Units 10/25/24  1912 10/23/24  1026   URINE CULTURE  No Growth <1000 cfu/mL <1000 cfu/ml Aerococcus urinae*  <1000 cfu/ml Corynebacterium glucuronolyticum*       Imaging Results Review: No pertinent imaging studies reviewed.  Other Study Results Review: No additional pertinent studies reviewed.    Last 24 Hours Medication List:     Current Facility-Administered Medications:     acetaminophen (TYLENOL) tablet 650 mg, Q6H PRN    amLODIPine (NORVASC) tablet 5 mg, Daily     butalbital-acetaminophen-caffeine (FIORICET,ESGIC) -40 mg per tablet 1 tablet, Q4H PRN    cefTRIAXone (ROCEPHIN) IVPB (premix in dextrose) 1,000 mg 50 mL, Q24H    chlordiazePOXIDE (LIBRIUM) capsule 10 mg, Q8H BRENDA    enoxaparin (LOVENOX) subcutaneous injection 40 mg, Daily    finasteride (PROSCAR) tablet 5 mg, Daily    folic acid (FOLVITE) tablet 1 mg, Daily    melatonin tablet 6 mg, HS    multivitamin-minerals (CENTRUM) tablet 1 tablet, Daily    ondansetron (ZOFRAN) injection 4 mg, Q6H PRN    oxybutynin (DITROPAN) tablet 5 mg, TID PRN    polyethylene glycol (MIRALAX) packet 17 g, Daily    sertraline (ZOLOFT) tablet 100 mg, Daily    sodium chloride 0.9 % infusion, Continuous, Last Rate: 75 mL/hr (10/28/24 1021)    tamsulosin (FLOMAX) capsule 0.4 mg, Daily With Dinner    thiamine tablet 100 mg, Daily    Administrative Statements   Today, Patient Was Seen By: Jackson Mcdowell, DO  I have spent a total time of 35 minutes in caring for this patient on the day of the visit/encounter including Diagnostic results, Prognosis, Risks and benefits of tx options, and Instructions for management.    **Please Note: This note may have been constructed using a voice recognition system.**

## 2024-10-28 NOTE — ASSESSMENT & PLAN NOTE
Reports drinking a gallon of vodka daily  Continue Librium 10 mg PO every 8 hours  Monitor for signs of alcohol withdrawal and DT's (delirium tremens) with the CIWA protocol  Utilize thiamine 500 mg IV TID  Continue folic acid 1 mg PO Qdaily and a daily PO multivitamin  Consult Psychiatry

## 2024-10-28 NOTE — PLAN OF CARE
Problem: PAIN - ADULT  Goal: Verbalizes/displays adequate comfort level or baseline comfort level  Description: Interventions:  - Encourage patient to monitor pain and request assistance  - Assess pain using appropriate pain scale (0-10 pain scale)  - Administer analgesics based on type and severity of pain and evaluate response  - Implement non-pharmacological measures as appropriate and evaluate response  - Consider cultural and social influences on pain and pain management  - Notify physician/advanced practitioner if interventions unsuccessful or patient reports new pain  Outcome: Progressing     Problem: INFECTION - ADULT  Goal: Absence or prevention of progression during hospitalization  Description: INTERVENTIONS:  - Assess and monitor for signs and symptoms of infection  - Monitor lab/diagnostic results  - Monitor all insertion sites, i.e. indwelling lines  - Administer medications as ordered  - Instruct and encourage patient and family to use good hand hygiene technique  Outcome: Progressing

## 2024-10-28 NOTE — ASSESSMENT & PLAN NOTE
Check an iron panel, vitamin B12 level, and folate level  Follow the CBC  Transfuse for a hemoglobin less than 7 g/dl

## 2024-10-29 VITALS
HEIGHT: 65 IN | BODY MASS INDEX: 37.39 KG/M2 | SYSTOLIC BLOOD PRESSURE: 178 MMHG | WEIGHT: 224.43 LBS | TEMPERATURE: 97.7 F | HEART RATE: 62 BPM | RESPIRATION RATE: 16 BRPM | OXYGEN SATURATION: 98 % | DIASTOLIC BLOOD PRESSURE: 75 MMHG

## 2024-10-29 LAB
25(OH)D3 SERPL-MCNC: 8.9 NG/ML (ref 30–100)
ALBUMIN SERPL BCG-MCNC: 3.4 G/DL (ref 3.5–5)
ALP SERPL-CCNC: 52 U/L (ref 34–104)
ALT SERPL W P-5'-P-CCNC: 29 U/L (ref 7–52)
AMMONIA PLAS-SCNC: 26 UMOL/L (ref 18–72)
ANION GAP SERPL CALCULATED.3IONS-SCNC: 7 MMOL/L (ref 4–13)
ANISOCYTOSIS BLD QL SMEAR: PRESENT
AST SERPL W P-5'-P-CCNC: 23 U/L (ref 13–39)
BASOPHILS # BLD MANUAL: 0 THOUSAND/UL (ref 0–0.1)
BASOPHILS NFR MAR MANUAL: 0 % (ref 0–1)
BILIRUB SERPL-MCNC: 0.23 MG/DL (ref 0.2–1)
BUN SERPL-MCNC: 11 MG/DL (ref 5–25)
CALCIUM ALBUM COR SERPL-MCNC: 8.6 MG/DL (ref 8.3–10.1)
CALCIUM SERPL-MCNC: 8.1 MG/DL (ref 8.4–10.2)
CHLORIDE SERPL-SCNC: 104 MMOL/L (ref 96–108)
CO2 SERPL-SCNC: 27 MMOL/L (ref 21–32)
COLOR STONE: NORMAL
COMMENT-STONE3: NORMAL
COMPOSITION: NORMAL
CREAT SERPL-MCNC: 1.15 MG/DL (ref 0.6–1.3)
EOSINOPHIL # BLD MANUAL: 0.11 THOUSAND/UL (ref 0–0.4)
EOSINOPHIL NFR BLD MANUAL: 2 % (ref 0–6)
ERYTHROCYTE [DISTWIDTH] IN BLOOD BY AUTOMATED COUNT: 15 % (ref 11.6–15.1)
FERRITIN SERPL-MCNC: 404 NG/ML (ref 24–336)
FOLATE SERPL-MCNC: 14.6 NG/ML
GFR SERPL CREATININE-BSD FRML MDRD: 62 ML/MIN/1.73SQ M
GLUCOSE SERPL-MCNC: 112 MG/DL (ref 65–140)
HAV IGM SER QL: NORMAL
HBV CORE IGM SER QL: NORMAL
HBV SURFACE AG SER QL: NORMAL
HCT VFR BLD AUTO: 32.4 % (ref 36.5–49.3)
HCV AB SER QL: NORMAL
HGB BLD-MCNC: 10.1 G/DL (ref 12–17)
IRON SATN MFR SERPL: 38 % (ref 15–50)
IRON SERPL-MCNC: 92 UG/DL (ref 50–212)
LABORATORY COMMENT REPORT: NORMAL
LYMPHOCYTES # BLD AUTO: 2.37 THOUSAND/UL (ref 0.6–4.47)
LYMPHOCYTES # BLD AUTO: 45 % (ref 14–44)
MACROCYTES BLD QL AUTO: PRESENT
MAGNESIUM SERPL-MCNC: 1.6 MG/DL (ref 1.9–2.7)
MCH RBC QN AUTO: 32.4 PG (ref 26.8–34.3)
MCHC RBC AUTO-ENTMCNC: 31.2 G/DL (ref 31.4–37.4)
MCV RBC AUTO: 104 FL (ref 82–98)
METAMYELOCYTE ABSOLUTE CT: 0.16 THOUSAND/UL (ref 0–0.1)
METAMYELOCYTES NFR BLD MANUAL: 3 % (ref 0–1)
MONOCYTES # BLD AUTO: 0.37 THOUSAND/UL (ref 0–1.22)
MONOCYTES NFR BLD: 7 % (ref 4–12)
MYELOCYTE ABSOLUTE CT: 0.11 THOUSAND/UL (ref 0–0.1)
MYELOCYTES NFR BLD MANUAL: 2 % (ref 0–1)
NEUTROPHILS # BLD MANUAL: 2.16 THOUSAND/UL (ref 1.85–7.62)
NEUTS SEG NFR BLD AUTO: 41 % (ref 43–75)
PHOSPHATE SERPL-MCNC: 3.3 MG/DL (ref 2.3–4.1)
PHOTO: NORMAL
PLATELET # BLD AUTO: 223 THOUSANDS/UL (ref 149–390)
PLATELET BLD QL SMEAR: ADEQUATE
PMV BLD AUTO: 8.3 FL (ref 8.9–12.7)
POTASSIUM SERPL-SCNC: 3.6 MMOL/L (ref 3.5–5.3)
PROCALCITONIN SERPL-MCNC: 0.08 NG/ML
PROT SERPL-MCNC: 5.7 G/DL (ref 6.4–8.4)
RBC # BLD AUTO: 3.12 MILLION/UL (ref 3.88–5.62)
RBC MORPH BLD: PRESENT
SIZE STONE: NORMAL MM
SODIUM SERPL-SCNC: 138 MMOL/L (ref 135–147)
SPEC SOURCE SUBJ: NORMAL
STONE ANALYSIS-IMP: NORMAL
STONE ANALYSIS-IMP: NORMAL
TIBC SERPL-MCNC: 244 UG/DL (ref 250–450)
UIBC SERPL-MCNC: 152 UG/DL (ref 155–355)
URATE MFR STONE: 100 %
VIT B12 SERPL-MCNC: 300 PG/ML (ref 180–914)
WBC # BLD AUTO: 5.27 THOUSAND/UL (ref 4.31–10.16)
WT STONE: 69 MG

## 2024-10-29 PROCEDURE — 82746 ASSAY OF FOLIC ACID SERUM: CPT | Performed by: INTERNAL MEDICINE

## 2024-10-29 PROCEDURE — 83735 ASSAY OF MAGNESIUM: CPT | Performed by: INTERNAL MEDICINE

## 2024-10-29 PROCEDURE — 82306 VITAMIN D 25 HYDROXY: CPT | Performed by: INTERNAL MEDICINE

## 2024-10-29 PROCEDURE — 82728 ASSAY OF FERRITIN: CPT | Performed by: INTERNAL MEDICINE

## 2024-10-29 PROCEDURE — 97530 THERAPEUTIC ACTIVITIES: CPT

## 2024-10-29 PROCEDURE — 85027 COMPLETE CBC AUTOMATED: CPT | Performed by: INTERNAL MEDICINE

## 2024-10-29 PROCEDURE — 82607 VITAMIN B-12: CPT | Performed by: INTERNAL MEDICINE

## 2024-10-29 PROCEDURE — 80053 COMPREHEN METABOLIC PANEL: CPT | Performed by: INTERNAL MEDICINE

## 2024-10-29 PROCEDURE — 82140 ASSAY OF AMMONIA: CPT | Performed by: INTERNAL MEDICINE

## 2024-10-29 PROCEDURE — 83550 IRON BINDING TEST: CPT | Performed by: INTERNAL MEDICINE

## 2024-10-29 PROCEDURE — 97116 GAIT TRAINING THERAPY: CPT

## 2024-10-29 PROCEDURE — 83540 ASSAY OF IRON: CPT | Performed by: INTERNAL MEDICINE

## 2024-10-29 PROCEDURE — 80074 ACUTE HEPATITIS PANEL: CPT | Performed by: INTERNAL MEDICINE

## 2024-10-29 PROCEDURE — 85007 BL SMEAR W/DIFF WBC COUNT: CPT | Performed by: INTERNAL MEDICINE

## 2024-10-29 PROCEDURE — 84100 ASSAY OF PHOSPHORUS: CPT | Performed by: INTERNAL MEDICINE

## 2024-10-29 PROCEDURE — 99239 HOSP IP/OBS DSCHRG MGMT >30: CPT | Performed by: PHYSICIAN ASSISTANT

## 2024-10-29 PROCEDURE — 84145 PROCALCITONIN (PCT): CPT | Performed by: INTERNAL MEDICINE

## 2024-10-29 RX ORDER — AMLODIPINE BESYLATE 5 MG/1
5 TABLET ORAL DAILY
Qty: 30 TABLET | Refills: 0 | Status: SHIPPED | OUTPATIENT
Start: 2024-10-30

## 2024-10-29 RX ADMIN — TAMSULOSIN HYDROCHLORIDE 0.4 MG: 0.4 CAPSULE ORAL at 15:47

## 2024-10-29 RX ADMIN — GABAPENTIN 300 MG: 300 CAPSULE ORAL at 15:47

## 2024-10-29 RX ADMIN — SERTRALINE HYDROCHLORIDE 150 MG: 50 TABLET ORAL at 08:30

## 2024-10-29 RX ADMIN — THIAMINE HYDROCHLORIDE 500 MG: 100 INJECTION, SOLUTION INTRAMUSCULAR; INTRAVENOUS at 08:30

## 2024-10-29 RX ADMIN — CEFTRIAXONE 1000 MG: 1 INJECTION, SOLUTION INTRAVENOUS at 12:11

## 2024-10-29 RX ADMIN — SODIUM CHLORIDE 75 ML/HR: 0.9 INJECTION, SOLUTION INTRAVENOUS at 01:15

## 2024-10-29 RX ADMIN — FOLIC ACID 1 MG: 1 TABLET ORAL at 08:30

## 2024-10-29 RX ADMIN — ENOXAPARIN SODIUM 40 MG: 40 INJECTION SUBCUTANEOUS at 08:30

## 2024-10-29 RX ADMIN — MULTIPLE VITAMINS W/ MINERALS TAB 1 TABLET: TAB ORAL at 08:30

## 2024-10-29 RX ADMIN — POLYETHYLENE GLYCOL 3350 17 G: 17 POWDER, FOR SOLUTION ORAL at 08:30

## 2024-10-29 RX ADMIN — GABAPENTIN 300 MG: 300 CAPSULE ORAL at 08:30

## 2024-10-29 RX ADMIN — CHLORDIAZEPOXIDE HYDROCHLORIDE 10 MG: 5 CAPSULE ORAL at 05:11

## 2024-10-29 RX ADMIN — THIAMINE HYDROCHLORIDE 500 MG: 100 INJECTION, SOLUTION INTRAMUSCULAR; INTRAVENOUS at 15:47

## 2024-10-29 RX ADMIN — FINASTERIDE 5 MG: 5 TABLET, FILM COATED ORAL at 08:30

## 2024-10-29 RX ADMIN — AMLODIPINE BESYLATE 5 MG: 5 TABLET ORAL at 08:30

## 2024-10-29 RX ADMIN — BUTALBITAL, ACETAMINOPHEN, AND CAFFEINE 1 TABLET: 325; 50; 40 TABLET ORAL at 15:48

## 2024-10-29 NOTE — ASSESSMENT & PLAN NOTE
Reports drinking a gallon of vodka daily  Continue Librium 10 mg PO every 8 hours  Psychiatry consulted,  Recommending outpatient EtOH program and behavioral health follow up

## 2024-10-29 NOTE — CASE MANAGEMENT
Case Management Discharge Planning Note    Patient name Fredy Yu  Location /422-01 MRN [id number]  : 1950 Date 10/29/2024       Current Admission Date: 10/25/2024  Current Admission Diagnosis:Complicated UTI (urinary tract infection)   Patient Active Problem List    Diagnosis Date Noted Date Diagnosed    Macrocytic anemia 10/28/2024     Anxiety 10/28/2024     Complicated UTI (urinary tract infection) 10/26/2024     Ureteral stent present 10/25/2024     Left ureteral calculus 10/23/2024     Left ureteral stone 10/22/2024     NATASHA (acute kidney injury) ([facility]) 10/22/2024     Bilateral lower extremity edema 09/15/2024     Psoriatic arthritis ([facility]) 2024     Other osteoporosis with current pathological fracture, vertebra(e), initial encounter for fracture ([facility]) 2024     High risk medication use 2024     Compression of lumbar vertebra ([facility]) 2024     Alcohol abuse with withdrawal without complication ([facility]) 2024     Leukocytosis 2024     ROSARIO (obstructive sleep apnea) 2024     Snoring 2024     Sleep apnea 2024     Prediabetes 2024     Excessive daytime sleepiness 2024     Abnormal brain MRI 2024     Neuropathy 2024     Carpal tunnel syndrome of left wrist 2024     neuropathy 2024     Psoriasis 2023     Inflammatory arthritis 2023     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 degeneration, unspecified stage ([facility]) 2022     Seizure ([facility]) 2022     Polymyalgia rheumatica ([facility]) 10/21/2022     Obesity, morbid ([facility]) 2022     Depression with anxiety 2021     Pruritus 2021     Alcohol induced fatty liver 2021     Acute pancreatitis 2021     History of prolonged Q-T interval on ECG 2021     Unspecified mood (affective) disorder ([facility]) 2021     BPH (benign prostatic hyperplasia) 2019     Hypomagnesemia 2019     Alcohol use disorder 2019     Essential  hypertension 02/10/2019       LOS (days): 3  Geometric Mean LOS (GMLOS) (days): 3.2  Days to GMLOS:0.4     OBJECTIVE:  Risk of Unplanned Readmission Score: 31.64         Current admission status: Inpatient   Preferred Pharmacy:   [id number] - [place] - [address]  [address]  [place] [address]  Phone: [phone] Fax: [phone]    Primary Care Provider: Tejal Garcia MD    Primary Insurance: MEDICARE  Secondary Insurance: [employer] HE[employer] OPTIONS PROGRAM    DISCHARGE DETAILS:Patient discharging home today. Nurse to review AVS, all follow up providers listed. Wife to transport home later today.

## 2024-10-29 NOTE — ASSESSMENT & PLAN NOTE
Patient spontaneously lost ureteral stent - KUB shows no retained stent and string not visible externally

## 2024-10-29 NOTE — ASSESSMENT & PLAN NOTE
In the setting of alcohol abuse  The patient reports worsening anxiety in the recent past.  Psychiatry recommendations noted

## 2024-10-29 NOTE — DISCHARGE SUMMARY
Discharge Summary - Hospitalist   Name: Fredy Yu 74 y.o. male I MRN: [id number]  Unit/Bed#: 422-01 I Date of Admission: 10/25/2024   Date of Service: 10/29/2024 I Hospital Day: 3     Assessment & Plan  Complicated UTI (urinary tract infection)  POD #6 S/P cystosocpy, ureteroscopy, left ureteral stent placement [facility]   Alcohol use disorder  Reports drinking a gallon of vodka daily  Continue Librium 10 mg PO every 8 hours  Psychiatry consulted,  Recommending outpatient EtOH program and behavioral health follow up  Ureteral stent present  Patient spontaneously lost ureteral stent - KUB shows no retained stent and string not visible externally   NATASHA (acute kidney injury) (HCC)  Baseline serum creatinine level of 0.8-1.1 mg/dl  The acute kidney injury resolved with IV fluids  Essential hypertension  Hold HCTZ (hydrochlorothiazide) in the setting of acute kidney injury  Initiated amlodipine 5 mg PO Qdaily  Blood Pressure: 136/70   Hypomagnesemia  Resolved with magnesium replacement therapy  Follow the magnesium level  Macrocytic anemia  Suspect secondary to EtOH abuse  Recommend folate and thiamine supplement on discharge  Anxiety  In the setting of alcohol abuse  The patient reports worsening anxiety in the recent past.  Psychiatry recommendations noted     Discharging Physician / Practitioner: Ashley Mcallister PA-C  PCP: Tejal Garcia MD  Admission Date:   Admission Orders (From admission, onward)       Ordered        10/26/24 1612  INPATIENT ADMISSION  Once            10/25/24 2156  Place in Observation  Once                          Discharge Date: 10/29/24    Consultations During Hospital Stay:  IP CONSULT TO PSYCHIATRY  IP CONSULT TO UROLOGY    Procedures Performed:   None    Significant Findings / Test Results:   XR abdomen 1 view kub    Result Date: 10/28/2024  Impression: No definite urinary tract calculus, noting somewhat limited evaluation due to patient body habitus. Workstation performed: [id number]  "      XR abdomen 1 view kub    Result Date: 10/28/2024  Impression No definite urinary tract calculus, noting somewhat limited evaluation due to patient body habitus. Workstation performed: [id number]        Results for orders placed during the hospital encounter of 05/21/24    Echo complete w/ contrast if indicated    Interpretation Summary    Left Ventricle: Left ventricular cavity size is normal. Wall thickness is not well visualized. The left ventricular ejection fraction is 65%. Systolic function is normal. Wall motion cannot be accurately assessed. Diastolic function is normal for age.    Extremely technically difficult study with poor endocardial definition and visualization of cardiac structures despite administration of echo contrast.      No results for input(s): \"BLOODCX\", \"SPUTUMCULTUR\", \"GRAMSTAIN\", \"URINECX\", \"WOUNDCULT\", \"BODYFLUIDCUL\", \"MRSACULTURE\", \"INFLUAPCR\", \"INFLUBPCR\", \"RSVPCR\", \"LEGIONELLAUR\", \"STPU\", \"CDIFFTOXINB\" in the last 72 hours.    Incidental Findings:   None other than noted above; I reviewed the above mentioned incidental findings with the patient and/or family and they expressed understanding.    Test Results Pending at Discharge (will require follow up):   None     Outpatient Tests Requested:  None    Complications:  None    Reason for Admission:   Chief Complaint   Patient presents with    Dizziness     Patient reports feeling dizzy and off balanced. States that he has not had a drink in one week.          Hospital Course:   Fredy Yu is a 74 y.o. male whose past medical history is remarkable for HTN, recurrent alcohol abuse, anxiety disorder/depression, and kidney stones.  He was recently hospitalized and subsequently discharged home 10/24/2024 from [facility] after treatment for obstructed kidney stone s/p left ureteral stent placement. Presented to [place] ER with report of shaking chills since he returned home with generalized weakness and malaise, also reported " "feelings of alcohol withdrawal with marked anxiety, tremulousness.  He reports daily consumption of \"a gallon of vodka\", with his last drink being about 3 to 4 days prior to presentation. Remained hemodynamically stable and afebrile since readsmission.    Urology was consulted for evaluation for ureteral stent retrieval. Patient does not recall removing stent, however on evaluation including KUB, there is no evidence of stent. UCx showing <1000 bacteria and he has received several days of IV ceftriaxone inpatient. No further antibiotics on discharge.     He will need outpatient EtOH rehab, declines inpatient need. Needs outpatient  follow-up for anxiety/depression management.       Please see above list of diagnoses and related plan for additional information.     Condition at Discharge: fair    Discharge Day Visit / Exam:   Subjective: Offers no new complaints at this time. No acute events reported overnight. Understanding of plan.  All questions answered to the best of my ability at this time to patient satisfaction. He would like to have the IV  discontinued and ambulate with the RW prior to discharge.   Vitals: Blood Pressure: 136/70 (10/29/24 0732)  Pulse: (!) 50 (10/29/24 0302)  Temperature: 98 °F (36.7 °C) (10/29/24 0732)  Temp Source: Oral (10/29/24 0302)  Respirations: 16 (10/29/24 0732)  Height: 5' 5\" (165.1 cm) (10/25/24 2238)  Weight - Scale: 102 kg (224 lb 6.9 oz) (10/28/24 0600)  SpO2: 97 % (10/29/24 0302)  Exam:   Physical Exam  Vitals and nursing note reviewed.   Constitutional:       General: He is awake. He is not in acute distress.     Appearance: Normal appearance. He is obese. He is not ill-appearing or toxic-appearing.   HENT:      Right Ear: Decreased hearing noted.      Left Ear: Decreased hearing noted.   Cardiovascular:      Rate and Rhythm: Normal rate and regular rhythm.      Heart sounds: Normal heart sounds.   Pulmonary:      Effort: Pulmonary effort is normal. No respiratory " distress.      Breath sounds: Normal breath sounds. No wheezing.   Abdominal:      General: Abdomen is protuberant. Bowel sounds are normal. There is no distension.      Palpations: Abdomen is soft.      Tenderness: There is no abdominal tenderness.   Skin:     General: Skin is warm and dry.      Coloration: Skin is not jaundiced or pale.   Neurological:      Mental Status: He is alert.   Psychiatric:         Mood and Affect: Mood normal.         Behavior: Behavior normal. Behavior is cooperative.         Thought Content: Thought content normal.           Discussion with Family: Patient declined call to .     Discharge instructions/Information to patient and family:   See after visit summary for information provided to patient and family.      Provisions for Follow-Up Care:  See after visit summary for information related to follow-up care and any pertinent home health orders.       Mobility at time of Discharge:   Basic Mobility Inpatient Raw Score: 18  JH-HLM Goal: 6: Walk 10 steps or more  JH-HLM Achieved: 6: Walk 10 steps or more  HLM Goal achieved. Continue to encourage appropriate mobility.    Disposition:   Home outpatient referral to PT/OT     Planned Readmission: none     Discharge Statement:  I spent 40 minutes discharging the patient. This time was spent on the day of discharge. I had direct contact with the patient on the day of discharge. Greater than 50% of the total time was spent examining patient, answering all patient questions, arranging and discussing plan of care with patient as well as directly providing post-discharge instructions.  Additional time then spent on discharge activities.    Discharge Medications:  See after visit summary for reconciled discharge medications provided to patient and/or family.      **Please Note: This note may have been constructed using a voice recognition system**

## 2024-10-29 NOTE — NURSING NOTE
AVS reviewed with wife. Patient in stable condition. Transported via wheel chair with PCA and wife.

## 2024-10-29 NOTE — PLAN OF CARE
Problem: PHYSICAL THERAPY ADULT  Goal: Performs mobility at highest level of function for planned discharge setting.  See evaluation for individualized goals.  Description:  Outcome: Progressing  Note: Prognosis: Good  Problem List:  (Decreased strength; Decreased endurance; Impaired balance; Decreased mobility; Decreased safety awareness)  Assessment: Pt. seen for PT treatment session this date with interventions consisting of  transfers and  gait training w/ emphasis on improving pt's ability to ambulate. Pt. Requiring occasional  cues for sequence and safety. In comparison to previous session, Pt. With improvements in activity tolerance.   Pt is in need of continued activity in PT to improve strength balance endurance mobility transfers and ambulation with return to maximize LOF. From PT/mobility standpoint, recommendation at time of d/c would be level III: min resource intensity  in order to promote return to PLOF and independence.   The patient's AM-PAC Basic Mobility Inpatient Short Form Raw Score is 18. A Raw score of greater than 16 suggests the patient may benefit from discharge to home.  Please also refer to physical therapy recommendation for safe DC planning.        Rehab Resource Intensity Level, PT: III (Minimum Resource Intensity)    See flowsheet documentation for full assessment.

## 2024-10-29 NOTE — PLAN OF CARE
Problem: PAIN - ADULT  Goal: Verbalizes/displays adequate comfort level or baseline comfort level  Description: Interventions:  - Encourage patient to monitor pain and request assistance  - Assess pain using appropriate pain scale (0-10 pain scale)  - Administer analgesics based on type and severity of pain and evaluate response  - Implement non-pharmacological measures as appropriate and evaluate response  - Consider cultural and social influences on pain and pain management  - Notify physician/advanced practitioner if interventions unsuccessful or patient reports new pain  10/29/2024 1004 by Ros Hassan RN  Outcome: Progressing  10/29/2024 1004 by Ros Hassan RN  Outcome: Progressing     Problem: INFECTION - ADULT  Goal: Absence or prevention of progression during hospitalization  Description: INTERVENTIONS:  - Assess and monitor for signs and symptoms of infection  - Monitor lab/diagnostic results  - Monitor all insertion sites, i.e. indwelling lines  - Administer medications as ordered  - Instruct and encourage patient and family to use good hand hygiene technique  10/29/2024 1004 by Ros Hassan RN  Outcome: Progressing  10/29/2024 1004 by Ros Hassan RN  Outcome: Progressing     Problem: SAFETY ADULT  Goal: Patient will remain free of falls  Description: INTERVENTIONS:  - Educate patient/family on patient safety including physical limitations  - Instruct patient to call for assistance with activity (contact guard assist and walker)  - Consult OT/PT to assist with strengthening/mobility   - Keep Call bell within reach  - Keep bed low and locked with side rails adjusted as appropriate  - Keep care items and personal belongings within reach  - Initiate and maintain comfort rounds  - Make Fall Risk Sign visible to staff (high fall risk)  - Offer Toileting every 1-2 Hours, in advance of need  - Initiate/Maintain bed/chair alarm  - Obtain necessary fall risk management equipment: nonskid  footwear  - Apply yellow socks and bracelet for high fall risk patients  10/29/2024 1004 by Ros Hassan RN  Outcome: Progressing  10/29/2024 1004 by Ros Hassan RN  Outcome: Progressing  Goal: Maintain or return to baseline ADL function  Description: INTERVENTIONS:  -  Assess patient's ability to carry out ADLs; (min to mod assist)  - Assess/evaluate cause of self-care deficits (fatigue, limited movement, tremors, anxiety)  - Assess range of motion  - Assess patient's mobility; (contact guard assist and walker)  - Assess patient's need for assistive devices and provide as appropriate  - Encourage maximum independence but intervene and supervise when necessary  - Involve family in performance of ADLs  - Assess for home care needs following discharge   - Consider OT consult to assist with ADL evaluation and planning for discharge  - Provide patient education as appropriate  10/29/2024 1004 by Ros Hassan RN  Outcome: Progressing  10/29/2024 1004 by Ros Hassan RN  Outcome: Progressing  Goal: Maintains/Returns to pre admission functional level  Description: INTERVENTIONS:  - Perform AM-PAC 6 Click Basic Mobility/ Daily Activity assessment daily.  - Set and communicate daily mobility goal to care team and patient/family/caregiver.   - Collaborate with rehabilitation services on mobility goals if consulted  - Reposition patient every 2 hours.  - Ambulate patient 3-5 times a day  - Out of bed to chair 3 times a day   - Out of bed for meals 3 times a day  - Out of bed for toileting  - Record patient progress and toleration of activity level   10/29/2024 1004 by Ros Hassan RN  Outcome: Progressing  10/29/2024 1004 by Ros Hassan RN  Outcome: Progressing     Problem: DISCHARGE PLANNING  Goal: Discharge to home or other facility with appropriate resources  Description: INTERVENTIONS:  - Identify barriers to discharge w/patient and caregiver  - Arrange for needed discharge resources and  transportation as appropriate  - Identify discharge learning needs (meds, wound care, etc.)  - Refer to Case Management Department for coordinating discharge planning if the patient needs post-hospital services based on physician/advanced practitioner order or complex needs related to functional status, cognitive ability, or social support system  10/29/2024 1004 by Ros Hassan RN  Outcome: Progressing  10/29/2024 1004 by Ros Hassan RN  Outcome: Progressing     Problem: Knowledge Deficit  Goal: Patient/family/caregiver demonstrates understanding of disease process, treatment plan, medications, and discharge instructions  Description: Complete learning assessment and assess knowledge base.  Interventions:  - Provide teaching at level of understanding  - Provide teaching via preferred learning methods  10/29/2024 1004 by Ros Hassan RN  Outcome: Progressing  10/29/2024 1004 by Ros Hassan RN  Outcome: Progressing     Problem: Prexisting or High Potential for Compromised Skin Integrity  Goal: Skin integrity is maintained or improved  Description: INTERVENTIONS:  - Identify patients at risk for skin breakdown  - Assess and monitor skin integrity  - Assess and monitor nutrition and hydration status  - Monitor labs   - Assess for incontinence (every 1-2 hours prn)  - Turn and reposition patient (cue to weight shift and turn)  - Assist with mobility/ambulation (contact guard assist and walker)  - Relieve pressure over bony prominences  - Avoid friction and shearing  - Provide appropriate hygiene as needed including keeping skin clean and dry  - Evaluate need for skin moisturizer/barrier cream  - Collaborate with interdisciplinary team   - Patient/family teaching  10/29/2024 1004 by Ros Hassan RN  Outcome: Progressing  10/29/2024 1004 by Ros Hassan RN  Outcome: Progressing     Problem: GENITOURINARY - ADULT  Goal: Maintains or returns to baseline urinary function  Description:  INTERVENTIONS:  - Assess urinary function  - Encourage oral fluids to ensure adequate hydration if ordered  - Administer IV fluids as ordered to ensure adequate hydration  - Administer ordered medications as needed  - Offer frequent toileting  - Follow urinary retention protocol if ordered  Outcome: Progressing

## 2024-10-29 NOTE — HOSPITAL COURSE
Patient: Jacky Bailey Date: 9/27/2018   YOB: 1955 Attending: Jamari Jones MD   63 year old female Hospital Day: 3     Vascular Surgery    Operation procedure: Surveillance of failed L UE AVF    Subjective:  Resting in bed. No acute events overnight. Reports decreased pain to R UE but still having swelling. Denies tingling/numbness or weakness to R UE. There are no complaints regarding L UE at this time. Denies CP, SOB, palpitations, fever, or chills. Tolerated HD today.     Vein mapping has been ordered, still has not been scheduled.     Allergies:  ALLERGIES:   Allergen Reactions   • Amlodipine SWELLING     Pt reports tongue/lip swelling with amlodipine (no longer taking it)        Reviewed: Allergies, Medical History and Surgical History    Vital Last Value 24 Hour Range   Temperature 98.3 °F (36.8 °C) (09/27/18 1333) Temp  Min: 98.2 °F (36.8 °C)  Max: 99.8 °F (37.7 °C)   Pulse 82 (09/27/18 1333) Pulse  Min: 69  Max: 85   Respiratory 19 (09/27/18 1333) Resp  Min: 17  Max: 20   Non-Invasive  Blood Pressure 124/60 (09/27/18 1333) BP  Min: 124/60  Max: 188/74   Arterial   Blood Pressure   No Data Recorded   Pulse Oximetry 96 % (09/27/18 1333) SpO2  Min: 96 %  Max: 98 %     Vital Today Admitted   Weight 76 kg (09/27/18 1119) Weight: 102.1 kg (09/25/18 0034)   Height N/A Height: 5' 5\" (165.1 cm) (09/25/18 0034)   BMI (body mass index) N/A BMI (Calculated): 37.52 (09/25/18 0034)     Weight over the past 48 Hours:  Patient Vitals for the past 48 hrs:   Weight   09/26/18 0620 76.1 kg   09/27/18 0449 77 kg   09/27/18 1119 76 kg      Intake/Output:    Last stool occurrence: 1 (09/26/18 1927)    I/O last 3 completed shifts:  In: 240 [P.O.:240]  Out: 1100 [Urine:100; Other:1000]      Intake/Output Summary (Last 24 hours) at 9/27/2018 1535  Last data filed at 9/27/2018 1000  Gross per 24 hour   Intake 240 ml   Output 1100 ml   Net -860 ml       Rhythm: RRR    PHYSICAL EXAM:  General: awake, alert, NAD;  "Fredy Yu is a 74 y.o. male whose past medical history is remarkable for HTN, recurrent alcohol abuse, anxiety disorder/depression, and kidney stones.  He was recently hospitalized and subsequently discharged home 10/24/2024 from [facility] after treatment for obstructed kidney stone s/p left ureteral stent placement. Presented to [facility] ER with report of shaking chills since he returned home with generalized weakness and malaise, also reported feelings of alcohol withdrawal with marked anxiety, tremulousness.  He reports daily consumption of \"a gallon of vodka\", with his last drink being about 3 to 4 days prior to presentation. Remained hemodynamically stable and afebrile since readsmission.    Urology was consulted for evaluation for ureteral stent retrieval. Patient does not recall removing stent, however on evaluation including KUB, there is no evidence of stent. UCx showing <1000 bacteria and he has received several days of IV ceftriaxone inpatient. No further antibiotics on discharge.     He will need outpatient EtOH rehab, declines inpatient need. Needs outpatient  follow-up for anxiety/depression management.   " resting in bed comfortably  Head: Normocephalic, atraumatic  Nose: Clear, no congestion  Neck: Soft, supple, trachea midline; dressing R chest cath site intact and dry with some soreness to palpation  Heart: S1S2, RRR  Lungs: respirations are easy and unlabored on RA  Abdomen: Soft, ND (nondistended), NT (nontender), + BS (positive bowel sounds).  Extremities:   R UE: warm, with improving diffuse edema upper arm to dorsum hand; arm is soft but with some TTP diffusely; normal motor function and sensation intact; peripheral IV in place upper arm and forearm; radial pulse palp 1+  L UE: warm, dry, normal motor function and sensation intact; brachiocephalic AVF with absent thrill and absent bruit, incisions dry with steri strips intact; palp radial pulse 1+  B/L LE: warm, dry, normal motor function and sensation; pedal pulses palp b/l  Skin: normal texture, turgor, temperature; or exceptions as stated above  Neuro: A and O (Alert and oriented) x 3, CN (cranial nerves) II-XII grossly intact  Psych: normal mood and affect for medical condition  Pulses: as above    Laboratory Results:   Recent Labs   Lab  09/27/18   1210  09/27/18   0215  09/26/18   1805   09/26/18   0634   09/25/18   0524  09/25/18   0205   SODIUM   --    --    --    --   133*   --    --   134*   POTASSIUM   --    --    --    --   4.1   --    --   4.7   CHLORIDE   --    --    --    --   102   --    --   102   CO2   --    --    --    --   23   --    --   24   BUN   --    --    --    --   24*   --    --   38*   CREATININE   --    --    --    --   3.96*   --    --   5.50*   GLUCOSE   --    --    --    --   95   --    --   111*   WBC   --   9.4   --    --   9.3   --   15.3*  16.7*   HGB   --   8.0*   --    --   8.5*   --   8.7*  8.8*   HCT   --   26.7*   --    --   28.1*   --   29.2*  28.9*   PLT   --   230   --    --   221   --   236  235   PT   --   10.6   --    --    --    --   11.2  11.0   INR   --   1.0   --    --    --    --   1.1  1.0   PTT  57*  41*   78*   < >   --    < >  40*  38*   BILIRUBIN   --    --    --    --    --    --    --   0.4   ALKPT   --    --    --    --    --    --    --   129*   AST   --    --    --    --    --    --    --   14   GPT   --    --    --    --    --    --    --   8    < > = values in this interval not displayed.     Diagnosis/Comorbidities/Complications:  - Acute deep vein thrombosis of R UE  - Febrile illness  - Hx of bacteremia s/t HD line infection  - S/P Left brachial-cephalic AVF, non-functioning  - ESRD on HD  - HTN  - Depression     Plans/Recommendations:  This is a patient well-known to Vascular service previously seen for failed AVF of L UE. Originally scheduled for clinic visit with Dr. Rocha to discuss revision of AVF/options but presented to ED c/o R UE swelling and pain with fever. Found to have acute DVT of R UE. Now admitted for treatment of DVT and abx therapy for febrile illness. Vascular Surgery has been asked to provide recommendations for AVF.    - Vein map ordered to assess basilic vein. Awaiting scheduling. D/w US and apparently there were questions about doing full vein map which is why it has not been scheduled as of yet. We request full mapping of the L UE and this was communicated with US team   - Plan for AVF/graft pending these results and improvement of acute illness.   - Hold off on PO anti-coagulation until surgical plan determined. Pending results of vein map and IR tentative OR Monday, 10/1 for L UE AVF/graft.   - Continue saving L UE for AVF.    - Abx per ID  - Currently on heparin gtt for DVT  - Patient seen by IR, no plans for thrombolysis of R UE DVT at this point, they recc continuing heparin gtt and will reassess tomorrow  - There are no signs of phlegmasia cerulea dolens of R UE on exam, and patient's sx appear to be improving. Continue to monitor closely.    - Medical management per attending  - Greatly appreciate consulted services  - Will follow    Discharge Plan:    pending      Krissy MARTINEZ  LAMONTE Short  [phone]    D/w Dr. Rocha and Dr. Jones  The documentation recorded by the scribe accurately and completely reflects the service(s) I personally performed and the decisions made by me.   I reviewed the left upper extremity vein mapping, she does not have any suitable veins for arteriovenous fistula  I discussed with Dr. Vasquez from interventional radiology to do the thrombolysis on the right upper extremity, and he agrees that if we're able to open the right upper extremity central veins then she can potentially have fistula surgery on the right arm.  For now we will wait for IR thrombolyzes to be completed on the right upper extremity then repeat the right upper extremity vein mapping in a week or 2 to evaluate whether there is any suitable vein for AV fistula prior to deciding that she has to have an AV graft

## 2024-10-29 NOTE — PHYSICAL THERAPY NOTE
PHYSICAL THERAPY NOTE          Patient Name: Fredy Yu  Today's Date: 10/29/2024   10/29/24 1109   PT Last Visit   PT Visit Date 10/29/24   Note Type   Note Type Treatment   Pain Assessment   Pain Assessment Tool 0-10   Pain Score No Pain   Restrictions/Precautions   Weight Bearing Precautions Per Order No   Other Precautions   (Chair Alarm; Fall Risk; Hard of hearing)   General   Chart Reviewed Yes   Response to Previous Treatment Patient with no complaints from previous session.   Family/Caregiver Present No   Cognition   Overall Cognitive Status WFL   Arousal/Participation Alert;Cooperative   Orientation Level Oriented X4   Following Commands Follows one step commands without difficulty   Comments [place]   Subjective   Subjective Pt. would like to ambulate   Bed Mobility   Additional Comments OOB in chair start/end PT session   Transfers   Sit to Stand 5  Supervision   Additional items Armrests;Increased time required;Verbal cues   Stand to Sit 5  Supervision   Additional items Armrests;Increased time required;Bed elevated   Additional Comments RW used. Cues hand placement and safety   Ambulation/Elevation   Gait pattern Short stride;Excessively slow   Gait Assistance 5  Supervision   Additional items Verbal cues   Assistive Device Rolling walker   Distance 300'   Balance   Static Sitting Good   Dynamic Sitting Good   Static Standing Fair +   Dynamic Standing Fair +   Ambulatory Fair  (RW)   Endurance Deficit   Endurance Deficit Yes   Activity Tolerance   Activity Tolerance Patient limited by fatigue   Assessment   Prognosis Good   Problem List   (Decreased strength; Decreased endurance; Impaired balance; Decreased mobility; Decreased safety awareness)   Assessment Pt. seen for PT treatment session this date with interventions consisting of  transfers and  gait training w/ emphasis on improving pt's ability to ambulate. Pt.  Requiring occasional  cues for sequence and safety. In comparison to previous session, Pt. With improvements in activity tolerance.   Pt is in need of continued activity in PT to improve strength balance endurance mobility transfers and ambulation with return to maximize LOF. From PT/mobility standpoint, recommendation at time of d/c would be level III: min resource intensity  in order to promote return to PLOF and independence.   The patient's AM-PAC Basic Mobility Inpatient Short Form Raw Score is 18. A Raw score of greater than 16 suggests the patient may benefit from discharge to home.  Please also refer to physical therapy recommendation for safe DC planning.   Goals   Patient Goals to go home   LTG Expiration Date 11/09/24   PT Treatment Day 1   Plan   Treatment/Interventions Functional transfer training;LE strengthening/ROM;Therapeutic exercise;Endurance training;Bed mobility;Gait training;Spoke to nursing   Progress Progressing toward goals   PT Frequency 3-5x/wk   Discharge Recommendation   Rehab Resource Intensity Level, PT III (Minimum Resource Intensity)   AM-PAC Basic Mobility Inpatient   Turning in Flat Bed Without Bedrails 3   Lying on Back to Sitting on Edge of Flat Bed Without Bedrails 3   Moving Bed to Chair 3   Standing Up From Chair Using Arms 3   Walk in Room 3   Climb 3-5 Stairs With Railing 3   Basic Mobility Inpatient Raw Score 18   Basic Mobility Standardized Score 41.05   [place] Highest Level Of Mobility   JH-HLM Goal 6: Walk 10 steps or more   JH-HLM Achieved 8: Walk 250 feet ot more   Education   Education Provided Mobility training   Patient Demonstrates verbal understanding;Reinforcement needed   End of Consult   Patient Position at End of Consult Bedside chair;Bed/Chair alarm activated;All needs within reach   End of Consult Comments discussed POC with PT

## 2024-10-29 NOTE — ASSESSMENT & PLAN NOTE
Hold HCTZ (hydrochlorothiazide) in the setting of acute kidney injury  Initiated amlodipine 5 mg PO Qdaily  Blood Pressure: 136/70

## 2024-10-30 DIAGNOSIS — F39 UNSPECIFIED MOOD (AFFECTIVE) DISORDER (HCC): ICD-10-CM

## 2024-10-30 RX ORDER — TRAZODONE HYDROCHLORIDE 100 MG/1
150 TABLET ORAL
Qty: 135 TABLET | Refills: 1 | Status: SHIPPED | OUTPATIENT
Start: 2024-10-30

## 2024-11-01 ENCOUNTER — TELEPHONE (OUTPATIENT)
Dept: FAMILY MEDICINE CLINIC | Facility: CLINIC | Age: 74
End: 2024-11-01

## 2024-11-01 ENCOUNTER — OFFICE VISIT (OUTPATIENT)
Dept: FAMILY MEDICINE CLINIC | Facility: CLINIC | Age: 74
End: 2024-11-01
Payer: MEDICARE

## 2024-11-01 VITALS
WEIGHT: 228 LBS | SYSTOLIC BLOOD PRESSURE: 98 MMHG | HEIGHT: 65 IN | HEART RATE: 61 BPM | OXYGEN SATURATION: 95 % | TEMPERATURE: 98.9 F | BODY MASS INDEX: 37.99 KG/M2 | DIASTOLIC BLOOD PRESSURE: 50 MMHG

## 2024-11-01 DIAGNOSIS — F10.130 ALCOHOL ABUSE WITH WITHDRAWAL WITHOUT COMPLICATION (HCC): ICD-10-CM

## 2024-11-01 DIAGNOSIS — N20.1 LEFT URETERAL STONE: ICD-10-CM

## 2024-11-01 DIAGNOSIS — F41.8 DEPRESSION WITH ANXIETY: ICD-10-CM

## 2024-11-01 DIAGNOSIS — F41.9 ANXIETY: ICD-10-CM

## 2024-11-01 DIAGNOSIS — Z09 HOSPITAL DISCHARGE FOLLOW-UP: Primary | ICD-10-CM

## 2024-11-01 PROCEDURE — 99496 TRANSJ CARE MGMT HIGH F2F 7D: CPT | Performed by: PHYSICIAN ASSISTANT

## 2024-11-01 NOTE — ASSESSMENT & PLAN NOTE
Depression Screening Follow-up Plan: Patient's depression screening was positive with a PHQ-9 score of 18. Patient with underlying depression and was advised to continue current medications as prescribed. Patient declines further evaluation by mental health professional and/or medications. They have no active suicidal ideations. Brief counseling provided and recommend additional follow-up/re-evaluation at next office visit.

## 2024-11-01 NOTE — PROGRESS NOTES
Transition of Care Visit  Name: Fredy Yu      : 1950      MRN: [id number]  Encounter Provider: Yoni Hirsch PA-C  Encounter Date: 2024   Encounter department: [facility] PRIMARY CARE    Assessment & Plan  Left ureteral stone         Alcohol abuse with withdrawal without complication (HCC)         Hospital discharge follow-up         Anxiety         Depression with anxiety  Depression Screening Follow-up Plan: Patient's depression screening was positive with a PHQ-9 score of 18. Patient with underlying depression and was advised to continue current medications as prescribed. Patient declines further evaluation by mental health professional and/or medications. They have no active suicidal ideations. Brief counseling provided and recommend additional follow-up/re-evaluation at next office visit.            Patient is a 74 year old male PMH HTN, recurrent alcohol abuse, anxiety disorder/depression, and kidney stones. Patient presents today for TCM for hospitalization 10/25/2024 to 10/29/2024. He presented with generalized weakness, fatigue, and symptoms of alcohol withdrawal such as anxiety, tremulousness. Patient was noted to have NATASHA as serum creatinine level was 0.8-1.1 mg/dl and hypomagnesemia, both of which resolved w/ IV fluids and supplementation. Of note, patient had previous hospitalization 10/22/2024 to 10/24/2024 for left ureteral stone in which he underwent reteroscopy w/ lithotripsy and retrograde pyelogram w/o complication. Stent was placed and was to be removed outpatient. Urology was consulted for evaluation for ureteral stent retrieval and though the patient does not recall removing stent, there is no evidence of stent on KUB. Patient w/ significant alcohol abuse history along with depression and anxiety which was noted during hospitalization, for which hospitalist team recommended outpatient EtOH program and behavioral health follow up.     Since discharge, patient  denies any alcohol intake, stating his last drink was on 10/21 at 0700. Has resumed regular attendance w/ AA. He denies any urinary symptoms, abdominal pain, NVD at this time. Primary concern since discharged is uncontrolled anxiety and depression symptoms. Patient states that he has many chronic situations in his life that cause him great deal of stress, including parents at nursing home, his daughter's behavior, and he reports that his wife is chronically negative. He denies SI/HI, auditory or visual hallucinations at this time. PHQ-9 18, STAN-7 16.     PCP had discussion w/ patient that he has only been taking zoloft x 4 weeks and more time is needed for positive effect. That being said, PCP expressed concern to patient that depression and anxiety seem out of control and that patient should present to [facility] walk in clinic to be established more quickly with both psychiatrist and psychologist. Patient declined at this time, stating that he would like to wait one more month to see how things go. Will continue w/ AA meetings. PCP agreeable.     Will see patient back in one month. Advised follow up with urology, with patient said that he will schedule.     History of Present Illness     Transitional Care Management Review:   Fredy Yu is a 74 y.o. male here for TCM follow up. Patient was hospital    During the TCM phone call patient stated:  TCM Call       Date and time call was made  10/30/2024 10:56 AM    Hospital care reviewed  Records reviewed    Patient was hospitialized at  [facility]    Date of Admission  10/25/24    Date of discharge  10/30/24    Diagnosis  Complicated UTI    Disposition  Home    Were the patients medications reviewed and updated  Yes    Current Symptoms  Fatigue    Fatigue severity  Moderate          TCM Call       Post hospital issues  Reduced activity; Poor ADL (Activities of Daily Living) performance    Should patient be enrolled in anticoag monitoring?   No    Scheduled for follow up?  Yes    Patients specialists  Urologist    Did you obtain your prescribed medications  Yes    Do you need help managing your prescriptions or medications  No    Is transportation to your appointment needed  No    I have advised the patient to call PCP with any new or worsening symptoms  Bonita Pedroza    Living Arrangements  Spouse or Significiant other    Support System  Spouse    The type of support provided  Emotional; Financial; Physical    Do you have social support  Yes, some    Are you recieving any outpatient services  Yes    What type of services  PT/OT    Are you recieving home care services  No    Are you using any community resources  No    Current waiver services  No    Have you fallen in the last 12 months  No    Interperter language line needed  No    Counseling  Patient    Counseling topics  Activities of daily living          Fredy Yu is a 74 y.o. male presenting today for TCM. Patient states since discharge he feels depressed, anxious. Denies HI/SI, auditory or visual hallucinations. He denies any urinary symptoms, abdominal pain at this time. Patient reports multitude of stressors in life that cause him symptoms of both anxiety and depression. He state she is tired of being surrounded by negativity.       Review of Systems   Constitutional:  Negative for chills and fever.   HENT:  Negative for ear pain and sore throat.    Eyes:  Negative for pain and visual disturbance.   Respiratory:  Negative for cough and shortness of breath.    Cardiovascular:  Negative for chest pain and palpitations.   Gastrointestinal:  Negative for abdominal pain and vomiting.   Genitourinary:  Negative for difficulty urinating, dysuria, frequency, hematuria, penile discharge, penile pain, penile swelling and urgency.   Musculoskeletal:  Negative for arthralgias and back pain.   Skin:  Negative for color change and rash.   Neurological:  Negative for seizures and syncope.  "  Psychiatric/Behavioral:  The patient is nervous/anxious.    All other systems reviewed and are negative.    Objective     BP 98/50 (BP Location: Left arm)   Pulse 61   Temp 98.9 °F (37.2 °C) (Tympanic)   Ht 5' 5\" (1.651 m)   Wt 103 kg (228 lb)   SpO2 95%   BMI 37.94 kg/m²     Physical Exam  Vitals reviewed.   Constitutional:       Appearance: Normal appearance. He is obese.   HENT:      Head: Normocephalic.      Right Ear: External ear normal.      Left Ear: External ear normal.      Nose: Nose normal.      Mouth/Throat:      Mouth: Mucous membranes are moist.      Pharynx: Oropharynx is clear.   Eyes:      Conjunctiva/sclera: Conjunctivae normal.   Cardiovascular:      Rate and Rhythm: Normal rate and regular rhythm.      Heart sounds: Normal heart sounds.   Pulmonary:      Effort: Pulmonary effort is normal.      Breath sounds: Normal breath sounds.   Abdominal:      General: Bowel sounds are normal.      Palpations: Abdomen is soft.   Neurological:      Mental Status: He is alert and oriented to person, place, and time.   Psychiatric:         Mood and Affect: Mood is anxious. Affect is tearful.         Speech: Speech normal.         Behavior: Behavior normal.         Cognition and Memory: Cognition and memory normal.       Medications have been reviewed by provider in current encounter    "

## 2024-11-01 NOTE — TELEPHONE ENCOUNTER
----- Message from Eduardo Rivera MD sent at 10/23/2024 10:55 AM EDT -----  Today Fredy underwent left ureteroscopy.  I was able to remove his ureteral calculus.  He has a stent in place with a string.  He was instructed to remove his own stent in 3 days.  Please call patient in 1 week to ensure that the stent has been removed.  Outpatient follow-up in the [place]  office in 8 weeks recommended.  FT

## 2024-11-18 ENCOUNTER — TELEPHONE (OUTPATIENT)
Dept: FAMILY MEDICINE CLINIC | Facility: CLINIC | Age: 74
End: 2024-11-18

## 2024-11-18 NOTE — TELEPHONE ENCOUNTER
Fredy stopped in the office for a refill     Sertraline [facility]

## 2024-11-19 ENCOUNTER — OFFICE VISIT (OUTPATIENT)
Dept: FAMILY MEDICINE CLINIC | Facility: CLINIC | Age: 74
End: 2024-11-19
Payer: MEDICARE

## 2024-11-19 VITALS
BODY MASS INDEX: 37.75 KG/M2 | HEART RATE: 78 BPM | OXYGEN SATURATION: 93 % | HEIGHT: 65 IN | TEMPERATURE: 97.8 F | SYSTOLIC BLOOD PRESSURE: 126 MMHG | WEIGHT: 226.6 LBS | DIASTOLIC BLOOD PRESSURE: 56 MMHG

## 2024-11-19 DIAGNOSIS — N40.1 BENIGN PROSTATIC HYPERPLASIA WITH URINARY FREQUENCY: ICD-10-CM

## 2024-11-19 DIAGNOSIS — Z78.9 TRANSITION OF CARE: ICD-10-CM

## 2024-11-19 DIAGNOSIS — L40.50 PSORIATIC ARTHRITIS (HCC): ICD-10-CM

## 2024-11-19 DIAGNOSIS — I10 ESSENTIAL HYPERTENSION: ICD-10-CM

## 2024-11-19 DIAGNOSIS — R26.2 AMBULATORY DYSFUNCTION: ICD-10-CM

## 2024-11-19 DIAGNOSIS — F10.130 ALCOHOL ABUSE WITH WITHDRAWAL WITHOUT COMPLICATION (HCC): ICD-10-CM

## 2024-11-19 DIAGNOSIS — R35.0 BENIGN PROSTATIC HYPERPLASIA WITH URINARY FREQUENCY: ICD-10-CM

## 2024-11-19 DIAGNOSIS — Z91.89 DRIVING SAFETY ISSUE: ICD-10-CM

## 2024-11-19 DIAGNOSIS — N28.1 COMPLEX RENAL CYST: ICD-10-CM

## 2024-11-19 DIAGNOSIS — F41.9 ANXIETY: ICD-10-CM

## 2024-11-19 DIAGNOSIS — F10.90 ALCOHOL USE DISORDER: ICD-10-CM

## 2024-11-19 DIAGNOSIS — F41.8 DEPRESSION WITH ANXIETY: ICD-10-CM

## 2024-11-19 DIAGNOSIS — Z09 HOSPITAL DISCHARGE FOLLOW-UP: Primary | ICD-10-CM

## 2024-11-19 PROCEDURE — 99215 OFFICE O/P EST HI 40 MIN: CPT | Performed by: FAMILY MEDICINE

## 2024-11-19 RX ORDER — AMLODIPINE BESYLATE 2.5 MG/1
2.5 TABLET ORAL DAILY
Qty: 30 TABLET | Refills: 2 | Status: SHIPPED | OUTPATIENT
Start: 2024-11-19

## 2024-11-19 NOTE — ASSESSMENT & PLAN NOTE
-Given that patient has had multiple hospitalizations for alcohol withdrawal in the past, patient might benefit from inpatient admission in a rehab facility  -PCP to review resources regarding private agencies around to help with the process  -Patient reports that he has not taken naltrexone since it was prescribed in September    Patient to start naltrexone tomorrow   Continue thiamine, folic acid  Encourage going to AA meetings regularly  Counseled on alcohol cessation  Follow-up in 3-4 weeks

## 2024-11-19 NOTE — ASSESSMENT & PLAN NOTE
-Patient reports that he has not been taking amlodipine since 3 weeks  -BP today 125/60  -We will reduce the dose of amlodipine to 2.5 mg from 5 mg  -Follow-up in 3 to 4 weeks  Orders:    amLODIPine (NORVASC) 2.5 mg tablet; Take 1 tablet (2.5 mg total) by mouth daily

## 2024-11-19 NOTE — ASSESSMENT & PLAN NOTE
Depression Screening Follow-up Plan: Patient's depression screening was positive with a PHQ-9 score of 10.   -Given persistent symptoms, we will increase the Zoloft to 150 mg daily  -Follow-up in 3 to 4 weeks  -If no improvement, consider discontinuing SSRI and trial SNRI/atypical antipsychotics  -Referral to psych services placed  Orders:    Ambulatory referral to Psych Services; Future

## 2024-11-19 NOTE — ASSESSMENT & PLAN NOTE
-Patient reports that his prednisone was discontinued in the hospital  -Patient chronically maintained on prednisone 4 mg daily, 20 mg methotrexate once a week  -Will reinitiate patient on prednisone 4 mg daily  -Continue outpatient follow-up with rheumatology  -PCP to notify his rheumatologist regarding the discontinuation of prednisone in the hospital

## 2024-11-19 NOTE — ASSESSMENT & PLAN NOTE
-Maintained on finasteride 5 mg daily, alfuzosin 10 mg daily  -Continue follow-up with urology

## 2024-11-19 NOTE — PROGRESS NOTES
Name: Fredy Yu      : 1950      MRN: [id number]  Encounter Provider: Tita Galan MD  Encounter Date: 2024   Encounter department: [facility] PRIMARY CARE      Assessment & Plan  Hospital discharge follow-up  -Patient was admitted from -11/15 for alcohol withdrawal  -Consumed several vodka shots on  which is the last day of alcohol use  -Has not had any alcohol since  -Has been attending AA meetings daily  -Denies any symptom of alcohol withdrawal or chest pain today       Alcohol abuse with withdrawal without complication (HCC)  -Given that patient has had multiple hospitalizations for alcohol withdrawal in the past, patient might benefit from inpatient admission in a rehab facility  -PCP to review resources regarding private agencies around to help with the process  -Patient reports that he has not taken naltrexone since it was prescribed in September    Patient to start naltrexone tomorrow   Continue thiamine, folic acid  Encourage going to AA meetings regularly  Counseled on alcohol cessation  Follow-up in 3-4 weeks  Transition of care         Complex renal cyst  -Following urology  -CT abdomen 2024 showing lobular complex left kidney cyst measuring up to 2.8 cm with underlying thin septations.  Follow-up renal ultrasound recommended in 12 months to ensure stability         Essential hypertension  -Patient reports that he has not been taking amlodipine since 3 weeks  -BP today 125/60  -We will reduce the dose of amlodipine to 2.5 mg from 5 mg  -Follow-up in 3 to 4 weeks  Orders:    amLODIPine (NORVASC) 2.5 mg tablet; Take 1 tablet (2.5 mg total) by mouth daily    Psoriatic arthritis (HCC)  -Patient reports that his prednisone was discontinued in the hospital  -Patient chronically maintained on prednisone 4 mg daily, 20 mg methotrexate once a week  -Will reinitiate patient on prednisone 4 mg daily  -Continue outpatient follow-up with rheumatology  -PCP to  notify his rheumatologist regarding the discontinuation of prednisone in the hospital       Benign prostatic hyperplasia with urinary frequency  -Maintained on finasteride 5 mg daily, alfuzosin 10 mg daily  -Continue follow-up with urology       Alcohol use disorder         Anxiety  -We will discontinue Atarax  -BuSpar 7.5 mg 2 times daily  -Given persistent depression symptoms, we will increase Zoloft to 150 mg daily  -Follow-up in 3 to 4 weeks  Orders:    sertraline (Zoloft) 50 mg tablet; Take 3 tablets (150 mg total) by mouth daily    Ambulatory referral to Psych Services; Future    Depression with anxiety  Depression Screening Follow-up Plan: Patient's depression screening was positive with a PHQ-9 score of 10.   -Given persistent symptoms, we will increase the Zoloft to 150 mg daily  -Follow-up in 3 to 4 weeks  -If no improvement, consider discontinuing SSRI and trial SNRI/atypical antipsychotics  -Referral to psych services placed  Orders:    Ambulatory referral to Psych Services; Future    Ambulatory dysfunction  -Patient reports difficulty with balance when walking  -Also appears confused on and off  -Patient states that he drinks in the car  -Given several risk factors with driving, ambulatory dysfunction-we will refer patient to Senior care for evaluation for driving safety, cognitive evaluation  -PCP to contact [employer] to revoke license in the meanwhile  Orders:    Ambulatory Referral to Senior Care; Future    Driving safety issue  -Patient reports difficulty with balance when walking  -Also appears confused on and off  -Patient states that he drinks in the car  -Given several risk factors with driving, ambulatory dysfunction-we will refer patient to Senior care for evaluation for driving safety, cognitive evaluation  -PCP to contact [employer] to revoke license in the meanwhile  Orders:    Ambulatory Referral to Senior Care; Future         History of Present Illness     Patient is a 75 yo M who presents to  the office for TCM visit following recent admission for alcohol withdrawal. Patient was admitted from 11/12-11/15. Patient states that he had several vodka shots on 11/11 as he got upset with his brother. He states that 11/11 was the last day of his alcohol consumption. He states that he is doing well since hospital discharge. Has been compliant with his AA meetings everyday since discharge. He c/o balance issues and hand tremors. He states that he is not taking amlodipine since 3 weeks. Has not started Naltrexone since it was started.  Off note, wife reports that he drinks in his car.  She reports that there is no alcohol at home.    Hospital course:    This is a brief description of the patient's hospital stay; please refer to medical chart for further details. This 74-year-old male has a history of remote alcohol abuse, anemia, BPH, hypertension. He presented complaining of vague chest discomfort and alcohol withdrawal. He admitted to rebounding and having heavy alcohol use of straight vodka on the night prior to admission. He has a history of mild tremors of his upper extremities, left worse than right, but these tremors became more severe at 3 AM in addition to substernal chest pressure and dull generalized abdominal pain although it is primarily upper abdomen into the epigastric area. Initial workup showed a lipase of 512 CT of the head was unremarkable and chest abdomen pelvis did see some peripancreatic fluid and it was felt he may have had mild pancreatitis. He had a 2.8 cm cyst in the lower pole of the left kidney and follow-up renal ultrasound was recommended within a year to ensure stability. He was admitted with acute alcohol intoxication with alcohol withdrawal and started on the CIWA protocol. His troponins were flat and EKG was unremarkable and his pain was felt to be very atypical and consistent with alcoholic gastritis. He had not demonstrated any signs of withdrawal. It was felt that his fine  "resting tremor was chronic and predated his alcohol binge by at least a year. He already sees a neurologist and has a follow-up appointment scheduled in a few months because of the tremor. He related multiple social stressors regarding both parents being in a nursing home and additional stresses which precipitated his alcoholic binge and relapse. He was evaluated by psychiatry who recommended continuing his sertraline, BuSpar, and trazodone. He was encouraged to establish care with an outpatient psychiatrist and he states that he will consider it and he was given resources for those appointments. He was educated by psychiatry on the physiological and psychological risks of continued alcohol abuse but he declined any specific resources and said that he would follow-up with AA which he has been seeing in the past. On 11/15, he was alert and oriented and strongly desired to go home. He was pain-free, still has fine resting tremor of his hands which was not felt to be secondary to alcohol, he was ambulating with physical therapy and felt to be stable for discharge.        Review of Systems   Constitutional:  Negative for chills and fever.   HENT:  Negative for ear pain and sore throat.    Eyes:  Negative for pain and visual disturbance.   Respiratory:  Negative for cough and shortness of breath.    Cardiovascular:  Negative for chest pain and palpitations.   Gastrointestinal:  Negative for abdominal pain and vomiting.   Genitourinary:  Negative for dysuria and hematuria.   Musculoskeletal:  Negative for arthralgias and back pain.   Skin:  Negative for color change and rash.   Neurological:  Positive for tremors. Negative for seizures and syncope.        Balance issues   All other systems reviewed and are negative.    Objective     /56 (BP Location: Left arm, Patient Position: Sitting, Cuff Size: Adult)   Pulse 78   Temp 97.8 °F (36.6 °C) (Tympanic)   Ht 5' 5\" (1.651 m)   Wt 103 kg (226 lb 9.6 oz)   SpO2 93%  "  BMI 37.71 kg/m²     Physical Exam  Vitals and nursing note reviewed.   Constitutional:       General: He is not in acute distress.     Appearance: He is well-developed.   HENT:      Head: Normocephalic and atraumatic.   Eyes:      Conjunctiva/sclera: Conjunctivae normal.   Cardiovascular:      Rate and Rhythm: Normal rate and regular rhythm.      Heart sounds: No murmur heard.  Pulmonary:      Effort: Pulmonary effort is normal. No respiratory distress.      Breath sounds: Normal breath sounds.   Abdominal:      Palpations: Abdomen is soft.      Tenderness: There is no abdominal tenderness.   Musculoskeletal:         General: No swelling.      Cervical back: Neck supple.   Skin:     General: Skin is warm and dry.      Capillary Refill: Capillary refill takes less than 2 seconds.   Neurological:      Mental Status: He is alert. He is confused.      Motor: Tremor (resting tremors) present.      Gait: Gait abnormal.   Psychiatric:         Mood and Affect: Mood normal.         Tita Galan MD

## 2024-11-19 NOTE — ASSESSMENT & PLAN NOTE
-We will discontinue Atarax  -BuSpar 7.5 mg 2 times daily  -Given persistent depression symptoms, we will increase Zoloft to 150 mg daily  -Follow-up in 3 to 4 weeks  Orders:    sertraline (Zoloft) 50 mg tablet; Take 3 tablets (150 mg total) by mouth daily    Ambulatory referral to Psych Services; Future

## 2024-11-20 ENCOUNTER — EVALUATION (OUTPATIENT)
Dept: PHYSICAL THERAPY | Facility: CLINIC | Age: 74
End: 2024-11-20
Payer: MEDICARE

## 2024-11-20 ENCOUNTER — TELEPHONE (OUTPATIENT)
Dept: FAMILY MEDICINE CLINIC | Facility: CLINIC | Age: 74
End: 2024-11-20

## 2024-11-20 DIAGNOSIS — R26.89 IMBALANCE: Primary | ICD-10-CM

## 2024-11-20 DIAGNOSIS — R26.81 UNSTEADY GAIT: Primary | ICD-10-CM

## 2024-11-20 PROCEDURE — 97162 PT EVAL MOD COMPLEX 30 MIN: CPT

## 2024-11-20 NOTE — PROGRESS NOTES
PT Evaluation     Today's date: 2024  Patient name: Fredy Yu  : 1950  MRN: [id number]  Referring provider: Tejal Garcia,*  Dx:   Encounter Diagnosis     ICD-10-CM    1. Unsteady gait  R26.81           Start Time: 1015  Stop Time: 1100  Total time in clinic (min): 45 minutes    Assessment  Impairments: abnormal gait, activity intolerance, impaired balance, impaired physical strength, lacks appropriate home exercise program, pain with function, activity limitations and endurance  Symptom irritability: moderate    Assessment details: Fredy is a 74 y.o. male presenting to PT with complaints of decreased balance and unsteady gait. His deficits include: L lumbar pain, decreased BLE strength, decreased balance, unsteady gait and decreased tolerance to activity. This patient would benefit from skilled PT services to address these issues and to maximize function.     Thank you for the referral.   Barriers to intervention: hearing loss and vision  Understanding of Dx/Px/POC: good     Prognosis: good    Goals  STGs: To be complete within 4 weeks  - Decrease lumbar pain to 4/10 at worst  - Increase AROM to WNL  - Increase BLE strength to > 4+/5    LTGs: To be complete by discharge  - Improve FOTO score from 49 to 50 for increased safety and functional capacity with ADL's  - Improve Miranda Balance Score to >41/56 for increased safety and functional capacity with ADL's  - Able to walk for short community distances without pain or limitation for increased safety and functional capacity with ADLs   - Able to ascend/descend a full flight of stairs with reciprocal pattern for increased safety and functional capacity with ADLs       Plan  Patient would benefit from: skilled physical therapy    Planned therapy interventions: balance, gait training, home exercise program, patient/caregiver education, neuromuscular re-education, strengthening, therapeutic activities, therapeutic exercise, manual therapy,  flexibility and stretching    Frequency: 1-2x week  Duration in weeks: 8  Plan of Care beginning date: 2024  Plan of Care expiration date: 1/15/2025  Treatment plan discussed with: patient  Plan details: Patient informed that from this point forward, to ensure adherence to the aforementioned plan of care, all or some of the treatment may be performed and carried out by a Physical Therapy Assistant (PTA) with supervision from a licensed Physical Therapist (PT) in accordance with [facility] Physical Therapy Practice Act.    Patient will continue to benefit from skilled physical therapy to address the functional deficits that were identified during the evaluation today. We will continue to progress the therapy program to address these functional deficits and achieve the established goals.             Subjective Evaluation    History of Present Illness  Mechanism of injury: The patient states that he has had issues with his gait and balance for the past few months. He denies having any falls, but he does feel unsteady. He states that he feels most unsteady when he initially stands up from sitting, especially prolonged sitting.   The patient states that he was diagnosed with peripheral neuropathy last year.   He states that he has had to walk with a cane and walker at times. He states that he had a fall in , which required hospitalization.   The patient states that he had a recent hospitalization from  - 11/15. The patient states that PTcame in once to have him walk with a walker. He states that he has been walking without an AD since he was discharged to home.           Recurrent probem    Quality of life: poor    Patient Goals  Patient goals for therapy: increased strength and improved balance  Patient goal: walk better  Pain  Current pain ratin  At best pain ratin  At worst pain ratin  Location: L lower back  Relieving factors: relaxation, rest and medications  Aggravating factors:  standing, stair climbing and walking    Social Support  Steps to enter house: yes  2  Stairs in house: yes   Lives in: multiple-level home  Lives with: spouse    Employment status: not working        Objective     Strength/Myotome Testing     Left Hip   Planes of Motion   Flexion: 4  Extension: 4  Abduction: 4  External rotation: 3+    Right Hip   Planes of Motion   Flexion: 4-  Extension: 4-  Abduction: 4-  External rotation: 3+    Left Knee   Flexion: 4  Extension: 4    Right Knee   Flexion: 4  Extension: 4    Left Ankle/Foot   Dorsiflexion: 4-  Plantar flexion: 4    Right Ankle/Foot   Dorsiflexion: 4-  Plantar flexion: 4    Ambulation     Ambulation: Level Surfaces   Ambulation without assistive device: independent    Ambulation: Stairs   Ascend stairs: independent  Pattern: non-reciprocal  Railings: one rail  Descend stairs: independent  Pattern: non-reciprocal  Railings: one rail    Observational Gait   Gait: antalgic     Additional Observational Gait Details  Patient ambulates with steppage gait.         Miranda Balance Scale - 34/56 - Medium Fall Risk     Precautions: Hard of hearing, Macular degeneration, Neuropathy, Poor tolerance to standing and walking      Date 11/20/24       FOTO AW 49       Manuals                                        Neuro Re-Ed        Tandem walk        Tandem stance        Stand with NBOS                                        Ther Ex        NuStep        Seated or standing blanche        LAQ        Heel raises        SHC        Stand hip abd, ext                        Ther Activity                        Gait Training                        Modalities

## 2024-11-20 NOTE — LETTER
2024    Tejal Garcia MD  [address] [facility] [address]    Patient: Fredy Yu   YOB: 1950   Date of Visit: 2024     Encounter Diagnosis     ICD-10-CM    1. Unsteady gait  R26.81           Dear Dr. Garcia:    Thank you for your recent referral of Fredy Yu. Please review the attached evaluation summary from Fredy's recent visit.     Please verify that you agree with the plan of care by signing the attached order.     If you have any questions or concerns, please do not hesitate to call.     I sincerely appreciate the opportunity to share in the care of one of your patients and hope to have another opportunity to work with you in the near future.       Sincerely,    Christelle Alvarez, PT      Referring Provider:      I certify that I have read the below Plan of Care and certify the need for these services furnished under this plan of treatment while under my care.                    Tejal Garcia MD  [address] [facility] [address]  Via In Basket          PT Evaluation     Today's date: 2024  Patient name: Fredy Yu  : 1950  MRN: [id number]  Referring provider: Tejal Garcia,*  Dx:   Encounter Diagnosis     ICD-10-CM    1. Unsteady gait  R26.81           Start Time: 1015  Stop Time: 1100  Total time in clinic (min): 45 minutes    Assessment  Impairments: abnormal gait, activity intolerance, impaired balance, impaired physical strength, lacks appropriate home exercise program, pain with function, activity limitations and endurance  Symptom irritability: moderate    Assessment details: Fredy is a 74 y.o. male presenting to PT with complaints of decreased balance and unsteady gait. His deficits include: L lumbar pain, decreased BLE strength, decreased balance, unsteady gait and decreased tolerance to activity. This patient would benefit from skilled PT services to address these issues and to maximize function.      Thank you for the referral.   Barriers to intervention: hearing loss and vision  Understanding of Dx/Px/POC: good     Prognosis: good    Goals  STGs: To be complete within 4 weeks  - Decrease lumbar pain to 4/10 at worst  - Increase AROM to WNL  - Increase BLE strength to > 4+/5    LTGs: To be complete by discharge  - Improve FOTO score from 49 to 50 for increased safety and functional capacity with ADL's  - Improve Miranda Balance Score to >41/56 for increased safety and functional capacity with ADL's  - Able to walk for short community distances without pain or limitation for increased safety and functional capacity with ADLs   - Able to ascend/descend a full flight of stairs with reciprocal pattern for increased safety and functional capacity with ADLs       Plan  Patient would benefit from: skilled physical therapy    Planned therapy interventions: balance, gait training, home exercise program, patient/caregiver education, neuromuscular re-education, strengthening, therapeutic activities, therapeutic exercise, manual therapy, flexibility and stretching    Frequency: 1-2x week  Duration in weeks: 8  Plan of Care beginning date: 11/20/2024  Plan of Care expiration date: 1/15/2025  Treatment plan discussed with: patient  Plan details: Patient informed that from this point forward, to ensure adherence to the aforementioned plan of care, all or some of the treatment may be performed and carried out by a Physical Therapy Assistant (PTA) with supervision from a licensed Physical Therapist (PT) in accordance with [facility] Physical Therapy Practice Act.    Patient will continue to benefit from skilled physical therapy to address the functional deficits that were identified during the evaluation today. We will continue to progress the therapy program to address these functional deficits and achieve the established goals.             Subjective Evaluation    History of Present Illness  Mechanism of injury: The  patient states that he has had issues with his gait and balance for the past few months. He denies having any falls, but he does feel unsteady. He states that he feels most unsteady when he initially stands up from sitting, especially prolonged sitting.   The patient states that he was diagnosed with peripheral neuropathy last year.   He states that he has had to walk with a cane and walker at times. He states that he had a fall in , which required hospitalization.   The patient states that he had a recent hospitalization from  - 11/15. The patient states that PTcame in once to have him walk with a walker. He states that he has been walking without an AD since he was discharged to home.           Recurrent probem    Quality of life: poor    Patient Goals  Patient goals for therapy: increased strength and improved balance  Patient goal: walk better  Pain  Current pain ratin  At best pain ratin  At worst pain ratin  Location: L lower back  Relieving factors: relaxation, rest and medications  Aggravating factors: standing, stair climbing and walking    Social Support  Steps to enter house: yes  2  Stairs in house: yes   Lives in: multiple-level home  Lives with: spouse    Employment status: not working        Objective     Strength/Myotome Testing     Left Hip   Planes of Motion   Flexion: 4  Extension: 4  Abduction: 4  External rotation: 3+    Right Hip   Planes of Motion   Flexion: 4-  Extension: 4-  Abduction: 4-  External rotation: 3+    Left Knee   Flexion: 4  Extension: 4    Right Knee   Flexion: 4  Extension: 4    Left Ankle/Foot   Dorsiflexion: 4-  Plantar flexion: 4    Right Ankle/Foot   Dorsiflexion: 4-  Plantar flexion: 4    Ambulation     Ambulation: Level Surfaces   Ambulation without assistive device: independent    Ambulation: Stairs   Ascend stairs: independent  Pattern: non-reciprocal  Railings: one rail  Descend stairs: independent  Pattern: non-reciprocal  Railings: one  bladimir    Observational Gait   Gait: antalgic     Additional Observational Gait Details  Patient ambulates with steppage gait.         Miranda Balance Scale - 34/56 - Medium Fall Risk     Precautions: Hard of hearing, Macular degeneration, Neuropathy, Poor tolerance to standing and walking      Date 11/20/24       FOTO AW 49       Manuals                                        Neuro Re-Ed        Tandem walk        Tandem stance        Stand with NBOS                                        Ther Ex        NuStep        Seated or standing marches        LAQ        Heel raises        SHC        Stand hip abd, ext                        Ther Activity                        Gait Training                        Modalities

## 2024-11-20 NOTE — TELEPHONE ENCOUNTER
Kathrine from Physical Therapy stopped in to ask if you can provide a new PT script for balance.    Thank you

## 2024-11-25 ENCOUNTER — OFFICE VISIT (OUTPATIENT)
Dept: PHYSICAL THERAPY | Facility: CLINIC | Age: 74
End: 2024-11-25
Payer: MEDICARE

## 2024-11-25 DIAGNOSIS — R26.81 UNSTEADY GAIT: Primary | ICD-10-CM

## 2024-11-25 PROBLEM — N39.0 COMPLICATED UTI (URINARY TRACT INFECTION): Status: RESOLVED | Noted: 2024-10-26 | Resolved: 2024-11-25

## 2024-11-25 PROCEDURE — 97110 THERAPEUTIC EXERCISES: CPT

## 2024-11-25 PROCEDURE — 97530 THERAPEUTIC ACTIVITIES: CPT

## 2024-11-25 NOTE — PROGRESS NOTES
"Daily Note     Today's date: 2024  Patient name: Fredy Yu  : 1950  MRN: [id number]  Referring provider: Tejal Garcia,*  Dx: No diagnosis found.               Subjective: Pt states he feels unsteady on his feet and has some challenge with walking secondary to neruopathy       Objective: See treatment diary below      Assessment: Tolerated treatment well. Patient demonstrated fatigue post treatment.  Began ex per flow sheet.  Supervision for balance.  Pt with moderate fatigue in legs by end of session      Plan: Continue per plan of care.      Precautions: Hard of hearing, Macular degeneration, Neuropathy, Poor tolerance to standing and walking      Date 24      FOTO AW 49       Manuals                                        Neuro Re-Ed        Tandem walk  Parallel bars 5x       Sidestepping  Parallel bars 5x       Tandem stance  NV      Stand with NBOS  4x15\"                                      Ther Ex        NuStep  6 min L1      Seated or standing marches  Standing 20x ea      LAQ  2# 20x      Heel raises  30x      SHC        Stand hip abd, ext  10x ea                      Ther Activity                        Gait Training                        Modalities                              "

## 2024-11-25 NOTE — TELEPHONE ENCOUNTER
A message was left on the Patient's  identifying phone .  Patient is to call the office back if he has any questions.

## 2024-11-29 ENCOUNTER — OFFICE VISIT (OUTPATIENT)
Dept: PHYSICAL THERAPY | Facility: CLINIC | Age: 74
End: 2024-11-29
Payer: MEDICARE

## 2024-11-29 DIAGNOSIS — R26.81 UNSTEADY GAIT: Primary | ICD-10-CM

## 2024-11-29 PROCEDURE — 97110 THERAPEUTIC EXERCISES: CPT

## 2024-11-29 PROCEDURE — 97112 NEUROMUSCULAR REEDUCATION: CPT

## 2024-11-29 NOTE — PROGRESS NOTES
"Daily Note     Today's date: 2024  Patient name: Fredy Yu  : 1950  MRN: [id number]  Referring provider: Tejal Garcia,*  Dx:   Encounter Diagnosis     ICD-10-CM    1. Unsteady gait  R26.81                      Subjective: Pt states his lower back is sore and is feeling some discomfort through his R leg this morning.  States there is a lot going on in his family life currently.       Objective: See treatment diary below      Assessment: Tolerated treatment fair. Patient would benefit from continued PT.  Pt with more fatigue today.  Also more intense tremor noted in bilateral hands.  States he feels anxious with his family matters this visit.  Ended session early secondary to this.  Will plan to resume full session next visit.       Plan: Continue per plan of care.      Precautions: Hard of hearing, Macular degeneration, Neuropathy, Poor tolerance to standing and walking      Date 24     FOTO AW 49       Manuals                                        Neuro Re-Ed        Tandem walk  Parallel bars 5x  3x     Sidestepping  Parallel bars 5x  3x     Tandem stance  NV      Stand with NBOS  4x15\"      Romberg EC    3x15\"     Romberg c Head turns   3x15\"                      Ther Ex        NuStep  6 min L1 6 min L5     standing marches  Standing 20x ea Seated 2# 20x     LAQ  2# 20x 2# 20x     Heel raises  30x Seated 20x 2#     SHC        Stand hip abd, ext  10x ea 10x ea      Seated hip abd   GTB 20x     Seated calf stretch c strap   3x15\" ea      Seated SKTC   3x15\"              Ther Activity                        Gait Training                        Modalities                                "

## 2024-12-02 ENCOUNTER — APPOINTMENT (OUTPATIENT)
Dept: PHYSICAL THERAPY | Facility: CLINIC | Age: 74
End: 2024-12-02
Payer: MEDICARE

## 2024-12-03 ENCOUNTER — OFFICE VISIT (OUTPATIENT)
Dept: PHYSICAL THERAPY | Facility: CLINIC | Age: 74
End: 2024-12-03
Payer: MEDICARE

## 2024-12-03 DIAGNOSIS — R26.81 UNSTEADY GAIT: Primary | ICD-10-CM

## 2024-12-03 PROCEDURE — 97110 THERAPEUTIC EXERCISES: CPT

## 2024-12-03 PROCEDURE — 97112 NEUROMUSCULAR REEDUCATION: CPT

## 2024-12-03 PROCEDURE — 97530 THERAPEUTIC ACTIVITIES: CPT

## 2024-12-03 NOTE — PROGRESS NOTES
"Daily Note     Today's date: 12/3/2024  Patient name: Fredy Yu  : 1950  MRN: [id number]  Referring provider: Tejal Garcia,*  Dx: No diagnosis found.               Subjective: \"good days and bad days\".  This weekend drove to [place] to  his daughter and states he felt fine before driving.  When he got home, felt his legs were giving out and weak from the drive.       Objective: See treatment diary below      Assessment: Tolerated treatment well. Patient demonstrated fatigue post treatment.  Improvement to tandem walking and sidestepping this visit.       Plan: Continue per plan of care.      Precautions: Hard of hearing, Macular degeneration, Neuropathy, Poor tolerance to standing and walking      Date 11/20/24 11/25 11/29 12/3    FOTO AW 49       Manuals                                        Neuro Re-Ed        Tandem walk  Parallel bars 5x  3x 5x     Sidestepping  Parallel bars 5x  3x 5x     Tandem stance  NV  3x15\" ea    Stand with NBOS  4x15\"      Romberg EC    3x15\" 3x15\"    Romberg c Head turns   3x15\"                      Ther Ex        NuStep  6 min L1 6 min L5 6 min L5    standing marches  Standing 20x ea Seated 2# 20x 2# 2x20    LAQ  2# 20x 2# 20x 2# 2x20x    Heel raises  30x Seated 20x 2# 30x    SHC        Stand hip abd, ext  10x ea 10x ea  20xea     Seated hip abd   GTB 20x     Seated calf stretch c strap   3x15\" ea  3x15\" ea    Seated SKTC   3x15\"              Ther Activity        360 turns   3cw  3ccw 3cw  3ccw    Turning to look over shoulder    2x4 ea; therapist holds up a number    Soccer ball kicks    Therapist rolls red PB; sested; 2x10 ea            Gait Training                        Modalities                                  "

## 2024-12-04 ENCOUNTER — TELEPHONE (OUTPATIENT)
Dept: ADMINISTRATIVE | Facility: OTHER | Age: 74
End: 2024-12-04

## 2024-12-04 ENCOUNTER — APPOINTMENT (OUTPATIENT)
Dept: PHYSICAL THERAPY | Facility: CLINIC | Age: 74
End: 2024-12-04
Payer: MEDICARE

## 2024-12-04 NOTE — TELEPHONE ENCOUNTER
12/04/24 2:09 PM    Patient contacted to bring Advance Directive, POLST, or Living Will document to next scheduled pcp visit.[facility] Department spoke with patient/ caregiver.    Thank you.  Nadja Tran MA  [facility]

## 2024-12-05 ENCOUNTER — APPOINTMENT (OUTPATIENT)
Dept: PHYSICAL THERAPY | Facility: CLINIC | Age: 74
End: 2024-12-05
Payer: MEDICARE

## 2024-12-06 ENCOUNTER — APPOINTMENT (OUTPATIENT)
Dept: PHYSICAL THERAPY | Facility: CLINIC | Age: 74
End: 2024-12-06
Payer: MEDICARE

## 2024-12-09 ENCOUNTER — APPOINTMENT (OUTPATIENT)
Dept: PHYSICAL THERAPY | Facility: CLINIC | Age: 74
End: 2024-12-09
Payer: MEDICARE

## 2024-12-10 ENCOUNTER — OFFICE VISIT (OUTPATIENT)
Dept: FAMILY MEDICINE CLINIC | Facility: CLINIC | Age: 74
End: 2024-12-10
Payer: MEDICARE

## 2024-12-10 ENCOUNTER — TELEPHONE (OUTPATIENT)
Dept: RHEUMATOLOGY | Facility: CLINIC | Age: 74
End: 2024-12-10

## 2024-12-10 VITALS
BODY MASS INDEX: 37.62 KG/M2 | SYSTOLIC BLOOD PRESSURE: 136 MMHG | HEART RATE: 73 BPM | DIASTOLIC BLOOD PRESSURE: 88 MMHG | TEMPERATURE: 97.2 F | OXYGEN SATURATION: 93 % | WEIGHT: 225.8 LBS | HEIGHT: 65 IN

## 2024-12-10 DIAGNOSIS — F10.90 ALCOHOL USE DISORDER: Primary | ICD-10-CM

## 2024-12-10 DIAGNOSIS — I10 ESSENTIAL HYPERTENSION: ICD-10-CM

## 2024-12-10 DIAGNOSIS — R73.03 PREDIABETES: ICD-10-CM

## 2024-12-10 DIAGNOSIS — F41.9 ANXIETY: ICD-10-CM

## 2024-12-10 DIAGNOSIS — F41.8 DEPRESSION WITH ANXIETY: ICD-10-CM

## 2024-12-10 DIAGNOSIS — R26.89 IMBALANCE: ICD-10-CM

## 2024-12-10 DIAGNOSIS — E78.5 DYSLIPIDEMIA: ICD-10-CM

## 2024-12-10 PROCEDURE — 99213 OFFICE O/P EST LOW 20 MIN: CPT | Performed by: FAMILY MEDICINE

## 2024-12-10 PROCEDURE — G2211 COMPLEX E/M VISIT ADD ON: HCPCS | Performed by: FAMILY MEDICINE

## 2024-12-10 NOTE — ASSESSMENT & PLAN NOTE
-Patient reports improved symptoms with Zoloft 150 mg daily  -Continue current treatment  -Advised patient to make the appointment with psych services  -Follow-up in 3 months    Orders:    sertraline (Zoloft) 50 mg tablet; Take 3 tablets (150 mg total) by mouth daily

## 2024-12-10 NOTE — ASSESSMENT & PLAN NOTE
-Patient reports that he has not had alcohol since 1 month  -Maintained on naltrexone 50 mg oral daily, reports no side effects  -Attends AA meetings 2-3 times a week  -Appreciated patient's efforts  -Continue current treatment

## 2024-12-10 NOTE — TELEPHONE ENCOUNTER
Patient called the RX Refill Line. Message is being forwarded to the office.     Patient is requesting humira 40mg/ .4ml, inject under the skin every 14 days. He requested Dr Anna refill his medication.  He would like it sent to [facility].     Please contact patient at  [phone] with any questions on this dosage. He stated it was changed from .8 to .4ml

## 2024-12-10 NOTE — PROGRESS NOTES
Name: Fredy Yu      : 1950      MRN: [id number]  Encounter Provider: Tita Galan MD  Encounter Date: 12/10/2024   Encounter department: [facility]    Assessment & Plan  Alcohol use disorder  -Patient reports that he has not had alcohol since 1 month  -Maintained on naltrexone 50 mg oral daily, reports no side effects  -Attends AA meetings 2-3 times a week  -Appreciated patient's efforts  -Continue current treatment    Essential hypertension  -BP stable  -Continue current treatment  -Follow-up in 3 months for Medicare wellness  Orders:    T4; Future    TSH, 3rd generation; Future    Comprehensive metabolic panel; Future    CBC and differential; Future    Imbalance  -Patient reports that he has not yet made appointment with [facility]  -Advised that he make that appointment as soon as possible  -Continue PT  Orders:    Lipid panel; Future    T4; Future    TSH, 3rd generation; Future    Comprehensive metabolic panel; Future    CBC and differential; Future    Anxiety  -Patient states that his symptoms are well-controlled with Zoloft 150 mg daily  -Continue BuSpar 7.5 mg 2 times daily  -Continue current treatment  -Patient has not made appointment with psych services, advised patient to make that appointment as well as possible  -Refill sent  Orders:    sertraline (Zoloft) 50 mg tablet; Take 3 tablets (150 mg total) by mouth daily    Dyslipidemia    Orders:    Lipid panel; Future    T4; Future    TSH, 3rd generation; Future    Comprehensive metabolic panel; Future    CBC and differential; Future    Hemoglobin A1C; Future    Prediabetes    Orders:    Hemoglobin A1C; Future    Depression with anxiety  -Patient reports improved symptoms with Zoloft 150 mg daily  -Continue current treatment  -Advised patient to make the appointment with psych services  -Follow-up in 3 months    Orders:    sertraline (Zoloft) 50 mg tablet; Take 3 tablets (150 mg total) by mouth daily      "    History of Present Illness     Patient is a 74-year-old male who presents to the office today for follow-up for alcohol use disorder.  Patient states that he has been taking naltrexone daily.  He states that he has not had alcohol since 1 month.  He states that his urge for alcohol use has decreased.  Denies side effects from the medication.  Patient states that he also feels better overall with increased dose of Zoloft.  He states that he was doing well until last Thursday.  He reports that his daughter is admitted to psych unit and that has been causing a lot of stress last few days.  Patient otherwise denies any acute concerns today.  He states that he attends AA meetings 2-3 times a week.      Review of Systems   Constitutional:  Negative for chills and fever.   HENT:  Negative for ear pain and sore throat.    Eyes:  Negative for pain and visual disturbance.   Respiratory:  Negative for cough and shortness of breath.    Cardiovascular:  Negative for chest pain and palpitations.   Gastrointestinal:  Negative for abdominal pain and vomiting.   Genitourinary:  Negative for dysuria and hematuria.   Musculoskeletal:  Negative for arthralgias and back pain.   Skin:  Negative for color change and rash.   Neurological:  Negative for seizures and syncope.   All other systems reviewed and are negative.    Objective     /88 (BP Location: Left arm, Patient Position: Sitting, Cuff Size: Adult)   Pulse 73   Temp (!) 97.2 °F (36.2 °C) (Tympanic)   Ht 5' 5\" (1.651 m)   Wt 102 kg (225 lb 12.8 oz)   SpO2 93%   BMI 37.58 kg/m²     Physical Exam  Vitals and nursing note reviewed.   Constitutional:       General: He is not in acute distress.     Appearance: He is well-developed.   HENT:      Head: Normocephalic and atraumatic.   Eyes:      Conjunctiva/sclera: Conjunctivae normal.   Cardiovascular:      Rate and Rhythm: Normal rate and regular rhythm.      Heart sounds: No murmur heard.  Pulmonary:      Effort: " Pulmonary effort is normal. No respiratory distress.      Breath sounds: Normal breath sounds.   Abdominal:      Palpations: Abdomen is soft.      Tenderness: There is no abdominal tenderness.   Musculoskeletal:         General: No swelling.      Cervical back: Neck supple.   Skin:     General: Skin is warm and dry.      Capillary Refill: Capillary refill takes less than 2 seconds.   Neurological:      Mental Status: He is alert.   Psychiatric:         Mood and Affect: Mood normal.         Tita Galan MD

## 2024-12-10 NOTE — ASSESSMENT & PLAN NOTE
-BP stable  -Continue current treatment  -Follow-up in 3 months for Medicare wellness  Orders:    T4; Future    TSH, 3rd generation; Future    Comprehensive metabolic panel; Future    CBC and differential; Future

## 2024-12-10 NOTE — ASSESSMENT & PLAN NOTE
-Patient states that his symptoms are well-controlled with Zoloft 150 mg daily  -Continue BuSpar 7.5 mg 2 times daily  -Continue current treatment  -Patient has not made appointment with psych services, advised patient to make that appointment as well as possible  -Refill sent  Orders:    sertraline (Zoloft) 50 mg tablet; Take 3 tablets (150 mg total) by mouth daily

## 2024-12-11 ENCOUNTER — APPOINTMENT (OUTPATIENT)
Dept: PHYSICAL THERAPY | Facility: CLINIC | Age: 74
End: 2024-12-11
Payer: MEDICARE

## 2024-12-11 DIAGNOSIS — Z79.899 HIGH RISK MEDICATION USE: Primary | ICD-10-CM

## 2024-12-11 DIAGNOSIS — Z11.59 ENCOUNTER FOR SCREENING FOR OTHER VIRAL DISEASES: ICD-10-CM

## 2024-12-11 DIAGNOSIS — L40.50 PSORIATIC ARTHRITIS (HCC): Primary | ICD-10-CM

## 2024-12-11 NOTE — TELEPHONE ENCOUNTER
PA for Humira  APPROVED     Date(s) approved December 11, 2024 to December 11, 2025     Case #     Patient advised by          []Nostalgia Bingoh[facility] Message  []Phone call   [x]LMOM  []L/M to call office as no active Communication consent on file  []Unable to leave detailed message as VM not approved on Communication consent       Pharmacy advised by    [x]Fax  []Phone call    Approval letter scanned into Media Yes

## 2024-12-11 NOTE — TELEPHONE ENCOUNTER
PA for Humira SUBMITTED to [employer]    via    []CMM-KEY:    [x]Surescripts-Case ID # [id number]   []Availity-Auth ID #  NDC #    []Faxed to plan   []Other website    []Phone call Case ID #      [x]PA sent as URGENT    All office notes, labs and other pertaining documents and studies sent. Clinical questions answered. Awaiting determination from insurance company.     Turnaround time for your insurance to make a decision on your Prior Authorization can take 7-21 business days.

## 2024-12-11 NOTE — TELEPHONE ENCOUNTER
He was previously getting it through Dermatology for his psoriasis, please resubmit prior auth from our end for his psoriatic arthritis.     Rheumatology Prior Authorization Request      Medication/Disease Information:   Diagnosis:  psoriatic arthritis  Medication:  Humira (adalimumab) 40mg/0.4mL every 2 weeks subcutaneous pen injection, continuation of therapy  Failed or Intolerant to or Contraindicated:  methotrexate  Screening  Hepatitis B/C:  negative  Tuberculosis: negative  No active infections  Up to Date on immunizations

## 2024-12-16 NOTE — PLAN OF CARE
Problem: Potential for Falls  Goal: Patient will remain free of falls  Description  INTERVENTIONS:  - Assess patient frequently for physical needs  -  Identify cognitive and physical deficits and behaviors that affect risk of falls  -  [place] fall precautions as indicated by assessment   - Educate patient/family on patient safety including physical limitations  - Instruct patient to call for assistance with activity based on assessment  - Modify environment to reduce risk of injury  - Consider OT/PT consult to assist with strengthening/mobility  Outcome: Progressing     Problem: PAIN - ADULT  Goal: Verbalizes/displays adequate comfort level or baseline comfort level  Description  Interventions:  - Encourage patient to monitor pain and request assistance  - Assess pain using appropriate pain scale  - Administer analgesics based on type and severity of pain and evaluate response  - Implement non-pharmacological measures as appropriate and evaluate response  - Consider cultural and social influences on pain and pain management  - Notify physician/advanced practitioner if interventions unsuccessful or patient reports new pain  Outcome: Progressing     Problem: SAFETY ADULT  Goal: Patient will remain free of falls  Description  INTERVENTIONS:  - Assess patient frequently for physical needs  -  Identify cognitive and physical deficits and behaviors that affect risk of falls    -  [place] fall precautions as indicated by assessment   - Educate patient/family on patient safety including physical limitations  - Instruct patient to call for assistance with activity based on assessment  - Modify environment to reduce risk of injury  - Consider OT/PT consult to assist with strengthening/mobility  Outcome: Progressing  Goal: Maintain or return to baseline ADL function  Description  INTERVENTIONS:  -  Assess patient's ability to carry out ADLs; assess patient's baseline for ADL function and identify physical deficits which Return in about 6 months (around 6/16/2025).    Problems Addressed    DMII  -metformin, trulicity  -no problems with either medication  -no GI issues  -lost 4lbs since last season  -per last note:   A1c is 6.4 patient congratulated on success.  At this time we will increase the Trulicity and discontinue the glipizide.  If he has any issues with the higher dose of Trulicity we could go back down and restart the glipizide.  Will have him follow-up in 3 months for repeat A1c.  If that 1 is at goal and likely can space things out to.  6 months.  Will also check CMP today.  Assessment and Plan:   POC A1c done and 6.1. Continue on current medication of metformin and trulicity (will remain off glipizide).      Dyslipidemia  -has been on statin in the past, have elected to hold off restarting until 40 based on lipid panel    Hx of GERD  -previously on omeprazole     Seasonal allergies  -flonase; uses it based on the season (typically the warmer months)    Dandruff  -ketoconazole shampoo    Bipolar  -follows with psych and therapist  -currently on depakote, sertraline, trazodone      Subjective  Chief Complaint   Patient presents with    Office Visit    Diabetes     Follow up       HPI       Diabetes     Additional comments: Follow up                     History  Past Medical History:   Diagnosis Date    ADHD (attention deficit hyperactivity disorder)     Attention deficit disorder with hyperactivity(314.01) 01/25/2012    Bipolar disorder  (CMD)     Depression with anxiety     Depressive disorder, not elsewhere classified 01/25/2012    Esophageal reflux 01/25/2012    Generalized anxiety disorder 01/25/2012    Hyperlipidemia     Morbid obesity  (CMD)     Obesity, unspecified 01/25/2012    Type 2 diabetes mellitus  (CMD) 01/25/2012    Unspecified vitamin D deficiency 01/25/2012    Vitamin D deficiency      No past surgical history on file.    Medications  Current Outpatient Medications   Medication Sig Dispense Refill     impact ability to perform ADLs (bathing, care of mouth/teeth, toileting, grooming, dressing, etc )  - Assess/evaluate cause of self-care deficits   - Assess range of motion  - Assess patient's mobility; develop plan if impaired  - Assess patient's need for assistive devices and provide as appropriate  - Encourage maximum independence but intervene and supervise when necessary  ¯ Involve family in performance of ADLs  ¯ Assess for home care needs following discharge   ¯ Request OT consult to assist with ADL evaluation and planning for discharge  ¯ Provide patient education as appropriate  Outcome: Progressing  Goal: Maintain or return mobility status to optimal level  Description  INTERVENTIONS:  - Assess patient's baseline mobility status (ambulation, transfers, stairs, etc )    - Identify cognitive and physical deficits and behaviors that affect mobility  - Identify mobility aids required to assist with transfers and/or ambulation (gait belt, sit-to-stand, lift, walker, cane, etc )  - [place] fall precautions as indicated by assessment  - Record patient progress and toleration of activity level on Mobility SBAR; progress patient to next Phase/Stage  - Instruct patient to call for assistance with activity based on assessment  - Request Rehabilitation consult to assist with strengthening/weightbearing, etc   Outcome: Progressing     Problem: DISCHARGE PLANNING  Goal: Discharge to home or other facility with appropriate resources  Description  INTERVENTIONS:  - Identify barriers to discharge w/patient and caregiver  - Arrange for needed discharge resources and transportation as appropriate  - Identify discharge learning needs (meds, wound care, etc )  - Arrange for interpretive services to assist at discharge as needed  - Refer to Case Management Department for coordinating discharge planning if the patient needs post-hospital services based on physician/advanced practitioner order or complex needs related to metformin (GLUCOPHAGE) 1000 MG tablet TAKE 1 TABLET BY MOUTH IN THE MORNING AND IN THE EVENING WITH MEALS 180 tablet 0    dulaglutide (Trulicity) 1.5 MG/0.5ML pen-injector Inject 1.5 mg into the skin every 7 days. Indications: Type 2 Diabetes 2 mL 3    blood glucose (OneTouch Ultra) test strip Test blood sugar 2 times daily. Diagnosis: type 2 diabetes 100 strip 11    TRAZODONE HCL PO       ketoconazole (NIZORAL) 2 % shampoo APPLY TO SCALP FOR SEVERAL MINUTES, THEN WASH OFF THREE TIMES A WEEK 120 mL 11    divalproex (DEPAKOTE) 500 MG delayed release EC tablet Take 2 tablets by mouth nightly.      fluticasone (FLONASE) 50 MCG/ACT nasal spray INSTILL 2 SPRAYS INTO EACH NOSTRIL EVERY DAY 3 Bottle 3    sertraline (ZOLOFT) 50 MG tablet Take 1 tablet by mouth daily. Indications: dose unknowm 90 tablet 3    Cholecalciferol (VITAMIN D3) 2000 UNITS TABS Take 2 tablets by mouth daily.       No current facility-administered medications for this visit.         Exam  Visit Vitals  /70 (BP Location: LUE - Left upper extremity, Patient Position: Sitting, Cuff Size: Large Adult)   Pulse 65   Temp 98 °F (36.7 °C) (Tympanic)   Resp 16   Ht 5' 9\" (1.753 m)   Wt (!) 152.6 kg (336 lb 6.4 oz)   SpO2 98%   BMI 49.68 kg/m²           Wt Readings from Last 4 Encounters:   12/16/24 (!) 152.6 kg (336 lb 6.4 oz)   08/23/24 (!) 154.5 kg (340 lb 9.6 oz)   03/26/24 (!) 152.1 kg (335 lb 4.8 oz)   12/27/23 (!) 154.2 kg (340 lb)     BP Readings from Last 4 Encounters:   12/16/24 110/70   08/23/24 134/82   03/26/24 138/72   12/27/23 124/70       Physical Exam                functional status, cognitive ability, or social support system  Outcome: Progressing     Problem: Knowledge Deficit  Goal: Patient/family/caregiver demonstrates understanding of disease process, treatment plan, medications, and discharge instructions  Description  Complete learning assessment and assess knowledge base  Interventions:  - Provide teaching at level of understanding  - Provide teaching via preferred learning methods  Outcome: Progressing     Problem: NEUROSENSORY - ADULT  Goal: Achieves maximal functionality and self care  Description  INTERVENTIONS  - Monitor swallowing and airway patency with patient fatigue and changes in neurological status  - Encourage and assist patient to increase activity and self care with guidance from rehab services  - Encourage visually impaired, hearing impaired and aphasic patients to use assistive/communication devices  Outcome: Progressing     Problem: CARDIOVASCULAR - ADULT  Goal: Maintains optimal cardiac output and hemodynamic stability  Description  INTERVENTIONS:  - Monitor I/O, vital signs and rhythm  - Monitor for S/S and trends of decreased cardiac output i e  bleeding, hypotension  - Administer and titrate ordered vasoactive medications to optimize hemodynamic stability  - Assess quality of pulses, skin color and temperature  - Assess for signs of decreased coronary artery perfusion - ex   Angina  - Instruct patient to report change in severity of symptoms  Outcome: Progressing  Goal: Absence of cardiac dysrhythmias or at baseline rhythm  Description  INTERVENTIONS:  - Continuous cardiac monitoring, monitor vital signs, obtain 12 lead EKG if indicated  - Administer antiarrhythmic and heart rate control medications as ordered  - Monitor electrolytes and administer replacement therapy as ordered  Outcome: Progressing     Problem: RESPIRATORY - ADULT  Goal: Achieves optimal ventilation and oxygenation  Description  INTERVENTIONS:  - Assess for changes in respiratory status  - Assess for changes in mentation and behavior  - Position to facilitate oxygenation and minimize respiratory effort  - Oxygen administration by appropriate delivery method based on oxygen saturation (per order) or ABGs  - Initiate smoking cessation education as indicated  - Encourage broncho-pulmonary hygiene including cough, deep breathe, Incentive Spirometry  - Assess the need for suctioning and aspirate as needed  - Assess and instruct to report SOB or any respiratory difficulty  - Respiratory Therapy support as indicated  Outcome: Progressing     Problem: METABOLIC, FLUID AND ELECTROLYTES - ADULT  Goal: Electrolytes maintained within normal limits  Description  INTERVENTIONS:  - Monitor labs and assess patient for signs and symptoms of electrolyte imbalances  - Administer electrolyte replacement as ordered  - Monitor response to electrolyte replacements, including repeat lab results as appropriate  - Instruct patient on fluid and nutrition as appropriate  Outcome: Progressing  Goal: Fluid balance maintained  Description  INTERVENTIONS:  - Monitor labs and assess for signs and symptoms of volume excess or deficit  - Monitor I/O and WT  - Instruct patient on fluid and nutrition as appropriate  Outcome: Progressing     Problem: SKIN/TISSUE INTEGRITY - ADULT  Goal: Skin integrity remains intact  Description  INTERVENTIONS  - Identify patients at risk for skin breakdown  - Assess and monitor skin integrity  - Assess and monitor nutrition and hydration status  - Monitor labs (i e  albumin)  - Assess for incontinence   - Turn and reposition patient  - Assist with mobility/ambulation  - Relieve pressure over bony prominences  - Avoid friction and shearing  - Provide appropriate hygiene as needed including keeping skin clean and dry  - Evaluate need for skin moisturizer/barrier cream  - Collaborate with interdisciplinary team (i e  Nutrition, Rehabilitation, etc )   - Patient/family teaching  Outcome: Progressing     Problem: Prexisting or High Potential for Compromised Skin Integrity  Goal: Skin integrity is maintained or improved  Description  INTERVENTIONS:  - Identify patients at risk for skin breakdown  - Assess and monitor skin integrity  - Assess and monitor nutrition and hydration status  - Monitor labs (i e  albumin)  - Assess for incontinence   - Turn and reposition patient  - Assist with mobility/ambulation  - Relieve pressure over bony prominences  - Avoid friction and shearing  - Provide appropriate hygiene as needed including keeping skin clean and dry  - Evaluate need for skin moisturizer/barrier cream  - Collaborate with interdisciplinary team (i e  Nutrition, Rehabilitation, etc )   - Patient/family teaching  Outcome: Progressing

## 2024-12-17 ENCOUNTER — OFFICE VISIT (OUTPATIENT)
Dept: PHYSICAL THERAPY | Facility: CLINIC | Age: 74
End: 2024-12-17
Payer: MEDICARE

## 2024-12-17 DIAGNOSIS — F41.9 ANXIETY: ICD-10-CM

## 2024-12-17 DIAGNOSIS — R26.81 UNSTEADY GAIT: Primary | ICD-10-CM

## 2024-12-17 DIAGNOSIS — I10 ESSENTIAL HYPERTENSION: ICD-10-CM

## 2024-12-17 PROCEDURE — 97110 THERAPEUTIC EXERCISES: CPT

## 2024-12-17 PROCEDURE — 97112 NEUROMUSCULAR REEDUCATION: CPT

## 2024-12-17 RX ORDER — AMLODIPINE BESYLATE 2.5 MG/1
2.5 TABLET ORAL DAILY
Qty: 30 TABLET | Refills: 2 | Status: SHIPPED | OUTPATIENT
Start: 2024-12-17

## 2024-12-17 RX ORDER — BUSPIRONE HYDROCHLORIDE 7.5 MG/1
7.5 TABLET ORAL 2 TIMES DAILY
Qty: 60 TABLET | Refills: 2 | Status: SHIPPED | OUTPATIENT
Start: 2024-12-17

## 2024-12-17 NOTE — PROGRESS NOTES
"Daily Note     Today's date: 2024  Patient name: Fredy Yu  : 1950  MRN: [id number]  Referring provider: Tejal Garcia,*  Dx: No diagnosis found.               Subjective: 12/10 MD increased anxiety meds given situation at home with family.  Pt states he notices being more shaky the last few days.  has follow up with neurologist to continue monitoring for Parkinsonism      Objective: See treatment diary below      Assessment: Tolerated treatment well. Patient demonstrated fatigue post treatment and would benefit from continued PT.  Reassessed BBT today; 3 point improvement overall 37/56.       Plan: Continue per plan of care.      Precautions: Hard of hearing, Macular degeneration, Neuropathy, Poor tolerance to standing and walking      Date 11/20/24 11/25 11/29 12/3 12/17   FOTO AW 49    TS 45   Manuals                                        Neuro Re-Ed        Miranda Balance Test     37 TS   Tandem walk  Parallel bars 5x  3x 5x     Sidestepping  Parallel bars 5x  3x 5x     Tandem stance  NV  3x15\" ea 3x15\"   Stand with NBOS  4x15\"      Romberg EC    3x15\" 3x15\"    Romberg c Head turns   3x15\"                      Ther Ex        NuStep  6 min L1 6 min L5 6 min L5 6 min L5   standing marches  Standing 20x ea Seated 2# 20x 2# 2x20 20x 3#   LAQ  2# 20x 2# 20x 2# 2x20x 20x 3#   Heel raises  30x Seated 20x 2# 30x 30x    SHC        Stand hip abd, ext  10x ea 10x ea  20xea  15x ea   Seated hip abd   GTB 20x     Seated calf stretch c strap   3x15\" ea  3x15\" ea    Seated SKTC   3x15\"              Ther Activity        360 turns   3cw  3ccw 3cw  3ccw    Turning to look over shoulder    2x4 ea; therapist holds up a number    Soccer ball kicks    Therapist rolls red PB; sested; 2x10 ea            Gait Training                        Modalities                                    "

## 2024-12-18 NOTE — TELEPHONE ENCOUNTER
Called patient to inform him medication was sent to pharmacy. Patient did not answer, left a voicemail to call the office at [phone].

## 2024-12-19 ENCOUNTER — APPOINTMENT (OUTPATIENT)
Dept: PHYSICAL THERAPY | Facility: CLINIC | Age: 74
End: 2024-12-19
Payer: MEDICARE

## 2024-12-23 ENCOUNTER — OFFICE VISIT (OUTPATIENT)
Dept: PHYSICAL THERAPY | Facility: CLINIC | Age: 74
End: 2024-12-23
Payer: MEDICARE

## 2024-12-23 DIAGNOSIS — R26.81 UNSTEADY GAIT: Primary | ICD-10-CM

## 2024-12-23 PROCEDURE — 97110 THERAPEUTIC EXERCISES: CPT

## 2024-12-23 PROCEDURE — 97112 NEUROMUSCULAR REEDUCATION: CPT

## 2024-12-23 NOTE — PROGRESS NOTES
"Daily Note     Today's date: 2024  Patient name: Fredy Yu  : 1950  MRN: [id number]  Referring provider: Tejal Garcai,*  Dx: No diagnosis found.               Subjective: Pt states his BP meds were changed and he feels a little more stable today      Objective: See treatment diary below      Assessment: Tolerated treatment well. Patient demonstrated fatigue post treatment and would benefit from continued PT.  Pt with improved balance and dynamic stability noted today      Plan: Continue per plan of care.      Precautions: Hard of hearing, Macular degeneration, Neuropathy, Poor tolerance to standing and walking      Date 12/23 11/25 11/29 12/3 12/17   FOTO     TS 45   Manuals                                        Neuro Re-Ed        Miranda Balance Test     37 TS   Tandem walk Foam 3x Parallel bars 5x  3x 5x     Sidestepping 3x Parallel bars 5x  3x 5x     Tandem stance 3x15\" NV  3x15\" ea 3x15\"   Stand with NBOS  4x15\"      Romberg EC  4x15\"  3x15\" 3x15\"    Romberg c Head turns   3x15\"                      Ther Ex        NuStep 8 min L5 6 min L1 6 min L5 6 min L5 6 min L5   standing marches 20x Standing 20x ea Seated 2# 20x 2# 2x20 20x 3#   LAQ 3#  2# 20x 2# 20x 2# 2x20x 20x 3#   Heel raises 20x 30x Seated 20x 2# 30x 30x    SHC        Stand hip abd, ext  10x ea 10x ea  20xea  15x ea   Seated hip abd   GTB 20x     Seated calf stretch c strap 3x15\"  3x15\" ea  3x15\" ea    Seated SKTC 3x15\"   3x15\"              Ther Activity        360 turns   3cw  3ccw 3cw  3ccw    Turning to look over shoulder    2x4 ea; therapist holds up a number    Soccer ball kicks    Therapist rolls red PB; sested; 2x10 ea            Gait Training                        Modalities                                      "

## 2024-12-27 ENCOUNTER — EVALUATION (OUTPATIENT)
Dept: PHYSICAL THERAPY | Facility: CLINIC | Age: 74
End: 2024-12-27
Payer: MEDICARE

## 2024-12-27 DIAGNOSIS — R26.81 UNSTEADY GAIT: Primary | ICD-10-CM

## 2024-12-27 PROCEDURE — 97110 THERAPEUTIC EXERCISES: CPT

## 2024-12-27 PROCEDURE — 97112 NEUROMUSCULAR REEDUCATION: CPT

## 2024-12-27 NOTE — PROGRESS NOTES
PT Evaluation     Today's date: 2024  Patient name: Fredy Yu  : 1950  MRN: [id number]  Referring provider: Tejal Garcia,*  Dx:   Encounter Diagnosis     ICD-10-CM    1. Unsteady gait  R26.81                      Assessment  Impairments: abnormal gait, activity intolerance, impaired balance, impaired physical strength, lacks appropriate home exercise program, pain with function, activity limitations and endurance  Symptom irritability: moderate    Assessment details: Fredy is a 74 y.o. male presenting to PT with complaints of decreased balance and unsteady gait. His deficits include: L lumbar pain, decreased BLE strength, decreased balance, unsteady gait and decreased tolerance to activity. This patient would benefit from skilled PT services to address these issues and to maximize function.     Thank you for the referral.     : Fredy has been seen for 7 visits for gait and balance.  He is making good progress in lower extremity strength and has made some improvement in his Miranda Balance test since eval.  Since recent medicine change, he is demonstrating improving gait mechaincs, less unsteadiness with balance exercises.  Pt will continue to benefit from progression of activity in clinic to help improve QOL outside of clinic   Barriers to intervention: hearing loss and vision  Understanding of Dx/Px/POC: good     Prognosis: good    Goals  STGs: To be complete within 4 weeks  - Decrease lumbar pain to 4/10 at worst PROGRESSING   - Increase AROM to WNL PROGRESSING  - Increase BLE strength to > 4+/5 NEARLY MET    LTGs: To be complete by discharge  - Improve FOTO score from 49 to 50 for increased safety and functional capacity with ADL's PROGRESSING   - Improve Miranda Balance Score to >41/56 for increased safety and functional capacity with ADL's PROGRESSING   - Able to walk for short community distances without pain or limitation for increased safety and functional capacity with ADLs  PROGRESSING   - Able to ascend/descend a full flight of stairs with reciprocal pattern for increased safety and functional capacity with ADLs PROGRESSING       Plan  Patient would benefit from: skilled physical therapy    Planned therapy interventions: balance, gait training, home exercise program, patient/caregiver education, neuromuscular re-education, strengthening, therapeutic activities, therapeutic exercise, manual therapy, flexibility and stretching    Frequency: 1-2x week  Duration in weeks: 4  Plan of Care beginning date: 12/27/2024  Plan of Care expiration date: 1/24/2025  Treatment plan discussed with: patient  Plan details: Patient informed that from this point forward, to ensure adherence to the aforementioned plan of care, all or some of the treatment may be performed and carried out by a Physical Therapy Assistant (PTA) with supervision from a licensed Physical Therapist (PT) in accordance with [facility] Physical Therapy Practice Act.    Patient will continue to benefit from skilled physical therapy to address the functional deficits that were identified during the evaluation today. We will continue to progress the therapy program to address these functional deficits and achieve the established goals.           Subjective Evaluation    History of Present Illness  Mechanism of injury: The patient states that he has had issues with his gait and balance for the past few months. He denies having any falls, but he does feel unsteady. He states that he feels most unsteady when he initially stands up from sitting, especially prolonged sitting.   The patient states that he was diagnosed with peripheral neuropathy last year.   He states that he has had to walk with a cane and walker at times. He states that he had a fall in 2020, which required hospitalization.   The patient states that he had a recent hospitalization from 11/12 - 11/15. The patient states that PTcame in once to have him walk with a  walker. He states that he has been walking without an AD since he was discharged to home.       : Pt states he is feeling less shaky since his medicine change.  States he works on strength and some balance activity at home, but has a lot of family matters going on currently.           Recurrent probem    Quality of life: poor    Patient Goals  Patient goals for therapy: increased strength and improved balance  Patient goal: walk better  Pain  Current pain ratin  At best pain ratin  At worst pain ratin  Location: L lower back  Relieving factors: relaxation, rest and medications  Aggravating factors: standing, stair climbing and walking    Social Support  Steps to enter house: yes  2  Stairs in house: yes   Lives in: multiple-level home  Lives with: spouse    Employment status: not working      Objective     Strength/Myotome Testing     Left Hip   Planes of Motion   Flexion: 4+  Extension: 4  Abduction: 4 and 4+  Adduction: 5  External rotation: 3+    Right Hip   Planes of Motion   Flexion: 4+  Extension: 4-  Abduction: 4- and 4+  Adduction: 5  External rotation: 3+    Left Knee   Flexion: 4+  Extension: 4+    Right Knee   Flexion: 4  Extension: 4 and 4+    Left Ankle/Foot   Dorsiflexion: 4+  Plantar flexion: 4    Right Ankle/Foot   Dorsiflexion: 4+  Plantar flexion: 4    Ambulation     Ambulation: Level Surfaces   Ambulation without assistive device: independent    Ambulation: Stairs   Ascend stairs: independent  Pattern: reciprocal  Railings: one rail  Descend stairs: independent  Pattern: non-reciprocal  Railings: one rail    Observational Gait   Gait: within functional limits     Additional Observational Gait Details  Patient ambulates with steppage gait.         Miranda Balance Scale - 34/56 - Medium Fall Risk     Precautions: Hard of hearing, Macular degeneration, Neuropathy, Poor tolerance to standing and walking      Date 12/23 12/27  11/29 12/3 12/17   FOTO     TS 45   Manuals                     "                    Neuro Re-Ed        Miranda Balance Test     37 TS   Tandem walk Foam 3x 5x  3x 5x     Sidestepping 3x  3x 5x     Tandem stance 3x15\" 2x20\" ea  3x15\" ea 3x15\"   Romberg EC  4x15\" 3x20\" 3x15\" 3x15\"    Romberg c Head turns   3x15\"                      Ther Ex        Obj. Updates   TS      NuStep 8 min L5 8 min L5  6 min L5 6 min L5 6 min L5   standing marches 20x Toe taps to 4\" 2x20ea Seated 2# 20x 2# 2x20 20x 3#   LAQ 3# 20 ea 4# 20 ea 2# 20x 2# 2x20x 20x 3#   Heel raises 20x 20x Seated 20x 2# 30x 30x    SHC        Stand hip abd, ext  20x ea 10x ea  20xea  15x ea   Seated calf stretch c strap 3x15\" 3x15\"  3x15\" ea  3x15\" ea    Seated SKTC 3x15\"   3x15\"              Ther Activity        360 turns  2x ea  3cw  3ccw 3cw  3ccw    Turning to look over shoulder    2x4 ea; therapist holds up a number    Soccer ball kicks    Therapist rolls red PB; sested; 2x10 ea    Step ups  6\" 10x ea              Gait Training                        Modalities                              "

## 2024-12-27 NOTE — LETTER
2024    Tejal Garcia MD  [address] [facility] [address]    Patient: Fredy Yu   YOB: 1950   Date of Visit: 2024     Encounter Diagnosis     ICD-10-CM    1. Unsteady gait  R26.81           Dear Dr. Garcia:    Thank you for your recent referral of Fredy Yu. Please review the attached evaluation summary from Fredy's recent visit.     Please verify that you agree with the plan of care by signing the attached order.     If you have any questions or concerns, please do not hesitate to call.     I sincerely appreciate the opportunity to share in the care of one of your patients and hope to have another opportunity to work with you in the near future.       Sincerely,    Andrew Gomez, PT      Referring Provider:      I certify that I have read the below Plan of Care and certify the need for these services furnished under this plan of treatment while under my care.                    Tejal Garcia MD  [address] [facility] [address]  Via In Basket          PT Evaluation     Today's date: 2024  Patient name: Fredy Yu  : 1950  MRN: [id number]  Referring provider: Tejal Garcia,*  Dx:   Encounter Diagnosis     ICD-10-CM    1. Unsteady gait  R26.81                      Assessment  Impairments: abnormal gait, activity intolerance, impaired balance, impaired physical strength, lacks appropriate home exercise program, pain with function, activity limitations and endurance  Symptom irritability: moderate    Assessment details: Fredy is a 74 y.o. male presenting to PT with complaints of decreased balance and unsteady gait. His deficits include: L lumbar pain, decreased BLE strength, decreased balance, unsteady gait and decreased tolerance to activity. This patient would benefit from skilled PT services to address these issues and to maximize function.     Thank you for the referral.     : Fredy has been seen for  7 visits for gait and balance.  He is making good progress in lower extremity strength and has made some improvement in his Miranda Balance test since eval.  Since recent medicine change, he is demonstrating improving gait mechaincs, less unsteadiness with balance exercises.  Pt will continue to benefit from progression of activity in clinic to help improve QOL outside of clinic   Barriers to intervention: hearing loss and vision  Understanding of Dx/Px/POC: good     Prognosis: good    Goals  STGs: To be complete within 4 weeks  - Decrease lumbar pain to 4/10 at worst PROGRESSING   - Increase AROM to WNL PROGRESSING  - Increase BLE strength to > 4+/5 NEARLY MET    LTGs: To be complete by discharge  - Improve FOTO score from 49 to 50 for increased safety and functional capacity with ADL's PROGRESSING   - Improve Miranda Balance Score to >41/56 for increased safety and functional capacity with ADL's PROGRESSING   - Able to walk for short community distances without pain or limitation for increased safety and functional capacity with ADLs PROGRESSING   - Able to ascend/descend a full flight of stairs with reciprocal pattern for increased safety and functional capacity with ADLs PROGRESSING       Plan  Patient would benefit from: skilled physical therapy    Planned therapy interventions: balance, gait training, home exercise program, patient/caregiver education, neuromuscular re-education, strengthening, therapeutic activities, therapeutic exercise, manual therapy, flexibility and stretching    Frequency: 1-2x week  Duration in weeks: 8  Plan of Care beginning date: 11/20/2024  Plan of Care expiration date: 1/15/2025  Treatment plan discussed with: patient  Plan details: Patient informed that from this point forward, to ensure adherence to the aforementioned plan of care, all or some of the treatment may be performed and carried out by a Physical Therapy Assistant (PTA) with supervision from a licensed Physical Therapist (PT)  in accordance with [facility] Physical Therapy Practice Act.    Patient will continue to benefit from skilled physical therapy to address the functional deficits that were identified during the evaluation today. We will continue to progress the therapy program to address these functional deficits and achieve the established goals.           Subjective Evaluation    History of Present Illness  Mechanism of injury: The patient states that he has had issues with his gait and balance for the past few months. He denies having any falls, but he does feel unsteady. He states that he feels most unsteady when he initially stands up from sitting, especially prolonged sitting.   The patient states that he was diagnosed with peripheral neuropathy last year.   He states that he has had to walk with a cane and walker at times. He states that he had a fall in , which required hospitalization.   The patient states that he had a recent hospitalization from  - 11/15. The patient states that PTcame in once to have him walk with a walker. He states that he has been walking without an AD since he was discharged to home.       : Pt states he is feeling less shaky since his medicine change.  States he works on strength and some balance activity at home, but has a lot of family matters going on currently.           Recurrent probem    Quality of life: poor    Patient Goals  Patient goals for therapy: increased strength and improved balance  Patient goal: walk better  Pain  Current pain ratin  At best pain ratin  At worst pain ratin  Location: L lower back  Relieving factors: relaxation, rest and medications  Aggravating factors: standing, stair climbing and walking    Social Support  Steps to enter house: yes  2  Stairs in house: yes   Lives in: multiple-level home  Lives with: spouse    Employment status: not working      Objective     Strength/Myotome Testing     Left Hip   Planes of Motion   Flexion:  "4+  Extension: 4  Abduction: 4 and 4+  Adduction: 5  External rotation: 3+    Right Hip   Planes of Motion   Flexion: 4+  Extension: 4-  Abduction: 4- and 4+  Adduction: 5  External rotation: 3+    Left Knee   Flexion: 4+  Extension: 4+    Right Knee   Flexion: 4  Extension: 4 and 4+    Left Ankle/Foot   Dorsiflexion: 4+  Plantar flexion: 4    Right Ankle/Foot   Dorsiflexion: 4+  Plantar flexion: 4    Ambulation     Ambulation: Level Surfaces   Ambulation without assistive device: independent    Ambulation: Stairs   Ascend stairs: independent  Pattern: reciprocal  Railings: one rail  Descend stairs: independent  Pattern: non-reciprocal  Railings: one rail    Observational Gait   Gait: within functional limits     Additional Observational Gait Details  Patient ambulates with steppage gait.         Miranda Balance Scale - 34/56 - Medium Fall Risk     Precautions: Hard of hearing, Macular degeneration, Neuropathy, Poor tolerance to standing and walking      Date 12/23 12/27  11/29 12/3 12/17   FOTO     TS 45   Manuals                                        Neuro Re-Ed        Miranda Balance Test     37 TS   Tandem walk Foam 3x 5x  3x 5x     Sidestepping 3x  3x 5x     Tandem stance 3x15\" 2x20\" ea  3x15\" ea 3x15\"   Romberg EC  4x15\" 3x20\" 3x15\" 3x15\"    Romberg c Head turns   3x15\"                      Ther Ex        Obj. Updates   TS      NuStep 8 min L5 8 min L5  6 min L5 6 min L5 6 min L5   standing marches 20x Toe taps to 4\" 2x20ea Seated 2# 20x 2# 2x20 20x 3#   LAQ 3# 20 ea 4# 20 ea 2# 20x 2# 2x20x 20x 3#   Heel raises 20x 20x Seated 20x 2# 30x 30x    SHC        Stand hip abd, ext  20x ea 10x ea  20xea  15x ea   Seated calf stretch c strap 3x15\" 3x15\"  3x15\" ea  3x15\" ea    Seated SKTC 3x15\"   3x15\"              Ther Activity        360 turns  2x ea  3cw  3ccw 3cw  3ccw    Turning to look over shoulder    2x4 ea; therapist holds up a number    Soccer ball kicks    Therapist rolls red PB; sested; 2x10 ea    Step ups  6\" " 10x ea              Gait Training                        Modalities

## 2024-12-30 ENCOUNTER — OFFICE VISIT (OUTPATIENT)
Dept: PHYSICAL THERAPY | Facility: CLINIC | Age: 74
End: 2024-12-30
Payer: MEDICARE

## 2024-12-30 DIAGNOSIS — R26.81 UNSTEADY GAIT: Primary | ICD-10-CM

## 2024-12-30 PROCEDURE — 97112 NEUROMUSCULAR REEDUCATION: CPT

## 2024-12-30 PROCEDURE — 97530 THERAPEUTIC ACTIVITIES: CPT

## 2024-12-30 PROCEDURE — 97110 THERAPEUTIC EXERCISES: CPT

## 2024-12-30 NOTE — PROGRESS NOTES
"Daily Note     Today's date: 2024  Patient name: Fredy Yu  : 1950  MRN: [id number]  Referring provider: Tejal Garcia,*  Dx: No diagnosis found.               Subjective: Pt measured that he was walked a mile each of the last 3 days.  Feeling more confident in his ability.       Objective: See treatment diary below      Assessment: Tolerated treatment well. Patient demonstrated fatigue post treatment and would benefit from continued PT      Plan: Continue per plan of care.      Precautions: Hard of hearing, Macular degeneration, Neuropathy, Poor tolerance to standing and walking      Date 12/23 12/27  12/30 12/3 12/17   FOTO     TS 45   Manuals                                        Neuro Re-Ed        Miranda Balance Test     37 TS   Tandem walk Foam 3x 5x   5x     Sidestepping 3x   5x     Romberg EC  4x15\" 3x20\" Airex 3x20\" 3x15\"    Romberg c Head turns   On airex 3x20\"                     Ther Ex        Obj. Updates   TS      NuStep 8 min L5 8 min L5  6 min L5  6 min L5 6 min L5   standing marches 20x Toe taps to 4\" 2x20ea 20x 6\"  2# 2x20 20x 3#   LAQ 3# 20 ea 4# 20 ea 4# 20 ea 2# 2x20x 20x 3#   Heel raises 20x 20x 30x 30x 30x    SHC        Stand hip abd, ext  20x ea 20xea 20xea  15x ea   Seated calf stretch c strap 3x15\" 3x15\"  3x15\" 3x15\" ea    Seated SKTC 3x15\"   3x15\"             Ther Activity        360 turns  2x ea  3x ea 3cw  3ccw    Turning to look over shoulder   3x3ea 2x4 ea; therapist holds up a number    Soccer ball kicks    Therapist rolls red PB; sested; 2x10 ea    Step ups  6\" 10x ea 6\" 10x ea      Hurdles   Use ankle weights; 2x fwd and sideways each             Gait Training                        Modalities                                    "

## 2025-01-01 ENCOUNTER — RESULTS FOLLOW-UP (OUTPATIENT)
Dept: NEUROLOGY | Facility: CLINIC | Age: 75
End: 2025-01-01

## 2025-01-01 DIAGNOSIS — F10.930 ALCOHOL WITHDRAWAL SYNDROME WITHOUT COMPLICATION (HCC): ICD-10-CM

## 2025-01-01 RX ORDER — CHLORDIAZEPOXIDE HYDROCHLORIDE 25 MG/1
CAPSULE, GELATIN COATED ORAL
Qty: 18 CAPSULE | Refills: 0 | OUTPATIENT
Start: 2025-01-01 | End: 2025-03-25

## 2025-01-02 NOTE — PROGRESS NOTES
"Name: Fredy Yu      : 1950      MRN: [id number]  Encounter Provider: MALGORZATA Corona  Encounter Date: 2025   Encounter department: NEUROLOGY [facility]  :  Assessment & Plan  Neuropathy  Patient was seen on 3/18/24 by Dr. Reeves for diminished sensation of the left face, arm, and leg.  Per last office visit note, \"He had been diagnosed with neuropathy in the past, but this does not appear to correspond to his symptoms.Patient doing well since his office visit with Dr. Reeves.  Still reports having numbness and tingling left side of his face, arm, and leg.  Repeat EMG and nerve conduction study of LUE and LLE was normal.  MRI brain without acute infarction, edema, or mass effect.  He does have a T2 hyperintense focus in the right anterior temporal lobe.  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  Patient with moderate chronic microangiopathic ischemic changes.  Continue  PT for gait/balance 2/2 neuropathy and macular degeneration  Continue Gabapentin 400mg BID or TID  Recommend to check blood pressure occasionally away from the doctor's office to make sure that those numbers are typically less than 130/80.  If they are frequently higher than that, we recommend checking it more frequently and following up with primary care team/cardiologist   Continue all medication as prescribed by your primary care team/cardiologist  Will defer to primary care team for monitoring of cholesterol panel and blood sugar numbers with target LDL cholesterol of less than 70 and hemoglobin A1c less than 7%  Recommend following a low salt, mediterranean diet   Recommend routine physical exercise as tolerated        Tremor  He does report involuntary movement of his legs where he will have a random muscle twitch or spasm as well as a tremor in his bilateral UE (L>R).  Wife decribes it as randomly occurring at rest. Denies family history of essential tremor. Does not notice " Telephone Encounter by Miranda Medina RN, BSN at 08/29/18 03:50 PM     Author:  Miranda Medina RN, BSN Service:  (none) Author Type:  Registered Nurse     Filed:  08/29/18 03:50 PM Encounter Date:  8/29/2018 Status:  Signed     :  Miranda Medina RN, BSN (Registered Nurse)            To hold for Mendel: Please advise on mothers request for work in on Saturday with you.[EF1.1M]      Revision History        User Key Date/Time User Provider Type Action    > EF1.1 08/29/18 03:50 PM Miranda Medina, RN, BSN Registered Nurse Sign    M - Manual             tremoring of the chin, tongue, head, voice, or legs.  He does have gait instability, but also has neuropathy and visual disturbance (macular degeneration) and is currently working with PT with noted improvement.  Wife denies any pill rolling motions.  He is currently taking Zoloft 150 daily.  He does have numbness and tingling in his fingers and toes but EMG LUE and LLE came back normal.  He was evaluated in the ER at [facility] for headaches and tremor in November 2024 which they report is not new for this patient and has been going on for at least a year. He has a remote history of alcohol abuse.  They completed a CT head which came back without acute abnormality.  Did show mild white matter changes. He is concerned for Parkinson's.    Patient without cogwheeling or rigidity on exam  Resting tremor not appreciated today, however, wife describes it as intermittent  Does have a somewhat flattened affect and took smaller steps on exam.  Wife reports that without shoes on he will sometimes shuffle his feet  Follow up with Katrina Hernandez next available for Parkinson's evaluation and next steps to making a diagnosis vs ruling it out       Abnormal brain MRI  Brain MRI was without acute infarction, edema, or mass effect. Showed an unchanged signal abnormality in the anterior right temporal lobe.  Cavernoma is  favored. Patient denies any stroke like symptoms at this time. Continues to experience left sided numbness to his face, arm, and leg.  Will have patient repeat brain MRI in 6 months for interval follow up.   Repeat brain MRI w wo contrast to be completed in 6 months to monitor area of likely cavernoma  Referral to neurosurgery at that time if indicated           History of Present Illness   We had the pleasure of evaluating Fredy in neurological follow-up today. He is a 74 y.o. year-old male who presents today for a follow up after MRI brain.       History obtained from patient and wife as well as available medical  "record review.     History of Present Illness:      Patient was last seen on 3/18/24 by Dr. Reeves for diminished sensation of the left face, arm, and leg.  Per last office visit note, \"He had been diagnosed with neuropathy in the past, but this does not appear to correspond to his symptoms. I would expect neuropathy to cause stocking distribution symptoms that are relatively symmetrical, perhaps spreading upwards later in the clinical course. In addition, his electrical findings were quite severe which does not correspond to the other portions of his case. I would be concerned that there may have been technical factors that could have interfered. I am going to repeat his testing myself. Given the one-sided nature of his symptoms I would be more concerned that he may have a right-sided intracranial lesion. He needs a cranial MRI to assess for this possibility. Further measures will be considered based on his test results.\"  OV with me 4/19/24: Patient doing well since his office visit with Dr. Reeves.  Still reports having numbness and tingling left side of his face, arm, and leg.  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 Brain MRI was without acute infarction, edema, or mass effect. It does display a subcentimeter T2 hypertintense focus in the anterior right temporal lobe, likely a chronic microbleed or cavernoma.  Patient denies any stroke like symptoms at this time   OV with me 9/4/24:Ongoing numbness and tingling on the left side of his face and arm.  Vision has gotten worse in the last few months as well and he also reports increased tearing and twitching of the left eye.  He follows closely with an ophthalmologist as he receives eye injections on a regular basis.  He denies any new strokelike symptoms at this time.  He does endorse ongoing neuropathic pain that is especially troublesome during the night " but can also keep him from being able to be as mobile as he would like to be during the daytime.  He currently takes gabapentin 400 mg at night.  He was evaluated by sleep medicine since her last office visit and it was determined through his sleep study that he has severe sleep apnea.  He has been wearing his CPAP machine on a nightly basis. We reviewed his MRI of the brain which again showed an unchanged signal abnormality in the anterior right temporal lobe.  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    Interval history as of 1/6/25:  Since his last visit, he continues to deny and new strokelike symptoms.  He has been compliant with wearing his cpap machine with/without change to his excessive daytime sleepiness.  He is currently doing physical therapy for walking and balance training which has been effective. His LDL cholesterol is 73.    Neuropathy: He continues to take Gabapentin 400mg 2-3x per day.  With some improvement to his symptoms. He completed an EMG 3/18/24 of one upper and one lower extremity which came back normal.      He does report involuntary movement of his legs where he will have a random muscle twitch or spasm as well as a tremor in his bilateral UE (L>R).  Wife decribes it as randomly occurring at rest. Denies family history of essential tremor. Does not notice tremoring of the chin, tongue, head, voice, or legs.  He does have gait instability but also has neuropathy and visual disturbance (macular degeneration) and is currently working with PT with noted improvement.  Wife denies any pill rolling motions.  He is currently taking Zoloft 150 daily.  He does have numbness and tingling in his fingers and toes but EMG LUE came back normal.  He was evaluated in the ER at [facility] for headaches and tremor which they report is not new for this patient and has been going on for at least a year. He has a remote history of alcohol abuse.  They completed a CT head which came back without acute abnormality.  Did show mild  white matter changes.    stroke/TIA risk factors were evaluated including:      AP/AC therapy: None.     Statin therapy: None at this time. Will repeat lipid panel and discuss if needed at that time.      Blood pressure today and as of late: 122/64 today in the office.  Recommend 130/80 or less      Most recent LDL:   Lab Results   Component Value Date    LDLCALC 73 11/13/2024           Lab Results   Component Value Date     HGBA1C 5.8 04/20/2024      Cardiology evaluation/Cardiac Monitoring: Echo 5/21/2024: Ejection fraction 65%.  Bilateral atria not well-visualized-extremely technically difficult study with poor endocardial definition and visualization of cardiac structures despite administration of echo contrast.     Endocrinology evaluation: N/A at this time.      Lifestyle history/modifications:     -Diet/Exercise regimen: Well-balanced diet.  No particular exercise regimen.      -PT/OT/ST: PT currently     -Sleep: trazadone is helping with his sleep.      -Post-stroke depression/anxiety:Denies at this time     -Smoking:none     -ETOH: none currently. Has remote history of abuse with recent relapse.      -Illicit drug use: none     Safety:     Independent of all ADLs  Denies any falls at home in the last month.  And ambulates without an assistive device.  Manages all of his medications and finances on his own.  Denies any issues with short-term memory.    Review of Systems:  Constitutional:  Negative for appetite change, fatigue and fever.   HENT: Negative.  Negative for hearing loss, tinnitus, trouble swallowing and voice change.    Eyes: Negative.  Negative for photophobia, pain and visual disturbance.   Respiratory: Negative.  Negative for shortness of breath.    Cardiovascular: Negative.  Negative for palpitations.   Gastrointestinal: Negative.  Negative for nausea and vomiting.   Endocrine: Negative.  Negative for cold intolerance.   Genitourinary: Negative.  Negative for dysuria, frequency and urgency.    Musculoskeletal:  Negative for back pain, gait problem, myalgias, neck pain and neck stiffness.   Skin: Negative.  Negative for rash.   Allergic/Immunologic: Negative.    Neurological:  Positive for tremors (shaking arms and legs out of no place) and numbness (bad numbness on left side of face comes and goes). Negative for dizziness, seizures, syncope, facial asymmetry, speech difficulty, weakness, light-headedness and headaches.   Hematological: Negative.  Does not bruise/bleed easily.   Psychiatric/Behavioral: Negative.  Negative for confusion, hallucinations and sleep disturbance.    All other systems reviewed and are negative.       I have personally reviewed the MA's review of systems and made changes as necessary.    Current Outpatient Medications on File Prior to Visit   Medication Sig Dispense Refill    Adalimumab 40 MG/0.4ML AJKT Inject 1 each under the skin every 14 (fourteen) days Inject 1 pen every 14 days 2 each 11    alfuzosin (UROXATRAL) 10 mg 24 hr tablet Take 1 tablet (10 mg total) by mouth daily 90 tablet 1    amLODIPine (NORVASC) 2.5 mg tablet Take 1 tablet (2.5 mg total) by mouth daily 30 tablet 2    Blood Pressure KIT Use 1 Device daily 1 kit 0    busPIRone (BUSPAR) 7.5 mg tablet Take 1 tablet (7.5 mg total) by mouth 2 (two) times a day 60 tablet 2    finasteride (PROSCAR) 5 mg tablet Take 1 tablet (5 mg total) by mouth daily 90 tablet 1    folic acid (FOLVITE) 1 mg tablet Take 1 tablet (1 mg total) by mouth daily 90 tablet 1    gabapentin (Neurontin) 400 mg capsule Take 400mg twice daily x 5 days. Then increase to three times daily thereafter 90 capsule 3    methotrexate 2.5 MG tablet Take 8 tablets (20 mg total) by mouth once a week 120 tablet 1    naltrexone (REVIA) 50 mg tablet Take 1 tablet (50 mg total) by mouth daily 30 tablet 1    oxybutynin (DITROPAN) 5 mg tablet Take 1 tablet (5 mg total) by mouth 3 (three) times a day as needed (for stent discomfort) 10 tablet 0    predniSONE 1 mg  "tablet Take 4 tablets (4 mg total) by mouth daily 120 tablet 6    sertraline (Zoloft) 50 mg tablet Take 3 tablets (150 mg total) by mouth daily 90 tablet 2    thiamine 100 MG tablet Take 100 mg by mouth daily      traZODone (DESYREL) 100 mg tablet Take 1.5 tablets (150 mg total) by mouth daily at bedtime 135 tablet 1    magnesium hydroxide (MILK OF MAGNESIA) 400 mg/5 mL oral suspension Take by mouth daily as needed for constipation (Patient not taking: Reported on 1/6/2025)       No current facility-administered medications on file prior to visit.      Social History     Tobacco Use    Smoking status: Never     Passive exposure: Never    Smokeless tobacco: Never   Vaping Use    Vaping status: Never Used   Substance and Sexual Activity    Alcohol use: Not Currently     Comment: pint of vodka everyday- states that he hasn't drank in one week    Drug use: Never    Sexual activity: Not Currently     Partners: Female      Objective   /64 (BP Location: Left arm, Patient Position: Sitting, Cuff Size: Large)   Pulse 65   Temp 98.3 °F (36.8 °C) (Temporal)   Ht 5' 5\" (1.651 m)   Wt 99.2 kg (218 lb 9.6 oz)   SpO2 98%   BMI 36.38 kg/m²     Physical Exam  Vitals reviewed.   Constitutional:       General: He is not in acute distress.  HENT:      Head: Normocephalic and atraumatic.      Mouth/Throat:      Mouth: Mucous membranes are moist.   Eyes:      General: Lids are normal.      Extraocular Movements: Extraocular movements intact.      Pupils: Pupils are equal, round, and reactive to light.   Pulmonary:      Effort: Pulmonary effort is normal.   Skin:     General: Skin is warm and dry.   Neurological:      Mental Status: He is alert.      Coordination: Romberg sign positive.   Psychiatric:         Speech: Speech normal.       Neurological Exam  Mental Status  Alert. Oriented to person, place, time and situation. Speech is normal. Language is fluent with no aphasia. Attention and concentration are normal.  Patient " is very [place] at baseline.    Cranial Nerves  CN II: Visual acuity is normal. Visual fields full to confrontation.  CN III, IV, VI: Extraocular movements intact bilaterally. Normal lids and orbits bilaterally. Pupils equal round and reactive to light bilaterally.  CN V:  Left: Diminished sensation of the entire left side of the face.  CN VII: Full and symmetric facial movement.  CN VIII: Hearing is normal.  CN IX, X: Palate elevates symmetrically  CN XI: Shoulder shrug strength is normal.  CN XII: Tongue midline without atrophy or fasciculations.    Motor  Normal muscle bulk throughout. No fasciculations present. Normal muscle tone. Strength is 5/5 in all four extremities except as noted.                                             Right                     Left  Elbow extension                    4+                           No abnormal movements including resting tremor noted during exam.  No rigidity or cogwheeling present..    Sensory  Light touch abnormality: Decreased to left arm and left leg.     Coordination  Right: Finger-to-nose normal. Rapid alternating movement normal.Left: Finger-to-nose normal. Rapid alternating movement normal.    Gait  Casual gait: Normal stance. Reduced stride length. Romberg is present.    Labs:  Lab Results   Component Value Date    LDLCALC 73 11/13/2024     Lab Results   Component Value Date    HGBA1C 5.8 04/20/2024     Radiology Results Review:   11/12/24 CTH: No acute intracranial findings: No acute large transcortical infarct or   intracranial hemorrhage. 2.  Mild white matter changes, which are nonspecific commonly seen with chronic microvascular ischemic change.   8/28/24 MRI Brain: No acute intracranial process.Unchanged signal abnormality in the anterior right temporal lobe as discussed, most consistent with cavernoma or sequelae of chronic microhemorrhage. Cavernoma is favored.Moderate chronic small vessel ischemic changes.  6/27/24 Carotid Ultrasound: <50% stenosis  bilaterally  4/1/24 MRI brain: No acute infarction, edema, or mass effect.Subcentimeter T2 hyperintense focus within the anterior right temporal lobe with associated susceptibility. Findings can be seen with a chronic microbleed or cavernoma.Moderate chronic microangiopathic ischemic changes.    Administrative Statements   I have spent a total time of 40 minutes in caring for this patient on the day of the visit/encounter including Diagnostic results, Prognosis, Risks and benefits of tx options, Instructions for management, Patient and family education, Importance of tx compliance, Risk factor reductions, Impressions, Counseling / Coordination of care, Documenting in the medical record, Reviewing / ordering tests, medicine, procedures  , and Obtaining or reviewing history  .

## 2025-01-02 NOTE — PATIENT INSTRUCTIONS
Numbness:  -Continue physical therapy for balance  -Continue Gabapentin 400mg BID. May increase to TID since you are tolerating well  - Recommend to check blood pressure occasionally away from the doctor's office to make sure that those numbers are typically less than 130/80.  If they are frequently higher than that, we recommend checking a little more often and to follow up with primary care team.  -Continue all medications as prescribed be your primary care team/cardiologist.   - Will defer to primary care team for monitoring of cholesterol panel and blood sugar numbers with target LDL cholesterol of less than 70 and hemoglobin A1c less than 7%.  - Recommend following a low salt, mediterranean diet.     We will plan for him to return to the office next available with Katrina for tremor and neuropathy,  but would be happy to see him sooner if the need should arise.  If he has any symptoms concerning for TIA or stroke including sudden painless loss of vision or double vision, difficulty speaking or swallowing, vertigo/room spinning that does not quickly resolve, or weakness/numbness/loss of coordination affecting 1 side of the face or body, he should proceed by ambulance to the nearest emergency room immediately.

## 2025-01-03 ENCOUNTER — OFFICE VISIT (OUTPATIENT)
Dept: PHYSICAL THERAPY | Facility: CLINIC | Age: 75
End: 2025-01-03
Payer: MEDICARE

## 2025-01-03 DIAGNOSIS — R26.81 UNSTEADY GAIT: Primary | ICD-10-CM

## 2025-01-03 PROCEDURE — 97530 THERAPEUTIC ACTIVITIES: CPT

## 2025-01-03 PROCEDURE — 97110 THERAPEUTIC EXERCISES: CPT

## 2025-01-03 PROCEDURE — 97112 NEUROMUSCULAR REEDUCATION: CPT

## 2025-01-03 NOTE — PROGRESS NOTES
"Daily Note     Today's date: 1/3/2025  Patient name: Fredy Yu  : 1950  MRN: [id number]  Referring provider: Tejal Garcia,*  Dx: No diagnosis found.               Subjective: Pt states he is tired in that he did not sleep well yesterday.       Objective: See treatment diary below      Assessment: Tolerated treatment fair. Patient demonstrated fatigue post treatment and would benefit from continued PT.  Added LE strengthening machines today.  Some dizziness with figure 8 walks and hurdles      Plan: Continue per plan of care.      Precautions: Hard of hearing, Macular degeneration, Neuropathy, Poor tolerance to standing and walking      Date 12/23 12/27  12/30 1/3 12/17   FOTO     TS 45   Manuals                                        Neuro Re-Ed        Miranda Balance Test     37 TS   Tandem walk Foam 3x 5x       Sidestepping 3x       Romberg EC  4x15\" 3x20\" Airex 3x20\"     Romberg c Head turns   On airex 3x20\" Airex 3x15\"    SLB    3x15\"             Ther Ex        Obj. Updates   TS      NuStep 8 min L5 8 min L5  6 min L5  8 min L5  6 min L5   standing marches 20x Toe taps to 4\" 2x20ea 20x 6\"   20x 3#   Heel raises 20x 20x 30x 30x 30x    Stand hip abd, ext  20x ea 20xea 20ea 15x ea   Seated calf stretch c strap 3x15\" 3x15\"  3x15\" 3x15\"    Seated SKTC 3x15\"   3x15\" 3x15\"     Leg Press     15# 3x10        Knee extension    25# 3x10    Knee flexion     30# 3x10            Ther Activity        360 turns  2x ea  3x ea     Turning to look over shoulder   3x3ea     Soccer ball kicks        Step ups  6\" 10x ea 6\" 10x ea  6\" 15x ea    Hurdles   Use ankle weights; 2x fwd and sideways each Ankle weights, 2x fwd and side     Heel / Toe walking    3x ea in //bars     Figure 8 through cones    2x each direction             Gait Training                        Modalities                                      "

## 2025-01-06 ENCOUNTER — OFFICE VISIT (OUTPATIENT)
Dept: NEUROLOGY | Facility: CLINIC | Age: 75
End: 2025-01-06
Payer: MEDICARE

## 2025-01-06 VITALS
SYSTOLIC BLOOD PRESSURE: 122 MMHG | WEIGHT: 218.6 LBS | HEIGHT: 65 IN | OXYGEN SATURATION: 98 % | BODY MASS INDEX: 36.42 KG/M2 | DIASTOLIC BLOOD PRESSURE: 64 MMHG | TEMPERATURE: 98.3 F | HEART RATE: 65 BPM

## 2025-01-06 DIAGNOSIS — R25.1 TREMOR: ICD-10-CM

## 2025-01-06 DIAGNOSIS — R90.89 ABNORMAL BRAIN MRI: ICD-10-CM

## 2025-01-06 DIAGNOSIS — G62.9 NEUROPATHY: Primary | ICD-10-CM

## 2025-01-06 PROCEDURE — 99215 OFFICE O/P EST HI 40 MIN: CPT

## 2025-01-06 NOTE — PROGRESS NOTES
From: Jeanne Johnson  To: CORI Burns  Sent: 7/2/2021 12:15 PM CDT  Subject: Medication Question    I would appreciate a script to treat bacteria in my urine. Prefer macrobiotic. I started with increased temp of 99.3 this am where it's been 97.4 daily.     Please send to [facility] in [employer] on [address].     Thanks  Jeanne   Review of Systems   Constitutional:  Negative for appetite change, fatigue and fever.   HENT: Negative.  Negative for hearing loss, tinnitus, trouble swallowing and voice change.    Eyes: Negative.  Negative for photophobia, pain and visual disturbance.   Respiratory: Negative.  Negative for shortness of breath.    Cardiovascular: Negative.  Negative for palpitations.   Gastrointestinal: Negative.  Negative for nausea and vomiting.   Endocrine: Negative.  Negative for cold intolerance.   Genitourinary: Negative.  Negative for dysuria, frequency and urgency.   Musculoskeletal:  Negative for back pain, gait problem, myalgias, neck pain and neck stiffness.   Skin: Negative.  Negative for rash.   Allergic/Immunologic: Negative.    Neurological:  Positive for tremors (shaking arms and legs out of no place) and numbness (bad numbness on left side of face comes and goes). Negative for dizziness, seizures, syncope, facial asymmetry, speech difficulty, weakness, light-headedness and headaches.   Hematological: Negative.  Does not bruise/bleed easily.   Psychiatric/Behavioral: Negative.  Negative for confusion, hallucinations and sleep disturbance.    All other systems reviewed and are negative.

## 2025-01-06 NOTE — ASSESSMENT & PLAN NOTE
He does report involuntary movement of his legs where he will have a random muscle twitch or spasm as well as a tremor in his bilateral UE (L>R).  Wife decribes it as randomly occurring at rest. Denies family history of essential tremor. Does not notice tremoring of the chin, tongue, head, voice, or legs.  He does have gait instability, but also has neuropathy and visual disturbance (macular degeneration) and is currently working with PT with noted improvement.  Wife denies any pill rolling motions.  He is currently taking Zoloft 150 daily.  He does have numbness and tingling in his fingers and toes but EMG LUE and LLE came back normal.  He was evaluated in the ER at [facility] for headaches and tremor in November 2024 which they report is not new for this patient and has been going on for at least a year. He has a remote history of alcohol abuse.  They completed a CT head which came back without acute abnormality.  Did show mild white matter changes. He is concerned for Parkinson's.    Patient without cogwheeling or rigidity on exam  Resting tremor not appreciated today, however, wife describes it as intermittent  Does have a somewhat flattened affect and took smaller steps on exam.  Wife reports that without shoes on he will sometimes shuffle his feet  Follow up with Katrina Hernandez next available for Parkinson's evaluation and next steps to making a diagnosis vs ruling it out

## 2025-01-06 NOTE — ASSESSMENT & PLAN NOTE
"Patient was seen on 3/18/24 by Dr. Reeves for diminished sensation of the left face, arm, and leg.  Per last office visit note, \"He had been diagnosed with neuropathy in the past, but this does not appear to correspond to his symptoms.Patient doing well since his office visit with Dr. Reeves.  Still reports having numbness and tingling left side of his face, arm, and leg.  Repeat EMG and nerve conduction study of LUE and LLE was normal.  MRI brain without acute infarction, edema, or mass effect.  He does have a T2 hyperintense focus in the right anterior temporal lobe.  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  Patient with moderate chronic microangiopathic ischemic changes.  Continue  PT for gait/balance 2/2 neuropathy and macular degeneration  Continue Gabapentin 400mg BID or TID  Recommend to check blood pressure occasionally away from the doctor's office to make sure that those numbers are typically less than 130/80.  If they are frequently higher than that, we recommend checking it more frequently and following up with primary care team/cardiologist   Continue all medication as prescribed by your primary care team/cardiologist  Will defer to primary care team for monitoring of cholesterol panel and blood sugar numbers with target LDL cholesterol of less than 70 and hemoglobin A1c less than 7%  Recommend following a low salt, mediterranean diet   Recommend routine physical exercise as tolerated        "

## 2025-01-07 ENCOUNTER — APPOINTMENT (OUTPATIENT)
Dept: PHYSICAL THERAPY | Facility: CLINIC | Age: 75
End: 2025-01-07
Payer: MEDICARE

## 2025-01-10 ENCOUNTER — APPOINTMENT (OUTPATIENT)
Dept: PHYSICAL THERAPY | Facility: CLINIC | Age: 75
End: 2025-01-10
Payer: MEDICARE

## 2025-01-14 ENCOUNTER — APPOINTMENT (OUTPATIENT)
Dept: PHYSICAL THERAPY | Facility: CLINIC | Age: 75
End: 2025-01-14
Payer: MEDICARE

## 2025-01-15 ENCOUNTER — OFFICE VISIT (OUTPATIENT)
Dept: PHYSICAL THERAPY | Facility: CLINIC | Age: 75
End: 2025-01-15
Payer: MEDICARE

## 2025-01-15 DIAGNOSIS — R26.81 UNSTEADY GAIT: Primary | ICD-10-CM

## 2025-01-15 PROCEDURE — 97530 THERAPEUTIC ACTIVITIES: CPT

## 2025-01-15 PROCEDURE — 97112 NEUROMUSCULAR REEDUCATION: CPT

## 2025-01-15 PROCEDURE — 97110 THERAPEUTIC EXERCISES: CPT

## 2025-01-15 NOTE — PROGRESS NOTES
"Daily Note     Today's date: 1/15/2025  Patient name: Fredy Yu  : 1950  MRN: [id number]  Referring provider: Tejal Garcia,*  Dx:   Encounter Diagnosis     ICD-10-CM    1. Unsteady gait  R26.81                      Subjective: Pt notes some dizziness with head rotation  activities. He states he didn't have breakfast.      Objective: See treatment diary below      Assessment: Tolerated treatment fair. Patient would benefit from continued PT Pt did experience slight dizziness with head turning exercises( no breakfast) We modified those activities.      Plan: Continue per plan of care.      Precautions: Hard of hearing, Macular degeneration, Neuropathy, Poor tolerance to standing and walking      Date 12/23 12/27  12/30 1/3 1/15   FOTO     NV   Manuals                        Neuro Re-Ed        Miranda Balance Test        Romberg EC  4x15\" 3x20\" Airex 3x20\"     Romberg c Head turns- foam   On airex 3x20\" Airex 3x15\" np   SLB    3x15\"  Foam 3x 15\"           Ther Ex        Obj. Updates   TS      NuStep 8 min L5 8 min L5  6 min L5  8 min L5  8m L5   Standing marches 20x Toe taps to 4\" 2x20ea 20x 6\"      Heel raises 20x 20x 30x 30x 30x    Stand hip abd, ext  20x ea 20xea 20ea 15x ea   Seated calf stretch c strap 3x15\" 3x15\"  3x15\" 3x15\" 3x  15\"   Seated SKTC 3x15\"   3x15\" 3x15\"  3x 15\"   Leg Press     15# 3x10     15# 3/10   Knee extension    25# 3x10 25# 3/10   Knee flexion     30# 3x10 30# 3/10           Ther Activity        360 turns  2x ea  3x ea     Turning to look over shoulder   3x3ea  3x 3 ea    Soccer ball kicks        Step ups  6\" 10x ea 6\" 10x ea  6\" 15x ea 6\" 15x ea   Hurdles   Use ankle weights; 2x fwd and sideways each Ankle weights, 2x fwd and side  NP   Heel / Toe walking    3x ea in //bars  3x ea // bars   Figure 8 through cones    2x each direction  3x ea direction           Gait Training                        Modalities                                        "

## 2025-01-17 ENCOUNTER — OFFICE VISIT (OUTPATIENT)
Dept: PHYSICAL THERAPY | Facility: CLINIC | Age: 75
End: 2025-01-17
Payer: MEDICARE

## 2025-01-17 DIAGNOSIS — R26.81 UNSTEADY GAIT: Primary | ICD-10-CM

## 2025-01-17 PROCEDURE — 97530 THERAPEUTIC ACTIVITIES: CPT

## 2025-01-17 PROCEDURE — 97112 NEUROMUSCULAR REEDUCATION: CPT

## 2025-01-17 PROCEDURE — 97110 THERAPEUTIC EXERCISES: CPT

## 2025-01-17 NOTE — PROGRESS NOTES
"Daily Note     Today's date: 2025  Patient name: Fredy Yu  : 1950  MRN: [id number]  Referring provider: Tejal Garcia,*  Dx:   Encounter Diagnosis     ICD-10-CM    1. Unsteady gait  R26.81                      Subjective: Pt notes min increased c/o.      Objective: See treatment diary below      Assessment: Tolerated treatment well. Patient demonstrated fatigue post treatment. Pt performed modified program due to time. He continues to have frequent/ brief episodes of dizziness with quick motions.       Plan: Continue per plan of care.      Precautions: Hard of hearing, Macular degeneration, Neuropathy, Poor tolerance to standing and walking      Date 1/17 12/27  12/30 1/3 1/15   FOTO ds       Manuals                        Neuro Re-Ed        Miranda Balance Test        Romberg EC   3x20\" Airex 3x20\"     Romberg c Head turns- foam   On airex 3x20\" Airex 3x15\" np   SLB-foam 3x 15\"   3x15\"  Foam 3x 15\"           Ther Ex        Obj. Updates   TS      NuStep 6m L6 8 min L5  6 min L5  8 min L5  8m L5   Standing marches 20x Toe taps to 4\" 2x20ea 20x 6\"      Heel raises 20x 20x 30x 30x 30x    Stand hip abd, ext 20x ea 20x ea 20xea 20ea 15x ea   Seated calf stretch c strap 3x15\" 3x15\"  3x15\" 3x15\" 3x  15\"   Seated SKTC 3x15\"   3x15\" 3x15\"  3x 15\"   Leg Press  15#  3/10   15# 3x10     15# 3/10   Knee extension 25# 3/10   25# 3x10 25# 3/10   Knee flexion  30# 3/10   30# 3x10 30# 3/10           Ther Activity        360 turns  2x ea  3x ea     Turning to look over shoulder NP  3x3ea  3x 3 ea    Soccer ball kicks        Step ups 6\" 15x ea 6\" 10x ea 6\" 10x ea  6\" 15x ea 6\" 15x ea   Hurdles   Use ankle weights; 2x fwd and sideways each Ankle weights, 2x fwd and side  NP   Heel / Toe walking 3x ea // bars   3x ea in //bars  3x ea // bars   Figure 8 through cones NV   2x each direction  3x ea direction           Gait Training                        Modalities                                          "

## 2025-01-21 ENCOUNTER — OFFICE VISIT (OUTPATIENT)
Dept: PHYSICAL THERAPY | Facility: CLINIC | Age: 75
End: 2025-01-21
Payer: MEDICARE

## 2025-01-21 DIAGNOSIS — R26.81 UNSTEADY GAIT: Primary | ICD-10-CM

## 2025-01-21 PROCEDURE — 97530 THERAPEUTIC ACTIVITIES: CPT

## 2025-01-21 PROCEDURE — 97112 NEUROMUSCULAR REEDUCATION: CPT

## 2025-01-21 PROCEDURE — 97110 THERAPEUTIC EXERCISES: CPT

## 2025-01-21 NOTE — PROGRESS NOTES
"Daily Note     Today's date: 2025  Patient name: Fredy Yu  : 1950  MRN: [id number]  Referring provider: Tejal Garcia,*  Dx:   Encounter Diagnosis     ICD-10-CM    1. Unsteady gait  R26.81                      Subjective: Pt notes some increased R lumbar soreness after running the .      Objective: See treatment diary below      Assessment: Tolerated treatment fair. Patient would benefit from continued PT      Plan: Continue per plan of care.      Precautions: Hard of hearing, Macular degeneration, Neuropathy, Poor tolerance to standing and walking      Date 1/17 1/21 12/30 1/3 1/15   FOTO Ds 46       Manuals                        Neuro Re-Ed        Miranda Balance Test        Romberg EC    Airex 3x20\"     Romberg c Head turns- foam   On airex 3x20\" Airex 3x15\" np   SLB-foam 3x 15\" 3x 15\"  3x15\"  Foam 3x 15\"           Ther Ex        Obj. Updates         NuStep 6m L6 6m L5 6 min L5  8 min L5  8m L5   Standing marches 20x 20x  20x 6\"      Heel raises 20x 20x  30x 30x 30x    Stand hip abd, ext 20x ea 20x ea 20xea 20ea 15x ea   Seated calf stretch c strap 3x15\" home 3x15\" 3x15\" 3x  15\"   Seated SKTC 3x15\"  home 3x15\" 3x15\"  3x 15\"   Leg Press  15#  3/10 15# 2/15  15# 3x10     15# 3/10   Knee extension 25# 3/10 25# 2/15  25# 3x10 25# 3/10   Knee flexion  30# 3/10 30# 2/15  30# 3x10 30# 3/10           Ther Activity        360 turns   3x ea     Turning to look over shoulder NP  3x3ea  3x 3 ea    Soccer ball kicks        Step ups 6\" 15x ea NV 6\" 10x ea  6\" 15x ea 6\" 15x ea   Hurdles   Use ankle weights; 2x fwd and sideways each Ankle weights, 2x fwd and side  NP   Heel / Toe walking 3x ea // bars NV  3x ea in //bars  3x ea // bars   Figure 8 through cones NV NV  2x each direction  3x ea direction           Gait Training                        Modalities                hp  Ds lb/cerv                            "

## 2025-01-28 ENCOUNTER — APPOINTMENT (OUTPATIENT)
Dept: PHYSICAL THERAPY | Facility: CLINIC | Age: 75
End: 2025-01-28
Payer: MEDICARE

## 2025-01-30 ENCOUNTER — EVALUATION (OUTPATIENT)
Dept: PHYSICAL THERAPY | Facility: CLINIC | Age: 75
End: 2025-01-30
Payer: MEDICARE

## 2025-01-30 DIAGNOSIS — R26.81 UNSTEADY GAIT: Primary | ICD-10-CM

## 2025-01-30 PROCEDURE — 97110 THERAPEUTIC EXERCISES: CPT

## 2025-01-30 PROCEDURE — 97112 NEUROMUSCULAR REEDUCATION: CPT

## 2025-01-30 NOTE — LETTER
2025    Tejal Garcia MD  [address] [facility] [address]    Patient: Fredy Yu   YOB: 1950   Date of Visit: 2025     Encounter Diagnosis     ICD-10-CM    1. Unsteady gait  R26.81           Dear Dr. Garcia:    Thank you for your recent referral of Fredy Yu. Please review the attached evaluation summary from Fredy's recent visit.     Please verify that you agree with the plan of care by signing the attached order.     If you have any questions or concerns, please do not hesitate to call.     I sincerely appreciate the opportunity to share in the care of one of your patients and hope to have another opportunity to work with you in the near future.       Sincerely,    Andrew Gomez, PT      Referring Provider:      I certify that I have read the below Plan of Care and certify the need for these services furnished under this plan of treatment while under my care.                    Tejal Garcia MD  [address] [facility] [address]  Via In Basket          PT Re-Evaluation     Today's date: 2025  Patient name: Fredy Yu  : 1950  MRN: [id number]  Referring provider: Tejal Garcia,*  Dx:   No diagnosis found.                 Assessment  Impairments: abnormal gait, activity intolerance, impaired balance, impaired physical strength, lacks appropriate home exercise program, pain with function, activity limitations and endurance  Symptom irritability: moderate    Assessment details: Fredy is a 74 y.o. male presenting to PT with complaints of decreased balance and unsteady gait. His deficits include: L lumbar pain, decreased BLE strength, decreased balance, unsteady gait and decreased tolerance to activity. This patient would benefit from skilled PT services to address these issues and to maximize function.     Thank you for the referral.     : Fredy has been seen for 7 visits for gait and balance.  He is making  good progress in lower extremity strength and has made some improvement in his Skaggs Balance test since eval.  Since recent medicine change, he is demonstrating improving gait mechaincs, less unsteadiness with balance exercises.  Pt will continue to benefit from progression of activity in clinic to help improve QOL outside of clinic     1/30 UPDATE: Fredy has been seen for 13 visits to this point.  He has made signficant improvement in his skaggs balance to this point - 46/56 - placing him in a low fall risk category.  He has also improved his FOTO score to predicted level by discharge.  Pt LE strength has improved well.  Has met nearly all goals at this time and today we discussed wrapping up to discharge next week, to which he can begin wellness program.  Pt agreeable at this time  Barriers to intervention: hearing loss and vision  Understanding of Dx/Px/POC: good     Prognosis: good    Goals  STGs: To be complete within 4 weeks  - Decrease lumbar pain to 4/10 at worst MET  - Increase AROM to WNL MET  - Increase BLE strength to > 4+/5 MET    LTGs: To be complete by discharge  - Improve FOTO score from 49 to 50 for increased safety and functional capacity with ADL's MET  - Improve Skaggs Balance Score to >41/56 for increased safety and functional capacity with ADL's MET   - Able to walk for short community distances without pain or limitation for increased safety and functional capacity with ADLs NEARLY MET  - Able to ascend/descend a full flight of stairs with reciprocal pattern for increased safety and functional capacity with ADLs MET       Plan  Patient would benefit from: skilled physical therapy    Planned therapy interventions: balance, gait training, home exercise program, patient/caregiver education, neuromuscular re-education, strengthening, therapeutic activities, therapeutic exercise, manual therapy, flexibility and stretching    Frequency: 1-2x week  Duration in weeks: 4  Plan of Care beginning date:  2025  Plan of Care expiration date: 2025  Treatment plan discussed with: patient  Plan details: Patient informed that from this point forward, to ensure adherence to the aforementioned plan of care, all or some of the treatment may be performed and carried out by a Physical Therapy Assistant (PTA) with supervision from a licensed Physical Therapist (PT) in accordance with [facility] Physical Therapy Practice Act.    Patient will continue to benefit from skilled physical therapy to address the functional deficits that were identified during the evaluation today. We will continue to progress the therapy program to address these functional deficits and achieve the established goals.           Subjective Evaluation    History of Present Illness  Mechanism of injury: The patient states that he has had issues with his gait and balance for the past few months. He denies having any falls, but he does feel unsteady. He states that he feels most unsteady when he initially stands up from sitting, especially prolonged sitting.   The patient states that he was diagnosed with peripheral neuropathy last year.   He states that he has had to walk with a cane and walker at times. He states that he had a fall in , which required hospitalization.   The patient states that he had a recent hospitalization from  - 11/15. The patient states that PTcame in once to have him walk with a walker. He states that he has been walking without an AD since he was discharged to home.       : Pt states he is feeling less shaky since his medicine change.  States he works on strength and some balance activity at home, but has a lot of family matters going on currently.           Recurrent probem    Quality of life: poor    Patient Goals  Patient goals for therapy: increased strength and improved balance  Patient goal: walk better  Pain  Current pain ratin  At best pain ratin  At worst pain ratin  Location: L  "lower back  Relieving factors: relaxation, rest and medications  Aggravating factors: standing, stair climbing and walking    Social Support  Steps to enter house: yes  2  Stairs in house: yes   Lives in: multiple-level home  Lives with: spouse    Employment status: not working      Objective     Strength/Myotome Testing     Left Hip   Planes of Motion   Flexion: 5  Extension: 4  Abduction: 5  Adduction: 5  External rotation: 3+    Right Hip   Planes of Motion   Flexion: 5  Extension: 4-  Abduction: 5  Adduction: 5  External rotation: 3+    Left Knee   Flexion: 5  Extension: 5    Right Knee   Flexion: 5  Extension: 5    Left Ankle/Foot   Dorsiflexion: 5  Plantar flexion: 5    Right Ankle/Foot   Dorsiflexion: 5  Plantar flexion: 5    Ambulation     Ambulation: Level Surfaces   Ambulation without assistive device: independent    Ambulation: Stairs   Ascend stairs: independent  Pattern: reciprocal  Railings: one rail  Descend stairs: independent  Pattern: non-reciprocal  Railings: one rail    Observational Gait   Gait: within functional limits     Additional Observational Gait Details  Patient ambulates with steppage gait.         Miranda Balance Scale - 34/56 - Medium Fall Risk  12/17 BBT 37/56  1/30 BBT 46/56     Precautions: Hard of hearing, Macular degeneration, Neuropathy, Poor tolerance to standing and walking      Date 1/17 1/21 1/30 1/3 1/15   FOTO Ds 46  50 TS     Manuals                        Neuro Re-Ed        Miranda Balance Test   46 TS     Romberg EC         Romberg c Head turns- foam    Airex 3x15\" np   SLB-foam 3x 15\" 3x 15\"  3x15\"  Foam 3x 15\"           Ther Ex        Obj. Updates    TS and education     NuStep 6m L6 6m L5  8 min L5  8m L5   Standing marches 20x 20x       Heel raises 20x 20x   30x 30x    Stand hip abd, ext 20x ea 20x ea  20ea 15x ea   Seated calf stretch c strap 3x15\" home  3x15\" 3x  15\"   Seated SKTC 3x15\"  home  3x15\"  3x 15\"   Leg Press  15#  3/10 15# 2/15  15# 3x10     15# 3/10   Knee " "extension 25# 3/10 25# 2/15  25# 3x10 25# 3/10   Knee flexion  30# 3/10 30# 2/15  30# 3x10 30# 3/10           Ther Activity        360 turns        Turning to look over shoulder NP    3x 3 ea    Step ups 6\" 15x ea NV  6\" 15x ea 6\" 15x ea   Hurdles    Ankle weights, 2x fwd and side  NP   Heel / Toe walking 3x ea // bars NV  3x ea in //bars  3x ea // bars   Figure 8 through cones NV NV  2x each direction  3x ea direction           Gait Training                        Modalities                hp  Ds lb/cerv   TS                            "

## 2025-01-30 NOTE — PROGRESS NOTES
PT Re-Evaluation     Today's date: 2025  Patient name: Fredy Yu  : 1950  MRN: [id number]  Referring provider: Tejal Garcia,*  Dx:   No diagnosis found.                 Assessment  Impairments: abnormal gait, activity intolerance, impaired balance, impaired physical strength, lacks appropriate home exercise program, pain with function, activity limitations and endurance  Symptom irritability: moderate    Assessment details: Fredy is a 74 y.o. male presenting to PT with complaints of decreased balance and unsteady gait. His deficits include: L lumbar pain, decreased BLE strength, decreased balance, unsteady gait and decreased tolerance to activity. This patient would benefit from skilled PT services to address these issues and to maximize function.     Thank you for the referral.     : Fredy has been seen for 7 visits for gait and balance.  He is making good progress in lower extremity strength and has made some improvement in his Skaggs Balance test since eval.  Since recent medicine change, he is demonstrating improving gait mechaincs, less unsteadiness with balance exercises.  Pt will continue to benefit from progression of activity in clinic to help improve QOL outside of clinic      UPDATE: Fredy has been seen for 13 visits to this point.  He has made signficant improvement in his skaggs balance to this point - 46/56 - placing him in a low fall risk category.  He has also improved his FOTO score to predicted level by discharge.  Pt LE strength has improved well.  Has met nearly all goals at this time and today we discussed wrapping up to discharge next week, to which he can begin wellness program.  Pt agreeable at this time  Barriers to intervention: hearing loss and vision  Understanding of Dx/Px/POC: good     Prognosis: good    Goals  STGs: To be complete within 4 weeks  - Decrease lumbar pain to 4/10 at worst MET  - Increase AROM to WNL MET  - Increase BLE strength to > 4+/5  MET    LTGs: To be complete by discharge  - Improve FOTO score from 49 to 50 for increased safety and functional capacity with ADL's MET  - Improve Miranda Balance Score to >41/56 for increased safety and functional capacity with ADL's MET   - Able to walk for short community distances without pain or limitation for increased safety and functional capacity with ADLs NEARLY MET  - Able to ascend/descend a full flight of stairs with reciprocal pattern for increased safety and functional capacity with ADLs MET       Plan  Patient would benefit from: skilled physical therapy    Planned therapy interventions: balance, gait training, home exercise program, patient/caregiver education, neuromuscular re-education, strengthening, therapeutic activities, therapeutic exercise, manual therapy, flexibility and stretching    Frequency: 1-2x week  Duration in weeks: 4  Plan of Care beginning date: 1/30/2025  Plan of Care expiration date: 2/27/2025  Treatment plan discussed with: patient  Plan details: Patient informed that from this point forward, to ensure adherence to the aforementioned plan of care, all or some of the treatment may be performed and carried out by a Physical Therapy Assistant (PTA) with supervision from a licensed Physical Therapist (PT) in accordance with [facility] Physical Therapy Practice Act.    Patient will continue to benefit from skilled physical therapy to address the functional deficits that were identified during the evaluation today. We will continue to progress the therapy program to address these functional deficits and achieve the established goals.           Subjective Evaluation    History of Present Illness  Mechanism of injury: The patient states that he has had issues with his gait and balance for the past few months. He denies having any falls, but he does feel unsteady. He states that he feels most unsteady when he initially stands up from sitting, especially prolonged sitting.   The  patient states that he was diagnosed with peripheral neuropathy last year.   He states that he has had to walk with a cane and walker at times. He states that he had a fall in , which required hospitalization.   The patient states that he had a recent hospitalization from  - 11/15. The patient states that PTcame in once to have him walk with a walker. He states that he has been walking without an AD since he was discharged to home.       : Pt states he is feeling less shaky since his medicine change.  States he works on strength and some balance activity at home, but has a lot of family matters going on currently.           Recurrent probem    Quality of life: poor    Patient Goals  Patient goals for therapy: increased strength and improved balance  Patient goal: walk better  Pain  Current pain ratin  At best pain ratin  At worst pain ratin  Location: L lower back  Relieving factors: relaxation, rest and medications  Aggravating factors: standing, stair climbing and walking    Social Support  Steps to enter house: yes  2  Stairs in house: yes   Lives in: multiple-level home  Lives with: spouse    Employment status: not working      Objective     Strength/Myotome Testing     Left Hip   Planes of Motion   Flexion: 5  Extension: 4  Abduction: 5  Adduction: 5  External rotation: 3+    Right Hip   Planes of Motion   Flexion: 5  Extension: 4-  Abduction: 5  Adduction: 5  External rotation: 3+    Left Knee   Flexion: 5  Extension: 5    Right Knee   Flexion: 5  Extension: 5    Left Ankle/Foot   Dorsiflexion: 5  Plantar flexion: 5    Right Ankle/Foot   Dorsiflexion: 5  Plantar flexion: 5    Ambulation     Ambulation: Level Surfaces   Ambulation without assistive device: independent    Ambulation: Stairs   Ascend stairs: independent  Pattern: reciprocal  Railings: one rail  Descend stairs: independent  Pattern: non-reciprocal  Railings: one rail    Observational Gait   Gait: within functional limits  "    Additional Observational Gait Details  Patient ambulates with steppage gait.         Miranda Balance Scale - 34/56 - Medium Fall Risk  12/17 BBT 37/56 1/30 BBT 46/56     Precautions: Hard of hearing, Macular degeneration, Neuropathy, Poor tolerance to standing and walking      Date 1/17 1/21 1/30 1/3 1/15   FOTO Ds 46  50 TS     Manuals                        Neuro Re-Ed        Miranda Balance Test   46 TS     Romberg EC         Romberg c Head turns- foam    Airex 3x15\" np   SLB-foam 3x 15\" 3x 15\"  3x15\"  Foam 3x 15\"           Ther Ex        Obj. Updates    TS and education     NuStep 6m L6 6m L5  8 min L5  8m L5   Standing marches 20x 20x       Heel raises 20x 20x   30x 30x    Stand hip abd, ext 20x ea 20x ea  20ea 15x ea   Seated calf stretch c strap 3x15\" home  3x15\" 3x  15\"   Seated SKTC 3x15\"  home  3x15\"  3x 15\"   Leg Press  15#  3/10 15# 2/15  15# 3x10     15# 3/10   Knee extension 25# 3/10 25# 2/15  25# 3x10 25# 3/10   Knee flexion  30# 3/10 30# 2/15  30# 3x10 30# 3/10           Ther Activity        360 turns        Turning to look over shoulder NP    3x 3 ea    Step ups 6\" 15x ea NV  6\" 15x ea 6\" 15x ea   Hurdles    Ankle weights, 2x fwd and side  NP   Heel / Toe walking 3x ea // bars NV  3x ea in //bars  3x ea // bars   Figure 8 through cones NV NV  2x each direction  3x ea direction           Gait Training                        Modalities                hp  Ds lb/cerv   TS            "

## 2025-02-04 ENCOUNTER — OFFICE VISIT (OUTPATIENT)
Dept: PHYSICAL THERAPY | Facility: CLINIC | Age: 75
End: 2025-02-04
Payer: MEDICARE

## 2025-02-04 DIAGNOSIS — R26.81 UNSTEADY GAIT: Primary | ICD-10-CM

## 2025-02-04 PROCEDURE — 97110 THERAPEUTIC EXERCISES: CPT

## 2025-02-04 PROCEDURE — 97530 THERAPEUTIC ACTIVITIES: CPT

## 2025-02-04 NOTE — PROGRESS NOTES
"Daily Note     Today's date: 2025  Patient name: Fredy Yu  : 1950  MRN: [id number]  Referring provider: Tejal Garcia,*  Dx:   Encounter Diagnosis     ICD-10-CM    1. Unsteady gait  R26.81                      Subjective: Pt with min c/o today.      Objective: See treatment diary below      Assessment: Tolerated treatment well. Patient exhibited good technique with therapeutic exercises      Plan: Continue per plan of care.      Precautions: Hard of hearing, Macular degeneration, Neuropathy, Poor tolerance to standing and walking      Date 1/17 1/21 1/30 2/4 1/15   FOTO Ds 46  50 TS     Manuals                        Neuro Re-Ed        Miranda Balance Test   46 TS     Romberg EC         Romberg c Head turns- foam     np   SLB-foam 3x 15\" 3x 15\"   Foam 3x 15\"           Ther Ex        Obj. Updates    TS and education     NuStep 6m L6 6m L5  8m L5  8m L5   Standing marches 20x 20x       Heel raises 20x 20x   30x 30x    Stand hip abd, ext 20x ea 20x ea  20x ea 15x ea   Seated calf stretch c strap 3x15\" home  home 3x  15\"   Seated SKTC 3x15\"  home  home  3x 15\"   Leg Press  15#  3/10 15# 2/15  20#2/15    15# 3/10   Knee extension 25# 3/10 25# 2/15  25# 2/15 25# 3/10   Knee flexion  30# 3/10 30# 2/15  35# 2/15 30# 3/10           Ther Activity        360 turns        Turning to look over shoulder NP    3x 3 ea    Step ups 6\" 15x ea NV  6\" 15x ea 6\" 15x ea   Heel / Toe walking 3x ea // bars NV  3x  ea //bars 3x ea // bars   Figure 8 through cones NV NV   3x ea direction           Gait Training                        Modalities                HP  Ds lb/cerv   TS  Cer ds post          "

## 2025-02-05 ENCOUNTER — TELEPHONE (OUTPATIENT)
Dept: NEUROLOGY | Facility: CLINIC | Age: 75
End: 2025-02-05

## 2025-02-05 NOTE — TELEPHONE ENCOUNTER
LMOM to r/s pt's appt w/ Mary in [facility] on 2/6/25 due to impending weather, pt can not be vv due to new patient. Offered 2/18/25 at 11:30.

## 2025-02-07 ENCOUNTER — APPOINTMENT (OUTPATIENT)
Dept: PHYSICAL THERAPY | Facility: CLINIC | Age: 75
End: 2025-02-07
Payer: MEDICARE

## 2025-02-11 ENCOUNTER — OFFICE VISIT (OUTPATIENT)
Dept: PHYSICAL THERAPY | Facility: CLINIC | Age: 75
End: 2025-02-11
Payer: MEDICARE

## 2025-02-11 DIAGNOSIS — R26.81 UNSTEADY GAIT: Primary | ICD-10-CM

## 2025-02-11 PROCEDURE — 97110 THERAPEUTIC EXERCISES: CPT

## 2025-02-11 NOTE — PROGRESS NOTES
"Daily Note     Today's date: 2025  Patient name: Fredy Yu  : 1950  MRN: [id number]  Referring provider: Tejal Garcia,*  Dx:   Encounter Diagnosis     ICD-10-CM    1. Unsteady gait  R26.81                      Subjective: Pt states he slipped and fell on ice yesterday, knee and ankle are sore.  Alternating heat and ice      Objective: See treatment diary below      Assessment: Tolerated treatment well. Patient exhibited good technique with therapeutic exercises. Modified secondary to sore  knee and ankle.  Pt pleased with progress he has made and was educated to reach out with any questions in the future.       Plan: DC to home program, wellness     Precautions: Hard of hearing, Macular degeneration, Neuropathy, Poor tolerance to standing and walking      Date    FOTO Ds 46  50 TS     Manuals                        Neuro Re-Ed        Miranda Balance Test   46 TS     Romberg EC         Romberg c Head turns- foam        SLB-foam 3x 15\" 3x 15\"   3/15\"           Ther Ex        Obj. Updates    TS and education     NuStep 6m L6 6m L5  8m L5  6m L5   Standing marches 20x 20x    20x    Heel raises 20x 20x   30x 30x   Stand hip abd, ext 20x ea 20x ea  20x ea 15 ea    Seated calf stretch c strap 3x15\" home  home 3/15\"   Seated SKTC 3x15\"  home  home  3/15\"   Leg Press  15#  3/10 15# 2/15  20#2/15    10# 3/10   Knee extension 25# 3/10 25# 2/15  25# 2/15 LAQ 3#s today 3/10    Knee flexion  30# 3/10 30# 2/15  35# 2/15 25# 3/10           Ther Activity        360 turns        Turning to look over shoulder NP       Step ups 6\" 15x ea NV  6\" 15x ea    Heel / Toe walking 3x ea // bars NV  3x  ea //bars    Figure 8 through cones NV NV              Gait Training                        Modalities                HP  Ds lb/cerv   TS  Cer ds post            "

## 2025-02-18 ENCOUNTER — OFFICE VISIT (OUTPATIENT)
Dept: NEUROLOGY | Facility: CLINIC | Age: 75
End: 2025-02-18
Payer: MEDICARE

## 2025-02-18 ENCOUNTER — APPOINTMENT (OUTPATIENT)
Dept: LAB | Facility: MEDICAL CENTER | Age: 75
End: 2025-02-18
Payer: MEDICARE

## 2025-02-18 VITALS
DIASTOLIC BLOOD PRESSURE: 67 MMHG | HEART RATE: 60 BPM | BODY MASS INDEX: 36.15 KG/M2 | SYSTOLIC BLOOD PRESSURE: 154 MMHG | WEIGHT: 217 LBS | HEIGHT: 65 IN

## 2025-02-18 DIAGNOSIS — I10 ESSENTIAL HYPERTENSION: ICD-10-CM

## 2025-02-18 DIAGNOSIS — G62.9 NEUROPATHY: ICD-10-CM

## 2025-02-18 DIAGNOSIS — E78.2 MIXED HYPERLIPIDEMIA: ICD-10-CM

## 2025-02-18 DIAGNOSIS — E78.5 DYSLIPIDEMIA: ICD-10-CM

## 2025-02-18 DIAGNOSIS — R73.03 PREDIABETES: ICD-10-CM

## 2025-02-18 DIAGNOSIS — R25.1 TREMOR: Primary | ICD-10-CM

## 2025-02-18 DIAGNOSIS — D18.00 CAVERNOMA: ICD-10-CM

## 2025-02-18 DIAGNOSIS — R26.89 IMBALANCE: ICD-10-CM

## 2025-02-18 LAB
T4 FREE SERPL-MCNC: 1.07 NG/DL (ref 0.61–1.12)
TSH SERPL DL<=0.05 MIU/L-ACNC: 1.25 UIU/ML (ref 0.45–4.5)

## 2025-02-18 PROCEDURE — 84446 ASSAY OF VITAMIN E: CPT

## 2025-02-18 PROCEDURE — 84439 ASSAY OF FREE THYROXINE: CPT

## 2025-02-18 PROCEDURE — 36415 COLL VENOUS BLD VENIPUNCTURE: CPT

## 2025-02-18 PROCEDURE — 82525 ASSAY OF COPPER: CPT

## 2025-02-18 PROCEDURE — 84443 ASSAY THYROID STIM HORMONE: CPT

## 2025-02-18 PROCEDURE — 82390 ASSAY OF CERULOPLASMIN: CPT

## 2025-02-18 PROCEDURE — 99214 OFFICE O/P EST MOD 30 MIN: CPT | Performed by: NURSE PRACTITIONER

## 2025-02-18 NOTE — ASSESSMENT & PLAN NOTE
Patient's main concern today is tremor that started a year and a half ago.  He notes mainly postural tremor in the hands left greater than right.  There is no family history of tremor.  On exam he does have left greater than right postural and intention tremor, no parkinsonian symptoms in which she was reassured.  possible essential tremor?  Will plan to obtain labs.  He has had MRI brain recently.  Given his tremor does not currently impact functioning he declines medication, will continue to monitor his symptoms clinically. He is currently on gabapentin for his neuropathy  Orders:    TSH + Free T4; Future    Copper Level; Future    Ceruloplasmin; Future    Vitamin E; Future

## 2025-02-18 NOTE — PROGRESS NOTES
Name: Fredy Yu      : 1950      MRN: [id number]  Encounter Provider: MALGORZATA Koenig  Encounter Date: 2025   Encounter department: NEUROLOGY [facility]  :  Assessment & Plan  Tremor  Patient's main concern today is tremor that started a year and a half ago.  He notes mainly postural tremor in the hands left greater than right.  There is no family history of tremor.  On exam he does have left greater than right postural and intention tremor, no parkinsonian symptoms in which she was reassured.  possible essential tremor?  Will plan to obtain labs.  He has had MRI brain recently.  Given his tremor does not currently impact functioning he declines medication, will continue to monitor his symptoms clinically. He is currently on gabapentin for his neuropathy  Orders:    TSH + Free T4; Future    Copper Level; Future    Ceruloplasmin; Future    Vitamin E; Future    Neuropathy  Patient with left sided numnbess and tingling, was seen by stroke team, MRI brain obtain, resulted noted below. EMG did show nueropathy, he notes   leg fatigue and imbalance, requests PT referral.     Orders:    Ambulatory Referral to Physical Therapy; Future          History of Present Illness   HPI   Patient is a 75 year old male with PMH of HTN, ROSARIO, fatty liver, inflammatory arthritis, psoriatic arthritis, BPH, alcohol use, depression/anxiety, prediabetes, who presents for evaluation of tremor.     First seen by our office in 2024 for left sided numbness/tingling, MRI brain did not reveal any stroke, He does have a T2 hyperintense focus in the right anterior temporal lobe. 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 moderate chronic microangiopathic ischemic changes     Reports of tremor that started about a year and half in the hands L>R. NO clear resting tremor. Notes a postural tremor. Handwriting is more sloppier, is not smaller.   Doesn't not tremor with eating or drinking.   He  denies numbness or tingling of the hands, his left hand can feel weak at times.   No hand cramping.   He notes his hands can also jerk at times.   He has neuropathy, he can only walk a certain distance until his legs feel tired. No shuffling, wife states he walks slowly.  No loss of smell.   No dream re-reenactment.     He will notice some jerking of his legs overnight, sleeps in recliner. Occurs much less often during the day.      He finds they are worse when he is stressed. He is under a lot of stress recently due to some family situations recently.     No family hx of tremor, or parkinsons disease.   No hx of antipsychotic use.     He is currently on gabapentin 400 mg BID-TID.       Prior work uP;  Labs: 10/2024 b12 300  4/2024 A1c 5.8    MRI brain 8/2024: No acute intracranial process.   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   Moderate chronic small vessel ischemic changes.    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   HENT: Negative.  Negative for hearing loss, tinnitus, trouble swallowing and voice change.    Eyes: Negative.  Negative for photophobia, pain and visual disturbance.   Respiratory: Negative.  Negative for shortness of breath.    Cardiovascular: Negative.  Negative for palpitations.   Gastrointestinal: Negative.  Negative for nausea and vomiting.   Endocrine: Negative.  Negative for cold intolerance.   Genitourinary: Negative.  Negative for dysuria, frequency and urgency.   Musculoskeletal:  Negative for back pain, gait problem, myalgias, neck pain and neck stiffness.   Skin: Negative.  Negative for rash.   Allergic/Immunologic: Negative.    Neurological:  Positive for  numbness (B/L legs - more so in the left. tingling in the left side of head). Negative for dizziness, tremors, seizures, syncope, facial asymmetry, speech difficulty, weakness, light-headedness and headaches.        Occ jerking movement  when stress   Hematological: Negative.  Does not bruise/bleed easily.   Psychiatric/Behavioral: Negative.  Negative for confusion, hallucinations and sleep disturbance.         I have personally reviewed the MA's review of systems and made changes as necessary.         Objective   There were no vitals taken for this visit.    Physical Exam  Constitutional:       General: He is awake.   Eyes:      General: Lids are normal.      Extraocular Movements: Extraocular movements intact.      Pupils: Pupils are equal, round, and reactive to light.   Neurological:      Mental Status: He is alert.      Deep Tendon Reflexes:      Reflex Scores:       Bicep reflexes are 2+ on the right side and 2+ on the left side.       Brachioradialis reflexes are 2+ on the right side and 2+ on the left side.       Patellar reflexes are 2+ on the right side and 2+ on the left side.  Psychiatric:         Speech: Speech normal.       Neurological Exam  Mental Status  Awake and alert. Oriented to person, place, time and situation. Speech is normal. Language is fluent with no aphasia. Attention and concentration are normal.  Patient is very [place] at baseline.    Cranial Nerves  CN III, IV, VI: Extraocular movements intact bilaterally. Normal lids and orbits bilaterally. Pupils equal round and reactive to light bilaterally.  CN V:  Left: Diminished sensation of the entire left side of the face.  CN VII: Full and symmetric facial movement.  CN VIII: Hearing is normal.  CN IX, X: Palate elevates symmetrically  CN XI: Shoulder shrug strength is normal.  CN XII: Tongue midline without atrophy or fasciculations.    Motor  Normal muscle bulk throughout. No fasciculations present. Normal muscle tone. Strength is 5/5 in all  four extremities except as noted.                                             Right                     Left  Elbow extension                    4+                             Sensory  Light touch abnormality: Decreased to left arm and left leg. Temperature abnormality: Endorsed diminished sensation on the left side.     Reflexes                                            Right                      Left  Brachioradialis                    2+                         2+  Biceps                                 2+                         2+  Patellar                                2+                         2+  Achilles                                Tr                         Tr    Coordination  Right: Finger-to-nose abnormality: Rapid alternating movement normal.Left: Finger-to-nose abnormality: Rapid alternating movement normal.  No resting tremor  Postural tremor L 2 R 1  Intention tremor L 1 R 1    No bradykinesia, cogwheeling, or rigidity    No head or vocal tremor.    Gait  Casual gait:  Slightly reduced stride, good step height, normal arm swing.

## 2025-02-18 NOTE — ASSESSMENT & PLAN NOTE
Patient with left sided numnbess and tingling, was seen by stroke team, MRI brain obtain, resulted noted below. EMG did show nueropathy, he notes   leg fatigue and imbalance, requests PT referral.     Orders:    Ambulatory Referral to Physical Therapy; Future

## 2025-02-18 NOTE — PROGRESS NOTES
Review of Systems   Constitutional:  Negative for appetite change, fatigue and fever.   HENT: Negative.  Negative for hearing loss, tinnitus, trouble swallowing and voice change.    Eyes: Negative.  Negative for photophobia, pain and visual disturbance.   Respiratory: Negative.  Negative for shortness of breath.    Cardiovascular: Negative.  Negative for palpitations.   Gastrointestinal: Negative.  Negative for nausea and vomiting.   Endocrine: Negative.  Negative for cold intolerance.   Genitourinary: Negative.  Negative for dysuria, frequency and urgency.   Musculoskeletal:  Negative for back pain, gait problem, myalgias, neck pain and neck stiffness.   Skin: Negative.  Negative for rash.   Allergic/Immunologic: Negative.    Neurological:  Positive for numbness (B/L legs - more so in the left. tingling in the left side of head). Negative for dizziness, tremors, seizures, syncope, facial asymmetry, speech difficulty, weakness, light-headedness and headaches.        Occ jerking movement  when stress   Hematological: Negative.  Does not bruise/bleed easily.   Psychiatric/Behavioral: Negative.  Negative for confusion, hallucinations and sleep disturbance.

## 2025-02-20 LAB — CERULOPLASMIN SERPL-MCNC: 21.6 MG/DL (ref 16–31)

## 2025-02-22 LAB — COPPER SERPL-MCNC: 85 UG/DL (ref 69–132)

## 2025-02-24 LAB
A-TOCOPHEROL VIT E SERPL-MCNC: 12.1 MG/L (ref 9–29)
GAMMA TOCOPHEROL SERPL-MCNC: 1.2 MG/L (ref 0.5–4.9)

## 2025-02-27 ENCOUNTER — APPOINTMENT (EMERGENCY)
Dept: CT IMAGING | Facility: HOSPITAL | Age: 75
End: 2025-02-27
Payer: MEDICARE

## 2025-02-27 ENCOUNTER — HOSPITAL ENCOUNTER (EMERGENCY)
Facility: HOSPITAL | Age: 75
Discharge: HOME | End: 2025-02-27
Attending: EMERGENCY MEDICINE
Payer: MEDICARE

## 2025-02-27 VITALS
DIASTOLIC BLOOD PRESSURE: 91 MMHG | SYSTOLIC BLOOD PRESSURE: 147 MMHG | OXYGEN SATURATION: 93 % | HEART RATE: 54 BPM | RESPIRATION RATE: 20 BRPM | TEMPERATURE: 98.5 F

## 2025-02-27 DIAGNOSIS — R41.0 CONFUSION: Primary | ICD-10-CM

## 2025-02-27 DIAGNOSIS — F10.939 ALCOHOL WITHDRAWAL (HCC): ICD-10-CM

## 2025-02-27 LAB
2HR DELTA HS TROPONIN: -1 NG/L
ANION GAP SERPL CALCULATED.3IONS-SCNC: 8 MMOL/L (ref 4–13)
APTT PPP: 25 SECONDS (ref 23–34)
BACTERIA UR QL AUTO: ABNORMAL /HPF
BILIRUB UR QL STRIP: NEGATIVE
BUN SERPL-MCNC: 18 MG/DL (ref 5–25)
CALCIUM SERPL-MCNC: 9.3 MG/DL (ref 8.4–10.2)
CARDIAC TROPONIN I PNL SERPL HS: 10 NG/L (ref ?–50)
CARDIAC TROPONIN I PNL SERPL HS: 11 NG/L (ref ?–50)
CHLORIDE SERPL-SCNC: 100 MMOL/L (ref 96–108)
CLARITY UR: CLEAR
CO2 SERPL-SCNC: 30 MMOL/L (ref 21–32)
COLOR UR: YELLOW
CREAT SERPL-MCNC: 1.21 MG/DL (ref 0.6–1.3)
ERYTHROCYTE [DISTWIDTH] IN BLOOD BY AUTOMATED COUNT: 13.8 % (ref 11.6–15.1)
GFR SERPL CREATININE-BSD FRML MDRD: 58 ML/MIN/1.73SQ M
GLUCOSE SERPL-MCNC: 94 MG/DL (ref 65–140)
GLUCOSE SERPL-MCNC: 96 MG/DL (ref 65–140)
GLUCOSE UR STRIP-MCNC: NEGATIVE MG/DL
GRAN CASTS #/AREA URNS LPF: ABNORMAL /[LPF]
HCT VFR BLD AUTO: 38.7 % (ref 36.5–49.3)
HGB BLD-MCNC: 13.2 G/DL (ref 12–17)
HGB UR QL STRIP.AUTO: ABNORMAL
INR PPP: 1.11 (ref 0.85–1.19)
KETONES UR STRIP-MCNC: ABNORMAL MG/DL
LEUKOCYTE ESTERASE UR QL STRIP: NEGATIVE
MCH RBC QN AUTO: 32.8 PG (ref 26.8–34.3)
MCHC RBC AUTO-ENTMCNC: 34.1 G/DL (ref 31.4–37.4)
MCV RBC AUTO: 96 FL (ref 82–98)
NITRITE UR QL STRIP: NEGATIVE
NON-SQ EPI CELLS URNS QL MICRO: ABNORMAL /HPF
PH UR STRIP.AUTO: 5.5 [PH]
PLATELET # BLD AUTO: 191 THOUSANDS/UL (ref 149–390)
PMV BLD AUTO: 8.7 FL (ref 8.9–12.7)
POTASSIUM SERPL-SCNC: 4 MMOL/L (ref 3.5–5.3)
PROT UR STRIP-MCNC: ABNORMAL MG/DL
PROTHROMBIN TIME: 14.8 SECONDS (ref 12.3–15)
RBC # BLD AUTO: 4.03 MILLION/UL (ref 3.88–5.62)
RBC #/AREA URNS AUTO: ABNORMAL /HPF
SODIUM SERPL-SCNC: 138 MMOL/L (ref 135–147)
SP GR UR STRIP.AUTO: 1.02 (ref 1–1.03)
URATE CRY URNS QL MICRO: ABNORMAL /HPF
UROBILINOGEN UR STRIP-ACNC: <2 MG/DL
WBC # BLD AUTO: 6.4 THOUSAND/UL (ref 4.31–10.16)
WBC #/AREA URNS AUTO: ABNORMAL /HPF

## 2025-02-27 PROCEDURE — 81001 URINALYSIS AUTO W/SCOPE: CPT | Performed by: EMERGENCY MEDICINE

## 2025-02-27 PROCEDURE — 70450 CT HEAD/BRAIN W/O DYE: CPT

## 2025-02-27 PROCEDURE — 99285 EMERGENCY DEPT VISIT HI MDM: CPT

## 2025-02-27 PROCEDURE — 99285 EMERGENCY DEPT VISIT HI MDM: CPT | Performed by: EMERGENCY MEDICINE

## 2025-02-27 PROCEDURE — 82948 REAGENT STRIP/BLOOD GLUCOSE: CPT

## 2025-02-27 PROCEDURE — 85027 COMPLETE CBC AUTOMATED: CPT | Performed by: EMERGENCY MEDICINE

## 2025-02-27 PROCEDURE — 85610 PROTHROMBIN TIME: CPT | Performed by: EMERGENCY MEDICINE

## 2025-02-27 PROCEDURE — 96374 THER/PROPH/DIAG INJ IV PUSH: CPT

## 2025-02-27 PROCEDURE — 93005 ELECTROCARDIOGRAM TRACING: CPT

## 2025-02-27 PROCEDURE — 85730 THROMBOPLASTIN TIME PARTIAL: CPT | Performed by: EMERGENCY MEDICINE

## 2025-02-27 PROCEDURE — 36415 COLL VENOUS BLD VENIPUNCTURE: CPT | Performed by: EMERGENCY MEDICINE

## 2025-02-27 PROCEDURE — 80048 BASIC METABOLIC PNL TOTAL CA: CPT | Performed by: EMERGENCY MEDICINE

## 2025-02-27 PROCEDURE — 84484 ASSAY OF TROPONIN QUANT: CPT | Performed by: EMERGENCY MEDICINE

## 2025-02-27 RX ORDER — CHLORDIAZEPOXIDE HYDROCHLORIDE 25 MG/1
CAPSULE, GELATIN COATED ORAL
Qty: 18 CAPSULE | Refills: 0 | Status: SHIPPED | OUTPATIENT
Start: 2025-02-27 | End: 2025-03-11 | Stop reason: SDUPTHER

## 2025-02-27 RX ORDER — PHENOBARBITAL SODIUM 130 MG/ML
65 INJECTION, SOLUTION INTRAMUSCULAR; INTRAVENOUS ONCE
Status: COMPLETED | OUTPATIENT
Start: 2025-02-27 | End: 2025-02-27

## 2025-02-27 RX ADMIN — PHENOBARBITAL SODIUM 65 MG: 130 INJECTION INTRAMUSCULAR at 09:46

## 2025-02-27 NOTE — DISCHARGE INSTRUCTIONS
Avoid alcohol usage.  Take medications as prescribed.  Return immediately if condition worsens.  Please follow-up with your primary care physician.

## 2025-02-27 NOTE — ED PROVIDER NOTES
Time reflects when diagnosis was documented in both MDM as applicable and the Disposition within this note       Time User Action Codes Description Comment    2/27/2025  8:07 AM Brody Rolon Add [R41.0] Confusion     2/27/2025 11:49 AM Brody Rolon Add [F10.939] Alcohol withdrawal (HCC)           ED Disposition       ED Disposition   Discharge    Condition   Stable    Date/Time   Thu Feb 27, 2025 11:49 AM    Comment   Fredy Yu discharge to home/self care.                   Assessment & Plan       Medical Decision Making  Patient is a pleasant 75-year-old male presenting with an episode of hallucinating last night as well as feeling shaky this morning. His wife reports that patient has become confused last night and having hallucinations. States last drink was yesterday. Patient is shaky upon arrival.     Problems Addressed:  Alcohol withdrawal (HCC): acute illness or injury     Details: Will place on Librium taper over the next few days.  Confusion: acute illness or injury    Amount and/or Complexity of Data Reviewed  External Data Reviewed: notes.  Labs: ordered. Decision-making details documented in ED Course.  Radiology: ordered and independent interpretation performed. Decision-making details documented in ED Course.     Details: No evidence of stroke    Risk  OTC drugs.  Prescription drug management.  Decision regarding hospitalization.  Risk Details: Patient feels better is requesting to be discharged home.  No focal motor or sensory neurological deficits.  Symptoms consistent with alcohol withdrawal.  Instructed to proceed to his alcoholic Anonymous meetings as scheduled.  Return to condition worsens.  Provided with prescription for Librium taper.  Reviewed return precautions and follow-up information with patient and family.             Medications   PHENobarbital injection 65 mg (65 mg Intravenous Given 2/27/25 0946)       ED Risk Strat Scores        I personally discussed return  precautions with this patient and family. I provided the patient with written discharge instructions and particularly highlighted specific areas of interest to this patient, including but not limited to: medications for symptom managment, follow up recommendations, and return precautions. Patient and family are in agreement with this plan as outlined above.                    SBIRT 22yo+      Flowsheet Row Most Recent Value   Initial Alcohol Screen: US AUDIT-C     1. How often do you have a drink containing alcohol? 0 Filed at: 02/27/2025 1223   2. How many drinks containing alcohol do you have on a typical day you are drinking?  0 Filed at: 02/27/2025 1223   3a. Male UNDER 65: How often do you have five or more drinks on one occasion? 0 Filed at: 02/27/2025 1223   3b. FEMALE Any Age, or MALE 65+: How often do you have 4 or more drinks on one occassion? 0 Filed at: 02/27/2025 1223   Audit-C Score 0 Filed at: 02/27/2025 1223   JEMIMA: How many times in the past year have you...    Used an illegal drug or used a prescription medication for non-medical reasons? Never Filed at: 02/27/2025 1223                            History of Present Illness       Chief Complaint   Patient presents with    Hallucinations     Wife reports that patient has become confused last night and having hallucinations. States last drink was yesterday. Patient is shaky upon arrival.       Past Medical History:   Diagnosis Date    Alcohol dependency (HCC)     Anxiety     Arthritis     Depression     [place] (hard of hearing)     Hypertension     Kidney stones     Peripheral neuropathy     Prostate enlargement     Renal disorder     kidney    Shoulder dislocation       Past Surgical History:   Procedure Laterality Date    CHOLECYSTECTOMY      2010    COLONOSCOPY      CYSTOSCOPY W/ STONE MANIPULATION N/A 10/23/2024    Procedure: BASKET STONE EXTRACTION;  Surgeon: Eduardo Rivera MD;  Location: [facility];  Service: Urology    FL RETROGRADE  PYELOGRAM  10/23/2024    WY CYSTO/URETERO W/LITHOTRIPSY &INDWELL STENT INSRT Left 10/23/2024    Procedure: CYSTOSCOPY URETEROSCOPY WITH LITHOTRIPSY HOLMIUM LASER, RETROGRADE PYELOGRAM AND INSERTION STENT URETERAL;  Surgeon: Eduardo Rivera MD;  Location: [facility];  Service: Urology    SHOULDER SURGERY Left     for dislocation x 2, 20 years ago an the second 18 years ago      Family History   Problem Relation Age of Onset    Dementia Mother     Colon cancer Mother     Heart disease Father     COPD Father     Heart failure Father     Coronary artery disease Father     Sleep apnea Brother       Social History     Tobacco Use    Smoking status: Never     Passive exposure: Never    Smokeless tobacco: Never   Vaping Use    Vaping status: Never Used   Substance Use Topics    Alcohol use: Yes     Comment: pint of vodka everyday- states that he hasn't drank in one week    Drug use: Never      E-Cigarette/Vaping    E-Cigarette Use Never User       E-Cigarette/Vaping Substances    Nicotine No     THC No     CBD No     Flavoring No     Other No     Unknown No       I have reviewed and agree with the history as documented.     Patient is a pleasant 75-year-old male presenting with an episode of hallucinating last night as well as feeling shaky this morning. His wife reports that patient has become confused last night and having hallucinations. States last drink was yesterday. Patient is shaky upon arrival.  Patient has history of alcohol abuse and states he began drinking again last week and has drank approximately 1 pint of vodka daily over the last 7 days and stopped yesterday afternoon after several years of sobriety.  Patient is awake alert and oriented but slightly tremulous.  Will review past records.  Will check basic labs, administer phenobarbital in the interim.          Review of Systems   Constitutional:  Negative for activity change, appetite change, chills and fever.   HENT:  Negative for ear pain, hearing  loss, rhinorrhea, sneezing, sore throat and trouble swallowing.    Eyes:  Negative for pain and visual disturbance.   Respiratory:  Negative for cough, choking, chest tightness, shortness of breath, wheezing and stridor.    Cardiovascular:  Negative for chest pain, palpitations and leg swelling.   Gastrointestinal:  Negative for abdominal pain, constipation, diarrhea, nausea and vomiting.   Genitourinary:  Negative for difficulty urinating, dysuria, frequency, hematuria and testicular pain.   Musculoskeletal:  Negative for arthralgias, back pain, gait problem and neck pain.   Skin:  Negative for color change and rash.   Allergic/Immunologic: Negative for immunocompromised state.   Neurological:  Positive for tremors. Negative for dizziness, seizures, syncope, speech difficulty, weakness, light-headedness, numbness and headaches.   Psychiatric/Behavioral:  Positive for confusion and hallucinations.    All other systems reviewed and are negative.          Objective       ED Triage Vitals [02/27/25 0835]   Temperature Pulse Blood Pressure Respirations SpO2 Patient Position - Orthostatic VS   98.8 °F (37.1 °C) 55 145/65 22 94 % Sitting      Temp Source Heart Rate Source BP Location FiO2 (%) Pain Score    Temporal Monitor Right arm -- 7      Vitals      Date and Time Temp Pulse SpO2 Resp BP Pain Score FACES Pain Rating User   02/27/25 1306 98.5 °F (36.9 °C) 54 93 % 20 147/91 5 -- [place]   02/27/25 1130 98.5 °F (36.9 °C) 53 93 % 20 147/91 5 -- [place]   02/27/25 1030 98.5 °F (36.9 °C) 54 94 % 20 145/90 5 -- [place]   02/27/25 0930 98.5 °F (36.9 °C) 54 93 % 22 128/59 5 -- [place]   02/27/25 0855 -- -- -- -- -- 7 -- [place]   02/27/25 0835 98.8 °F (37.1 °C) 55 94 % 22 145/65 7 -- [place]            Physical Exam  Vitals and nursing note reviewed.   Constitutional:       General: He is not in acute distress.     Appearance: Normal appearance. He is well-developed and normal weight. He is not ill-appearing, toxic-appearing or diaphoretic.   HENT:       Head: Normocephalic and atraumatic.      Nose: Nose normal.      Mouth/Throat:      Mouth: Mucous membranes are moist.      Pharynx: Oropharynx is clear.   Eyes:      General: No scleral icterus.     Extraocular Movements: Extraocular movements intact.      Conjunctiva/sclera: Conjunctivae normal.   Cardiovascular:      Rate and Rhythm: Normal rate and regular rhythm.      Pulses: Normal pulses.      Heart sounds: Normal heart sounds. No murmur heard.  Pulmonary:      Effort: Pulmonary effort is normal. No respiratory distress.      Breath sounds: Normal breath sounds. No wheezing or rales.   Chest:      Chest wall: No tenderness.   Abdominal:      General: Bowel sounds are normal. There is no distension.      Palpations: Abdomen is soft. There is no mass.      Tenderness: There is no abdominal tenderness. There is no right CVA tenderness, left CVA tenderness, guarding or rebound.   Musculoskeletal:         General: No tenderness, deformity or signs of injury. Normal range of motion.      Cervical back: Normal range of motion and neck supple. No rigidity or tenderness.      Right lower leg: No edema.      Left lower leg: No edema.   Lymphadenopathy:      Cervical: No cervical adenopathy.   Skin:     General: Skin is warm and dry.      Capillary Refill: Capillary refill takes less than 2 seconds.      Coloration: Skin is not jaundiced or pale.      Findings: No bruising, erythema, lesion or rash.   Neurological:      General: No focal deficit present.      Mental Status: He is alert and oriented to person, place, and time. Mental status is at baseline.      Motor: No abnormal muscle tone.      Comments: Slight hand tremors left greater than right.  No focal motor or sensory weakness.   Psychiatric:         Mood and Affect: Mood normal.         Behavior: Behavior normal.         Results Reviewed       Procedure Component Value Units Date/Time    HS Troponin I 2hr [[id number]]  (Normal) Collected: 02/27/25 1041    Lab  Status: Final result Specimen: Blood from Arm, Left Updated: 02/27/25 1108     hs TnI 2hr 10 ng/L      Delta 2hr hsTnI -1 ng/L     Urine Microscopic [[id number]]  (Abnormal) Collected: 02/27/25 1024    Lab Status: Final result Specimen: Urine, Clean Catch Updated: 02/27/25 1054     RBC, UA 1-2 /hpf      WBC, UA 0-1 /hpf      Epithelial Cells Occasional /hpf      Bacteria, UA Occasional /hpf      Granular Casts, UA 0-3     Uric Acid Reyna, UA Occasional /hpf     UA w Reflex to Microscopic w Reflex to Culture [[id number]]  (Abnormal) Collected: 02/27/25 1024    Lab Status: Final result Specimen: Urine, Clean Catch Updated: 02/27/25 1031     Color, UA Yellow     Clarity, UA Clear     Specific Gravity, UA 1.025     pH, UA 5.5     Leukocytes, UA Negative     Nitrite, UA Negative     Protein, UA Trace mg/dl      Glucose, UA Negative mg/dl      Ketones, UA 10 (1+) mg/dl      Urobilinogen, UA <2.0 mg/dl      Bilirubin, UA Negative     Occult Blood, UA Small    HS Troponin 0hr (reflex protocol) [[id number]]  (Normal) Collected: 02/27/25 0840    Lab Status: Final result Specimen: Blood from Arm, Left Updated: 02/27/25 0933     hs TnI 0hr 11 ng/L     Basic metabolic panel [[id number]] Collected: 02/27/25 0840    Lab Status: Final result Specimen: Blood from Arm, Left Updated: 02/27/25 0909     Sodium 138 mmol/L      Potassium 4.0 mmol/L      Chloride 100 mmol/L      CO2 30 mmol/L      ANION GAP 8 mmol/L      BUN 18 mg/dL      Creatinine 1.21 mg/dL      Glucose 96 mg/dL      Calcium 9.3 mg/dL      eGFR 58 ml/min/1.73sq m     Narrative:      National Kid[employer] guidelines for Chronic Kidney Disease (CKD):     Stage 1 with normal or high GFR (GFR > 90 mL/min/1.73 square meters)    Stage 2 Mild CKD (GFR = 60-89 mL/min/1.73 square meters)    Stage 3A Moderate CKD (GFR = 45-59 mL/min/1.73 square meters)    Stage 3B Moderate CKD (GFR = 30-44 mL/min/1.73 square meters)    Stage 4 Severe CKD (GFR = 15-29 mL/min/1.73 square  meters)    Stage 5 End Stage CKD (GFR <15 mL/min/1.73 square meters)  Note: GFR calculation is accurate only with a steady state creatinine    Protime-INR [[id number]]  (Normal) Collected: 02/27/25 0840    Lab Status: Final result Specimen: Blood from Arm, Left Updated: 02/27/25 0906     Protime 14.8 seconds      INR 1.11    Narrative:      INR Therapeutic Range    Indication                                             INR Range      Atrial Fibrillation                                               2.0-3.0  Hypercoagulable State                                    2.0.2.3  Left Ventricular Asist Device                            2.0-3.0  Mechanical Heart Valve                                  -    Aortic(with afib, MI, embolism, HF, LA enlargement,    and/or coagulopathy)                                     2.0-3.0 (2.5-3.5)     Mitral                                                             2.5-3.5  Prosthetic/Bioprosthetic Heart Valve               2.0-3.0  Venous thromboembolism (VTE: VT, PE        2.0-3.0    APTT [[id number]]  (Normal) Collected: 02/27/25 0840    Lab Status: Final result Specimen: Blood from Arm, Left Updated: 02/27/25 0906     PTT 25 seconds     CBC and Platelet [[id number]]  (Abnormal) Collected: 02/27/25 0840    Lab Status: Final result Specimen: Blood from Arm, Left Updated: 02/27/25 0855     WBC 6.40 Thousand/uL      RBC 4.03 Million/uL      Hemoglobin 13.2 g/dL      Hematocrit 38.7 %      MCV 96 fL      MCH 32.8 pg      MCHC 34.1 g/dL      RDW 13.8 %      Platelets 191 Thousands/uL      MPV 8.7 fL     Fingerstick Glucose (POCT) [[id number]]  (Normal) Collected: 02/27/25 0841    Lab Status: Final result Specimen: Blood Updated: 02/27/25 0842     POC Glucose 94 mg/dl             CT head wo contrast   Final Interpretation by Tiffani Barth MD (02/27 1001)      No acute intracranial abnormality.                  Workstation performed: [id number]             ECG 12 Lead Documentation  Only    Date/Time: 2/27/2025 8:15 AM    Performed by: Brody Rolon DO  Authorized by: Brody Rolon DO    Indications / Diagnosis:  Confusion  ECG reviewed by me, the ED Provider: yes    Patient location:  ED  Previous ECG:     Comparison to cardiac monitor: Yes    Rate:     ECG rate:  54    ECG rate assessment: bradycardic    Rhythm:     Rhythm: sinus bradycardia    Ectopy:     Ectopy: none    QRS:     QRS axis:  Normal    QRS intervals:  Normal  Conduction:     Conduction: normal    ST segments:     ST segments:  Normal  T waves:     T waves: normal        ED Medication and Procedure Management   Prior to Admission Medications   Prescriptions Last Dose Informant Patient Reported? Taking?   Adalimumab 40 MG/0.4ML AJKT  Self No No   Sig: Inject 1 each under the skin every 14 (fourteen) days Inject 1 pen every 14 days   Patient not taking: Reported on 2/18/2025   Blood Pressure KIT  Self No No   Sig: Use 1 Device daily   alfuzosin (UROXATRAL) 10 mg 24 hr tablet  Self No No   Sig: Take 1 tablet (10 mg total) by mouth daily   amLODIPine (NORVASC) 2.5 mg tablet  Self No No   Sig: Take 1 tablet (2.5 mg total) by mouth daily   busPIRone (BUSPAR) 7.5 mg tablet  Self No No   Sig: Take 1 tablet (7.5 mg total) by mouth 2 (two) times a day   finasteride (PROSCAR) 5 mg tablet  Self No No   Sig: Take 1 tablet (5 mg total) by mouth daily   folic acid (FOLVITE) 1 mg tablet  Self No No   Sig: Take 1 tablet (1 mg total) by mouth daily   gabapentin (Neurontin) 400 mg capsule  Self No No   Sig: Take 400mg twice daily x 5 days. Then increase to three times daily thereafter   magnesium hydroxide (MILK OF MAGNESIA) 400 mg/5 mL oral suspension  Self Yes No   Sig: Take by mouth daily as needed for constipation   Patient not taking: Reported on 1/6/2025   methotrexate 2.5 MG tablet  Self No No   Sig: Take 8 tablets (20 mg total) by mouth once a week   naltrexone (REVIA) 50 mg tablet  Self No No   Sig: Take 1 tablet  (50 mg total) by mouth daily   oxybutynin (DITROPAN) 5 mg tablet  Self No No   Sig: Take 1 tablet (5 mg total) by mouth 3 (three) times a day as needed (for stent discomfort)   Patient not taking: Reported on 2/18/2025   predniSONE 1 mg tablet  Self No No   Sig: Take 4 tablets (4 mg total) by mouth daily   sertraline (Zoloft) 50 mg tablet  Self No No   Sig: Take 3 tablets (150 mg total) by mouth daily   thiamine 100 MG tablet  Self Yes No   Sig: Take 100 mg by mouth daily   traZODone (DESYREL) 100 mg tablet  Self No No   Sig: Take 1.5 tablets (150 mg total) by mouth daily at bedtime      Facility-Administered Medications: None     Discharge Medication List as of 2/27/2025 11:54 AM        START taking these medications    Details   chlordiazePOXIDE (LIBRIUM) 25 mg capsule Multiple Dosages:Starting Thu 2/27/2025, Until Sat 3/1/2025 at 2359, THEN Starting Sun 3/2/2025, Until Tue 3/4/2025 at 2359, THEN Starting Wed 3/5/2025, Until Fri 3/7/2025 at 2359Take 1 capsule (25 mg total) by mouth 3 (three) times a day for 3 days,  THEN 1 capsule (25 mg total) 2 (two) times a day for 3 days, THEN 1 capsule (25 mg total) in the morning for 3 days., Normal           CONTINUE these medications which have NOT CHANGED    Details   Adalimumab 40 MG/0.4ML AJKT Inject 1 each under the skin every 14 (fourteen) days Inject 1 pen every 14 days, Starting Wed 12/11/2024, Normal      alfuzosin (UROXATRAL) 10 mg 24 hr tablet Take 1 tablet (10 mg total) by mouth daily, Starting Fri 1/19/2024, Until Tue 2/18/2025, Normal      amLODIPine (NORVASC) 2.5 mg tablet Take 1 tablet (2.5 mg total) by mouth daily, Starting Tue 12/17/2024, Normal      Blood Pressure KIT Use 1 Device daily, Starting Tue 10/1/2024, Normal      busPIRone (BUSPAR) 7.5 mg tablet Take 1 tablet (7.5 mg total) by mouth 2 (two) times a day, Starting Tue 12/17/2024, Normal      finasteride (PROSCAR) 5 mg tablet Take 1 tablet (5 mg total) by mouth daily, Starting Tue 8/27/2024, Normal       folic acid (FOLVITE) 1 mg tablet Take 1 tablet (1 mg total) by mouth daily, Starting Tue 7/23/2024, Normal      gabapentin (Neurontin) 400 mg capsule Take 400mg twice daily x 5 days. Then increase to three times daily thereafter, Normal      magnesium hydroxide (MILK OF MAGNESIA) 400 mg/5 mL oral suspension Take by mouth daily as needed for constipation, Historical Med      methotrexate 2.5 MG tablet Take 8 tablets (20 mg total) by mouth once a week, Starting Tue 7/23/2024, Normal      naltrexone (REVIA) 50 mg tablet Take 1 tablet (50 mg total) by mouth daily, Starting Tue 10/1/2024, Normal      oxybutynin (DITROPAN) 5 mg tablet Take 1 tablet (5 mg total) by mouth 3 (three) times a day as needed (for stent discomfort), Starting Thu 10/24/2024, Normal      predniSONE 1 mg tablet Take 4 tablets (4 mg total) by mouth daily, Starting Tue 7/23/2024, Normal      sertraline (Zoloft) 50 mg tablet Take 3 tablets (150 mg total) by mouth daily, Starting Tue 12/10/2024, Until Mon 3/10/2025, Normal      thiamine 100 MG tablet Take 100 mg by mouth daily, Historical Med      traZODone (DESYREL) 100 mg tablet Take 1.5 tablets (150 mg total) by mouth daily at bedtime, Starting Wed 10/30/2024, Normal           No discharge procedures on file.  ED SEPSIS DOCUMENTATION   Time reflects when diagnosis was documented in both MDM as applicable and the Disposition within this note       Time User Action Codes Description Comment    2/27/2025  8:07 AM Brody Rolon Add [R41.0] Confusion     2/27/2025 11:49 AM Brody Rolon Add [F10.939] Alcohol withdrawal (HCC)                  Brody Rolon,   03/01/25 [id number]

## 2025-02-28 LAB
ATRIAL RATE: 54 BPM
P AXIS: 36 DEGREES
PR INTERVAL: 166 MS
QRS AXIS: 45 DEGREES
QRSD INTERVAL: 106 MS
QT INTERVAL: 450 MS
QTC INTERVAL: 426 MS
T WAVE AXIS: 51 DEGREES
VENTRICULAR RATE: 54 BPM

## 2025-02-28 PROCEDURE — 93010 ELECTROCARDIOGRAM REPORT: CPT | Performed by: INTERNAL MEDICINE

## 2025-03-11 ENCOUNTER — OFFICE VISIT (OUTPATIENT)
Dept: FAMILY MEDICINE CLINIC | Facility: CLINIC | Age: 75
End: 2025-03-11
Payer: MEDICARE

## 2025-03-11 VITALS
SYSTOLIC BLOOD PRESSURE: 134 MMHG | HEART RATE: 52 BPM | TEMPERATURE: 96.9 F | DIASTOLIC BLOOD PRESSURE: 72 MMHG | WEIGHT: 214.2 LBS | BODY MASS INDEX: 35.64 KG/M2 | OXYGEN SATURATION: 96 %

## 2025-03-11 DIAGNOSIS — G47.33 OSA (OBSTRUCTIVE SLEEP APNEA): ICD-10-CM

## 2025-03-11 DIAGNOSIS — I10 ESSENTIAL HYPERTENSION: ICD-10-CM

## 2025-03-11 DIAGNOSIS — E66.01 SEVERE OBESITY (BMI 35.0-39.9) WITH COMORBIDITY (HCC): ICD-10-CM

## 2025-03-11 DIAGNOSIS — F10.939 ALCOHOL WITHDRAWAL (HCC): ICD-10-CM

## 2025-03-11 DIAGNOSIS — H35.3230 BILATERAL EXUDATIVE AGE-RELATED MACULAR DEGENERATION, UNSPECIFIED STAGE (HCC): ICD-10-CM

## 2025-03-11 DIAGNOSIS — R26.2 AMBULATORY DYSFUNCTION: ICD-10-CM

## 2025-03-11 DIAGNOSIS — L40.50 PSORIATIC ARTHRITIS (HCC): ICD-10-CM

## 2025-03-11 DIAGNOSIS — Z00.00 MEDICARE ANNUAL WELLNESS VISIT, SUBSEQUENT: Primary | ICD-10-CM

## 2025-03-11 DIAGNOSIS — F10.930 ALCOHOL WITHDRAWAL SYNDROME WITHOUT COMPLICATION (HCC): ICD-10-CM

## 2025-03-11 PROBLEM — R56.9 SEIZURE (HCC): Status: RESOLVED | Noted: 2022-12-29 | Resolved: 2025-01-01

## 2025-03-11 PROCEDURE — 99214 OFFICE O/P EST MOD 30 MIN: CPT | Performed by: FAMILY MEDICINE

## 2025-03-11 PROCEDURE — G0439 PPPS, SUBSEQ VISIT: HCPCS | Performed by: FAMILY MEDICINE

## 2025-03-11 PROCEDURE — G2211 COMPLEX E/M VISIT ADD ON: HCPCS | Performed by: FAMILY MEDICINE

## 2025-03-11 RX ORDER — AMLODIPINE BESYLATE 2.5 MG/1
2.5 TABLET ORAL DAILY
Qty: 90 TABLET | Refills: 1 | Status: SHIPPED | OUTPATIENT
Start: 2025-03-11 | End: 2025-03-21

## 2025-03-11 RX ORDER — CHLORDIAZEPOXIDE HYDROCHLORIDE 25 MG/1
CAPSULE, GELATIN COATED ORAL
Qty: 18 CAPSULE | Refills: 0 | Status: SHIPPED | OUTPATIENT
Start: 2025-03-11 | End: 2025-03-20

## 2025-03-11 RX ORDER — ALLOPURINOL
POWDER (GRAM) MISCELLANEOUS
COMMUNITY

## 2025-03-11 NOTE — PATIENT INSTRUCTIONS
Medicare Preventive Visit Patient Instructions  Thank you for completing your Welcome to Medicare Visit or Medicare Annual Wellness Visit today. Your next wellness visit will be due in one year (3/12/2026).  The screening/preventive services that you may require over the next 5-10 years are detailed below. Some tests may not apply to you based off risk factors and/or age. Screening tests ordered at today's visit but not completed yet may show as past due. Also, please note that scanned in results may not display below.  Preventive Screenings:  Service Recommendations Previous Testing/Comments   Colorectal Cancer Screening  Colonoscopy    Fecal Occult Blood Test (FOBT)/Fecal Immunochemical Test (FIT)  Fecal DNA/Cologuard Test  Flexible Sigmoidoscopy Age: 45-75 years old   Colonoscopy: every 10 years (May be performed more frequently if at higher risk)  OR  FOBT/FIT: every 1 year  OR  Cologuard: every 3 years  OR  Sigmoidoscopy: every 5 years  Screening may be recommended earlier than age 45 if at higher risk for colorectal cancer. Also, an individualized decision between you and your healthcare provider will decide whether screening between the ages of 76-85 would be appropriate. Colonoscopy: 11/30/2022  FOBT/FIT: Not on file  Cologuard: Not on file  Sigmoidoscopy: Not on file    Screening Current     Prostate Cancer Screening Individualized decision between patient and health care provider in men between ages of 55-69   Medicare will cover every 12 months beginning on the day after your 50th birthday PSA: No results in last 5 years     Screening Not Indicated     Hepatitis C Screening Once for adults born between 1945 and 1965  More frequently in patients at high risk for Hepatitis C Hep C Antibody: 10/29/2024    Screening Current   Diabetes Screening 1-2 times per year if you're at risk for diabetes or have pre-diabetes Fasting glucose: 84 mg/dL (10/11/2023)  A1C: 5.8 (4/20/2024)  Screening Current   Cholesterol  Screening Once every 5 years if you don't have a lipid disorder. May order more often based on risk factors. Lipid panel: 11/13/2024  Screening Not Indicated  History Lipid Disorder      Other Preventive Screenings Covered by Medicare:  Abdominal Aortic Aneurysm (AAA) Screening: covered once if your at risk. You're considered to be at risk if you have a family history of AAA or a male between the age of 65-75 who smoking at least 100 cigarettes in your lifetime.  Lung Cancer Screening: covers low dose CT scan once per year if you meet all of the following conditions: (1) Age 55-77; (2) No signs or symptoms of lung cancer; (3) Current smoker or have quit smoking within the last 15 years; (4) You have a tobacco smoking history of at least 20 pack years (packs per day x number of years you smoked); (5) You get a written order from a healthcare provider.  Glaucoma Screening: covered annually if you're considered high risk: (1) You have diabetes OR (2) Family history of glaucoma OR (3)  aged 50 and older OR (4)  American aged 65 and older  Osteoporosis Screening: covered every 2 years if you meet one of the following conditions: (1) Have a vertebral abnormality; (2) On glucocorticoid therapy for more than 3 months; (3) Have primary hyperparathyroidism; (4) On osteoporosis medications and need to assess response to drug therapy.  HIV Screening: covered annually if you're between the age of 15-65. Also covered annually if you are younger than 15 and older than 65 with risk factors for HIV infection. For pregnant patients, it is covered up to 3 times per pregnancy.    Immunizations:  Immunization Recommendations   Influenza Vaccine Annual influenza vaccination during flu season is recommended for all persons aged >= 6 months who do not have contraindications   Pneumococcal Vaccine   * Pneumococcal conjugate vaccine = PCV13 (Prevnar 13), PCV15 (Vaxneuvance), PCV20 (Prevnar 20)  * Pneumococcal  polysaccharide vaccine = PPSV23 (Pneumovax) Adults 19-63 yo with certain risk factors or if 65+ yo  If never received any pneumonia vaccine: recommend Prevnar 20 (PCV20)  Give PCV20 if previously received 1 dose of PCV13 or PPSV23   Hepatitis B Vaccine 3 dose series if at intermediate or high risk (ex: diabetes, end stage renal disease, liver disease)   Respiratory syncytial virus (RSV) Vaccine - COVERED BY MEDICARE PART D  * RSVPreF3 (Arexvy) CDC recommends that adults 60 years of age and older may receive a single dose of RSV vaccine using shared clinical decision-making (SCDM)   Tetanus (Td) Vaccine - COST NOT COVERED BY MEDICARE PART B Following completion of primary series, a booster dose should be given every 10 years to maintain immunity against tetanus. Td may also be given as tetanus wound prophylaxis.   Tdap Vaccine - COST NOT COVERED BY MEDICARE PART B Recommended at least once for all adults. For pregnant patients, recommended with each pregnancy.   Shingles Vaccine (Shingrix) - COST NOT COVERED BY MEDICARE PART B  2 shot series recommended in those 19 years and older who have or will have weakened immune systems or those 50 years and older     Health Maintenance Due:      Topic Date Due   • Colorectal Cancer Screening  11/27/2032   • Hepatitis C Screening  Completed     Immunizations Due:      Topic Date Due   • Hepatitis A Vaccine (1 of 2 - Risk 2-dose series) Never done   • COVID-19 Vaccine (4 - 2024-25 season) 09/01/2024     Advance Directives   What are advance directives?  Advance directives are legal documents that state your wishes and plans for medical care. These plans are made ahead of time in case you lose your ability to make decisions for yourself. Advance directives can apply to any medical decision, such as the treatments you want, and if you want to donate organs.   What are the types of advance directives?  There are many types of advance directives, and each state has rules about how  to use them. You may choose a combination of any of the following:  Living will:  This is a written record of the treatment you want. You can also choose which treatments you do not want, which to limit, and which to stop at a certain time. This includes surgery, medicine, IV fluid, and tube feedings.   Durable power of  for healthcare (DPAHC):  This is a written record that states who you want to make healthcare choices for you when you are unable to make them for yourself. This person, called a proxy, is usually a family member or a friend. You may choose more than 1 proxy.  Do not resuscitate (DNR) order:  A DNR order is used in case your heart stops beating or you stop breathing. It is a request not to have certain forms of treatment, such as CPR. A DNR order may be included in other types of advance directives.  Medical directive:  This covers the care that you want if you are in a coma, near death, or unable to make decisions for yourself. You can list the treatments you want for each condition. Treatment may include pain medicine, surgery, blood transfusions, dialysis, IV or tube feedings, and a ventilator (breathing machine).  Values history:  This document has questions about your views, beliefs, and how you feel and think about life. This information can help others choose the care that you would choose.  Why are advance directives important?  An advance directive helps you control your care. Although spoken wishes may be used, it is better to have your wishes written down. Spoken wishes can be misunderstood, or not followed. Treatments may be given even if you do not want them. An advance directive may make it easier for your family to make difficult choices about your care.   Weight Management   Why it is important to manage your weight:  Being overweight increases your risk of health conditions such as heart disease, high blood pressure, type 2 diabetes, and certain types of cancer. It can also  increase your risk for osteoarthritis, sleep apnea, and other respiratory problems. Aim for a slow, steady weight loss. Even a small amount of weight loss can lower your risk of health problems.  How to lose weight safely:  A safe and healthy way to lose weight is to eat fewer calories and get regular exercise. You can lose up about 1 pound a week by decreasing the number of calories you eat by 500 calories each day.   Healthy meal plan for weight management:  A healthy meal plan includes a variety of foods, contains fewer calories, and helps you stay healthy. A healthy meal plan includes the following:  Eat whole-grain foods more often.  A healthy meal plan should contain fiber. Fiber is the part of grains, fruits, and vegetables that is not broken down by your body. Whole-grain foods are healthy and provide extra fiber in your diet. Some examples of whole-grain foods are whole-wheat breads and pastas, oatmeal, brown rice, and bulgur.  Eat a variety of vegetables every day.  Include dark, leafy greens such as spinach, kale, mikel greens, and mustard greens. Eat yellow and orange vegetables such as carrots, sweet potatoes, and winter squash.   Eat a variety of fruits every day.  Choose fresh or canned fruit (canned in its own juice or light syrup) instead of juice. Fruit juice has very little or no fiber.  Eat low-fat dairy foods.  Drink fat-free (skim) milk or 1% milk. Eat fat-free yogurt and low-fat cottage cheese. Try low-fat cheeses such as mozzarella and other reduced-fat cheeses.  Choose meat and other protein foods that are low in fat.  Choose beans or other legumes such as split peas or lentils. Choose fish, skinless poultry (chicken or turkey), or lean cuts of red meat (beef or pork). Before you cook meat or poultry, cut off any visible fat.   Use less fat and oil.  Try baking foods instead of frying them. Add less fat, such as margarine, sour cream, regular salad dressing and mayonnaise to foods. Eat  "fewer high-fat foods. Some examples of high-fat foods include french fries, doughnuts, ice cream, and cakes.  Eat fewer sweets.  Limit foods and drinks that are high in sugar. This includes candy, cookies, regular soda, and sweetened drinks.  Exercise:  Exercise at least 30 minutes per day on most days of the week. Some examples of exercise include walking, biking, dancing, and swimming. You can also fit in more physical activity by taking the stairs instead of the elevator or parking farther away from stores. Ask your healthcare provider about the best exercise plan for you.   Alcohol Use and Your Health    Drinking too much can harm your health.  Excessive alcohol use leads to about 88,000 death in the [place] each year, and shortens the life of those who diet by almost 30 years.  Further, excessive drinking cost the economy $249 billion in 2010.  Most excessive drinkers are not alcohol dependent.    Excessive alcohol use has immediate effects that increase the risk of many harmful health conditions.  These are most often the result of binge drinking.  Over time, excessive alcohol use can lead to the development of chronic diseases and other series health problems.    What is considered a \"drink\"?        Excessive alcohol use includes:  Binge Drinking: For women, 4 or more drinks consumed on one occasion. For men, 5 or more drinks consumed on one occasion.  Heavy Drinking: For women, 8 or more drinks per week. For men, 15 or more drinks per week  Any alcohol used by pregnant women  Any alcohol used by those under the age of 21 years    If you choose to drink, do so in moderation:  Do not drink at all if you are under the age of 21, or if you are or may be pregnant, or have health problems that could be made worse by drinking.  For women, up to 1 drink per day  For men, up to 2 drinks a day    No one should begin drinking or drink more frequently based on potential health benefits    Short-Term Health " Risks:  Injuries: motor vehicle crashes, falls, drownings, burns  Violence: homicide, suicide, sexual assault, intimate partner violence  Alcohol poisoning  Reproductive health: risky sexual behaviors, unintended prengnacy, sexually transmitted diseases, miscarriage, stillbirth, fetal alcohol syndrome    Long-Term Health Risks:  Chronic diseases: high blood pressure, heart disease, stroke, liver disease, digestive problems  Cancers: breast, mouth and throat, liver, colon  Learning and memory problems: dementia, poor school performance  Mental health: depression, anxiety, insomnia  Social problems: lost productivity, family problems, unemployment  Alcohol dependence    For support and more information:  Substance Abuse and Mental Health Services Administration  [address]  [place] [address]  Web Address: http://www.samhsa.gov    Alcoholics Anonymous        Web Address: http://www.aa.org    https://www.cdc.gov/alcohol/fact-sheets/alcohol-use.htm     © Copyright [employer] 2018 Information is for End User's use only and may not be sold, redistributed or otherwise used for commercial purposes. All illustrations and images included in CareNotes® are the copyrighted property of [employer]M., Inc. or [employer]

## 2025-03-11 NOTE — PROGRESS NOTES
Name: Fredy Yu      : 1950      MRN: [id number]  Encounter Provider: Tita Galan MD  Encounter Date: 3/11/2025   Encounter department: [facility] PRIMARY CARE    Assessment & Plan  Medicare annual wellness visit, subsequent  -Uptodate with Colonoscopy       Alcohol withdrawal syndrome without complication (HCC)  -Pt states that he returned to alcohol use last few days  -Reports that he has cut down on AA meetings lately  -Last drink yesterday- half a pint of vodka  -Given that patient has had multiple hospitalizations for alcohol withdrawal in the past, patient will benefit from inpatient admission in a rehab facility, patient declines for now due to cost  -Patient reports that he has not taken naltrexone since it was prescribed, has been taking it on and off  -Last ED visit 2025 and was prescribed Librium which patient completed  -Patient currently in withdrawal  -We will prescribe Librium taper for the next 3 days  -Advised patient to resume naltrexone after completion of Librium  -Advised patient to resume AA meeting  -Follow-up in 4 weeks  Orders:    chlordiazePOXIDE (LIBRIUM) 25 mg capsule; Take 1 capsule (25 mg total) by mouth 3 (three) times a day for 3 days, THEN 1 capsule (25 mg total) 2 (two) times a day for 3 days, THEN 1 capsule (25 mg total) in the morning for 3 days.    Essential hypertension  -BP stable for age  -Continue current Rx  -Refills given   Orders:    amLODIPine (NORVASC) 2.5 mg tablet; Take 1 tablet (2.5 mg total) by mouth daily    ROSARIO (obstructive sleep apnea)  -CPAP at bedtime       Psoriatic arthritis (HCC)  -Following rheumatology       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 degeneration, unspecified stage (HCC)  -Continue follow-up with ophthalmology       Severe obesity (BMI 35.0-39.9) with comorbidity (HCC)  -Counseled on healthy diet and exercise         Ambulatory dysfunction  -Continue physical therapy  -Patient following neurology for  neuropathy  -Plan per neuro         Depression Screening and Follow-up Plan: Patient's depression screening was positive with a PHQ-9 score of 25.   Patient assessed for underlying major depression. Brief counseling provided and recommend additional follow-up/re-evaluation next office visit. Depression likely due to other medical condition. Will treat underlying condition. Patient advised to follow-up with PCP for further management.       Preventive health issues were discussed with patient, and age appropriate screening tests were ordered as noted in patient's After Visit Summary. Personalized health advice and appropriate referrals for health education or preventive services given if needed, as noted in patient's After Visit Summary.    History of Present Illness     Patient is a 74 yo M who presents to the office for medicare wellness visit. Patient states that he returned to alcohol use again last few days. He states that he had about half a pint of vodka yesterday. He states that he is shaky and is currently in mild withdrawal. Has not had any alcohol since yesterday. He states that he has not been taking Naltrexone that was prescribed last visit. He states that he also cut down on the AA meeting. He states that his father recently passed away in Jan and that also has lead to him using alcohol more. P       Patient Care Team:  Tejal Garcia MD as PCP - General (Family Medicine)  MD Hernandez Tobias Son, MD Sukumar Reeves MD (Neurology)    Review of Systems   Constitutional:  Negative for chills and fever.   HENT:  Negative for ear pain and sore throat.    Eyes:  Negative for pain and visual disturbance.   Respiratory:  Negative for cough and shortness of breath.    Cardiovascular:  Negative for chest pain and palpitations.   Gastrointestinal:  Negative for abdominal pain and vomiting.   Genitourinary:  Negative for dysuria and hematuria.   Musculoskeletal:  Negative for arthralgias and  back pain.   Skin:  Negative for color change and rash.   Neurological:  Positive for tremors. Negative for seizures and syncope.   All other systems reviewed and are negative.    Medical History Reviewed by provider this encounter:  [facility]       Annual Wellness Visit Questionnaire   Fredy is here for his Subsequent Wellness visit.     Health Risk Assessment:   Patient rates overall health as fair. Patient feels that their physical health rating is same. Patient is dissatisfied with their life. Eyesight was rated as slightly worse. Hearing was rated as slightly worse. Patient feels that their emotional and mental health rating is slightly worse. Patients states they are sometimes angry. Patient states they are often unusually tired/fatigued. Pain experienced in the last 7 days has been none. Patient states that he has experienced no weight loss or gain in last 6 months.     Depression Screening:   PHQ-9 Score: 25      Fall Risk Screening:   In the past year, patient has experienced: no history of falling in past year      Home Safety:  Patient does not have trouble with stairs inside or outside of their home. Patient has working smoke alarms and has working carbon monoxide detector. Home safety hazards include: none.     Nutrition:   Current diet is Regular.     Medications:   Patient is currently taking over-the-counter supplements. OTC medications include: see medication list. Patient is able to manage medications.     Activities of Daily Living (ADLs)/Instrumental Activities of Daily Living (IADLs):   Walk and transfer into and out of bed and chair?: Yes  Dress and groom yourself?: Yes    Bathe or shower yourself?: Yes    Feed yourself? Yes  Do your laundry/housekeeping?: Yes  Manage your money, pay your bills and track your expenses?: Yes  Make your own meals?: Yes    Do your own shopping?: Yes    Previous Hospitalizations:   Any hospitalizations or ED visits within the last 12 months?: Yes    How many  hospitalizations have you had in the last year?: 3-4    Advance Care Planning:   Living will: Yes    Durable POA for healthcare: Yes    Advanced directive: Yes    Advanced directive counseling given: Yes    Five wishes given: No    Patient declined ACP directive: Yes      PREVENTIVE SCREENINGS      Cardiovascular Screening:    General: History Lipid Disorder, Screening Current and Risks and Benefits Discussed      Diabetes Screening:     General: Screening Current and Risks and Benefits Discussed      Colorectal Cancer Screening:     General: Screening Current      Prostate Cancer Screening:    General: Screening Not Indicated      Osteoporosis Screening:    General: Screening Not Indicated and History Osteoporosis      Abdominal Aortic Aneurysm (AAA) Screening:    Risk factors include: age between 65-74 yo        General: Screening Not Indicated      Lung Cancer Screening:     General: Screening Not Indicated      Hepatitis C Screening:    General: Screening Current    Screening, Brief Intervention, and Referral to Treatment (SBIRT)     Screening  Typical number of drinks in a day: 8  Typical number of drinks in a week: 48  Interpretation: Risky drinking behavior.    AUDIT-C Screenin) How often did you have a drink containing alcohol in the past year? 4 or more times a week  2) How many drinks did you have on a typical day when you were drinking in the past year? 7 to 9  3) How often did you have 6 or more drinks on one occasion in the past year? daily or almost daily    AUDIT-C Score: 8  Interpretation: Score 4-12 (male): POSITIVE screen for alcohol misuse    AUDIT Screenin) How often during the last year have you found that you were not able to stop drinking once you had started? 4 - daily or almost daily  5) How often during the last year have you failed to do what was normally expected from you because of drinking? 4 - daily or almost daily  6) How often during the last year have you needed a first  drink in the morning to get yourself going after a heavy drinking session? 0 - never  7) How often during the last year have you had a feeling of guilt or remorse after drinking? 4 - daily or almost daily  8) How often during the last year have you been unable to remember what happened the night before because you had been drinking? 0 - never  9) Have you or someone else been injured as a result of your drinking? 0 - no  10) Has a relative or friend or a doctor or another health worker been concerned about your drinking or suggested you cut down? 4 - yes, during the last year    AUDIT Score: 24  Interpretation: High risk alcohol consumption; likely alcohol dependence    Single Item Drug Screening:  How often have you used an illegal drug (including marijuana) or a prescription medication for non-medical reasons in the past year? never    Single Item Drug Screen Score: 0  Interpretation: Negative screen for possible drug use disorder    Social Drivers of Health     Financial Resource Strain: Patient Declined (11/12/2024)    Received from [facility]    Overall Financial Resource Strain (CARDIA)     Difficulty of Paying Living Expenses: Patient declined   Food Insecurity: No Food Insecurity (3/11/2025)    Hunger Vital Sign     Worried About Running Out of Food in the Last Year: Never true     Ran Out of Food in the Last Year: Never true   Transportation Needs: No Transportation Needs (3/11/2025)    PRAPARE - Transportation     Lack of Transportation (Medical): No     Lack of Transportation (Non-Medical): No   Housing Stability: Low Risk  (3/11/2025)    Housing Stability Vital Sign     Unable to Pay for Housing in the Last Year: No     Number of Times Moved in the Last Year: 0     Homeless in the Last Year: No   Utilities: Not At Risk (3/11/2025)    [facility] Utilities     Threatened with loss of utilities: No     No results found.    Objective   /72   Pulse (!) 52   Temp (!) 96.9 °F (36.1 °C)   Wt  97.2 kg (214 lb 3.2 oz)   SpO2 96%   BMI 35.64 kg/m²     Physical Exam  Vitals and nursing note reviewed.   Constitutional:       General: He is not in acute distress.     Appearance: He is well-developed.   HENT:      Head: Normocephalic and atraumatic.      Right Ear: Tympanic membrane normal.      Left Ear: Tympanic membrane normal. There is impacted cerumen.   Eyes:      Conjunctiva/sclera: Conjunctivae normal.   Cardiovascular:      Rate and Rhythm: Normal rate and regular rhythm.      Heart sounds: No murmur heard.  Pulmonary:      Effort: Pulmonary effort is normal. No respiratory distress.      Breath sounds: Normal breath sounds.   Abdominal:      Palpations: Abdomen is soft.      Tenderness: There is no abdominal tenderness.   Musculoskeletal:         General: No swelling.      Cervical back: Neck supple.   Skin:     General: Skin is warm and dry.      Capillary Refill: Capillary refill takes less than 2 seconds.   Neurological:      Mental Status: He is alert and oriented to person, place, and time.      Motor: Tremor present.      Gait: Gait abnormal (unstable 2/2 alcohol use).   Psychiatric:         Mood and Affect: Mood normal.

## 2025-03-11 NOTE — ASSESSMENT & PLAN NOTE
-BP stable for age  -Continue current Rx  -Refills given   Orders:    amLODIPine (NORVASC) 2.5 mg tablet; Take 1 tablet (2.5 mg total) by mouth daily

## 2025-03-13 ENCOUNTER — OFFICE VISIT (OUTPATIENT)
Dept: SLEEP CENTER | Facility: CLINIC | Age: 75
End: 2025-03-13
Payer: MEDICARE

## 2025-03-13 VITALS
TEMPERATURE: 98.4 F | HEART RATE: 56 BPM | RESPIRATION RATE: 16 BRPM | BODY MASS INDEX: 36.35 KG/M2 | SYSTOLIC BLOOD PRESSURE: 138 MMHG | DIASTOLIC BLOOD PRESSURE: 81 MMHG | OXYGEN SATURATION: 99 % | HEIGHT: 65 IN | WEIGHT: 218.2 LBS

## 2025-03-13 DIAGNOSIS — F10.90 ALCOHOL USE DISORDER: ICD-10-CM

## 2025-03-13 DIAGNOSIS — F41.8 DEPRESSION WITH ANXIETY: ICD-10-CM

## 2025-03-13 DIAGNOSIS — G47.33 OSA (OBSTRUCTIVE SLEEP APNEA): Primary | ICD-10-CM

## 2025-03-13 PROCEDURE — 99215 OFFICE O/P EST HI 40 MIN: CPT | Performed by: STUDENT IN AN ORGANIZED HEALTH CARE EDUCATION/TRAINING PROGRAM

## 2025-03-13 PROCEDURE — G2211 COMPLEX E/M VISIT ADD ON: HCPCS | Performed by: STUDENT IN AN ORGANIZED HEALTH CARE EDUCATION/TRAINING PROGRAM

## 2025-03-13 NOTE — PATIENT INSTRUCTIONS
Continue PAP Therapy  Continue AutoBiPAP at settings of minimum EPAP 68qdA2P, maximum IPAP 67hrA9W, PS 4cmH2O  Remember to clean your mask and equipment regularly, as directed.  I am ordering a formal mask fitting appointment; this is an appointment with the DME to ensure that you have the optimal mask and fit for your face structure    You should be eligible for new supplies approximately every 3-6 months, depending on your insurance coverage. Contact your Durable Medical Equipment (DME) company for new supplies as needed.  Practice good Sleep Hygiene, as outlined below.  Continue to attend AA meetings and work on abstinence from alcohol  Keep your appointment with [facility] in July  Follow up in 3-6 months.      Care and Maintenance  Headgear should be washed as needed. Daily inspection and weekly washings are recommended. Do not disassemble the straps. Machine wash in warm water, making sure to attach Velcro hooks and tabs before washing. Line dry or machine dry on a low setting.  Masks should be washed every day. Daily inspection is recommended. Leave the mask and tubing attached. Gently wash the mask with a soft cloth using warm water and mild detergent, concentrating on the mask cushion flaps. DO NOT use alcohol or bleach. Rinse thoroughly and air dry.  Tubing and headgear should be washed weekly. Daily inspection is recommended. Wash in warm water and mild detergent and rinse thoroughly. Hook the tubing to the machine and blow until dry.  Humidifier should be washed daily and filled with DISTILLED water before use. Wash with warm water and mild detergent. Rinse thoroughly and air dry.  Disposable filters should be replaced once a month. Wash reusable foam filters with warm water and mild detergent at least once a month. Rinse thoroughly and dry with paper towels.  Avoid  that contain fragrance or conditioners, as these will leave a residue.  NEVER iron any soft goods.      CMS Requirements    Your  insurance requires a face-to-face follow up visit within a 31-90 day period after starting CPAP.  Your insurance requires compliance with CPAP, which is at least 4 hours per night for 70% of the time. This must be done over a 30 day period and must occur within the initial 31-90 day period after starting CPAP.  Your insurance also requires at least yearly follow ups to continue to pay for CPAP supplies.       PAP Supply Guidelines    Below are the guidelines for reordering your supplies. You will be responsible for your deductible, co payments, and out of pocket expenses.    Item Frequency   Nasal Mask (no headgear) 1 every 3 months   Nasal Mask Cushion 1 every 2 weeks   Full Face Mask (no headgear) 1 every 3 months   Full Face Mask Cushion 1 every month   Nasal Pillows 1 every 2 weeks   Headgear 1 every 6 months   hin Strap 1 every 6 months   jessica 1 every 3 months   Filters: Reusable 1 every 6 months   Filters: Disposable 1 every 2 weeks   Humidifier Chamber(disposable) 1 every 6 months         Good Sleep Hygiene  Wake up at the same time every day, even on the weekends.  Use your bed for sleep and intimacy only.  If you have been in bed awake for 30 minutes, get up and leave the bedroom. Choose a dull activity not involving a blue screen (TV, computer, handheld devices). Go back to bed when you feel sleepy.  Avoid caffeine, nicotine and alcohol before you go to bed.  Avoid large meals before you go to bed.  Avoid using screens (computers, tablets, smartphones, etc.) for at least 1 hour before bedtime  Exercise regularly, but do not exercise right before you go to bed.  Avoid daytime naps. If you do take a nap, sleep for 20-40 minutes, and not after 2pm.

## 2025-03-13 NOTE — ASSESSMENT & PLAN NOTE
This sounds to continue to be a bit of an issue, however some of the social factors (such as difficulties with his daughter) have improved slightly.  He does sound to have an appointment with [facility] for management of this and other issues in July.  Certainly, not yet optimally controlled depression could contribute to excessive daytime sleepiness.    Defer pharmacologic management to his PCP and/or [facility]  Encouraged him to keep his appointment with [facility] in July.

## 2025-03-13 NOTE — ASSESSMENT & PLAN NOTE
Fredy is a pleasant 75-year-old gentleman with a PMHx of HTN, depression, macular degeneration, polymyalgia rheumatica, seizure, HLD, prolonged QT interval, obesity, prediabetes, alcohol use disorder who presents in follow up for obstructive sleep apnea (PARTHA 33.6, O2 ginger 67% 5/2024) and other excessive daytime sleepiness.  Unfortunately, he has lapsed in his use of the device since his last visit with me.  His severe hearing loss does limit/impact history taking to a degree, however it sounds as though the primary reasons for this were intermittent congestion and mucus buildup resulting in a headache when he used the device (which is only occurred intermittently over the winter), but also a relapse in alcohol use following the passing of his father.    Compliance report reviewed and analyzed  Resume regular use of AutoBiPAP at settings of minimum EPAP 27ugT7J, maximum IPAP 69whO6C, PS 4cmH2O  I have also ordered a repeat mask fitting appointment due to ongoing severe leak noted on his compliance report  Prescription for new supplies ordered today  Reviewed CMS/insurance requirements and resupply guidelines  Information provided on the above as well as general maintenance steps  Recommended maintaining good Sleep Hygiene  May consider a nocturnal pulse oximetry study once he is more stable with PAP use.    Orders:    PAP DME Resupply/Reorder    Mask fitting only; Future

## 2025-03-13 NOTE — ASSESSMENT & PLAN NOTE
He unfortunately did undergo a relapse recently, which significantly affected his use of the device.  He tells me that he has now resumed Alcoholics Anonymous meetings, and was prescribed Librium by his PCP to help avoid withdrawal.    Reviewed the importance of abstinence, and encouraged him to continue going to AA meetings  Defer primary management to his PCP

## 2025-03-13 NOTE — PROGRESS NOTES
Name: Fredy Yu      : 1950      MRN: [id number]  Encounter Provider: Ta Ward DO  Encounter Date: 3/13/2025   Encounter department: [facility] SLEEP MEDICINE [place]  :  Assessment & Plan  ROSARIO (obstructive sleep apnea)  Fredy is a pleasant 75-year-old gentleman with a PMHx of HTN, depression, macular degeneration, polymyalgia rheumatica, seizure, HLD, prolonged QT interval, obesity, prediabetes, alcohol use disorder who presents in follow up for obstructive sleep apnea (PARTHA 33.6, O2 ginger 67% 2024) and other excessive daytime sleepiness.  Unfortunately, he has lapsed in his use of the device since his last visit with me.  His severe hearing loss does limit/impact history taking to a degree, however it sounds as though the primary reasons for this were intermittent congestion and mucus buildup resulting in a headache when he used the device (which is only occurred intermittently over the winter), but also a relapse in alcohol use following the passing of his father.    Compliance report reviewed and analyzed  Resume regular use of AutoBiPAP at settings of minimum EPAP 76hmT7U, maximum IPAP 30qdT7J, PS 4cmH2O  I have also ordered a repeat mask fitting appointment due to ongoing severe leak noted on his compliance report  Prescription for new supplies ordered today  Reviewed CMS/insurance requirements and resupply guidelines  Information provided on the above as well as general maintenance steps  Recommended maintaining good Sleep Hygiene  May consider a nocturnal pulse oximetry study once he is more stable with PAP use.    Orders:    PAP DME Resupply/Reorder    Mask fitting only; Future    Alcohol use disorder  He unfortunately did undergo a relapse recently, which significantly affected his use of the device.  He tells me that he has now resumed Alcoholics Anonymous meetings, and was prescribed Librium by his PCP to help avoid withdrawal.    Reviewed the importance of abstinence, and encouraged  him to continue going to AA meetings  Defer primary management to his PCP       Depression with anxiety  This sounds to continue to be a bit of an issue, however some of the social factors (such as difficulties with his daughter) have improved slightly.  He does sound to have an appointment with [facility] for management of this and other issues in July.  Certainly, not yet optimally controlled depression could contribute to excessive daytime sleepiness.    Defer pharmacologic management to his PCP and/or [facility]  Encouraged him to keep his appointment with [facility] in July.           Follow-up:  He will Return for Follow up in 3-6 months..      ________________________________________________________________________________________________    Per Last Visit Note (Date: 9/12/2024):  Fredy is a pleasant 74-year-old gentleman with a PMHx of HTN, depression, macular degeneration, polymyalgia rheumatica, seizure, HLD, prolonged QT interval, obesity, prediabetes who presents in follow up for obstructive sleep apnea (PARTHA 33.6, O2 ginger 67% 5/2024).  From a purely sleep apnea standpoint, he is actually doing fairly well.  He endorses some difficulty when he initially started using it, however he currently endorses good benefit, which is reflected in his residual AHI on his compliance report.  He does appear to have a fairly severe mask leak issue, however, which should be addressed.     He also unfortunately has been dealing with a very distressing family issue (see HPI), which sounds with been exacerbating his depression.  He has had some medication adjustments made by his PCP, which he thinks may have helped, however the situation is ongoing.  Certainly, this depression could it self exacerbate any daytime sleepiness or fatigue he may have, as could side effects of medications, so we will need to keep that in mind when evaluating symptoms.     Continue auto BiPAP at settings of max IPAP 25 cm H2O, minimum EPAP 14 cm H2O,  PS 4 cm H2O  I placed an order for a formal mask fitting appointment to address the severe mask leak  Prescription for new supplies ordered today  Reviewed CMS/insurance requirements and resupply guidelines  Information provided on the above as well as general maintenance steps  Once things have settled down (if possible), we may consider a nocturnal pulse oximetry study to ensure adequate oxygenation, likely at his next appointment.  We did spend quite some time reviewing the effects of depression on daytime fatigue and sleepiness.  For his and his wife's request, I have placed a referral to [facility]'s psych services, however I also counseled him to continue to follow with his PCP and/or potentially look for local psychologic services, based on the [facility] [place] psych departments current wait times.  I also reviewed the effects of alcohol on sleep, and encouraged him to continue with his regular AA meetings, despite his recent relapse in July.  He will Return in about 6 months (around 3/12/2025).      Sleep Studies:  -HSAT 5/28/2024: TRT: 608.5 minutes, PARTHA: 33.6, O2 ginger: 67%.  Hypoxia was noted even in the absence of respiratory events, although unclear whether this was true hypoxemia or technical artifact.      -PAP Titration Study 6/13/2024: Titration started at 4 cmH2O, titrated to 19/15 cmH2O. Best pressure noted to be 19/15 cmH2O. however, some flow limitation still seen at this level, therefore auto BiPAP recommended          ________________________________________________________________________________________________      Interval History: Fredy Yu is a 75 y.o. male with a PMHx of HTN, depression, macular degeneration, polymyalgia rheumatica, seizure, HLD, prolonged QT interval, obesity, prediabetes, alcohol use disorder who presents in follow up for obstructive sleep apnea (PARTHA 33.6, O2 ginger 67% 5/2024) and other excessive daytime sleepiness.    Psych Services?   -Daughter is working again, 3 days  "per week in [place], then from uncle's house the other 2 days.   -Still being managed by PCP (trazodone, BuSpar, Zoloft)    Alcohol Use?   -Father passed away in January, got disgusted and relapsed, stopped going to AA meetings. Has now resumed AA meetings as of this week. Hasn't had anything to drink since Saturday; up until that point, \"every 2 days I was drinking a pint.\"   -Was started on a Librium taper by his PCP yesterday.   -Has an appointment with [facility] 7/21/2025      SDB:  -Current experience with PAP Therapy: Has been getting stuffed up, with yellow mucous, then when he uses the device on those nights he will get a pain in his forehead from \"the pressure building up.\"    -Does endorse 1 week \"a couple weeks ago\" where every time he was in the shower he would have discharge from the left nostril, with blood,  but this has gone away.    -Also endorses that with the relapse, he was drinking more, and would fall asleep without putting the device on.   -Mask type: Full-face mask  -Difficulties with mask: Denies  -Device: ResMed AirCurve 11 BiPAP; received 7/18/2024.  -Difficulties with device: Denies  -DME: [employer] [facility]  -Compliance:                  Changes to PMH, PSH, SH: See above           Sitting and reading: (Patient-Rptd) (P) High chance of dozing  Watching TV: (Patient-Rptd) (P) High chance of dozing  Sitting, inactive in a public place (e.g. a theatre or a meeting): (Patient-Rptd) (P) Moderate chance of dozing  As a passenger in a car for an hour without a break: (Patient-Rptd) (P) Moderate chance of dozing  Lying down to rest in the afternoon when circumstances permit: (Patient-Rptd) (P) High chance of dozing  Sitting and talking to someone: (Patient-Rptd) (P) Slight chance of dozing  Sitting quietly after a lunch without alcohol: (Patient-Rptd) (P) Moderate chance of dozing  In a car, while stopped for a few minutes in traffic: (Patient-Rptd) (P) Would never doze  Total score: (Patient-Rptd) (P) " 16     Review of Systems  Pertinent positives/negatives included in HPI and also as noted:     Current Outpatient Medications on File Prior to Visit   Medication Sig Dispense Refill    Allopurinol POWD       amLODIPine (NORVASC) 2.5 mg tablet Take 1 tablet (2.5 mg total) by mouth daily 90 tablet 1    Blood Pressure KIT Use 1 Device daily 1 kit 0    busPIRone (BUSPAR) 7.5 mg tablet Take 1 tablet (7.5 mg total) by mouth 2 (two) times a day 60 tablet 2    chlordiazePOXIDE (LIBRIUM) 25 mg capsule Take 1 capsule (25 mg total) by mouth 3 (three) times a day for 3 days, THEN 1 capsule (25 mg total) 2 (two) times a day for 3 days, THEN 1 capsule (25 mg total) in the morning for 3 days. 18 capsule 0    finasteride (PROSCAR) 5 mg tablet Take 1 tablet (5 mg total) by mouth daily 90 tablet 1    folic acid (FOLVITE) 1 mg tablet Take 1 tablet (1 mg total) by mouth daily 90 tablet 1    gabapentin (Neurontin) 400 mg capsule Take 400mg twice daily x 5 days. Then increase to three times daily thereafter 90 capsule 3    methotrexate 2.5 MG tablet Take 8 tablets (20 mg total) by mouth once a week 120 tablet 1    naltrexone (REVIA) 50 mg tablet Take 1 tablet (50 mg total) by mouth daily 30 tablet 1    predniSONE 1 mg tablet Take 4 tablets (4 mg total) by mouth daily 120 tablet 6    sertraline (Zoloft) 50 mg tablet Take 3 tablets (150 mg total) by mouth daily 90 tablet 2    thiamine 100 MG tablet Take 100 mg by mouth daily      traZODone (DESYREL) 100 mg tablet Take 1.5 tablets (150 mg total) by mouth daily at bedtime 135 tablet 1    Adalimumab 40 MG/0.4ML AJKT Inject 1 each under the skin every 14 (fourteen) days Inject 1 pen every 14 days (Patient not taking: Reported on 2/18/2025) 2 each 11    alfuzosin (UROXATRAL) 10 mg 24 hr tablet Take 1 tablet (10 mg total) by mouth daily 90 tablet 1    magnesium hydroxide (MILK OF MAGNESIA) 400 mg/5 mL oral suspension Take by mouth daily as needed for constipation (Patient not taking: Reported on  "1/6/2025)      oxybutynin (DITROPAN) 5 mg tablet Take 1 tablet (5 mg total) by mouth 3 (three) times a day as needed (for stent discomfort) (Patient not taking: Reported on 2/18/2025) 10 tablet 0     No current facility-administered medications on file prior to visit.      Objective   /81 (BP Location: Left arm, Patient Position: Sitting, Cuff Size: Large)   Pulse 56   Temp 98.4 °F (36.9 °C) (Temporal)   Resp 16   Ht 5' 5\" (1.651 m)   Wt 99 kg (218 lb 3.2 oz)   SpO2 99%   BMI 36.31 kg/m²     Neck Circumference: 18.5  Physical Exam   PHYSICAL EXAMINATION:  Vital Signs: /81 (BP Location: Left arm, Patient Position: Sitting, Cuff Size: Large)   Pulse 56   Temp 98.4 °F (36.9 °C) (Temporal)   Resp 16   Ht 5' 5\" (1.651 m)   Wt 99 kg (218 lb 3.2 oz)   SpO2 99%   BMI 36.31 kg/m²     Constitutional: NAD, well appearing   Mental Status: AAOx3  Skin: Warm, dry, no rashes noted   Eyes: PERRL, normal conjunctiva  Chest: No evidence of respiratory distress, no accessory muscle use; no evidence of peripheral cyanosis  Abdomen: Soft, NT/ND  Extremities: No digital clubbing or pedal edema  Neuro: Strength 5/5 throughout, sensation grossly intact.  Significantly hard of hearing.    Data  Lab Results   Component Value Date    HGB 13.2 02/27/2025    HCT 38.7 02/27/2025    MCV 96 02/27/2025      Lab Results   Component Value Date    GLUCOSE 119 12/29/2014    CALCIUM 9.3 02/27/2025     12/29/2014    K 4.0 02/27/2025    CO2 30 02/27/2025     02/27/2025    BUN 18 02/27/2025    CREATININE 1.21 02/27/2025     Lab Results   Component Value Date    IRON 92 10/29/2024    TIBC 244 (L) 10/29/2024    FERRITIN 404 (H) 10/29/2024     Lab Results   Component Value Date    AST 18 11/14/2024    ALT 20 11/14/2024       Administrative Statements   I have spent a total time of 40-43 minutes in caring for this patient on the day of the visit/encounter including Diagnostic results, Prognosis, Risks and benefits of tx " options, Instructions for management, Patient and family education, Importance of tx compliance, Risk factor reductions, Documenting in the medical record, Reviewing/placing orders in the medical record (including tests, medications, and/or procedures), and Obtaining or reviewing history  .    Electronically signed by:    Ta Ward DO  Board-Certified Neurology and Sleep Medicine  [facility]  03/13/25

## 2025-03-14 ENCOUNTER — TELEPHONE (OUTPATIENT)
Dept: SLEEP CENTER | Facility: CLINIC | Age: 75
End: 2025-03-14

## 2025-03-14 NOTE — TELEPHONE ENCOUNTER
Fredy stopped into the office and was upset because he said he lost a lot of his Librium pills down the drain in the bathroom.  He said that his hands were not even shaking that bad.  The total amount of pills lost was 7.  Can replacement pills be ordered?

## 2025-03-14 NOTE — TELEPHONE ENCOUNTER
Fredy stopped into the office for concerns of medication that fell into the sink and mentioned that he was just seen by sleep med and they weighed him and he gained 2 lbs since he seen us .  I took Patient to the scale and his weight was 216 lbs.

## 2025-03-17 NOTE — TELEPHONE ENCOUNTER
Called patient to relay message below from provider. Patient did not answer, left a voicemail to call the office at [phone].      Given that patient has already been out of the medication since 3/14, a refill wont be appropriate at this time. Regarding weight gain, does patient have any symptoms like sob or abdominal pain, swelling in his extremities etc?

## 2025-03-17 NOTE — TELEPHONE ENCOUNTER
Given that patient has already been out of the medication since 3/14, a refill wont be appropriate at this time. Regarding weight gain, does patient have any symptoms like sob or abdominal pain, swelling in his extremities etc?

## 2025-03-20 ENCOUNTER — TELEPHONE (OUTPATIENT)
Dept: FAMILY MEDICINE CLINIC | Facility: CLINIC | Age: 75
End: 2025-03-20

## (undated) DEVICE — FIBER STD QUANTA 365 MICRON

## (undated) DEVICE — GLOVE INDICATOR PI UNDERGLOVE SZ 8 BLUE

## (undated) DEVICE — GLOVE SRG BIOGEL ECLIPSE 7.5

## (undated) DEVICE — 3M™ TEGADERM™ TRANSPARENT FILM DRESSING FRAME STYLE, 1624W, 2-3/8 IN X 2-3/4 IN (6 CM X 7 CM), 100/CT 4CT/CASE: Brand: 3M™ TEGADERM™

## (undated) DEVICE — PACK TUR

## (undated) DEVICE — CHLORHEXIDINE 4PCT 4 OZ

## (undated) DEVICE — SYRINGE 10ML LL

## (undated) DEVICE — PREMIUM DRY TRAY LF: Brand: MEDLINE INDUSTRIES, INC.

## (undated) DEVICE — SPECIMEN CONTAINER STERILE PEEL PACK

## (undated) DEVICE — GUIDEWIRE STRGHT TIP 0.035 IN  SOLO PLUS